# Patient Record
Sex: FEMALE | Race: BLACK OR AFRICAN AMERICAN | NOT HISPANIC OR LATINO | Employment: OTHER | URBAN - METROPOLITAN AREA
[De-identification: names, ages, dates, MRNs, and addresses within clinical notes are randomized per-mention and may not be internally consistent; named-entity substitution may affect disease eponyms.]

---

## 2017-01-09 DIAGNOSIS — F41.9 ANXIETY: ICD-10-CM

## 2017-01-09 NOTE — TELEPHONE ENCOUNTER
----- Message from Alma Griffiths sent at 1/9/2017  8:20 AM CST -----  Contact: pt  Pt requests refill for xanax. Pt can be reached at [phone]  ..  [facility]  [address] [place] [address]  Phone: [phone] Fax: [phone]

## 2017-01-11 DIAGNOSIS — F41.9 ANXIETY: ICD-10-CM

## 2017-01-11 RX ORDER — ALPRAZOLAM 2 MG/1
2 TABLET ORAL 2 TIMES DAILY
Qty: 60 TABLET | Refills: 2 | Status: CANCELLED | OUTPATIENT
Start: 2017-01-11

## 2017-01-11 RX ORDER — ALPRAZOLAM 2 MG/1
TABLET ORAL
Qty: 60 TABLET | Refills: 2 | Status: SHIPPED | OUTPATIENT
Start: 2017-01-11 | End: 2017-04-10 | Stop reason: SDUPTHER

## 2017-02-07 RX ORDER — LEVOTHYROXINE SODIUM 150 UG/1
TABLET ORAL
Qty: 30 TABLET | Refills: 6 | Status: SHIPPED | OUTPATIENT
Start: 2017-02-07 | End: 2017-04-18 | Stop reason: SDUPTHER

## 2017-02-09 ENCOUNTER — OFFICE VISIT (OUTPATIENT)
Dept: FAMILY MEDICINE | Facility: CLINIC | Age: 57
End: 2017-02-09
Payer: COMMERCIAL

## 2017-02-09 VITALS
BODY MASS INDEX: 21.19 KG/M2 | OXYGEN SATURATION: 96 % | RESPIRATION RATE: 18 BRPM | WEIGHT: 124.13 LBS | DIASTOLIC BLOOD PRESSURE: 84 MMHG | HEART RATE: 112 BPM | HEIGHT: 64 IN | SYSTOLIC BLOOD PRESSURE: 132 MMHG | TEMPERATURE: 99 F

## 2017-02-09 DIAGNOSIS — J30.9 ALLERGIC RHINITIS, CAUSE UNSPECIFIED: Primary | ICD-10-CM

## 2017-02-09 PROCEDURE — 99999 PR PBB SHADOW E&M-EST. PATIENT-LVL III: CPT | Mod: PBBFAC,,, | Performed by: REGISTERED NURSE

## 2017-02-09 PROCEDURE — 99213 OFFICE O/P EST LOW 20 MIN: CPT | Mod: S$GLB,,, | Performed by: REGISTERED NURSE

## 2017-02-09 RX ORDER — LEVOTHYROXINE SODIUM 150 UG/1
150 TABLET ORAL DAILY
Qty: 30 TABLET | Refills: 6 | Status: CANCELLED | OUTPATIENT
Start: 2017-02-09

## 2017-02-09 RX ORDER — PROMETHAZINE HYDROCHLORIDE AND CODEINE PHOSPHATE 6.25; 1 MG/5ML; MG/5ML
5 SOLUTION ORAL EVERY 6 HOURS PRN
Qty: 180 ML | Refills: 0 | Status: SHIPPED | OUTPATIENT
Start: 2017-02-09 | End: 2017-04-18 | Stop reason: SDUPTHER

## 2017-02-09 RX ORDER — CARBINOXAMINE MALEATE 4 MG/1
1-2 TABLET ORAL 3 TIMES DAILY PRN
Qty: 60 EACH | Refills: 0 | Status: SHIPPED | OUTPATIENT
Start: 2017-02-09 | End: 2017-04-18 | Stop reason: SDUPTHER

## 2017-02-09 RX ORDER — PREDNISONE 20 MG/1
40 TABLET ORAL DAILY
Qty: 10 TABLET | Refills: 0 | Status: SHIPPED | OUTPATIENT
Start: 2017-02-09 | End: 2017-02-14

## 2017-02-09 NOTE — MR AVS SNAPSHOT
[facility] Medicine  [address]  [place] [address]  Phone: [phone]                  Ana Bonilla   2017 10:30 AM   Office Visit    Description:  Female : 1960   Provider:  Kenrick Locke NP   Department:  [facility]           Reason for Visit     Generalized Body Aches     Cough     Fatigue           Diagnoses this Visit        Comments    Allergic rhinitis, cause unspecified    -  Primary            To Do List           Goals (5 Years of Data)     None       These Medications        Disp Refills Start End    predniSONE (DELTASONE) 20 MG tablet 10 tablet 0 2017    Take 2 tablets (40 mg total) by mouth once daily. - Oral    [facility] [place] [facility]. Ph #: [phone]       promethazine-codeine 6.25-10 mg/5 ml (PHENERGAN WITH CODEINE) 6.25-10 mg/5 mL syrup 180 mL 0 2017     Take 5 mLs by mouth every 6 (six) hours as needed. - Oral    [facility] [place] [facility]  [facility]. Ph #: [phone]       carbinoxamine maleate 4 mg Tab 60 each 0 2017     Take 1-2 tablets by mouth 3 (three) times daily as needed. - Oral    [facility] [place] [facility]  [facility]. Ph #: [phone]         [facility]     [facility] On Call Nurse Care Line -  Assistance  Registered nurses in the [facility] provide clinical advisement, health education, appointment booking, and other advisory services.  Call for this free service at [phone].             Medications           Message regarding Medications     Verify the changes and/or additions to your medication regime listed below are the same as discussed with your clinician today.  If any of these changes or additions are incorrect, please notify your healthcare provider.        START taking these NEW  medications        Refills    predniSONE (DELTASONE) 20 MG tablet 0    Sig: Take 2 tablets (40 mg total) by mouth once daily.    Class: Normal    Route: Oral    carbinoxamine maleate 4 mg Tab 0    Sig: Take 1-2 tablets by mouth 3 (three) times daily as needed.    Class: Normal    Route: Oral           Verify that the below list of medications is an accurate representation of the medications you are currently taking.  If none reported, the list may be blank. If incorrect, please contact your healthcare provider. Carry this list with you in case of emergency.           Current Medications     albuterol (PROVENTIL HFA) 90 mcg/actuation inhaler Inhale 2 puffs into the lungs every 6 (six) hours as needed.    albuterol-ipratropium  mcg (COMBIVENT)  mcg/actuation inhaler Inhale 2 puffs into the lungs every 6 (six) hours as needed for Wheezing.    alprazolam (XANAX) 2 MG Tab take 1 tablet by mouth twice a day if needed    alprazolam (XANAX) 2 MG Tab take 1 tablet by mouth twice a day if needed    augmented betamethasone dipropionate (DIPROLENE-AF) 0.05 % cream Apply topically 2 (two) times daily.    azelastine (OPTIVAR) 0.05 % ophthalmic solution Place 1 drop into both eyes 2 (two) times daily.    budesonide (PULMICORT) 180 mcg/actuation AePB Inhale 2 puffs into the lungs 2 (two) times daily.    carbinoxamine maleate 4 mg Tab Take 1-2 tablets by mouth 3 (three) times daily as needed.    flunisolide 25 mcg, 0.025%, (NASALIDE) 25 mcg (0.025 %) Spry 2 sprays by Nasal route 2 (two) times daily.    levocetirizine (XYZAL) 5 MG tablet Take 1 tablet (5 mg total) by mouth once daily.    levothyroxine (SYNTHROID) 150 MCG tablet take 1 tablet by mouth once daily    montelukast (SINGULAIR) 10 mg tablet Take 1 tablet (10 mg total) by mouth every evening.    omeprazole (PRILOSEC) 40 MG capsule Take 1 capsule (40 mg total) by mouth once daily.    ondansetron (ZOFRAN) 4 MG tablet Take 1 tablet (4 mg total) by mouth every 4 to 6  "hours as needed for Nausea.    oxycodone-acetaminophen (PERCOCET) 5-325 mg per tablet take 1 tablet by mouth every 8 to 12 hours    predniSONE (DELTASONE) 20 MG tablet Take 2 tablets (40 mg total) by mouth once daily.    promethazine-codeine 6.25-10 mg/5 ml (PHENERGAN WITH CODEINE) 6.25-10 mg/5 mL syrup Take 5 mLs by mouth every 6 (six) hours as needed.           Clinical Reference Information           Your Vitals Were     BP Pulse Temp Resp Height Weight    132/84 112 98.6 °F (37 °C) (Tympanic) 18 5' 4" (1.626 m) 56.3 kg (124 lb 1.9 oz)    SpO2 BMI             96% 21.3 kg/m2         Blood Pressure          Most Recent Value    BP  132/84      Allergies as of 2/9/2017     No Known Allergies      Immunizations Administered on Date of Encounter - 2/9/2017     None      Smoking Cessation     If you would like to quit smoking:   You may be eligible for free services if you are a [place] resident and started smoking cigarettes before September 1, 1988.  Call the Smoking Cessation Trust ([facility]) toll free at [phone] or ([phone].   Call 1-800-QUIT-NOW if you do not meet the above criteria.            Language Assistance Services     ATTENTION: Language assistance services are available, free of charge. Please call [phone].      ATENCIÓN: Si habla español, tiene a reyes disposición servicios gratuitos de asistencia lingüística. Llame al [phone].     [facility] Ý: N?u b?n nói Ti?ng Vi?t, có các d?ch v? h? tr? ngôn ng? mi?n phí dành cho b?n. G?i s? [phone].         [facility] complies with applicable Federal civil rights laws and does not discriminate on the basis of race, color, national origin, age, disability, or sex.        "

## 2017-02-09 NOTE — PROGRESS NOTES
"Subjective:       Patient ID: Ana Bonilla is a 56 y.o. female.    Chief Complaint: Generalized Body Aches; Cough; and Fatigue    HPI     Mrs. Bonilla is here today with c/o illness x 3 days.  Taking Benedryl, Phen-Codeine and nasal spray.  Reports fever, chills, aches, dry cough, NC, and ear pain.  Reports scratchy throat, sneezing w/ puffy watery eyes.    Review of Systems   Constitutional: Positive for chills, fatigue and fever.   HENT: Positive for congestion, ear pain, postnasal drip, sneezing and sore throat. Negative for rhinorrhea and sinus pressure.    Eyes: Positive for discharge, redness and itching. Negative for photophobia, pain and visual disturbance.   Respiratory: Positive for cough. Negative for shortness of breath and wheezing.    Cardiovascular: Negative.    Allergic/Immunologic: Positive for environmental allergies.   Neurological: Positive for headaches. Negative for weakness, light-headedness and numbness.   Hematological: Negative for adenopathy.       Objective:         Vitals:    02/09/17 1042   BP: 132/84   Pulse: (!) 112   Resp: 18   Temp: 98.6 °F (37 °C)   TempSrc: Tympanic   SpO2: 96%   Weight: 56.3 kg (124 lb 1.9 oz)   Height: 5' 4" (1.626 m)       Physical Exam   Constitutional: She is oriented to person, place, and time. She appears well-developed and well-nourished.   HENT:   Head: Normocephalic and atraumatic.   Right Ear: Tympanic membrane is bulging. Tympanic membrane is not injected, not perforated, not erythematous and not retracted.   Left Ear: Tympanic membrane is bulging. Tympanic membrane is not injected, not perforated, not erythematous and not retracted.   Nose: Mucosal edema and rhinorrhea (boggy, clear RN) present. Right sinus exhibits no maxillary sinus tenderness and no frontal sinus tenderness. Left sinus exhibits no maxillary sinus tenderness and no frontal sinus tenderness.   Mouth/Throat: No oropharyngeal exudate, posterior oropharyngeal edema or posterior " oropharyngeal erythema (clear PND noted).   Eyes: Pupils are equal, round, and reactive to light. Right eye exhibits discharge (clear B watery d/c, eye redness, puffy, shiners). Left eye exhibits discharge.   Cardiovascular: Regular rhythm and normal heart sounds.  Tachycardia present.  Exam reveals no gallop and no friction rub.    No murmur heard.  Pulmonary/Chest: Effort normal and breath sounds normal. No respiratory distress. She has no wheezes. She exhibits no tenderness.   Lymphadenopathy:     She has no cervical adenopathy.   Neurological: She is alert and oriented to person, place, and time.   Vitals reviewed.      Assessment:       1. Allergic rhinitis, cause unspecified        Plan:       Ana was seen today for generalized body aches, cough and fatigue.    Diagnoses and all orders for this visit:    Allergic rhinitis, cause unspecified  -     predniSONE (DELTASONE) 20 MG tablet; Take 2 tablets (40 mg total) by mouth once daily.  -     promethazine-codeine 6.25-10 mg/5 ml (PHENERGAN WITH CODEINE) 6.25-10 mg/5 mL syrup; Take 5 mLs by mouth every 6 (six) hours as needed.  -     carbinoxamine maleate 4 mg Tab; Take 1-2 tablets by mouth 3 (three) times daily as needed.      Symptomatic care, rest and fluids.  RTC prn.

## 2017-04-10 DIAGNOSIS — J32.9 SINUSITIS, UNSPECIFIED CHRONICITY, UNSPECIFIED LOCATION: ICD-10-CM

## 2017-04-10 DIAGNOSIS — F41.9 ANXIETY: ICD-10-CM

## 2017-04-10 RX ORDER — OMEPRAZOLE 40 MG/1
CAPSULE, DELAYED RELEASE ORAL
Qty: 30 CAPSULE | Refills: 6 | Status: SHIPPED | OUTPATIENT
Start: 2017-04-10 | End: 2017-04-18 | Stop reason: SDUPTHER

## 2017-04-10 RX ORDER — ALPRAZOLAM 2 MG/1
TABLET ORAL
Qty: 60 TABLET | Refills: 0 | Status: SHIPPED | OUTPATIENT
Start: 2017-04-10 | End: 2017-04-18 | Stop reason: SDUPTHER

## 2017-04-10 RX ORDER — FLUNISOLIDE 0.25 MG/ML
2 SOLUTION NASAL 2 TIMES DAILY
Qty: 25 ML | Refills: 2 | Status: SHIPPED | OUTPATIENT
Start: 2017-04-10 | End: 2017-08-29

## 2017-04-10 NOTE — TELEPHONE ENCOUNTER
----- Message from Monse Thomason sent at 4/10/2017  4:38 PM CDT -----  Requesting Rx refill for nasal spray, Xanax, and heart burn medication.    Pt use..  [facility] [facility] [address]  [address]  [place] [address]  Phone: [phone] Fax: [phone]    Please adv/call [phone].//thanks. cw

## 2017-04-11 NOTE — TELEPHONE ENCOUNTER
Pt notified of medication sent to pharmacy and appointment needed for physical. Pt voiced understanding

## 2017-04-11 NOTE — TELEPHONE ENCOUNTER
----- Message from Kamila Vann sent at 4/11/2017  2:50 PM CDT -----  Patient would like for you to call her at [phone].     This is all she would tell me.                                  kirk

## 2017-04-18 ENCOUNTER — OFFICE VISIT (OUTPATIENT)
Dept: FAMILY MEDICINE | Facility: CLINIC | Age: 57
End: 2017-04-18
Payer: COMMERCIAL

## 2017-04-18 ENCOUNTER — LAB VISIT (OUTPATIENT)
Dept: LAB | Facility: HOSPITAL | Age: 57
End: 2017-04-18
Attending: REGISTERED NURSE
Payer: COMMERCIAL

## 2017-04-18 VITALS
BODY MASS INDEX: 21.11 KG/M2 | HEIGHT: 64 IN | HEART RATE: 105 BPM | OXYGEN SATURATION: 99 % | RESPIRATION RATE: 18 BRPM | SYSTOLIC BLOOD PRESSURE: 136 MMHG | DIASTOLIC BLOOD PRESSURE: 86 MMHG | WEIGHT: 123.69 LBS | TEMPERATURE: 97 F

## 2017-04-18 DIAGNOSIS — E78.00 HYPERCHOLESTEROLEMIA: ICD-10-CM

## 2017-04-18 DIAGNOSIS — Z00.00 ANNUAL PHYSICAL EXAM: ICD-10-CM

## 2017-04-18 DIAGNOSIS — J44.89 COPD WITH ASTHMA: ICD-10-CM

## 2017-04-18 DIAGNOSIS — E03.9 PRIMARY HYPOTHYROIDISM: ICD-10-CM

## 2017-04-18 DIAGNOSIS — Z72.0 TOBACCO ABUSE DISORDER: ICD-10-CM

## 2017-04-18 DIAGNOSIS — Z00.00 ANNUAL PHYSICAL EXAM: Primary | ICD-10-CM

## 2017-04-18 DIAGNOSIS — J30.9 CHRONIC ALLERGIC RHINITIS: ICD-10-CM

## 2017-04-18 DIAGNOSIS — F41.9 ANXIETY: ICD-10-CM

## 2017-04-18 DIAGNOSIS — I73.9 PVD (PERIPHERAL VASCULAR DISEASE): ICD-10-CM

## 2017-04-18 DIAGNOSIS — R10.9 SIDE PAIN: ICD-10-CM

## 2017-04-18 DIAGNOSIS — Z12.39 BREAST CANCER SCREENING: ICD-10-CM

## 2017-04-18 DIAGNOSIS — K21.9 GASTROESOPHAGEAL REFLUX DISEASE, ESOPHAGITIS PRESENCE NOT SPECIFIED: ICD-10-CM

## 2017-04-18 LAB
ALBUMIN SERPL BCP-MCNC: 3.6 G/DL
ALP SERPL-CCNC: 63 U/L
ALT SERPL W/O P-5'-P-CCNC: 13 U/L
ANION GAP SERPL CALC-SCNC: 7 MMOL/L
AST SERPL-CCNC: 24 U/L
BACTERIA #/AREA URNS AUTO: ABNORMAL /HPF
BASOPHILS # BLD AUTO: 0.03 K/UL
BASOPHILS NFR BLD: 0.7 %
BILIRUB SERPL-MCNC: 0.6 MG/DL
BILIRUB UR QL STRIP: NEGATIVE
BUN SERPL-MCNC: 10 MG/DL
CALCIUM SERPL-MCNC: 8.8 MG/DL
CHLORIDE SERPL-SCNC: 107 MMOL/L
CHOLEST/HDLC SERPL: 2.3 {RATIO}
CLARITY UR REFRACT.AUTO: ABNORMAL
CO2 SERPL-SCNC: 27 MMOL/L
COLOR UR AUTO: YELLOW
CREAT SERPL-MCNC: 0.9 MG/DL
DIFFERENTIAL METHOD: ABNORMAL
EOSINOPHIL # BLD AUTO: 0.1 K/UL
EOSINOPHIL NFR BLD: 2.8 %
ERYTHROCYTE [DISTWIDTH] IN BLOOD BY AUTOMATED COUNT: 12.8 %
EST. GFR  (AFRICAN AMERICAN): >60 ML/MIN/1.73 M^2
EST. GFR  (NON AFRICAN AMERICAN): >60 ML/MIN/1.73 M^2
GLUCOSE SERPL-MCNC: 97 MG/DL
GLUCOSE UR QL STRIP: NEGATIVE
HCT VFR BLD AUTO: 39.7 %
HDL/CHOLESTEROL RATIO: 42.6 %
HDLC SERPL-MCNC: 204 MG/DL
HDLC SERPL-MCNC: 87 MG/DL
HGB BLD-MCNC: 13.4 G/DL
HGB UR QL STRIP: ABNORMAL
KETONES UR QL STRIP: NEGATIVE
LDLC SERPL CALC-MCNC: 106.8 MG/DL
LEUKOCYTE ESTERASE UR QL STRIP: NEGATIVE
LYMPHOCYTES # BLD AUTO: 1.5 K/UL
LYMPHOCYTES NFR BLD: 35.5 %
MCH RBC QN AUTO: 31.5 PG
MCHC RBC AUTO-ENTMCNC: 33.8 %
MCV RBC AUTO: 93 FL
MICROSCOPIC COMMENT: ABNORMAL
MONOCYTES # BLD AUTO: 0.4 K/UL
MONOCYTES NFR BLD: 9.6 %
NEUTROPHILS # BLD AUTO: 2.2 K/UL
NEUTROPHILS NFR BLD: 51.4 %
NITRITE UR QL STRIP: NEGATIVE
NONHDLC SERPL-MCNC: 117 MG/DL
PH UR STRIP: 5 [PH] (ref 5–8)
PLATELET # BLD AUTO: 288 K/UL
PMV BLD AUTO: 10.3 FL
POTASSIUM SERPL-SCNC: 3.7 MMOL/L
PROT SERPL-MCNC: 7.7 G/DL
PROT UR QL STRIP: NEGATIVE
RBC # BLD AUTO: 4.26 M/UL
RBC #/AREA URNS AUTO: 15 /HPF (ref 0–4)
SODIUM SERPL-SCNC: 141 MMOL/L
SP GR UR STRIP: 1.02 (ref 1–1.03)
SQUAMOUS #/AREA URNS AUTO: 8 /HPF
TRIGL SERPL-MCNC: 51 MG/DL
TSH SERPL DL<=0.005 MIU/L-ACNC: 1.1 UIU/ML
URN SPEC COLLECT METH UR: ABNORMAL
UROBILINOGEN UR STRIP-ACNC: NEGATIVE EU/DL
WBC # BLD AUTO: 4.28 K/UL
WBC #/AREA URNS AUTO: 3 /HPF (ref 0–5)

## 2017-04-18 PROCEDURE — 81001 URINALYSIS AUTO W/SCOPE: CPT

## 2017-04-18 PROCEDURE — 85025 COMPLETE CBC W/AUTO DIFF WBC: CPT

## 2017-04-18 PROCEDURE — 84443 ASSAY THYROID STIM HORMONE: CPT

## 2017-04-18 PROCEDURE — 80053 COMPREHEN METABOLIC PANEL: CPT

## 2017-04-18 PROCEDURE — 99396 PREV VISIT EST AGE 40-64: CPT | Mod: S$GLB,,, | Performed by: REGISTERED NURSE

## 2017-04-18 PROCEDURE — 36415 COLL VENOUS BLD VENIPUNCTURE: CPT | Mod: PO

## 2017-04-18 PROCEDURE — 99999 PR PBB SHADOW E&M-EST. PATIENT-LVL IV: CPT | Mod: PBBFAC,,, | Performed by: REGISTERED NURSE

## 2017-04-18 PROCEDURE — 80061 LIPID PANEL: CPT

## 2017-04-18 RX ORDER — ALPRAZOLAM 2 MG/1
2 TABLET ORAL 2 TIMES DAILY PRN
Qty: 60 TABLET | Refills: 2 | Status: SHIPPED | OUTPATIENT
Start: 2017-04-18 | End: 2017-08-09 | Stop reason: SDUPTHER

## 2017-04-18 RX ORDER — LEVOTHYROXINE SODIUM 150 UG/1
150 TABLET ORAL DAILY
Qty: 30 TABLET | Refills: 6 | Status: SHIPPED | OUTPATIENT
Start: 2017-04-18 | End: 2018-04-28 | Stop reason: SDUPTHER

## 2017-04-18 RX ORDER — ACETAMINOPHEN 500 MG/1
TABLET, FILM COATED ORAL
Refills: 0 | COMMUNITY
Start: 2017-03-11 | End: 2017-08-15

## 2017-04-18 RX ORDER — OMEPRAZOLE 40 MG/1
40 CAPSULE, DELAYED RELEASE ORAL DAILY
Qty: 30 CAPSULE | Refills: 6 | Status: SHIPPED | OUTPATIENT
Start: 2017-04-18 | End: 2018-05-23

## 2017-04-18 RX ORDER — MONTELUKAST SODIUM 10 MG/1
10 TABLET ORAL NIGHTLY
Qty: 30 TABLET | Refills: 6 | Status: SHIPPED | OUTPATIENT
Start: 2017-04-18 | End: 2018-05-23

## 2017-04-18 RX ORDER — CARBINOXAMINE MALEATE 4 MG/1
1-2 TABLET ORAL 3 TIMES DAILY PRN
Qty: 60 EACH | Refills: 0 | Status: SHIPPED | OUTPATIENT
Start: 2017-04-18 | End: 2017-08-29

## 2017-04-18 RX ORDER — PROMETHAZINE HYDROCHLORIDE AND CODEINE PHOSPHATE 6.25; 1 MG/5ML; MG/5ML
5 SOLUTION ORAL EVERY 6 HOURS PRN
Qty: 180 ML | Refills: 0 | Status: SHIPPED | OUTPATIENT
Start: 2017-04-18 | End: 2017-08-15

## 2017-04-18 NOTE — MR AVS SNAPSHOT
[facility] Medicine  [address] [address]  Phone: [phone]                  Ana Bonilla   2017 9:00 AM   Office Visit    Description:  Female : 1960   Provider:  Kenrick Locke NP   Department:  [facility]           Reason for Visit     Annual Exam           Diagnoses this Visit        Comments    Annual physical exam    -  Primary     Hypercholesterolemia         Primary hypothyroidism         PVD (peripheral vascular disease)         Gastroesophageal reflux disease, esophagitis presence not specified         Anxiety         Chronic allergic rhinitis         COPD with asthma         Side pain         Tobacco abuse disorder         Breast cancer screening                To Do List           Future Appointments        Provider Department Dept Phone    2017 11:00 AM LABORATORY, [address] [facility] [phone]      Goals (5 Years of Data)     None       These Medications        Disp Refills Start End    alprazolam (XANAX) 2 MG Tab 60 tablet 2 2017     Take 1 tablet (2 mg total) by mouth 2 (two) times daily as needed. - Oral    Pharmacy: [address]  [facility]. Ph #: [phone]       carbinoxamine maleate 4 mg Tab 60 each 0 2017     Take 1-2 tablets by mouth 3 (three) times daily as needed. - Oral    Pharmacy: [address]  [facility]. Ph #: [phone]       levothyroxine (SYNTHROID) 150 MCG tablet 30 tablet 6 2017     Take 1 tablet (150 mcg total) by mouth once daily. - Oral    Pharmacy: [address]  [facility]. Ph #: [phone]       montelukast (SINGULAIR) 10 mg tablet 30 tablet 6 2017     Take 1 tablet (10 mg total) by mouth every evening. - Oral    Pharmacy: [address]  - [address] [facility]. Ph #: [phone]       omeprazole (PRILOSEC) 40 MG capsule 30 capsule 6 4/18/2017     Take 1 capsule (40 mg total) by mouth once daily. - Oral    Pharmacy: [facility] [facility] [place] - [address] [facility]. Ph #: [phone]       promethazine-codeine 6.25-10 mg/5 ml (PHENERGAN WITH CODEINE) 6.25-10 mg/5 mL syrup 180 mL 0 4/18/2017     Take 5 mLs by mouth every 6 (six) hours as needed. - Oral    Pharmacy: [facility] [address] [facility]. Ph #: [phone]         [facility] On Call     [facility] On Call Nurse Care Line - 24/7 Assistance  Unless otherwise directed by your provider, please contact [facility], our nurse care line that is available for 24/7 assistance.     Registered nurses in the [facility] provide: appointment scheduling, clinical advisement, health education, and other advisory services.  Call: [phone] (toll free)               Medications           Message regarding Medications     Verify the changes and/or additions to your medication regime listed below are the same as discussed with your clinician today.  If any of these changes or additions are incorrect, please notify your healthcare provider.        CHANGE how you are taking these medications     Start Taking Instead of    alprazolam (XANAX) 2 MG Tab alprazolam (XANAX) 2 MG Tab    Dosage:  Take 1 tablet (2 mg total) by mouth 2 (two) times daily as needed. Dosage:  take 1 tablet by mouth twice a day if needed    Reason for Change:  Reorder     levothyroxine (SYNTHROID) 150 MCG tablet levothyroxine (SYNTHROID) 150 MCG tablet    Dosage:  Take 1 tablet (150 mcg total) by mouth once daily. Dosage:  take 1 tablet by mouth once daily    Reason for Change:  Reorder     omeprazole (PRILOSEC) 40 MG capsule omeprazole (PRILOSEC) 40 MG capsule    Dosage:  Take 1 capsule (40 mg total) by mouth once daily. Dosage:  take 1 capsule  by mouth once daily    Reason for Change:  Reorder       STOP taking these medications     levocetirizine (XYZAL) 5 MG tablet Take 1 tablet (5 mg total) by mouth once daily.           Verify that the below list of medications is an accurate representation of the medications you are currently taking.  If none reported, the list may be blank. If incorrect, please contact your healthcare provider. Carry this list with you in case of emergency.           Current Medications     albuterol (PROVENTIL HFA) 90 mcg/actuation inhaler Inhale 2 puffs into the lungs every 6 (six) hours as needed.    alprazolam (XANAX) 2 MG Tab Take 1 tablet (2 mg total) by mouth 2 (two) times daily as needed.    azelastine (OPTIVAR) 0.05 % ophthalmic solution Place 1 drop into both eyes 2 (two) times daily.    carbinoxamine maleate 4 mg Tab Take 1-2 tablets by mouth 3 (three) times daily as needed.    flunisolide 25 mcg, 0.025%, (NASALIDE) 25 mcg (0.025 %) Spry 2 sprays by Nasal route 2 (two) times daily.    levothyroxine (SYNTHROID) 150 MCG tablet Take 1 tablet (150 mcg total) by mouth once daily.    montelukast (SINGULAIR) 10 mg tablet Take 1 tablet (10 mg total) by mouth every evening.    omeprazole (PRILOSEC) 40 MG capsule Take 1 capsule (40 mg total) by mouth once daily.    ondansetron (ZOFRAN) 4 MG tablet Take 1 tablet (4 mg total) by mouth every 4 to 6 hours as needed for Nausea.    oxycodone-acetaminophen (PERCOCET) 5-325 mg per tablet take 1 tablet by mouth every 8 to 12 hours    TYLENOL EXTRA STRENGTH 500 mg tablet take 2 CAPLETS by mouth every 6 hours    albuterol-ipratropium  mcg (COMBIVENT)  mcg/actuation inhaler Inhale 2 puffs into the lungs every 6 (six) hours as needed for Wheezing.    augmented betamethasone dipropionate (DIPROLENE-AF) 0.05 % cream Apply topically 2 (two) times daily.    budesonide (PULMICORT) 180 mcg/actuation AePB Inhale 2 puffs into the lungs 2 (two) times daily.    promethazine-codeine 6.25-10  "mg/5 ml (PHENERGAN WITH CODEINE) 6.25-10 mg/5 mL syrup Take 5 mLs by mouth every 6 (six) hours as needed.           Clinical Reference Information           Your Vitals Were     BP Pulse Temp Resp    136/86 (BP Location: Left arm, Patient Position: Sitting, BP Method: Manual) 105 96.7 °F (35.9 °C) (Tympanic) 18    Height Weight SpO2 BMI    5' 4" (1.626 m) 56.1 kg (123 lb 10.9 oz) 99% 21.23 kg/m2      Blood Pressure          Most Recent Value    BP  136/86      Allergies as of 4/18/2017     No Known Allergies      Immunizations Administered on Date of Encounter - 4/18/2017     None      Orders Placed During Today's Visit     Future Labs/Procedures Expected by Expires    CBC auto differential  4/18/2017 6/17/2018    Comprehensive metabolic panel  4/18/2017 6/17/2018    Lipid panel  4/18/2017 6/17/2018    Mammo Digital Screening Bilat with CAD  4/18/2017 6/18/2018    TSH  4/18/2017 6/17/2018      Smoking Cessation     If you would like to quit smoking:   You may be eligible for free services if you are a [place] resident and started smoking cigarettes before September 1, 1988.  Call the Smoking Cessation Trust ([facility]) toll free at [phone] [phone] or [phone] [phone].   Call 1-800-QUIT-NOW if you do not meet the above criteria.   Contact us via email: [email or web address]   View our website for more information: [email or web address]        Language Assistance Services     ATTENTION: Language assistance services are available, free of charge. Please call [phone].      ATENCIÓN: Si habla español, tiene a reyes disposición servicios gratuitos de asistencia lingüística. Llame al [phone].     JADE Ý: N?u b?n nói Ti?ng Vi?t, có các d?ch v? h? tr? ngôn ng? mi?n phí dành cho b?n. G?i s? [phone].         [facility] complies with applicable Federal civil rights laws and does not discriminate on the basis of race, color, national origin, age, disability, or sex.        "

## 2017-04-26 NOTE — PROGRESS NOTES
Subjective:       Patient ID: Ana Bonilla is a 56 y.o. female.    Chief Complaint: Annual Exam      HPI    Mrs. Bonilla is here today for her annual wellness exam.  I have reviewed the patient's medical history in detail and updated the computerized patient record.    Reports LT side pain x few days.  Concerned might be kidney related, admits to lack of water intake daily.  However, she has been doing an increased amount of housework, cleaning, mopping, etc.  No pain meds taken.      Review of Systems   Constitutional: Negative.    HENT: Negative.    Eyes: Negative.    Respiratory: Negative.    Cardiovascular: Negative for chest pain, palpitations and leg swelling.   Gastrointestinal: Negative.    Endocrine: Negative for cold intolerance, heat intolerance, polydipsia, polyphagia and polyuria.   Genitourinary: Negative.    Musculoskeletal: Positive for back pain (LT side pain). Negative for gait problem, joint swelling and neck pain.   Skin: Negative.    Neurological: Negative.    Hematological: Negative for adenopathy. Does not bruise/bleed easily.   Psychiatric/Behavioral: Positive for sleep disturbance. Negative for dysphoric mood, hallucinations, self-injury and suicidal ideas. The patient is nervous/anxious (on meds). The patient is not hyperactive.          Patient Active Problem List   Diagnosis    COPD with asthma    GERD (gastroesophageal reflux disease)    Primary hypothyroidism    Tobacco abuse disorder    PVD (peripheral vascular disease)    Anxiety    Chronic allergic rhinitis    Hypercholesterolemia       Past Medical History:   Diagnosis Date    Amblyopia OS    Anxiety     Asthma     GERD (gastroesophageal reflux disease)     Thyroid disease      Past Surgical History:   Procedure Laterality Date    APPENDECTOMY      BUNIONECTOMY      HYSTERECTOMY      THYROIDECTOMY       Family History   Problem Relation Age of Onset    Hypertension Mother     Diabetes Mother     Mental  "illness Son     Diabetes Father     Pancreatic cancer Maternal Uncle     Stroke Maternal Grandmother     Prostate cancer Maternal Grandfather      Social History     Social History    Marital status:     Number of children: 2     Occupational History    [employer]      Social History Main Topics    Smoking status: Current Every Day Smoker     Packs/day: 1.00     Types: Cigarettes    Smokeless tobacco: Never Used    Alcohol use Yes, rarely    Drug use: No         Review of patient's allergies indicates:  No Known Allergies        Objective:     Vitals:    04/18/17 0903   BP: 136/86   BP Location: Left arm   Patient Position: Sitting   BP Method: Manual   Pulse: 105   Resp: 18   Temp: 96.7 °F (35.9 °C)   TempSrc: Tympanic   SpO2: 99%   Weight: 56.1 kg (123 lb 10.9 oz)   Height: 5' 4" (1.626 m)       Physical Exam   Constitutional: She is oriented to person, place, and time. She appears well-developed and well-nourished. No distress.   HENT:   Head: Normocephalic and atraumatic.   Right Ear: External ear normal.   Left Ear: External ear normal.   Nose: Nose normal.   Mouth/Throat: Oropharynx is clear and moist. No oropharyngeal exudate.   Eyes: Conjunctivae and EOM are normal. Pupils are equal, round, and reactive to light. Right eye exhibits no discharge. Left eye exhibits no discharge. No scleral icterus.   Neck: Normal range of motion. Neck supple. No JVD present. No tracheal deviation present. No thyromegaly present.   Cardiovascular: Normal rate, regular rhythm, normal heart sounds and intact distal pulses.  Exam reveals no gallop and no friction rub.    No murmur heard.  Pulmonary/Chest: Effort normal and breath sounds normal. No stridor. No tachypnea. No respiratory distress. She has no decreased breath sounds. She has no wheezes. She exhibits no tenderness. Right breast exhibits no mass. Left breast exhibits no mass. Breasts are symmetrical. There is no breast swelling.   Abdominal: Soft. Bowel " sounds are normal. She exhibits no distension and no mass. There is no tenderness.   Genitourinary: No breast tenderness or discharge.   Genitourinary Comments: Declined exam   Musculoskeletal: Normal range of motion. She exhibits no edema or deformity.        Lumbar back: She exhibits tenderness.        Back:    Lymphadenopathy:     She has no cervical adenopathy.   Neurological: She is alert and oriented to person, place, and time. She has normal reflexes. She displays normal reflexes. No cranial nerve deficit. She exhibits normal muscle tone. Coordination normal.   Skin: Skin is warm and dry. No rash noted. She is not diaphoretic. No erythema.   Psychiatric: She has a normal mood and affect. Her behavior is normal. Judgment and thought content normal.       Medication List with Changes/Refills   Current Medications    ALBUTEROL (PROVENTIL HFA) 90 MCG/ACTUATION INHALER    Inhale 2 puffs into the lungs every 6 (six) hours as needed.    ALBUTEROL-IPRATROPIUM  MCG (COMBIVENT)  MCG/ACTUATION INHALER    Inhale 2 puffs into the lungs every 6 (six) hours as needed for Wheezing.    AUGMENTED BETAMETHASONE DIPROPIONATE (DIPROLENE-AF) 0.05 % CREAM    Apply topically 2 (two) times daily.    AZELASTINE (OPTIVAR) 0.05 % OPHTHALMIC SOLUTION    Place 1 drop into both eyes 2 (two) times daily.    BUDESONIDE (PULMICORT) 180 MCG/ACTUATION AEPB    Inhale 2 puffs into the lungs 2 (two) times daily.    FLUNISOLIDE 25 MCG, 0.025%, (NASALIDE) 25 MCG (0.025 %) SPRY    2 sprays by Nasal route 2 (two) times daily.    ONDANSETRON (ZOFRAN) 4 MG TABLET    Take 1 tablet (4 mg total) by mouth every 4 to 6 hours as needed for Nausea.    OXYCODONE-ACETAMINOPHEN (PERCOCET) 5-325 MG PER TABLET    take 1 tablet by mouth every 8 to 12 hours    TYLENOL EXTRA STRENGTH 500 MG TABLET    take 2 CAPLETS by mouth every 6 hours   Changed and/or Refilled Medications    Modified Medication Previous Medication    ALPRAZOLAM (XANAX) 2 MG TAB  alprazolam (XANAX) 2 MG Tab       Take 1 tablet (2 mg total) by mouth 2 (two) times daily as needed.    take 1 tablet by mouth twice a day if needed    CARBINOXAMINE MALEATE 4 MG TAB carbinoxamine maleate 4 mg Tab       Take 1-2 tablets by mouth 3 (three) times daily as needed.    Take 1-2 tablets by mouth 3 (three) times daily as needed.    LEVOTHYROXINE (SYNTHROID) 150 MCG TABLET levothyroxine (SYNTHROID) 150 MCG tablet       Take 1 tablet (150 mcg total) by mouth once daily.    take 1 tablet by mouth once daily    MONTELUKAST (SINGULAIR) 10 MG TABLET montelukast (SINGULAIR) 10 mg tablet       Take 1 tablet (10 mg total) by mouth every evening.    Take 1 tablet (10 mg total) by mouth every evening.    OMEPRAZOLE (PRILOSEC) 40 MG CAPSULE omeprazole (PRILOSEC) 40 MG capsule       Take 1 capsule (40 mg total) by mouth once daily.    take 1 capsule by mouth once daily    PROMETHAZINE-CODEINE 6.25-10 MG/5 ML (PHENERGAN WITH CODEINE) 6.25-10 MG/5 ML SYRUP promethazine-codeine 6.25-10 mg/5 ml (PHENERGAN WITH CODEINE) 6.25-10 mg/5 mL syrup       Take 5 mLs by mouth every 6 (six) hours as needed.    Take 5 mLs by mouth every 6 (six) hours as needed.   Discontinued Medications    ALPRAZOLAM (XANAX) 2 MG TAB    take 1 tablet by mouth twice a day if needed    LEVOCETIRIZINE (XYZAL) 5 MG TABLET    Take 1 tablet (5 mg total) by mouth once daily.       Diagnosis       1. Annual physical exam    2. Hypercholesterolemia    3. Primary hypothyroidism    4. PVD (peripheral vascular disease)    5. Gastroesophageal reflux disease, esophagitis presence not specified    6. Anxiety    7. Chronic allergic rhinitis    8. COPD with asthma    9. Side pain    10. Tobacco abuse disorder    11. Breast cancer screening          Assessment/ Plan     Annual physical exam  -     Mammo Digital Screening Bilat with CAD; Future; Expected date: 4/18/17  -     CBC auto differential; Future; Expected date: 4/18/17  -     Comprehensive metabolic panel;  Future; Expected date: 4/18/17  -     TSH; Future; Expected date: 4/18/17  -     Lipid panel; Future; Expected date: 4/18/17    Hypercholesterolemia  -     CBC auto differential; Future; Expected date: 4/18/17  -     Comprehensive metabolic panel; Future; Expected date: 4/18/17  -     TSH; Future; Expected date: 4/18/17  -     Lipid panel; Future; Expected date: 4/18/17    Primary hypothyroidism  -     CBC auto differential; Future; Expected date: 4/18/17  -     Comprehensive metabolic panel; Future; Expected date: 4/18/17  -     TSH; Future; Expected date: 4/18/17  -     Lipid panel; Future; Expected date: 4/18/17  -     levothyroxine (SYNTHROID) 150 MCG tablet; Take 1 tablet (150 mcg total) by mouth once daily.  Dispense: 30 tablet; Refill: 6    PVD (peripheral vascular disease)    Gastroesophageal reflux disease, esophagitis presence not specified  -     omeprazole (PRILOSEC) 40 MG capsule; Take 1 capsule (40 mg total) by mouth once daily.  Dispense: 30 capsule; Refill: 6    Anxiety  -     alprazolam (XANAX) 2 MG Tab; Take 1 tablet (2 mg total) by mouth 2 (two) times daily as needed.  Dispense: 60 tablet; Refill: 2    Chronic allergic rhinitis  -     carbinoxamine maleate 4 mg Tab; Take 1-2 tablets by mouth 3 (three) times daily as needed.  Dispense: 60 each; Refill: 0  -     montelukast (SINGULAIR) 10 mg tablet; Take 1 tablet (10 mg total) by mouth every evening.  Dispense: 30 tablet; Refill: 6    COPD with asthma  -     montelukast (SINGULAIR) 10 mg tablet; Take 1 tablet (10 mg total) by mouth every evening.  Dispense: 30 tablet; Refill: 6  -     promethazine-codeine 6.25-10 mg/5 ml (PHENERGAN WITH CODEINE) 6.25-10 mg/5 mL syrup; Take 5 mLs by mouth every 6 (six) hours as needed.  Dispense: 180 mL; Refill: 0    Side pain  -     Urinalysis    Tobacco abuse disorder        -     Smoking cessation encouraged/advised.    Breast cancer screening  -     Mammo Digital Screening Bilat with CAD; Future; Expected date:  4/18/17      Medications refilled.  Mammogram.  Lab pending.  Follow-up in clinic as needed.      JONA Ramesh  [facility]

## 2017-08-09 DIAGNOSIS — F41.9 ANXIETY: ICD-10-CM

## 2017-08-09 RX ORDER — ALPRAZOLAM 2 MG/1
TABLET ORAL
Qty: 60 TABLET | Refills: 2 | Status: SHIPPED | OUTPATIENT
Start: 2017-08-09 | End: 2017-11-02 | Stop reason: SDUPTHER

## 2017-08-15 ENCOUNTER — OFFICE VISIT (OUTPATIENT)
Dept: FAMILY MEDICINE | Facility: CLINIC | Age: 57
End: 2017-08-15
Payer: OTHER MISCELLANEOUS

## 2017-08-15 ENCOUNTER — HOSPITAL ENCOUNTER (OUTPATIENT)
Dept: RADIOLOGY | Facility: HOSPITAL | Age: 57
Discharge: HOME | End: 2017-08-15
Attending: REGISTERED NURSE
Payer: COMMERCIAL

## 2017-08-15 VITALS
BODY MASS INDEX: 21.61 KG/M2 | TEMPERATURE: 98 F | DIASTOLIC BLOOD PRESSURE: 80 MMHG | OXYGEN SATURATION: 98 % | WEIGHT: 126.56 LBS | RESPIRATION RATE: 17 BRPM | HEART RATE: 106 BPM | HEIGHT: 64 IN | SYSTOLIC BLOOD PRESSURE: 150 MMHG

## 2017-08-15 DIAGNOSIS — M99.71 CONNECTIVE TISSUE AND DISC STENOSIS OF INTERVERTEBRAL FORAMINA OF CERVICAL REGION: ICD-10-CM

## 2017-08-15 DIAGNOSIS — Z01.818 PREOP EXAMINATION: Primary | ICD-10-CM

## 2017-08-15 DIAGNOSIS — R03.0 BLOOD PRESSURE ELEVATED WITHOUT HISTORY OF HTN: ICD-10-CM

## 2017-08-15 DIAGNOSIS — Z01.818 PREOP EXAMINATION: ICD-10-CM

## 2017-08-15 LAB
BILIRUB UR QL STRIP: NEGATIVE
CLARITY UR REFRACT.AUTO: CLEAR
COLOR UR AUTO: YELLOW
GLUCOSE UR QL STRIP: NEGATIVE
HGB UR QL STRIP: ABNORMAL
KETONES UR QL STRIP: NEGATIVE
LEUKOCYTE ESTERASE UR QL STRIP: ABNORMAL
MICROSCOPIC COMMENT: NORMAL
NITRITE UR QL STRIP: NEGATIVE
PH UR STRIP: 6 [PH] (ref 5–8)
PROT UR QL STRIP: NEGATIVE
RBC #/AREA URNS AUTO: 0 /HPF (ref 0–4)
SP GR UR STRIP: 1 (ref 1–1.03)
SQUAMOUS #/AREA URNS AUTO: 3 /HPF
URN SPEC COLLECT METH UR: ABNORMAL
UROBILINOGEN UR STRIP-ACNC: NEGATIVE EU/DL
WBC #/AREA URNS AUTO: 2 /HPF (ref 0–5)

## 2017-08-15 PROCEDURE — 71020 XR CHEST PA AND LATERAL: CPT | Mod: TC,PO

## 2017-08-15 PROCEDURE — 93010 ELECTROCARDIOGRAM REPORT: CPT | Mod: S$GLB,,, | Performed by: INTERNAL MEDICINE

## 2017-08-15 PROCEDURE — 93005 ELECTROCARDIOGRAM TRACING: CPT | Mod: S$GLB,,, | Performed by: REGISTERED NURSE

## 2017-08-15 PROCEDURE — 3008F BODY MASS INDEX DOCD: CPT | Mod: ,,, | Performed by: REGISTERED NURSE

## 2017-08-15 PROCEDURE — 71020 XR CHEST PA AND LATERAL: CPT | Mod: 26,,, | Performed by: RADIOLOGY

## 2017-08-15 PROCEDURE — 99999 PR PBB SHADOW E&M-EST. PATIENT-LVL V: CPT | Mod: PBBFAC,,, | Performed by: REGISTERED NURSE

## 2017-08-15 PROCEDURE — 99214 OFFICE O/P EST MOD 30 MIN: CPT | Mod: 25,S$PBB,, | Performed by: REGISTERED NURSE

## 2017-08-15 RX ORDER — PROMETHAZINE HYDROCHLORIDE AND CODEINE PHOSPHATE 6.25; 1 MG/5ML; MG/5ML
5 SOLUTION ORAL
Qty: 180 ML | Refills: 0 | Status: SHIPPED | OUTPATIENT
Start: 2017-08-15 | End: 2018-05-23

## 2017-08-15 NOTE — PROGRESS NOTES
Subjective:       Patient ID: Ana Bonilla is a 57 y.o. female.    Chief Complaint: Pre-op Exam      HPI    Mrs. Bonilla is here today for preop clearance for upcoming surgery.  Scheduled for C5-6 ACDF on 8/31/2017 with Dr. Richard at [facility].    Denies complications to surgery or anesthesia previously.    Blood pressure elevated today on 2 recordings, she reports likely due to stress.  Has recently increased from 1/2 ppd smoking to 1 ppd due to stress.  She has not previously been diagnosed with HTN and on no medications for such.  She is currently followed by Dr. Marcelo Maldonado (Cardiology) mainly for lipids but she has not seen him in the past 2 years.  She will need cardiac clearance per preop form.      Review of Systems   Constitutional: Negative.    HENT: Negative.    Eyes: Negative.    Respiratory: Negative.    Cardiovascular: Negative for chest pain, palpitations and leg swelling.   Endocrine: Negative for cold intolerance, heat intolerance, polydipsia, polyphagia and polyuria.   Genitourinary: Negative.    Musculoskeletal: Positive for myalgias and neck pain. Negative for back pain, gait problem and joint swelling.   Skin: Negative.    Neurological: Negative for dizziness, tremors, syncope, weakness, light-headedness and headaches.   Hematological: Negative for adenopathy. Does not bruise/bleed easily.   Psychiatric/Behavioral: Positive for agitation, decreased concentration, dysphoric mood and sleep disturbance. Negative for behavioral problems, confusion, hallucinations, self-injury and suicidal ideas. The patient is nervous/anxious (on meds). The patient is not hyperactive.          Patient Active Problem List   Diagnosis    COPD with asthma    GERD (gastroesophageal reflux disease)    Primary hypothyroidism    Tobacco abuse disorder    PVD (peripheral vascular disease)    Anxiety    Chronic allergic rhinitis    Hypercholesterolemia       Past Medical History:   Diagnosis Date     Amblyopia OS    Anxiety     Asthma     GERD (gastroesophageal reflux disease)     Thyroid disease        Past Surgical History:   Procedure Laterality Date    APPENDECTOMY      BUNIONECTOMY      HYSTERECTOMY      THYROIDECTOMY         Family History   Problem Relation Age of Onset    Hypertension Mother     Diabetes Mother     Mental illness Son     Diabetes Father     Mental illness Other     Pancreatic cancer Other     Pancreatic cancer Maternal Uncle     Stroke Maternal Grandmother     Prostate cancer Maternal Grandfather          Social History     Social History    Marital status:     Number of children: 2     Social History Main Topics    Smoking status: Current Every Day Smoker     Packs/day: 1.00     Types: Cigarettes    Smokeless tobacco: Never Used    Alcohol use Yes//rarely    Drug use: No         Review of patient's allergies indicates:  No Known Allergies      Current Outpatient Prescriptions on File Prior to Visit   Medication Sig Dispense Refill    albuterol (PROVENTIL HFA) 90 mcg/actuation inhaler Inhale 2 puffs into the lungs every 6 (six) hours as needed. 18 g 3    alprazolam (XANAX) 2 MG Tab take 1 tablet by mouth twice a day if needed 60 tablet 2    carbinoxamine maleate 4 mg Tab Take 1-2 tablets by mouth 3 (three) times daily as needed. 60 each 0    flunisolide 25 mcg, 0.025%, (NASALIDE) 25 mcg (0.025 %) Spry 2 sprays by Nasal route 2 (two) times daily. 25 mL 2    levothyroxine (SYNTHROID) 150 MCG tablet Take 1 tablet (150 mcg total) by mouth once daily. 30 tablet 6    montelukast (SINGULAIR) 10 mg tablet Take 1 tablet (10 mg total) by mouth every evening. 30 tablet 6    omeprazole (PRILOSEC) 40 MG capsule Take 1 capsule (40 mg total) by mouth once daily. 30 capsule 6    ondansetron (ZOFRAN) 4 MG tablet Take 1 tablet (4 mg total) by mouth every 4 to 6 hours as needed for Nausea. 60 tablet 2    oxycodone-acetaminophen (PERCOCET) 5-325 mg per tablet take  "1 tablet by mouth every 8 to 12 hours  0    budesonide (PULMICORT) 180 mcg/actuation AePB Inhale 2 puffs into the lungs 2 (two) times daily. 1 each 3           Objective:     Vitals:    08/15/17 1309 08/15/17 1333   BP: (!) 154/94 (!) 150/80   Pulse: 106    Resp: 17    Temp: 97.6 °F (36.4 °C)    TempSrc: Tympanic    SpO2: 98%    Weight: 57.4 kg (126 lb 8.7 oz)    Height: 5' 4" (1.626 m)    PainSc: 0-No pain        Physical Exam   Constitutional: She is oriented to person, place, and time. She appears well-developed and well-nourished. No distress.   HENT:   Head: Normocephalic and atraumatic.   Right Ear: External ear normal.   Left Ear: External ear normal.   Nose: Nose normal.   Mouth/Throat: Oropharynx is clear and moist. No oropharyngeal exudate.   Eyes: Conjunctivae and EOM are normal. Pupils are equal, round, and reactive to light. Right eye exhibits no discharge. Left eye exhibits no discharge. No scleral icterus.   Neck: Normal range of motion. Neck supple. No JVD present. No tracheal deviation present. No thyromegaly present.   Cardiovascular: Regular rhythm, normal heart sounds and intact distal pulses.  Tachycardia present.  Exam reveals no friction rub.    No murmur heard.  Pulmonary/Chest: Effort normal and breath sounds normal. No stridor. No respiratory distress. She has no wheezes. She has no rales. She exhibits no tenderness.   Abdominal: Soft. Bowel sounds are normal.   Musculoskeletal: Normal range of motion. She exhibits no edema, tenderness or deformity.   Lymphadenopathy:     She has no cervical adenopathy.   Neurological: She is alert and oriented to person, place, and time. She displays normal reflexes. No cranial nerve deficit. She exhibits normal muscle tone. Coordination normal.   Skin: Skin is warm and dry. Capillary refill takes less than 2 seconds. No rash noted. She is not diaphoretic.   Psychiatric: She has a normal mood and affect. Her behavior is normal. Judgment and thought " content normal.         12-lead EKG in office today shows a NSR with rate of 64 bpm.        Diagnosis       1. Preop examination    2. 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 Blood pressure elevated without history of HTN          Assessment/ Plan     Preop examination  -     X-Ray Chest PA And Lateral; Future; Expected date: 08/15/2017  -     IN OFFICE EKG 12-LEAD (to Muse)  -     CBC auto differential; Future; Expected date: 08/15/2017  -     Basic metabolic panel; Future; Expected date: 08/15/2017  -     Culture, MRSA  -     Protime-INR; Future; Expected date: 08/15/2017  -     APTT; Future; Expected date: 08/15/2017  -     Sedimentation rate, manual; Future; Expected date: 08/15/2017  -     URINALYSIS  -     Urine culture  -     Ambulatory referral to Cardiology    Connective 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 Richard.    Blood pressure elevated without history of HTN  -     Ambulatory referral to Cardiology        No oral NSAIDs or ASA ~ 7 to 10 days prior to surgery.  She is not able to see Dr. Maldonado for cardiac clearance as they do not accept Workers Comp insurance.  Surgical clearance pending lab and CXR results, along with Dr. Lovell's report.  RTC prn.        Future Appointments  Date Time Provider Department Center   8/15/2017 2:50 PM LABORATORY, [facility] LAB [facility]   8/17/2017 2:20 PM Joe Lovell MD [facility]

## 2017-08-17 ENCOUNTER — OFFICE VISIT (OUTPATIENT)
Dept: CARDIOLOGY | Facility: CLINIC | Age: 57
End: 2017-08-17
Payer: OTHER MISCELLANEOUS

## 2017-08-17 VITALS
HEART RATE: 96 BPM | BODY MASS INDEX: 21.91 KG/M2 | WEIGHT: 128.31 LBS | HEIGHT: 64 IN | SYSTOLIC BLOOD PRESSURE: 152 MMHG | DIASTOLIC BLOOD PRESSURE: 84 MMHG

## 2017-08-17 DIAGNOSIS — I10 ESSENTIAL HYPERTENSION: ICD-10-CM

## 2017-08-17 DIAGNOSIS — Z72.0 TOBACCO ABUSE DISORDER: ICD-10-CM

## 2017-08-17 DIAGNOSIS — I73.9 PVD (PERIPHERAL VASCULAR DISEASE): ICD-10-CM

## 2017-08-17 DIAGNOSIS — Z01.810 PREOP CARDIOVASCULAR EXAM: Primary | ICD-10-CM

## 2017-08-17 DIAGNOSIS — I73.9 CLAUDICATION: ICD-10-CM

## 2017-08-17 DIAGNOSIS — J44.89 COPD WITH ASTHMA: ICD-10-CM

## 2017-08-17 LAB
BACTERIA UR CULT: NORMAL
MRSA SPEC QL CULT: NORMAL

## 2017-08-17 PROCEDURE — 99244 OFF/OP CNSLTJ NEW/EST MOD 40: CPT | Mod: S$GLB,,, | Performed by: INTERNAL MEDICINE

## 2017-08-17 PROCEDURE — 99999 PR PBB SHADOW E&M-EST. PATIENT-LVL III: CPT | Mod: PBBFAC,,, | Performed by: INTERNAL MEDICINE

## 2017-08-17 RX ORDER — ATENOLOL 25 MG/1
25 TABLET ORAL DAILY
Qty: 30 TABLET | Refills: 11 | Status: SHIPPED | OUTPATIENT
Start: 2017-08-17 | End: 2017-09-05 | Stop reason: ALTCHOICE

## 2017-08-17 NOTE — LETTER
August 18, 2017      Kenrick Locke, NP  [address] [place] [address]           [facility]  [address] [place] [place] [address]  Phone: [phone]  Fax: [phone]          Patient: Ana Bonilla   MR Number: [id number]   YOB: 1960   Date of Visit: 8/17/2017       Dear Kenrick Locke:    Thank you for referring Ana Bonilla to me for evaluation. Attached you will find relevant portions of my assessment and plan of care.    If you have questions, please do not hesitate to call me. I look forward to following Ana Bonilla along with you.    Sincerely,    Joe Lovell MD    Enclosure  CC:  No Recipients    If you would like to receive this communication electronically, please contact [email or web address] or [phone] to request more information on [employer] Link access.    For providers and/or their staff who would like to refer a patient to [facility], please contact us through our one-stop-shop provider referral line, [facility] , at [phone].    If you feel you have received this communication in error or would no longer like to receive these types of communications, please e-mail [email or web address]

## 2017-08-17 NOTE — PROGRESS NOTES
Subjective:   Patient ID:  Ana Bonilla is a 57 y.o. female who presents for evaluation of Claudication and Pre-op Exam      56 yo female, came in for preop clearance of Scheduled for C5-6 ACDF with Dr. [facility] at [facility] on . Worked at [employer] and had neck injury.  PMH COPD, hiatal hernia, Smokes 1 ppd for 40 yrs. Occasional drinking.   Chronic BRAGG. No chest pain, dizziness, palpitation and syncope.  Left calf pain at rest, worse with walking. Had leg cramp at night two weeks ago.  Father  of DM at 30'. Mother HTN. Brother  of MVA.  EKG on  NSR          Past Medical History:   Diagnosis Date    Amblyopia OS    Anxiety     Asthma     GERD (gastroesophageal reflux disease)     Thyroid disease        Past Surgical History:   Procedure Laterality Date    APPENDECTOMY      BUNIONECTOMY      HYSTERECTOMY      THYROIDECTOMY         Social History   Substance Use Topics    Smoking status: Current Every Day Smoker     Packs/day: 1.00     Types: Cigarettes    Smokeless tobacco: Never Used    Alcohol use Yes      Comment: rarely        Family History   Problem Relation Age of Onset    Hypertension Mother     Diabetes Mother     Mental illness Son     Diabetes Father     Mental illness Other     Pancreatic cancer Other     Pancreatic cancer Maternal Uncle     Stroke Maternal Grandmother     Prostate cancer Maternal Grandfather        Review of Systems   Constitution: Negative for decreased appetite, diaphoresis, fever, weakness, malaise/fatigue and night sweats.   HENT: Negative for headaches and nosebleeds.    Eyes: Negative for blurred vision and double vision.   Cardiovascular: Negative for chest pain, claudication, dyspnea on exertion, irregular heartbeat, leg swelling, near-syncope, orthopnea, palpitations, paroxysmal nocturnal dyspnea and syncope.   Respiratory: Positive for shortness of breath. Negative for cough, sleep disturbances due to breathing, snoring, sputum  production and wheezing.    Endocrine: Negative for cold intolerance and polyuria.   Hematologic/Lymphatic: Does not bruise/bleed easily.   Skin: Negative for rash.   Musculoskeletal: Positive for joint pain and muscle cramps. Negative for back pain, falls, joint swelling and neck pain.   Gastrointestinal: Negative for abdominal pain, heartburn, nausea and vomiting.   Genitourinary: Negative for dysuria, frequency and hematuria.   Neurological: Negative for difficulty with concentration, dizziness, focal weakness, light-headedness, numbness and seizures.   Psychiatric/Behavioral: Negative for depression, memory loss and substance abuse. The patient does not have insomnia.    Allergic/Immunologic: Negative for HIV exposure and hives.       Objective:   Physical Exam   Constitutional: She is oriented to person, place, and time. She appears well-nourished.   HENT:   Head: Normocephalic.   Eyes: Pupils are equal, round, and reactive to light.   Neck: Normal carotid pulses and no JVD present. Carotid bruit is not present. No thyromegaly present.   Cardiovascular: Normal rate, regular rhythm and normal heart sounds.   No extrasystoles are present. PMI is not displaced.  Exam reveals no gallop and no S3.    No murmur heard.  Pulses:       Dorsalis pedis pulses are 2+ on the right side, and 1+ on the left side.        Posterior tibial pulses are 2+ on the right side, and 1+ on the left side.   Pulmonary/Chest: Breath sounds normal. No stridor. No respiratory distress.   Abdominal: Soft. Bowel sounds are normal. There is no tenderness. There is no rebound.   Musculoskeletal: Normal range of motion.   Neurological: She is alert and oriented to person, place, and time.   Skin: Skin is intact. No rash noted.   Psychiatric: Her behavior is normal.       Lab Results   Component Value Date    CHOL 204 (H) 04/18/2017    CHOL 228 (H) 04/12/2016    CHOL 209 (H) 06/04/2014     Lab Results   Component Value Date    HDL 87 (H)  04/18/2017    HDL 55 04/12/2016    HDL 75 06/04/2014     Lab Results   Component Value Date    LDLCALC 106.8 04/18/2017    LDLCALC 152.2 04/12/2016    LDLCALC 122.6 06/04/2014     Lab Results   Component Value Date    TRIG 51 04/18/2017    TRIG 104 04/12/2016    TRIG 57 06/04/2014     Lab Results   Component Value Date    CHOLHDL 42.6 04/18/2017    CHOLHDL 24.1 04/12/2016    CHOLHDL 35.9 06/04/2014       Chemistry        Component Value Date/Time     08/15/2017 1427    K 3.4 (L) 08/15/2017 1427     08/15/2017 1427    CO2 26 08/15/2017 1427    BUN 9 08/15/2017 1427    CREATININE 1.0 08/15/2017 1427    GLU 80 08/15/2017 1427        Component Value Date/Time    CALCIUM 8.9 08/15/2017 1427    ALKPHOS 63 04/18/2017 1006    AST 24 04/18/2017 1006    ALT 13 04/18/2017 1006    BILITOT 0.6 04/18/2017 1006    ESTGFRAFRICA >60.0 08/15/2017 1427    EGFRNONAA >60.0 08/15/2017 1427          Lab Results   Component Value Date    TSH 1.100 04/18/2017     Lab Results   Component Value Date    INR 1.0 08/15/2017     Lab Results   Component Value Date    WBC 5.99 08/15/2017    HGB 14.0 08/15/2017    HCT 41.1 08/15/2017    MCV 95 08/15/2017     08/15/2017     BNP  @LABRCNTIP(BNP,BNPTRIAGEBLO)@  Estimated Creatinine Clearance: 53.6 mL/min (based on Cr of 1).     Assessment:      1. Preop cardiovascular exam    2. Claudication    3. PVD (peripheral vascular disease)    4. Tobacco abuse disorder    5. COPD with asthma    6. Essential hypertension        Plan:   Echo and MIKE/LE arterial US ordered  Add Atenolol for HTN  Smoking cessation.       Addendum on 08/25  ECHo showed normal EF and moderate AI.  Low periop risk of CV events for moderate risk procedure.  Decent functional and exercise capacity.  No chest pain, active arrhythmia and CHF symptoms.  Ok to proceed the scheduled surgery without further cardiac study.  Continue Atenolol periop.  Avoid periop fluid overloaded.  RTC in 1 yr

## 2017-08-22 ENCOUNTER — TELEPHONE (OUTPATIENT)
Dept: FAMILY MEDICINE | Facility: CLINIC | Age: 57
End: 2017-08-22

## 2017-08-22 NOTE — TELEPHONE ENCOUNTER
----- Message from Kamila Vann sent at 8/22/2017  1:31 PM CDT -----  Contact: Miko Richard  She would like the pre-op clearance faxed to her at [phone] and phone [phone] ext [phone].                                           kirk

## 2017-08-22 NOTE — TELEPHONE ENCOUNTER
Nurse notified that the patient hasn't been cleared from cardiology yet and clearance will be faxed once this is done.

## 2017-08-24 ENCOUNTER — CLINICAL SUPPORT (OUTPATIENT)
Dept: CARDIOLOGY | Facility: CLINIC | Age: 57
End: 2017-08-24
Payer: OTHER MISCELLANEOUS

## 2017-08-24 DIAGNOSIS — I73.9 CLAUDICATION: ICD-10-CM

## 2017-08-24 DIAGNOSIS — Z01.810 PREOP CARDIOVASCULAR EXAM: ICD-10-CM

## 2017-08-24 LAB
AORTIC VALVE REGURGITATION: ABNORMAL
DIASTOLIC DYSFUNCTION: NO
RETIRED EF AND QEF - SEE NOTES: 60 (ref 55–65)

## 2017-08-24 PROCEDURE — 93306 TTE W/DOPPLER COMPLETE: CPT | Mod: PBBFAC,PO | Performed by: INTERNAL MEDICINE

## 2017-08-24 PROCEDURE — 93922 UPR/L XTREMITY ART 2 LEVELS: CPT | Mod: PBBFAC,PO | Performed by: INTERNAL MEDICINE

## 2017-08-24 PROCEDURE — 93925 LOWER EXTREMITY STUDY: CPT | Mod: PBBFAC,PO | Performed by: INTERNAL MEDICINE

## 2017-08-25 LAB — VASCULAR ANKLE BRACHIAL INDEX (ABI) RIGHT: 1.05 (ref 0.9–1.2)

## 2017-08-28 ENCOUNTER — TELEPHONE (OUTPATIENT)
Dept: FAMILY MEDICINE | Facility: CLINIC | Age: 57
End: 2017-08-28

## 2017-08-28 NOTE — TELEPHONE ENCOUNTER
Per Cardiology notes, procedure results normal, she is cleared for surgery.  However, I need her to come in for nurse visit BP check ASAP as her BP was elevated at her preop appt and she was started on medication for HTN.  Can she come in today so I can finish out her paperwork?????

## 2017-08-29 ENCOUNTER — OFFICE VISIT (OUTPATIENT)
Dept: FAMILY MEDICINE | Facility: CLINIC | Age: 57
End: 2017-08-29
Payer: OTHER MISCELLANEOUS

## 2017-08-29 VITALS
WEIGHT: 125.69 LBS | RESPIRATION RATE: 17 BRPM | OXYGEN SATURATION: 99 % | HEIGHT: 64 IN | HEART RATE: 86 BPM | BODY MASS INDEX: 21.46 KG/M2 | DIASTOLIC BLOOD PRESSURE: 74 MMHG | SYSTOLIC BLOOD PRESSURE: 132 MMHG | TEMPERATURE: 97 F

## 2017-08-29 DIAGNOSIS — I10 ESSENTIAL HYPERTENSION: Primary | ICD-10-CM

## 2017-08-29 DIAGNOSIS — J30.9 ALLERGIC RHINITIS, UNSPECIFIED: ICD-10-CM

## 2017-08-29 PROBLEM — Z01.810 PREOP CARDIOVASCULAR EXAM: Status: RESOLVED | Noted: 2017-08-17 | Resolved: 2017-08-29

## 2017-08-29 PROCEDURE — 99214 OFFICE O/P EST MOD 30 MIN: CPT | Mod: 25,S$GLB,, | Performed by: REGISTERED NURSE

## 2017-08-29 PROCEDURE — 96372 THER/PROPH/DIAG INJ SC/IM: CPT | Mod: S$GLB,,, | Performed by: REGISTERED NURSE

## 2017-08-29 PROCEDURE — 99999 PR PBB SHADOW E&M-EST. PATIENT-LVL IV: CPT | Mod: PBBFAC,,, | Performed by: REGISTERED NURSE

## 2017-08-29 PROCEDURE — 3008F BODY MASS INDEX DOCD: CPT | Mod: S$GLB,,, | Performed by: REGISTERED NURSE

## 2017-08-29 RX ORDER — DESLORATADINE 5 MG/1
5 TABLET ORAL DAILY
Qty: 30 TABLET | Refills: 6 | Status: SHIPPED | OUTPATIENT
Start: 2017-08-29 | End: 2018-05-23

## 2017-08-29 RX ORDER — BETAMETHASONE SODIUM PHOSPHATE AND BETAMETHASONE ACETATE 3; 3 MG/ML; MG/ML
6 INJECTION, SUSPENSION INTRA-ARTICULAR; INTRALESIONAL; INTRAMUSCULAR; SOFT TISSUE
Status: COMPLETED | OUTPATIENT
Start: 2017-08-29 | End: 2017-08-29

## 2017-08-29 RX ORDER — AZELASTINE 1 MG/ML
1 SPRAY, METERED NASAL 2 TIMES DAILY
Qty: 30 ML | Refills: 6 | Status: SHIPPED | OUTPATIENT
Start: 2017-08-29 | End: 2018-05-23

## 2017-08-29 RX ADMIN — BETAMETHASONE SODIUM PHOSPHATE AND BETAMETHASONE ACETATE 6 MG: 3; 3 INJECTION, SUSPENSION INTRA-ARTICULAR; INTRALESIONAL; INTRAMUSCULAR; SOFT TISSUE at 03:08

## 2017-08-29 NOTE — PROGRESS NOTES
Subjective:       Patient ID: Ana Bonilla is a 57 y.o. female.    Chief Complaint: Hypertension      HPI    Mrs. Bonilla is here today to f/u on HTN and for a blood pressure check to get cleared for surgery.  She was recently started on atenolol per Cardiology; has been taking daily as ordered.  Reports feeling better, HA improved.  Not checking BP as she does not have access to monitor at home.    She also reports phoning Dr. Richard's office regarding her sinus symptoms.  They advised her to see me today to see if she should have the surgery.  She feels she has a sinus infection with h/o allergies.  She has been taking Palgic and using nasal spray but not helping.  Reports PM-fever but not sure if true fever or hot flashes.  Reports dry cough, sore throat, RN, NC, PND, itchy swollen eyes.  Reports HA with pressure, nasal drainage clear.    Cardiology notes reviewed, Dr. Lovell has cleared her for surgery.      Review of Systems   Constitutional: Positive for diaphoresis. Negative for appetite change and chills.   HENT: Positive for congestion, postnasal drip, rhinorrhea, sinus pressure and sore throat. Negative for ear pain (reports pressure & popping), hearing loss, nosebleeds, sneezing, tinnitus, trouble swallowing and voice change.    Eyes: Positive for discharge and itching. Negative for photophobia, pain, redness and visual disturbance.   Respiratory: Positive for cough. Negative for choking, chest tightness, shortness of breath and wheezing.    Cardiovascular: Negative.    Gastrointestinal: Negative for abdominal pain, nausea and vomiting.   Allergic/Immunologic: Positive for environmental allergies.   Neurological: Positive for headaches. Negative for weakness, light-headedness and numbness.   Hematological: Negative for adenopathy.         Patient Active Problem List   Diagnosis    COPD with asthma    GERD (gastroesophageal reflux disease)    Primary hypothyroidism    Tobacco abuse disorder    PVD  "(peripheral vascular disease)    Anxiety    Hypercholesterolemia    Claudication    Essential hypertension         Objective:     Vitals:    08/29/17 1417   BP: 132/74   BP Location: Left arm   Patient Position: Sitting   BP Method: Medium (Manual)   Pulse: 86   Resp: 17   Temp: 97.4 °F (36.3 °C)   TempSrc: Tympanic   SpO2: 99%   Weight: 57 kg (125 lb 10.6 oz)   Height: 5' 4" (1.626 m)       Physical Exam   Constitutional: She is oriented to person, place, and time. She appears well-developed and well-nourished. No distress.   HENT:   Head: Normocephalic. Head is with contusion (small area of swelling, reports hitting her head on car door a few days ago --- no ecchymosis or skin breaks).       Right Ear: Tympanic membrane is retracted. Tympanic membrane is not injected, not perforated and not erythematous.   Left Ear: Tympanic membrane is retracted. Tympanic membrane is not injected, not perforated and not erythematous.   Nose: Mucosal edema and rhinorrhea (boggy, clear RN) present. No epistaxis. Right sinus exhibits no maxillary sinus tenderness and no frontal sinus tenderness. Left sinus exhibits no maxillary sinus tenderness and no frontal sinus tenderness.   Mouth/Throat: Mucous membranes are normal. No oropharyngeal exudate, posterior oropharyngeal edema or posterior oropharyngeal erythema (clear PND noted).   Eyes: EOM are normal. Pupils are equal, round, and reactive to light. Right eye exhibits discharge (clear B watery d/c, no eye redness, B shiners noted). Left eye exhibits discharge. No scleral icterus.   Cardiovascular: Normal rate, regular rhythm and normal heart sounds.  Exam reveals no gallop and no friction rub.    No murmur heard.  Pulmonary/Chest: Effort normal and breath sounds normal. No respiratory distress. She has no wheezes. She exhibits no tenderness.   Lymphadenopathy:     She has no cervical adenopathy.   Neurological: She is alert and oriented to person, place, and time.   Skin: Skin " is warm and dry. Capillary refill takes less than 2 seconds. No rash noted. She is not diaphoretic.         Medication List with Changes/Refills   New Medications    AZELASTINE (ASTELIN) 137 MCG (0.1 %) NASAL SPRAY    1 spray (137 mcg total) by Nasal route 2 (two) times daily.    DESLORATADINE (CLARINEX) 5 MG TABLET    Take 1 tablet (5 mg total) by mouth once daily.   Current Medications    ALBUTEROL (PROVENTIL HFA) 90 MCG/ACTUATION INHALER    Inhale 2 puffs into the lungs every 6 (six) hours as needed.    ALPRAZOLAM (XANAX) 2 MG TAB    take 1 tablet by mouth twice a day if needed    ATENOLOL (TENORMIN) 25 MG TABLET    Take 1 tablet (25 mg total) by mouth once daily.    LEVOTHYROXINE (SYNTHROID) 150 MCG TABLET    Take 1 tablet (150 mcg total) by mouth once daily.    MONTELUKAST (SINGULAIR) 10 MG TABLET    Take 1 tablet (10 mg total) by mouth every evening.    OMEPRAZOLE (PRILOSEC) 40 MG CAPSULE    Take 1 capsule (40 mg total) by mouth once daily.    ONDANSETRON (ZOFRAN) 4 MG TABLET    Take 1 tablet (4 mg total) by mouth every 4 to 6 hours as needed for Nausea.    OXYCODONE-ACETAMINOPHEN (PERCOCET) 5-325 MG PER TABLET    take 1 tablet by mouth every 8 to 12 hours    PROMETHAZINE-CODEINE 6.25-10 MG/5 ML (PHENERGAN WITH CODEINE) 6.25-10 MG/5 ML SYRUP    Take 5 mLs by mouth every 4 to 6 hours as needed for Cough.   Discontinued Medications    CARBINOXAMINE MALEATE 4 MG TAB    Take 1-2 tablets by mouth 3 (three) times daily as needed.    FLUNISOLIDE 25 MCG, 0.025%, (NASALIDE) 25 MCG (0.025 %) SPRY    2 sprays by Nasal route 2 (two) times daily.           Diagnosis       1. Essential hypertension    2. Allergic rhinitis, unspecified          Assessment/ Plan     Essential hypertension        -     Continue atenolol 25 mg daily as ordered.        -     Low-salt/caffeine intake.        -     Stop smoking.    Allergic rhinitis, unspecified        -     Stop Palgic and flunisolide spray, continue Singulair PM.  -      betamethasone acetate-betamethasone sodium phosphate injection 6 mg; Inject 1 mL (6 mg total) into the muscle one time.  -     azelastine (ASTELIN) 137 mcg (0.1 %) nasal spray; 1 spray (137 mcg total) by Nasal route 2 (two) times daily.  Dispense: 30 mL; Refill: 6  -     desloratadine (CLARINEX) 5 mg tablet; Take 1 tablet (5 mg total) by mouth once daily.  Dispense: 30 tablet; Refill: 6      Cleared for surgery.  Dr. Richard's office to be notified today with all information forwarded.  Follow-up in clinic as needed.        JONA Ramesh  [facility]

## 2017-08-30 ENCOUNTER — TELEPHONE (OUTPATIENT)
Dept: FAMILY MEDICINE | Facility: CLINIC | Age: 57
End: 2017-08-30

## 2017-08-30 NOTE — TELEPHONE ENCOUNTER
Workers comp notified that medication was related to WC claim because she had to be cleared for surgery.

## 2017-08-30 NOTE — TELEPHONE ENCOUNTER
----- Message from Miguel Ángel Pires sent at 8/30/2017  3:50 PM CDT -----  Contact: ANIL Veliz workers comp   She's calling in regards to verification of medication: Desloratadin 5 mg & Azelastine 0.1% (spray), she's asking if these medications related to the pt's workers Comp, please advise, ph# [phone] (office)

## 2017-09-05 ENCOUNTER — TELEPHONE (OUTPATIENT)
Dept: CARDIOLOGY | Facility: CLINIC | Age: 57
End: 2017-09-05

## 2017-09-05 DIAGNOSIS — E78.00 HYPERCHOLESTEROLEMIA: Primary | ICD-10-CM

## 2017-09-05 DIAGNOSIS — I10 ESSENTIAL HYPERTENSION: ICD-10-CM

## 2017-09-05 RX ORDER — METOPROLOL SUCCINATE 25 MG/1
25 TABLET, EXTENDED RELEASE ORAL DAILY
Qty: 30 TABLET | Refills: 11 | Status: SHIPPED | OUTPATIENT
Start: 2017-09-05 | End: 2018-05-23

## 2017-10-16 DIAGNOSIS — F41.9 ANXIETY: ICD-10-CM

## 2017-10-16 RX ORDER — PROMETHAZINE HYDROCHLORIDE AND CODEINE PHOSPHATE 6.25; 1 MG/5ML; MG/5ML
SOLUTION ORAL
Qty: 180 ML | Refills: 0 | OUTPATIENT
Start: 2017-10-16

## 2017-10-16 RX ORDER — ALPRAZOLAM 2 MG/1
TABLET ORAL
Qty: 60 TABLET | Refills: 2 | OUTPATIENT
Start: 2017-10-16

## 2017-10-16 NOTE — TELEPHONE ENCOUNTER
----- Message from Leslie Lloyd sent at 10/16/2017 12:19 PM CDT -----  Contact: Patient  Patient states she was told to call nurse regarding a refill on cough syrup, please call her back at [phone]. Thank you

## 2017-11-02 DIAGNOSIS — F41.9 ANXIETY: ICD-10-CM

## 2017-11-02 RX ORDER — ALPRAZOLAM 2 MG/1
TABLET ORAL
Qty: 60 TABLET | Refills: 2 | OUTPATIENT
Start: 2017-11-02

## 2017-11-02 NOTE — TELEPHONE ENCOUNTER
----- Message from Shakira Alatorre sent at 11/2/2017 11:26 AM CDT -----  Contact: Pt   Pt called in regards to needing a new prescription sent to [facility] pt is not trying refill the medication just need a new prescription please callback number is [phone]

## 2017-11-08 RX ORDER — ALPRAZOLAM 2 MG/1
2 TABLET ORAL 2 TIMES DAILY
Qty: 60 TABLET | Refills: 0 | Status: SHIPPED | OUTPATIENT
Start: 2017-11-08 | End: 2017-12-07 | Stop reason: SDUPTHER

## 2017-11-26 DIAGNOSIS — J30.9 CHRONIC ALLERGIC RHINITIS: ICD-10-CM

## 2017-11-26 RX ORDER — AZELASTINE HYDROCHLORIDE 0.5 MG/ML
SOLUTION/ DROPS OPHTHALMIC
Qty: 6 ML | Refills: 6 | Status: SHIPPED | OUTPATIENT
Start: 2017-11-26 | End: 2018-05-23

## 2017-12-07 DIAGNOSIS — F41.9 ANXIETY: ICD-10-CM

## 2017-12-07 RX ORDER — ALPRAZOLAM 2 MG/1
TABLET ORAL
Qty: 60 TABLET | Refills: 0 | Status: SHIPPED | OUTPATIENT
Start: 2017-12-07 | End: 2018-01-10 | Stop reason: SDUPTHER

## 2018-01-10 DIAGNOSIS — F41.9 ANXIETY: ICD-10-CM

## 2018-01-10 RX ORDER — ALPRAZOLAM 2 MG/1
TABLET ORAL
Qty: 60 TABLET | Refills: 0 | Status: SHIPPED | OUTPATIENT
Start: 2018-01-10 | End: 2018-02-12 | Stop reason: SDUPTHER

## 2018-02-12 DIAGNOSIS — F41.9 ANXIETY: ICD-10-CM

## 2018-02-13 RX ORDER — ALPRAZOLAM 2 MG/1
TABLET ORAL
Qty: 60 TABLET | Refills: 0 | Status: SHIPPED | OUTPATIENT
Start: 2018-02-13 | End: 2018-03-12 | Stop reason: SDUPTHER

## 2018-03-12 DIAGNOSIS — F41.9 ANXIETY: ICD-10-CM

## 2018-03-13 RX ORDER — ALPRAZOLAM 2 MG/1
2 TABLET ORAL 2 TIMES DAILY
Qty: 60 TABLET | Refills: 0 | OUTPATIENT
Start: 2018-03-13

## 2018-03-13 RX ORDER — ALPRAZOLAM 2 MG/1
TABLET ORAL
Qty: 60 TABLET | Refills: 0 | Status: SHIPPED | OUTPATIENT
Start: 2018-03-13 | End: 2018-04-13 | Stop reason: SDUPTHER

## 2018-03-13 NOTE — TELEPHONE ENCOUNTER
----- Message from Nidhi Marcos sent at 3/13/2018 12:09 PM CDT -----  Contact: patient  Calling concerning the status of her Rx. Please call patient @ [phone]. Thanks, ariela

## 2018-04-13 DIAGNOSIS — F41.9 ANXIETY: ICD-10-CM

## 2018-04-13 RX ORDER — ALPRAZOLAM 2 MG/1
TABLET ORAL
Qty: 60 TABLET | Refills: 0 | Status: SHIPPED | OUTPATIENT
Start: 2018-04-13 | End: 2018-05-15 | Stop reason: SDUPTHER

## 2018-04-28 DIAGNOSIS — E03.9 PRIMARY HYPOTHYROIDISM: ICD-10-CM

## 2018-04-29 RX ORDER — LEVOTHYROXINE SODIUM 150 UG/1
TABLET ORAL
Qty: 30 TABLET | Refills: 0 | Status: SHIPPED | OUTPATIENT
Start: 2018-04-29 | End: 2018-05-22 | Stop reason: SDUPTHER

## 2018-05-15 DIAGNOSIS — F41.9 ANXIETY: ICD-10-CM

## 2018-05-15 RX ORDER — ALPRAZOLAM 2 MG/1
TABLET ORAL
Qty: 60 TABLET | Refills: 0 | Status: SHIPPED | OUTPATIENT
Start: 2018-05-15 | End: 2018-06-21 | Stop reason: SDUPTHER

## 2018-05-22 DIAGNOSIS — E03.9 PRIMARY HYPOTHYROIDISM: ICD-10-CM

## 2018-05-22 RX ORDER — LEVOTHYROXINE SODIUM 150 UG/1
TABLET ORAL
Qty: 30 TABLET | Refills: 0 | Status: SHIPPED | OUTPATIENT
Start: 2018-05-22 | End: 2018-05-23

## 2018-05-23 ENCOUNTER — OFFICE VISIT (OUTPATIENT)
Dept: FAMILY MEDICINE | Facility: CLINIC | Age: 58
End: 2018-05-23
Payer: MEDICAID

## 2018-05-23 VITALS
DIASTOLIC BLOOD PRESSURE: 80 MMHG | HEART RATE: 78 BPM | RESPIRATION RATE: 18 BRPM | WEIGHT: 132.5 LBS | SYSTOLIC BLOOD PRESSURE: 158 MMHG | BODY MASS INDEX: 22.62 KG/M2 | TEMPERATURE: 98 F | HEIGHT: 64 IN

## 2018-05-23 DIAGNOSIS — M79.605 PAIN IN BOTH LOWER EXTREMITIES: ICD-10-CM

## 2018-05-23 DIAGNOSIS — R51.9 HEADACHE, UNSPECIFIED HEADACHE TYPE: ICD-10-CM

## 2018-05-23 DIAGNOSIS — M79.604 PAIN IN BOTH LOWER EXTREMITIES: ICD-10-CM

## 2018-05-23 DIAGNOSIS — J30.9 ALLERGIC RHINITIS, UNSPECIFIED SEASONALITY, UNSPECIFIED TRIGGER: ICD-10-CM

## 2018-05-23 DIAGNOSIS — Z72.0 TOBACCO ABUSE DISORDER: ICD-10-CM

## 2018-05-23 DIAGNOSIS — I10 ESSENTIAL HYPERTENSION: Primary | ICD-10-CM

## 2018-05-23 PROCEDURE — 99999 PR PBB SHADOW E&M-EST. PATIENT-LVL III: CPT | Mod: PBBFAC,,, | Performed by: REGISTERED NURSE

## 2018-05-23 PROCEDURE — 99214 OFFICE O/P EST MOD 30 MIN: CPT | Mod: S$PBB,,, | Performed by: REGISTERED NURSE

## 2018-05-23 PROCEDURE — 99213 OFFICE O/P EST LOW 20 MIN: CPT | Mod: PBBFAC,PO | Performed by: REGISTERED NURSE

## 2018-05-23 RX ORDER — LEVOCETIRIZINE DIHYDROCHLORIDE 5 MG/1
5 TABLET, FILM COATED ORAL EVERY MORNING
Qty: 30 TABLET | Refills: 6 | Status: SHIPPED | OUTPATIENT
Start: 2018-05-23 | End: 2020-01-27

## 2018-05-23 RX ORDER — MONTELUKAST SODIUM 10 MG/1
10 TABLET ORAL NIGHTLY
Qty: 30 TABLET | Refills: 6 | Status: SHIPPED | OUTPATIENT
Start: 2018-05-23 | End: 2019-06-30 | Stop reason: SDUPTHER

## 2018-05-23 RX ORDER — GABAPENTIN 300 MG/1
300 CAPSULE ORAL NIGHTLY
Qty: 30 CAPSULE | Refills: 6 | Status: SHIPPED | OUTPATIENT
Start: 2018-05-23 | End: 2020-12-01 | Stop reason: SDUPTHER

## 2018-05-23 RX ORDER — ATENOLOL 25 MG/1
25 TABLET ORAL DAILY
COMMUNITY
End: 2018-05-23 | Stop reason: SDUPTHER

## 2018-05-23 RX ORDER — ATENOLOL 50 MG/1
50 TABLET ORAL DAILY
Qty: 30 TABLET | Refills: 6 | Status: SHIPPED | OUTPATIENT
Start: 2018-05-23 | End: 2018-06-07

## 2018-05-23 RX ORDER — BENZOCAINE .13; .15; .5; 2 G/100G; G/100G; G/100G; G/100G
1 GEL ORAL DAILY
Qty: 8.6 G | Refills: 2 | Status: SHIPPED | OUTPATIENT
Start: 2018-05-23 | End: 2018-09-06

## 2018-05-23 NOTE — PROGRESS NOTES
Subjective:       Patient ID: Ana Bonilla is a 57 y.o. female.    Chief Complaint: Headache      HPI    Mrs. Bonilla is here today with c/o severe headaches along with other issues.  HA have been daily, with pain behind the eyes.  She has been checking her BP at local pharmacy when she gets a chance ~ 163/90.  Currently on BP medication as ordered --- see [employer].  Also with reports of chronic sinus/allergy issues, uses Flonase daily.  Does c/o dizziness with pressure in the ears at times.  Not resting well, reports frequent increased stress.  On Xanax as ordered, does not feel it has been effective as it once was when she first started taking.  We tried changing to other med in the past but she opts to stay on Xanax.  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  Denies GERD but may be due to chronic PND ????  Has been using Flonase daily intermittently during daytime and budesonide at bedtime per ENT instructions but not nightly as instructed.  Does continue to smoke 1 ppd but not ready to quit.  Reports leg burning and pain, has had previous studies on legs with negative results.  Good results with gabapentin, wishes to restart.  She is under care of Dr. Richard for chronic neck issues.  He has not addressed her leg issues to see if there is any correlation.  She does have an appt w/ him next week.      Review of Systems   Constitutional: Positive for fatigue. Negative for chills and fever.   HENT: Positive for sore throat.    Eyes: Positive for pain. Negative for photophobia and visual disturbance.   Respiratory: Negative.    Cardiovascular: Negative for chest pain, palpitations and leg swelling.   Gastrointestinal: Negative.    Endocrine: Negative for polydipsia, polyphagia and polyuria.   Genitourinary: Negative.    Musculoskeletal: Positive for arthralgias, myalgias and neck pain. Negative for back pain, gait problem and joint swelling.   Skin: Negative.    Neurological: Positive for  "light-headedness and headaches. Negative for weakness and numbness.   Hematological: Negative for adenopathy. Does not bruise/bleed easily.   Psychiatric/Behavioral: Positive for decreased concentration, dysphoric mood and sleep disturbance. Negative for hallucinations, self-injury and suicidal ideas. The patient is nervous/anxious. The patient is not hyperactive.          Patient Active Problem List   Diagnosis    COPD with asthma    GERD (gastroesophageal reflux disease)    Primary hypothyroidism    Tobacco abuse disorder    PVD (peripheral vascular disease)    Anxiety    Hypercholesterolemia    Claudication    Essential hypertension       Past medical, surgical, family and social histories have been reviewed today.        Objective:     Vitals:    05/23/18 1319 05/23/18 1345   BP: (!) 152/80 (!) 158/80   Pulse: 78    Resp: 18    Temp: 97.7 °F (36.5 °C)    TempSrc: Tympanic    Weight: 60.1 kg (132 lb 7.9 oz)    Height: 5' 4" (1.626 m)        Physical Exam   Constitutional: She is oriented to person, place, and time. She appears well-developed and well-nourished.   HENT:   Head: Normocephalic and atraumatic.   Right Ear: Tympanic membrane normal.   Left Ear: Tympanic membrane normal.   Nose: Mucosal edema and rhinorrhea (boggy, clear RN) present. Right sinus exhibits no maxillary sinus tenderness and no frontal sinus tenderness. Left sinus exhibits no maxillary sinus tenderness and no frontal sinus tenderness.   Mouth/Throat: Mucous membranes are normal. Posterior oropharyngeal erythema (increased clear PND noted) present. No oropharyngeal exudate or posterior oropharyngeal edema.   Eyes: EOM are normal. Pupils are equal, round, and reactive to light. Right eye exhibits discharge (clear B watery d/c, no eye redness, shiners noted). Left eye exhibits discharge.   Neck: Normal range of motion. Neck supple. No JVD present. No tracheal deviation present. No thyromegaly present.   Cardiovascular: Normal rate, " regular rhythm, normal heart sounds and intact distal pulses.  Exam reveals no gallop and no friction rub.    No murmur heard.  Pulmonary/Chest: Effort normal and breath sounds normal. No stridor. No respiratory distress. She has no wheezes. She exhibits no tenderness.   Musculoskeletal: Normal range of motion. She exhibits tenderness (mild TTP lower legs, no swelling, normal gait, Aruna neg). She exhibits no edema or deformity.   Lymphadenopathy:     She has no cervical adenopathy.   Neurological: She is alert and oriented to person, place, and time.   Skin: Skin is warm and dry. Capillary refill takes less than 2 seconds. No rash noted. No erythema.   Psychiatric: She has a normal mood and affect. Her behavior is normal. Judgment and thought content normal.   Vitals reviewed.        Medication List with Changes/Refills   New Medications    BUDESONIDE (RINOCORT AQUA) 32 MCG/ACTUATION NASAL SPRAY    1 spray (32 mcg total) by Nasal route once daily.    GABAPENTIN (NEURONTIN) 300 MG CAPSULE    Take 1 capsule (300 mg total) by mouth every evening.    LEVOCETIRIZINE (XYZAL) 5 MG TABLET    Take 1 tablet (5 mg total) by mouth every morning.    MONTELUKAST (SINGULAIR) 10 MG TABLET    Take 1 tablet (10 mg total) by mouth every evening.   Current Medications    ALPRAZOLAM (XANAX) 2 MG TAB    take 1 tablet by mouth twice a day    ONDANSETRON (ZOFRAN) 4 MG TABLET    Take 1 tablet (4 mg total) by mouth every 4 to 6 hours as needed for Nausea.    OXYCODONE-ACETAMINOPHEN (PERCOCET) 5-325 MG PER TABLET    take 1 tablet by mouth every 8 to 12 hours   Changed and/or Refilled Medications    Modified Medication Previous Medication    ATENOLOL (TENORMIN) 50 MG TABLET atenolol (TENORMIN) 25 MG tablet       Take 1 tablet (50 mg total) by mouth once daily.    Take 25 mg by mouth once daily.   Discontinued Medications    ALBUTEROL (PROVENTIL HFA) 90 MCG/ACTUATION INHALER    Inhale 2 puffs into the lungs every 6 (six) hours as needed.     AZELASTINE (ASTELIN) 137 MCG (0.1 %) NASAL SPRAY    1 spray (137 mcg total) by Nasal route 2 (two) times daily.    AZELASTINE (OPTIVAR) 0.05 % OPHTHALMIC SOLUTION    instill 1 drop into both eyes twice a day    DESLORATADINE (CLARINEX) 5 MG TABLET    Take 1 tablet (5 mg total) by mouth once daily.    LEVOTHYROXINE (SYNTHROID) 150 MCG TABLET    take 1 tablet by mouth once daily    METOPROLOL SUCCINATE (TOPROL-XL) 25 MG 24 HR TABLET    Take 1 tablet (25 mg total) by mouth once daily.    MONTELUKAST (SINGULAIR) 10 MG TABLET    Take 1 tablet (10 mg total) by mouth every evening.    OMEPRAZOLE (PRILOSEC) 40 MG CAPSULE    Take 1 capsule (40 mg total) by mouth once daily.    PROMETHAZINE-CODEINE 6.25-10 MG/5 ML (PHENERGAN WITH CODEINE) 6.25-10 MG/5 ML SYRUP    Take 5 mLs by mouth every 4 to 6 hours as needed for Cough.           Diagnosis       1. Essential hypertension    2. Headache, unspecified headache type    3. Allergic rhinitis, unspecified seasonality, unspecified trigger    4. Pain in both lower extremities    5. Tobacco abuse disorder          Assessment/ Plan     Essential hypertension        -     This problem is not currently controlled.        -     Increased atenolol from 25 to 50 mg daily.  -     atenolol (TENORMIN) 50 MG tablet; Take 1 tablet (50 mg total) by mouth once daily.  Dispense: 30 tablet; Refill: 6    Headache, unspecified headache type        -     This problem is not currently controlled.        -     Headache diary.        -     HTN vs stress vs allergies vs frequent Flonase use        -     Increased beta-blocker dose to help manage BP may help.  -     atenolol (TENORMIN) 50 MG tablet; Take 1 tablet (50 mg total) by mouth once daily.  Dispense: 30 tablet; Refill: 6    Allergic rhinitis, unspecified seasonality, unspecified trigger        -     This problem is not currently controlled.        -     Treatment plan and med discussed.        -     Stop Flonase, may be contributing to  headaches.  -     levocetirizine (XYZAL) 5 MG tablet; Take 1 tablet (5 mg total) by mouth every morning.  Dispense: 30 tablet; Refill: 6  -     montelukast (SINGULAIR) 10 mg tablet; Take 1 tablet (10 mg total) by mouth every evening.  Dispense: 30 tablet; Refill: 6  -     budesonide (RINOCORT AQUA) 32 mcg/actuation nasal spray; 1 spray (32 mcg total) by Nasal route once daily.  Dispense: 8.6 g; Refill: 2    Pain in both lower extremities        -     This problem is not currently controlled.        -     Take at bedtime for now, cautioned on sedating effects.  -     gabapentin (NEURONTIN) 300 MG capsule; Take 1 capsule (300 mg total) by mouth every evening.  Dispense: 30 capsule; Refill: 6  -     Discuss further with Dr. Richard, is there a connection btw leg pain/neuropathy and her current neck/spine issues ????  -     Stop smoking, can contribute to leg pain.    Tobacco abuse disorder        -     Smoking cessation has been strongly encouraged & advised, she is not ready to quit at this time.        -     Currently smoking 1 ppd.        She is scheduled for her annual wellness exam for 6/1/2018.  Follow-up in clinic as needed.          JONA Ramesh  [facility]

## 2018-05-24 PROBLEM — J30.9 ALLERGIC RHINITIS: Status: ACTIVE | Noted: 2018-05-24

## 2018-05-24 PROBLEM — M79.605 PAIN IN BOTH LOWER EXTREMITIES: Status: ACTIVE | Noted: 2018-05-24

## 2018-05-24 PROBLEM — M79.604 PAIN IN BOTH LOWER EXTREMITIES: Status: ACTIVE | Noted: 2018-05-24

## 2018-05-30 ENCOUNTER — OFFICE VISIT (OUTPATIENT)
Dept: FAMILY MEDICINE | Facility: CLINIC | Age: 58
End: 2018-05-30
Payer: MEDICAID

## 2018-05-30 ENCOUNTER — TELEPHONE (OUTPATIENT)
Dept: FAMILY MEDICINE | Facility: CLINIC | Age: 58
End: 2018-05-30

## 2018-05-30 VITALS
WEIGHT: 130.5 LBS | OXYGEN SATURATION: 97 % | DIASTOLIC BLOOD PRESSURE: 82 MMHG | SYSTOLIC BLOOD PRESSURE: 121 MMHG | TEMPERATURE: 97 F | BODY MASS INDEX: 22.28 KG/M2 | HEART RATE: 73 BPM | RESPIRATION RATE: 16 BRPM | HEIGHT: 64 IN

## 2018-05-30 DIAGNOSIS — J32.9 SINUSITIS, UNSPECIFIED CHRONICITY, UNSPECIFIED LOCATION: Primary | ICD-10-CM

## 2018-05-30 PROCEDURE — 99213 OFFICE O/P EST LOW 20 MIN: CPT | Mod: PBBFAC,PO | Performed by: REGISTERED NURSE

## 2018-05-30 PROCEDURE — 99213 OFFICE O/P EST LOW 20 MIN: CPT | Mod: S$PBB,,, | Performed by: REGISTERED NURSE

## 2018-05-30 PROCEDURE — 99999 PR PBB SHADOW E&M-EST. PATIENT-LVL III: CPT | Mod: PBBFAC,,, | Performed by: REGISTERED NURSE

## 2018-05-30 RX ORDER — PREDNISONE 20 MG/1
TABLET ORAL
Qty: 10 TABLET | Refills: 0 | Status: SHIPPED | OUTPATIENT
Start: 2018-05-30 | End: 2018-06-07

## 2018-05-30 RX ORDER — CLARITHROMYCIN 500 MG/1
500 TABLET, FILM COATED ORAL 2 TIMES DAILY
Qty: 20 TABLET | Refills: 0 | Status: SHIPPED | OUTPATIENT
Start: 2018-05-30 | End: 2018-06-09

## 2018-05-30 NOTE — TELEPHONE ENCOUNTER
States every since her shingles shot, she's been feeling bad. Thinks it's a bad sinus infections and wanted antibiotics called in. Advised pt that she must be seen. Appt scheduled.

## 2018-05-30 NOTE — TELEPHONE ENCOUNTER
----- Message from Miguel Ángel Pires sent at 5/30/2018  6:41 AM CDT -----  Contact: pt  She's calling in regards to getting a shingles injection, pls advise, [phone] (work)

## 2018-06-01 ENCOUNTER — TELEPHONE (OUTPATIENT)
Dept: FAMILY MEDICINE | Facility: CLINIC | Age: 58
End: 2018-06-01

## 2018-06-01 NOTE — TELEPHONE ENCOUNTER
----- Message from Ade Ruiz sent at 6/1/2018  8:33 AM CDT -----  Contact: self [phone]  States that she would like to reschedule her appt for a physical to Wednesday 06/06/18. Pt is an established pt. Please call back at [phone]//thank you acc

## 2018-06-07 ENCOUNTER — HOSPITAL ENCOUNTER (OUTPATIENT)
Dept: RADIOLOGY | Facility: HOSPITAL | Age: 58
Discharge: HOME | End: 2018-06-07
Attending: REGISTERED NURSE
Payer: MEDICAID

## 2018-06-07 ENCOUNTER — OFFICE VISIT (OUTPATIENT)
Dept: FAMILY MEDICINE | Facility: CLINIC | Age: 58
End: 2018-06-07
Payer: MEDICAID

## 2018-06-07 VITALS
SYSTOLIC BLOOD PRESSURE: 160 MMHG | HEART RATE: 69 BPM | TEMPERATURE: 98 F | DIASTOLIC BLOOD PRESSURE: 80 MMHG | BODY MASS INDEX: 22.32 KG/M2 | WEIGHT: 130.75 LBS | HEIGHT: 64 IN | RESPIRATION RATE: 18 BRPM

## 2018-06-07 DIAGNOSIS — E78.00 HYPERCHOLESTEROLEMIA: ICD-10-CM

## 2018-06-07 DIAGNOSIS — R05.9 COUGH: ICD-10-CM

## 2018-06-07 DIAGNOSIS — J34.89 SINUS PAIN: ICD-10-CM

## 2018-06-07 DIAGNOSIS — R51.9 HEADACHE, UNSPECIFIED HEADACHE TYPE: ICD-10-CM

## 2018-06-07 DIAGNOSIS — H57.13 PAIN OF BOTH EYES: ICD-10-CM

## 2018-06-07 DIAGNOSIS — K59.09 CONSTIPATION, CHRONIC: ICD-10-CM

## 2018-06-07 DIAGNOSIS — Z12.39 BREAST CANCER SCREENING: ICD-10-CM

## 2018-06-07 DIAGNOSIS — I10 ESSENTIAL HYPERTENSION: ICD-10-CM

## 2018-06-07 DIAGNOSIS — Z12.11 COLON CANCER SCREENING: ICD-10-CM

## 2018-06-07 DIAGNOSIS — Z00.00 ANNUAL PHYSICAL EXAM: Primary | ICD-10-CM

## 2018-06-07 DIAGNOSIS — E03.9 PRIMARY HYPOTHYROIDISM: ICD-10-CM

## 2018-06-07 LAB
CREAT UR-MCNC: 161 MG/DL
MICROALBUMIN UR DL<=1MG/L-MCNC: 50 UG/ML
MICROALBUMIN/CREATININE RATIO: 31.1 UG/MG

## 2018-06-07 PROCEDURE — 82043 UR ALBUMIN QUANTITATIVE: CPT

## 2018-06-07 PROCEDURE — 99215 OFFICE O/P EST HI 40 MIN: CPT | Mod: PBBFAC,25,PO | Performed by: REGISTERED NURSE

## 2018-06-07 PROCEDURE — 99999 PR PBB SHADOW E&M-EST. PATIENT-LVL V: CPT | Mod: PBBFAC,,, | Performed by: REGISTERED NURSE

## 2018-06-07 PROCEDURE — 99396 PREV VISIT EST AGE 40-64: CPT | Mod: S$PBB,,, | Performed by: REGISTERED NURSE

## 2018-06-07 PROCEDURE — 70220 X-RAY EXAM OF SINUSES: CPT | Mod: 26,,, | Performed by: RADIOLOGY

## 2018-06-07 PROCEDURE — 74019 RADEX ABDOMEN 2 VIEWS: CPT | Mod: 26,,, | Performed by: RADIOLOGY

## 2018-06-07 PROCEDURE — 74019 RADEX ABDOMEN 2 VIEWS: CPT | Mod: TC,FY,PO

## 2018-06-07 PROCEDURE — 70220 X-RAY EXAM OF SINUSES: CPT | Mod: TC,FY,PO

## 2018-06-07 RX ORDER — PROMETHAZINE HYDROCHLORIDE AND CODEINE PHOSPHATE 6.25; 1 MG/5ML; MG/5ML
5 SOLUTION ORAL EVERY 4 HOURS PRN
Qty: 180 ML | Refills: 0 | Status: SHIPPED | OUTPATIENT
Start: 2018-06-07 | End: 2019-04-26 | Stop reason: ALTCHOICE

## 2018-06-07 RX ORDER — AMLODIPINE AND BENAZEPRIL HYDROCHLORIDE 5; 10 MG/1; MG/1
1 CAPSULE ORAL DAILY
Qty: 30 CAPSULE | Refills: 6 | Status: SHIPPED | OUTPATIENT
Start: 2018-06-07 | End: 2019-02-05 | Stop reason: SDUPTHER

## 2018-06-07 NOTE — PROGRESS NOTES
"Subjective:       Patient ID: Ana Bonilla is a 57 y.o. female.    Chief Complaint: Sinus Problem and Headache      HPI    Mrs. Bonilla is here today with c/o sinus issues with frequent headaches since last week, worsening.  Reports HA pain, sinus pressure, pain behind eyes and cough.  She reports getting her Shingrix shot at pharmacy 2 days ago and thinks it may have made her feel worse.  OTC meds not helping.      Review of Systems   Constitutional: Positive for chills and fever.   HENT: Positive for congestion, postnasal drip, rhinorrhea, sinus pain and sore throat. Negative for ear pain and tinnitus.    Eyes: Positive for pain. Negative for photophobia, discharge, redness, itching and visual disturbance.   Respiratory: Positive for cough. Negative for shortness of breath and wheezing.    Cardiovascular: Negative.    Neurological: Positive for headaches. Negative for weakness, light-headedness and numbness.   Hematological: Negative for adenopathy.         Patient Active Problem List   Diagnosis    COPD with asthma    GERD (gastroesophageal reflux disease)    Primary hypothyroidism    Tobacco abuse disorder    PVD (peripheral vascular disease)    Anxiety    Hypercholesterolemia    Claudication    Essential hypertension    Allergic rhinitis    Pain in both lower extremities       Past medical, surgical, family and social histories have been reviewed today.        Objective:     Vitals:    05/30/18 0948   BP: 121/82   BP Location: Right arm   Patient Position: Sitting   BP Method: Small (Manual)   Pulse: 73   Resp: 16   Temp: 96.7 °F (35.9 °C)   TempSrc: Tympanic   SpO2: 97%   Weight: 59.2 kg (130 lb 8.2 oz)   Height: 5' 4" (1.626 m)       Physical Exam   Constitutional: She is oriented to person, place, and time. She appears well-developed and well-nourished.   HENT:   Head: Normocephalic and atraumatic.   Right Ear: Tympanic membrane is bulging. Tympanic membrane is not injected, not perforated, " not erythematous and not retracted.   Left Ear: Tympanic membrane is bulging. Tympanic membrane is not injected, not perforated, not erythematous and not retracted.   Nose: Mucosal edema present. No rhinorrhea. Right sinus exhibits frontal sinus tenderness. Right sinus exhibits no maxillary sinus tenderness. Left sinus exhibits frontal sinus tenderness. Left sinus exhibits no maxillary sinus tenderness.   Mouth/Throat: Oropharynx is clear and moist.   Eyes: Conjunctivae and EOM are normal. Pupils are equal, round, and reactive to light.   Cardiovascular: Normal rate and regular rhythm.    Pulmonary/Chest: Effort normal and breath sounds normal.   Lymphadenopathy:     She has no cervical adenopathy.   Neurological: She is alert and oriented to person, place, and time.   Vitals reviewed.        Medication List with Changes/Refills   New Medications    AMLODIPINE-BENAZEPRIL 5-10 MG (LOTREL) 5-10 MG PER CAPSULE    Take 1 capsule by mouth once daily.    CLARITHROMYCIN (BIAXIN) 500 MG TABLET    Take 1 tablet (500 mg total) by mouth 2 (two) times daily.    LINACLOTIDE (LINZESS) 145 MCG CAP CAPSULE    Take 1 capsule (145 mcg total) by mouth once daily. Take in AM on empty stomach 30 min before meal    PROMETHAZINE-CODEINE 6.25-10 MG/5 ML (PHENERGAN WITH CODEINE) 6.25-10 MG/5 ML SYRUP    Take 5 mLs by mouth every 4 (four) hours as needed.   Current Medications    ALPRAZOLAM (XANAX) 2 MG TAB    take 1 tablet by mouth twice a day    BUDESONIDE (RINOCORT AQUA) 32 MCG/ACTUATION NASAL SPRAY    1 spray (32 mcg total) by Nasal route once daily.    GABAPENTIN (NEURONTIN) 300 MG CAPSULE    Take 1 capsule (300 mg total) by mouth every evening.    LEVOCETIRIZINE (XYZAL) 5 MG TABLET    Take 1 tablet (5 mg total) by mouth every morning.    MONTELUKAST (SINGULAIR) 10 MG TABLET    Take 1 tablet (10 mg total) by mouth every evening.    ONDANSETRON (ZOFRAN) 4 MG TABLET    Take 1 tablet (4 mg total) by mouth every 4 to 6 hours as needed for  Nausea.    OXYCODONE-ACETAMINOPHEN (PERCOCET) 5-325 MG PER TABLET    take 1 tablet by mouth every 8 to 12 hours   Discontinued Medications    ATENOLOL (TENORMIN) 50 MG TABLET    Take 1 tablet (50 mg total) by mouth once daily.    PREDNISONE (DELTASONE) 20 MG TABLET    Take 2 tab daily x 3 days, 1 tab daily x 3 days, then 1/2 tab daily x 2 days.           Diagnosis       1. Sinusitis, unspecified chronicity, unspecified location          Assessment/ Plan     Sinusitis, unspecified chronicity, unspecified location  · This problem is not controlled; txmt plan and med discussed.  · Symptomatic care, rest, fluids, hydration, Vitamin-C.  · Smoking cessation discussed.  · OTC cough med prn.  · Flonase daily.  · Orders:  -     clarithromycin (BIAXIN) 500 MG tablet; Take 1 tablet (500 mg total) by mouth 2 (two) times daily.  Dispense: 20 tablet; Refill: 0  -     predniSONE (DELTASONE) 20 MG tablet; Take 2 tab daily x 3 days, 1 tab daily x 3 days, then 1/2 tab daily x 2 days.  Dispense: 10 tablet; Refill: 0      Follow-up in clinic as needed.          JONA Ramesh  [facility]

## 2018-06-07 NOTE — PROGRESS NOTES
"Subjective:       Patient ID: Ana Bonilla is a 57 y.o. female.    Chief Complaint: Annual Exam      HPI    Mrs. Bonilla is here today for her annual wellness exam.  I have reviewed the patient's medical history in detail and updated the computerized patient record.      Review of Systems   Constitutional: Negative.    HENT: Positive for sinus pain (chronic).    Eyes: Positive for pain (Reports chronic pain and dryness ~ 2015//not wearing glasses). Negative for photophobia, discharge, itching and visual disturbance.        Reports being legally blind in LT eye   Respiratory: Positive for cough. Negative for shortness of breath and wheezing.    Cardiovascular: Negative.         She has cut back to atenolol 25 mg daily since taking 50 mg "made me feel bad".  Does not check home BP.  Caffeine intake = 6 cokes/day.   Gastrointestinal: Positive for abdominal distention (bloating), abdominal pain (gas pains) and constipation (Reports decreased daily intake of water/fiber///admits to poor eating habits///stools 1 to 2 times a week, if that). Negative for anal bleeding, blood in stool, diarrhea, nausea, rectal pain and vomiting.   Endocrine: Negative for polydipsia, polyphagia and polyuria.   Genitourinary: Negative.    Musculoskeletal: Negative.    Neurological: Positive for dizziness, light-headedness and headaches.   Hematological: Negative for adenopathy. Does not bruise/bleed easily.   Psychiatric/Behavioral: Positive for sleep disturbance. Negative for decreased concentration and dysphoric mood. The patient is nervous/anxious.          Patient Active Problem List   Diagnosis    COPD with asthma    GERD (gastroesophageal reflux disease)    Primary hypothyroidism    Tobacco abuse disorder    PVD (peripheral vascular disease)    Anxiety    Hypercholesterolemia    Claudication    Essential hypertension    Allergic rhinitis    Pain in both lower extremities       Past Medical History:   Diagnosis Date    " "Allergy     Amblyopia OS    Anxiety     Asthma     COPD (chronic obstructive pulmonary disease)     GERD (gastroesophageal reflux disease)     Hyperlipidemia     Hypertension     Thyroid disease        Past Surgical History:   Procedure Laterality Date    APPENDECTOMY      BUNIONECTOMY      HYSTERECTOMY      PARTIAL//still with ovaries    THYROIDECTOMY         Family History   Problem Relation Age of Onset    Hypertension Mother     Diabetes Mother     Mental illness Son     Diabetes Father     Mental illness Other     Pancreatic cancer Other     Pancreatic cancer Maternal Uncle     Stroke Maternal Grandmother     Prostate cancer Maternal Grandfather        Social History     Social History    Marital status:     Number of children: 2     Social History Main Topics    Smoking status: Current Every Day Smoker     Packs/day: 1.00     Types: Cigarettes    Smokeless tobacco: Never Used    Alcohol use Rarely    Drug use: No         Review of patient's allergies indicates:  No Known Allergies        Objective:     Vitals:    06/07/18 0959 06/07/18 1020   BP: (!) 142/80 (!) 160/80   Pulse: 69    Resp: 18    Temp: 97.7 °F (36.5 °C)    TempSrc: Tympanic    Weight: 59.3 kg (130 lb 11.7 oz)    Height: 5' 4" (1.626 m)        Physical Exam   Constitutional: She is oriented to person, place, and time. She appears well-developed and well-nourished. No distress.   HENT:   Head: Normocephalic and atraumatic.   Right Ear: External ear normal.   Left Ear: External ear normal.   Nose: Right sinus exhibits frontal sinus tenderness. Right sinus exhibits no maxillary sinus tenderness. Left sinus exhibits frontal sinus tenderness. Left sinus exhibits no maxillary sinus tenderness.   Mouth/Throat: Oropharynx is clear and moist. No oropharyngeal exudate.   Eyes: Conjunctivae and EOM are normal. Pupils are equal, round, and reactive to light. Right eye exhibits no discharge. Left eye exhibits no discharge. No " scleral icterus.   Neck: Normal range of motion. Neck supple. No JVD present. No tracheal deviation present. No thyromegaly present.   Cardiovascular: Normal rate, regular rhythm, normal heart sounds and intact distal pulses.  Exam reveals no gallop and no friction rub.    No murmur heard.  Pulmonary/Chest: Effort normal and breath sounds normal. No stridor. No respiratory distress. She has no wheezes. She exhibits no tenderness. Right breast exhibits no mass and no nipple discharge. Left breast exhibits no mass and no nipple discharge. Breasts are symmetrical. There is no breast swelling.   Abdominal: Soft. Bowel sounds are normal. She exhibits no distension and no mass. There is no tenderness.   Genitourinary: No breast tenderness.   Genitourinary Comments: Declined exam.   Musculoskeletal: Normal range of motion. She exhibits no edema, tenderness or deformity.   Lymphadenopathy:     She has no cervical adenopathy.   Neurological: She is alert and oriented to person, place, and time. No sensory deficit. She exhibits normal muscle tone. Coordination normal.   Skin: Skin is warm and dry. Capillary refill takes less than 2 seconds. No rash noted. She is not diaphoretic. No erythema.   Psychiatric: She has a normal mood and affect. Her behavior is normal. Judgment and thought content normal.   Vitals reviewed.      Medication List with Changes/Refills   New Medications    AMLODIPINE-BENAZEPRIL 5-10 MG (LOTREL) 5-10 MG PER CAPSULE    Take 1 capsule by mouth once daily.    LINACLOTIDE (LINZESS) 145 MCG CAP CAPSULE    Take 1 capsule (145 mcg total) by mouth once daily. Take in AM on empty stomach 30 min before meal    PROMETHAZINE-CODEINE 6.25-10 MG/5 ML (PHENERGAN WITH CODEINE) 6.25-10 MG/5 ML SYRUP    Take 5 mLs by mouth every 4 (four) hours as needed.   Current Medications    ALPRAZOLAM (XANAX) 2 MG TAB    take 1 tablet by mouth twice a day    BUDESONIDE (RINOCORT AQUA) 32 MCG/ACTUATION NASAL SPRAY    1 spray (32 mcg  total) by Nasal route once daily.    CLARITHROMYCIN (BIAXIN) 500 MG TABLET    Take 1 tablet (500 mg total) by mouth 2 (two) times daily.    GABAPENTIN (NEURONTIN) 300 MG CAPSULE    Take 1 capsule (300 mg total) by mouth every evening.    LEVOCETIRIZINE (XYZAL) 5 MG TABLET    Take 1 tablet (5 mg total) by mouth every morning.    MONTELUKAST (SINGULAIR) 10 MG TABLET    Take 1 tablet (10 mg total) by mouth every evening.    ONDANSETRON (ZOFRAN) 4 MG TABLET    Take 1 tablet (4 mg total) by mouth every 4 to 6 hours as needed for Nausea.    OXYCODONE-ACETAMINOPHEN (PERCOCET) 5-325 MG PER TABLET    take 1 tablet by mouth every 8 to 12 hours   Discontinued Medications    ATENOLOL (TENORMIN) 50 MG TABLET    Take 1 tablet (50 mg total) by mouth once daily.    PREDNISONE (DELTASONE) 20 MG TABLET    Take 2 tab daily x 3 days, 1 tab daily x 3 days, then 1/2 tab daily x 2 days.       Diagnosis       1. Annual physical exam    2. Essential hypertension    3. Hypercholesterolemia    4. Primary hypothyroidism    5. Constipation, chronic    6. Sinus pain    7. Cough    8. Pain of both eyes    9. Headache, unspecified headache type    10. Breast cancer screening    11. Colon cancer screening          Assessment/ Plan     Annual physical exam  -     Mammo Digital Screening Bilat with CAD; Future; Expected date: 06/07/2018  -     Case request GI: COLONOSCOPY  -     CBC auto differential; Future; Expected date: 06/07/2018  -     Comprehensive metabolic panel; Future; Expected date: 06/07/2018  -     TSH; Future; Expected date: 06/07/2018  -     Lipid panel; Future; Expected date: 06/07/2018  -     Microalbumin/creatinine urine ratio    Essential hypertension  · This problem is not controlled.  · Stop atenolol.  · Start Lotrel 5-10 as directed.  · Low-caffeine and salt intake daily.  · Routine home BP checks daily with log.  · Orders:  -     CBC auto differential; Future; Expected date: 06/07/2018  -     Comprehensive metabolic panel;  Future; Expected date: 06/07/2018  -     TSH; Future; Expected date: 06/07/2018  -     Lipid panel; Future; Expected date: 06/07/2018  -     Microalbumin/creatinine urine ratio  -     amlodipine-benazepril 5-10 mg (LOTREL) 5-10 mg per capsule; Take 1 capsule by mouth once daily.  Dispense: 30 capsule; Refill: 6    Hypercholesterolemia  · PT with h/o elevated TC, to check lab today.  · Orders:  -     CBC auto differential; Future; Expected date: 06/07/2018  -     Comprehensive metabolic panel; Future; Expected date: 06/07/2018  -     TSH; Future; Expected date: 06/07/2018  -     Lipid panel; Future; Expected date: 06/07/2018    Primary hypothyroidism  · PT with h/o hypothyroidism, to check lab today.  -     CBC auto differential; Future; Expected date: 06/07/2018  -     Comprehensive metabolic panel; Future; Expected date: 06/07/2018  -     TSH; Future; Expected date: 06/07/2018  -     Lipid panel; Future; Expected date: 06/07/2018    Constipation, chronic  · This problem is not controlled.  · Increase daily intake of water and fiber.  · Orders:  -     X-Ray Abdomen Flat And Erect; Future; Expected date: 06/07/2018  -     linaclotide (LINZESS) 145 mcg Cap capsule; Take 1 capsule (145 mcg total) by mouth once daily. Take in AM on empty stomach 30 min before meal  Dispense: 30 capsule; Refill: 6    Sinus pain  · This problem is not controlled, she has completed ABX, to check xray today.  · Orders:  -     X-Ray Sinuses Min 3 Views; Future; Expected date: 06/07/2018    Cough  · Medication refill for PT c/o cough.  · Orders:  -     promethazine-codeine 6.25-10 mg/5 ml (PHENERGAN WITH CODEINE) 6.25-10 mg/5 mL syrup; Take 5 mLs by mouth every 4 (four) hours as needed.  Dispense: 180 mL; Refill: 0    Pain of both eyes  · This problem is not controlled.  · Orders:  -     Ambulatory referral to Optometry  -     X-Ray Sinuses Min 3 Views; Future; Expected date: 06/07/2018    Headache, unspecified headache type  · This problem  is not controlled.  · HA pain due to eye issues (not wearing glasses), sinuses, allergies, stress, HTN or other cause ???  · Orders:  -     Ambulatory referral to Optometry  -     X-Ray Sinuses Min 3 Views; Future; Expected date: 06/07/2018    Breast cancer screening  -     Mammo Digital Screening Bilat with CAD; Future; Expected date: 06/07/2018    Colon cancer screening  -     Case request GI: COLONOSCOPY        Lab pending.  Follow-up in clinic as needed.          JONA Ramesh  [facility]

## 2018-06-08 ENCOUNTER — LAB VISIT (OUTPATIENT)
Dept: LAB | Facility: HOSPITAL | Age: 58
End: 2018-06-08
Attending: REGISTERED NURSE
Payer: MEDICAID

## 2018-06-08 ENCOUNTER — TELEPHONE (OUTPATIENT)
Dept: FAMILY MEDICINE | Facility: CLINIC | Age: 58
End: 2018-06-08

## 2018-06-08 DIAGNOSIS — E05.90 HYPERTHYROIDISM: ICD-10-CM

## 2018-06-08 DIAGNOSIS — I10 ESSENTIAL HYPERTENSION: Primary | ICD-10-CM

## 2018-06-08 LAB — THYROPEROXIDASE IGG SERPL-ACNC: <6 IU/ML

## 2018-06-08 PROCEDURE — 36415 COLL VENOUS BLD VENIPUNCTURE: CPT | Mod: PO

## 2018-06-08 PROCEDURE — 86376 MICROSOMAL ANTIBODY EACH: CPT

## 2018-06-08 NOTE — TELEPHONE ENCOUNTER
Labs reviewed:    1 --- Urine shows very slight/minimal kidney function changes.  This should get taken care of due to me taking her off of her beta-blocker and starting her on the ACE for her HTN.  Will recheck in near future.    2 --- Lipids --- TC elevated at 231 --- need to focus on healthy diet and eating habits, exercise.  No meds for now.    3 --- Thyroid testing shows hyperthyroidism.  This may explain her frequent headaches.  However, before starting her on txmt, I want her to come in ASAP to check her thyroid antibodies for autoimmune disease.  Lab order in.      Want to recheck urine micro in 1 month --- orders in.      Xray:  1 --- Abdomen --- does show build-up of stool, no obstruction  2 --- Sinuses clear

## 2018-06-15 ENCOUNTER — TELEPHONE (OUTPATIENT)
Dept: FAMILY MEDICINE | Facility: CLINIC | Age: 58
End: 2018-06-15

## 2018-06-15 DIAGNOSIS — E05.90 HYPERTHYROIDISM: Primary | ICD-10-CM

## 2018-06-15 NOTE — TELEPHONE ENCOUNTER
----- Message from Miguel Ángel Pires sent at 6/15/2018 11:31 AM CDT -----  Contact: pt  She's calling in regards to her lab results, [phone] (work)

## 2018-06-17 PROBLEM — K59.09 CONSTIPATION, CHRONIC: Status: ACTIVE | Noted: 2018-06-17

## 2018-06-18 RX ORDER — METHIMAZOLE 5 MG/1
5 TABLET ORAL 3 TIMES DAILY
Qty: 90 TABLET | Refills: 6 | Status: SHIPPED | OUTPATIENT
Start: 2018-06-18 | End: 2018-07-13

## 2018-06-18 NOTE — TELEPHONE ENCOUNTER
Pt notified of lab results, interventions and medications sent to the pharmacy. Pt voiced understanding.

## 2018-06-18 NOTE — TELEPHONE ENCOUNTER
Thyroid antibodies negative.  Sending RX to pharmacy to tx her hyperthyroidism.  To start Tapazole 5 mg TID, recheck lab in 2 to 4 weeks.

## 2018-06-21 DIAGNOSIS — E03.9 PRIMARY HYPOTHYROIDISM: ICD-10-CM

## 2018-06-21 DIAGNOSIS — F41.9 ANXIETY: ICD-10-CM

## 2018-06-21 RX ORDER — LEVOTHYROXINE SODIUM 150 UG/1
TABLET ORAL
Qty: 30 TABLET | Refills: 0 | OUTPATIENT
Start: 2018-06-21

## 2018-06-21 RX ORDER — ALPRAZOLAM 2 MG/1
TABLET ORAL
Qty: 60 TABLET | Refills: 0 | Status: SHIPPED | OUTPATIENT
Start: 2018-06-21 | End: 2018-07-19 | Stop reason: SDUPTHER

## 2018-06-22 NOTE — TELEPHONE ENCOUNTER
----- Message from Radha Camara sent at 6/22/2018  7:53 AM CDT -----  Contact: pt   States she's calling rg having some questions about some medicine/ had some meds for her thyroid ordered last week and was never told to stop taking previous med and has been taken both of them and was told by her pharm that the Dr has stopped taking the meds and wants to discuss and can be reached at [phone]//thanks/dbw

## 2018-06-27 ENCOUNTER — DOCUMENTATION ONLY (OUTPATIENT)
Dept: ENDOSCOPY | Facility: HOSPITAL | Age: 58
End: 2018-06-27

## 2018-07-03 ENCOUNTER — OFFICE VISIT (OUTPATIENT)
Dept: OPHTHALMOLOGY | Facility: CLINIC | Age: 58
End: 2018-07-03
Payer: MEDICAID

## 2018-07-03 ENCOUNTER — HOSPITAL ENCOUNTER (OUTPATIENT)
Dept: RADIOLOGY | Facility: HOSPITAL | Age: 58
Discharge: HOME | End: 2018-07-03
Attending: REGISTERED NURSE
Payer: MEDICAID

## 2018-07-03 ENCOUNTER — OFFICE VISIT (OUTPATIENT)
Dept: URGENT CARE | Facility: CLINIC | Age: 58
End: 2018-07-03
Payer: MEDICAID

## 2018-07-03 VITALS
HEART RATE: 118 BPM | SYSTOLIC BLOOD PRESSURE: 132 MMHG | DIASTOLIC BLOOD PRESSURE: 76 MMHG | RESPIRATION RATE: 14 BRPM | TEMPERATURE: 97 F | HEIGHT: 64 IN | OXYGEN SATURATION: 97 % | WEIGHT: 134.69 LBS | BODY MASS INDEX: 22.99 KG/M2

## 2018-07-03 VITALS — HEIGHT: 64 IN | BODY MASS INDEX: 22.2 KG/M2 | WEIGHT: 130 LBS

## 2018-07-03 DIAGNOSIS — H20.9 IRITIS OF LEFT EYE: Primary | ICD-10-CM

## 2018-07-03 DIAGNOSIS — Z00.00 ANNUAL PHYSICAL EXAM: ICD-10-CM

## 2018-07-03 DIAGNOSIS — Z12.39 BREAST CANCER SCREENING: ICD-10-CM

## 2018-07-03 DIAGNOSIS — Z01.30 BP CHECK: Primary | ICD-10-CM

## 2018-07-03 PROCEDURE — 77063 BREAST TOMOSYNTHESIS BI: CPT | Mod: TC,PO

## 2018-07-03 PROCEDURE — 99213 OFFICE O/P EST LOW 20 MIN: CPT | Mod: S$PBB,,, | Performed by: FAMILY MEDICINE

## 2018-07-03 PROCEDURE — 77067 SCR MAMMO BI INCL CAD: CPT | Mod: 26,,, | Performed by: RADIOLOGY

## 2018-07-03 PROCEDURE — 92002 INTRM OPH EXAM NEW PATIENT: CPT | Mod: S$PBB,,, | Performed by: OPHTHALMOLOGY

## 2018-07-03 PROCEDURE — 77063 BREAST TOMOSYNTHESIS BI: CPT | Mod: 26,,, | Performed by: RADIOLOGY

## 2018-07-03 PROCEDURE — 99211 OFF/OP EST MAY X REQ PHY/QHP: CPT | Mod: PBBFAC,PO | Performed by: OPHTHALMOLOGY

## 2018-07-03 PROCEDURE — 99213 OFFICE O/P EST LOW 20 MIN: CPT | Mod: PBBFAC,PO | Performed by: FAMILY MEDICINE

## 2018-07-03 PROCEDURE — 99999 PR PBB SHADOW E&M-EST. PATIENT-LVL I: CPT | Mod: PBBFAC,,, | Performed by: OPHTHALMOLOGY

## 2018-07-03 PROCEDURE — 99999 PR PBB SHADOW E&M-EST. PATIENT-LVL III: CPT | Mod: PBBFAC,,, | Performed by: FAMILY MEDICINE

## 2018-07-03 RX ORDER — PREDNISOLONE ACETATE 10 MG/ML
1 SUSPENSION/ DROPS OPHTHALMIC 4 TIMES DAILY
Qty: 5 ML | Refills: 0 | Status: SHIPPED | OUTPATIENT
Start: 2018-07-03 | End: 2018-12-10 | Stop reason: ALTCHOICE

## 2018-07-03 NOTE — LETTER
July 3, 2018      Kenrick Locke, NP  [address] [place] [address]           [facility]  [address] [place] [address]  Phone: [phone]  Fax: [phone]          Patient: Ana Bonilla   MR Number: [id number]   YOB: 1960   Date of Visit: 7/3/2018       Dear Kenrick Locke:    Thank you for referring Ana Bonilla to me for evaluation. Attached you will find relevant portions of my assessment and plan of care.    If you have questions, please do not hesitate to call me. I look forward to following Ana Bonilla along with you.    Sincerely,    Stoney Trejo MD    Enclosure  CC:  No Recipients    If you would like to receive this communication electronically, please contact [email or web address] or [phone] to request more information on [employer] Link access.    For providers and/or their staff who would like to refer a patient to [facility], please contact us through our one-stop-shop provider referral line, [facility], at [phone].    If you feel you have received this communication in error or would no longer like to receive these types of communications, please e-mail [email or web address]

## 2018-07-03 NOTE — PROGRESS NOTES
HPI     [place] [facility]   Last seen by [facility] on 12/31/13  Requested a MD today only and cancelled appt with optometry   C/O pain in both eyes patient states pain has been present since   yesterday. Patient rates her pain level at a 6. Patient states eyes seem   very dry. OS hurts more than the OD  +Photobobia OS   No discharge  No drops, says she tried some in the past   Pt says she lost insurance and has just recently got insurance back   Pt states she also is being monitored for thyroid problems and High Blood   pressure     Last edited by Stoney Trejo MD on 7/3/2018  9:30 AM. (History)            Assessment /Plan     For exam results, see Encounter Report.      ICD-10-CM ICD-9-CM    1. Iritis of left eye H20.9 364.3 By Severe Symptoms-  only trace flare OS on exam- Will   Start Pred  QID OS        RETURN TO CLINIC 7-10 days , refract   Start Pred QID OS   Will sarah then and likely full exam   Pt will see PCP For Blood pressure and Thyroid Issues

## 2018-07-03 NOTE — PROGRESS NOTES
"Subjective:       Patient ID: Ana Bonilla is a 57 y.o. female.    Chief Complaint: Hypertension (patient states that she needed a BP check )    /76 (BP Location: Right arm, Patient Position: Sitting)   Pulse (!) 118   Temp 96.6 °F (35.9 °C) (Tympanic)   Resp 14   Ht 5' 4" (1.626 m)   Wt 61.1 kg (134 lb 11.2 oz)   SpO2 97%   BMI 23.12 kg/m²     HPI      Review of Systems    Objective:      Physical Exam    Assessment:       No diagnosis found.    Plan:     There are no diagnoses linked to this encounter.  Instructions    Discussed with pt/family all information and results pertaining to this visit. Discussed diagnosis and plan of treatment.  All questions and concerns were addressed at this time. Pt/family expresses understanding of information and instructions.  Care and follow up instruction provided.  "

## 2018-07-03 NOTE — PROGRESS NOTES
"Subjective:       Patient ID: Ana Bonilla is a 57 y.o. female.    Chief Complaint: Hypertension (patient states that she needed a BP check )    /76 (BP Location: Right arm, Patient Position: Sitting)   Pulse (!) 118   Temp 96.6 °F (35.9 °C) (Tympanic)   Resp 14   Ht 5' 4" (1.626 m)   Wt 61.1 kg (134 lb 11.2 oz)   SpO2 97%   BMI 23.12 kg/m²     HPI  Pt just saw her eye doctor for iritis- was not able to stand exam due to severe eye pain, and was sent here to check Bp since she is having a headache as well. She has her PCP appt next week  Review of Systems   Constitutional: Positive for fatigue.   Eyes: Positive for pain.   Neurological: Positive for headaches.       Objective:      Physical Exam   Constitutional: She is oriented to person, place, and time. She appears well-developed and well-nourished. No distress.   HENT:   Head: Normocephalic and atraumatic.   Eyes: EOM are normal. Pupils are equal, round, and reactive to light.   Neck: Normal range of motion. Neck supple.   Cardiovascular: Normal rate.    Pulmonary/Chest: Effort normal. No respiratory distress.   Musculoskeletal: Normal range of motion.   Neurological: She is alert and oriented to person, place, and time.   Skin: Skin is warm and dry. She is not diaphoretic.   Nursing note and vitals reviewed.      Assessment:       1. BP check        Plan:     Ana was seen today for hypertension.    Diagnoses and all orders for this visit:    BP check      Instructions  1. Continue current medicine  2. Keep 7/9 appt with PCP    Discussed with pt/family all information and results pertaining to this visit. Discussed diagnosis and plan of treatment.  All questions and concerns were addressed at this time. Pt/family expresses understanding of information and instructions.  Care and follow up instruction provided.  "

## 2018-07-03 NOTE — PATIENT INSTRUCTIONS
"  Discharge Instructions: Taking Blood Pressure Medications  You have been diagnosed with high blood pressure (hypertension). Diet and exercise help control high blood pressure. Many people also need the help of one or more medicines. Here are the main types of blood pressure medicines and how they work.  Diuretics  Diuretics are sometimes called "water pills" because they work in the kidney and flush excess water and sodium (salt) from the body. These are one of the most common and  important medicines for the management of blood pressure.  Beta-blockers  Beta-blockers reduce nerve impulses to the heart and blood vessels. This makes the heart beat slower and with less force. Your blood pressure drops, and your heart does not have to work as hard, which may improve pumping function.  ACE inhibitors  ACE inhibitors cause the vessels to relax. This helps the blood flow better and lowers blood pressure.  Angiotensin antagonists  Angiotensin antagonists shield blood vessels from a hormone that causes the blood vessels to get stiff and narrow. Your vessels become wider, and your blood pressure goes down.  Calcium channel blockers (CCBs)  CCBs keep calcium from entering the muscle cells of the heart and blood vessels. This causes blood vessels to relax, and your blood pressure goes down.  Alpha-blockers  Alpha-blockers reduce nerve impulses to blood vessels. This allows blood to pass easily, causing blood pressure to go down.  Alpha-beta blockers  Alpha-beta blockers work the same way as alpha-blockers but also slow your heartbeat. As a result, less blood is pumped through your blood vessels and your blood pressure goes down.  Vasodilators  Vasodilators directly open blood vessels by relaxing the muscle in the vessel walls, causing blood pressure to go down.  Date Last Reviewed: 4/27/2016  © 2477-4971 [employer]. [address] [place] [address], [place] [address]. All rights reserved. This information is not " intended as a substitute for professional medical care. Always follow your healthcare professional's instructions.

## 2018-07-11 ENCOUNTER — LAB VISIT (OUTPATIENT)
Dept: LAB | Facility: HOSPITAL | Age: 58
End: 2018-07-11
Attending: REGISTERED NURSE
Payer: MEDICAID

## 2018-07-11 ENCOUNTER — OFFICE VISIT (OUTPATIENT)
Dept: FAMILY MEDICINE | Facility: CLINIC | Age: 58
End: 2018-07-11
Payer: MEDICAID

## 2018-07-11 VITALS
HEIGHT: 64 IN | SYSTOLIC BLOOD PRESSURE: 110 MMHG | OXYGEN SATURATION: 98 % | TEMPERATURE: 96 F | WEIGHT: 132.06 LBS | DIASTOLIC BLOOD PRESSURE: 72 MMHG | BODY MASS INDEX: 22.55 KG/M2 | HEART RATE: 100 BPM

## 2018-07-11 DIAGNOSIS — I10 ESSENTIAL HYPERTENSION: ICD-10-CM

## 2018-07-11 DIAGNOSIS — E07.9 THYROID DISORDER: Primary | ICD-10-CM

## 2018-07-11 DIAGNOSIS — E07.9 THYROID DISORDER: ICD-10-CM

## 2018-07-11 LAB
T4 FREE SERPL-MCNC: <0.4 NG/DL
TSH SERPL DL<=0.005 MIU/L-ACNC: 4.26 UIU/ML

## 2018-07-11 PROCEDURE — 99999 PR PBB SHADOW E&M-EST. PATIENT-LVL IV: CPT | Mod: PBBFAC,,, | Performed by: REGISTERED NURSE

## 2018-07-11 PROCEDURE — 84443 ASSAY THYROID STIM HORMONE: CPT

## 2018-07-11 PROCEDURE — 99214 OFFICE O/P EST MOD 30 MIN: CPT | Mod: PBBFAC,PO | Performed by: REGISTERED NURSE

## 2018-07-11 PROCEDURE — 99214 OFFICE O/P EST MOD 30 MIN: CPT | Mod: S$PBB,,, | Performed by: REGISTERED NURSE

## 2018-07-11 PROCEDURE — 84439 ASSAY OF FREE THYROXINE: CPT

## 2018-07-11 PROCEDURE — 36415 COLL VENOUS BLD VENIPUNCTURE: CPT | Mod: PO

## 2018-07-11 RX ORDER — AMOXICILLIN AND CLAVULANATE POTASSIUM 875; 125 MG/1; MG/1
1 TABLET, FILM COATED ORAL 2 TIMES DAILY
Refills: 0 | COMMUNITY
Start: 2018-07-06 | End: 2018-12-10 | Stop reason: ALTCHOICE

## 2018-07-11 RX ORDER — OXAPROZIN 600 MG/1
TABLET, FILM COATED ORAL
Refills: 0 | COMMUNITY
Start: 2018-07-06 | End: 2018-12-10

## 2018-07-12 NOTE — PROGRESS NOTES
"Subjective:       Patient ID: Ana Bonilla is a 57 y.o. female.    Chief Complaint: Follow-up      HPI    Mrs. Bonilla is here today to f/u on abnormal thyroid labs.  She now reports that there was some confusion with her medication.  This is actually the first I am hearing of this.  She had been taking Synthroid but was nowhere listed on her Med-Card she was taking and had not let nurses know when checked in at appts.  So, when labs showed up abnormal, the assumption is that she is hyperthyroid on no medication.  She did stop the Synthroid before starting the Tapazole but there may have been a time when taking together.  She reports headaches have improved.  BP doing better, running ~ 130/70.  Currently on Linzess, stools much better so only able to take med every 3 days.      Review of Systems   Constitutional: Negative.    Respiratory: Negative.    Cardiovascular: Negative.    Gastrointestinal: Positive for constipation (improved on Linzess).   Neurological: Positive for headaches (improving).         Patient Active Problem List   Diagnosis    COPD with asthma    GERD (gastroesophageal reflux disease)    Primary hypothyroidism    Tobacco abuse disorder    PVD (peripheral vascular disease)    Anxiety    Hypercholesterolemia    Claudication    Essential hypertension    Allergic rhinitis    Pain in both lower extremities    Constipation, chronic       Past medical, surgical, family and social histories have been reviewed today.          Objective:     Vitals:    07/11/18 0943   BP: 110/72   BP Location: Left arm   Patient Position: Sitting   Pulse: 100   Temp: 96 °F (35.6 °C)   TempSrc: Tympanic   SpO2: 98%   Weight: 59.9 kg (132 lb 0.9 oz)   Height: 5' 4" (1.626 m)       Physical Exam   Constitutional: She is oriented to person, place, and time. She appears well-developed and well-nourished.   HENT:   Head: Normocephalic and atraumatic.   Eyes: Conjunctivae are normal. Pupils are equal, round, and " reactive to light.   Neck: Normal range of motion. Neck supple. No JVD present. No tracheal deviation present. No thyromegaly present.   Cardiovascular: Normal rate and regular rhythm.    Pulmonary/Chest: Effort normal and breath sounds normal. No stridor.   Neurological: She is alert and oriented to person, place, and time.         Medication List with Changes/Refills   Current Medications    ALPRAZOLAM (XANAX) 2 MG TAB    take 1 tablet by mouth twice a day    AMLODIPINE-BENAZEPRIL 5-10 MG (LOTREL) 5-10 MG PER CAPSULE    Take 1 capsule by mouth once daily.    AMOXICILLIN-CLAVULANATE 875-125MG (AUGMENTIN) 875-125 MG PER TABLET    Take 1 tablet by mouth 2 (two) times daily.    BUDESONIDE (RINOCORT AQUA) 32 MCG/ACTUATION NASAL SPRAY    1 spray (32 mcg total) by Nasal route once daily.    GABAPENTIN (NEURONTIN) 300 MG CAPSULE    Take 1 capsule (300 mg total) by mouth every evening.    LEVOCETIRIZINE (XYZAL) 5 MG TABLET    Take 1 tablet (5 mg total) by mouth every morning.    LINACLOTIDE (LINZESS) 145 MCG CAP CAPSULE    Take 1 capsule (145 mcg total) by mouth once daily. Take in AM on empty stomach 30 min before meal    MONTELUKAST (SINGULAIR) 10 MG TABLET    Take 1 tablet (10 mg total) by mouth every evening.    ONDANSETRON (ZOFRAN) 4 MG TABLET    Take 1 tablet (4 mg total) by mouth every 4 to 6 hours as needed for Nausea.    OXAPROZIN (DAYPRO) 600 MG TABLET    TK 1 T PO BID PRN P    OXYCODONE-ACETAMINOPHEN (PERCOCET) 5-325 MG PER TABLET    take 1 tablet by mouth every 8 to 12 hours    PREDNISOLONE ACETATE (PRED FORTE) 1 % DRPS    Place 1 drop into the left eye 4 (four) times daily.    PROMETHAZINE-CODEINE 6.25-10 MG/5 ML (PHENERGAN WITH CODEINE) 6.25-10 MG/5 ML SYRUP    Take 5 mLs by mouth every 4 (four) hours as needed.           Diagnosis       1. Thyroid disorder    2. Essential hypertension          Assessment/ Plan     Thyroid disorder  · PT with h/o hypothyroidism, to check labs today.  · Orders:  -     TSH;  Future; Expected date: 07/11/2018  -     T4, free; Future; Expected date: 07/11/2018    Essential hypertension  · Problem well-controlled, to continue medication as ordered.        Med adjustment pending lab result.  RTC prn.        [facility]

## 2018-07-13 ENCOUNTER — TELEPHONE (OUTPATIENT)
Dept: FAMILY MEDICINE | Facility: CLINIC | Age: 58
End: 2018-07-13

## 2018-07-13 DIAGNOSIS — E03.9 PRIMARY HYPOTHYROIDISM: Primary | ICD-10-CM

## 2018-07-13 RX ORDER — LEVOTHYROXINE SODIUM 100 UG/1
100 TABLET ORAL
Qty: 30 TABLET | Refills: 6 | Status: SHIPPED | OUTPATIENT
Start: 2018-07-13 | End: 2019-02-06 | Stop reason: SDUPTHER

## 2018-07-13 NOTE — TELEPHONE ENCOUNTER
Called patient to advise Labs reviewed ---- shows hypothyroidism, stop ALL thyroid meds she has now at home.  I am sending NEW med to pharmacy today for generic Synthroid --- take 100 mcg daily, recheck lab in 4 to 6 weeks. No answer. Left voicemail for a call back.

## 2018-07-13 NOTE — TELEPHONE ENCOUNTER
Labs reviewed ---- shows hypothyroidism, stop ALL thyroid meds she has now at home.  I am sending NEW med to pharmacy today for generic Synthroid --- take 100 mcg daily, recheck lab in 4 to 6 weeks.

## 2018-07-13 NOTE — TELEPHONE ENCOUNTER
----- Message from Delilah Holland sent at 7/13/2018  8:37 AM CDT -----  Contact: pt  She's calling in regards to missed call,pls call pt back at [phone] (home) [phone] (work)

## 2018-07-13 NOTE — TELEPHONE ENCOUNTER
S/w patient. Advised Labs reviewed ---- shows hypothyroidism, stop ALL thyroid meds she has now at home.  I am sending NEW med to pharmacy today for generic Synthroid --- take 100 mcg daily, recheck lab in 4 to 6 weeks patient verbalized understanding and states she will come in 4-6 weeks to recheck lab. Please put lab orders in.

## 2018-07-16 ENCOUNTER — TELEPHONE (OUTPATIENT)
Dept: FAMILY MEDICINE | Facility: CLINIC | Age: 58
End: 2018-07-16

## 2018-07-16 NOTE — TELEPHONE ENCOUNTER
LV letting the pt know her prescription is ready at her pharmacy [facility] and to call back with any questions.

## 2018-07-16 NOTE — TELEPHONE ENCOUNTER
----- Message from Shakira Alatorre sent at 7/16/2018 12:45 PM CDT -----  Contact: Pt   Pt called to check the status of medication being called in to the  Pharmacy..[phone] (home) [phone] (work)

## 2018-07-18 ENCOUNTER — OFFICE VISIT (OUTPATIENT)
Dept: OPHTHALMOLOGY | Facility: CLINIC | Age: 58
End: 2018-07-18
Payer: MEDICAID

## 2018-07-18 DIAGNOSIS — H20.9 IRITIS OF LEFT EYE: Primary | ICD-10-CM

## 2018-07-18 DIAGNOSIS — H53.022 ANISOMETROPIC AMBLYOPIA OF LEFT EYE: ICD-10-CM

## 2018-07-18 DIAGNOSIS — H02.402 PTOSIS OF LEFT EYELID: ICD-10-CM

## 2018-07-18 DIAGNOSIS — H57.12 PAIN AROUND LEFT EYE: ICD-10-CM

## 2018-07-18 DIAGNOSIS — H11.153 PINGUECULA OF BOTH EYES: ICD-10-CM

## 2018-07-18 PROCEDURE — 99999 PR PBB SHADOW E&M-EST. PATIENT-LVL I: CPT | Mod: PBBFAC,,, | Performed by: OPHTHALMOLOGY

## 2018-07-18 PROCEDURE — 99211 OFF/OP EST MAY X REQ PHY/QHP: CPT | Mod: PBBFAC,PO | Performed by: OPHTHALMOLOGY

## 2018-07-18 PROCEDURE — 92014 COMPRE OPH EXAM EST PT 1/>: CPT | Mod: S$PBB,,, | Performed by: OPHTHALMOLOGY

## 2018-07-18 RX ORDER — DICLOFENAC SODIUM 30 MG/G
GEL TOPICAL
Refills: 3 | COMMUNITY
Start: 2018-07-10 | End: 2019-08-26

## 2018-07-18 NOTE — PROGRESS NOTES
HPI     Iritis    Additional comments: Pred QID OS           Comments   Pt here for iritis f/u. Pt states that she doesn't know if the medication   has been working. She says that they are still bothering her and she   experienced some pain yesterday. VA stable. Pt was not 100% compliant with   gtts. The first couple of days, she was, but then she had her wisdom tooth   removed. She says after that, she used her drop around 2 times a day. She   slept most of the time after that.   Pt said for years her both eyes have hurt, ache sensation-   Dry Eyes OU      Neck Fusion 8/31/2017    Pred QID OS       Last edited by Stoney Trejo MD on 7/18/2018  8:19 AM. (History)            Assessment /Plan     For exam results, see Encounter Report.      ICD-10-CM ICD-9-CM    1. Iritis of left eye H20.9 364.3 By symptoms. Treated with Pred and patient did not have any response   2. Pinguecula of both eyes H11.153 372.51 Will follow   3. Amblyopia of left eye H53.032 368.01 Secondary to uncorrected astigmatism   4. Ptosis of left eyelid H02.402 374.30 Follow. Consider consult   5. Pain around left eye H57.12 379.91 Unidentified reason for pain OS but it is possible that she has some strain due to anisometropia. Possible neuralgia, or sinus. Pt is taking xyzal. Had ct on sinus previously        MR dispensed and explained that she will see well at distance in the right eye only. The new glasses will allow her to have better acceptance of the glasses. ( Progressive add on right lens only.) She will need to wear her old glasses in order to test for her 's license. Explained OS will not see well in the distance with new glasses, but will be tolerated.   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   Pt instructed to get glasses, see if headaches go away. Take drivers test with Old glasses to get best visual result OU for driving.   D/c pred     Return to clinic 1 year or PRN

## 2018-07-19 ENCOUNTER — TELEPHONE (OUTPATIENT)
Dept: FAMILY MEDICINE | Facility: CLINIC | Age: 58
End: 2018-07-19

## 2018-07-19 DIAGNOSIS — F41.9 ANXIETY: ICD-10-CM

## 2018-07-19 RX ORDER — ALPRAZOLAM 2 MG/1
TABLET ORAL
Qty: 60 TABLET | Refills: 0 | OUTPATIENT
Start: 2018-07-19

## 2018-07-19 NOTE — TELEPHONE ENCOUNTER
----- Message from Sofía Rooney sent at 7/19/2018 11:40 AM CDT -----  Contact: pt  She's calling stating that she tried to get a refill on her Xanax through her pharmacy and they advised her that they received a message stating the she was not longer a pt of [facility], but pt was just seen last week, please advise [phone] (work)

## 2018-07-20 RX ORDER — ALPRAZOLAM 2 MG/1
TABLET ORAL
Qty: 60 TABLET | Refills: 0 | Status: SHIPPED | OUTPATIENT
Start: 2018-07-20 | End: 2018-08-20 | Stop reason: SDUPTHER

## 2018-07-20 NOTE — TELEPHONE ENCOUNTER
----- Message from Ade Ruiz sent at 7/19/2018  3:26 PM CDT -----  Contact: self [phone]  States that she was told by pharm that Ms [facility] is no longer her provider. States that she wants to know why. States that she has called several times. Please call back at [phone]//thank you acc

## 2018-07-23 RX ORDER — ALPRAZOLAM 2 MG/1
TABLET ORAL
Qty: 60 TABLET | Refills: 0 | OUTPATIENT
Start: 2018-07-23

## 2018-08-20 DIAGNOSIS — F41.9 ANXIETY: ICD-10-CM

## 2018-08-20 RX ORDER — ALPRAZOLAM 2 MG/1
TABLET ORAL
Qty: 60 TABLET | Refills: 0 | OUTPATIENT
Start: 2018-08-20

## 2018-08-20 RX ORDER — ALPRAZOLAM 2 MG/1
2 TABLET ORAL 2 TIMES DAILY
Qty: 60 TABLET | Refills: 0 | Status: SHIPPED | OUTPATIENT
Start: 2018-08-20 | End: 2018-09-20 | Stop reason: SDUPTHER

## 2018-08-20 NOTE — TELEPHONE ENCOUNTER
----- Message from Radha Camara sent at 8/20/2018 11:16 AM CDT -----  Contact: pt   Pt states she's calling to get a refill on her medicine and can be reached at [phone]/pls call when it's called in //thanks/dbw     1. What is the name of the medication you are requesting? xantex  2. What is the dose? 2mg  3. How do you take the medication? Orally, topically, etc? Orally   4. How often do you take this medication? Twice daily   5. Do you need a 30 day or 90 day supply? 30 days  6. How many refills are you requesting?   7. What is your preferred pharmacy and location of the pharmacy?   8. Who can we contact with further questions? Pt     [id number] - [place] - [address] [address] [facility] [address] [facility] [address]  [facility] [place] [address]  Phone: [phone] Fax: [phone]

## 2018-09-04 ENCOUNTER — TELEPHONE (OUTPATIENT)
Dept: FAMILY MEDICINE | Facility: CLINIC | Age: 58
End: 2018-09-04

## 2018-09-04 NOTE — TELEPHONE ENCOUNTER
----- Message from Delia Singh sent at 9/4/2018  3:51 PM CDT -----  Patient needs call back  appt..[phone]

## 2018-09-06 ENCOUNTER — OFFICE VISIT (OUTPATIENT)
Dept: FAMILY MEDICINE | Facility: CLINIC | Age: 58
End: 2018-09-06
Payer: MEDICAID

## 2018-09-06 VITALS
SYSTOLIC BLOOD PRESSURE: 134 MMHG | BODY MASS INDEX: 22.4 KG/M2 | TEMPERATURE: 98 F | HEIGHT: 64 IN | HEART RATE: 105 BPM | DIASTOLIC BLOOD PRESSURE: 70 MMHG | WEIGHT: 131.19 LBS | OXYGEN SATURATION: 98 %

## 2018-09-06 DIAGNOSIS — I10 ESSENTIAL HYPERTENSION: ICD-10-CM

## 2018-09-06 DIAGNOSIS — E03.9 PRIMARY HYPOTHYROIDISM: ICD-10-CM

## 2018-09-06 DIAGNOSIS — J30.9 ALLERGIC RHINITIS, UNSPECIFIED SEASONALITY, UNSPECIFIED TRIGGER: ICD-10-CM

## 2018-09-06 DIAGNOSIS — F41.9 ANXIETY: Primary | ICD-10-CM

## 2018-09-06 DIAGNOSIS — R63.2 POLYPHAGIA: ICD-10-CM

## 2018-09-06 PROCEDURE — 99214 OFFICE O/P EST MOD 30 MIN: CPT | Mod: S$PBB,,, | Performed by: REGISTERED NURSE

## 2018-09-06 PROCEDURE — 99999 PR PBB SHADOW E&M-EST. PATIENT-LVL IV: CPT | Mod: PBBFAC,,, | Performed by: REGISTERED NURSE

## 2018-09-06 PROCEDURE — 99214 OFFICE O/P EST MOD 30 MIN: CPT | Mod: PBBFAC,PO | Performed by: REGISTERED NURSE

## 2018-09-06 RX ORDER — TRIAMCINOLONE ACETONIDE 55 UG/1
2 SPRAY, METERED NASAL DAILY
Qty: 16.5 ML | Refills: 3 | Status: SHIPPED | OUTPATIENT
Start: 2018-09-06 | End: 2019-03-14 | Stop reason: SDUPTHER

## 2018-09-06 NOTE — PROGRESS NOTES
Subjective:       Patient ID: Ana Bonilla is a 58 y.o. female.    Chief Complaint: Anxiety and Headache      HPI    Mrs. Bonilla is here today with reports of increased stress.  Mother ill in ICU at hospital, not doing well.  Son also causing her stress w/ his medical and legal issues.  She has been very upset and now with severe HA pain.  Does report increased nervousness at times, panic attacks and palpitations.  She is on anxiety medication as ordered, does not wish to try anything else at this time.  She is the only child, puts extra strain on her.  Reports not sleeping well.    Does have c/o increased appetite with night-time sugar cravings.  Denies increased urination or thirst.      Review of Systems   Constitutional: Positive for appetite change and fatigue. Negative for activity change and diaphoresis.   Eyes: Positive for pain (with headaches). Negative for photophobia and visual disturbance.   Respiratory: Negative.    Cardiovascular: Positive for palpitations. Negative for chest pain and leg swelling.   Musculoskeletal: Positive for myalgias.   Skin: Negative.    Neurological: Positive for headaches. Negative for dizziness, tremors, syncope, speech difficulty, light-headedness and numbness.   Psychiatric/Behavioral: Positive for agitation, decreased concentration and sleep disturbance. Negative for self-injury and suicidal ideas. The patient is nervous/anxious.          Patient Active Problem List   Diagnosis    COPD with asthma    GERD (gastroesophageal reflux disease)    Primary hypothyroidism    Tobacco abuse disorder    PVD (peripheral vascular disease)    Anxiety    Hypercholesterolemia    Claudication    Essential hypertension    Allergic rhinitis    Pain in both lower extremities    Constipation, chronic    Anisometropic amblyopia of left eye       Past medical, surgical, family and social histories have been reviewed today.        Objective:     Vitals:    09/06/18 0835   BP:  "134/70   Pulse: 105   Temp: 98.3 °F (36.8 °C)   SpO2: 98%   Weight: 59.5 kg (131 lb 2.8 oz)   Height: 5' 4" (1.626 m)   PainSc: 10-Worst pain ever       Physical Exam   Constitutional: She is oriented to person, place, and time. She appears well-developed and well-nourished. She appears distressed (tearful when speaking about ill mother).   HENT:   Head: Normocephalic and atraumatic.   Eyes: Conjunctivae and EOM are normal. Pupils are equal, round, and reactive to light.   Neck: Normal range of motion. Neck supple.   Cardiovascular: Regular rhythm and normal heart sounds. Tachycardia present.   Pulmonary/Chest: Effort normal and breath sounds normal.   Neurological: She is alert and oriented to person, place, and time.   Skin: She is not diaphoretic.   Psychiatric: Her speech is normal. Judgment normal. Her mood appears anxious. Her affect is not angry, not blunt and not inappropriate. She is agitated. She is not slowed, not withdrawn and not actively hallucinating. Thought content is not paranoid and not delusional. Cognition and memory are normal. She expresses no homicidal and no suicidal ideation. She is attentive.   Vitals reviewed.        Medication List with Changes/Refills   New Medications    TRIAMCINOLONE (NASACORT) 55 MCG NASAL INHALER    2 sprays by Nasal route once daily.   Current Medications    ALPRAZOLAM (XANAX) 2 MG TAB    Take 1 tablet (2 mg total) by mouth 2 (two) times daily.    AMLODIPINE-BENAZEPRIL 5-10 MG (LOTREL) 5-10 MG PER CAPSULE    Take 1 capsule by mouth once daily.    AMOXICILLIN-CLAVULANATE 875-125MG (AUGMENTIN) 875-125 MG PER TABLET    Take 1 tablet by mouth 2 (two) times daily.    DICLOFENAC SODIUM (SOLARAZE) 3 % GEL    APPLY ONE APPLICATION TOPICALLY FOUR TIMES DAILY AS NEEDED    GABAPENTIN (NEURONTIN) 300 MG CAPSULE    Take 1 capsule (300 mg total) by mouth every evening.    LEVOCETIRIZINE (XYZAL) 5 MG TABLET    Take 1 tablet (5 mg total) by mouth every morning.    LEVOTHYROXINE " (SYNTHROID) 100 MCG TABLET    Take 1 tablet (100 mcg total) by mouth before breakfast.    LINACLOTIDE (LINZESS) 145 MCG CAP CAPSULE    Take 1 capsule (145 mcg total) by mouth once daily. Take in AM on empty stomach 30 min before meal    MONTELUKAST (SINGULAIR) 10 MG TABLET    Take 1 tablet (10 mg total) by mouth every evening.    ONDANSETRON (ZOFRAN) 4 MG TABLET    Take 1 tablet (4 mg total) by mouth every 4 to 6 hours as needed for Nausea.    OXAPROZIN (DAYPRO) 600 MG TABLET    TK 1 T PO BID PRN P    OXYCODONE-ACETAMINOPHEN (PERCOCET) 5-325 MG PER TABLET    take 1 tablet by mouth every 8 to 12 hours    PREDNISOLONE ACETATE (PRED FORTE) 1 % DRPS    Place 1 drop into the left eye 4 (four) times daily.    PROMETHAZINE-CODEINE 6.25-10 MG/5 ML (PHENERGAN WITH CODEINE) 6.25-10 MG/5 ML SYRUP    Take 5 mLs by mouth every 4 (four) hours as needed.   Discontinued Medications    BUDESONIDE (RINOCORT AQUA) 32 MCG/ACTUATION NASAL SPRAY    1 spray (32 mcg total) by Nasal route once daily.           Diagnosis       1. Anxiety    2. Primary hypothyroidism    3. Essential hypertension    4. Polyphagia    5. Allergic rhinitis, unspecified seasonality, unspecified trigger          Assessment/ Plan     Anxiety  · This problem is not currently controlled, does not wish to make any changes to meds at this time.  · Continue Xanax as ordered, declines daily medication.  · Coping skills, relaxation, support system, meditation, etc.  · Recommended speaking with hospital  if not able to go for counseling.  · Proper sleep and diet.    Primary hypothyroidism  · PT with medication adjustment 9/2018, needs f/u labs to check level.  · Orders:  -     TSH; Future; Expected date: 09/06/2018  -     T4, free; Future; Expected date: 09/06/2018    Essential hypertension  · Stable and controlled, continue current medication, no changes.    Polyphagia  · PT with reports of increased appetite and PM sugar cravings, to check lab.  · Orders:  -      Hemoglobin A1c; Future; Expected date: 09/06/2018  -     Insulin, random; Future; Expected date: 09/06/2018    Allergic rhinitis, unspecified seasonality, unspecified trigger  · RX for nasal spray per PT request.  · Orders:  -     triamcinolone (NASACORT) 55 mcg nasal inhaler; 2 sprays by Nasal route once daily.  Dispense: 16.5 mL; Refill: 3        JONA Ramesh  [facility]

## 2018-09-07 ENCOUNTER — LAB VISIT (OUTPATIENT)
Dept: LAB | Facility: HOSPITAL | Age: 58
End: 2018-09-07
Attending: REGISTERED NURSE
Payer: MEDICAID

## 2018-09-07 DIAGNOSIS — R63.2 POLYPHAGIA: ICD-10-CM

## 2018-09-07 DIAGNOSIS — E03.9 PRIMARY HYPOTHYROIDISM: ICD-10-CM

## 2018-09-07 LAB
ESTIMATED AVG GLUCOSE: 103 MG/DL
HBA1C MFR BLD HPLC: 5.2 %
INSULIN COLLECTION INTERVAL: NORMAL
INSULIN SERPL-ACNC: 5.7 UU/ML
T4 FREE SERPL-MCNC: 1.21 NG/DL
TSH SERPL DL<=0.005 MIU/L-ACNC: 1.44 UIU/ML

## 2018-09-07 PROCEDURE — 84443 ASSAY THYROID STIM HORMONE: CPT

## 2018-09-07 PROCEDURE — 36415 COLL VENOUS BLD VENIPUNCTURE: CPT | Mod: PO

## 2018-09-07 PROCEDURE — 84439 ASSAY OF FREE THYROXINE: CPT

## 2018-09-07 PROCEDURE — 83036 HEMOGLOBIN GLYCOSYLATED A1C: CPT

## 2018-09-07 PROCEDURE — 83525 ASSAY OF INSULIN: CPT

## 2018-09-10 ENCOUNTER — TELEPHONE (OUTPATIENT)
Dept: FAMILY MEDICINE | Facility: CLINIC | Age: 58
End: 2018-09-10

## 2018-09-20 DIAGNOSIS — F41.9 ANXIETY: ICD-10-CM

## 2018-09-20 RX ORDER — ALPRAZOLAM 2 MG/1
TABLET ORAL
Qty: 60 TABLET | Refills: 0 | Status: SHIPPED | OUTPATIENT
Start: 2018-09-20 | End: 2018-10-17 | Stop reason: SDUPTHER

## 2018-10-17 DIAGNOSIS — F41.9 ANXIETY: ICD-10-CM

## 2018-10-18 RX ORDER — ALPRAZOLAM 2 MG/1
TABLET ORAL
Qty: 60 TABLET | Refills: 0 | Status: SHIPPED | OUTPATIENT
Start: 2018-10-18 | End: 2018-11-19 | Stop reason: SDUPTHER

## 2018-11-19 DIAGNOSIS — F41.9 ANXIETY: ICD-10-CM

## 2018-11-19 RX ORDER — ALPRAZOLAM 2 MG/1
TABLET ORAL
Qty: 60 TABLET | Refills: 0 | Status: SHIPPED | OUTPATIENT
Start: 2018-11-19 | End: 2018-12-17 | Stop reason: SDUPTHER

## 2018-11-26 ENCOUNTER — TELEPHONE (OUTPATIENT)
Dept: CARDIOLOGY | Facility: CLINIC | Age: 58
End: 2018-11-26

## 2018-11-26 NOTE — TELEPHONE ENCOUNTER
Called to patient--states needs a follow up appointment with Dr. Lovell--will be having an injection in back soon --appointment has been scheduled on 12/7/18 according to patient's request

## 2018-11-26 NOTE — TELEPHONE ENCOUNTER
----- Message from Cuca Mims sent at 11/26/2018  8:02 AM CST -----  Contact: self   Requesting to be worked into schedule.she was seen by pain management and BP was taken.she was told that her heartbeat is too fast.she also is need of pre op clearance.please call back at [phone].      Thanks,  Cuca Mims

## 2018-12-07 ENCOUNTER — OFFICE VISIT (OUTPATIENT)
Dept: CARDIOLOGY | Facility: CLINIC | Age: 58
End: 2018-12-07
Payer: MEDICAID

## 2018-12-07 ENCOUNTER — CLINICAL SUPPORT (OUTPATIENT)
Dept: CARDIOLOGY | Facility: CLINIC | Age: 58
End: 2018-12-07
Payer: MEDICAID

## 2018-12-07 VITALS
BODY MASS INDEX: 21.91 KG/M2 | SYSTOLIC BLOOD PRESSURE: 120 MMHG | HEART RATE: 72 BPM | WEIGHT: 128.31 LBS | HEIGHT: 64 IN | DIASTOLIC BLOOD PRESSURE: 70 MMHG

## 2018-12-07 DIAGNOSIS — I10 HTN (HYPERTENSION): ICD-10-CM

## 2018-12-07 DIAGNOSIS — I10 ESSENTIAL HYPERTENSION: ICD-10-CM

## 2018-12-07 DIAGNOSIS — I35.1 NONRHEUMATIC AORTIC VALVE INSUFFICIENCY: ICD-10-CM

## 2018-12-07 DIAGNOSIS — Z72.0 TOBACCO ABUSE DISORDER: ICD-10-CM

## 2018-12-07 DIAGNOSIS — I10 ESSENTIAL HYPERTENSION: Primary | ICD-10-CM

## 2018-12-07 DIAGNOSIS — E78.00 HYPERCHOLESTEROLEMIA: ICD-10-CM

## 2018-12-07 PROCEDURE — 93010 ELECTROCARDIOGRAM REPORT: CPT | Mod: S$PBB,,, | Performed by: NUCLEAR MEDICINE

## 2018-12-07 PROCEDURE — 99214 OFFICE O/P EST MOD 30 MIN: CPT | Mod: S$PBB,,, | Performed by: INTERNAL MEDICINE

## 2018-12-07 PROCEDURE — 93005 ELECTROCARDIOGRAM TRACING: CPT | Mod: PBBFAC | Performed by: NUCLEAR MEDICINE

## 2018-12-07 PROCEDURE — 99213 OFFICE O/P EST LOW 20 MIN: CPT | Mod: PBBFAC,25 | Performed by: INTERNAL MEDICINE

## 2018-12-07 PROCEDURE — 99999 PR PBB SHADOW E&M-EST. PATIENT-LVL III: CPT | Mod: PBBFAC,,, | Performed by: INTERNAL MEDICINE

## 2018-12-07 NOTE — PROGRESS NOTES
Subjective:   Patient ID:  Ana Bonilla is a 58 y.o. female who presents for follow up of Annual Exam      56 yo female, 1yr f/u.  PMH HTN, HLD< COPD, hiatal hernia, Smokes 1 ppd for 40 yrs. Occasional drinking.   Chronic BRAGG. No chest pain, dizziness, palpitation and syncope.  Left calf pain at rest, worse with walking. Had leg cramp at night two weeks ago.  Father  of DM at 30'. Mother HTN. Brother  of MVA.  EKG on  NSR  ECHO in  normal EF and moderate AI.          Past Medical History:   Diagnosis Date    Allergy     Amblyopia OS    Anxiety     Asthma     COPD (chronic obstructive pulmonary disease)     GERD (gastroesophageal reflux disease)     Hyperlipidemia     Hypertension     Thyroid disease        Past Surgical History:   Procedure Laterality Date    APPENDECTOMY      BUNIONECTOMY      HYSTERECTOMY      PARTIAL//still with ovaries    neck fusion  2017    THYROIDECTOMY         Social History     Tobacco Use    Smoking status: Current Every Day Smoker     Packs/day: 1.00     Types: Cigarettes    Smokeless tobacco: Never Used   Substance Use Topics    Alcohol use: Yes     Comment: rarely     Drug use: No       Family History   Problem Relation Age of Onset    Hypertension Mother     Diabetes Mother     Mental illness Son     Diabetes Father     Mental illness Other     Pancreatic cancer Other     Pancreatic cancer Maternal Uncle     Stroke Maternal Grandmother     Prostate cancer Maternal Grandfather     Ovarian cancer Maternal Cousin          Review of Systems   Constitution: Negative for decreased appetite, diaphoresis, fever, weakness, malaise/fatigue and night sweats.   HENT: Negative for nosebleeds.    Eyes: Negative for blurred vision and double vision.   Cardiovascular: Positive for dyspnea on exertion. Negative for chest pain, claudication, irregular heartbeat, leg swelling, near-syncope, orthopnea, palpitations, paroxysmal nocturnal dyspnea and  syncope.   Respiratory: Negative for cough, shortness of breath, sleep disturbances due to breathing, snoring, sputum production and wheezing.    Endocrine: Negative for cold intolerance and polyuria.   Hematologic/Lymphatic: Does not bruise/bleed easily.   Skin: Negative for rash.   Musculoskeletal: Negative for back pain, falls, joint pain, joint swelling and neck pain.   Gastrointestinal: Negative for abdominal pain, heartburn, nausea and vomiting.   Genitourinary: Negative for dysuria, frequency and hematuria.   Neurological: Negative for difficulty with concentration, dizziness, focal weakness, headaches, light-headedness, numbness and seizures.   Psychiatric/Behavioral: Negative for depression, memory loss and substance abuse. The patient does not have insomnia.    Allergic/Immunologic: Negative for HIV exposure and hives.       Objective:   Physical Exam   Constitutional: She is oriented to person, place, and time. She appears well-nourished.   HENT:   Head: Normocephalic.   Eyes: Pupils are equal, round, and reactive to light.   Neck: Normal carotid pulses and no JVD present. Carotid bruit is not present. No thyromegaly present.   Cardiovascular: Normal rate, regular rhythm and normal heart sounds.  No extrasystoles are present. PMI is not displaced. Exam reveals no gallop and no S3.   No murmur heard.  Pulses:       Dorsalis pedis pulses are 2+ on the right side, and 1+ on the left side.        Posterior tibial pulses are 2+ on the right side, and 1+ on the left side.   Pulmonary/Chest: Breath sounds normal. No stridor. No respiratory distress.   Abdominal: Soft. Bowel sounds are normal. There is no tenderness. There is no rebound.   Musculoskeletal: Normal range of motion.   Neurological: She is alert and oriented to person, place, and time.   Skin: Skin is intact. No rash noted.   Psychiatric: Her behavior is normal.       Lab Results   Component Value Date    CHOL 231 (H) 06/07/2018    CHOL 204 (H)  04/18/2017    CHOL 228 (H) 04/12/2016     Lab Results   Component Value Date    HDL 74 06/07/2018    HDL 87 (H) 04/18/2017    HDL 55 04/12/2016     Lab Results   Component Value Date    LDLCALC 141.2 06/07/2018    LDLCALC 106.8 04/18/2017    LDLCALC 152.2 04/12/2016     Lab Results   Component Value Date    TRIG 79 06/07/2018    TRIG 51 04/18/2017    TRIG 104 04/12/2016     Lab Results   Component Value Date    CHOLHDL 32.0 06/07/2018    CHOLHDL 42.6 04/18/2017    CHOLHDL 24.1 04/12/2016       Chemistry        Component Value Date/Time     06/07/2018 1119    K 4.1 06/07/2018 1119     06/07/2018 1119    CO2 24 06/07/2018 1119    BUN 13 06/07/2018 1119    CREATININE 0.9 06/07/2018 1119    GLU 92 06/07/2018 1119        Component Value Date/Time    CALCIUM 9.5 06/07/2018 1119    ALKPHOS 65 06/07/2018 1119    AST 25 06/07/2018 1119    ALT 19 06/07/2018 1119    BILITOT 0.7 06/07/2018 1119    ESTGFRAFRICA >60.0 06/07/2018 1119    EGFRNONAA >60.0 06/07/2018 1119          Lab Results   Component Value Date    HGBA1C 5.2 09/07/2018     Lab Results   Component Value Date    TSH 1.441 09/07/2018     Lab Results   Component Value Date    INR 1.0 08/15/2017     Lab Results   Component Value Date    WBC 5.25 06/07/2018    HGB 14.4 06/07/2018    HCT 45.8 06/07/2018    MCV 99 (H) 06/07/2018     06/07/2018     BMP  Sodium   Date Value Ref Range Status   06/07/2018 141 136 - 145 mmol/L Final     Potassium   Date Value Ref Range Status   06/07/2018 4.1 3.5 - 5.1 mmol/L Final     Chloride   Date Value Ref Range Status   06/07/2018 106 95 - 110 mmol/L Final     CO2   Date Value Ref Range Status   06/07/2018 24 23 - 29 mmol/L Final     BUN, Bld   Date Value Ref Range Status   06/07/2018 13 6 - 20 mg/dL Final     Creatinine   Date Value Ref Range Status   06/07/2018 0.9 0.5 - 1.4 mg/dL Final     Calcium   Date Value Ref Range Status   06/07/2018 9.5 8.7 - 10.5 mg/dL Final     Anion Gap   Date Value Ref Range Status    06/07/2018 11 8 - 16 mmol/L Final     eGFR if    Date Value Ref Range Status   06/07/2018 >60.0 >60 mL/min/1.73 m^2 Final     eGFR if non    Date Value Ref Range Status   06/07/2018 >60.0 >60 mL/min/1.73 m^2 Final     Comment:     Calculation used to obtain the estimated glomerular filtration  rate (eGFR) is the CKD-EPI equation.        BNP  @LABRCNTIP(BNP,BNPTRIAGEBLO)@  @LABRCNTIP(troponini)@  CrCl cannot be calculated (Patient's most recent lab result is older than the maximum 7 days allowed.).  No results found in the last 24 hours.  No results found in the last 24 hours.  No results found in the last 24 hours.    Assessment:      1. Essential hypertension    2. Hypercholesterolemia    3. Tobacco abuse disorder    4. HTN (hypertension)    5. Nonrheumatic aortic valve insufficiency      BP controlled    ASCVD 10-yr risk 5.8%  EKG NSr and LVH    Plan:   Refer to smoking cessation  Continue Lotrel  Recommend heart-healthy diet, weight control and regular exercise.  Yasmine. Risk modification.   RTC in 1 yr    I have reviewed all pertinent labs and cardiac studies. Plans and recommendations have been formulated under my direct supervision. All questions answered and patient voiced understanding. Patient to continue current medications.

## 2018-12-10 ENCOUNTER — OFFICE VISIT (OUTPATIENT)
Dept: GASTROENTEROLOGY | Facility: CLINIC | Age: 58
End: 2018-12-10
Payer: MEDICAID

## 2018-12-10 VITALS
BODY MASS INDEX: 21.91 KG/M2 | HEIGHT: 64 IN | DIASTOLIC BLOOD PRESSURE: 70 MMHG | WEIGHT: 128.31 LBS | HEART RATE: 100 BPM | SYSTOLIC BLOOD PRESSURE: 130 MMHG

## 2018-12-10 DIAGNOSIS — K21.9 GASTROESOPHAGEAL REFLUX DISEASE WITHOUT ESOPHAGITIS: Primary | ICD-10-CM

## 2018-12-10 DIAGNOSIS — K59.04 CHRONIC IDIOPATHIC CONSTIPATION: ICD-10-CM

## 2018-12-10 DIAGNOSIS — R10.13 DYSPEPSIA: ICD-10-CM

## 2018-12-10 PROCEDURE — 99203 OFFICE O/P NEW LOW 30 MIN: CPT | Mod: S$PBB,,, | Performed by: PHYSICIAN ASSISTANT

## 2018-12-10 PROCEDURE — 99214 OFFICE O/P EST MOD 30 MIN: CPT | Mod: PBBFAC | Performed by: PHYSICIAN ASSISTANT

## 2018-12-10 PROCEDURE — 99999 PR PBB SHADOW E&M-EST. PATIENT-LVL IV: CPT | Mod: PBBFAC,,, | Performed by: PHYSICIAN ASSISTANT

## 2018-12-10 RX ORDER — OMEPRAZOLE 20 MG/1
20 CAPSULE, DELAYED RELEASE ORAL DAILY
Qty: 30 CAPSULE | Refills: 11 | Status: SHIPPED | OUTPATIENT
Start: 2018-12-10 | End: 2019-11-01

## 2018-12-10 NOTE — PROGRESS NOTES
GI OUTPATIENT NOTE    PCP: Kenrick Locke No address on file    Chief Complaint   Patient presents with    Gastroesophageal Reflux       HISTORY OF PRESENT ILLNESS:  58 y.o. female presents to the GI clinic today for initial evaluation. Patient has a history of GERD and was diagnosed with hiatal hernia on EGD in 2011.The patient complains of epigastric pain described as a burning sensation onset many years ago. Symptoms are consistent with previous episodes of GERD, which was previously controlled with omeprazole. However, patient is no longer taking this medication and dyspepsia has returned. Patient's diet consists of very little fruits and vegetables and she frequently drinks multiple diet sodas a day. Associated symptoms include increased stress due to family issues and chronic constipation. Patient notes she was placed on Linzess for constipation by her PCP 6 months ago. She has not been taking this medication everyday and only takes it as needed when feeling excessively bloated or constipated. Patient typically has a bowel movement once a day for 4 days of the week. Patient feels she empties her bowels completely and does not strain excessively. She denies hematochezia, melena, weight loss, change in appetite, nausea or vomiting. Last colonoscopy was in January 2011. This resulted normal with recommendation to repeat in 7 years.    Past Medical History:   Diagnosis Date    Allergy     Amblyopia OS    Anxiety     Asthma     COPD (chronic obstructive pulmonary disease)     GERD (gastroesophageal reflux disease)     Hyperlipidemia     Hypertension     Thyroid disease        Past Surgical History:   Procedure Laterality Date    APPENDECTOMY      BUNIONECTOMY      HYSTERECTOMY      PARTIAL//still with ovaries    neck fusion  08/2017    THYROIDECTOMY         Social History     Socioeconomic History    Marital status:      Spouse name: Not on file    Number of children: 2    Years of  education: Not on file    Highest education level: Not on file   Social Needs    Financial resource strain: Not on file    Food insecurity - worry: Not on file    Food insecurity - inability: Not on file    Transportation needs - medical: Not on file    Transportation needs - non-medical: Not on file   Occupational History     Employer: [employer]   Tobacco Use    Smoking status: Current Every Day Smoker     Packs/day: 1.00     Types: Cigarettes    Smokeless tobacco: Never Used   Substance and Sexual Activity    Alcohol use: Yes     Comment: rarely     Drug use: No    Sexual activity: No   Other Topics Concern    Not on file   Social History Narrative    Not on file       Family History   Problem Relation Age of Onset    Hypertension Mother     Diabetes Mother     Mental illness Son     Diabetes Father     Mental illness Other     Pancreatic cancer Other     Pancreatic cancer Maternal Uncle     Stroke Maternal Grandmother     Prostate cancer Maternal Grandfather     Ovarian cancer Maternal Cousin        MEDS/ALLERGY:  The patient's medications and allergies were reviewed and updated in the EPIC chart.     Review of Systems   Constitutional: Negative for fever.   HENT: Negative for hearing loss.    Eyes: Negative for visual disturbance.   Respiratory: Negative for cough and shortness of breath.    Cardiovascular: Negative for chest pain.   Gastrointestinal:        As per HPI.   Genitourinary: Negative for dysuria, frequency and hematuria.   Musculoskeletal: Negative for arthralgias and back pain.   Skin: Negative for rash.   Neurological: Negative for seizures, syncope, numbness and headaches.   Hematological: Does not bruise/bleed easily.   Psychiatric/Behavioral: The patient is not nervous/anxious.        Physical Exam   Constitutional: She is oriented to person, place, and time. Vital signs are normal. She appears well-developed and well-nourished.   HENT:   Head: Normocephalic and atraumatic.    Eyes: EOM are normal.   Neck: Normal range of motion. Neck supple. Carotid bruit is not present. No thyromegaly present.   Cardiovascular: Normal rate and regular rhythm.   No murmur heard.  Pulmonary/Chest: Effort normal and breath sounds normal. No respiratory distress. She has no wheezes.   Abdominal: Soft. Normal appearance and bowel sounds are normal. She exhibits no distension and no mass. There is tenderness in the epigastric area.   Musculoskeletal: She exhibits no edema.   Neurological: She is alert and oriented to person, place, and time. No cranial nerve deficit. Gait normal.   Skin: Skin is warm and dry. No rash noted.   Psychiatric: Thought content normal.       LABS/IMAGING:   Pertinent results were reviewed.     ASSESSMENT:  1. Gastroesophageal reflux disease without esophagitis    2. Dyspepsia    3. Chronic idiopathic constipation        PLAN:  Prescribed a 4 week trial of Omeprazole 20 mg. Discussed the possible side effects of long term use. Recommended that patient limit foods which can exacerbate GERD, including caffeine, sodas, and tomato based sauces. Will evaluate efficacy of treatment at follow-up visit.   Start taking Linzess 3 days a week instead of PRN.   Will discuss scheduling a colonoscopy at next visit.    Follow-up in about 4 weeks (around 1/7/2019).     Thank you for the opportunity to participate in the care of this patient. This consult was designated to me by my supervising physician. He fully participated in the development of the assessment and recommendations.    Jeanmarie Brown PA-C.

## 2018-12-14 DIAGNOSIS — F41.9 ANXIETY: ICD-10-CM

## 2018-12-14 RX ORDER — ALPRAZOLAM 2 MG/1
TABLET ORAL
Qty: 60 TABLET | Refills: 0 | Status: CANCELLED | OUTPATIENT
Start: 2018-12-14

## 2018-12-17 ENCOUNTER — HOSPITAL ENCOUNTER (OUTPATIENT)
Dept: RADIOLOGY | Facility: HOSPITAL | Age: 58
Discharge: HOME | End: 2018-12-17
Attending: REGISTERED NURSE
Payer: MEDICAID

## 2018-12-17 ENCOUNTER — OFFICE VISIT (OUTPATIENT)
Dept: FAMILY MEDICINE | Facility: CLINIC | Age: 58
End: 2018-12-17
Payer: MEDICAID

## 2018-12-17 VITALS
DIASTOLIC BLOOD PRESSURE: 74 MMHG | WEIGHT: 130.06 LBS | BODY MASS INDEX: 22.2 KG/M2 | TEMPERATURE: 99 F | HEIGHT: 64 IN | OXYGEN SATURATION: 100 % | SYSTOLIC BLOOD PRESSURE: 112 MMHG | HEART RATE: 69 BPM

## 2018-12-17 DIAGNOSIS — R39.9 URINARY SYMPTOM OR SIGN: ICD-10-CM

## 2018-12-17 DIAGNOSIS — K59.09 CONSTIPATION, CHRONIC: ICD-10-CM

## 2018-12-17 DIAGNOSIS — M54.16 CHRONIC RADICULAR PAIN OF LOWER BACK: Primary | ICD-10-CM

## 2018-12-17 DIAGNOSIS — F41.9 ANXIETY: ICD-10-CM

## 2018-12-17 DIAGNOSIS — G89.29 CHRONIC RADICULAR PAIN OF LOWER BACK: Primary | ICD-10-CM

## 2018-12-17 LAB
BILIRUB SERPL-MCNC: NEGATIVE MG/DL
BLOOD URINE, POC: ABNORMAL
COLOR, POC UA: YELLOW
GLUCOSE UR QL STRIP: NORMAL
KETONES UR QL STRIP: NEGATIVE
LEUKOCYTE ESTERASE URINE, POC: ABNORMAL
NITRITE, POC UA: NEGATIVE
PH, POC UA: 8
PROTEIN, POC: ABNORMAL
SPECIFIC GRAVITY, POC UA: 1
UROBILINOGEN, POC UA: NORMAL

## 2018-12-17 PROCEDURE — 99999 PR PBB SHADOW E&M-EST. PATIENT-LVL IV: CPT | Mod: PBBFAC,,, | Performed by: REGISTERED NURSE

## 2018-12-17 PROCEDURE — 99214 OFFICE O/P EST MOD 30 MIN: CPT | Mod: PBBFAC,25,PO | Performed by: REGISTERED NURSE

## 2018-12-17 PROCEDURE — 87086 URINE CULTURE/COLONY COUNT: CPT

## 2018-12-17 PROCEDURE — 74019 RADEX ABDOMEN 2 VIEWS: CPT | Mod: TC,FY,PO

## 2018-12-17 PROCEDURE — 99214 OFFICE O/P EST MOD 30 MIN: CPT | Mod: 25,S$PBB,, | Performed by: REGISTERED NURSE

## 2018-12-17 PROCEDURE — 74019 RADEX ABDOMEN 2 VIEWS: CPT | Mod: 26,,, | Performed by: RADIOLOGY

## 2018-12-17 PROCEDURE — 81002 URINALYSIS NONAUTO W/O SCOPE: CPT | Mod: PBBFAC,PO | Performed by: REGISTERED NURSE

## 2018-12-17 RX ORDER — ALPRAZOLAM 2 MG/1
TABLET ORAL
Qty: 60 TABLET | Refills: 0 | Status: SHIPPED | OUTPATIENT
Start: 2018-12-17 | End: 2019-01-14 | Stop reason: SDUPTHER

## 2018-12-17 NOTE — PROGRESS NOTES
Subjective:       Patient ID: Ana Bonilla is a 58 y.o. female.    Chief Complaint: Back Pain      HPI    Ana Bonilla is here today with c/o lower back and pelvic pain since Friday 12/14/2018, acute onset.  Denies injury or trauma.  Pain located midline down LT leg.  Did have RANULFO about 2 weeks ago, reports doing well until pain started up this past Friday.  Has issues with chronic constipation, on Linzess but only taking 3 times a week.  Also with c/o bloating and heartburn.  LBM was on Friday, same day her pain to the back and pelvic area started.  She has tried Percocet and Pennsaid but did not relieve her pain.  Admits to minimal to no water intake daily, increased intake of soda.        Review of Systems   Constitutional: Negative for chills and fever.   Respiratory: Negative.    Cardiovascular: Negative.    Gastrointestinal: Positive for abdominal pain and constipation. Negative for diarrhea, nausea and vomiting.   Genitourinary: Positive for pelvic pain. Negative for dysuria and hematuria.   Musculoskeletal: Positive for back pain.   Skin: Negative.    Neurological: Negative.        Review of patient's allergies indicates:  No Known Allergies    Patient Active Problem List   Diagnosis    COPD with asthma    GERD (gastroesophageal reflux disease)    Primary hypothyroidism    Tobacco abuse disorder    PVD (peripheral vascular disease)    Anxiety    Hypercholesterolemia    Claudication    Essential hypertension    Allergic rhinitis    Pain in both lower extremities    Constipation, chronic    Anisometropic amblyopia of left eye    Nonrheumatic aortic valve insufficiency       Current Outpatient Medications on File Prior to Visit   Medication Sig Dispense Refill    ALPRAZolam (XANAX) 2 MG Tab TAKE 1 TABLET(2 MG) BY MOUTH TWICE DAILY 60 tablet 0    amlodipine-benazepril 5-10 mg (LOTREL) 5-10 mg per capsule Take 1 capsule by mouth once daily. 30 capsule 6    diclofenac sodium (SOLARAZE) 3 %  "gel APPLY ONE APPLICATION TOPICALLY FOUR TIMES DAILY AS NEEDED  3    gabapentin (NEURONTIN) 300 MG capsule Take 1 capsule (300 mg total) by mouth every evening. 30 capsule 6    levocetirizine (XYZAL) 5 MG tablet Take 1 tablet (5 mg total) by mouth every morning. 30 tablet 6    levothyroxine (SYNTHROID) 100 MCG tablet Take 1 tablet (100 mcg total) by mouth before breakfast. 30 tablet 6    linaclotide (LINZESS) 145 mcg Cap capsule Take 1 capsule (145 mcg total) by mouth once daily. Take in AM on empty stomach 30 min before meal 30 capsule 6    montelukast (SINGULAIR) 10 mg tablet Take 1 tablet (10 mg total) by mouth every evening. 30 tablet 6    omeprazole (PRILOSEC) 20 MG capsule Take 1 capsule (20 mg total) by mouth once daily. 30 capsule 11    ondansetron (ZOFRAN) 4 MG tablet Take 1 tablet (4 mg total) by mouth every 4 to 6 hours as needed for Nausea. 60 tablet 2    oxycodone-acetaminophen (PERCOCET) 5-325 mg per tablet take 1 tablet by mouth every 8 to 12 hours  0    promethazine-codeine 6.25-10 mg/5 ml (PHENERGAN WITH CODEINE) 6.25-10 mg/5 mL syrup Take 5 mLs by mouth every 4 (four) hours as needed. 180 mL 0    triamcinolone (NASACORT) 55 mcg nasal inhaler 2 sprays by Nasal route once daily. 16.5 mL 3     No current facility-administered medications on file prior to visit.        Past medical, surgical, family and social histories have been reviewed today.        Objective:     Vitals:    12/17/18 1038   BP: 112/74   Pulse: 69   Temp: 98.5 °F (36.9 °C)   TempSrc: Oral   SpO2: 100%   Weight: 59 kg (130 lb 1.1 oz)   Height: 5' 4" (1.626 m)   PainSc:   9   PainLoc: Back         Physical Exam   Constitutional: She is oriented to person, place, and time. She appears well-developed and well-nourished.   Cardiovascular: Normal rate and regular rhythm.   Pulmonary/Chest: Effort normal and breath sounds normal.   Abdominal: Bowel sounds are normal. She exhibits no distension and no mass. There is tenderness. " There is no rigidity and no guarding.       Musculoskeletal: Normal range of motion.        Lumbar back: She exhibits tenderness. She exhibits no swelling, no edema, no deformity, no spasm and normal pulse.        Back:    Neurological: She is alert and oriented to person, place, and time.   Vitals reviewed.              Component      Latest Ref Rng & Units 12/17/2018   Color, UA       YELLOW   Spec Grav UA       1.005   pH, UA       8   WBC, UA       +   Nitrite, UA       NEGATIVE   Protein       TRACE   Glucose, UA       NORMAL   Ketones, UA       NEGATIVE   Urobilinogen, UA       NORMAL   Bilirubin       NEGATIVE   RBC, UA       ABOUT 5-10       Diagnosis       1. Chronic radicular pain of lower back    2. Urinary symptom or sign    3. Constipation, chronic          Assessment/ Plan     Chronic radicular pain of lower back  · Acute flare of lower back pain ~ 3 days ago, chronic issue followed by Dr. Rodo Davidson with RANULFO 2 weeks ago.  · Continue treatment plan per Pain Mgmt.    Urinary symptom or sign  -     POCT urine dipstick without microscope  -     Urine culture    Constipation, chronic  -     X-Ray Abdomen Flat And Erect; Future; Expected date: 12/17/2018        Acute pain to the lower stomach and back, highly likely due to constipation.  Increase Linzess to daily if possible with increased intake of water and fiber in diet.  UA negative today for infection.  Xray pending.  Follow-up in clinic as needed.        JONA Ramesh  [facility]

## 2018-12-19 ENCOUNTER — TELEPHONE (OUTPATIENT)
Dept: FAMILY MEDICINE | Facility: CLINIC | Age: 58
End: 2018-12-19

## 2018-12-19 LAB — BACTERIA UR CULT: NO GROWTH

## 2018-12-19 NOTE — TELEPHONE ENCOUNTER
Urine culture negative, no infection.  I sent result to her portal this AM.  Needs to be drinking plenty of water, Azo or cranberry pills.  NO soft drinks, coffee or tea.    Also, her abdominal xrays do show a build-up of stool likely causing her symptoms of lower back and pelvic pressure.  Need to increase daily water and fiber intake.  Linzess as directed.

## 2018-12-19 NOTE — TELEPHONE ENCOUNTER
----- Message from Pura Johnson LPN sent at 12/19/2018  7:50 AM CST -----  Contact: Patient  Please advise.   ----- Message -----  From: Leslie Rm  Sent: 12/19/2018   7:20 AM  To: [facility] Staff    Patient is requesting an antibiotic, stating her bladder infection is not getting better, please call her back at [phone]. Thank you      [id number] - [place] [facility] [id number] [facility] [address] [facility] [address]  [address] [place] [address] [place] [id number]  Phone: [phone] Fax: [phone]

## 2019-01-14 DIAGNOSIS — F41.9 ANXIETY: ICD-10-CM

## 2019-01-15 NOTE — TELEPHONE ENCOUNTER
----- Message from Sofía Rooney sent at 1/15/2019  9:07 AM CST -----  Contact: pt  She's calling to get a refill...    1. What is the name of the medication you are requesting? Xanax  2. What is the dose? 2 mg  3. How do you take the medication? Orally, topically, etc? orally  4. How often do you take this medication? Twice daily  5. Do you need a 30 day or 90 day supply? 30  6. How many refills are you requesting? 1  7. What is your preferred pharmacy and location of the pharmacy? [facility] on [address] and [facility]  8. Who can we contact with further questions? [phone]

## 2019-01-16 RX ORDER — ALPRAZOLAM 2 MG/1
TABLET ORAL
Qty: 60 TABLET | Refills: 0 | Status: SHIPPED | OUTPATIENT
Start: 2019-01-16 | End: 2019-02-16 | Stop reason: SDUPTHER

## 2019-01-23 ENCOUNTER — TELEPHONE (OUTPATIENT)
Dept: FAMILY MEDICINE | Facility: CLINIC | Age: 59
End: 2019-01-23

## 2019-01-23 NOTE — TELEPHONE ENCOUNTER
----- Message from Cassidy Hayes sent at 1/23/2019 10:37 AM CST -----  Contact: [phone]  Would like to consult with the nurse, patients states she is hurting really bad and would like to be work in for an sooner appt, please call back at [phone], thanks sj

## 2019-01-24 ENCOUNTER — HOSPITAL ENCOUNTER (OUTPATIENT)
Dept: RADIOLOGY | Facility: HOSPITAL | Age: 59
Discharge: HOME | End: 2019-01-24
Attending: REGISTERED NURSE
Payer: MEDICAID

## 2019-01-24 ENCOUNTER — OFFICE VISIT (OUTPATIENT)
Dept: FAMILY MEDICINE | Facility: CLINIC | Age: 59
End: 2019-01-24
Payer: MEDICAID

## 2019-01-24 VITALS
TEMPERATURE: 98 F | DIASTOLIC BLOOD PRESSURE: 74 MMHG | OXYGEN SATURATION: 99 % | BODY MASS INDEX: 22.81 KG/M2 | HEART RATE: 102 BPM | SYSTOLIC BLOOD PRESSURE: 122 MMHG | HEIGHT: 64 IN | WEIGHT: 133.63 LBS

## 2019-01-24 DIAGNOSIS — K59.09 CONSTIPATION, CHRONIC: ICD-10-CM

## 2019-01-24 DIAGNOSIS — R10.2 PELVIC PAIN: ICD-10-CM

## 2019-01-24 DIAGNOSIS — R10.2 PELVIC PAIN: Primary | ICD-10-CM

## 2019-01-24 DIAGNOSIS — R10.9 ABDOMINAL PAIN, UNSPECIFIED ABDOMINAL LOCATION: ICD-10-CM

## 2019-01-24 LAB
BILIRUB SERPL-MCNC: NORMAL MG/DL
BLOOD URINE, POC: NORMAL
COLOR, POC UA: YELLOW
GLUCOSE UR QL STRIP: NORMAL
KETONES UR QL STRIP: NORMAL
LEUKOCYTE ESTERASE URINE, POC: NORMAL
NITRITE, POC UA: NORMAL
PH, POC UA: 5
PROTEIN, POC: NORMAL
SPECIFIC GRAVITY, POC UA: 1
UROBILINOGEN, POC UA: NORMAL

## 2019-01-24 PROCEDURE — 99999 PR PBB SHADOW E&M-EST. PATIENT-LVL IV: ICD-10-PCS | Mod: PBBFAC,,, | Performed by: REGISTERED NURSE

## 2019-01-24 PROCEDURE — 99999 PR PBB SHADOW E&M-EST. PATIENT-LVL IV: CPT | Mod: PBBFAC,,, | Performed by: REGISTERED NURSE

## 2019-01-24 PROCEDURE — 99214 OFFICE O/P EST MOD 30 MIN: CPT | Mod: PBBFAC,25,PO | Performed by: REGISTERED NURSE

## 2019-01-24 PROCEDURE — 81002 URINALYSIS NONAUTO W/O SCOPE: CPT | Mod: PBBFAC,PO | Performed by: REGISTERED NURSE

## 2019-01-24 PROCEDURE — 99214 OFFICE O/P EST MOD 30 MIN: CPT | Mod: 25,S$PBB,, | Performed by: REGISTERED NURSE

## 2019-01-24 PROCEDURE — 99214 PR OFFICE/OUTPT VISIT, EST, LEVL IV, 30-39 MIN: ICD-10-PCS | Mod: 25,S$PBB,, | Performed by: REGISTERED NURSE

## 2019-01-24 PROCEDURE — 74178 CT ABD&PLV WO CNTR FLWD CNTR: CPT | Mod: TC

## 2019-01-24 PROCEDURE — 25500020 PHARM REV CODE 255: Performed by: REGISTERED NURSE

## 2019-01-24 RX ORDER — HYOSCYAMINE SULFATE 0.125 MG
125 TABLET ORAL EVERY 4 HOURS PRN
Qty: 30 TABLET | Refills: 0 | Status: SHIPPED | OUTPATIENT
Start: 2019-01-24 | End: 2019-04-26

## 2019-01-24 RX ADMIN — IOHEXOL 30 ML: 350 INJECTION, SOLUTION INTRAVENOUS at 02:01

## 2019-01-24 RX ADMIN — IOHEXOL 75 ML: 350 INJECTION, SOLUTION INTRAVENOUS at 04:01

## 2019-01-25 ENCOUNTER — TELEPHONE (OUTPATIENT)
Dept: FAMILY MEDICINE | Facility: CLINIC | Age: 59
End: 2019-01-25

## 2019-01-25 NOTE — TELEPHONE ENCOUNTER
CT does not much other than constipation.  As discussed at her appt, I think this is the root of her problem and symptoms.  Needs to start taking the Linzess consistently, drink plenty of water and add fiber to the diet.  Discuss with GI (she has seen) for any further problems.

## 2019-02-01 ENCOUNTER — OFFICE VISIT (OUTPATIENT)
Dept: GASTROENTEROLOGY | Facility: CLINIC | Age: 59
End: 2019-02-01
Payer: MEDICAID

## 2019-02-01 VITALS
DIASTOLIC BLOOD PRESSURE: 70 MMHG | BODY MASS INDEX: 22.2 KG/M2 | WEIGHT: 130.06 LBS | HEIGHT: 64 IN | HEART RATE: 76 BPM | SYSTOLIC BLOOD PRESSURE: 132 MMHG

## 2019-02-01 DIAGNOSIS — Z12.11 COLON CANCER SCREENING: ICD-10-CM

## 2019-02-01 DIAGNOSIS — R14.0 BLOATING: Primary | ICD-10-CM

## 2019-02-01 DIAGNOSIS — K59.04 CHRONIC IDIOPATHIC CONSTIPATION: ICD-10-CM

## 2019-02-01 DIAGNOSIS — K21.9 GASTROESOPHAGEAL REFLUX DISEASE, ESOPHAGITIS PRESENCE NOT SPECIFIED: ICD-10-CM

## 2019-02-01 DIAGNOSIS — R10.13 DYSPEPSIA: ICD-10-CM

## 2019-02-01 PROCEDURE — 99214 OFFICE O/P EST MOD 30 MIN: CPT | Mod: PBBFAC | Performed by: PHYSICIAN ASSISTANT

## 2019-02-01 PROCEDURE — 99213 OFFICE O/P EST LOW 20 MIN: CPT | Mod: S$PBB,,, | Performed by: PHYSICIAN ASSISTANT

## 2019-02-01 PROCEDURE — 99213 PR OFFICE/OUTPT VISIT, EST, LEVL III, 20-29 MIN: ICD-10-PCS | Mod: S$PBB,,, | Performed by: PHYSICIAN ASSISTANT

## 2019-02-01 PROCEDURE — 99999 PR PBB SHADOW E&M-EST. PATIENT-LVL IV: CPT | Mod: PBBFAC,,, | Performed by: PHYSICIAN ASSISTANT

## 2019-02-01 PROCEDURE — 99999 PR PBB SHADOW E&M-EST. PATIENT-LVL IV: ICD-10-PCS | Mod: PBBFAC,,, | Performed by: PHYSICIAN ASSISTANT

## 2019-02-01 NOTE — PROGRESS NOTES
Subjective:      Patient ID: Ana Bonilal is a 58 y.o. female.    Chief Complaint: Follow-up and Abdominal Pain (Left side )    HPI  The patient has a history of GERD, dyspepsia and chronic constipation. She is here today for a follow up visit. Recommendations last visit included a trial of Omeprazole daily, education about reflux precautions and Linzess at least three time daily. Today she complains primarily of bloating. She last took Linzess on Tuesday, but not taking regularly. When she takes it, she has a BM within 40 minutes. She feels better after taking it and denies side effects. She has back on soda which helped a little with the bloating. She also reports having low back pain and trouble walking. She was given toradol which helped some, but she still has flank pain.     Review of Systems  As per HPI.     Objective:     Physical Exam   Constitutional: She is oriented to person, place, and time. She appears well-developed and well-nourished. No distress.   HENT:   Head: Normocephalic and atraumatic.   Eyes: EOM are normal.   Cardiovascular: Normal rate and regular rhythm.   Pulmonary/Chest: Effort normal and breath sounds normal. No respiratory distress. She has no wheezes.   Abdominal: Soft. Bowel sounds are normal. She exhibits no distension. There is no tenderness.   Neurological: She is alert and oriented to person, place, and time. No cranial nerve deficit.   Skin: She is not diaphoretic.   Psychiatric: Her behavior is normal.       Assessment:     1. Bloating    2. Chronic idiopathic constipation    3. Gastroesophageal reflux disease, esophagitis presence not specified    4. Dyspepsia    5. Colon cancer screening        Plan:     Discussed colonoscopy. Currently scheduled in May.   Reviewed the goal with Linzess. I would like to see her take it daily but doubt she will be compliant. Discussed importance of compliance with therapy plans.   Continue PPI daily.   Offered information on low FODMAPs  diet and discuss probiotics.     Follow-up if symptoms worsen or fail to improve.    Thank you for the opportunity to participate in the care of this patient.   Jeanmarie Brown PA-C.

## 2019-02-05 DIAGNOSIS — I10 ESSENTIAL HYPERTENSION: ICD-10-CM

## 2019-02-05 RX ORDER — AMLODIPINE AND BENAZEPRIL HYDROCHLORIDE 5; 10 MG/1; MG/1
1 CAPSULE ORAL DAILY
Qty: 30 CAPSULE | Refills: 6 | Status: SHIPPED | OUTPATIENT
Start: 2019-02-05 | End: 2019-07-30 | Stop reason: SDUPTHER

## 2019-02-06 DIAGNOSIS — E03.9 PRIMARY HYPOTHYROIDISM: ICD-10-CM

## 2019-02-07 ENCOUNTER — OFFICE VISIT (OUTPATIENT)
Dept: FAMILY MEDICINE | Facility: CLINIC | Age: 59
End: 2019-02-07
Payer: MEDICAID

## 2019-02-07 VITALS
TEMPERATURE: 99 F | OXYGEN SATURATION: 94 % | HEIGHT: 64 IN | DIASTOLIC BLOOD PRESSURE: 72 MMHG | SYSTOLIC BLOOD PRESSURE: 110 MMHG | BODY MASS INDEX: 22.32 KG/M2 | HEART RATE: 105 BPM | WEIGHT: 130.75 LBS

## 2019-02-07 DIAGNOSIS — J20.9 ACUTE BRONCHITIS, UNSPECIFIED ORGANISM: Primary | ICD-10-CM

## 2019-02-07 DIAGNOSIS — J30.9 ALLERGIC RHINITIS, UNSPECIFIED SEASONALITY, UNSPECIFIED TRIGGER: ICD-10-CM

## 2019-02-07 PROCEDURE — 99999 PR PBB SHADOW E&M-EST. PATIENT-LVL IV: CPT | Mod: PBBFAC,,, | Performed by: REGISTERED NURSE

## 2019-02-07 PROCEDURE — 96372 THER/PROPH/DIAG INJ SC/IM: CPT | Mod: PBBFAC,PO

## 2019-02-07 PROCEDURE — 99214 PR OFFICE/OUTPT VISIT, EST, LEVL IV, 30-39 MIN: ICD-10-PCS | Mod: S$PBB,,, | Performed by: REGISTERED NURSE

## 2019-02-07 PROCEDURE — 99214 OFFICE O/P EST MOD 30 MIN: CPT | Mod: S$PBB,,, | Performed by: REGISTERED NURSE

## 2019-02-07 PROCEDURE — 99999 PR PBB SHADOW E&M-EST. PATIENT-LVL IV: ICD-10-PCS | Mod: PBBFAC,,, | Performed by: REGISTERED NURSE

## 2019-02-07 PROCEDURE — 99214 OFFICE O/P EST MOD 30 MIN: CPT | Mod: PBBFAC,PO | Performed by: REGISTERED NURSE

## 2019-02-07 RX ORDER — PROMETHAZINE HYDROCHLORIDE AND DEXTROMETHORPHAN HYDROBROMIDE 6.25; 15 MG/5ML; MG/5ML
5 SYRUP ORAL
Qty: 118 ML | Refills: 0 | Status: SHIPPED | OUTPATIENT
Start: 2019-02-07 | End: 2019-04-26 | Stop reason: ALTCHOICE

## 2019-02-07 RX ORDER — METHYLPREDNISOLONE ACETATE 80 MG/ML
80 INJECTION, SUSPENSION INTRA-ARTICULAR; INTRALESIONAL; INTRAMUSCULAR; SOFT TISSUE ONCE
Status: COMPLETED | OUTPATIENT
Start: 2019-02-07 | End: 2019-02-07

## 2019-02-07 RX ORDER — LEVOTHYROXINE SODIUM 100 UG/1
100 TABLET ORAL
Qty: 30 TABLET | Refills: 6 | Status: SHIPPED | OUTPATIENT
Start: 2019-02-07 | End: 2019-02-07 | Stop reason: SDUPTHER

## 2019-02-07 RX ORDER — LEVOTHYROXINE SODIUM 100 UG/1
100 TABLET ORAL
Qty: 90 TABLET | Refills: 1 | Status: SHIPPED | OUTPATIENT
Start: 2019-02-07 | End: 2020-02-28

## 2019-02-07 RX ORDER — AZITHROMYCIN 250 MG/1
TABLET, FILM COATED ORAL
Qty: 6 TABLET | Refills: 0 | Status: SHIPPED | OUTPATIENT
Start: 2019-02-07 | End: 2019-04-26

## 2019-02-07 RX ADMIN — METHYLPREDNISOLONE ACETATE 80 MG: 80 INJECTION, SUSPENSION INTRALESIONAL; INTRAMUSCULAR; INTRASYNOVIAL; SOFT TISSUE at 11:02

## 2019-02-07 NOTE — TELEPHONE ENCOUNTER
----- Message from Gaye Garner sent at 2/7/2019  7:44 AM CST -----  Contact: Patient  Patient called to speak with the nurse; she would like to be worked in for Sinus issues today.    She can be contacted at [phone].    Thanks,  Gaye

## 2019-02-13 NOTE — PROGRESS NOTES
Subjective:       Patient ID: Ana Bonilla is a 58 y.o. female.    Chief Complaint   Patient presents with    Sinus Problem       HPI    Ana Bonilla is here today with c/o not feeling well since yesterday.  Reports clear RN, NC, PND, sneezing with itchy watery eyes.  Productive cough, ear pain and popping.  Using Nasacort with Clarinex and Tylenol-sinus.      Review of Systems   Constitutional: Negative for chills and fever.   HENT: Positive for congestion, ear pain, postnasal drip, rhinorrhea, sneezing and sore throat. Negative for sinus pain.    Eyes: Positive for discharge and itching. Negative for photophobia, pain, redness and visual disturbance.   Respiratory: Positive for cough. Negative for chest tightness and wheezing.    Allergic/Immunologic: Positive for environmental allergies.   Neurological: Negative.    Hematological: Negative for adenopathy.       Review of patient's allergies indicates:  No Known Allergies    Patient Active Problem List   Diagnosis    COPD with asthma    GERD (gastroesophageal reflux disease)    Primary hypothyroidism    Tobacco abuse disorder    PVD (peripheral vascular disease)    Anxiety    Hypercholesterolemia    Claudication    Essential hypertension    Allergic rhinitis    Pain in both lower extremities    Constipation, chronic    Anisometropic amblyopia of left eye    Nonrheumatic aortic valve insufficiency       Current Outpatient Medications on File Prior to Visit   Medication Sig Dispense Refill    ALPRAZolam (XANAX) 2 MG Tab TAKE 1 TABLET(2 MG) BY MOUTH TWICE DAILY 60 tablet 0    amlodipine-benazepril 5-10 mg (LOTREL) 5-10 mg per capsule Take 1 capsule by mouth once daily. 30 capsule 6    diclofenac sodium (SOLARAZE) 3 % gel APPLY ONE APPLICATION TOPICALLY FOUR TIMES DAILY AS NEEDED  3    gabapentin (NEURONTIN) 300 MG capsule Take 1 capsule (300 mg total) by mouth every evening. 30 capsule 6    hyoscyamine (ANASPAZ,LEVSIN) 0.125 mg Tab Take 1  "tablet (125 mcg total) by mouth every 4 (four) hours as needed. 30 tablet 0    levocetirizine (XYZAL) 5 MG tablet Take 1 tablet (5 mg total) by mouth every morning. 30 tablet 6    linaclotide (LINZESS) 145 mcg Cap capsule Take 1 capsule (145 mcg total) by mouth once daily. Take in AM on empty stomach 30 min before meal 30 capsule 6    montelukast (SINGULAIR) 10 mg tablet Take 1 tablet (10 mg total) by mouth every evening. 30 tablet 6    omeprazole (PRILOSEC) 20 MG capsule Take 1 capsule (20 mg total) by mouth once daily. 30 capsule 11    ondansetron (ZOFRAN) 4 MG tablet Take 1 tablet (4 mg total) by mouth every 4 to 6 hours as needed for Nausea. 60 tablet 2    oxycodone-acetaminophen (PERCOCET) 5-325 mg per tablet take 1 tablet by mouth every 8 to 12 hours  0    promethazine-codeine 6.25-10 mg/5 ml (PHENERGAN WITH CODEINE) 6.25-10 mg/5 mL syrup Take 5 mLs by mouth every 4 (four) hours as needed. 180 mL 0    triamcinolone (NASACORT) 55 mcg nasal inhaler 2 sprays by Nasal route once daily. 16.5 mL 3    levothyroxine (SYNTHROID) 100 MCG tablet Take 1 tablet (100 mcg total) by mouth before breakfast. 90 tablet 1     No current facility-administered medications on file prior to visit.        Past medical, surgical, family and social histories have been reviewed today.        Objective:     Vitals:    02/07/19 1101   BP: 110/72   Pulse: 105   Temp: 98.6 °F (37 °C)   TempSrc: Oral   SpO2: (!) 94%   Weight: 59.3 kg (130 lb 11.7 oz)   Height: 5' 4" (1.626 m)   PainSc: 0-No pain         Physical Exam   Constitutional: She is oriented to person, place, and time. She appears well-developed and well-nourished.   HENT:   Head: Normocephalic and atraumatic.   Right Ear: Tympanic membrane is bulging. Tympanic membrane is not erythematous. No middle ear effusion.   Left Ear: Tympanic membrane is bulging. Tympanic membrane is not erythematous.  No middle ear effusion.   Nose: Mucosal edema (clear RN//boggy) and rhinorrhea " present.   Mouth/Throat: No oropharyngeal exudate, posterior oropharyngeal edema or posterior oropharyngeal erythema (clear PND noted).   Eyes: Pupils are equal, round, and reactive to light. Right eye exhibits discharge (both eyes clear DC/red/shiners). Left eye exhibits discharge.   Cardiovascular: Regular rhythm and normal heart sounds. Tachycardia present.   Pulmonary/Chest: Effort normal. No respiratory distress. She has wheezes. She has no rales. She exhibits no tenderness.   Lymphadenopathy:     She has no cervical adenopathy.   Neurological: She is alert and oriented to person, place, and time.   Vitals reviewed.        Diagnosis       1. Acute bronchitis, unspecified organism    2. Allergic rhinitis, unspecified seasonality, unspecified trigger          Assessment/ Plan     Acute bronchitis, unspecified organism  -     methylPREDNISolone acetate injection 80 mg  -     azithromycin (Z-JESIKA) 250 MG tablet; Take 2 tabs by mouth today, then 1 tab daily x 4 days.  Dispense: 6 tablet; Refill: 0  -     promethazine-dextromethorphan (PROMETHAZINE-DM) 6.25-15 mg/5 mL Syrp; Take 5 mLs by mouth every 4 to 6 hours as needed.  Dispense: 118 mL; Refill: 0    Allergic rhinitis, unspecified seasonality, unspecified trigger  -     methylPREDNISolone acetate injection 80 mg  -     promethazine-dextromethorphan (PROMETHAZINE-DM) 6.25-15 mg/5 mL Syrp; Take 5 mLs by mouth every 4 to 6 hours as needed.  Dispense: 118 mL; Refill: 0          Medication discussed, as directed.  Injection today.  Continue Nasacort and Clarinex as previously ordered.  Rest and fluids.  Follow-up in clinic as needed.      JONA Ramesh  [facility]

## 2019-02-15 DIAGNOSIS — F41.9 ANXIETY: ICD-10-CM

## 2019-02-15 NOTE — TELEPHONE ENCOUNTER
----- Message from Ade Ruiz sent at 2/15/2019  3:32 PM CST -----  Contact: self [phone]  Type:  RX Refill Request    Who Called: Ana Bonilla  Refill or New Rx:refill  RX Name and Strength:xanax 2mg  How is the patient currently taking it? (ex. 1XDay):2x day  Is this a 30 day or 90 day RX:30 day  Preferred Pharmacy with phone number:    [id number] - [place] - [address] [address]  [address] [place] [address]  Phone: [phone] Fax: [phone]    Local or Mail Order:local  Ordering Provider:Cornelia  Would the patient rather a call back or a response via [facility]? Call back  Best Call Back Number:[phone]  Additional Information:

## 2019-02-16 DIAGNOSIS — F41.9 ANXIETY: ICD-10-CM

## 2019-02-18 RX ORDER — ALPRAZOLAM 2 MG/1
TABLET ORAL
Qty: 60 TABLET | Refills: 0 | Status: SHIPPED | OUTPATIENT
Start: 2019-02-18 | End: 2019-03-18 | Stop reason: SDUPTHER

## 2019-02-18 RX ORDER — ALPRAZOLAM 2 MG/1
TABLET ORAL
Qty: 60 TABLET | Refills: 0 | OUTPATIENT
Start: 2019-02-18

## 2019-03-14 DIAGNOSIS — F41.9 ANXIETY: ICD-10-CM

## 2019-03-14 DIAGNOSIS — J30.9 ALLERGIC RHINITIS, UNSPECIFIED SEASONALITY, UNSPECIFIED TRIGGER: ICD-10-CM

## 2019-03-14 RX ORDER — TRIAMCINOLONE ACETONIDE 55 UG/1
SPRAY, METERED NASAL
Qty: 16.9 G | Refills: 0 | Status: SHIPPED | OUTPATIENT
Start: 2019-03-14 | End: 2019-04-16 | Stop reason: SDUPTHER

## 2019-03-14 RX ORDER — ALPRAZOLAM 2 MG/1
TABLET ORAL
Qty: 60 TABLET | Refills: 0 | OUTPATIENT
Start: 2019-03-14

## 2019-03-18 DIAGNOSIS — F41.9 ANXIETY: ICD-10-CM

## 2019-03-18 RX ORDER — ALPRAZOLAM 2 MG/1
TABLET ORAL
Qty: 60 TABLET | Refills: 0 | Status: SHIPPED | OUTPATIENT
Start: 2019-03-18 | End: 2019-04-16 | Stop reason: SDUPTHER

## 2019-04-16 DIAGNOSIS — J30.9 ALLERGIC RHINITIS, UNSPECIFIED SEASONALITY, UNSPECIFIED TRIGGER: ICD-10-CM

## 2019-04-16 DIAGNOSIS — F41.9 ANXIETY: ICD-10-CM

## 2019-04-16 RX ORDER — TRIAMCINOLONE ACETONIDE 55 UG/1
SPRAY, METERED NASAL
Qty: 16.9 G | Refills: 0 | Status: SHIPPED | OUTPATIENT
Start: 2019-04-16 | End: 2019-05-03 | Stop reason: SDUPTHER

## 2019-04-16 RX ORDER — ALPRAZOLAM 2 MG/1
TABLET ORAL
Qty: 60 TABLET | Refills: 0 | Status: SHIPPED | OUTPATIENT
Start: 2019-04-16 | End: 2019-05-14 | Stop reason: SDUPTHER

## 2019-04-26 ENCOUNTER — OFFICE VISIT (OUTPATIENT)
Dept: FAMILY MEDICINE | Facility: CLINIC | Age: 59
End: 2019-04-26
Payer: MEDICAID

## 2019-04-26 VITALS
OXYGEN SATURATION: 99 % | WEIGHT: 129.19 LBS | TEMPERATURE: 99 F | BODY MASS INDEX: 22.06 KG/M2 | HEART RATE: 108 BPM | DIASTOLIC BLOOD PRESSURE: 80 MMHG | SYSTOLIC BLOOD PRESSURE: 136 MMHG | HEIGHT: 64 IN

## 2019-04-26 DIAGNOSIS — J30.9 ALLERGIC RHINITIS, UNSPECIFIED SEASONALITY, UNSPECIFIED TRIGGER: ICD-10-CM

## 2019-04-26 DIAGNOSIS — J44.89 COPD WITH ASTHMA: Primary | ICD-10-CM

## 2019-04-26 DIAGNOSIS — Z72.0 TOBACCO ABUSE DISORDER: ICD-10-CM

## 2019-04-26 PROCEDURE — 99214 PR OFFICE/OUTPT VISIT, EST, LEVL IV, 30-39 MIN: ICD-10-PCS | Mod: 25,S$PBB,, | Performed by: REGISTERED NURSE

## 2019-04-26 PROCEDURE — 99999 PR PBB SHADOW E&M-EST. PATIENT-LVL III: ICD-10-PCS | Mod: PBBFAC,,, | Performed by: REGISTERED NURSE

## 2019-04-26 PROCEDURE — 99999 PR PBB SHADOW E&M-EST. PATIENT-LVL III: CPT | Mod: PBBFAC,,, | Performed by: REGISTERED NURSE

## 2019-04-26 PROCEDURE — 96372 THER/PROPH/DIAG INJ SC/IM: CPT | Mod: PBBFAC,PO

## 2019-04-26 PROCEDURE — 99213 OFFICE O/P EST LOW 20 MIN: CPT | Mod: PBBFAC,PO,25 | Performed by: REGISTERED NURSE

## 2019-04-26 PROCEDURE — 99214 OFFICE O/P EST MOD 30 MIN: CPT | Mod: 25,S$PBB,, | Performed by: REGISTERED NURSE

## 2019-04-26 RX ORDER — BETAMETHASONE SODIUM PHOSPHATE AND BETAMETHASONE ACETATE 3; 3 MG/ML; MG/ML
9 INJECTION, SUSPENSION INTRA-ARTICULAR; INTRALESIONAL; INTRAMUSCULAR; SOFT TISSUE
Status: COMPLETED | OUTPATIENT
Start: 2019-04-26 | End: 2019-04-26

## 2019-04-26 RX ORDER — MOXIFLOXACIN HYDROCHLORIDE 400 MG/1
400 TABLET ORAL DAILY
Qty: 5 TABLET | Refills: 0 | Status: SHIPPED | OUTPATIENT
Start: 2019-04-26 | End: 2019-05-01

## 2019-04-26 RX ORDER — ALBUTEROL SULFATE 108 UG/1
2 AEROSOL, METERED RESPIRATORY (INHALATION) EVERY 6 HOURS PRN
Qty: 18 G | Refills: 0 | Status: SHIPPED | OUTPATIENT
Start: 2019-04-26 | End: 2021-11-17

## 2019-04-26 RX ORDER — PROMETHAZINE HYDROCHLORIDE AND CODEINE PHOSPHATE 6.25; 1 MG/5ML; MG/5ML
5 SOLUTION ORAL EVERY 4 HOURS PRN
Qty: 50 ML | Refills: 0 | Status: SHIPPED | OUTPATIENT
Start: 2019-04-26 | End: 2019-05-24 | Stop reason: SDUPTHER

## 2019-04-26 RX ADMIN — BETAMETHASONE ACETATE AND BETAMETHASONE SODIUM PHOSPHATE 9 MG: 3; 3 INJECTION, SUSPENSION INTRA-ARTICULAR; INTRALESIONAL; INTRAMUSCULAR; SOFT TISSUE at 09:04

## 2019-04-26 NOTE — PROGRESS NOTES
"Subjective:       Patient ID: Ana Bonilla is a 58 y.o. female.    Chief Complaint   Patient presents with    Sinusitis       Sinusitis   Associated symptoms include congestion, coughing, headaches, shortness of breath and sneezing. Pertinent negatives include no chills, ear pain or sore throat.   Here today with c/o sinus issues for the past ~ 3 weeks.  Has allergies, thinks ill due to pollen.  She has been taking Singulair nightly, takes Xyzal occasionally.  Phenergan-DM not helping.  Not sleeping well but that is more due to increased anxiety.  She has been seen by Gynecology recently, had an abnormal pap smear with f/u colposcopy.  Now due to the abnormalities on both tests, she is f/u for a procedure "to burn the cells", scheduled for 5/28/2019.  Smoking 1 ppd she thinks due to stress.  Now with c/o dry cough, wheezing, sinus pressure, itchy watery eyes.      Review of Systems   Constitutional: Positive for fatigue. Negative for chills and fever.   HENT: Positive for congestion, postnasal drip, rhinorrhea, sinus pain and sneezing. Negative for ear pain, facial swelling, nosebleeds, sore throat, tinnitus, trouble swallowing and voice change.    Eyes: Positive for discharge and itching. Negative for photophobia, pain, redness and visual disturbance.   Respiratory: Positive for cough, chest tightness, shortness of breath and wheezing.    Cardiovascular: Negative.    Gastrointestinal: Negative for abdominal pain, nausea and vomiting.   Musculoskeletal: Negative for myalgias.   Skin: Negative.    Allergic/Immunologic: Positive for environmental allergies.   Neurological: Positive for headaches. Negative for weakness, light-headedness and numbness.   Hematological: Negative for adenopathy.   Psychiatric/Behavioral: Positive for agitation, decreased concentration, dysphoric mood and sleep disturbance. Negative for behavioral problems, confusion, hallucinations, self-injury and suicidal ideas. The patient is " nervous/anxious. The patient is not hyperactive.        Review of patient's allergies indicates:  No Known Allergies    Patient Active Problem List   Diagnosis    COPD with asthma    GERD (gastroesophageal reflux disease)    Primary hypothyroidism    Tobacco abuse disorder    PVD (peripheral vascular disease)    Anxiety    Hypercholesterolemia    Claudication    Essential hypertension    Allergic rhinitis    Pain in both lower extremities    Constipation, chronic    Anisometropic amblyopia of left eye    Nonrheumatic aortic valve insufficiency       Current Outpatient Medications on File Prior to Visit   Medication Sig Dispense Refill    ALPRAZolam (XANAX) 2 MG Tab TAKE 1 TABLET(2 MG) BY MOUTH TWICE DAILY 60 tablet 0    amlodipine-benazepril 5-10 mg (LOTREL) 5-10 mg per capsule Take 1 capsule by mouth once daily. 30 capsule 6    diclofenac sodium (SOLARAZE) 3 % gel APPLY ONE APPLICATION TOPICALLY FOUR TIMES DAILY AS NEEDED  3    gabapentin (NEURONTIN) 300 MG capsule Take 1 capsule (300 mg total) by mouth every evening. 30 capsule 6    levothyroxine (SYNTHROID) 100 MCG tablet Take 1 tablet (100 mcg total) by mouth before breakfast. 90 tablet 1    linaclotide (LINZESS) 145 mcg Cap capsule Take 1 capsule (145 mcg total) by mouth once daily. Take in AM on empty stomach 30 min before meal 30 capsule 6    montelukast (SINGULAIR) 10 mg tablet Take 1 tablet (10 mg total) by mouth every evening. 30 tablet 6    NASACORT 55 mcg nasal inhaler INSTILL 2 SPRAYS IN EACH NOSTRIL ONCE DAILY 16.9 g 0    omeprazole (PRILOSEC) 20 MG capsule Take 1 capsule (20 mg total) by mouth once daily. 30 capsule 11    levocetirizine (XYZAL) 5 MG tablet Take 1 tablet (5 mg total) by mouth every morning. 30 tablet 6     No current facility-administered medications on file prior to visit.        Past medical, surgical, family and social histories have been reviewed today.        Objective:     Vitals:    04/26/19 0821   BP:  "136/80   Pulse: 108   Temp: 98.9 °F (37.2 °C)   TempSrc: Oral   SpO2: 99%   Weight: 58.6 kg (129 lb 3 oz)   Height: 5' 4" (1.626 m)         Physical Exam   Constitutional: She is oriented to person, place, and time. She appears well-developed and well-nourished. No distress.   HENT:   Head: Normocephalic and atraumatic.   Right Ear: Tympanic membrane normal.   Left Ear: Tympanic membrane normal.   Nose: Mucosal edema and rhinorrhea (boggy//clear RN) present.   Mouth/Throat: Mucous membranes are normal. No oropharyngeal exudate, posterior oropharyngeal edema or posterior oropharyngeal erythema (clear PND noted).   Eyes: Pupils are equal, round, and reactive to light. Right eye exhibits discharge (both eyes w/ clear watery d/c, no redness, pos for shiners). Left eye exhibits discharge.   Cardiovascular: Regular rhythm and normal heart sounds. Tachycardia present.   Pulmonary/Chest: Effort normal. No respiratory distress. She has wheezes. She has no rales. She exhibits no tenderness.   Lymphadenopathy:     She has no cervical adenopathy.   Neurological: She is alert and oriented to person, place, and time.   Skin: Skin is warm and dry. Capillary refill takes less than 2 seconds. No rash noted. She is not diaphoretic.   Vitals reviewed.        Diagnosis       1. COPD with asthma    2. Allergic rhinitis, unspecified seasonality, unspecified trigger    3. Tobacco abuse disorder          Assessment/ Plan     COPD with asthma  -     betamethasone acetate-betamethasone sodium phosphate injection 9 mg  -     moxifloxacin (AVELOX) 400 mg tablet; Take 1 tablet (400 mg total) by mouth once daily. for 5 days  Dispense: 5 tablet; Refill: 0  -     PROVENTIL HFA 90 mcg/actuation inhaler; Inhale 2 puffs into the lungs every 6 (six) hours as needed for Wheezing or Shortness of Breath. Rescue  Dispense: 18 g; Refill: 0  -     promethazine-codeine 6.25-10 mg/5 ml (PHENERGAN WITH CODEINE) 6.25-10 mg/5 mL syrup; Take 5 mLs by mouth every " 4 (four) hours as needed for Cough.  Dispense: 50 mL; Refill: 0    Allergic rhinitis, unspecified seasonality, unspecified trigger  -     betamethasone acetate-betamethasone sodium phosphate injection 9 mg  -     promethazine-codeine 6.25-10 mg/5 ml (PHENERGAN WITH CODEINE) 6.25-10 mg/5 mL syrup; Take 5 mLs by mouth every 4 (four) hours as needed for Cough.  Dispense: 50 mL; Refill: 0    Tobacco abuse disorder  · Smoking cessation discussed, advised.        Injection today.  Medication discussed, take as directed.  Xyzal and Singulair daily.  Rest and fluids.  Follow-up in clinic in 2 to 3 days if worse or no better.        JONA Ramesh  [facility]

## 2019-04-29 ENCOUNTER — TELEPHONE (OUTPATIENT)
Dept: FAMILY MEDICINE | Facility: CLINIC | Age: 59
End: 2019-04-29

## 2019-04-29 RX ORDER — DOXYCYCLINE 100 MG/1
100 CAPSULE ORAL 2 TIMES DAILY
Qty: 20 CAPSULE | Refills: 0 | Status: SHIPPED | OUTPATIENT
Start: 2019-04-29 | End: 2019-05-09

## 2019-04-29 NOTE — TELEPHONE ENCOUNTER
Rec'd notification from insurance that the antibiotic I ordered (Avelox) was not covered.  Before I change to something else, I need to confirm w/ her she did not get it filled.  Thanks.

## 2019-04-30 ENCOUNTER — TELEPHONE (OUTPATIENT)
Dept: FAMILY MEDICINE | Facility: CLINIC | Age: 59
End: 2019-04-30

## 2019-04-30 NOTE — TELEPHONE ENCOUNTER
----- Message from Maegan Winter sent at 4/30/2019  2:20 PM CDT -----  Contact: Tommy([phone]  Would like to consult with nurse regarding medication(Avelox 400 MG), please call back at [phone]. Thanks/ar

## 2019-04-30 NOTE — TELEPHONE ENCOUNTER
S/W Pt and pt is requesting a inhaler to be sent to the pharmacy. Pt stated that an antibiotic was sent but she needs an inhaler. Pt is requesting ventolin inhaler.//rf

## 2019-04-30 NOTE — TELEPHONE ENCOUNTER
S/W Pt and pt said that she was calling the pharmacy to see because she didn't receive it yesterday.//rf

## 2019-05-03 DIAGNOSIS — J30.9 ALLERGIC RHINITIS, UNSPECIFIED SEASONALITY, UNSPECIFIED TRIGGER: ICD-10-CM

## 2019-05-03 RX ORDER — TRIAMCINOLONE ACETONIDE 55 UG/1
SPRAY, METERED NASAL
Qty: 16.9 G | Refills: 2 | Status: SHIPPED | OUTPATIENT
Start: 2019-05-03 | End: 2020-07-27

## 2019-05-14 DIAGNOSIS — F41.9 ANXIETY: ICD-10-CM

## 2019-05-14 RX ORDER — ALPRAZOLAM 2 MG/1
TABLET ORAL
Qty: 60 TABLET | Refills: 1 | Status: SHIPPED | OUTPATIENT
Start: 2019-05-14 | End: 2019-07-08 | Stop reason: SDUPTHER

## 2019-05-24 DIAGNOSIS — J30.9 ALLERGIC RHINITIS, UNSPECIFIED SEASONALITY, UNSPECIFIED TRIGGER: ICD-10-CM

## 2019-05-24 DIAGNOSIS — J44.89 COPD WITH ASTHMA: ICD-10-CM

## 2019-05-24 RX ORDER — PROMETHAZINE HYDROCHLORIDE AND CODEINE PHOSPHATE 6.25; 1 MG/5ML; MG/5ML
5 SOLUTION ORAL EVERY 4 HOURS PRN
Qty: 90 ML | Refills: 0 | Status: SHIPPED | OUTPATIENT
Start: 2019-05-24 | End: 2019-07-08 | Stop reason: SDUPTHER

## 2019-05-30 NOTE — PROGRESS NOTES
Pt returned [facility] [address] [place] [address] [facility]  Electronically signed by Damaso Chaparro RN on 5/30/2019 at 1:59 PM Subjective:       Patient ID: Ana Bonilla is a 58 y.o. female.    Chief Complaint   Patient presents with    Urinary Tract Infection       HPI    Ana Bonilla is here today with c/o possible UTI.  Her only complaint that may be due to a UTI is that of lower pelvic pain.  She was seen by her Gynecologist recently, pelvic US done and she was told no cyst or fibroids.  Did have a lot of stool built up.  Admits to increased intake of cheese recently that may have caused her to be blocked.  Does suffer with chronic constipation but does not take her Linzess on regular basis.  Reports taking a dose of Linzess on Tuesday 1/22/2019 which relieved her side pain but now having pelvic pain and cramps.  She has increased her daily water intake, urine w/out odor and has not appeared darker in color to her.  Denies melena or bright red rectal bleeding.  Last seen 1 month ago for similar issue.      Review of Systems   Constitutional: Negative.    Respiratory: Negative.    Cardiovascular: Negative.    Gastrointestinal: Positive for abdominal pain and constipation. Negative for abdominal distention, blood in stool, diarrhea, nausea and vomiting.   Genitourinary: Positive for pelvic pain. Negative for dysuria, flank pain, hematuria and vaginal discharge.   Neurological: Negative.        Review of patient's allergies indicates:  No Known Allergies    Patient Active Problem List   Diagnosis    COPD with asthma    GERD (gastroesophageal reflux disease)    Primary hypothyroidism    Tobacco abuse disorder    PVD (peripheral vascular disease)    Anxiety    Hypercholesterolemia    Claudication    Essential hypertension    Allergic rhinitis    Pain in both lower extremities    Constipation, chronic    Anisometropic amblyopia of left eye    Nonrheumatic aortic valve insufficiency       Current Outpatient Medications on File Prior to Visit   Medication Sig Dispense Refill    ALPRAZolam (XANAX) 2 MG Tab TAKE 1 TABLET(2  "MG) BY MOUTH TWICE DAILY 60 tablet 0    amlodipine-benazepril 5-10 mg (LOTREL) 5-10 mg per capsule Take 1 capsule by mouth once daily. 30 capsule 6    diclofenac sodium (SOLARAZE) 3 % gel APPLY ONE APPLICATION TOPICALLY FOUR TIMES DAILY AS NEEDED  3    gabapentin (NEURONTIN) 300 MG capsule Take 1 capsule (300 mg total) by mouth every evening. 30 capsule 6    levocetirizine (XYZAL) 5 MG tablet Take 1 tablet (5 mg total) by mouth every morning. 30 tablet 6    levothyroxine (SYNTHROID) 100 MCG tablet Take 1 tablet (100 mcg total) by mouth before breakfast. 30 tablet 6    linaclotide (LINZESS) 145 mcg Cap capsule Take 1 capsule (145 mcg total) by mouth once daily. Take in AM on empty stomach 30 min before meal 30 capsule 6    montelukast (SINGULAIR) 10 mg tablet Take 1 tablet (10 mg total) by mouth every evening. 30 tablet 6    omeprazole (PRILOSEC) 20 MG capsule Take 1 capsule (20 mg total) by mouth once daily. 30 capsule 11    ondansetron (ZOFRAN) 4 MG tablet Take 1 tablet (4 mg total) by mouth every 4 to 6 hours as needed for Nausea. 60 tablet 2    oxycodone-acetaminophen (PERCOCET) 5-325 mg per tablet take 1 tablet by mouth every 8 to 12 hours  0    promethazine-codeine 6.25-10 mg/5 ml (PHENERGAN WITH CODEINE) 6.25-10 mg/5 mL syrup Take 5 mLs by mouth every 4 (four) hours as needed. 180 mL 0    triamcinolone (NASACORT) 55 mcg nasal inhaler 2 sprays by Nasal route once daily. 16.5 mL 3     No current facility-administered medications on file prior to visit.        Past medical, surgical, family and social histories have been reviewed today.        Objective:     Vitals:    01/24/19 0931   BP: 122/74   Pulse: 102   Temp: 98.1 °F (36.7 °C)   TempSrc: Oral   SpO2: 99%   Weight: 60.6 kg (133 lb 9.6 oz)   Height: 5' 4" (1.626 m)   PainSc:   6   PainLoc: Abdomen         Physical Exam   Constitutional: She is oriented to person, place, and time. She appears well-developed and well-nourished.   Cardiovascular: " Normal rate and regular rhythm.   Pulmonary/Chest: Effort normal and breath sounds normal.   Abdominal: Bowel sounds are normal. She exhibits distension (mild//suprapubic area). She exhibits no mass. There is no hepatosplenomegaly. There is tenderness. There is no rigidity, no rebound, no guarding, no CVA tenderness, no tenderness at McBurney's point and negative Ramachandran's sign.       Musculoskeletal: Normal range of motion. She exhibits no edema, tenderness or deformity.   Neurological: She is alert and oriented to person, place, and time.   Skin: Skin is warm. Capillary refill takes less than 2 seconds.   Psychiatric: Her speech is normal. Judgment normal. Her mood appears anxious. Her affect is not blunt, not labile and not inappropriate. She is agitated. She is not actively hallucinating. Cognition and memory are normal. She is attentive.   Vitals reviewed.      Component      Latest Ref Rng & Units 1/24/2019   Color, UA       YELLOW   Spec Grav UA       1.000   pH, UA       5   WBC, UA       NEG   Nitrite, UA       NEG   Protein       NEG   Glucose, UA       NORMAL   Ketones, UA       NEG   Urobilinogen, UA       NORMAL   Bilirubin       NEG   RBC, UA       NEG         Diagnosis       1. Pelvic pain    2. Abdominal pain, unspecified abdominal location    3. Constipation, chronic          Assessment/ Plan     Pelvic pain  · UA clear and noted to be free of infection today, current symptom likely due to constipation.  -     POCT urine dipstick without microscope  -     CT Abdomen Pelvis W Wo Contrast; Future; Expected date: 01/24/2019    Abdominal pain, unspecified abdominal location  · Pain x 1 to 2 months, not improving.  -     POCT urine dipstick without microscope  -     CT Abdomen Pelvis W Wo Contrast; Future; Expected date: 01/24/2019  -     hyoscyamine (ANASPAZ,LEVSIN) 0.125 mg Tab; Take 1 tablet (125 mcg total) by mouth every 4 (four) hours as needed.  Dispense: 30 tablet; Refill: 0    Constipation,  chronic  · Increase daily water and fiber intake.  · Take Linzess ordered.      Follow-up in clinic as needed.      JONA Ramesh  [facility]

## 2019-06-30 DIAGNOSIS — J30.9 ALLERGIC RHINITIS, UNSPECIFIED SEASONALITY, UNSPECIFIED TRIGGER: ICD-10-CM

## 2019-07-01 RX ORDER — MONTELUKAST SODIUM 10 MG/1
TABLET ORAL
Qty: 30 TABLET | Refills: 0 | Status: SHIPPED | OUTPATIENT
Start: 2019-07-01 | End: 2019-08-26 | Stop reason: SDUPTHER

## 2019-07-08 ENCOUNTER — OFFICE VISIT (OUTPATIENT)
Dept: FAMILY MEDICINE | Facility: CLINIC | Age: 59
End: 2019-07-08
Payer: MEDICAID

## 2019-07-08 VITALS
BODY MASS INDEX: 22.2 KG/M2 | WEIGHT: 130.06 LBS | HEIGHT: 64 IN | SYSTOLIC BLOOD PRESSURE: 115 MMHG | OXYGEN SATURATION: 98 % | HEART RATE: 86 BPM | TEMPERATURE: 99 F | DIASTOLIC BLOOD PRESSURE: 80 MMHG

## 2019-07-08 DIAGNOSIS — F41.9 ANXIETY: ICD-10-CM

## 2019-07-08 DIAGNOSIS — J32.9 SINOBRONCHITIS: Primary | ICD-10-CM

## 2019-07-08 DIAGNOSIS — J40 SINOBRONCHITIS: Primary | ICD-10-CM

## 2019-07-08 PROCEDURE — 99214 OFFICE O/P EST MOD 30 MIN: CPT | Mod: 25,S$PBB,, | Performed by: REGISTERED NURSE

## 2019-07-08 PROCEDURE — 99214 PR OFFICE/OUTPT VISIT, EST, LEVL IV, 30-39 MIN: ICD-10-PCS | Mod: 25,S$PBB,, | Performed by: REGISTERED NURSE

## 2019-07-08 PROCEDURE — 99999 PR PBB SHADOW E&M-EST. PATIENT-LVL IV: CPT | Mod: PBBFAC,,, | Performed by: REGISTERED NURSE

## 2019-07-08 PROCEDURE — 99214 OFFICE O/P EST MOD 30 MIN: CPT | Mod: PBBFAC,PO | Performed by: REGISTERED NURSE

## 2019-07-08 PROCEDURE — 99999 PR PBB SHADOW E&M-EST. PATIENT-LVL IV: ICD-10-PCS | Mod: PBBFAC,,, | Performed by: REGISTERED NURSE

## 2019-07-08 RX ORDER — ALPRAZOLAM 2 MG/1
TABLET ORAL
Qty: 60 TABLET | Refills: 0 | OUTPATIENT
Start: 2019-07-08

## 2019-07-08 RX ORDER — DOXYCYCLINE 100 MG/1
100 CAPSULE ORAL 2 TIMES DAILY
Qty: 20 CAPSULE | Refills: 0 | Status: SHIPPED | OUTPATIENT
Start: 2019-07-08 | End: 2019-07-18

## 2019-07-08 RX ORDER — PROMETHAZINE HYDROCHLORIDE AND CODEINE PHOSPHATE 6.25; 1 MG/5ML; MG/5ML
5 SOLUTION ORAL EVERY 4 HOURS PRN
Qty: 150 ML | Refills: 0 | Status: SHIPPED | OUTPATIENT
Start: 2019-07-08 | End: 2019-11-08

## 2019-07-08 RX ORDER — ALPRAZOLAM 2 MG/1
TABLET ORAL
Qty: 60 TABLET | Refills: 1 | Status: SHIPPED | OUTPATIENT
Start: 2019-07-08 | End: 2019-09-03 | Stop reason: SDUPTHER

## 2019-07-08 RX ORDER — BETAMETHASONE SODIUM PHOSPHATE AND BETAMETHASONE ACETATE 3; 3 MG/ML; MG/ML
9 INJECTION, SUSPENSION INTRA-ARTICULAR; INTRALESIONAL; INTRAMUSCULAR; SOFT TISSUE
Status: COMPLETED | OUTPATIENT
Start: 2019-07-08 | End: 2019-07-08

## 2019-07-08 RX ADMIN — BETAMETHASONE ACETATE AND BETAMETHASONE SODIUM PHOSPHATE 9 MG: 3; 3 INJECTION, SUSPENSION INTRA-ARTICULAR; INTRALESIONAL; INTRAMUSCULAR; SOFT TISSUE at 12:07

## 2019-07-08 NOTE — PROGRESS NOTES
Celestone 9mg IM injection administered left ventrogluteal. Tolerated without difficulty. Agreed to wait 15 mins for observation.

## 2019-07-08 NOTE — TELEPHONE ENCOUNTER
Spoke with pt informed of medication being sent to pharmacy for . Voiced understanding. Call concluded.

## 2019-07-08 NOTE — PROGRESS NOTES
Subjective:       Patient ID: Ana Boinlla is a 58 y.o. female.    Chief Complaint   Patient presents with    Cough       HPI    Ana Bonilla is here today with c/o cough and cold symptoms for the past few weeks.  Continues to smoke.  Reports fatigue, productive cough and chest congestion.  With c/o pain behind eyes and to face, NC, RN and throat irritation.  She has been using triamcinolone nasal spray.      Review of Systems   Constitutional: Positive for fatigue. Negative for chills, diaphoresis and fever.   HENT: Positive for congestion, postnasal drip, rhinorrhea, sinus pain and sore throat. Negative for ear pain, sneezing, trouble swallowing and voice change.    Eyes: Positive for pain. Negative for photophobia, redness and visual disturbance.   Respiratory: Positive for cough. Negative for shortness of breath and wheezing.    Cardiovascular: Negative for chest pain, palpitations and leg swelling.   Gastrointestinal: Negative for abdominal pain, nausea and vomiting.   Musculoskeletal: Negative.    Skin: Negative.    Neurological: Positive for headaches. Negative for weakness, light-headedness and numbness.   Hematological: Negative for adenopathy.       Review of patient's allergies indicates:  No Known Allergies    Patient Active Problem List   Diagnosis    COPD with asthma    GERD (gastroesophageal reflux disease)    Primary hypothyroidism    Tobacco abuse disorder    PVD (peripheral vascular disease)    Anxiety    Hypercholesterolemia    Claudication    Essential hypertension    Allergic rhinitis    Pain in both lower extremities    Constipation, chronic    Anisometropic amblyopia of left eye    Nonrheumatic aortic valve insufficiency       Current Outpatient Medications on File Prior to Visit   Medication Sig Dispense Refill    ALPRAZolam (XANAX) 2 MG Tab TAKE 1 TABLET(2 MG) BY MOUTH TWICE DAILY 60 tablet 1    amlodipine-benazepril 5-10 mg (LOTREL) 5-10 mg per capsule Take 1  "capsule by mouth once daily. 30 capsule 6    diclofenac sodium (SOLARAZE) 3 % gel APPLY ONE APPLICATION TOPICALLY FOUR TIMES DAILY AS NEEDED  3    levothyroxine (SYNTHROID) 100 MCG tablet Take 1 tablet (100 mcg total) by mouth before breakfast. 90 tablet 1    linaclotide (LINZESS) 145 mcg Cap capsule Take 1 capsule (145 mcg total) by mouth once daily. Take in AM on empty stomach 30 min before meal 30 capsule 6    montelukast (SINGULAIR) 10 mg tablet TAKE 1 TABLET BY MOUTH EVERY EVENING 30 tablet 0    NASACORT 55 mcg nasal inhaler INSTILL 2 SPRAYS IN EACH NOSTRIL ONCE DAILY 16.9 g 2    omeprazole (PRILOSEC) 20 MG capsule Take 1 capsule (20 mg total) by mouth once daily. 30 capsule 11    promethazine-codeine 6.25-10 mg/5 ml (PHENERGAN WITH CODEINE) 6.25-10 mg/5 mL syrup Take 5 mLs by mouth every 4 (four) hours as needed for Cough. 90 mL 0    PROVENTIL HFA 90 mcg/actuation inhaler Inhale 2 puffs into the lungs every 6 (six) hours as needed for Wheezing or Shortness of Breath. Rescue 18 g 0    gabapentin (NEURONTIN) 300 MG capsule Take 1 capsule (300 mg total) by mouth every evening. 30 capsule 6    levocetirizine (XYZAL) 5 MG tablet Take 1 tablet (5 mg total) by mouth every morning. 30 tablet 6     No current facility-administered medications on file prior to visit.          Past medical, surgical, family and social histories have been reviewed today.        Objective:     Vitals:    07/08/19 1122   BP: 115/80   Pulse: 86   Temp: 98.7 °F (37.1 °C)   SpO2: 98%   Weight: 59 kg (130 lb 1.1 oz)   Height: 5' 4" (1.626 m)   PainSc: 0-No pain         Physical Exam   Constitutional: She is oriented to person, place, and time. She appears well-developed and well-nourished.   HENT:   Head: Normocephalic and atraumatic.   Right Ear: Tympanic membrane normal.   Left Ear: Tympanic membrane normal.   Nose: Mucosal edema and rhinorrhea present. No epistaxis. Right sinus exhibits maxillary sinus tenderness and frontal sinus " tenderness. Left sinus exhibits maxillary sinus tenderness and frontal sinus tenderness.   Mouth/Throat: Oropharynx is clear and moist and mucous membranes are normal.   Eyes: Pupils are equal, round, and reactive to light. Conjunctivae are normal. Right eye exhibits no discharge. Left eye exhibits no discharge.   Cardiovascular: Normal rate and regular rhythm.   Pulmonary/Chest: Effort normal. No respiratory distress. She has wheezes. She has no rales. She exhibits no tenderness.   Musculoskeletal: Normal range of motion. She exhibits no edema.   Lymphadenopathy:     She has no cervical adenopathy.   Neurological: She is alert and oriented to person, place, and time.   Skin: Skin is warm and dry. Capillary refill takes less than 2 seconds. No rash noted.   Psychiatric: She has a normal mood and affect. Her behavior is normal. Judgment and thought content normal.   Vitals reviewed.        Diagnosis       1. Sinobronchitis          Assessment/ Plan     Sinobronchitis  -     betamethasone acetate-betamethasone sodium phosphate injection 9 mg  -     doxycycline (MONODOX) 100 MG capsule; Take 1 capsule (100 mg total) by mouth 2 (two) times daily. for 10 days  Dispense: 20 capsule; Refill: 0  -     promethazine-codeine 6.25-10 mg/5 ml (PHENERGAN WITH CODEINE) 6.25-10 mg/5 mL syrup; Take 5 mLs by mouth every 4 (four) hours as needed for Cough.  Dispense: 150 mL; Refill: 0        Injection today.  Smoking cessation strongly encouraged.  Symptomatic care, rest & fluids.  Follow-up in clinic in 2 to 3 days if worse or no better.  RTC prn.      JONA Ramesh  [facility]

## 2019-07-14 DIAGNOSIS — K59.09 CONSTIPATION, CHRONIC: ICD-10-CM

## 2019-07-15 RX ORDER — LINACLOTIDE 145 UG/1
CAPSULE, GELATIN COATED ORAL
Qty: 30 CAPSULE | Refills: 6 | Status: SHIPPED | OUTPATIENT
Start: 2019-07-15 | End: 2020-04-28

## 2019-07-30 DIAGNOSIS — I10 ESSENTIAL HYPERTENSION: ICD-10-CM

## 2019-07-30 RX ORDER — AMLODIPINE AND BENAZEPRIL HYDROCHLORIDE 5; 10 MG/1; MG/1
CAPSULE ORAL
Qty: 90 CAPSULE | Refills: 0 | Status: SHIPPED | OUTPATIENT
Start: 2019-07-30 | End: 2019-10-28 | Stop reason: SDUPTHER

## 2019-08-26 ENCOUNTER — TELEPHONE (OUTPATIENT)
Dept: FAMILY MEDICINE | Facility: CLINIC | Age: 59
End: 2019-08-26

## 2019-08-26 ENCOUNTER — OFFICE VISIT (OUTPATIENT)
Dept: FAMILY MEDICINE | Facility: CLINIC | Age: 59
End: 2019-08-26
Payer: MEDICAID

## 2019-08-26 VITALS
WEIGHT: 134.69 LBS | HEART RATE: 82 BPM | TEMPERATURE: 99 F | HEIGHT: 64 IN | DIASTOLIC BLOOD PRESSURE: 78 MMHG | SYSTOLIC BLOOD PRESSURE: 125 MMHG | OXYGEN SATURATION: 98 % | BODY MASS INDEX: 22.99 KG/M2

## 2019-08-26 DIAGNOSIS — Z12.39 BREAST CANCER SCREENING: ICD-10-CM

## 2019-08-26 DIAGNOSIS — J30.9 CHRONIC ALLERGIC RHINITIS: Primary | ICD-10-CM

## 2019-08-26 DIAGNOSIS — Z12.11 COLON CANCER SCREENING: ICD-10-CM

## 2019-08-26 DIAGNOSIS — Z72.0 TOBACCO ABUSE DISORDER: ICD-10-CM

## 2019-08-26 PROCEDURE — 99214 PR OFFICE/OUTPT VISIT, EST, LEVL IV, 30-39 MIN: ICD-10-PCS | Mod: S$PBB,,, | Performed by: REGISTERED NURSE

## 2019-08-26 PROCEDURE — 99214 OFFICE O/P EST MOD 30 MIN: CPT | Mod: PBBFAC,PO | Performed by: REGISTERED NURSE

## 2019-08-26 PROCEDURE — 99999 PR PBB SHADOW E&M-EST. PATIENT-LVL IV: CPT | Mod: PBBFAC,,, | Performed by: REGISTERED NURSE

## 2019-08-26 PROCEDURE — 99999 PR PBB SHADOW E&M-EST. PATIENT-LVL IV: ICD-10-PCS | Mod: PBBFAC,,, | Performed by: REGISTERED NURSE

## 2019-08-26 PROCEDURE — 99214 OFFICE O/P EST MOD 30 MIN: CPT | Mod: S$PBB,,, | Performed by: REGISTERED NURSE

## 2019-08-26 RX ORDER — IPRATROPIUM BROMIDE 21 UG/1
2 SPRAY, METERED NASAL 2 TIMES DAILY PRN
Qty: 30 ML | Refills: 2 | Status: SHIPPED | OUTPATIENT
Start: 2019-08-26 | End: 2019-12-03 | Stop reason: SDUPTHER

## 2019-08-26 RX ORDER — CHLORZOXAZONE 500 MG/1
TABLET ORAL
Refills: 0 | COMMUNITY
Start: 2019-08-23 | End: 2022-04-26

## 2019-08-26 RX ORDER — MONTELUKAST SODIUM 10 MG/1
10 TABLET ORAL NIGHTLY
Qty: 90 TABLET | Refills: 0 | Status: SHIPPED | OUTPATIENT
Start: 2019-08-26 | End: 2019-12-20

## 2019-08-26 NOTE — TELEPHONE ENCOUNTER
----- Message from Ruth Martinez sent at 8/26/2019 11:16 AM CDT -----  Contact: pt   Type:  Sooner Apoointment Request    Caller is requesting a sooner appointment.  Caller declined first available appointment listed below.  Caller will not accept being placed on the waitlist and is requesting a message be sent to doctor.  Name of Caller: pt   When is the first available appointment? 09/03  Symptoms: sinus infection  Would the patient rather a call back or a response via [facility]? Call back  Best Call Back Number:[phone]  Additional Information:

## 2019-08-26 NOTE — PROGRESS NOTES
Subjective:       Patient ID: Ana Bonilla is a 59 y.o. female.    Chief Complaint   Patient presents with    Sinusitis       Sinusitis   This is a recurrent problem. The current episode started in the past 7 days (strong smell of perfume triggered s/s). The problem has been waxing and waning since onset. There has been no fever. Her pain is at a severity of 0/10. Associated symptoms include congestion, coughing, diaphoresis, ear pain (popping), headaches, sinus pressure and sneezing. Pertinent negatives include no chills, hoarse voice, shortness of breath, sore throat or swollen glands. Past treatments include spray decongestants. The treatment provided mild relief.         Review of Systems   Constitutional: Positive for diaphoresis and fatigue. Negative for chills.   HENT: Positive for congestion, ear pain (popping), postnasal drip, rhinorrhea, sinus pressure and sneezing. Negative for hoarse voice, sore throat and trouble swallowing.    Eyes: Positive for pain. Negative for discharge, redness and itching.   Respiratory: Positive for cough. Negative for shortness of breath and wheezing.    Cardiovascular: Negative.    Allergic/Immunologic: Positive for environmental allergies.   Neurological: Positive for light-headedness and headaches. Negative for weakness and numbness.   Hematological: Negative for adenopathy.         Review of patient's allergies indicates:  No Known Allergies      Medication List with Changes/Refills   New Medications    IPRATROPIUM (ATROVENT) 0.03 % NASAL SPRAY    2 sprays by Nasal route 2 (two) times daily as needed for Rhinitis.   Current Medications    ALPRAZOLAM (XANAX) 2 MG TAB    TAKE 1 TABLET(2 MG) BY MOUTH TWICE DAILY    AMLODIPINE-BENAZEPRIL 5-10 MG (LOTREL) 5-10 MG PER CAPSULE    TAKE 1 CAPSULE BY MOUTH EVERY DAY    CHLORZOXAZONE (PARAFON FORTE) 500 MG TAB    TK 1 AND 1/2 TS PO Q 8 H PRF SPASMS    GABAPENTIN (NEURONTIN) 300 MG CAPSULE    Take 1 capsule (300 mg total) by mouth  "every evening.    LEVOCETIRIZINE (XYZAL) 5 MG TABLET    Take 1 tablet (5 mg total) by mouth every morning.    LEVOTHYROXINE (SYNTHROID) 100 MCG TABLET    Take 1 tablet (100 mcg total) by mouth before breakfast.    LINZESS 145 MCG CAP CAPSULE    TAKE 1 CAPSULE BY MOUTH ONCE DAILY IN THE MORNING ON AN EMPTY STOMACH 30 MINUTES BEFORE MEAL    NASACORT 55 MCG NASAL INHALER    INSTILL 2 SPRAYS IN EACH NOSTRIL ONCE DAILY    OMEPRAZOLE (PRILOSEC) 20 MG CAPSULE    Take 1 capsule (20 mg total) by mouth once daily.    PROMETHAZINE-CODEINE 6.25-10 MG/5 ML (PHENERGAN WITH CODEINE) 6.25-10 MG/5 ML SYRUP    Take 5 mLs by mouth every 4 (four) hours as needed for Cough.    PROVENTIL HFA 90 MCG/ACTUATION INHALER    Inhale 2 puffs into the lungs every 6 (six) hours as needed for Wheezing or Shortness of Breath. Rescue   Changed and/or Refilled Medications    Modified Medication Previous Medication    MONTELUKAST (SINGULAIR) 10 MG TABLET montelukast (SINGULAIR) 10 mg tablet       Take 1 tablet (10 mg total) by mouth every evening.    TAKE 1 TABLET BY MOUTH EVERY EVENING   Discontinued Medications    DICLOFENAC SODIUM (SOLARAZE) 3 % GEL    APPLY ONE APPLICATION TOPICALLY FOUR TIMES DAILY AS NEEDED       Patient Active Problem List   Diagnosis    COPD with asthma    GERD (gastroesophageal reflux disease)    Primary hypothyroidism    Tobacco abuse disorder    PVD (peripheral vascular disease)    Anxiety    Hypercholesterolemia    Claudication    Essential hypertension    Allergic rhinitis    Pain in both lower extremities    Constipation, chronic    Anisometropic amblyopia of left eye    Nonrheumatic aortic valve insufficiency         Past medical, surgical, family and social histories have been reviewed today.        Objective:     Vitals:    08/26/19 1421   BP: 125/78   Pulse: 82   Temp: 98.9 °F (37.2 °C)   SpO2: 98%   Weight: 61.1 kg (134 lb 11.2 oz)   Height: 5' 4" (1.626 m)   PainSc: 0-No pain         Physical Exam "   Constitutional: She is oriented to person, place, and time. She appears well-developed and well-nourished.   HENT:   Head: Normocephalic and atraumatic.   Right Ear: Tympanic membrane is bulging. Tympanic membrane is not injected and not erythematous.   Left Ear: Tympanic membrane is bulging. Tympanic membrane is not injected and not erythematous.   Nose: Mucosal edema and rhinorrhea (boggy//clear RN) present.   Mouth/Throat: Mucous membranes are normal. No oropharyngeal exudate, posterior oropharyngeal edema or posterior oropharyngeal erythema.   Eyes: Pupils are equal, round, and reactive to light. Right eye exhibits discharge (both eyes with clear watery DC, slight redness, shiners noted). Left eye exhibits discharge.   Cardiovascular: Normal rate and regular rhythm.   Pulmonary/Chest: Effort normal and breath sounds normal.   Lymphadenopathy:     She has no cervical adenopathy.   Neurological: She is alert and oriented to person, place, and time.   Vitals reviewed.        Diagnosis       1. Chronic allergic rhinitis    2. Tobacco abuse disorder    3. Breast cancer screening    4. Colon cancer screening          Assessment/ Plan     Chronic allergic rhinitis  · Chronic intermittent issue, to allergy for further evaluation, need to get underlying allergies under control.  · Claritin or Zyrtec once daily in AM, restart Singulair nightly.  -     Ambulatory referral to Allergy  -     ipratropium (ATROVENT) 0.03 % nasal spray; 2 sprays by Nasal route 2 (two) times daily as needed for Rhinitis.  Dispense: 30 mL; Refill: 2  -     montelukast (SINGULAIR) 10 mg tablet; Take 1 tablet (10 mg total) by mouth every evening.  Dispense: 90 tablet; Refill: 0    Tobacco abuse disorder  · Smoking cessation strongly advised and encouraged.    Breast cancer screening  -     Mammo Digital Screening Bilat; Future; Expected date: 08/26/2019    Colon cancer screening  -     Case request GI: COLONOSCOPY        Follow-up in clinic as  needed.      Patient Care Team:  Kenrick Locke NP as PCP - General (General Practice)  Saturnino Lemon LPN as Care Coordinator      JONA Ramesh  [facility]

## 2019-08-27 ENCOUNTER — OFFICE VISIT (OUTPATIENT)
Dept: ALLERGY | Facility: CLINIC | Age: 59
End: 2019-08-27
Payer: MEDICAID

## 2019-08-27 VITALS
SYSTOLIC BLOOD PRESSURE: 110 MMHG | HEIGHT: 64 IN | WEIGHT: 135.13 LBS | DIASTOLIC BLOOD PRESSURE: 78 MMHG | HEART RATE: 88 BPM | BODY MASS INDEX: 23.07 KG/M2 | TEMPERATURE: 98 F

## 2019-08-27 DIAGNOSIS — H04.129 DRY EYE: ICD-10-CM

## 2019-08-27 DIAGNOSIS — K21.9 GASTROESOPHAGEAL REFLUX DISEASE WITHOUT ESOPHAGITIS: ICD-10-CM

## 2019-08-27 DIAGNOSIS — J31.0 CHRONIC RHINITIS: Primary | ICD-10-CM

## 2019-08-27 PROCEDURE — 99999 PR PBB SHADOW E&M-EST. PATIENT-LVL IV: ICD-10-PCS | Mod: PBBFAC,,, | Performed by: ALLERGY & IMMUNOLOGY

## 2019-08-27 PROCEDURE — 99999 PR PBB SHADOW E&M-EST. PATIENT-LVL IV: CPT | Mod: PBBFAC,,, | Performed by: ALLERGY & IMMUNOLOGY

## 2019-08-27 PROCEDURE — 99203 PR OFFICE/OUTPT VISIT, NEW, LEVL III, 30-44 MIN: ICD-10-PCS | Mod: S$PBB,,, | Performed by: ALLERGY & IMMUNOLOGY

## 2019-08-27 PROCEDURE — 99203 OFFICE O/P NEW LOW 30 MIN: CPT | Mod: S$PBB,,, | Performed by: ALLERGY & IMMUNOLOGY

## 2019-08-27 PROCEDURE — 99214 OFFICE O/P EST MOD 30 MIN: CPT | Mod: PBBFAC | Performed by: ALLERGY & IMMUNOLOGY

## 2019-08-27 RX ORDER — POLYVINYL ALCOHOL 14 MG/ML
1 SOLUTION/ DROPS OPHTHALMIC
Qty: 30 ML | Refills: 5 | COMMUNITY
Start: 2019-08-27 | End: 2020-11-30

## 2019-08-27 RX ORDER — FLUTICASONE PROPIONATE 50 MCG
1 SPRAY, SUSPENSION (ML) NASAL DAILY
Qty: 1 BOTTLE | Refills: 5 | Status: SHIPPED | OUTPATIENT
Start: 2019-08-27 | End: 2020-02-24

## 2019-08-27 NOTE — PATIENT INSTRUCTIONS
Nasal symptoms likely not related to allergy.    Suspect symptoms are worsened by reflux- recommend avoiding  Acidic foods, spicy foods, alcohol, caffeine, chocolate, peppermints.  Discussed side effects of PPIs  Will start Flonase. [place] away from the center of the nose.  Continue Ipratropium nasal spray as needed.  Recommend smoking cessation.

## 2019-08-27 NOTE — PROGRESS NOTES
"Subjective:       Patient ID: Ana Bonilla is a 59 y.o. female.    Chief Complaint:  Allergies      HPI: 59 year old female- Rhinitis for several years. She admits to smoking for several years. She reports eye pain and "heaviness". She states that she as minimal eye pruritis. She reports that the fan and strong odors exacerbate nasal and eye symptoms. She was treated for a sinus infection three weeks ago with a steroid injection and Doxycycline. She has tried Xyzal in the past, which did not feel alleviated her symptoms. She has also used triamcinolone nasal spray, which she felt irritated her nasal passage.    Environmental History: Tobacco Smoke in Home: yes     Past Medical History:   Diagnosis Date    Allergy     Amblyopia OS    Anxiety     Asthma     COPD (chronic obstructive pulmonary disease)     GERD (gastroesophageal reflux disease)     Hyperlipidemia     Hypertension     Thyroid disease      Family History   Problem Relation Age of Onset    Hypertension Mother     Diabetes Mother     Mental illness Son     Diabetes Father     Mental illness Other     Pancreatic cancer Other     Pancreatic cancer Maternal Uncle     Stroke Maternal Grandmother     Prostate cancer Maternal Grandfather     Ovarian cancer Maternal Cousin      Social History     Tobacco Use    Smoking status: Current Every Day Smoker     Packs/day: 1.00     Types: Cigarettes    Smokeless tobacco: Never Used   Substance Use Topics    Alcohol use: Yes     Comment: rarely     Drug use: No     Review of patient's allergies indicates:  No Known Allergies    Review of Systems   Constitutional: Negative for activity change, appetite change, chills and fever.   HENT: Positive for postnasal drip, rhinorrhea, sinus pressure and sinus pain. Negative for ear pain.    Eyes: Positive for pain. Negative for discharge.        Dry eyes leading to eye pain per the patient   Respiratory: Negative for apnea, choking, chest tightness, " shortness of breath and wheezing.    Cardiovascular: Negative for chest pain and leg swelling.   Gastrointestinal: Negative for abdominal distention, abdominal pain and constipation.   Endocrine: Negative for polydipsia and polyuria.   Genitourinary: Negative for difficulty urinating and dysuria.   Musculoskeletal: Negative for neck pain and neck stiffness.   Allergic/Immunologic: Negative for environmental allergies and food allergies.   Neurological: Negative for dizziness.   Hematological: Negative for adenopathy. Does not bruise/bleed easily.   Psychiatric/Behavioral: Negative for agitation and confusion.        Objective:    Physical Exam   Constitutional: She is oriented to person, place, and time. She appears well-developed and well-nourished. No distress.   HENT:   Head: Normocephalic and atraumatic.   Right Ear: External ear normal.   Left Ear: External ear normal.   Nose: Nose normal.   Mouth/Throat: Oropharynx is clear and moist.   No pale turbinates, erythematous nasal passage   Eyes: Pupils are equal, round, and reactive to light. Conjunctivae are normal. Right eye exhibits no discharge. Left eye exhibits no discharge.   Neck: Normal range of motion. Neck supple. No tracheal deviation present. No thyromegaly present.   Cardiovascular: Normal rate, regular rhythm and normal heart sounds. Exam reveals no friction rub.   No murmur heard.  Pulmonary/Chest: Effort normal and breath sounds normal. No stridor. No respiratory distress. She has no wheezes. She has no rales. She exhibits no tenderness.   Abdominal: Soft. Bowel sounds are normal. She exhibits no distension and no mass. There is no tenderness. There is no rebound and no guarding.   Musculoskeletal: She exhibits no edema or tenderness.   Lymphadenopathy:     She has no cervical adenopathy.   Neurological: She is alert and oriented to person, place, and time.   Skin: Skin is warm. She is not diaphoretic. No pallor.   Psychiatric: She has a normal  mood and affect. Thought content normal.       Laboratory:   none performed   Assessment:       1. Chronic rhinitis    2. Gastroesophageal reflux disease without esophagitis    3. Dry eye         Plan:       Chronic rhinitis  -     fluticasone propionate (FLONASE) 50 mcg/actuation nasal spray; 1 spray (50 mcg total) by Each Nostril route once daily.  Dispense: 1 Bottle; Refill: 5    Gastroesophageal reflux disease without esophagitis    Dry eye  -     polyvinyl alcohol, artificial tears, (LIQUIFILM TEARS) 1.4 % ophthalmic solution; Place 1 drop into both eyes as needed.  Dispense: 30 mL; Refill: 5    Symptoms and physical exam are that of non- allergic rhinitis/chronic rhinitis. Discussed the aforementioned at length. Recommend she continue ipratropium nasal spray and start flonase. Recommend she stop triamcinolone nasal spray. Discussed appropriate technique to be used with nasal steroid spray administration. Recommend smoking cessation. Suspect GERD is contributing to her post nasal drip. Information regarding altering her diet were given. Discussed side effects of PPIs.   She reported dry eyes and lubricating drops were prescribed.  RTC -PRN 6 months or sooner, if needed

## 2019-08-27 NOTE — LETTER
August 27, 2019      Lore Maxwell MD  [address] [place] [address]           [facility]  [address]  [place] [address]  Phone: [phone]  Fax: [phone]          Patient: Ana Bonilla   MR Number: [id number]   YOB: 1960   Date of Visit: 8/27/2019       Dear Dr. Lore Maxwell:    Thank you for referring Ana Bonilla to me for evaluation. Attached you will find relevant portions of my assessment and plan of care.    If you have questions, please do not hesitate to call me. I look forward to following Ana Bonilla along with you.    Sincerely,    Namita Buenrsotro MD    Enclosure  CC:  No Recipients    If you would like to receive this communication electronically, please contact [email or web address] or [phone] to request more information on [employer] access.    For providers and/or their staff who would like to refer a patient to [facility], please contact us through our one-stop-shop provider referral line, [facility], at [phone].    If you feel you have received this communication in error or would no longer like to receive these types of communications, please e-mail [email or web address]

## 2019-09-03 DIAGNOSIS — F41.9 ANXIETY: ICD-10-CM

## 2019-09-04 ENCOUNTER — HOSPITAL ENCOUNTER (OUTPATIENT)
Dept: RADIOLOGY | Facility: HOSPITAL | Age: 59
Discharge: HOME | End: 2019-09-04
Attending: REGISTERED NURSE
Payer: MEDICAID

## 2019-09-04 VITALS — HEIGHT: 64 IN | WEIGHT: 135 LBS | BODY MASS INDEX: 23.05 KG/M2

## 2019-09-04 DIAGNOSIS — Z12.39 BREAST CANCER SCREENING: ICD-10-CM

## 2019-09-04 PROCEDURE — 77063 MAMMO DIGITAL SCREENING BILAT WITH TOMOSYNTHESIS_CAD: ICD-10-PCS | Mod: 26,,, | Performed by: RADIOLOGY

## 2019-09-04 PROCEDURE — 77063 BREAST TOMOSYNTHESIS BI: CPT | Mod: 26,,, | Performed by: RADIOLOGY

## 2019-09-04 PROCEDURE — 77067 MAMMO DIGITAL SCREENING BILAT WITH TOMOSYNTHESIS_CAD: ICD-10-PCS | Mod: 26,,, | Performed by: RADIOLOGY

## 2019-09-04 PROCEDURE — 77067 SCR MAMMO BI INCL CAD: CPT | Mod: 26,,, | Performed by: RADIOLOGY

## 2019-09-04 PROCEDURE — 77067 SCR MAMMO BI INCL CAD: CPT | Mod: TC

## 2019-09-04 RX ORDER — ALPRAZOLAM 2 MG/1
TABLET ORAL
Qty: 60 TABLET | Refills: 1 | Status: SHIPPED | OUTPATIENT
Start: 2019-09-04 | End: 2019-10-31 | Stop reason: SDUPTHER

## 2019-10-28 DIAGNOSIS — I10 ESSENTIAL HYPERTENSION: ICD-10-CM

## 2019-10-28 DIAGNOSIS — F41.9 ANXIETY: ICD-10-CM

## 2019-10-28 RX ORDER — AMLODIPINE AND BENAZEPRIL HYDROCHLORIDE 5; 10 MG/1; MG/1
CAPSULE ORAL
Qty: 90 CAPSULE | Refills: 0 | Status: SHIPPED | OUTPATIENT
Start: 2019-10-28 | End: 2020-01-24

## 2019-10-28 RX ORDER — ALPRAZOLAM 2 MG/1
TABLET ORAL
Qty: 60 TABLET | Refills: 0 | OUTPATIENT
Start: 2019-10-28

## 2019-10-29 DIAGNOSIS — J30.9 ALLERGIC RHINITIS, UNSPECIFIED SEASONALITY, UNSPECIFIED TRIGGER: ICD-10-CM

## 2019-10-29 RX ORDER — MONTELUKAST SODIUM 10 MG/1
TABLET ORAL
Qty: 30 TABLET | Refills: 0 | Status: SHIPPED | OUTPATIENT
Start: 2019-10-29 | End: 2019-11-08

## 2019-10-31 DIAGNOSIS — F41.9 ANXIETY: ICD-10-CM

## 2019-10-31 RX ORDER — ALPRAZOLAM 2 MG/1
TABLET ORAL
Qty: 60 TABLET | Refills: 2 | Status: SHIPPED | OUTPATIENT
Start: 2019-10-31 | End: 2020-01-27 | Stop reason: SDUPTHER

## 2019-11-01 ENCOUNTER — DOCUMENTATION ONLY (OUTPATIENT)
Dept: GASTROENTEROLOGY | Facility: CLINIC | Age: 59
End: 2019-11-01

## 2019-11-01 ENCOUNTER — LAB VISIT (OUTPATIENT)
Dept: LAB | Facility: HOSPITAL | Age: 59
End: 2019-11-01
Attending: PHYSICIAN ASSISTANT
Payer: MEDICAID

## 2019-11-01 ENCOUNTER — OFFICE VISIT (OUTPATIENT)
Dept: GASTROENTEROLOGY | Facility: CLINIC | Age: 59
End: 2019-11-01
Payer: MEDICAID

## 2019-11-01 VITALS
WEIGHT: 141.56 LBS | OXYGEN SATURATION: 99 % | DIASTOLIC BLOOD PRESSURE: 80 MMHG | SYSTOLIC BLOOD PRESSURE: 122 MMHG | BODY MASS INDEX: 24.17 KG/M2 | HEART RATE: 108 BPM | HEIGHT: 64 IN

## 2019-11-01 DIAGNOSIS — R10.13 EPIGASTRIC PAIN: ICD-10-CM

## 2019-11-01 DIAGNOSIS — E89.0 POSTOPERATIVE HYPOTHYROIDISM: Primary | ICD-10-CM

## 2019-11-01 DIAGNOSIS — E89.0 POSTOPERATIVE HYPOTHYROIDISM: ICD-10-CM

## 2019-11-01 LAB
ALBUMIN SERPL BCP-MCNC: 3.8 G/DL (ref 3.5–5.2)
ALP SERPL-CCNC: 69 U/L (ref 55–135)
ALT SERPL W/O P-5'-P-CCNC: 14 U/L (ref 10–44)
AST SERPL-CCNC: 21 U/L (ref 10–40)
BILIRUB DIRECT SERPL-MCNC: 0.2 MG/DL (ref 0.1–0.3)
BILIRUB SERPL-MCNC: 0.5 MG/DL (ref 0.1–1)
PROT SERPL-MCNC: 8 G/DL (ref 6–8.4)
TSH SERPL DL<=0.005 MIU/L-ACNC: 2.31 UIU/ML (ref 0.4–4)

## 2019-11-01 PROCEDURE — 99213 OFFICE O/P EST LOW 20 MIN: CPT | Mod: S$PBB,,, | Performed by: PHYSICIAN ASSISTANT

## 2019-11-01 PROCEDURE — 84443 ASSAY THYROID STIM HORMONE: CPT

## 2019-11-01 PROCEDURE — 99999 PR PBB SHADOW E&M-EST. PATIENT-LVL V: CPT | Mod: PBBFAC,,, | Performed by: PHYSICIAN ASSISTANT

## 2019-11-01 PROCEDURE — 36415 COLL VENOUS BLD VENIPUNCTURE: CPT

## 2019-11-01 PROCEDURE — 99213 PR OFFICE/OUTPT VISIT, EST, LEVL III, 20-29 MIN: ICD-10-PCS | Mod: S$PBB,,, | Performed by: PHYSICIAN ASSISTANT

## 2019-11-01 PROCEDURE — 99215 OFFICE O/P EST HI 40 MIN: CPT | Mod: PBBFAC | Performed by: PHYSICIAN ASSISTANT

## 2019-11-01 PROCEDURE — 99999 PR PBB SHADOW E&M-EST. PATIENT-LVL V: ICD-10-PCS | Mod: PBBFAC,,, | Performed by: PHYSICIAN ASSISTANT

## 2019-11-01 PROCEDURE — 80076 HEPATIC FUNCTION PANEL: CPT

## 2019-11-01 RX ORDER — OMEPRAZOLE 40 MG/1
40 CAPSULE, DELAYED RELEASE ORAL EVERY MORNING
Qty: 90 CAPSULE | Refills: 3 | Status: SHIPPED | OUTPATIENT
Start: 2019-11-01 | End: 2020-01-27 | Stop reason: ALTCHOICE

## 2019-11-01 RX ORDER — SODIUM, POTASSIUM,MAG SULFATES 17.5-3.13G
SOLUTION, RECONSTITUTED, ORAL ORAL
Qty: 354 ML | Refills: 0 | Status: ON HOLD | OUTPATIENT
Start: 2019-11-01 | End: 2019-12-04 | Stop reason: HOSPADM

## 2019-11-01 NOTE — PATIENT INSTRUCTIONS
Increased dose of Omeprazole daily.   Take Linzess daily at least one week. Ok to take in the evening.

## 2019-11-01 NOTE — PROGRESS NOTES
Mailed FODMOD diet and instructions for colonoscopy to pt, per her request. Suprep faxed to [facility] off [address].

## 2019-11-01 NOTE — PROGRESS NOTES
Subjective:      Patient ID: Ana Bonilla is a 59 y.o. female.    Chief Complaint: Bloated and Gastroesophageal Reflux (has hernia)    HPI  The patient has a history of GERD, dyspepsia and chronic constipation. She was last seen in February. She is here today for a follow up visit. She has traditionally done well with Linzess and daily Omeprazole. However, she reported non-compliance last visit. We also talked about low FODMAPs diet and probiotics. She is scheduled for a colonoscopy 11/11/19. Today she complains of epigastric discomfort, bloating and gas. Sometimes she gags herself to cause belching which helps. She denies abdominal pain, nausea or vomiting. She has been smoking more and not watching what she eats. She likes to drink alcohol sometimes. She attributes it to stress. She stopped her PPI and just started it back a few days ago. She said she stopped it because she thought is might cause cancer. She hasn't been taking Linzess because her pharmacist said it could cause dehydration. Her last BM was four days ago. She was prescribed hyocosymine but not taking it. She doesn't know if works.     Review of Systems  As per HPI. She also complains of weight gain and sweating more than usual.     Objective:     Physical Exam   Constitutional: She is oriented to person, place, and time. She appears well-developed and well-nourished. No distress.   HENT:   Head: Normocephalic and atraumatic.   Eyes: EOM are normal.   Cardiovascular: Normal rate and regular rhythm.   Pulmonary/Chest: Effort normal and breath sounds normal. No respiratory distress. She has no wheezes.   Abdominal: Soft. Bowel sounds are normal. She exhibits no distension. There is no tenderness.   Neurological: She is alert and oriented to person, place, and time. No cranial nerve deficit.   Skin: She is not diaphoretic.   Psychiatric: Her behavior is normal.       Assessment:     1. Postoperative hypothyroidism    2. Epigastric pain        Plan:       Discussed EGD, but we would have to change her colonoscopy date and she doesn't want to do that.  She is scheduled 11/11/19.   Discussed compliance with medications even if just a week or two, so we can see if therapy helps or if we need to move on to other options.     Orders Placed This Encounter   Procedures    TSH    Hepatic function panel     Medications Ordered This Encounter   Medications    omeprazole (PRILOSEC) 40 MG capsule     Sig: Take 1 capsule (40 mg total) by mouth every morning.     Dispense:  90 capsule     Refill:  3    SUPREP BOWEL PREP KIT 17.5-3.13-1.6 gram SolR     Sig: Use as directed     Dispense:  354 mL     Refill:  0       Follow up after colonoscopy.     Thank you for the opportunity to participate in the care of this patient.   Jeanmarie Brown PA-C.

## 2019-11-07 ENCOUNTER — TELEPHONE (OUTPATIENT)
Dept: FAMILY MEDICINE | Facility: CLINIC | Age: 59
End: 2019-11-07

## 2019-11-07 NOTE — PROGRESS NOTES
Subjective:       Patient ID: Ana Bonilla is a 59 y.o. female.    Chief Complaint   Patient presents with    Bleeding/Bruising       HPI    Ana Bonilla is here today with c/o skin rash w/ discoloration to her side for the past few days.  Rash has been itching causing her to scratch and rub more forcefully over area.  No skin breakdown.  Does think she bruises fairly easily, no h/o anemia.  Continues to have problems w/ LT sided sciatica.  She has used anti-itch cream for the rash and has helped some.  Does not take ASA or other blood thinners.      Review of Systems   Constitutional: Negative.    Respiratory: Negative.    Cardiovascular: Negative.    Musculoskeletal: Positive for back pain.   Skin: Positive for color change and rash.   Hematological: Positive for adenopathy.         Review of patient's allergies indicates:  No Known Allergies      Medication List with Changes/Refills   Current Medications    ALPRAZOLAM (XANAX) 2 MG TAB    TAKE 1 TABLET(2 MG) BY MOUTH TWICE DAILY PRN    AMLODIPINE-BENAZEPRIL 5-10 MG (LOTREL) 5-10 MG PER CAPSULE    TAKE 1 CAPSULE BY MOUTH EVERY DAY    CHLORZOXAZONE (PARAFON FORTE) 500 MG TAB    TK 1 AND 1/2 TS PO Q 8 H PRF SPASMS    FLUTICASONE PROPIONATE (FLONASE) 50 MCG/ACTUATION NASAL SPRAY    1 spray (50 mcg total) by Each Nostril route once daily.    GABAPENTIN (NEURONTIN) 300 MG CAPSULE    Take 1 capsule (300 mg total) by mouth every evening.    IPRATROPIUM (ATROVENT) 0.03 % NASAL SPRAY    2 sprays by Nasal route 2 (two) times daily as needed for Rhinitis.    LEVOCETIRIZINE (XYZAL) 5 MG TABLET    Take 1 tablet (5 mg total) by mouth every morning.    LEVOTHYROXINE (SYNTHROID) 100 MCG TABLET    Take 1 tablet (100 mcg total) by mouth before breakfast.    LINZESS 145 MCG CAP CAPSULE    TAKE 1 CAPSULE BY MOUTH ONCE DAILY IN THE MORNING ON AN EMPTY STOMACH 30 MINUTES BEFORE MEAL    MONTELUKAST (SINGULAIR) 10 MG TABLET    Take 1 tablet (10 mg total) by mouth every evening.  "   MONTELUKAST (SINGULAIR) 10 MG TABLET    TAKE 1 TABLET BY MOUTH EVERY EVENING    NASACORT 55 MCG NASAL INHALER    INSTILL 2 SPRAYS IN EACH NOSTRIL ONCE DAILY    OMEPRAZOLE (PRILOSEC) 40 MG CAPSULE    Take 1 capsule (40 mg total) by mouth every morning.    POLYVINYL ALCOHOL, ARTIFICIAL TEARS, (LIQUIFILM TEARS) 1.4 % OPHTHALMIC SOLUTION    Place 1 drop into both eyes as needed.    PROMETHAZINE-CODEINE 6.25-10 MG/5 ML (PHENERGAN WITH CODEINE) 6.25-10 MG/5 ML SYRUP    Take 5 mLs by mouth every 4 (four) hours as needed for Cough.    PROVENTIL HFA 90 MCG/ACTUATION INHALER    Inhale 2 puffs into the lungs every 6 (six) hours as needed for Wheezing or Shortness of Breath. Rescue    SUPREP BOWEL PREP KIT 17.5-3.13-1.6 GRAM SOLR    Use as directed       Patient Active Problem List   Diagnosis    COPD with asthma    GERD (gastroesophageal reflux disease)    Primary hypothyroidism    Tobacco abuse disorder    PVD (peripheral vascular disease)    Anxiety    Hypercholesterolemia    Claudication    Essential hypertension    Allergic rhinitis    Pain in both lower extremities    Constipation, chronic    Anisometropic amblyopia of left eye    Nonrheumatic aortic valve insufficiency         Past medical, surgical, family and social histories have been reviewed today.        Objective:     Vitals:    11/08/19 0915   BP: 106/72   Pulse: 104   Temp: 98.6 °F (37 °C)   TempSrc: Tympanic   SpO2: 98%   Weight: 62.1 kg (136 lb 14.5 oz)   Height: 5' 4" (1.626 m)   PainSc:   8         Physical Exam   Constitutional: She is oriented to person, place, and time. She appears well-developed and well-nourished.   Abdominal: Soft. Bowel sounds are normal. She exhibits no distension and no mass.       Skin rash to LT side of abdomen consistent with fungal infection.  Area near rash appears to be discolored similar to a bruise.   Neurological: She is alert and oriented to person, place, and time.   Skin: She is not diaphoretic.   Vitals " reviewed.        Diagnosis       1. Rash, skin    2. Easy bruisability          Assessment/ Plan     Rash, skin  -     clotrimazole-betamethasone 1-0.05% (LOTRISONE) cream; Apply topically 2 (two) times daily.  Dispense: 15 g; Refill: 1    Easy bruisability  -     CBC auto differential; Future; Expected date: 11/08/2019  -     Basic metabolic panel; Future; Expected date: 11/08/2019  -     Protime-INR; Future; Expected date: 11/08/2019        Will try treating her rash w/ topical and not oral at this time.  Lab pending.  Follow-up in clinic as needed.        Patient Care Team:  Kenrick Locke NP as PCP - General (General Practice)  Saturnino Lemon LPN as Care Coordinator      JONA Ramesh  [facility]

## 2019-11-07 NOTE — TELEPHONE ENCOUNTER
----- Message from Mimi Bettencourt sent at 11/7/2019  9:53 AM CST -----  Contact: Patient   Type:  Sooner Appointment Request    Caller is requesting a sooner appointment.  Caller declined first available appointment listed below.  Caller will not accept being placed on the wait list and is requesting a message be sent to doctor.  Name of Caller: Ana  When is the first available appointment? Thursday 11/21/19  Symptoms: Bruise on her left side  Would the patient rather a call back or a response via [facility]? Call back   Best Call Back Number: Please call her at [phone]  Additional Information: She would like to be seen before her colonoscopy

## 2019-11-08 ENCOUNTER — LAB VISIT (OUTPATIENT)
Dept: LAB | Facility: HOSPITAL | Age: 59
End: 2019-11-08
Attending: REGISTERED NURSE
Payer: MEDICAID

## 2019-11-08 ENCOUNTER — OFFICE VISIT (OUTPATIENT)
Dept: FAMILY MEDICINE | Facility: CLINIC | Age: 59
End: 2019-11-08
Payer: MEDICAID

## 2019-11-08 VITALS
TEMPERATURE: 99 F | HEIGHT: 64 IN | OXYGEN SATURATION: 98 % | HEART RATE: 104 BPM | WEIGHT: 136.88 LBS | DIASTOLIC BLOOD PRESSURE: 72 MMHG | BODY MASS INDEX: 23.37 KG/M2 | SYSTOLIC BLOOD PRESSURE: 106 MMHG

## 2019-11-08 DIAGNOSIS — R23.3 EASY BRUISABILITY: ICD-10-CM

## 2019-11-08 DIAGNOSIS — R21 RASH, SKIN: Primary | ICD-10-CM

## 2019-11-08 PROBLEM — M79.604 PAIN IN BOTH LOWER EXTREMITIES: Status: RESOLVED | Noted: 2018-05-24 | Resolved: 2019-11-08

## 2019-11-08 PROBLEM — M79.605 PAIN IN BOTH LOWER EXTREMITIES: Status: RESOLVED | Noted: 2018-05-24 | Resolved: 2019-11-08

## 2019-11-08 LAB
ANION GAP SERPL CALC-SCNC: 10 MMOL/L (ref 8–16)
BASOPHILS # BLD AUTO: 0.04 K/UL (ref 0–0.2)
BASOPHILS NFR BLD: 0.7 % (ref 0–1.9)
BUN SERPL-MCNC: 12 MG/DL (ref 6–20)
CALCIUM SERPL-MCNC: 9.1 MG/DL (ref 8.7–10.5)
CHLORIDE SERPL-SCNC: 106 MMOL/L (ref 95–110)
CO2 SERPL-SCNC: 24 MMOL/L (ref 23–29)
CREAT SERPL-MCNC: 0.9 MG/DL (ref 0.5–1.4)
DIFFERENTIAL METHOD: ABNORMAL
EOSINOPHIL # BLD AUTO: 0.1 K/UL (ref 0–0.5)
EOSINOPHIL NFR BLD: 2 % (ref 0–8)
ERYTHROCYTE [DISTWIDTH] IN BLOOD BY AUTOMATED COUNT: 12.8 % (ref 11.5–14.5)
EST. GFR  (AFRICAN AMERICAN): >60 ML/MIN/1.73 M^2
EST. GFR  (NON AFRICAN AMERICAN): >60 ML/MIN/1.73 M^2
GLUCOSE SERPL-MCNC: 90 MG/DL (ref 70–110)
HCT VFR BLD AUTO: 41.7 % (ref 37–48.5)
HGB BLD-MCNC: 13.1 G/DL (ref 12–16)
IMM GRANULOCYTES # BLD AUTO: 0.01 K/UL (ref 0–0.04)
IMM GRANULOCYTES NFR BLD AUTO: 0.2 % (ref 0–0.5)
INR PPP: 1 (ref 0.8–1.2)
LYMPHOCYTES # BLD AUTO: 1.9 K/UL (ref 1–4.8)
LYMPHOCYTES NFR BLD: 31.1 % (ref 18–48)
MCH RBC QN AUTO: 31.8 PG (ref 27–31)
MCHC RBC AUTO-ENTMCNC: 31.4 G/DL (ref 32–36)
MCV RBC AUTO: 101 FL (ref 82–98)
MONOCYTES # BLD AUTO: 0.6 K/UL (ref 0.3–1)
MONOCYTES NFR BLD: 9.4 % (ref 4–15)
NEUTROPHILS # BLD AUTO: 3.4 K/UL (ref 1.8–7.7)
NEUTROPHILS NFR BLD: 56.6 % (ref 38–73)
NRBC BLD-RTO: 0 /100 WBC
PLATELET # BLD AUTO: 311 K/UL (ref 150–350)
PMV BLD AUTO: 10 FL (ref 9.2–12.9)
POTASSIUM SERPL-SCNC: 4.2 MMOL/L (ref 3.5–5.1)
PROTHROMBIN TIME: 10.1 SEC (ref 9–12.5)
RBC # BLD AUTO: 4.12 M/UL (ref 4–5.4)
SODIUM SERPL-SCNC: 140 MMOL/L (ref 136–145)
WBC # BLD AUTO: 6.05 K/UL (ref 3.9–12.7)

## 2019-11-08 PROCEDURE — 85610 PROTHROMBIN TIME: CPT

## 2019-11-08 PROCEDURE — 99213 PR OFFICE/OUTPT VISIT, EST, LEVL III, 20-29 MIN: ICD-10-PCS | Mod: S$PBB,,, | Performed by: REGISTERED NURSE

## 2019-11-08 PROCEDURE — 99999 PR PBB SHADOW E&M-EST. PATIENT-LVL III: CPT | Mod: PBBFAC,,, | Performed by: REGISTERED NURSE

## 2019-11-08 PROCEDURE — 85025 COMPLETE CBC W/AUTO DIFF WBC: CPT

## 2019-11-08 PROCEDURE — 99999 PR PBB SHADOW E&M-EST. PATIENT-LVL III: ICD-10-PCS | Mod: PBBFAC,,, | Performed by: REGISTERED NURSE

## 2019-11-08 PROCEDURE — 36415 COLL VENOUS BLD VENIPUNCTURE: CPT | Mod: PO

## 2019-11-08 PROCEDURE — 99213 OFFICE O/P EST LOW 20 MIN: CPT | Mod: S$PBB,,, | Performed by: REGISTERED NURSE

## 2019-11-08 PROCEDURE — 80048 BASIC METABOLIC PNL TOTAL CA: CPT

## 2019-11-08 PROCEDURE — 99213 OFFICE O/P EST LOW 20 MIN: CPT | Mod: PBBFAC,PO | Performed by: REGISTERED NURSE

## 2019-11-08 RX ORDER — DICLOFENAC SODIUM 10 MG/G
GEL TOPICAL
Refills: 2 | COMMUNITY
Start: 2019-10-07 | End: 2020-07-27

## 2019-11-08 RX ORDER — CLOTRIMAZOLE AND BETAMETHASONE DIPROPIONATE 10; .64 MG/G; MG/G
CREAM TOPICAL 2 TIMES DAILY
Qty: 15 G | Refills: 1 | Status: SHIPPED | OUTPATIENT
Start: 2019-11-08 | End: 2020-07-27

## 2019-11-08 RX ORDER — CONJUGATED ESTROGENS 0.62 MG/G
CREAM VAGINAL
Refills: 3 | COMMUNITY
Start: 2019-10-30 | End: 2021-07-15

## 2019-11-08 RX ORDER — ESTRADIOL 0.5 MG/1
0.5 TABLET ORAL DAILY
COMMUNITY
End: 2019-11-08

## 2019-11-08 RX ORDER — LIDOCAINE 50 MG/G
1 PATCH TOPICAL DAILY
Refills: 1 | COMMUNITY
Start: 2019-10-07 | End: 2020-07-27

## 2019-11-08 RX ORDER — ESTRADIOL 1 MG/1
TABLET ORAL
Refills: 11 | COMMUNITY
Start: 2019-10-30 | End: 2020-07-27

## 2019-11-14 ENCOUNTER — TELEPHONE (OUTPATIENT)
Dept: FAMILY MEDICINE | Facility: CLINIC | Age: 59
End: 2019-11-14

## 2019-11-14 DIAGNOSIS — R79.89 ABNORMAL CBC MEASUREMENT: Primary | ICD-10-CM

## 2019-11-14 NOTE — TELEPHONE ENCOUNTER
Informed pt labs reviewed ---- shows some mild changes on her CBC, possibly due to a low B12 level or decreased folate.  These 2 issues can cause fatigue and possible bruising.  I would rec checking some lab to see how her levels are. Pt verbalized understanding. -KT-

## 2019-11-14 NOTE — TELEPHONE ENCOUNTER
----- Message from Kenrick Locke NP sent at 11/14/2019  7:55 AM CST -----  Labs reviewed ---- shows some mild changes on her CBC, possibly due to a low B12 level or decreased folate.  These 2 issues can cause fatigue and possible bruising.  I would rec checking some lab to see how her levels are.  Orders in.

## 2019-11-15 ENCOUNTER — LAB VISIT (OUTPATIENT)
Dept: LAB | Facility: HOSPITAL | Age: 59
End: 2019-11-15
Attending: REGISTERED NURSE
Payer: MEDICAID

## 2019-11-15 DIAGNOSIS — R79.89 ABNORMAL CBC MEASUREMENT: ICD-10-CM

## 2019-11-15 PROCEDURE — 36415 COLL VENOUS BLD VENIPUNCTURE: CPT | Mod: PO

## 2019-11-15 PROCEDURE — 82607 VITAMIN B-12: CPT

## 2019-11-15 PROCEDURE — 82746 ASSAY OF FOLIC ACID SERUM: CPT

## 2019-11-16 LAB
FOLATE SERPL-MCNC: 6.6 NG/ML (ref 4–24)
VIT B12 SERPL-MCNC: 749 PG/ML (ref 210–950)

## 2019-11-18 ENCOUNTER — TELEPHONE (OUTPATIENT)
Dept: FAMILY MEDICINE | Facility: CLINIC | Age: 59
End: 2019-11-18

## 2019-11-18 DIAGNOSIS — J44.89 COPD WITH ASTHMA: ICD-10-CM

## 2019-11-18 DIAGNOSIS — J30.9 ALLERGIC RHINITIS, UNSPECIFIED SEASONALITY, UNSPECIFIED TRIGGER: ICD-10-CM

## 2019-11-18 RX ORDER — PROMETHAZINE HYDROCHLORIDE AND CODEINE PHOSPHATE 6.25; 1 MG/5ML; MG/5ML
SOLUTION ORAL
Qty: 150 ML | Refills: 0 | Status: SHIPPED | OUTPATIENT
Start: 2019-11-18 | End: 2020-01-27 | Stop reason: SDUPTHER

## 2019-11-18 NOTE — TELEPHONE ENCOUNTER
----- Message from Leslie Rm sent at 11/18/2019  3:12 PM CST -----  Contact: Patient  Patient states that she keeps getting phone calls from the office, but does not know why, please call her back at [phone]. Thank you

## 2019-11-19 NOTE — TELEPHONE ENCOUNTER
----- Message from Louisa Edmondson sent at 11/19/2019  3:15 PM CST -----  Contact: [phone]/self  Patient requesting to speak with you regarding test results. Please advise.

## 2019-11-20 NOTE — TELEPHONE ENCOUNTER
Advised patient she didn't need to take anything at this time per physician. Patient stated she still getting a vitamin supplement to help her gain energy.

## 2019-12-03 DIAGNOSIS — J30.9 CHRONIC ALLERGIC RHINITIS: ICD-10-CM

## 2019-12-03 RX ORDER — IPRATROPIUM BROMIDE 21 UG/1
SPRAY, METERED NASAL
Qty: 30 ML | Refills: 0 | Status: SHIPPED | OUTPATIENT
Start: 2019-12-03 | End: 2020-12-09 | Stop reason: SDUPTHER

## 2019-12-04 ENCOUNTER — HOSPITAL ENCOUNTER (OUTPATIENT)
Facility: HOSPITAL | Age: 59
Discharge: HOME | End: 2019-12-04
Attending: INTERNAL MEDICINE | Admitting: INTERNAL MEDICINE
Payer: MEDICAID

## 2019-12-04 ENCOUNTER — ANESTHESIA EVENT (OUTPATIENT)
Dept: ENDOSCOPY | Facility: HOSPITAL | Age: 59
End: 2019-12-04
Payer: MEDICAID

## 2019-12-04 ENCOUNTER — ANESTHESIA (OUTPATIENT)
Dept: ENDOSCOPY | Facility: HOSPITAL | Age: 59
End: 2019-12-04
Payer: MEDICAID

## 2019-12-04 VITALS
TEMPERATURE: 98 F | OXYGEN SATURATION: 99 % | HEART RATE: 75 BPM | HEIGHT: 64 IN | DIASTOLIC BLOOD PRESSURE: 68 MMHG | WEIGHT: 135.38 LBS | SYSTOLIC BLOOD PRESSURE: 118 MMHG | BODY MASS INDEX: 23.11 KG/M2 | RESPIRATION RATE: 18 BRPM

## 2019-12-04 DIAGNOSIS — D12.5 ADENOMATOUS POLYP OF SIGMOID COLON: Primary | ICD-10-CM

## 2019-12-04 DIAGNOSIS — Z12.11 COLON CANCER SCREENING: ICD-10-CM

## 2019-12-04 PROBLEM — K59.09 CONSTIPATION, CHRONIC: Status: RESOLVED | Noted: 2018-06-17 | Resolved: 2019-12-04

## 2019-12-04 PROBLEM — K63.5 COLON POLYPS: Status: ACTIVE | Noted: 2019-12-04

## 2019-12-04 PROCEDURE — 88305 TISSUE EXAM BY PATHOLOGIST: ICD-10-PCS | Mod: 26,,, | Performed by: PATHOLOGY

## 2019-12-04 PROCEDURE — 45385 COLONOSCOPY W/LESION REMOVAL: CPT | Performed by: INTERNAL MEDICINE

## 2019-12-04 PROCEDURE — 45385 PR COLONOSCOPY,REMV LESN,SNARE: ICD-10-PCS | Mod: ,,, | Performed by: INTERNAL MEDICINE

## 2019-12-04 PROCEDURE — 63600175 PHARM REV CODE 636 W HCPCS: Performed by: NURSE ANESTHETIST, CERTIFIED REGISTERED

## 2019-12-04 PROCEDURE — 00811 ANES LWR INTST NDSC NOS: CPT | Performed by: INTERNAL MEDICINE

## 2019-12-04 PROCEDURE — 45385 COLONOSCOPY W/LESION REMOVAL: CPT | Mod: ,,, | Performed by: INTERNAL MEDICINE

## 2019-12-04 PROCEDURE — 88305 TISSUE EXAM BY PATHOLOGIST: CPT | Mod: 26,,, | Performed by: PATHOLOGY

## 2019-12-04 PROCEDURE — 37000009 HC ANESTHESIA EA ADD 15 MINS: Performed by: INTERNAL MEDICINE

## 2019-12-04 PROCEDURE — 27201089 HC SNARE, DISP (ANY): Performed by: INTERNAL MEDICINE

## 2019-12-04 PROCEDURE — 37000008 HC ANESTHESIA 1ST 15 MINUTES: Performed by: INTERNAL MEDICINE

## 2019-12-04 PROCEDURE — 88305 TISSUE EXAM BY PATHOLOGIST: CPT | Performed by: PATHOLOGY

## 2019-12-04 RX ORDER — SODIUM CHLORIDE, SODIUM LACTATE, POTASSIUM CHLORIDE, CALCIUM CHLORIDE 600; 310; 30; 20 MG/100ML; MG/100ML; MG/100ML; MG/100ML
INJECTION, SOLUTION INTRAVENOUS CONTINUOUS PRN
Status: DISCONTINUED | OUTPATIENT
Start: 2019-12-04 | End: 2019-12-04

## 2019-12-04 RX ORDER — SODIUM CHLORIDE, SODIUM LACTATE, POTASSIUM CHLORIDE, CALCIUM CHLORIDE 600; 310; 30; 20 MG/100ML; MG/100ML; MG/100ML; MG/100ML
INJECTION, SOLUTION INTRAVENOUS CONTINUOUS
Status: ACTIVE | OUTPATIENT
Start: 2019-12-04

## 2019-12-04 RX ORDER — SODIUM CHLORIDE 0.9 % (FLUSH) 0.9 %
10 SYRINGE (ML) INJECTION
Status: DISCONTINUED | OUTPATIENT
Start: 2019-12-04 | End: 2022-04-26

## 2019-12-04 RX ORDER — PROPOFOL 10 MG/ML
VIAL (ML) INTRAVENOUS
Status: DISCONTINUED | OUTPATIENT
Start: 2019-12-04 | End: 2019-12-04

## 2019-12-04 RX ADMIN — PROPOFOL 620 MG: 10 INJECTION, EMULSION INTRAVENOUS at 09:12

## 2019-12-04 RX ADMIN — SODIUM CHLORIDE, SODIUM LACTATE, POTASSIUM CHLORIDE, AND CALCIUM CHLORIDE: 600; 310; 30; 20 INJECTION, SOLUTION INTRAVENOUS at 08:12

## 2019-12-04 RX ADMIN — SODIUM CHLORIDE, SODIUM LACTATE, POTASSIUM CHLORIDE, AND CALCIUM CHLORIDE: 600; 310; 30; 20 INJECTION, SOLUTION INTRAVENOUS at 09:12

## 2019-12-04 NOTE — TRANSFER OF CARE
"Anesthesia Transfer of Care Note    Patient: Ana Bonilla    Procedure(s) Performed: Procedure(s) (LRB):  COLONOSCOPY (N/A)    Patient location: GI    Anesthesia Type: MAC    Transport from OR: Transported from OR on room air with adequate spontaneous ventilation    Post pain: adequate analgesia    Post assessment: no apparent anesthetic complications    Post vital signs: stable    Level of consciousness: responds to stimulation    Nausea/Vomiting: no nausea/vomiting    Complications: none    Transfer of care protocol was followed      Last vitals:   Visit Vitals  /70 (BP Location: Left arm, Patient Position: Lying)   Pulse 60   Temp 36.8 °C (98.2 °F) (Temporal)   Resp 18   Ht 5' 4" (1.626 m)   Wt 61.4 kg (135 lb 5.8 oz)   SpO2 98%   Breastfeeding? No   BMI 23.23 kg/m²     "

## 2019-12-04 NOTE — ANESTHESIA POSTPROCEDURE EVALUATION
Anesthesia Post Evaluation    Patient: Ana Bonilla    Procedure(s) Performed: Procedure(s) (LRB):  COLONOSCOPY (N/A)    Final Anesthesia Type: MAC    Patient location during evaluation: GI PACU  Patient participation: No - Unable to Participate, Sedation  Level of consciousness: responds to stimulation  Post-procedure vital signs: reviewed and stable  Pain management: adequate  Airway patency: patent    PONV status at discharge: No PONV  Anesthetic complications: no      Cardiovascular status: blood pressure returned to baseline  Respiratory status: unassisted and room air  Hydration status: euvolemic  Follow-up not needed.          Vitals Value Taken Time   /70 12/4/2019  7:40 AM   Temp 36.8 °C (98.2 °F) 12/4/2019  7:40 AM   Pulse 60 12/4/2019  7:40 AM   Resp 18 12/4/2019  7:40 AM   SpO2 98 % 12/4/2019  7:40 AM         No case tracking events are documented in the log.      Pain/Manuel Score: Manuel Score: 10 (12/4/2019  9:11 AM)

## 2019-12-04 NOTE — DISCHARGE INSTRUCTIONS

## 2019-12-04 NOTE — [employer] PATIENT INSTRUCTIONS
Discharge Summary/Instructions after an Endoscopic Procedure  Patient Name: Ana Bonilla  Patient MRN: [id number]  Patient YOB: 1960 Wednesday, December 04, 2019 Danny Matos III, MD  RESTRICTIONS:  During your procedure today, you received medications for sedation.  These   medications may affect your judgment, balance and coordination.  Therefore,   for 24 hours, you have the following restrictions:   - DO NOT drive a car, operate machinery, make legal/financial decisions,   sign important papers or drink alcohol.    ACTIVITY:  Today: no heavy lifting, straining or running due to procedural   sedation/anesthesia.  The following day: return to full activity including work.  DIET:  Eat and drink normally unless instructed otherwise.     TREATMENT FOR COMMON SIDE EFFECTS:  - Mild abdominal pain, nausea, belching, bloating or excessive gas:  rest,   eat lightly and use a heating pad.  - Sore Throat: treat with throat lozenges and/or gargle with warm salt   water.  - Because air was used during the procedure, expelling large amounts of air   from your rectum or belching is normal.  - If a bowel prep was taken, you may not have a bowel movement for 1-3 days.    This is normal.  SYMPTOMS TO WATCH FOR AND REPORT TO YOUR PHYSICIAN:  1. Abdominal pain or bloating, other than gas cramps.  2. Chest pain.  3. Back pain.  4. Signs of infection such as: chills or fever occurring within 24 hours   after the procedure.  5. Rectal bleeding, which would show as bright red, maroon, or black stools.   (A tablespoon of blood from the rectum is not serious, especially if   hemorrhoids are present.)  6. Vomiting.  7. Weakness or dizziness.  GO DIRECTLY TO THE NEAREST EMERGENCY ROOM IF YOU HAVE ANY OF THE FOLLOWING:      Difficulty breathing              Chills and/or fever over 101 F   Persistent vomiting and/or vomiting blood   Severe abdominal pain   Severe chest pain   Black, tarry stools   Bleeding- more than one  tablespoon   Any other symptom or condition that you feel may need urgent attention  Your doctor recommends these additional instructions:  If any biopsies were taken, your doctors clinic will contact you in 1 to 2   weeks with any results.  - Discharge patient to home (via wheelchair).   - High fiber diet.   - Continue present medications.   - Await pathology results.   - Repeat colonoscopy in 3 years for surveillance.   - Return to primary care physician as previously scheduled.   - Discharge patient to home (via wheelchair).   - High fiber diet.   - Continue present medications.   - Await pathology results.   - Repeat colonoscopy in 3 years for surveillance.   - Return to primary care physician as previously scheduled.  For questions, problems or results please call your physician Danny Matos III, MD at Work:  [phone]  If you have any questions about the above instructions, call the GI   department at [phone] or call the endoscopy unit at [phone]   from 7am until 3 pm.  [facility], EMERGENCY ROOM PHONE NUMBER:   [phone]  IF A COMPLICATION OR EMERGENCY SITUATION ARISES AND YOU ARE UNABLE TO REACH   YOUR PHYSICIAN - GO DIRECTLY TO THE EMERGENCY ROOM.  I have read or have had read to me these discharge instructions for my   procedure and have received a written copy.  I understand these   instructions and will follow-up with my physician if I have any questions.     __________________________________       _____________________________________  Nurse Signature                                          Patient/Designated   Responsible Party Signature  Danny Matos III, MD  12/4/2019 9:20:23 AM  This report has been verified and signed electronically.  [employer]

## 2019-12-04 NOTE — ANESTHESIA RELEASE NOTE
"Anesthesia Release from PACU Note    Patient: Ana Bonilla    Procedure(s) Performed: Procedure(s) (LRB):  COLONOSCOPY (N/A)    Anesthesia type: MAC    Post pain: Adequate analgesia    Post assessment: no apparent anesthetic complications    Last Vitals:   Visit Vitals  /70 (BP Location: Left arm, Patient Position: Lying)   Pulse 60   Temp 36.8 °C (98.2 °F) (Temporal)   Resp 18   Ht 5' 4" (1.626 m)   Wt 61.4 kg (135 lb 5.8 oz)   SpO2 98%   Breastfeeding? No   BMI 23.23 kg/m²       Post vital signs: stable    Level of consciousness: awake    Nausea/Vomiting: no nausea/no vomiting    Complications: none    Airway Patency: patent    Respiratory: unassisted, room air    Cardiovascular: stable and blood pressure at baseline    Hydration: euvolemic  "

## 2019-12-04 NOTE — DISCHARGE SUMMARY
[facility]  Brief Operative Note     SUMMARY     Surgery Date: 12/4/2019     Surgeon(s) and Role:     * Danny Matos III, MD - Primary    Assisting Surgeon: None    Pre-op Diagnosis:  Screening [Z13.9]    Post-op Diagnosis:  Post-Op Diagnosis Codes:     * Screening [Z13.9]      - Colon Polyps  Procedure(s) (LRB):  COLONOSCOPY (N/A)    Anesthesia: Choice    Description of the findings of the procedure: Procedures completed. See Procedure note for full details.    Findings/Key Components: Procedures completed. See Procedure note for full details.    Prosthetics/Devices: None    Estimated Blood Loss: * No values recorded between 12/4/2019 12:00 AM and 12/4/2019  9:22 AM *         Specimens:   Specimen (12h ago, onward)    None          Discharge Note    SUMMARY     Admit Date: 12/4/2019    Discharge Date and Time: 12/4/2019    Hospital Course (synopsis of major diagnoses, care, treatment, and services provided during the course of the hospital stay):  Procedures completed. See Procedure note for full details. Discharge patient when discharge criteria met.    Final Diagnosis: Post-Op Diagnosis Codes:     * Screening [Z13.9]     - Colon Polyps  Disposition: Discharge patient when discharge criteria met.    Follow Up/Patient Instructions:       Medications:  Reconciled Home Medications:   Current Discharge Medication List      CONTINUE these medications which have NOT CHANGED    Details   ALPRAZolam (XANAX) 2 MG Tab TAKE 1 TABLET(2 MG) BY MOUTH TWICE DAILY PRN  Qty: 60 tablet, Refills: 2    Associated Diagnoses: Anxiety      amlodipine-benazepril 5-10 mg (LOTREL) 5-10 mg per capsule TAKE 1 CAPSULE BY MOUTH EVERY DAY  Qty: 90 capsule, Refills: 0    Associated Diagnoses: Essential hypertension      chlorzoxazone (PARAFON FORTE) 500 mg Tab TK 1 AND 1/2 TS PO Q 8 H PRF SPASMS  Refills: 0      clotrimazole-betamethasone 1-0.05% (LOTRISONE) cream Apply topically 2 (two) times daily.  Qty: 15 g, Refills: 1     Associated Diagnoses: Rash, skin      fluticasone propionate (FLONASE) 50 mcg/actuation nasal spray 1 spray (50 mcg total) by Each Nostril route once daily.  Qty: 1 Bottle, Refills: 5    Associated Diagnoses: Chronic rhinitis      ipratropium (ATROVENT) 0.03 % nasal spray USE 2 SPRAYS IN EACH NOSTRIL TWICE DAILY AS NEEDED FOR RHINITIS  Qty: 30 mL, Refills: 0    Associated Diagnoses: Chronic allergic rhinitis      levothyroxine (SYNTHROID) 100 MCG tablet Take 1 tablet (100 mcg total) by mouth before breakfast.  Qty: 90 tablet, Refills: 1    Associated Diagnoses: Primary hypothyroidism      lidocaine (LIDODERM) 5 % 1 patch once daily.  Refills: 1      LINZESS 145 mcg Cap capsule TAKE 1 CAPSULE BY MOUTH ONCE DAILY IN THE MORNING ON AN EMPTY STOMACH 30 MINUTES BEFORE MEAL  Qty: 30 capsule, Refills: 6    Associated Diagnoses: Constipation, chronic      montelukast (SINGULAIR) 10 mg tablet Take 1 tablet (10 mg total) by mouth every evening.  Qty: 90 tablet, Refills: 0    Associated Diagnoses: Chronic allergic rhinitis      omeprazole (PRILOSEC) 40 MG capsule Take 1 capsule (40 mg total) by mouth every morning.  Qty: 90 capsule, Refills: 3      promethazine-codeine 6.25-10 mg/5 ml (PHENERGAN WITH CODEINE) 6.25-10 mg/5 mL syrup TAKE 5 ML BY MOUTH EVERY 4 HOURS AS NEEDED FOR COUGH  Qty: 150 mL, Refills: 0    Associated Diagnoses: COPD with asthma; Allergic rhinitis, unspecified seasonality, unspecified trigger      PROVENTIL HFA 90 mcg/actuation inhaler Inhale 2 puffs into the lungs every 6 (six) hours as needed for Wheezing or Shortness of Breath. Rescue  Qty: 18 g, Refills: 0    Associated Diagnoses: COPD with asthma      diclofenac sodium (VOLTAREN) 1 % Gel APPLY 2 GRAMS TOPICALLY FOUR TIMES DAILY AS NEEDED  Refills: 2      estradiol (ESTRACE) 1 MG tablet Refills: 11      gabapentin (NEURONTIN) 300 MG capsule Take 1 capsule (300 mg total) by mouth every evening.  Qty: 30 capsule, Refills: 6    Associated Diagnoses:  Pain in both lower extremities      levocetirizine (XYZAL) 5 MG tablet Take 1 tablet (5 mg total) by mouth every morning.  Qty: 30 tablet, Refills: 6    Associated Diagnoses: Allergic rhinitis, unspecified seasonality, unspecified trigger      NASACORT 55 mcg nasal inhaler INSTILL 2 SPRAYS IN EACH NOSTRIL ONCE DAILY  Qty: 16.9 g, Refills: 2    Associated Diagnoses: Allergic rhinitis, unspecified seasonality, unspecified trigger      polyvinyl alcohol, artificial tears, (LIQUIFILM TEARS) 1.4 % ophthalmic solution Place 1 drop into both eyes as needed.  Qty: 30 mL, Refills: 5    Associated Diagnoses: Dry eye      PREMARIN vaginal cream USE VAGINAL Q 3 DAYS AT BEDTIME UTD  Refills: 3         STOP taking these medications       SUPREP BOWEL PREP KIT 17.5-3.13-1.6 gram SolR Comments:   Reason for Stopping:              Discharge Procedure Orders   Diet general     Activity as tolerated

## 2019-12-04 NOTE — ANESTHESIA PREPROCEDURE EVALUATION
12/04/2019  Ana Bonilla is a 59 y.o., female.    Anesthesia Evaluation    I have reviewed the Patient Summary Reports.    I have reviewed the Nursing Notes.   I have reviewed the Medications.     Review of Systems  Anesthesia Hx:  No previous Anesthesia    Social:  Smoker    Hematology/Oncology:     Oncology Normal     Cardiovascular:   Hypertension Valvular problems/Murmurs, AI PVD hyperlipidemia Echo 2017:  CONCLUSIONS     1 - Concentric remodeling.     2 - No wall motion abnormalities.     3 - Normal left ventricular systolic function (EF 60-65%).     4 - Normal left ventricular diastolic function.     5 - Normal right ventricular systolic function .     6 - Moderate aortic regurgitation.    Pulmonary:   COPD Asthma    Renal/:  Renal/ Normal     Hepatic/GI:   Bowel Prep. GERD    Musculoskeletal:  Musculoskeletal Normal    Endocrine:   Hypothyroidism    Dermatological:  Skin Normal    Psych:  Psychiatric Normal           Physical Exam  General:  Well nourished    Airway/Jaw/Neck:  Airway Findings: Mouth Opening: Normal Tongue: Normal  General Airway Assessment: Adult  Mallampati: II  Improves to II with phonation.  TM Distance: Normal, at least 6 cm       Chest/Lungs:  Chest/Lungs Findings: Normal Respiratory Rate     Heart/Vascular:  Heart Findings: Rate: Normal  Rhythm: Regular Rhythm  Sounds: Normal        Mental Status:  Mental Status Findings:  Alert and Oriented         Anesthesia Plan  Type of Anesthesia, risks & benefits discussed:  Anesthesia Type:  MAC  Patient's Preference:   Intra-op Monitoring Plan: standard ASA monitors  Intra-op Monitoring Plan Comments:   Post Op Pain Control Plan: per primary service following discharge from PACU  Post Op Pain Control Plan Comments:   Induction:   IV  Beta Blocker:  Patient is not currently on a Beta-Blocker (No further documentation required).        Informed Consent: Patient understands risks and agrees with Anesthesia plan.  Questions answered. Anesthesia consent signed with patient.  ASA Score: 4     Day of Surgery Review of History & Physical:    H&P update referred to the surgeon.         Ready For Surgery From Anesthesia Perspective.

## 2019-12-04 NOTE — H&P
Short Stay Endoscopy History and Physical    PCP - Kenrick Locke NP    Procedure - Colonoscopy  ASA - 3  Mallampati - per anesthesia  History of Anesthesia problems - no  Family history Anesthesia problems -  no     HPI:  This is a 59 y.o.female here for evaluation of : Colon Cancer Screen    Reflux - yes  Dysphagia - no  Abdominal pain - no  Diarrhea - no  Anemia - no  GI bleeding - no  Nausea and vomiting-no  Early satiety-no  Aversion to sight or smell of food-no  Constipation: yes    ROS:  Constitutional: No fevers, chills, No weight loss  ENT: No allergies  CV: No chest pain  Pulm: No cough, No shortness of breath  Ophtho: No vision changes  GI: see HPI  Derm: positive rash; pruritus  Heme: No lymphadenopathy, No bruising  MSK: No arthritis  : No dysuria, No hematuria  Endo: No hot or cold intolerance  Neuro: No syncope, No seizure  Psych: No anxiety, No depression    Medical History:  Past Medical History:   Diagnosis Date    Allergy     Amblyopia OS    Anxiety     Asthma     COPD (chronic obstructive pulmonary disease)     GERD (gastroesophageal reflux disease)     Hyperlipidemia     Hypertension     Thyroid disease        Surgical History:  Past Surgical History:   Procedure Laterality Date    APPENDECTOMY      BUNIONECTOMY      HYSTERECTOMY      PARTIAL//still with ovaries    neck fusion  08/2017    THYROIDECTOMY         Family History:  Family History   Problem Relation Age of Onset    Hypertension Mother     Diabetes Mother     Mental illness Son     Diabetes Father     Mental illness Other     Pancreatic cancer Other     Pancreatic cancer Maternal Uncle     Stroke Maternal Grandmother     Prostate cancer Maternal Grandfather     Ovarian cancer Maternal Cousin        Social History:  Social History     Socioeconomic History    Marital status:      Spouse name: Not on file    Number of children: 2    Years of education: Not on file    Highest education level: Not  on file   Occupational History     Employer: [employer]   Social Needs    Financial resource strain: Not on file    Food insecurity:     Worry: Not on file     Inability: Not on file    Transportation needs:     Medical: Not on file     Non-medical: Not on file   Tobacco Use    Smoking status: Current Every Day Smoker     Packs/day: 1.00     Types: Cigarettes    Smokeless tobacco: Never Used   Substance and Sexual Activity    Alcohol use: Yes     Comment: rarely     Drug use: No    Sexual activity: Never   Lifestyle    Physical activity:     Days per week: Not on file     Minutes per session: Not on file    Stress: Not at all   Relationships    Social connections:     Talks on phone: Not on file     Gets together: Not on file     Attends Alevism service: Not on file     Active member of club or organization: Not on file     Attends meetings of clubs or organizations: Not on file     Relationship status: Not on file   Other Topics Concern    Not on file   Social History Narrative    Not on file       Allergies: Review of patient's allergies indicates:  No Known Allergies    Medications:   No current facility-administered medications on file prior to encounter.      Current Outpatient Medications on File Prior to Encounter   Medication Sig Dispense Refill    chlorzoxazone (PARAFON FORTE) 500 mg Tab TK 1 AND 1/2 TS PO Q 8 H PRF SPASMS  0    gabapentin (NEURONTIN) 300 MG capsule Take 1 capsule (300 mg total) by mouth every evening. 30 capsule 6    levocetirizine (XYZAL) 5 MG tablet Take 1 tablet (5 mg total) by mouth every morning. 30 tablet 6    levothyroxine (SYNTHROID) 100 MCG tablet Take 1 tablet (100 mcg total) by mouth before breakfast. 90 tablet 1    LINZESS 145 mcg Cap capsule TAKE 1 CAPSULE BY MOUTH ONCE DAILY IN THE MORNING ON AN EMPTY STOMACH 30 MINUTES BEFORE MEAL 30 capsule 6    montelukast (SINGULAIR) 10 mg tablet Take 1 tablet (10 mg total) by mouth every evening. 90 tablet 0    NASACORT  55 mcg nasal inhaler INSTILL 2 SPRAYS IN EACH NOSTRIL ONCE DAILY 16.9 g 2    PROVENTIL HFA 90 mcg/actuation inhaler Inhale 2 puffs into the lungs every 6 (six) hours as needed for Wheezing or Shortness of Breath. Rescue 18 g 0       Objective Findings:    Vital Signs:There were no vitals filed for this visit.        Physical Exam:  General Appearance: Well appearing in no acute distress  Eyes:    No scleral icterus  ENT: Neck supple, Lips, mucosa, and tongue normal; teeth and gums normal  Lungs: CTA bilaterally in anterior and posterior fields, no wheezes, no crackles.  Heart:  Regular rate, S1, S2 normal, no murmurs heard.  Abdomen: Soft, non tender, non distended with normal bowel sounds. No hepatosplenomegaly, ascites, or mass.  Extremities: No clubbing, cyanosis or edema  Skin: No rash    Labs:  Reviewed    Plan: Colonoscopy  I have explained the risks and benefits of endoscopy procedures to the patient including but not limited to bleeding, perforation, infection, and death. The patient wishes to proceed.

## 2019-12-06 DIAGNOSIS — I10 ESSENTIAL HYPERTENSION: Primary | ICD-10-CM

## 2019-12-06 LAB
FINAL PATHOLOGIC DIAGNOSIS: NORMAL
GROSS: NORMAL

## 2019-12-10 ENCOUNTER — OFFICE VISIT (OUTPATIENT)
Dept: CARDIOLOGY | Facility: CLINIC | Age: 59
End: 2019-12-10
Payer: MEDICAID

## 2019-12-10 ENCOUNTER — CLINICAL SUPPORT (OUTPATIENT)
Dept: CARDIOLOGY | Facility: CLINIC | Age: 59
End: 2019-12-10
Payer: MEDICAID

## 2019-12-10 VITALS
DIASTOLIC BLOOD PRESSURE: 80 MMHG | HEART RATE: 86 BPM | OXYGEN SATURATION: 97 % | WEIGHT: 137 LBS | SYSTOLIC BLOOD PRESSURE: 136 MMHG | BODY MASS INDEX: 23.52 KG/M2

## 2019-12-10 DIAGNOSIS — I73.9 PVD (PERIPHERAL VASCULAR DISEASE): ICD-10-CM

## 2019-12-10 DIAGNOSIS — I10 ESSENTIAL HYPERTENSION: ICD-10-CM

## 2019-12-10 DIAGNOSIS — Z72.0 TOBACCO ABUSE DISORDER: ICD-10-CM

## 2019-12-10 DIAGNOSIS — R07.9 CHEST PAIN, UNSPECIFIED TYPE: ICD-10-CM

## 2019-12-10 DIAGNOSIS — E78.00 HYPERCHOLESTEROLEMIA: ICD-10-CM

## 2019-12-10 DIAGNOSIS — R07.89 OTHER CHEST PAIN: Primary | ICD-10-CM

## 2019-12-10 DIAGNOSIS — J44.89 COPD WITH ASTHMA: ICD-10-CM

## 2019-12-10 PROCEDURE — 93005 ELECTROCARDIOGRAM TRACING: CPT | Mod: PBBFAC | Performed by: INTERNAL MEDICINE

## 2019-12-10 PROCEDURE — 99213 OFFICE O/P EST LOW 20 MIN: CPT | Mod: PBBFAC,25 | Performed by: INTERNAL MEDICINE

## 2019-12-10 PROCEDURE — 99215 OFFICE O/P EST HI 40 MIN: CPT | Mod: S$PBB,,, | Performed by: INTERNAL MEDICINE

## 2019-12-10 PROCEDURE — 93010 EKG 12-LEAD: ICD-10-PCS | Mod: S$PBB,,, | Performed by: INTERNAL MEDICINE

## 2019-12-10 PROCEDURE — 99999 PR PBB SHADOW E&M-EST. PATIENT-LVL III: ICD-10-PCS | Mod: PBBFAC,,, | Performed by: INTERNAL MEDICINE

## 2019-12-10 PROCEDURE — 93010 ELECTROCARDIOGRAM REPORT: CPT | Mod: S$PBB,,, | Performed by: INTERNAL MEDICINE

## 2019-12-10 PROCEDURE — 99999 PR PBB SHADOW E&M-EST. PATIENT-LVL III: CPT | Mod: PBBFAC,,, | Performed by: INTERNAL MEDICINE

## 2019-12-10 PROCEDURE — 99215 PR OFFICE/OUTPT VISIT, EST, LEVL V, 40-54 MIN: ICD-10-PCS | Mod: S$PBB,,, | Performed by: INTERNAL MEDICINE

## 2019-12-10 NOTE — PROGRESS NOTES
Subjective:   Patient ID:  Ana Bonilla is a 59 y.o. female who presents for follow up of No chief complaint on file.      58 yo female, 1yr f/u.  PMH HTN, HLD< COPD, hiatal hernia, Smokes 1 ppd for 40 yrs. Occasional drinking.   Some chest hurt, on left, once a month, lasted for minutes, triggered by stress. No radiation.  Chronic BRAGG. No dizziness, palpitation and syncope.  Left calf pain at rest, worse with walking. Had leg cramp at night two weeks ago.  Father  of DM at 30'. Mother HTN. Brother  of MVA.  EKG on  NSR  ECHO in  normal EF and moderate AI.  Now some vaping        Past Medical History:   Diagnosis Date    Allergy     Amblyopia OS    Anxiety     Asthma     COPD (chronic obstructive pulmonary disease)     GERD (gastroesophageal reflux disease)     Hyperlipidemia     Hypertension     Thyroid disease        Past Surgical History:   Procedure Laterality Date    APPENDECTOMY      BUNIONECTOMY      COLONOSCOPY N/A 2019    Procedure: COLONOSCOPY;  Surgeon: Danny Matos III, MD;  Location: [facility];  Service: Endoscopy;  Laterality: N/A;    HYSTERECTOMY      PARTIAL//still with ovaries    neck fusion  2017    THYROIDECTOMY         Social History     Tobacco Use    Smoking status: Current Every Day Smoker     Packs/day: 1.00     Types: Cigarettes    Smokeless tobacco: Never Used   Substance Use Topics    Alcohol use: Yes     Comment: rarely     Drug use: No       Family History   Problem Relation Age of Onset    Hypertension Mother     Diabetes Mother     Mental illness Son     Diabetes Father     Mental illness Other     Pancreatic cancer Other     Pancreatic cancer Maternal Uncle     Stroke Maternal Grandmother     Prostate cancer Maternal Grandfather     Ovarian cancer Maternal Cousin          Review of Systems   Constitution: Negative for decreased appetite, diaphoresis, fever, malaise/fatigue and night sweats.   HENT: Negative for  nosebleeds.    Eyes: Negative for blurred vision and double vision.   Cardiovascular: Positive for chest pain and dyspnea on exertion. Negative for claudication, irregular heartbeat, leg swelling, near-syncope, orthopnea, palpitations, paroxysmal nocturnal dyspnea and syncope.   Respiratory: Negative for cough, shortness of breath, sleep disturbances due to breathing, snoring, sputum production and wheezing.    Endocrine: Negative for cold intolerance and polyuria.   Hematologic/Lymphatic: Does not bruise/bleed easily.   Skin: Negative for rash.   Musculoskeletal: Positive for back pain. Negative for falls, joint pain, joint swelling and neck pain.   Gastrointestinal: Negative for abdominal pain, heartburn, nausea and vomiting.   Genitourinary: Negative for dysuria, frequency and hematuria.   Neurological: Negative for difficulty with concentration, dizziness, focal weakness, headaches, light-headedness, numbness, seizures and weakness.   Psychiatric/Behavioral: Negative for depression, memory loss and substance abuse. The patient does not have insomnia.    Allergic/Immunologic: Negative for HIV exposure and hives.       Objective:   Physical Exam   Constitutional: She is oriented to person, place, and time. She appears well-nourished.   HENT:   Head: Normocephalic.   Eyes: Pupils are equal, round, and reactive to light.   Neck: Normal carotid pulses and no JVD present. Carotid bruit is not present. No thyromegaly present.   Cardiovascular: Normal rate, regular rhythm and normal heart sounds.  No extrasystoles are present. PMI is not displaced. Exam reveals no gallop and no S3.   No murmur heard.  Pulses:       Dorsalis pedis pulses are 2+ on the right side, and 1+ on the left side.        Posterior tibial pulses are 2+ on the right side, and 1+ on the left side.   Pulmonary/Chest: Breath sounds normal. No stridor. No respiratory distress.   Abdominal: Soft. Bowel sounds are normal. There is no tenderness. There is  no rebound.   Musculoskeletal: Normal range of motion.   Neurological: She is alert and oriented to person, place, and time.   Skin: Skin is intact. No rash noted.   Psychiatric: Her behavior is normal.       Lab Results   Component Value Date    CHOL 231 (H) 06/07/2018    CHOL 204 (H) 04/18/2017    CHOL 228 (H) 04/12/2016     Lab Results   Component Value Date    HDL 74 06/07/2018    HDL 87 (H) 04/18/2017    HDL 55 04/12/2016     Lab Results   Component Value Date    LDLCALC 141.2 06/07/2018    LDLCALC 106.8 04/18/2017    LDLCALC 152.2 04/12/2016     Lab Results   Component Value Date    TRIG 79 06/07/2018    TRIG 51 04/18/2017    TRIG 104 04/12/2016     Lab Results   Component Value Date    CHOLHDL 32.0 06/07/2018    CHOLHDL 42.6 04/18/2017    CHOLHDL 24.1 04/12/2016       Chemistry        Component Value Date/Time     11/08/2019 1005    K 4.2 11/08/2019 1005     11/08/2019 1005    CO2 24 11/08/2019 1005    BUN 12 11/08/2019 1005    CREATININE 0.9 11/08/2019 1005    GLU 90 11/08/2019 1005        Component Value Date/Time    CALCIUM 9.1 11/08/2019 1005    ALKPHOS 69 11/01/2019 1022    AST 21 11/01/2019 1022    ALT 14 11/01/2019 1022    BILITOT 0.5 11/01/2019 1022    ESTGFRAFRICA >60.0 11/08/2019 1005    EGFRNONAA >60.0 11/08/2019 1005          Lab Results   Component Value Date    HGBA1C 5.2 09/07/2018     Lab Results   Component Value Date    TSH 2.305 11/01/2019     Lab Results   Component Value Date    INR 1.0 11/08/2019    INR 1.0 08/15/2017     Lab Results   Component Value Date    WBC 6.05 11/08/2019    HGB 13.1 11/08/2019    HCT 41.7 11/08/2019     (H) 11/08/2019     11/08/2019     BMP  Sodium   Date Value Ref Range Status   11/08/2019 140 136 - 145 mmol/L Final     Potassium   Date Value Ref Range Status   11/08/2019 4.2 3.5 - 5.1 mmol/L Final     Chloride   Date Value Ref Range Status   11/08/2019 106 95 - 110 mmol/L Final     CO2   Date Value Ref Range Status   11/08/2019 24 23 -  29 mmol/L Final     BUN, Bld   Date Value Ref Range Status   11/08/2019 12 6 - 20 mg/dL Final     Creatinine   Date Value Ref Range Status   11/08/2019 0.9 0.5 - 1.4 mg/dL Final     Calcium   Date Value Ref Range Status   11/08/2019 9.1 8.7 - 10.5 mg/dL Final     Anion Gap   Date Value Ref Range Status   11/08/2019 10 8 - 16 mmol/L Final     eGFR if    Date Value Ref Range Status   11/08/2019 >60.0 >60 mL/min/1.73 m^2 Final     eGFR if non    Date Value Ref Range Status   11/08/2019 >60.0 >60 mL/min/1.73 m^2 Final     Comment:     Calculation used to obtain the estimated glomerular filtration  rate (eGFR) is the CKD-EPI equation.        BNP  @LABRCNTIP(BNP,BNPTRIAGEBLO)@  @LABRCNTIP(troponini)@  CrCl cannot be calculated (Patient's most recent lab result is older than the maximum 7 days allowed.).  No results found in the last 24 hours.  No results found in the last 24 hours.  No results found in the last 24 hours.    Assessment:      1. Other chest pain    2. Hypercholesterolemia    3. Essential hypertension    4. PVD (peripheral vascular disease)    5. COPD with asthma    6. Tobacco abuse disorder      BP wnl  EKG NSR and PACs  Plan:   Phar. MPI can not walk due to lower back pain   Repeat Lipid profile  Continue Lotrel  Smoking cessation.  DASH  Avoid caffeines  Recommend heart-healthy diet, weight control and regular exercise.  Yasmine. Risk modification.   RTC in 1 yr    I have reviewed all pertinent labs and cardiac studies. Plans and recommendations have been formulated under my direct supervision. All questions answered and patient voiced understanding. Patient to continue current medications.

## 2019-12-20 DIAGNOSIS — J30.9 CHRONIC ALLERGIC RHINITIS: ICD-10-CM

## 2019-12-20 RX ORDER — MONTELUKAST SODIUM 10 MG/1
TABLET ORAL
Qty: 90 TABLET | Refills: 1 | Status: SHIPPED | OUTPATIENT
Start: 2019-12-20 | End: 2020-07-27

## 2020-01-14 ENCOUNTER — HOSPITAL ENCOUNTER (OUTPATIENT)
Dept: RADIOLOGY | Facility: HOSPITAL | Age: 60
Discharge: HOME | End: 2020-01-14
Attending: INTERNAL MEDICINE
Payer: MEDICAID

## 2020-01-14 ENCOUNTER — CLINICAL SUPPORT (OUTPATIENT)
Dept: CARDIOLOGY | Facility: CLINIC | Age: 60
End: 2020-01-14
Attending: INTERNAL MEDICINE
Payer: MEDICAID

## 2020-01-14 DIAGNOSIS — R07.9 CHEST PAIN, UNSPECIFIED TYPE: ICD-10-CM

## 2020-01-14 DIAGNOSIS — J44.89 COPD WITH ASTHMA: ICD-10-CM

## 2020-01-14 DIAGNOSIS — R07.89 OTHER CHEST PAIN: ICD-10-CM

## 2020-01-14 LAB — DIASTOLIC DYSFUNCTION: NO

## 2020-01-14 PROCEDURE — 93018 NM MULTI PHARM STRESS CARDIAC COMPONENT: ICD-10-PCS | Mod: S$PBB,,, | Performed by: INTERNAL MEDICINE

## 2020-01-14 PROCEDURE — 78452 NM MULTI PHARM STRESS CARDIAC COMPONENT: ICD-10-PCS | Mod: 26,,, | Performed by: INTERNAL MEDICINE

## 2020-01-14 PROCEDURE — 93016 CV STRESS TEST SUPVJ ONLY: CPT | Mod: S$PBB,,, | Performed by: INTERNAL MEDICINE

## 2020-01-14 PROCEDURE — 93018 CV STRESS TEST I&R ONLY: CPT | Mod: S$PBB,,, | Performed by: INTERNAL MEDICINE

## 2020-01-14 PROCEDURE — 93017 CV STRESS TEST TRACING ONLY: CPT | Mod: PBBFAC | Performed by: INTERNAL MEDICINE

## 2020-01-14 PROCEDURE — 93016 NM MULTI PHARM STRESS CARDIAC COMPONENT: ICD-10-PCS | Mod: S$PBB,,, | Performed by: INTERNAL MEDICINE

## 2020-01-14 PROCEDURE — 78452 HT MUSCLE IMAGE SPECT MULT: CPT | Mod: 26,,, | Performed by: INTERNAL MEDICINE

## 2020-01-14 PROCEDURE — A9502 TC99M TETROFOSMIN: HCPCS

## 2020-01-15 ENCOUNTER — TELEPHONE (OUTPATIENT)
Dept: CARDIOLOGY | Facility: CLINIC | Age: 60
End: 2020-01-15

## 2020-01-15 DIAGNOSIS — E78.00 HYPERCHOLESTEROLEMIA: Primary | ICD-10-CM

## 2020-01-15 RX ORDER — ATORVASTATIN CALCIUM 80 MG/1
80 TABLET, FILM COATED ORAL DAILY
Qty: 30 TABLET | Refills: 11 | Status: SHIPPED | OUTPATIENT
Start: 2020-01-15 | End: 2020-01-27

## 2020-01-16 ENCOUNTER — TELEPHONE (OUTPATIENT)
Dept: CARDIOLOGY | Facility: CLINIC | Age: 60
End: 2020-01-16

## 2020-01-16 NOTE — TELEPHONE ENCOUNTER
Contacted pt and let her know that he added lipitor and to do blood work in 6 weeks.    Add Lipitor 80 mg daily  CMP and Lipid profile in 6 weeks

## 2020-01-16 NOTE — TELEPHONE ENCOUNTER
----- Message from Chanel Sousa MA sent at 1/15/2020  8:31 AM CST -----  Please call in prescription for lipitor. Pt states that she is not taking any cholesterol medication, but I let her know that her nuke stress test is normal.

## 2020-01-22 DIAGNOSIS — J30.9 ALLERGIC RHINITIS, UNSPECIFIED SEASONALITY, UNSPECIFIED TRIGGER: ICD-10-CM

## 2020-01-22 DIAGNOSIS — I10 ESSENTIAL HYPERTENSION: ICD-10-CM

## 2020-01-22 DIAGNOSIS — J44.89 COPD WITH ASTHMA: ICD-10-CM

## 2020-01-22 NOTE — TELEPHONE ENCOUNTER
----- Message from Chandrika Gunderson sent at 1/22/2020  8:26 AM CST -----  Contact: Pt  Pt is requesting call back in regards to scheduling same day appt for sinus infection.          Pls call back at [phone]

## 2020-01-22 NOTE — TELEPHONE ENCOUNTER
Spoke with patient in reference to medication refill and advised her she need to come in for an annual and went over her last annual visit. Patient is schedule to come in on Monday for an annual visit.

## 2020-01-24 ENCOUNTER — TELEPHONE (OUTPATIENT)
Dept: CARDIOLOGY | Facility: CLINIC | Age: 60
End: 2020-01-24

## 2020-01-24 RX ORDER — AMLODIPINE AND BENAZEPRIL HYDROCHLORIDE 5; 10 MG/1; MG/1
CAPSULE ORAL
Qty: 30 CAPSULE | Refills: 0 | Status: SHIPPED | OUTPATIENT
Start: 2020-01-24 | End: 2020-02-24

## 2020-01-24 RX ORDER — PROMETHAZINE HYDROCHLORIDE AND CODEINE PHOSPHATE 6.25; 1 MG/5ML; MG/5ML
SOLUTION ORAL
Qty: 150 ML | OUTPATIENT
Start: 2020-01-24

## 2020-01-24 NOTE — TELEPHONE ENCOUNTER
Pt states that she started taking lipitor on Monday and states that she is itching and nausea and would like to know is it normal for these side effects. If not should she stop taking it.    Spoke with pt to let her know to stop taking the lipitor and to follow up with her PCP if the symptoms do not change.

## 2020-01-24 NOTE — TELEPHONE ENCOUNTER
----- Message from Mimi Bettencourt sent at 1/24/2020  3:35 PM CST -----  Contact: Patient   Patient is having a side effect from taken Lipitor 80 mg, Please call her at [phone].    Thanks  Td

## 2020-01-27 ENCOUNTER — OFFICE VISIT (OUTPATIENT)
Dept: FAMILY MEDICINE | Facility: CLINIC | Age: 60
End: 2020-01-27
Payer: MEDICAID

## 2020-01-27 VITALS
WEIGHT: 138 LBS | SYSTOLIC BLOOD PRESSURE: 140 MMHG | HEIGHT: 64 IN | TEMPERATURE: 98 F | DIASTOLIC BLOOD PRESSURE: 80 MMHG | HEART RATE: 97 BPM | OXYGEN SATURATION: 99 % | BODY MASS INDEX: 23.56 KG/M2

## 2020-01-27 DIAGNOSIS — J30.9 ALLERGIC RHINITIS, UNSPECIFIED SEASONALITY, UNSPECIFIED TRIGGER: ICD-10-CM

## 2020-01-27 DIAGNOSIS — K44.9 HIATAL HERNIA WITH GERD: ICD-10-CM

## 2020-01-27 DIAGNOSIS — F41.9 ANXIETY: ICD-10-CM

## 2020-01-27 DIAGNOSIS — I10 ESSENTIAL HYPERTENSION: ICD-10-CM

## 2020-01-27 DIAGNOSIS — R09.89 CHRONIC SINUS COMPLAINTS: ICD-10-CM

## 2020-01-27 DIAGNOSIS — Z00.00 ANNUAL PHYSICAL EXAM: Primary | ICD-10-CM

## 2020-01-27 DIAGNOSIS — K21.9 HIATAL HERNIA WITH GERD: ICD-10-CM

## 2020-01-27 DIAGNOSIS — K59.00 CONSTIPATION, UNSPECIFIED CONSTIPATION TYPE: ICD-10-CM

## 2020-01-27 DIAGNOSIS — J44.89 COPD WITH ASTHMA: ICD-10-CM

## 2020-01-27 DIAGNOSIS — E78.5 DYSLIPIDEMIA: ICD-10-CM

## 2020-01-27 DIAGNOSIS — K92.9 DIGESTIVE PROBLEMS: ICD-10-CM

## 2020-01-27 DIAGNOSIS — I73.9 PVD (PERIPHERAL VASCULAR DISEASE): ICD-10-CM

## 2020-01-27 DIAGNOSIS — Z72.0 TOBACCO ABUSE DISORDER: ICD-10-CM

## 2020-01-27 DIAGNOSIS — E03.9 PRIMARY HYPOTHYROIDISM: ICD-10-CM

## 2020-01-27 PROBLEM — R07.89 OTHER CHEST PAIN: Status: RESOLVED | Noted: 2019-12-10 | Resolved: 2020-01-27

## 2020-01-27 PROCEDURE — 99999 PR PBB SHADOW E&M-EST. PATIENT-LVL IV: CPT | Mod: PBBFAC,,, | Performed by: REGISTERED NURSE

## 2020-01-27 PROCEDURE — 99396 PREV VISIT EST AGE 40-64: CPT | Mod: S$PBB,,, | Performed by: REGISTERED NURSE

## 2020-01-27 PROCEDURE — 99214 OFFICE O/P EST MOD 30 MIN: CPT | Mod: PBBFAC,PO | Performed by: REGISTERED NURSE

## 2020-01-27 PROCEDURE — 99396 PR PREVENTIVE VISIT,EST,40-64: ICD-10-PCS | Mod: S$PBB,,, | Performed by: REGISTERED NURSE

## 2020-01-27 PROCEDURE — 99999 PR PBB SHADOW E&M-EST. PATIENT-LVL IV: ICD-10-PCS | Mod: PBBFAC,,, | Performed by: REGISTERED NURSE

## 2020-01-27 RX ORDER — PROMETHAZINE HYDROCHLORIDE AND CODEINE PHOSPHATE 6.25; 1 MG/5ML; MG/5ML
SOLUTION ORAL
Qty: 150 ML | Refills: 0 | Status: CANCELLED | OUTPATIENT
Start: 2020-01-27

## 2020-01-27 RX ORDER — ROSUVASTATIN CALCIUM 10 MG/1
10 TABLET, COATED ORAL DAILY
Qty: 90 TABLET | Refills: 1 | Status: SHIPPED | OUTPATIENT
Start: 2020-01-27 | End: 2020-08-10

## 2020-01-27 RX ORDER — PANTOPRAZOLE SODIUM 40 MG/1
40 TABLET, DELAYED RELEASE ORAL DAILY
Qty: 90 TABLET | Refills: 1 | Status: SHIPPED | OUTPATIENT
Start: 2020-01-27 | End: 2020-08-10

## 2020-01-27 RX ORDER — BETAMETHASONE SODIUM PHOSPHATE AND BETAMETHASONE ACETATE 3; 3 MG/ML; MG/ML
9 INJECTION, SUSPENSION INTRA-ARTICULAR; INTRALESIONAL; INTRAMUSCULAR; SOFT TISSUE
Status: COMPLETED | OUTPATIENT
Start: 2020-01-27 | End: 2020-01-27

## 2020-01-27 RX ORDER — HYOSCYAMINE SULFATE 0.125 MG
125 TABLET ORAL
Qty: 90 TABLET | Refills: 0 | Status: SHIPPED | OUTPATIENT
Start: 2020-01-27 | End: 2021-07-14

## 2020-01-27 RX ORDER — ALPRAZOLAM 2 MG/1
TABLET ORAL
Qty: 60 TABLET | Refills: 2 | Status: CANCELLED | OUTPATIENT
Start: 2020-01-27

## 2020-01-27 RX ORDER — PROMETHAZINE HYDROCHLORIDE AND CODEINE PHOSPHATE 6.25; 1 MG/5ML; MG/5ML
SOLUTION ORAL
Qty: 210 ML | Refills: 0 | Status: SHIPPED | OUTPATIENT
Start: 2020-01-27 | End: 2020-03-06

## 2020-01-27 RX ORDER — ALPRAZOLAM 2 MG/1
TABLET ORAL
Qty: 60 TABLET | Refills: 2 | Status: SHIPPED | OUTPATIENT
Start: 2020-01-27 | End: 2020-03-30 | Stop reason: SDUPTHER

## 2020-01-27 RX ADMIN — BETAMETHASONE ACETATE AND BETAMETHASONE SODIUM PHOSPHATE 9 MG: 3; 3 INJECTION, SUSPENSION INTRA-ARTICULAR; INTRALESIONAL; INTRAMUSCULAR; SOFT TISSUE at 03:01

## 2020-01-27 NOTE — PROGRESS NOTES
Administered celestone IM shot to right ventrogluteal.  See MAR.  Pt tolerated well.  Advised to wait at least 15 minutes to monitor for adverse reactions.  Pt verbalizes understanding.    ndc  [id number]  Lot   [id number]  Exp  2-

## 2020-01-27 NOTE — PROGRESS NOTES
Subjective:       Patient ID: Ana Bonilla is a 59 y.o. female.    Chief Complaint   Patient presents with    Annual Exam         HPI    Ana Bonilla is here today for an annual wellness exam.  I have reviewed the patient's medical history in detail and updated the computerized patient record.    Does have hyperlipidemia, followed by Dr. Andrews cardiology.  He recently ordered Lipitor 80 mg daily due to her abnormal lipids; however, she took the medication for a few days and stopped.  She reports medication caused skin itching and nausea.  However, she developed nausea after eating fried chicken.  Denies any muscle aches or pains.  She has not taken her cholesterol medication in about 2-3 days but GI symptoms persist.  She did take a dose of Levsin that she had at home and did help her nausea, bloating, including hands.  Zofran helped minimally.  Does admit to poor eating habits with an increased intake of fried and greasy food, lots of red meat.  No exercise.  Her nuclear stress test was normal      Review of Systems   Constitutional: Negative for activity change, appetite change, chills, diaphoresis, fatigue, fever and unexpected weight change.   HENT: Negative for nasal congestion, mouth sores and tinnitus.    Eyes: Negative for discharge and visual disturbance.   Respiratory: Negative for cough, shortness of breath and wheezing.    Cardiovascular: Negative for chest pain, palpitations and leg swelling.   Gastrointestinal: Positive for abdominal pain, bloating, constipation, nausea and reflux. Negative for blood in stool, diarrhea and vomiting.   Endocrine: Negative.    Genitourinary: Negative.    Musculoskeletal: Positive for back pain. Negative for arthralgias, joint swelling and leg pain.   Integumentary:  Negative for rash. Negative.   Neurological: Negative for vertigo, seizures, syncope, numbness and headaches.   Hematological: Negative.    Psychiatric/Behavioral: Positive for sleep disturbance.  Negative for depression. The patient is nervous/anxious.    Breast: negative.           Review of patient's allergies indicates:  No Known Allergies      Patient Active Problem List   Diagnosis    COPD with asthma    GERD (gastroesophageal reflux disease)    Primary hypothyroidism    Tobacco abuse disorder    PVD (peripheral vascular disease)    Anxiety    Hypercholesterolemia    Claudication    Essential hypertension    Allergic rhinitis    Anisometropic amblyopia of left eye    Nonrheumatic aortic valve insufficiency    Colon polyps    Other chest pain       Medication List with Changes/Refills   Current Medications    ALPRAZOLAM (XANAX) 2 MG TAB    TAKE 1 TABLET(2 MG) BY MOUTH TWICE DAILY PRN    AMLODIPINE-BENAZEPRIL 5-10 MG (LOTREL) 5-10 MG PER CAPSULE    TAKE 1 CAPSULE BY MOUTH EVERY DAY    ATORVASTATIN (LIPITOR) 80 MG TABLET    Take 1 tablet (80 mg total) by mouth once daily.    CHLORZOXAZONE (PARAFON FORTE) 500 MG TAB    TK 1 AND 1/2 TS PO Q 8 H PRF SPASMS    CLOTRIMAZOLE-BETAMETHASONE 1-0.05% (LOTRISONE) CREAM    Apply topically 2 (two) times daily.    DICLOFENAC SODIUM (VOLTAREN) 1 % GEL    APPLY 2 GRAMS TOPICALLY FOUR TIMES DAILY AS NEEDED    ESTRADIOL (ESTRACE) 1 MG TABLET        FLUTICASONE PROPIONATE (FLONASE) 50 MCG/ACTUATION NASAL SPRAY    1 spray (50 mcg total) by Each Nostril route once daily.    GABAPENTIN (NEURONTIN) 300 MG CAPSULE    Take 1 capsule (300 mg total) by mouth every evening.    IPRATROPIUM (ATROVENT) 0.03 % NASAL SPRAY    USE 2 SPRAYS IN EACH NOSTRIL TWICE DAILY AS NEEDED FOR RHINITIS    LEVOCETIRIZINE (XYZAL) 5 MG TABLET    Take 1 tablet (5 mg total) by mouth every morning.    LEVOTHYROXINE (SYNTHROID) 100 MCG TABLET    Take 1 tablet (100 mcg total) by mouth before breakfast.    LIDOCAINE (LIDODERM) 5 %    1 patch once daily.    LINZESS 145 MCG CAP CAPSULE    TAKE 1 CAPSULE BY MOUTH ONCE DAILY IN THE MORNING ON AN EMPTY STOMACH 30 MINUTES BEFORE MEAL    MONTELUKAST  (SINGULAIR) 10 MG TABLET    TAKE 1 TABLET BY MOUTH EVERY EVENING    NASACORT 55 MCG NASAL INHALER    INSTILL 2 SPRAYS IN EACH NOSTRIL ONCE DAILY    OMEPRAZOLE (PRILOSEC) 40 MG CAPSULE    Take 1 capsule (40 mg total) by mouth every morning.    POLYVINYL ALCOHOL, ARTIFICIAL TEARS, (LIQUIFILM TEARS) 1.4 % OPHTHALMIC SOLUTION    Place 1 drop into both eyes as needed.    PREMARIN VAGINAL CREAM    USE VAGINAL Q 3 DAYS AT BEDTIME UTD    PROMETHAZINE-CODEINE 6.25-10 MG/5 ML (PHENERGAN WITH CODEINE) 6.25-10 MG/5 ML SYRUP    TAKE 5 ML BY MOUTH EVERY 4 HOURS AS NEEDED FOR COUGH    PROVENTIL HFA 90 MCG/ACTUATION INHALER    Inhale 2 puffs into the lungs every 6 (six) hours as needed for Wheezing or Shortness of Breath. Rescue         Past Medical History:   Diagnosis Date    Allergy     Amblyopia OS    Anxiety     Asthma     COPD (chronic obstructive pulmonary disease)     GERD (gastroesophageal reflux disease)     Hyperlipidemia     Hypertension     Thyroid disease        Past Surgical History:   Procedure Laterality Date    APPENDECTOMY      BUNIONECTOMY      COLONOSCOPY N/A 12/4/2019    Procedure: COLONOSCOPY;  Surgeon: Danny Matos III, MD;  Location: [facility];  Service: Endoscopy;  Laterality: N/A;    HYSTERECTOMY      PARTIAL//still with ovaries    neck fusion  08/2017    THYROIDECTOMY         Family History   Problem Relation Age of Onset    Hypertension Mother     Diabetes Mother     Mental illness Son     Diabetes Father     Mental illness Other     Pancreatic cancer Other     Pancreatic cancer Maternal Uncle     Stroke Maternal Grandmother     Prostate cancer Maternal Grandfather     Ovarian cancer Maternal Cousin        Social History     Tobacco Use    Smoking status: Current Every Day Smoker     Packs/day: 1.00     Types: Cigarettes    Smokeless tobacco: Never Used   Substance Use Topics    Alcohol use: Yes     Comment: rarely     Drug use: No       Objective:     Vitals:     "01/27/20 1404   BP: 140/80   Pulse: 97   Temp: 97.9 °F (36.6 °C)   SpO2: 99%   Weight: 62.6 kg (138 lb 0.1 oz)   Height: 5' 4" (1.626 m)   PainSc: 0-No pain       Estimated body mass index is 23.52 kg/m² as calculated from the following:    Height as of 12/4/19: 5' 4" (1.626 m).    Weight as of 12/10/19: 62.1 kg (137 lb).      Physical Exam   Constitutional: She is oriented to person, place, and time. She appears well-developed and well-nourished. No distress.   HENT:   Head: Normocephalic and atraumatic.   Right Ear: External ear normal.   Left Ear: External ear normal.   Nose: Nose normal. No mucosal edema, rhinorrhea, sinus tenderness or nasal deformity. No epistaxis.   Mouth/Throat: Uvula is midline, oropharynx is clear and moist and mucous membranes are normal. Mucous membranes are not dry and not cyanotic. No oral lesions. Normal dentition. No uvula swelling. No oropharyngeal exudate or tonsillar abscesses.   Eyes: Pupils are equal, round, and reactive to light. Conjunctivae, EOM and lids are normal. Lids are everted and swept, no foreign bodies found. Right eye exhibits no discharge and no exudate. Left eye exhibits no discharge and no exudate. Right conjunctiva is not injected. Left conjunctiva is not injected. No scleral icterus.   Neck: Normal range of motion. Neck supple. No JVD present. Carotid bruit is not present. No tracheal deviation, no edema, no erythema and normal range of motion present. No thyroid mass and no thyromegaly present.   Cardiovascular: Normal rate, regular rhythm and intact distal pulses. Exam reveals no gallop and no friction rub.   No murmur heard.  Pulmonary/Chest: Effort normal and breath sounds normal. No stridor. No respiratory distress. She has no wheezes. She exhibits no tenderness.   Abdominal: Soft. Bowel sounds are normal. She exhibits no distension and no mass. There is no hepatosplenomegaly. There is no tenderness. There is no rebound, no guarding and no CVA tenderness. "   Genitourinary:   Genitourinary Comments: Deferred; h/o hysterectomy   Musculoskeletal: Normal range of motion. She exhibits no edema, tenderness or deformity.   Lymphadenopathy:     She has no cervical adenopathy.   Neurological: She is alert and oriented to person, place, and time. She has normal strength and normal reflexes. She displays no atrophy and no tremor. No cranial nerve deficit or sensory deficit. She exhibits normal muscle tone. Coordination and gait normal.   Skin: Skin is warm, dry and intact. Capillary refill takes less than 2 seconds. No bruising and no rash noted. She is not diaphoretic. No erythema. No pallor.   Psychiatric: She has a normal mood and affect. Her speech is normal and behavior is normal. Judgment and thought content normal. Cognition and memory are normal. Cognition and memory are not impaired.             Lab Results   Component Value Date    CHOL 235 (H) 01/14/2020    CHOL 231 (H) 06/07/2018    CHOL 204 (H) 04/18/2017     Lab Results   Component Value Date    HDL 76 (H) 01/14/2020    HDL 74 06/07/2018    HDL 87 (H) 04/18/2017     Lab Results   Component Value Date    LDLCALC 142.2 01/14/2020    LDLCALC 141.2 06/07/2018    LDLCALC 106.8 04/18/2017     Lab Results   Component Value Date    TRIG 84 01/14/2020    TRIG 79 06/07/2018    TRIG 51 04/18/2017     Lab Results   Component Value Date    CHOLHDL 32.3 01/14/2020    CHOLHDL 32.0 06/07/2018    CHOLHDL 42.6 04/18/2017         Diagnosis       1. Annual physical exam    2. Essential hypertension    3. Dyslipidemia    4. Primary hypothyroidism    5. PVD (peripheral vascular disease)    6. Allergic rhinitis, unspecified seasonality, unspecified trigger    7. Chronic sinus complaints    8. COPD with asthma    9. Anxiety    10. Hiatal hernia with GERD    11. Digestive problems    12. Constipation, unspecified constipation type    13. Tobacco abuse disorder          Assessment/ Plan     Annual physical exam  -     CBC auto differential;  Future; Expected date: 01/27/2020  -     Comprehensive metabolic panel; Future; Expected date: 01/27/2020  -     TSH; Future; Expected date: 01/27/2020  -     Cancel: Lipid panel; Future; Expected date: 01/27/2020  -     Microalbumin/creatinine urine ratio  -     HIV 1/2 Ag/Ab (4th Gen); Future; Expected date: 01/27/2020    Essential hypertension  Stable and controlled, on medication as ordered.  Healthy diet, regular exercise.  Discussed healthy lifestyle changes and modifications.  Stop smoking.  -     CBC auto differential; Future; Expected date: 01/27/2020  -     Comprehensive metabolic panel; Future; Expected date: 01/27/2020  -     TSH; Future; Expected date: 01/27/2020  -     Cancel: Lipid panel; Future; Expected date: 01/27/2020  -     Microalbumin/creatinine urine ratio    Dyslipidemia  Lipitor 80 mg ordered per Cardiology but patient reports not tolerating due to skin itching and GI issues; however, her GI issues started after eating fried food and have continued.  Overall, her lipids do not look all that bad although her total cholesterol is elevated but HDL is over 70.  LDL normal.  Healthy diet and cholesterol control discussed.  I have started her on a low-dose Crestor to take daily at night.  She has been advised to contact me back if any side effects occur her or medication is not tolerated.  Lipids not ordered today as they have been checked recently.  -     CBC auto differential; Future; Expected date: 01/27/2020  -     Comprehensive metabolic panel; Future; Expected date: 01/27/2020  -     TSH; Future; Expected date: 01/27/2020  -     rosuvastatin (CRESTOR) 10 MG tablet; Take 1 tablet (10 mg total) by mouth once daily.  Dispense: 90 tablet; Refill: 1    Primary hypothyroidism  On Synthroid as ordered for thyroid disorder, check lab today.  -     CBC auto differential; Future; Expected date: 01/27/2020  -     Comprehensive metabolic panel; Future; Expected date: 01/27/2020  -     TSH; Future;  Expected date: 01/27/2020    PVD (peripheral vascular disease)  Followed by cardiology.  Stop smoking.    Allergic rhinitis, unspecified seasonality, unspecified trigger  Does suffer from chronic allergies and sinus issues with cough.  Requests refill for the codeine cough medication that works well for her per her report.   has been reviewed today, no red flags.  She has been evaluated previously by ENT and Allergy Department, refuses updated evaluation at this time.  She is to take an over-the-counter oral antihistamine with or without a nasal spray.  -     promethazine-codeine 6.25-10 mg/5 ml (PHENERGAN WITH CODEINE) 6.25-10 mg/5 mL syrup; TAKE 5 ML BY MOUTH EVERY 4 HOURS AS NEEDED FOR COUGH  Dispense: 210 mL; Refill: 0    Chronic sinus complaints  Steroid injection provided today per patient request for chronic sinus and allergy issues.  See Allergy comments above.  -     betamethasone acetate-betamethasone sodium phosphate injection 9 mg    COPD with asthma  Stable at this time but does suffer from intermittent cough and wheeze due to COPD and asthma.  Uses inhalers as directed.  Cough medication refilled.  -     promethazine-codeine 6.25-10 mg/5 ml (PHENERGAN WITH CODEINE) 6.25-10 mg/5 mL syrup; TAKE 5 ML BY MOUTH EVERY 4 HOURS AS NEEDED FOR COUGH  Dispense: 210 mL; Refill: 0    Anxiety  On Xanax as ordered takes twice daily as needed  reviewed, no red flags.  -     ALPRAZolam (XANAX) 2 MG Tab; TAKE 1 TABLET(2 MG) BY MOUTH TWICE DAILY PRN  Dispense: 60 tablet; Refill: 2    Hiatal hernia with GERD  Recent flare of acid indigestion and GERD likely due to intake of fried food.  She has not treated with any medication and continues her poor eating habits.  Her complaints of nausea are likely due to this issue and not the cholesterol medication.  Dietary modifications and GERD triggers have been discussed.  She has taken omeprazole without relief of symptoms.  -     pantoprazole (PROTONIX) 40 MG tablet; Take  1 tablet (40 mg total) by mouth once daily.  Dispense: 90 tablet; Refill: 1    Digestive problems  Levsin refill per patient request as this has helped recently with her complaints of bloating and stomach cramping.  Does have Zofran at home to take as needed for nausea.  -     hyoscyamine (ANASPAZ,LEVSIN) 0.125 mg Tab; Take 1 tablet (125 mcg total) by mouth every 4 to 6 hours as needed.  Dispense: 90 tablet; Refill: 0    Constipation, unspecified constipation type  Followed by GI, Linzess ordered.  Maintain hydration and increase water intake daily, adequate fiber in diet.    Tobacco abuse disorder  Smoking cessation has strongly been encouraged and advised.  She reports not ready to quit at this time, admits using his smoking as her crutch to cope with other issues such as anxiety.      Lab pending.  Healthy diet, regular exercise.  Weight reduction.  Med(s) refilled.  Follow-up in clinic as needed.      Patient Care Team:  Kenrick Locke NP as PCP - General (General Practice)  Saturnino Lemon LPN as Care Coordinator      JONA Ramesh  [facility]

## 2020-01-29 ENCOUNTER — LAB VISIT (OUTPATIENT)
Dept: LAB | Facility: HOSPITAL | Age: 60
End: 2020-01-29
Attending: REGISTERED NURSE
Payer: MEDICAID

## 2020-01-29 DIAGNOSIS — Z00.00 ANNUAL PHYSICAL EXAM: ICD-10-CM

## 2020-01-29 DIAGNOSIS — E03.9 PRIMARY HYPOTHYROIDISM: ICD-10-CM

## 2020-01-29 DIAGNOSIS — I10 ESSENTIAL HYPERTENSION: ICD-10-CM

## 2020-01-29 DIAGNOSIS — E78.5 DYSLIPIDEMIA: ICD-10-CM

## 2020-01-29 LAB
ALBUMIN SERPL BCP-MCNC: 3.9 G/DL (ref 3.5–5.2)
ALP SERPL-CCNC: 74 U/L (ref 55–135)
ALT SERPL W/O P-5'-P-CCNC: 21 U/L (ref 10–44)
ANION GAP SERPL CALC-SCNC: 11 MMOL/L (ref 8–16)
AST SERPL-CCNC: 19 U/L (ref 10–40)
BASOPHILS # BLD AUTO: 0.03 K/UL (ref 0–0.2)
BASOPHILS NFR BLD: 0.4 % (ref 0–1.9)
BILIRUB SERPL-MCNC: 0.6 MG/DL (ref 0.1–1)
BUN SERPL-MCNC: 16 MG/DL (ref 6–20)
CALCIUM SERPL-MCNC: 9.2 MG/DL (ref 8.7–10.5)
CHLORIDE SERPL-SCNC: 107 MMOL/L (ref 95–110)
CO2 SERPL-SCNC: 25 MMOL/L (ref 23–29)
CREAT SERPL-MCNC: 0.9 MG/DL (ref 0.5–1.4)
DIFFERENTIAL METHOD: ABNORMAL
EOSINOPHIL # BLD AUTO: 0 K/UL (ref 0–0.5)
EOSINOPHIL NFR BLD: 0.4 % (ref 0–8)
ERYTHROCYTE [DISTWIDTH] IN BLOOD BY AUTOMATED COUNT: 12.4 % (ref 11.5–14.5)
EST. GFR  (AFRICAN AMERICAN): >60 ML/MIN/1.73 M^2
EST. GFR  (NON AFRICAN AMERICAN): >60 ML/MIN/1.73 M^2
GLUCOSE SERPL-MCNC: 76 MG/DL (ref 70–110)
HCT VFR BLD AUTO: 43.4 % (ref 37–48.5)
HGB BLD-MCNC: 13.5 G/DL (ref 12–16)
IMM GRANULOCYTES # BLD AUTO: 0.02 K/UL (ref 0–0.04)
IMM GRANULOCYTES NFR BLD AUTO: 0.2 % (ref 0–0.5)
LYMPHOCYTES # BLD AUTO: 2.7 K/UL (ref 1–4.8)
LYMPHOCYTES NFR BLD: 33 % (ref 18–48)
MCH RBC QN AUTO: 31.6 PG (ref 27–31)
MCHC RBC AUTO-ENTMCNC: 31.1 G/DL (ref 32–36)
MCV RBC AUTO: 102 FL (ref 82–98)
MONOCYTES # BLD AUTO: 0.8 K/UL (ref 0.3–1)
MONOCYTES NFR BLD: 10.3 % (ref 4–15)
NEUTROPHILS # BLD AUTO: 4.5 K/UL (ref 1.8–7.7)
NEUTROPHILS NFR BLD: 55.7 % (ref 38–73)
NRBC BLD-RTO: 0 /100 WBC
PLATELET # BLD AUTO: 300 K/UL (ref 150–350)
PMV BLD AUTO: 10.6 FL (ref 9.2–12.9)
POTASSIUM SERPL-SCNC: 3.5 MMOL/L (ref 3.5–5.1)
PROT SERPL-MCNC: 8.4 G/DL (ref 6–8.4)
RBC # BLD AUTO: 4.27 M/UL (ref 4–5.4)
SODIUM SERPL-SCNC: 143 MMOL/L (ref 136–145)
TSH SERPL DL<=0.005 MIU/L-ACNC: 0.98 UIU/ML (ref 0.4–4)
WBC # BLD AUTO: 8.05 K/UL (ref 3.9–12.7)

## 2020-01-29 PROCEDURE — 36415 COLL VENOUS BLD VENIPUNCTURE: CPT | Mod: PO

## 2020-01-29 PROCEDURE — 80053 COMPREHEN METABOLIC PANEL: CPT

## 2020-01-29 PROCEDURE — 85025 COMPLETE CBC W/AUTO DIFF WBC: CPT

## 2020-01-29 PROCEDURE — 84443 ASSAY THYROID STIM HORMONE: CPT

## 2020-01-29 PROCEDURE — 86703 HIV-1/HIV-2 1 RESULT ANTBDY: CPT

## 2020-01-30 LAB — HIV 1+2 AB+HIV1 P24 AG SERPL QL IA: NEGATIVE

## 2020-02-08 DIAGNOSIS — K92.9 DIGESTIVE PROBLEMS: ICD-10-CM

## 2020-02-10 RX ORDER — HYOSCYAMINE SULFATE 0.125 MG
TABLET ORAL
Qty: 90 TABLET | Refills: 0 | OUTPATIENT
Start: 2020-02-10

## 2020-02-23 DIAGNOSIS — J31.0 CHRONIC RHINITIS: ICD-10-CM

## 2020-02-23 DIAGNOSIS — I10 ESSENTIAL HYPERTENSION: ICD-10-CM

## 2020-02-24 RX ORDER — AMLODIPINE AND BENAZEPRIL HYDROCHLORIDE 5; 10 MG/1; MG/1
CAPSULE ORAL
Qty: 90 CAPSULE | Refills: 1 | Status: SHIPPED | OUTPATIENT
Start: 2020-02-24 | End: 2020-08-10

## 2020-02-24 RX ORDER — FLUTICASONE PROPIONATE 50 MCG
SPRAY, SUSPENSION (ML) NASAL
Qty: 16 G | Refills: 4 | Status: SHIPPED | OUTPATIENT
Start: 2020-02-24 | End: 2020-11-30

## 2020-02-27 ENCOUNTER — LAB VISIT (OUTPATIENT)
Dept: LAB | Facility: HOSPITAL | Age: 60
End: 2020-02-27
Attending: INTERNAL MEDICINE
Payer: MEDICAID

## 2020-02-27 DIAGNOSIS — E78.00 HYPERCHOLESTEROLEMIA: ICD-10-CM

## 2020-02-27 PROCEDURE — 80061 LIPID PANEL: CPT

## 2020-02-27 PROCEDURE — 36415 COLL VENOUS BLD VENIPUNCTURE: CPT

## 2020-02-27 PROCEDURE — 80053 COMPREHEN METABOLIC PANEL: CPT

## 2020-02-28 DIAGNOSIS — E03.9 PRIMARY HYPOTHYROIDISM: ICD-10-CM

## 2020-02-28 LAB
ALBUMIN SERPL BCP-MCNC: 3.8 G/DL (ref 3.5–5.2)
ALP SERPL-CCNC: 68 U/L (ref 55–135)
ALT SERPL W/O P-5'-P-CCNC: 19 U/L (ref 10–44)
ANION GAP SERPL CALC-SCNC: 9 MMOL/L (ref 8–16)
AST SERPL-CCNC: 22 U/L (ref 10–40)
BILIRUB SERPL-MCNC: 0.5 MG/DL (ref 0.1–1)
BUN SERPL-MCNC: 11 MG/DL (ref 6–20)
CALCIUM SERPL-MCNC: 8.9 MG/DL (ref 8.7–10.5)
CHLORIDE SERPL-SCNC: 106 MMOL/L (ref 95–110)
CHOLEST SERPL-MCNC: 159 MG/DL (ref 120–199)
CHOLEST/HDLC SERPL: 2.2 {RATIO} (ref 2–5)
CO2 SERPL-SCNC: 27 MMOL/L (ref 23–29)
CREAT SERPL-MCNC: 1 MG/DL (ref 0.5–1.4)
EST. GFR  (AFRICAN AMERICAN): >60 ML/MIN/1.73 M^2
EST. GFR  (NON AFRICAN AMERICAN): >60 ML/MIN/1.73 M^2
GLUCOSE SERPL-MCNC: 91 MG/DL (ref 70–110)
HDLC SERPL-MCNC: 73 MG/DL (ref 40–75)
HDLC SERPL: 45.9 % (ref 20–50)
LDLC SERPL CALC-MCNC: 75.6 MG/DL (ref 63–159)
NONHDLC SERPL-MCNC: 86 MG/DL
POTASSIUM SERPL-SCNC: 3.7 MMOL/L (ref 3.5–5.1)
PROT SERPL-MCNC: 7.8 G/DL (ref 6–8.4)
SODIUM SERPL-SCNC: 142 MMOL/L (ref 136–145)
TRIGL SERPL-MCNC: 52 MG/DL (ref 30–150)

## 2020-02-28 RX ORDER — LEVOTHYROXINE SODIUM 100 UG/1
TABLET ORAL
Qty: 90 TABLET | Refills: 3 | Status: SHIPPED | OUTPATIENT
Start: 2020-02-28 | End: 2021-02-15

## 2020-03-02 ENCOUNTER — TELEPHONE (OUTPATIENT)
Dept: FAMILY MEDICINE | Facility: CLINIC | Age: 60
End: 2020-03-02

## 2020-03-02 ENCOUNTER — TELEPHONE (OUTPATIENT)
Dept: CARDIOLOGY | Facility: CLINIC | Age: 60
End: 2020-03-02

## 2020-03-02 NOTE — TELEPHONE ENCOUNTER
Contacted pt about results, pt verbalized understanding.            ----- Message from Joe Lovell MD sent at 3/1/2020  5:27 PM CST -----  LDL controlled

## 2020-03-02 NOTE — TELEPHONE ENCOUNTER
----- Message from Mary Carlos sent at 3/2/2020  8:26 AM CST -----  Type:  Same Day Appointment Request    Caller is requesting a same day appointment.  Caller declined first available appointment listed below.    Name of Caller: Cristhian Perez  When is the first available appointment?  Next week  Symptoms:  Sinus/congestion/cough  Best Call Back Number:   [phone]  Additional Information:  Pt has Medicaid//and would like to know if possible to be seen today//please call/gustavo//josselin

## 2020-03-06 ENCOUNTER — TELEPHONE (OUTPATIENT)
Dept: FAMILY MEDICINE | Facility: CLINIC | Age: 60
End: 2020-03-06

## 2020-03-06 DIAGNOSIS — J30.9 ALLERGIC RHINITIS, UNSPECIFIED SEASONALITY, UNSPECIFIED TRIGGER: ICD-10-CM

## 2020-03-06 DIAGNOSIS — J44.89 COPD WITH ASTHMA: ICD-10-CM

## 2020-03-06 RX ORDER — PROMETHAZINE HYDROCHLORIDE AND CODEINE PHOSPHATE 6.25; 1 MG/5ML; MG/5ML
SOLUTION ORAL
Qty: 150 ML | Refills: 0 | Status: SHIPPED | OUTPATIENT
Start: 2020-03-06 | End: 2020-11-30

## 2020-03-06 NOTE — TELEPHONE ENCOUNTER
----- Message from Mimi Bettencourt sent at 3/6/2020 11:00 AM CST -----  Contact: Patient   Ana would like a call back at [phone], Regards to getting an appointment.    Thanks  Td

## 2020-03-06 NOTE — TELEPHONE ENCOUNTER
Patient would like a script sent to her pharmacy for yeast infection. She went to urgent care and they prescribe her an antibiotic for her sinus infection and she is now experience itching. Patient advised she always get those symptoms from taking antibiotic.

## 2020-03-09 RX ORDER — FLUCONAZOLE 150 MG/1
150 TABLET ORAL DAILY
Qty: 1 TABLET | Refills: 0 | Status: SHIPPED | OUTPATIENT
Start: 2020-03-09 | End: 2020-03-10

## 2020-03-19 DIAGNOSIS — J30.9 ALLERGIC RHINITIS, UNSPECIFIED SEASONALITY, UNSPECIFIED TRIGGER: ICD-10-CM

## 2020-03-19 DIAGNOSIS — J20.9 ACUTE BRONCHITIS, UNSPECIFIED ORGANISM: ICD-10-CM

## 2020-03-19 RX ORDER — PROMETHAZINE HYDROCHLORIDE AND DEXTROMETHORPHAN HYDROBROMIDE 6.25; 15 MG/5ML; MG/5ML
SYRUP ORAL
Qty: 118 ML | Refills: 0 | Status: SHIPPED | OUTPATIENT
Start: 2020-03-19 | End: 2020-11-30

## 2020-03-22 DIAGNOSIS — J44.89 COPD WITH ASTHMA: ICD-10-CM

## 2020-03-22 DIAGNOSIS — J30.9 ALLERGIC RHINITIS, UNSPECIFIED SEASONALITY, UNSPECIFIED TRIGGER: ICD-10-CM

## 2020-03-22 RX ORDER — PROMETHAZINE HYDROCHLORIDE AND CODEINE PHOSPHATE 6.25; 1 MG/5ML; MG/5ML
SOLUTION ORAL
Qty: 150 ML | OUTPATIENT
Start: 2020-03-22

## 2020-03-23 RX ORDER — PROMETHAZINE HYDROCHLORIDE AND CODEINE PHOSPHATE 6.25; 1 MG/5ML; MG/5ML
SOLUTION ORAL
Qty: 150 ML | Refills: 0 | Status: CANCELLED | OUTPATIENT
Start: 2020-03-23

## 2020-03-23 NOTE — TELEPHONE ENCOUNTER
She just had the medication (with codeine) filled on 3/6/3030 and again on 3/13/2020.  Any fever, SOB, ill contacts, etc ????  This is not something anyone should be taking long-term due to codeine.  If having a chronic cough with the need for codeine monthly, then she should see a Pulmonary doc for evaluation.

## 2020-03-23 NOTE — TELEPHONE ENCOUNTER
----- Message from Bran Cid sent at 3/23/2020  7:47 AM CDT -----  Contact: pt   Pt having some issue with getting cough syrup form pharmacy, script sent multiple times. Pt would like cb to explain.           [phone]           [id number] - [place] - [address] [place] [address] [facility] [address] [facility] [place] [address]  Phone: [phone] Fax: [phone]

## 2020-03-23 NOTE — TELEPHONE ENCOUNTER
Patient said the promethazine -DM does not work for her. she didn't  that medicine . Because she really want the promethazine with codeine. But she said if you really want her to take try  the promethazine syrp without codeine she'll try it.

## 2020-03-30 DIAGNOSIS — F41.9 ANXIETY: ICD-10-CM

## 2020-03-30 RX ORDER — ALPRAZOLAM 2 MG/1
TABLET ORAL
Qty: 60 TABLET | Refills: 2 | Status: SHIPPED | OUTPATIENT
Start: 2020-03-30 | End: 2020-06-24

## 2020-03-30 NOTE — TELEPHONE ENCOUNTER
----- Message from Roseann Sousa sent at 3/30/2020  9:47 AM CDT -----  ..Type:  Patient Returning Call    Who Called: pt   Who Left Message for Patient  Does the patient know what this is regarding?: urgent shut down of pharmacy   Would the patient rather a call back or a response via [facility]?  Call back   Best Call Back Number:[phone]  Additional Information: Pt is requesting a call from nurse to have her prescription transferred to another pharmacy. Pt states [facility] shut  Down due to urgent situation. Pt is out of medication.      [facility] [employer]

## 2020-03-30 NOTE — TELEPHONE ENCOUNTER
Patient would like her xanax medication sent to pharmacy I just put into system.  Although it was filled she could not  the medication because the pharmacy on [address] closed because they don't have a drive thru and their not letting anyone in at this time. She was told because it's consider an opioid drug  That she have to call her dr and have them put in a new script at another store because it can't be transfer from one pharmacy to another.

## 2020-04-28 DIAGNOSIS — K59.09 CONSTIPATION, CHRONIC: ICD-10-CM

## 2020-04-28 RX ORDER — LINACLOTIDE 145 UG/1
CAPSULE, GELATIN COATED ORAL
Qty: 30 CAPSULE | Refills: 6 | Status: SHIPPED | OUTPATIENT
Start: 2020-04-28 | End: 2021-10-01 | Stop reason: DRUGHIGH

## 2020-07-27 ENCOUNTER — OFFICE VISIT (OUTPATIENT)
Dept: FAMILY MEDICINE | Facility: CLINIC | Age: 60
End: 2020-07-27
Payer: MEDICAID

## 2020-07-27 VITALS
BODY MASS INDEX: 23.07 KG/M2 | HEIGHT: 64 IN | HEART RATE: 82 BPM | OXYGEN SATURATION: 99 % | SYSTOLIC BLOOD PRESSURE: 120 MMHG | DIASTOLIC BLOOD PRESSURE: 73 MMHG | TEMPERATURE: 98 F | WEIGHT: 135.13 LBS

## 2020-07-27 DIAGNOSIS — J30.9 CHRONIC ALLERGIC RHINITIS: Primary | ICD-10-CM

## 2020-07-27 PROCEDURE — 99213 PR OFFICE/OUTPT VISIT, EST, LEVL III, 20-29 MIN: ICD-10-PCS | Mod: 25,S$PBB,, | Performed by: REGISTERED NURSE

## 2020-07-27 PROCEDURE — 99215 OFFICE O/P EST HI 40 MIN: CPT | Mod: PBBFAC,PO | Performed by: REGISTERED NURSE

## 2020-07-27 PROCEDURE — 99999 PR PBB SHADOW E&M-EST. PATIENT-LVL V: ICD-10-PCS | Mod: PBBFAC,,, | Performed by: REGISTERED NURSE

## 2020-07-27 PROCEDURE — 99999 PR PBB SHADOW E&M-EST. PATIENT-LVL V: CPT | Mod: PBBFAC,,, | Performed by: REGISTERED NURSE

## 2020-07-27 PROCEDURE — 99213 OFFICE O/P EST LOW 20 MIN: CPT | Mod: 25,S$PBB,, | Performed by: REGISTERED NURSE

## 2020-07-27 RX ORDER — MONTELUKAST SODIUM 10 MG/1
10 TABLET ORAL NIGHTLY
Qty: 90 TABLET | Refills: 1 | Status: SHIPPED | OUTPATIENT
Start: 2020-07-27 | End: 2021-10-13

## 2020-07-27 RX ORDER — BETAMETHASONE SODIUM PHOSPHATE AND BETAMETHASONE ACETATE 3; 3 MG/ML; MG/ML
9 INJECTION, SUSPENSION INTRA-ARTICULAR; INTRALESIONAL; INTRAMUSCULAR; SOFT TISSUE
Status: COMPLETED | OUTPATIENT
Start: 2020-07-27 | End: 2020-07-27

## 2020-07-27 RX ORDER — AZELASTINE HYDROCHLORIDE 0.5 MG/ML
1 SOLUTION/ DROPS OPHTHALMIC 2 TIMES DAILY
Qty: 6 ML | Refills: 2 | Status: SHIPPED | OUTPATIENT
Start: 2020-07-27 | End: 2022-06-10

## 2020-07-27 RX ORDER — FLUNISOLIDE 0.25 MG/ML
2 SOLUTION NASAL 2 TIMES DAILY
Qty: 25 ML | Refills: 1 | Status: SHIPPED | OUTPATIENT
Start: 2020-07-27 | End: 2020-12-09 | Stop reason: SDUPTHER

## 2020-07-27 RX ADMIN — BETAMETHASONE ACETATE AND BETAMETHASONE SODIUM PHOSPHATE 9 MG: 3; 3 INJECTION, SUSPENSION INTRA-ARTICULAR; INTRALESIONAL; INTRAMUSCULAR; SOFT TISSUE at 02:07

## 2020-07-27 NOTE — PROGRESS NOTES
Subjective:       Patient ID: Ana Bonilla is a 59 y.o. female.    Chief Complaint   Patient presents with    Sinus Problem       HPI    Patient has been ill for the past 2 to 3 days.  Has not been taking Singulair x 3 weeks, although she reports getting tremendous relief from allergy issues.  Stopped Flonase as it gave her HA pain.  Reports mild cough, throat and eye irritation.      Review of Systems   Constitutional: Positive for chills. Negative for diaphoresis.   HENT: Positive for congestion, postnasal drip and sore throat. Negative for ear pain, facial swelling, nosebleeds and rhinorrhea.    Eyes: Positive for pain and itching. Negative for discharge.   Respiratory: Positive for cough. Negative for shortness of breath, wheezing and stridor.    Cardiovascular: Negative.    Musculoskeletal: Negative for myalgias.   Skin: Negative.    Neurological: Positive for headaches. Negative for weakness, light-headedness and numbness.   Hematological: Negative for adenopathy.         Review of patient's allergies indicates:  No Known Allergies      Patient Active Problem List   Diagnosis    COPD with asthma    GERD (gastroesophageal reflux disease)    Primary hypothyroidism    Tobacco abuse disorder    PVD (peripheral vascular disease)    Anxiety    Dyslipidemia    Claudication    Essential hypertension    Allergic rhinitis    Anisometropic amblyopia of left eye    Nonrheumatic aortic valve insufficiency    Colon polyps         Current Outpatient Medications:     ALPRAZolam (XANAX) 2 MG Tab, TAKE 1 TABLET(2 MG) BY MOUTH TWICE DAILY AS NEEDED, Disp: 60 tablet, Rfl: 1    amlodipine-benazepril 5-10 mg (LOTREL) 5-10 mg per capsule, TAKE 1 CAPSULE BY MOUTH EVERY DAY, Disp: 90 capsule, Rfl: 1    chlorzoxazone (PARAFON FORTE) 500 mg Tab, TK 1 AND 1/2 TS PO Q 8 H PRF SPASMS, Disp: , Rfl: 0    clotrimazole-betamethasone 1-0.05% (LOTRISONE) cream, Apply topically 2 (two) times daily., Disp: 15 g, Rfl: 1     diclofenac sodium (VOLTAREN) 1 % Gel, APPLY 2 GRAMS TOPICALLY FOUR TIMES DAILY AS NEEDED, Disp: , Rfl: 2    estradiol (ESTRACE) 1 MG tablet, , Disp: , Rfl: 11    fluticasone propionate (FLONASE) 50 mcg/actuation nasal spray, USE 1 SPRAY IN EACH NOSTRIL ONCE A DAY, Disp: 16 g, Rfl: 4    gabapentin (NEURONTIN) 300 MG capsule, Take 1 capsule (300 mg total) by mouth every evening., Disp: 30 capsule, Rfl: 6    hyoscyamine (ANASPAZ,LEVSIN) 0.125 mg Tab, Take 1 tablet (125 mcg total) by mouth every 4 to 6 hours as needed., Disp: 90 tablet, Rfl: 0    ipratropium (ATROVENT) 0.03 % nasal spray, USE 2 SPRAYS IN EACH NOSTRIL TWICE DAILY AS NEEDED FOR RHINITIS, Disp: 30 mL, Rfl: 0    levothyroxine (SYNTHROID) 100 MCG tablet, TAKE 1 TABLET(100 MCG) BY MOUTH BEFORE BREAKFAST, Disp: 90 tablet, Rfl: 3    lidocaine (LIDODERM) 5 %, 1 patch once daily., Disp: , Rfl: 1    LINZESS 145 mcg Cap capsule, TAKE 1 CAPSULE BY MOUTH ONCE DAILY IN THE MORNING ON AN EMPTY STOMACH 30 MINUTES BEFORE MEAL, Disp: 30 capsule, Rfl: 6    montelukast (SINGULAIR) 10 mg tablet, TAKE 1 TABLET BY MOUTH EVERY EVENING, Disp: 90 tablet, Rfl: 1    NASACORT 55 mcg nasal inhaler, INSTILL 2 SPRAYS IN EACH NOSTRIL ONCE DAILY, Disp: 16.9 g, Rfl: 2    pantoprazole (PROTONIX) 40 MG tablet, Take 1 tablet (40 mg total) by mouth once daily., Disp: 90 tablet, Rfl: 1    polyvinyl alcohol, artificial tears, (LIQUIFILM TEARS) 1.4 % ophthalmic solution, Place 1 drop into both eyes as needed., Disp: 30 mL, Rfl: 5    PREMARIN vaginal cream, USE VAGINAL Q 3 DAYS AT BEDTIME UTD, Disp: , Rfl: 3    promethazine-codeine 6.25-10 mg/5 ml (PHENERGAN WITH CODEINE) 6.25-10 mg/5 mL syrup, TAKE 5 ML BY MOUTH EVERY 4 HOURS AS NEEDED FOR COUGH, Disp: 150 mL, Rfl: 0    promethazine-dextromethorphan (PROMETHAZINE-DM) 6.25-15 mg/5 mL Syrp, TAKE 5 ML BY MOUTH EVERY 4 TO 6 HOURS AS NEEDED, Disp: 118 mL, Rfl: 0    PROVENTIL HFA 90 mcg/actuation inhaler, Inhale 2 puffs into the lungs  "every 6 (six) hours as needed for Wheezing or Shortness of Breath. Rescue, Disp: 18 g, Rfl: 0    rosuvastatin (CRESTOR) 10 MG tablet, Take 1 tablet (10 mg total) by mouth once daily., Disp: 90 tablet, Rfl: 1  No current facility-administered medications for this visit.     Facility-Administered Medications Ordered in Other Visits:     lactated ringers infusion, , Intravenous, Continuous, Danny Matos III, MD    sodium chloride 0.9% flush 10 mL, 10 mL, Intravenous, PRN, Danny Matos III, MD        Past medical, surgical, family and social histories have been reviewed today.      Objective:     Vitals:    07/27/20 1342   BP: 120/73   Pulse: 82   Temp: 97.7 °F (36.5 °C)   TempSrc: Oral   SpO2: 99%   Weight: 61.3 kg (135 lb 2.3 oz)   Height: 5' 4" (1.626 m)   PainSc:   2         Physical Exam  Vitals signs reviewed.   Constitutional:       Appearance: She is well-developed.   HENT:      Head: Normocephalic and atraumatic.      Right Ear: Tympanic membrane normal.      Left Ear: Tympanic membrane normal.      Nose: Mucosal edema (boggy//clear rn) and rhinorrhea present.      Mouth/Throat:      Pharynx: Oropharynx is clear. No pharyngeal swelling, oropharyngeal exudate or posterior oropharyngeal erythema.   Eyes:      General: Lids are normal. Allergic shiner present.         Right eye: No discharge.         Left eye: No discharge.      Conjunctiva/sclera: Conjunctivae normal.      Pupils: Pupils are equal, round, and reactive to light.   Cardiovascular:      Rate and Rhythm: Normal rate and regular rhythm.   Pulmonary:      Effort: Pulmonary effort is normal.      Breath sounds: Normal breath sounds.   Musculoskeletal: Normal range of motion.   Lymphadenopathy:      Cervical: No cervical adenopathy.   Skin:     General: Skin is warm and dry.      Capillary Refill: Capillary refill takes less than 2 seconds.      Findings: No rash.   Neurological:      Mental Status: She is alert and oriented to person, place, " and time.   Psychiatric:         Behavior: Behavior normal.         Thought Content: Thought content normal.         Judgment: Judgment normal.           Diagnosis       1. Chronic allergic rhinitis          Assessment/ Plan     Chronic allergic rhinitis  -     betamethasone acetate-betamethasone sodium phosphate injection 9 mg  -     montelukast (SINGULAIR) 10 mg tablet; Take 1 tablet (10 mg total) by mouth every evening.  Dispense: 90 tablet; Refill: 1  -     azelastine (OPTIVAR) 0.05 % ophthalmic solution; Place 1 drop into both eyes 2 (two) times daily.  Dispense: 6 mL; Refill: 2  -     flunisolide 25 mcg, 0.025%, (NASALIDE) 25 mcg (0.025 %) Spry; 2 sprays by Nasal route 2 (two) times daily.  Dispense: 25 mL; Refill: 1        Follow-up:  Return or contact office back in 2 to 3 days if worse or no better.  Otherwise, return prn.        JONA Ramesh  [facility]

## 2020-08-13 ENCOUNTER — CLINICAL SUPPORT (OUTPATIENT)
Dept: SMOKING CESSATION | Facility: CLINIC | Age: 60
End: 2020-08-13
Payer: COMMERCIAL

## 2020-08-13 DIAGNOSIS — F17.200 NICOTINE DEPENDENCE: Primary | ICD-10-CM

## 2020-08-13 PROCEDURE — 99999 PR PBB SHADOW E&M-EST. PATIENT-LVL I: CPT | Mod: PBBFAC,,,

## 2020-08-13 PROCEDURE — 99404 PR PREVENT COUNSEL,INDIV,60 MIN: ICD-10-PCS | Mod: S$GLB,,, | Performed by: GENERAL PRACTICE

## 2020-08-13 PROCEDURE — 99999 PR PBB SHADOW E&M-EST. PATIENT-LVL I: ICD-10-PCS | Mod: PBBFAC,,,

## 2020-08-13 PROCEDURE — 99404 PREV MED CNSL INDIV APPRX 60: CPT | Mod: S$GLB,,, | Performed by: GENERAL PRACTICE

## 2020-08-13 RX ORDER — DM/P-EPHED/ACETAMINOPH/DOXYLAM 30-7.5/3
2 LIQUID (ML) ORAL
Qty: 270 LOZENGE | Refills: 0 | Status: SHIPPED | OUTPATIENT
Start: 2020-08-13 | End: 2020-11-30

## 2020-08-13 RX ORDER — IBUPROFEN 200 MG
1 TABLET ORAL DAILY
Qty: 14 PATCH | Refills: 0 | Status: SHIPPED | OUTPATIENT
Start: 2020-08-13 | End: 2020-11-30

## 2020-08-20 ENCOUNTER — CLINICAL SUPPORT (OUTPATIENT)
Dept: SMOKING CESSATION | Facility: CLINIC | Age: 60
End: 2020-08-20
Payer: COMMERCIAL

## 2020-08-20 DIAGNOSIS — F17.200 NICOTINE DEPENDENCE: Primary | ICD-10-CM

## 2020-08-20 PROCEDURE — 99402 PREV MED CNSL INDIV APPRX 30: CPT | Mod: S$GLB,,, | Performed by: GENERAL PRACTICE

## 2020-08-20 PROCEDURE — 99402 PR PREVENT COUNSEL,INDIV,30 MIN: ICD-10-PCS | Mod: S$GLB,,, | Performed by: GENERAL PRACTICE

## 2020-08-20 NOTE — PROGRESS NOTES
Individual Follow-Up Form    8/20/2020    Clinical Status of Patient: Outpatient    Length of Service: 30 minutes    Continuing Medication: yes  Patches    Other Medications: nicotine lozenges as needed     Target Symptoms: Withdrawal and medication side effects. The following were  rated moderate (3) to severe (4) on TCRS:  · Moderate (3): tired, restless, anxious, desire tobacco  · Severe (4): none    Comments: Spoke with patient at length in regards to her smoking cessation progress. She states that she picked up her patches but the pharmacy had to order her nicotine lozenges. She states that her  had surgery and is being discharged today. She had to limit her smoking due to hospital guidelines. She has not started to use the nicotine patches but states that she will begin tomorrow. She states that she feels anxious and nervous about taking care of her  and will be forced to limit her smoking. Reviewed proper patch use and the use of the nicotine lozenges. Discussed coping strategies. She verbalized understanding. The patient denies any abnormal behavioral or mental changes at this time. Will continue to encourage and monitor progress.    Diagnosis: F17.200    Next Visit: 1 week

## 2020-08-24 DIAGNOSIS — F41.9 ANXIETY: ICD-10-CM

## 2020-08-24 RX ORDER — ALPRAZOLAM 2 MG/1
TABLET ORAL
Qty: 60 TABLET | Refills: 2 | Status: SHIPPED | OUTPATIENT
Start: 2020-08-24 | End: 2020-11-20 | Stop reason: SDUPTHER

## 2020-08-24 RX ORDER — ALPRAZOLAM 2 MG/1
TABLET ORAL
Qty: 50 TABLET | OUTPATIENT
Start: 2020-08-24

## 2020-08-25 NOTE — TELEPHONE ENCOUNTER
Want her to start her allergy medication, if not taking already.  Nasal spray for nose to help reduce congestion and swelling.  Cold compresses to eyes.

## 2020-08-25 NOTE — TELEPHONE ENCOUNTER
----- Message from Ade Ruiz sent at 8/25/2020  9:37 AM CDT -----  Regarding: same day appt  Contact: Pt  Type:  Same Day Appointment Request    Caller is requesting a same day appointment.  Caller declined first available appointment listed below.    Name of Caller:Ana Bonilla   When is the first available appointment?09/10/20  Symptoms:nose swollen   Best Call Back Number:[phone]  Additional Information:

## 2020-08-25 NOTE — TELEPHONE ENCOUNTER
Pt stated she woke up with the top of your nose is swollen and eyes are black.     Does pt need to come in or virtual     Please advise

## 2020-08-25 NOTE — TELEPHONE ENCOUNTER
Pt stated she is taking singular every night and will use nose spray she has and try cold compresses on eyes and will follow up if needed.

## 2020-08-28 ENCOUNTER — PATIENT OUTREACH (OUTPATIENT)
Dept: ADMINISTRATIVE | Facility: OTHER | Age: 60
End: 2020-08-28

## 2020-08-28 NOTE — PROGRESS NOTES
Health Maintenance Due   Topic Date Due    TETANUS VACCINE  08/04/1978    Pneumococcal Vaccine (Medium Risk) (1 of 1 - PPSV23) 08/04/1979    LDCT Lung Screen  08/04/2015    Shingles Vaccine (2 of 2) 07/22/2018    Mammogram  09/04/2020     Updates were requested from care everywhere.  Chart was reviewed for overdue Proactive [facility] Encounters ([facility]) topics (CRS, Breast Cancer Screening, Eye exam)  Health Maintenance has been updated.  LINKS immunization registry triggered.  Immunizations were not reconciled. Patient not found in LINKS.

## 2020-09-01 ENCOUNTER — OFFICE VISIT (OUTPATIENT)
Dept: OPHTHALMOLOGY | Facility: CLINIC | Age: 60
End: 2020-09-01
Payer: MEDICAID

## 2020-09-01 DIAGNOSIS — H04.129 DRYNESS, EYE: Primary | ICD-10-CM

## 2020-09-01 DIAGNOSIS — H18.529 MAP-DOT-FINGERPRINT CORNEAL DYSTROPHY: ICD-10-CM

## 2020-09-01 DIAGNOSIS — H53.022 ANISOMETROPIC AMBLYOPIA OF LEFT EYE: ICD-10-CM

## 2020-09-01 PROCEDURE — 92014 PR EYE EXAM, EST PATIENT,COMPREHESV: ICD-10-PCS | Mod: S$PBB,,, | Performed by: OPHTHALMOLOGY

## 2020-09-01 PROCEDURE — 99214 OFFICE O/P EST MOD 30 MIN: CPT | Mod: PBBFAC | Performed by: OPHTHALMOLOGY

## 2020-09-01 PROCEDURE — 92014 COMPRE OPH EXAM EST PT 1/>: CPT | Mod: S$PBB,,, | Performed by: OPHTHALMOLOGY

## 2020-09-01 PROCEDURE — 99999 PR PBB SHADOW E&M-EST. PATIENT-LVL IV: ICD-10-PCS | Mod: PBBFAC,,, | Performed by: OPHTHALMOLOGY

## 2020-09-01 PROCEDURE — 99999 PR PBB SHADOW E&M-EST. PATIENT-LVL IV: CPT | Mod: PBBFAC,,, | Performed by: OPHTHALMOLOGY

## 2020-09-01 RX ORDER — PREDNISOLONE ACETATE 10 MG/ML
1 SUSPENSION/ DROPS OPHTHALMIC 2 TIMES DAILY
Qty: 5 ML | Refills: 0 | Status: SHIPPED | OUTPATIENT
Start: 2020-09-01 | End: 2020-09-06

## 2020-09-01 NOTE — PROGRESS NOTES
===============================  Ana Bonilla,  9/1/2020 today   60 y.o. female   Last visit [facility]: :12/31/2013   Last visit eye dept. Visit date not found  [facility]:  Uncorrected distance visual acuity was 20/30 in the right eye and CF at 3' in the left eye.  Tonometry     Tonometry (Applanation, 2:35 PM)       Right Left    Pressure 12 15               Not recorded        Manifest Refraction     Manifest Refraction       Sphere Cylinder Axis Dist [facility]    Right +0.75 +0.25 040 20/20    Left    ni              CYCLOPLEGIC     Cycloplegic Refraction       Sphere Cylinder Axis Dist VA    Right +0.75 +0.25 040     Left -4.75 +0.50 040 20/30              Chief Complaint   Patient presents with    iritis of left eye     eye exam/ pt states that it is hard to see both distance and near    chalazion/ right eye     Ophthalmic Medications     Artificial Tears and Lubricant Single Agents Start End     polyvinyl alcohol, artificial tears, (LIQUIFILM TEARS) 1.4 % ophthalmic solution    8/27/2019     Sig: Place 1 drop into both eyes as needed.    Class: OTC    Route: Both Eyes    Ophthalmic - Anti-inflammatory, Glucocorticoids Start End     prednisoLONE acetate (PRED FORTE) 1 % DrpS    9/1/2020 9/6/2020    Sig: Place 1 drop into both eyes 2 (two) times a day. for 5 days    Route: Both Eyes        ________________  9/1/2020 today  HPI     iritis of left eye      Additional comments: eye exam/ pt states that it is hard to see both   distance and near              Comments     No dm  No sx's          Last edited by Jane Morales on 9/1/2020  2:26 PM. (History)      Problem List Items Addressed This Visit     Anisometropic amblyopia of left eye      Other Visit Diagnoses     Dryness, eye    -  Primary    Relevant Medications    prednisoLONE acetate (PRED FORTE) 1 % DrpS    Map-dot-fingerprint corneal dystrophy        Relevant Medications    prednisoLONE acetate (PRED FORTE) 1 % DrpS          .previously followed by Dr. Trejo for  iritis  Today no cell no flare  Minimal map dot dystrophy ou  Dry eye, recommend at's frequently  Anisometropia   She would like to have glasses  rx given today  Retina normal  rtc with Dr. Trejo in 1 year      ===========================

## 2020-09-01 NOTE — PATIENT INSTRUCTIONS
"Dry Eye Instruction Sheet  Dry Eye is a common eye condition in aging eyes or in patients with other medical conditions.  The symptoms of dry eye include burning, stinging, redness, aramis or ángel sensation, watering, or feeling as if something is in your eye.  These symptoms are often worse in situations that increase drying of eyes such as reading, lengthy use of eyes, dry weather, using a fan, wearing a CPAP, or often worse at the end of the day.  Dry eyes occur when the baseline tear production is reduced; therefore treatment is based on increasing moisture in the eyes.      --Artificial tear drops should be placed in both eyes 3-4 times daily and may be increased or decreased as needed. Do not use anything stating "to get the red out".  You can find artificial tear drops over the counter at any drug store. Examples are systane, genteal, theratears, refresh.         --Lubrication ointments and gels such as Systane gel, Genteal Gel, or Refresh PM should be applied to the inside of both lower lids at bedtime if you feel you need additional moisture.  You may also use throughout the daytime for severe dry eye.     "

## 2020-10-05 ENCOUNTER — TELEPHONE (OUTPATIENT)
Dept: FAMILY MEDICINE | Facility: CLINIC | Age: 60
End: 2020-10-05

## 2020-10-05 DIAGNOSIS — Z12.39 ENCOUNTER FOR SCREENING FOR MALIGNANT NEOPLASM OF BREAST, UNSPECIFIED SCREENING MODALITY: Primary | ICD-10-CM

## 2020-10-05 NOTE — TELEPHONE ENCOUNTER
----- Message from Beth Camara sent at 10/5/2020  2:12 PM CDT -----  Contact: JAMILA  Would like to consult with nurse about having a order place for a mammo please call back [phone]

## 2020-10-08 ENCOUNTER — HOSPITAL ENCOUNTER (OUTPATIENT)
Dept: RADIOLOGY | Facility: HOSPITAL | Age: 60
Discharge: HOME | End: 2020-10-08
Attending: REGISTERED NURSE
Payer: MEDICAID

## 2020-10-08 VITALS — HEIGHT: 64 IN | BODY MASS INDEX: 23.07 KG/M2 | WEIGHT: 135.13 LBS

## 2020-10-08 DIAGNOSIS — Z12.39 ENCOUNTER FOR SCREENING FOR MALIGNANT NEOPLASM OF BREAST, UNSPECIFIED SCREENING MODALITY: ICD-10-CM

## 2020-10-08 PROCEDURE — 77067 SCR MAMMO BI INCL CAD: CPT | Mod: 26,,, | Performed by: RADIOLOGY

## 2020-10-08 PROCEDURE — 77067 SCR MAMMO BI INCL CAD: CPT | Mod: TC

## 2020-10-08 PROCEDURE — 77063 MAMMO DIGITAL SCREENING BILAT WITH TOMO: ICD-10-PCS | Mod: 26,,, | Performed by: RADIOLOGY

## 2020-10-08 PROCEDURE — 77063 BREAST TOMOSYNTHESIS BI: CPT | Mod: 26,,, | Performed by: RADIOLOGY

## 2020-10-08 PROCEDURE — 77067 MAMMO DIGITAL SCREENING BILAT WITH TOMO: ICD-10-PCS | Mod: 26,,, | Performed by: RADIOLOGY

## 2020-11-20 DIAGNOSIS — F41.9 ANXIETY: ICD-10-CM

## 2020-11-20 RX ORDER — ALPRAZOLAM 2 MG/1
TABLET ORAL
Qty: 60 TABLET | Refills: 0 | Status: SHIPPED | OUTPATIENT
Start: 2020-11-20 | End: 2020-12-16 | Stop reason: SDUPTHER

## 2020-11-20 NOTE — TELEPHONE ENCOUNTER
----- Message from Mary Carlos sent at 11/20/2020 11:51 AM CST -----  Type:  RX Refill Request    Who Called:  Pt  Ana   Refill or New Rx:  refill   RX Name and Strength:  Xanax 2mg   How is the patient currently taking it? (ex. 1XDay):  Takes twice daily   Is this a 30 day or 90 day RX:   Preferred Pharmacy with phone number:  [facility]  Local or Mail Order:  Local   Ordering Provider:  Ms Locke   Would the patient rather a call back or a response via [facility]?   Call back   Best Call Back Number:  [phone]  Additional Information:  please call///thanks/[facility]

## 2020-11-30 ENCOUNTER — HOSPITAL ENCOUNTER (OUTPATIENT)
Dept: RADIOLOGY | Facility: HOSPITAL | Age: 60
Discharge: HOME | End: 2020-11-30
Attending: REGISTERED NURSE
Payer: MEDICAID

## 2020-11-30 ENCOUNTER — OFFICE VISIT (OUTPATIENT)
Dept: FAMILY MEDICINE | Facility: CLINIC | Age: 60
End: 2020-11-30
Payer: MEDICAID

## 2020-11-30 ENCOUNTER — TELEPHONE (OUTPATIENT)
Dept: FAMILY MEDICINE | Facility: CLINIC | Age: 60
End: 2020-11-30

## 2020-11-30 VITALS
BODY MASS INDEX: 24.13 KG/M2 | DIASTOLIC BLOOD PRESSURE: 70 MMHG | TEMPERATURE: 98 F | WEIGHT: 141.31 LBS | HEIGHT: 64 IN | HEART RATE: 103 BPM | SYSTOLIC BLOOD PRESSURE: 120 MMHG

## 2020-11-30 DIAGNOSIS — M79.672 BILATERAL FOOT PAIN: Primary | ICD-10-CM

## 2020-11-30 DIAGNOSIS — M79.672 BILATERAL FOOT PAIN: ICD-10-CM

## 2020-11-30 DIAGNOSIS — M79.671 BILATERAL FOOT PAIN: Primary | ICD-10-CM

## 2020-11-30 DIAGNOSIS — M79.671 BILATERAL FOOT PAIN: ICD-10-CM

## 2020-11-30 PROCEDURE — 99215 OFFICE O/P EST HI 40 MIN: CPT | Mod: PBBFAC,PO | Performed by: REGISTERED NURSE

## 2020-11-30 PROCEDURE — 99999 PR PBB SHADOW E&M-EST. PATIENT-LVL V: ICD-10-PCS | Mod: PBBFAC,,, | Performed by: REGISTERED NURSE

## 2020-11-30 PROCEDURE — 99214 PR OFFICE/OUTPT VISIT, EST, LEVL IV, 30-39 MIN: ICD-10-PCS | Mod: S$PBB,,, | Performed by: REGISTERED NURSE

## 2020-11-30 PROCEDURE — 73630 X-RAY EXAM OF FOOT: CPT | Mod: TC,50,FY,PO

## 2020-11-30 PROCEDURE — 99999 PR PBB SHADOW E&M-EST. PATIENT-LVL V: CPT | Mod: PBBFAC,,, | Performed by: REGISTERED NURSE

## 2020-11-30 PROCEDURE — 73630 XR FOOT COMPLETE 3 VIEW BILATERAL: ICD-10-PCS | Mod: 26,50,, | Performed by: RADIOLOGY

## 2020-11-30 PROCEDURE — 73630 X-RAY EXAM OF FOOT: CPT | Mod: 26,50,, | Performed by: RADIOLOGY

## 2020-11-30 PROCEDURE — 99214 OFFICE O/P EST MOD 30 MIN: CPT | Mod: S$PBB,,, | Performed by: REGISTERED NURSE

## 2020-11-30 RX ORDER — TRAMADOL HYDROCHLORIDE 50 MG/1
50 TABLET ORAL EVERY 6 HOURS
Qty: 20 TABLET | Refills: 0 | Status: SHIPPED | OUTPATIENT
Start: 2020-11-30 | End: 2021-07-14

## 2020-11-30 RX ORDER — DICLOFENAC SODIUM 10 MG/G
4 GEL TOPICAL 4 TIMES DAILY PRN
Qty: 100 G | Refills: 2 | Status: SHIPPED | OUTPATIENT
Start: 2020-11-30 | End: 2021-11-17

## 2020-11-30 RX ORDER — KETOROLAC TROMETHAMINE 30 MG/ML
60 INJECTION, SOLUTION INTRAMUSCULAR; INTRAVENOUS
Status: COMPLETED | OUTPATIENT
Start: 2020-11-30 | End: 2020-11-30

## 2020-11-30 RX ADMIN — KETOROLAC TROMETHAMINE 60 MG: 30 INJECTION, SOLUTION INTRAMUSCULAR; INTRAVENOUS at 02:11

## 2020-11-30 NOTE — PROGRESS NOTES
Subjective:       Patient ID: Ana Bonilla is a 60 y.o. female.      Chief Complaint   Patient presents with    Foot Pain       Mrs. Bonilla is here with c/o both feet hurting for about the past 2 weeks, waxing/waning.  Pain constant.  Denies injury or trauma.  Describes pain as deep burning, not sleeping well.  Concerned pain could be related to gout.  Recent intake of sausage and seafood.  History of right bunion surgery.  Tylenol not helping for pain.      Review of Systems   Constitutional: Negative.    Musculoskeletal: Positive for arthralgias and gait problem. Negative for joint swelling.   Skin: Negative.          Review of patient's allergies indicates:  No Known Allergies      Patient Active Problem List   Diagnosis    COPD with asthma    GERD (gastroesophageal reflux disease)    Primary hypothyroidism    Tobacco abuse disorder    PVD (peripheral vascular disease)    Anxiety    Dyslipidemia    Claudication    Essential hypertension    Allergic rhinitis    Anisometropic amblyopia of left eye    Nonrheumatic aortic valve insufficiency    Colon polyps           Current Outpatient Medications:     ALPRAZolam (XANAX) 2 MG Tab, TAKE 1 TABLET TWICE DAILY AS NEEDED, Disp: 60 tablet, Rfl: 0    amlodipine-benazepril 5-10 mg (LOTREL) 5-10 mg per capsule, TAKE 1 CAPSULE BY MOUTH EVERY DAY, Disp: 90 capsule, Rfl: 1    azelastine (OPTIVAR) 0.05 % ophthalmic solution, Place 1 drop into both eyes 2 (two) times daily., Disp: 6 mL, Rfl: 2    chlorzoxazone (PARAFON FORTE) 500 mg Tab, TK 1 AND 1/2 TS PO Q 8 H PRF SPASMS, Disp: , Rfl: 0    flunisolide 25 mcg, 0.025%, (NASALIDE) 25 mcg (0.025 %) Spry, 2 sprays by Nasal route 2 (two) times daily., Disp: 25 mL, Rfl: 1    hyoscyamine (ANASPAZ,LEVSIN) 0.125 mg Tab, Take 1 tablet (125 mcg total) by mouth every 4 to 6 hours as needed., Disp: 90 tablet, Rfl: 0    ipratropium (ATROVENT) 0.03 % nasal spray, USE 2 SPRAYS IN EACH NOSTRIL TWICE DAILY AS NEEDED FOR  "RHINITIS, Disp: 30 mL, Rfl: 0    levothyroxine (SYNTHROID) 100 MCG tablet, TAKE 1 TABLET(100 MCG) BY MOUTH BEFORE BREAKFAST, Disp: 90 tablet, Rfl: 3    LINZESS 145 mcg Cap capsule, TAKE 1 CAPSULE BY MOUTH ONCE DAILY IN THE MORNING ON AN EMPTY STOMACH 30 MINUTES BEFORE MEAL, Disp: 30 capsule, Rfl: 6    montelukast (SINGULAIR) 10 mg tablet, Take 1 tablet (10 mg total) by mouth every evening., Disp: 90 tablet, Rfl: 1    pantoprazole (PROTONIX) 40 MG tablet, TAKE 1 TABLET(40 MG) BY MOUTH EVERY DAY, Disp: 90 tablet, Rfl: 1    PREMARIN vaginal cream, USE VAGINAL Q 3 DAYS AT BEDTIME UTD, Disp: , Rfl: 3    promethazine-codeine 6.25-10 mg/5 ml (PHENERGAN WITH CODEINE) 6.25-10 mg/5 mL syrup, TAKE 5 ML BY MOUTH EVERY 4 HOURS AS NEEDED FOR COUGH, Disp: 150 mL, Rfl: 0    rosuvastatin (CRESTOR) 10 MG tablet, TAKE 1 TABLET(10 MG) BY MOUTH EVERY DAY, Disp: 90 tablet, Rfl: 1    gabapentin (NEURONTIN) 300 MG capsule, Take 1 capsule (300 mg total) by mouth every evening., Disp: 30 capsule, Rfl: 6    PROVENTIL HFA 90 mcg/actuation inhaler, Inhale 2 puffs into the lungs every 6 (six) hours as needed for Wheezing or Shortness of Breath. Rescue, Disp: 18 g, Rfl: 0  No current facility-administered medications for this visit.     Facility-Administered Medications Ordered in Other Visits:     lactated ringers infusion, , Intravenous, Continuous, Danny Matos III, MD    sodium chloride 0.9% flush 10 mL, 10 mL, Intravenous, PRN, Danny Matos III, MD        Past medical, surgical, family and social histories have been reviewed today.      Objective:     Vitals:    11/30/20 1339   BP: 120/70   Pulse: 103   Temp: 97.9 °F (36.6 °C)   TempSrc: Oral   Weight: 64.1 kg (141 lb 5 oz)   Height: 5' 4" (1.626 m)   PainSc: 10-Worst pain ever   PainLoc: Foot         Physical Exam  Vitals signs reviewed.   Constitutional:       General: She is in acute distress.   HENT:      Head: Normocephalic and atraumatic.   Cardiovascular:      " Pulses:           Dorsalis pedis pulses are 2+ on the right side and 2+ on the left side.        Posterior tibial pulses are 2+ on the right side and 2+ on the left side.   Musculoskeletal:      Right foot: Normal range of motion and normal capillary refill. Bony tenderness present. No swelling or deformity.      Left foot: Normal range of motion and normal capillary refill. Bony tenderness present. No swelling or deformity.        Feet:    Feet:      Right foot:      Skin integrity: Skin integrity normal.      Toenail Condition: Right toenails are normal.      Left foot:      Skin integrity: Skin integrity normal.      Toenail Condition: Left toenails are normal.   Neurological:      Mental Status: She is alert and oriented to person, place, and time.      Gait: Gait abnormal.   Psychiatric:         Mood and Affect: Mood is anxious.         Speech: Speech normal.         Behavior: Behavior is agitated.         Thought Content: Thought content normal.             Assessment         ICD-10-CM ICD-9-CM    1. Bilateral foot pain  M79.671 729.5 Uric Acid    M79.672  ketorolac injection 60 mg      X-Ray Foot Complete Bilateral      CK      diclofenac sodium (VOLTAREN) 1 % Gel      traMADoL (ULTRAM) 50 mg tablet    Lab and xray pending.  Elevate, cold packs.  Injection today for pain relief.           Follow-up     Return prn.      JONA Ramesh  [facility]

## 2020-11-30 NOTE — TELEPHONE ENCOUNTER
----- Message from Yaquelin Thomason sent at 11/30/2020  7:29 AM CST -----  Regarding: appt  Contact: pt  Type:  Same Day Appointment Request    Caller is requesting a same day appointment.  Caller declined first available appointment listed below.    Name of Caller: pt  When is the first available appointment? Call back  Symptoms: feet pain  Best Call Back Number: [phone]  Additional Information: n/a

## 2020-12-01 DIAGNOSIS — M79.604 PAIN IN BOTH LOWER EXTREMITIES: ICD-10-CM

## 2020-12-01 DIAGNOSIS — M79.605 PAIN IN BOTH LOWER EXTREMITIES: ICD-10-CM

## 2020-12-01 RX ORDER — GABAPENTIN 300 MG/1
300 CAPSULE ORAL NIGHTLY PRN
Qty: 90 CAPSULE | Refills: 0 | Status: SHIPPED | OUTPATIENT
Start: 2020-12-01 | End: 2020-12-09

## 2020-12-04 ENCOUNTER — OFFICE VISIT (OUTPATIENT)
Dept: PODIATRY | Facility: CLINIC | Age: 60
End: 2020-12-04
Payer: MEDICAID

## 2020-12-04 VITALS
SYSTOLIC BLOOD PRESSURE: 137 MMHG | DIASTOLIC BLOOD PRESSURE: 85 MMHG | HEART RATE: 105 BPM | WEIGHT: 141.31 LBS | HEIGHT: 64 IN | BODY MASS INDEX: 24.13 KG/M2

## 2020-12-04 DIAGNOSIS — M77.41 METATARSALGIA OF RIGHT FOOT: Primary | ICD-10-CM

## 2020-12-04 DIAGNOSIS — M79.671 ACUTE FOOT PAIN, RIGHT: ICD-10-CM

## 2020-12-04 PROCEDURE — 99214 OFFICE O/P EST MOD 30 MIN: CPT | Mod: PBBFAC | Performed by: PODIATRIST

## 2020-12-04 PROCEDURE — 99204 OFFICE O/P NEW MOD 45 MIN: CPT | Mod: S$PBB,,, | Performed by: PODIATRIST

## 2020-12-04 PROCEDURE — 99204 PR OFFICE/OUTPT VISIT, NEW, LEVL IV, 45-59 MIN: ICD-10-PCS | Mod: S$PBB,,, | Performed by: PODIATRIST

## 2020-12-04 PROCEDURE — 99999 PR PBB SHADOW E&M-EST. PATIENT-LVL IV: ICD-10-PCS | Mod: PBBFAC,,, | Performed by: PODIATRIST

## 2020-12-04 PROCEDURE — 99999 PR PBB SHADOW E&M-EST. PATIENT-LVL IV: CPT | Mod: PBBFAC,,, | Performed by: PODIATRIST

## 2020-12-04 RX ORDER — METHYLPREDNISOLONE 4 MG/1
TABLET ORAL
Qty: 1 PACKAGE | Refills: 0 | Status: SHIPPED | OUTPATIENT
Start: 2020-12-04 | End: 2021-07-14

## 2020-12-04 NOTE — PATIENT INSTRUCTIONS
Metatarsalgia     Metarsalgia is irritation/inflammation and/or damage to the supportive connective tissues (i.e. bone, joint capsule, tendon, and/or ligaments) located at the ball of the foot (the metatarsal heads, which may also include the metatarsoophalangeal joints where the toes connect to the foot). This is caused by accumulation of slightly increased but repetitive weightbearing pressure to the ball of the foot, or a one time activity of significant overuse to the foot.  Once this develops, it may take more than 6 weeks and up to 3 months to resolve with diligent treatment measures on a daily basis.  Unfortunately, this is usually not a quick fix type of problem, especially if it's been going on for a month or more.    The goal of treatment is to decrease the weight bearing pressures being transferred to the ball of the foot (the heads of the 2nd, 3rd and 4th metatarsals just behind the respective toes).  This can be done over a shorter period of time (6-8 weeks) by wearing an orthopedic boot daily.  These boots have a rocker bottom to them and transfer weightbearing pressures away from the foot significantly.  Over 1-2 months, the decreased weightbearing pressures allow the connective tissues to heal and regain some structural integrity.    Wearing a metatarsal pad will also help redistribute forces along the ball of your foot and decrease inflammation. These are pads that typically have a tear-drop type of shape and are placed just behind the ball of the foot in a shoe. Often they are made out of felt or silicon. Cushioning style over-the-counter inserts with a built-in metatarsal pad will also help. You may try to wear a motion-control shoes for a couple months or a rocker-bottom shoe ('s Shape Ups or similar). Similar to the orthopedic boot, the rocker-bottom shoe reduces motion of the toe joints and helps control the inflammation.    Examples of good motion-control shoes    [place]:  Momo  Addiction  New Balance: 587, 927, 1123, 1012  Asics Gel Evolution  [place]: ProGrid Stabil, ProGrid [place]  [place]: Wave Alchemy    Shoes in these categories are athletic-type shoes that are well-cushioned, structurally supportive, limit over-pronation, provide a good forefoot rocker, and stabilize the midfoot.  These properties work together to help a lot of foot conditions, including ball of foot pain, plantar fasciitis, midfoot arthritis, and symptomatic flatfeet. Patients are recommended to wear motion-control shoes as their regular footwear for 2-3 months.  It is advisable to have a couple pairs of these and wear them indoors as well.    Because the Achilles tendon is often tight in people with similar foot problems, a daily Achilles stretching regimen is recommended.  This can be done by looping a towel around the ball of your foot and gently pulling back for 30-40 seconds.  This should be done 5 times per day.  When you don't have a towel available, it is helpful to bend your foot back towards your shin to help stretch the Achilles when initiating weight bearing after a period of rest or sitting.    It is also important to rest from any excessive weightbearing activities: long walks, hikes, dancing, high-impact sports (basketball, tennis, racquetball, etc).  Instead, if you regularly exercise, consider low-impact exercises, such as swimming or a stationary bike. Avoid walking barefooted on hard surfaces.  Use of good slippers at home will help reduce the pressures to the ball of your feet.  Avoid use of ladders. Avoid excessive use of stairs    You may also take an anti-inflammatory medication such as Ibuprofen 600 mg three times a day with food for 3 weeks, but only if your stomach can tolerate it and if you are not taking a blood thinner or have a history of stomach / gastrointestinal or significant kidney problems.    This condition may take 8-12 weeks to resolve.  If no improvement is found after 4  weeks, then you may need to consider wearing a short leg walking cast, orthopedic boot, or discuss other treatment options with your doctor.

## 2020-12-04 NOTE — PROGRESS NOTES
PODIATRIC MEDICINE AND SURGERY  12/4/2020    PCP: Dr. Kenrick Locke, NP    CHIEF COMPLAINT   Chief Complaint   Patient presents with    Foot Pain     Pt c/o of right foot pain on ball of foot 9/10       HPI:    Ana Bonilla is a 60 y.o. female who has a past medical history of Allergy, Amblyopia (OS), Anxiety, Asthma, COPD (chronic obstructive pulmonary disease), GERD (gastroesophageal reflux disease), Hyperlipidemia, Hypertension, and Thyroid disease.   Ana presents to clinic today complaining of ball of foot pain RIGHT. Pt states she had bunion surgery 7 years ago. Symptoms are across ball of foot radiating primarily from great toe joint across ball of foot. Pt rates pain 9/10. She describes pain as throbbing/achy.     Patient denies other pedal complaints at this time.     PMH  Past Medical History:   Diagnosis Date    Allergy     Amblyopia OS    Anxiety     Asthma     COPD (chronic obstructive pulmonary disease)     GERD (gastroesophageal reflux disease)     Hyperlipidemia     Hypertension     Thyroid disease        PROBLEM LIST  Patient Active Problem List    Diagnosis Date Noted    Colon polyps 12/04/2019    Nonrheumatic aortic valve insufficiency 12/07/2018    Anisometropic amblyopia of left eye 07/18/2018    Allergic rhinitis 05/24/2018    Claudication 08/17/2017    Essential hypertension 08/17/2017    Dyslipidemia 10/07/2015    PVD (peripheral vascular disease) 11/03/2014    Anxiety 11/03/2014    COPD with asthma 10/18/2013    GERD (gastroesophageal reflux disease) 10/18/2013    Primary hypothyroidism 10/18/2013    Tobacco abuse disorder 10/18/2013       MEDS  Current Outpatient Medications on File Prior to Visit   Medication Sig Dispense Refill    ALPRAZolam (XANAX) 2 MG Tab TAKE 1 TABLET TWICE DAILY AS NEEDED 60 tablet 0    amlodipine-benazepril 5-10 mg (LOTREL) 5-10 mg per capsule TAKE 1 CAPSULE BY MOUTH EVERY DAY 90 capsule 1    azelastine (OPTIVAR) 0.05 %  ophthalmic solution Place 1 drop into both eyes 2 (two) times daily. 6 mL 2    chlorzoxazone (PARAFON FORTE) 500 mg Tab TK 1 AND 1/2 TS PO Q 8 H PRF SPASMS  0    diclofenac sodium (VOLTAREN) 1 % Gel Apply 4 g topically 4 (four) times daily as needed. 100 g 2    flunisolide 25 mcg, 0.025%, (NASALIDE) 25 mcg (0.025 %) Spry 2 sprays by Nasal route 2 (two) times daily. 25 mL 1    gabapentin (NEURONTIN) 300 MG capsule Take 1 capsule (300 mg total) by mouth nightly as needed. 90 capsule 0    hyoscyamine (ANASPAZ,LEVSIN) 0.125 mg Tab Take 1 tablet (125 mcg total) by mouth every 4 to 6 hours as needed. 90 tablet 0    ipratropium (ATROVENT) 0.03 % nasal spray USE 2 SPRAYS IN EACH NOSTRIL TWICE DAILY AS NEEDED FOR RHINITIS 30 mL 0    levothyroxine (SYNTHROID) 100 MCG tablet TAKE 1 TABLET(100 MCG) BY MOUTH BEFORE BREAKFAST 90 tablet 3    LINZESS 145 mcg Cap capsule TAKE 1 CAPSULE BY MOUTH ONCE DAILY IN THE MORNING ON AN EMPTY STOMACH 30 MINUTES BEFORE MEAL 30 capsule 6    montelukast (SINGULAIR) 10 mg tablet Take 1 tablet (10 mg total) by mouth every evening. 90 tablet 1    pantoprazole (PROTONIX) 40 MG tablet TAKE 1 TABLET(40 MG) BY MOUTH EVERY DAY 90 tablet 1    PREMARIN vaginal cream USE VAGINAL Q 3 DAYS AT BEDTIME UTD  3    rosuvastatin (CRESTOR) 10 MG tablet TAKE 1 TABLET(10 MG) BY MOUTH EVERY DAY 90 tablet 1    traMADoL (ULTRAM) 50 mg tablet Take 1 tablet (50 mg total) by mouth every 6 (six) hours. 20 tablet 0    PROVENTIL HFA 90 mcg/actuation inhaler Inhale 2 puffs into the lungs every 6 (six) hours as needed for Wheezing or Shortness of Breath. Rescue 18 g 0     Current Facility-Administered Medications on File Prior to Visit   Medication Dose Route Frequency Provider Last Rate Last Dose    lactated ringers infusion   Intravenous Continuous Danny Matos III, MD        sodium chloride 0.9% flush 10 mL  10 mL Intravenous PRN Danny Matos III, MD           Medication List with Changes/Refills   New  Medications    METHYLPREDNISOLONE (MEDROL DOSEPACK) 4 MG TABLET    use as directed   Current Medications    ALPRAZOLAM (XANAX) 2 MG TAB    TAKE 1 TABLET TWICE DAILY AS NEEDED    AMLODIPINE-BENAZEPRIL 5-10 MG (LOTREL) 5-10 MG PER CAPSULE    TAKE 1 CAPSULE BY MOUTH EVERY DAY    AZELASTINE (OPTIVAR) 0.05 % OPHTHALMIC SOLUTION    Place 1 drop into both eyes 2 (two) times daily.    CHLORZOXAZONE (PARAFON FORTE) 500 MG TAB    TK 1 AND 1/2 TS PO Q 8 H PRF SPASMS    DICLOFENAC SODIUM (VOLTAREN) 1 % GEL    Apply 4 g topically 4 (four) times daily as needed.    FLUNISOLIDE 25 MCG, 0.025%, (NASALIDE) 25 MCG (0.025 %) SPRY    2 sprays by Nasal route 2 (two) times daily.    GABAPENTIN (NEURONTIN) 300 MG CAPSULE    Take 1 capsule (300 mg total) by mouth nightly as needed.    HYOSCYAMINE (ANASPAZ,LEVSIN) 0.125 MG TAB    Take 1 tablet (125 mcg total) by mouth every 4 to 6 hours as needed.    IPRATROPIUM (ATROVENT) 0.03 % NASAL SPRAY    USE 2 SPRAYS IN EACH NOSTRIL TWICE DAILY AS NEEDED FOR RHINITIS    LEVOTHYROXINE (SYNTHROID) 100 MCG TABLET    TAKE 1 TABLET(100 MCG) BY MOUTH BEFORE BREAKFAST    LINZESS 145 MCG CAP CAPSULE    TAKE 1 CAPSULE BY MOUTH ONCE DAILY IN THE MORNING ON AN EMPTY STOMACH 30 MINUTES BEFORE MEAL    MONTELUKAST (SINGULAIR) 10 MG TABLET    Take 1 tablet (10 mg total) by mouth every evening.    PANTOPRAZOLE (PROTONIX) 40 MG TABLET    TAKE 1 TABLET(40 MG) BY MOUTH EVERY DAY    PREMARIN VAGINAL CREAM    USE VAGINAL Q 3 DAYS AT BEDTIME UTD    PROVENTIL HFA 90 MCG/ACTUATION INHALER    Inhale 2 puffs into the lungs every 6 (six) hours as needed for Wheezing or Shortness of Breath. Rescue    ROSUVASTATIN (CRESTOR) 10 MG TABLET    TAKE 1 TABLET(10 MG) BY MOUTH EVERY DAY    TRAMADOL (ULTRAM) 50 MG TABLET    Take 1 tablet (50 mg total) by mouth every 6 (six) hours.       PSH     Past Surgical History:   Procedure Laterality Date    APPENDECTOMY      BUNIONECTOMY      COLONOSCOPY N/A 12/4/2019    Procedure: COLONOSCOPY;  " Surgeon: Danny Matos III, MD;  Location: [facility];  Service: Endoscopy;  Laterality: N/A;    HYSTERECTOMY      PARTIAL//still with ovaries    neck fusion  08/2017    THYROIDECTOMY          ALL  Review of patient's allergies indicates:  No Known Allergies    SOC     Social History     Tobacco Use    Smoking status: Current Every Day Smoker     Packs/day: 1.00     Years: 45.00     Pack years: 45.00     Types: Cigarettes    Smokeless tobacco: Never Used   Substance Use Topics    Alcohol use: Yes     Comment: rarely     Drug use: No         FAMILY HX    Family History   Problem Relation Age of Onset    Hypertension Mother     Diabetes Mother     Mental illness Son     Diabetes Father     Mental illness Other     Pancreatic cancer Other     Pancreatic cancer Maternal Uncle     Stroke Maternal Grandmother     Prostate cancer Maternal Grandfather     Ovarian cancer Maternal Cousin             REVIEW OF SYSTEMS  General: This patient is well-developed, well-nourished and appears stated age, well-oriented to person, place and time, and cooperative and pleasant on today's visit   Constitutional: Negative for chills and fever.   Respiratory: Negative for shortness of breath.    Cardiovascular: Negative for chest pain, palpitations, orthopnea  Gastrointestinal: Negative for diarrhea, nausea and vomiting.   Musculoskeletal: Positive for above noted in HPI  Skin: Negative for rash, skin, or nail changes   Neurological: Negative for tingling and sensory changes  Peripheral Vascular: no claudication or cyanosis  Psychiatric/Behavioral: Negative for altered mental status     PHYSICAL EXAM:      Vitals:    12/04/20 0912   BP: 137/85   Pulse: 105   Weight: 64.1 kg (141 lb 5 oz)   Height: 5' 4" (1.626 m)         LOWER EXTREMITY PHYSICAL EXAM  VASCULAR  Dorsalis pedis and posterior tibial pulses palpable 2/4 bilaterally. Capillary refill time immediate to the toes. Feet are warm to the touch. Skin temperature " warm to warm from proximally to distally There are no varicosities, telangiectasias noted to bilateral foot and ankle regions. There are no ecchymoses noted to bilateral foot and ankle regions. There is no gross lower extremity edema.    DERMATOLOGIC  Skin moist with healthy texture and turgor.There are no open ulcerations, lacerations, or fissures to bilateral foot and ankle regions. There are no signs of infection as there is no erythema, no proximal-extending lymphangiitis, no fluctuance, or crepitus noted on palpation to bilateral foot and ankle regions. There is no interdigital maceration.   There are no hyperkeratotic lesions noted to feet. Nails are well-trimmed.    NEUROLOGIC  Epicritic sensation is intact as the patient is able to sense light touch to bilateral foot and ankle regions. Achilles and patellar deep tendon reflexes intact. Babinski reflex absent    ORTHOPEDIC/BIOMECHANICAL  TTP across metatarsal heads 1-5 RIGHT foot. Muscle strength AT/EHL/EDL/PT: 5/5; Achilles/Gastroc/Soleus: 5/5; PB/PL: 5/5 Muscle tone is normal. Ankle joint ROM non painful with DF/PF, non-crepitus; STJ ROM  Inv/ev non painful, non crepitus ; MTPJ b/l  DF/PF, non crepitus ;    IMAGING   Reviewed by me and I agree with radiologist findings, 3 views of foot/ankle, reveal:  Results for orders placed during the hospital encounter of 11/30/20   X-Ray Foot Complete Bilateral    Narrative EXAMINATION:  XR FOOT COMPLETE 3 VIEW BILATERAL    CLINICAL HISTORY:  Pain in right foot    TECHNIQUE:  AP, lateral, and oblique views of both feet were performed.    COMPARISON:  None    FINDINGS:  Right: There is no radiographic evidence of acute osseous, articular, or soft tissue abnormality.  There are postoperative changes related to prior hallux valgus correction with 2 screws in place within the distal 1st metatarsal.  No findings to suggest hardware complication.There are very mild degenerative findings present at the great toe MTP joint.   No erosive changes visualized.    Left: There is no radiographic evidence of acute osseous, articular, or soft tissue abnormality.  There is slight hallux valgus deformity.  Joint spaces are preserved.No erosive changes demonstrated.      Impression As above.  No acute findings.      Electronically signed by: Marino Pollack MD  Date:    11/30/2020  Time:    15:14           Results for orders placed during the hospital encounter of 11/30/20   X-Ray Foot Complete Bilateral    Narrative EXAMINATION:  XR FOOT COMPLETE 3 VIEW BILATERAL    CLINICAL HISTORY:  Pain in right foot    TECHNIQUE:  AP, lateral, and oblique views of both feet were performed.    COMPARISON:  None    FINDINGS:  Right: There is no radiographic evidence of acute osseous, articular, or soft tissue abnormality.  There are postoperative changes related to prior hallux valgus correction with 2 screws in place within the distal 1st metatarsal.  No findings to suggest hardware complication.There are very mild degenerative findings present at the great toe MTP joint.  No erosive changes visualized.    Left: There is no radiographic evidence of acute osseous, articular, or soft tissue abnormality.  There is slight hallux valgus deformity.  Joint spaces are preserved.No erosive changes demonstrated.      Impression As above.  No acute findings.      Electronically signed by: Marino Pollack MD  Date:    11/30/2020  Time:    15:14       No results found for this or any previous visit.     No results found for this or any previous visit.        ASSESSMENT     Encounter Diagnosis   Name Primary?    Metatarsalgia of right foot Yes         PLAN  Patient was educated about clinical and imaging findings, and verbalizes understanding of above.     Diagnoses and all orders for this visit:  Metatarsalgia of right foot    Other orders  -     methylPREDNISolone (MEDROL DOSEPACK) 4 mg tablet; use as directed  Dispense: 1 Package; Refill: 0       I discussed treatment  for Metarsalgia which is due to inflammation or irritation at ball of foot. I discussed the need to offload the foot to reduce the pressure on ball of foot with use of orthotic- pressure relief, cushioned tennis shoes, rocker bottom- examples provided to patient such as 's Shape Ups. An offloading metatarsal pad was also recommended to help redistribute forces along the ball of the foot and decrease inflammation.I also discussed treatment in CAM BOOT for period of 4-6 weeks if symptoms fail to resolve. This will allow transfer of weight to be reduced at the ball of the foot and allow the connective tissues time to heal. Pt would like to be fitted with boot today due to pain.    Verbal consent obtained by patient to proceed with fitting of CAM walking boot. Appropriate DME/signature obtained prior to fit. A CAM Walking boot was then fitted/dispensed and customized to the patient's RIGHT lower extremity by the LPN/MA. Patient denied any discomfort/pains to the site with boot.  Patient was provided instructions in regards to ambulation. Patient was informed that they should not drive with CAM walker if dispensed on right lower extremity.     Stretching exercises provided. RTC as scheduled below.    Disclaimer:  This note may have been prepared using voice recognition software, it may have not been extensively proofed, as such there could be errors within the text such as sound alike errors.         Future Appointments   Date Time Provider Department Center   1/12/2021 10:30 AM Aleja Lua DPM HG POD [place]       Report Electronically Signed By:     Aleja Lua DPM   Podiatry  [facility]-   12/4/2020

## 2020-12-09 ENCOUNTER — TELEPHONE (OUTPATIENT)
Dept: FAMILY MEDICINE | Facility: CLINIC | Age: 60
End: 2020-12-09

## 2020-12-09 DIAGNOSIS — J30.9 CHRONIC ALLERGIC RHINITIS: ICD-10-CM

## 2020-12-09 DIAGNOSIS — J01.10 ACUTE FRONTAL SINUSITIS, RECURRENCE NOT SPECIFIED: ICD-10-CM

## 2020-12-09 RX ORDER — IPRATROPIUM BROMIDE 21 UG/1
SPRAY, METERED NASAL
Qty: 30 ML | Refills: 2 | Status: SHIPPED | OUTPATIENT
Start: 2020-12-09 | End: 2021-03-17 | Stop reason: SDUPTHER

## 2020-12-09 RX ORDER — FLUNISOLIDE 0.25 MG/ML
2 SOLUTION NASAL 2 TIMES DAILY
Qty: 25 ML | Refills: 1 | Status: SHIPPED | OUTPATIENT
Start: 2020-12-09 | End: 2021-04-09 | Stop reason: SDUPTHER

## 2020-12-09 RX ORDER — PROMETHAZINE HYDROCHLORIDE AND CODEINE PHOSPHATE 6.25; 1 MG/5ML; MG/5ML
5 SOLUTION ORAL
Qty: 150 ML | Refills: 0 | Status: SHIPPED | OUTPATIENT
Start: 2020-12-09 | End: 2021-07-14

## 2020-12-09 RX ORDER — IPRATROPIUM BROMIDE 21 UG/1
SPRAY, METERED NASAL
Qty: 30 ML | Refills: 0 | Status: CANCELLED | OUTPATIENT
Start: 2020-12-09

## 2020-12-09 NOTE — TELEPHONE ENCOUNTER
Xanax last refilled on 11/20 for 1 mo supply ---- too soon now for refill.    As for the cough medication, she should not be taking if on gabapentin.

## 2020-12-09 NOTE — TELEPHONE ENCOUNTER
Pt notified of nasal spray refill sent; pt states she no longer takes gabapentin d/t seeing podiatry? Would like cough syrup called in

## 2020-12-09 NOTE — TELEPHONE ENCOUNTER
----- Message from Lucretia Hdz sent at 12/9/2020  8:38 AM CST -----  Type:  Needs Medical Advice    Who Called: Pt  Symptoms (please be specific):    How long has patient had these symptoms:    Pharmacy name and phone #:    Would the patient rather a call back or a response via [facility]? Call   Best Call Back Number: [phone] (home)   Additional Information:   Pt is requesting a call back asap in regards to previous refill request that was sent for cough syrup and nasal spray. Please call back

## 2020-12-09 NOTE — TELEPHONE ENCOUNTER
Called pt and she needs refills on her nasal spray and  Cough syrup.     xanax wants refill  X 2. Due to the fact a lot of people are getting xanax and when she needs it the pharm is out.      Please advise.

## 2020-12-15 DIAGNOSIS — F41.9 ANXIETY: ICD-10-CM

## 2020-12-15 RX ORDER — ALPRAZOLAM 2 MG/1
TABLET ORAL
Qty: 60 TABLET | Refills: 0 | OUTPATIENT
Start: 2020-12-15

## 2020-12-15 NOTE — TELEPHONE ENCOUNTER
----- Message from Tai Mckeon sent at 12/15/2020 11:52 AM CST -----  .Type:  RX Refill Request    Who Called: Ana Bonilla  Refill or New Rx:refil  RX Name and Strength:Xanax 2mg  How is the patient currently taking it? (ex. 1XDay):Daily  Is this a 30 day or 90 day RX:30    Preferred Pharmacy with phone number:.  [id number] - [place] - [address]  [address] [address]  Phone: [phone] Fax: [phone]        Local or Mail Order:local  Ordering Provider:Kenrick Locke  Would the patient rather a call back or a response via [facility]? Call back  Best Call Back Number:[phone]  Additional Information:

## 2020-12-16 DIAGNOSIS — F41.9 ANXIETY: ICD-10-CM

## 2020-12-17 RX ORDER — ALPRAZOLAM 2 MG/1
TABLET ORAL
Qty: 60 TABLET | Refills: 0 | Status: SHIPPED | OUTPATIENT
Start: 2020-12-17 | End: 2021-01-14 | Stop reason: SDUPTHER

## 2021-01-11 ENCOUNTER — PATIENT OUTREACH (OUTPATIENT)
Dept: ADMINISTRATIVE | Facility: OTHER | Age: 61
End: 2021-01-11

## 2021-01-26 ENCOUNTER — OFFICE VISIT (OUTPATIENT)
Dept: PODIATRY | Facility: CLINIC | Age: 61
End: 2021-01-26
Payer: MEDICAID

## 2021-01-26 VITALS
WEIGHT: 141.31 LBS | HEIGHT: 64 IN | BODY MASS INDEX: 24.13 KG/M2 | HEART RATE: 102 BPM | DIASTOLIC BLOOD PRESSURE: 88 MMHG | SYSTOLIC BLOOD PRESSURE: 156 MMHG

## 2021-01-26 DIAGNOSIS — M77.41 METATARSALGIA OF RIGHT FOOT: Primary | ICD-10-CM

## 2021-01-26 DIAGNOSIS — M79.671 ACUTE FOOT PAIN, RIGHT: ICD-10-CM

## 2021-01-26 PROCEDURE — 99214 OFFICE O/P EST MOD 30 MIN: CPT | Mod: PBBFAC | Performed by: PODIATRIST

## 2021-01-26 PROCEDURE — 99999 PR PBB SHADOW E&M-EST. PATIENT-LVL IV: ICD-10-PCS | Mod: PBBFAC,,, | Performed by: PODIATRIST

## 2021-01-26 PROCEDURE — 99214 PR OFFICE/OUTPT VISIT, EST, LEVL IV, 30-39 MIN: ICD-10-PCS | Mod: S$PBB,,, | Performed by: PODIATRIST

## 2021-01-26 PROCEDURE — 99214 OFFICE O/P EST MOD 30 MIN: CPT | Mod: S$PBB,,, | Performed by: PODIATRIST

## 2021-01-26 PROCEDURE — 99999 PR PBB SHADOW E&M-EST. PATIENT-LVL IV: CPT | Mod: PBBFAC,,, | Performed by: PODIATRIST

## 2021-02-10 ENCOUNTER — OFFICE VISIT (OUTPATIENT)
Dept: CARDIOLOGY | Facility: CLINIC | Age: 61
End: 2021-02-10
Payer: MEDICAID

## 2021-02-10 ENCOUNTER — CLINICAL SUPPORT (OUTPATIENT)
Dept: CARDIOLOGY | Facility: CLINIC | Age: 61
End: 2021-02-10
Payer: MEDICAID

## 2021-02-10 VITALS
BODY MASS INDEX: 24.2 KG/M2 | SYSTOLIC BLOOD PRESSURE: 138 MMHG | WEIGHT: 141 LBS | OXYGEN SATURATION: 98 % | HEART RATE: 117 BPM | DIASTOLIC BLOOD PRESSURE: 82 MMHG

## 2021-02-10 DIAGNOSIS — M79.605 PAIN IN BOTH LOWER EXTREMITIES: ICD-10-CM

## 2021-02-10 DIAGNOSIS — I10 ESSENTIAL HYPERTENSION: ICD-10-CM

## 2021-02-10 DIAGNOSIS — J44.89 COPD WITH ASTHMA: ICD-10-CM

## 2021-02-10 DIAGNOSIS — I35.1 NONRHEUMATIC AORTIC VALVE INSUFFICIENCY: ICD-10-CM

## 2021-02-10 DIAGNOSIS — I10 ESSENTIAL HYPERTENSION: Primary | ICD-10-CM

## 2021-02-10 DIAGNOSIS — E78.5 DYSLIPIDEMIA: ICD-10-CM

## 2021-02-10 DIAGNOSIS — Z72.0 TOBACCO ABUSE DISORDER: ICD-10-CM

## 2021-02-10 DIAGNOSIS — R00.0 TACHYCARDIA: ICD-10-CM

## 2021-02-10 DIAGNOSIS — M79.604 PAIN IN BOTH LOWER EXTREMITIES: ICD-10-CM

## 2021-02-10 PROCEDURE — 99214 PR OFFICE/OUTPT VISIT, EST, LEVL IV, 30-39 MIN: ICD-10-PCS | Mod: S$PBB,,, | Performed by: INTERNAL MEDICINE

## 2021-02-10 PROCEDURE — 99214 OFFICE O/P EST MOD 30 MIN: CPT | Mod: S$PBB,,, | Performed by: INTERNAL MEDICINE

## 2021-02-10 PROCEDURE — 93010 EKG 12-LEAD: ICD-10-PCS | Mod: S$PBB,,, | Performed by: INTERNAL MEDICINE

## 2021-02-10 PROCEDURE — 93010 ELECTROCARDIOGRAM REPORT: CPT | Mod: S$PBB,,, | Performed by: INTERNAL MEDICINE

## 2021-02-10 PROCEDURE — 99999 PR PBB SHADOW E&M-EST. PATIENT-LVL III: ICD-10-PCS | Mod: PBBFAC,,, | Performed by: INTERNAL MEDICINE

## 2021-02-10 PROCEDURE — 99213 OFFICE O/P EST LOW 20 MIN: CPT | Mod: PBBFAC,PO,25 | Performed by: INTERNAL MEDICINE

## 2021-02-10 PROCEDURE — 99999 PR PBB SHADOW E&M-EST. PATIENT-LVL III: CPT | Mod: PBBFAC,,, | Performed by: INTERNAL MEDICINE

## 2021-02-10 PROCEDURE — 93005 ELECTROCARDIOGRAM TRACING: CPT | Mod: PBBFAC,PO | Performed by: INTERNAL MEDICINE

## 2021-02-10 RX ORDER — AMLODIPINE AND BENAZEPRIL HYDROCHLORIDE 5; 10 MG/1; MG/1
1 CAPSULE ORAL DAILY
Qty: 90 CAPSULE | Refills: 3 | Status: SHIPPED | OUTPATIENT
Start: 2021-02-10 | End: 2022-02-02 | Stop reason: SDUPTHER

## 2021-02-10 RX ORDER — ROSUVASTATIN CALCIUM 10 MG/1
10 TABLET, COATED ORAL DAILY
Qty: 90 TABLET | Refills: 3 | Status: SHIPPED | OUTPATIENT
Start: 2021-02-10 | End: 2022-04-05 | Stop reason: SDUPTHER

## 2021-02-12 ENCOUNTER — OFFICE VISIT (OUTPATIENT)
Dept: INTERNAL MEDICINE | Facility: CLINIC | Age: 61
End: 2021-02-12
Payer: MEDICAID

## 2021-02-12 VITALS
SYSTOLIC BLOOD PRESSURE: 132 MMHG | BODY MASS INDEX: 24.24 KG/M2 | HEART RATE: 97 BPM | DIASTOLIC BLOOD PRESSURE: 66 MMHG | RESPIRATION RATE: 18 BRPM | TEMPERATURE: 98 F | OXYGEN SATURATION: 99 % | HEIGHT: 64 IN | WEIGHT: 142 LBS

## 2021-02-12 DIAGNOSIS — I10 ESSENTIAL HYPERTENSION: ICD-10-CM

## 2021-02-12 DIAGNOSIS — E89.0 HISTORY OF THYROIDECTOMY: Primary | ICD-10-CM

## 2021-02-12 DIAGNOSIS — F41.9 ANXIETY: ICD-10-CM

## 2021-02-12 DIAGNOSIS — Z76.89 ESTABLISHING CARE WITH NEW DOCTOR, ENCOUNTER FOR: ICD-10-CM

## 2021-02-12 DIAGNOSIS — Z72.0 TOBACCO ABUSE DISORDER: ICD-10-CM

## 2021-02-12 DIAGNOSIS — J39.2 EDEMA OF THROAT: ICD-10-CM

## 2021-02-12 PROCEDURE — 99999 PR PBB SHADOW E&M-EST. PATIENT-LVL IV: ICD-10-PCS | Mod: PBBFAC,,, | Performed by: FAMILY MEDICINE

## 2021-02-12 PROCEDURE — 99214 OFFICE O/P EST MOD 30 MIN: CPT | Mod: S$PBB,,, | Performed by: FAMILY MEDICINE

## 2021-02-12 PROCEDURE — 99999 PR PBB SHADOW E&M-EST. PATIENT-LVL IV: CPT | Mod: PBBFAC,,, | Performed by: FAMILY MEDICINE

## 2021-02-12 PROCEDURE — 99214 OFFICE O/P EST MOD 30 MIN: CPT | Mod: PBBFAC | Performed by: FAMILY MEDICINE

## 2021-02-12 PROCEDURE — 99214 PR OFFICE/OUTPT VISIT, EST, LEVL IV, 30-39 MIN: ICD-10-PCS | Mod: S$PBB,,, | Performed by: FAMILY MEDICINE

## 2021-02-12 RX ORDER — ALPRAZOLAM 2 MG/1
TABLET ORAL
Qty: 60 TABLET | Refills: 0 | Status: SHIPPED | OUTPATIENT
Start: 2021-02-12 | End: 2021-04-15 | Stop reason: SDUPTHER

## 2021-03-01 ENCOUNTER — PATIENT OUTREACH (OUTPATIENT)
Dept: ADMINISTRATIVE | Facility: OTHER | Age: 61
End: 2021-03-01

## 2021-03-02 ENCOUNTER — OFFICE VISIT (OUTPATIENT)
Dept: PODIATRY | Facility: CLINIC | Age: 61
End: 2021-03-02
Payer: MEDICAID

## 2021-03-02 VITALS
HEART RATE: 96 BPM | DIASTOLIC BLOOD PRESSURE: 78 MMHG | HEIGHT: 64 IN | WEIGHT: 142.44 LBS | SYSTOLIC BLOOD PRESSURE: 119 MMHG | BODY MASS INDEX: 24.32 KG/M2

## 2021-03-02 DIAGNOSIS — M62.461 GASTROCNEMIUS EQUINUS OF RIGHT LOWER EXTREMITY: ICD-10-CM

## 2021-03-02 DIAGNOSIS — M77.41 METATARSALGIA OF RIGHT FOOT: Primary | ICD-10-CM

## 2021-03-02 PROCEDURE — 99213 OFFICE O/P EST LOW 20 MIN: CPT | Mod: S$PBB,,, | Performed by: PODIATRIST

## 2021-03-02 PROCEDURE — 99213 PR OFFICE/OUTPT VISIT, EST, LEVL III, 20-29 MIN: ICD-10-PCS | Mod: S$PBB,,, | Performed by: PODIATRIST

## 2021-03-02 PROCEDURE — 99999 PR PBB SHADOW E&M-EST. PATIENT-LVL IV: CPT | Mod: PBBFAC,,, | Performed by: PODIATRIST

## 2021-03-02 PROCEDURE — 99214 OFFICE O/P EST MOD 30 MIN: CPT | Mod: PBBFAC | Performed by: PODIATRIST

## 2021-03-02 PROCEDURE — 99999 PR PBB SHADOW E&M-EST. PATIENT-LVL IV: ICD-10-PCS | Mod: PBBFAC,,, | Performed by: PODIATRIST

## 2021-03-09 ENCOUNTER — OFFICE VISIT (OUTPATIENT)
Dept: OTOLARYNGOLOGY | Facility: CLINIC | Age: 61
End: 2021-03-09
Payer: MEDICAID

## 2021-03-09 VITALS
BODY MASS INDEX: 24.6 KG/M2 | TEMPERATURE: 98 F | DIASTOLIC BLOOD PRESSURE: 92 MMHG | HEART RATE: 115 BPM | SYSTOLIC BLOOD PRESSURE: 177 MMHG | WEIGHT: 143.31 LBS

## 2021-03-09 DIAGNOSIS — F17.200 SMOKER: ICD-10-CM

## 2021-03-09 DIAGNOSIS — J34.89 NASAL DRAINAGE: ICD-10-CM

## 2021-03-09 DIAGNOSIS — J38.2 VOCAL CORD NODULE: Primary | ICD-10-CM

## 2021-03-09 DIAGNOSIS — E89.0 HISTORY OF THYROIDECTOMY: ICD-10-CM

## 2021-03-09 DIAGNOSIS — J39.2 EDEMA OF THROAT: ICD-10-CM

## 2021-03-09 PROCEDURE — 31575 DIAGNOSTIC LARYNGOSCOPY: CPT | Mod: S$PBB,,, | Performed by: ORTHOPAEDIC SURGERY

## 2021-03-09 PROCEDURE — 31575 DIAGNOSTIC LARYNGOSCOPY: CPT | Mod: PBBFAC | Performed by: ORTHOPAEDIC SURGERY

## 2021-03-09 PROCEDURE — 99204 OFFICE O/P NEW MOD 45 MIN: CPT | Mod: 25,S$PBB,, | Performed by: ORTHOPAEDIC SURGERY

## 2021-03-09 PROCEDURE — 31575 PR LARYNGOSCOPY, FLEXIBLE; DIAGNOSTIC: ICD-10-PCS | Mod: S$PBB,,, | Performed by: ORTHOPAEDIC SURGERY

## 2021-03-09 PROCEDURE — 99214 OFFICE O/P EST MOD 30 MIN: CPT | Mod: PBBFAC,25 | Performed by: ORTHOPAEDIC SURGERY

## 2021-03-09 PROCEDURE — 99999 PR PBB SHADOW E&M-EST. PATIENT-LVL IV: CPT | Mod: PBBFAC,,, | Performed by: ORTHOPAEDIC SURGERY

## 2021-03-09 PROCEDURE — 99999 PR PBB SHADOW E&M-EST. PATIENT-LVL IV: ICD-10-PCS | Mod: PBBFAC,,, | Performed by: ORTHOPAEDIC SURGERY

## 2021-03-09 PROCEDURE — 99204 PR OFFICE/OUTPT VISIT, NEW, LEVL IV, 45-59 MIN: ICD-10-PCS | Mod: 25,S$PBB,, | Performed by: ORTHOPAEDIC SURGERY

## 2021-03-17 DIAGNOSIS — J31.0 CHRONIC RHINITIS: ICD-10-CM

## 2021-03-17 DIAGNOSIS — J30.9 CHRONIC ALLERGIC RHINITIS: ICD-10-CM

## 2021-03-17 RX ORDER — FLUTICASONE PROPIONATE 50 MCG
1 SPRAY, SUSPENSION (ML) NASAL DAILY
Qty: 16 G | Refills: 4 | Status: CANCELLED | OUTPATIENT
Start: 2021-03-17

## 2021-03-17 RX ORDER — IPRATROPIUM BROMIDE 21 UG/1
SPRAY, METERED NASAL
Qty: 30 ML | Refills: 2 | Status: SHIPPED | OUTPATIENT
Start: 2021-03-17 | End: 2021-11-17

## 2021-03-19 ENCOUNTER — IMMUNIZATION (OUTPATIENT)
Dept: INTERNAL MEDICINE | Facility: CLINIC | Age: 61
End: 2021-03-19
Payer: MEDICAID

## 2021-03-19 DIAGNOSIS — Z23 NEED FOR VACCINATION: Primary | ICD-10-CM

## 2021-03-19 PROCEDURE — 91300 COVID-19, MRNA, LNP-S, PF, 30 MCG/0.3 ML DOSE VACCINE: CPT | Mod: PBBFAC | Performed by: FAMILY MEDICINE

## 2021-04-09 ENCOUNTER — IMMUNIZATION (OUTPATIENT)
Dept: INTERNAL MEDICINE | Facility: CLINIC | Age: 61
End: 2021-04-09

## 2021-04-09 DIAGNOSIS — Z23 NEED FOR VACCINATION: Primary | ICD-10-CM

## 2021-04-09 DIAGNOSIS — J30.9 CHRONIC ALLERGIC RHINITIS: ICD-10-CM

## 2021-04-09 PROCEDURE — 91300 COVID-19, MRNA, LNP-S, PF, 30 MCG/0.3 ML DOSE VACCINE: ICD-10-PCS | Mod: S$GLB,,, | Performed by: FAMILY MEDICINE

## 2021-04-09 PROCEDURE — 0002A COVID-19, MRNA, LNP-S, PF, 30 MCG/0.3 ML DOSE VACCINE: ICD-10-PCS | Mod: CV19,S$GLB,, | Performed by: FAMILY MEDICINE

## 2021-04-09 PROCEDURE — 0002A COVID-19, MRNA, LNP-S, PF, 30 MCG/0.3 ML DOSE VACCINE: CPT | Mod: CV19,S$GLB,, | Performed by: FAMILY MEDICINE

## 2021-04-09 PROCEDURE — 91300 COVID-19, MRNA, LNP-S, PF, 30 MCG/0.3 ML DOSE VACCINE: CPT | Mod: S$GLB,,, | Performed by: FAMILY MEDICINE

## 2021-04-12 RX ORDER — FLUNISOLIDE 0.25 MG/ML
2 SOLUTION NASAL 2 TIMES DAILY
Qty: 25 ML | Refills: 1 | Status: SHIPPED | OUTPATIENT
Start: 2021-04-12 | End: 2021-04-14 | Stop reason: SDUPTHER

## 2021-04-14 DIAGNOSIS — J30.9 CHRONIC ALLERGIC RHINITIS: ICD-10-CM

## 2021-04-14 RX ORDER — FLUNISOLIDE 0.25 MG/ML
2 SOLUTION NASAL 2 TIMES DAILY
Qty: 25 ML | Refills: 1 | Status: SHIPPED | OUTPATIENT
Start: 2021-04-14 | End: 2021-07-14

## 2021-04-15 DIAGNOSIS — F41.9 ANXIETY: ICD-10-CM

## 2021-04-15 RX ORDER — ALPRAZOLAM 2 MG/1
TABLET ORAL
Qty: 60 TABLET | Refills: 0 | Status: SHIPPED | OUTPATIENT
Start: 2021-04-15 | End: 2021-04-23 | Stop reason: SDUPTHER

## 2021-04-22 ENCOUNTER — CLINICAL SUPPORT (OUTPATIENT)
Dept: SMOKING CESSATION | Facility: CLINIC | Age: 61
End: 2021-04-22
Payer: COMMERCIAL

## 2021-04-22 DIAGNOSIS — F17.200 NICOTINE DEPENDENCE: Primary | ICD-10-CM

## 2021-04-22 PROCEDURE — 99407 BEHAV CHNG SMOKING > 10 MIN: CPT | Mod: S$GLB,,,

## 2021-04-22 PROCEDURE — 99407 PR TOBACCO USE CESSATION INTENSIVE >10 MINUTES: ICD-10-PCS | Mod: S$GLB,,,

## 2021-04-23 DIAGNOSIS — F41.9 ANXIETY: ICD-10-CM

## 2021-04-23 RX ORDER — ALPRAZOLAM 2 MG/1
TABLET ORAL
Qty: 60 TABLET | Refills: 0 | Status: SHIPPED | OUTPATIENT
Start: 2021-04-23 | End: 2021-07-14 | Stop reason: SDUPTHER

## 2021-05-11 ENCOUNTER — TELEPHONE (OUTPATIENT)
Dept: FAMILY MEDICINE | Facility: CLINIC | Age: 61
End: 2021-05-11

## 2021-07-01 ENCOUNTER — TELEPHONE (OUTPATIENT)
Dept: INTERNAL MEDICINE | Facility: CLINIC | Age: 61
End: 2021-07-01

## 2021-07-14 ENCOUNTER — OFFICE VISIT (OUTPATIENT)
Dept: INTERNAL MEDICINE | Facility: CLINIC | Age: 61
End: 2021-07-14
Payer: MEDICAID

## 2021-07-14 ENCOUNTER — LAB VISIT (OUTPATIENT)
Dept: LAB | Facility: HOSPITAL | Age: 61
End: 2021-07-14
Attending: FAMILY MEDICINE
Payer: MEDICAID

## 2021-07-14 VITALS
DIASTOLIC BLOOD PRESSURE: 82 MMHG | SYSTOLIC BLOOD PRESSURE: 134 MMHG | TEMPERATURE: 98 F | RESPIRATION RATE: 18 BRPM | HEIGHT: 64 IN | WEIGHT: 138.44 LBS | OXYGEN SATURATION: 97 % | HEART RATE: 104 BPM | BODY MASS INDEX: 23.64 KG/M2

## 2021-07-14 DIAGNOSIS — Z00.00 ROUTINE PHYSICAL EXAMINATION: Primary | ICD-10-CM

## 2021-07-14 DIAGNOSIS — Z12.11 COLON CANCER SCREENING: ICD-10-CM

## 2021-07-14 DIAGNOSIS — Z00.00 ROUTINE PHYSICAL EXAMINATION: ICD-10-CM

## 2021-07-14 DIAGNOSIS — F41.9 ANXIETY: ICD-10-CM

## 2021-07-14 DIAGNOSIS — R53.83 FATIGUE, UNSPECIFIED TYPE: ICD-10-CM

## 2021-07-14 DIAGNOSIS — Z12.31 SCREENING MAMMOGRAM, ENCOUNTER FOR: ICD-10-CM

## 2021-07-14 PROCEDURE — 36415 COLL VENOUS BLD VENIPUNCTURE: CPT | Performed by: FAMILY MEDICINE

## 2021-07-14 PROCEDURE — 82607 VITAMIN B-12: CPT | Performed by: FAMILY MEDICINE

## 2021-07-14 PROCEDURE — 80053 COMPREHEN METABOLIC PANEL: CPT | Performed by: FAMILY MEDICINE

## 2021-07-14 PROCEDURE — 99396 PREV VISIT EST AGE 40-64: CPT | Mod: S$PBB,,, | Performed by: FAMILY MEDICINE

## 2021-07-14 PROCEDURE — 99999 PR PBB SHADOW E&M-EST. PATIENT-LVL V: CPT | Mod: PBBFAC,,, | Performed by: FAMILY MEDICINE

## 2021-07-14 PROCEDURE — 99215 OFFICE O/P EST HI 40 MIN: CPT | Mod: PBBFAC,25 | Performed by: FAMILY MEDICINE

## 2021-07-14 PROCEDURE — 99396 PR PREVENTIVE VISIT,EST,40-64: ICD-10-PCS | Mod: S$PBB,,, | Performed by: FAMILY MEDICINE

## 2021-07-14 PROCEDURE — 96372 THER/PROPH/DIAG INJ SC/IM: CPT | Mod: PBBFAC

## 2021-07-14 PROCEDURE — 80061 LIPID PANEL: CPT | Performed by: FAMILY MEDICINE

## 2021-07-14 PROCEDURE — 99999 PR PBB SHADOW E&M-EST. PATIENT-LVL V: ICD-10-PCS | Mod: PBBFAC,,, | Performed by: FAMILY MEDICINE

## 2021-07-14 PROCEDURE — 84443 ASSAY THYROID STIM HORMONE: CPT | Performed by: FAMILY MEDICINE

## 2021-07-14 RX ORDER — ALPRAZOLAM 2 MG/1
TABLET ORAL
Qty: 60 TABLET | Refills: 0 | Status: SHIPPED | OUTPATIENT
Start: 2021-07-14 | End: 2021-07-14 | Stop reason: SDUPTHER

## 2021-07-14 RX ORDER — ESTRADIOL 10 UG/1
1 INSERT VAGINAL
COMMUNITY
Start: 2021-06-18 | End: 2023-03-27

## 2021-07-14 RX ORDER — FLUTICASONE PROPIONATE 50 MCG
1 SPRAY, SUSPENSION (ML) NASAL DAILY
Qty: 18.2 ML | Refills: 1 | Status: SHIPPED | OUTPATIENT
Start: 2021-07-14 | End: 2022-12-19 | Stop reason: SDUPTHER

## 2021-07-14 RX ORDER — ALPRAZOLAM 2 MG/1
TABLET ORAL
Qty: 60 TABLET | Refills: 0 | Status: SHIPPED | OUTPATIENT
Start: 2021-07-14 | End: 2021-09-15

## 2021-07-14 RX ORDER — CYANOCOBALAMIN 1000 UG/ML
1000 INJECTION, SOLUTION INTRAMUSCULAR; SUBCUTANEOUS ONCE
Status: COMPLETED | OUTPATIENT
Start: 2021-07-14 | End: 2021-07-14

## 2021-07-14 RX ORDER — DICLOFENAC SODIUM 20 MG/G
SOLUTION TOPICAL
COMMUNITY
Start: 2021-07-06 | End: 2022-08-24

## 2021-07-14 RX ADMIN — CYANOCOBALAMIN 1000 MCG: 1000 INJECTION, SOLUTION INTRAMUSCULAR at 10:07

## 2021-07-15 LAB
ALBUMIN SERPL BCP-MCNC: 3.8 G/DL (ref 3.5–5.2)
ALP SERPL-CCNC: 62 U/L (ref 55–135)
ALT SERPL W/O P-5'-P-CCNC: 32 U/L (ref 10–44)
ANION GAP SERPL CALC-SCNC: 11 MMOL/L (ref 8–16)
AST SERPL-CCNC: 33 U/L (ref 10–40)
BILIRUB SERPL-MCNC: 0.5 MG/DL (ref 0.1–1)
BUN SERPL-MCNC: 9 MG/DL (ref 6–20)
CALCIUM SERPL-MCNC: 9.2 MG/DL (ref 8.7–10.5)
CHLORIDE SERPL-SCNC: 105 MMOL/L (ref 95–110)
CHOLEST SERPL-MCNC: 168 MG/DL (ref 120–199)
CHOLEST/HDLC SERPL: 2.3 {RATIO} (ref 2–5)
CO2 SERPL-SCNC: 25 MMOL/L (ref 23–29)
CREAT SERPL-MCNC: 0.9 MG/DL (ref 0.5–1.4)
EST. GFR  (AFRICAN AMERICAN): >60 ML/MIN/1.73 M^2
EST. GFR  (NON AFRICAN AMERICAN): >60 ML/MIN/1.73 M^2
GLUCOSE SERPL-MCNC: 88 MG/DL (ref 70–110)
HDLC SERPL-MCNC: 74 MG/DL (ref 40–75)
HDLC SERPL: 44 % (ref 20–50)
LDLC SERPL CALC-MCNC: 80.4 MG/DL (ref 63–159)
NONHDLC SERPL-MCNC: 94 MG/DL
POTASSIUM SERPL-SCNC: 3.5 MMOL/L (ref 3.5–5.1)
PROT SERPL-MCNC: 8.4 G/DL (ref 6–8.4)
SODIUM SERPL-SCNC: 141 MMOL/L (ref 136–145)
TRIGL SERPL-MCNC: 68 MG/DL (ref 30–150)
TSH SERPL DL<=0.005 MIU/L-ACNC: 1.32 UIU/ML (ref 0.4–4)
VIT B12 SERPL-MCNC: 648 PG/ML (ref 210–950)

## 2021-07-20 ENCOUNTER — PATIENT MESSAGE (OUTPATIENT)
Dept: INTERNAL MEDICINE | Facility: CLINIC | Age: 61
End: 2021-07-20

## 2021-07-20 DIAGNOSIS — R05.9 COUGH: Primary | ICD-10-CM

## 2021-07-20 RX ORDER — PROMETHAZINE HYDROCHLORIDE AND CODEINE PHOSPHATE 6.25; 1 MG/5ML; MG/5ML
5 SOLUTION ORAL EVERY 4 HOURS PRN
Qty: 118 ML | Refills: 0 | Status: SHIPPED | OUTPATIENT
Start: 2021-07-20 | End: 2021-10-05

## 2021-08-17 ENCOUNTER — PATIENT OUTREACH (OUTPATIENT)
Dept: ADMINISTRATIVE | Facility: OTHER | Age: 61
End: 2021-08-17

## 2021-08-17 ENCOUNTER — OFFICE VISIT (OUTPATIENT)
Dept: PODIATRY | Facility: CLINIC | Age: 61
End: 2021-08-17
Payer: MEDICAID

## 2021-08-17 VITALS — HEIGHT: 64 IN | BODY MASS INDEX: 23.53 KG/M2 | WEIGHT: 137.81 LBS

## 2021-08-17 DIAGNOSIS — M77.41 METATARSALGIA OF RIGHT FOOT: Primary | ICD-10-CM

## 2021-08-17 DIAGNOSIS — M62.461 GASTROCNEMIUS EQUINUS OF RIGHT LOWER EXTREMITY: ICD-10-CM

## 2021-08-17 PROCEDURE — 99214 PR OFFICE/OUTPT VISIT, EST, LEVL IV, 30-39 MIN: ICD-10-PCS | Mod: S$PBB,,, | Performed by: PODIATRIST

## 2021-08-17 PROCEDURE — 99214 OFFICE O/P EST MOD 30 MIN: CPT | Mod: PBBFAC | Performed by: PODIATRIST

## 2021-08-17 PROCEDURE — 99999 PR PBB SHADOW E&M-EST. PATIENT-LVL IV: CPT | Mod: PBBFAC,,, | Performed by: PODIATRIST

## 2021-08-17 PROCEDURE — 99999 PR PBB SHADOW E&M-EST. PATIENT-LVL IV: ICD-10-PCS | Mod: PBBFAC,,, | Performed by: PODIATRIST

## 2021-08-17 PROCEDURE — 99214 OFFICE O/P EST MOD 30 MIN: CPT | Mod: S$PBB,,, | Performed by: PODIATRIST

## 2021-09-02 ENCOUNTER — OFFICE VISIT (OUTPATIENT)
Dept: CARDIOLOGY | Facility: CLINIC | Age: 61
End: 2021-09-02
Payer: MEDICAID

## 2021-09-02 VITALS
BODY MASS INDEX: 23.65 KG/M2 | OXYGEN SATURATION: 99 % | DIASTOLIC BLOOD PRESSURE: 72 MMHG | HEART RATE: 88 BPM | HEIGHT: 64 IN | SYSTOLIC BLOOD PRESSURE: 132 MMHG

## 2021-09-02 DIAGNOSIS — Z72.0 TOBACCO ABUSE DISORDER: Primary | ICD-10-CM

## 2021-09-02 DIAGNOSIS — M79.604 PAIN OF RIGHT LOWER EXTREMITY: ICD-10-CM

## 2021-09-02 DIAGNOSIS — R00.0 TACHYCARDIA: ICD-10-CM

## 2021-09-02 DIAGNOSIS — E78.5 DYSLIPIDEMIA: ICD-10-CM

## 2021-09-02 DIAGNOSIS — I73.9 PVD (PERIPHERAL VASCULAR DISEASE): ICD-10-CM

## 2021-09-02 DIAGNOSIS — I10 ESSENTIAL HYPERTENSION: ICD-10-CM

## 2021-09-02 DIAGNOSIS — I35.1 NONRHEUMATIC AORTIC VALVE INSUFFICIENCY: ICD-10-CM

## 2021-09-02 PROCEDURE — 99214 PR OFFICE/OUTPT VISIT, EST, LEVL IV, 30-39 MIN: ICD-10-PCS | Mod: S$PBB,,, | Performed by: INTERNAL MEDICINE

## 2021-09-02 PROCEDURE — 99214 OFFICE O/P EST MOD 30 MIN: CPT | Mod: S$PBB,,, | Performed by: INTERNAL MEDICINE

## 2021-09-02 PROCEDURE — 99999 PR PBB SHADOW E&M-EST. PATIENT-LVL III: CPT | Mod: PBBFAC,,, | Performed by: INTERNAL MEDICINE

## 2021-09-02 PROCEDURE — 99213 OFFICE O/P EST LOW 20 MIN: CPT | Mod: PBBFAC | Performed by: INTERNAL MEDICINE

## 2021-09-02 PROCEDURE — 99999 PR PBB SHADOW E&M-EST. PATIENT-LVL III: ICD-10-PCS | Mod: PBBFAC,,, | Performed by: INTERNAL MEDICINE

## 2021-09-07 ENCOUNTER — CLINICAL SUPPORT (OUTPATIENT)
Dept: REHABILITATION | Facility: HOSPITAL | Age: 61
End: 2021-09-07
Attending: PODIATRIST
Payer: MEDICAID

## 2021-09-07 DIAGNOSIS — M62.461 GASTROCNEMIUS EQUINUS OF RIGHT LOWER EXTREMITY: ICD-10-CM

## 2021-09-07 DIAGNOSIS — R26.2 DIFFICULTY WALKING: ICD-10-CM

## 2021-09-07 DIAGNOSIS — M77.41 METATARSALGIA OF RIGHT FOOT: ICD-10-CM

## 2021-09-07 PROCEDURE — 97162 PT EVAL MOD COMPLEX 30 MIN: CPT

## 2021-09-07 PROCEDURE — 97110 THERAPEUTIC EXERCISES: CPT

## 2021-09-15 ENCOUNTER — PATIENT MESSAGE (OUTPATIENT)
Dept: INTERNAL MEDICINE | Facility: CLINIC | Age: 61
End: 2021-09-15

## 2021-09-15 DIAGNOSIS — F41.9 ANXIETY: ICD-10-CM

## 2021-09-15 RX ORDER — ALPRAZOLAM 2 MG/1
TABLET ORAL
Qty: 60 TABLET | Refills: 0 | Status: SHIPPED | OUTPATIENT
Start: 2021-09-15 | End: 2021-10-15

## 2021-09-24 ENCOUNTER — HOSPITAL ENCOUNTER (OUTPATIENT)
Dept: CARDIOLOGY | Facility: HOSPITAL | Age: 61
Discharge: HOME | End: 2021-09-24
Attending: INTERNAL MEDICINE
Payer: MEDICAID

## 2021-09-24 ENCOUNTER — TELEPHONE (OUTPATIENT)
Dept: GASTROENTEROLOGY | Facility: CLINIC | Age: 61
End: 2021-09-24

## 2021-09-24 VITALS
WEIGHT: 137 LBS | WEIGHT: 137 LBS | SYSTOLIC BLOOD PRESSURE: 114 MMHG | BODY MASS INDEX: 23.39 KG/M2 | BODY MASS INDEX: 23.39 KG/M2 | DIASTOLIC BLOOD PRESSURE: 62 MMHG | BODY MASS INDEX: 23.39 KG/M2 | WEIGHT: 137 LBS | DIASTOLIC BLOOD PRESSURE: 62 MMHG | HEIGHT: 64 IN | HEIGHT: 64 IN | HEIGHT: 64 IN | DIASTOLIC BLOOD PRESSURE: 62 MMHG | SYSTOLIC BLOOD PRESSURE: 114 MMHG | SYSTOLIC BLOOD PRESSURE: 114 MMHG

## 2021-09-24 DIAGNOSIS — M79.604 PAIN OF RIGHT LOWER EXTREMITY: ICD-10-CM

## 2021-09-24 DIAGNOSIS — I73.9 PVD (PERIPHERAL VASCULAR DISEASE): ICD-10-CM

## 2021-09-24 DIAGNOSIS — I35.1 NONRHEUMATIC AORTIC VALVE INSUFFICIENCY: ICD-10-CM

## 2021-09-24 PROCEDURE — 93925 LOWER EXTREMITY STUDY: CPT | Mod: 26,,, | Performed by: INTERNAL MEDICINE

## 2021-09-24 PROCEDURE — 93925 LOWER EXTREMITY STUDY: CPT

## 2021-09-24 PROCEDURE — 93922 UPR/L XTREMITY ART 2 LEVELS: CPT

## 2021-09-24 PROCEDURE — 93922 UPR/L XTREMITY ART 2 LEVELS: CPT | Mod: 26,,, | Performed by: INTERNAL MEDICINE

## 2021-09-24 PROCEDURE — 93925 CV US DOPPLER ARTERIAL LEGS BILATERAL (CUPID ONLY): ICD-10-PCS | Mod: 26,,, | Performed by: INTERNAL MEDICINE

## 2021-09-24 PROCEDURE — 93971 CV US DOPPLER VENOUS LEG RIGHT (CUPID ONLY): ICD-10-PCS | Mod: 26,RT,, | Performed by: INTERNAL MEDICINE

## 2021-09-24 PROCEDURE — 93922 ANKLE BRACHIAL INDICES (ABI): ICD-10-PCS | Mod: 26,,, | Performed by: INTERNAL MEDICINE

## 2021-09-24 PROCEDURE — 93971 EXTREMITY STUDY: CPT | Mod: RT

## 2021-09-24 PROCEDURE — 93971 EXTREMITY STUDY: CPT | Mod: 26,RT,, | Performed by: INTERNAL MEDICINE

## 2021-09-27 LAB
LEFT ABI: 1.02
LEFT ANT TIBIAL SYS PSV: 51 CM/S
LEFT ARM BP: 114 MMHG
LEFT CFA PSV: 122 CM/S
LEFT DORSALIS PEDIS: 116 MMHG
LEFT PERONEAL SYS PSV: 46 CM/S
LEFT POPLITEAL PSV: 56 CM/S
LEFT POST TIBIAL SYS PSV: 59 CM/S
LEFT POSTERIOR TIBIAL: 91 MMHG
LEFT PROFUNDA SYS PSV: 66 CM/S
LEFT SUPER FEMORAL DIST SYS PSV: 70 CM/S
LEFT SUPER FEMORAL MID SYS PSV: 95 CM/S
LEFT SUPER FEMORAL OSTIAL SYS PSV: 117 CM/S
LEFT SUPER FEMORAL PROX SYS PSV: 115 CM/S
LEFT TBI: 0.77
LEFT TIB/PER TRUNK SYS PSV: 56 CM/S
LEFT TOE PRESSURE: 88 MMHG
OHS CV LEFT LOWER EXTREMITY ABI (NO CALC): 1.02
OHS CV RIGHT ABI LOWER EXTREMITY (NO CALC): 1.16
RIGHT ABI: 1.16
RIGHT ANT TIBIAL SYS PSV: 45 CM/S
RIGHT ARM BP: 108 MMHG
RIGHT CFA PSV: 111 CM/S
RIGHT DORSALIS PEDIS: 132 MMHG
RIGHT PERONEAL SYS PSV: 52 CM/S
RIGHT POPLITEAL PSV: 50 CM/S
RIGHT POST TIBIAL SYS PSV: 38 CM/S
RIGHT POSTERIOR TIBIAL: 108 MMHG
RIGHT PROFUNDA SYS PSV: 112 CM/S
RIGHT SUPER FEMORAL DIST SYS PSV: 66 CM/S
RIGHT SUPER FEMORAL MID SYS PSV: 105 CM/S
RIGHT SUPER FEMORAL OSTIAL SYS PSV: 82 CM/S
RIGHT SUPER FEMORAL PROX SYS PSV: 84 CM/S
RIGHT TBI: 0.75
RIGHT TIB/PER TRUNK SYS PSV: 38 CM/S
RIGHT TOE PRESSURE: 86 MMHG

## 2021-09-28 ENCOUNTER — TELEPHONE (OUTPATIENT)
Dept: CARDIOLOGY | Facility: CLINIC | Age: 61
End: 2021-09-28

## 2021-10-01 ENCOUNTER — OFFICE VISIT (OUTPATIENT)
Dept: GASTROENTEROLOGY | Facility: CLINIC | Age: 61
End: 2021-10-01
Payer: MEDICAID

## 2021-10-01 ENCOUNTER — PATIENT OUTREACH (OUTPATIENT)
Dept: ADMINISTRATIVE | Facility: OTHER | Age: 61
End: 2021-10-01

## 2021-10-01 VITALS
DIASTOLIC BLOOD PRESSURE: 68 MMHG | SYSTOLIC BLOOD PRESSURE: 104 MMHG | OXYGEN SATURATION: 98 % | HEART RATE: 104 BPM | BODY MASS INDEX: 23.93 KG/M2 | WEIGHT: 140.19 LBS | HEIGHT: 64 IN

## 2021-10-01 DIAGNOSIS — R10.13 EPIGASTRIC PAIN: Primary | ICD-10-CM

## 2021-10-01 DIAGNOSIS — K59.09 CHRONIC CONSTIPATION: ICD-10-CM

## 2021-10-01 DIAGNOSIS — K21.9 GASTROESOPHAGEAL REFLUX DISEASE, UNSPECIFIED WHETHER ESOPHAGITIS PRESENT: ICD-10-CM

## 2021-10-01 PROCEDURE — 99214 PR OFFICE/OUTPT VISIT, EST, LEVL IV, 30-39 MIN: ICD-10-PCS | Mod: S$PBB,,, | Performed by: INTERNAL MEDICINE

## 2021-10-01 PROCEDURE — 99213 OFFICE O/P EST LOW 20 MIN: CPT | Mod: PBBFAC | Performed by: INTERNAL MEDICINE

## 2021-10-01 PROCEDURE — 99214 OFFICE O/P EST MOD 30 MIN: CPT | Mod: S$PBB,,, | Performed by: INTERNAL MEDICINE

## 2021-10-01 PROCEDURE — 99999 PR PBB SHADOW E&M-EST. PATIENT-LVL III: CPT | Mod: PBBFAC,,, | Performed by: INTERNAL MEDICINE

## 2021-10-01 PROCEDURE — 99999 PR PBB SHADOW E&M-EST. PATIENT-LVL III: ICD-10-PCS | Mod: PBBFAC,,, | Performed by: INTERNAL MEDICINE

## 2021-10-04 ENCOUNTER — TELEPHONE (OUTPATIENT)
Dept: GASTROENTEROLOGY | Facility: CLINIC | Age: 61
End: 2021-10-04
Payer: MEDICAID

## 2021-10-04 DIAGNOSIS — R05.9 COUGH: ICD-10-CM

## 2021-10-05 ENCOUNTER — HOSPITAL ENCOUNTER (OUTPATIENT)
Dept: CARDIOLOGY | Facility: HOSPITAL | Age: 61
Discharge: HOME | End: 2021-10-05
Attending: INTERNAL MEDICINE
Payer: MEDICAID

## 2021-10-05 VITALS — WEIGHT: 140 LBS | BODY MASS INDEX: 23.9 KG/M2 | HEIGHT: 64 IN

## 2021-10-05 LAB
AORTIC ROOT ANNULUS: 2.44 CM
ASCENDING AORTA: 2.98 CM
AV INDEX (PROSTH): 0.83
AV MEAN GRADIENT: 4 MMHG
AV PEAK GRADIENT: 7 MMHG
AV VALVE AREA: 2.54 CM2
AV VELOCITY RATIO: 0.89
BSA FOR ECHO PROCEDURE: 1.69 M2
CV ECHO LV RWT: 0.4 CM
DOP CALC AO PEAK VEL: 1.33 M/S
DOP CALC AO VTI: 29.95 CM
DOP CALC LVOT AREA: 3 CM2
DOP CALC LVOT DIAMETER: 1.97 CM
DOP CALC LVOT PEAK VEL: 1.18 M/S
DOP CALC LVOT STROKE VOLUME: 76.16 CM3
DOP CALC RVOT PEAK VEL: 0.78 M/S
DOP CALC RVOT VTI: 15.97 CM
DOP CALCLVOT PEAK VEL VTI: 25 CM
E WAVE DECELERATION TIME: 157.02 MSEC
E/A RATIO: 0.98
E/E' RATIO: 7 M/S
ECHO LV POSTERIOR WALL: 0.86 CM (ref 0.6–1.1)
EJECTION FRACTION: 60 %
FRACTIONAL SHORTENING: 33 % (ref 28–44)
INTERVENTRICULAR SEPTUM: 0.77 CM (ref 0.6–1.1)
IVRT: 88.49 MSEC
LA MAJOR: 5.35 CM
LA MINOR: 5.02 CM
LA WIDTH: 2.84 CM
LEFT ATRIUM SIZE: 3.13 CM
LEFT ATRIUM VOLUME INDEX: 23.3 ML/M2
LEFT ATRIUM VOLUME: 39.14 CM3
LEFT INTERNAL DIMENSION IN SYSTOLE: 2.87 CM (ref 2.1–4)
LEFT VENTRICLE DIASTOLIC VOLUME INDEX: 48.89 ML/M2
LEFT VENTRICLE DIASTOLIC VOLUME: 82.13 ML
LEFT VENTRICLE MASS INDEX: 64 G/M2
LEFT VENTRICLE SYSTOLIC VOLUME INDEX: 18.6 ML/M2
LEFT VENTRICLE SYSTOLIC VOLUME: 31.32 ML
LEFT VENTRICULAR INTERNAL DIMENSION IN DIASTOLE: 4.28 CM (ref 3.5–6)
LEFT VENTRICULAR MASS: 107.11 G
LV LATERAL E/E' RATIO: 5.73 M/S
LV SEPTAL E/E' RATIO: 9 M/S
MV PEAK A VEL: 0.64 M/S
MV PEAK E VEL: 0.63 M/S
PULM VEIN S/D RATIO: 1.36
PV MEAN GRADIENT: 1 MMHG
PV PEAK D VEL: 0.44 M/S
PV PEAK S VEL: 0.6 M/S
PV PEAK VELOCITY: 0.9 CM/S
RA MAJOR: 4.17 CM
RA PRESSURE: 3 MMHG
RA WIDTH: 3.31 CM
RIGHT VENTRICULAR END-DIASTOLIC DIMENSION: 2.96 CM
SINUS: 3.09 CM
STJ: 3.14 CM
TDI LATERAL: 0.11 M/S
TDI SEPTAL: 0.07 M/S
TDI: 0.09 M/S
TRICUSPID ANNULAR PLANE SYSTOLIC EXCURSION: 1.64 CM

## 2021-10-05 PROCEDURE — 93306 ECHO (CUPID ONLY): ICD-10-PCS | Mod: 26,,, | Performed by: INTERNAL MEDICINE

## 2021-10-05 PROCEDURE — 93306 TTE W/DOPPLER COMPLETE: CPT | Mod: 26,,, | Performed by: INTERNAL MEDICINE

## 2021-10-05 PROCEDURE — 93306 TTE W/DOPPLER COMPLETE: CPT

## 2021-10-05 RX ORDER — PROMETHAZINE HYDROCHLORIDE AND CODEINE PHOSPHATE 6.25; 1 MG/5ML; MG/5ML
SOLUTION ORAL
Qty: 118 ML | Refills: 0 | Status: SHIPPED | OUTPATIENT
Start: 2021-10-05 | End: 2021-11-29 | Stop reason: SDUPTHER

## 2021-10-06 ENCOUNTER — TELEPHONE (OUTPATIENT)
Dept: CARDIOLOGY | Facility: CLINIC | Age: 61
End: 2021-10-06

## 2021-10-06 ENCOUNTER — CLINICAL SUPPORT (OUTPATIENT)
Dept: SMOKING CESSATION | Facility: CLINIC | Age: 61
End: 2021-10-06
Payer: COMMERCIAL

## 2021-10-06 DIAGNOSIS — F17.200 NICOTINE DEPENDENCE: Primary | ICD-10-CM

## 2021-10-06 PROCEDURE — 99407 BEHAV CHNG SMOKING > 10 MIN: CPT | Mod: S$GLB,,,

## 2021-10-06 PROCEDURE — 99407 PR TOBACCO USE CESSATION INTENSIVE >10 MINUTES: ICD-10-PCS | Mod: S$GLB,,,

## 2021-10-13 ENCOUNTER — TELEPHONE (OUTPATIENT)
Dept: INTERNAL MEDICINE | Facility: CLINIC | Age: 61
End: 2021-10-13

## 2021-10-13 ENCOUNTER — PATIENT MESSAGE (OUTPATIENT)
Dept: INTERNAL MEDICINE | Facility: CLINIC | Age: 61
End: 2021-10-13
Payer: MEDICAID

## 2021-10-14 DIAGNOSIS — F41.9 ANXIETY: ICD-10-CM

## 2021-10-15 RX ORDER — ALPRAZOLAM 2 MG/1
TABLET ORAL
Qty: 60 TABLET | Refills: 0 | Status: SHIPPED | OUTPATIENT
Start: 2021-10-15 | End: 2021-11-16

## 2021-10-18 ENCOUNTER — TELEPHONE (OUTPATIENT)
Dept: INTERNAL MEDICINE | Facility: CLINIC | Age: 61
End: 2021-10-18

## 2021-10-18 ENCOUNTER — PATIENT MESSAGE (OUTPATIENT)
Dept: INTERNAL MEDICINE | Facility: CLINIC | Age: 61
End: 2021-10-18
Payer: MEDICAID

## 2021-11-10 ENCOUNTER — PATIENT MESSAGE (OUTPATIENT)
Dept: INTERNAL MEDICINE | Facility: CLINIC | Age: 61
End: 2021-11-10
Payer: MEDICAID

## 2021-11-17 ENCOUNTER — OFFICE VISIT (OUTPATIENT)
Dept: INTERNAL MEDICINE | Facility: CLINIC | Age: 61
End: 2021-11-17
Payer: MEDICAID

## 2021-11-17 VITALS
TEMPERATURE: 98 F | HEIGHT: 64 IN | OXYGEN SATURATION: 97 % | RESPIRATION RATE: 18 BRPM | HEART RATE: 112 BPM | BODY MASS INDEX: 23.78 KG/M2 | SYSTOLIC BLOOD PRESSURE: 120 MMHG | WEIGHT: 139.31 LBS | DIASTOLIC BLOOD PRESSURE: 78 MMHG

## 2021-11-17 DIAGNOSIS — F41.9 ANXIETY: Primary | ICD-10-CM

## 2021-11-17 DIAGNOSIS — Z72.0 TOBACCO ABUSE DISORDER: ICD-10-CM

## 2021-11-17 PROCEDURE — 99999 PR PBB SHADOW E&M-EST. PATIENT-LVL IV: CPT | Mod: PBBFAC,,, | Performed by: FAMILY MEDICINE

## 2021-11-17 PROCEDURE — 99999 PR PBB SHADOW E&M-EST. PATIENT-LVL IV: ICD-10-PCS | Mod: PBBFAC,,, | Performed by: FAMILY MEDICINE

## 2021-11-17 PROCEDURE — 99213 PR OFFICE/OUTPT VISIT, EST, LEVL III, 20-29 MIN: ICD-10-PCS | Mod: S$PBB,,, | Performed by: FAMILY MEDICINE

## 2021-11-17 PROCEDURE — 99214 OFFICE O/P EST MOD 30 MIN: CPT | Mod: PBBFAC | Performed by: FAMILY MEDICINE

## 2021-11-17 PROCEDURE — 99213 OFFICE O/P EST LOW 20 MIN: CPT | Mod: S$PBB,,, | Performed by: FAMILY MEDICINE

## 2021-11-17 RX ORDER — LIDOCAINE 50 MG/G
PATCH TOPICAL
COMMUNITY
Start: 2021-11-12 | End: 2022-08-24

## 2021-11-17 RX ORDER — ONDANSETRON 4 MG/1
4 TABLET, FILM COATED ORAL
COMMUNITY
Start: 2021-10-18 | End: 2022-04-26

## 2021-11-17 RX ORDER — METAXALONE 800 MG/1
800 TABLET ORAL
COMMUNITY
Start: 2021-10-13 | End: 2022-04-26

## 2021-11-29 ENCOUNTER — PATIENT MESSAGE (OUTPATIENT)
Dept: INTERNAL MEDICINE | Facility: CLINIC | Age: 61
End: 2021-11-29
Payer: MEDICAID

## 2021-11-29 DIAGNOSIS — R05.9 COUGH: ICD-10-CM

## 2021-11-29 RX ORDER — PROMETHAZINE HYDROCHLORIDE AND CODEINE PHOSPHATE 6.25; 1 MG/5ML; MG/5ML
SOLUTION ORAL
Qty: 118 ML | OUTPATIENT
Start: 2021-11-29

## 2021-11-29 RX ORDER — PROMETHAZINE HYDROCHLORIDE AND CODEINE PHOSPHATE 6.25; 1 MG/5ML; MG/5ML
SOLUTION ORAL
Qty: 118 ML | Refills: 0 | Status: SHIPPED | OUTPATIENT
Start: 2021-11-29 | End: 2022-02-15

## 2021-12-14 ENCOUNTER — PATIENT MESSAGE (OUTPATIENT)
Dept: INTERNAL MEDICINE | Facility: CLINIC | Age: 61
End: 2021-12-14
Payer: MEDICAID

## 2022-01-07 ENCOUNTER — HOSPITAL ENCOUNTER (OUTPATIENT)
Dept: RADIOLOGY | Facility: HOSPITAL | Age: 62
Discharge: HOME | End: 2022-01-07
Attending: FAMILY MEDICINE
Payer: MEDICAID

## 2022-01-07 VITALS — WEIGHT: 139.31 LBS | HEIGHT: 64 IN | BODY MASS INDEX: 23.78 KG/M2

## 2022-01-07 DIAGNOSIS — Z12.31 SCREENING MAMMOGRAM, ENCOUNTER FOR: ICD-10-CM

## 2022-01-07 PROCEDURE — 77067 SCR MAMMO BI INCL CAD: CPT | Mod: 26,,, | Performed by: RADIOLOGY

## 2022-01-07 PROCEDURE — 77067 MAMMO DIGITAL SCREENING BILAT WITH TOMO: ICD-10-PCS | Mod: 26,,, | Performed by: RADIOLOGY

## 2022-01-07 PROCEDURE — 77063 BREAST TOMOSYNTHESIS BI: CPT | Mod: 26,,, | Performed by: RADIOLOGY

## 2022-01-07 PROCEDURE — 77063 MAMMO DIGITAL SCREENING BILAT WITH TOMO: ICD-10-PCS | Mod: 26,,, | Performed by: RADIOLOGY

## 2022-01-07 PROCEDURE — 77063 BREAST TOMOSYNTHESIS BI: CPT | Mod: TC

## 2022-01-12 ENCOUNTER — PATIENT MESSAGE (OUTPATIENT)
Dept: ENDOSCOPY | Facility: HOSPITAL | Age: 62
End: 2022-01-12
Payer: MEDICAID

## 2022-01-14 DIAGNOSIS — F41.9 ANXIETY: ICD-10-CM

## 2022-01-14 RX ORDER — ALPRAZOLAM 2 MG/1
2 TABLET ORAL 2 TIMES DAILY PRN
Qty: 60 TABLET | Refills: 0 | Status: SHIPPED | OUTPATIENT
Start: 2022-01-14 | End: 2022-02-15

## 2022-01-14 NOTE — TELEPHONE ENCOUNTER
Reviewed   Chronic benzo use  Reviewed last note w/ prescriber  Covering for Dr. Sibley  Will refill to prevent withdrawal

## 2022-01-14 NOTE — TELEPHONE ENCOUNTER
No new care gaps identified.  Powered by [employer]. Reference number: [id number].   1/14/2022 4:35:10 AM CST

## 2022-02-01 ENCOUNTER — PATIENT MESSAGE (OUTPATIENT)
Dept: CARDIOLOGY | Facility: CLINIC | Age: 62
End: 2022-02-01
Payer: MEDICAID

## 2022-02-01 DIAGNOSIS — I10 ESSENTIAL HYPERTENSION: ICD-10-CM

## 2022-02-02 RX ORDER — AMLODIPINE AND BENAZEPRIL HYDROCHLORIDE 5; 10 MG/1; MG/1
1 CAPSULE ORAL DAILY
Qty: 90 CAPSULE | Refills: 3 | Status: SHIPPED | OUTPATIENT
Start: 2022-02-02 | End: 2022-03-04

## 2022-02-14 DIAGNOSIS — F41.9 ANXIETY: ICD-10-CM

## 2022-02-14 NOTE — TELEPHONE ENCOUNTER
No new care gaps identified.  Powered by [employer]. Reference number: [id number].   2/14/2022 8:24:35 AM CST

## 2022-02-15 ENCOUNTER — PATIENT MESSAGE (OUTPATIENT)
Dept: INTERNAL MEDICINE | Facility: CLINIC | Age: 62
End: 2022-02-15

## 2022-02-15 ENCOUNTER — OFFICE VISIT (OUTPATIENT)
Dept: INTERNAL MEDICINE | Facility: CLINIC | Age: 62
End: 2022-02-15
Payer: MEDICAID

## 2022-02-15 DIAGNOSIS — R05.9 COUGH: ICD-10-CM

## 2022-02-15 DIAGNOSIS — F41.9 ANXIETY: Primary | ICD-10-CM

## 2022-02-15 DIAGNOSIS — U07.1 COVID-19: ICD-10-CM

## 2022-02-15 PROCEDURE — 1160F RVW MEDS BY RX/DR IN RCRD: CPT | Mod: CPTII,95,, | Performed by: PHYSICIAN ASSISTANT

## 2022-02-15 PROCEDURE — 99214 PR OFFICE/OUTPT VISIT, EST, LEVL IV, 30-39 MIN: ICD-10-PCS | Mod: 95,,, | Performed by: PHYSICIAN ASSISTANT

## 2022-02-15 PROCEDURE — 1160F PR REVIEW ALL MEDS BY PRESCRIBER/CLIN PHARMACIST DOCUMENTED: ICD-10-PCS | Mod: CPTII,95,, | Performed by: PHYSICIAN ASSISTANT

## 2022-02-15 PROCEDURE — 99214 OFFICE O/P EST MOD 30 MIN: CPT | Mod: 95,,, | Performed by: PHYSICIAN ASSISTANT

## 2022-02-15 PROCEDURE — 1159F PR MEDICATION LIST DOCUMENTED IN MEDICAL RECORD: ICD-10-PCS | Mod: CPTII,95,, | Performed by: PHYSICIAN ASSISTANT

## 2022-02-15 PROCEDURE — 1159F MED LIST DOCD IN RCRD: CPT | Mod: CPTII,95,, | Performed by: PHYSICIAN ASSISTANT

## 2022-02-15 PROCEDURE — 4010F ACE/ARB THERAPY RXD/TAKEN: CPT | Mod: CPTII,95,, | Performed by: PHYSICIAN ASSISTANT

## 2022-02-15 PROCEDURE — 4010F PR ACE/ARB THEARPY RXD/TAKEN: ICD-10-PCS | Mod: CPTII,95,, | Performed by: PHYSICIAN ASSISTANT

## 2022-02-15 RX ORDER — PROMETHAZINE HYDROCHLORIDE AND DEXTROMETHORPHAN HYDROBROMIDE 6.25; 15 MG/5ML; MG/5ML
5 SYRUP ORAL NIGHTLY PRN
Qty: 120 ML | Refills: 0 | Status: SHIPPED | OUTPATIENT
Start: 2022-02-15 | End: 2022-04-26

## 2022-02-15 RX ORDER — ALPRAZOLAM 2 MG/1
2 TABLET ORAL 2 TIMES DAILY PRN
Qty: 60 TABLET | Refills: 0 | OUTPATIENT
Start: 2022-02-15

## 2022-02-15 NOTE — PROGRESS NOTES
Subjective:      Patient ID: Ana Bonilla is a 61 y.o. female.    Chief Complaint: No chief complaint on file.      The patient location is: [place]   The chief complaint leading to consultation is: med refill    Visit type: audiovisual    Face to Face time with patient: 8:09-8:14  10 minutes of total time spent on the encounter, which includes face to face time and non-face to face time preparing to see the patient (eg, review of tests), Obtaining and/or reviewing separately obtained history, Documenting clinical information in the electronic or other health record, Independently interpreting results (not separately reported) and communicating results to the patient/family/caregiver, or Care coordination (not separately reported).     Each patient to whom he or she provides medical services by telemedicine is:  (1) informed of the relationship between the physician and patient and the respective role of any other health care provider with respect to management of the patient; and (2) notified that he or she may decline to receive medical services by telemedicine and may withdraw from such care at any time.    Notes: Patient is new to me, being seen today for medication refill.    Requesting refill of xanax, has been on medication for years, it works well for anxiety   Typically takes bid   Has a schizophrenic son that causes her a lot of stress/anxiety     COVID + Friday, symptoms started Wednesday, feeling better but still with cough    Last visit Nov 2021 with Dr. Sibley     Review of Systems   Constitutional: Positive for chills. Negative for activity change, diaphoresis, fever and unexpected weight change.   HENT: Positive for congestion, rhinorrhea, sinus pressure and sinus pain. Negative for ear pain, hearing loss, sore throat and trouble swallowing.    Eyes: Negative for discharge and visual disturbance.   Respiratory: Positive for cough and wheezing. Negative for chest tightness and shortness of breath.     Cardiovascular: Negative for chest pain and palpitations.   Gastrointestinal: Negative for blood in stool, constipation, diarrhea and vomiting.   Endocrine: Negative for polydipsia and polyuria.   Genitourinary: Negative for difficulty urinating, dysuria, hematuria and menstrual problem.   Musculoskeletal: Negative for arthralgias, joint swelling and neck pain.   Neurological: Negative for dizziness, weakness and headaches.   Psychiatric/Behavioral: Negative for confusion and dysphoric mood. The patient is nervous/anxious.        Objective:   There were no vitals taken for this visit.  Physical Exam  Constitutional:       General: She is not in acute distress.     Appearance: She is well-developed. She is not ill-appearing or diaphoretic.   HENT:      Head: Normocephalic and atraumatic.      Right Ear: External ear normal.      Left Ear: External ear normal.   Eyes:      General: Lids are normal.         Right eye: No discharge.         Left eye: No discharge.      Conjunctiva/sclera: Conjunctivae normal.      Right eye: Right conjunctiva is not injected.      Left eye: Left conjunctiva is not injected.   Pulmonary:      Effort: Pulmonary effort is normal. No respiratory distress.      Comments: Speaks in full sentences without increased respiratory effort   Skin:     General: Skin is warm and dry.      Findings: No rash.   Neurological:      Mental Status: She is alert and oriented to person, place, and time.   Psychiatric:         Speech: Speech normal.         Behavior: Behavior normal.         Thought Content: Thought content normal.         Judgment: Judgment normal.       Assessment:      1. Anxiety    2. COVID-19    3. Cough       Plan:   Anxiety    COVID-19  -     COVID-19 Home Symptom Monitoring  - Duration (days): 14    Cough  -     promethazine-dextromethorphan (PROMETHAZINE-DM) 6.25-15 mg/5 mL Syrp; Take 5 mLs by mouth nightly as needed (Cough).  Dispense: 120 mL; Refill: 0       Discussed worsening  signs/symptoms and when to return to clinic or go to ED.   Patient expresses understanding and agrees with treatment plan.

## 2022-02-21 ENCOUNTER — NURSE TRIAGE (OUTPATIENT)
Dept: ADMINISTRATIVE | Facility: CLINIC | Age: 62
End: 2022-02-21
Payer: MEDICAID

## 2022-02-21 NOTE — TELEPHONE ENCOUNTER
Pt contacted for HSM escalation - C/O sinus pressure and reports C19+ 10 days ago. -  Denies any fever or SOB, pt able to speak in full sentences without noted SOB or cough. Sinus congestion protocol used and pt advised on home care. Pt instructed to call [facility] for worsening of symptoms or health questions. Encounter routed to PCP.     Reason for Disposition   Sinus congestion as part of a cold, present < 10 days    Additional Information   Negative: Sounds like a life-threatening emergency to the triager   Negative: Difficulty breathing, and not from stuffy nose (e.g., not relieved by cleaning out the nose)   Negative: SEVERE headache and has fever   Negative: Patient sounds very sick or weak to the triager   Negative: SEVERE sinus pain   Negative: Severe headache   Negative: Redness or swelling on the cheek, forehead, or around the eye   Negative: Fever > 103 F (39.4 C)   Negative: Fever > 101 F (38.3 C) and over 60 years of age   Negative: Fever > 100.0 F (37.8 C) and has diabetes mellitus or a weak immune system (e.g., HIV positive, cancer chemotherapy, organ transplant, splenectomy, chronic steroids)   Negative: Fever > 100.0 F (37.8 C) and bedridden (e.g., nursing home patient, stroke, chronic illness, recovering from surgery)   Negative: Fever present > 3 days (72 hours)   Negative: Fever returns after gone for over 24 hours and symptoms worse or not improved   Negative: Sinus pain (not just congestion) and fever   Negative: Earache   Negative: Sinus congestion (pressure, fullness) present > 10 days   Negative: Nasal discharge present > 10 days   Negative: Using nasal washes and pain medicine > 24 hours and sinus pain (lower forehead, cheekbone, or eye) persists   Negative: Lots of coughing   Negative: Patient wants to be seen    Protocols used: SINUS PAIN OR CONGESTION-A-OH

## 2022-03-03 ENCOUNTER — PATIENT OUTREACH (OUTPATIENT)
Dept: ADMINISTRATIVE | Facility: OTHER | Age: 62
End: 2022-03-03
Payer: MEDICAID

## 2022-03-03 DIAGNOSIS — I10 ESSENTIAL HYPERTENSION: Primary | ICD-10-CM

## 2022-03-04 ENCOUNTER — OFFICE VISIT (OUTPATIENT)
Dept: CARDIOLOGY | Facility: CLINIC | Age: 62
End: 2022-03-04
Payer: MEDICAID

## 2022-03-04 ENCOUNTER — HOSPITAL ENCOUNTER (OUTPATIENT)
Dept: CARDIOLOGY | Facility: HOSPITAL | Age: 62
Discharge: HOME | End: 2022-03-04
Attending: INTERNAL MEDICINE
Payer: MEDICAID

## 2022-03-04 VITALS
HEART RATE: 98 BPM | DIASTOLIC BLOOD PRESSURE: 70 MMHG | BODY MASS INDEX: 23.34 KG/M2 | OXYGEN SATURATION: 98 % | SYSTOLIC BLOOD PRESSURE: 128 MMHG | WEIGHT: 136 LBS

## 2022-03-04 DIAGNOSIS — I73.9 PVD (PERIPHERAL VASCULAR DISEASE): ICD-10-CM

## 2022-03-04 DIAGNOSIS — J44.89 COPD WITH ASTHMA: ICD-10-CM

## 2022-03-04 DIAGNOSIS — I10 ESSENTIAL HYPERTENSION: ICD-10-CM

## 2022-03-04 DIAGNOSIS — E78.5 DYSLIPIDEMIA: ICD-10-CM

## 2022-03-04 DIAGNOSIS — R00.0 TACHYCARDIA: ICD-10-CM

## 2022-03-04 DIAGNOSIS — I10 ESSENTIAL HYPERTENSION: Primary | ICD-10-CM

## 2022-03-04 DIAGNOSIS — Z72.0 TOBACCO ABUSE DISORDER: ICD-10-CM

## 2022-03-04 DIAGNOSIS — I35.1 NONRHEUMATIC AORTIC VALVE INSUFFICIENCY: ICD-10-CM

## 2022-03-04 PROCEDURE — 3078F PR MOST RECENT DIASTOLIC BLOOD PRESSURE < 80 MM HG: ICD-10-PCS | Mod: CPTII,,, | Performed by: INTERNAL MEDICINE

## 2022-03-04 PROCEDURE — 1159F PR MEDICATION LIST DOCUMENTED IN MEDICAL RECORD: ICD-10-PCS | Mod: CPTII,,, | Performed by: INTERNAL MEDICINE

## 2022-03-04 PROCEDURE — 3008F PR BODY MASS INDEX (BMI) DOCUMENTED: ICD-10-PCS | Mod: CPTII,,, | Performed by: INTERNAL MEDICINE

## 2022-03-04 PROCEDURE — 99999 PR PBB SHADOW E&M-EST. PATIENT-LVL III: ICD-10-PCS | Mod: PBBFAC,,, | Performed by: INTERNAL MEDICINE

## 2022-03-04 PROCEDURE — 99214 OFFICE O/P EST MOD 30 MIN: CPT | Mod: S$PBB,,, | Performed by: INTERNAL MEDICINE

## 2022-03-04 PROCEDURE — 1160F PR REVIEW ALL MEDS BY PRESCRIBER/CLIN PHARMACIST DOCUMENTED: ICD-10-PCS | Mod: CPTII,,, | Performed by: INTERNAL MEDICINE

## 2022-03-04 PROCEDURE — 3074F SYST BP LT 130 MM HG: CPT | Mod: CPTII,,, | Performed by: INTERNAL MEDICINE

## 2022-03-04 PROCEDURE — 4010F ACE/ARB THERAPY RXD/TAKEN: CPT | Mod: CPTII,,, | Performed by: INTERNAL MEDICINE

## 2022-03-04 PROCEDURE — 99999 PR PBB SHADOW E&M-EST. PATIENT-LVL III: CPT | Mod: PBBFAC,,, | Performed by: INTERNAL MEDICINE

## 2022-03-04 PROCEDURE — 1160F RVW MEDS BY RX/DR IN RCRD: CPT | Mod: CPTII,,, | Performed by: INTERNAL MEDICINE

## 2022-03-04 PROCEDURE — 1159F MED LIST DOCD IN RCRD: CPT | Mod: CPTII,,, | Performed by: INTERNAL MEDICINE

## 2022-03-04 PROCEDURE — 99214 PR OFFICE/OUTPT VISIT, EST, LEVL IV, 30-39 MIN: ICD-10-PCS | Mod: S$PBB,,, | Performed by: INTERNAL MEDICINE

## 2022-03-04 PROCEDURE — 99213 OFFICE O/P EST LOW 20 MIN: CPT | Mod: PBBFAC | Performed by: INTERNAL MEDICINE

## 2022-03-04 PROCEDURE — 93010 EKG 12-LEAD: ICD-10-PCS | Mod: ,,, | Performed by: INTERNAL MEDICINE

## 2022-03-04 PROCEDURE — 93005 ELECTROCARDIOGRAM TRACING: CPT

## 2022-03-04 PROCEDURE — 93010 ELECTROCARDIOGRAM REPORT: CPT | Mod: ,,, | Performed by: INTERNAL MEDICINE

## 2022-03-04 PROCEDURE — 3008F BODY MASS INDEX DOCD: CPT | Mod: CPTII,,, | Performed by: INTERNAL MEDICINE

## 2022-03-04 PROCEDURE — 4010F PR ACE/ARB THEARPY RXD/TAKEN: ICD-10-PCS | Mod: CPTII,,, | Performed by: INTERNAL MEDICINE

## 2022-03-04 PROCEDURE — 3078F DIAST BP <80 MM HG: CPT | Mod: CPTII,,, | Performed by: INTERNAL MEDICINE

## 2022-03-04 PROCEDURE — 3074F PR MOST RECENT SYSTOLIC BLOOD PRESSURE < 130 MM HG: ICD-10-PCS | Mod: CPTII,,, | Performed by: INTERNAL MEDICINE

## 2022-03-04 RX ORDER — METOPROLOL SUCCINATE 50 MG/1
50 TABLET, EXTENDED RELEASE ORAL DAILY
Qty: 30 TABLET | Refills: 11 | Status: SHIPPED | OUTPATIENT
Start: 2022-03-04 | End: 2023-05-22

## 2022-03-04 RX ORDER — AMLODIPINE BESYLATE AND BENAZEPRIL HYDROCHLORIDE 2.5; 1 MG/1; MG/1
1 CAPSULE ORAL DAILY
Qty: 90 CAPSULE | Refills: 3 | Status: SHIPPED | OUTPATIENT
Start: 2022-03-04 | End: 2023-03-02

## 2022-03-04 NOTE — PROGRESS NOTES
Subjective:   Patient ID:  Ana Bonilla is a 61 y.o. female who presents for follow up of No chief complaint on file.      60 yo female 6 months f/u.  PMH HTN, HLD, mod AI, COPD, hiatal hernia, hypothyroidism, s/p neck spinal fusion. Smokes 1 ppd for 40 yrs. Occasional drinking.    visit. Some chest hurt, on left, once a month, lasted for minutes, triggered by stress. No radiation.  Chronic BRAGG. No dizziness, palpitation and syncope.  Left calf pain at rest, worse with walking. Had leg cramp at night two weeks ago.  Father  of DM at 30'. Mother HTN. Brother  of MVA.  EKG on  NSR  ECHO in  normal EF and moderate AI. LE arterial US no lesion  Now some vaping     MPI no ischemia and EF normal   states that some stress. Some chronic BRAGG. Occasional CP triggered by stress. May relate to GERD.  Occasional calf and ankle pain, occurred at rest. EKG NSR LAE    2021 visit  C/o RLE leg. Used to have the pain starting from the lower back. Now the pain on the thigh and calf. Ocurred at rest and with exertion.   F/u with podiatric for right foot pain and will have the therapy. Orthopedic gave her Toradol.   Still smoking.    Interval history  H/o COVID in    Lost her mother in  and her son is in behave hospital for drug issue. A lot of stress   echo normal EF and mod AI.   LE arterial US mild disease  BRAGG chronic. Still has bad cough after COVID 19 infection  Today EKG NSR and nonspecific T wave      Past Medical History:   Diagnosis Date    Allergy     Amblyopia OS    Anxiety     Asthma     COPD (chronic obstructive pulmonary disease)     GERD (gastroesophageal reflux disease)     Hyperlipidemia     Hypertension     Thyroid disease        Past Surgical History:   Procedure Laterality Date    APPENDECTOMY      BUNIONECTOMY      COLONOSCOPY N/A 2019    Procedure: COLONOSCOPY;  Surgeon: Danny Matos III, MD;  Location: [facility];  Service:  Endoscopy;  Laterality: N/A;    HYSTERECTOMY      PARTIAL//still with ovaries    neck fusion  08/2017    THYROIDECTOMY         Social History     Tobacco Use    Smoking status: Current Every Day Smoker     Packs/day: 1.00     Years: 45.00     Pack years: 45.00     Types: Cigarettes    Smokeless tobacco: Never Used   Substance Use Topics    Alcohol use: Not Currently     Comment: quit    Drug use: No       Family History   Problem Relation Age of Onset    Hypertension Mother     Diabetes Mother     Mental illness Son     Diabetes Father     Mental illness Other     Pancreatic cancer Other     Pancreatic cancer Maternal Uncle     Stroke Maternal Grandmother     Prostate cancer Maternal Grandfather     Ovarian cancer Maternal Cousin          Review of Systems   Constitutional: Negative for decreased appetite, diaphoresis, fever, malaise/fatigue and night sweats.   HENT: Negative for nosebleeds.    Eyes: Negative for blurred vision and double vision.   Cardiovascular: Positive for dyspnea on exertion. Negative for claudication, irregular heartbeat, leg swelling, near-syncope, orthopnea, palpitations, paroxysmal nocturnal dyspnea and syncope.        RLE pain   Respiratory: Negative for cough, shortness of breath, sleep disturbances due to breathing, snoring, sputum production and wheezing.    Endocrine: Negative for cold intolerance and polyuria.   Hematologic/Lymphatic: Does not bruise/bleed easily.   Skin: Negative for rash.   Musculoskeletal: Positive for back pain. Negative for falls, joint pain, joint swelling and neck pain.   Gastrointestinal: Negative for abdominal pain, heartburn, nausea and vomiting.   Genitourinary: Negative for dysuria, frequency and hematuria.   Neurological: Negative for difficulty with concentration, dizziness, focal weakness, headaches, light-headedness, numbness, seizures and weakness.   Psychiatric/Behavioral: Negative for depression, memory loss and substance abuse. The  patient is nervous/anxious. The patient does not have insomnia.    Allergic/Immunologic: Negative for HIV exposure and hives.       Objective:   Physical Exam  HENT:      Head: Normocephalic.   Eyes:      Pupils: Pupils are equal, round, and reactive to light.   Neck:      Thyroid: No thyromegaly.      Vascular: Normal carotid pulses. No carotid bruit or JVD.   Cardiovascular:      Rate and Rhythm: Normal rate and regular rhythm.  No extrasystoles are present.     Chest Wall: PMI is not displaced.      Pulses:           Dorsalis pedis pulses are 2+ on the right side and 1+ on the left side.        Posterior tibial pulses are 2+ on the right side and 1+ on the left side.      Heart sounds: Normal heart sounds. No murmur heard.    No gallop. No S3 sounds.   Pulmonary:      Effort: No respiratory distress.      Breath sounds: Normal breath sounds. No stridor.   Abdominal:      General: Bowel sounds are normal.      Palpations: Abdomen is soft.      Tenderness: There is no abdominal tenderness. There is no rebound.   Musculoskeletal:         General: Normal range of motion.   Skin:     Findings: No rash.   Neurological:      Mental Status: She is alert and oriented to person, place, and time.   Psychiatric:         Behavior: Behavior normal.         Lab Results   Component Value Date    CHOL 168 07/14/2021    CHOL 159 02/28/2020    CHOL 235 (H) 01/14/2020     Lab Results   Component Value Date    HDL 74 07/14/2021    HDL 73 02/28/2020    HDL 76 (H) 01/14/2020     Lab Results   Component Value Date    LDLCALC 80.4 07/14/2021    LDLCALC 75.6 02/28/2020    LDLCALC 142.2 01/14/2020     Lab Results   Component Value Date    TRIG 68 07/14/2021    TRIG 52 02/28/2020    TRIG 84 01/14/2020     Lab Results   Component Value Date    CHOLHDL 44.0 07/14/2021    CHOLHDL 45.9 02/28/2020    CHOLHDL 32.3 01/14/2020       Chemistry        Component Value Date/Time     07/14/2021 0927    K 3.5 07/14/2021 0927     07/14/2021  0927    CO2 25 07/14/2021 0927    BUN 9 07/14/2021 0927    CREATININE 0.9 07/14/2021 0927    GLU 88 07/14/2021 0927        Component Value Date/Time    CALCIUM 9.2 07/14/2021 0927    ALKPHOS 62 07/14/2021 0927    AST 33 07/14/2021 0927    ALT 32 07/14/2021 0927    BILITOT 0.5 07/14/2021 0927    ESTGFRAFRICA >60.0 07/14/2021 0927    EGFRNONAA >60.0 07/14/2021 0927          Lab Results   Component Value Date    HGBA1C 5.2 09/07/2018     Lab Results   Component Value Date    TSH 1.321 07/14/2021     Lab Results   Component Value Date    INR 1.0 11/08/2019    INR 1.0 08/15/2017     Lab Results   Component Value Date    WBC 8.05 01/29/2020    HGB 13.5 01/29/2020    HCT 43.4 01/29/2020     (H) 01/29/2020     01/29/2020     BMP  Sodium   Date Value Ref Range Status   07/14/2021 141 136 - 145 mmol/L Final     Potassium   Date Value Ref Range Status   07/14/2021 3.5 3.5 - 5.1 mmol/L Final     Chloride   Date Value Ref Range Status   07/14/2021 105 95 - 110 mmol/L Final     CO2   Date Value Ref Range Status   07/14/2021 25 23 - 29 mmol/L Final     BUN   Date Value Ref Range Status   07/14/2021 9 6 - 20 mg/dL Final     Creatinine   Date Value Ref Range Status   07/14/2021 0.9 0.5 - 1.4 mg/dL Final     Calcium   Date Value Ref Range Status   07/14/2021 9.2 8.7 - 10.5 mg/dL Final     Anion Gap   Date Value Ref Range Status   07/14/2021 11 8 - 16 mmol/L Final     eGFR if    Date Value Ref Range Status   07/14/2021 >60.0 >60 mL/min/1.73 m^2 Final     eGFR if non    Date Value Ref Range Status   07/14/2021 >60.0 >60 mL/min/1.73 m^2 Final     Comment:     Calculation used to obtain the estimated glomerular filtration  rate (eGFR) is the CKD-EPI equation.        BNP  @LABRCNTIP(BNP,BNPTRIAGEBLO)@  @LABRCNTIP(troponini)@  CrCl cannot be calculated (Patient's most recent lab result is older than the maximum 7 days allowed.).  No results found in the last 24 hours.  No results found in the  last 24 hours.  No results found in the last 24 hours.    Assessment:      1. Essential hypertension    2. Dyslipidemia    3. PVD (peripheral vascular disease)    4. Nonrheumatic aortic valve insufficiency    5. COPD with asthma    6. Tobacco abuse disorder    7. Tachycardia        Plan:   Decrease Lotrel from 5/10 mg to 2.5/10 mg daily  Add ToprolXL 50 mg daily for tachy and HTN  Continue statin  Not advise ASA   smoking cessation    Counseled DASH  Check Lipid profile in 6 months  Recommend heart-healthy diet, weight control and regular exercise.  Yasmine. Risk modification.   I have reviewed all pertinent labs and cardiac studies independently. Plans and recommendations have been formulated under my direct supervision. All questions answered and patient voiced understanding.   If symptoms persist go to the ED  RTC in 6 months

## 2022-03-15 ENCOUNTER — PATIENT MESSAGE (OUTPATIENT)
Dept: INTERNAL MEDICINE | Facility: CLINIC | Age: 62
End: 2022-03-15
Payer: MEDICAID

## 2022-03-30 ENCOUNTER — OFFICE VISIT (OUTPATIENT)
Dept: INTERNAL MEDICINE | Facility: CLINIC | Age: 62
End: 2022-03-30
Payer: MEDICAID

## 2022-03-30 VITALS
BODY MASS INDEX: 24.07 KG/M2 | WEIGHT: 140.19 LBS | TEMPERATURE: 97 F | SYSTOLIC BLOOD PRESSURE: 118 MMHG | RESPIRATION RATE: 18 BRPM | OXYGEN SATURATION: 98 % | DIASTOLIC BLOOD PRESSURE: 74 MMHG | HEART RATE: 73 BPM

## 2022-03-30 DIAGNOSIS — R10.9 LEFT SIDED ABDOMINAL PAIN: Primary | ICD-10-CM

## 2022-03-30 LAB
BILIRUB UR QL STRIP: NEGATIVE
CLARITY UR: CLEAR
COLOR UR: YELLOW
GLUCOSE UR QL STRIP: NEGATIVE
HGB UR QL STRIP: NEGATIVE
KETONES UR QL STRIP: NEGATIVE
LEUKOCYTE ESTERASE UR QL STRIP: NEGATIVE
NITRITE UR QL STRIP: NEGATIVE
PH UR STRIP: 7 [PH] (ref 5–8)
PROT UR QL STRIP: NEGATIVE
SP GR UR STRIP: 1.01 (ref 1–1.03)
URN SPEC COLLECT METH UR: NORMAL
UROBILINOGEN UR STRIP-ACNC: NEGATIVE EU/DL

## 2022-03-30 PROCEDURE — 3078F DIAST BP <80 MM HG: CPT | Mod: CPTII,,, | Performed by: FAMILY MEDICINE

## 2022-03-30 PROCEDURE — 96372 THER/PROPH/DIAG INJ SC/IM: CPT | Mod: PBBFAC

## 2022-03-30 PROCEDURE — 99213 PR OFFICE/OUTPT VISIT, EST, LEVL III, 20-29 MIN: ICD-10-PCS | Mod: S$PBB,,, | Performed by: FAMILY MEDICINE

## 2022-03-30 PROCEDURE — 3078F PR MOST RECENT DIASTOLIC BLOOD PRESSURE < 80 MM HG: ICD-10-PCS | Mod: CPTII,,, | Performed by: FAMILY MEDICINE

## 2022-03-30 PROCEDURE — 81002 URINALYSIS NONAUTO W/O SCOPE: CPT | Performed by: FAMILY MEDICINE

## 2022-03-30 PROCEDURE — 87086 URINE CULTURE/COLONY COUNT: CPT | Performed by: FAMILY MEDICINE

## 2022-03-30 PROCEDURE — 4010F PR ACE/ARB THEARPY RXD/TAKEN: ICD-10-PCS | Mod: CPTII,,, | Performed by: FAMILY MEDICINE

## 2022-03-30 PROCEDURE — 3074F SYST BP LT 130 MM HG: CPT | Mod: CPTII,,, | Performed by: FAMILY MEDICINE

## 2022-03-30 PROCEDURE — 1159F PR MEDICATION LIST DOCUMENTED IN MEDICAL RECORD: ICD-10-PCS | Mod: CPTII,,, | Performed by: FAMILY MEDICINE

## 2022-03-30 PROCEDURE — 3074F PR MOST RECENT SYSTOLIC BLOOD PRESSURE < 130 MM HG: ICD-10-PCS | Mod: CPTII,,, | Performed by: FAMILY MEDICINE

## 2022-03-30 PROCEDURE — 99999 PR PBB SHADOW E&M-EST. PATIENT-LVL IV: ICD-10-PCS | Mod: PBBFAC,,, | Performed by: FAMILY MEDICINE

## 2022-03-30 PROCEDURE — 4010F ACE/ARB THERAPY RXD/TAKEN: CPT | Mod: CPTII,,, | Performed by: FAMILY MEDICINE

## 2022-03-30 PROCEDURE — 99213 OFFICE O/P EST LOW 20 MIN: CPT | Mod: S$PBB,,, | Performed by: FAMILY MEDICINE

## 2022-03-30 PROCEDURE — 99214 OFFICE O/P EST MOD 30 MIN: CPT | Mod: 25,PBBFAC | Performed by: FAMILY MEDICINE

## 2022-03-30 PROCEDURE — 99999 PR PBB SHADOW E&M-EST. PATIENT-LVL IV: CPT | Mod: PBBFAC,,, | Performed by: FAMILY MEDICINE

## 2022-03-30 PROCEDURE — 1159F MED LIST DOCD IN RCRD: CPT | Mod: CPTII,,, | Performed by: FAMILY MEDICINE

## 2022-03-30 PROCEDURE — 3008F PR BODY MASS INDEX (BMI) DOCUMENTED: ICD-10-PCS | Mod: CPTII,,, | Performed by: FAMILY MEDICINE

## 2022-03-30 PROCEDURE — 3008F BODY MASS INDEX DOCD: CPT | Mod: CPTII,,, | Performed by: FAMILY MEDICINE

## 2022-03-30 RX ORDER — KETOROLAC TROMETHAMINE 30 MG/ML
60 INJECTION, SOLUTION INTRAMUSCULAR; INTRAVENOUS
Status: COMPLETED | OUTPATIENT
Start: 2022-03-30 | End: 2022-03-30

## 2022-03-30 RX ORDER — IBUPROFEN 800 MG/1
800 TABLET ORAL 3 TIMES DAILY
Qty: 30 TABLET | Refills: 1 | Status: SHIPPED | OUTPATIENT
Start: 2022-03-30 | End: 2022-04-26

## 2022-03-30 RX ADMIN — KETOROLAC TROMETHAMINE 60 MG: 60 INJECTION, SOLUTION INTRAMUSCULAR at 04:03

## 2022-03-30 NOTE — PROGRESS NOTES
Subjective:       Patient ID: Ana Bonilla is a 61 y.o. female.    Chief Complaint: No chief complaint on file.    HPI Ms. Bonilla presents today for left side pain    Went to    Urinalysis negative   Culture was positive and she has been treated with Augmentin  Still feeling pressure in the lower abdomen    She was given a medrol dose gurdeep and this did not help  She has had left side pain for a couple weeks.   History of back pain    Had a Toradol shot from pain management and this helped for a couple hours     The pain is nagging pain   7/10   Comes and goes     Review of Systems   Constitutional: Negative for activity change, appetite change, fatigue and fever.   HENT: Negative for congestion, ear pain and sinus pressure.    Eyes: Negative for pain and visual disturbance.   Respiratory: Negative for cough, chest tightness and wheezing.    Cardiovascular: Negative for chest pain, palpitations and leg swelling.   Gastrointestinal: Positive for abdominal pain. Negative for abdominal distention, constipation, diarrhea, nausea and vomiting.   Endocrine: Negative for polydipsia and polyuria.   Genitourinary: Negative for difficulty urinating, dyspareunia, dysuria, flank pain and hematuria.   Musculoskeletal: Negative for arthralgias and back pain.   Skin: Negative for rash.   Neurological: Negative for dizziness, tremors, syncope, weakness, numbness and headaches.   Psychiatric/Behavioral: Negative for agitation and confusion.           Past Medical History:   Diagnosis Date    Allergy     Amblyopia OS    Anxiety     Asthma     COPD (chronic obstructive pulmonary disease)     GERD (gastroesophageal reflux disease)     Hyperlipidemia     Hypertension     Thyroid disease      Past Surgical History:   Procedure Laterality Date    APPENDECTOMY      BUNIONECTOMY      COLONOSCOPY N/A 12/4/2019    Procedure: COLONOSCOPY;  Surgeon: Danny Matos III, MD;  Location: [facility];  Service: Endoscopy;   Laterality: N/A;    HYSTERECTOMY      PARTIAL//still with ovaries    neck fusion  08/2017    THYROIDECTOMY       Family History   Problem Relation Age of Onset    Hypertension Mother     Diabetes Mother     Mental illness Son     Diabetes Father     Mental illness Other     Pancreatic cancer Other     Pancreatic cancer Maternal Uncle     Stroke Maternal Grandmother     Prostate cancer Maternal Grandfather     Ovarian cancer Maternal Cousin      Social History     Socioeconomic History    Marital status:     Number of children: 2   Occupational History     Employer: [employer]   Tobacco Use    Smoking status: Current Every Day Smoker     Packs/day: 1.00     Years: 45.00     Pack years: 45.00     Types: Cigarettes    Smokeless tobacco: Never Used   Substance and Sexual Activity    Alcohol use: Not Currently     Comment: quit    Drug use: No    Sexual activity: Never       Objective:        Physical Exam  Vitals reviewed.   Constitutional:       Comments: Appears uncomfortable   HENT:      Head: Normocephalic and atraumatic.   Cardiovascular:      Rate and Rhythm: Normal rate.   Pulmonary:      Effort: Pulmonary effort is normal.   Skin:     General: Skin is warm.   Neurological:      Mental Status: She is alert.           Results for orders placed or performed in visit on 03/30/22   Urine culture    Specimen: Urine, Clean Catch   Result Value Ref Range    Urine Culture, Routine No growth    Urinalysis   Result Value Ref Range    Specimen UA Urine, Clean Catch     Color, UA Yellow Yellow, Straw, Kate    Appearance, UA Clear Clear    pH, UA 7.0 5.0 - 8.0    Specific Gravity, UA 1.015 1.005 - 1.030    Protein, UA Negative Negative    Glucose, UA Negative Negative    Ketones, UA Negative Negative    Bilirubin (UA) Negative Negative    Occult Blood UA Negative Negative    Nitrite, UA Negative Negative    Urobilinogen, UA Negative <2.0 EU/dL    Leukocytes, UA Negative Negative       Assessment/Plan:      Left sided abdominal pain  -     Urinalysis  -     Urine culture; Future; Expected date: 03/30/2022  -     ibuprofen (ADVIL,MOTRIN) 800 MG tablet; Take 1 tablet (800 mg total) by mouth 3 (three) times daily.  Dispense: 30 tablet; Refill: 1  -     ketorolac injection 60 mg          Follow up as needed    Kristin Sibley MD  [facility]

## 2022-03-31 LAB — BACTERIA UR CULT: NO GROWTH

## 2022-04-05 DIAGNOSIS — E78.5 DYSLIPIDEMIA: ICD-10-CM

## 2022-04-06 RX ORDER — ROSUVASTATIN CALCIUM 10 MG/1
10 TABLET, COATED ORAL DAILY
Qty: 90 TABLET | Refills: 3 | Status: SHIPPED | OUTPATIENT
Start: 2022-04-06 | End: 2023-04-28

## 2022-04-26 ENCOUNTER — PATIENT MESSAGE (OUTPATIENT)
Dept: INTERNAL MEDICINE | Facility: CLINIC | Age: 62
End: 2022-04-26

## 2022-04-26 ENCOUNTER — PATIENT MESSAGE (OUTPATIENT)
Dept: INTERNAL MEDICINE | Facility: CLINIC | Age: 62
End: 2022-04-26
Payer: MEDICAID

## 2022-04-26 ENCOUNTER — TELEPHONE (OUTPATIENT)
Dept: PRIMARY CARE CLINIC | Facility: CLINIC | Age: 62
End: 2022-04-26
Payer: MEDICAID

## 2022-04-26 ENCOUNTER — OFFICE VISIT (OUTPATIENT)
Dept: INTERNAL MEDICINE | Facility: CLINIC | Age: 62
End: 2022-04-26
Payer: MEDICAID

## 2022-04-26 VITALS
BODY MASS INDEX: 23.9 KG/M2 | DIASTOLIC BLOOD PRESSURE: 70 MMHG | SYSTOLIC BLOOD PRESSURE: 134 MMHG | HEIGHT: 64 IN | TEMPERATURE: 97 F | HEART RATE: 84 BPM | OXYGEN SATURATION: 99 % | WEIGHT: 140 LBS | RESPIRATION RATE: 18 BRPM

## 2022-04-26 DIAGNOSIS — Z72.0 TOBACCO ABUSE DISORDER: ICD-10-CM

## 2022-04-26 DIAGNOSIS — M94.0 COSTOCHONDRITIS: Primary | ICD-10-CM

## 2022-04-26 DIAGNOSIS — K44.9 HIATAL HERNIA WITH GERD: ICD-10-CM

## 2022-04-26 DIAGNOSIS — J44.89 COPD WITH ASTHMA: ICD-10-CM

## 2022-04-26 DIAGNOSIS — E03.9 PRIMARY HYPOTHYROIDISM: ICD-10-CM

## 2022-04-26 DIAGNOSIS — I10 ESSENTIAL HYPERTENSION: ICD-10-CM

## 2022-04-26 DIAGNOSIS — R05.9 COUGH: ICD-10-CM

## 2022-04-26 DIAGNOSIS — K21.9 HIATAL HERNIA WITH GERD: ICD-10-CM

## 2022-04-26 PROCEDURE — 1160F PR REVIEW ALL MEDS BY PRESCRIBER/CLIN PHARMACIST DOCUMENTED: ICD-10-PCS | Mod: CPTII,,, | Performed by: PHYSICIAN ASSISTANT

## 2022-04-26 PROCEDURE — 4010F PR ACE/ARB THEARPY RXD/TAKEN: ICD-10-PCS | Mod: CPTII,,, | Performed by: PHYSICIAN ASSISTANT

## 2022-04-26 PROCEDURE — 99999 PR PBB SHADOW E&M-EST. PATIENT-LVL IV: ICD-10-PCS | Mod: PBBFAC,,, | Performed by: PHYSICIAN ASSISTANT

## 2022-04-26 PROCEDURE — 99999 PR PBB SHADOW E&M-EST. PATIENT-LVL IV: CPT | Mod: PBBFAC,,, | Performed by: PHYSICIAN ASSISTANT

## 2022-04-26 PROCEDURE — 1160F RVW MEDS BY RX/DR IN RCRD: CPT | Mod: CPTII,,, | Performed by: PHYSICIAN ASSISTANT

## 2022-04-26 PROCEDURE — 3075F PR MOST RECENT SYSTOLIC BLOOD PRESS GE 130-139MM HG: ICD-10-PCS | Mod: CPTII,,, | Performed by: PHYSICIAN ASSISTANT

## 2022-04-26 PROCEDURE — 99214 OFFICE O/P EST MOD 30 MIN: CPT | Mod: S$PBB,,, | Performed by: PHYSICIAN ASSISTANT

## 2022-04-26 PROCEDURE — 1159F PR MEDICATION LIST DOCUMENTED IN MEDICAL RECORD: ICD-10-PCS | Mod: CPTII,,, | Performed by: PHYSICIAN ASSISTANT

## 2022-04-26 PROCEDURE — 1159F MED LIST DOCD IN RCRD: CPT | Mod: CPTII,,, | Performed by: PHYSICIAN ASSISTANT

## 2022-04-26 PROCEDURE — 4010F ACE/ARB THERAPY RXD/TAKEN: CPT | Mod: CPTII,,, | Performed by: PHYSICIAN ASSISTANT

## 2022-04-26 PROCEDURE — 3008F PR BODY MASS INDEX (BMI) DOCUMENTED: ICD-10-PCS | Mod: CPTII,,, | Performed by: PHYSICIAN ASSISTANT

## 2022-04-26 PROCEDURE — 3075F SYST BP GE 130 - 139MM HG: CPT | Mod: CPTII,,, | Performed by: PHYSICIAN ASSISTANT

## 2022-04-26 PROCEDURE — 3078F DIAST BP <80 MM HG: CPT | Mod: CPTII,,, | Performed by: PHYSICIAN ASSISTANT

## 2022-04-26 PROCEDURE — 99214 OFFICE O/P EST MOD 30 MIN: CPT | Mod: PBBFAC | Performed by: PHYSICIAN ASSISTANT

## 2022-04-26 PROCEDURE — 3078F PR MOST RECENT DIASTOLIC BLOOD PRESSURE < 80 MM HG: ICD-10-PCS | Mod: CPTII,,, | Performed by: PHYSICIAN ASSISTANT

## 2022-04-26 PROCEDURE — 99214 PR OFFICE/OUTPT VISIT, EST, LEVL IV, 30-39 MIN: ICD-10-PCS | Mod: S$PBB,,, | Performed by: PHYSICIAN ASSISTANT

## 2022-04-26 PROCEDURE — 3008F BODY MASS INDEX DOCD: CPT | Mod: CPTII,,, | Performed by: PHYSICIAN ASSISTANT

## 2022-04-26 RX ORDER — TIZANIDINE 4 MG/1
4 TABLET ORAL EVERY 8 HOURS PRN
Qty: 20 TABLET | Refills: 0 | Status: SHIPPED | OUTPATIENT
Start: 2022-04-26 | End: 2022-05-03

## 2022-04-26 RX ORDER — MELOXICAM 7.5 MG/1
7.5 TABLET ORAL DAILY
Qty: 15 TABLET | Refills: 0 | Status: SHIPPED | OUTPATIENT
Start: 2022-04-26 | End: 2022-05-11

## 2022-04-26 RX ORDER — LEVOTHYROXINE SODIUM 100 UG/1
100 TABLET ORAL
Qty: 90 TABLET | Refills: 0 | Status: SHIPPED | OUTPATIENT
Start: 2022-04-26 | End: 2022-07-12

## 2022-04-26 RX ORDER — PANTOPRAZOLE SODIUM 40 MG/1
40 TABLET, DELAYED RELEASE ORAL DAILY
Qty: 30 TABLET | Refills: 0 | Status: SHIPPED | OUTPATIENT
Start: 2022-04-26 | End: 2022-06-10 | Stop reason: SDUPTHER

## 2022-04-26 RX ORDER — PROMETHAZINE HYDROCHLORIDE AND DEXTROMETHORPHAN HYDROBROMIDE 6.25; 15 MG/5ML; MG/5ML
5 SYRUP ORAL NIGHTLY PRN
Qty: 120 ML | Refills: 0 | Status: SHIPPED | OUTPATIENT
Start: 2022-04-26 | End: 2022-06-10

## 2022-04-26 RX ORDER — BENZONATATE 200 MG/1
200 CAPSULE ORAL 3 TIMES DAILY PRN
Qty: 30 CAPSULE | Refills: 0 | Status: SHIPPED | OUTPATIENT
Start: 2022-04-26 | End: 2022-05-06

## 2022-04-26 NOTE — PROGRESS NOTES
"Subjective:      Patient ID: Ana Bonilla is a 61 y.o. female.    Chief Complaint: soreness    Patient is known to me, being seen today for chest soreness x1wk, R worse than L.  Feels like she pulled a muscle.  Coughing recently d/t allergies/pollen.  Soreness started after a coughing fit.  Took ibuprofen/tylenol with minimal relief.  Pain is worse with movement and deep inspiration.  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    Last visit March 2022    Review of Systems   Constitutional: Negative for chills, diaphoresis and fever.   HENT: Positive for rhinorrhea. Negative for congestion and sore throat.         Tales singulair   Respiratory: Positive for cough (improving, denies discolored sputum). Negative for shortness of breath and wheezing.         +smoker  +COPD   Cardiovascular: Positive for chest pain (soreness).   Gastrointestinal: Negative for abdominal pain, constipation, diarrhea, nausea and vomiting.   Skin: Negative for rash.   Neurological: Negative for dizziness, light-headedness and headaches.       Objective:   /70   Pulse 84   Temp 96.8 °F (36 °C) (Tympanic)   Resp 18   Ht 5' 4" (1.626 m)   Wt 63.5 kg (139 lb 15.9 oz)   SpO2 99%   BMI 24.03 kg/m²   Physical Exam  Constitutional:       General: She is not in acute distress.     Appearance: Normal appearance. She is well-developed. She is not ill-appearing.   HENT:      Head: Normocephalic and atraumatic.   Cardiovascular:      Rate and Rhythm: Normal rate and regular rhythm.      Heart sounds: Normal heart sounds. No murmur heard.  Pulmonary:      Effort: Pulmonary effort is normal. No respiratory distress.      Breath sounds: Normal breath sounds. No decreased breath sounds.   Chest:       Musculoskeletal:      Right lower leg: No edema.      Left lower leg: No edema.   Skin:     General: Skin is warm and dry.      Findings: No rash.   Psychiatric:         Speech: Speech normal.         " Behavior: Behavior normal.         Thought Content: Thought content normal.       Assessment:      1. Costochondritis    2. Cough    3. Hiatal hernia with GERD    4. Primary hypothyroidism    5. Essential hypertension    6. Tobacco abuse disorder    7. COPD with asthma       Plan:   Costochondritis  -     meloxicam (MOBIC) 7.5 MG tablet; Take 1 tablet (7.5 mg total) by mouth once daily.  Dispense: 15 tablet; Refill: 0  -     tiZANidine (ZANAFLEX) 4 MG tablet; Take 1 tablet (4 mg total) by mouth every 8 (eight) hours as needed (spasm).  Dispense: 20 tablet; Refill: 0    Cough  -     benzonatate (TESSALON) 200 MG capsule; Take 1 capsule (200 mg total) by mouth 3 (three) times daily as needed for Cough.  Dispense: 30 capsule; Refill: 0  -     promethazine-dextromethorphan (PROMETHAZINE-DM) 6.25-15 mg/5 mL Syrp; Take 5 mLs by mouth nightly as needed (Cough).  Dispense: 120 mL; Refill: 0    Hiatal hernia with GERD  -     pantoprazole (PROTONIX) 40 MG tablet; Take 1 tablet (40 mg total) by mouth once daily.  Dispense: 30 tablet; Refill: 0    Primary hypothyroidism  -     levothyroxine (SYNTHROID) 100 MCG tablet; Take 1 tablet (100 mcg total) by mouth before breakfast.  Dispense: 90 tablet; Refill: 0    Essential hypertension   Controlled     Tobacco abuse disorder   Encourage cessation    COPD with asthma  -     benzonatate (TESSALON) 200 MG capsule; Take 1 capsule (200 mg total) by mouth 3 (three) times daily as needed for Cough.  Dispense: 30 capsule; Refill: 0      Discussed RICE and short course NSAIDs, warm heat   Caution as muscle relaxer can cause drowsiness   Consider rib x-rays if no improvement     Discussed worsening signs/symptoms and when to return to clinic or go to ED.   Patient expresses understanding and agrees with treatment plan.

## 2022-04-26 NOTE — TELEPHONE ENCOUNTER
----- Message from Tasneem Joseph sent at 4/26/2022  8:50 AM CDT -----  Contact: Patient  Type:  Same Day Appointment Request    Caller is requesting a same day appointment.  Caller declined first available appointment listed below.    Name of Caller:Ana Bonilla   When is the first available appointment?  Symptoms:chest soreness  Best Call Back Number:[phone]  Additional Information:

## 2022-05-03 ENCOUNTER — PATIENT MESSAGE (OUTPATIENT)
Dept: CARDIOLOGY | Facility: CLINIC | Age: 62
End: 2022-05-03

## 2022-05-03 ENCOUNTER — OFFICE VISIT (OUTPATIENT)
Dept: CARDIOLOGY | Facility: CLINIC | Age: 62
End: 2022-05-03
Payer: MEDICAID

## 2022-05-03 ENCOUNTER — PATIENT MESSAGE (OUTPATIENT)
Dept: INTERNAL MEDICINE | Facility: CLINIC | Age: 62
End: 2022-05-03
Payer: MEDICAID

## 2022-05-03 VITALS
DIASTOLIC BLOOD PRESSURE: 72 MMHG | OXYGEN SATURATION: 100 % | BODY MASS INDEX: 24.17 KG/M2 | WEIGHT: 141.56 LBS | HEIGHT: 64 IN | SYSTOLIC BLOOD PRESSURE: 140 MMHG

## 2022-05-03 DIAGNOSIS — J44.89 COPD WITH ASTHMA: ICD-10-CM

## 2022-05-03 DIAGNOSIS — R07.2 PRECORDIAL PAIN: Primary | ICD-10-CM

## 2022-05-03 DIAGNOSIS — I10 ESSENTIAL HYPERTENSION: ICD-10-CM

## 2022-05-03 DIAGNOSIS — I35.1 NONRHEUMATIC AORTIC VALVE INSUFFICIENCY: ICD-10-CM

## 2022-05-03 DIAGNOSIS — E78.5 DYSLIPIDEMIA: ICD-10-CM

## 2022-05-03 DIAGNOSIS — I73.9 PVD (PERIPHERAL VASCULAR DISEASE): ICD-10-CM

## 2022-05-03 PROCEDURE — 3008F BODY MASS INDEX DOCD: CPT | Mod: CPTII,,, | Performed by: INTERNAL MEDICINE

## 2022-05-03 PROCEDURE — 4010F ACE/ARB THERAPY RXD/TAKEN: CPT | Mod: CPTII,,, | Performed by: INTERNAL MEDICINE

## 2022-05-03 PROCEDURE — 1160F RVW MEDS BY RX/DR IN RCRD: CPT | Mod: CPTII,,, | Performed by: INTERNAL MEDICINE

## 2022-05-03 PROCEDURE — 99999 PR PBB SHADOW E&M-EST. PATIENT-LVL IV: ICD-10-PCS | Mod: PBBFAC,,, | Performed by: INTERNAL MEDICINE

## 2022-05-03 PROCEDURE — 3078F DIAST BP <80 MM HG: CPT | Mod: CPTII,,, | Performed by: INTERNAL MEDICINE

## 2022-05-03 PROCEDURE — 4010F PR ACE/ARB THEARPY RXD/TAKEN: ICD-10-PCS | Mod: CPTII,,, | Performed by: INTERNAL MEDICINE

## 2022-05-03 PROCEDURE — 99999 PR PBB SHADOW E&M-EST. PATIENT-LVL IV: CPT | Mod: PBBFAC,,, | Performed by: INTERNAL MEDICINE

## 2022-05-03 PROCEDURE — 99214 OFFICE O/P EST MOD 30 MIN: CPT | Mod: PBBFAC | Performed by: INTERNAL MEDICINE

## 2022-05-03 PROCEDURE — 3077F SYST BP >= 140 MM HG: CPT | Mod: CPTII,,, | Performed by: INTERNAL MEDICINE

## 2022-05-03 PROCEDURE — 1159F MED LIST DOCD IN RCRD: CPT | Mod: CPTII,,, | Performed by: INTERNAL MEDICINE

## 2022-05-03 PROCEDURE — 3008F PR BODY MASS INDEX (BMI) DOCUMENTED: ICD-10-PCS | Mod: CPTII,,, | Performed by: INTERNAL MEDICINE

## 2022-05-03 PROCEDURE — 99214 OFFICE O/P EST MOD 30 MIN: CPT | Mod: S$PBB,,, | Performed by: INTERNAL MEDICINE

## 2022-05-03 PROCEDURE — 1159F PR MEDICATION LIST DOCUMENTED IN MEDICAL RECORD: ICD-10-PCS | Mod: CPTII,,, | Performed by: INTERNAL MEDICINE

## 2022-05-03 PROCEDURE — 3077F PR MOST RECENT SYSTOLIC BLOOD PRESSURE >= 140 MM HG: ICD-10-PCS | Mod: CPTII,,, | Performed by: INTERNAL MEDICINE

## 2022-05-03 PROCEDURE — 3078F PR MOST RECENT DIASTOLIC BLOOD PRESSURE < 80 MM HG: ICD-10-PCS | Mod: CPTII,,, | Performed by: INTERNAL MEDICINE

## 2022-05-03 PROCEDURE — 1160F PR REVIEW ALL MEDS BY PRESCRIBER/CLIN PHARMACIST DOCUMENTED: ICD-10-PCS | Mod: CPTII,,, | Performed by: INTERNAL MEDICINE

## 2022-05-03 PROCEDURE — 99214 PR OFFICE/OUTPT VISIT, EST, LEVL IV, 30-39 MIN: ICD-10-PCS | Mod: S$PBB,,, | Performed by: INTERNAL MEDICINE

## 2022-05-03 NOTE — PROGRESS NOTES
Subjective:   Patient ID:  Ana Bonilla is a 61 y.o. female who presents for follow up of Chest Pain      62 yo female came in for chest pain  PMH HTN, HLD, mod AI, COPD, hiatal hernia, hypothyroidism, s/p neck spinal fusion. Smokes 1 ppd for 40 yrs. Occasional drinking.    visit. Some chest hurt, on left, once a month, lasted for minutes, triggered by stress. No radiation.  Chronic BRAGG. No dizziness, palpitation and syncope.  Left calf pain at rest, worse with walking. Had leg cramp at night two weeks ago.  Father  of DM at 30'. Mother HTN. Brother  of MVA.  EKG on  NSR  ECHO in  normal EF and moderate AI. LE arterial US no lesion  Now some vaping     MPI no ischemia and EF normal   states that some stress. Some chronic BRAGG. Occasional CP triggered by stress. May relate to GERD.  Occasional calf and ankle pain, occurred at rest. EKG NSR LAE    2021 visit  C/o RLE leg. Used to have the pain starting from the lower back. Now the pain on the thigh and calf. Ocurred at rest and with exertion.   F/u with podiatric for right foot pain and will have the therapy. Orthopedic gave her Toradol.   Still smoking.     visit  H/o COVID in    Lost her mother in  and her son is in behave hospital for drug issue. A lot of stress   echo normal EF and mod AI.   LE arterial US mild disease  BRAGG chronic. Still has bad cough after COVID 19 infection  EKG NSR and nonspecific T wave    Interval history  Intermittent chest pain for 2 weeks, worse with cough and stretching the arms. Feels sore, and worse pain when laying down. Muscle relaxant caused the Crazy feeling and ibupofen      Past Medical History:   Diagnosis Date    Allergy     Amblyopia OS    Anxiety     Asthma     COPD (chronic obstructive pulmonary disease)     GERD (gastroesophageal reflux disease)     Hyperlipidemia     Hypertension     Thyroid disease        Past Surgical History:   Procedure  Laterality Date    APPENDECTOMY      BUNIONECTOMY      COLONOSCOPY N/A 12/4/2019    Procedure: COLONOSCOPY;  Surgeon: Danny Matos III, MD;  Location: [facility];  Service: Endoscopy;  Laterality: N/A;    HYSTERECTOMY      PARTIAL//still with ovaries    neck fusion  08/2017    THYROIDECTOMY         Social History     Tobacco Use    Smoking status: Current Every Day Smoker     Packs/day: 1.00     Years: 45.00     Pack years: 45.00     Types: Cigarettes    Smokeless tobacco: Never Used   Substance Use Topics    Alcohol use: Not Currently     Comment: quit    Drug use: No       Family History   Problem Relation Age of Onset    Hypertension Mother     Diabetes Mother     Diabetes Father     Stroke Maternal Grandmother     Prostate cancer Maternal Grandfather     Mental illness Son     Pancreatic cancer Maternal Uncle     Mental illness Other     Pancreatic cancer Other     Ovarian cancer Maternal Cousin          Review of Systems   Constitutional: Negative for decreased appetite, diaphoresis, fever, malaise/fatigue and night sweats.   HENT: Negative for nosebleeds.    Eyes: Negative for blurred vision and double vision.   Cardiovascular: Positive for chest pain and dyspnea on exertion. Negative for claudication, irregular heartbeat, leg swelling, near-syncope, orthopnea, palpitations, paroxysmal nocturnal dyspnea and syncope.        RLE pain   Respiratory: Negative for cough, shortness of breath, sleep disturbances due to breathing, snoring, sputum production and wheezing.    Endocrine: Negative for cold intolerance and polyuria.   Hematologic/Lymphatic: Does not bruise/bleed easily.   Skin: Negative for rash.   Musculoskeletal: Positive for back pain. Negative for falls, joint pain, joint swelling and neck pain.   Gastrointestinal: Negative for abdominal pain, heartburn, nausea and vomiting.   Genitourinary: Negative for dysuria, frequency and hematuria.   Neurological: Negative for difficulty  with concentration, dizziness, focal weakness, headaches, light-headedness, numbness, seizures and weakness.   Psychiatric/Behavioral: Negative for depression, memory loss and substance abuse. The patient is nervous/anxious. The patient does not have insomnia.    Allergic/Immunologic: Negative for HIV exposure and hives.       Objective:   Physical Exam  HENT:      Head: Normocephalic.   Eyes:      Pupils: Pupils are equal, round, and reactive to light.   Neck:      Thyroid: No thyromegaly.      Vascular: Normal carotid pulses. No carotid bruit or JVD.   Cardiovascular:      Rate and Rhythm: Normal rate and regular rhythm.  No extrasystoles are present.     Chest Wall: PMI is not displaced.      Pulses:           Dorsalis pedis pulses are 2+ on the right side and 1+ on the left side.        Posterior tibial pulses are 2+ on the right side and 1+ on the left side.      Heart sounds: Normal heart sounds. No murmur heard.    No gallop. No S3 sounds.   Pulmonary:      Effort: No respiratory distress.      Breath sounds: Normal breath sounds. No stridor.   Chest:      Comments: Diffuse + tenderness on both chests  Abdominal:      General: Bowel sounds are normal.      Palpations: Abdomen is soft.      Tenderness: There is no abdominal tenderness. There is no rebound.   Musculoskeletal:         General: Normal range of motion.   Skin:     Findings: No rash.   Neurological:      Mental Status: She is alert and oriented to person, place, and time.   Psychiatric:         Behavior: Behavior normal.         Lab Results   Component Value Date    CHOL 168 07/14/2021    CHOL 159 02/28/2020    CHOL 235 (H) 01/14/2020     Lab Results   Component Value Date    HDL 74 07/14/2021    HDL 73 02/28/2020    HDL 76 (H) 01/14/2020     Lab Results   Component Value Date    LDLCALC 80.4 07/14/2021    LDLCALC 75.6 02/28/2020    LDLCALC 142.2 01/14/2020     Lab Results   Component Value Date    TRIG 68 07/14/2021    TRIG 52 02/28/2020    TRIG  84 01/14/2020     Lab Results   Component Value Date    CHOLHDL 44.0 07/14/2021    CHOLHDL 45.9 02/28/2020    CHOLHDL 32.3 01/14/2020       Chemistry        Component Value Date/Time     07/14/2021 0927    K 3.5 07/14/2021 0927     07/14/2021 0927    CO2 25 07/14/2021 0927    BUN 9 07/14/2021 0927    CREATININE 0.9 07/14/2021 0927    GLU 88 07/14/2021 0927        Component Value Date/Time    CALCIUM 9.2 07/14/2021 0927    ALKPHOS 62 07/14/2021 0927    AST 33 07/14/2021 0927    ALT 32 07/14/2021 0927    BILITOT 0.5 07/14/2021 0927    ESTGFRAFRICA >60.0 07/14/2021 0927    EGFRNONAA >60.0 07/14/2021 0927          Lab Results   Component Value Date    HGBA1C 5.2 09/07/2018     Lab Results   Component Value Date    TSH 1.321 07/14/2021     Lab Results   Component Value Date    INR 1.0 11/08/2019    INR 1.0 08/15/2017     Lab Results   Component Value Date    WBC 8.05 01/29/2020    HGB 13.5 01/29/2020    HCT 43.4 01/29/2020     (H) 01/29/2020     01/29/2020     BMP  Sodium   Date Value Ref Range Status   07/14/2021 141 136 - 145 mmol/L Final     Potassium   Date Value Ref Range Status   07/14/2021 3.5 3.5 - 5.1 mmol/L Final     Chloride   Date Value Ref Range Status   07/14/2021 105 95 - 110 mmol/L Final     CO2   Date Value Ref Range Status   07/14/2021 25 23 - 29 mmol/L Final     BUN   Date Value Ref Range Status   07/14/2021 9 6 - 20 mg/dL Final     Creatinine   Date Value Ref Range Status   07/14/2021 0.9 0.5 - 1.4 mg/dL Final     Calcium   Date Value Ref Range Status   07/14/2021 9.2 8.7 - 10.5 mg/dL Final     Anion Gap   Date Value Ref Range Status   07/14/2021 11 8 - 16 mmol/L Final     eGFR if    Date Value Ref Range Status   07/14/2021 >60.0 >60 mL/min/1.73 m^2 Final     eGFR if non    Date Value Ref Range Status   07/14/2021 >60.0 >60 mL/min/1.73 m^2 Final     Comment:     Calculation used to obtain the estimated glomerular filtration  rate (eGFR) is the  CKD-EPI equation.        BNP  @LABRCNTIP(BNP,BNPTRIAGEBLO)@  @LABRCNTIP(troponini)@  CrCl cannot be calculated (Patient's most recent lab result is older than the maximum 7 days allowed.).  No results found in the last 24 hours.  No results found in the last 24 hours.  No results found in the last 24 hours.    Assessment:      1. Precordial pain    2. Essential hypertension    3. Dyslipidemia    4. PVD (peripheral vascular disease)    5. Nonrheumatic aortic valve insufficiency    6. COPD with asthma      Non cardiac chest pain  Plan:   Continue taking NSIADs for 2 weeks and f/u with PCP  Continue Lotrel from 5/10 mg to 2.5/10 mg daily  ContinueToprolXL 50 mg daily for tachy and HTN  Continue statin for HLD  Smoking cessation    Counseled DASH  Check Lipid profile in 6 months  Recommend heart-healthy diet, weight control and regular exercise.  Yasmine. Risk modification.   I have reviewed all pertinent labs and cardiac studies independently. Plans and recommendations have been formulated under my direct supervision. All questions answered and patient voiced understanding.   If symptoms persist go to the ED  RTC as scheduled

## 2022-05-11 ENCOUNTER — HOSPITAL ENCOUNTER (OUTPATIENT)
Dept: RADIOLOGY | Facility: HOSPITAL | Age: 62
Discharge: HOME | End: 2022-05-11
Attending: FAMILY MEDICINE
Payer: MEDICAID

## 2022-05-11 ENCOUNTER — OFFICE VISIT (OUTPATIENT)
Dept: INTERNAL MEDICINE | Facility: CLINIC | Age: 62
End: 2022-05-11
Payer: MEDICAID

## 2022-05-11 VITALS
SYSTOLIC BLOOD PRESSURE: 116 MMHG | TEMPERATURE: 97 F | WEIGHT: 137.56 LBS | HEIGHT: 64 IN | RESPIRATION RATE: 18 BRPM | OXYGEN SATURATION: 98 % | HEART RATE: 70 BPM | DIASTOLIC BLOOD PRESSURE: 76 MMHG | BODY MASS INDEX: 23.49 KG/M2

## 2022-05-11 DIAGNOSIS — R05.9 COUGH: ICD-10-CM

## 2022-05-11 DIAGNOSIS — F41.9 ANXIETY: ICD-10-CM

## 2022-05-11 DIAGNOSIS — M94.0 COSTOCHONDRITIS: Primary | ICD-10-CM

## 2022-05-11 PROCEDURE — 71046 X-RAY EXAM CHEST 2 VIEWS: CPT | Mod: 26,,, | Performed by: RADIOLOGY

## 2022-05-11 PROCEDURE — 99999 PR PBB SHADOW E&M-EST. PATIENT-LVL V: ICD-10-PCS | Mod: PBBFAC,,, | Performed by: FAMILY MEDICINE

## 2022-05-11 PROCEDURE — 1159F MED LIST DOCD IN RCRD: CPT | Mod: CPTII,,, | Performed by: FAMILY MEDICINE

## 2022-05-11 PROCEDURE — 3074F SYST BP LT 130 MM HG: CPT | Mod: CPTII,,, | Performed by: FAMILY MEDICINE

## 2022-05-11 PROCEDURE — 3078F PR MOST RECENT DIASTOLIC BLOOD PRESSURE < 80 MM HG: ICD-10-PCS | Mod: CPTII,,, | Performed by: FAMILY MEDICINE

## 2022-05-11 PROCEDURE — 99214 OFFICE O/P EST MOD 30 MIN: CPT | Mod: S$PBB,,, | Performed by: FAMILY MEDICINE

## 2022-05-11 PROCEDURE — 1159F PR MEDICATION LIST DOCUMENTED IN MEDICAL RECORD: ICD-10-PCS | Mod: CPTII,,, | Performed by: FAMILY MEDICINE

## 2022-05-11 PROCEDURE — 71046 XR CHEST PA AND LATERAL: ICD-10-PCS | Mod: 26,,, | Performed by: RADIOLOGY

## 2022-05-11 PROCEDURE — 96372 THER/PROPH/DIAG INJ SC/IM: CPT | Mod: PBBFAC

## 2022-05-11 PROCEDURE — 99999 PR PBB SHADOW E&M-EST. PATIENT-LVL V: CPT | Mod: PBBFAC,,, | Performed by: FAMILY MEDICINE

## 2022-05-11 PROCEDURE — 3008F BODY MASS INDEX DOCD: CPT | Mod: CPTII,,, | Performed by: FAMILY MEDICINE

## 2022-05-11 PROCEDURE — 4010F ACE/ARB THERAPY RXD/TAKEN: CPT | Mod: CPTII,,, | Performed by: FAMILY MEDICINE

## 2022-05-11 PROCEDURE — 3074F PR MOST RECENT SYSTOLIC BLOOD PRESSURE < 130 MM HG: ICD-10-PCS | Mod: CPTII,,, | Performed by: FAMILY MEDICINE

## 2022-05-11 PROCEDURE — 3008F PR BODY MASS INDEX (BMI) DOCUMENTED: ICD-10-PCS | Mod: CPTII,,, | Performed by: FAMILY MEDICINE

## 2022-05-11 PROCEDURE — 99214 PR OFFICE/OUTPT VISIT, EST, LEVL IV, 30-39 MIN: ICD-10-PCS | Mod: S$PBB,,, | Performed by: FAMILY MEDICINE

## 2022-05-11 PROCEDURE — 4010F PR ACE/ARB THEARPY RXD/TAKEN: ICD-10-PCS | Mod: CPTII,,, | Performed by: FAMILY MEDICINE

## 2022-05-11 PROCEDURE — 3078F DIAST BP <80 MM HG: CPT | Mod: CPTII,,, | Performed by: FAMILY MEDICINE

## 2022-05-11 PROCEDURE — 71046 X-RAY EXAM CHEST 2 VIEWS: CPT | Mod: TC

## 2022-05-11 PROCEDURE — 99215 OFFICE O/P EST HI 40 MIN: CPT | Mod: PBBFAC,25 | Performed by: FAMILY MEDICINE

## 2022-05-11 RX ORDER — NAPROXEN 500 MG/1
500 TABLET ORAL 2 TIMES DAILY
Qty: 20 TABLET | Refills: 0 | Status: SHIPPED | OUTPATIENT
Start: 2022-05-11 | End: 2022-08-25

## 2022-05-11 RX ORDER — PROMETHAZINE HYDROCHLORIDE AND CODEINE PHOSPHATE 6.25; 1 MG/5ML; MG/5ML
5 SOLUTION ORAL EVERY 4 HOURS PRN
Qty: 118 ML | Refills: 0 | Status: SHIPPED | OUTPATIENT
Start: 2022-05-11 | End: 2022-05-21

## 2022-05-11 RX ORDER — METHYLPREDNISOLONE ACETATE 80 MG/ML
80 INJECTION, SUSPENSION INTRA-ARTICULAR; INTRALESIONAL; INTRAMUSCULAR; SOFT TISSUE
Status: COMPLETED | OUTPATIENT
Start: 2022-05-11 | End: 2022-05-11

## 2022-05-11 RX ORDER — ALPRAZOLAM 2 MG/1
2 TABLET ORAL 2 TIMES DAILY PRN
Qty: 60 TABLET | Refills: 0 | Status: SHIPPED | OUTPATIENT
Start: 2022-05-11 | End: 2022-06-10 | Stop reason: SDUPTHER

## 2022-05-11 RX ADMIN — METHYLPREDNISOLONE ACETATE 80 MG: 80 INJECTION, SUSPENSION INTRA-ARTICULAR; INTRALESIONAL; INTRAMUSCULAR; INTRASYNOVIAL; SOFT TISSUE at 10:05

## 2022-05-11 NOTE — TELEPHONE ENCOUNTER
----- Message from Fidencio Sousa sent at 5/11/2022 11:24 AM CDT -----  Contact: [phone]  Calling regarding pt medication,promethazine-codeine 6.25-10 mg/5 ml (PHENERGAN WITH CODEINE) 6.25-10 mg/5 mL syrup. need approval to fill . Please call back at [phone] . Thanks/dj

## 2022-05-11 NOTE — PROGRESS NOTES
Subjective:       Patient ID: Ana Bonilla is a 61 y.o. female.    Chief Complaint: No chief complaint on file.    HPI Ms Bonilla presents today for chest pain follow up.   Cardiology told her her symptoms were not heart related.       Coughing at night with allergies  Seen by another provider and was given muscle relaxers, tessalon perles     Dx of costochondritis     Review of Systems   Constitutional: Negative for activity change, appetite change, fatigue and fever.   HENT: Negative for congestion, ear pain and sinus pressure.    Eyes: Negative for pain and visual disturbance.   Respiratory: Positive for cough. Negative for chest tightness and wheezing.    Cardiovascular: Positive for chest pain. Negative for palpitations and leg swelling.   Gastrointestinal: Negative for abdominal distention, abdominal pain, constipation, diarrhea, nausea and vomiting.   Endocrine: Negative for polydipsia and polyuria.   Genitourinary: Negative for difficulty urinating, dyspareunia, dysuria, flank pain and hematuria.   Musculoskeletal: Negative for arthralgias and back pain.   Skin: Negative for rash.   Neurological: Negative for dizziness, tremors, syncope, weakness, numbness and headaches.   Psychiatric/Behavioral: Negative for agitation and confusion.           Past Medical History:   Diagnosis Date    Allergy     Amblyopia OS    Anxiety     Asthma     COPD (chronic obstructive pulmonary disease)     GERD (gastroesophageal reflux disease)     Hyperlipidemia     Hypertension     Thyroid disease      Past Surgical History:   Procedure Laterality Date    APPENDECTOMY      BUNIONECTOMY      COLONOSCOPY N/A 12/4/2019    Procedure: COLONOSCOPY;  Surgeon: Danny Matos III, MD;  Location: [facility];  Service: Endoscopy;  Laterality: N/A;    HYSTERECTOMY      PARTIAL//still with ovaries    neck fusion  08/2017    THYROIDECTOMY       Family History   Problem Relation Age of Onset    Hypertension Mother      Diabetes Mother     Diabetes Father     Stroke Maternal Grandmother     Prostate cancer Maternal Grandfather     Mental illness Son     Pancreatic cancer Maternal Uncle     Mental illness Other     Pancreatic cancer Other     Ovarian cancer Maternal Cousin      Social History     Socioeconomic History    Marital status:     Number of children: 2   Occupational History     Employer: [employer]   Tobacco Use    Smoking status: Current Every Day Smoker     Packs/day: 1.00     Years: 45.00     Pack years: 45.00     Types: Cigarettes    Smokeless tobacco: Never Used   Substance and Sexual Activity    Alcohol use: Not Currently     Comment: quit    Drug use: No    Sexual activity: Never       Objective:        Physical Exam  Vitals reviewed.   Constitutional:       Appearance: Normal appearance. She is normal weight.   HENT:      Head: Normocephalic and atraumatic.   Chest:       Neurological:      Mental Status: She is alert.           Results for orders placed or performed in visit on 03/30/22   Urine culture    Specimen: Urine, Clean Catch   Result Value Ref Range    Urine Culture, Routine No growth    Urinalysis   Result Value Ref Range    Specimen UA Urine, Clean Catch     Color, UA Yellow Yellow, Straw, Kate    Appearance, UA Clear Clear    pH, UA 7.0 5.0 - 8.0    Specific Gravity, UA 1.015 1.005 - 1.030    Protein, UA Negative Negative    Glucose, UA Negative Negative    Ketones, UA Negative Negative    Bilirubin (UA) Negative Negative    Occult Blood UA Negative Negative    Nitrite, UA Negative Negative    Urobilinogen, UA Negative <2.0 EU/dL    Leukocytes, UA Negative Negative       Assessment/Plan:     Costochondritis  -     methylPREDNISolone acetate injection 80 mg  -     naproxen (NAPROSYN) 500 MG tablet; Take 1 tablet (500 mg total) by mouth 2 (two) times daily.  Dispense: 20 tablet; Refill: 0    Cough  -     X-Ray Chest PA And Lateral; Future; Expected date: 05/11/2022  -      promethazine-codeine 6.25-10 mg/5 ml (PHENERGAN WITH CODEINE) 6.25-10 mg/5 mL syrup; Take 5 mLs by mouth every 4 (four) hours as needed for Cough.  Dispense: 118 mL; Refill: 0    Anxiety  -     ALPRAZolam (XANAX) 2 MG Tab; Take 1 tablet (2 mg total) by mouth 2 (two) times daily as needed (anxiety).  Dispense: 60 tablet; Refill: 0        Follow up as needed     Kristin Sibley MD  [facility]   Family Medicine

## 2022-05-11 NOTE — TELEPHONE ENCOUNTER
Notified pharmacy per MD that she was ok with patient getting cough medication now as long as she stops the Promethazine DM she already has

## 2022-05-25 DIAGNOSIS — J30.9 CHRONIC ALLERGIC RHINITIS: ICD-10-CM

## 2022-05-25 RX ORDER — MONTELUKAST SODIUM 10 MG/1
TABLET ORAL
Qty: 90 TABLET | Refills: 1 | Status: SHIPPED | OUTPATIENT
Start: 2022-05-25 | End: 2022-10-03

## 2022-05-25 NOTE — TELEPHONE ENCOUNTER
No new care gaps identified.  [facility] Embedded Care Gaps. Reference number: [id number]. 5/25/2022   10:57:41 AM WANDAT

## 2022-05-25 NOTE — TELEPHONE ENCOUNTER
Refill Routing Note   Medication(s) are not appropriate for processing by [facility] for the following reason(s):      - Medication not previously prescribed by PCP    [facility] action(s):  Defer          Medication reconciliation completed: No     Appointments  past 12m or future 3m with PCP    Date Provider   Last Visit   5/11/2022 Kristin Sibley MD   Next Visit   8/24/2022 Kristin Sibley MD   ED visits in past 90 days: 0        Note composed:1:05 PM 05/25/2022

## 2022-05-31 ENCOUNTER — PATIENT MESSAGE (OUTPATIENT)
Dept: INTERNAL MEDICINE | Facility: CLINIC | Age: 62
End: 2022-05-31
Payer: MEDICAID

## 2022-05-31 ENCOUNTER — TELEPHONE (OUTPATIENT)
Dept: INTERNAL MEDICINE | Facility: CLINIC | Age: 62
End: 2022-05-31
Payer: MEDICAID

## 2022-05-31 NOTE — TELEPHONE ENCOUNTER
----- Message from Amaris Sampson sent at 5/31/2022 10:40 AM CDT -----  Contact: self  Ana Bonilla would like a call back at [phone], in regards to message she received on [facility]. pt states that she can't acces her account but she will accept the appt on next week.

## 2022-06-10 ENCOUNTER — LAB VISIT (OUTPATIENT)
Dept: LAB | Facility: HOSPITAL | Age: 62
End: 2022-06-10
Attending: FAMILY MEDICINE
Payer: MEDICAID

## 2022-06-10 ENCOUNTER — OFFICE VISIT (OUTPATIENT)
Dept: INTERNAL MEDICINE | Facility: CLINIC | Age: 62
End: 2022-06-10
Payer: MEDICAID

## 2022-06-10 VITALS
DIASTOLIC BLOOD PRESSURE: 76 MMHG | RESPIRATION RATE: 18 BRPM | WEIGHT: 134.5 LBS | HEIGHT: 64 IN | BODY MASS INDEX: 22.96 KG/M2 | HEART RATE: 86 BPM | SYSTOLIC BLOOD PRESSURE: 130 MMHG | OXYGEN SATURATION: 99 %

## 2022-06-10 DIAGNOSIS — R25.2 CRAMPS, EXTREMITY: ICD-10-CM

## 2022-06-10 DIAGNOSIS — F41.9 ANXIETY: ICD-10-CM

## 2022-06-10 DIAGNOSIS — E89.0 HISTORY OF THYROIDECTOMY: Primary | ICD-10-CM

## 2022-06-10 DIAGNOSIS — K21.9 HIATAL HERNIA WITH GERD: ICD-10-CM

## 2022-06-10 DIAGNOSIS — R20.2 NUMBNESS AND TINGLING: ICD-10-CM

## 2022-06-10 DIAGNOSIS — E89.0 HISTORY OF THYROIDECTOMY: ICD-10-CM

## 2022-06-10 DIAGNOSIS — R20.0 NUMBNESS AND TINGLING: ICD-10-CM

## 2022-06-10 DIAGNOSIS — K44.9 HIATAL HERNIA WITH GERD: ICD-10-CM

## 2022-06-10 LAB
MAGNESIUM SERPL-MCNC: 1.6 MG/DL (ref 1.6–2.6)
T3 SERPL-MCNC: 72 NG/DL (ref 60–180)
T4 FREE SERPL-MCNC: 1.33 NG/DL (ref 0.71–1.51)
TSH SERPL DL<=0.005 MIU/L-ACNC: 0.79 UIU/ML (ref 0.4–4)
VIT B12 SERPL-MCNC: 756 PG/ML (ref 210–950)

## 2022-06-10 PROCEDURE — 4010F ACE/ARB THERAPY RXD/TAKEN: CPT | Mod: CPTII,,, | Performed by: FAMILY MEDICINE

## 2022-06-10 PROCEDURE — 3008F BODY MASS INDEX DOCD: CPT | Mod: CPTII,,, | Performed by: FAMILY MEDICINE

## 2022-06-10 PROCEDURE — 84480 ASSAY TRIIODOTHYRONINE (T3): CPT | Performed by: FAMILY MEDICINE

## 2022-06-10 PROCEDURE — 3008F PR BODY MASS INDEX (BMI) DOCUMENTED: ICD-10-PCS | Mod: CPTII,,, | Performed by: FAMILY MEDICINE

## 2022-06-10 PROCEDURE — 99213 OFFICE O/P EST LOW 20 MIN: CPT | Mod: S$PBB,,, | Performed by: FAMILY MEDICINE

## 2022-06-10 PROCEDURE — 84443 ASSAY THYROID STIM HORMONE: CPT | Performed by: FAMILY MEDICINE

## 2022-06-10 PROCEDURE — 4010F PR ACE/ARB THEARPY RXD/TAKEN: ICD-10-PCS | Mod: CPTII,,, | Performed by: FAMILY MEDICINE

## 2022-06-10 PROCEDURE — 99213 PR OFFICE/OUTPT VISIT, EST, LEVL III, 20-29 MIN: ICD-10-PCS | Mod: S$PBB,,, | Performed by: FAMILY MEDICINE

## 2022-06-10 PROCEDURE — 1159F MED LIST DOCD IN RCRD: CPT | Mod: CPTII,,, | Performed by: FAMILY MEDICINE

## 2022-06-10 PROCEDURE — 1159F PR MEDICATION LIST DOCUMENTED IN MEDICAL RECORD: ICD-10-PCS | Mod: CPTII,,, | Performed by: FAMILY MEDICINE

## 2022-06-10 PROCEDURE — 83735 ASSAY OF MAGNESIUM: CPT | Performed by: FAMILY MEDICINE

## 2022-06-10 PROCEDURE — 84439 ASSAY OF FREE THYROXINE: CPT | Performed by: FAMILY MEDICINE

## 2022-06-10 PROCEDURE — 3078F PR MOST RECENT DIASTOLIC BLOOD PRESSURE < 80 MM HG: ICD-10-PCS | Mod: CPTII,,, | Performed by: FAMILY MEDICINE

## 2022-06-10 PROCEDURE — 3075F SYST BP GE 130 - 139MM HG: CPT | Mod: CPTII,,, | Performed by: FAMILY MEDICINE

## 2022-06-10 PROCEDURE — 3078F DIAST BP <80 MM HG: CPT | Mod: CPTII,,, | Performed by: FAMILY MEDICINE

## 2022-06-10 PROCEDURE — 3075F PR MOST RECENT SYSTOLIC BLOOD PRESS GE 130-139MM HG: ICD-10-PCS | Mod: CPTII,,, | Performed by: FAMILY MEDICINE

## 2022-06-10 PROCEDURE — 99999 PR PBB SHADOW E&M-EST. PATIENT-LVL IV: CPT | Mod: PBBFAC,,, | Performed by: FAMILY MEDICINE

## 2022-06-10 PROCEDURE — 99214 OFFICE O/P EST MOD 30 MIN: CPT | Mod: PBBFAC | Performed by: FAMILY MEDICINE

## 2022-06-10 PROCEDURE — 82607 VITAMIN B-12: CPT | Performed by: FAMILY MEDICINE

## 2022-06-10 PROCEDURE — 36415 COLL VENOUS BLD VENIPUNCTURE: CPT | Performed by: FAMILY MEDICINE

## 2022-06-10 PROCEDURE — 99999 PR PBB SHADOW E&M-EST. PATIENT-LVL IV: ICD-10-PCS | Mod: PBBFAC,,, | Performed by: FAMILY MEDICINE

## 2022-06-10 RX ORDER — ALPRAZOLAM 2 MG/1
2 TABLET ORAL 2 TIMES DAILY PRN
Qty: 60 TABLET | Refills: 0 | Status: SHIPPED | OUTPATIENT
Start: 2022-06-10 | End: 2022-07-12

## 2022-06-10 RX ORDER — METHYLPREDNISOLONE 4 MG/1
TABLET ORAL
Qty: 1 EACH | Refills: 0 | Status: SHIPPED | OUTPATIENT
Start: 2022-06-10 | End: 2022-07-01

## 2022-06-10 RX ORDER — PANTOPRAZOLE SODIUM 40 MG/1
40 TABLET, DELAYED RELEASE ORAL DAILY
Qty: 90 TABLET | Refills: 0 | Status: SHIPPED | OUTPATIENT
Start: 2022-06-10 | End: 2022-09-07

## 2022-06-10 NOTE — PROGRESS NOTES
Subjective:       Patient ID: Ana Bonilla is a 61 y.o. female.    Chief Complaint: Follow-up    HPI Ms. Bonilla presents today for her follow up.     Concerned about thyroid   She has been having hot flashes and cramps in the feet  Numbness and tingling     Saw oncologist on yesterday     Middle of chest is sore   Treated for costochondritis in April she did not take the medication as directed     Review of Systems   Constitutional: Negative for activity change, appetite change, fatigue and fever.   HENT: Negative for congestion, ear pain and sinus pressure.    Eyes: Negative for pain and visual disturbance.   Respiratory: Negative for cough, chest tightness and wheezing.    Cardiovascular: Positive for chest pain. Negative for palpitations and leg swelling.   Gastrointestinal: Negative for abdominal distention, abdominal pain, constipation, diarrhea, nausea and vomiting.   Endocrine: Negative for polydipsia and polyuria.   Genitourinary: Negative for difficulty urinating, dyspareunia, dysuria, flank pain and hematuria.   Musculoskeletal: Negative for arthralgias and back pain.   Skin: Negative for rash.   Neurological: Negative for dizziness, tremors, syncope, weakness, numbness and headaches.   Psychiatric/Behavioral: Negative for agitation and confusion.           Past Medical History:   Diagnosis Date    Allergy     Amblyopia OS    Anxiety     Asthma     COPD (chronic obstructive pulmonary disease)     GERD (gastroesophageal reflux disease)     Hyperlipidemia     Hypertension     Thyroid disease      Past Surgical History:   Procedure Laterality Date    APPENDECTOMY      BUNIONECTOMY      COLONOSCOPY N/A 12/4/2019    Procedure: COLONOSCOPY;  Surgeon: Danny Matos III, MD;  Location: [facility];  Service: Endoscopy;  Laterality: N/A;    HYSTERECTOMY      PARTIAL//still with ovaries    neck fusion  08/2017    THYROIDECTOMY       Family History   Problem Relation Age of Onset     Hypertension Mother     Diabetes Mother     Diabetes Father     Stroke Maternal Grandmother     Prostate cancer Maternal Grandfather     Mental illness Son     Pancreatic cancer Maternal Uncle     Mental illness Other     Pancreatic cancer Other     Ovarian cancer Maternal Cousin      Social History     Socioeconomic History    Marital status:     Number of children: 2   Occupational History     Employer: [employer]   Tobacco Use    Smoking status: Current Every Day Smoker     Packs/day: 1.00     Years: 45.00     Pack years: 45.00     Types: Cigarettes    Smokeless tobacco: Never Used   Substance and Sexual Activity    Alcohol use: Not Currently     Comment: quit    Drug use: No    Sexual activity: Never       Objective:        Physical Exam  Vitals reviewed.   Eyes:      Extraocular Movements: Extraocular movements intact.      Pupils: Pupils are equal, round, and reactive to light.   Pulmonary:      Effort: Pulmonary effort is normal.   Neurological:      General: No focal deficit present.      Mental Status: She is oriented to person, place, and time.           Results for orders placed or performed in visit on 03/30/22   Urine culture    Specimen: Urine, Clean Catch   Result Value Ref Range    Urine Culture, Routine No growth    Urinalysis   Result Value Ref Range    Specimen UA Urine, Clean Catch     Color, UA Yellow Yellow, Straw, Kate    Appearance, UA Clear Clear    pH, UA 7.0 5.0 - 8.0    Specific Gravity, UA 1.015 1.005 - 1.030    Protein, UA Negative Negative    Glucose, UA Negative Negative    Ketones, UA Negative Negative    Bilirubin (UA) Negative Negative    Occult Blood UA Negative Negative    Nitrite, UA Negative Negative    Urobilinogen, UA Negative <2.0 EU/dL    Leukocytes, UA Negative Negative       Assessment/Plan:     History of thyroidectomy  -     TSH; Future; Expected date: 06/10/2022  -     T4, Free; Future; Expected date: 06/10/2022  -     T3; Future; Expected date:  06/10/2022    Numbness and tingling  -     Vitamin B12; Future; Expected date: 06/10/2022    Cramps, extremity  -     Magnesium; Future; Expected date: 06/10/2022    Hiatal hernia with GERD  -     pantoprazole (PROTONIX) 40 MG tablet; Take 1 tablet (40 mg total) by mouth once daily.  Dispense: 90 tablet; Refill: 0    Anxiety  -     ALPRAZolam (XANAX) 2 MG Tab; Take 1 tablet (2 mg total) by mouth 2 (two) times daily as needed (anxiety).  Dispense: 60 tablet; Refill: 0    Other orders  -     methylPREDNISolone (MEDROL DOSEPACK) 4 mg tablet; use as directed  Dispense: 1 each; Refill: 0      Take medication Naproxen as directed twice a day for at least 7 days     Follow up as needed     Kristin Sibley MD  [facility]   Family Medicine

## 2022-07-20 DIAGNOSIS — I10 ESSENTIAL HYPERTENSION: ICD-10-CM

## 2022-07-30 NOTE — TELEPHONE ENCOUNTER
Called patient back and advised her that physician would like her to use the medication without codeine , patient agreed and understand.    30-Apr-2021

## 2022-08-18 ENCOUNTER — PATIENT MESSAGE (OUTPATIENT)
Dept: HEPATOLOGY | Facility: CLINIC | Age: 62
End: 2022-08-18
Payer: MEDICAID

## 2022-08-24 ENCOUNTER — OFFICE VISIT (OUTPATIENT)
Dept: INTERNAL MEDICINE | Facility: CLINIC | Age: 62
End: 2022-08-24
Payer: MEDICAID

## 2022-08-24 VITALS
SYSTOLIC BLOOD PRESSURE: 130 MMHG | BODY MASS INDEX: 23.37 KG/M2 | TEMPERATURE: 98 F | DIASTOLIC BLOOD PRESSURE: 72 MMHG | HEART RATE: 80 BPM | HEIGHT: 64 IN | WEIGHT: 136.88 LBS | OXYGEN SATURATION: 98 % | RESPIRATION RATE: 18 BRPM

## 2022-08-24 DIAGNOSIS — R52 PAIN: ICD-10-CM

## 2022-08-24 DIAGNOSIS — R05.9 COUGH: ICD-10-CM

## 2022-08-24 DIAGNOSIS — J44.89 COPD WITH ASTHMA: ICD-10-CM

## 2022-08-24 DIAGNOSIS — Z00.00 ROUTINE PHYSICAL EXAMINATION: Primary | ICD-10-CM

## 2022-08-24 DIAGNOSIS — Z72.0 TOBACCO ABUSE DISORDER: ICD-10-CM

## 2022-08-24 PROCEDURE — 99214 OFFICE O/P EST MOD 30 MIN: CPT | Mod: PBBFAC,25 | Performed by: FAMILY MEDICINE

## 2022-08-24 PROCEDURE — 99396 PR PREVENTIVE VISIT,EST,40-64: ICD-10-PCS | Mod: S$PBB,,, | Performed by: FAMILY MEDICINE

## 2022-08-24 PROCEDURE — 4010F PR ACE/ARB THEARPY RXD/TAKEN: ICD-10-PCS | Mod: CPTII,,, | Performed by: FAMILY MEDICINE

## 2022-08-24 PROCEDURE — 3075F SYST BP GE 130 - 139MM HG: CPT | Mod: CPTII,,, | Performed by: FAMILY MEDICINE

## 2022-08-24 PROCEDURE — 3008F PR BODY MASS INDEX (BMI) DOCUMENTED: ICD-10-PCS | Mod: CPTII,,, | Performed by: FAMILY MEDICINE

## 2022-08-24 PROCEDURE — 1159F MED LIST DOCD IN RCRD: CPT | Mod: CPTII,,, | Performed by: FAMILY MEDICINE

## 2022-08-24 PROCEDURE — 99396 PREV VISIT EST AGE 40-64: CPT | Mod: S$PBB,,, | Performed by: FAMILY MEDICINE

## 2022-08-24 PROCEDURE — 1159F PR MEDICATION LIST DOCUMENTED IN MEDICAL RECORD: ICD-10-PCS | Mod: CPTII,,, | Performed by: FAMILY MEDICINE

## 2022-08-24 PROCEDURE — 4010F ACE/ARB THERAPY RXD/TAKEN: CPT | Mod: CPTII,,, | Performed by: FAMILY MEDICINE

## 2022-08-24 PROCEDURE — 3078F DIAST BP <80 MM HG: CPT | Mod: CPTII,,, | Performed by: FAMILY MEDICINE

## 2022-08-24 PROCEDURE — 3075F PR MOST RECENT SYSTOLIC BLOOD PRESS GE 130-139MM HG: ICD-10-PCS | Mod: CPTII,,, | Performed by: FAMILY MEDICINE

## 2022-08-24 PROCEDURE — 99999 PR PBB SHADOW E&M-EST. PATIENT-LVL IV: CPT | Mod: PBBFAC,,, | Performed by: FAMILY MEDICINE

## 2022-08-24 PROCEDURE — 3078F PR MOST RECENT DIASTOLIC BLOOD PRESSURE < 80 MM HG: ICD-10-PCS | Mod: CPTII,,, | Performed by: FAMILY MEDICINE

## 2022-08-24 PROCEDURE — 99999 PR PBB SHADOW E&M-EST. PATIENT-LVL IV: ICD-10-PCS | Mod: PBBFAC,,, | Performed by: FAMILY MEDICINE

## 2022-08-24 PROCEDURE — 3008F BODY MASS INDEX DOCD: CPT | Mod: CPTII,,, | Performed by: FAMILY MEDICINE

## 2022-08-24 PROCEDURE — 96372 THER/PROPH/DIAG INJ SC/IM: CPT | Mod: PBBFAC

## 2022-08-24 RX ORDER — DEXAMETHASONE SODIUM PHOSPHATE 100 MG/10ML
10 INJECTION INTRAMUSCULAR; INTRAVENOUS ONCE
Status: COMPLETED | OUTPATIENT
Start: 2022-08-24 | End: 2022-08-24

## 2022-08-24 RX ORDER — FLUTICASONE PROPIONATE AND SALMETEROL 250; 50 UG/1; UG/1
1 POWDER RESPIRATORY (INHALATION) 2 TIMES DAILY
Qty: 180 EACH | Refills: 1 | Status: SHIPPED | OUTPATIENT
Start: 2022-08-24 | End: 2023-12-19 | Stop reason: SDUPTHER

## 2022-08-24 RX ORDER — PROMETHAZINE HYDROCHLORIDE AND CODEINE PHOSPHATE 6.25; 1 MG/5ML; MG/5ML
5 SOLUTION ORAL EVERY 4 HOURS PRN
Qty: 118 ML | Refills: 0 | Status: SHIPPED | OUTPATIENT
Start: 2022-08-24 | End: 2022-10-01

## 2022-08-24 RX ADMIN — DEXAMETHASONE SODIUM PHOSPHATE 10 MG: 10 INJECTION INTRAMUSCULAR; INTRAVENOUS at 03:08

## 2022-08-24 NOTE — PROGRESS NOTES
Ana Bonilla  08/24/2022  [id number]    Kristin Sibley MD  Patient Care Team:  Kristin Sibley MD as PCP - General (Internal Medicine)    Has the patient seen any provider outside of the network since the last visit ? (no). If yes, HIPPA forms completed and records requested.      Visit Type:a scheduled routine follow-up visit    Chief Complaint:  Chief Complaint   Patient presents with    Chest Pain    Annual Exam    Wheezing       History of Present Illness:  HPI    Ms. Bonilla presents today for her annual exam.     Chest pain   This has been going on for a while  Very uncomfortable going across her chest   Bra is uncomfortable     Acid reflux symptoms     Review of Systems   Constitutional: Negative for chills and fever.   HENT: Negative for congestion and tinnitus.    Eyes: Negative for blurred vision, pain and discharge.   Respiratory: Negative for cough and wheezing.    Cardiovascular: Negative for chest pain, palpitations, orthopnea and leg swelling.   Gastrointestinal: Negative for abdominal pain, blood in stool, constipation, diarrhea, heartburn, nausea and vomiting.   Genitourinary: Negative for dysuria, flank pain, frequency, hematuria and urgency.   Skin: Negative for itching and rash.   Neurological: Negative for dizziness, tingling and headaches.   Psychiatric/Behavioral: Negative for depression.         Screening Questionnaires:    In the last two weeks how often have you felt down, depressed, or hopeless ( frequent )    In the last two weeks how often have you had little interest or pleasure in doing  (intermittent )    In the last two weeks how often have you been bothered by the following problems:  1. Feeling nervous, anxious, or on edge ( constant )    2. Not being able to stop or control worrying ( intermittent)    3. Worrying too much about different things ( frequent)    4. Trouble relaxing ( no )    5. Being so restless that it is hard to sit still  (no )    6. Becoming easily annoyed  or irritable (constant)    7. Feeling afraid as if something awful might happen (no )    How often do you have a drink containing Alcohol? denied     Do you exercise  (no ) moderately active    Do you take a baby Aspirin daily ( no)    Do you have an advance directive ( no ) The patient was given information regarding Living Will/Durable Power-of- if requested.     The following were reviewed: Active problem list, medication list, allergies, family history, social history, and Health Maintenance.     History:  Past Medical History:   Diagnosis Date    Allergy     Amblyopia OS    Anxiety     Asthma     COPD (chronic obstructive pulmonary disease)     GERD (gastroesophageal reflux disease)     Hyperlipidemia     Hypertension     Thyroid disease      Past Surgical History:   Procedure Laterality Date    APPENDECTOMY      BUNIONECTOMY      COLONOSCOPY N/A 12/4/2019    Procedure: COLONOSCOPY;  Surgeon: Danny Matos III, MD;  Location: [facility];  Service: Endoscopy;  Laterality: N/A;    HYSTERECTOMY      PARTIAL//still with ovaries    neck fusion  08/2017    THYROIDECTOMY       Family History   Problem Relation Age of Onset    Hypertension Mother     Diabetes Mother     Diabetes Father     Stroke Maternal Grandmother     Prostate cancer Maternal Grandfather     Mental illness Son     Pancreatic cancer Maternal Uncle     Mental illness Other     Pancreatic cancer Other     Ovarian cancer Maternal Cousin      Social History     Socioeconomic History    Marital status:     Number of children: 2   Occupational History     Employer: [employer]   Tobacco Use    Smoking status: Current Every Day Smoker     Packs/day: 1.00     Years: 45.00     Pack years: 45.00     Types: Cigarettes    Smokeless tobacco: Never Used   Substance and Sexual Activity    Alcohol use: Not Currently     Comment: quit    Drug use: No    Sexual activity: Never     Patient Active Problem List   Diagnosis     COPD with asthma    GERD (gastroesophageal reflux disease)    Primary hypothyroidism    Tobacco abuse disorder    PVD (peripheral vascular disease)    Anxiety    Dyslipidemia    Claudication    Essential hypertension    Allergic rhinitis    Pain in both lower extremities    Anisometropic amblyopia of left eye    Nonrheumatic aortic valve insufficiency    Colon polyps    Precordial pain    Tachycardia    Pain of right lower extremity    Difficulty walking     Review of patient's allergies indicates:  No Known Allergies    Health Maintenance  Health Maintenance Topics with due status: Not Due       Topic Last Completion Date    Colorectal Cancer Screening 12/04/2019    Mammogram 01/07/2022     Health Maintenance Due   Topic Date Due    Pneumococcal Vaccines (Age 0-64) (1 - PCV) Never done    TETANUS VACCINE  Never done    LDCT Lung Screen  Never done    COVID-19 Vaccine (3 - Booster for Pfizer series) 09/09/2021    Lipid Panel  07/14/2022       Medications:  Current Outpatient Medications on File Prior to Visit   Medication Sig Dispense Refill    ALPRAZolam (XANAX) 2 MG Tab TAKE 1 TABLET(2 MG) BY MOUTH TWICE DAILY AS NEEDED FOR ANXIETY 60 tablet 0    amlodipine-benazepril 2.5-10 mg (LOTREL) 2.5-10 mg per capsule Take 1 capsule by mouth once daily. 90 capsule 3    estradioL (VAGIFEM) 10 mcg Tab Place 1 tablet vaginally twice a week.      fluticasone propionate (FLONASE) 50 mcg/actuation nasal spray 1 spray (50 mcg total) by Each Nostril route once daily. 18.2 mL 1    levothyroxine (SYNTHROID) 100 MCG tablet TAKE 1 TABLET(100 MCG) BY MOUTH BEFORE BREAKFAST 90 tablet 0    levothyroxine (SYNTHROID) 100 MCG tablet Take 1 tablet (100 mcg total) by mouth before breakfast. 90 tablet 0    linaCLOtide (LINZESS) 72 mcg Cap capsule Take 1 capsule (72 mcg total) by mouth before breakfast. 30 capsule 5    metoprolol succinate (TOPROL-XL) 50 MG 24 hr tablet Take 1 tablet (50 mg total) by mouth once  daily. 30 tablet 11    montelukast (SINGULAIR) 10 mg tablet TAKE 1 TABLET(10 MG) BY MOUTH EVERY EVENING 90 tablet 1    naproxen (NAPROSYN) 500 MG tablet Take 1 tablet (500 mg total) by mouth 2 (two) times daily. 20 tablet 0    pantoprazole (PROTONIX) 40 MG tablet Take 1 tablet (40 mg total) by mouth once daily. 90 tablet 0    rosuvastatin (CRESTOR) 10 MG tablet Take 1 tablet (10 mg total) by mouth once daily. 90 tablet 3    [DISCONTINUED] LIDOcaine (LIDODERM) 5 % SMARTSIG:Topical      [DISCONTINUED] PENNSAID 20 mg/gram /actuation(2 %) sopm SMARTSI Pump Topical Twice Daily       Current Facility-Administered Medications on File Prior to Visit   Medication Dose Route Frequency Provider Last Rate Last Admin    lactated ringers infusion   Intravenous Continuous Danny Matos III, MD           Medications have been reviewed and reconciled with patient at visit today.    Barriers to medications present (no )    Adverse reactions to current medications (no)    Over the counter medications reviewed (Yes) and if needed added to active Medication list.    Exam:  Vitals:    22 1357   BP: 130/72   Pulse: 80   Resp: 18   Temp: 98.3 °F (36.8 °C)     Weight: 62.1 kg (136 lb 14.5 oz)   Body mass index is 23.5 kg/m².      Physical Exam  Vitals reviewed.   Constitutional:       General: She is not in acute distress.     Appearance: Normal appearance.   HENT:      Head: Normocephalic and atraumatic.      Right Ear: External ear normal.      Left Ear: External ear normal.      Nose: Nose normal.   Eyes:      General:         Right eye: No discharge.         Left eye: No discharge.      Pupils: Pupils are equal, round, and reactive to light.   Neck:      Thyroid: No thyromegaly.   Cardiovascular:      Rate and Rhythm: Normal rate and regular rhythm.      Heart sounds: Normal heart sounds. No murmur heard.  Pulmonary:      Effort: Pulmonary effort is normal. No respiratory distress.      Breath sounds: Wheezing  present.   Abdominal:      General: Bowel sounds are normal. There is no distension.      Palpations: Abdomen is soft.      Tenderness: There is no abdominal tenderness.   Musculoskeletal:         General: Normal range of motion.      Cervical back: Normal range of motion and neck supple.   Skin:     General: Skin is warm and dry.      Findings: No rash.   Neurological:      Mental Status: She is alert and oriented to person, place, and time.      Coordination: Coordination normal.   Psychiatric:         Behavior: Behavior normal.         Laboratory Reviewed: (Yes)  Lab Results   Component Value Date    WBC 8.05 01/29/2020    HGB 13.5 01/29/2020    HCT 43.4 01/29/2020     01/29/2020    CHOL 168 07/14/2021    TRIG 68 07/14/2021    HDL 74 07/14/2021    ALT 32 07/14/2021    AST 33 07/14/2021     07/14/2021    K 3.5 07/14/2021     07/14/2021    CREATININE 0.9 07/14/2021    BUN 9 07/14/2021    CO2 25 07/14/2021    TSH 0.792 06/10/2022    INR 1.0 11/08/2019    HGBA1C 5.2 09/07/2018       Assessment:  The primary encounter diagnosis was Routine physical examination. Diagnoses of Tobacco abuse disorder, Pain, COPD with asthma, and Cough were also pertinent to this visit.    Plan:  Routine physical examination  -     Lipid Panel; Future; Expected date: 08/24/2022    Tobacco abuse disorder  -     CT Chest Lung Screening Low Dose; Future; Expected date: 08/24/2022    Pain  -     CBC auto differential; Future; Expected date: 08/24/2022  -     Comprehensive metabolic panel; Future; Expected date: 08/24/2022  -     Sedimentation rate; Future; Expected date: 08/24/2022  -     C-reactive protein; Future; Expected date: 08/24/2022  -     LIUDMILA; Future; Expected date: 08/24/2022  -     Rheumatoid factor; Future; Expected date: 08/24/2022  -     Cyclic citrul peptide antibody, IgG; Future; Expected date: 08/24/2022  -     Sjogrens syndrome-A extractable nuclear antibody; Future; Expected date: 08/24/2022  -      Urinalysis  -     Protein / creatinine ratio, urine    COPD with asthma  -     fluticasone-salmeterol diskus inhaler 250-50 mcg; Inhale 1 puff into the lungs 2 (two) times daily. Controller  Dispense: 180 each; Refill: 1  -     dexamethasone injection 10 mg    Cough  -     promethazine-codeine 6.25-10 mg/5 ml (PHENERGAN WITH CODEINE) 6.25-10 mg/5 mL syrup; Take 5 mLs by mouth every 4 (four) hours as needed for Cough.  Dispense: 118 mL; Refill: 0    discussed LDCT scan and patient agreed although scared    -Patient's lab results were reviewed and discussed with patient  -Treatment options and alternatives were discussed with the patient. Patient expressed understanding. Patient was given the opportunity to ask questions and be an active participant in their medical care. Patient had no further questions or concerns at this time.   -Documentation of patient's health and condition was obtained from family member who was present during visit.  -Patient is an overall moderate risk for health complications from their medical conditions.     Follow up: follow up 1 year       Care Plan/Goals: Reviewed N/A   Goals    None             After visit summary printed and given to patient upon discharge.  Patient goals and care plan are included in After visit summary.

## 2022-08-25 ENCOUNTER — TELEPHONE (OUTPATIENT)
Dept: INTERNAL MEDICINE | Facility: CLINIC | Age: 62
End: 2022-08-25
Payer: MEDICAID

## 2022-08-25 ENCOUNTER — OFFICE VISIT (OUTPATIENT)
Dept: GASTROENTEROLOGY | Facility: CLINIC | Age: 62
End: 2022-08-25
Payer: MEDICAID

## 2022-08-25 ENCOUNTER — LAB VISIT (OUTPATIENT)
Dept: LAB | Facility: HOSPITAL | Age: 62
End: 2022-08-25
Attending: FAMILY MEDICINE
Payer: MEDICAID

## 2022-08-25 VITALS
HEIGHT: 64 IN | HEART RATE: 72 BPM | RESPIRATION RATE: 98 BRPM | WEIGHT: 138 LBS | DIASTOLIC BLOOD PRESSURE: 80 MMHG | SYSTOLIC BLOOD PRESSURE: 130 MMHG | BODY MASS INDEX: 23.56 KG/M2

## 2022-08-25 DIAGNOSIS — Z79.1 NSAID LONG-TERM USE: ICD-10-CM

## 2022-08-25 DIAGNOSIS — Z00.00 ROUTINE PHYSICAL EXAMINATION: ICD-10-CM

## 2022-08-25 DIAGNOSIS — K21.9 GASTROESOPHAGEAL REFLUX DISEASE, UNSPECIFIED WHETHER ESOPHAGITIS PRESENT: Primary | ICD-10-CM

## 2022-08-25 DIAGNOSIS — Z86.010 HX OF ADENOMATOUS COLONIC POLYPS: ICD-10-CM

## 2022-08-25 DIAGNOSIS — R10.13 EPIGASTRIC PAIN: ICD-10-CM

## 2022-08-25 DIAGNOSIS — R52 PAIN: ICD-10-CM

## 2022-08-25 LAB
ALBUMIN SERPL BCP-MCNC: 3.7 G/DL (ref 3.5–5.2)
ALP SERPL-CCNC: 65 U/L (ref 55–135)
ALT SERPL W/O P-5'-P-CCNC: 18 U/L (ref 10–44)
ANION GAP SERPL CALC-SCNC: 12 MMOL/L (ref 8–16)
AST SERPL-CCNC: 19 U/L (ref 10–40)
BASOPHILS # BLD AUTO: 0.01 K/UL (ref 0–0.2)
BASOPHILS NFR BLD: 0.1 % (ref 0–1.9)
BILIRUB SERPL-MCNC: 0.5 MG/DL (ref 0.1–1)
BILIRUB UR QL STRIP: NEGATIVE
BUN SERPL-MCNC: 12 MG/DL (ref 8–23)
CALCIUM SERPL-MCNC: 9.5 MG/DL (ref 8.7–10.5)
CCP AB SER IA-ACNC: <0.5 U/ML
CHLORIDE SERPL-SCNC: 105 MMOL/L (ref 95–110)
CHOLEST SERPL-MCNC: 176 MG/DL (ref 120–199)
CHOLEST/HDLC SERPL: 2.4 {RATIO} (ref 2–5)
CLARITY UR REFRACT.AUTO: CLEAR
CO2 SERPL-SCNC: 21 MMOL/L (ref 23–29)
COLOR UR AUTO: COLORLESS
CREAT SERPL-MCNC: 0.8 MG/DL (ref 0.5–1.4)
CREAT UR-MCNC: 26 MG/DL (ref 15–325)
CRP SERPL-MCNC: 1.1 MG/L (ref 0–8.2)
DIFFERENTIAL METHOD: ABNORMAL
EOSINOPHIL # BLD AUTO: 0 K/UL (ref 0–0.5)
EOSINOPHIL NFR BLD: 0 % (ref 0–8)
ERYTHROCYTE [DISTWIDTH] IN BLOOD BY AUTOMATED COUNT: 12.3 % (ref 11.5–14.5)
ERYTHROCYTE [SEDIMENTATION RATE] IN BLOOD BY WESTERGREN METHOD: 34 MM/HR (ref 0–20)
EST. GFR  (NO RACE VARIABLE): >60 ML/MIN/1.73 M^2
GLUCOSE SERPL-MCNC: 119 MG/DL (ref 70–110)
GLUCOSE UR QL STRIP: NEGATIVE
HCT VFR BLD AUTO: 37.9 % (ref 37–48.5)
HDLC SERPL-MCNC: 74 MG/DL (ref 40–75)
HDLC SERPL: 42 % (ref 20–50)
HGB BLD-MCNC: 12.6 G/DL (ref 12–16)
HGB UR QL STRIP: NEGATIVE
IMM GRANULOCYTES # BLD AUTO: 0.08 K/UL (ref 0–0.04)
IMM GRANULOCYTES NFR BLD AUTO: 0.8 % (ref 0–0.5)
KETONES UR QL STRIP: NEGATIVE
LDLC SERPL CALC-MCNC: 92.6 MG/DL (ref 63–159)
LEUKOCYTE ESTERASE UR QL STRIP: NEGATIVE
LYMPHOCYTES # BLD AUTO: 1.5 K/UL (ref 1–4.8)
LYMPHOCYTES NFR BLD: 15.6 % (ref 18–48)
MCH RBC QN AUTO: 32.1 PG (ref 27–31)
MCHC RBC AUTO-ENTMCNC: 33.2 G/DL (ref 32–36)
MCV RBC AUTO: 96 FL (ref 82–98)
MONOCYTES # BLD AUTO: 0.5 K/UL (ref 0.3–1)
MONOCYTES NFR BLD: 5 % (ref 4–15)
NEUTROPHILS # BLD AUTO: 7.5 K/UL (ref 1.8–7.7)
NEUTROPHILS NFR BLD: 78.5 % (ref 38–73)
NITRITE UR QL STRIP: NEGATIVE
NONHDLC SERPL-MCNC: 102 MG/DL
NRBC BLD-RTO: 0 /100 WBC
PH UR STRIP: 6 [PH] (ref 5–8)
PLATELET # BLD AUTO: 288 K/UL (ref 150–450)
PMV BLD AUTO: 10.4 FL (ref 9.2–12.9)
POTASSIUM SERPL-SCNC: 3.8 MMOL/L (ref 3.5–5.1)
PROT SERPL-MCNC: 8.9 G/DL (ref 6–8.4)
PROT UR QL STRIP: NEGATIVE
PROT UR-MCNC: <7 MG/DL (ref 0–15)
PROT/CREAT UR: NORMAL MG/G{CREAT} (ref 0–0.2)
RBC # BLD AUTO: 3.93 M/UL (ref 4–5.4)
RHEUMATOID FACT SERPL-ACNC: <13 IU/ML (ref 0–15)
SODIUM SERPL-SCNC: 138 MMOL/L (ref 136–145)
SP GR UR STRIP: 1 (ref 1–1.03)
TRIGL SERPL-MCNC: 47 MG/DL (ref 30–150)
URN SPEC COLLECT METH UR: ABNORMAL
WBC # BLD AUTO: 9.51 K/UL (ref 3.9–12.7)

## 2022-08-25 PROCEDURE — 99999 PR PBB SHADOW E&M-EST. PATIENT-LVL V: CPT | Mod: PBBFAC,,, | Performed by: INTERNAL MEDICINE

## 2022-08-25 PROCEDURE — 86038 ANTINUCLEAR ANTIBODIES: CPT | Performed by: FAMILY MEDICINE

## 2022-08-25 PROCEDURE — 4010F ACE/ARB THERAPY RXD/TAKEN: CPT | Mod: CPTII,,, | Performed by: INTERNAL MEDICINE

## 2022-08-25 PROCEDURE — 86140 C-REACTIVE PROTEIN: CPT | Performed by: FAMILY MEDICINE

## 2022-08-25 PROCEDURE — 86235 NUCLEAR ANTIGEN ANTIBODY: CPT | Performed by: FAMILY MEDICINE

## 2022-08-25 PROCEDURE — 3008F PR BODY MASS INDEX (BMI) DOCUMENTED: ICD-10-PCS | Mod: CPTII,,, | Performed by: INTERNAL MEDICINE

## 2022-08-25 PROCEDURE — 99214 PR OFFICE/OUTPT VISIT, EST, LEVL IV, 30-39 MIN: ICD-10-PCS | Mod: S$PBB,,, | Performed by: INTERNAL MEDICINE

## 2022-08-25 PROCEDURE — 80053 COMPREHEN METABOLIC PANEL: CPT | Performed by: FAMILY MEDICINE

## 2022-08-25 PROCEDURE — 3079F DIAST BP 80-89 MM HG: CPT | Mod: CPTII,,, | Performed by: INTERNAL MEDICINE

## 2022-08-25 PROCEDURE — 1159F PR MEDICATION LIST DOCUMENTED IN MEDICAL RECORD: ICD-10-PCS | Mod: CPTII,,, | Performed by: INTERNAL MEDICINE

## 2022-08-25 PROCEDURE — 1160F RVW MEDS BY RX/DR IN RCRD: CPT | Mod: CPTII,,, | Performed by: INTERNAL MEDICINE

## 2022-08-25 PROCEDURE — 86200 CCP ANTIBODY: CPT | Performed by: FAMILY MEDICINE

## 2022-08-25 PROCEDURE — 86431 RHEUMATOID FACTOR QUANT: CPT | Performed by: FAMILY MEDICINE

## 2022-08-25 PROCEDURE — 84156 ASSAY OF PROTEIN URINE: CPT | Performed by: FAMILY MEDICINE

## 2022-08-25 PROCEDURE — 99999 PR PBB SHADOW E&M-EST. PATIENT-LVL V: ICD-10-PCS | Mod: PBBFAC,,, | Performed by: INTERNAL MEDICINE

## 2022-08-25 PROCEDURE — 1160F PR REVIEW ALL MEDS BY PRESCRIBER/CLIN PHARMACIST DOCUMENTED: ICD-10-PCS | Mod: CPTII,,, | Performed by: INTERNAL MEDICINE

## 2022-08-25 PROCEDURE — 1159F MED LIST DOCD IN RCRD: CPT | Mod: CPTII,,, | Performed by: INTERNAL MEDICINE

## 2022-08-25 PROCEDURE — 81003 URINALYSIS AUTO W/O SCOPE: CPT | Performed by: FAMILY MEDICINE

## 2022-08-25 PROCEDURE — 80061 LIPID PANEL: CPT | Performed by: FAMILY MEDICINE

## 2022-08-25 PROCEDURE — 3075F PR MOST RECENT SYSTOLIC BLOOD PRESS GE 130-139MM HG: ICD-10-PCS | Mod: CPTII,,, | Performed by: INTERNAL MEDICINE

## 2022-08-25 PROCEDURE — 3008F BODY MASS INDEX DOCD: CPT | Mod: CPTII,,, | Performed by: INTERNAL MEDICINE

## 2022-08-25 PROCEDURE — 3079F PR MOST RECENT DIASTOLIC BLOOD PRESSURE 80-89 MM HG: ICD-10-PCS | Mod: CPTII,,, | Performed by: INTERNAL MEDICINE

## 2022-08-25 PROCEDURE — 85651 RBC SED RATE NONAUTOMATED: CPT | Performed by: FAMILY MEDICINE

## 2022-08-25 PROCEDURE — 3075F SYST BP GE 130 - 139MM HG: CPT | Mod: CPTII,,, | Performed by: INTERNAL MEDICINE

## 2022-08-25 PROCEDURE — 99215 OFFICE O/P EST HI 40 MIN: CPT | Mod: PBBFAC | Performed by: INTERNAL MEDICINE

## 2022-08-25 PROCEDURE — 85025 COMPLETE CBC W/AUTO DIFF WBC: CPT | Performed by: FAMILY MEDICINE

## 2022-08-25 PROCEDURE — 36415 COLL VENOUS BLD VENIPUNCTURE: CPT | Performed by: FAMILY MEDICINE

## 2022-08-25 PROCEDURE — 99214 OFFICE O/P EST MOD 30 MIN: CPT | Mod: S$PBB,,, | Performed by: INTERNAL MEDICINE

## 2022-08-25 PROCEDURE — 4010F PR ACE/ARB THEARPY RXD/TAKEN: ICD-10-PCS | Mod: CPTII,,, | Performed by: INTERNAL MEDICINE

## 2022-08-25 RX ORDER — ONDANSETRON 4 MG/1
4 TABLET, ORALLY DISINTEGRATING ORAL EVERY 12 HOURS PRN
Qty: 10 TABLET | Refills: 0 | Status: SHIPPED | OUTPATIENT
Start: 2022-08-25 | End: 2023-03-03

## 2022-08-25 RX ORDER — SOD SULF/POT CHLORIDE/MAG SULF 1.479 G
12 TABLET ORAL DAILY
Qty: 24 TABLET | Refills: 0 | Status: SHIPPED | OUTPATIENT
Start: 2022-08-25 | End: 2023-03-27

## 2022-08-25 NOTE — PROGRESS NOTES
Subjective:       Patient ID: Ana Bonilla is a 62 y.o. female.    Chief Complaint: Gastroesophageal Reflux (Needs colonoscopy)    The patient is known to our service from previous encounters and was last seen in October last year with complaint of chest and epigastric pain.  There was also abdominal bloating.  She was set up for EGD but she states she had COVID and was unable to get this done.  Over the past 2 weeks she has had or of this, and has history of constipation with increased gas and bloating.  The patient also has history of colon polyps and is due for repeat colonoscopy.  Look to schedule EGD and colonoscopy in, and check KUB to see about stool burden.  The patient takes Linzess but admits to not taking it daily.  The patient also states she was told she could be having chest wall pain and has been on NSAIDs which she just recently discontinued.  She has also been told her PPI prescription has .    Review of Systems   Constitutional:  Negative for activity change, appetite change, chills, diaphoresis, fatigue, fever and unexpected weight change.   HENT:  Positive for postnasal drip. Negative for congestion, ear discharge, ear pain, hearing loss, nosebleeds and tinnitus.    Eyes:  Negative for photophobia and visual disturbance.   Respiratory:  Positive for cough. Negative for apnea, choking, chest tightness, shortness of breath and wheezing.    Cardiovascular:  Negative for chest pain, palpitations and leg swelling.   Gastrointestinal:  Positive for constipation and nausea. Negative for abdominal distention, abdominal pain, anal bleeding, blood in stool, diarrhea, rectal pain and vomiting.        Gas  Bloating  JOSE   Genitourinary:  Negative for difficulty urinating, dyspareunia, dysuria, flank pain, frequency, hematuria, menstrual problem, pelvic pain, urgency, vaginal bleeding and vaginal discharge.   Musculoskeletal:  Positive for back pain. Negative for arthralgias, gait problem, joint  swelling, myalgias and neck stiffness.   Skin:  Negative for pallor and rash.   Neurological:  Negative for dizziness, tremors, seizures, syncope, speech difficulty, weakness, numbness and headaches.   Hematological:  Negative for adenopathy.   Psychiatric/Behavioral:  Negative for agitation, confusion, hallucinations, sleep disturbance and suicidal ideas.      Objective:      Physical Exam  Vitals reviewed.   Constitutional:       Appearance: She is well-developed.   HENT:      Head: Normocephalic and atraumatic.   Eyes:      General: No scleral icterus.        Right eye: No discharge.         Left eye: No discharge.   Neck:      Thyroid: No thyromegaly.      Vascular: No JVD.   Cardiovascular:      Rate and Rhythm: Normal rate and regular rhythm.      Heart sounds: Normal heart sounds. No murmur heard.    No friction rub. No gallop.   Pulmonary:      Effort: Pulmonary effort is normal. No respiratory distress.      Breath sounds: Normal breath sounds. No wheezing or rales.   Chest:      Chest wall: No tenderness.   Abdominal:      General: Bowel sounds are normal. There is no distension.      Palpations: Abdomen is soft. There is no mass.      Tenderness: There is abdominal tenderness. There is no guarding or rebound.      Comments: Generalized but predominant epigastric tenderness  Full; mildly protuberant   Musculoskeletal:         General: Normal range of motion.      Cervical back: Normal range of motion and neck supple.   Lymphadenopathy:      Cervical: No cervical adenopathy.   Skin:     General: Skin is warm and dry.      Coloration: Skin is not pale.      Findings: No erythema or rash.   Neurological:      Mental Status: She is alert and oriented to person, place, and time.      Motor: No abnormal muscle tone.      Coordination: Coordination normal.      Deep Tendon Reflexes: Reflexes are normal and symmetric.   Psychiatric:         Behavior: Behavior normal.         Thought Content: Thought content  normal.         Judgment: Judgment normal.       Assessment:   Gastroesophageal reflux disease, unspecified whether esophagitis present  -     Ambulatory referral/consult to Endo Procedure ; Future; Expected date: 08/26/2022    Epigastric pain  -     Ambulatory referral/consult to Endo Procedure ; Future; Expected date: 08/26/2022    Hx of adenomatous colonic polyps  -     Ambulatory referral/consult to Endo Procedure ; Future; Expected date: 08/26/2022  -     sod sulf-pot chloride-mag sulf (SUTAB) 1.479-0.188- 0.225 gram tablet; Take 12 tablets by mouth once daily. Take according to package instructions with indicated amount of water.  Dispense: 24 tablet; Refill: 0    NSAID long-term use    Other orders  -     ondansetron (ZOFRAN-ODT) 4 MG TbDL; Take 1 tablet (4 mg total) by mouth every 12 (twelve) hours as needed (nausea).  Dispense: 10 tablet; Refill: 0        Plan:   As above

## 2022-08-25 NOTE — TELEPHONE ENCOUNTER
----- Message from Patti Wallis sent at 8/25/2022  8:43 AM CDT -----  Regarding: urine  Patient dropped off urine at lab appt but there is no current home collection orders to link to lab appt.

## 2022-08-26 ENCOUNTER — HOSPITAL ENCOUNTER (OUTPATIENT)
Dept: PREADMISSION TESTING | Facility: HOSPITAL | Age: 62
Discharge: HOME | End: 2022-08-26
Attending: INTERNAL MEDICINE
Payer: MEDICAID

## 2022-08-26 DIAGNOSIS — Z86.010 HX OF ADENOMATOUS COLONIC POLYPS: ICD-10-CM

## 2022-08-26 DIAGNOSIS — R10.13 EPIGASTRIC PAIN: ICD-10-CM

## 2022-08-26 DIAGNOSIS — K21.9 GASTROESOPHAGEAL REFLUX DISEASE, UNSPECIFIED WHETHER ESOPHAGITIS PRESENT: Primary | ICD-10-CM

## 2022-08-26 LAB — ANA SER QL IF: NORMAL

## 2022-08-29 LAB
ANTI-SSA ANTIBODY: 0.06 RATIO (ref 0–0.99)
ANTI-SSA INTERPRETATION: NEGATIVE

## 2022-08-30 ENCOUNTER — TELEPHONE (OUTPATIENT)
Dept: PREADMISSION TESTING | Facility: HOSPITAL | Age: 62
End: 2022-08-30
Payer: MEDICAID

## 2022-08-30 DIAGNOSIS — R10.13 EPIGASTRIC PAIN: ICD-10-CM

## 2022-08-30 DIAGNOSIS — K21.9 GASTROESOPHAGEAL REFLUX DISEASE, UNSPECIFIED WHETHER ESOPHAGITIS PRESENT: Primary | ICD-10-CM

## 2022-08-30 DIAGNOSIS — Z86.010 HISTORY OF ADENOMATOUS POLYP OF COLON: ICD-10-CM

## 2022-08-30 NOTE — TELEPHONE ENCOUNTER
----- Message from Luann Hdz sent at 8/29/2022  9:51 AM CDT -----  Pt request a call back at [phone]. States she has left several messages without luck of call back. Pt needs to schedule procedure, says someone called Fri at 10am but call ws disconnected. Thanks TYE

## 2022-09-06 ENCOUNTER — OFFICE VISIT (OUTPATIENT)
Dept: CARDIOLOGY | Facility: CLINIC | Age: 62
End: 2022-09-06
Payer: MEDICAID

## 2022-09-06 VITALS
SYSTOLIC BLOOD PRESSURE: 130 MMHG | BODY MASS INDEX: 23.34 KG/M2 | DIASTOLIC BLOOD PRESSURE: 80 MMHG | WEIGHT: 136.69 LBS | OXYGEN SATURATION: 99 % | HEIGHT: 64 IN | HEART RATE: 81 BPM

## 2022-09-06 DIAGNOSIS — I10 ESSENTIAL HYPERTENSION: Primary | ICD-10-CM

## 2022-09-06 DIAGNOSIS — Z72.0 TOBACCO ABUSE DISORDER: ICD-10-CM

## 2022-09-06 DIAGNOSIS — E78.5 DYSLIPIDEMIA: ICD-10-CM

## 2022-09-06 DIAGNOSIS — I35.1 NONRHEUMATIC AORTIC VALVE INSUFFICIENCY: ICD-10-CM

## 2022-09-06 DIAGNOSIS — I73.9 PVD (PERIPHERAL VASCULAR DISEASE): ICD-10-CM

## 2022-09-06 PROCEDURE — 3008F BODY MASS INDEX DOCD: CPT | Mod: CPTII,,, | Performed by: INTERNAL MEDICINE

## 2022-09-06 PROCEDURE — 1159F PR MEDICATION LIST DOCUMENTED IN MEDICAL RECORD: ICD-10-PCS | Mod: CPTII,,, | Performed by: INTERNAL MEDICINE

## 2022-09-06 PROCEDURE — 3008F PR BODY MASS INDEX (BMI) DOCUMENTED: ICD-10-PCS | Mod: CPTII,,, | Performed by: INTERNAL MEDICINE

## 2022-09-06 PROCEDURE — 3079F DIAST BP 80-89 MM HG: CPT | Mod: CPTII,,, | Performed by: INTERNAL MEDICINE

## 2022-09-06 PROCEDURE — 4010F PR ACE/ARB THEARPY RXD/TAKEN: ICD-10-PCS | Mod: CPTII,,, | Performed by: INTERNAL MEDICINE

## 2022-09-06 PROCEDURE — 3079F PR MOST RECENT DIASTOLIC BLOOD PRESSURE 80-89 MM HG: ICD-10-PCS | Mod: CPTII,,, | Performed by: INTERNAL MEDICINE

## 2022-09-06 PROCEDURE — 3075F SYST BP GE 130 - 139MM HG: CPT | Mod: CPTII,,, | Performed by: INTERNAL MEDICINE

## 2022-09-06 PROCEDURE — 1160F RVW MEDS BY RX/DR IN RCRD: CPT | Mod: CPTII,,, | Performed by: INTERNAL MEDICINE

## 2022-09-06 PROCEDURE — 4010F ACE/ARB THERAPY RXD/TAKEN: CPT | Mod: CPTII,,, | Performed by: INTERNAL MEDICINE

## 2022-09-06 PROCEDURE — 1159F MED LIST DOCD IN RCRD: CPT | Mod: CPTII,,, | Performed by: INTERNAL MEDICINE

## 2022-09-06 PROCEDURE — 1160F PR REVIEW ALL MEDS BY PRESCRIBER/CLIN PHARMACIST DOCUMENTED: ICD-10-PCS | Mod: CPTII,,, | Performed by: INTERNAL MEDICINE

## 2022-09-06 PROCEDURE — 3075F PR MOST RECENT SYSTOLIC BLOOD PRESS GE 130-139MM HG: ICD-10-PCS | Mod: CPTII,,, | Performed by: INTERNAL MEDICINE

## 2022-09-06 PROCEDURE — 99999 PR PBB SHADOW E&M-EST. PATIENT-LVL IV: ICD-10-PCS | Mod: PBBFAC,,, | Performed by: INTERNAL MEDICINE

## 2022-09-06 PROCEDURE — 99214 OFFICE O/P EST MOD 30 MIN: CPT | Mod: S$PBB,,, | Performed by: INTERNAL MEDICINE

## 2022-09-06 PROCEDURE — 99214 OFFICE O/P EST MOD 30 MIN: CPT | Mod: PBBFAC | Performed by: INTERNAL MEDICINE

## 2022-09-06 PROCEDURE — 99999 PR PBB SHADOW E&M-EST. PATIENT-LVL IV: CPT | Mod: PBBFAC,,, | Performed by: INTERNAL MEDICINE

## 2022-09-06 PROCEDURE — 99214 PR OFFICE/OUTPT VISIT, EST, LEVL IV, 30-39 MIN: ICD-10-PCS | Mod: S$PBB,,, | Performed by: INTERNAL MEDICINE

## 2022-09-06 NOTE — PROGRESS NOTES
Subjective:   Patient ID:  Ana Bonilla is a 62 y.o. female who presents for follow up of Follow-up      60 yo female came in for chest pain  PMH HTN, HLD, mod AI, COPD, hiatal hernia, hypothyroidism, s/p neck spinal fusion. Smokes 1 ppd for 40 yrs. Occasional drinking.    visit. Some chest hurt, on left, once a month, lasted for minutes, triggered by stress. No radiation.  Chronic BRAGG. No dizziness, palpitation and syncope.  Left calf pain at rest, worse with walking. Had leg cramp at night two weeks ago.  Father  of DM at 30'. Mother HTN. Brother  of MVA.  EKG on  NSR  ECHO in  normal EF and moderate AI. LE arterial US no lesion  Now some vaping     MPI no ischemia and EF normal   states that some stress. Some chronic BRAGG. Occasional CP triggered by stress. May relate to GERD.  Occasional calf and ankle pain, occurred at rest. EKG NSR LAE    2021 visit  C/o RLE leg. Used to have the pain starting from the lower back. Now the pain on the thigh and calf. Ocurred at rest and with exertion.   F/u with podiatric for right foot pain and will have the therapy. Orthopedic gave her Toradol.   Still smoking.     visit  H/o COVID in    Lost her mother in  and her son is in behave hospital for drug issue. A lot of stress   echo normal EF and mod AI.   LE arterial US mild disease  BRAGG chronic. Still has bad cough after COVID 19 infection  EKG NSR and nonspecific T wave     visit  Intermittent chest pain for 2 weeks, worse with cough and stretching the arms. Feels sore, and worse pain when laying down. Muscle relaxant caused the Crazy feeling and ibupofen    Interval history  Chronic lower sternal CP for days and weeks. Worse with coughing stretching. EGD in           Past Medical History:   Diagnosis Date    Allergy     Amblyopia OS    Anxiety     Asthma     COPD (chronic obstructive pulmonary disease)     GERD (gastroesophageal reflux  disease)     Hyperlipidemia     Hypertension     Thyroid disease        Past Surgical History:   Procedure Laterality Date    APPENDECTOMY      BUNIONECTOMY      COLONOSCOPY N/A 12/4/2019    Procedure: COLONOSCOPY;  Surgeon: Danny Matos III, MD;  Location: [facility];  Service: Endoscopy;  Laterality: N/A;    HYSTERECTOMY      PARTIAL//still with ovaries    neck fusion  08/2017    THYROIDECTOMY         Social History     Tobacco Use    Smoking status: Every Day     Packs/day: 1.00     Years: 45.00     Pack years: 45.00     Types: Cigarettes    Smokeless tobacco: Never   Substance Use Topics    Alcohol use: Not Currently     Comment: quit    Drug use: No       Family History   Problem Relation Age of Onset    Hypertension Mother     Diabetes Mother     Diabetes Father     Stroke Maternal Grandmother     Prostate cancer Maternal Grandfather     Mental illness Son     Pancreatic cancer Maternal Uncle     Mental illness Other     Pancreatic cancer Other     Ovarian cancer Maternal Cousin          Review of Systems   Constitutional: Negative for decreased appetite, diaphoresis, fever, malaise/fatigue and night sweats.   HENT:  Negative for nosebleeds.    Eyes:  Negative for blurred vision and double vision.   Cardiovascular:  Positive for chest pain and dyspnea on exertion. Negative for claudication, irregular heartbeat, leg swelling, near-syncope, orthopnea, palpitations, paroxysmal nocturnal dyspnea and syncope.        RLE pain   Respiratory:  Negative for cough, shortness of breath, sleep disturbances due to breathing, snoring, sputum production and wheezing.    Endocrine: Negative for cold intolerance and polyuria.   Hematologic/Lymphatic: Does not bruise/bleed easily.   Skin:  Negative for rash.   Musculoskeletal:  Positive for back pain. Negative for falls, joint pain, joint swelling and neck pain.   Gastrointestinal:  Negative for abdominal pain, heartburn, nausea and vomiting.   Genitourinary:  Negative for  dysuria, frequency and hematuria.   Neurological:  Negative for difficulty with concentration, dizziness, focal weakness, headaches, light-headedness, numbness, seizures and weakness.   Psychiatric/Behavioral:  Negative for depression, memory loss and substance abuse. The patient is nervous/anxious. The patient does not have insomnia.    Allergic/Immunologic: Negative for HIV exposure and hives.     Objective:   Physical Exam  HENT:      Head: Normocephalic.   Eyes:      Pupils: Pupils are equal, round, and reactive to light.   Neck:      Thyroid: No thyromegaly.      Vascular: Normal carotid pulses. No carotid bruit or JVD.   Cardiovascular:      Rate and Rhythm: Normal rate and regular rhythm. No extrasystoles are present.     Chest Wall: PMI is not displaced.      Pulses:           Dorsalis pedis pulses are 2+ on the right side and 1+ on the left side.        Posterior tibial pulses are 2+ on the right side and 1+ on the left side.      Heart sounds: Normal heart sounds. No murmur heard.    No gallop. No S3 sounds.   Pulmonary:      Effort: No respiratory distress.      Breath sounds: Normal breath sounds. No stridor.   Chest:      Comments: Diffuse + tenderness on both chests  Abdominal:      General: Bowel sounds are normal.      Palpations: Abdomen is soft.      Tenderness: There is no abdominal tenderness. There is no rebound.   Musculoskeletal:         General: Normal range of motion.   Skin:     Findings: No rash.   Neurological:      Mental Status: She is alert and oriented to person, place, and time.   Psychiatric:         Behavior: Behavior normal.       Lab Results   Component Value Date    CHOL 176 08/25/2022    CHOL 168 07/14/2021    CHOL 159 02/28/2020     Lab Results   Component Value Date    HDL 74 08/25/2022    HDL 74 07/14/2021    HDL 73 02/28/2020     Lab Results   Component Value Date    LDLCALC 92.6 08/25/2022    LDLCALC 80.4 07/14/2021    LDLCALC 75.6 02/28/2020     Lab Results   Component  Value Date    TRIG 47 08/25/2022    TRIG 68 07/14/2021    TRIG 52 02/28/2020     Lab Results   Component Value Date    CHOLHDL 42.0 08/25/2022    CHOLHDL 44.0 07/14/2021    CHOLHDL 45.9 02/28/2020       Chemistry        Component Value Date/Time     08/25/2022 0822    K 3.8 08/25/2022 0822     08/25/2022 0822    CO2 21 (L) 08/25/2022 0822    BUN 12 08/25/2022 0822    CREATININE 0.8 08/25/2022 0822     (H) 08/25/2022 0822        Component Value Date/Time    CALCIUM 9.5 08/25/2022 0822    ALKPHOS 65 08/25/2022 0822    AST 19 08/25/2022 0822    ALT 18 08/25/2022 0822    BILITOT 0.5 08/25/2022 0822    ESTGFRAFRICA >60.0 07/14/2021 0927    EGFRNONAA >60.0 07/14/2021 0927          Lab Results   Component Value Date    HGBA1C 5.2 09/07/2018     Lab Results   Component Value Date    TSH 0.792 06/10/2022     Lab Results   Component Value Date    INR 1.0 11/08/2019    INR 1.0 08/15/2017     Lab Results   Component Value Date    WBC 9.51 08/25/2022    HGB 12.6 08/25/2022    HCT 37.9 08/25/2022    MCV 96 08/25/2022     08/25/2022     BMP  Sodium   Date Value Ref Range Status   08/25/2022 138 136 - 145 mmol/L Final     Potassium   Date Value Ref Range Status   08/25/2022 3.8 3.5 - 5.1 mmol/L Final     Chloride   Date Value Ref Range Status   08/25/2022 105 95 - 110 mmol/L Final     CO2   Date Value Ref Range Status   08/25/2022 21 (L) 23 - 29 mmol/L Final     BUN   Date Value Ref Range Status   08/25/2022 12 8 - 23 mg/dL Final     Creatinine   Date Value Ref Range Status   08/25/2022 0.8 0.5 - 1.4 mg/dL Final     Calcium   Date Value Ref Range Status   08/25/2022 9.5 8.7 - 10.5 mg/dL Final     Anion Gap   Date Value Ref Range Status   08/25/2022 12 8 - 16 mmol/L Final     eGFR if    Date Value Ref Range Status   07/14/2021 >60.0 >60 mL/min/1.73 m^2 Final     eGFR if non    Date Value Ref Range Status   07/14/2021 >60.0 >60 mL/min/1.73 m^2 Final     Comment:      Calculation used to obtain the estimated glomerular filtration  rate (eGFR) is the CKD-EPI equation.        BNP  @LABRCNTIP(BNP,BNPTRIAGEBLO)@  @LABRCNTIP(troponini)@  CrCl cannot be calculated (Patient's most recent lab result is older than the maximum 7 days allowed.).  No results found in the last 24 hours.  No results found in the last 24 hours.  No results found in the last 24 hours.    Assessment:      1. Essential hypertension    2. PVD (peripheral vascular disease)    3. Dyslipidemia    4. Nonrheumatic aortic valve insufficiency    5. Tobacco abuse disorder        Plan:   Continue taking NSIADs for 2 weeks and f/u with PCP  Continue Lotrel 2.5/10 mg daily  ContinueToprolXL 50 mg daily for tachy and HTN  Continue statin for HLD  Smoking cessation  RTC 12 months

## 2022-10-06 ENCOUNTER — OFFICE VISIT (OUTPATIENT)
Dept: INTERNAL MEDICINE | Facility: CLINIC | Age: 62
End: 2022-10-06
Payer: MEDICAID

## 2022-10-06 VITALS
SYSTOLIC BLOOD PRESSURE: 130 MMHG | BODY MASS INDEX: 23.56 KG/M2 | WEIGHT: 138 LBS | HEIGHT: 64 IN | DIASTOLIC BLOOD PRESSURE: 80 MMHG

## 2022-10-06 DIAGNOSIS — J44.89 COPD WITH ASTHMA: ICD-10-CM

## 2022-10-06 DIAGNOSIS — M79.18 MUSCULOSKELETAL PAIN: ICD-10-CM

## 2022-10-06 DIAGNOSIS — R73.09 ELEVATED GLUCOSE: Primary | ICD-10-CM

## 2022-10-06 PROCEDURE — 1159F PR MEDICATION LIST DOCUMENTED IN MEDICAL RECORD: ICD-10-PCS | Mod: CPTII,95,, | Performed by: FAMILY MEDICINE

## 2022-10-06 PROCEDURE — 3075F PR MOST RECENT SYSTOLIC BLOOD PRESS GE 130-139MM HG: ICD-10-PCS | Mod: CPTII,95,, | Performed by: FAMILY MEDICINE

## 2022-10-06 PROCEDURE — 99213 OFFICE O/P EST LOW 20 MIN: CPT | Mod: 95,,, | Performed by: FAMILY MEDICINE

## 2022-10-06 PROCEDURE — 3008F PR BODY MASS INDEX (BMI) DOCUMENTED: ICD-10-PCS | Mod: CPTII,95,, | Performed by: FAMILY MEDICINE

## 2022-10-06 PROCEDURE — 4010F PR ACE/ARB THEARPY RXD/TAKEN: ICD-10-PCS | Mod: CPTII,95,, | Performed by: FAMILY MEDICINE

## 2022-10-06 PROCEDURE — 3079F PR MOST RECENT DIASTOLIC BLOOD PRESSURE 80-89 MM HG: ICD-10-PCS | Mod: CPTII,95,, | Performed by: FAMILY MEDICINE

## 2022-10-06 PROCEDURE — 1159F MED LIST DOCD IN RCRD: CPT | Mod: CPTII,95,, | Performed by: FAMILY MEDICINE

## 2022-10-06 PROCEDURE — 3075F SYST BP GE 130 - 139MM HG: CPT | Mod: CPTII,95,, | Performed by: FAMILY MEDICINE

## 2022-10-06 PROCEDURE — 4010F ACE/ARB THERAPY RXD/TAKEN: CPT | Mod: CPTII,95,, | Performed by: FAMILY MEDICINE

## 2022-10-06 PROCEDURE — 3079F DIAST BP 80-89 MM HG: CPT | Mod: CPTII,95,, | Performed by: FAMILY MEDICINE

## 2022-10-06 PROCEDURE — 3008F BODY MASS INDEX DOCD: CPT | Mod: CPTII,95,, | Performed by: FAMILY MEDICINE

## 2022-10-06 PROCEDURE — 99213 PR OFFICE/OUTPT VISIT, EST, LEVL III, 20-29 MIN: ICD-10-PCS | Mod: 95,,, | Performed by: FAMILY MEDICINE

## 2022-10-06 RX ORDER — METHYLPREDNISOLONE 4 MG/1
TABLET ORAL
Qty: 1 EACH | Refills: 0 | Status: SHIPPED | OUTPATIENT
Start: 2022-10-06 | End: 2022-10-27

## 2022-10-06 NOTE — PROGRESS NOTES
The patient location is: [place] (home)  The chief complaint leading to consultation is: follow up /cough/chest pain    Visit type: audiovisual    Face to Face time with patient: 12 minutes  12 minutes of total time spent on the encounter, which includes face to face time and non-face to face time preparing to see the patient (eg, review of tests), Obtaining and/or reviewing separately obtained history, Documenting clinical information in the electronic or other health record, Independently interpreting results (not separately reported) and communicating results to the patient/family/caregiver, or Care coordination (not separately reported).         Each patient to whom he or she provides medical services by telemedicine is:  (1) informed of the relationship between the physician and patient and the respective role of any other health care provider with respect to management of the patient; and (2) notified that he or she may decline to receive medical services by telemedicine and may withdraw from such care at any time.    Subjective:       Patient ID: Ana Bonilla is a 62 y.o. female.    Chief Complaint: Follow-up    HPI Ms. 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   HENT:  Negative for hearing loss, rhinorrhea and trouble swallowing.    Eyes:  Negative for discharge and visual disturbance.   Respiratory:  Positive for wheezing. Negative for chest tightness.    Cardiovascular:  Positive for chest pain. Negative for palpitations.   Gastrointestinal:  Negative for blood in stool, constipation, diarrhea and vomiting.   Endocrine: Negative for polydipsia and polyuria.    Genitourinary:  Negative for difficulty urinating, dysuria, hematuria and menstrual problem.   Musculoskeletal:  Positive for arthralgias. Negative for joint swelling and neck pain.   Neurological:  Negative for weakness and headaches.   Psychiatric/Behavioral:  Negative for confusion and dysphoric mood.          Past Medical History:   Diagnosis Date    Allergy     Amblyopia OS    Anxiety     Asthma     COPD (chronic obstructive pulmonary disease)     GERD (gastroesophageal reflux disease)     Hyperlipidemia     Hypertension     Thyroid disease      Past Surgical History:   Procedure Laterality Date    APPENDECTOMY      BUNIONECTOMY      COLONOSCOPY N/A 12/4/2019    Procedure: COLONOSCOPY;  Surgeon: Danny Matos III, MD;  Location: [facility];  Service: Endoscopy;  Laterality: N/A;    HYSTERECTOMY      PARTIAL//still with ovaries    neck fusion  08/2017    THYROIDECTOMY       Family History   Problem Relation Age of Onset    Hypertension Mother     Diabetes Mother     Diabetes Father     Stroke Maternal Grandmother     Prostate cancer Maternal Grandfather     Mental illness Son     Pancreatic cancer Maternal Uncle     Mental illness Other     Pancreatic cancer Other     Ovarian cancer Maternal Cousin          Objective:        Physical Exam  Vitals reviewed.   Constitutional:       Appearance: Normal appearance.   Pulmonary:      Effort: Pulmonary effort is normal.   Neurological:      Mental Status: She is alert.           Assessment/Plan:     Elevated glucose  -     HEMOGLOBIN A1C; Future; Expected date: 10/06/2022    Musculoskeletal pain  -     methylPREDNISolone (MEDROL DOSEPACK) 4 mg tablet; use as directed  Dispense: 1 each; Refill: 0    COPD with asthma    Will obtain a1c in a few months   Schedule with pulmonary    Follow up as needed    Kristin Sibley MD  [facility]   Family Medicine

## 2022-10-19 NOTE — PROGRESS NOTES
Procedure instructions reviewed. Place, time, begin low fiber diet, clear liquids only on day before procedure no solid food at all, nothing to eat or drink after 2 am dose of prep on am of procedure, no chewing gum or sucking on candy, take BP medication with sip of water at least 2 hours after am dose of prep on am of procedure on am of procedure, have someone to drive them home and bowel prep instructions reviewed with pt. Pt verbalized understanding.

## 2022-10-22 ENCOUNTER — NURSE TRIAGE (OUTPATIENT)
Dept: ADMINISTRATIVE | Facility: CLINIC | Age: 62
End: 2022-10-22
Payer: MEDICAID

## 2022-10-22 NOTE — TELEPHONE ENCOUNTER
Reason for Disposition   [1] Caller has URGENT medicine question about med that PCP or specialist prescribed AND [2] triager unable to answer question    Additional Information   Negative: [1] Intentional drug overdose AND [2] suicidal thoughts or ideas   Negative: Drug overdose and triager unable to answer question   Negative: Caller requesting a renewal or refill of a medicine patient is currently taking   Negative: Caller requesting information unrelated to medicine   Negative: Caller requesting information about COVID-19 Vaccine   Negative: Caller requesting information about Emergency Contraception   Negative: Caller requesting information about Combined Birth Control Pills   Negative: Caller requesting information about Progestin Birth Control Pills   Negative: Caller requesting information about Post-Op pain or medicines   Negative: Caller requesting a prescription antibiotic (such as Penicillin) for Strep throat and has a positive culture result   Negative: Caller requesting a prescription anti-viral med (such as Tamiflu) and has influenza (flu) symptoms   Negative: Immunization reaction suspected   Negative: Rash while taking a medicine or within 3 days of stopping it   Negative: [1] Asthma and [2] having symptoms of asthma (cough, wheezing, etc.)   Negative: [1] Symptom of illness (e.g., headache, abdominal pain, earache, vomiting) AND [2] more than mild   Negative: Breastfeeding questions about mother's medicines and diet   Negative: MORE THAN A DOUBLE DOSE of a prescription or over-the-counter (OTC) drug   Negative: [1] DOUBLE DOSE (an extra dose or lesser amount) of prescription drug AND [2] any symptoms (e.g., dizziness, nausea, pain, sleepiness)   Negative: [1] DOUBLE DOSE (an extra dose or lesser amount) of over-the-counter (OTC) drug AND [2] any symptoms (e.g., dizziness, nausea, pain, sleepiness)   Negative: Took another person's prescription drug   Negative: [1] Pharmacy calling with prescription  question AND [2] triager unable to answer question   Negative: [1] Prescription not at pharmacy AND [2] was prescribed by PCP recently (Exception: triager has access to EMR and prescription is recorded there. Go to Home Care and confirm for pharmacy.)   Negative: [1] DOUBLE DOSE (an extra dose or lesser amount) of prescription drug AND [2] NO symptoms (Exception: a double dose of antibiotics)   Negative: Diabetes drug error or overdose (e.g., took wrong type of insulin or took extra dose)    Protocols used: Medication Question Call-A-  pt called re having scope on Monday 10/24 mag citrate is recalled. Spoke with dr majo antunez to use 2 capfuls of miralax in Gatorade instead of mag citrate then use prep as ordered. Pt notified. Call back with questions

## 2022-10-23 RX ORDER — SODIUM CHLORIDE 0.9 % (FLUSH) 0.9 %
10 SYRINGE (ML) INJECTION
Status: CANCELLED | OUTPATIENT
Start: 2022-10-23

## 2022-10-23 RX ORDER — SODIUM CHLORIDE, SODIUM LACTATE, POTASSIUM CHLORIDE, CALCIUM CHLORIDE 600; 310; 30; 20 MG/100ML; MG/100ML; MG/100ML; MG/100ML
INJECTION, SOLUTION INTRAVENOUS CONTINUOUS
Status: CANCELLED | OUTPATIENT
Start: 2022-10-23

## 2022-10-24 ENCOUNTER — ANESTHESIA EVENT (OUTPATIENT)
Dept: ENDOSCOPY | Facility: HOSPITAL | Age: 62
End: 2022-10-24
Payer: MEDICAID

## 2022-10-24 ENCOUNTER — ANESTHESIA (OUTPATIENT)
Dept: ENDOSCOPY | Facility: HOSPITAL | Age: 62
End: 2022-10-24
Payer: MEDICAID

## 2022-10-24 ENCOUNTER — HOSPITAL ENCOUNTER (OUTPATIENT)
Facility: HOSPITAL | Age: 62
Discharge: HOME | End: 2022-10-24
Attending: INTERNAL MEDICINE | Admitting: INTERNAL MEDICINE
Payer: MEDICAID

## 2022-10-24 DIAGNOSIS — K21.9 GASTROESOPHAGEAL REFLUX DISEASE, UNSPECIFIED WHETHER ESOPHAGITIS PRESENT: Primary | ICD-10-CM

## 2022-10-24 DIAGNOSIS — Z86.010 HX OF ADENOMATOUS COLONIC POLYPS: ICD-10-CM

## 2022-10-24 PROCEDURE — 88341 IMHCHEM/IMCYTCHM EA ADD ANTB: CPT | Mod: 26,,, | Performed by: PATHOLOGY

## 2022-10-24 PROCEDURE — 37000008 HC ANESTHESIA 1ST 15 MINUTES: Performed by: INTERNAL MEDICINE

## 2022-10-24 PROCEDURE — 88305 TISSUE EXAM BY PATHOLOGIST: ICD-10-PCS | Mod: 26,,, | Performed by: PATHOLOGY

## 2022-10-24 PROCEDURE — 88342 IMHCHEM/IMCYTCHM 1ST ANTB: CPT | Mod: 26,,, | Performed by: PATHOLOGY

## 2022-10-24 PROCEDURE — 88342 IMHCHEM/IMCYTCHM 1ST ANTB: CPT | Performed by: PATHOLOGY

## 2022-10-24 PROCEDURE — 25000003 PHARM REV CODE 250: Performed by: INTERNAL MEDICINE

## 2022-10-24 PROCEDURE — 43239 PR EGD, FLEX, W/BIOPSY, SGL/MULTI: ICD-10-PCS | Mod: 51,,, | Performed by: INTERNAL MEDICINE

## 2022-10-24 PROCEDURE — 45378 PR COLONOSCOPY,DIAGNOSTIC: ICD-10-PCS | Mod: ,,, | Performed by: INTERNAL MEDICINE

## 2022-10-24 PROCEDURE — 88342 CHG IMMUNOCYTOCHEMISTRY: ICD-10-PCS | Mod: 26,,, | Performed by: PATHOLOGY

## 2022-10-24 PROCEDURE — G0105 COLORECTAL SCRN; HI RISK IND: HCPCS | Performed by: INTERNAL MEDICINE

## 2022-10-24 PROCEDURE — 37000009 HC ANESTHESIA EA ADD 15 MINS: Performed by: INTERNAL MEDICINE

## 2022-10-24 PROCEDURE — 88341 IMHCHEM/IMCYTCHM EA ADD ANTB: CPT | Mod: 59 | Performed by: PATHOLOGY

## 2022-10-24 PROCEDURE — 88305 TISSUE EXAM BY PATHOLOGIST: CPT | Mod: 26,,, | Performed by: PATHOLOGY

## 2022-10-24 PROCEDURE — 27201012 HC FORCEPS, HOT/COLD, DISP: Performed by: INTERNAL MEDICINE

## 2022-10-24 PROCEDURE — 45378 DIAGNOSTIC COLONOSCOPY: CPT | Mod: ,,, | Performed by: INTERNAL MEDICINE

## 2022-10-24 PROCEDURE — 43239 EGD BIOPSY SINGLE/MULTIPLE: CPT | Mod: 51,,, | Performed by: INTERNAL MEDICINE

## 2022-10-24 PROCEDURE — 63600175 PHARM REV CODE 636 W HCPCS: Performed by: FAMILY MEDICINE

## 2022-10-24 PROCEDURE — 45378 DIAGNOSTIC COLONOSCOPY: CPT | Performed by: INTERNAL MEDICINE

## 2022-10-24 PROCEDURE — 88341 PR IHC OR ICC EACH ADD'L SINGLE ANTIBODY  STAINPR: ICD-10-PCS | Mod: 26,,, | Performed by: PATHOLOGY

## 2022-10-24 PROCEDURE — 43239 EGD BIOPSY SINGLE/MULTIPLE: CPT | Performed by: INTERNAL MEDICINE

## 2022-10-24 PROCEDURE — 88305 TISSUE EXAM BY PATHOLOGIST: CPT | Performed by: PATHOLOGY

## 2022-10-24 PROCEDURE — 25000003 PHARM REV CODE 250: Performed by: FAMILY MEDICINE

## 2022-10-24 RX ORDER — DEXTROMETHORPHAN/PSEUDOEPHED 2.5-7.5/.8
DROPS ORAL
Status: DISCONTINUED | OUTPATIENT
Start: 2022-10-24 | End: 2022-10-24 | Stop reason: HOSPADM

## 2022-10-24 RX ORDER — LIDOCAINE HYDROCHLORIDE 20 MG/ML
INJECTION, SOLUTION EPIDURAL; INFILTRATION; INTRACAUDAL; PERINEURAL
Status: DISCONTINUED | OUTPATIENT
Start: 2022-10-24 | End: 2022-10-24

## 2022-10-24 RX ORDER — PROPOFOL 10 MG/ML
VIAL (ML) INTRAVENOUS
Status: DISCONTINUED | OUTPATIENT
Start: 2022-10-24 | End: 2022-10-24

## 2022-10-24 RX ADMIN — PROPOFOL 50 MG: 10 INJECTION, EMULSION INTRAVENOUS at 12:10

## 2022-10-24 RX ADMIN — LIDOCAINE HYDROCHLORIDE 20 MG: 20 INJECTION, SOLUTION EPIDURAL; INFILTRATION; INTRACAUDAL; PERINEURAL at 11:10

## 2022-10-24 RX ADMIN — PROPOFOL 100 MG: 10 INJECTION, EMULSION INTRAVENOUS at 11:10

## 2022-10-24 RX ADMIN — PROPOFOL 50 MG: 10 INJECTION, EMULSION INTRAVENOUS at 11:10

## 2022-10-24 RX ADMIN — SODIUM CHLORIDE, SODIUM LACTATE, POTASSIUM CHLORIDE, AND CALCIUM CHLORIDE: .6; .31; .03; .02 INJECTION, SOLUTION INTRAVENOUS at 11:10

## 2022-10-24 NOTE — [employer]ATION PATIENT INSTRUCTIONS
Discharge Summary/Instructions after an Endoscopic Procedure  Patient Name: Ana Bonilla  Patient MRN: [id number]  Patient YOB: 1960 Monday, October 24, 2022 Danny Matos III, MD  Dear patient,  As a result of recent federal legislation (The Federal Cures Act), you may   receive lab or pathology results from your procedure in your [facility]   account before your physician is able to contact you. Your physician or   their representative will relay the results to you with their   recommendations at their soonest availability.  Thank you,  RESTRICTIONS:  During your procedure today, you received medications for sedation.  These   medications may affect your judgment, balance and coordination.  Therefore,   for 24 hours, you have the following restrictions:   - DO NOT drive a car, operate machinery, make legal/financial decisions,   sign important papers or drink alcohol.    ACTIVITY:  Today: no heavy lifting, straining or running due to procedural   sedation/anesthesia.  The following day: return to full activity including work.  DIET:  Eat and drink normally unless instructed otherwise.     TREATMENT FOR COMMON SIDE EFFECTS:  - Mild abdominal pain, nausea, belching, bloating or excessive gas:  rest,   eat lightly and use a heating pad.  - Sore Throat: treat with throat lozenges and/or gargle with warm salt   water.  - Because air was used during the procedure, expelling large amounts of air   from your rectum or belching is normal.  - If a bowel prep was taken, you may not have a bowel movement for 1-3 days.    This is normal.  SYMPTOMS TO WATCH FOR AND REPORT TO YOUR PHYSICIAN:  1. Abdominal pain or bloating, other than gas cramps.  2. Chest pain.  3. Back pain.  4. Signs of infection such as: chills or fever occurring within 24 hours   after the procedure.  5. Rectal bleeding, which would show as bright red, maroon, or black stools.   (A tablespoon of blood from the rectum is not serious, especially  if   hemorrhoids are present.)  6. Vomiting.  7. Weakness or dizziness.  GO DIRECTLY TO THE NEAREST EMERGENCY ROOM IF YOU HAVE ANY OF THE FOLLOWING:      Difficulty breathing              Chills and/or fever over 101 F   Persistent vomiting and/or vomiting blood   Severe abdominal pain   Severe chest pain   Black, tarry stools   Bleeding- more than one tablespoon   Any other symptom or condition that you feel may need urgent attention  Your doctor recommends these additional instructions:  If any biopsies were taken, your doctors clinic will contact you in 1 to 2   weeks with any results.  - Discharge patient to home (via wheelchair).   - Resume previous diet.   - Continue present medications.   - Await pathology results.   - Return to GI clinic in 2 weeks.  For questions, problems or results please call your physician Danny Matos III, MD at Work:  [phone]  If you have any questions about the above instructions, call the GI   department at [phone] or call the endoscopy unit at [phone]   from 7am until 3 pm.  [facility], EMERGENCY ROOM PHONE NUMBER:   [phone]  IF A COMPLICATION OR EMERGENCY SITUATION ARISES AND YOU ARE UNABLE TO REACH   YOUR PHYSICIAN - GO DIRECTLY TO THE EMERGENCY ROOM.  I have read or have had read to me these discharge instructions for my   procedure and have received a written copy.  I understand these   instructions and will follow-up with my physician if I have any questions.     __________________________________       _____________________________________  Nurse Signature                                          Patient/Designated   Responsible Party Signature  Danny Matos III, MD  10/24/2022 12:51:34 PM  This report has been verified and signed electronically.  Dear patient,  As a result of recent federal legislation (The Federal Cures Act), you may   receive lab or pathology results from your procedure in your [facility]   account  before your physician is able to contact you. Your physician or   their representative will relay the results to you with their   recommendations at their soonest availability.  Thank you,  [employer]OVATION

## 2022-10-24 NOTE — ANESTHESIA POSTPROCEDURE EVALUATION
Anesthesia Post Evaluation    Patient: Ana Bonilla    Procedure(s) Performed: Procedure(s) (LRB):  EGD (ESOPHAGOGASTRODUODENOSCOPY) (N/A)  COLONOSCOPY (N/A)    Final Anesthesia Type: MAC      Patient location during evaluation: GI PACU  Patient participation: Yes- Able to Participate  Level of consciousness: awake and alert  Post-procedure vital signs: reviewed and stable  Pain management: adequate  Airway patency: patent    PONV status at discharge: No PONV  Anesthetic complications: no      Cardiovascular status: stable  Respiratory status: unassisted and spontaneous ventilation  Hydration status: euvolemic  Follow-up not needed.          Vitals Value Taken Time   /42 10/24/22 1230   Temp 36.2 °C (97.2 °F) 10/24/22 1230   Pulse 69 10/24/22 1230   Resp 16 10/24/22 1230   SpO2 100 % 10/24/22 1230         No case tracking events are documented in the log.      Pain/Manuel Score: No data recorded

## 2022-10-24 NOTE — TRANSFER OF CARE
"Anesthesia Transfer of Care Note    Patient: Ana Bonilla    Procedure(s) Performed: Procedure(s) (LRB):  EGD (ESOPHAGOGASTRODUODENOSCOPY) (N/A)  COLONOSCOPY (N/A)    Patient location: GI    Anesthesia Type: MAC    Transport from OR: Transported from OR on room air with adequate spontaneous ventilation    Post pain: adequate analgesia    Post assessment: no apparent anesthetic complications    Post vital signs: stable    Level of consciousness: awake and responds to stimulation    Nausea/Vomiting: no nausea/vomiting    Complications: none    Transfer of care protocol was followed      Last vitals:   Visit Vitals  BP (!) 134/42 (BP Location: Left arm, Patient Position: Lying)   Pulse 69   Temp 36.2 °C (97.2 °F) (Tympanic)   Resp 16   Ht 5' 4" (1.626 m)   Wt 61.2 kg (135 lb)   SpO2 100%   Breastfeeding No   BMI 23.17 kg/m²     "

## 2022-10-24 NOTE — PLAN OF CARE
Dr. Matos at bedside to discuss findings with pt and family.    normal rate, regular rhythm, and no murmur.

## 2022-10-24 NOTE — ANESTHESIA PREPROCEDURE EVALUATION
10/24/2022  Ana Bonilla is a 62 y.o., female.      Pre-op Assessment    I have reviewed the Patient Summary Reports.     I have reviewed the Nursing Notes. I have reviewed the NPO Status.   I have reviewed the Medications.     Review of Systems  Anesthesia Hx:  No previous Anesthesia    Social:  Smoker    Hematology/Oncology:     Oncology Normal     Cardiovascular:   Hypertension Valvular problems/Murmurs, AI PVD hyperlipidemia Echo 2017:  CONCLUSIONS     1 - Concentric remodeling.     2 - No wall motion abnormalities.     3 - Normal left ventricular systolic function (EF 60-65%).     4 - Normal left ventricular diastolic function.     5 - Normal right ventricular systolic function .     6 - Moderate aortic regurgitation.    Pulmonary:   COPD Asthma    Renal/:  Renal/ Normal     Hepatic/GI:   Bowel Prep. GERD    Musculoskeletal:  Musculoskeletal Normal    Endocrine:   Hypothyroidism    Dermatological:  Skin Normal    Psych:  Psychiatric Normal           Physical Exam  General: Well nourished, Cooperative, Alert and Oriented    Airway:  Mallampati: II   Mouth Opening: Normal  TM Distance: Normal  Tongue: Normal  Neck ROM: Normal ROM    Dental:  Intact    Chest/Lungs:  Clear to auscultation    Heart:  Rhythm: Regular Rhythm  Sounds: Normal    Abdomen:  Normal        Anesthesia Plan  Type of Anesthesia, risks & benefits discussed:    Anesthesia Type: MAC  Intra-op Monitoring Plan: Standard ASA Monitors  Induction:  IV  Informed Consent: Informed consent signed with the Patient and all parties understand the risks and agree with anesthesia plan.  All questions answered.   ASA Score: 3  Day of Surgery Review of History & Physical: I have interviewed and examined the patient. I have reviewed the patient's H&P dated:     Ready For Surgery From Anesthesia Perspective.     .

## 2022-10-24 NOTE — PROVATION PATIENT INSTRUCTIONS
Discharge Summary/Instructions after an Endoscopic Procedure  Patient Name: Ana Bonilla  Patient MRN: [id number]  Patient YOB: 1960 Monday, October 24, 2022 Danny Matos III, MD  Dear patient,  As a result of recent federal legislation (The Federal Cures Act), you may   receive lab or pathology results from your procedure in your [facility]   account before your physician is able to contact you. Your physician or   their representative will relay the results to you with their   recommendations at their soonest availability.  Thank you,  RESTRICTIONS:  During your procedure today, you received medications for sedation.  These   medications may affect your judgment, balance and coordination.  Therefore,   for 24 hours, you have the following restrictions:   - DO NOT drive a car, operate machinery, make legal/financial decisions,   sign important papers or drink alcohol.    ACTIVITY:  Today: no heavy lifting, straining or running due to procedural   sedation/anesthesia.  The following day: return to full activity including work.  DIET:  Eat and drink normally unless instructed otherwise.     TREATMENT FOR COMMON SIDE EFFECTS:  - Mild abdominal pain, nausea, belching, bloating or excessive gas:  rest,   eat lightly and use a heating pad.  - Sore Throat: treat with throat lozenges and/or gargle with warm salt   water.  - Because air was used during the procedure, expelling large amounts of air   from your rectum or belching is normal.  - If a bowel prep was taken, you may not have a bowel movement for 1-3 days.    This is normal.  SYMPTOMS TO WATCH FOR AND REPORT TO YOUR PHYSICIAN:  1. Abdominal pain or bloating, other than gas cramps.  2. Chest pain.  3. Back pain.  4. Signs of infection such as: chills or fever occurring within 24 hours   after the procedure.  5. Rectal bleeding, which would show as bright red, maroon, or black stools.   (A tablespoon of blood from the rectum is not serious, especially  if   hemorrhoids are present.)  6. Vomiting.  7. Weakness or dizziness.  GO DIRECTLY TO THE NEAREST EMERGENCY ROOM IF YOU HAVE ANY OF THE FOLLOWING:      Difficulty breathing              Chills and/or fever over 101 F   Persistent vomiting and/or vomiting blood   Severe abdominal pain   Severe chest pain   Black, tarry stools   Bleeding- more than one tablespoon   Any other symptom or condition that you feel may need urgent attention  Your doctor recommends these additional instructions:  If any biopsies were taken, your doctors clinic will contact you in 1 to 2   weeks with any results.  - Discharge patient to home (via wheelchair).   - High fiber diet.   - Continue present medications.   - Repeat colonoscopy in 5 years for surveillance.   - Return to primary care physician as previously scheduled.   - Discharge patient to home (via wheelchair).   - High fiber diet.   - Continue present medications.   - Repeat colonoscopy in 5 years for surveillance.   - Return to primary care physician as previously scheduled.  For questions, problems or results please call your physician Danny Matos III, MD at Work:  [phone]  If you have any questions about the above instructions, call the GI   department at [phone] or call the endoscopy unit at [phone]   from 7am until 3 pm.  [facility] [place], EMERGENCY ROOM PHONE NUMBER:   [phone]  IF A COMPLICATION OR EMERGENCY SITUATION ARISES AND YOU ARE UNABLE TO REACH   YOUR PHYSICIAN - GO DIRECTLY TO THE EMERGENCY ROOM.  I have read or have had read to me these discharge instructions for my   procedure and have received a written copy.  I understand these   instructions and will follow-up with my physician if I have any questions.     __________________________________       _____________________________________  Nurse Signature                                          Patient/Designated   Responsible Party Signature  Danny Matos III,  MD  10/24/2022 12:42:28 PM  This report has been verified and signed electronically.  Dear patient,  As a result of recent federal legislation (The Federal Cures Act), you may   receive lab or pathology results from your procedure in your [facility]   account before your physician is able to contact you. Your physician or   their representative will relay the results to you with their   recommendations at their soonest availability.  Thank you,  PROVATION

## 2022-10-24 NOTE — H&P
Short Stay Endoscopy History and Physical    PCP - Kristin Sibley MD    Procedure - Colonoscopy  ASA - 3  Mallampati - per anesthesia  History of Anesthesia problems - no  Family history Anesthesia problems -  no     HPI:  This is a 62 y.o.female here for evaluation of : Epigastric  pain, GERD; Hx of Colon Polyps    Reflux - yes  Dysphagia - no  Abdominal pain - yes  Diarrhea - no  Anemia - no  GI bleeding - no  Nausea and vomiting-no  Early satiety-no  aversion to sight or smell of food-no    ROS:  Constitutional: No fevers, chills, No weight loss  ENT: No allergies  CV: No chest pain  Pulm: No cough, No shortness of breath  Ophtho: No vision changes  GI: see HPI  Derm: No rash  Heme: No lymphadenopathy, No bruising  MSK: No arthritis  : No dysuria, No hematuria  Endo: No hot or cold intolerance  Neuro: No syncope, No seizure  Psych: No anxiety, No depression    Medical History:  Past Medical History:   Diagnosis Date    Allergy     Amblyopia OS    Anxiety     Asthma     COPD (chronic obstructive pulmonary disease)     GERD (gastroesophageal reflux disease)     Hyperlipidemia     Hypertension     Thyroid disease        Surgical History:  Past Surgical History:   Procedure Laterality Date    APPENDECTOMY      BUNIONECTOMY      COLONOSCOPY N/A 12/4/2019    Procedure: COLONOSCOPY;  Surgeon: Danny Matos III, MD;  Location: [facility];  Service: Endoscopy;  Laterality: N/A;    HYSTERECTOMY      PARTIAL//still with ovaries    neck fusion  08/2017    THYROIDECTOMY         Family History:  Family History   Problem Relation Age of Onset    Hypertension Mother     Diabetes Mother     Diabetes Father     Stroke Maternal Grandmother     Prostate cancer Maternal Grandfather     Mental illness Son     Pancreatic cancer Maternal Uncle     Mental illness Other     Pancreatic cancer Other     Ovarian cancer Maternal Cousin        Social History:  Social History     Socioeconomic History    Marital status:     Number  of children: 2   Occupational History     Employer: [employer]   Tobacco Use    Smoking status: Every Day     Packs/day: 1.00     Years: 45.00     Pack years: 45.00     Types: Cigarettes    Smokeless tobacco: Never   Substance and Sexual Activity    Alcohol use: Not Currently     Comment: quit    Drug use: No    Sexual activity: Never       Allergies: Review of patient's allergies indicates:  No Known Allergies    Medications:   Current Facility-Administered Medications on File Prior to Encounter   Medication Dose Route Frequency Provider Last Rate Last Admin    lactated ringers infusion   Intravenous Continuous Danny Matos III, MD         Current Outpatient Medications on File Prior to Encounter   Medication Sig Dispense Refill    amlodipine-benazepril 2.5-10 mg (LOTREL) 2.5-10 mg per capsule Take 1 capsule by mouth once daily. 90 capsule 3    estradioL (VAGIFEM) 10 mcg Tab Place 1 tablet vaginally twice a week.      fluticasone propionate (FLONASE) 50 mcg/actuation nasal spray 1 spray (50 mcg total) by Each Nostril route once daily. 18.2 mL 1    fluticasone-salmeterol diskus inhaler 250-50 mcg Inhale 1 puff into the lungs 2 (two) times daily. Controller 180 each 1    levothyroxine (SYNTHROID) 100 MCG tablet Take 1 tablet (100 mcg total) by mouth before breakfast. 90 tablet 0    linaCLOtide (LINZESS) 72 mcg Cap capsule Take 1 capsule (72 mcg total) by mouth before breakfast. 30 capsule 5    metoprolol succinate (TOPROL-XL) 50 MG 24 hr tablet Take 1 tablet (50 mg total) by mouth once daily. 30 tablet 11    ondansetron (ZOFRAN-ODT) 4 MG TbDL Take 1 tablet (4 mg total) by mouth every 12 (twelve) hours as needed (nausea). 10 tablet 0    rosuvastatin (CRESTOR) 10 MG tablet Take 1 tablet (10 mg total) by mouth once daily. 90 tablet 3    sod sulf-pot chloride-mag sulf (SUTAB) 1.479-0.188- 0.225 gram tablet Take 12 tablets by mouth once daily. Take according to package instructions with indicated amount of water. 24 tablet  0       Objective Findings:    Vital Signs:There were no vitals filed for this visit.        Physical Exam:  General Appearance: Well appearing in no acute distress  Eyes:    No scleral icterus  ENT: Neck supple, Lips, mucosa, and tongue normal; teeth and gums normal  Lungs: CTA bilaterally in anterior and posterior fields, no wheezes, no crackles.  Heart:  Regular rate, S1, S2 normal, no murmurs heard.  Abdomen: Soft, non tender, non distended with normal bowel sounds. No hepatosplenomegaly, ascites, or mass.  Extremities: No clubbing, cyanosis or edema  Skin: No rash    Labs:  Reviewed    Plan: EGD and Colonoscopy  I have explained the risks and benefits of endoscopy procedures to the patient including but not limited to bleeding, perforation, infection, and death. The patient wishes to proceed.

## 2022-10-25 ENCOUNTER — PATIENT MESSAGE (OUTPATIENT)
Dept: INTERNAL MEDICINE | Facility: CLINIC | Age: 62
End: 2022-10-25
Payer: MEDICAID

## 2022-10-25 VITALS
BODY MASS INDEX: 23.05 KG/M2 | TEMPERATURE: 97 F | DIASTOLIC BLOOD PRESSURE: 59 MMHG | OXYGEN SATURATION: 99 % | SYSTOLIC BLOOD PRESSURE: 103 MMHG | HEART RATE: 69 BPM | HEIGHT: 64 IN | WEIGHT: 135 LBS | RESPIRATION RATE: 16 BRPM

## 2022-10-28 DIAGNOSIS — K25.9 MULTIPLE GASTRIC ULCERS: Primary | ICD-10-CM

## 2022-10-28 DIAGNOSIS — K27.9 H PYLORI ULCER: ICD-10-CM

## 2022-10-28 DIAGNOSIS — B96.81 H PYLORI ULCER: ICD-10-CM

## 2022-10-28 LAB
COMMENT: NORMAL
FINAL PATHOLOGIC DIAGNOSIS: NORMAL
GROSS: NORMAL
Lab: NORMAL

## 2022-10-28 RX ORDER — BISMUTH SUBCITRATE POTASSIUM, METRONIDAZOLE AND TETRACYCLINE HYDROCHLORIDE 140; 125; 125 MG/1; MG/1; MG/1
3 CAPSULE ORAL
Qty: 120 CAPSULE | Refills: 0 | Status: SHIPPED | OUTPATIENT
Start: 2022-10-28 | End: 2022-11-07

## 2022-11-04 ENCOUNTER — PATIENT MESSAGE (OUTPATIENT)
Dept: INTERNAL MEDICINE | Facility: CLINIC | Age: 62
End: 2022-11-04
Payer: MEDICAID

## 2022-11-04 RX ORDER — FLUCONAZOLE 150 MG/1
150 TABLET ORAL DAILY
Qty: 2 TABLET | Refills: 0 | Status: SHIPPED | OUTPATIENT
Start: 2022-11-04 | End: 2022-11-06

## 2022-11-04 NOTE — TELEPHONE ENCOUNTER
Please advise Web[employer] message, Dr. Matos had prescribed Pylera and it is giving her a yeast infection.

## 2022-11-07 ENCOUNTER — PATIENT MESSAGE (OUTPATIENT)
Dept: INTERNAL MEDICINE | Facility: CLINIC | Age: 62
End: 2022-11-07
Payer: MEDICAID

## 2022-11-07 DIAGNOSIS — F41.9 ANXIETY: ICD-10-CM

## 2022-11-07 RX ORDER — ALPRAZOLAM 2 MG/1
TABLET ORAL
Qty: 60 TABLET | Refills: 0 | Status: SHIPPED | OUTPATIENT
Start: 2022-11-07 | End: 2022-12-05 | Stop reason: SDUPTHER

## 2022-11-07 NOTE — TELEPHONE ENCOUNTER
No new care gaps identified.  [facility] Embedded Care Gaps. Reference number: [id number]. 11/07/2022   11:40:55 AM CST

## 2022-11-30 ENCOUNTER — PATIENT MESSAGE (OUTPATIENT)
Dept: GASTROENTEROLOGY | Facility: CLINIC | Age: 62
End: 2022-11-30
Payer: MEDICAID

## 2022-12-07 ENCOUNTER — TELEPHONE (OUTPATIENT)
Dept: INTERNAL MEDICINE | Facility: CLINIC | Age: 62
End: 2022-12-07
Payer: MEDICAID

## 2022-12-07 ENCOUNTER — OFFICE VISIT (OUTPATIENT)
Dept: INTERNAL MEDICINE | Facility: CLINIC | Age: 62
End: 2022-12-07
Payer: MEDICAID

## 2022-12-07 VITALS
RESPIRATION RATE: 18 BRPM | BODY MASS INDEX: 22.85 KG/M2 | HEIGHT: 64 IN | WEIGHT: 133.81 LBS | OXYGEN SATURATION: 95 % | TEMPERATURE: 98 F | SYSTOLIC BLOOD PRESSURE: 116 MMHG | HEART RATE: 69 BPM | DIASTOLIC BLOOD PRESSURE: 70 MMHG

## 2022-12-07 DIAGNOSIS — R09.81 SINUS CONGESTION: ICD-10-CM

## 2022-12-07 DIAGNOSIS — R05.9 COUGH, UNSPECIFIED TYPE: Primary | ICD-10-CM

## 2022-12-07 LAB
CTP QC/QA: YES
CTP QC/QA: YES
POC MOLECULAR INFLUENZA A AGN: NEGATIVE
POC MOLECULAR INFLUENZA B AGN: NEGATIVE
SARS-COV-2 RDRP RESP QL NAA+PROBE: NEGATIVE

## 2022-12-07 PROCEDURE — 3078F DIAST BP <80 MM HG: CPT | Mod: CPTII,,, | Performed by: NURSE PRACTITIONER

## 2022-12-07 PROCEDURE — 3008F BODY MASS INDEX DOCD: CPT | Mod: CPTII,,, | Performed by: NURSE PRACTITIONER

## 2022-12-07 PROCEDURE — 87502 INFLUENZA DNA AMP PROBE: CPT | Mod: PBBFAC | Performed by: NURSE PRACTITIONER

## 2022-12-07 PROCEDURE — 1159F MED LIST DOCD IN RCRD: CPT | Mod: CPTII,,, | Performed by: NURSE PRACTITIONER

## 2022-12-07 PROCEDURE — 1160F PR REVIEW ALL MEDS BY PRESCRIBER/CLIN PHARMACIST DOCUMENTED: ICD-10-PCS | Mod: CPTII,,, | Performed by: NURSE PRACTITIONER

## 2022-12-07 PROCEDURE — 3078F PR MOST RECENT DIASTOLIC BLOOD PRESSURE < 80 MM HG: ICD-10-PCS | Mod: CPTII,,, | Performed by: NURSE PRACTITIONER

## 2022-12-07 PROCEDURE — 3074F PR MOST RECENT SYSTOLIC BLOOD PRESSURE < 130 MM HG: ICD-10-PCS | Mod: CPTII,,, | Performed by: NURSE PRACTITIONER

## 2022-12-07 PROCEDURE — 99999 PR PBB SHADOW E&M-EST. PATIENT-LVL III: ICD-10-PCS | Mod: PBBFAC,,, | Performed by: NURSE PRACTITIONER

## 2022-12-07 PROCEDURE — 99213 PR OFFICE/OUTPT VISIT, EST, LEVL III, 20-29 MIN: ICD-10-PCS | Mod: S$PBB,,, | Performed by: NURSE PRACTITIONER

## 2022-12-07 PROCEDURE — 4010F PR ACE/ARB THEARPY RXD/TAKEN: ICD-10-PCS | Mod: CPTII,,, | Performed by: NURSE PRACTITIONER

## 2022-12-07 PROCEDURE — 99213 OFFICE O/P EST LOW 20 MIN: CPT | Mod: PBBFAC | Performed by: NURSE PRACTITIONER

## 2022-12-07 PROCEDURE — 99213 OFFICE O/P EST LOW 20 MIN: CPT | Mod: S$PBB,,, | Performed by: NURSE PRACTITIONER

## 2022-12-07 PROCEDURE — 99999 PR PBB SHADOW E&M-EST. PATIENT-LVL III: CPT | Mod: PBBFAC,,, | Performed by: NURSE PRACTITIONER

## 2022-12-07 PROCEDURE — 4010F ACE/ARB THERAPY RXD/TAKEN: CPT | Mod: CPTII,,, | Performed by: NURSE PRACTITIONER

## 2022-12-07 PROCEDURE — 1159F PR MEDICATION LIST DOCUMENTED IN MEDICAL RECORD: ICD-10-PCS | Mod: CPTII,,, | Performed by: NURSE PRACTITIONER

## 2022-12-07 PROCEDURE — 3008F PR BODY MASS INDEX (BMI) DOCUMENTED: ICD-10-PCS | Mod: CPTII,,, | Performed by: NURSE PRACTITIONER

## 2022-12-07 PROCEDURE — 96372 THER/PROPH/DIAG INJ SC/IM: CPT | Mod: PBBFAC

## 2022-12-07 PROCEDURE — 1160F RVW MEDS BY RX/DR IN RCRD: CPT | Mod: CPTII,,, | Performed by: NURSE PRACTITIONER

## 2022-12-07 PROCEDURE — 87635 SARS-COV-2 COVID-19 AMP PRB: CPT | Mod: PBBFAC | Performed by: NURSE PRACTITIONER

## 2022-12-07 PROCEDURE — 3074F SYST BP LT 130 MM HG: CPT | Mod: CPTII,,, | Performed by: NURSE PRACTITIONER

## 2022-12-07 RX ORDER — METHYLPREDNISOLONE 4 MG/1
TABLET ORAL
Qty: 1 EACH | Refills: 0 | Status: SHIPPED | OUTPATIENT
Start: 2022-12-07 | End: 2023-02-15

## 2022-12-07 RX ORDER — PROMETHAZINE HYDROCHLORIDE AND DEXTROMETHORPHAN HYDROBROMIDE 6.25; 15 MG/5ML; MG/5ML
10 SYRUP ORAL EVERY 4 HOURS PRN
Qty: 240 ML | Refills: 0 | Status: SHIPPED | OUTPATIENT
Start: 2022-12-07 | End: 2022-12-17

## 2022-12-07 RX ORDER — DEXAMETHASONE SODIUM PHOSPHATE 100 MG/10ML
10 INJECTION INTRAMUSCULAR; INTRAVENOUS
Status: COMPLETED | OUTPATIENT
Start: 2022-12-07 | End: 2022-12-07

## 2022-12-07 RX ORDER — DEXBROMPHENIRAMINE MALEATE AND PHENYLEPHRINE HYDROCHLORIDE 2; 10 MG/1; MG/1
1 TABLET ORAL EVERY 6 HOURS PRN
Qty: 28 TABLET | Refills: 0 | Status: SHIPPED | OUTPATIENT
Start: 2022-12-07 | End: 2022-12-21

## 2022-12-07 RX ORDER — AZITHROMYCIN 250 MG/1
TABLET, FILM COATED ORAL
Qty: 6 TABLET | Refills: 0 | Status: SHIPPED | OUTPATIENT
Start: 2022-12-07 | End: 2022-12-12

## 2022-12-07 RX ADMIN — DEXAMETHASONE SODIUM PHOSPHATE 10 MG: 10 INJECTION INTRAMUSCULAR; INTRAVENOUS at 03:12

## 2022-12-07 NOTE — PROGRESS NOTES
Ana GONZALEZ Hickman  12/07/2022  [id number]    Kristin Sibley MD  Patient Care Team:  Kristin Sibley MD as PCP - General (Internal Medicine)          Visit Type:an urgent visit for a new problem    Chief Complaint:  Chief Complaint   Patient presents with    Cough    Chest Pain    Nasal Congestion    Sore Throat       History of Present Illness:    61 yo female presents with co cough, sinus congestion, throat pain  and  cough for one week. Pt denies fever, chills, sweats, CP, SOB, NVD, abdominal pain, fatigue, headache, body aches, loss of taste or smell.      History:  Past Medical History:   Diagnosis Date    Allergy     Amblyopia OS    Anxiety     Asthma     COPD (chronic obstructive pulmonary disease)     GERD (gastroesophageal reflux disease)     Hyperlipidemia     Hypertension     Thyroid disease      Past Surgical History:   Procedure Laterality Date    APPENDECTOMY      BUNIONECTOMY      COLONOSCOPY N/A 12/4/2019    Procedure: COLONOSCOPY;  Surgeon: Danny Matos III, MD;  Location: [facility];  Service: Endoscopy;  Laterality: N/A;    COLONOSCOPY N/A 10/24/2022    Procedure: COLONOSCOPY;  Surgeon: Danny Matos III, MD;  Location: [facility];  Service: Endoscopy;  Laterality: N/A;    ESOPHAGOGASTRODUODENOSCOPY N/A 10/24/2022    Procedure: EGD (ESOPHAGOGASTRODUODENOSCOPY);  Surgeon: Danny Matos III, MD;  Location: [facility];  Service: Endoscopy;  Laterality: N/A;    HYSTERECTOMY      PARTIAL//still with ovaries    neck fusion  08/2017    THYROIDECTOMY       Family History   Problem Relation Age of Onset    Hypertension Mother     Diabetes Mother     Diabetes Father     Stroke Maternal Grandmother     Prostate cancer Maternal Grandfather     Mental illness Son     Pancreatic cancer Maternal Uncle     Mental illness Other     Pancreatic cancer Other     Ovarian cancer Maternal Cousin      Social History     Socioeconomic History    Marital status:     Number of children: 2   Occupational History      Employer: [employer]   Tobacco Use    Smoking status: Every Day     Packs/day: 0.50     Years: 45.00     Pack years: 22.50     Types: Cigarettes    Smokeless tobacco: Never   Substance and Sexual Activity    Alcohol use: Not Currently     Comment: quit    Drug use: No    Sexual activity: Never     Patient Active Problem List   Diagnosis    COPD with asthma    GERD (gastroesophageal reflux disease)    Primary hypothyroidism    Tobacco abuse disorder    PVD (peripheral vascular disease)    Anxiety    Dyslipidemia    Claudication    Essential hypertension    Allergic rhinitis    Pain in both lower extremities    Anisometropic amblyopia of left eye    Nonrheumatic aortic valve insufficiency    Colon polyps    Precordial pain    Tachycardia    Pain of right lower extremity    Difficulty walking     Review of patient's allergies indicates:  No Known Allergies    The following were reviewed at this visit: active problem list, medication list, allergies, family history, social history, and health maintenance.    Medications:  Current Outpatient Medications on File Prior to Visit   Medication Sig Dispense Refill    ALPRAZolam (XANAX) 2 MG Tab TAKE 1 TABLET BY MOUTH 2 TIMES A DAY AS NEEDED ANXIETY 60 tablet 0    amlodipine-benazepril 2.5-10 mg (LOTREL) 2.5-10 mg per capsule Take 1 capsule by mouth once daily. 90 capsule 3    fluticasone propionate (FLONASE) 50 mcg/actuation nasal spray 1 spray (50 mcg total) by Each Nostril route once daily. 18.2 mL 1    fluticasone-salmeterol diskus inhaler 250-50 mcg Inhale 1 puff into the lungs 2 (two) times daily. Controller 180 each 1    levothyroxine (SYNTHROID) 100 MCG tablet Take 1 tablet (100 mcg total) by mouth before breakfast. 90 tablet 0    linaCLOtide (LINZESS) 72 mcg Cap capsule Take 1 capsule (72 mcg total) by mouth before breakfast. 30 capsule 5    metoprolol succinate (TOPROL-XL) 50 MG 24 hr tablet Take 1 tablet (50 mg total) by mouth once daily. 30 tablet 11    montelukast  (SINGULAIR) 10 mg tablet TAKE 1 TABLET(10 MG) BY MOUTH EVERY EVENING 90 tablet 0    ondansetron (ZOFRAN-ODT) 4 MG TbDL Take 1 tablet (4 mg total) by mouth every 12 (twelve) hours as needed (nausea). 10 tablet 0    pantoprazole (PROTONIX) 40 MG tablet TAKE 1 TABLET(40 MG) BY MOUTH EVERY DAY 90 tablet 3    promethazine-codeine 6.25-10 mg/5 ml (PHENERGAN WITH CODEINE) 6.25-10 mg/5 mL syrup Take 5 mLs by mouth every 4 (four) hours as needed for Cough. 118 mL 0    rosuvastatin (CRESTOR) 10 MG tablet Take 1 tablet (10 mg total) by mouth once daily. 90 tablet 3    sod sulf-pot chloride-mag sulf (SUTAB) 1.479-0.188- 0.225 gram tablet Take 12 tablets by mouth once daily. Take according to package instructions with indicated amount of water. 24 tablet 0    estradioL (VAGIFEM) 10 mcg Tab Place 1 tablet vaginally twice a week.       Current Facility-Administered Medications on File Prior to Visit   Medication Dose Route Frequency Provider Last Rate Last Admin    lactated ringers infusion   Intravenous Continuous Danny Matos III, MD           Medications have been reviewed and reconciled with patient at this visit.  Barriers to medications reviewed with patient.    Adverse reactions to current medications reviewed with patient..    Over the counter medications reviewed and reconciled with patient.    Exam:  Wt Readings from Last 3 Encounters:   12/07/22 60.7 kg (133 lb 13.1 oz)   10/24/22 61.2 kg (135 lb)   10/06/22 62.6 kg (138 lb)     Temp Readings from Last 3 Encounters:   12/07/22 97.9 °F (36.6 °C) (Tympanic)   10/24/22 97.2 °F (36.2 °C) (Tympanic)   08/24/22 98.3 °F (36.8 °C) (Oral)     BP Readings from Last 3 Encounters:   12/07/22 116/70   10/24/22 (!) 103/59   10/06/22 130/80     Pulse Readings from Last 3 Encounters:   12/07/22 69   10/24/22 69   09/06/22 81     Body mass index is 22.97 kg/m².      Review of Systems   Constitutional: Negative.    HENT:  Positive for congestion, sinus pain and sore throat.    Eyes:  Negative.    Respiratory:  Positive for cough.    Cardiovascular: Negative.    Gastrointestinal: Negative.    Genitourinary: Negative.    Musculoskeletal: Negative.    Skin: Negative.    Neurological: Negative.    Endo/Heme/Allergies: Negative.    Psychiatric/Behavioral: Negative.     Physical Exam  Vitals and nursing note reviewed.   Constitutional:       Appearance: Normal appearance. She is normal weight.   HENT:      Head: Normocephalic and atraumatic.      Right Ear: Tympanic membrane, ear canal and external ear normal.      Left Ear: Tympanic membrane, ear canal and external ear normal.      Nose: Congestion and rhinorrhea present.      Mouth/Throat:      Mouth: Mucous membranes are moist.      Pharynx: Oropharynx is clear.   Eyes:      Conjunctiva/sclera: Conjunctivae normal.      Pupils: Pupils are equal, round, and reactive to light.   Cardiovascular:      Rate and Rhythm: Normal rate and regular rhythm.      Pulses: Normal pulses.   Pulmonary:      Effort: Pulmonary effort is normal.      Breath sounds: Normal breath sounds.   Abdominal:      General: Abdomen is flat. Bowel sounds are normal.      Palpations: Abdomen is soft.   Musculoskeletal:         General: Normal range of motion.      Cervical back: Normal range of motion and neck supple.   Skin:     General: Skin is warm and dry.      Capillary Refill: Capillary refill takes less than 2 seconds.   Neurological:      General: No focal deficit present.      Mental Status: She is alert and oriented to person, place, and time.   Psychiatric:         Mood and Affect: Mood normal.         Behavior: Behavior normal.         Thought Content: Thought content normal.         Judgment: Judgment normal.       Laboratory Reviewed ({Yes)  Lab Results   Component Value Date    WBC 9.51 08/25/2022    HGB 12.6 08/25/2022    HCT 37.9 08/25/2022     08/25/2022    CHOL 176 08/25/2022    TRIG 47 08/25/2022    HDL 74 08/25/2022    ALT 18 08/25/2022    AST 19  08/25/2022     08/25/2022    K 3.8 08/25/2022     08/25/2022    CREATININE 0.8 08/25/2022    BUN 12 08/25/2022    CO2 21 (L) 08/25/2022    TSH 0.792 06/10/2022    INR 1.0 11/08/2019    HGBA1C 5.2 09/07/2018       Ana was seen today for cough, chest pain, nasal congestion and sore throat.    Diagnoses and all orders for this visit:    Cough, unspecified type  -     POCT COVID-19 Rapid Screening  -     POCT Influenza A/B Molecular    Sinus congestion  -     POCT COVID-19 Rapid Screening  -     POCT Influenza A/B Molecular                Care Plan/Goals: Reviewed    Goals    None         Follow up: No follow-ups on file.    After visit summary was printed and given to patient upon discharge today.  Patient goals and care plan are included in After Visit Summary.

## 2022-12-07 NOTE — TELEPHONE ENCOUNTER
----- Message from Ruth Ochoa sent at 12/7/2022 10:04 AM CST -----  Contact: Ana  Type:  Patient Returning Call    Who Called: Ana  Who Left Message for Patient: Peace   Does the patient know what this is regarding?: no  Would the patient rather a call back or a response via [facility]? call  Best Call Back Number: [phone]  Additional Information: Ana returned the missed call today and would like to be contacted again today please

## 2022-12-07 NOTE — TELEPHONE ENCOUNTER
----- Message from Lupe Bolaños sent at 12/7/2022  9:43 AM CST -----  Contact: pt- [phone]  No blue slot available to schedule an appointment for the patient.    Patient is established with which PCP: [facility]    Reason for the visit: sinus and cough    Would the patient like a call back, or a response through their [facility] portal?:  call

## 2022-12-07 NOTE — LETTER
December 7, 2022      [facility]  [id number] [place] [id number]  Phone: [phone]  Fax: [phone]       Patient: Ana Bonilla   YOB: 1960  Date of Visit: 12/07/2022    To Whom It May Concern:    Traci Bonilla  was at [facility] on 12/07/2022. The patient may return to work/school on 12/8/22 with no restrictions. If you have any questions or concerns, or if I can be of further assistance, please do not hesitate to contact me.    Sincerely,    Monse Castillo, NP

## 2022-12-22 ENCOUNTER — OFFICE VISIT (OUTPATIENT)
Dept: INTERNAL MEDICINE | Facility: CLINIC | Age: 62
End: 2022-12-22
Payer: MEDICAID

## 2022-12-22 VITALS
BODY MASS INDEX: 22.77 KG/M2 | HEART RATE: 82 BPM | OXYGEN SATURATION: 98 % | DIASTOLIC BLOOD PRESSURE: 76 MMHG | SYSTOLIC BLOOD PRESSURE: 118 MMHG | WEIGHT: 133.38 LBS | TEMPERATURE: 97 F | HEIGHT: 64 IN

## 2022-12-22 DIAGNOSIS — R05.9 COUGH, UNSPECIFIED TYPE: Primary | ICD-10-CM

## 2022-12-22 DIAGNOSIS — J40 BRONCHITIS: ICD-10-CM

## 2022-12-22 PROCEDURE — 99999 PR PBB SHADOW E&M-EST. PATIENT-LVL IV: ICD-10-PCS | Mod: PBBFAC,,, | Performed by: NURSE PRACTITIONER

## 2022-12-22 PROCEDURE — 99214 OFFICE O/P EST MOD 30 MIN: CPT | Mod: PBBFAC | Performed by: NURSE PRACTITIONER

## 2022-12-22 PROCEDURE — 3078F DIAST BP <80 MM HG: CPT | Mod: CPTII,,, | Performed by: NURSE PRACTITIONER

## 2022-12-22 PROCEDURE — 3074F SYST BP LT 130 MM HG: CPT | Mod: CPTII,,, | Performed by: NURSE PRACTITIONER

## 2022-12-22 PROCEDURE — 4010F PR ACE/ARB THEARPY RXD/TAKEN: ICD-10-PCS | Mod: CPTII,,, | Performed by: NURSE PRACTITIONER

## 2022-12-22 PROCEDURE — 3078F PR MOST RECENT DIASTOLIC BLOOD PRESSURE < 80 MM HG: ICD-10-PCS | Mod: CPTII,,, | Performed by: NURSE PRACTITIONER

## 2022-12-22 PROCEDURE — 99999 PR PBB SHADOW E&M-EST. PATIENT-LVL IV: CPT | Mod: PBBFAC,,, | Performed by: NURSE PRACTITIONER

## 2022-12-22 PROCEDURE — 99213 OFFICE O/P EST LOW 20 MIN: CPT | Mod: S$PBB,,, | Performed by: NURSE PRACTITIONER

## 2022-12-22 PROCEDURE — 3008F PR BODY MASS INDEX (BMI) DOCUMENTED: ICD-10-PCS | Mod: CPTII,,, | Performed by: NURSE PRACTITIONER

## 2022-12-22 PROCEDURE — 3074F PR MOST RECENT SYSTOLIC BLOOD PRESSURE < 130 MM HG: ICD-10-PCS | Mod: CPTII,,, | Performed by: NURSE PRACTITIONER

## 2022-12-22 PROCEDURE — 1159F MED LIST DOCD IN RCRD: CPT | Mod: CPTII,,, | Performed by: NURSE PRACTITIONER

## 2022-12-22 PROCEDURE — 99213 PR OFFICE/OUTPT VISIT, EST, LEVL III, 20-29 MIN: ICD-10-PCS | Mod: S$PBB,,, | Performed by: NURSE PRACTITIONER

## 2022-12-22 PROCEDURE — 3008F BODY MASS INDEX DOCD: CPT | Mod: CPTII,,, | Performed by: NURSE PRACTITIONER

## 2022-12-22 PROCEDURE — 1159F PR MEDICATION LIST DOCUMENTED IN MEDICAL RECORD: ICD-10-PCS | Mod: CPTII,,, | Performed by: NURSE PRACTITIONER

## 2022-12-22 PROCEDURE — 4010F ACE/ARB THERAPY RXD/TAKEN: CPT | Mod: CPTII,,, | Performed by: NURSE PRACTITIONER

## 2022-12-22 RX ORDER — PROMETHAZINE HYDROCHLORIDE AND DEXTROMETHORPHAN HYDROBROMIDE 6.25; 15 MG/5ML; MG/5ML
10 SYRUP ORAL EVERY 4 HOURS PRN
Qty: 240 ML | Refills: 0 | Status: SHIPPED | OUTPATIENT
Start: 2022-12-22 | End: 2023-01-01

## 2022-12-22 RX ORDER — FLUTICASONE PROPIONATE 50 MCG
1 SPRAY, SUSPENSION (ML) NASAL DAILY
Qty: 1 ML | Refills: 1 | Status: SHIPPED | OUTPATIENT
Start: 2022-12-22 | End: 2024-01-24 | Stop reason: SDUPTHER

## 2022-12-22 RX ORDER — ALBUTEROL SULFATE 90 UG/1
2 AEROSOL, METERED RESPIRATORY (INHALATION) EVERY 6 HOURS PRN
Qty: 18 G | Refills: 0 | Status: SHIPPED | OUTPATIENT
Start: 2022-12-22 | End: 2023-09-06 | Stop reason: SDUPTHER

## 2022-12-22 RX ORDER — BENZONATATE 200 MG/1
200 CAPSULE ORAL 3 TIMES DAILY PRN
Qty: 30 CAPSULE | Refills: 1 | Status: SHIPPED | OUTPATIENT
Start: 2022-12-22 | End: 2023-01-01

## 2022-12-22 NOTE — PROGRESS NOTES
Ana GONZALEZ [place]  12/22/2022  [id number]    Kristin Sibley MD  Patient Care Team:  Kristin Sibley MD as PCP - General (Internal Medicine)          Visit Type:an urgent visit for a new problem    Chief Complaint:  Chief Complaint   Patient presents with    Cough    Shortness of Breath       History of Present Illness:    61 yo female presents with continued cough, sinus congestion. Pt denies fever, chills, sweats, CP, SOB, NVD, abdominal pain, fatigue, headache, body aches, loss of taste or smell. Reports she completed all prescribed medications from last visit and felt better but began coughing again after being exposed to dust at work.    History:  Past Medical History:   Diagnosis Date    Allergy     Amblyopia OS    Anxiety     Asthma     COPD (chronic obstructive pulmonary disease)     GERD (gastroesophageal reflux disease)     Hyperlipidemia     Hypertension     Thyroid disease      Past Surgical History:   Procedure Laterality Date    APPENDECTOMY      BUNIONECTOMY      COLONOSCOPY N/A 12/4/2019    Procedure: COLONOSCOPY;  Surgeon: Danny Matos III, MD;  Location: [facility];  Service: Endoscopy;  Laterality: N/A;    COLONOSCOPY N/A 10/24/2022    Procedure: COLONOSCOPY;  Surgeon: Danny Matos III, MD;  Location: [facility];  Service: Endoscopy;  Laterality: N/A;    ESOPHAGOGASTRODUODENOSCOPY N/A 10/24/2022    Procedure: EGD (ESOPHAGOGASTRODUODENOSCOPY);  Surgeon: Danny Matos III, MD;  Location: [facility];  Service: Endoscopy;  Laterality: N/A;    HYSTERECTOMY      PARTIAL//still with ovaries    neck fusion  08/2017    THYROIDECTOMY       Family History   Problem Relation Age of Onset    Hypertension Mother     Diabetes Mother     Diabetes Father     Stroke Maternal Grandmother     Prostate cancer Maternal Grandfather     Mental illness Son     Pancreatic cancer Maternal Uncle     Mental illness Other     Pancreatic cancer Other     Ovarian cancer Maternal Cousin      Social History     Socioeconomic  History    Marital status:     Number of children: 2   Occupational History     Employer: [employer]   Tobacco Use    Smoking status: Every Day     Packs/day: 0.50     Years: 45.00     Pack years: 22.50     Types: Cigarettes    Smokeless tobacco: Never   Substance and Sexual Activity    Alcohol use: Not Currently     Comment: quit    Drug use: No    Sexual activity: Never     Patient Active Problem List   Diagnosis    COPD with asthma    GERD (gastroesophageal reflux disease)    Primary hypothyroidism    Tobacco abuse disorder    PVD (peripheral vascular disease)    Anxiety    Dyslipidemia    Claudication    Essential hypertension    Allergic rhinitis    Pain in both lower extremities    Anisometropic amblyopia of left eye    Nonrheumatic aortic valve insufficiency    Colon polyps    Precordial pain    Tachycardia    Pain of right lower extremity    Difficulty walking     Review of patient's allergies indicates:  No Known Allergies    The following were reviewed at this visit: active problem list, medication list, allergies, family history, social history, and health maintenance.    Medications:  Current Outpatient Medications on File Prior to Visit   Medication Sig Dispense Refill    ALPRAZolam (XANAX) 2 MG Tab TAKE 1 TABLET BY MOUTH 2 TIMES A DAY AS NEEDED ANXIETY 60 tablet 0    amlodipine-benazepril 2.5-10 mg (LOTREL) 2.5-10 mg per capsule Take 1 capsule by mouth once daily. 90 capsule 3    estradioL (VAGIFEM) 10 mcg Tab Place 1 tablet vaginally twice a week.      fluticasone propionate (FLONASE) 50 mcg/actuation nasal spray 1 spray (50 mcg total) by Each Nostril route once daily. 32 g 3    fluticasone-salmeterol diskus inhaler 250-50 mcg Inhale 1 puff into the lungs 2 (two) times daily. Controller 180 each 1    levothyroxine (SYNTHROID) 100 MCG tablet Take 1 tablet (100 mcg total) by mouth before breakfast. 90 tablet 0    linaCLOtide (LINZESS) 72 mcg Cap capsule Take 1 capsule (72 mcg total) by mouth before  breakfast. 30 capsule 5    methylPREDNISolone (MEDROL DOSEPACK) 4 mg tablet TAKE AS DIRECTED 1 each 0    metoprolol succinate (TOPROL-XL) 50 MG 24 hr tablet Take 1 tablet (50 mg total) by mouth once daily. 30 tablet 11    montelukast (SINGULAIR) 10 mg tablet TAKE 1 TABLET(10 MG) BY MOUTH EVERY EVENING 90 tablet 0    ondansetron (ZOFRAN-ODT) 4 MG TbDL Take 1 tablet (4 mg total) by mouth every 12 (twelve) hours as needed (nausea). 10 tablet 0    pantoprazole (PROTONIX) 40 MG tablet TAKE 1 TABLET(40 MG) BY MOUTH EVERY DAY 90 tablet 3    rosuvastatin (CRESTOR) 10 MG tablet Take 1 tablet (10 mg total) by mouth once daily. 90 tablet 3    sod sulf-pot chloride-mag sulf (SUTAB) 1.479-0.188- 0.225 gram tablet Take 12 tablets by mouth once daily. Take according to package instructions with indicated amount of water. 24 tablet 0    [] dexbrompheniramine-phenyleph (ALA-HIST PE) 2-10 mg Tab Take 1 tablet by mouth every 6 (six) hours as needed (sinus congestion). 28 tablet 0    promethazine-codeine 6.25-10 mg/5 ml (PHENERGAN WITH CODEINE) 6.25-10 mg/5 mL syrup Take 5 mLs by mouth every 4 (four) hours as needed for Cough. (Patient not taking: Reported on 2022) 118 mL 0     Current Facility-Administered Medications on File Prior to Visit   Medication Dose Route Frequency Provider Last Rate Last Admin    lactated ringers infusion   Intravenous Continuous Danny Matos III, MD           Medications have been reviewed and reconciled with patient at this visit.  Barriers to medications reviewed with patient.    Adverse reactions to current medications reviewed with patient..    Over the counter medications reviewed and reconciled with patient.    Exam:  Wt Readings from Last 3 Encounters:   22 60.5 kg (133 lb 6.1 oz)   22 60.7 kg (133 lb 13.1 oz)   10/24/22 61.2 kg (135 lb)     Temp Readings from Last 3 Encounters:   22 97.4 °F (36.3 °C)   22 97.9 °F (36.6 °C) (Tympanic)   10/24/22 97.2 °F (36.2  °C) (Tympanic)     BP Readings from Last 3 Encounters:   12/22/22 118/76   12/07/22 116/70   10/24/22 (!) 103/59     Pulse Readings from Last 3 Encounters:   12/22/22 82   12/07/22 69   10/24/22 69     Body mass index is 22.89 kg/m².      Review of Systems   Constitutional: Negative.    HENT:  Positive for congestion.    Eyes: Negative.    Respiratory:  Positive for cough, sputum production and wheezing.    Cardiovascular: Negative.    Gastrointestinal: Negative.    Genitourinary: Negative.    Musculoskeletal: Negative.    Skin: Negative.    Neurological: Negative.    Endo/Heme/Allergies: Negative.    Psychiatric/Behavioral: Negative.     Physical Exam  Vitals and nursing note reviewed.   Constitutional:       Appearance: Normal appearance. She is normal weight.   HENT:      Head: Normocephalic and atraumatic.      Right Ear: Tympanic membrane, ear canal and external ear normal.      Left Ear: Tympanic membrane, ear canal and external ear normal.      Nose: Congestion and rhinorrhea present.      Mouth/Throat:      Mouth: Mucous membranes are moist.      Pharynx: Oropharynx is clear.   Eyes:      Extraocular Movements: Extraocular movements intact.      Conjunctiva/sclera: Conjunctivae normal.      Pupils: Pupils are equal, round, and reactive to light.   Cardiovascular:      Rate and Rhythm: Normal rate and regular rhythm.      Pulses: Normal pulses.      Heart sounds: Normal heart sounds.   Pulmonary:      Effort: Pulmonary effort is normal.      Breath sounds: Examination of the right-lower field reveals wheezing. Examination of the left-lower field reveals wheezing. Wheezing present.   Abdominal:      General: Bowel sounds are normal.      Palpations: Abdomen is soft.   Musculoskeletal:         General: Normal range of motion.      Cervical back: Normal range of motion and neck supple.   Skin:     General: Skin is warm and dry.      Capillary Refill: Capillary refill takes less than 2 seconds.   Neurological:       General: No focal deficit present.      Mental Status: She is alert.   Psychiatric:         Mood and Affect: Mood normal.         Behavior: Behavior normal.         Thought Content: Thought content normal.         Judgment: Judgment normal.       Laboratory Reviewed ({Yes)  Lab Results   Component Value Date    WBC 9.51 08/25/2022    HGB 12.6 08/25/2022    HCT 37.9 08/25/2022     08/25/2022    CHOL 176 08/25/2022    TRIG 47 08/25/2022    HDL 74 08/25/2022    ALT 18 08/25/2022    AST 19 08/25/2022     08/25/2022    K 3.8 08/25/2022     08/25/2022    CREATININE 0.8 08/25/2022    BUN 12 08/25/2022    CO2 21 (L) 08/25/2022    TSH 0.792 06/10/2022    INR 1.0 11/08/2019    HGBA1C 5.2 09/07/2018       Ana was seen today for cough and shortness of breath.    Diagnoses and all orders for this visit:    Cough, unspecified type                Care Plan/Goals: Reviewed    Goals    None         Follow up: No follow-ups on file.    After visit summary was printed and given to patient upon discharge today.  Patient goals and care plan are included in After Visit Summary.

## 2023-01-10 ENCOUNTER — OFFICE VISIT (OUTPATIENT)
Dept: INTERNAL MEDICINE | Facility: CLINIC | Age: 63
End: 2023-01-10
Payer: MEDICAID

## 2023-01-10 ENCOUNTER — LAB VISIT (OUTPATIENT)
Dept: LAB | Facility: HOSPITAL | Age: 63
End: 2023-01-10
Attending: NURSE PRACTITIONER
Payer: MEDICAID

## 2023-01-10 VITALS
BODY MASS INDEX: 21.91 KG/M2 | OXYGEN SATURATION: 95 % | TEMPERATURE: 98 F | WEIGHT: 128.31 LBS | HEIGHT: 64 IN | DIASTOLIC BLOOD PRESSURE: 60 MMHG | HEART RATE: 96 BPM | SYSTOLIC BLOOD PRESSURE: 112 MMHG | RESPIRATION RATE: 18 BRPM

## 2023-01-10 DIAGNOSIS — Z00.00 PHYSICAL EXAM, ANNUAL: ICD-10-CM

## 2023-01-10 DIAGNOSIS — R06.2 WHEEZING: ICD-10-CM

## 2023-01-10 DIAGNOSIS — R09.81 SINUS CONGESTION: ICD-10-CM

## 2023-01-10 DIAGNOSIS — R05.9 COUGH, UNSPECIFIED TYPE: Primary | ICD-10-CM

## 2023-01-10 LAB
ESTIMATED AVG GLUCOSE: 117 MG/DL (ref 68–131)
HBA1C MFR BLD: 5.7 % (ref 4–5.6)

## 2023-01-10 PROCEDURE — 1159F MED LIST DOCD IN RCRD: CPT | Mod: CPTII,,, | Performed by: NURSE PRACTITIONER

## 2023-01-10 PROCEDURE — 83036 HEMOGLOBIN GLYCOSYLATED A1C: CPT | Performed by: NURSE PRACTITIONER

## 2023-01-10 PROCEDURE — 3044F HG A1C LEVEL LT 7.0%: CPT | Mod: CPTII,,, | Performed by: NURSE PRACTITIONER

## 2023-01-10 PROCEDURE — 94640 AIRWAY INHALATION TREATMENT: CPT | Mod: ,,, | Performed by: NURSE PRACTITIONER

## 2023-01-10 PROCEDURE — 1159F PR MEDICATION LIST DOCUMENTED IN MEDICAL RECORD: ICD-10-PCS | Mod: CPTII,,, | Performed by: NURSE PRACTITIONER

## 2023-01-10 PROCEDURE — 99999 PR PBB SHADOW E&M-EST. PATIENT-LVL IV: CPT | Mod: PBBFAC,,, | Performed by: NURSE PRACTITIONER

## 2023-01-10 PROCEDURE — 94640 PR INHAL RX, AIRWAY OBST/DX SPUTUM INDUCT: ICD-10-PCS | Mod: ,,, | Performed by: NURSE PRACTITIONER

## 2023-01-10 PROCEDURE — 1160F PR REVIEW ALL MEDS BY PRESCRIBER/CLIN PHARMACIST DOCUMENTED: ICD-10-PCS | Mod: CPTII,,, | Performed by: NURSE PRACTITIONER

## 2023-01-10 PROCEDURE — 1160F RVW MEDS BY RX/DR IN RCRD: CPT | Mod: CPTII,,, | Performed by: NURSE PRACTITIONER

## 2023-01-10 PROCEDURE — 96372 THER/PROPH/DIAG INJ SC/IM: CPT | Mod: PBBFAC

## 2023-01-10 PROCEDURE — 99999 PR PBB SHADOW E&M-EST. PATIENT-LVL IV: ICD-10-PCS | Mod: PBBFAC,,, | Performed by: NURSE PRACTITIONER

## 2023-01-10 PROCEDURE — 99213 OFFICE O/P EST LOW 20 MIN: CPT | Mod: S$PBB,25,, | Performed by: NURSE PRACTITIONER

## 2023-01-10 PROCEDURE — 99214 OFFICE O/P EST MOD 30 MIN: CPT | Mod: PBBFAC | Performed by: NURSE PRACTITIONER

## 2023-01-10 PROCEDURE — 3074F PR MOST RECENT SYSTOLIC BLOOD PRESSURE < 130 MM HG: ICD-10-PCS | Mod: CPTII,,, | Performed by: NURSE PRACTITIONER

## 2023-01-10 PROCEDURE — 3044F PR MOST RECENT HEMOGLOBIN A1C LEVEL <7.0%: ICD-10-PCS | Mod: CPTII,,, | Performed by: NURSE PRACTITIONER

## 2023-01-10 PROCEDURE — 3008F PR BODY MASS INDEX (BMI) DOCUMENTED: ICD-10-PCS | Mod: CPTII,,, | Performed by: NURSE PRACTITIONER

## 2023-01-10 PROCEDURE — 3008F BODY MASS INDEX DOCD: CPT | Mod: CPTII,,, | Performed by: NURSE PRACTITIONER

## 2023-01-10 PROCEDURE — 3074F SYST BP LT 130 MM HG: CPT | Mod: CPTII,,, | Performed by: NURSE PRACTITIONER

## 2023-01-10 PROCEDURE — 3078F PR MOST RECENT DIASTOLIC BLOOD PRESSURE < 80 MM HG: ICD-10-PCS | Mod: CPTII,,, | Performed by: NURSE PRACTITIONER

## 2023-01-10 PROCEDURE — 36415 COLL VENOUS BLD VENIPUNCTURE: CPT | Performed by: NURSE PRACTITIONER

## 2023-01-10 PROCEDURE — 99213 PR OFFICE/OUTPT VISIT, EST, LEVL III, 20-29 MIN: ICD-10-PCS | Mod: S$PBB,25,, | Performed by: NURSE PRACTITIONER

## 2023-01-10 PROCEDURE — 3078F DIAST BP <80 MM HG: CPT | Mod: CPTII,,, | Performed by: NURSE PRACTITIONER

## 2023-01-10 RX ORDER — METHYLPREDNISOLONE 4 MG/1
TABLET ORAL
Qty: 1 EACH | Refills: 0 | Status: SHIPPED | OUTPATIENT
Start: 2023-01-10 | End: 2023-02-15

## 2023-01-10 RX ORDER — IPRATROPIUM BROMIDE AND ALBUTEROL SULFATE 2.5; .5 MG/3ML; MG/3ML
3 SOLUTION RESPIRATORY (INHALATION)
Status: COMPLETED | OUTPATIENT
Start: 2023-01-10 | End: 2023-01-10

## 2023-01-10 RX ORDER — DEXAMETHASONE SODIUM PHOSPHATE 100 MG/10ML
10 INJECTION INTRAMUSCULAR; INTRAVENOUS
Status: COMPLETED | OUTPATIENT
Start: 2023-01-10 | End: 2023-01-10

## 2023-01-10 RX ORDER — DEXBROMPHENIRAMINE MALEATE AND PHENYLEPHRINE HYDROCHLORIDE 2; 10 MG/1; MG/1
1 TABLET ORAL EVERY 6 HOURS PRN
Qty: 28 TABLET | Refills: 0 | Status: SHIPPED | OUTPATIENT
Start: 2023-01-10 | End: 2023-01-24

## 2023-01-10 RX ADMIN — DEXAMETHASONE SODIUM PHOSPHATE 10 MG: 10 INJECTION INTRAMUSCULAR; INTRAVENOUS at 10:01

## 2023-01-10 RX ADMIN — IPRATROPIUM BROMIDE AND ALBUTEROL SULFATE 3 ML: .5; 3 SOLUTION RESPIRATORY (INHALATION) at 10:01

## 2023-01-10 NOTE — LETTER
January 10, 2023      [facility]  [id number] [place] [id number]  Phone: [phone]  Fax: [phone]       Patient: Ana Bonilla   YOB: 1960  Date of Visit: 01/10/2023    To Whom It May Concern:    Traci Bonilla  was at [facility] on 01/10/2023. The patient may return to work 01/12/2023 with no restrictions. If you have any questions or concerns, or if I can be of further assistance, please do not hesitate to contact me.    Sincerely,    Christy Gomez MA

## 2023-01-11 DIAGNOSIS — Z12.31 OTHER SCREENING MAMMOGRAM: ICD-10-CM

## 2023-01-12 DIAGNOSIS — J44.89 COPD WITH ASTHMA: Primary | ICD-10-CM

## 2023-01-17 NOTE — PROGRESS NOTES
Ana GONZALEZ [place]  01/16/2023  [id number]    Kristin Sibley MD  Patient Care Team:  Kristin Sibley MD as PCP - General (Internal Medicine)          Visit Type:an urgent visit for an existing problem     Chief Complaint:  No chief complaint on file.      History of Present Illness:  61 yo female presents with co of continued cough, congestion and wheezing for months. Pt reports she stills smokes and thinks her allergies are flared up from all the dust at work. Denies SOB,CP or difficulty breathing.       History:  Past Medical History:   Diagnosis Date    Allergy     Amblyopia OS    Anxiety     Asthma     COPD (chronic obstructive pulmonary disease)     GERD (gastroesophageal reflux disease)     Hyperlipidemia     Hypertension     Thyroid disease      Past Surgical History:   Procedure Laterality Date    APPENDECTOMY      BUNIONECTOMY      COLONOSCOPY N/A 12/4/2019    Procedure: COLONOSCOPY;  Surgeon: Danny Matos III, MD;  Location: [facility];  Service: Endoscopy;  Laterality: N/A;    COLONOSCOPY N/A 10/24/2022    Procedure: COLONOSCOPY;  Surgeon: Danny Matos III, MD;  Location: [facility];  Service: Endoscopy;  Laterality: N/A;    ESOPHAGOGASTRODUODENOSCOPY N/A 10/24/2022    Procedure: EGD (ESOPHAGOGASTRODUODENOSCOPY);  Surgeon: Danny Matos III, MD;  Location: [facility];  Service: Endoscopy;  Laterality: N/A;    HYSTERECTOMY      PARTIAL//still with ovaries    neck fusion  08/2017    THYROIDECTOMY       Family History   Problem Relation Age of Onset    Hypertension Mother     Diabetes Mother     Diabetes Father     Stroke Maternal Grandmother     Prostate cancer Maternal Grandfather     Mental illness Son     Pancreatic cancer Maternal Uncle     Mental illness Other     Pancreatic cancer Other     Ovarian cancer Maternal Cousin      Social History     Socioeconomic History    Marital status:     Number of children: 2   Occupational History     Employer: [employer]   Tobacco Use    Smoking status: Every  Day     Packs/day: 0.50     Years: 45.00     Pack years: 22.50     Types: Cigarettes    Smokeless tobacco: Never   Substance and Sexual Activity    Alcohol use: Not Currently     Comment: quit    Drug use: No    Sexual activity: Never     Patient Active Problem List   Diagnosis    COPD with asthma    GERD (gastroesophageal reflux disease)    Primary hypothyroidism    Tobacco abuse disorder    PVD (peripheral vascular disease)    Anxiety    Dyslipidemia    Claudication    Essential hypertension    Allergic rhinitis    Pain in both lower extremities    Anisometropic amblyopia of left eye    Nonrheumatic aortic valve insufficiency    Colon polyps    Precordial pain    Tachycardia    Pain of right lower extremity    Difficulty walking     Review of patient's allergies indicates:  No Known Allergies    The following were reviewed at this visit: active problem list, medication list, allergies, family history, social history, and health maintenance.    Medications:  Current Outpatient Medications on File Prior to Visit   Medication Sig Dispense Refill    albuterol (PROVENTIL HFA) 90 mcg/actuation inhaler Inhale 2 puffs into the lungs every 6 (six) hours as needed for Wheezing. Rescue 18 g 0    amlodipine-benazepril 2.5-10 mg (LOTREL) 2.5-10 mg per capsule Take 1 capsule by mouth once daily. 90 capsule 3    estradioL (VAGIFEM) 10 mcg Tab Place 1 tablet vaginally twice a week.      fluticasone propionate (FLONASE) 50 mcg/actuation nasal spray 1 spray (50 mcg total) by Each Nostril route once daily. 32 g 3    fluticasone propionate (FLONASE) 50 mcg/actuation nasal spray 1 spray (50 mcg total) by Each Nostril route once daily. 1 mL 1    fluticasone-salmeterol diskus inhaler 250-50 mcg Inhale 1 puff into the lungs 2 (two) times daily. Controller 180 each 1    levothyroxine (SYNTHROID) 100 MCG tablet Take 1 tablet (100 mcg total) by mouth before breakfast. 90 tablet 0    linaCLOtide (LINZESS) 72 mcg Cap capsule Take 1 capsule  (72 mcg total) by mouth before breakfast. 30 capsule 5    methylPREDNISolone (MEDROL DOSEPACK) 4 mg tablet TAKE AS DIRECTED 1 each 0    metoprolol succinate (TOPROL-XL) 50 MG 24 hr tablet Take 1 tablet (50 mg total) by mouth once daily. 30 tablet 11    montelukast (SINGULAIR) 10 mg tablet TAKE 1 TABLET(10 MG) BY MOUTH EVERY EVENING 90 tablet 0    ondansetron (ZOFRAN-ODT) 4 MG TbDL Take 1 tablet (4 mg total) by mouth every 12 (twelve) hours as needed (nausea). 10 tablet 0    pantoprazole (PROTONIX) 40 MG tablet TAKE 1 TABLET(40 MG) BY MOUTH EVERY DAY 90 tablet 3    rosuvastatin (CRESTOR) 10 MG tablet Take 1 tablet (10 mg total) by mouth once daily. 90 tablet 3    sod sulf-pot chloride-mag sulf (SUTAB) 1.479-0.188- 0.225 gram tablet Take 12 tablets by mouth once daily. Take according to package instructions with indicated amount of water. 24 tablet 0    promethazine-codeine 6.25-10 mg/5 ml (PHENERGAN WITH CODEINE) 6.25-10 mg/5 mL syrup Take 5 mLs by mouth every 4 (four) hours as needed for Cough. (Patient not taking: Reported on 1/10/2023) 118 mL 0     Current Facility-Administered Medications on File Prior to Visit   Medication Dose Route Frequency Provider Last Rate Last Admin    lactated ringers infusion   Intravenous Continuous Danny Matos III, MD           Medications have been reviewed and reconciled with patient at this visit.  Barriers to medications reviewed with patient.    Adverse reactions to current medications reviewed with patient..    Over the counter medications reviewed and reconciled with patient.    Exam:  Wt Readings from Last 3 Encounters:   01/10/23 58.2 kg (128 lb 4.9 oz)   12/22/22 60.5 kg (133 lb 6.1 oz)   12/07/22 60.7 kg (133 lb 13.1 oz)     Temp Readings from Last 3 Encounters:   01/10/23 97.7 °F (36.5 °C) (Temporal)   12/22/22 97.4 °F (36.3 °C)   12/07/22 97.9 °F (36.6 °C) (Tympanic)     BP Readings from Last 3 Encounters:   01/10/23 112/60   12/22/22 118/76   12/07/22 116/70      Pulse Readings from Last 3 Encounters:   01/10/23 96   12/22/22 82   12/07/22 69     Body mass index is 22.02 kg/m².      Review of Systems   HENT:  Positive for congestion.    Respiratory:  Positive for cough and wheezing.    All other systems reviewed and are negative.  Physical Exam  Constitutional:       Appearance: Normal appearance. She is normal weight.   HENT:      Head: Normocephalic and atraumatic.      Right Ear: Tympanic membrane, ear canal and external ear normal.      Left Ear: Tympanic membrane, ear canal and external ear normal.      Nose: Congestion present.      Mouth/Throat:      Mouth: Mucous membranes are moist.      Pharynx: Oropharynx is clear.   Eyes:      Extraocular Movements: Extraocular movements intact.      Conjunctiva/sclera: Conjunctivae normal.      Pupils: Pupils are equal, round, and reactive to light.   Cardiovascular:      Rate and Rhythm: Normal rate and regular rhythm.      Pulses: Normal pulses.      Heart sounds: Normal heart sounds.   Pulmonary:      Effort: Pulmonary effort is normal.      Breath sounds: Normal breath sounds.   Abdominal:      General: Abdomen is flat. Bowel sounds are normal.      Palpations: Abdomen is soft.   Musculoskeletal:         General: Normal range of motion.      Cervical back: Normal range of motion and neck supple.   Skin:     General: Skin is warm and dry.      Capillary Refill: Capillary refill takes less than 2 seconds.      Coloration: Skin is not pale.   Neurological:      General: No focal deficit present.      Mental Status: She is alert and oriented to person, place, and time.   Psychiatric:         Mood and Affect: Mood normal.         Behavior: Behavior normal.         Thought Content: Thought content normal.       Laboratory Reviewed ({Yes)  Lab Results   Component Value Date    WBC 9.51 08/25/2022    HGB 12.6 08/25/2022    HCT 37.9 08/25/2022     08/25/2022    CHOL 176 08/25/2022    TRIG 47 08/25/2022    HDL 74 08/25/2022     ALT 18 08/25/2022    AST 19 08/25/2022     08/25/2022    K 3.8 08/25/2022     08/25/2022    CREATININE 0.8 08/25/2022    BUN 12 08/25/2022    CO2 21 (L) 08/25/2022    TSH 0.792 06/10/2022    INR 1.0 11/08/2019    HGBA1C 5.7 (H) 01/10/2023       Diagnoses and all orders for this visit:    Cough, unspecified type  -     albuterol-ipratropium 2.5 mg-0.5 mg/3 mL nebulizer solution 3 mL  -     dexAMETHasone injection 10 mg  -     methylPREDNISolone (MEDROL DOSEPACK) 4 mg tablet; TAKE AS DIRECTED    Sinus congestion  -     dexAMETHasone injection 10 mg  -     methylPREDNISolone (MEDROL DOSEPACK) 4 mg tablet; TAKE AS DIRECTED  -     dexbrompheniramine-phenyleph (ALA-HIST PE) 2-10 mg Tab; Take 1 tablet by mouth every 6 (six) hours as needed (sinus congestion).    Physical exam, annual  -     HEMOGLOBIN A1C; Future    Wheezing        Nebulizer treatment     Pt patient gave verbal consent to nebulizer treatment  Treatment was given for wheezing   Patient's oxygen saturation prior to treatment 96%  Patient tolerated treatment well  Patient reports less coughing and can breathe more easily  No wheezing auscultated after completion of treatment  Patient left in no apparent distress     Care Plan/Goals: Reviewed    Goals    None     Diagnoses and all orders for this visit:    Cough, unspecified type  -     albuterol-ipratropium 2.5 mg-0.5 mg/3 mL nebulizer solution 3 mL  -     dexAMETHasone injection 10 mg  -     methylPREDNISolone (MEDROL DOSEPACK) 4 mg tablet; TAKE AS DIRECTED    Sinus congestion  -     dexAMETHasone injection 10 mg  -     methylPREDNISolone (MEDROL DOSEPACK) 4 mg tablet; TAKE AS DIRECTED  -     dexbrompheniramine-phenyleph (ALA-HIST PE) 2-10 mg Tab; Take 1 tablet by mouth every 6 (six) hours as needed (sinus congestion).    Physical exam, annual  -     HEMOGLOBIN A1C; Future    Wheezing         Follow up: Follow up if symptoms worsen or fail to improve.    After visit summary was printed and  given to patient upon discharge today.  Patient goals and care plan are included in After Visit Summary.

## 2023-01-19 ENCOUNTER — TELEPHONE (OUTPATIENT)
Dept: INTERNAL MEDICINE | Facility: CLINIC | Age: 63
End: 2023-01-19
Payer: MEDICAID

## 2023-01-19 DIAGNOSIS — Z87.891 HISTORY OF TOBACCO USE: ICD-10-CM

## 2023-01-19 DIAGNOSIS — R05.9 COUGH, UNSPECIFIED TYPE: Primary | ICD-10-CM

## 2023-01-20 ENCOUNTER — PATIENT MESSAGE (OUTPATIENT)
Dept: INTERNAL MEDICINE | Facility: CLINIC | Age: 63
End: 2023-01-20
Payer: MEDICAID

## 2023-01-20 ENCOUNTER — TELEPHONE (OUTPATIENT)
Dept: INTERNAL MEDICINE | Facility: CLINIC | Age: 63
End: 2023-01-20
Payer: MEDICAID

## 2023-01-20 NOTE — TELEPHONE ENCOUNTER
----- Message from Jyotsna Mahajan sent at 1/20/2023 10:44 AM CST -----  Pt stated she missed a call and is requesting a call back at.[phone]  Thx jm

## 2023-01-25 ENCOUNTER — PATIENT MESSAGE (OUTPATIENT)
Dept: ADMINISTRATIVE | Facility: HOSPITAL | Age: 63
End: 2023-01-25
Payer: MEDICAID

## 2023-02-02 ENCOUNTER — PATIENT MESSAGE (OUTPATIENT)
Dept: INTERNAL MEDICINE | Facility: CLINIC | Age: 63
End: 2023-02-02
Payer: COMMERCIAL

## 2023-02-02 ENCOUNTER — HOSPITAL ENCOUNTER (OUTPATIENT)
Dept: RADIOLOGY | Facility: HOSPITAL | Age: 63
Discharge: HOME | End: 2023-02-02
Attending: FAMILY MEDICINE
Payer: COMMERCIAL

## 2023-02-02 ENCOUNTER — TELEPHONE (OUTPATIENT)
Dept: INTERNAL MEDICINE | Facility: CLINIC | Age: 63
End: 2023-02-02
Payer: COMMERCIAL

## 2023-02-02 DIAGNOSIS — R05.9 COUGH, UNSPECIFIED TYPE: ICD-10-CM

## 2023-02-02 DIAGNOSIS — Z87.891 HISTORY OF TOBACCO USE: ICD-10-CM

## 2023-02-02 DIAGNOSIS — R91.8 ABNORMAL CT LUNG SCREENING: Primary | ICD-10-CM

## 2023-02-02 PROCEDURE — 71250 CT CHEST WITHOUT CONTRAST: ICD-10-PCS | Mod: 26,,, | Performed by: RADIOLOGY

## 2023-02-02 PROCEDURE — 71250 CT THORAX DX C-: CPT | Mod: TC

## 2023-02-02 PROCEDURE — 71250 CT THORAX DX C-: CPT | Mod: 26,,, | Performed by: RADIOLOGY

## 2023-02-03 ENCOUNTER — PATIENT MESSAGE (OUTPATIENT)
Dept: INTERNAL MEDICINE | Facility: CLINIC | Age: 63
End: 2023-02-03

## 2023-02-03 ENCOUNTER — OFFICE VISIT (OUTPATIENT)
Dept: INTERNAL MEDICINE | Facility: CLINIC | Age: 63
End: 2023-02-03
Payer: COMMERCIAL

## 2023-02-03 ENCOUNTER — OFFICE VISIT (OUTPATIENT)
Dept: HEMATOLOGY/ONCOLOGY | Facility: CLINIC | Age: 63
End: 2023-02-03
Payer: COMMERCIAL

## 2023-02-03 VITALS
TEMPERATURE: 98 F | SYSTOLIC BLOOD PRESSURE: 147 MMHG | BODY MASS INDEX: 22.63 KG/M2 | HEART RATE: 88 BPM | WEIGHT: 131.81 LBS | DIASTOLIC BLOOD PRESSURE: 97 MMHG | OXYGEN SATURATION: 98 %

## 2023-02-03 DIAGNOSIS — G89.29 CHRONIC MIDLINE LOW BACK PAIN WITHOUT SCIATICA: ICD-10-CM

## 2023-02-03 DIAGNOSIS — R91.8 ABNORMAL CT LUNG SCREENING: Primary | ICD-10-CM

## 2023-02-03 DIAGNOSIS — K59.00 CONSTIPATION, UNSPECIFIED CONSTIPATION TYPE: Primary | ICD-10-CM

## 2023-02-03 DIAGNOSIS — K59.09 CHRONIC CONSTIPATION: ICD-10-CM

## 2023-02-03 DIAGNOSIS — M54.50 CHRONIC MIDLINE LOW BACK PAIN WITHOUT SCIATICA: ICD-10-CM

## 2023-02-03 DIAGNOSIS — R30.0 DYSURIA: ICD-10-CM

## 2023-02-03 DIAGNOSIS — G47.00 INSOMNIA, UNSPECIFIED TYPE: ICD-10-CM

## 2023-02-03 PROCEDURE — 1159F MED LIST DOCD IN RCRD: CPT | Mod: CPTII,95,, | Performed by: NURSE PRACTITIONER

## 2023-02-03 PROCEDURE — 3044F PR MOST RECENT HEMOGLOBIN A1C LEVEL <7.0%: ICD-10-PCS | Mod: CPTII,95,, | Performed by: NURSE PRACTITIONER

## 2023-02-03 PROCEDURE — 99999 PR PBB SHADOW E&M-EST. PATIENT-LVL V: CPT | Mod: PBBFAC,,, | Performed by: INTERNAL MEDICINE

## 2023-02-03 PROCEDURE — 1160F RVW MEDS BY RX/DR IN RCRD: CPT | Mod: CPTII,95,, | Performed by: NURSE PRACTITIONER

## 2023-02-03 PROCEDURE — 1160F PR REVIEW ALL MEDS BY PRESCRIBER/CLIN PHARMACIST DOCUMENTED: ICD-10-PCS | Mod: CPTII,95,, | Performed by: NURSE PRACTITIONER

## 2023-02-03 PROCEDURE — 3044F PR MOST RECENT HEMOGLOBIN A1C LEVEL <7.0%: ICD-10-PCS | Mod: CPTII,S$GLB,, | Performed by: INTERNAL MEDICINE

## 2023-02-03 PROCEDURE — 3080F PR MOST RECENT DIASTOLIC BLOOD PRESSURE >= 90 MM HG: ICD-10-PCS | Mod: CPTII,S$GLB,, | Performed by: INTERNAL MEDICINE

## 2023-02-03 PROCEDURE — 3044F HG A1C LEVEL LT 7.0%: CPT | Mod: CPTII,95,, | Performed by: NURSE PRACTITIONER

## 2023-02-03 PROCEDURE — 99212 PR OFFICE/OUTPT VISIT, EST, LEVL II, 10-19 MIN: ICD-10-PCS | Mod: 95,,, | Performed by: NURSE PRACTITIONER

## 2023-02-03 PROCEDURE — 99212 OFFICE O/P EST SF 10 MIN: CPT | Mod: 95,,, | Performed by: NURSE PRACTITIONER

## 2023-02-03 PROCEDURE — 99204 OFFICE O/P NEW MOD 45 MIN: CPT | Mod: S$GLB,,, | Performed by: INTERNAL MEDICINE

## 2023-02-03 PROCEDURE — 3008F PR BODY MASS INDEX (BMI) DOCUMENTED: ICD-10-PCS | Mod: CPTII,S$GLB,, | Performed by: INTERNAL MEDICINE

## 2023-02-03 PROCEDURE — 3077F PR MOST RECENT SYSTOLIC BLOOD PRESSURE >= 140 MM HG: ICD-10-PCS | Mod: CPTII,S$GLB,, | Performed by: INTERNAL MEDICINE

## 2023-02-03 PROCEDURE — 99204 PR OFFICE/OUTPT VISIT, NEW, LEVL IV, 45-59 MIN: ICD-10-PCS | Mod: S$GLB,,, | Performed by: INTERNAL MEDICINE

## 2023-02-03 PROCEDURE — 3080F DIAST BP >= 90 MM HG: CPT | Mod: CPTII,S$GLB,, | Performed by: INTERNAL MEDICINE

## 2023-02-03 PROCEDURE — 1159F PR MEDICATION LIST DOCUMENTED IN MEDICAL RECORD: ICD-10-PCS | Mod: CPTII,95,, | Performed by: NURSE PRACTITIONER

## 2023-02-03 PROCEDURE — 3044F HG A1C LEVEL LT 7.0%: CPT | Mod: CPTII,S$GLB,, | Performed by: INTERNAL MEDICINE

## 2023-02-03 PROCEDURE — 3077F SYST BP >= 140 MM HG: CPT | Mod: CPTII,S$GLB,, | Performed by: INTERNAL MEDICINE

## 2023-02-03 PROCEDURE — 99999 PR PBB SHADOW E&M-EST. PATIENT-LVL V: ICD-10-PCS | Mod: PBBFAC,,, | Performed by: INTERNAL MEDICINE

## 2023-02-03 PROCEDURE — 3008F BODY MASS INDEX DOCD: CPT | Mod: CPTII,S$GLB,, | Performed by: INTERNAL MEDICINE

## 2023-02-03 RX ORDER — KETOROLAC TROMETHAMINE 10 MG/1
10 TABLET, FILM COATED ORAL EVERY 6 HOURS
Qty: 20 TABLET | Refills: 0 | Status: SHIPPED | OUTPATIENT
Start: 2023-02-03 | End: 2023-02-08

## 2023-02-03 RX ORDER — NITROFURANTOIN 25; 75 MG/1; MG/1
100 CAPSULE ORAL 2 TIMES DAILY
Qty: 14 CAPSULE | Refills: 0 | Status: SHIPPED | OUTPATIENT
Start: 2023-02-03 | End: 2023-02-10

## 2023-02-03 RX ORDER — TRAZODONE HYDROCHLORIDE 50 MG/1
50 TABLET ORAL NIGHTLY
Qty: 30 TABLET | Refills: 11 | Status: SHIPPED | OUTPATIENT
Start: 2023-02-03 | End: 2024-02-03

## 2023-02-03 NOTE — TELEPHONE ENCOUNTER
Pt changed her insurance in the chart, [employer]. I was able to get her scheduled with hematology for today.

## 2023-02-03 NOTE — PROGRESS NOTES
Rey GONZALEZ Verbank  02/03/2023  [id number]      The patient location is: home   The chief complaint leading to consultation is: lower back pain, dysuria, constipation   Visit type: Virtual visit with synchronous audio and video  Total time spent with patient: 10 min  Each patient to whom he or she provides medical services by telemedicine is:  (1) informed of the relationship between the physician and patient and the respective role of any other health care provider with respect to management of the patient; and (2) notified that he or she may decline to receive medical services by telemedicine and may withdraw from such care at any time.        Chief Complaint: lower back pain, constipation, dysuria      History of Present Illness:    62 year old female presents with complaint of insomnia, chronic lower back pain without radiation, chronic  constipation, and dysuria for 2 days. Denies fever, known injury or trauma       Review of Systems   Constitutional:  Negative for fever and weight loss.   Cardiovascular:  Negative for chest pain.   Gastrointestinal:  Negative for abdominal pain.   Genitourinary:  Positive for dysuria. Negative for hematuria.   Musculoskeletal:  Positive for back pain.   Neurological:  Negative for tingling, weakness and headaches.       History:  Past Medical History:   Diagnosis Date    Allergy     Amblyopia OS    Anxiety     Asthma     COPD (chronic obstructive pulmonary disease)     GERD (gastroesophageal reflux disease)     Hyperlipidemia     Hypertension     Thyroid disease      Past Surgical History:   Procedure Laterality Date    APPENDECTOMY      BUNIONECTOMY      COLONOSCOPY N/A 12/4/2019    Procedure: COLONOSCOPY;  Surgeon: Danny Matos III, MD;  Location: [facility];  Service: Endoscopy;  Laterality: N/A;    COLONOSCOPY N/A 10/24/2022    Procedure: COLONOSCOPY;  Surgeon: Danny Matos III, MD;  Location: [facility];  Service: Endoscopy;  Laterality: N/A;     ESOPHAGOGASTRODUODENOSCOPY N/A 10/24/2022    Procedure: EGD (ESOPHAGOGASTRODUODENOSCOPY);  Surgeon: Danny Matos III, MD;  Location: [facility];  Service: Endoscopy;  Laterality: N/A;    HYSTERECTOMY      PARTIAL//still with ovaries    neck fusion  08/2017    THYROIDECTOMY         Family History   Problem Relation Age of Onset    Hypertension Mother     Diabetes Mother     Diabetes Father     Stroke Maternal Grandmother     Prostate cancer Maternal Grandfather     Mental illness Son     Pancreatic cancer Maternal Uncle     Mental illness Other     Pancreatic cancer Other     Ovarian cancer Maternal Cousin      Social History     Socioeconomic History    Marital status:     Number of children: 2   Occupational History     Employer: [employer]   Tobacco Use    Smoking status: Every Day     Packs/day: 0.50     Years: 45.00     Pack years: 22.50     Types: Cigarettes    Smokeless tobacco: Never   Substance and Sexual Activity    Alcohol use: Not Currently     Comment: quit    Drug use: No    Sexual activity: Never     Patient Active Problem List   Diagnosis    COPD with asthma    GERD (gastroesophageal reflux disease)    Primary hypothyroidism    Tobacco abuse disorder    PVD (peripheral vascular disease)    Anxiety    Dyslipidemia    Claudication    Essential hypertension    Allergic rhinitis    Pain in both lower extremities    Anisometropic amblyopia of left eye    Nonrheumatic aortic valve insufficiency    Colon polyps    Precordial pain    Tachycardia    Pain of right lower extremity    Difficulty walking    Low back pain, unspecified     Review of patient's allergies indicates:  No Known Allergies    The following were reviewed at this visit: active problem list, medication list, allergies, family history, social history, and health maintenance.    Medications:  Current Outpatient Medications on File Prior to Visit   Medication Sig Dispense Refill    albuterol (PROVENTIL HFA) 90 mcg/actuation inhaler Inhale 2  puffs into the lungs every 6 (six) hours as needed for Wheezing. Rescue 18 g 0    ALPRAZolam (XANAX) 2 MG Tab TAKE 1 TABLET BY MOUTH TWICE DAILY AS NEEDED FOR ANXIETY 60 tablet 0    amlodipine-benazepril 2.5-10 mg (LOTREL) 2.5-10 mg per capsule Take 1 capsule by mouth once daily. 90 capsule 3    estradioL (VAGIFEM) 10 mcg Tab Place 1 tablet vaginally twice a week.      fluticasone propionate (FLONASE) 50 mcg/actuation nasal spray 1 spray (50 mcg total) by Each Nostril route once daily. 32 g 3    fluticasone propionate (FLONASE) 50 mcg/actuation nasal spray 1 spray (50 mcg total) by Each Nostril route once daily. 1 mL 1    fluticasone-salmeterol diskus inhaler 250-50 mcg Inhale 1 puff into the lungs 2 (two) times daily. Controller 180 each 1    levothyroxine (SYNTHROID) 100 MCG tablet TAKE 1 TABLET(100 MCG) BY MOUTH BEFORE BREAKFAST 90 tablet 1    linaCLOtide (LINZESS) 72 mcg Cap capsule Take 1 capsule (72 mcg total) by mouth before breakfast. 30 capsule 5    methylPREDNISolone (MEDROL DOSEPACK) 4 mg tablet TAKE AS DIRECTED 1 each 0    methylPREDNISolone (MEDROL DOSEPACK) 4 mg tablet TAKE AS DIRECTED 1 each 0    metoprolol succinate (TOPROL-XL) 50 MG 24 hr tablet Take 1 tablet (50 mg total) by mouth once daily. 30 tablet 11    montelukast (SINGULAIR) 10 mg tablet TAKE 1 TABLET(10 MG) BY MOUTH EVERY EVENING 90 tablet 0    ondansetron (ZOFRAN-ODT) 4 MG TbDL Take 1 tablet (4 mg total) by mouth every 12 (twelve) hours as needed (nausea). 10 tablet 0    pantoprazole (PROTONIX) 40 MG tablet TAKE 1 TABLET(40 MG) BY MOUTH EVERY DAY 90 tablet 3    promethazine-codeine 6.25-10 mg/5 ml (PHENERGAN WITH CODEINE) 6.25-10 mg/5 mL syrup Take 5 mLs by mouth every 4 (four) hours as needed for Cough. 118 mL 0    rosuvastatin (CRESTOR) 10 MG tablet Take 1 tablet (10 mg total) by mouth once daily. 90 tablet 3    sod sulf-pot chloride-mag sulf (SUTAB) 1.479-0.188- 0.225 gram tablet Take 12 tablets by mouth once daily. Take according to  package instructions with indicated amount of water. 24 tablet 0     Current Facility-Administered Medications on File Prior to Visit   Medication Dose Route Frequency Provider Last Rate Last Admin    lactated ringers infusion   Intravenous Continuous Danny Matos III, MD           Exam:  Wt Readings from Last 3 Encounters:   02/03/23 59.8 kg (131 lb 13.4 oz)   01/10/23 58.2 kg (128 lb 4.9 oz)   12/22/22 60.5 kg (133 lb 6.1 oz)     Temp Readings from Last 3 Encounters:   02/03/23 98.1 °F (36.7 °C) (Temporal)   01/10/23 97.7 °F (36.5 °C) (Temporal)   12/22/22 97.4 °F (36.3 °C)     BP Readings from Last 3 Encounters:   02/03/23 (!) 147/97   01/10/23 112/60   12/22/22 118/76     Pulse Readings from Last 3 Encounters:   02/03/23 88   01/10/23 96   12/22/22 82     There is no height or weight on file to calculate BMI.      @ROS@    Physical Exam  Vitals and nursing note reviewed.   Constitutional:       Appearance: Normal appearance. She is normal weight.   Eyes:      Extraocular Movements: Extraocular movements intact.      Conjunctiva/sclera: Conjunctivae normal.   Neurological:      General: No focal deficit present.      Mental Status: She is alert and oriented to person, place, and time.   Psychiatric:         Mood and Affect: Mood normal.         Behavior: Behavior normal.         Thought Content: Thought content normal.         Judgment: Judgment normal.       Laboratory Reviewed ({Yes)  Lab Results   Component Value Date    WBC 9.51 08/25/2022    HGB 12.6 08/25/2022    HCT 37.9 08/25/2022     08/25/2022    CHOL 176 08/25/2022    TRIG 47 08/25/2022    HDL 74 08/25/2022    ALT 18 08/25/2022    AST 19 08/25/2022     08/25/2022    K 3.8 08/25/2022     08/25/2022    CREATININE 0.8 08/25/2022    BUN 12 08/25/2022    CO2 21 (L) 08/25/2022    TSH 0.792 06/10/2022    INR 1.0 11/08/2019    HGBA1C 5.7 (H) 01/10/2023       Diagnoses and all orders for this visit:    Constipation, unspecified  constipation type    Chronic midline low back pain without sciatica    Dysuria    Answers submitted by the patient for this visit:  Back Pain Questionnaire (Submitted on 2/3/2023)  Chief Complaint: Back pain  Chronicity: new  Onset: in the past 7 days  Frequency: intermittently  Progression since onset: gradually worsening  Pain location: sacro-iliac  Pain quality: aching  Pain - numeric: 10/10  Pain is: the same all the time  Aggravated by: urinating  bladder incontinence: Yes  leg pain: No  numbness: No  paresis: No  pelvic pain: No  perianal numbness: No  genital pain: No  Pain severity: severe  Improvement on treatment: no relief

## 2023-02-03 NOTE — PROGRESS NOTES
Subjective:      Patient ID: Ana Bonilla is a 62 y.o. female.    Chief Complaint: No chief complaint on file.      HPI: This is a 62 year old woman with history of Gastritis. She had CT chest done on 2/2/2023 that showed lytic and expansile destructive lesion in the sternum and lytic lesion at L1 vertebral body. She was referred for evaluation of lytic lesion by Dr. Kristin Sibley.    Review of Systems   Constitutional:  Negative for chills and fever.   HENT:  Negative for mouth sores and sore throat.    Respiratory:  Negative for shortness of breath and wheezing.    Cardiovascular:  Negative for palpitations.   Gastrointestinal:  Negative for abdominal pain and blood in stool.   Genitourinary:  Negative for hematuria.   Neurological:  Negative for dizziness and headaches.   Psychiatric/Behavioral:  Negative for agitation and confusion.      Objective:     Physical Exam  Constitutional:       Appearance: Normal appearance.   HENT:      Head: Normocephalic and atraumatic.      Mouth/Throat:      Pharynx: Oropharynx is clear. No oropharyngeal exudate or posterior oropharyngeal erythema.   Cardiovascular:      Heart sounds:     No friction rub. No gallop.   Pulmonary:      Effort: No respiratory distress.      Breath sounds: Normal breath sounds. No wheezing or rales.   Abdominal:      Tenderness: There is no guarding or rebound.   Musculoskeletal:      Cervical back: Neck supple.      Right lower leg: No edema.      Left lower leg: No edema.   Skin:     Findings: No erythema or rash.   Neurological:      General: No focal deficit present.      Mental Status: She is alert and oriented to person, place, and time.   Psychiatric:         Mood and Affect: Mood normal.         Behavior: Behavior normal.         Thought Content: Thought content normal.         Judgment: Judgment normal.       Assessment:     Problem List Items Addressed This Visit    None  Visit Diagnoses       Abnormal CT lung screening    -  Primary     Relevant Orders    Ambulatory referral/consult to Interventional Radiology    CT/PET SCAN ROUTINE    CBC W/ AUTO DIFFERENTIAL    COMPREHENSIVE METABOLIC PANEL             Lytic lesion at Sternum and L1 vertebral robinson.     Plan:     For CT - guided biopsy of lytic lesion -sternum/L1 - for histological diagnosis.  Further recommendations to follow after biopsy and histological diagnosis.  PET-CT/Mammogram.  RTC: 2 weeks after biopsy.

## 2023-02-09 ENCOUNTER — PATIENT MESSAGE (OUTPATIENT)
Dept: HEMATOLOGY/ONCOLOGY | Facility: CLINIC | Age: 63
End: 2023-02-09
Payer: COMMERCIAL

## 2023-02-14 ENCOUNTER — PATIENT MESSAGE (OUTPATIENT)
Dept: INTERNAL MEDICINE | Facility: CLINIC | Age: 63
End: 2023-02-14
Payer: COMMERCIAL

## 2023-02-15 ENCOUNTER — OFFICE VISIT (OUTPATIENT)
Dept: PAIN MEDICINE | Facility: CLINIC | Age: 63
End: 2023-02-15
Payer: COMMERCIAL

## 2023-02-15 ENCOUNTER — PATIENT MESSAGE (OUTPATIENT)
Dept: PAIN MEDICINE | Facility: CLINIC | Age: 63
End: 2023-02-15

## 2023-02-15 VITALS
SYSTOLIC BLOOD PRESSURE: 127 MMHG | HEART RATE: 80 BPM | RESPIRATION RATE: 17 BRPM | BODY MASS INDEX: 22.2 KG/M2 | HEIGHT: 64 IN | DIASTOLIC BLOOD PRESSURE: 74 MMHG | WEIGHT: 130.06 LBS

## 2023-02-15 DIAGNOSIS — M46.1 SACROILIITIS: ICD-10-CM

## 2023-02-15 DIAGNOSIS — M79.18 MYOFASCIAL PAIN SYNDROME OF LUMBAR SPINE: ICD-10-CM

## 2023-02-15 DIAGNOSIS — M51.36 DDD (DEGENERATIVE DISC DISEASE), LUMBAR: ICD-10-CM

## 2023-02-15 DIAGNOSIS — M47.816 LUMBAR SPONDYLOSIS: Primary | ICD-10-CM

## 2023-02-15 PROCEDURE — 99204 PR OFFICE/OUTPT VISIT, NEW, LEVL IV, 45-59 MIN: ICD-10-PCS | Mod: 25,S$GLB,, | Performed by: ANESTHESIOLOGY

## 2023-02-15 PROCEDURE — 3078F DIAST BP <80 MM HG: CPT | Mod: CPTII,S$GLB,, | Performed by: ANESTHESIOLOGY

## 2023-02-15 PROCEDURE — 1159F MED LIST DOCD IN RCRD: CPT | Mod: CPTII,S$GLB,, | Performed by: ANESTHESIOLOGY

## 2023-02-15 PROCEDURE — 3044F PR MOST RECENT HEMOGLOBIN A1C LEVEL <7.0%: ICD-10-PCS | Mod: CPTII,S$GLB,, | Performed by: ANESTHESIOLOGY

## 2023-02-15 PROCEDURE — 3008F PR BODY MASS INDEX (BMI) DOCUMENTED: ICD-10-PCS | Mod: CPTII,S$GLB,, | Performed by: ANESTHESIOLOGY

## 2023-02-15 PROCEDURE — 20552 PR INJECT TRIGGER POINT, 1 OR 2: ICD-10-PCS | Mod: S$GLB,,, | Performed by: ANESTHESIOLOGY

## 2023-02-15 PROCEDURE — 99204 OFFICE O/P NEW MOD 45 MIN: CPT | Mod: 25,S$GLB,, | Performed by: ANESTHESIOLOGY

## 2023-02-15 PROCEDURE — 1159F PR MEDICATION LIST DOCUMENTED IN MEDICAL RECORD: ICD-10-PCS | Mod: CPTII,S$GLB,, | Performed by: ANESTHESIOLOGY

## 2023-02-15 PROCEDURE — 3074F SYST BP LT 130 MM HG: CPT | Mod: CPTII,S$GLB,, | Performed by: ANESTHESIOLOGY

## 2023-02-15 PROCEDURE — 20552 NJX 1/MLT TRIGGER POINT 1/2: CPT | Mod: S$GLB,,, | Performed by: ANESTHESIOLOGY

## 2023-02-15 PROCEDURE — 3078F PR MOST RECENT DIASTOLIC BLOOD PRESSURE < 80 MM HG: ICD-10-PCS | Mod: CPTII,S$GLB,, | Performed by: ANESTHESIOLOGY

## 2023-02-15 PROCEDURE — 3008F BODY MASS INDEX DOCD: CPT | Mod: CPTII,S$GLB,, | Performed by: ANESTHESIOLOGY

## 2023-02-15 PROCEDURE — 3074F PR MOST RECENT SYSTOLIC BLOOD PRESSURE < 130 MM HG: ICD-10-PCS | Mod: CPTII,S$GLB,, | Performed by: ANESTHESIOLOGY

## 2023-02-15 PROCEDURE — 1160F RVW MEDS BY RX/DR IN RCRD: CPT | Mod: CPTII,S$GLB,, | Performed by: ANESTHESIOLOGY

## 2023-02-15 PROCEDURE — 99999 PR PBB SHADOW E&M-EST. PATIENT-LVL III: ICD-10-PCS | Mod: PBBFAC,,, | Performed by: ANESTHESIOLOGY

## 2023-02-15 PROCEDURE — 1160F PR REVIEW ALL MEDS BY PRESCRIBER/CLIN PHARMACIST DOCUMENTED: ICD-10-PCS | Mod: CPTII,S$GLB,, | Performed by: ANESTHESIOLOGY

## 2023-02-15 PROCEDURE — 3044F HG A1C LEVEL LT 7.0%: CPT | Mod: CPTII,S$GLB,, | Performed by: ANESTHESIOLOGY

## 2023-02-15 PROCEDURE — 99999 PR PBB SHADOW E&M-EST. PATIENT-LVL III: CPT | Mod: PBBFAC,,, | Performed by: ANESTHESIOLOGY

## 2023-02-15 RX ORDER — METHOCARBAMOL 500 MG/1
500 TABLET, FILM COATED ORAL 2 TIMES DAILY PRN
Qty: 60 TABLET | Refills: 1 | Status: SHIPPED | OUTPATIENT
Start: 2023-02-15 | End: 2023-05-22

## 2023-02-15 RX ORDER — IBUPROFEN 800 MG/1
TABLET ORAL
COMMUNITY
Start: 2023-02-02 | End: 2023-03-27

## 2023-02-16 ENCOUNTER — PATIENT MESSAGE (OUTPATIENT)
Dept: INTERNAL MEDICINE | Facility: CLINIC | Age: 63
End: 2023-02-16
Payer: COMMERCIAL

## 2023-02-16 NOTE — PROGRESS NOTES
New Patient Interventional Pain Note (Initial Visit)    Referring Physician: Sohail Bailey    PCP: Kristin Sibley MD    Chief Complaint:   Chief Complaint   Patient presents with    Low-back Pain        SUBJECTIVE:    Ana Bonilla is a 62 y.o. female who presents to the clinic for the evaluation of back pain.   Patient reports 20 year history of lower back pain that intermittently flares up.  Most recent flare-up has been lasting for 2 weeks.  Pain is described as an aching throbbing pain in a band across her lower back.  She denies any significant radiation to her bilateral lower extremities.  Pain is worse with prolonged standing or walking, better with sitting down.  Pain is rated an 8 out 10. Denies any fevers, chills, changes in gait, weakness, or bowel and bladder incontinence      Non-Pharmacologic Treatments:  Physical Therapy/Home Exercise: yes  Ice/Heat:yes  TENS: no  Acupuncture: no  Massage: yes  Chiropractic: yes        Previous Pain Medications:  NSAIDs, Tylenol, muscle relaxers, opioids, topicals     report:  Reviewed and consistent with medication use as prescribed.    Pain Procedures:   Remote history of lumbar RANULFO as lumbar medial branch blocks with Dr. Schulz      Imaging:     MRI lumbar spine without contrast, 04/21/2021, [facility] center of [place]  Technique: Sagittal T2, T1, axial T2, and T1 spin echo and sagittal STIR images through the lumbar spine were obtained without gadolinium. This examination was performed on the 0.6 talita [facility] MRI unit.    Findings: The lumbar elements are normal in height, alignment, and marrow signal.    L1-L2: Normal.    L2-L3: Disc bulge extends 1 to 2 mm beyond the margin of the vertebral column. No spinal canal narrowing. No foraminal narrowing. Stable.    L3-L4: Circumferential disc bulge extends 2 mm beyond the margin of the vertebral column. Early facet arthropathy. No spinal canal narrowing. No foraminal narrowing. Stable.    L4-L5:  Circumferential disc bulge extends 2 mm beyond the margin of the vertebral column. Early facet arthropathy. No spinal canal narrowing. Minimal narrowing of both foramina.    L5-S1: Disc bulge extends 1 to 2 mm beyond the margin of the vertebral column. No spinal canal narrowing. No foraminal narrowing.    The conus medullaris terminates at L1-L2 and appears normal.    Impression: There has been no change in the appearance of the lumbar spine since an examination dated November 15, 2018. There are small disc bulges along the lumbar spine causing no spinal canal narrowing and no significant foraminal narrowing.    Location Code: 1      Electronically Signed by STEVEN ROJAS at 29-Apr-2021 03:25:27 PM            Past Medical History:   Diagnosis Date    Allergy     Amblyopia OS    Anxiety     Asthma     COPD (chronic obstructive pulmonary disease)     GERD (gastroesophageal reflux disease)     Hyperlipidemia     Hypertension     Thyroid disease      Past Surgical History:   Procedure Laterality Date    APPENDECTOMY      BUNIONECTOMY      COLONOSCOPY N/A 12/4/2019    Procedure: COLONOSCOPY;  Surgeon: Danny Matos III, MD;  Location: [facility];  Service: Endoscopy;  Laterality: N/A;    COLONOSCOPY N/A 10/24/2022    Procedure: COLONOSCOPY;  Surgeon: Danny Matos III, MD;  Location: [facility];  Service: Endoscopy;  Laterality: N/A;    ESOPHAGOGASTRODUODENOSCOPY N/A 10/24/2022    Procedure: EGD (ESOPHAGOGASTRODUODENOSCOPY);  Surgeon: Danny Matos III, MD;  Location: [facility];  Service: Endoscopy;  Laterality: N/A;    HYSTERECTOMY      PARTIAL//still with ovaries    neck fusion  08/2017    THYROIDECTOMY       Social History     Socioeconomic History    Marital status:     Number of children: 2   Occupational History     Employer: [employer]   Tobacco Use    Smoking status: Every Day     Packs/day: 0.50     Years: 45.00     Pack years: 22.50     Types: Cigarettes    Smokeless tobacco: Never   Substance and  Sexual Activity    Alcohol use: Not Currently     Comment: quit    Drug use: No    Sexual activity: Never     Family History   Problem Relation Age of Onset    Hypertension Mother     Diabetes Mother     Diabetes Father     Stroke Maternal Grandmother     Prostate cancer Maternal Grandfather     Mental illness Son     Pancreatic cancer Maternal Uncle     Mental illness Other     Pancreatic cancer Other     Ovarian cancer Maternal Cousin        Review of patient's allergies indicates:  No Known Allergies    Current Outpatient Medications   Medication Sig    albuterol (PROVENTIL HFA) 90 mcg/actuation inhaler Inhale 2 puffs into the lungs every 6 (six) hours as needed for Wheezing. Rescue    ALPRAZolam (XANAX) 2 MG Tab TAKE 1 TABLET BY MOUTH TWICE DAILY AS NEEDED FOR ANXIETY    amlodipine-benazepril 2.5-10 mg (LOTREL) 2.5-10 mg per capsule Take 1 capsule by mouth once daily.    estradioL (VAGIFEM) 10 mcg Tab Place 1 tablet vaginally twice a week.    fluticasone propionate (FLONASE) 50 mcg/actuation nasal spray 1 spray (50 mcg total) by Each Nostril route once daily.    fluticasone propionate (FLONASE) 50 mcg/actuation nasal spray 1 spray (50 mcg total) by Each Nostril route once daily.    fluticasone-salmeterol diskus inhaler 250-50 mcg Inhale 1 puff into the lungs 2 (two) times daily. Controller    ibuprofen (ADVIL,MOTRIN) 800 MG tablet ibuprofen Take 1 tablet (oral) every 8 hours PRN - Pain [id number] tablet every 8 hours oral No set duration recorded No set duration amount recorded active 800 mg    levothyroxine (SYNTHROID) 100 MCG tablet TAKE 1 TABLET(100 MCG) BY MOUTH BEFORE BREAKFAST    linaCLOtide (LINZESS) 72 mcg Cap capsule Take 2 capsules (144 mcg total) by mouth before breakfast.    metoprolol succinate (TOPROL-XL) 50 MG 24 hr tablet Take 1 tablet (50 mg total) by mouth once daily.    montelukast (SINGULAIR) 10 mg tablet TAKE 1 TABLET(10 MG) BY MOUTH EVERY EVENING    ondansetron (ZOFRAN-ODT) 4 MG TbDL Take 1  tablet (4 mg total) by mouth every 12 (twelve) hours as needed (nausea).    pantoprazole (PROTONIX) 40 MG tablet TAKE 1 TABLET(40 MG) BY MOUTH EVERY DAY    promethazine-codeine 6.25-10 mg/5 ml (PHENERGAN WITH CODEINE) 6.25-10 mg/5 mL syrup Take 5 mLs by mouth every 4 (four) hours as needed for Cough.    rosuvastatin (CRESTOR) 10 MG tablet Take 1 tablet (10 mg total) by mouth once daily.    sod sulf-pot chloride-mag sulf (SUTAB) 1.479-0.188- 0.225 gram tablet Take 12 tablets by mouth once daily. Take according to package instructions with indicated amount of water.    traZODone (DESYREL) 50 MG tablet Take 1 tablet (50 mg total) by mouth every evening.    methocarbamoL (ROBAXIN) 500 MG Tab Take 1 tablet (500 mg total) by mouth 2 (two) times daily as needed.     No current facility-administered medications for this visit.     Facility-Administered Medications Ordered in Other Visits   Medication    lactated ringers infusion         ROS  Review of Systems   Constitutional:  Negative for chills, diaphoresis, fatigue and fever.   HENT:  Negative for ear discharge, ear pain, rhinorrhea, trouble swallowing and voice change.    Eyes:  Negative for pain and redness.   Respiratory:  Negative for chest tightness, shortness of breath, wheezing and stridor.    Cardiovascular:  Negative for chest pain and leg swelling.   Gastrointestinal:  Negative for blood in stool, diarrhea, nausea and vomiting.   Endocrine: Negative for cold intolerance and heat intolerance.   Genitourinary:  Negative for dysuria, hematuria and urgency.   Musculoskeletal:  Positive for arthralgias, back pain and myalgias. Negative for gait problem, joint swelling, neck pain and neck stiffness.   Skin:  Negative for rash.   Neurological:  Negative for tremors, seizures, speech difficulty, weakness, light-headedness, numbness and headaches.   Hematological:  Does not bruise/bleed easily.   Psychiatric/Behavioral:  Negative for agitation, confusion and suicidal  "ideas.           OBJECTIVE:  /74   Pulse 80   Resp 17   Ht 5' 4" (1.626 m)   Wt 59 kg (130 lb 1.1 oz)   BMI 22.33 kg/m²         Physical Exam  Constitutional:       General: She is not in acute distress.     Appearance: Normal appearance. She is not ill-appearing.   HENT:      Head: Normocephalic and atraumatic.      Nose: No congestion or rhinorrhea.   Eyes:      Extraocular Movements: Extraocular movements intact.      Pupils: Pupils are equal, round, and reactive to light.   Cardiovascular:      Pulses: Normal pulses.   Pulmonary:      Effort: Pulmonary effort is normal.   Abdominal:      General: Abdomen is flat.      Palpations: Abdomen is soft.   Skin:     General: Skin is warm and dry.      Capillary Refill: Capillary refill takes less than 2 seconds.   Neurological:      General: No focal deficit present.      Mental Status: She is alert and oriented to person, place, and time.      Cranial Nerves: No cranial nerve deficit.      Sensory: No sensory deficit.      Motor: No weakness or abnormal muscle tone.      Gait: Gait abnormal.      Deep Tendon Reflexes:      Reflex Scores:       Patellar reflexes are 2+ on the right side and 2+ on the left side.       Achilles reflexes are 2+ on the right side and 2+ on the left side.  Psychiatric:         Mood and Affect: Mood normal.         Behavior: Behavior normal.         Thought Content: Thought content normal.         Musculoskeletal:    Lumbar Exam  Incision: no  Pain with Flexion: yes  Pain with Extension: yes  ROM:  Decreased  Paraspinous TTP:  Bilateral lumbar paraspinous  Facet TTP:  L4-5  Facet Loading:  Positive bilaterally  SLR:  Negative bilaterally  SIJ TTP:  Negative bilaterally  ALEJANDRO:  Negative bilaterally      LABS:  Lab Results   Component Value Date    WBC 9.51 08/25/2022    HGB 12.6 08/25/2022    HCT 37.9 08/25/2022    MCV 96 08/25/2022     08/25/2022       CMP  Sodium   Date Value Ref Range Status   08/25/2022 138 136 - 145 " mmol/L Final     Potassium   Date Value Ref Range Status   08/25/2022 3.8 3.5 - 5.1 mmol/L Final     Chloride   Date Value Ref Range Status   08/25/2022 105 95 - 110 mmol/L Final     CO2   Date Value Ref Range Status   08/25/2022 21 (L) 23 - 29 mmol/L Final     Glucose   Date Value Ref Range Status   08/25/2022 119 (H) 70 - 110 mg/dL Final     BUN   Date Value Ref Range Status   08/25/2022 12 8 - 23 mg/dL Final     Creatinine   Date Value Ref Range Status   08/25/2022 0.8 0.5 - 1.4 mg/dL Final     Calcium   Date Value Ref Range Status   08/25/2022 9.5 8.7 - 10.5 mg/dL Final     Total Protein   Date Value Ref Range Status   08/25/2022 8.9 (H) 6.0 - 8.4 g/dL Final     Albumin   Date Value Ref Range Status   08/25/2022 3.7 3.5 - 5.2 g/dL Final     Total Bilirubin   Date Value Ref Range Status   08/25/2022 0.5 0.1 - 1.0 mg/dL Final     Comment:     For infants and newborns, interpretation of results should be based  on gestational age, weight and in agreement with clinical  observations.    Premature Infant recommended reference ranges:  Up to 24 hours.............<8.0 mg/dL  Up to 48 hours............<12.0 mg/dL  3-5 days..................<15.0 mg/dL  6-29 days.................<15.0 mg/dL       Alkaline Phosphatase   Date Value Ref Range Status   08/25/2022 65 55 - 135 U/L Final     AST   Date Value Ref Range Status   08/25/2022 19 10 - 40 U/L Final     ALT   Date Value Ref Range Status   08/25/2022 18 10 - 44 U/L Final     Anion Gap   Date Value Ref Range Status   08/25/2022 12 8 - 16 mmol/L Final     eGFR if    Date Value Ref Range Status   07/14/2021 >60.0 >60 mL/min/1.73 m^2 Final     eGFR if non    Date Value Ref Range Status   07/14/2021 >60.0 >60 mL/min/1.73 m^2 Final     Comment:     Calculation used to obtain the estimated glomerular filtration  rate (eGFR) is the CKD-EPI equation.          Lab Results   Component Value Date    HGBA1C 5.7 (H) 01/10/2023             ASSESSMENT:        62 y.o. year old female with lower back pain, consistent with     1. Lumbar spondylosis  methocarbamoL (ROBAXIN) 500 MG Tab      2. DDD (degenerative disc disease), lumbar        3. Myofascial pain syndrome of lumbar spine        4. Sacroiliitis          Lumbar spondylosis  -     methocarbamoL (ROBAXIN) 500 MG Tab; Take 1 tablet (500 mg total) by mouth 2 (two) times daily as needed.  Dispense: 60 tablet; Refill: 1    DDD (degenerative disc disease), lumbar    Myofascial pain syndrome of lumbar spine    Sacroiliitis             PLAN:   - Interventions:   Trigger point injection today in clinic.  Note below  If patient does not have resolution of lower back pain with trigger point injection, we will proceed with lumbar medial branch blocks  - Anticoagulation use:   no no anticoagulation    - Medications:   Start Robaxin 500 mg twice a day as needed    - Therapy:    Patient has completed formal physical therapy as well as chiropractic sessions with mild relief.  Continue home exercises    - Imaging/Diagnostic:   MRI lumbar spine reviewed and findings discussed with patient.  Significant for degenerative disc disease throughout the lumbar spine as well as facet arthropathy most advanced at L4-5    - Consults:   None at this time        - Patient Questions: Answered all of the patient's questions regarding diagnosis, therapy, and treatment     This condition does not require this patient to take time off of work, and the primary goal of our Pain Management services is to improve the patient's functional capacity.     - Follow up visit: return to clinic in 4 weeks if needed    Procedure Note: Trigger point injection of left lumbar paraspinous x1  Pre-Procedure Diagnosis:  Myofascial pain  Post-Procedure Diagnosis: Same  : Alirio Santos MD  Medication Mixture: 2ml 1% lidocaine and 1ml Toradol 30mg/ml    Description: After maximal tender points were identified at the noted areas above, the overlying skin  was cleaned with etoh swabs.  Using a 25 G 1 inch hypodermic needle, the above medication mixture was distributed equally among all the injection sites  after negative aspiration. A stellate pattern was then used to needle the surrounding area. Needle was removed and areas cleaned and a bandage was applied    Blood loss minimal.  Complications: None  Disposition: Pt left in good condition with no complaints.      The above plan and management options were discussed at length with patient. Patient is in agreement with the above and verbalized understanding.    I discussed the goals of interventional chronic pain management with the patient on today's visit.  I explained the utility of injections for diagnostic and therapeutic purposes.  We discussed a multimodal approach to pain including treating the patient's given worst pain at any given time.  We will use a systematic approach to addressing pain.  We will also adopt a multimodal approach that includes injections, adjuvant medications, physical therapy, at times psychiatry.  There may be a limited role for opioid use intermittently in the treatment of pain, more particularly for acute pain although no one approach can be used as a sole treatment modality.    I emphasized the importance of regular exercise, core strengthening and stretching, diet and weight loss as a cornerstone of long-term pain management.      Alirio Santos MD  Interventional Pain Management  [facility]    Disclaimer:  This note was prepared using voice recognition system and is likely to have sound alike errors that may have been overlooked even after proof reading.  Please call me with any questions

## 2023-02-16 NOTE — H&P (VIEW-ONLY)
New Patient Interventional Pain Note (Initial Visit)    Referring Physician: Sohail Bailey    PCP: Kristin Sibley MD    Chief Complaint:   Chief Complaint   Patient presents with    Low-back Pain        SUBJECTIVE:    Ana Bonilla is a 62 y.o. female who presents to the clinic for the evaluation of back pain.   Patient reports 20 year history of lower back pain that intermittently flares up.  Most recent flare-up has been lasting for 2 weeks.  Pain is described as an aching throbbing pain in a band across her lower back.  She denies any significant radiation to her bilateral lower extremities.  Pain is worse with prolonged standing or walking, better with sitting down.  Pain is rated an 8 out 10. Denies any fevers, chills, changes in gait, weakness, or bowel and bladder incontinence      Non-Pharmacologic Treatments:  Physical Therapy/Home Exercise: yes  Ice/Heat:yes  TENS: no  Acupuncture: no  Massage: yes  Chiropractic: yes        Previous Pain Medications:  NSAIDs, Tylenol, muscle relaxers, opioids, topicals     report:  Reviewed and consistent with medication use as prescribed.    Pain Procedures:   Remote history of lumbar RANULFO as lumbar medial branch blocks with Dr. Schulz      Imaging:     MRI lumbar spine without contrast, 04/21/2021, imaging center of [place]  Technique: Sagittal T2, T1, axial T2, and T1 spin echo and sagittal STIR images through the lumbar spine were obtained without gadolinium. This examination was performed on the 0.6 talita [facility] MRI unit.    Findings: The lumbar elements are normal in height, alignment, and marrow signal.    L1-L2: Normal.    L2-L3: Disc bulge extends 1 to 2 mm beyond the margin of the vertebral column. No spinal canal narrowing. No foraminal narrowing. Stable.    L3-L4: Circumferential disc bulge extends 2 mm beyond the margin of the vertebral column. Early facet arthropathy. No spinal canal narrowing. No foraminal narrowing. Stable.    L4-L5:  Circumferential disc bulge extends 2 mm beyond the margin of the vertebral column. Early facet arthropathy. No spinal canal narrowing. Minimal narrowing of both foramina.    L5-S1: Disc bulge extends 1 to 2 mm beyond the margin of the vertebral column. No spinal canal narrowing. No foraminal narrowing.    The conus medullaris terminates at L1-L2 and appears normal.    Impression: There has been no change in the appearance of the lumbar spine since an examination dated November 15, 2018. There are small disc bulges along the lumbar spine causing no spinal canal narrowing and no significant foraminal narrowing.    Location Code: 1      Electronically Signed by STEVEN ROJAS at 29-Apr-2021 03:25:27 PM            Past Medical History:   Diagnosis Date    Allergy     Amblyopia OS    Anxiety     Asthma     COPD (chronic obstructive pulmonary disease)     GERD (gastroesophageal reflux disease)     Hyperlipidemia     Hypertension     Thyroid disease      Past Surgical History:   Procedure Laterality Date    APPENDECTOMY      BUNIONECTOMY      COLONOSCOPY N/A 12/4/2019    Procedure: COLONOSCOPY;  Surgeon: Danny Matos III, MD;  Location: [facility];  Service: Endoscopy;  Laterality: N/A;    COLONOSCOPY N/A 10/24/2022    Procedure: COLONOSCOPY;  Surgeon: Danny Matos III, MD;  Location: [facility];  Service: Endoscopy;  Laterality: N/A;    ESOPHAGOGASTRODUODENOSCOPY N/A 10/24/2022    Procedure: EGD (ESOPHAGOGASTRODUODENOSCOPY);  Surgeon: Danny Matos III, MD;  Location: [facility];  Service: Endoscopy;  Laterality: N/A;    HYSTERECTOMY      PARTIAL//still with ovaries    neck fusion  08/2017    THYROIDECTOMY       Social History     Socioeconomic History    Marital status:     Number of children: 2   Occupational History     Employer: [employer]   Tobacco Use    Smoking status: Every Day     Packs/day: 0.50     Years: 45.00     Pack years: 22.50     Types: Cigarettes    Smokeless tobacco: Never   Substance and  Sexual Activity    Alcohol use: Not Currently     Comment: quit    Drug use: No    Sexual activity: Never     Family History   Problem Relation Age of Onset    Hypertension Mother     Diabetes Mother     Diabetes Father     Stroke Maternal Grandmother     Prostate cancer Maternal Grandfather     Mental illness Son     Pancreatic cancer Maternal Uncle     Mental illness Other     Pancreatic cancer Other     Ovarian cancer Maternal Cousin        Review of patient's allergies indicates:  No Known Allergies    Current Outpatient Medications   Medication Sig    albuterol (PROVENTIL HFA) 90 mcg/actuation inhaler Inhale 2 puffs into the lungs every 6 (six) hours as needed for Wheezing. Rescue    ALPRAZolam (XANAX) 2 MG Tab TAKE 1 TABLET BY MOUTH TWICE DAILY AS NEEDED FOR ANXIETY    amlodipine-benazepril 2.5-10 mg (LOTREL) 2.5-10 mg per capsule Take 1 capsule by mouth once daily.    estradioL (VAGIFEM) 10 mcg Tab Place 1 tablet vaginally twice a week.    fluticasone propionate (FLONASE) 50 mcg/actuation nasal spray 1 spray (50 mcg total) by Each Nostril route once daily.    fluticasone propionate (FLONASE) 50 mcg/actuation nasal spray 1 spray (50 mcg total) by Each Nostril route once daily.    fluticasone-salmeterol diskus inhaler 250-50 mcg Inhale 1 puff into the lungs 2 (two) times daily. Controller    ibuprofen (ADVIL,MOTRIN) 800 MG tablet ibuprofen Take 1 tablet (oral) every 8 hours PRN - Pain [id number] tablet every 8 hours oral No set duration recorded No set duration amount recorded active 800 mg    levothyroxine (SYNTHROID) 100 MCG tablet TAKE 1 TABLET(100 MCG) BY MOUTH BEFORE BREAKFAST    linaCLOtide (LINZESS) 72 mcg Cap capsule Take 2 capsules (144 mcg total) by mouth before breakfast.    metoprolol succinate (TOPROL-XL) 50 MG 24 hr tablet Take 1 tablet (50 mg total) by mouth once daily.    montelukast (SINGULAIR) 10 mg tablet TAKE 1 TABLET(10 MG) BY MOUTH EVERY EVENING    ondansetron (ZOFRAN-ODT) 4 MG TbDL Take 1  tablet (4 mg total) by mouth every 12 (twelve) hours as needed (nausea).    pantoprazole (PROTONIX) 40 MG tablet TAKE 1 TABLET(40 MG) BY MOUTH EVERY DAY    promethazine-codeine 6.25-10 mg/5 ml (PHENERGAN WITH CODEINE) 6.25-10 mg/5 mL syrup Take 5 mLs by mouth every 4 (four) hours as needed for Cough.    rosuvastatin (CRESTOR) 10 MG tablet Take 1 tablet (10 mg total) by mouth once daily.    sod sulf-pot chloride-mag sulf (SUTAB) 1.479-0.188- 0.225 gram tablet Take 12 tablets by mouth once daily. Take according to package instructions with indicated amount of water.    traZODone (DESYREL) 50 MG tablet Take 1 tablet (50 mg total) by mouth every evening.    methocarbamoL (ROBAXIN) 500 MG Tab Take 1 tablet (500 mg total) by mouth 2 (two) times daily as needed.     No current facility-administered medications for this visit.     Facility-Administered Medications Ordered in Other Visits   Medication    lactated ringers infusion         ROS  Review of Systems   Constitutional:  Negative for chills, diaphoresis, fatigue and fever.   HENT:  Negative for ear discharge, ear pain, rhinorrhea, trouble swallowing and voice change.    Eyes:  Negative for pain and redness.   Respiratory:  Negative for chest tightness, shortness of breath, wheezing and stridor.    Cardiovascular:  Negative for chest pain and leg swelling.   Gastrointestinal:  Negative for blood in stool, diarrhea, nausea and vomiting.   Endocrine: Negative for cold intolerance and heat intolerance.   Genitourinary:  Negative for dysuria, hematuria and urgency.   Musculoskeletal:  Positive for arthralgias, back pain and myalgias. Negative for gait problem, joint swelling, neck pain and neck stiffness.   Skin:  Negative for rash.   Neurological:  Negative for tremors, seizures, speech difficulty, weakness, light-headedness, numbness and headaches.   Hematological:  Does not bruise/bleed easily.   Psychiatric/Behavioral:  Negative for agitation, confusion and suicidal  "ideas.           OBJECTIVE:  /74   Pulse 80   Resp 17   Ht 5' 4" (1.626 m)   Wt 59 kg (130 lb 1.1 oz)   BMI 22.33 kg/m²         Physical Exam  Constitutional:       General: She is not in acute distress.     Appearance: Normal appearance. She is not ill-appearing.   HENT:      Head: Normocephalic and atraumatic.      Nose: No congestion or rhinorrhea.   Eyes:      Extraocular Movements: Extraocular movements intact.      Pupils: Pupils are equal, round, and reactive to light.   Cardiovascular:      Pulses: Normal pulses.   Pulmonary:      Effort: Pulmonary effort is normal.   Abdominal:      General: Abdomen is flat.      Palpations: Abdomen is soft.   Skin:     General: Skin is warm and dry.      Capillary Refill: Capillary refill takes less than 2 seconds.   Neurological:      General: No focal deficit present.      Mental Status: She is alert and oriented to person, place, and time.      Cranial Nerves: No cranial nerve deficit.      Sensory: No sensory deficit.      Motor: No weakness or abnormal muscle tone.      Gait: Gait abnormal.      Deep Tendon Reflexes:      Reflex Scores:       Patellar reflexes are 2+ on the right side and 2+ on the left side.       Achilles reflexes are 2+ on the right side and 2+ on the left side.  Psychiatric:         Mood and Affect: Mood normal.         Behavior: Behavior normal.         Thought Content: Thought content normal.         Musculoskeletal:    Lumbar Exam  Incision: no  Pain with Flexion: yes  Pain with Extension: yes  ROM:  Decreased  Paraspinous TTP:  Bilateral lumbar paraspinous  Facet TTP:  L4-5  Facet Loading:  Positive bilaterally  SLR:  Negative bilaterally  SIJ TTP:  Negative bilaterally  ALEJANDRO:  Negative bilaterally      LABS:  Lab Results   Component Value Date    WBC 9.51 08/25/2022    HGB 12.6 08/25/2022    HCT 37.9 08/25/2022    MCV 96 08/25/2022     08/25/2022       CMP  Sodium   Date Value Ref Range Status   08/25/2022 138 136 - 145 " mmol/L Final     Potassium   Date Value Ref Range Status   08/25/2022 3.8 3.5 - 5.1 mmol/L Final     Chloride   Date Value Ref Range Status   08/25/2022 105 95 - 110 mmol/L Final     CO2   Date Value Ref Range Status   08/25/2022 21 (L) 23 - 29 mmol/L Final     Glucose   Date Value Ref Range Status   08/25/2022 119 (H) 70 - 110 mg/dL Final     BUN   Date Value Ref Range Status   08/25/2022 12 8 - 23 mg/dL Final     Creatinine   Date Value Ref Range Status   08/25/2022 0.8 0.5 - 1.4 mg/dL Final     Calcium   Date Value Ref Range Status   08/25/2022 9.5 8.7 - 10.5 mg/dL Final     Total Protein   Date Value Ref Range Status   08/25/2022 8.9 (H) 6.0 - 8.4 g/dL Final     Albumin   Date Value Ref Range Status   08/25/2022 3.7 3.5 - 5.2 g/dL Final     Total Bilirubin   Date Value Ref Range Status   08/25/2022 0.5 0.1 - 1.0 mg/dL Final     Comment:     For infants and newborns, interpretation of results should be based  on gestational age, weight and in agreement with clinical  observations.    Premature Infant recommended reference ranges:  Up to 24 hours.............<8.0 mg/dL  Up to 48 hours............<12.0 mg/dL  3-5 days..................<15.0 mg/dL  6-29 days.................<15.0 mg/dL       Alkaline Phosphatase   Date Value Ref Range Status   08/25/2022 65 55 - 135 U/L Final     AST   Date Value Ref Range Status   08/25/2022 19 10 - 40 U/L Final     ALT   Date Value Ref Range Status   08/25/2022 18 10 - 44 U/L Final     Anion Gap   Date Value Ref Range Status   08/25/2022 12 8 - 16 mmol/L Final     eGFR if    Date Value Ref Range Status   07/14/2021 >60.0 >60 mL/min/1.73 m^2 Final     eGFR if non    Date Value Ref Range Status   07/14/2021 >60.0 >60 mL/min/1.73 m^2 Final     Comment:     Calculation used to obtain the estimated glomerular filtration  rate (eGFR) is the CKD-EPI equation.          Lab Results   Component Value Date    HGBA1C 5.7 (H) 01/10/2023             ASSESSMENT:        62 y.o. year old female with lower back pain, consistent with     1. Lumbar spondylosis  methocarbamoL (ROBAXIN) 500 MG Tab      2. DDD (degenerative disc disease), lumbar        3. Myofascial pain syndrome of lumbar spine        4. Sacroiliitis          Lumbar spondylosis  -     methocarbamoL (ROBAXIN) 500 MG Tab; Take 1 tablet (500 mg total) by mouth 2 (two) times daily as needed.  Dispense: 60 tablet; Refill: 1    DDD (degenerative disc disease), lumbar    Myofascial pain syndrome of lumbar spine    Sacroiliitis             PLAN:   - Interventions:   Trigger point injection today in clinic.  Note below  If patient does not have resolution of lower back pain with trigger point injection, we will proceed with lumbar medial branch blocks  - Anticoagulation use:   no no anticoagulation    - Medications:   Start Robaxin 500 mg twice a day as needed    - Therapy:    Patient has completed formal physical therapy as well as chiropractic sessions with mild relief.  Continue home exercises    - Imaging/Diagnostic:   MRI lumbar spine reviewed and findings discussed with patient.  Significant for degenerative disc disease throughout the lumbar spine as well as facet arthropathy most advanced at L4-5    - Consults:   None at this time        - Patient Questions: Answered all of the patient's questions regarding diagnosis, therapy, and treatment     This condition does not require this patient to take time off of work, and the primary goal of our Pain Management services is to improve the patient's functional capacity.     - Follow up visit: return to clinic in 4 weeks if needed    Procedure Note: Trigger point injection of left lumbar paraspinous x1  Pre-Procedure Diagnosis:  Myofascial pain  Post-Procedure Diagnosis: Same  : Alirio Santos MD  Medication Mixture: 2ml 1% lidocaine and 1ml Toradol 30mg/ml    Description: After maximal tender points were identified at the noted areas above, the overlying skin  was cleaned with etoh swabs.  Using a 25 G 1 inch hypodermic needle, the above medication mixture was distributed equally among all the injection sites  after negative aspiration. A stellate pattern was then used to needle the surrounding area. Needle was removed and areas cleaned and a bandage was applied    Blood loss minimal.  Complications: None  Disposition: Pt left in good condition with no complaints.      The above plan and management options were discussed at length with patient. Patient is in agreement with the above and verbalized understanding.    I discussed the goals of interventional chronic pain management with the patient on today's visit.  I explained the utility of injections for diagnostic and therapeutic purposes.  We discussed a multimodal approach to pain including treating the patient's given worst pain at any given time.  We will use a systematic approach to addressing pain.  We will also adopt a multimodal approach that includes injections, adjuvant medications, physical therapy, at times psychiatry.  There may be a limited role for opioid use intermittently in the treatment of pain, more particularly for acute pain although no one approach can be used as a sole treatment modality.    I emphasized the importance of regular exercise, core strengthening and stretching, diet and weight loss as a cornerstone of long-term pain management.      Alirio Santos MD  Interventional Pain Management  [facility]    Disclaimer:  This note was prepared using voice recognition system and is likely to have sound alike errors that may have been overlooked even after proof reading.  Please call me with any questions

## 2023-02-17 DIAGNOSIS — M54.50 LOW BACK PAIN, UNSPECIFIED BACK PAIN LATERALITY, UNSPECIFIED CHRONICITY, UNSPECIFIED WHETHER SCIATICA PRESENT: Primary | ICD-10-CM

## 2023-02-17 DIAGNOSIS — K63.5 POLYP OF COLON, UNSPECIFIED PART OF COLON, UNSPECIFIED TYPE: ICD-10-CM

## 2023-03-02 ENCOUNTER — PATIENT MESSAGE (OUTPATIENT)
Dept: HEMATOLOGY/ONCOLOGY | Facility: CLINIC | Age: 63
End: 2023-03-02
Payer: COMMERCIAL

## 2023-03-02 DIAGNOSIS — R91.8 ABNORMAL CT LUNG SCREENING: Primary | ICD-10-CM

## 2023-03-03 ENCOUNTER — PATIENT MESSAGE (OUTPATIENT)
Dept: INTERNAL MEDICINE | Facility: CLINIC | Age: 63
End: 2023-03-03
Payer: COMMERCIAL

## 2023-03-03 DIAGNOSIS — R91.8 ABNORMAL CT LUNG SCREENING: Primary | ICD-10-CM

## 2023-03-03 DIAGNOSIS — G89.29 CHRONIC MIDLINE LOW BACK PAIN WITHOUT SCIATICA: Primary | ICD-10-CM

## 2023-03-03 DIAGNOSIS — M54.50 CHRONIC MIDLINE LOW BACK PAIN WITHOUT SCIATICA: Primary | ICD-10-CM

## 2023-03-03 RX ORDER — ONDANSETRON HYDROCHLORIDE 8 MG/1
8 TABLET, FILM COATED ORAL EVERY 12 HOURS PRN
Qty: 30 TABLET | Refills: 2 | Status: SHIPPED | OUTPATIENT
Start: 2023-03-03 | End: 2024-03-02

## 2023-03-03 RX ORDER — HYDROCODONE BITARTRATE AND ACETAMINOPHEN 5; 325 MG/1; MG/1
1 TABLET ORAL EVERY 6 HOURS PRN
Qty: 30 TABLET | Refills: 0 | Status: SHIPPED | OUTPATIENT
Start: 2023-03-03 | End: 2023-03-03

## 2023-03-03 RX ORDER — HYDROCODONE BITARTRATE AND ACETAMINOPHEN 5; 325 MG/1; MG/1
1 TABLET ORAL EVERY 6 HOURS PRN
Qty: 60 TABLET | Refills: 0 | Status: CANCELLED | OUTPATIENT
Start: 2023-03-03

## 2023-03-03 RX ORDER — OXYCODONE AND ACETAMINOPHEN 10; 325 MG/1; MG/1
1 TABLET ORAL EVERY 4 HOURS PRN
Qty: 28 TABLET | Refills: 0 | Status: SHIPPED | OUTPATIENT
Start: 2023-03-03 | End: 2023-03-27 | Stop reason: ALTCHOICE

## 2023-03-04 ENCOUNTER — PATIENT MESSAGE (OUTPATIENT)
Dept: INTERNAL MEDICINE | Facility: CLINIC | Age: 63
End: 2023-03-04
Payer: COMMERCIAL

## 2023-03-09 ENCOUNTER — TELEPHONE (OUTPATIENT)
Dept: RADIOLOGY | Facility: HOSPITAL | Age: 63
End: 2023-03-09
Payer: COMMERCIAL

## 2023-03-09 DIAGNOSIS — I10 ESSENTIAL HYPERTENSION: ICD-10-CM

## 2023-03-09 DIAGNOSIS — E78.5 DYSLIPIDEMIA: ICD-10-CM

## 2023-03-09 DIAGNOSIS — D68.9 COAGULATION DEFECT, UNSPECIFIED: Primary | ICD-10-CM

## 2023-03-09 DIAGNOSIS — E03.9 PRIMARY HYPOTHYROIDISM: ICD-10-CM

## 2023-03-10 ENCOUNTER — HOSPITAL ENCOUNTER (OUTPATIENT)
Dept: RADIOLOGY | Facility: HOSPITAL | Age: 63
Discharge: HOME | End: 2023-03-10
Attending: PHYSICIAN ASSISTANT
Payer: COMMERCIAL

## 2023-03-10 ENCOUNTER — PATIENT MESSAGE (OUTPATIENT)
Dept: INTERNAL MEDICINE | Facility: CLINIC | Age: 63
End: 2023-03-10
Payer: COMMERCIAL

## 2023-03-10 VITALS
BODY MASS INDEX: 21.85 KG/M2 | WEIGHT: 128 LBS | SYSTOLIC BLOOD PRESSURE: 113 MMHG | HEART RATE: 65 BPM | DIASTOLIC BLOOD PRESSURE: 65 MMHG | HEIGHT: 64 IN | OXYGEN SATURATION: 99 % | RESPIRATION RATE: 16 BRPM

## 2023-03-10 DIAGNOSIS — M54.50 LOW BACK PAIN, UNSPECIFIED BACK PAIN LATERALITY, UNSPECIFIED CHRONICITY, UNSPECIFIED WHETHER SCIATICA PRESENT: ICD-10-CM

## 2023-03-10 DIAGNOSIS — F41.9 ANXIETY: ICD-10-CM

## 2023-03-10 DIAGNOSIS — K63.5 POLYP OF COLON, UNSPECIFIED PART OF COLON, UNSPECIFIED TYPE: ICD-10-CM

## 2023-03-10 PROCEDURE — 63600175 PHARM REV CODE 636 W HCPCS: Performed by: RADIOLOGY

## 2023-03-10 PROCEDURE — 77012 CT SCAN FOR NEEDLE BIOPSY: CPT | Mod: TC

## 2023-03-10 PROCEDURE — 88342 CHG IMMUNOCYTOCHEMISTRY: ICD-10-PCS | Mod: 26,59,, | Performed by: PATHOLOGY

## 2023-03-10 PROCEDURE — 88185 FLOWCYTOMETRY/TC ADD-ON: CPT | Mod: 59 | Performed by: PATHOLOGY

## 2023-03-10 PROCEDURE — 88341 PR IHC OR ICC EACH ADD'L SINGLE ANTIBODY  STAINPR: ICD-10-PCS | Mod: 26,,, | Performed by: PATHOLOGY

## 2023-03-10 PROCEDURE — 88307 PR  SURG PATH,LEVEL V: ICD-10-PCS | Mod: 26,,, | Performed by: PATHOLOGY

## 2023-03-10 PROCEDURE — 88341 IMHCHEM/IMCYTCHM EA ADD ANTB: CPT | Mod: 26,,, | Performed by: PATHOLOGY

## 2023-03-10 PROCEDURE — 77012 CT GUIDED NEEDLE PLACEMENT: ICD-10-PCS | Mod: 26,,, | Performed by: RADIOLOGY

## 2023-03-10 PROCEDURE — 20220 BONE BIOPSY TROCAR/NDL SUPFC: CPT

## 2023-03-10 PROCEDURE — 88341 IMHCHEM/IMCYTCHM EA ADD ANTB: CPT | Performed by: PATHOLOGY

## 2023-03-10 PROCEDURE — 88184 FLOWCYTOMETRY/ TC 1 MARKER: CPT | Performed by: PATHOLOGY

## 2023-03-10 PROCEDURE — 88189 FLOWCYTOMETRY/READ 16 & >: CPT | Mod: ,,, | Performed by: PATHOLOGY

## 2023-03-10 PROCEDURE — 88307 TISSUE EXAM BY PATHOLOGIST: CPT | Performed by: PATHOLOGY

## 2023-03-10 PROCEDURE — 63600175 PHARM REV CODE 636 W HCPCS: Performed by: PHYSICIAN ASSISTANT

## 2023-03-10 PROCEDURE — 88189 PR  FLOWCYTOMETRY/READ, 16 & > MARKERS: ICD-10-PCS | Mod: ,,, | Performed by: PATHOLOGY

## 2023-03-10 PROCEDURE — 77012 CT SCAN FOR NEEDLE BIOPSY: CPT | Mod: 26,,, | Performed by: RADIOLOGY

## 2023-03-10 PROCEDURE — 88342 IMHCHEM/IMCYTCHM 1ST ANTB: CPT | Performed by: PATHOLOGY

## 2023-03-10 PROCEDURE — 88307 TISSUE EXAM BY PATHOLOGIST: CPT | Mod: 26,,, | Performed by: PATHOLOGY

## 2023-03-10 PROCEDURE — 88342 IMHCHEM/IMCYTCHM 1ST ANTB: CPT | Mod: 26,59,, | Performed by: PATHOLOGY

## 2023-03-10 RX ORDER — OXYCODONE AND ACETAMINOPHEN 5; 325 MG/1; MG/1
1 TABLET ORAL EVERY 6 HOURS PRN
COMMUNITY
End: 2023-03-27 | Stop reason: ALTCHOICE

## 2023-03-10 RX ORDER — ONDANSETRON 2 MG/ML
8 INJECTION INTRAMUSCULAR; INTRAVENOUS ONCE
Status: COMPLETED | OUTPATIENT
Start: 2023-03-10 | End: 2023-03-10

## 2023-03-10 RX ORDER — ALPRAZOLAM 2 MG/1
TABLET ORAL
Qty: 60 TABLET | Refills: 0 | Status: SHIPPED | OUTPATIENT
Start: 2023-03-10 | End: 2023-04-10 | Stop reason: SDUPTHER

## 2023-03-10 RX ORDER — MIDAZOLAM HYDROCHLORIDE 1 MG/ML
INJECTION INTRAMUSCULAR; INTRAVENOUS CODE/TRAUMA/SEDATION MEDICATION
Status: COMPLETED | OUTPATIENT
Start: 2023-03-10 | End: 2023-03-10

## 2023-03-10 RX ORDER — ONDANSETRON HYDROCHLORIDE 4 MG/5ML
8 SOLUTION ORAL ONCE
Status: DISCONTINUED | OUTPATIENT
Start: 2023-03-10 | End: 2023-03-10

## 2023-03-10 RX ORDER — ONDANSETRON 2 MG/ML
4 INJECTION INTRAMUSCULAR; INTRAVENOUS ONCE
Status: DISCONTINUED | OUTPATIENT
Start: 2023-03-10 | End: 2023-03-10

## 2023-03-10 RX ORDER — ONDANSETRON 2 MG/ML
4 INJECTION INTRAMUSCULAR; INTRAVENOUS ONCE
Status: COMPLETED | OUTPATIENT
Start: 2023-03-10 | End: 2023-03-10

## 2023-03-10 RX ORDER — FENTANYL CITRATE 50 UG/ML
INJECTION, SOLUTION INTRAMUSCULAR; INTRAVENOUS CODE/TRAUMA/SEDATION MEDICATION
Status: COMPLETED | OUTPATIENT
Start: 2023-03-10 | End: 2023-03-10

## 2023-03-10 RX ADMIN — ONDANSETRON 4 MG: 2 INJECTION INTRAMUSCULAR; INTRAVENOUS at 09:03

## 2023-03-10 RX ADMIN — MIDAZOLAM HYDROCHLORIDE 1 MG: 1 INJECTION INTRAMUSCULAR; INTRAVENOUS at 10:03

## 2023-03-10 RX ADMIN — ONDANSETRON 8 MG: 2 INJECTION INTRAMUSCULAR; INTRAVENOUS at 11:03

## 2023-03-10 RX ADMIN — FENTANYL CITRATE 50 MCG: 50 INJECTION, SOLUTION INTRAMUSCULAR; INTRAVENOUS at 10:03

## 2023-03-10 NOTE — PLAN OF CARE
Band aid to left mid posterior back D/I with no redness/swelling noted and small spot of blood. VSS, NADN, and pt meets criteria for discharge. Discharge instructions given to and reviewed with pt, and pt verbalized understanding of all. Pt discharged to home, taken out via wheelchair and driven home by Lars ().

## 2023-03-10 NOTE — DISCHARGE INSTRUCTIONS
Please return to ER if any of these symptoms occur:  Fever over 101 degrees,  Any purulent drainage from site (pus, yellow or has foul odor), or any redness or swelling to site  Bleeding from the puncture site not controlled, If bleeding occurs at site hold pressure for 5 mins.  If bleeding continues go to ER  Pain not controlled with Aleve or Tylenol,    No driving for 24 hours after procedure due to sedation given during procedure.     Do not submerge in standing water for 2 days after biopsy but you may shower.    May change bandage if it becomes soiled and bandage may be removed in 2 days.    Resume home medications and diet    Biopsy results will be with Dr. Velasquez in 5-7 days, please follow up with him for results and any other questions or concerns that you may have.

## 2023-03-10 NOTE — DISCHARGE SUMMARY
[facility] ([facility])  Discharge Note  Short Stay    CT Guided Needle Placement      OUTCOME: Patient tolerated treatment/procedure well without complication and is now ready for discharge.    DISPOSITION: Home or Self Care    FINAL DIAGNOSIS:  <principal problem not specified>    FOLLOWUP: In clinic    DISCHARGE INSTRUCTIONS:  No discharge procedures on file.      Clinical Reference Documents Added to Patient Instructions         Document    BONE BIOPSY (ENGLISH)    PROCEDURAL SEDATION, ADULT ED (ENGLISH)            TIME SPENT ON DISCHARGE: 15 minutes.    Pre Op Diagnosis: L1 vertebral body bone lesion.     Post Op Diagnosis: same     Procedure:  CT guided L1 bone biopsy.     Procedure performed by: Hebert PAREDES, Ortega OWUSU, Neto OWUSU     Written Informed Consent Obtained: Yes     Specimen Removed:  yes     Estimated Blood Loss:  minimal     Findings: Local anesthesia and moderate sedation were used.     The patient tolerated the procedure well and there were no complications.      Disposition:  F/U in clinic or with ordering physician    Discharge instructions:  Light activity for 24 hours.  Remove band aid in 24 hours.  No baths (showers are appropriate).      Sterile technique was performed in the posterior thorax, lidocaine was used as a local anesthetic.  Multiple samples taken percutaneously from the L1 bone lesion.  Pt tolerated the procedure well without immediate complications.  Please see radiologist report for details. F/u with PCP and/or ordering physician.

## 2023-03-10 NOTE — PLAN OF CARE
PT RECEIVED TO RECOVERY.  PT C/O NAUSEA.  DENIES PAIN AND HA.  IN SUPINE POSITION ON STRETCHER.  DRESSING TO MID BACK CDI.

## 2023-03-10 NOTE — PLAN OF CARE
PT AMBULATED TO ROOM.  PLACED IN HOSPITAL GOWN AND IN SUPINE POSITION ON STRETCHER.  MARCOS.  HISTORY PER PT.

## 2023-03-10 NOTE — SEDATION DOCUMENTATION
Procedure completed.  Needle removed.  Transferred to stretcher in supine position.  Report to Antonia BERG  Transferred to RR

## 2023-03-10 NOTE — TELEPHONE ENCOUNTER
No new care gaps identified.  [facility] Embedded Care Gaps. Reference number: [id number]. 3/10/2023   9:05:47 AM CST

## 2023-03-17 ENCOUNTER — HOSPITAL ENCOUNTER (OUTPATIENT)
Dept: RADIOLOGY | Facility: HOSPITAL | Age: 63
Discharge: HOME | End: 2023-03-17
Attending: FAMILY MEDICINE
Payer: COMMERCIAL

## 2023-03-17 ENCOUNTER — TELEPHONE (OUTPATIENT)
Dept: PAIN MEDICINE | Facility: CLINIC | Age: 63
End: 2023-03-17
Payer: COMMERCIAL

## 2023-03-17 DIAGNOSIS — Z12.31 OTHER SCREENING MAMMOGRAM: ICD-10-CM

## 2023-03-17 LAB
COMMENT: NORMAL
FINAL PATHOLOGIC DIAGNOSIS: NORMAL
FLOW CYTOMETRY ANTIBODIES ANALYZED - LYMPH NODE: NORMAL
FLOW CYTOMETRY COMMENT - LYMPH NODE: NORMAL
FLOW CYTOMETRY INTERPRETATION - LYMPH NODE: NORMAL
GROSS: NORMAL
Lab: NORMAL
MICROSCOPIC EXAM: NORMAL

## 2023-03-17 PROCEDURE — 77063 BREAST TOMOSYNTHESIS BI: CPT | Mod: 26,,, | Performed by: RADIOLOGY

## 2023-03-17 PROCEDURE — 77067 SCR MAMMO BI INCL CAD: CPT | Mod: 26,,, | Performed by: RADIOLOGY

## 2023-03-17 PROCEDURE — 77067 SCR MAMMO BI INCL CAD: CPT | Mod: TC

## 2023-03-17 PROCEDURE — 77067 MAMMO DIGITAL SCREENING BILAT WITH TOMO: ICD-10-PCS | Mod: 26,,, | Performed by: RADIOLOGY

## 2023-03-17 PROCEDURE — 77063 MAMMO DIGITAL SCREENING BILAT WITH TOMO: ICD-10-PCS | Mod: 26,,, | Performed by: RADIOLOGY

## 2023-03-22 ENCOUNTER — PATIENT MESSAGE (OUTPATIENT)
Dept: HEMATOLOGY/ONCOLOGY | Facility: CLINIC | Age: 63
End: 2023-03-22
Payer: COMMERCIAL

## 2023-03-24 ENCOUNTER — PATIENT MESSAGE (OUTPATIENT)
Dept: HEMATOLOGY/ONCOLOGY | Facility: CLINIC | Age: 63
End: 2023-03-24
Payer: COMMERCIAL

## 2023-03-24 ENCOUNTER — TELEPHONE (OUTPATIENT)
Dept: HEMATOLOGY/ONCOLOGY | Facility: CLINIC | Age: 63
End: 2023-03-24
Payer: COMMERCIAL

## 2023-03-24 NOTE — TELEPHONE ENCOUNTER
----- Message from Nayeli Bryan sent at 3/24/2023  4:16 PM CDT -----  Type:  Patient Returning Call    Who Called:pt  Who Left Message for Patient:abelardo  Does the patient know what this is regarding?:no  Would the patient rather a call back or a response via [facility]? call  Best Call Back Number:[phone]  Additional Information:

## 2023-03-27 ENCOUNTER — OFFICE VISIT (OUTPATIENT)
Dept: HEMATOLOGY/ONCOLOGY | Facility: CLINIC | Age: 63
End: 2023-03-27
Payer: COMMERCIAL

## 2023-03-27 ENCOUNTER — PATIENT MESSAGE (OUTPATIENT)
Dept: HEMATOLOGY/ONCOLOGY | Facility: CLINIC | Age: 63
End: 2023-03-27

## 2023-03-27 ENCOUNTER — PATIENT MESSAGE (OUTPATIENT)
Dept: INTERNAL MEDICINE | Facility: CLINIC | Age: 63
End: 2023-03-27
Payer: COMMERCIAL

## 2023-03-27 ENCOUNTER — LAB VISIT (OUTPATIENT)
Dept: LAB | Facility: HOSPITAL | Age: 63
End: 2023-03-27
Attending: INTERNAL MEDICINE
Payer: COMMERCIAL

## 2023-03-27 VITALS
SYSTOLIC BLOOD PRESSURE: 147 MMHG | HEART RATE: 88 BPM | TEMPERATURE: 97 F | BODY MASS INDEX: 21.65 KG/M2 | OXYGEN SATURATION: 97 % | WEIGHT: 126.13 LBS | DIASTOLIC BLOOD PRESSURE: 87 MMHG

## 2023-03-27 DIAGNOSIS — E88.09 PLASMA CELL DYSCRASIA: Primary | ICD-10-CM

## 2023-03-27 DIAGNOSIS — E88.09 PLASMA CELL DYSCRASIA: ICD-10-CM

## 2023-03-27 DIAGNOSIS — J30.9 CHRONIC ALLERGIC RHINITIS: ICD-10-CM

## 2023-03-27 LAB
B2 MICROGLOB SERPL-MCNC: 1.9 UG/ML (ref 0–2.5)
IGA SERPL-MCNC: 49 MG/DL (ref 40–350)
IGG SERPL-MCNC: 4521 MG/DL (ref 650–1600)
IGM SERPL-MCNC: 21 MG/DL (ref 50–300)
LDH SERPL L TO P-CCNC: 162 U/L (ref 110–260)

## 2023-03-27 PROCEDURE — 99999 PR PBB SHADOW E&M-EST. PATIENT-LVL IV: ICD-10-PCS | Mod: PBBFAC,,, | Performed by: INTERNAL MEDICINE

## 2023-03-27 PROCEDURE — 1160F PR REVIEW ALL MEDS BY PRESCRIBER/CLIN PHARMACIST DOCUMENTED: ICD-10-PCS | Mod: CPTII,S$GLB,, | Performed by: INTERNAL MEDICINE

## 2023-03-27 PROCEDURE — 1159F MED LIST DOCD IN RCRD: CPT | Mod: CPTII,S$GLB,, | Performed by: INTERNAL MEDICINE

## 2023-03-27 PROCEDURE — 82232 ASSAY OF BETA-2 PROTEIN: CPT | Performed by: INTERNAL MEDICINE

## 2023-03-27 PROCEDURE — 3077F PR MOST RECENT SYSTOLIC BLOOD PRESSURE >= 140 MM HG: ICD-10-PCS | Mod: CPTII,S$GLB,, | Performed by: INTERNAL MEDICINE

## 2023-03-27 PROCEDURE — 3079F PR MOST RECENT DIASTOLIC BLOOD PRESSURE 80-89 MM HG: ICD-10-PCS | Mod: CPTII,S$GLB,, | Performed by: INTERNAL MEDICINE

## 2023-03-27 PROCEDURE — 99213 OFFICE O/P EST LOW 20 MIN: CPT | Mod: S$GLB,,, | Performed by: INTERNAL MEDICINE

## 2023-03-27 PROCEDURE — 86334 PATHOLOGIST INTERPRETATION IFE: ICD-10-PCS | Mod: 26,,, | Performed by: PATHOLOGY

## 2023-03-27 PROCEDURE — 83615 LACTATE (LD) (LDH) ENZYME: CPT | Performed by: INTERNAL MEDICINE

## 2023-03-27 PROCEDURE — 3079F DIAST BP 80-89 MM HG: CPT | Mod: CPTII,S$GLB,, | Performed by: INTERNAL MEDICINE

## 2023-03-27 PROCEDURE — 99213 PR OFFICE/OUTPT VISIT, EST, LEVL III, 20-29 MIN: ICD-10-PCS | Mod: S$GLB,,, | Performed by: INTERNAL MEDICINE

## 2023-03-27 PROCEDURE — 84165 PATHOLOGIST INTERPRETATION SPE: ICD-10-PCS | Mod: 26,,, | Performed by: PATHOLOGY

## 2023-03-27 PROCEDURE — 36415 COLL VENOUS BLD VENIPUNCTURE: CPT | Performed by: INTERNAL MEDICINE

## 2023-03-27 PROCEDURE — 3077F SYST BP >= 140 MM HG: CPT | Mod: CPTII,S$GLB,, | Performed by: INTERNAL MEDICINE

## 2023-03-27 PROCEDURE — 3008F PR BODY MASS INDEX (BMI) DOCUMENTED: ICD-10-PCS | Mod: CPTII,S$GLB,, | Performed by: INTERNAL MEDICINE

## 2023-03-27 PROCEDURE — 86334 IMMUNOFIX E-PHORESIS SERUM: CPT | Performed by: INTERNAL MEDICINE

## 2023-03-27 PROCEDURE — 86334 IMMUNOFIX E-PHORESIS SERUM: CPT | Mod: 26,,, | Performed by: PATHOLOGY

## 2023-03-27 PROCEDURE — 84165 PROTEIN E-PHORESIS SERUM: CPT | Mod: 26,,, | Performed by: PATHOLOGY

## 2023-03-27 PROCEDURE — 99999 PR PBB SHADOW E&M-EST. PATIENT-LVL IV: CPT | Mod: PBBFAC,,, | Performed by: INTERNAL MEDICINE

## 2023-03-27 PROCEDURE — 1159F PR MEDICATION LIST DOCUMENTED IN MEDICAL RECORD: ICD-10-PCS | Mod: CPTII,S$GLB,, | Performed by: INTERNAL MEDICINE

## 2023-03-27 PROCEDURE — 3044F PR MOST RECENT HEMOGLOBIN A1C LEVEL <7.0%: ICD-10-PCS | Mod: CPTII,S$GLB,, | Performed by: INTERNAL MEDICINE

## 2023-03-27 PROCEDURE — 3044F HG A1C LEVEL LT 7.0%: CPT | Mod: CPTII,S$GLB,, | Performed by: INTERNAL MEDICINE

## 2023-03-27 PROCEDURE — 84165 PROTEIN E-PHORESIS SERUM: CPT | Performed by: INTERNAL MEDICINE

## 2023-03-27 PROCEDURE — 4010F PR ACE/ARB THEARPY RXD/TAKEN: ICD-10-PCS | Mod: CPTII,S$GLB,, | Performed by: INTERNAL MEDICINE

## 2023-03-27 PROCEDURE — 3008F BODY MASS INDEX DOCD: CPT | Mod: CPTII,S$GLB,, | Performed by: INTERNAL MEDICINE

## 2023-03-27 PROCEDURE — 4010F ACE/ARB THERAPY RXD/TAKEN: CPT | Mod: CPTII,S$GLB,, | Performed by: INTERNAL MEDICINE

## 2023-03-27 PROCEDURE — 82784 ASSAY IGA/IGD/IGG/IGM EACH: CPT | Performed by: INTERNAL MEDICINE

## 2023-03-27 PROCEDURE — 1160F RVW MEDS BY RX/DR IN RCRD: CPT | Mod: CPTII,S$GLB,, | Performed by: INTERNAL MEDICINE

## 2023-03-27 RX ORDER — MONTELUKAST SODIUM 10 MG/1
10 TABLET ORAL NIGHTLY
Qty: 90 TABLET | Refills: 0 | Status: SHIPPED | OUTPATIENT
Start: 2023-03-27 | End: 2023-11-01 | Stop reason: SDUPTHER

## 2023-03-27 RX ORDER — BACLOFEN 10 MG/1
10 TABLET ORAL 3 TIMES DAILY
COMMUNITY
Start: 2023-02-02 | End: 2023-10-18

## 2023-03-27 RX ORDER — HYDROCODONE BITARTRATE AND ACETAMINOPHEN 5; 325 MG/1; MG/1
1 TABLET ORAL EVERY 6 HOURS PRN
COMMUNITY
Start: 2023-03-03 | End: 2023-03-27 | Stop reason: SDUPTHER

## 2023-03-27 RX ORDER — HYDROCODONE BITARTRATE AND ACETAMINOPHEN 5; 325 MG/1; MG/1
1 TABLET ORAL EVERY 6 HOURS PRN
Qty: 90 TABLET | Refills: 0 | Status: SHIPPED | OUTPATIENT
Start: 2023-03-27 | End: 2023-07-28

## 2023-03-27 NOTE — H&P (VIEW-ONLY)
Subjective:      Patient ID: Ana Bonilla is a 62 y.o. female.    Chief Complaint: No chief complaint on file.      HPI: This is a 62 year old woman who had CT chest that showed lytic lesion on sternum and L1 vertebral body. Biopsy of L1 lesion on 3/10/2023 showed mature B cell neoplasm, most consistent with Plasma cell neoplasm.     Review of Systems   Constitutional:  Negative for chills and fever.   HENT:  Negative for mouth sores and sore throat.    Respiratory:  Negative for cough and shortness of breath.    Cardiovascular:  Negative for chest pain and palpitations.   Gastrointestinal:  Negative for abdominal pain and blood in stool.   Neurological:  Negative for dizziness and headaches.   Psychiatric/Behavioral:  Negative for agitation and confusion.      Objective:     Physical Exam  Constitutional:       Appearance: Normal appearance.   HENT:      Head: Normocephalic and atraumatic.      Mouth/Throat:      Pharynx: Oropharynx is clear. No oropharyngeal exudate or posterior oropharyngeal erythema.   Cardiovascular:      Heart sounds:     No friction rub. No gallop.   Pulmonary:      Breath sounds: Normal breath sounds. No wheezing or rales.   Abdominal:      Palpations: Abdomen is soft.      Tenderness: There is no guarding or rebound.   Musculoskeletal:      Cervical back: Neck supple.      Right lower leg: No edema.      Left lower leg: No edema.   Skin:     Findings: No erythema or rash.   Neurological:      Mental Status: She is alert and oriented to person, place, and time.   Psychiatric:         Mood and Affect: Mood normal.         Behavior: Behavior normal.         Thought Content: Thought content normal.         Judgment: Judgment normal.       Assessment:     Problem List Items Addressed This Visit    None  Visit Diagnoses       Plasma cell dyscrasia    -  Primary    Relevant Orders    PROTEIN ELECTROPHORESIS, SERUM    MECHE    Protein electrophoresis, timed urine    Immunofixation, 24 hour  Urine    IMMUNOGLOBULINS (IGG, IGA, IGM) QUANTITATIVE    BETA 2 MICROGLOBULIN    LDH (Completed)    NM PET CT Whole Body    CT Biopsy Bone Marrow (xpd)             Lytic lesion - L1/sternum  - L1 biopsy - most compatible with plasma cell neoplasm.     Plan:     L1 biopsy was most compatible with plasma cell neoplasm.  For SPEP/MECHE/FLC/ quantitative immunoglobulins.  UPEP/MECHE  LDH/beta 2 microglobulin  Bone marrow biopsy.  PET-CT.  RTC: 1 week after bone marrow biopsy.

## 2023-03-28 ENCOUNTER — PATIENT MESSAGE (OUTPATIENT)
Dept: INTERNAL MEDICINE | Facility: CLINIC | Age: 63
End: 2023-03-28
Payer: COMMERCIAL

## 2023-03-28 LAB
ALBUMIN SERPL ELPH-MCNC: 4.46 G/DL (ref 3.35–5.55)
ALPHA1 GLOB SERPL ELPH-MCNC: 0.31 G/DL (ref 0.17–0.41)
ALPHA2 GLOB SERPL ELPH-MCNC: 0.83 G/DL (ref 0.43–0.99)
B-GLOBULIN SERPL ELPH-MCNC: 0.64 G/DL (ref 0.5–1.1)
GAMMA GLOB SERPL ELPH-MCNC: 3.45 G/DL (ref 0.67–1.58)
INTERPRETATION SERPL IFE-IMP: NORMAL
PATHOLOGIST INTERPRETATION IFE: NORMAL
PROT SERPL-MCNC: 9.7 G/DL (ref 6–8.4)

## 2023-03-29 LAB — PATHOLOGIST INTERPRETATION SPE: NORMAL

## 2023-04-04 ENCOUNTER — PATIENT MESSAGE (OUTPATIENT)
Dept: HEMATOLOGY/ONCOLOGY | Facility: CLINIC | Age: 63
End: 2023-04-04
Payer: COMMERCIAL

## 2023-04-05 ENCOUNTER — TELEPHONE (OUTPATIENT)
Dept: RADIOLOGY | Facility: HOSPITAL | Age: 63
End: 2023-04-05
Payer: COMMERCIAL

## 2023-04-05 ENCOUNTER — PATIENT MESSAGE (OUTPATIENT)
Dept: HEMATOLOGY/ONCOLOGY | Facility: CLINIC | Age: 63
End: 2023-04-05
Payer: COMMERCIAL

## 2023-04-05 NOTE — TELEPHONE ENCOUNTER
Interventional Radiology:    Pre-procedure phone call made at this time. Informed pt to be NPO after midnight, show up to [facility] [address] at 9:30am, either have a ride with them or have a phone number for the person driving them home so that we can get in contact with them to keep them updated. Answered all questions that the pt had and pt verbalized understanding of all discussed.

## 2023-04-06 ENCOUNTER — HOSPITAL ENCOUNTER (OUTPATIENT)
Dept: RADIOLOGY | Facility: HOSPITAL | Age: 63
Discharge: HOME | End: 2023-04-06
Attending: INTERNAL MEDICINE
Payer: COMMERCIAL

## 2023-04-06 VITALS
DIASTOLIC BLOOD PRESSURE: 58 MMHG | HEART RATE: 62 BPM | SYSTOLIC BLOOD PRESSURE: 93 MMHG | OXYGEN SATURATION: 96 % | RESPIRATION RATE: 14 BRPM

## 2023-04-06 DIAGNOSIS — E88.09 PLASMA CELL DYSCRASIA: ICD-10-CM

## 2023-04-06 PROCEDURE — 85097 BONE MARROW INTERPRETATION: CPT | Mod: ,,, | Performed by: PATHOLOGY

## 2023-04-06 PROCEDURE — 38221 CT BIOPSY BONE MARROW (XPD): ICD-10-PCS | Mod: RT,,, | Performed by: RADIOLOGY

## 2023-04-06 PROCEDURE — 88342 IMHCHEM/IMCYTCHM 1ST ANTB: CPT | Mod: 59 | Performed by: PATHOLOGY

## 2023-04-06 PROCEDURE — 88237 TISSUE CULTURE BONE MARROW: CPT | Performed by: INTERNAL MEDICINE

## 2023-04-06 PROCEDURE — 88189 FLOWCYTOMETRY/READ 16 & >: CPT | Mod: ,,, | Performed by: PATHOLOGY

## 2023-04-06 PROCEDURE — 88189 PR  FLOWCYTOMETRY/READ, 16 & > MARKERS: ICD-10-PCS | Mod: ,,, | Performed by: PATHOLOGY

## 2023-04-06 PROCEDURE — 88341 PR IHC OR ICC EACH ADD'L SINGLE ANTIBODY  STAINPR: ICD-10-PCS | Mod: 26,,, | Performed by: PATHOLOGY

## 2023-04-06 PROCEDURE — 88313 SPECIAL STAINS GROUP 2: CPT | Mod: 59 | Performed by: PATHOLOGY

## 2023-04-06 PROCEDURE — 77012 CT SCAN FOR NEEDLE BIOPSY: CPT | Mod: 26,RT,, | Performed by: RADIOLOGY

## 2023-04-06 PROCEDURE — 88341 IMHCHEM/IMCYTCHM EA ADD ANTB: CPT | Mod: 26,,, | Performed by: PATHOLOGY

## 2023-04-06 PROCEDURE — 88305 TISSUE EXAM BY PATHOLOGIST: ICD-10-PCS | Mod: 26,,, | Performed by: PATHOLOGY

## 2023-04-06 PROCEDURE — 85097 PR  BONE MARROW,SMEAR INTERPRETATION: ICD-10-PCS | Mod: ,,, | Performed by: PATHOLOGY

## 2023-04-06 PROCEDURE — 88271 CYTOGENETICS DNA PROBE: CPT | Performed by: INTERNAL MEDICINE

## 2023-04-06 PROCEDURE — 88311 PR  DECALCIFY TISSUE: ICD-10-PCS | Mod: 26,,, | Performed by: PATHOLOGY

## 2023-04-06 PROCEDURE — 38221 DX BONE MARROW BIOPSIES: CPT | Mod: RT,,, | Performed by: RADIOLOGY

## 2023-04-06 PROCEDURE — 77012 CT SCAN FOR NEEDLE BIOPSY: CPT | Mod: TC

## 2023-04-06 PROCEDURE — 88342 CHG IMMUNOCYTOCHEMISTRY: ICD-10-PCS | Mod: 26,59,, | Performed by: PATHOLOGY

## 2023-04-06 PROCEDURE — 88341 IMHCHEM/IMCYTCHM EA ADD ANTB: CPT | Mod: 59 | Performed by: PATHOLOGY

## 2023-04-06 PROCEDURE — 88311 DECALCIFY TISSUE: CPT | Mod: 26,,, | Performed by: PATHOLOGY

## 2023-04-06 PROCEDURE — 88313 PR  SPECIAL STAINS,GROUP II: ICD-10-PCS | Mod: 26,,, | Performed by: PATHOLOGY

## 2023-04-06 PROCEDURE — 88184 FLOWCYTOMETRY/ TC 1 MARKER: CPT | Performed by: PATHOLOGY

## 2023-04-06 PROCEDURE — 63600175 PHARM REV CODE 636 W HCPCS: Performed by: RADIOLOGY

## 2023-04-06 PROCEDURE — 88264 CHROMOSOME ANALYSIS 20-25: CPT | Performed by: INTERNAL MEDICINE

## 2023-04-06 PROCEDURE — 88311 DECALCIFY TISSUE: CPT | Performed by: PATHOLOGY

## 2023-04-06 PROCEDURE — 88342 IMHCHEM/IMCYTCHM 1ST ANTB: CPT | Mod: 26,59,, | Performed by: PATHOLOGY

## 2023-04-06 PROCEDURE — 88305 TISSUE EXAM BY PATHOLOGIST: CPT | Mod: 59 | Performed by: PATHOLOGY

## 2023-04-06 PROCEDURE — 88185 FLOWCYTOMETRY/TC ADD-ON: CPT | Mod: 59 | Performed by: PATHOLOGY

## 2023-04-06 PROCEDURE — 88305 TISSUE EXAM BY PATHOLOGIST: CPT | Mod: 26,,, | Performed by: PATHOLOGY

## 2023-04-06 PROCEDURE — 88313 SPECIAL STAINS GROUP 2: CPT | Mod: 26,,, | Performed by: PATHOLOGY

## 2023-04-06 PROCEDURE — 77012 CT BIOPSY BONE MARROW (XPD): ICD-10-PCS | Mod: 26,RT,, | Performed by: RADIOLOGY

## 2023-04-06 RX ORDER — FENTANYL CITRATE 50 UG/ML
INJECTION, SOLUTION INTRAMUSCULAR; INTRAVENOUS CODE/TRAUMA/SEDATION MEDICATION
Status: COMPLETED | OUTPATIENT
Start: 2023-04-06 | End: 2023-04-06

## 2023-04-06 RX ORDER — MIDAZOLAM HYDROCHLORIDE 1 MG/ML
INJECTION INTRAMUSCULAR; INTRAVENOUS CODE/TRAUMA/SEDATION MEDICATION
Status: COMPLETED | OUTPATIENT
Start: 2023-04-06 | End: 2023-04-06

## 2023-04-06 RX ADMIN — FENTANYL CITRATE 50 MCG: 50 INJECTION, SOLUTION INTRAMUSCULAR; INTRAVENOUS at 10:04

## 2023-04-06 RX ADMIN — MIDAZOLAM HYDROCHLORIDE 1 MG: 1 INJECTION INTRAMUSCULAR; INTRAVENOUS at 10:04

## 2023-04-06 NOTE — DISCHARGE SUMMARY
[facility])  Discharge Note  Short Stay    CT Biopsy Bone Marrow (xpd)      OUTCOME: Patient tolerated treatment/procedure well without complication and is now ready for discharge.    DISPOSITION: Home or Self Care    FINAL DIAGNOSIS:  <principal problem not specified>    FOLLOWUP: In clinic    DISCHARGE INSTRUCTIONS:  No discharge procedures on file.     TIME SPENT ON DISCHARGE: 15 minutes    Pre Op Diagnosis: Plasma cell dyscrasia     Post Op Diagnosis: same     Procedure:  Bone marrow biopsy     Procedure performed by: Donn PAREDES, Neto OWUSU     Written Informed Consent Obtained: Yes     Specimen Removed:  yes     Estimated Blood Loss:  minimal     Findings: Local anesthesia and moderate sedation were used.     The patient tolerated the procedure well and there were no complications.      Disposition:  F/U in clinic    Discharge instructions:  Light activity for 24 hours.  Remove band aid in 24 hours.  No baths (showers are appropriate).    F/U with ordering physician    Sterile technique was performed in the right iliac, lidocaine was used as a local anesthetic.  Multiple samples taken percutaneously from the right iliac bone.  Pt tolerated the procedure well without immediate complications.  Please see radiologist report for details. F/u with PCP and/or ordering physician.

## 2023-04-06 NOTE — NURSING
1055  Rec'd to RR NAD VSS  C/o mild nausea but doesn't want anything.  (Rec'd prior to procedure Zofran)  Lights dimmed.  Quiet environment facilitated

## 2023-04-08 ENCOUNTER — PATIENT MESSAGE (OUTPATIENT)
Dept: INTERNAL MEDICINE | Facility: CLINIC | Age: 63
End: 2023-04-08
Payer: COMMERCIAL

## 2023-04-08 DIAGNOSIS — F41.9 ANXIETY: ICD-10-CM

## 2023-04-10 ENCOUNTER — HOSPITAL ENCOUNTER (OUTPATIENT)
Dept: RADIOLOGY | Facility: HOSPITAL | Age: 63
Discharge: HOME | End: 2023-04-10
Attending: INTERNAL MEDICINE
Payer: COMMERCIAL

## 2023-04-10 DIAGNOSIS — E88.09 PLASMA CELL DYSCRASIA: ICD-10-CM

## 2023-04-10 PROCEDURE — 78816 PET IMAGE W/CT FULL BODY: CPT | Mod: 26,PS,, | Performed by: RADIOLOGY

## 2023-04-10 PROCEDURE — 78816 PET IMAGE W/CT FULL BODY: CPT | Mod: TC

## 2023-04-10 PROCEDURE — A9552 F18 FDG: HCPCS

## 2023-04-10 PROCEDURE — 78816 NM PET CT WHOLE BODY: ICD-10-PCS | Mod: 26,PS,, | Performed by: RADIOLOGY

## 2023-04-10 RX ORDER — ALPRAZOLAM 2 MG/1
TABLET ORAL
Qty: 60 TABLET | Refills: 0 | Status: SHIPPED | OUTPATIENT
Start: 2023-04-10 | End: 2023-05-08 | Stop reason: SDUPTHER

## 2023-04-10 NOTE — TELEPHONE ENCOUNTER
No new care gaps identified.  [facility] Embedded Care Gaps. Reference number: [id number]. 4/10/2023   9:09:16 AM CDT

## 2023-04-11 ENCOUNTER — PATIENT MESSAGE (OUTPATIENT)
Dept: HEMATOLOGY/ONCOLOGY | Facility: CLINIC | Age: 63
End: 2023-04-11
Payer: COMMERCIAL

## 2023-04-12 LAB
BODY SITE - BONE MARROW: NORMAL
CLINICAL DIAGNOSIS - BONE MARROW: NORMAL
FLOW CYTOMETRY ANTIBODIES ANALYZED - BONE MARROW: NORMAL
FLOW CYTOMETRY COMMENT - BONE MARROW: NORMAL
FLOW CYTOMETRY INTERPRETATION - BONE MARROW: NORMAL

## 2023-04-19 ENCOUNTER — LAB VISIT (OUTPATIENT)
Dept: LAB | Facility: HOSPITAL | Age: 63
End: 2023-04-19
Attending: INTERNAL MEDICINE
Payer: COMMERCIAL

## 2023-04-19 ENCOUNTER — OFFICE VISIT (OUTPATIENT)
Dept: HEMATOLOGY/ONCOLOGY | Facility: CLINIC | Age: 63
End: 2023-04-19
Payer: COMMERCIAL

## 2023-04-19 VITALS
SYSTOLIC BLOOD PRESSURE: 148 MMHG | BODY MASS INDEX: 21.49 KG/M2 | HEART RATE: 105 BPM | WEIGHT: 125.88 LBS | HEIGHT: 64 IN | OXYGEN SATURATION: 98 % | DIASTOLIC BLOOD PRESSURE: 88 MMHG | TEMPERATURE: 98 F

## 2023-04-19 DIAGNOSIS — C90.00 MULTIPLE MYELOMA, REMISSION STATUS UNSPECIFIED: Primary | ICD-10-CM

## 2023-04-19 DIAGNOSIS — R91.8 ABNORMAL CT LUNG SCREENING: ICD-10-CM

## 2023-04-19 DIAGNOSIS — C90.00 MULTIPLE MYELOMA, REMISSION STATUS UNSPECIFIED: ICD-10-CM

## 2023-04-19 DIAGNOSIS — E88.09 PLASMA CELL DYSCRASIA: Primary | ICD-10-CM

## 2023-04-19 LAB
ALBUMIN SERPL BCP-MCNC: 3.6 G/DL (ref 3.5–5.2)
ALP SERPL-CCNC: 59 U/L (ref 55–135)
ALT SERPL W/O P-5'-P-CCNC: 17 U/L (ref 10–44)
ANION GAP SERPL CALC-SCNC: 11 MMOL/L (ref 8–16)
AST SERPL-CCNC: 18 U/L (ref 10–40)
BASOPHILS # BLD AUTO: 0.05 K/UL (ref 0–0.2)
BASOPHILS NFR BLD: 0.8 % (ref 0–1.9)
BILIRUB SERPL-MCNC: 0.5 MG/DL (ref 0.1–1)
BUN SERPL-MCNC: 11 MG/DL (ref 8–23)
CALCIUM SERPL-MCNC: 9 MG/DL (ref 8.7–10.5)
CHLORIDE SERPL-SCNC: 108 MMOL/L (ref 95–110)
CO2 SERPL-SCNC: 23 MMOL/L (ref 23–29)
CREAT SERPL-MCNC: 0.9 MG/DL (ref 0.5–1.4)
DIFFERENTIAL METHOD: ABNORMAL
EOSINOPHIL # BLD AUTO: 0.3 K/UL (ref 0–0.5)
EOSINOPHIL NFR BLD: 4.1 % (ref 0–8)
ERYTHROCYTE [DISTWIDTH] IN BLOOD BY AUTOMATED COUNT: 12.9 % (ref 11.5–14.5)
EST. GFR  (NO RACE VARIABLE): >60 ML/MIN/1.73 M^2
GLUCOSE SERPL-MCNC: 120 MG/DL (ref 70–110)
HCT VFR BLD AUTO: 35.5 % (ref 37–48.5)
HGB BLD-MCNC: 11.5 G/DL (ref 12–16)
IMM GRANULOCYTES # BLD AUTO: 0.01 K/UL (ref 0–0.04)
IMM GRANULOCYTES NFR BLD AUTO: 0.2 % (ref 0–0.5)
LYMPHOCYTES # BLD AUTO: 2.4 K/UL (ref 1–4.8)
LYMPHOCYTES NFR BLD: 38.7 % (ref 18–48)
MCH RBC QN AUTO: 31.1 PG (ref 27–31)
MCHC RBC AUTO-ENTMCNC: 32.4 G/DL (ref 32–36)
MCV RBC AUTO: 96 FL (ref 82–98)
MONOCYTES # BLD AUTO: 0.6 K/UL (ref 0.3–1)
MONOCYTES NFR BLD: 8.7 % (ref 4–15)
NEUTROPHILS # BLD AUTO: 3 K/UL (ref 1.8–7.7)
NEUTROPHILS NFR BLD: 47.5 % (ref 38–73)
NRBC BLD-RTO: 0 /100 WBC
PLATELET # BLD AUTO: 252 K/UL (ref 150–450)
PMV BLD AUTO: 9.3 FL (ref 9.2–12.9)
POTASSIUM SERPL-SCNC: 3.5 MMOL/L (ref 3.5–5.1)
PROT SERPL-MCNC: 9.5 G/DL (ref 6–8.4)
RBC # BLD AUTO: 3.7 M/UL (ref 4–5.4)
SODIUM SERPL-SCNC: 142 MMOL/L (ref 136–145)
WBC # BLD AUTO: 6.3 K/UL (ref 3.9–12.7)

## 2023-04-19 PROCEDURE — 1159F PR MEDICATION LIST DOCUMENTED IN MEDICAL RECORD: ICD-10-PCS | Mod: CPTII,S$GLB,, | Performed by: INTERNAL MEDICINE

## 2023-04-19 PROCEDURE — 3008F BODY MASS INDEX DOCD: CPT | Mod: CPTII,S$GLB,, | Performed by: INTERNAL MEDICINE

## 2023-04-19 PROCEDURE — 3079F DIAST BP 80-89 MM HG: CPT | Mod: CPTII,S$GLB,, | Performed by: INTERNAL MEDICINE

## 2023-04-19 PROCEDURE — 99213 OFFICE O/P EST LOW 20 MIN: CPT | Mod: S$GLB,,, | Performed by: INTERNAL MEDICINE

## 2023-04-19 PROCEDURE — 1159F MED LIST DOCD IN RCRD: CPT | Mod: CPTII,S$GLB,, | Performed by: INTERNAL MEDICINE

## 2023-04-19 PROCEDURE — 3008F PR BODY MASS INDEX (BMI) DOCUMENTED: ICD-10-PCS | Mod: CPTII,S$GLB,, | Performed by: INTERNAL MEDICINE

## 2023-04-19 PROCEDURE — 4010F ACE/ARB THERAPY RXD/TAKEN: CPT | Mod: CPTII,S$GLB,, | Performed by: INTERNAL MEDICINE

## 2023-04-19 PROCEDURE — 4010F PR ACE/ARB THEARPY RXD/TAKEN: ICD-10-PCS | Mod: CPTII,S$GLB,, | Performed by: INTERNAL MEDICINE

## 2023-04-19 PROCEDURE — 3044F HG A1C LEVEL LT 7.0%: CPT | Mod: CPTII,S$GLB,, | Performed by: INTERNAL MEDICINE

## 2023-04-19 PROCEDURE — 99213 PR OFFICE/OUTPT VISIT, EST, LEVL III, 20-29 MIN: ICD-10-PCS | Mod: S$GLB,,, | Performed by: INTERNAL MEDICINE

## 2023-04-19 PROCEDURE — 1160F RVW MEDS BY RX/DR IN RCRD: CPT | Mod: CPTII,S$GLB,, | Performed by: INTERNAL MEDICINE

## 2023-04-19 PROCEDURE — 3044F PR MOST RECENT HEMOGLOBIN A1C LEVEL <7.0%: ICD-10-PCS | Mod: CPTII,S$GLB,, | Performed by: INTERNAL MEDICINE

## 2023-04-19 PROCEDURE — 85025 COMPLETE CBC W/AUTO DIFF WBC: CPT | Performed by: INTERNAL MEDICINE

## 2023-04-19 PROCEDURE — 99999 PR PBB SHADOW E&M-EST. PATIENT-LVL V: ICD-10-PCS | Mod: PBBFAC,,, | Performed by: INTERNAL MEDICINE

## 2023-04-19 PROCEDURE — 1160F PR REVIEW ALL MEDS BY PRESCRIBER/CLIN PHARMACIST DOCUMENTED: ICD-10-PCS | Mod: CPTII,S$GLB,, | Performed by: INTERNAL MEDICINE

## 2023-04-19 PROCEDURE — 36415 COLL VENOUS BLD VENIPUNCTURE: CPT | Performed by: INTERNAL MEDICINE

## 2023-04-19 PROCEDURE — 3077F SYST BP >= 140 MM HG: CPT | Mod: CPTII,S$GLB,, | Performed by: INTERNAL MEDICINE

## 2023-04-19 PROCEDURE — 3077F PR MOST RECENT SYSTOLIC BLOOD PRESSURE >= 140 MM HG: ICD-10-PCS | Mod: CPTII,S$GLB,, | Performed by: INTERNAL MEDICINE

## 2023-04-19 PROCEDURE — 80053 COMPREHEN METABOLIC PANEL: CPT | Performed by: INTERNAL MEDICINE

## 2023-04-19 PROCEDURE — 99999 PR PBB SHADOW E&M-EST. PATIENT-LVL V: CPT | Mod: PBBFAC,,, | Performed by: INTERNAL MEDICINE

## 2023-04-19 PROCEDURE — 3079F PR MOST RECENT DIASTOLIC BLOOD PRESSURE 80-89 MM HG: ICD-10-PCS | Mod: CPTII,S$GLB,, | Performed by: INTERNAL MEDICINE

## 2023-04-19 NOTE — PROGRESS NOTES
Subjective:      Patient ID: Ana Bonilla is a 62 y.o. female.    Chief Complaint: No chief complaint on file.      HPI: This is a 62 year old woman with Multiple myeloma. Bone marrow biopsy done on 4/6/2023 showed 60 % plasma cells. PET-CT on 4/10/2023 showed extensive lytic bony lesions throughout the spine, sternum, bilateral ribs, pelvis and mild compression deformity in L1 vertebral body. SPEP/MECHE on 3/27/2023 showed IgG lambda monoclonal protein - 3.19 g/dl. Quantitative immunoglobulins on 3/27/2023 showed IgG 4,521 mg/dl.  UPEP/MECHE and FLC were pending. Labs on 3/27/2023 showed Beta 2 microglobulin 1.9, .  Labs on 4/19/2023 showed Hb, 11.5, WBC 6.3, platelets, BUN 11, Cr 0.9, calcium 9.  Review of Systems   Constitutional:  Negative for chills and fever.   HENT:  Negative for mouth sores and sore throat.    Respiratory:  Negative for cough and shortness of breath.    Cardiovascular:  Negative for chest pain and palpitations.   Gastrointestinal:  Negative for blood in stool.   Neurological:  Negative for dizziness and headaches.   Psychiatric/Behavioral:  Negative for agitation and confusion.      Objective:     Physical Exam  Constitutional:       Appearance: Normal appearance.   HENT:      Head: Normocephalic and atraumatic.      Mouth/Throat:      Pharynx: Oropharynx is clear. No oropharyngeal exudate or posterior oropharyngeal erythema.   Cardiovascular:      Heart sounds:     No friction rub. No gallop.   Pulmonary:      Breath sounds: Normal breath sounds. No wheezing or rales.   Abdominal:      Palpations: Abdomen is soft.      Tenderness: There is no guarding.   Musculoskeletal:      Cervical back: Neck supple.      Right lower leg: No edema.      Left lower leg: No edema.   Skin:     Findings: No erythema or rash.   Neurological:      Mental Status: She is alert and oriented to person, place, and time.   Psychiatric:         Mood and Affect: Mood normal.         Behavior: Behavior  normal.         Thought Content: Thought content normal.         Judgment: Judgment normal.       Assessment:     Problem List Items Addressed This Visit    None       IgG lambda Multiple myeloma -  -  BMBx : 60 % plasma cells - 4/6/2023.     Plan:     Patient has IgG lambda multiple myeloma - BMBx : 60 % plasma cells on 4/6/2023.  SPEP/MECHE showed IgG lambda - monoclonal protein 3.19 g/dl - (3/27/2023).  PET-CT ( 4/10/2023) - showed extensive lytic lesions in the axial skeleton and mild L1 compression deformity.  Await FLC/UPEP/MECHE and myeloma FISH panel.  Await FISH panel for staging.  Plan to initiate Induction chemotherapy with VRD regimen (Velcde/Revlimid/Decadron)  -  Q 21 days cycle for 3 -6 cycles and evaluate for autotransplant.   - May consider 4 drug regimen by adding Daratumumab - if high risk after FISH panel is available.  6. Aspirin daily p.o - prophylaxis for thrombosis.  7. Acyclovir 400 mg p.o BID - prophylaxis for herpes Zoster.  8.  If mild L1 compression deformity becomes symptomatic, then consider Radiation to L1 and Neurosurgery evaluation.  9.  Plan for Zometa  ( and calcium and vit. D ) - after dental evaluation.  10. RTC: 2 weeks.

## 2023-04-20 ENCOUNTER — DOCUMENTATION ONLY (OUTPATIENT)
Dept: HEMATOLOGY/ONCOLOGY | Facility: CLINIC | Age: 63
End: 2023-04-20
Payer: COMMERCIAL

## 2023-04-20 ENCOUNTER — TELEPHONE (OUTPATIENT)
Dept: HEMATOLOGY/ONCOLOGY | Facility: CLINIC | Age: 63
End: 2023-04-20
Payer: COMMERCIAL

## 2023-04-20 DIAGNOSIS — C90.00 MULTIPLE MYELOMA, REMISSION STATUS UNSPECIFIED: ICD-10-CM

## 2023-04-20 NOTE — PROGRESS NOTES
Called and spoke with pt to discuss starting tx. Explained my role as NN, gave pt my phone # and asked about MD visit yesterday. Pt aware of need to start oral and IV/ injection tx. Asked about tx time, days, how often. Voiced concerns about being vs not being able to work, her job working with her availability d/t tx. Told her once we have tx plan/schedule will ask SW to reach out to her for social service needs. Will notify MD of plan needed to get insurance approval, told pt typically 10-14 days for approval is needed.   Oncology Navigation   Intake  Date of Diagnosis: 23  Cancer Type: Myeloma  Internal / External Referral: Internal  Date of Referral: 23  Initial Nurse Navigator Contact: 23  Referral to Initial Contact Timeline (days): 1  Date Worked: 23     Treatment  Current Status: Staging work-up       Medical Oncologist: Dr. Velasquez  Chemotherapy: Planned  Chemotherapy Regimen: Velcade (Durvalumab possible pending FISH)  Oral Therapy: Planned                       Acuity  Systemic Treatment - predicted or initiated: Chemotherapy Regimen with Multiple drugs (+1)  ECO  Comorbidities in Medical History: 2  Support: 0  Verbalizes Financial Concerns: 1  Transportation: 0  Verbalizes the need for more education: 1  Navigation Acuity: 7     Follow Up  No follow-ups on file.     All questions answered, she voiced understanding and has no further questions at this time.

## 2023-04-20 NOTE — TELEPHONE ENCOUNTER
SW intern called pt to discuss distress score of 10. Pt was not available and SW intern left a message for pt to call back. SW intern will remain available.

## 2023-04-20 NOTE — TELEPHONE ENCOUNTER
Spoke with Mrs Bonilla advised that Dr Velasquez would like her to have labs drawn tomorrow 4/21/22 she also needs to  supplies for 24hr urine. Mrs Bonilla will stop by the cc tomorrow to have labs drawn. She will also need to see the  to  ensure.

## 2023-04-21 ENCOUNTER — SPECIALTY PHARMACY (OUTPATIENT)
Dept: PHARMACY | Facility: CLINIC | Age: 63
End: 2023-04-21
Payer: COMMERCIAL

## 2023-04-21 ENCOUNTER — PATIENT MESSAGE (OUTPATIENT)
Dept: INTERNAL MEDICINE | Facility: CLINIC | Age: 63
End: 2023-04-21
Payer: COMMERCIAL

## 2023-04-21 ENCOUNTER — LAB VISIT (OUTPATIENT)
Dept: LAB | Facility: HOSPITAL | Age: 63
End: 2023-04-21
Attending: INTERNAL MEDICINE
Payer: COMMERCIAL

## 2023-04-21 ENCOUNTER — PATIENT MESSAGE (OUTPATIENT)
Dept: HEMATOLOGY/ONCOLOGY | Facility: CLINIC | Age: 63
End: 2023-04-21
Payer: COMMERCIAL

## 2023-04-21 ENCOUNTER — TELEPHONE (OUTPATIENT)
Dept: INTERNAL MEDICINE | Facility: CLINIC | Age: 63
End: 2023-04-21
Payer: COMMERCIAL

## 2023-04-21 DIAGNOSIS — M25.559 HIP PAIN, UNSPECIFIED LATERALITY: Primary | ICD-10-CM

## 2023-04-21 DIAGNOSIS — C90.00 MULTIPLE MYELOMA, REMISSION STATUS UNSPECIFIED: Primary | ICD-10-CM

## 2023-04-21 DIAGNOSIS — C90.00 MULTIPLE MYELOMA, REMISSION STATUS UNSPECIFIED: ICD-10-CM

## 2023-04-21 LAB
ALBUMIN SERPL BCP-MCNC: 3.5 G/DL (ref 3.5–5.2)
ALP SERPL-CCNC: 59 U/L (ref 55–135)
ALT SERPL W/O P-5'-P-CCNC: 14 U/L (ref 10–44)
ANION GAP SERPL CALC-SCNC: 10 MMOL/L (ref 8–16)
AST SERPL-CCNC: 19 U/L (ref 10–40)
BASOPHILS # BLD AUTO: 0.06 K/UL (ref 0–0.2)
BASOPHILS NFR BLD: 1.2 % (ref 0–1.9)
BILIRUB SERPL-MCNC: 0.7 MG/DL (ref 0.1–1)
BUN SERPL-MCNC: 10 MG/DL (ref 8–23)
CALCIUM SERPL-MCNC: 9 MG/DL (ref 8.7–10.5)
CHLORIDE SERPL-SCNC: 108 MMOL/L (ref 95–110)
CO2 SERPL-SCNC: 23 MMOL/L (ref 23–29)
CREAT SERPL-MCNC: 0.8 MG/DL (ref 0.5–1.4)
DIFFERENTIAL METHOD: ABNORMAL
EOSINOPHIL # BLD AUTO: 0.3 K/UL (ref 0–0.5)
EOSINOPHIL NFR BLD: 5.5 % (ref 0–8)
ERYTHROCYTE [DISTWIDTH] IN BLOOD BY AUTOMATED COUNT: 13 % (ref 11.5–14.5)
EST. GFR  (NO RACE VARIABLE): >60 ML/MIN/1.73 M^2
GLUCOSE SERPL-MCNC: 78 MG/DL (ref 70–110)
HCT VFR BLD AUTO: 36.4 % (ref 37–48.5)
HGB BLD-MCNC: 11.7 G/DL (ref 12–16)
IMM GRANULOCYTES # BLD AUTO: 0.01 K/UL (ref 0–0.04)
IMM GRANULOCYTES NFR BLD AUTO: 0.2 % (ref 0–0.5)
LYMPHOCYTES # BLD AUTO: 2.1 K/UL (ref 1–4.8)
LYMPHOCYTES NFR BLD: 42.9 % (ref 18–48)
MCH RBC QN AUTO: 30.6 PG (ref 27–31)
MCHC RBC AUTO-ENTMCNC: 32.1 G/DL (ref 32–36)
MCV RBC AUTO: 95 FL (ref 82–98)
MONOCYTES # BLD AUTO: 0.5 K/UL (ref 0.3–1)
MONOCYTES NFR BLD: 10.3 % (ref 4–15)
NEUTROPHILS # BLD AUTO: 1.9 K/UL (ref 1.8–7.7)
NEUTROPHILS NFR BLD: 39.9 % (ref 38–73)
NRBC BLD-RTO: 0 /100 WBC
PLATELET # BLD AUTO: 233 K/UL (ref 150–450)
PMV BLD AUTO: 9.3 FL (ref 9.2–12.9)
POTASSIUM SERPL-SCNC: 3.9 MMOL/L (ref 3.5–5.1)
PROT SERPL-MCNC: 9.5 G/DL (ref 6–8.4)
RBC # BLD AUTO: 3.82 M/UL (ref 4–5.4)
SODIUM SERPL-SCNC: 141 MMOL/L (ref 136–145)
WBC # BLD AUTO: 4.87 K/UL (ref 3.9–12.7)

## 2023-04-21 PROCEDURE — 36415 COLL VENOUS BLD VENIPUNCTURE: CPT | Performed by: INTERNAL MEDICINE

## 2023-04-21 PROCEDURE — 83521 IG LIGHT CHAINS FREE EACH: CPT | Mod: 59 | Performed by: INTERNAL MEDICINE

## 2023-04-21 PROCEDURE — 80053 COMPREHEN METABOLIC PANEL: CPT | Performed by: INTERNAL MEDICINE

## 2023-04-21 PROCEDURE — 85025 COMPLETE CBC W/AUTO DIFF WBC: CPT | Performed by: INTERNAL MEDICINE

## 2023-04-21 RX ORDER — ACYCLOVIR 400 MG/1
400 TABLET ORAL 2 TIMES DAILY
Qty: 60 TABLET | Refills: 11 | Status: SHIPPED | OUTPATIENT
Start: 2023-04-21 | End: 2024-04-20

## 2023-04-21 RX ORDER — DEXAMETHASONE 4 MG/1
40 TABLET ORAL
Qty: 30 TABLET | Refills: 4 | Status: SHIPPED | OUTPATIENT
Start: 2023-04-21 | End: 2023-06-21 | Stop reason: SDUPTHER

## 2023-04-21 RX ORDER — LYSINE HCL 500 MG
1 TABLET ORAL ONCE
Qty: 1 TABLET | Refills: 0 | Status: SHIPPED | OUTPATIENT
Start: 2023-04-21 | End: 2023-10-18

## 2023-04-21 RX ORDER — LENALIDOMIDE 25 MG/1
25 CAPSULE ORAL DAILY
Qty: 14 CAPSULE | Refills: 4 | Status: SHIPPED | OUTPATIENT
Start: 2023-04-21 | End: 2023-04-25 | Stop reason: SDUPTHER

## 2023-04-21 RX ORDER — KETOROLAC TROMETHAMINE 30 MG/ML
60 INJECTION, SOLUTION INTRAMUSCULAR; INTRAVENOUS
Status: DISCONTINUED | OUTPATIENT
Start: 2023-04-21 | End: 2023-10-18

## 2023-04-21 RX ORDER — HEPARIN 100 UNIT/ML
500 SYRINGE INTRAVENOUS
Status: CANCELLED | OUTPATIENT
Start: 2023-05-31

## 2023-04-21 RX ORDER — SODIUM CHLORIDE 0.9 % (FLUSH) 0.9 %
10 SYRINGE (ML) INJECTION
Status: CANCELLED | OUTPATIENT
Start: 2023-05-31

## 2023-04-21 RX ORDER — ASPIRIN 81 MG/1
81 TABLET ORAL DAILY
Qty: 30 TABLET | Refills: 4 | Status: SHIPPED | OUTPATIENT
Start: 2023-04-21 | End: 2023-12-13

## 2023-04-21 NOTE — TELEPHONE ENCOUNTER
Vito, this is Karishma Morales with [facility].  We are working on your prescription that your doctor has sent us. We will be working with your insurance to get this approved for you. We will be calling you along the way with updates on your medication.  If you have any questions, you can reach us at [phone].    Welcome call outcome: Patient/caregiver reached    Notified pt that medication is approved under her insurance. No [place] required but [place] required. [place] approved until 4/20/2024. Pt will need to fill with [facility]. Notified pt with [facility] information. Pt confirmed understanding.

## 2023-04-21 NOTE — TELEPHONE ENCOUNTER
----- Message from Yary Brennan sent at 4/21/2023  4:28 PM CDT -----  Contact: Ana  Type:  Sooner Apoointment Request    Caller is requesting a sooner appointment. Caller will not accept being placed on the waitlist and is requesting a message be sent to doctor.  Name of Caller:Ana  When is the first available appointment?unknown  Symptoms:injection  Would the patient rather a call back or a response via [facility]? call  Best Call Back Number:[phone]  Additional Information: Patient request to schedule an injection sooner than next available  Thank you,  GH

## 2023-04-24 ENCOUNTER — CLINICAL SUPPORT (OUTPATIENT)
Dept: HEMATOLOGY/ONCOLOGY | Facility: CLINIC | Age: 63
End: 2023-04-24
Payer: COMMERCIAL

## 2023-04-24 ENCOUNTER — CLINICAL SUPPORT (OUTPATIENT)
Dept: INTERNAL MEDICINE | Facility: CLINIC | Age: 63
End: 2023-04-24
Payer: COMMERCIAL

## 2023-04-24 DIAGNOSIS — M25.559 HIP PAIN, UNSPECIFIED LATERALITY: Primary | ICD-10-CM

## 2023-04-24 LAB
COMMENT: NORMAL
FINAL PATHOLOGIC DIAGNOSIS: NORMAL
GROSS: NORMAL
KAPPA LC SER QL IA: 0.99 MG/DL (ref 0.33–1.94)
KAPPA LC/LAMBDA SER IA: 0.02 (ref 0.26–1.65)
LAMBDA LC SER QL IA: 51.78 MG/DL (ref 0.57–2.63)
Lab: NORMAL
MICROSCOPIC EXAM: NORMAL
SUPPLEMENTAL DIAGNOSIS: NORMAL

## 2023-04-24 PROCEDURE — 99999 PR PBB SHADOW E&M-EST. PATIENT-LVL III: ICD-10-PCS | Mod: PBBFAC,,,

## 2023-04-24 PROCEDURE — 96372 PR INJECTION,THERAP/PROPH/DIAG2ST, IM OR SUBCUT: ICD-10-PCS | Mod: S$GLB,,, | Performed by: NURSE PRACTITIONER

## 2023-04-24 PROCEDURE — 96372 THER/PROPH/DIAG INJ SC/IM: CPT | Mod: S$GLB,,, | Performed by: NURSE PRACTITIONER

## 2023-04-24 PROCEDURE — 99999 PR PBB SHADOW E&M-EST. PATIENT-LVL III: CPT | Mod: PBBFAC,,,

## 2023-04-24 RX ORDER — KETOROLAC TROMETHAMINE 30 MG/ML
60 INJECTION, SOLUTION INTRAMUSCULAR; INTRAVENOUS
Status: COMPLETED | OUTPATIENT
Start: 2023-04-24 | End: 2023-04-24

## 2023-04-24 RX ADMIN — KETOROLAC TROMETHAMINE 60 MG: 30 INJECTION, SOLUTION INTRAMUSCULAR; INTRAVENOUS at 12:04

## 2023-04-24 NOTE — PROGRESS NOTES
Pt here for toradol injection   Injection given as ordered  Tolerated well   Advised pt to wait 15 min after injection   Pt verbalized understanding

## 2023-04-24 NOTE — PROGRESS NOTES
Met with patient  in person____) to discuss upcoming systemic therapy initiation. Discussed patient diagnosis including staging information. Also discussed that therapy regimen prescribed involves the following drugs: __Dexamethasone, Revlimid, Velcade, and Zometa_, and timeline of therapy administration. Went over what to expect on first day of treatment, including what to bring with you, visitor policy, and infusion suite guidelines. Also discussed supportive and shared services available to patient, including social work, financial counseling, oncology nutrition, and oncology psychology.      Covered with patient potential side effects and symptom management. Reviewed supportive medications that will be given before, during, and after treatment and provided written instructions for all.  Also stressed that other side effects are possible outside of those listed. Spent additional time on signs of infection, infection prevention, and proper nutrition/hydration.    Education provided to patient via [facility] specific drug information and chemotherapy education binder___). Also provided contact information for clinic and information related to [facility] communication. Discussed communication process for after-hours needs and some common scenarios in which patient should call provider for guidance vs. immediately report to the emergency room.     Finally, patient was given my contact information. Encouraged patient to call with any questions, concerns, or needs. Instructed pt to call when her chemo appointment is confirmed and she has a start date and we will review how and when to take the medications at home to coincide with her chemo injection.  Reminded her that the example calendar I made for her shows cycle days, not set week days, as she may not start on a Monday.  It shows days 1-21 and how to take oral meds as well as when the Velcade falls within that 21 day cycle.  Patient verbalized understanding of all  above information and said she is going to review it with her .  Once she receives the Revlimid, she will call her navigator, Abbie Amor RN to let her know.

## 2023-04-25 ENCOUNTER — DOCUMENTATION ONLY (OUTPATIENT)
Dept: HEMATOLOGY/ONCOLOGY | Facility: CLINIC | Age: 63
End: 2023-04-25
Payer: COMMERCIAL

## 2023-04-25 DIAGNOSIS — C90.00 MULTIPLE MYELOMA, REMISSION STATUS UNSPECIFIED: ICD-10-CM

## 2023-04-25 DIAGNOSIS — Z72.0 TOBACCO ABUSE DISORDER: Primary | ICD-10-CM

## 2023-04-25 RX ORDER — LENALIDOMIDE 25 MG/1
25 CAPSULE ORAL DAILY
Qty: 14 CAPSULE | Refills: 4 | Status: SHIPPED | OUTPATIENT
Start: 2023-04-25 | End: 2023-04-25 | Stop reason: SDUPTHER

## 2023-04-25 RX ORDER — LENALIDOMIDE 25 MG/1
25 CAPSULE ORAL DAILY
Qty: 14 CAPSULE | Refills: 4 | Status: SHIPPED | OUTPATIENT
Start: 2023-04-25 | End: 2023-05-17

## 2023-04-25 NOTE — PROGRESS NOTES
Talked with pt about starting revlimid/dex/velcade, since she has completed chemo education. Told me she was able to complete [employer] survey, obtained auth #[id number].

## 2023-04-27 ENCOUNTER — SOCIAL WORK (OUTPATIENT)
Dept: HEMATOLOGY/ONCOLOGY | Facility: CLINIC | Age: 63
End: 2023-04-27
Payer: COMMERCIAL

## 2023-04-27 ENCOUNTER — CLINICAL SUPPORT (OUTPATIENT)
Dept: SMOKING CESSATION | Facility: CLINIC | Age: 63
End: 2023-04-27
Payer: COMMERCIAL

## 2023-04-27 ENCOUNTER — TELEPHONE (OUTPATIENT)
Dept: HEMATOLOGY/ONCOLOGY | Facility: CLINIC | Age: 63
End: 2023-04-27
Payer: COMMERCIAL

## 2023-04-27 ENCOUNTER — TELEPHONE (OUTPATIENT)
Dept: SMOKING CESSATION | Facility: CLINIC | Age: 63
End: 2023-04-27
Payer: COMMERCIAL

## 2023-04-27 DIAGNOSIS — F17.200 NICOTINE DEPENDENCE: Primary | ICD-10-CM

## 2023-04-27 DIAGNOSIS — E78.5 DYSLIPIDEMIA: ICD-10-CM

## 2023-04-27 LAB
CHROM BANDING METHOD: NORMAL
CHROMOSOME ANALYSIS BM ADDITIONAL INFORMATION: NORMAL
CHROMOSOME ANALYSIS BM RELEASED BY: NORMAL
CHROMOSOME ANALYSIS BM RESULT SUMMARY: NORMAL
CLINICAL CYTOGENETICIST REVIEW: NORMAL
KARYOTYP MAR: NORMAL
MYELOMA FISH REASON FOR REFERRAL (BM): NORMAL
MYELOMA FISH RESULT (BM): NORMAL
MYELOMA, FISH DISCLAIMER: NORMAL
MYELOMA, FISH INTERPRETATION: NORMAL
MYELOMA, FISH RELEASED BY: NORMAL
MYELOMA, FISH RESULT SUMMARY: NORMAL
MYELOMA, FISH RESULT TABLE: NORMAL
MYELOMA, FISH SPECIMEN: NORMAL
REASON FOR REFERRAL (NARRATIVE): NORMAL
REF LAB TEST METHOD: NORMAL
REF LAB TEST METHOD: NORMAL
SERVICE CMNT-IMP: NORMAL
SPECIMEN SOURCE: NORMAL
SPECIMEN SOURCE: NORMAL
SPECIMEN: NORMAL

## 2023-04-27 PROCEDURE — 99404 PREV MED CNSL INDIV APPRX 60: CPT | Mod: 95,,, | Performed by: GENERAL PRACTICE

## 2023-04-27 PROCEDURE — 99404 PR PREVENT COUNSEL,INDIV,60 MIN: ICD-10-PCS | Mod: 95,,, | Performed by: GENERAL PRACTICE

## 2023-04-27 RX ORDER — IBUPROFEN 200 MG
1 TABLET ORAL DAILY
Qty: 28 PATCH | Refills: 0 | Status: SHIPPED | OUTPATIENT
Start: 2023-04-27 | End: 2023-08-31 | Stop reason: DRUGHIGH

## 2023-04-27 NOTE — TELEPHONE ENCOUNTER
Spoke to patient over the phone to confirm virtual visit scheduled for today at 9 AM. Reviewed instructions for joining virtual visit and provided patient with return contact information if needed.

## 2023-04-27 NOTE — TELEPHONE ENCOUNTER
SW intern called pt to discuss distress score of 10. Pt stated she is doing well at this time. Pt stated she is coming to [place] today to get her medicine and that she needs more vanilla complete ensure. SW intern will give pt vanilla boost to try. SW intern will remain available.

## 2023-04-27 NOTE — Clinical Note
Patient intake for Quit 2 Episode.  Patient will be participating in bi-weekly tobacco cessation meetings and will begin the prescribed tobacco cessation medication regimen of 21 mg nicotine patch QD. FTND score of 6 indicates a moderate dependence on nicotine. LOWELL-D score of 9 is perceived as no mental distress / depression at this time.

## 2023-04-27 NOTE — PROGRESS NOTES
SW intern met with pt downstairs and provided pt with a box of vanilla boost. SW intern will remain available.

## 2023-04-27 NOTE — PROGRESS NOTES
JONG intern met with pt downstairs and provided pt with a box of vanilla boost. Ensure samples were not available. Pt stated that she was not diabetic. No other needs expressed. JONG intern will remain available.

## 2023-04-28 RX ORDER — ROSUVASTATIN CALCIUM 10 MG/1
TABLET, COATED ORAL
Qty: 90 TABLET | Refills: 3 | Status: SHIPPED | OUTPATIENT
Start: 2023-04-28 | End: 2023-09-11 | Stop reason: SDUPTHER

## 2023-05-01 ENCOUNTER — DOCUMENTATION ONLY (OUTPATIENT)
Dept: HEMATOLOGY/ONCOLOGY | Facility: CLINIC | Age: 63
End: 2023-05-01
Payer: COMMERCIAL

## 2023-05-03 NOTE — PROGRESS NOTES
Pt called stating she received a call from [facility] and they told the the meds won't be delivered to her home, she'll have to get from local pharmacy. Told pt [facility] is the  that will offer copay assist, they have nothing to do with med delivery. Told her I will call the specialty pharmacy filling her meds which is [facility]. Called [facility] and spoke with Radha for status of new fill, she transferred me to [facility] with physician services line, [phone]. [facility] was able to confirm receipt of script, plan to fill and schedule delivery with pt. Will f/u in 5-7 days to check status. Pt states she doesn't want to schedule chemo until she has the med as she is still trying to work and her schedule is made week to week. She plans to notify me when she hears form [facility].  Oncology Navigation   Intake  Date of Diagnosis: 23  Cancer Type: Myeloma  Internal / External Referral: Internal  Date of Referral: 23  Initial Nurse Navigator Contact: 23  Referral to Initial Contact Timeline (days): 1  Date Worked: 23     Treatment  Current Status: Staging work-up       Medical Oncologist: Dr. Velasquez  Chemotherapy: Planned  Chemotherapy Regimen: Velcade (Durvalumab possible pending FISH)  Oral Therapy: Planned                       Acuity  Systemic Treatment - predicted or initiated: Chemotherapy Regimen with Multiple drugs (+1)  ECO  Comorbidities in Medical History: 2  Support: 0  Verbalizes Financial Concerns: 1  Transportation: 0  Verbalizes the need for more education: 1  Navigation Acuity: 7     Follow Up  No follow-ups on file.

## 2023-05-04 ENCOUNTER — TELEPHONE (OUTPATIENT)
Dept: HEMATOLOGY/ONCOLOGY | Facility: CLINIC | Age: 63
End: 2023-05-04
Payer: COMMERCIAL

## 2023-05-04 ENCOUNTER — PATIENT MESSAGE (OUTPATIENT)
Dept: HEMATOLOGY/ONCOLOGY | Facility: CLINIC | Age: 63
End: 2023-05-04
Payer: COMMERCIAL

## 2023-05-04 ENCOUNTER — CLINICAL SUPPORT (OUTPATIENT)
Dept: SMOKING CESSATION | Facility: CLINIC | Age: 63
End: 2023-05-04
Payer: COMMERCIAL

## 2023-05-04 DIAGNOSIS — F17.200 NICOTINE DEPENDENCE: Primary | ICD-10-CM

## 2023-05-04 PROCEDURE — 99999 PR PBB SHADOW E&M-EST. PATIENT-LVL I: ICD-10-PCS | Mod: PBBFAC,,,

## 2023-05-04 PROCEDURE — 99402 PR PREVENT COUNSEL,INDIV,30 MIN: ICD-10-PCS | Mod: S$GLB,,, | Performed by: GENERAL PRACTICE

## 2023-05-04 PROCEDURE — 99402 PREV MED CNSL INDIV APPRX 30: CPT | Mod: S$GLB,,, | Performed by: GENERAL PRACTICE

## 2023-05-04 PROCEDURE — 99999 PR PBB SHADOW E&M-EST. PATIENT-LVL I: CPT | Mod: PBBFAC,,,

## 2023-05-04 NOTE — NURSING
1555pm: Outgoing call to pt regarding LVM for me. Abbie is pt assigned NN but is out. Pt received Revlimid in mail. Pt scheduled for labs on 5/10 at 8 am at , for Dr. Dela Cruz on 5/10 at 9 am at , and for chemo on 5/10 at 930 am at . Reviewed with pt that Revlimid should be taken at same time everyday. Pt instructed to start Revlimid and Decadron on Wed 5/10 when start Velcade. Pt informed that I'll have her NN, Abbie, call pt on  when she gets back to make sure pt understands what's expected of her. Pt verbalized understanding. Pt plan to report to appt as scheduled.   Oncology Navigation   Intake  Date of Diagnosis: 23  Cancer Type: Myeloma  Internal / External Referral: Internal  Date of Referral: 23  Initial Nurse Navigator Contact: 23  Referral to Initial Contact Timeline (days): 1  Date Worked: 23     Treatment  Current Status: Staging work-up       Medical Oncologist: Dr. Velasquez  Chemotherapy: Planned  Chemotherapy Regimen: Velcade (Durvalumab possible pending FISH)  Oral Therapy: Planned                       Acuity  Systemic Treatment - predicted or initiated: Chemotherapy Regimen with Multiple drugs (+1)  ECO  Comorbidities in Medical History: 2  Support: 0  Verbalizes Financial Concerns: 1  Transportation: 0  Verbalizes the need for more education: 1  Navigation Acuity: 7     Follow Up  No follow-ups on file.
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
## 2023-05-05 DIAGNOSIS — C90.00 MULTIPLE MYELOMA, REMISSION STATUS UNSPECIFIED: Primary | ICD-10-CM

## 2023-05-08 ENCOUNTER — PATIENT MESSAGE (OUTPATIENT)
Dept: INTERNAL MEDICINE | Facility: CLINIC | Age: 63
End: 2023-05-08
Payer: COMMERCIAL

## 2023-05-08 DIAGNOSIS — F41.9 ANXIETY: ICD-10-CM

## 2023-05-08 RX ORDER — ALPRAZOLAM 2 MG/1
TABLET ORAL
Qty: 60 TABLET | Refills: 0 | Status: SHIPPED | OUTPATIENT
Start: 2023-05-08 | End: 2023-06-02 | Stop reason: SDUPTHER

## 2023-05-08 NOTE — TELEPHONE ENCOUNTER
No care due was identified.  [facility] Embedded Care Due Messages. Reference number: [id number].   5/08/2023 10:53:35 AM CDT

## 2023-05-09 ENCOUNTER — PATIENT MESSAGE (OUTPATIENT)
Dept: HEMATOLOGY/ONCOLOGY | Facility: CLINIC | Age: 63
End: 2023-05-09
Payer: COMMERCIAL

## 2023-05-09 ENCOUNTER — PATIENT MESSAGE (OUTPATIENT)
Dept: INTERNAL MEDICINE | Facility: CLINIC | Age: 63
End: 2023-05-09
Payer: COMMERCIAL

## 2023-05-09 NOTE — PROGRESS NOTES
HEMATOLOGY / ONCOLOGY   CLINIC NOTE     ONCOLOGICAL HISTORY:     Diagnosis:  - multiple myeloma IgG lambda    Treatment History:  -     Current Treatment:   - VRD  - Zometa pending dental clearance     Subjective:       Chief Complaint: Chemotherapy and Multiple Myeloma      HPI    Ana Bonilla  62 y.o.  female with past medical history significant for hypertension, COPD, asthma, GERD, hypothyroidism here for follow-up and management of multiple myeloma      Bone marrow biopsy on 4/6/2023 showed 60 % plasma cells. PET-CT on 4/10/2023 showed extensive lytic bony lesions throughout the spine, sternum, bilateral ribs, pelvis and mild compression deformity in L1 vertebral body. SPEP/MECHE on 3/27/2023 showed IgG lambda monoclonal protein - 3.19 g/dl. Quantitative immunoglobulins on 3/27/2023 showed IgG 4,521 mg/dl.  UPEP/MECHE and FLC were pending. Labs on 3/27/2023 showed Beta 2 microglobulin 1.9, .  Labs on 4/19/2023 showed Hb, 11.5, WBC 6.3, platelets, BUN 11, Cr 0.9, calcium 9.    Interval History:     Patient is having chronic lower abdominal pain and sternal pain.  Denies any numbness, tingling in the lower extremities.  Denies any urine or bowel incontinence or retention.  Denies any other concerns      Past Medical History:   Diagnosis Date    Allergy     Amblyopia OS    Anxiety     Asthma     COPD (chronic obstructive pulmonary disease)     GERD (gastroesophageal reflux disease)     Hyperlipidemia     Hypertension     Thyroid disease       Past Surgical History:   Procedure Laterality Date    APPENDECTOMY      BUNIONECTOMY      COLONOSCOPY N/A 12/4/2019    Procedure: COLONOSCOPY;  Surgeon: Danny Matos III, MD;  Location: [facility];  Service: Endoscopy;  Laterality: N/A;    COLONOSCOPY N/A 10/24/2022    Procedure: COLONOSCOPY;  Surgeon: Danny Matos III, MD;  Location: [facility];  Service: Endoscopy;  Laterality: N/A;    ESOPHAGOGASTRODUODENOSCOPY N/A 10/24/2022    Procedure: EGD  (ESOPHAGOGASTRODUODENOSCOPY);  Surgeon: Danny Matos III, MD;  Location: [facility];  Service: Endoscopy;  Laterality: N/A;    HYSTERECTOMY      PARTIAL//still with ovaries    neck fusion  08/2017    THYROIDECTOMY       Social History     Socioeconomic History    Marital status:     Number of children: 2   Occupational History     Employer: [employer]   Tobacco Use    Smoking status: Every Day     Packs/day: 0.50     Years: 50.00     Pack years: 25.00     Types: Cigarettes    Smokeless tobacco: Never   Substance and Sexual Activity    Alcohol use: Not Currently     Comment: quit    Drug use: No    Sexual activity: Never      Family History   Problem Relation Age of Onset    Hypertension Mother     Diabetes Mother     Diabetes Father     Stroke Maternal Grandmother     Prostate cancer Maternal Grandfather     Mental illness Son     Pancreatic cancer Maternal Uncle     Mental illness Other     Pancreatic cancer Other     Ovarian cancer Maternal Cousin       Review of patient's allergies indicates:   Allergen Reactions    Hydrocodone-acetaminophen Other (See Comments)     Causes pt to feel extremely sick       Review of Systems   Constitutional:  Positive for fatigue. Negative for activity change, chills and fever.   HENT: Negative.     Eyes: Negative.    Respiratory:  Negative for cough and shortness of breath.    Cardiovascular:  Positive for chest pain. Negative for leg swelling.   Gastrointestinal:  Negative for constipation, diarrhea, nausea and vomiting.   Endocrine: Negative.    Genitourinary: Negative.    Musculoskeletal:  Positive for back pain. Negative for arthralgias and myalgias.   Integumentary:  Negative.   Allergic/Immunologic: Negative.    Neurological:  Negative for light-headedness, numbness and headaches.   Hematological: Negative.    Psychiatric/Behavioral: Negative.         Objective:        Vitals:    05/10/23 0844   BP: 125/80   Pulse: 78   Temp: 98.5 °F (36.9 °C)        Physical  Exam  Constitutional:       General: She is not in acute distress.     Appearance: She is well-developed. She is not ill-appearing.   HENT:      Head: Normocephalic and atraumatic.      Mouth/Throat:      Mouth: Mucous membranes are moist.   Eyes:      Extraocular Movements: Extraocular movements intact.      Conjunctiva/sclera: Conjunctivae normal.   Cardiovascular:      Rate and Rhythm: Normal rate and regular rhythm.      Heart sounds: Normal heart sounds.   Pulmonary:      Effort: Pulmonary effort is normal. No respiratory distress.      Breath sounds: Normal breath sounds. No wheezing.   Abdominal:      General: Bowel sounds are normal. There is no distension.      Palpations: Abdomen is soft.      Tenderness: There is no abdominal tenderness.   Musculoskeletal:         General: Normal range of motion.      Cervical back: Normal range of motion and neck supple.   Skin:     General: Skin is warm.   Neurological:      General: No focal deficit present.      Mental Status: She is alert and oriented to person, place, and time. Mental status is at baseline.      Cranial Nerves: No cranial nerve deficit.   Psychiatric:         Mood and Affect: Mood normal.         LABS / IMAGING      - 06/06/2023 RIGHT ILIAC CREST BONE MARROW ASPIRATE, BONE MARROW CLOT, AND BONE MARROW CORE BIOPSY WITH:     CELLULARITY=40-60%, TRILINEAGE HEMATOPOIETIC ACTIVITY (M/E=2.9:1).   CONSISTENT WITH PLASMA CELL NEOPLASM(60%).  SEE COMMENT.   FOCAL GRADE 1 RETICULAR FIBROSIS.   CONGO RED NEGATIVE.   INCREASED STORAGE IRON.   ADEQUATE NUMBER OF MEGAKARYOCYTES.    Bone marrow karyotype results: 46, XX[20], female karyotype.     Myeloma fixed cell, high-risk, FISH:  Normal.  The result is within normal limits for 1q duplication, TP53 deletion and IGH rearrangement.       - 04/10/2023 PET: Numerous FDG avid predominately lytic osseous lesions as above.  Primary differential considerations would include multiple myeloma versus metastatic  disease.  2. No FDG avid soft tissue masses or adenopathy demonstrated.  3. Mild pathologic compression deformity of the L1 vertebral body.  This report was flagged in Epic as abnormal.        Electronically signed by: Marino Pollack MD  Date:                                                Assessment:     ECOG SCORE           IgG lambda Multiple myeloma, R-ISS stage I  -  BMBx : 60 % plasma cells - 4/6/2023  - SPEP/MECHE showed IgG lambda - monoclonal protein 3.19 g/dl - (3/27/2023).  - R-ISS stage I (beta 2 microglobulin 1.9, albumin 3.5, , normal karyotype and no high-risk mutations on fish panel  - PET-CT ( 4/10/2023) - showed extensive lytic lesions in the axial skeleton and mild L1 compression deformity.    Cancer-related pain / palliative care by specialist  -  checked 05/10/2023   - switch [place] to Percocet as patient is unable to tolerate Norco      Plan:     - Start with Induction chemotherapy with VRD regimen (Velcde/Revlimid/Decadron) - 05/10/2023   - Refer to evaluate for autotransplant.   - Continue with Aspirin daily p.o for thrombosis prophylaxis; Acyclovir 400 mg p.o BID for herpes Zoster prophylaxis .  - Referred to Neurosurgery & Radiation oncology for evaluation of L1 compression deformity   - Plan for Zometa  ( and calcium and vit. D ) - after dental evaluation.  Referred for dental evaluation  - patient has been on Norco for pain relief but is complaining of nausea with it, thus will switch to Percocet         - MD / LABS / TREATMENT VISIT - 1 WEEK for cycle 1 day 8 treatment and also to follow up on toxicity after being started on chemo         Med Onc Chart Routing      Follow up with physician 1 week.   Follow up with WERNER    Infusion scheduling note chemo today and every week   Injection scheduling note    Labs CBC and CMP   Scheduling:  Preferred lab:  Lab interval:  labs before next visit   Imaging    Pharmacy appointment    Other referrals             The patient was seen,  interviewed and examined. Pertinent lab and radiology studies were reviewed. Pt instructed to call should develop concerning signs/symptoms or have further questions.       Portions of the record may have been created with voice recognition software. Occasional wrong-word or sound-a-like substitutions may have occurred due to the inherent limitations of voice recognition software. Read the chart carefully and recognize, using context, where substitutions have occurred.    Jose Dela Cruz MD  Hematology / Oncology

## 2023-05-10 ENCOUNTER — OFFICE VISIT (OUTPATIENT)
Dept: HEMATOLOGY/ONCOLOGY | Facility: CLINIC | Age: 63
End: 2023-05-10
Payer: COMMERCIAL

## 2023-05-10 ENCOUNTER — TELEPHONE (OUTPATIENT)
Dept: HEMATOLOGY/ONCOLOGY | Facility: CLINIC | Age: 63
End: 2023-05-10

## 2023-05-10 ENCOUNTER — LAB VISIT (OUTPATIENT)
Dept: LAB | Facility: HOSPITAL | Age: 63
End: 2023-05-10
Attending: INTERNAL MEDICINE
Payer: COMMERCIAL

## 2023-05-10 ENCOUNTER — INFUSION (OUTPATIENT)
Dept: INFUSION THERAPY | Facility: HOSPITAL | Age: 63
End: 2023-05-10
Attending: INTERNAL MEDICINE
Payer: COMMERCIAL

## 2023-05-10 ENCOUNTER — TELEPHONE (OUTPATIENT)
Dept: NEUROSURGERY | Facility: CLINIC | Age: 63
End: 2023-05-10
Payer: COMMERCIAL

## 2023-05-10 VITALS
DIASTOLIC BLOOD PRESSURE: 80 MMHG | HEART RATE: 78 BPM | BODY MASS INDEX: 21.34 KG/M2 | WEIGHT: 124.31 LBS | SYSTOLIC BLOOD PRESSURE: 125 MMHG | TEMPERATURE: 99 F

## 2023-05-10 VITALS
SYSTOLIC BLOOD PRESSURE: 101 MMHG | OXYGEN SATURATION: 100 % | DIASTOLIC BLOOD PRESSURE: 68 MMHG | TEMPERATURE: 97 F | RESPIRATION RATE: 18 BRPM | HEART RATE: 66 BPM

## 2023-05-10 DIAGNOSIS — G89.3 CANCER RELATED PAIN: ICD-10-CM

## 2023-05-10 DIAGNOSIS — Z51.11 ENCOUNTER FOR ANTINEOPLASTIC CHEMOTHERAPY: ICD-10-CM

## 2023-05-10 DIAGNOSIS — C90.00 MULTIPLE MYELOMA, REMISSION STATUS UNSPECIFIED: ICD-10-CM

## 2023-05-10 DIAGNOSIS — C90.00 MULTIPLE MYELOMA, REMISSION STATUS UNSPECIFIED: Primary | ICD-10-CM

## 2023-05-10 LAB
ALBUMIN SERPL BCP-MCNC: 3.4 G/DL (ref 3.5–5.2)
ALP SERPL-CCNC: 62 U/L (ref 55–135)
ALT SERPL W/O P-5'-P-CCNC: 20 U/L (ref 10–44)
ANION GAP SERPL CALC-SCNC: 7 MMOL/L (ref 8–16)
AST SERPL-CCNC: 24 U/L (ref 10–40)
BASOPHILS # BLD AUTO: 0.04 K/UL (ref 0–0.2)
BASOPHILS NFR BLD: 0.7 % (ref 0–1.9)
BILIRUB SERPL-MCNC: 0.5 MG/DL (ref 0.1–1)
BUN SERPL-MCNC: 11 MG/DL (ref 8–23)
CALCIUM SERPL-MCNC: 9.5 MG/DL (ref 8.7–10.5)
CHLORIDE SERPL-SCNC: 105 MMOL/L (ref 95–110)
CO2 SERPL-SCNC: 26 MMOL/L (ref 23–29)
CREAT SERPL-MCNC: 1 MG/DL (ref 0.5–1.4)
DIFFERENTIAL METHOD: NORMAL
EOSINOPHIL # BLD AUTO: 0.3 K/UL (ref 0–0.5)
EOSINOPHIL NFR BLD: 4.6 % (ref 0–8)
ERYTHROCYTE [DISTWIDTH] IN BLOOD BY AUTOMATED COUNT: 12.6 % (ref 11.5–14.5)
EST. GFR  (NO RACE VARIABLE): >60 ML/MIN/1.73 M^2
GLUCOSE SERPL-MCNC: 129 MG/DL (ref 70–110)
HCT VFR BLD AUTO: 38.1 % (ref 37–48.5)
HGB BLD-MCNC: 12.4 G/DL (ref 12–16)
IMM GRANULOCYTES # BLD AUTO: 0.01 K/UL (ref 0–0.04)
IMM GRANULOCYTES NFR BLD AUTO: 0.2 % (ref 0–0.5)
LYMPHOCYTES # BLD AUTO: 2.4 K/UL (ref 1–4.8)
LYMPHOCYTES NFR BLD: 43.3 % (ref 18–48)
MAGNESIUM SERPL-MCNC: 1.6 MG/DL (ref 1.6–2.6)
MCH RBC QN AUTO: 31 PG (ref 27–31)
MCHC RBC AUTO-ENTMCNC: 32.5 G/DL (ref 32–36)
MCV RBC AUTO: 95 FL (ref 82–98)
MONOCYTES # BLD AUTO: 0.6 K/UL (ref 0.3–1)
MONOCYTES NFR BLD: 10.5 % (ref 4–15)
NEUTROPHILS # BLD AUTO: 2.2 K/UL (ref 1.8–7.7)
NEUTROPHILS NFR BLD: 40.7 % (ref 38–73)
NRBC BLD-RTO: 0 /100 WBC
PLATELET # BLD AUTO: 251 K/UL (ref 150–450)
PMV BLD AUTO: 9.3 FL (ref 9.2–12.9)
POTASSIUM SERPL-SCNC: 4.5 MMOL/L (ref 3.5–5.1)
PROT SERPL-MCNC: 9.7 G/DL (ref 6–8.4)
RBC # BLD AUTO: 4 M/UL (ref 4–5.4)
SODIUM SERPL-SCNC: 138 MMOL/L (ref 136–145)
WBC # BLD AUTO: 5.45 K/UL (ref 3.9–12.7)

## 2023-05-10 PROCEDURE — 85025 COMPLETE CBC W/AUTO DIFF WBC: CPT | Performed by: INTERNAL MEDICINE

## 2023-05-10 PROCEDURE — 3074F SYST BP LT 130 MM HG: CPT | Mod: CPTII,S$GLB,, | Performed by: INTERNAL MEDICINE

## 2023-05-10 PROCEDURE — 4010F PR ACE/ARB THEARPY RXD/TAKEN: ICD-10-PCS | Mod: CPTII,S$GLB,, | Performed by: INTERNAL MEDICINE

## 2023-05-10 PROCEDURE — 99215 PR OFFICE/OUTPT VISIT, EST, LEVL V, 40-54 MIN: ICD-10-PCS | Mod: S$GLB,,, | Performed by: INTERNAL MEDICINE

## 2023-05-10 PROCEDURE — 3044F HG A1C LEVEL LT 7.0%: CPT | Mod: CPTII,S$GLB,, | Performed by: INTERNAL MEDICINE

## 2023-05-10 PROCEDURE — 99999 PR PBB SHADOW E&M-EST. PATIENT-LVL IV: ICD-10-PCS | Mod: PBBFAC,,, | Performed by: INTERNAL MEDICINE

## 2023-05-10 PROCEDURE — 3008F PR BODY MASS INDEX (BMI) DOCUMENTED: ICD-10-PCS | Mod: CPTII,S$GLB,, | Performed by: INTERNAL MEDICINE

## 2023-05-10 PROCEDURE — 3044F PR MOST RECENT HEMOGLOBIN A1C LEVEL <7.0%: ICD-10-PCS | Mod: CPTII,S$GLB,, | Performed by: INTERNAL MEDICINE

## 2023-05-10 PROCEDURE — 83735 ASSAY OF MAGNESIUM: CPT | Performed by: INTERNAL MEDICINE

## 2023-05-10 PROCEDURE — 80074 ACUTE HEPATITIS PANEL: CPT | Performed by: INTERNAL MEDICINE

## 2023-05-10 PROCEDURE — 3079F DIAST BP 80-89 MM HG: CPT | Mod: CPTII,S$GLB,, | Performed by: INTERNAL MEDICINE

## 2023-05-10 PROCEDURE — 3079F PR MOST RECENT DIASTOLIC BLOOD PRESSURE 80-89 MM HG: ICD-10-PCS | Mod: CPTII,S$GLB,, | Performed by: INTERNAL MEDICINE

## 2023-05-10 PROCEDURE — 3074F PR MOST RECENT SYSTOLIC BLOOD PRESSURE < 130 MM HG: ICD-10-PCS | Mod: CPTII,S$GLB,, | Performed by: INTERNAL MEDICINE

## 2023-05-10 PROCEDURE — 36415 COLL VENOUS BLD VENIPUNCTURE: CPT | Performed by: INTERNAL MEDICINE

## 2023-05-10 PROCEDURE — 87389 HIV-1 AG W/HIV-1&-2 AB AG IA: CPT | Performed by: INTERNAL MEDICINE

## 2023-05-10 PROCEDURE — 99215 OFFICE O/P EST HI 40 MIN: CPT | Mod: S$GLB,,, | Performed by: INTERNAL MEDICINE

## 2023-05-10 PROCEDURE — 63600175 PHARM REV CODE 636 W HCPCS: Mod: JW,JG | Performed by: INTERNAL MEDICINE

## 2023-05-10 PROCEDURE — 3008F BODY MASS INDEX DOCD: CPT | Mod: CPTII,S$GLB,, | Performed by: INTERNAL MEDICINE

## 2023-05-10 PROCEDURE — 86704 HEP B CORE ANTIBODY TOTAL: CPT | Performed by: INTERNAL MEDICINE

## 2023-05-10 PROCEDURE — 80053 COMPREHEN METABOLIC PANEL: CPT | Performed by: INTERNAL MEDICINE

## 2023-05-10 PROCEDURE — 4010F ACE/ARB THERAPY RXD/TAKEN: CPT | Mod: CPTII,S$GLB,, | Performed by: INTERNAL MEDICINE

## 2023-05-10 PROCEDURE — 96401 CHEMO ANTI-NEOPL SQ/IM: CPT

## 2023-05-10 PROCEDURE — 99999 PR PBB SHADOW E&M-EST. PATIENT-LVL IV: CPT | Mod: PBBFAC,,, | Performed by: INTERNAL MEDICINE

## 2023-05-10 RX ORDER — SODIUM CHLORIDE 0.9 % (FLUSH) 0.9 %
10 SYRINGE (ML) INJECTION
Status: CANCELLED | OUTPATIENT
Start: 2023-05-11

## 2023-05-10 RX ORDER — OXYCODONE AND ACETAMINOPHEN 5; 325 MG/1; MG/1
1 TABLET ORAL EVERY 4 HOURS PRN
Qty: 90 TABLET | Refills: 0 | Status: SHIPPED | OUTPATIENT
Start: 2023-05-10 | End: 2023-06-12

## 2023-05-10 RX ORDER — BORTEZOMIB 3.5 MG/1
1.3 INJECTION, POWDER, LYOPHILIZED, FOR SOLUTION INTRAVENOUS; SUBCUTANEOUS
Status: CANCELLED | OUTPATIENT
Start: 2023-05-11

## 2023-05-10 RX ORDER — LENALIDOMIDE 25 MG/1
25 CAPSULE ORAL DAILY
Qty: 14 CAPSULE | Refills: 4 | Status: SHIPPED | OUTPATIENT
Start: 2023-05-10 | End: 2023-05-17

## 2023-05-10 RX ORDER — HEPARIN 100 UNIT/ML
500 SYRINGE INTRAVENOUS
Status: CANCELLED | OUTPATIENT
Start: 2023-05-11

## 2023-05-10 RX ORDER — BORTEZOMIB 3.5 MG/1
1.3 INJECTION, POWDER, LYOPHILIZED, FOR SOLUTION INTRAVENOUS; SUBCUTANEOUS
Status: COMPLETED | OUTPATIENT
Start: 2023-05-10 | End: 2023-05-10

## 2023-05-10 RX ADMIN — BORTEZOMIB 2 MG: 3.5 INJECTION, POWDER, LYOPHILIZED, FOR SOLUTION INTRAVENOUS; SUBCUTANEOUS at 10:05

## 2023-05-10 NOTE — PLAN OF CARE
Problem: Adult Inpatient Plan of Care  Goal: Plan of Care Review  5/10/2023 1104 by Antonia Raman RN  Outcome: Ongoing, Progressing  5/10/2023 0952 by Antonia Raman RN  Outcome: Ongoing, Progressing  Goal: Patient-Specific Goal (Individualized)  5/10/2023 1104 by Antonia Raman RN  Outcome: Ongoing, Progressing  5/10/2023 0952 by Antonia Raman RN  Outcome: Ongoing, Progressing  Goal: Optimal Comfort and Wellbeing  5/10/2023 1104 by Antonia Raman RN  Outcome: Ongoing, Progressing  5/10/2023 0952 by Antonia Raman RN  Outcome: Ongoing, Progressing  Intervention: Provide Person-Centered Care  Flowsheets (Taken 5/10/2023 1104)  Trust Relationship/Rapport:   care explained   reassurance provided   choices provided   thoughts/feelings acknowledged   emotional support provided   empathic listening provided   questions answered   questions encouraged

## 2023-05-10 NOTE — PROGRESS NOTES
Introduced myself to new oncology patient and/or family member(s) in infusion. Gave my business card with dietitian contact information along with guidance on when to contact me about an appointment, for concerns like significant loss of appetite and weight loss.   Signature: Rosemary Bynum, MPH, RD, LDN

## 2023-05-10 NOTE — TELEPHONE ENCOUNTER
"Swer was consulted to assist with pt. dental needs.   2:05 pm - Pavanr called pt. Pt. reports she has dentures on the top part of her mouth and reports she is unsure when she last saw a dentist.  Pt. reports she has a dentist "I know I can go to." Pt. reports to call this dentist for an appt. Pt. cut our conversation short today. Pt. is agreeable to telephone call tm as pt. reports she is tired today. Swer will return pt.'s call tm morning per pt request. Swer will remain available.   "

## 2023-05-10 NOTE — TELEPHONE ENCOUNTER
[facility] Urgent request from Hem/onc for a compression deformity. Patient stated she could make appt tomorrow, and if she could not she would cancel. PET/ CT done 4/10/23.  RD

## 2023-05-11 ENCOUNTER — TELEPHONE (OUTPATIENT)
Dept: RADIATION ONCOLOGY | Facility: CLINIC | Age: 63
End: 2023-05-11
Payer: COMMERCIAL

## 2023-05-11 ENCOUNTER — TELEPHONE (OUTPATIENT)
Dept: HEMATOLOGY/ONCOLOGY | Facility: CLINIC | Age: 63
End: 2023-05-11
Payer: COMMERCIAL

## 2023-05-11 ENCOUNTER — PATIENT MESSAGE (OUTPATIENT)
Dept: HEMATOLOGY/ONCOLOGY | Facility: CLINIC | Age: 63
End: 2023-05-11
Payer: COMMERCIAL

## 2023-05-11 ENCOUNTER — OFFICE VISIT (OUTPATIENT)
Dept: NEUROSURGERY | Facility: CLINIC | Age: 63
End: 2023-05-11
Payer: COMMERCIAL

## 2023-05-11 VITALS
RESPIRATION RATE: 18 BRPM | HEART RATE: 10 BPM | HEIGHT: 64 IN | BODY MASS INDEX: 21.07 KG/M2 | WEIGHT: 123.44 LBS | SYSTOLIC BLOOD PRESSURE: 127 MMHG | DIASTOLIC BLOOD PRESSURE: 75 MMHG

## 2023-05-11 DIAGNOSIS — S32.010A COMPRESSION FRACTURE OF L1 VERTEBRA, INITIAL ENCOUNTER: ICD-10-CM

## 2023-05-11 DIAGNOSIS — C90.00 MULTIPLE MYELOMA, REMISSION STATUS UNSPECIFIED: ICD-10-CM

## 2023-05-11 DIAGNOSIS — M54.6 PAIN IN THORACIC SPINE: ICD-10-CM

## 2023-05-11 DIAGNOSIS — G89.3 CANCER RELATED PAIN: ICD-10-CM

## 2023-05-11 DIAGNOSIS — M48.07 SPINAL STENOSIS, LUMBOSACRAL REGION: Primary | ICD-10-CM

## 2023-05-11 DIAGNOSIS — C90.00 MULTIPLE MYELOMA, REMISSION STATUS UNSPECIFIED: Primary | ICD-10-CM

## 2023-05-11 LAB
HAV IGM SERPL QL IA: NORMAL
HBV CORE AB SERPL QL IA: NORMAL
HBV CORE IGM SERPL QL IA: NORMAL
HBV SURFACE AG SERPL QL IA: NORMAL
HCV AB SERPL QL IA: NORMAL
HIV 1+2 AB+HIV1 P24 AG SERPL QL IA: NORMAL

## 2023-05-11 PROCEDURE — 3008F PR BODY MASS INDEX (BMI) DOCUMENTED: ICD-10-PCS | Mod: CPTII,S$GLB,, | Performed by: NEUROLOGICAL SURGERY

## 2023-05-11 PROCEDURE — 99999 PR PBB SHADOW E&M-EST. PATIENT-LVL V: CPT | Mod: PBBFAC,,, | Performed by: NEUROLOGICAL SURGERY

## 2023-05-11 PROCEDURE — 3078F DIAST BP <80 MM HG: CPT | Mod: CPTII,S$GLB,, | Performed by: NEUROLOGICAL SURGERY

## 2023-05-11 PROCEDURE — 99999 PR PBB SHADOW E&M-EST. PATIENT-LVL V: ICD-10-PCS | Mod: PBBFAC,,, | Performed by: NEUROLOGICAL SURGERY

## 2023-05-11 PROCEDURE — 3044F PR MOST RECENT HEMOGLOBIN A1C LEVEL <7.0%: ICD-10-PCS | Mod: CPTII,S$GLB,, | Performed by: NEUROLOGICAL SURGERY

## 2023-05-11 PROCEDURE — 3074F SYST BP LT 130 MM HG: CPT | Mod: CPTII,S$GLB,, | Performed by: NEUROLOGICAL SURGERY

## 2023-05-11 PROCEDURE — 4010F PR ACE/ARB THEARPY RXD/TAKEN: ICD-10-PCS | Mod: CPTII,S$GLB,, | Performed by: NEUROLOGICAL SURGERY

## 2023-05-11 PROCEDURE — 3074F PR MOST RECENT SYSTOLIC BLOOD PRESSURE < 130 MM HG: ICD-10-PCS | Mod: CPTII,S$GLB,, | Performed by: NEUROLOGICAL SURGERY

## 2023-05-11 PROCEDURE — 3078F PR MOST RECENT DIASTOLIC BLOOD PRESSURE < 80 MM HG: ICD-10-PCS | Mod: CPTII,S$GLB,, | Performed by: NEUROLOGICAL SURGERY

## 2023-05-11 PROCEDURE — 3044F HG A1C LEVEL LT 7.0%: CPT | Mod: CPTII,S$GLB,, | Performed by: NEUROLOGICAL SURGERY

## 2023-05-11 PROCEDURE — 3008F BODY MASS INDEX DOCD: CPT | Mod: CPTII,S$GLB,, | Performed by: NEUROLOGICAL SURGERY

## 2023-05-11 PROCEDURE — 4010F ACE/ARB THERAPY RXD/TAKEN: CPT | Mod: CPTII,S$GLB,, | Performed by: NEUROLOGICAL SURGERY

## 2023-05-11 PROCEDURE — 99204 PR OFFICE/OUTPT VISIT, NEW, LEVL IV, 45-59 MIN: ICD-10-PCS | Mod: S$GLB,,, | Performed by: NEUROLOGICAL SURGERY

## 2023-05-11 PROCEDURE — 99204 OFFICE O/P NEW MOD 45 MIN: CPT | Mod: S$GLB,,, | Performed by: NEUROLOGICAL SURGERY

## 2023-05-11 RX ORDER — PROMETHAZINE HYDROCHLORIDE 25 MG/1
25 TABLET ORAL EVERY 4 HOURS
Qty: 45 TABLET | Refills: 2 | Status: SHIPPED | OUTPATIENT
Start: 2023-05-11

## 2023-05-11 RX ORDER — DIAZEPAM 5 MG/1
TABLET ORAL
Qty: 1 TABLET | Refills: 0 | Status: SHIPPED | OUTPATIENT
Start: 2023-05-11 | End: 2023-10-30

## 2023-05-11 NOTE — TELEPHONE ENCOUNTER
Made a call to schedule the patient a radiation oncology consult to come in today @ 12:00 to see Dr Saunders but the patient said she was feeling nauseous and she doesn't feel like coming in today. She is scheduled to see Dr Smith @ 1:30 and she has some questions for him regarding her back, she said to call her back tomorrow after she has had a chance to talk with Dr Smith today but she isn't even sure if she wants radiation. She expressed her frustration with [facility] & that all of the doctors are leaving & now they are laying off over 700 employees & she said she isn't sure that she wants to stay with [facility]. I told her that I would call her back tomorrow after she has a chance to talk with Dr Smith & we will discuss with her if she wants to come in for a consult, she verbalized understanding.

## 2023-05-11 NOTE — PROGRESS NOTES
Subjective:      Patient ID: Ana Bonilla is a 62 y.o. female.    HPI  The patient is here today for evaluation of a compression fracture.  She is referred to our clinic by Dr. Jose Dela Cruz.  Pt with hx of multiple myeloma presents to clinic with MRI for evaluation lower back pain with a CT/PET for review   Pt w hx of multiple myeloma currently receiving chemotherapy   Lower back pain rated 8/10   No radiation into the lower extremities   She denies any NT or BB symptoms     PET:CT      1. Numerous FDG avid predominately lytic osseous lesions as above.  Primary differential considerations would include multiple myeloma versus metastatic disease.  2. No FDG avid soft tissue masses or adenopathy demonstrated.  3. Mild pathologic compression deformity of the L1 vertebral body.  This report was flagged in Epic as abnormal.      Objective:     Body mass index is 21.19 kg/m².  Vitals:    05/11/23 1323   BP: 127/75   Pulse: (!) 10   Resp: 18        Back:  None  Paraspinal muscle spasms   None  Pain with flexion and extention   WNL  Range of motion    Neg  Straight leg raise     Motor   Right Right Left Left  Level Group   5  5  L2 Hip flexor (Psoas)   5  5  L3 Leg extension (Quads)   5  5  L4 Dorsiflexion & foot inversion (Tibialis Anterior)   5  5  L5 Great toe extension ( EHL)   5  5  S1 Foot eversion (Gastroc, PL & PB)     Sensation  NL Decreased (R/L/BL) Level Sensation    X  L2 Anterio-medial thigh   X  L3 Medial thigh around knee   X  L4 Medial foot   X  L5 Dorsum foot   X  S1 Lateral foot     Reflex  2+  Patellar tendon (L4)   2+  Achilles tendon (S1)       NM Pet Whole Body- 4/10/23  FINDINGS:  Quality of the study: Adequate.   Head neck: No suspicious foci of elevated FDG uptake demonstrated.   Chest: No suspicious foci of elevated FDG uptake demonstrated.   Abdomen and pelvis: No suspicious foci of elevated FDG uptake demonstrated.   Bones and extremities: Numerous suspicious FDG avid predominately lytic  lesions are seen throughout the axial skeleton with involvement of the cervical spine, thoracic spine, lumbar spine, sternum, bilateral ribs, and pelvis.  Index lesions demonstrated SUV max of 13.0 at the sternum, 15.0 at the L1 vertebral body, 10.0 at the T5 vertebral body, 5.7 at the C7 vertebral body, 11.0 along the posterior margins of the right acetabulum, and 10.0 at the right pubic bone.  There does appear to be a mild compression deformity involving the L1 vertebral body.  There also appears to be a small FDG avid lesion in the left femoral head with SUV max of 7.7.   Physiologic uptake of the tracer is present within the brain, salivary glands, myocardium, GI and  tracts.   Incidental CT findings: There are multiple bilateral renal cysts present.     Impression:   1. Numerous FDG avid predominately lytic osseous lesions as above.  Primary differential considerations would include multiple myeloma versus metastatic disease.  2. No FDG avid soft tissue masses or adenopathy demonstrated.  3. Mild pathologic compression deformity of the L1 vertebral body.       Component 1 mo ago   Final Pathologic Diagnosis     RIGHT ILIAC CREST BONE MARROW ASPIRATE, BONE MARROW CLOT, AND BONE MARROW CORE BIOPSY WITH:     CELLULARITY=40-60%, TRILINEAGE HEMATOPOIETIC ACTIVITY (M/E=2.9:1).   CONSISTENT WITH PLASMA CELL NEOPLASM(60%).  SEE COMMENT.   FOCAL GRADE 1 RETICULAR FIBROSIS.   CONGO RED NEGATIVE.   INCREASED STORAGE IRON.   ADEQUATE NUMBER OF MEGAKARYOCYTES.          Lab Results   Component Value Date    WBC 17.16 (H) 06/02/2023    HCT 37.3 06/02/2023           INDEPENDENT INTERPRETATION OF TEST:  Relevant imaging results reviewed and interpreted by me, discussed with the patient and / or family today.  Assessment:     1. Spinal stenosis, lumbosacral region    2. Multiple myeloma, remission status unspecified    3. Cancer related pain    4. Pain in thoracic spine    5. Compression fracture of L1 vertebra, initial  encounter      Plan:     Spinal stenosis, lumbosacral region  -     MRI Lumbar Spine W WO Contrast; Future; Expected date: 05/11/2023  -     MRI Thoracic Spine W WO Contrast; Future; Expected date: 05/11/2023    Multiple myeloma, remission status unspecified  -     Ambulatory referral/consult to Neurosurgery    Cancer related pain  -     Ambulatory referral/consult to Neurosurgery    Pain in thoracic spine  -     MRI Thoracic Spine W WO Contrast; Future; Expected date: 05/11/2023    Compression fracture of L1 vertebra, initial encounter    Other orders  -     diazePAM (VALIUM) 5 MG tablet; Take 1 tablet by mouth 30 minutes prior to MRI to help decrease anxiety.  Dispense: 1 tablet; Refill: 0    Patient w hx of MM and new L1 compression deformity   Neurologically intact   MRI with and without contrast ordered   Valium for sedation during scan   Follow up after imaging for tx options     Fausto Smith MD  [facility]

## 2023-05-11 NOTE — TELEPHONE ENCOUNTER
Swer attempted to return pt.'s call to address dental evaluation. Swer received no telephone pickup ([phone]). Pavanr was able to leave a  for a call back. Swer will remain available.

## 2023-05-11 NOTE — TELEPHONE ENCOUNTER
Pavanr received return call from pt. Pt. reports having called [facility] for an appt. Pt. reports dental appt is 5/16/23 at 8 am. Pavanr provided update to nurse navigation. Pavanr attempted to call [facility] to request dental clearance letter following pt. appt, however swer was left on hold for a long period of time. Pavanr will attempt to call business again today.

## 2023-05-12 ENCOUNTER — TELEPHONE (OUTPATIENT)
Dept: HEMATOLOGY/ONCOLOGY | Facility: CLINIC | Age: 63
End: 2023-05-12
Payer: COMMERCIAL

## 2023-05-12 NOTE — TELEPHONE ENCOUNTER
8:20 am - Guy attempted to reach [facility] ([phone]) for the third time with no pickup. Guy left a vm for a call back regarding clearance letter. Swer will remain available.

## 2023-05-15 ENCOUNTER — TELEPHONE (OUTPATIENT)
Dept: HEMATOLOGY/ONCOLOGY | Facility: CLINIC | Age: 63
End: 2023-05-15
Payer: COMMERCIAL

## 2023-05-15 NOTE — TELEPHONE ENCOUNTER
----- Message from Roni Mitchell sent at 5/10/2023  1:43 PM CDT -----  Regarding: RE: SCT Benefits Check  Abe Hilliard,    I have verified insurance coverage for medical and transplant benefit for patient at [facility] only.    Stephen  ----- Message -----  From: Arminda De Leon RN  Sent: 5/10/2023  10:57 AM CDT  To: Tangela Gomez, Roni Mitchell  Subject: SCT Benefits Check                               Hello,    Please complete benefit check for patient. Medical benefit check and transplant benefit check for [facility] only.    [employer]    Thank you,  Arminda

## 2023-05-16 ENCOUNTER — TELEPHONE (OUTPATIENT)
Dept: HEMATOLOGY/ONCOLOGY | Facility: CLINIC | Age: 63
End: 2023-05-16
Payer: COMMERCIAL

## 2023-05-16 NOTE — TELEPHONE ENCOUNTER
Swer received return call from [facility] this am. Staff requests pt. current medication list be faxed to (fax#[phone]). Swer faxed requested med list. Staff reports to fax pt. clearance letter to sw fax ([phone]). Staff has  contact information. Swer will remain available.

## 2023-05-16 NOTE — TELEPHONE ENCOUNTER
OTIS Angelo spoke with patient who agreed to see Dr Garcia virtually in June. Pt is unsure about moving forward with SCT consult but will speak with Dr Dela Cruz.

## 2023-05-16 NOTE — PROGRESS NOTES
HEMATOLOGY / ONCOLOGY   CLINIC NOTE     ONCOLOGICAL HISTORY:     Diagnosis:  - Multiple Myeloma IgG lambda    Treatment History:  -     Current Treatment:   - VRD  - Zometa pending dental clearance     Subjective:       Chief Complaint: Multiple Myeloma      HPI    Ana Bonilla  62 y.o.  female with past medical history significant for hypertension, COPD, asthma, GERD, hypothyroidism here for follow-up and management of multiple myeloma    Bone marrow biopsy on 4/6/2023 showed 60 % plasma cells. PET-CT on 4/10/2023 showed extensive lytic bony lesions throughout the spine, sternum, bilateral ribs, pelvis and mild compression deformity in L1 vertebral body. SPEP/MECHE on 3/27/2023 showed IgG lambda monoclonal protein - 3.19 g/dl. Quantitative immunoglobulins on 3/27/2023 showed IgG 4,521 mg/dl.  UPEP/MECHE and FLC were pending. Labs on 3/27/2023 showed Beta 2 microglobulin 1.9, .  Labs on 4/19/2023 showed Hb, 11.5, WBC 6.3, platelets, BUN 11, Cr 0.9, calcium 9.    Interval History:     Patient has been complaining intermittent nausea, cyst being started chemotherapy.  Patient is complaining of back pain has been following with Neurosurgery, who is planning to have an MRI of her spine done next week.  Denies abdominal pain , diarrhea.  Denies any numbness, tingling in the lower extremities.  Denies any urine or bowel incontinence or retention.  Denies any other concerns      Past Medical History:   Diagnosis Date    Allergy     Amblyopia OS    Anxiety     Asthma     COPD (chronic obstructive pulmonary disease)     GERD (gastroesophageal reflux disease)     Hyperlipidemia     Hypertension     Thyroid disease       Past Surgical History:   Procedure Laterality Date    APPENDECTOMY      BUNIONECTOMY      COLONOSCOPY N/A 12/4/2019    Procedure: COLONOSCOPY;  Surgeon: Danny Matos III, MD;  Location: [facility];  Service: Endoscopy;  Laterality: N/A;    COLONOSCOPY N/A 10/24/2022    Procedure:  COLONOSCOPY;  Surgeon: Danny Matos III, MD;  Location: [facility] ENDO;  Service: Endoscopy;  Laterality: N/A;    ESOPHAGOGASTRODUODENOSCOPY N/A 10/24/2022    Procedure: EGD (ESOPHAGOGASTRODUODENOSCOPY);  Surgeon: Danny Matos III, MD;  Location: [facility] ENDO;  Service: Endoscopy;  Laterality: N/A;    HYSTERECTOMY      PARTIAL//still with ovaries    neck fusion  08/2017    THYROIDECTOMY       Social History     Socioeconomic History    Marital status:     Number of children: 2   Occupational History     Employer: [employer]   Tobacco Use    Smoking status: Every Day     Packs/day: 0.50     Years: 50.00     Pack years: 25.00     Types: Cigarettes    Smokeless tobacco: Never   Substance and Sexual Activity    Alcohol use: Not Currently     Comment: quit    Drug use: No    Sexual activity: Never      Family History   Problem Relation Age of Onset    Hypertension Mother     Diabetes Mother     Diabetes Father     Stroke Maternal Grandmother     Prostate cancer Maternal Grandfather     Mental illness Son     Pancreatic cancer Maternal Uncle     Mental illness Other     Pancreatic cancer Other     Ovarian cancer Maternal Cousin       Review of patient's allergies indicates:   Allergen Reactions    Hydrocodone-acetaminophen Other (See Comments)     Causes pt to feel extremely sick       Review of Systems   Constitutional:  Positive for fatigue. Negative for activity change, chills and fever.   HENT: Negative.     Eyes: Negative.    Respiratory:  Negative for cough and shortness of breath.    Cardiovascular:  Positive for chest pain. Negative for leg swelling.   Gastrointestinal:  Positive for nausea. Negative for constipation, diarrhea and vomiting.   Endocrine: Negative.    Genitourinary: Negative.    Musculoskeletal:  Positive for back pain. Negative for arthralgias and myalgias.   Integumentary:  Negative.   Allergic/Immunologic: Negative.    Neurological:  Negative for light-headedness, numbness and headaches.    Hematological: Negative.    Psychiatric/Behavioral: Negative.         Objective:        Vitals:    05/17/23 1014   BP: 113/70   Pulse: 82   Temp: 97.7 °F (36.5 °C)          Physical Exam  Constitutional:       General: She is not in acute distress.     Appearance: She is well-developed. She is not ill-appearing.   HENT:      Head: Normocephalic and atraumatic.      Mouth/Throat:      Mouth: Mucous membranes are moist.   Eyes:      Extraocular Movements: Extraocular movements intact.      Conjunctiva/sclera: Conjunctivae normal.   Cardiovascular:      Rate and Rhythm: Normal rate and regular rhythm.      Heart sounds: Normal heart sounds.   Pulmonary:      Effort: Pulmonary effort is normal. No respiratory distress.      Breath sounds: Normal breath sounds. No wheezing.   Abdominal:      General: Bowel sounds are normal. There is no distension.      Palpations: Abdomen is soft.      Tenderness: There is no abdominal tenderness.   Musculoskeletal:         General: Normal range of motion.      Cervical back: Normal range of motion and neck supple.   Skin:     General: Skin is warm.   Neurological:      General: No focal deficit present.      Mental Status: She is alert and oriented to person, place, and time. Mental status is at baseline.      Cranial Nerves: No cranial nerve deficit.   Psychiatric:         Mood and Affect: Mood normal.         LABS / IMAGING      - 06/06/2023 RIGHT ILIAC CREST BONE MARROW ASPIRATE, BONE MARROW CLOT, AND BONE MARROW CORE BIOPSY WITH:     CELLULARITY=40-60%, TRILINEAGE HEMATOPOIETIC ACTIVITY (M/E=2.9:1).   CONSISTENT WITH PLASMA CELL NEOPLASM(60%).  SEE COMMENT.   FOCAL GRADE 1 RETICULAR FIBROSIS.   CONGO RED NEGATIVE.   INCREASED STORAGE IRON.   ADEQUATE NUMBER OF MEGAKARYOCYTES.    Bone marrow karyotype results: 46, XX[20], female karyotype.     Myeloma fixed cell, high-risk, FISH:  Normal.  The result is within normal limits for 1q duplication, TP53 deletion and IGH rearrangement.        - 04/10/2023 PET: Numerous FDG avid predominately lytic osseous lesions as above.  Primary differential considerations would include multiple myeloma versus metastatic disease.  2. No FDG avid soft tissue masses or adenopathy demonstrated.  3. Mild pathologic compression deformity of the L1 vertebral body.  This report was flagged in Epic as abnormal.        Electronically signed by: Marino Pollack MD  Date:                                                Assessment:     ECOG SCORE           IgG lambda Multiple myeloma, R-ISS stage I  - BMBx : 60 % plasma cells - 4/6/2023  - SPEP/MECHE showed IgG lambda - monoclonal protein 3.19 g/dl - (3/27/2023).  - R-ISS stage I (beta 2 microglobulin 1.9, albumin 3.5, , normal karyotype and no high-risk mutations on fish panel  - PET-CT ( 4/10/2023) - showed extensive lytic lesions in the axial skeleton and mild L1 compression deformity.  - Started with Induction chemotherapy with VRD regimen (Velcde/Revlimid/Decadron) - 05/10/2023   - Started Aspirin daily p.o for thrombosis prophylaxis; Acyclovir 400 mg p.o BID for herpes Zoster prophylaxis .    Cancer-related pain / palliative care by specialist  -  checked 05/10/2023   - switch Moro to Percocet as patient is unable to tolerate Norco      Plan:     - Continue C1D8.  Noted to have creatinine clearance of 49, thus will change the treatment of lenalidomide from 25 mg daily to 10 mg daily.  New prescription sent and patient informed to bring the medication with her to the clinic during next visit for confirmation and to start on the new dose.   - Continue Aspirin ; Acyclovir 400 mg p.o BID for herpes Zoster prophylaxis .  - Pending MRI spine and Neurosurgery evaluation (05/23/23) & Radiation oncology for evaluation of L1 compression deformity   - Pending evaluation for BM transplant (06/05/2023).   - Plan for Zometa  ( and calcium and vit. D ) - after dental evaluation.  Referred for dental evaluation, pending  clearance documentation.  Plan to start the patient on Zometa after being seen by Neurosurgery and decided not to have any intervention.   - patient has been on Norco for pain relief but is complaining of nausea with it, thus will switch to Percocet     - MD / LABS / TREATMENT VISIT - 1 WEEK for cycle 1 day 15 treatment and also to follow up on toxicity after being started on chemo         Med Onc Chart Routing      Follow up with physician 2 weeks.   Follow up with WERNER 1 week.   Infusion scheduling note today and next week   Injection scheduling note    Labs CBC, CMP and TSH   Scheduling:  Preferred lab:  Lab interval:  next visit   Imaging    Pharmacy appointment    Other referrals             The patient was seen, interviewed and examined. Pertinent lab and radiology studies were reviewed. Pt instructed to call should develop concerning signs/symptoms or have further questions.       Portions of the record may have been created with voice recognition software. Occasional wrong-word or sound-a-like substitutions may have occurred due to the inherent limitations of voice recognition software. Read the chart carefully and recognize, using context, where substitutions have occurred.    Jose Dela Cruz MD  Hematology / Oncology

## 2023-05-17 ENCOUNTER — LAB VISIT (OUTPATIENT)
Dept: LAB | Facility: HOSPITAL | Age: 63
End: 2023-05-17
Attending: INTERNAL MEDICINE
Payer: COMMERCIAL

## 2023-05-17 ENCOUNTER — INFUSION (OUTPATIENT)
Dept: INFUSION THERAPY | Facility: HOSPITAL | Age: 63
End: 2023-05-17
Attending: INTERNAL MEDICINE
Payer: COMMERCIAL

## 2023-05-17 ENCOUNTER — OFFICE VISIT (OUTPATIENT)
Dept: HEMATOLOGY/ONCOLOGY | Facility: CLINIC | Age: 63
End: 2023-05-17
Payer: COMMERCIAL

## 2023-05-17 ENCOUNTER — DOCUMENTATION ONLY (OUTPATIENT)
Dept: HEMATOLOGY/ONCOLOGY | Facility: CLINIC | Age: 63
End: 2023-05-17
Payer: COMMERCIAL

## 2023-05-17 ENCOUNTER — TELEPHONE (OUTPATIENT)
Dept: PHARMACY | Facility: CLINIC | Age: 63
End: 2023-05-17
Payer: COMMERCIAL

## 2023-05-17 VITALS
WEIGHT: 117.75 LBS | HEIGHT: 64 IN | TEMPERATURE: 98 F | SYSTOLIC BLOOD PRESSURE: 113 MMHG | OXYGEN SATURATION: 99 % | HEART RATE: 82 BPM | DIASTOLIC BLOOD PRESSURE: 70 MMHG | BODY MASS INDEX: 20.1 KG/M2

## 2023-05-17 VITALS
HEART RATE: 81 BPM | SYSTOLIC BLOOD PRESSURE: 111 MMHG | OXYGEN SATURATION: 99 % | TEMPERATURE: 98 F | RESPIRATION RATE: 18 BRPM | DIASTOLIC BLOOD PRESSURE: 60 MMHG

## 2023-05-17 DIAGNOSIS — C90.00 MULTIPLE MYELOMA, REMISSION STATUS UNSPECIFIED: Primary | ICD-10-CM

## 2023-05-17 DIAGNOSIS — Z51.11 ENCOUNTER FOR ANTINEOPLASTIC CHEMOTHERAPY: ICD-10-CM

## 2023-05-17 DIAGNOSIS — C90.00 MULTIPLE MYELOMA, REMISSION STATUS UNSPECIFIED: ICD-10-CM

## 2023-05-17 LAB
ALBUMIN SERPL BCP-MCNC: 3.8 G/DL (ref 3.5–5.2)
ALP SERPL-CCNC: 64 U/L (ref 55–135)
ALT SERPL W/O P-5'-P-CCNC: 34 U/L (ref 10–44)
ANION GAP SERPL CALC-SCNC: 9 MMOL/L (ref 8–16)
AST SERPL-CCNC: 27 U/L (ref 10–40)
BASOPHILS # BLD AUTO: 0.03 K/UL (ref 0–0.2)
BASOPHILS NFR BLD: 0.4 % (ref 0–1.9)
BILIRUB SERPL-MCNC: 0.5 MG/DL (ref 0.1–1)
BUN SERPL-MCNC: 16 MG/DL (ref 8–23)
CALCIUM SERPL-MCNC: 9.1 MG/DL (ref 8.7–10.5)
CHLORIDE SERPL-SCNC: 104 MMOL/L (ref 95–110)
CO2 SERPL-SCNC: 27 MMOL/L (ref 23–29)
CREAT SERPL-MCNC: 1 MG/DL (ref 0.5–1.4)
DIFFERENTIAL METHOD: ABNORMAL
EOSINOPHIL # BLD AUTO: 0.5 K/UL (ref 0–0.5)
EOSINOPHIL NFR BLD: 7.3 % (ref 0–8)
ERYTHROCYTE [DISTWIDTH] IN BLOOD BY AUTOMATED COUNT: 13 % (ref 11.5–14.5)
EST. GFR  (NO RACE VARIABLE): >60 ML/MIN/1.73 M^2
GLUCOSE SERPL-MCNC: 105 MG/DL (ref 70–110)
HCT VFR BLD AUTO: 37.2 % (ref 37–48.5)
HGB BLD-MCNC: 12.2 G/DL (ref 12–16)
IMM GRANULOCYTES # BLD AUTO: 0.01 K/UL (ref 0–0.04)
IMM GRANULOCYTES NFR BLD AUTO: 0.1 % (ref 0–0.5)
LYMPHOCYTES # BLD AUTO: 2.7 K/UL (ref 1–4.8)
LYMPHOCYTES NFR BLD: 38.7 % (ref 18–48)
MAGNESIUM SERPL-MCNC: 1.7 MG/DL (ref 1.6–2.6)
MCH RBC QN AUTO: 31.5 PG (ref 27–31)
MCHC RBC AUTO-ENTMCNC: 32.8 G/DL (ref 32–36)
MCV RBC AUTO: 96 FL (ref 82–98)
MONOCYTES # BLD AUTO: 0.6 K/UL (ref 0.3–1)
MONOCYTES NFR BLD: 8.5 % (ref 4–15)
NEUTROPHILS # BLD AUTO: 3.2 K/UL (ref 1.8–7.7)
NEUTROPHILS NFR BLD: 45 % (ref 38–73)
NRBC BLD-RTO: 0 /100 WBC
PLATELET # BLD AUTO: 249 K/UL (ref 150–450)
PMV BLD AUTO: 9.4 FL (ref 9.2–12.9)
POTASSIUM SERPL-SCNC: 4.4 MMOL/L (ref 3.5–5.1)
PROT SERPL-MCNC: 9.8 G/DL (ref 6–8.4)
RBC # BLD AUTO: 3.87 M/UL (ref 4–5.4)
SODIUM SERPL-SCNC: 140 MMOL/L (ref 136–145)
WBC # BLD AUTO: 7.08 K/UL (ref 3.9–12.7)

## 2023-05-17 PROCEDURE — 99999 PR PBB SHADOW E&M-EST. PATIENT-LVL III: CPT | Mod: PBBFAC,,, | Performed by: INTERNAL MEDICINE

## 2023-05-17 PROCEDURE — 3074F SYST BP LT 130 MM HG: CPT | Mod: CPTII,S$GLB,, | Performed by: INTERNAL MEDICINE

## 2023-05-17 PROCEDURE — 96401 CHEMO ANTI-NEOPL SQ/IM: CPT

## 2023-05-17 PROCEDURE — 99214 PR OFFICE/OUTPT VISIT, EST, LEVL IV, 30-39 MIN: ICD-10-PCS | Mod: S$GLB,,, | Performed by: INTERNAL MEDICINE

## 2023-05-17 PROCEDURE — 3078F PR MOST RECENT DIASTOLIC BLOOD PRESSURE < 80 MM HG: ICD-10-PCS | Mod: CPTII,S$GLB,, | Performed by: INTERNAL MEDICINE

## 2023-05-17 PROCEDURE — 3044F HG A1C LEVEL LT 7.0%: CPT | Mod: CPTII,S$GLB,, | Performed by: INTERNAL MEDICINE

## 2023-05-17 PROCEDURE — 3078F DIAST BP <80 MM HG: CPT | Mod: CPTII,S$GLB,, | Performed by: INTERNAL MEDICINE

## 2023-05-17 PROCEDURE — 4010F ACE/ARB THERAPY RXD/TAKEN: CPT | Mod: CPTII,S$GLB,, | Performed by: INTERNAL MEDICINE

## 2023-05-17 PROCEDURE — 99214 OFFICE O/P EST MOD 30 MIN: CPT | Mod: S$GLB,,, | Performed by: INTERNAL MEDICINE

## 2023-05-17 PROCEDURE — 99999 PR PBB SHADOW E&M-EST. PATIENT-LVL III: ICD-10-PCS | Mod: PBBFAC,,, | Performed by: INTERNAL MEDICINE

## 2023-05-17 PROCEDURE — 83735 ASSAY OF MAGNESIUM: CPT | Performed by: INTERNAL MEDICINE

## 2023-05-17 PROCEDURE — 85025 COMPLETE CBC W/AUTO DIFF WBC: CPT | Performed by: INTERNAL MEDICINE

## 2023-05-17 PROCEDURE — 3008F BODY MASS INDEX DOCD: CPT | Mod: CPTII,S$GLB,, | Performed by: INTERNAL MEDICINE

## 2023-05-17 PROCEDURE — 3044F PR MOST RECENT HEMOGLOBIN A1C LEVEL <7.0%: ICD-10-PCS | Mod: CPTII,S$GLB,, | Performed by: INTERNAL MEDICINE

## 2023-05-17 PROCEDURE — 4010F PR ACE/ARB THEARPY RXD/TAKEN: ICD-10-PCS | Mod: CPTII,S$GLB,, | Performed by: INTERNAL MEDICINE

## 2023-05-17 PROCEDURE — 3074F PR MOST RECENT SYSTOLIC BLOOD PRESSURE < 130 MM HG: ICD-10-PCS | Mod: CPTII,S$GLB,, | Performed by: INTERNAL MEDICINE

## 2023-05-17 PROCEDURE — 3008F PR BODY MASS INDEX (BMI) DOCUMENTED: ICD-10-PCS | Mod: CPTII,S$GLB,, | Performed by: INTERNAL MEDICINE

## 2023-05-17 PROCEDURE — 63600175 PHARM REV CODE 636 W HCPCS: Mod: JZ,JG | Performed by: INTERNAL MEDICINE

## 2023-05-17 PROCEDURE — 36415 COLL VENOUS BLD VENIPUNCTURE: CPT | Performed by: INTERNAL MEDICINE

## 2023-05-17 PROCEDURE — 80053 COMPREHEN METABOLIC PANEL: CPT | Performed by: INTERNAL MEDICINE

## 2023-05-17 RX ORDER — SODIUM CHLORIDE 0.9 % (FLUSH) 0.9 %
10 SYRINGE (ML) INJECTION
Status: CANCELLED | OUTPATIENT
Start: 2023-05-17

## 2023-05-17 RX ORDER — HEPARIN 100 UNIT/ML
500 SYRINGE INTRAVENOUS
Status: DISCONTINUED | OUTPATIENT
Start: 2023-05-17 | End: 2023-05-17 | Stop reason: HOSPADM

## 2023-05-17 RX ORDER — HEPARIN 100 UNIT/ML
500 SYRINGE INTRAVENOUS
Status: CANCELLED | OUTPATIENT
Start: 2023-05-17

## 2023-05-17 RX ORDER — LENALIDOMIDE 10 MG/1
1 CAPSULE ORAL DAILY
Qty: 14 EACH | Refills: 5 | Status: ACTIVE | OUTPATIENT
Start: 2023-05-17 | End: 2023-05-18 | Stop reason: SDUPTHER

## 2023-05-17 RX ORDER — SODIUM CHLORIDE 0.9 % (FLUSH) 0.9 %
10 SYRINGE (ML) INJECTION
Status: DISCONTINUED | OUTPATIENT
Start: 2023-05-17 | End: 2023-05-17 | Stop reason: HOSPADM

## 2023-05-17 RX ORDER — BORTEZOMIB 3.5 MG/1
1.3 INJECTION, POWDER, LYOPHILIZED, FOR SOLUTION INTRAVENOUS; SUBCUTANEOUS
Status: CANCELLED | OUTPATIENT
Start: 2023-05-17

## 2023-05-17 RX ORDER — BORTEZOMIB 3.5 MG/1
1.3 INJECTION, POWDER, LYOPHILIZED, FOR SOLUTION INTRAVENOUS; SUBCUTANEOUS
Status: COMPLETED | OUTPATIENT
Start: 2023-05-17 | End: 2023-05-17

## 2023-05-17 RX ADMIN — BORTEZOMIB 2 MG: 3.5 INJECTION, POWDER, LYOPHILIZED, FOR SOLUTION INTRAVENOUS; SUBCUTANEOUS at 11:05

## 2023-05-17 NOTE — PLAN OF CARE
Discussed plan of care with pt. Addressed any and ongoing concerns. Pt denies    Problem: Adult Inpatient Plan of Care  Goal: Plan of Care Review  5/17/2023 1106 by Sandy Moreno RN  Outcome: Ongoing, Progressing  Flowsheets (Taken 5/17/2023 1106)  Plan of Care Reviewed With: patient  5/17/2023 1105 by Sandy Moreno RN  Outcome: Ongoing, Progressing  Flowsheets (Taken 5/17/2023 1105)  Plan of Care Reviewed With: patient  Goal: Patient-Specific Goal (Individualized)  5/17/2023 1106 by Sandy Moreno RN  Outcome: Ongoing, Progressing  5/17/2023 1105 by Sandy Moreno RN  Outcome: Ongoing, Progressing  Goal: Absence of Hospital-Acquired Illness or Injury  5/17/2023 1106 by Sandy Moreno RN  Outcome: Ongoing, Progressing  5/17/2023 1105 by Sandy Moreno RN  Outcome: Ongoing, Progressing  Intervention: Identify and Manage Fall Risk  Flowsheets (Taken 5/17/2023 1105)  Safety Promotion/Fall Prevention:   room near unit station   nonskid shoes/socks when out of bed   in recliner, wheels locked  Intervention: Prevent Infection  Flowsheets (Taken 5/17/2023 1105)  Infection Prevention:   hand hygiene promoted   equipment surfaces disinfected  Goal: Optimal Comfort and Wellbeing  5/17/2023 1106 by Sandy Moreno RN  Outcome: Ongoing, Progressing  5/17/2023 1105 by Sandy Moreno RN  Outcome: Ongoing, Progressing  Intervention: Monitor Pain and Promote Comfort  Flowsheets (Taken 5/17/2023 1105)  Pain Management Interventions: quiet environment facilitated  Intervention: Provide Person-Centered Care  Flowsheets (Taken 5/17/2023 1105)  Trust Relationship/Rapport:   questions encouraged   care explained   choices provided   reassurance provided   thoughts/feelings acknowledged   emotional support provided   empathic listening provided   questions answered

## 2023-05-17 NOTE — PROGRESS NOTES
Guy received fax from [facility] with pt.'s dental assessment. MD recommended pt. obtain dental evaluation. Guy notified pt. nurse navigator and provided fax. Pavanr will remain available.

## 2023-05-17 NOTE — PLAN OF CARE
Discussed plan of care with pt. Addressed any and ongoing concerns. Pt denies    Problem: Adult Inpatient Plan of Care  Goal: Plan of Care Review  Outcome: Ongoing, Progressing  Flowsheets (Taken 5/17/2023 1105)  Plan of Care Reviewed With: patient  Goal: Patient-Specific Goal (Individualized)  Outcome: Ongoing, Progressing  Goal: Absence of Hospital-Acquired Illness or Injury  Outcome: Ongoing, Progressing  Intervention: Identify and Manage Fall Risk  Flowsheets (Taken 5/17/2023 1105)  Safety Promotion/Fall Prevention:   room near unit station   nonskid shoes/socks when out of bed   in recliner, wheels locked  Intervention: Prevent Infection  Flowsheets (Taken 5/17/2023 1105)  Infection Prevention:   hand hygiene promoted   equipment surfaces disinfected  Goal: Optimal Comfort and Wellbeing  Outcome: Ongoing, Progressing  Intervention: Monitor Pain and Promote Comfort  Flowsheets (Taken 5/17/2023 1105)  Pain Management Interventions: quiet environment facilitated  Intervention: Provide Person-Centered Care  Flowsheets (Taken 5/17/2023 1105)  Trust Relationship/Rapport:   questions encouraged   care explained   choices provided   reassurance provided   thoughts/feelings acknowledged   emotional support provided   empathic listening provided   questions answered

## 2023-05-17 NOTE — TELEPHONE ENCOUNTER
Hello, this is Obey Denton, clinical pharmacist with [facility] that is part of your care team.  We have begun working on your prescription that your doctor has sent us. Our next steps include:     Working with your insurance company to obtain approval for your medication  Working with you to ensure your medication is affordable     We will be calling you along the way with updates on your medication but if you have any concerns or receive information that you would like to discuss please reach us at [phone].    Welcome call outcome: Patient/caregiver reached

## 2023-05-18 ENCOUNTER — TELEPHONE (OUTPATIENT)
Dept: HEMATOLOGY/ONCOLOGY | Facility: CLINIC | Age: 63
End: 2023-05-18
Payer: COMMERCIAL

## 2023-05-18 ENCOUNTER — DOCUMENTATION ONLY (OUTPATIENT)
Dept: HEMATOLOGY/ONCOLOGY | Facility: CLINIC | Age: 63
End: 2023-05-18
Payer: COMMERCIAL

## 2023-05-18 RX ORDER — LENALIDOMIDE 10 MG/1
1 CAPSULE ORAL DAILY
Qty: 14 EACH | Refills: 5 | Status: SHIPPED | OUTPATIENT
Start: 2023-05-18 | End: 2023-05-24 | Stop reason: SDUPTHER

## 2023-05-18 NOTE — PROGRESS NOTES
"Called pt to f/u after C1D1. States it went well, but had some questions about oral chemo. Pt states MD changed her dose and [facility] called her about filling the med. Explained to her again that med has to be filled by specialty pharmacy, not local retail. Pt sure that [facility] filled it. Told her it went to [facility] but I will check with both. R/S next weeks lab appt to  so that she doesn't have to wait for lab results on day of provider visit. Wants to follow Dr. Dela Cruz after next weeks visit if possible, told her MD likes [facility] to follow also in the event MD not available, she states that's fine. Reminded her to bring meds with her next week for the MD to review. All questions answered, she voiced understanding and knows to call if she has questions. Called [facility], staff states no active scripts of Dr. Dela Cruz on file. Called [facility], "Yuleno" states they only have the 25mg script for lenalidomide, told him I see that MD sent it to [facility], will ask Dr. Dela Cruz to send to [facility]. Dr. Dela Cruz sent 10mg lenalidomide script to [facility], will follow.   Oncology Navigation   Intake  Date of Diagnosis: 23  Cancer Type: Transplant; Myeloma  Internal / External Referral: Internal  Date of Referral: 05/10/23  Initial Nurse Navigator Contact: 05/15/23  Referral to Initial Contact Timeline (days): 5  Date Worked: 23  Reason if booked > 7 days after scheduling: Additional tests/procedures; Patient request     Treatment  Current Status: Active       Medical Oncologist: Dr. FRITZ Dela Cruz  Chemotherapy: Initiated  Chemotherapy Regimen: Velcade (Durvalumab possible pending FISH)  Oral Therapy: Initiated                       Acuity  Systemic Treatment - predicted or initiated: Chemotherapy Regimen with Multiple drugs (+1)  ECO  Comorbidities in Medical History: 2   Needed: 0  Support: 0  Verbalizes Financial Concerns: 1  Transportation: 0  Psychological Factors (+1 each): Emotional during " conversation  Verbalizes the need for more education: 1  Navigation Acuity: 3     Follow Up  Follow up in 6 days (on 5/24/2023) for 1 wk sarah w/labs.

## 2023-05-19 ENCOUNTER — OFFICE VISIT (OUTPATIENT)
Dept: INTERNAL MEDICINE | Facility: CLINIC | Age: 63
End: 2023-05-19
Payer: COMMERCIAL

## 2023-05-19 VITALS
WEIGHT: 124.31 LBS | DIASTOLIC BLOOD PRESSURE: 68 MMHG | HEIGHT: 64 IN | BODY MASS INDEX: 21.22 KG/M2 | OXYGEN SATURATION: 98 % | SYSTOLIC BLOOD PRESSURE: 124 MMHG | RESPIRATION RATE: 15 BRPM

## 2023-05-19 DIAGNOSIS — R10.9 LEFT FLANK PAIN: ICD-10-CM

## 2023-05-19 DIAGNOSIS — R30.0 DYSURIA: Primary | ICD-10-CM

## 2023-05-19 LAB
BACTERIA #/AREA URNS HPF: ABNORMAL /HPF
BILIRUB UR QL STRIP: NEGATIVE
CLARITY UR: ABNORMAL
COLOR UR: YELLOW
GLUCOSE UR QL STRIP: NEGATIVE
HGB UR QL STRIP: NEGATIVE
KETONES UR QL STRIP: NEGATIVE
LEUKOCYTE ESTERASE UR QL STRIP: ABNORMAL
MICROSCOPIC COMMENT: ABNORMAL
NITRITE UR QL STRIP: NEGATIVE
PH UR STRIP: 6 [PH] (ref 5–8)
PROT UR QL STRIP: ABNORMAL
RBC #/AREA URNS HPF: 3 /HPF (ref 0–4)
SP GR UR STRIP: 1.02 (ref 1–1.03)
SQUAMOUS #/AREA URNS HPF: 4 /HPF
UNIDENT CRYS URNS QL MICRO: ABNORMAL
URN SPEC COLLECT METH UR: ABNORMAL
UROBILINOGEN UR STRIP-ACNC: NEGATIVE EU/DL
WBC #/AREA URNS HPF: 14 /HPF (ref 0–5)
WBC CLUMPS URNS QL MICRO: ABNORMAL

## 2023-05-19 PROCEDURE — 99214 PR OFFICE/OUTPT VISIT, EST, LEVL IV, 30-39 MIN: ICD-10-PCS | Mod: S$GLB,,, | Performed by: NURSE PRACTITIONER

## 2023-05-19 PROCEDURE — 4010F ACE/ARB THERAPY RXD/TAKEN: CPT | Mod: CPTII,S$GLB,, | Performed by: NURSE PRACTITIONER

## 2023-05-19 PROCEDURE — 87086 URINE CULTURE/COLONY COUNT: CPT | Performed by: NURSE PRACTITIONER

## 2023-05-19 PROCEDURE — 3078F DIAST BP <80 MM HG: CPT | Mod: CPTII,S$GLB,, | Performed by: NURSE PRACTITIONER

## 2023-05-19 PROCEDURE — 3008F PR BODY MASS INDEX (BMI) DOCUMENTED: ICD-10-PCS | Mod: CPTII,S$GLB,, | Performed by: NURSE PRACTITIONER

## 2023-05-19 PROCEDURE — 3078F PR MOST RECENT DIASTOLIC BLOOD PRESSURE < 80 MM HG: ICD-10-PCS | Mod: CPTII,S$GLB,, | Performed by: NURSE PRACTITIONER

## 2023-05-19 PROCEDURE — 1159F PR MEDICATION LIST DOCUMENTED IN MEDICAL RECORD: ICD-10-PCS | Mod: CPTII,S$GLB,, | Performed by: NURSE PRACTITIONER

## 2023-05-19 PROCEDURE — 3044F HG A1C LEVEL LT 7.0%: CPT | Mod: CPTII,S$GLB,, | Performed by: NURSE PRACTITIONER

## 2023-05-19 PROCEDURE — 3008F BODY MASS INDEX DOCD: CPT | Mod: CPTII,S$GLB,, | Performed by: NURSE PRACTITIONER

## 2023-05-19 PROCEDURE — 99214 OFFICE O/P EST MOD 30 MIN: CPT | Mod: S$GLB,,, | Performed by: NURSE PRACTITIONER

## 2023-05-19 PROCEDURE — 3044F PR MOST RECENT HEMOGLOBIN A1C LEVEL <7.0%: ICD-10-PCS | Mod: CPTII,S$GLB,, | Performed by: NURSE PRACTITIONER

## 2023-05-19 PROCEDURE — 99999 PR PBB SHADOW E&M-EST. PATIENT-LVL III: ICD-10-PCS | Mod: PBBFAC,,, | Performed by: NURSE PRACTITIONER

## 2023-05-19 PROCEDURE — 1159F MED LIST DOCD IN RCRD: CPT | Mod: CPTII,S$GLB,, | Performed by: NURSE PRACTITIONER

## 2023-05-19 PROCEDURE — 99999 PR PBB SHADOW E&M-EST. PATIENT-LVL III: CPT | Mod: PBBFAC,,, | Performed by: NURSE PRACTITIONER

## 2023-05-19 PROCEDURE — 4010F PR ACE/ARB THEARPY RXD/TAKEN: ICD-10-PCS | Mod: CPTII,S$GLB,, | Performed by: NURSE PRACTITIONER

## 2023-05-19 PROCEDURE — 3074F PR MOST RECENT SYSTOLIC BLOOD PRESSURE < 130 MM HG: ICD-10-PCS | Mod: CPTII,S$GLB,, | Performed by: NURSE PRACTITIONER

## 2023-05-19 PROCEDURE — 81000 URINALYSIS NONAUTO W/SCOPE: CPT | Performed by: NURSE PRACTITIONER

## 2023-05-19 PROCEDURE — 1160F RVW MEDS BY RX/DR IN RCRD: CPT | Mod: CPTII,S$GLB,, | Performed by: NURSE PRACTITIONER

## 2023-05-19 PROCEDURE — 3074F SYST BP LT 130 MM HG: CPT | Mod: CPTII,S$GLB,, | Performed by: NURSE PRACTITIONER

## 2023-05-19 PROCEDURE — 1160F PR REVIEW ALL MEDS BY PRESCRIBER/CLIN PHARMACIST DOCUMENTED: ICD-10-PCS | Mod: CPTII,S$GLB,, | Performed by: NURSE PRACTITIONER

## 2023-05-19 RX ORDER — NITROFURANTOIN 25; 75 MG/1; MG/1
100 CAPSULE ORAL 2 TIMES DAILY
Qty: 14 CAPSULE | Refills: 0 | Status: SHIPPED | OUTPATIENT
Start: 2023-05-19 | End: 2023-05-31

## 2023-05-19 NOTE — PROGRESS NOTES
Ana GONZALEZ Levan  05/19/2023  [id number]    Kristin Sibley MD  Patient Care Team:  Kristin Sibley MD as PCP - General (Internal Medicine)  Abbie Amor, RN as Oncology Navigator  Arminda De Leon, RN as Oncology Navigator (Hematology and Oncology)  Negrita Wetzel, PharmD as Pharmacist (Pharmacist)          Visit Type:an urgent visit for a new problem    Chief Complaint:  No chief complaint on file.      History of Present Illness:    61 yo female presents with co left flank pain and dysuria for one week.  Denies fever, cva tenderness.  Takes one injection once a week and chemo pill everyday. MD was concerned about kidney function.       History:  Past Medical History:   Diagnosis Date    Allergy     Amblyopia OS    Anxiety     Asthma     COPD (chronic obstructive pulmonary disease)     GERD (gastroesophageal reflux disease)     Hyperlipidemia     Hypertension     Thyroid disease      Past Surgical History:   Procedure Laterality Date    APPENDECTOMY      BUNIONECTOMY      COLONOSCOPY N/A 12/4/2019    Procedure: COLONOSCOPY;  Surgeon: Danny Matos III, MD;  Location: [facility];  Service: Endoscopy;  Laterality: N/A;    COLONOSCOPY N/A 10/24/2022    Procedure: COLONOSCOPY;  Surgeon: Danny Matos III, MD;  Location: [facility];  Service: Endoscopy;  Laterality: N/A;    ESOPHAGOGASTRODUODENOSCOPY N/A 10/24/2022    Procedure: EGD (ESOPHAGOGASTRODUODENOSCOPY);  Surgeon: Danny Matos III, MD;  Location: [facility];  Service: Endoscopy;  Laterality: N/A;    HYSTERECTOMY      PARTIAL//still with ovaries    neck fusion  08/2017    THYROIDECTOMY       Family History   Problem Relation Age of Onset    Hypertension Mother     Diabetes Mother     Diabetes Father     Stroke Maternal Grandmother     Prostate cancer Maternal Grandfather     Mental illness Son     Pancreatic cancer Maternal Uncle     Mental illness Other     Pancreatic cancer Other     Ovarian cancer Maternal Cousin      Social History      Socioeconomic History    Marital status:     Number of children: 2   Occupational History     Employer: [employer]   Tobacco Use    Smoking status: Every Day     Packs/day: 0.50     Years: 50.00     Pack years: 25.00     Types: Cigarettes    Smokeless tobacco: Never   Substance and Sexual Activity    Alcohol use: Not Currently     Comment: quit    Drug use: No    Sexual activity: Never     Patient Active Problem List   Diagnosis    COPD with asthma    GERD (gastroesophageal reflux disease)    Primary hypothyroidism    Tobacco abuse disorder    PVD (peripheral vascular disease)    Anxiety    Dyslipidemia    Claudication    Essential hypertension    Allergic rhinitis    Pain in both lower extremities    Anisometropic amblyopia of left eye    Nonrheumatic aortic valve insufficiency    Colon polyps    Precordial pain    Tachycardia    Pain of right lower extremity    Difficulty walking    Low back pain, unspecified    Multiple myeloma     Review of patient's allergies indicates:   Allergen Reactions    Hydrocodone-acetaminophen Other (See Comments)     Causes pt to feel extremely sick        The following were reviewed at this visit: active problem list, medication list, allergies, family history, social history, and health maintenance.    Medications:  Current Outpatient Medications on File Prior to Visit   Medication Sig Dispense Refill    acyclovir (ZOVIRAX) 400 MG tablet Take 1 tablet (400 mg total) by mouth 2 (two) times daily. 60 tablet 11    albuterol (PROVENTIL HFA) 90 mcg/actuation inhaler Inhale 2 puffs into the lungs every 6 (six) hours as needed for Wheezing. Rescue 18 g 0    ALPRAZolam (XANAX) 2 MG Tab TAKE 1 TABLET BY MOUTH TWICE DAILY AS NEEDED FOR ANXIETY 60 tablet 0    amlodipine-benazepril 2.5-10 mg (LOTREL) 2.5-10 mg per capsule TAKE 1 CAPSULE BY MOUTH EVERY DAY 90 capsule 3    aspirin (ECOTRIN) 81 MG EC tablet Take 1 tablet (81 mg total) by mouth once daily. 30 tablet 4    baclofen (LIORESAL) 10  MG tablet baclofen Take 1 Tablet (oral) 3 times per day for 5 days 20230202 tablet 3 times per day oral 5 days active 10 mg      calcium carbonate-vit D3-min 600 mg calcium- 400 unit Tab Take 1 tablet by mouth once. for 1 dose (Patient not taking: Reported on 4/27/2023) 1 tablet 0    dexAMETHasone (DECADRON) 4 MG Tab Take 10 tablets (40 mg total) by mouth every 7 days. ( once weekly by mouth on days 1, 8 and 15 every 21 day cycle) 30 tablet 4    diazePAM (VALIUM) 5 MG tablet Take 1 tablet by mouth 30 minutes prior to MRI to help decrease anxiety. 1 tablet 0    fluticasone propionate (FLONASE) 50 mcg/actuation nasal spray 1 spray (50 mcg total) by Each Nostril route once daily. 1 mL 1    fluticasone-salmeterol diskus inhaler 250-50 mcg Inhale 1 puff into the lungs 2 (two) times daily. Controller 180 each 1    HYDROcodone-acetaminophen (NORCO) 5-325 mg per tablet Take 1 tablet by mouth every 6 (six) hours as needed for Pain. 90 tablet 0    lenalidomide 10 mg Cap Take 1 capsule by mouth once daily on days 1-14 of each 21 day cycle.. 14 each 5    levothyroxine (SYNTHROID) 100 MCG tablet TAKE 1 TABLET(100 MCG) BY MOUTH BEFORE BREAKFAST 90 tablet 1    linaCLOtide (LINZESS) 72 mcg Cap capsule Take 2 capsules (144 mcg total) by mouth before breakfast. 30 capsule 5    methocarbamoL (ROBAXIN) 500 MG Tab Take 1 tablet (500 mg total) by mouth 2 (two) times daily as needed. 60 tablet 1    metoprolol succinate (TOPROL-XL) 50 MG 24 hr tablet Take 1 tablet (50 mg total) by mouth once daily. 30 tablet 11    montelukast (SINGULAIR) 10 mg tablet Take 1 tablet (10 mg total) by mouth every evening. 90 tablet 0    nicotine (NICODERM CQ) 21 mg/24 hr Place 1 patch onto the skin once daily. 28 patch 0    ondansetron (ZOFRAN) 8 MG tablet Take 1 tablet (8 mg total) by mouth every 12 (twelve) hours as needed for Nausea. 30 tablet 2    oxyCODONE-acetaminophen (PERCOCET) 5-325 mg per tablet Take 1 tablet by mouth every 4 (four) hours as needed  for Pain. 90 tablet 0    pantoprazole (PROTONIX) 40 MG tablet TAKE 1 TABLET(40 MG) BY MOUTH EVERY DAY 90 tablet 3    promethazine (PHENERGAN) 25 MG tablet Take 1 tablet (25 mg total) by mouth every 4 (four) hours. 45 tablet 2    promethazine-codeine 6.25-10 mg/5 ml (PHENERGAN WITH CODEINE) 6.25-10 mg/5 mL syrup Take 5 mLs by mouth every 4 (four) hours as needed for Cough. 118 mL 0    rosuvastatin (CRESTOR) 10 MG tablet TAKE 1 TABLET(10 MG) BY MOUTH EVERY DAY 90 tablet 3    traZODone (DESYREL) 50 MG tablet Take 1 tablet (50 mg total) by mouth every evening. 30 tablet 11     Current Facility-Administered Medications on File Prior to Visit   Medication Dose Route Frequency Provider Last Rate Last Admin    ketorolac injection 60 mg  60 mg Intramuscular 1 time in Clinic/HOD Kristin Sibley MD        lactated ringers infusion   Intravenous Continuous Danny Matos III, MD           Medications have been reviewed and reconciled with patient at this visit.  Barriers to medications reviewed with patient.    Adverse reactions to current medications reviewed with patient..    Over the counter medications reviewed and reconciled with patient.    Exam:  Wt Readings from Last 3 Encounters:   05/17/23 53.4 kg (117 lb 11.6 oz)   05/11/23 56 kg (123 lb 7.3 oz)   05/10/23 56.4 kg (124 lb 5.4 oz)     Temp Readings from Last 3 Encounters:   05/17/23 98.1 °F (36.7 °C)   05/17/23 97.7 °F (36.5 °C) (Temporal)   05/10/23 97.4 °F (36.3 °C)     BP Readings from Last 3 Encounters:   05/17/23 111/60   05/17/23 113/70   05/11/23 127/75     Pulse Readings from Last 3 Encounters:   05/17/23 81   05/17/23 82   05/11/23 (!) 10     There is no height or weight on file to calculate BMI.      Review of Systems   Constitutional:  Positive for weight loss.   Gastrointestinal:  Positive for constipation and nausea.   Genitourinary:  Positive for dysuria, flank pain, frequency and urgency.   Skin:  Negative for rash.   Physical Exam  Vitals and  nursing note reviewed.   Constitutional:       Appearance: Normal appearance. She is normal weight.   HENT:      Head: Normocephalic and atraumatic.      Right Ear: Tympanic membrane, ear canal and external ear normal.      Left Ear: Tympanic membrane, ear canal and external ear normal.      Nose: Nose normal.      Mouth/Throat:      Mouth: Mucous membranes are moist.      Pharynx: Oropharynx is clear.   Eyes:      Extraocular Movements: Extraocular movements intact.      Conjunctiva/sclera: Conjunctivae normal.      Pupils: Pupils are equal, round, and reactive to light.   Cardiovascular:      Rate and Rhythm: Normal rate and regular rhythm.      Pulses: Normal pulses.      Heart sounds: Normal heart sounds.   Pulmonary:      Effort: Pulmonary effort is normal.      Breath sounds: Normal breath sounds.   Abdominal:      General: Bowel sounds are normal.      Palpations: Abdomen is soft.   Musculoskeletal:         General: Normal range of motion.      Cervical back: Normal range of motion and neck supple.   Skin:     General: Skin is warm and dry.      Capillary Refill: Capillary refill takes less than 2 seconds.   Neurological:      General: No focal deficit present.      Mental Status: She is alert and oriented to person, place, and time.   Psychiatric:         Mood and Affect: Mood normal.         Behavior: Behavior normal.         Thought Content: Thought content normal.         Judgment: Judgment normal.       Laboratory Reviewed ({Yes)  Lab Results   Component Value Date    WBC 7.08 05/17/2023    HGB 12.2 05/17/2023    HCT 37.2 05/17/2023     05/17/2023    CHOL 176 08/25/2022    TRIG 47 08/25/2022    HDL 74 08/25/2022    ALT 34 05/17/2023    AST 27 05/17/2023     05/17/2023    K 4.4 05/17/2023     05/17/2023    CREATININE 1.0 05/17/2023    BUN 16 05/17/2023    CO2 27 05/17/2023    TSH 0.792 06/10/2022    INR 1.0 03/10/2023    HGBA1C 5.7 (H) 01/10/2023       Diagnoses and all orders for this  visit:    Dysuria  -     Urinalysis, Reflex to Urine Culture Urine, Clean Catch                Care Plan/Goals: Reviewed    Goals    None         Follow up: No follow-ups on file.    After visit summary was printed and given to patient upon discharge today.  Patient goals and care plan are included in After Visit Summary.  Answers submitted by the patient for this visit:  Painful Urination Questionnaire (Submitted on 5/19/2023)  Chief Complaint: Dysuria  Chronicity: new  Frequency: intermittently  Progression since onset: gradually worsening  Pain quality: aching  Pain - numeric: 4/10  Fever: no fever  Sexually active?: No  History of pyelonephritis?: No  possible pregnancy: No  sweats: Yes  withholding: No  behavior changes: No  Treatments tried: acetaminophen, increased fluids  Improvement on treatment: no relief  Pain severity: moderate  catheterization: No  diabetes insipidus: No  diabetes mellitus: No  genitourinary reflux: No  hypertension: Yes  recurrent UTIs: No  single kidney: No  STD: No  urinary stasis: No  urological procedure: No  kidney stones: No

## 2023-05-21 LAB — BACTERIA UR CULT: NORMAL

## 2023-05-22 ENCOUNTER — HOSPITAL ENCOUNTER (OUTPATIENT)
Dept: RADIOLOGY | Facility: HOSPITAL | Age: 63
Discharge: HOME | End: 2023-05-22
Attending: NEUROLOGICAL SURGERY
Payer: COMMERCIAL

## 2023-05-22 ENCOUNTER — TELEPHONE (OUTPATIENT)
Dept: RADIATION ONCOLOGY | Facility: CLINIC | Age: 63
End: 2023-05-22
Payer: COMMERCIAL

## 2023-05-22 DIAGNOSIS — M47.816 LUMBAR SPONDYLOSIS: ICD-10-CM

## 2023-05-22 DIAGNOSIS — M48.07 SPINAL STENOSIS, LUMBOSACRAL REGION: ICD-10-CM

## 2023-05-22 DIAGNOSIS — M54.6 PAIN IN THORACIC SPINE: ICD-10-CM

## 2023-05-22 PROCEDURE — 72157 MRI CHEST SPINE W/O & W/DYE: CPT | Mod: 26,,, | Performed by: RADIOLOGY

## 2023-05-22 PROCEDURE — 72157 MRI THORACIC SPINE W WO CONTRAST: ICD-10-PCS | Mod: 26,,, | Performed by: RADIOLOGY

## 2023-05-22 PROCEDURE — 72158 MRI LUMBAR SPINE W/O & W/DYE: CPT | Mod: 26,,, | Performed by: RADIOLOGY

## 2023-05-22 PROCEDURE — A9585 GADOBUTROL INJECTION: HCPCS | Mod: PN | Performed by: NEUROLOGICAL SURGERY

## 2023-05-22 PROCEDURE — 72158 MRI LUMBAR SPINE W/O & W/DYE: CPT | Mod: TC,PN

## 2023-05-22 PROCEDURE — 72158 MRI LUMBAR SPINE W WO CONTRAST: ICD-10-PCS | Mod: 26,,, | Performed by: RADIOLOGY

## 2023-05-22 PROCEDURE — 72157 MRI CHEST SPINE W/O & W/DYE: CPT | Mod: TC,PN

## 2023-05-22 PROCEDURE — 25500020 PHARM REV CODE 255: Mod: PN | Performed by: NEUROLOGICAL SURGERY

## 2023-05-22 RX ORDER — METHOCARBAMOL 500 MG/1
TABLET, FILM COATED ORAL
Qty: 60 TABLET | Refills: 1 | Status: SHIPPED | OUTPATIENT
Start: 2023-05-22 | End: 2023-06-23 | Stop reason: SDUPTHER

## 2023-05-22 RX ORDER — GADOBUTROL 604.72 MG/ML
6 INJECTION INTRAVENOUS
Status: COMPLETED | OUTPATIENT
Start: 2023-05-22 | End: 2023-05-22

## 2023-05-22 RX ORDER — METOPROLOL SUCCINATE 50 MG/1
TABLET, EXTENDED RELEASE ORAL
Qty: 90 TABLET | Refills: 3 | Status: SHIPPED | OUTPATIENT
Start: 2023-05-22 | End: 2023-09-11 | Stop reason: SDUPTHER

## 2023-05-22 RX ADMIN — GADOBUTROL 6 ML: 604.72 INJECTION INTRAVENOUS at 11:05

## 2023-05-22 NOTE — TELEPHONE ENCOUNTER
Called patient to see if she wanted to schedule Rad Onc consult & she said she is having an mri on 3/23/23 & that Dr Smith is wanting to try to fix her back with concrete but she won't know anything more until after she has the mri. She asked that I call her back after the mri.

## 2023-05-23 ENCOUNTER — OFFICE VISIT (OUTPATIENT)
Dept: NEUROSURGERY | Facility: CLINIC | Age: 63
End: 2023-05-23
Payer: COMMERCIAL

## 2023-05-23 ENCOUNTER — LAB VISIT (OUTPATIENT)
Dept: LAB | Facility: HOSPITAL | Age: 63
End: 2023-05-23
Attending: INTERNAL MEDICINE
Payer: COMMERCIAL

## 2023-05-23 VITALS
HEART RATE: 94 BPM | DIASTOLIC BLOOD PRESSURE: 74 MMHG | WEIGHT: 124 LBS | SYSTOLIC BLOOD PRESSURE: 116 MMHG | RESPIRATION RATE: 18 BRPM | HEIGHT: 64 IN | BODY MASS INDEX: 21.17 KG/M2

## 2023-05-23 DIAGNOSIS — S32.010A CLOSED COMPRESSION FRACTURE OF BODY OF L1 VERTEBRA: ICD-10-CM

## 2023-05-23 DIAGNOSIS — M48.07 SPINAL STENOSIS, LUMBOSACRAL REGION: Primary | ICD-10-CM

## 2023-05-23 DIAGNOSIS — C90.00 MULTIPLE MYELOMA, REMISSION STATUS UNSPECIFIED: ICD-10-CM

## 2023-05-23 LAB
ALBUMIN SERPL BCP-MCNC: 3.3 G/DL (ref 3.5–5.2)
ALP SERPL-CCNC: 74 U/L (ref 55–135)
ALT SERPL W/O P-5'-P-CCNC: 40 U/L (ref 10–44)
ANION GAP SERPL CALC-SCNC: 12 MMOL/L (ref 8–16)
AST SERPL-CCNC: 28 U/L (ref 10–40)
BASOPHILS # BLD AUTO: 0.05 K/UL (ref 0–0.2)
BASOPHILS NFR BLD: 0.6 % (ref 0–1.9)
BILIRUB SERPL-MCNC: 0.5 MG/DL (ref 0.1–1)
BUN SERPL-MCNC: 20 MG/DL (ref 8–23)
CALCIUM SERPL-MCNC: 9.1 MG/DL (ref 8.7–10.5)
CHLORIDE SERPL-SCNC: 104 MMOL/L (ref 95–110)
CO2 SERPL-SCNC: 21 MMOL/L (ref 23–29)
CREAT SERPL-MCNC: 1 MG/DL (ref 0.5–1.4)
DIFFERENTIAL METHOD: ABNORMAL
EOSINOPHIL # BLD AUTO: 0.7 K/UL (ref 0–0.5)
EOSINOPHIL NFR BLD: 8.8 % (ref 0–8)
ERYTHROCYTE [DISTWIDTH] IN BLOOD BY AUTOMATED COUNT: 12.8 % (ref 11.5–14.5)
EST. GFR  (NO RACE VARIABLE): >60 ML/MIN/1.73 M^2
GLUCOSE SERPL-MCNC: 134 MG/DL (ref 70–110)
HCT VFR BLD AUTO: 35.3 % (ref 37–48.5)
HGB BLD-MCNC: 11.5 G/DL (ref 12–16)
IMM GRANULOCYTES # BLD AUTO: 0.02 K/UL (ref 0–0.04)
IMM GRANULOCYTES NFR BLD AUTO: 0.3 % (ref 0–0.5)
LYMPHOCYTES # BLD AUTO: 2.8 K/UL (ref 1–4.8)
LYMPHOCYTES NFR BLD: 34.4 % (ref 18–48)
MCH RBC QN AUTO: 31.3 PG (ref 27–31)
MCHC RBC AUTO-ENTMCNC: 32.6 G/DL (ref 32–36)
MCV RBC AUTO: 96 FL (ref 82–98)
MONOCYTES # BLD AUTO: 0.8 K/UL (ref 0.3–1)
MONOCYTES NFR BLD: 9.6 % (ref 4–15)
NEUTROPHILS # BLD AUTO: 3.7 K/UL (ref 1.8–7.7)
NEUTROPHILS NFR BLD: 46.3 % (ref 38–73)
NRBC BLD-RTO: 0 /100 WBC
PLATELET # BLD AUTO: 254 K/UL (ref 150–450)
PMV BLD AUTO: 10 FL (ref 9.2–12.9)
POTASSIUM SERPL-SCNC: 4.1 MMOL/L (ref 3.5–5.1)
PROT SERPL-MCNC: 9.5 G/DL (ref 6–8.4)
RBC # BLD AUTO: 3.68 M/UL (ref 4–5.4)
SODIUM SERPL-SCNC: 137 MMOL/L (ref 136–145)
WBC # BLD AUTO: 7.99 K/UL (ref 3.9–12.7)

## 2023-05-23 PROCEDURE — 3044F PR MOST RECENT HEMOGLOBIN A1C LEVEL <7.0%: ICD-10-PCS | Mod: CPTII,S$GLB,, | Performed by: NEUROLOGICAL SURGERY

## 2023-05-23 PROCEDURE — 3078F PR MOST RECENT DIASTOLIC BLOOD PRESSURE < 80 MM HG: ICD-10-PCS | Mod: CPTII,S$GLB,, | Performed by: NEUROLOGICAL SURGERY

## 2023-05-23 PROCEDURE — 99999 PR PBB SHADOW E&M-EST. PATIENT-LVL III: ICD-10-PCS | Mod: PBBFAC,,, | Performed by: NEUROLOGICAL SURGERY

## 2023-05-23 PROCEDURE — 3078F DIAST BP <80 MM HG: CPT | Mod: CPTII,S$GLB,, | Performed by: NEUROLOGICAL SURGERY

## 2023-05-23 PROCEDURE — 85025 COMPLETE CBC W/AUTO DIFF WBC: CPT | Performed by: INTERNAL MEDICINE

## 2023-05-23 PROCEDURE — 99999 PR PBB SHADOW E&M-EST. PATIENT-LVL III: CPT | Mod: PBBFAC,,, | Performed by: NEUROLOGICAL SURGERY

## 2023-05-23 PROCEDURE — 4010F ACE/ARB THERAPY RXD/TAKEN: CPT | Mod: CPTII,S$GLB,, | Performed by: NEUROLOGICAL SURGERY

## 2023-05-23 PROCEDURE — 3008F BODY MASS INDEX DOCD: CPT | Mod: CPTII,S$GLB,, | Performed by: NEUROLOGICAL SURGERY

## 2023-05-23 PROCEDURE — 1159F PR MEDICATION LIST DOCUMENTED IN MEDICAL RECORD: ICD-10-PCS | Mod: CPTII,S$GLB,, | Performed by: NEUROLOGICAL SURGERY

## 2023-05-23 PROCEDURE — 99214 PR OFFICE/OUTPT VISIT, EST, LEVL IV, 30-39 MIN: ICD-10-PCS | Mod: S$GLB,,, | Performed by: NEUROLOGICAL SURGERY

## 2023-05-23 PROCEDURE — 3074F PR MOST RECENT SYSTOLIC BLOOD PRESSURE < 130 MM HG: ICD-10-PCS | Mod: CPTII,S$GLB,, | Performed by: NEUROLOGICAL SURGERY

## 2023-05-23 PROCEDURE — 36415 COLL VENOUS BLD VENIPUNCTURE: CPT | Performed by: INTERNAL MEDICINE

## 2023-05-23 PROCEDURE — 80053 COMPREHEN METABOLIC PANEL: CPT | Performed by: INTERNAL MEDICINE

## 2023-05-23 PROCEDURE — 1159F MED LIST DOCD IN RCRD: CPT | Mod: CPTII,S$GLB,, | Performed by: NEUROLOGICAL SURGERY

## 2023-05-23 PROCEDURE — 99214 OFFICE O/P EST MOD 30 MIN: CPT | Mod: S$GLB,,, | Performed by: NEUROLOGICAL SURGERY

## 2023-05-23 PROCEDURE — 4010F PR ACE/ARB THEARPY RXD/TAKEN: ICD-10-PCS | Mod: CPTII,S$GLB,, | Performed by: NEUROLOGICAL SURGERY

## 2023-05-23 PROCEDURE — 84443 ASSAY THYROID STIM HORMONE: CPT | Performed by: INTERNAL MEDICINE

## 2023-05-23 PROCEDURE — 3008F PR BODY MASS INDEX (BMI) DOCUMENTED: ICD-10-PCS | Mod: CPTII,S$GLB,, | Performed by: NEUROLOGICAL SURGERY

## 2023-05-23 PROCEDURE — 3074F SYST BP LT 130 MM HG: CPT | Mod: CPTII,S$GLB,, | Performed by: NEUROLOGICAL SURGERY

## 2023-05-23 PROCEDURE — 3044F HG A1C LEVEL LT 7.0%: CPT | Mod: CPTII,S$GLB,, | Performed by: NEUROLOGICAL SURGERY

## 2023-05-23 NOTE — PROGRESS NOTES
Subjective:      Patient ID: nAa Bonilla is a 62 y.o. female.    HPI  Pt here today for follow-up L1 compression fracture with a history of multiple myeloma  She has a CT PET scan as well as an MRI with and without contrast to review  Since last visit rates pain as 7/10 lower back across the hips  Ambulates unassisted  Denies any weakness radiation into the legs  Denies any numbness or tingling  Denies bowel bladder symptoms    No previous spinal surgery  Currently getting chemotherapy for multiple myeloma    Objective:     Body mass index is 21.28 kg/m².  Vitals:    05/23/23 1503   BP: 116/74   Pulse: 94   Resp: 18        Back:   Tenderness bilaterally Paraspinal muscle spasms     Pain with flexion and extention    Limited secondary to pain Range of motion      Straight leg raise     Motor   Right Right Left Left  Level Group   5  5  L2 Hip flexor (Psoas)   5  5  L3 Leg extension (Quads)   5  5  L4 Dorsiflexion & foot inversion (Tibialis Anterior)   5  5  L5 Great toe extension ( EHL)   5  5  S1 Foot eversion (Gastroc, PL & PB)     Sensation  NL Decreased (R/L/BL) Level Sensation    X  L2 Anterio-medial thigh   X  L3 Medial thigh around knee   X  L4 Medial foot   X  L5 Dorsum foot   X  S1 Lateral foot     Reflex  2+  Patellar tendon (L4)   2+  Achilles tendon (S1)   MR thoracic spine  There are findings of extensive metastatic/neoplastic disease within the thoracic bone marrow.  There are numerous stippled STIR hyperintense and enhancing lesions throughout.  There is a somewhat large lesion within the body of C7, T5, and T8.  No signs of extramedullary extension of malignancy.  Vertebral body height and alignment are normal within the thoracic region.  No abnormal intradural enhancement.  No abnormal cord signal or cord deformity. No significant disc pathology. No spinal canal or neural foraminal stenosis.     Impression:     Widely metastatic disease to the thoracic spinal marrow without extramedullary  extension of malignancy.  No spinal canal or neural foraminal stenosis.    MR lumbar      1. Widespread metastatic/neoplastic disease to bone marrow.  There is extra medullary extension of malignant disease involving the left pedicle of L1 that causes severe left foraminal stenosis at T12-L1 and L1-L2.  It also causes mild spinal stenosis at the mid L1 where the dural canal measures 10 mm.  2. Pathologic compression fracture of L1 with roughly 50% height loss       NM Pet Whole Body- 4/10/23  FINDINGS:  Quality of the study: Adequate.   Head neck: No suspicious foci of elevated FDG uptake demonstrated.   Chest: No suspicious foci of elevated FDG uptake demonstrated.   Abdomen and pelvis: No suspicious foci of elevated FDG uptake demonstrated.   Bones and extremities: Numerous suspicious FDG avid predominately lytic lesions are seen throughout the axial skeleton with involvement of the cervical spine, thoracic spine, lumbar spine, sternum, bilateral ribs, and pelvis.  Index lesions demonstrated SUV max of 13.0 at the sternum, 15.0 at the L1 vertebral body, 10.0 at the T5 vertebral body, 5.7 at the C7 vertebral body, 11.0 along the posterior margins of the right acetabulum, and 10.0 at the right pubic bone.  There does appear to be a mild compression deformity involving the L1 vertebral body.  There also appears to be a small FDG avid lesion in the left femoral head with SUV max of 7.7.   Physiologic uptake of the tracer is present within the brain, salivary glands, myocardium, GI and  tracts.   Incidental CT findings: There are multiple bilateral renal cysts present.     Impression:   1. Numerous FDG avid predominately lytic osseous lesions as above.  Primary differential considerations would include multiple myeloma versus metastatic disease.  2. No FDG avid soft tissue masses or adenopathy demonstrated.  3. Mild pathologic compression deformity of the L1 vertebral body.       Component 1 mo ago   Final Pathologic  Diagnosis     RIGHT ILIAC CREST BONE MARROW ASPIRATE, BONE MARROW CLOT, AND BONE MARROW CORE BIOPSY WITH:     CELLULARITY=40-60%, TRILINEAGE HEMATOPOIETIC ACTIVITY (M/E=2.9:1).   CONSISTENT WITH PLASMA CELL NEOPLASM(60%).  SEE COMMENT.   FOCAL GRADE 1 RETICULAR FIBROSIS.   CONGO RED NEGATIVE.   INCREASED STORAGE IRON.   ADEQUATE NUMBER OF MEGAKARYOCYTES.          Lab Results   Component Value Date    WBC 7.08 05/17/2023    HCT 37.2 05/17/2023           INDEPENDENT INTERPRETATION OF TEST:  Relevant imaging results reviewed and interpreted by me, discussed with the patient and / or family today.  Assessment:     1. Spinal stenosis, lumbosacral region    2. Multiple myeloma, remission status unspecified    3. Closed compression fracture of body of L1 vertebra      Plan:     Spinal stenosis, lumbosacral region  -     Back/Cervical Brace For Home Use    Multiple myeloma, remission status unspecified    Closed compression fracture of body of L1 vertebra    Pathologic compression fracture of L1  History of multiple myeloma currently undergoing chemotherapy  50% loss of height of the lumbar vertebrae  Persistent back pain without radiation into the lower extremities    We discussed watchful waiting versus kyphoplasty and radiofrequency ablation of the tumor for pain control    Patient wishes to proceed with kyphoplasty    Consents signed today    The patient was informed of all benefits and potential risk of the operation including but not limited to:  Pain, infection, bleeding, coma, paralysis, death.  Cerebrospinal fluid leak, failure of any instrumentation, the need for additional procedures in the future. No guarantee was given that this procedure would alleviate all of the symptoms.    Plan for kyphoplasty with RFA of the vertebral body will call with surgical date in the near future    Fausto Smith MD  [facility]

## 2023-05-24 ENCOUNTER — OFFICE VISIT (OUTPATIENT)
Dept: HEMATOLOGY/ONCOLOGY | Facility: CLINIC | Age: 63
End: 2023-05-24
Payer: COMMERCIAL

## 2023-05-24 ENCOUNTER — DOCUMENTATION ONLY (OUTPATIENT)
Dept: HEMATOLOGY/ONCOLOGY | Facility: CLINIC | Age: 63
End: 2023-05-24
Payer: COMMERCIAL

## 2023-05-24 VITALS
TEMPERATURE: 97 F | WEIGHT: 126.13 LBS | OXYGEN SATURATION: 98 % | BODY MASS INDEX: 21.53 KG/M2 | HEIGHT: 64 IN | DIASTOLIC BLOOD PRESSURE: 75 MMHG | SYSTOLIC BLOOD PRESSURE: 113 MMHG | HEART RATE: 87 BPM

## 2023-05-24 DIAGNOSIS — C90.00 MULTIPLE MYELOMA, REMISSION STATUS UNSPECIFIED: ICD-10-CM

## 2023-05-24 DIAGNOSIS — Z51.11 ENCOUNTER FOR ANTINEOPLASTIC CHEMOTHERAPY: ICD-10-CM

## 2023-05-24 LAB — TSH SERPL DL<=0.005 MIU/L-ACNC: 0.85 UIU/ML (ref 0.4–4)

## 2023-05-24 PROCEDURE — 1159F MED LIST DOCD IN RCRD: CPT | Mod: CPTII,S$GLB,,

## 2023-05-24 PROCEDURE — 99999 PR PBB SHADOW E&M-EST. PATIENT-LVL V: ICD-10-PCS | Mod: PBBFAC,,,

## 2023-05-24 PROCEDURE — 3044F PR MOST RECENT HEMOGLOBIN A1C LEVEL <7.0%: ICD-10-PCS | Mod: CPTII,S$GLB,,

## 2023-05-24 PROCEDURE — 4010F ACE/ARB THERAPY RXD/TAKEN: CPT | Mod: CPTII,S$GLB,,

## 2023-05-24 PROCEDURE — 3008F BODY MASS INDEX DOCD: CPT | Mod: CPTII,S$GLB,,

## 2023-05-24 PROCEDURE — 1159F PR MEDICATION LIST DOCUMENTED IN MEDICAL RECORD: ICD-10-PCS | Mod: CPTII,S$GLB,,

## 2023-05-24 PROCEDURE — 3074F PR MOST RECENT SYSTOLIC BLOOD PRESSURE < 130 MM HG: ICD-10-PCS | Mod: CPTII,S$GLB,,

## 2023-05-24 PROCEDURE — 99215 PR OFFICE/OUTPT VISIT, EST, LEVL V, 40-54 MIN: ICD-10-PCS | Mod: S$GLB,,,

## 2023-05-24 PROCEDURE — 3044F HG A1C LEVEL LT 7.0%: CPT | Mod: CPTII,S$GLB,,

## 2023-05-24 PROCEDURE — 99215 OFFICE O/P EST HI 40 MIN: CPT | Mod: S$GLB,,,

## 2023-05-24 PROCEDURE — 3078F DIAST BP <80 MM HG: CPT | Mod: CPTII,S$GLB,,

## 2023-05-24 PROCEDURE — 4010F PR ACE/ARB THEARPY RXD/TAKEN: ICD-10-PCS | Mod: CPTII,S$GLB,,

## 2023-05-24 PROCEDURE — 99999 PR PBB SHADOW E&M-EST. PATIENT-LVL V: CPT | Mod: PBBFAC,,,

## 2023-05-24 PROCEDURE — 3074F SYST BP LT 130 MM HG: CPT | Mod: CPTII,S$GLB,,

## 2023-05-24 PROCEDURE — 3078F PR MOST RECENT DIASTOLIC BLOOD PRESSURE < 80 MM HG: ICD-10-PCS | Mod: CPTII,S$GLB,,

## 2023-05-24 PROCEDURE — 3008F PR BODY MASS INDEX (BMI) DOCUMENTED: ICD-10-PCS | Mod: CPTII,S$GLB,,

## 2023-05-24 RX ORDER — LENALIDOMIDE 10 MG/1
1 CAPSULE ORAL DAILY
Qty: 14 EACH | Refills: 5 | Status: SHIPPED | OUTPATIENT
Start: 2023-05-24 | End: 2023-06-28 | Stop reason: SDUPTHER

## 2023-05-24 NOTE — ASSESSMENT & PLAN NOTE
BMBx : 60 % plasma cells - 4/6/2023  - SPEP/MECHE showed IgG lambda - monoclonal protein 3.19 g/dl - (3/27/2023).  - R-ISS stage I (beta 2 microglobulin 1.9, albumin 3.5, , normal karyotype and no high-risk mutations on fish panel  - PET-CT ( 4/10/2023) - showed extensive lytic lesions in the axial skeleton and mild L1 compression deformity.  - Started with Induction chemotherapy with VRD regimen (Velcde/Revlimid/Decadron) - 05/10/2023   - Started Aspirin daily p.o for thrombosis prophylaxis; Acyclovir 400 mg p.o BID for herpes Zoster prophylaxis .  __________________________________________________    HOLD C1D15  --pt on abx therapy for tx of UTI  Lenalomide  10mg   --pt awaiting delivery from pharmacy--medication reordered and [employer] risk management prescription authorization completed today  --Advised pt to bring in to next appt to confirm receipt and start  --Continue Aspirin; Acyclovir 400 mg p.o BID for herpes Zoster prophylaxis .  -MRI spine and neurosurgery evaluation complete 05/23/23 plan for kyphoplasty pending insurance approval   - Pending evaluation for BM transplant (06/05/2023).   - Plan for Zometa pt continues on Ca+D supplement which she notes causes nausea--recommend trial of Viactiv Ca+D chews. Dental evaluation complete--Zometa on hold for now as pt has upcoming kyphoplasty scheduled.   - MD / LABS / TREATMENT VISIT - 1 WEEK for cycle 1 day 15 treatment and also to follow up on toxicity after being started on chemo

## 2023-05-24 NOTE — PROGRESS NOTES
Subjective:     Patient ID:?Ana Bonilla is a 62 y.o. female.?? MR#: [id number]   ?   PRIMARY ONCOLOGIST:   ?   CHIEF COMPLAINT: lab review/assessment for chemo ?????   ?   ONCOLOGIC DIAGNOSIS: Multiple Myeloma  ?   CURRENT TREATMENT: OP VRD - WEEKLY BORTEZOMIB LENALIDOMIDE DEXAMETHASONE Q3W     HPI  Ms. Bonilla is a 62-year-old  female with past medical history significant for hypertension, COPD, asthma, GERD, hypothyroidism here for follow-up and management of multiple myeloma. BMBx on 4/6/2023 showed 60 % plasma cells. PET-CT on 4/10/2023 showed extensive lytic bony lesions throughout the spine, sternum, bilateral ribs, pelvis and mild compression deformity in L1 vertebral body. SPEP/MECHE on 3/27/2023 showed IgG lambda monoclonal protein - 3.19 g/dl. Quantitative immunoglobulins on 3/27/2023 showed IgG 4,521 mg/dl. UPEP/MECHE and FLC were pending at that time. Labs on 3/27/2023 showed Beta 2 microglobulin 1.9, .  Labs on 4/19/2023 showed Hb, 11.5, WBC 6.3, platelets, BUN 11, Cr 0.9, calcium 9. Pt initiated on VRD 05/10/23.       Interval History: Pt presents today with her  for lab review and assessment prior to Velcade. Lenalidomide dose previously reduced from 25mg to 10mg do to decline in creatinine clearance. She has not received new rx--NN reaching out to [facility] to determine status of shipment. Pt notes she was seen by PCP 05/19 and found to have UTI currently on abx therapy with macrobid for 7 days. She notes all dental work has been completed.       Oncology History   Multiple myeloma   4/20/2023 Initial Diagnosis    Multiple myeloma       5/10/2023 -  Chemotherapy    Treatment Summary   Plan Name: OP VRD - WEEKLY BORTEZOMIB LENALIDOMIDE DEXAMETHASONE Q3W  Treatment Goal: Palliative  Status: Active  Start Date: 5/10/2023  End Date: 9/6/2023 (Planned)  Provider: Abram Velasquez MD  Chemotherapy: bortezomib (VELCADE) injection 2 mg, 1.3 mg/m2 = 2 mg, Subcutaneous, Clinic/HOD 1 time, 1  of 6 cycles  Administration: 2 mg (5/10/2023), 2 mg (5/17/2023)  lenalidomide 25 mg Cap, 25 mg, Oral, Daily, 1 of 1 cycle, Start date: 5/10/2023, End date: 5/17/2023          Social History     Socioeconomic History    Marital status:     Number of children: 2   Occupational History     Employer: [employer]   Tobacco Use    Smoking status: Every Day     Packs/day: 0.50     Years: 50.00     Pack years: 25.00     Types: Cigarettes    Smokeless tobacco: Never   Substance and Sexual Activity    Alcohol use: Not Currently     Comment: quit    Drug use: No    Sexual activity: Never      Family History   Problem Relation Age of Onset    Hypertension Mother     Diabetes Mother     Diabetes Father     Stroke Maternal Grandmother     Prostate cancer Maternal Grandfather     Mental illness Son     Pancreatic cancer Maternal Uncle     Mental illness Other     Pancreatic cancer Other     Ovarian cancer Maternal Cousin       Past Surgical History:   Procedure Laterality Date    APPENDECTOMY      BUNIONECTOMY      COLONOSCOPY N/A 12/4/2019    Procedure: COLONOSCOPY;  Surgeon: Danny Matos III, MD;  Location: [facility];  Service: Endoscopy;  Laterality: N/A;    COLONOSCOPY N/A 10/24/2022    Procedure: COLONOSCOPY;  Surgeon: Danny Matos III, MD;  Location: [facility];  Service: Endoscopy;  Laterality: N/A;    ESOPHAGOGASTRODUODENOSCOPY N/A 10/24/2022    Procedure: EGD (ESOPHAGOGASTRODUODENOSCOPY);  Surgeon: Danny Matos III, MD;  Location: [facility];  Service: Endoscopy;  Laterality: N/A;    HYSTERECTOMY      PARTIAL//still with ovaries    neck fusion  08/2017    THYROIDECTOMY          Review of Systems   Constitutional:  Positive for activity change and fatigue. Negative for appetite change, chills, fever and unexpected weight change.   HENT:  Negative for congestion, dental problem, mouth sores and nosebleeds.    Eyes:  Negative for visual disturbance.   Respiratory:  Negative for cough, choking and chest tightness.     Cardiovascular:  Negative for chest pain, palpitations and leg swelling.   Gastrointestinal:  Negative for abdominal distention, abdominal pain, anal bleeding, blood in stool, constipation, diarrhea, nausea and vomiting.   Endocrine: Negative.    Genitourinary:  Negative for dysuria, frequency, hematuria and urgency.   Musculoskeletal:  Positive for arthralgias and myalgias. Negative for back pain, gait problem and joint swelling.   Skin:  Negative for wound.   Allergic/Immunologic: Negative for immunocompromised state.   Neurological:  Negative for dizziness, light-headedness, numbness and headaches.   Hematological:  Negative for adenopathy. Does not bruise/bleed easily.   Psychiatric/Behavioral:  The patient is nervous/anxious.      ?   A comprehensive 14-point review of systems was reviewed with patient and was negative other than as specified above.   ?     Objective:      Physical Exam  Vitals reviewed.   Constitutional:       Appearance: Normal appearance. She is not ill-appearing.   HENT:      Head: Normocephalic and atraumatic.      Right Ear: External ear normal.      Left Ear: External ear normal.      Mouth/Throat:      Mouth: Mucous membranes are moist.      Pharynx: Oropharynx is clear.   Cardiovascular:      Rate and Rhythm: Normal rate.   Pulmonary:      Effort: Pulmonary effort is normal.   Abdominal:      General: Abdomen is flat.   Genitourinary:     Comments: deferred  Musculoskeletal:         General: Normal range of motion.      Cervical back: Normal range of motion.      Right lower leg: No edema.      Left lower leg: No edema.   Skin:     General: Skin is warm and dry.      Capillary Refill: Capillary refill takes less than 2 seconds.   Neurological:      Mental Status: She is alert and oriented to person, place, and time.      Motor: No weakness.         ?   Vitals:    05/24/23 1030   BP: 113/75   Pulse: 87   Temp: 97 °F (36.1 °C)      ?       ?   Laboratory:  ?   No visits with results  within 1 Day(s) from this visit.   Latest known visit with results is:   Lab Visit on 05/23/2023   Component Date Value Ref Range Status    Sodium 05/23/2023 137  136 - 145 mmol/L Final    Potassium 05/23/2023 4.1  3.5 - 5.1 mmol/L Final    Chloride 05/23/2023 104  95 - 110 mmol/L Final    CO2 05/23/2023 21 (L)  23 - 29 mmol/L Final    Glucose 05/23/2023 134 (H)  70 - 110 mg/dL Final    BUN 05/23/2023 20  8 - 23 mg/dL Final    Creatinine 05/23/2023 1.0  0.5 - 1.4 mg/dL Final    Calcium 05/23/2023 9.1  8.7 - 10.5 mg/dL Final    Total Protein 05/23/2023 9.5 (H)  6.0 - 8.4 g/dL Final    Albumin 05/23/2023 3.3 (L)  3.5 - 5.2 g/dL Final    Total Bilirubin 05/23/2023 0.5  0.1 - 1.0 mg/dL Final    Alkaline Phosphatase 05/23/2023 74  55 - 135 U/L Final    AST 05/23/2023 28  10 - 40 U/L Final    ALT 05/23/2023 40  10 - 44 U/L Final    Anion Gap 05/23/2023 12  8 - 16 mmol/L Final    eGFR 05/23/2023 >60.0  >60 mL/min/1.73 m^2 Final    WBC 05/23/2023 7.99  3.90 - 12.70 K/uL Final    RBC 05/23/2023 3.68 (L)  4.00 - 5.40 M/uL Final    Hemoglobin 05/23/2023 11.5 (L)  12.0 - 16.0 g/dL Final    Hematocrit 05/23/2023 35.3 (L)  37.0 - 48.5 % Final    MCV 05/23/2023 96  82 - 98 fL Final    MCH 05/23/2023 31.3 (H)  27.0 - 31.0 pg Final    MCHC 05/23/2023 32.6  32.0 - 36.0 g/dL Final    RDW 05/23/2023 12.8  11.5 - 14.5 % Final    Platelets 05/23/2023 254  150 - 450 K/uL Final    MPV 05/23/2023 10.0  9.2 - 12.9 fL Final    Immature Granulocytes 05/23/2023 0.3  0.0 - 0.5 % Final    Gran # (ANC) 05/23/2023 3.7  1.8 - 7.7 K/uL Final    Immature Grans (Abs) 05/23/2023 0.02  0.00 - 0.04 K/uL Final    Lymph # 05/23/2023 2.8  1.0 - 4.8 K/uL Final    Mono # 05/23/2023 0.8  0.3 - 1.0 K/uL Final    Eos # 05/23/2023 0.7 (H)  0.0 - 0.5 K/uL Final    Baso # 05/23/2023 0.05  0.00 - 0.20 K/uL Final    nRBC 05/23/2023 0  0 /100 WBC Final    Gran % 05/23/2023 46.3  38.0 - 73.0 % Final    Lymph % 05/23/2023 34.4  18.0 - 48.0 % Final    Mono % 05/23/2023  9.6  4.0 - 15.0 % Final    Eosinophil % 05/23/2023 8.8 (H)  0.0 - 8.0 % Final    Basophil % 05/23/2023 0.6  0.0 - 1.9 % Final    Differential Method 05/23/2023 Automated   Final    TSH 05/23/2023 0.846  0.400 - 4.000 uIU/mL Final      ?   Imaging:    Results for orders placed or performed during the hospital encounter of 04/10/23 (from the past 2160 hour(s))   NM PET CT Whole Body    Impression    1. Numerous FDG avid predominately lytic osseous lesions as above.  Primary differential considerations would include multiple myeloma versus metastatic disease.  2. No FDG avid soft tissue masses or adenopathy demonstrated.  3. Mild pathologic compression deformity of the L1 vertebral body.  This report was flagged in Epic as abnormal.      Electronically signed by: Marino Pollack MD  Date:    04/10/2023  Time:    11:58        Results for orders placed or performed during the hospital encounter of 04/06/23 (from the past 2160 hour(s))   CT Biopsy Bone Marrow (xpd)    Narrative    EXAMINATION:  CT BIOPSY BONE MARROW (XPD)    CLINICAL HISTORY:  Other disorders of plasma-protein metabolism, not elsewhere classified    TECHNIQUE:  Medications:    Moderate sedation using versed and fentanyl was provided with and trained observer monitoring the patient's vital signs and level of consciousness. The total time of sedation was  30 minutes.    1% lidocaine locally    : Donn PAREDES;  [facility] GLORIA assisted in this procedure.    Complications: None    COMPARISON:  None    FINDINGS:  Procedure: The risks and benefits of this procedure were discussed with the patient, written informed consent was obtained.  The patient was placed prone on the CT gantry.  Preprocedural imaging revealed clear route to the right iliac bone.  A suitable skin site for biopsy was selected.  IV fentanyl 50 mcg and Versed 1 mg was given for conscious sedation.  The skin site was prepped and draped in sterile fashion.  The skin was anesthetized with  1% lidocaine.    Using CT guidance an 11-gauge introducer needle was guided into the right iliac bone.  Prior to biopsy appropriate needle positioning was confirmed with CT.  Bone drill was used to obtain secure purchase into the iliac bone marrow space.  A total of 2 10 cc syringes were filled with marrow aspirate.  The coaxial needle was then used to core a bone sample.    Postprocedural imaging was acquired. The site was bandaged sterilely. The patient left the room in stable condition.      Impression    CT guided coaxial core needle right iliac wing bone marrow aspirate and biopsy.    All CT scans at this facility use dose modulation, iterative reconstruction, and/or weight based dosing when appropriate to reduce radiation dose to as low as reasonable achievable.      Electronically signed by: Michael Pop  Date:    04/06/2023  Time:    15:50   Results for orders placed or performed during the hospital encounter of 03/10/23 (from the past 2160 hour(s))   CT Guided Needle Placement    Narrative    EXAMINATION:  CT GUIDED NEEDLE PLACEMENT    CLINICAL HISTORY:  L1 bone lesion;  Low back pain, unspecified    TECHNIQUE:  The CT exam was performed using one or more of the following dose reduction techniques- Automated exposure control, adjustment of the mA and/or kV according to patient size, and/or use of iterative reconstructed technique.    All CT scans at this facility use dose modulation, iterative reconstruction, and/or weight based dosing when appropriate to reduce radiation dose to as low as reasonable achievable.    Medications:    Moderate sedation using versed and fentanyl was administered by a trained observer monitoring the patient's vital signs and level of consciousness. The total time of sedation was 20 minutes.    1% lidocaine locally    : KIET Hill    Complications: None.    COMPARISON:  None    FINDINGS:  Procedure: The risks and benefits of this procedure were discussed, all questions  were answered and informed consent was obtained.  The patient was placed supine on the CT gantry.  Preprocedural imaging was performed.  A suitable skin site for biopsy was selected.  IV fentanyl and Versed was administered for conscious sedation by a trained observer.  The skin site was prepped and draped in sterile fashion.  The skin was anesthetized with 1% lidocaine.    Using CT guidance a 19 gauge introducer needle was guided into the lytic L1 lesion.  Prior to biopsy appropriate needle positioning was confirmed with CT. Five 20 gauge samples were acquired, for were placed in formalin and 1 in RPMI.  The stylet was replaced and the needle was removed.    Postprocedural imaging was acquired. The site was bandaged sterilely. The patient left the room in stable condition.    Findings:    The needle is within the lytic L1 lesion.      Impression    Technically successful CT guided of the lytic L1 lesion.      Electronically signed by: Claus Ambrosio  Date:    03/10/2023  Time:    16:58        ?   Assessment/Plan:     Problem List Items Addressed This Visit          Oncology    Multiple myeloma     BMBx : 60 % plasma cells - 4/6/2023  - SPEP/MECHE showed IgG lambda - monoclonal protein 3.19 g/dl - (3/27/2023).  - R-ISS stage I (beta 2 microglobulin 1.9, albumin 3.5, , normal karyotype and no high-risk mutations on fish panel  - PET-CT ( 4/10/2023) - showed extensive lytic lesions in the axial skeleton and mild L1 compression deformity.  - Started with Induction chemotherapy with VRD regimen (Velcde/Revlimid/Decadron) - 05/10/2023   - Started Aspirin daily p.o for thrombosis prophylaxis; Acyclovir 400 mg p.o BID for herpes Zoster prophylaxis .  __________________________________________________    HOLD C1D15  --pt on abx therapy for tx of UTI  Lenalomide  10mg   --pt awaiting delivery from pharmacy--medication reordered and [employer] risk management prescription authorization completed today  --Advised pt to bring  in to next appt to confirm receipt and start  --Continue Aspirin; Acyclovir 400 mg p.o BID for herpes Zoster prophylaxis .  -MRI spine and neurosurgery evaluation complete 05/23/23 plan for kyphoplasty pending insurance approval   - Pending evaluation for BM transplant (06/05/2023).   - Plan for Zometa pt continues on Ca+D supplement which she notes causes nausea--recommend trial of Viactiv Ca+D chews. Dental evaluation complete--Zometa on hold for now as pt has upcoming kyphoplasty scheduled.   - MD / LABS / TREATMENT VISIT - 1 WEEK for cycle 1 day 15 treatment and also to follow up on toxicity after being started on chemo            Relevant Medications    lenalidomide 10 mg Cap     Other Visit Diagnoses       Encounter for antineoplastic chemotherapy        Relevant Medications    lenalidomide 10 mg Cap           Follow-up in one week with labs prior for reconsideration of C1D15 VRD      Med Onc Chart Routing      Follow up with physician . SCHEDULED   Follow up with [facility]    Infusion scheduling note    Injection scheduling note    Labs    Imaging    Pharmacy appointment    Other referrals              TONYA Pool  Hematology/Oncology

## 2023-05-25 ENCOUNTER — DOCUMENTATION ONLY (OUTPATIENT)
Dept: HEMATOLOGY/ONCOLOGY | Facility: CLINIC | Age: 63
End: 2023-05-25
Payer: COMMERCIAL

## 2023-05-29 ENCOUNTER — TELEPHONE (OUTPATIENT)
Dept: RADIATION ONCOLOGY | Facility: CLINIC | Age: 63
End: 2023-05-29
Payer: COMMERCIAL

## 2023-05-29 NOTE — TELEPHONE ENCOUNTER
Spoke with the patient regarding scheduling the Rad Onc consult & she told me that Dr Smith is suppose to fix her back, she is just waiting on her insurance so she is not interested in coming for a consult in radiation Oncology.

## 2023-05-30 NOTE — PROGRESS NOTES
HEMATOLOGY / ONCOLOGY   CLINIC NOTE     ONCOLOGICAL HISTORY:     Diagnosis:  - Multiple Myeloma IgG lambda    Treatment History:  -     Current Treatment:   - VRD  - Zometa pending dental clearance     Subjective:       Chief Complaint: No chief complaint on file.      MARIBETH Bonilla  62 y.o.  female with past medical history significant for hypertension, COPD, asthma, GERD, hypothyroidism here for follow-up and management of multiple myeloma    Bone marrow biopsy on 4/6/2023 showed 60 % plasma cells. PET-CT on 4/10/2023 showed extensive lytic bony lesions throughout the spine, sternum, bilateral ribs, pelvis and mild compression deformity in L1 vertebral body. SPEP/MECHE on 3/27/2023 showed IgG lambda monoclonal protein - 3.19 g/dl. Quantitative immunoglobulins on 3/27/2023 showed IgG 4,521 mg/dl.  UPEP/MECHE and FLC were pending. Labs on 3/27/2023 showed Beta 2 microglobulin 1.9, .  Labs on 4/19/2023 showed Hb, 11.5, WBC 6.3, platelets, BUN 11, Cr 0.9, calcium 9.    Interval History:     Patient has been complaining intermittent nausea and back pain , following with Neurosurgery, who is planning to have kyphoplasty done next week.  Denies abdominal pain , diarrhea.  Denies any numbness, tingling in the lower extremities.  Denies any urine or bowel incontinence or retention.  Denies any other concerns      Past Medical History:   Diagnosis Date    Allergy     Amblyopia OS    Anxiety     Asthma     COPD (chronic obstructive pulmonary disease)     GERD (gastroesophageal reflux disease)     Hyperlipidemia     Hypertension     Thyroid disease       Past Surgical History:   Procedure Laterality Date    APPENDECTOMY      BUNIONECTOMY      COLONOSCOPY N/A 12/4/2019    Procedure: COLONOSCOPY;  Surgeon: Danny Matos III, MD;  Location: [facility];  Service: Endoscopy;  Laterality: N/A;    COLONOSCOPY N/A 10/24/2022    Procedure: COLONOSCOPY;  Surgeon: Danny Matos III, MD;  Location: [facility];   Service: Endoscopy;  Laterality: N/A;    ESOPHAGOGASTRODUODENOSCOPY N/A 10/24/2022    Procedure: EGD (ESOPHAGOGASTRODUODENOSCOPY);  Surgeon: Danny Matos III, MD;  Location: [facility];  Service: Endoscopy;  Laterality: N/A;    HYSTERECTOMY      PARTIAL//still with ovaries    neck fusion  08/2017    THYROIDECTOMY       Social History     Socioeconomic History    Marital status:     Number of children: 2   Occupational History     Employer: [employer]   Tobacco Use    Smoking status: Every Day     Packs/day: 0.50     Years: 50.00     Pack years: 25.00     Types: Cigarettes    Smokeless tobacco: Never   Substance and Sexual Activity    Alcohol use: Not Currently     Comment: quit    Drug use: No    Sexual activity: Never      Family History   Problem Relation Age of Onset    Hypertension Mother     Diabetes Mother     Diabetes Father     Stroke Maternal Grandmother     Prostate cancer Maternal Grandfather     Mental illness Son     Pancreatic cancer Maternal Uncle     Mental illness Other     Pancreatic cancer Other     Ovarian cancer Maternal Cousin       Review of patient's allergies indicates:   Allergen Reactions    Hydrocodone-acetaminophen Other (See Comments)     Causes pt to feel extremely sick       Review of Systems   Constitutional:  Positive for fatigue. Negative for activity change, chills and fever.   HENT: Negative.     Eyes: Negative.    Respiratory:  Negative for cough and shortness of breath.    Cardiovascular:  Negative for chest pain and leg swelling.   Gastrointestinal:  Positive for nausea. Negative for constipation, diarrhea and vomiting.   Endocrine: Negative.    Genitourinary: Negative.    Musculoskeletal:  Positive for back pain. Negative for arthralgias and myalgias.   Integumentary:  Negative.   Allergic/Immunologic: Negative.    Neurological:  Negative for light-headedness, numbness and headaches.   Hematological: Negative.    Psychiatric/Behavioral: Negative.         Objective:         Vitals:    05/31/23 1102   BP: 128/72   Pulse: 94   Resp: 18   Temp: 98.4 °F (36.9 °C)            Physical Exam  Constitutional:       General: She is not in acute distress.     Appearance: She is well-developed. She is not ill-appearing.   HENT:      Head: Normocephalic and atraumatic.      Mouth/Throat:      Mouth: Mucous membranes are moist.   Cardiovascular:      Rate and Rhythm: Normal rate and regular rhythm.      Heart sounds: Normal heart sounds.   Pulmonary:      Effort: Pulmonary effort is normal. No respiratory distress.      Breath sounds: Normal breath sounds. No wheezing.   Abdominal:      General: Bowel sounds are normal. There is no distension.      Palpations: Abdomen is soft.      Tenderness: There is no abdominal tenderness.   Musculoskeletal:         General: Normal range of motion.      Cervical back: Normal range of motion and neck supple.   Skin:     General: Skin is warm.   Neurological:      General: No focal deficit present.      Mental Status: She is alert and oriented to person, place, and time. Mental status is at baseline.      Cranial Nerves: No cranial nerve deficit.   Psychiatric:         Mood and Affect: Mood normal.         LABS / IMAGING      - 06/06/2023 RIGHT ILIAC CREST BONE MARROW ASPIRATE, BONE MARROW CLOT, AND BONE MARROW CORE BIOPSY WITH:     CELLULARITY=40-60%, TRILINEAGE HEMATOPOIETIC ACTIVITY (M/E=2.9:1).   CONSISTENT WITH PLASMA CELL NEOPLASM(60%).  SEE COMMENT.   FOCAL GRADE 1 RETICULAR FIBROSIS.   CONGO RED NEGATIVE.   INCREASED STORAGE IRON.   ADEQUATE NUMBER OF MEGAKARYOCYTES.    Bone marrow karyotype results: 46, XX[20], female karyotype.     Myeloma fixed cell, high-risk, FISH:  Normal.  The result is within normal limits for 1q duplication, TP53 deletion and IGH rearrangement.       - 04/10/2023 PET: Numerous FDG avid predominately lytic osseous lesions as above.  Primary differential considerations would include multiple myeloma versus metastatic  disease.  2. No FDG avid soft tissue masses or adenopathy demonstrated.  3. Mild pathologic compression deformity of the L1 vertebral body.  This report was flagged in Epic as abnormal.        Electronically signed by: Marino Pollack MD  Date:                                                Assessment:     ECOG SCORE    1 - Restricted in strenuous activity-ambulatory and able to carry out work of a light nature       IgG lambda Multiple myeloma, R-ISS stage I  - BMBx : 60 % plasma cells - 4/6/2023  - SPEP/MECHE showed IgG lambda - monoclonal protein 3.19 g/dl - (3/27/2023).  - R-ISS stage I (beta 2 microglobulin 1.9, albumin 3.5, , normal karyotype and no high-risk mutations on fish panel)  - PET-CT ( 4/10/2023) - showed extensive lytic lesions in the axial skeleton and mild L1 compression deformity.  - Started with Induction chemotherapy with VRD regimen (Velcde/Revlimid/Decadron) - 05/10/2023   - Started Aspirin daily p.o for thrombosis prophylaxis; Acyclovir 400 mg p.o BID for herpes Zoster prophylaxis .    Cancer-related pain / palliative care by specialist  -  checked 05/10/2023   - switch [place] to Percocet as patient is unable to tolerate Norco      Plan:     - Continue C1D15.  Noted to have creatinine clearance of 49, thus changed lenalidomide from 25 mg daily to 10 mg daily.  New prescription sent and patient informed to bring the medication with her to the clinic during next visit for confirmation and to start on the new dose.   - Continue Aspirin; Acyclovir 400 mg p.o BID for herpes Zoster prophylaxis .  - Pending kyphoplasty - next week   - Started Zometa  (and calcium and vit. D ) as dental evaluation complete and discussed with Neurosurgery, can start while pending kyphoplasty  - Pending evaluation for BM transplant (06/05/2023).   - patient has been on Norco for pain relief but is complaining of nausea with it, thus will switch to Percocet  - MD / LABS / TREATMENT VISIT - 1 WEEK for cycle 2  day 1 treatment         Med Onc Chart Routing      Follow up with physician    Follow up with [facility] 1 week.   Infusion scheduling note Treat with bortezomib and Zometa today   Injection scheduling note Weekly bortezomib   Labs CBC, CMP and magnesium   Schedulin day prior to infusion  Preferred lab:  Lab interval:     Imaging    Pharmacy appointment    Other referrals             The patient was seen, interviewed and examined. Pertinent lab and radiology studies were reviewed. Pt instructed to call should develop concerning signs/symptoms or have further questions.       Portions of the record may have been created with voice recognition software. Occasional wrong-word or sound-a-like substitutions may have occurred due to the inherent limitations of voice recognition software. Read the chart carefully and recognize, using context, where substitutions have occurred.    Jose Dela Cruz MD  Hematology / Oncology

## 2023-05-31 ENCOUNTER — LAB VISIT (OUTPATIENT)
Dept: LAB | Facility: HOSPITAL | Age: 63
End: 2023-05-31
Attending: INTERNAL MEDICINE
Payer: COMMERCIAL

## 2023-05-31 ENCOUNTER — DOCUMENTATION ONLY (OUTPATIENT)
Dept: HEMATOLOGY/ONCOLOGY | Facility: CLINIC | Age: 63
End: 2023-05-31
Payer: COMMERCIAL

## 2023-05-31 ENCOUNTER — TELEPHONE (OUTPATIENT)
Dept: NEUROSURGERY | Facility: CLINIC | Age: 63
End: 2023-05-31
Payer: COMMERCIAL

## 2023-05-31 ENCOUNTER — OFFICE VISIT (OUTPATIENT)
Dept: HEMATOLOGY/ONCOLOGY | Facility: CLINIC | Age: 63
End: 2023-05-31
Payer: COMMERCIAL

## 2023-05-31 ENCOUNTER — INFUSION (OUTPATIENT)
Dept: INFUSION THERAPY | Facility: HOSPITAL | Age: 63
End: 2023-05-31
Attending: INTERNAL MEDICINE
Payer: COMMERCIAL

## 2023-05-31 ENCOUNTER — DOCUMENTATION ONLY (OUTPATIENT)
Dept: HEMATOLOGY/ONCOLOGY | Facility: CLINIC | Age: 63
End: 2023-05-31

## 2023-05-31 VITALS
HEIGHT: 64 IN | WEIGHT: 126.13 LBS | OXYGEN SATURATION: 98 % | SYSTOLIC BLOOD PRESSURE: 128 MMHG | TEMPERATURE: 98 F | RESPIRATION RATE: 18 BRPM | BODY MASS INDEX: 21.53 KG/M2 | DIASTOLIC BLOOD PRESSURE: 72 MMHG | HEART RATE: 94 BPM

## 2023-05-31 VITALS
BODY MASS INDEX: 21.53 KG/M2 | TEMPERATURE: 98 F | DIASTOLIC BLOOD PRESSURE: 64 MMHG | RESPIRATION RATE: 18 BRPM | HEART RATE: 76 BPM | HEIGHT: 64 IN | WEIGHT: 126.13 LBS | OXYGEN SATURATION: 98 % | SYSTOLIC BLOOD PRESSURE: 98 MMHG

## 2023-05-31 DIAGNOSIS — Z51.11 ENCOUNTER FOR ANTINEOPLASTIC CHEMOTHERAPY: Primary | ICD-10-CM

## 2023-05-31 DIAGNOSIS — T45.1X5A IMMUNODEFICIENCY DUE TO CHEMOTHERAPY: ICD-10-CM

## 2023-05-31 DIAGNOSIS — C90.00 MULTIPLE MYELOMA, REMISSION STATUS UNSPECIFIED: ICD-10-CM

## 2023-05-31 DIAGNOSIS — Z51.5 PALLIATIVE CARE BY SPECIALIST: ICD-10-CM

## 2023-05-31 DIAGNOSIS — Z79.899 IMMUNODEFICIENCY DUE TO CHEMOTHERAPY: ICD-10-CM

## 2023-05-31 DIAGNOSIS — D84.821 IMMUNODEFICIENCY DUE TO CHEMOTHERAPY: ICD-10-CM

## 2023-05-31 DIAGNOSIS — C90.00 MULTIPLE MYELOMA NOT HAVING ACHIEVED REMISSION: Primary | ICD-10-CM

## 2023-05-31 DIAGNOSIS — E83.42 HYPOMAGNESEMIA: ICD-10-CM

## 2023-05-31 DIAGNOSIS — Z51.11 ENCOUNTER FOR ANTINEOPLASTIC CHEMOTHERAPY: ICD-10-CM

## 2023-05-31 DIAGNOSIS — Z01.818 PRE-OP TESTING: Primary | ICD-10-CM

## 2023-05-31 LAB
ALBUMIN SERPL BCP-MCNC: 3.3 G/DL (ref 3.5–5.2)
ALP SERPL-CCNC: 76 U/L (ref 55–135)
ALT SERPL W/O P-5'-P-CCNC: 34 U/L (ref 10–44)
ANION GAP SERPL CALC-SCNC: 9 MMOL/L (ref 8–16)
AST SERPL-CCNC: 29 U/L (ref 10–40)
BASOPHILS # BLD AUTO: 0.07 K/UL (ref 0–0.2)
BASOPHILS NFR BLD: 1 % (ref 0–1.9)
BILIRUB SERPL-MCNC: 0.4 MG/DL (ref 0.1–1)
BUN SERPL-MCNC: 14 MG/DL (ref 8–23)
CALCIUM SERPL-MCNC: 8.7 MG/DL (ref 8.7–10.5)
CHLORIDE SERPL-SCNC: 107 MMOL/L (ref 95–110)
CO2 SERPL-SCNC: 24 MMOL/L (ref 23–29)
CREAT SERPL-MCNC: 1 MG/DL (ref 0.5–1.4)
DIFFERENTIAL METHOD: ABNORMAL
EOSINOPHIL # BLD AUTO: 0.9 K/UL (ref 0–0.5)
EOSINOPHIL NFR BLD: 12.3 % (ref 0–8)
ERYTHROCYTE [DISTWIDTH] IN BLOOD BY AUTOMATED COUNT: 13.2 % (ref 11.5–14.5)
EST. GFR  (NO RACE VARIABLE): >60 ML/MIN/1.73 M^2
GLUCOSE SERPL-MCNC: 88 MG/DL (ref 70–110)
HCT VFR BLD AUTO: 34.8 % (ref 37–48.5)
HGB BLD-MCNC: 11.5 G/DL (ref 12–16)
IMM GRANULOCYTES # BLD AUTO: 0.01 K/UL (ref 0–0.04)
IMM GRANULOCYTES NFR BLD AUTO: 0.1 % (ref 0–0.5)
LYMPHOCYTES # BLD AUTO: 2 K/UL (ref 1–4.8)
LYMPHOCYTES NFR BLD: 29.3 % (ref 18–48)
MAGNESIUM SERPL-MCNC: 1.5 MG/DL (ref 1.6–2.6)
MCH RBC QN AUTO: 31.7 PG (ref 27–31)
MCHC RBC AUTO-ENTMCNC: 33 G/DL (ref 32–36)
MCV RBC AUTO: 96 FL (ref 82–98)
MONOCYTES # BLD AUTO: 0.6 K/UL (ref 0.3–1)
MONOCYTES NFR BLD: 8.2 % (ref 4–15)
NEUTROPHILS # BLD AUTO: 3.4 K/UL (ref 1.8–7.7)
NEUTROPHILS NFR BLD: 49.1 % (ref 38–73)
NRBC BLD-RTO: 0 /100 WBC
PLATELET # BLD AUTO: 289 K/UL (ref 150–450)
PMV BLD AUTO: 9.1 FL (ref 9.2–12.9)
POTASSIUM SERPL-SCNC: 4 MMOL/L (ref 3.5–5.1)
PROT SERPL-MCNC: 9.5 G/DL (ref 6–8.4)
RBC # BLD AUTO: 3.63 M/UL (ref 4–5.4)
SODIUM SERPL-SCNC: 140 MMOL/L (ref 136–145)
WBC # BLD AUTO: 6.92 K/UL (ref 3.9–12.7)

## 2023-05-31 PROCEDURE — 99999 PR PBB SHADOW E&M-EST. PATIENT-LVL III: CPT | Mod: PBBFAC,,, | Performed by: INTERNAL MEDICINE

## 2023-05-31 PROCEDURE — 3078F DIAST BP <80 MM HG: CPT | Mod: CPTII,S$GLB,, | Performed by: INTERNAL MEDICINE

## 2023-05-31 PROCEDURE — 83735 ASSAY OF MAGNESIUM: CPT | Performed by: INTERNAL MEDICINE

## 2023-05-31 PROCEDURE — 63600175 PHARM REV CODE 636 W HCPCS: Mod: JW,JG | Performed by: INTERNAL MEDICINE

## 2023-05-31 PROCEDURE — 85025 COMPLETE CBC W/AUTO DIFF WBC: CPT | Performed by: INTERNAL MEDICINE

## 2023-05-31 PROCEDURE — 3044F HG A1C LEVEL LT 7.0%: CPT | Mod: CPTII,S$GLB,, | Performed by: INTERNAL MEDICINE

## 2023-05-31 PROCEDURE — 25000003 PHARM REV CODE 250: Performed by: INTERNAL MEDICINE

## 2023-05-31 PROCEDURE — 3074F SYST BP LT 130 MM HG: CPT | Mod: CPTII,S$GLB,, | Performed by: INTERNAL MEDICINE

## 2023-05-31 PROCEDURE — 3008F PR BODY MASS INDEX (BMI) DOCUMENTED: ICD-10-PCS | Mod: CPTII,S$GLB,, | Performed by: INTERNAL MEDICINE

## 2023-05-31 PROCEDURE — 4010F ACE/ARB THERAPY RXD/TAKEN: CPT | Mod: CPTII,S$GLB,, | Performed by: INTERNAL MEDICINE

## 2023-05-31 PROCEDURE — 3078F PR MOST RECENT DIASTOLIC BLOOD PRESSURE < 80 MM HG: ICD-10-PCS | Mod: CPTII,S$GLB,, | Performed by: INTERNAL MEDICINE

## 2023-05-31 PROCEDURE — 36415 COLL VENOUS BLD VENIPUNCTURE: CPT | Performed by: INTERNAL MEDICINE

## 2023-05-31 PROCEDURE — 96367 TX/PROPH/DG ADDL SEQ IV INF: CPT

## 2023-05-31 PROCEDURE — 3074F PR MOST RECENT SYSTOLIC BLOOD PRESSURE < 130 MM HG: ICD-10-PCS | Mod: CPTII,S$GLB,, | Performed by: INTERNAL MEDICINE

## 2023-05-31 PROCEDURE — 3008F BODY MASS INDEX DOCD: CPT | Mod: CPTII,S$GLB,, | Performed by: INTERNAL MEDICINE

## 2023-05-31 PROCEDURE — 3044F PR MOST RECENT HEMOGLOBIN A1C LEVEL <7.0%: ICD-10-PCS | Mod: CPTII,S$GLB,, | Performed by: INTERNAL MEDICINE

## 2023-05-31 PROCEDURE — 99999 PR PBB SHADOW E&M-EST. PATIENT-LVL III: ICD-10-PCS | Mod: PBBFAC,,, | Performed by: INTERNAL MEDICINE

## 2023-05-31 PROCEDURE — 96365 THER/PROPH/DIAG IV INF INIT: CPT

## 2023-05-31 PROCEDURE — 80053 COMPREHEN METABOLIC PANEL: CPT | Performed by: INTERNAL MEDICINE

## 2023-05-31 PROCEDURE — 99215 PR OFFICE/OUTPT VISIT, EST, LEVL V, 40-54 MIN: ICD-10-PCS | Mod: S$GLB,,, | Performed by: INTERNAL MEDICINE

## 2023-05-31 PROCEDURE — 96401 CHEMO ANTI-NEOPL SQ/IM: CPT

## 2023-05-31 PROCEDURE — 99215 OFFICE O/P EST HI 40 MIN: CPT | Mod: S$GLB,,, | Performed by: INTERNAL MEDICINE

## 2023-05-31 PROCEDURE — 4010F PR ACE/ARB THEARPY RXD/TAKEN: ICD-10-PCS | Mod: CPTII,S$GLB,, | Performed by: INTERNAL MEDICINE

## 2023-05-31 RX ORDER — BORTEZOMIB 3.5 MG/1
1.3 INJECTION, POWDER, LYOPHILIZED, FOR SOLUTION INTRAVENOUS; SUBCUTANEOUS
Status: COMPLETED | OUTPATIENT
Start: 2023-05-31 | End: 2023-05-31

## 2023-05-31 RX ORDER — BORTEZOMIB 3.5 MG/1
1.3 INJECTION, POWDER, LYOPHILIZED, FOR SOLUTION INTRAVENOUS; SUBCUTANEOUS
Status: CANCELLED | OUTPATIENT
Start: 2023-05-31

## 2023-05-31 RX ORDER — HEPARIN 100 UNIT/ML
500 SYRINGE INTRAVENOUS
Status: CANCELLED | OUTPATIENT
Start: 2023-05-31

## 2023-05-31 RX ORDER — SODIUM CHLORIDE 0.9 % (FLUSH) 0.9 %
10 SYRINGE (ML) INJECTION
Status: CANCELLED | OUTPATIENT
Start: 2023-05-31

## 2023-05-31 RX ORDER — ZOLEDRONIC ACID 0.04 MG/ML
4 INJECTION, SOLUTION INTRAVENOUS
Status: DISCONTINUED | OUTPATIENT
Start: 2023-05-31 | End: 2023-05-31

## 2023-05-31 RX ORDER — MAGNESIUM SULFATE 1 G/100ML
1 INJECTION INTRAVENOUS
Status: DISCONTINUED | OUTPATIENT
Start: 2023-05-31 | End: 2023-05-31

## 2023-05-31 RX ADMIN — BORTEZOMIB 2 MG: 3.5 INJECTION, POWDER, LYOPHILIZED, FOR SOLUTION INTRAVENOUS; SUBCUTANEOUS at 02:05

## 2023-05-31 RX ADMIN — ZOLEDRONIC ACID 3.5 MG: 4 INJECTION, SOLUTION, CONCENTRATE INTRAVENOUS at 01:05

## 2023-05-31 RX ADMIN — MAGNESIUM SULFATE HEPTAHYDRATE 100 ML/HR: 500 INJECTION, SOLUTION INTRAMUSCULAR; INTRAVENOUS at 12:05

## 2023-05-31 NOTE — DISCHARGE INSTRUCTIONS
FALL PREVENTION   Falls often occur due to slipping, tripping or losing your balance. Here are ways to reduce your risk of falling again.   Was there anything that caused your fall that can be fixed, removed or replaced?   Make your home safe by keeping walkways clear of objects you may trip over.   Use non-slip pads under rugs.   Do not walk in poorly lit areas.   Do not stand on chairs or wobbly ladders.   Use caution when reaching overhead or looking upward. This position can cause a loss of balance.   Be sure your shoes fit properly, have non-slip bottoms and are in good condition.   Be cautious when going up and down stairs, curbs, and when walking on uneven sidewalks.   If your balance is poor, consider using a cane or walker.   If your fall was related to alcohol use, stop or limit alcohol intake.   If your fall was related to use of sleeping medicines, talk to your doctor about this. You may need to reduce your dosage at bedtime if you awaken during the night to go to the bathroom.   To reduce the need for nighttime bathroom trips:   Avoid drinking fluids for several hours before going to bed   Empty your bladder before going to bed   Men can keep a urinal at the bedside   © 8024-3068 [facility], [address] [place] [address], [place] [id number]. All rights reserved. This information is not intended as a substitute for professional medical care. Always follow your healthcare professional's instructions.  WAYS TO HELP PREVENT INFECTION        WASH YOUR HANDS OFTEN DURING THE DAY, ESPECIALLY BEFORE YOU EAT, AFTER USING THE BATHROOM, AND AFTER TOUCHING ANIMALS    STAY AWAY FROM PEOPLE WHO HAVE ILLNESSES YOU CAN CATCH; SUCH AS COLDS, FLU, CHICKEN POX    TRY TO AVOID CROWDS    STAY AWAY FROM CHILDREN WHO RECENTLY HAVE RECEIVED LIVE VIRUS VACCINES    MAINTAIN GOOD MOUTH CARE    DO NOT SQUEEZE OR SCRATCH PIMPLES    CLEAN CUTS & SCRAPES RIGHT AWAY AND DAILY UNTIL HEALED WITH WARM WATER, SOAP & AN ANTISEPTIC    AVOID  CONTACT WITH LITTER BOXES, BIRD CAGES, & FISH TANKS    AVOID STANDING WATER, IE., BIRD BATHS, FLOWER POTS/VASES, OR HUMIDIFIERS    WEAR GLOVES WHEN GARDENING OR CLEANING UP AFTER OTHERS, ESPECIALLY BABIES & SMALL CHILDREN    DO NOT EAT RAW FISH, SEAFOOD, MEAT, OR EGGS    Thank you for letting me take care of YOU!!    OTIS Delgado          [facility]  [address] [facility]  [id number] [place] [address] Drive  [phone] phone     [phone] fax  Hours of Operation: Monday- Friday 8:00am- 5:00pm  After hours phone  [phone]  Hematology / Oncology Physicians on call:    Dr. Willi Cleaning        Nurse Practitioners:    BELLE Tipton, JUANI Duncan NP Khelsea Conley, BELLE Page, BELLE      Please don't hesitate to call if you have any concerns.

## 2023-05-31 NOTE — NURSING
Reviewed plan of care and discussed treatment with patient.  Receiving zometa IVPB; Mg+ IVPB; velcade subq to the right lower abdominal quadrant.  Patient verbalizes understanding.  Addressed any ongoing concerns; denies. Reclined in chair with feet elevated and a warm blanket.  Tolerated treatment. No adverse reaction.  To return to clinic on 6/7/2023.

## 2023-05-31 NOTE — PROGRESS NOTES
Met with pt in exam room prior to WERNER visit, pt states she still hasn't received lenalidomide 10mg nor has she heard from [facility]. Called [facility] while in room with pt phone on speaker for pt to hear. Representative states they need clarification on dose change from 25mg to 10mg, told her pt creatinine clearance is 49 as documented  in MD most recent note. Rep states she has noted that in pt file for pharmacist and she will go ahead and release for immediate fill. States auth# is good, pt should hear from someone by next day. Thanked her for her assistance, call ended well. Pt confirmed that she heard the conversation and will expect to hear from [employer] soon. She has my call back number should she need to reach me. No add'l NN needs at this time.   Oncology Navigation   Intake  Date of Diagnosis: 23  Cancer Type: Transplant; Myeloma  Internal / External Referral: Internal  Date of Referral: 05/10/23  Initial Nurse Navigator Contact: 05/15/23  Referral to Initial Contact Timeline (days): 5  Date Worked: 23  Reason if booked > 7 days after scheduling: Additional tests/procedures; Patient request     Treatment  Current Status: Active       Medical Oncologist: Dr. FRITZ Dela Cruz  Chemotherapy: Initiated  Chemotherapy Regimen: Velcade (Durvalumab possible pending FISH)  Oral Therapy: Initiated                       Acuity  Systemic Treatment - predicted or initiated: Chemotherapy Regimen with Multiple drugs (+1)  ECO  Comorbidities in Medical History: 2   Needed: 0  Support: 0  Verbalizes Financial Concerns: 1  Transportation: 0  Psychological Factors (+1 each): Emotional during conversation  Verbalizes the need for more education: 1  Navigation Acuity: 3     Follow Up  No follow-ups on file.

## 2023-05-31 NOTE — PROGRESS NOTES
Attempted to check status of med using [employer] online portal but WERNER credentials didn't work. After several attempts by WERNER to log on, called [facility] rep for assist, Zander Chan who had Lyly call me to walk us through new log in credentials. Spent 45 min on phone with Lyly, finally successful log in by WERNER and myself. Was told by Lyly, new script for 10mg was sent already by Dr. Márquez and the new auth # is [id number]. States all [facility] is waiting on is new auth # and med should be set up for delivery, thanked her for all her help, confirmed with clinic nurse the new auth, which she said she did and will fax to [facility]. Will follow as needed.   Oncology Navigation   Intake  Date of Diagnosis: 23  Cancer Type: Transplant; Myeloma  Internal / External Referral: Internal  Date of Referral: 05/10/23  Initial Nurse Navigator Contact: 05/15/23  Referral to Initial Contact Timeline (days): 5  Date Worked: 23  Reason if booked > 7 days after scheduling: Additional tests/procedures; Patient request     Treatment  Current Status: Active       Medical Oncologist: Dr. FRITZ Dela Cruz  Chemotherapy: Initiated  Chemotherapy Regimen: Velcade (Durvalumab possible pending FISH)  Oral Therapy: Initiated                       Acuity  Systemic Treatment - predicted or initiated: Chemotherapy Regimen with Multiple drugs (+1)  ECO  Comorbidities in Medical History: 2   Needed: 0  Support: 0  Verbalizes Financial Concerns: 1  Transportation: 0  Psychological Factors (+1 each): Emotional during conversation  Verbalizes the need for more education: 1  Navigation Acuity: 3     Follow Up  No follow-ups on file.

## 2023-05-31 NOTE — PLAN OF CARE
Problem: Adult Inpatient Plan of Care  Goal: Plan of Care Review  Outcome: Ongoing, Progressing  Flowsheets (Taken 5/31/2023 1533)  Plan of Care Reviewed With: patient  Goal: Patient-Specific Goal (Individualized)  Outcome: Ongoing, Progressing  Flowsheets (Taken 5/31/2023 1533)  Anxieties, Fears or Concerns: denies  Individualized Care Needs:   Reclined in chair with feet elevated   warm blanket   provided snack/water  Goal: Optimal Comfort and Wellbeing  Outcome: Ongoing, Progressing  Intervention: Monitor Pain and Promote Comfort  Flowsheets (Taken 5/31/2023 1533)  Pain Management Interventions:   quiet environment facilitated   warm blanket provided   relaxation techniques promoted  Intervention: Provide Person-Centered Care  Flowsheets (Taken 5/31/2023 1533)  Trust Relationship/Rapport:   questions encouraged   choices provided   reassurance provided   care explained   emotional support provided   thoughts/feelings acknowledged   empathic listening provided   questions answered     Problem: Fall Injury Risk  Goal: Absence of Fall and Fall-Related Injury  Outcome: Ongoing, Progressing  Intervention: Identify and Manage Contributors  Flowsheets (Taken 5/31/2023 1533)  Self-Care Promotion: independence encouraged  Medication Review/Management: medications reviewed  Intervention: Promote Injury-Free Environment  Flowsheets (Taken 5/31/2023 1533)  Safety Promotion/Fall Prevention:   in recliner, wheels locked   nonskid shoes/socks when out of bed   medications reviewed     Problem: Infection  Goal: Absence of Infection Signs and Symptoms  Outcome: Ongoing, Progressing  Intervention: Prevent or Manage Infection  Flowsheets (Taken 5/31/2023 1533)  Infection Management: aseptic technique maintained  Isolation Precautions: contact

## 2023-05-31 NOTE — PROGRESS NOTES
Called to exam room by Dr. Dela Cruz, wants update on pt med and neuro sx plan to determine it pt will be treated with chemo today. Pt states she stopped at Dr. Smith's office and was told her she couldn't get chemo until after procedure, also informed us that she still doesn't have the new oral chemo pills nor has she heard from [facility]. Told MD will find out about neuro procedure first, then will f/u on oral med. Secure chat msg sent to Dr. Smith and Bertrand, his PA. Pt expressed frustration with being here all day and this being the second time she's come for tx and not get it. I explained to her that we want both providers to be on one accord with her care and tx and until we hear back from Dr. Smith we won't give chemo. Explained it's important that we don't want to cause a delay in getting her spine taken care of, and assured her that she is well within her rights if she doesn't want to wait or get treated today. PA replied ok to treat today and they are planning for procedure on Monday but will reach out to pt for a definitive date, informed pt of this and she does want to get tx today. Told her once I hear from [facility] I will let her know, she voiced understanding, proceeding to infusion room for tx. SW met with pt while getting treatment. Pt states Dr. Smith's office called her to schedule procedure in August but she declined that late of a date, is waiting to hear from someone. I checked pt chart for phone note, no details in that note, so I msg'd KASHIF Smith to let her know the secure chat msg I received from Bertrand earlier. She states the providers are in surgery, she sent msgs, waiting to hear back from them and will notify pt and me of plan. Called [facility] and spoke with Nicki Clark, Faustina for update on pt script. States they just need clarification of why the change in dose, told her creatinine clearance of 49 is MD reason. States it's noted and changed in the system, claim went through,  confirmed that she has the correct auth #, script expires 23. States it should update in system shortly, they will be calling pt to set up delivery soon. Thanked her for her time. Called pt to let her know she should hear from [facility] within a day and that Dr. Smith's office should be reaching out to her also. She voiced understanding information, no add'l questions for NN at this time.  Oncology Navigation   Intake  Date of Diagnosis: 23  Cancer Type: Transplant; Myeloma  Internal / External Referral: Internal  Date of Referral: 05/10/23  Initial Nurse Navigator Contact: 05/15/23  Referral to Initial Contact Timeline (days): 5  Date Worked: 23  Reason if booked > 7 days after scheduling: Additional tests/procedures; Patient request     Treatment  Current Status: Active       Medical Oncologist: Dr. FRITZ Dela Cruz  Chemotherapy: Initiated  Chemotherapy Regimen: Velcade (Durvalumab possible pending FISH)  Oral Therapy: Initiated                       Acuity  Systemic Treatment - predicted or initiated: Chemotherapy Regimen with Multiple drugs (+1)  ECO  Comorbidities in Medical History: 2   Needed: 0  Support: 0  Verbalizes Financial Concerns: 1  Transportation: 0  Psychological Factors (+1 each): Emotional during conversation  Verbalizes the need for more education: 1  Navigation Acuity: 3     Follow Up  Follow up in about 1 week (around 2023) for sarah w/ labs.

## 2023-06-01 ENCOUNTER — TELEPHONE (OUTPATIENT)
Dept: NEUROSURGERY | Facility: CLINIC | Age: 63
End: 2023-06-01
Payer: COMMERCIAL

## 2023-06-01 DIAGNOSIS — S32.010A CLOSED COMPRESSION FRACTURE OF BODY OF L1 VERTEBRA: Primary | ICD-10-CM

## 2023-06-01 DIAGNOSIS — G89.3 CANCER RELATED PAIN: ICD-10-CM

## 2023-06-01 DIAGNOSIS — C90.00 MULTIPLE MYELOMA, REMISSION STATUS UNSPECIFIED: ICD-10-CM

## 2023-06-01 NOTE — TELEPHONE ENCOUNTER
Spoke with pt informed pt of sx date 6/5/23, pt was advised to complete labs on tomorrow at [facility]  and she ws advised that pre admit dept will give her a call with an arrival time on tomorrow.

## 2023-06-01 NOTE — PROGRESS NOTES
SWER met with patient on today in infusion. Pt was alone at time of visit. SWER introduced herself and explained her role. Pt was alone at time of visit. Pt accepted SWER's business card but gave the new patient folder back. Pt reports looking at the contents make her anxious and depressed. Pt reports she is doing well for now but will call SWER if she have any needs. Pt reports she works at [employer] in the [employer] department. Pt reports she drives herself to and from treatment. SWER provided patient sample Ensure and coupons. SWER encouraged patient to call if she have any further needs.

## 2023-06-02 ENCOUNTER — TELEPHONE (OUTPATIENT)
Dept: HEMATOLOGY/ONCOLOGY | Facility: CLINIC | Age: 63
End: 2023-06-02
Payer: COMMERCIAL

## 2023-06-02 ENCOUNTER — PATIENT MESSAGE (OUTPATIENT)
Dept: HEMATOLOGY/ONCOLOGY | Facility: CLINIC | Age: 63
End: 2023-06-02
Payer: COMMERCIAL

## 2023-06-02 ENCOUNTER — HOSPITAL ENCOUNTER (OUTPATIENT)
Dept: CARDIOLOGY | Facility: HOSPITAL | Age: 63
Discharge: HOME | End: 2023-06-02
Attending: NEUROLOGICAL SURGERY
Payer: COMMERCIAL

## 2023-06-02 ENCOUNTER — PATIENT MESSAGE (OUTPATIENT)
Dept: INTERNAL MEDICINE | Facility: CLINIC | Age: 63
End: 2023-06-02
Payer: COMMERCIAL

## 2023-06-02 ENCOUNTER — DOCUMENTATION ONLY (OUTPATIENT)
Dept: HEMATOLOGY/ONCOLOGY | Facility: CLINIC | Age: 63
End: 2023-06-02
Payer: COMMERCIAL

## 2023-06-02 ENCOUNTER — HOSPITAL ENCOUNTER (OUTPATIENT)
Dept: RADIOLOGY | Facility: HOSPITAL | Age: 63
Discharge: HOME | End: 2023-06-02
Attending: NEUROLOGICAL SURGERY
Payer: COMMERCIAL

## 2023-06-02 DIAGNOSIS — Z01.818 PRE-OP TESTING: ICD-10-CM

## 2023-06-02 DIAGNOSIS — F41.9 ANXIETY: ICD-10-CM

## 2023-06-02 PROCEDURE — 71046 X-RAY EXAM CHEST 2 VIEWS: CPT | Mod: TC

## 2023-06-02 PROCEDURE — 93010 ELECTROCARDIOGRAM REPORT: CPT | Mod: ,,, | Performed by: INTERNAL MEDICINE

## 2023-06-02 PROCEDURE — 93005 ELECTROCARDIOGRAM TRACING: CPT

## 2023-06-02 PROCEDURE — 71046 X-RAY EXAM CHEST 2 VIEWS: CPT | Mod: 26,,, | Performed by: RADIOLOGY

## 2023-06-02 PROCEDURE — 71046 XR CHEST PA AND LATERAL PRE-OP: ICD-10-PCS | Mod: 26,,, | Performed by: RADIOLOGY

## 2023-06-02 PROCEDURE — 93010 EKG 12-LEAD: ICD-10-PCS | Mod: ,,, | Performed by: INTERNAL MEDICINE

## 2023-06-02 RX ORDER — ALPRAZOLAM 2 MG/1
TABLET ORAL
Qty: 60 TABLET | Refills: 0 | Status: SHIPPED | OUTPATIENT
Start: 2023-06-02 | End: 2023-07-10 | Stop reason: SDUPTHER

## 2023-06-02 NOTE — PROGRESS NOTES
"Per Dr. Smith's ofc, pt having procedure 6/5 and pt not sure if she'll be able to have chemo 6/7 as scheduled. Told her I will check with Dr. Smalls for recs. Pt also states she hasn't heard from [facility] for med delivery. Told her I will call [employer] also and then call her back. Spoke with Zulma PRADO @ [employer] who states they have tried to make contact with pt a couple times at two different numbers. Asked her to confirm numbers on file, one number was incorrect asked her to update, states she couldn't. Asked if I could speak to someone who could update, as pt not having med has been minimum of 3 weeks, would like to prevent any further delays. Before she placed call on hold she said "gotta make my job hard today". When she returned to the phone asked for the first initial of her last name, she said "J"  and I asked if their calls are recorded? "yes they are". I told her what she said word for word, her response was "I'm sorry, I didn't know you heard me". Told her I did and would like to speak with someone. Shira, resolution  was placed on the call. I explained what happened, she apologized profusely, said that was unacceptable. Shira states Zulma's manager would be made aware and will listen to the conversation and the situation would be corrected. Shira asked if pt needs have been taken care of, told her needs will be taken care of when someone makes contact with pt to schedule delivery of med. States that will take place today. Thanked her for her time, call ended. Called pt back to let her know that someone should reach out to her to schedule delivery, possibly expect the call to come from [place]. Also told her the Dr. Dela Cruz ok if pt would like to move tx to week of 6/12 b/c of procedure on 6/5. We r/s lab to 6/6 @ [facility], provider and infusion to 6/7 @ [facility]. States she will call if she doesn't hear from [facility] over the weekend, has no other questions at this time.     "

## 2023-06-02 NOTE — TELEPHONE ENCOUNTER
----- Message from Abbie Amor RN sent at 6/2/2023  9:04 AM CDT -----  Good morning,     Pt has virtual consult with Dr. Calabrese 6/5 @ 330pm. She has been scheduled for her kyphoplasty 6/5 and needs the consult r/s please. She can be reached @ [phone].    Thanks

## 2023-06-02 NOTE — TELEPHONE ENCOUNTER
No care due was identified.  [facility] Embedded Care Due Messages. Reference number: [id number].   6/02/2023 9:14:53 AM CDT

## 2023-06-05 DIAGNOSIS — S32.010A CLOSED COMPRESSION FRACTURE OF BODY OF L1 VERTEBRA: ICD-10-CM

## 2023-06-05 DIAGNOSIS — S32.010A COMPRESSION FRACTURE OF L1 VERTEBRA, INITIAL ENCOUNTER: ICD-10-CM

## 2023-06-05 DIAGNOSIS — C90.00 MULTIPLE MYELOMA, REMISSION STATUS UNSPECIFIED: Primary | ICD-10-CM

## 2023-06-07 ENCOUNTER — ANESTHESIA EVENT (OUTPATIENT)
Dept: SURGERY | Facility: HOSPITAL | Age: 63
End: 2023-06-07
Payer: COMMERCIAL

## 2023-06-07 ENCOUNTER — TELEPHONE (OUTPATIENT)
Dept: PREADMISSION TESTING | Facility: HOSPITAL | Age: 63
End: 2023-06-07
Payer: COMMERCIAL

## 2023-06-07 NOTE — TELEPHONE ENCOUNTER
Called and spoke with pt about the following:     Please arrive to [facility] ([facility]) at 0630 am on 6/8/23 for your scheduled procedure.  Address: [address] [place] [address] [place] [address] ([address] on left, 1st Floor Lobby)  >>>NO eating or drinking after midnight unless instructed otherwise by your Surgeon<<<    Thank you,  -[facility] Pre Admit Testing Dept.  Mon-Fri 8 am - 4 pm [phone]

## 2023-06-07 NOTE — ANESTHESIA PREPROCEDURE EVALUATION
06/07/2023  Ana Bonilla is a 62 y.o., female.    Patient Active Problem List   Diagnosis    COPD with asthma    GERD (gastroesophageal reflux disease)    Primary hypothyroidism    Tobacco abuse disorder    PVD (peripheral vascular disease)    Anxiety    Dyslipidemia    Claudication    Essential hypertension    Allergic rhinitis    Pain in both lower extremities    Anisometropic amblyopia of left eye    Nonrheumatic aortic valve insufficiency    Colon polyps    Precordial pain    Tachycardia    Pain of right lower extremity    Difficulty walking    Low back pain, unspecified    Multiple myeloma     Pre-op Assessment    I have reviewed the Patient Summary Reports.     I have reviewed the Nursing Notes. I have reviewed the NPO Status.   I have reviewed the Medications.     Review of Systems  Anesthesia Hx:  Denies Family Hx of Anesthesia complications.  Personal Hx of Anesthesia complications, Post-Operative Nausea/Vomiting   Social:  Smoker    EENT/Dental:EENT/Dental Normal   Cardiovascular:   Hypertension Valvular problems/Murmurs, AI PVD ECG has been reviewed.    Pulmonary:   COPD Asthma    Renal/:  Renal/ Normal     Hepatic/GI:   GERD    Musculoskeletal:   Myeloma with pathologic L1 compression fracture  Spine Disorders: lumbar    Neurological:  Neurology Normal    Endocrine:   Hypothyroidism    Dermatological:  Skin Normal    Psych:  Psychiatric Normal           Physical Exam  General: Alert and Oriented    Airway:  Mallampati: II   Mouth Opening: Normal  TM Distance: Normal  Tongue: Normal  Neck ROM: Normal ROM    TTE   The left ventricle is normal in size with normal systolic function.   The estimated ejection fraction is 60%.   Normal left ventricular diastolic function.   Normal right ventricular size with normal right ventricular systolic function.   Moderate aortic  regurgitation.   Normal central venous pressure (3 mmHg).         Anesthesia Plan  Type of Anesthesia, risks & benefits discussed:    Anesthesia Type: Gen ETT  Intra-op Monitoring Plan: Standard ASA Monitors  Induction:  IV  Informed Consent: Informed consent signed with the Patient and all parties understand the risks and agree with anesthesia plan.  All questions answered.   ASA Score: 3  Day of Surgery Review of History & Physical: I have interviewed and examined the patient. I have reviewed the patient's H&P dated:     Ready For Surgery From Anesthesia Perspective.     .

## 2023-06-08 ENCOUNTER — ANESTHESIA (OUTPATIENT)
Dept: SURGERY | Facility: HOSPITAL | Age: 63
End: 2023-06-08
Payer: COMMERCIAL

## 2023-06-08 ENCOUNTER — HOSPITAL ENCOUNTER (OUTPATIENT)
Facility: HOSPITAL | Age: 63
Discharge: HOME | End: 2023-06-08
Attending: NEUROLOGICAL SURGERY | Admitting: NEUROLOGICAL SURGERY
Payer: COMMERCIAL

## 2023-06-08 VITALS
RESPIRATION RATE: 16 BRPM | WEIGHT: 125.44 LBS | HEIGHT: 64 IN | SYSTOLIC BLOOD PRESSURE: 95 MMHG | HEART RATE: 71 BPM | BODY MASS INDEX: 21.42 KG/M2 | DIASTOLIC BLOOD PRESSURE: 62 MMHG | OXYGEN SATURATION: 93 % | TEMPERATURE: 98 F

## 2023-06-08 DIAGNOSIS — E78.5 DYSLIPIDEMIA: ICD-10-CM

## 2023-06-08 DIAGNOSIS — C90.00 MULTIPLE MYELOMA NOT HAVING ACHIEVED REMISSION: Primary | ICD-10-CM

## 2023-06-08 DIAGNOSIS — M54.50 ACUTE BILATERAL LOW BACK PAIN WITHOUT SCIATICA: ICD-10-CM

## 2023-06-08 DIAGNOSIS — M48.50XA COMPRESSED SPINE FRACTURE: ICD-10-CM

## 2023-06-08 PROCEDURE — 88307 TISSUE EXAM BY PATHOLOGIST: CPT | Performed by: PATHOLOGY

## 2023-06-08 PROCEDURE — 88342 IMHCHEM/IMCYTCHM 1ST ANTB: CPT | Mod: 26,,, | Performed by: PATHOLOGY

## 2023-06-08 PROCEDURE — 94640 AIRWAY INHALATION TREATMENT: CPT

## 2023-06-08 PROCEDURE — 88307 TISSUE EXAM BY PATHOLOGIST: CPT | Mod: 26,,, | Performed by: PATHOLOGY

## 2023-06-08 PROCEDURE — 88311 PR  DECALCIFY TISSUE: ICD-10-PCS | Mod: 26,,, | Performed by: PATHOLOGY

## 2023-06-08 PROCEDURE — 63600175 PHARM REV CODE 636 W HCPCS: Performed by: PHYSICIAN ASSISTANT

## 2023-06-08 PROCEDURE — 88307 PR  SURG PATH,LEVEL V: ICD-10-PCS | Mod: 26,,, | Performed by: PATHOLOGY

## 2023-06-08 PROCEDURE — 36000707: Performed by: NEUROLOGICAL SURGERY

## 2023-06-08 PROCEDURE — 20982 PR ABLATE, BONE TUMOR(S) PERQ: ICD-10-PCS | Mod: 51,50,, | Performed by: NEUROLOGICAL SURGERY

## 2023-06-08 PROCEDURE — 25000003 PHARM REV CODE 250: Performed by: PHYSICIAN ASSISTANT

## 2023-06-08 PROCEDURE — C1886 CATHETER, ABLATION: HCPCS | Performed by: NEUROLOGICAL SURGERY

## 2023-06-08 PROCEDURE — 25000003 PHARM REV CODE 250: Performed by: ANESTHESIOLOGY

## 2023-06-08 PROCEDURE — 27201423 OPTIME MED/SURG SUP & DEVICES STERILE SUPPLY: Performed by: NEUROLOGICAL SURGERY

## 2023-06-08 PROCEDURE — 20982 ABLATE BONE TUMOR(S) PERQ: CPT | Mod: 51,50,, | Performed by: NEUROLOGICAL SURGERY

## 2023-06-08 PROCEDURE — 22514 PERQ VERTEBRAL AUGMENTATION: CPT | Mod: ,,, | Performed by: NEUROLOGICAL SURGERY

## 2023-06-08 PROCEDURE — 22514 PR PERQ VERTEBRAL AUGMENTATION UNI/BILAT LUMBAR: ICD-10-PCS | Mod: ,,, | Performed by: NEUROLOGICAL SURGERY

## 2023-06-08 PROCEDURE — 63600175 PHARM REV CODE 636 W HCPCS: Performed by: NEUROLOGICAL SURGERY

## 2023-06-08 PROCEDURE — 25000242 PHARM REV CODE 250 ALT 637 W/ HCPCS: Performed by: ANESTHESIOLOGY

## 2023-06-08 PROCEDURE — 37000008 HC ANESTHESIA 1ST 15 MINUTES: Performed by: NEUROLOGICAL SURGERY

## 2023-06-08 PROCEDURE — 25000003 PHARM REV CODE 250

## 2023-06-08 PROCEDURE — 37000009 HC ANESTHESIA EA ADD 15 MINS: Performed by: NEUROLOGICAL SURGERY

## 2023-06-08 PROCEDURE — 99900035 HC TECH TIME PER 15 MIN (STAT)

## 2023-06-08 PROCEDURE — 88341 IMHCHEM/IMCYTCHM EA ADD ANTB: CPT | Mod: 26,,, | Performed by: PATHOLOGY

## 2023-06-08 PROCEDURE — 71000015 HC POSTOP RECOV 1ST HR: Performed by: NEUROLOGICAL SURGERY

## 2023-06-08 PROCEDURE — 88364 INSITU HYBRIDIZATION (FISH): CPT | Performed by: PATHOLOGY

## 2023-06-08 PROCEDURE — 36000706: Performed by: NEUROLOGICAL SURGERY

## 2023-06-08 PROCEDURE — 88365 PR  TISSUE HYBRIDIZATION: ICD-10-PCS | Mod: 26,,, | Performed by: PATHOLOGY

## 2023-06-08 PROCEDURE — 63600175 PHARM REV CODE 636 W HCPCS

## 2023-06-08 PROCEDURE — 63600175 PHARM REV CODE 636 W HCPCS: Performed by: ANESTHESIOLOGY

## 2023-06-08 PROCEDURE — 88365 INSITU HYBRIDIZATION (FISH): CPT | Mod: 26,,, | Performed by: PATHOLOGY

## 2023-06-08 PROCEDURE — 88342 IMHCHEM/IMCYTCHM 1ST ANTB: CPT | Performed by: PATHOLOGY

## 2023-06-08 PROCEDURE — 88364 CHG INSITU HYBRIDIZATION (FISH: ICD-10-PCS | Mod: 26,,, | Performed by: PATHOLOGY

## 2023-06-08 PROCEDURE — C1726 CATH, BAL DIL, NON-VASCULAR: HCPCS | Performed by: NEUROLOGICAL SURGERY

## 2023-06-08 PROCEDURE — 88311 DECALCIFY TISSUE: CPT | Mod: 26,,, | Performed by: PATHOLOGY

## 2023-06-08 PROCEDURE — 88341 IMHCHEM/IMCYTCHM EA ADD ANTB: CPT | Performed by: PATHOLOGY

## 2023-06-08 PROCEDURE — C1713 ANCHOR/SCREW BN/BN,TIS/BN: HCPCS | Performed by: NEUROLOGICAL SURGERY

## 2023-06-08 PROCEDURE — 88364 INSITU HYBRIDIZATION (FISH): CPT | Mod: 26,,, | Performed by: PATHOLOGY

## 2023-06-08 PROCEDURE — 71000033 HC RECOVERY, INTIAL HOUR: Performed by: NEUROLOGICAL SURGERY

## 2023-06-08 PROCEDURE — 94761 N-INVAS EAR/PLS OXIMETRY MLT: CPT

## 2023-06-08 PROCEDURE — 25500020 PHARM REV CODE 255: Performed by: NEUROLOGICAL SURGERY

## 2023-06-08 PROCEDURE — 88341 PR IHC OR ICC EACH ADD'L SINGLE ANTIBODY  STAINPR: ICD-10-PCS | Mod: 26,,, | Performed by: PATHOLOGY

## 2023-06-08 PROCEDURE — 88365 INSITU HYBRIDIZATION (FISH): CPT | Performed by: PATHOLOGY

## 2023-06-08 PROCEDURE — 88342 CHG IMMUNOCYTOCHEMISTRY: ICD-10-PCS | Mod: 26,,, | Performed by: PATHOLOGY

## 2023-06-08 PROCEDURE — 71000039 HC RECOVERY, EACH ADD'L HOUR: Performed by: NEUROLOGICAL SURGERY

## 2023-06-08 PROCEDURE — 88311 DECALCIFY TISSUE: CPT | Performed by: PATHOLOGY

## 2023-06-08 DEVICE — CEMENT KYPHOPLASTY HI VISC: Type: IMPLANTABLE DEVICE | Site: SPINE LUMBAR | Status: FUNCTIONAL

## 2023-06-08 RX ORDER — ONDANSETRON HYDROCHLORIDE 2 MG/ML
INJECTION, SOLUTION INTRAMUSCULAR; INTRAVENOUS
Status: DISCONTINUED | OUTPATIENT
Start: 2023-06-08 | End: 2023-06-08

## 2023-06-08 RX ORDER — DEXTROSE MONOHYDRATE AND SODIUM CHLORIDE 5; .9 G/100ML; G/100ML
INJECTION, SOLUTION INTRAVENOUS CONTINUOUS
Status: DISCONTINUED | OUTPATIENT
Start: 2023-06-08 | End: 2023-06-08 | Stop reason: HOSPADM

## 2023-06-08 RX ORDER — ALBUTEROL SULFATE 0.83 MG/ML
2.5 SOLUTION RESPIRATORY (INHALATION) EVERY 4 HOURS PRN
Status: DISCONTINUED | OUTPATIENT
Start: 2023-06-08 | End: 2023-06-08 | Stop reason: HOSPADM

## 2023-06-08 RX ORDER — SCOLOPAMINE TRANSDERMAL SYSTEM 1 MG/1
1 PATCH, EXTENDED RELEASE TRANSDERMAL
Status: DISCONTINUED | OUTPATIENT
Start: 2023-06-08 | End: 2023-06-08 | Stop reason: HOSPADM

## 2023-06-08 RX ORDER — DEXAMETHASONE SODIUM PHOSPHATE 4 MG/ML
INJECTION, SOLUTION INTRA-ARTICULAR; INTRALESIONAL; INTRAMUSCULAR; INTRAVENOUS; SOFT TISSUE
Status: DISCONTINUED | OUTPATIENT
Start: 2023-06-08 | End: 2023-06-08

## 2023-06-08 RX ORDER — OXYCODONE HYDROCHLORIDE 5 MG/1
5 TABLET ORAL
Status: DISCONTINUED | OUTPATIENT
Start: 2023-06-08 | End: 2023-06-08 | Stop reason: HOSPADM

## 2023-06-08 RX ORDER — LIDOCAINE HYDROCHLORIDE 20 MG/ML
INJECTION, SOLUTION EPIDURAL; INFILTRATION; INTRACAUDAL; PERINEURAL
Status: DISCONTINUED | OUTPATIENT
Start: 2023-06-08 | End: 2023-06-08

## 2023-06-08 RX ORDER — PROPOFOL 10 MG/ML
VIAL (ML) INTRAVENOUS
Status: DISCONTINUED | OUTPATIENT
Start: 2023-06-08 | End: 2023-06-08

## 2023-06-08 RX ORDER — MIDAZOLAM HYDROCHLORIDE 1 MG/ML
INJECTION INTRAMUSCULAR; INTRAVENOUS
Status: DISCONTINUED | OUTPATIENT
Start: 2023-06-08 | End: 2023-06-08

## 2023-06-08 RX ORDER — SODIUM CHLORIDE 0.9 % (FLUSH) 0.9 %
3 SYRINGE (ML) INJECTION
Status: DISCONTINUED | OUTPATIENT
Start: 2023-06-08 | End: 2023-06-08 | Stop reason: HOSPADM

## 2023-06-08 RX ORDER — HYDROMORPHONE HYDROCHLORIDE 2 MG/ML
0.2 INJECTION, SOLUTION INTRAMUSCULAR; INTRAVENOUS; SUBCUTANEOUS EVERY 5 MIN PRN
Status: DISCONTINUED | OUTPATIENT
Start: 2023-06-08 | End: 2023-06-08 | Stop reason: HOSPADM

## 2023-06-08 RX ORDER — IPRATROPIUM BROMIDE AND ALBUTEROL SULFATE 2.5; .5 MG/3ML; MG/3ML
3 SOLUTION RESPIRATORY (INHALATION)
Status: COMPLETED | OUTPATIENT
Start: 2023-06-08 | End: 2023-06-08

## 2023-06-08 RX ORDER — FENTANYL CITRATE 50 UG/ML
INJECTION, SOLUTION INTRAMUSCULAR; INTRAVENOUS
Status: DISCONTINUED | OUTPATIENT
Start: 2023-06-08 | End: 2023-06-08

## 2023-06-08 RX ORDER — PHENYLEPHRINE HYDROCHLORIDE 10 MG/ML
INJECTION INTRAVENOUS
Status: DISCONTINUED | OUTPATIENT
Start: 2023-06-08 | End: 2023-06-08

## 2023-06-08 RX ORDER — ONDANSETRON 2 MG/ML
4 INJECTION INTRAMUSCULAR; INTRAVENOUS DAILY PRN
Status: DISCONTINUED | OUTPATIENT
Start: 2023-06-08 | End: 2023-06-08 | Stop reason: HOSPADM

## 2023-06-08 RX ORDER — MEPERIDINE HYDROCHLORIDE 25 MG/ML
12.5 INJECTION INTRAMUSCULAR; INTRAVENOUS; SUBCUTANEOUS ONCE
Status: DISCONTINUED | OUTPATIENT
Start: 2023-06-08 | End: 2023-06-08 | Stop reason: HOSPADM

## 2023-06-08 RX ORDER — VANCOMYCIN HYDROCHLORIDE 1 G/20ML
INJECTION, POWDER, LYOPHILIZED, FOR SOLUTION INTRAVENOUS
Status: DISCONTINUED | OUTPATIENT
Start: 2023-06-08 | End: 2023-06-08 | Stop reason: HOSPADM

## 2023-06-08 RX ORDER — DIPHENHYDRAMINE HYDROCHLORIDE 50 MG/ML
25 INJECTION INTRAMUSCULAR; INTRAVENOUS EVERY 6 HOURS PRN
Status: DISCONTINUED | OUTPATIENT
Start: 2023-06-08 | End: 2023-06-08 | Stop reason: HOSPADM

## 2023-06-08 RX ORDER — ROCURONIUM BROMIDE 10 MG/ML
INJECTION, SOLUTION INTRAVENOUS
Status: DISCONTINUED | OUTPATIENT
Start: 2023-06-08 | End: 2023-06-08

## 2023-06-08 RX ORDER — PROCHLORPERAZINE EDISYLATE 5 MG/ML
5 INJECTION INTRAMUSCULAR; INTRAVENOUS EVERY 30 MIN PRN
Status: DISCONTINUED | OUTPATIENT
Start: 2023-06-08 | End: 2023-06-08 | Stop reason: HOSPADM

## 2023-06-08 RX ADMIN — PROPOFOL 120 MG: 10 INJECTION, EMULSION INTRAVENOUS at 07:06

## 2023-06-08 RX ADMIN — ROCURONIUM BROMIDE 20 MG: 10 SOLUTION INTRAVENOUS at 08:06

## 2023-06-08 RX ADMIN — HYDROMORPHONE HYDROCHLORIDE 0.2 MG: 2 INJECTION INTRAMUSCULAR; INTRAVENOUS; SUBCUTANEOUS at 10:06

## 2023-06-08 RX ADMIN — FENTANYL CITRATE 50 MCG: 0.05 INJECTION, SOLUTION INTRAMUSCULAR; INTRAVENOUS at 09:06

## 2023-06-08 RX ADMIN — PHENYLEPHRINE HYDROCHLORIDE 100 MCG: 10 INJECTION INTRAVENOUS at 08:06

## 2023-06-08 RX ADMIN — IPRATROPIUM BROMIDE AND ALBUTEROL SULFATE 3 ML: .5; 3 SOLUTION RESPIRATORY (INHALATION) at 06:06

## 2023-06-08 RX ADMIN — MIDAZOLAM HYDROCHLORIDE 2 MG: 1 INJECTION, SOLUTION INTRAMUSCULAR; INTRAVENOUS at 07:06

## 2023-06-08 RX ADMIN — LIDOCAINE HYDROCHLORIDE 50 MG: 20 INJECTION, SOLUTION EPIDURAL; INFILTRATION; INTRACAUDAL; PERINEURAL at 07:06

## 2023-06-08 RX ADMIN — DEXAMETHASONE SODIUM PHOSPHATE 8 MG: 4 INJECTION, SOLUTION INTRA-ARTICULAR; INTRALESIONAL; INTRAMUSCULAR; INTRAVENOUS; SOFT TISSUE at 08:06

## 2023-06-08 RX ADMIN — PHENYLEPHRINE HYDROCHLORIDE 200 MCG: 10 INJECTION INTRAVENOUS at 08:06

## 2023-06-08 RX ADMIN — ONDANSETRON 4 MG: 2 INJECTION INTRAMUSCULAR; INTRAVENOUS at 09:06

## 2023-06-08 RX ADMIN — ROCURONIUM BROMIDE 40 MG: 10 SOLUTION INTRAVENOUS at 07:06

## 2023-06-08 RX ADMIN — SODIUM CHLORIDE, SODIUM LACTATE, POTASSIUM CHLORIDE, AND CALCIUM CHLORIDE: .6; .31; .03; .02 INJECTION, SOLUTION INTRAVENOUS at 07:06

## 2023-06-08 RX ADMIN — HYDROMORPHONE HYDROCHLORIDE 0.2 MG: 2 INJECTION INTRAMUSCULAR; INTRAVENOUS; SUBCUTANEOUS at 09:06

## 2023-06-08 RX ADMIN — FENTANYL CITRATE 50 MCG: 0.05 INJECTION, SOLUTION INTRAMUSCULAR; INTRAVENOUS at 07:06

## 2023-06-08 RX ADMIN — SCOPOLAMINE 1 PATCH: 1.5 PATCH, EXTENDED RELEASE TRANSDERMAL at 07:06

## 2023-06-08 RX ADMIN — VANCOMYCIN HYDROCHLORIDE 1000 MG: 1 INJECTION, POWDER, LYOPHILIZED, FOR SOLUTION INTRAVENOUS at 08:06

## 2023-06-08 NOTE — ANESTHESIA PROCEDURE NOTES
Intubation    Date/Time: 6/8/2023 8:00 AM  Performed by: Theresa Zamora  Authorized by: Delfino Hope MD     Intubation:     Induction:  Intravenous    Intubated:  Postinduction    Mask Ventilation:  Easy mask    Attempts:  1    Attempted By:  Student    Method of Intubation:  Video laryngoscopy    Blade:  Hartley 3    Laryngeal View Grade: Grade I - full view of cords      Difficult Airway Encountered?: No      Complications:  None    Airway Device:  Oral endotracheal tube    Airway Device Size:  7.0    Style/Cuff Inflation:  Cuffed (inflated to minimal occlusive pressure)    Tube secured:  21    Secured at:  The lips    Placement Verified By:  Capnometry    Complicating Factors:  None    Findings Post-Intubation:  BS equal bilateral

## 2023-06-08 NOTE — H&P
History:   Pt here for L1 kyphoplasty/osteocool   Hx of multiple myeloma  with compression fracture  8/10 back pain   No lower extremity symptoms     Medications:  Continuous Infusions:   dextrose 5 % and 0.9 % NaCl       Scheduled Meds:   scopolamine  1 patch Transdermal Once Pre-Op     PRN Meds:iohexoL, sodium chloride 0.9%, vancomycin (VANCOCIN) IVPB     Review of Systems   Constitutional:  Negative for activity change, appetite change and chills.   HENT:  Negative for hearing loss, sore throat and tinnitus.    Eyes:  Negative for pain, discharge and itching.   Cardiovascular:  Negative for chest pain.   Gastrointestinal:  Negative for abdominal pain.   Endocrine: Negative for cold intolerance and heat intolerance.   Genitourinary:  Negative for difficulty urinating and dysuria.   Musculoskeletal:  Positive for back pain and gait problem.   Allergic/Immunologic: Negative for environmental allergies.   Neurological:  Negative for dizziness, tremors, weakness, light-headedness and headaches.   Hematological:  Negative for adenopathy.   Psychiatric/Behavioral:  Negative for agitation, behavioral problems and confusion.    Objective:     Weight: 56.9 kg (125 lb 7.1 oz)  Body mass index is 21.53 kg/m².  Vital Signs (Most Recent):  Temp: 98.2 °F (36.8 °C) (06/08/23 0720)  Pulse: 78 (06/08/23 0720)  Resp: 20 (06/08/23 0720)  BP: 124/71 (06/08/23 0720)  SpO2: 99 % (06/08/23 0720) Vital Signs (24h Range):  Temp:  [98.2 °F (36.8 °C)] 98.2 °F (36.8 °C)  Pulse:  [68-78] 78  Resp:  [16-20] 20  SpO2:  [99 %-100 %] 99 %  BP: (124)/(71) 124/71                                 Physical Exam  Constitutional:       General: She is not in acute distress.     Appearance: She is well-developed and well-nourished.   Eyes:      Extraocular Movements: EOM normal.      Pupils: Pupils are equal, round, and reactive to light.   Cardiovascular:      Rate and Rhythm: Normal rate.   Abdominal:      Palpations: Abdomen is soft.   Neurological:       Mental Status: She is alert and oriented to person, place, and time.   Psychiatric:         Mood and Affect: Mood and affect normal.            Physical Exam:    Constitutional: She appears well-developed and well-nourished. No distress.     Eyes: Pupils are equal, round, and reactive to light. EOM are normal.     Cardiovascular: Normal rate.     Abdominal: Soft.     Skin: Skin displays no rash on trunk.     Psych/Behavior: She is alert. She is oriented to person, place, and time. She has a normal mood and affect.     Musculoskeletal:        Neck: Range of motion is full. Muscle strength is 5/5. Tone is normal.        Back: Range of motion is limited. There is tenderness. Muscle strength is 5/5. Tone is normal.        Right Upper Extremities: There is no tenderness. Tone is normal.        Left Upper Extremities: There is no tenderness. Tone is normal.       Right Lower Extremities: There is no tenderness.        Left Lower Extremities: There is no tenderness.     Neurological:        Sensory: There is no sensory deficit in the trunk. There is no sensory deficit in the extremities.        DTRs: DTRs are normal.        Cranial nerves: Cranial nerve(s) II, III, IV, V, VI, VII, VIII, IX, X, XI and XII are intact.     Significant Labs:  No results for input(s): GLU, NA, K, CL, CO2, BUN, CREATININE, CALCIUM, MG in the last 48 hours.  No results for input(s): WBC, HGB, HCT, PLT in the last 48 hours.  No results for input(s): LABPT, INR, APTT in the last 48 hours.  Microbiology Results (last 7 days)       ** No results found for the last 168 hours. **          All pertinent labs from the last 24 hours have been reviewed.    Significant Diagnostics:  I have reviewed all pertinent imaging results/findings within the past 24 hours.      Fausto Smith MD  Neurosurgery     Disclaimer: This note was prepared using a voice recognition system and is likely to have sound alike errors within the text.

## 2023-06-08 NOTE — OP NOTE
[facility] ([facility])  Neurosurgery  Operative Note    SUMMARY      Date of Procedure: 6/8/2023     Procedure: Procedure(s) (LRB):  Kyphoplasty (Bilateral)  BIOPSY (N/A)   Radiofrequency ablation     Surgeon(s) and Role:     * Fausto Smith MD - Primary    Assisting Surgeon: None    Pre-Operative Diagnosis: Multiple myeloma, remission status unspecified [C90.00]  Compression fracture of L1 vertebra, initial encounter [S32.010A]    Post-Operative Diagnosis: Post-Op Diagnosis Codes:     * Multiple myeloma, remission status unspecified [C90.00]     * Compression fracture of L1 vertebra, initial encounter [S32.010A]    Anesthesia: General    Operative Findings (including complications, if any):   L1 compression fracture secondary to tumor     Description of Technical Procedures:   Kyphoplasty L1   Vertebral body biopsy L1   Radiofrequency ablation bilateral L1         Estimated Blood Loss (EBL): * No values recorded between 6/8/2023  8:35 AM and 6/8/2023  9:15 AM *           Specimens:   Specimen (24h ago, onward)       Start     Ordered    06/08/23 0856  Specimen to Pathology, Surgery Neurosurgery  Once        Comments: Pre-op Diagnosis: Multiple myeloma, remission status unspecified [C90.00]Compression fracture of L1 vertebra, initial encounter [S32.010A]Procedure(s):KyphoplastyBIOPSY Number of specimens: 1Name of specimens: 1. L1 Vertebral body biopsy -perm     References:    Click here for ordering Quick Tip   Question Answer Comment   Procedure Type: Neurosurgery    Specimen Class: Known or suspected malignancy    Which provider would you like to cc? FAUSTO SMITH    Release to patient Immediate        06/08/23 0906                     Implants:   Implant Name Type Inv. Item Serial No.  Lot No. LRB No. Used Action   CEMENT KYPHOPLASTY HI VISC - [id number]  CEMENT KYPHOPLASTY HI VISC  KYPHON [id number] Bilateral 1 Implanted              Condition: Good    Disposition: PACU - hemodynamically  stable.    Attestation: I was present and scrubbed for the entire procedure.    Patient is a 62-year-old female who presented to me for evaluation history of multiple myeloma with an L1 compression fracture.  Contrast enhancement consistent with metastatic involvement of the vertebral body.  She after failing conservative therapy did agree and consent to having a kyphoplasty bilateral L1.  Transpedicular biopsy vertebral body  bilateral.  Radiofrequency ablation of the vertebral body      After explaining the surgical risks the patient as well as the family members were well apprised of all the objectives, benefits, risks and potential complications of the procedure, including but not limited to:  Worsening of current status, the possible need for further procedures, the risk of infection, headaches, CSF leak, possible spinal nerve injury resulting in paralysis, infection, injury to major vessels causing hemorrhage, stroke, loss of language function, coma and even death.  No assurance was given whether the symptoms would improve following the procedure.  Informed consent was obtained and secured in the chart after the patient and family voiced understanding of these risks and decided to proceed with the operation.     Description of procedure:  The patient was transferred to the operating room and was given preoperative prophylactic IV antibiotics.    Patient was then intubated with anesthesia and rolled prone onto the Les table.  Tano Hugger was placed over the upper body to maintain core control of the body temperature.  A Arreaga catheter was inserted prior.     Operative technique:  There area was prepped and draped in the standard sterile fashion.  A spinal needle was placed to zakia the L1 with biplanar fluoroscopy.     The cannulated transpedicular trocar from [employer] was then placed bilaterally under fluoroscopic guidance.  AP and lateral fluoroscopy then confirmed the trocars were in the appropriate  position.  Next the transpedicular drill was introduced and the bone was drilled into the vertebral body.  Specimen was sent to pathology for analysis and evaluation    Next the radiofrequency ablation probes were placed to the vertebral body transpedicular and subsequent treatment was performed according to the normal protocol     The balloon tamps were inserted in the deflated position into each pedicle.  Using radiopaque material and fluoroscopic guidance the balloon tamps were inflated to nominal pressure approximately 200 psi. The balloon tamps were then deflated and removed.       At the L1 level approximately 6 cc of cement was injected until a good fill through the vertebral body was obtained .  The right side pedicle and vertebral body filled easily     The cannula was then rotated and removed. Hemostasis obtained by holding manual pressure over the puncture site.  The patient remained prone on the table until the cement in the mixing bowl had hardened completely.  The percutaneous pin was then removed. Derma steen was used  close the incisions at each of the entry sites.  Patient tolerated the procedure well and was turned supine onto the operative table following the procedure.  The patient was then extubated with anesthesia and taken the recovery room without any deficits noted.    Fausto Smith MD  Neurosurgery     Disclaimer: This note was prepared using a voice recognition system and is likely to have sound alike errors within the text.

## 2023-06-08 NOTE — DISCHARGE SUMMARY
[facility] (Hospital)  Neurosurgery  Discharge Summary      Patient Name: Ana Bonilla  MRN: [id number]  Admission Date: 6/8/2023  Hospital Length of Stay: 0 days  Discharge Date and Time: No discharge date for patient encounter.  Attending Physician: Fausto Smith MD   Discharging Provider: Fausto Smith MD  Primary Care Provider: Kristin Sibley MD    HPI:   No notes on file    Procedure(s) (LRB):  Kyphoplasty (Bilateral)  BIOPSY (N/A)     Hospital Course: No notes on file    Goals of Care Treatment Preferences:  Code Status: Full Code      Consults:     Significant Diagnostic Studies: N/A    Pending Diagnostic Studies:       Procedure Component Value Units Date/Time    SURG FL Surgery Fluoro Usage [[id number]] Resulted: 06/08/23 0906    Order Status: Sent Lab Status: In process Updated: 06/08/23 0906    Specimen to Pathology, Surgery Neurosurgery [[id number]] Collected: 06/08/23 0906    Order Status: Sent Lab Status: In process Updated: 06/08/23 0907    Specimen: Tissue     X-Ray Lumbar Spine Ap And Lateral [[id number]] Resulted: 06/08/23 0906    Order Status: Sent Lab Status: In process Updated: 06/08/23 0906          There are no hospital problems to display for this patient.     Discharged Condition: good     Disposition: Home or Self Care    Follow Up:   Follow-up Information       Bertrand De La Rosa PA-C Follow up in 2 week(s).    Specialty: Neurosurgery  Contact information:  [address] [place] [address] [address] [place]Z [address] [phone]                           Patient Instructions:      Diet Adult Regular     Notify your health care provider if you experience any of the following:  temperature >100.4     Notify your health care provider if you experience any of the following:  persistent nausea and vomiting or diarrhea     Notify your health care provider if you experience any of the following:  severe uncontrolled pain     Notify your health care provider if you experience any of the following:   redness, tenderness, or signs of infection (pain, swelling, redness, odor or green/yellow discharge around incision site)     Notify your health care provider if you experience any of the following:  difficulty breathing or increased cough     Notify your health care provider if you experience any of the following:  severe persistent headache     Notify your health care provider if you experience any of the following:  persistent dizziness, light-headedness, or visual disturbances     Notify your health care provider if you experience any of the following:  worsening rash     Change dressing (specify)   Order Comments: Dressing change:wash daily with soap and water   Cover with a sterile dressing following     Activity as tolerated     Medications:  Reconciled Home Medications:      Medication List        CHANGE how you take these medications      metoprolol succinate 50 MG 24 hr tablet  Commonly known as: TOPROL-XL  TAKE 1 TABLET(50 MG) BY MOUTH EVERY DAY  What changed: when to take this     pantoprazole 40 MG tablet  Commonly known as: PROTONIX  TAKE 1 TABLET(40 MG) BY MOUTH EVERY DAY  What changed:   when to take this  reasons to take this     rosuvastatin 10 MG tablet  Commonly known as: CRESTOR  TAKE 1 TABLET(10 MG) BY MOUTH EVERY DAY  What changed: when to take this            CONTINUE taking these medications      acyclovir 400 MG tablet  Commonly known as: ZOVIRAX  Take 1 tablet (400 mg total) by mouth 2 (two) times daily.     albuterol 90 mcg/actuation inhaler  Commonly known as: PROVENTIL HFA  Inhale 2 puffs into the lungs every 6 (six) hours as needed for Wheezing. Rescue     ALPRAZolam 2 MG Tab  Commonly known as: XANAX  TAKE 1 TABLET BY MOUTH TWICE DAILY AS NEEDED FOR ANXIETY     amlodipine-benazepril 2.5-10 mg 2.5-10 mg per capsule  Commonly known as: LOTREL  TAKE 1 CAPSULE BY MOUTH EVERY DAY     aspirin 81 MG EC tablet  Commonly known as: ECOTRIN  Take 1 tablet (81 mg total) by mouth once daily.      baclofen 10 MG tablet  Commonly known as: LIORESAL  Take 10 mg by mouth 3 (three) times daily.     calcium carbonate-vit D3-min 600 mg calcium- 400 unit Tab  Take 1 tablet by mouth once. for 1 dose     dexAMETHasone 4 MG Tab  Commonly known as: DECADRON  Take 10 tablets (40 mg total) by mouth every 7 days. ( once weekly by mouth on days 1, 8 and 15 every 21 day cycle)     diazePAM 5 MG tablet  Commonly known as: VALIUM  Take 1 tablet by mouth 30 minutes prior to MRI to help decrease anxiety.     fluticasone propionate 50 mcg/actuation nasal spray  Commonly known as: FLONASE  1 spray (50 mcg total) by Each Nostril route once daily.     fluticasone-salmeterol 250-50 mcg/dose 250-50 mcg/dose diskus inhaler  Commonly known as: ADVAIR  Inhale 1 puff into the lungs 2 (two) times daily. Controller     HYDROcodone-acetaminophen 5-325 mg per tablet  Commonly known as: NORCO  Take 1 tablet by mouth every 6 (six) hours as needed for Pain.     levothyroxine 100 MCG tablet  Commonly known as: SYNTHROID  TAKE 1 TABLET(100 MCG) BY MOUTH BEFORE BREAKFAST     linaCLOtide 72 mcg Cap capsule  Commonly known as: LINZESS  Take 2 capsules (144 mcg total) by mouth before breakfast.     methocarbamoL 500 MG Tab  Commonly known as: ROBAXIN  TAKE 1 TABLET(500 MG) BY MOUTH TWICE DAILY AS NEEDED     montelukast 10 mg tablet  Commonly known as: SINGULAIR  Take 1 tablet (10 mg total) by mouth every evening.     nicotine 21 mg/24 hr  Commonly known as: NICODERM CQ  Place 1 patch onto the skin once daily.     ondansetron 8 MG tablet  Commonly known as: ZOFRAN  Take 1 tablet (8 mg total) by mouth every 12 (twelve) hours as needed for Nausea.     oxyCODONE-acetaminophen 5-325 mg per tablet  Commonly known as: PERCOCET  Take 1 tablet by mouth every 4 (four) hours as needed for Pain.     promethazine 25 MG tablet  Commonly known as: PHENERGAN  Take 1 tablet (25 mg total) by mouth every 4 (four) hours.     promethazine-codeine 6.25-10 mg/5 ml 6.25-10  mg/5 mL syrup  Commonly known as: PHENERGAN with CODEINE  Take 5 mLs by mouth every 4 (four) hours as needed for Cough.     traZODone 50 MG tablet  Commonly known as: DESYREL  Take 1 tablet (50 mg total) by mouth every evening.            ASK your doctor about these medications      lenalidomide 10 mg Cap  Take 1 capsule by mouth once daily on days 1-14 of each 21 day cycle..              Fausto Smith MD  Neurosurgery  [facility] ([facility])

## 2023-06-08 NOTE — TRANSFER OF CARE
"Anesthesia Transfer of Care Note    Patient: Ana Bonilla    Procedure(s) Performed: Procedure(s) (LRB):  Kyphoplasty (Bilateral)  BIOPSY (N/A)    Patient location: PACU    Anesthesia Type: general    Transport from OR: Transported from OR on room air with adequate spontaneous ventilation    Post pain: adequate analgesia    Post assessment: no apparent anesthetic complications and tolerated procedure well    Post vital signs: stable    Level of consciousness: awake, alert and oriented    Nausea/Vomiting: no nausea/vomiting    Complications: none    Transfer of care protocol was followed      Last vitals:   Visit Vitals  BP (!) 140/69 (BP Location: Right arm, Patient Position: Lying)   Pulse 75   Temp 36.9 °C (98.5 °F) (Temporal)   Resp 15   Ht 5' 4" (1.626 m)   Wt 56.9 kg (125 lb 7.1 oz)   SpO2 97%   Breastfeeding No   BMI 21.53 kg/m²     "

## 2023-06-09 ENCOUNTER — PATIENT MESSAGE (OUTPATIENT)
Dept: NEUROSURGERY | Facility: CLINIC | Age: 63
End: 2023-06-09
Payer: COMMERCIAL

## 2023-06-09 NOTE — ANESTHESIA POSTPROCEDURE EVALUATION
Anesthesia Post Evaluation    Patient: Ana Bonilla    Procedure(s) Performed: Procedure(s) (LRB):  KYPHOPLASTY (Bilateral)  BIOPSY (N/A)    Final Anesthesia Type: general      Patient location during evaluation: PACU  Patient participation: Yes- Able to Participate  Level of consciousness: awake  Post-procedure vital signs: reviewed and stable  Pain management: adequate  Airway patency: patent    PONV status at discharge: No PONV  Anesthetic complications: no      Cardiovascular status: hemodynamically stable  Respiratory status: unassisted  Hydration status: euvolemic  Follow-up not needed.          Vitals Value Taken Time   BP 95/62 06/08/23 1031   Temp 36.7 °C (98 °F) 06/08/23 1030   Pulse 74 06/08/23 1038   Resp 64 06/08/23 1037   SpO2 95 % 06/08/23 1038   Vitals shown include unvalidated device data.      Event Time   Out of Recovery 10:39:10         Pain/Manuel Score: Pain Rating Prior to Med Admin: 6 (6/8/2023 10:00 AM)  Manuel Score: 9 (6/8/2023 10:45 AM)

## 2023-06-12 ENCOUNTER — PATIENT MESSAGE (OUTPATIENT)
Dept: HEMATOLOGY/ONCOLOGY | Facility: CLINIC | Age: 63
End: 2023-06-12
Payer: COMMERCIAL

## 2023-06-12 DIAGNOSIS — C90.00 MULTIPLE MYELOMA, REMISSION STATUS UNSPECIFIED: ICD-10-CM

## 2023-06-12 DIAGNOSIS — G89.3 CANCER RELATED PAIN: ICD-10-CM

## 2023-06-12 RX ORDER — OXYCODONE AND ACETAMINOPHEN 10; 325 MG/1; MG/1
1 TABLET ORAL EVERY 8 HOURS PRN
Qty: 90 TABLET | Refills: 0 | Status: SHIPPED | OUTPATIENT
Start: 2023-07-12 | End: 2023-06-13

## 2023-06-12 RX ORDER — OXYCODONE AND ACETAMINOPHEN 10; 325 MG/1; MG/1
1 TABLET ORAL EVERY 8 HOURS PRN
Qty: 90 TABLET | Refills: 0 | Status: SHIPPED | OUTPATIENT
Start: 2023-07-13 | End: 2023-06-12

## 2023-06-12 NOTE — PROGRESS NOTES
Subjective:       Patient ID: Ana Bonilla is a 62 y.o. female.    Chief Complaint:   1. Multiple myeloma, remission status unspecified  IgG lambda Multiple myeloma, R-ISS stage I        Current Treatment:  OP VRD - WEEKLY BORTEZOMIB LENALIDOMIDE DEXAMETHASONE Q3W  OP ZOLEDRONIC ACID (ZOMETA) Q4W started 5/2023    Treatment History:  S/p kyphoplasty with radiofrequency ablation per Dr. Smith on 6/8/2023    HPI: This is a 62 year old woman with medical history significant for amblyopia, asthma, HTN, thyroid disease, anxiety, COPD, hyperlipidemia, allergies, and GERD who is seen in Hem/Onc for multiple myeloma. She was referred in 2/2023 by her PCP Dr. Kassidy Sibley after workup for gastritis revealed lytic lesions. CT chest showed lytic and expansile destructive lesion in the sternum and lytic lesions at L1. Biopsy of L1 lesion in 3/2023 revealed mature B cell neoplasm most consistent with plasma cell neoplasm.     Bone marrow biopsy was performed in 4/2023 that demonstrated 60% plasma cells. PET/CT showed extensive bony lesions throughout the spine, sternum, bilateral ribs, pelvis, and a mild compression deformity in L1. SPEP/MECHE showed IgG lambda monoclonal protein measuring 3.19 g/dL. Quantitative immunoglobulins on 3/27/2023 showed IgG 4,521 mg/dL. UPEP/MECHE and FLC were pending. Labs on 3/27/2023 showed Beta 2 microglobulin 1.9, .  Labs on 4/19/2023 showed Hgb, 11.5, WBC 6.3, platelets, BUN 11, Cr 0.9, calcium 9.    She was started on VRd (Velcade/Revlimid/dexamethasone) every 21 days for 3-6 cycles with the plan to evaluate her for autotransplant with consideration of adding daratumumab if she was found to be high risk after FISH panel was resulted. She was started on ASA for thrombosis prophylaxis and acyclovir for herpes zoster prophylaxis. Plan to start Zometa after dental evaluation.     In 5/2023, Revlimid was decreased from 25mg po 10mg due to decreased creatinine clearance of 49. She underwent  "kyphoplasty with radiofrequency ablation on 6/8/2023 by Neurosurgery.     Her primary Hematologist/Oncologist is Dr. Dela Cruz .    Interval History: Patient presents for follow up on VRd; She is scheduled to receive C2D1 today. She presents alone and reports "excruciating" pain for the last 6 days since her back surgery. She states she was informed that she would be in some pain after surgery. She states she did not receive any pain medication from her surgeon and previously prescribed Percocet 5mg are not effective. She was prescribed Percocet 10mg by Dr. Dela Cruz yesterday but her pharmacy does not have it; the medication is on backorder. Will have it transferred to [facility] pharmacy per her request as they have the medication in stock. She reports being constipated but admits to not drinking much water. She states her Linzess is not even effective. She reports a decreased appetite as well. Discussed that pain can cause decreased appetite and pain medications as well as inadequate water intake can cause constipation. Ca is low and total protein elevated. She admits to drinking Ensure with medications or whenever she does not feel like eating. Advised her to supplement her meals with Ensure as opposed to relying on Ensure for all of her nutrition. Recommend she start her new pain medication today and increase daily water intake; also recommend she snack throughout the day on the foods she likes or craves. She is amenable to this.     Reviewed labs with patient:   CBC:   Recent Labs   Lab 06/14/23  0754   WBC 6.99   RBC 3.37 L   Hemoglobin 10.6 L   Hematocrit 32.6 L   Platelets 283   MCV 97   MCH 31.5 H   MCHC 32.5     CMP:  Recent Labs   Lab 06/14/23  0754   Glucose 148 H   Calcium 8.4 L   Albumin 2.9 L   Total Protein 9.2 H   Sodium 137   Potassium 3.6   CO2 25   Chloride 103   BUN 13   Creatinine 1.1   Alkaline Phosphatase 63   ALT 18   AST 17   Total Bilirubin 0.5       Social History     Socioeconomic " History    Marital status:     Number of children: 2   Occupational History     Employer: [employer]   Tobacco Use    Smoking status: Every Day     Packs/day: 0.50     Years: 50.00     Pack years: 25.00     Types: Cigarettes    Smokeless tobacco: Never    Tobacco comments:     HOLD MIDNIGHT PRIOR TO SX   Substance and Sexual Activity    Alcohol use: Never     Comment: quit    Drug use: No    Sexual activity: Never     Past Medical History:   Diagnosis Date    Allergy     Amblyopia OS    Anxiety     Asthma     COPD (chronic obstructive pulmonary disease)     NO HOME o2    GERD (gastroesophageal reflux disease)     Hyperlipidemia     Hypertension     PONV (postoperative nausea and vomiting)     Thyroid disease      Family History   Problem Relation Age of Onset    Hypertension Mother     Diabetes Mother     Diabetes Father     Stroke Maternal Grandmother     Prostate cancer Maternal Grandfather     Mental illness Son     Pancreatic cancer Maternal Uncle     Mental illness Other     Pancreatic cancer Other     Ovarian cancer Maternal Cousin      Past Surgical History:   Procedure Laterality Date    APPENDECTOMY      BIOPSY N/A 6/8/2023    Procedure: BIOPSY;  Surgeon: Fausto Smith MD;  Location: [facility] OR;  Service: Neurosurgery;  Laterality: N/A;  L1    BUNIONECTOMY      COLONOSCOPY N/A 12/4/2019    Procedure: COLONOSCOPY;  Surgeon: Danny Matos III, MD;  Location: [facility];  Service: Endoscopy;  Laterality: N/A;    COLONOSCOPY N/A 10/24/2022    Procedure: COLONOSCOPY;  Surgeon: Danny Matos III, MD;  Location: [facility] ENDO;  Service: Endoscopy;  Laterality: N/A;    ESOPHAGOGASTRODUODENOSCOPY N/A 10/24/2022    Procedure: EGD (ESOPHAGOGASTRODUODENOSCOPY);  Surgeon: Danny Matos III, MD;  Location: [facility] ENDO;  Service: Endoscopy;  Laterality: N/A;    FIXATION KYPHOPLASTY Bilateral 6/8/2023    Procedure: KYPHOPLASTY;  Surgeon: Fausto Smith MD;  Location: [facility] OR;  Service: Neurosurgery;  Laterality:  Bilateral;  kyphoplasty and radiofrequency ablation - L1    HYSTERECTOMY      PARTIAL//still with ovaries    neck fusion  08/2017    THYROIDECTOMY       Review of Systems   Constitutional:  Positive for appetite change. Negative for fatigue.   HENT:  Negative for mouth sores, rhinorrhea and sore throat.    Eyes: Negative.  Photophobia: decreased since surgery.   Respiratory: Negative.     Cardiovascular: Negative.    Gastrointestinal:  Positive for constipation (since surgery). Negative for diarrhea, nausea and vomiting.   Endocrine: Negative.    Genitourinary: Negative.    Musculoskeletal:  Positive for back pain (10/10 back and right hip pain constant).   Integumentary:  Negative.   Allergic/Immunologic: Negative.    Neurological:  Negative for weakness and numbness.   Hematological: Negative.    Psychiatric/Behavioral: Negative.         Medication List with Changes/Refills   Current Medications    ACYCLOVIR (ZOVIRAX) 400 MG TABLET    Take 1 tablet (400 mg total) by mouth 2 (two) times daily.    ALBUTEROL (PROVENTIL HFA) 90 MCG/ACTUATION INHALER    Inhale 2 puffs into the lungs every 6 (six) hours as needed for Wheezing. Rescue    ALPRAZOLAM (XANAX) 2 MG TAB    TAKE 1 TABLET BY MOUTH TWICE DAILY AS NEEDED FOR ANXIETY    AMLODIPINE-BENAZEPRIL 2.5-10 MG (LOTREL) 2.5-10 MG PER CAPSULE    TAKE 1 CAPSULE BY MOUTH EVERY DAY    ASPIRIN (ECOTRIN) 81 MG EC TABLET    Take 1 tablet (81 mg total) by mouth once daily.    BACLOFEN (LIORESAL) 10 MG TABLET    Take 10 mg by mouth 3 (three) times daily.    CALCIUM CARBONATE-VIT D3- MG CALCIUM- 400 UNIT TAB    Take 1 tablet by mouth once. for 1 dose    DEXAMETHASONE (DECADRON) 4 MG TAB    Take 10 tablets (40 mg total) by mouth every 7 days. ( once weekly by mouth on days 1, 8 and 15 every 21 day cycle)    DIAZEPAM (VALIUM) 5 MG TABLET    Take 1 tablet by mouth 30 minutes prior to MRI to help decrease anxiety.    FLUTICASONE PROPIONATE (FLONASE) 50 MCG/ACTUATION NASAL SPRAY     1 spray (50 mcg total) by Each Nostril route once daily.    FLUTICASONE-SALMETEROL DISKUS INHALER 250-50 MCG    Inhale 1 puff into the lungs 2 (two) times daily. Controller    HYDROCODONE-ACETAMINOPHEN (NORCO) 5-325 MG PER TABLET    Take 1 tablet by mouth every 6 (six) hours as needed for Pain.    LENALIDOMIDE 10 MG CAP    Take 1 capsule by mouth once daily on days 1-14 of each 21 day cycle..    LEVOTHYROXINE (SYNTHROID) 100 MCG TABLET    TAKE 1 TABLET(100 MCG) BY MOUTH BEFORE BREAKFAST    LINACLOTIDE (LINZESS) 72 MCG CAP CAPSULE    Take 2 capsules (144 mcg total) by mouth before breakfast.    METHOCARBAMOL (ROBAXIN) 500 MG TAB    TAKE 1 TABLET(500 MG) BY MOUTH TWICE DAILY AS NEEDED    METOPROLOL SUCCINATE (TOPROL-XL) 50 MG 24 HR TABLET    TAKE 1 TABLET(50 MG) BY MOUTH EVERY DAY    MONTELUKAST (SINGULAIR) 10 MG TABLET    Take 1 tablet (10 mg total) by mouth every evening.    NICOTINE (NICODERM CQ) 21 MG/24 HR    Place 1 patch onto the skin once daily.    ONDANSETRON (ZOFRAN) 8 MG TABLET    Take 1 tablet (8 mg total) by mouth every 12 (twelve) hours as needed for Nausea.    OXYCODONE-ACETAMINOPHEN (PERCOCET)  MG PER TABLET    Take 1 tablet by mouth every 8 (eight) hours as needed for Pain.    PANTOPRAZOLE (PROTONIX) 40 MG TABLET    TAKE 1 TABLET(40 MG) BY MOUTH EVERY DAY    PROMETHAZINE (PHENERGAN) 25 MG TABLET    Take 1 tablet (25 mg total) by mouth every 4 (four) hours.    PROMETHAZINE-CODEINE 6.25-10 MG/5 ML (PHENERGAN WITH CODEINE) 6.25-10 MG/5 ML SYRUP    Take 5 mLs by mouth every 4 (four) hours as needed for Cough.    ROSUVASTATIN (CRESTOR) 10 MG TABLET    TAKE 1 TABLET(10 MG) BY MOUTH EVERY DAY    TRAZODONE (DESYREL) 50 MG TABLET    Take 1 tablet (50 mg total) by mouth every evening.     Objective:     Vitals:    06/14/23 0835   BP: 93/64   Pulse: 86   Temp: 98.1 °F (36.7 °C)     Physical Exam  Vitals reviewed.   Constitutional:       Appearance: Normal appearance.   HENT:      Head: Normocephalic.       Mouth/Throat:      Comments: Wearing mask      Eyes:      Extraocular Movements: Extraocular movements intact.      Pupils: Pupils are equal, round, and reactive to light.   Cardiovascular:      Rate and Rhythm: Normal rate and regular rhythm.      Heart sounds: Normal heart sounds.   Pulmonary:      Effort: Pulmonary effort is normal.      Breath sounds: Normal breath sounds.   Abdominal:      General: Bowel sounds are normal.      Palpations: Abdomen is soft.      Comments: rounded     Genitourinary:     Comments: deferred    Musculoskeletal:         General: Normal range of motion.      Cervical back: Normal range of motion and neck supple.   Skin:     General: Skin is warm and dry.   Neurological:      Mental Status: She is alert and oriented to person, place, and time.   Psychiatric:         Behavior: Behavior normal.         Thought Content: Thought content normal.      Comments: Restless in the chair and while standing due to discomfort from pain and constipation        (2) Ambulatory and capable of self care, unable to carry out work activity, up and about > 50% or waking hours  Assessment:     Problem List Items Addressed This Visit          Oncology    Multiple myeloma - Primary (Chronic)     Diagnosed in 3/2023 after work up for gastritis revealed lytic and expansile destructive lesion in the sternum and lytic lesions at L1. Bone marrow biopsy showed 60% plasma cells. Started VRd and Zometa. Underwent kyphoplasty and radiofrequency ablation in 6/2023. Plan to evaluate for autotransplant after 3-6 cycles.           Plan:     Multiple myeloma, remission status unspecified    Labs reviewed.   Ok to proceed with C2D1 of VRd today.  Patient to increase daily water and food intake.  Will have Percocet 10mg prescription sent to [facility] pharmacy per patient's request; patient to start today.  Follow up in 1 week with CBC and Comprehensive Metabolic Panel prior to C2D8.    Route Chart for Scheduling    Med  Onc Chart Routing      Follow up with physician    Follow up with [facility] 1 week.   Infusion scheduling note    Injection scheduling note C2D8 Velcade/Rev/dex   Labs CBC and CMP   Scheduling:  Preferred lab:  Lab interval:  in 1 week at [place]   Imaging None      Pharmacy appointment No pharmacy appointment needed      Other referrals     No additional referrals needed         I will review assessment/plan with collaborating physician.      BRITTANI Enamorado

## 2023-06-12 NOTE — TELEPHONE ENCOUNTER
Called by the patient complaining of severe pain in her back after surgical procedure and she was not provided any pain medication by the doctor who did the procedure.     checked 06/12/2023     Pain medication dose increased from Percocet 5 mg q8h prn to 10 mg q8h prn     Referred to palliative care for pain management in the future. Informed the patient to follow up with them in the future for pain medication refill as patient is also taking Xanax.       Jose Dela Cruz MD  Hematology / Oncology

## 2023-06-12 NOTE — ASSESSMENT & PLAN NOTE
Diagnosed in 3/2023 after work up for gastritis revealed lytic and expansile destructive lesion in the sternum and lytic lesions at L1. Bone marrow biopsy showed 60% plasma cells. Started VRd and Zometa. Underwent kyphoplasty and radiofrequency ablation in 6/2023. Plan to evaluate for autotransplant after 3-6 cycles.

## 2023-06-13 RX ORDER — OXYCODONE AND ACETAMINOPHEN 10; 325 MG/1; MG/1
1 TABLET ORAL EVERY 8 HOURS PRN
Qty: 90 TABLET | Refills: 0 | Status: SHIPPED | OUTPATIENT
Start: 2023-06-13 | End: 2023-06-14

## 2023-06-14 ENCOUNTER — LAB VISIT (OUTPATIENT)
Dept: LAB | Facility: HOSPITAL | Age: 63
End: 2023-06-14
Attending: INTERNAL MEDICINE
Payer: COMMERCIAL

## 2023-06-14 ENCOUNTER — OFFICE VISIT (OUTPATIENT)
Dept: HEMATOLOGY/ONCOLOGY | Facility: CLINIC | Age: 63
End: 2023-06-14
Payer: COMMERCIAL

## 2023-06-14 ENCOUNTER — INFUSION (OUTPATIENT)
Dept: INFUSION THERAPY | Facility: HOSPITAL | Age: 63
End: 2023-06-14
Attending: INTERNAL MEDICINE
Payer: COMMERCIAL

## 2023-06-14 VITALS
SYSTOLIC BLOOD PRESSURE: 86 MMHG | HEART RATE: 78 BPM | TEMPERATURE: 98 F | OXYGEN SATURATION: 99 % | RESPIRATION RATE: 18 BRPM | DIASTOLIC BLOOD PRESSURE: 61 MMHG

## 2023-06-14 VITALS
SYSTOLIC BLOOD PRESSURE: 93 MMHG | HEART RATE: 86 BPM | HEIGHT: 63 IN | OXYGEN SATURATION: 100 % | BODY MASS INDEX: 22.54 KG/M2 | DIASTOLIC BLOOD PRESSURE: 64 MMHG | TEMPERATURE: 98 F | WEIGHT: 127.19 LBS

## 2023-06-14 DIAGNOSIS — C90.00 MULTIPLE MYELOMA, REMISSION STATUS UNSPECIFIED: Primary | ICD-10-CM

## 2023-06-14 DIAGNOSIS — C90.00 MULTIPLE MYELOMA, REMISSION STATUS UNSPECIFIED: ICD-10-CM

## 2023-06-14 DIAGNOSIS — Z51.11 ENCOUNTER FOR ANTINEOPLASTIC CHEMOTHERAPY: ICD-10-CM

## 2023-06-14 LAB
ALBUMIN SERPL BCP-MCNC: 2.9 G/DL (ref 3.5–5.2)
ALP SERPL-CCNC: 63 U/L (ref 55–135)
ALT SERPL W/O P-5'-P-CCNC: 18 U/L (ref 10–44)
ANION GAP SERPL CALC-SCNC: 9 MMOL/L (ref 8–16)
AST SERPL-CCNC: 17 U/L (ref 10–40)
BASOPHILS # BLD AUTO: 0.03 K/UL (ref 0–0.2)
BASOPHILS NFR BLD: 0.4 % (ref 0–1.9)
BILIRUB SERPL-MCNC: 0.5 MG/DL (ref 0.1–1)
BUN SERPL-MCNC: 13 MG/DL (ref 8–23)
CALCIUM SERPL-MCNC: 8.4 MG/DL (ref 8.7–10.5)
CHLORIDE SERPL-SCNC: 103 MMOL/L (ref 95–110)
CO2 SERPL-SCNC: 25 MMOL/L (ref 23–29)
CREAT SERPL-MCNC: 1.1 MG/DL (ref 0.5–1.4)
DIFFERENTIAL METHOD: ABNORMAL
EOSINOPHIL # BLD AUTO: 0.3 K/UL (ref 0–0.5)
EOSINOPHIL NFR BLD: 4.9 % (ref 0–8)
ERYTHROCYTE [DISTWIDTH] IN BLOOD BY AUTOMATED COUNT: 13.2 % (ref 11.5–14.5)
EST. GFR  (NO RACE VARIABLE): 57 ML/MIN/1.73 M^2
GLUCOSE SERPL-MCNC: 148 MG/DL (ref 70–110)
HCT VFR BLD AUTO: 32.6 % (ref 37–48.5)
HGB BLD-MCNC: 10.6 G/DL (ref 12–16)
IMM GRANULOCYTES # BLD AUTO: 0.01 K/UL (ref 0–0.04)
IMM GRANULOCYTES NFR BLD AUTO: 0.1 % (ref 0–0.5)
LYMPHOCYTES # BLD AUTO: 2.1 K/UL (ref 1–4.8)
LYMPHOCYTES NFR BLD: 29.8 % (ref 18–48)
MAGNESIUM SERPL-MCNC: 2 MG/DL (ref 1.6–2.6)
MCH RBC QN AUTO: 31.5 PG (ref 27–31)
MCHC RBC AUTO-ENTMCNC: 32.5 G/DL (ref 32–36)
MCV RBC AUTO: 97 FL (ref 82–98)
MONOCYTES # BLD AUTO: 0.8 K/UL (ref 0.3–1)
MONOCYTES NFR BLD: 11.7 % (ref 4–15)
NEUTROPHILS # BLD AUTO: 3.7 K/UL (ref 1.8–7.7)
NEUTROPHILS NFR BLD: 53.1 % (ref 38–73)
NRBC BLD-RTO: 0 /100 WBC
PLATELET # BLD AUTO: 283 K/UL (ref 150–450)
PMV BLD AUTO: 8.7 FL (ref 9.2–12.9)
POTASSIUM SERPL-SCNC: 3.6 MMOL/L (ref 3.5–5.1)
PROT SERPL-MCNC: 9.2 G/DL (ref 6–8.4)
RBC # BLD AUTO: 3.37 M/UL (ref 4–5.4)
SODIUM SERPL-SCNC: 137 MMOL/L (ref 136–145)
WBC # BLD AUTO: 6.99 K/UL (ref 3.9–12.7)

## 2023-06-14 PROCEDURE — 99999 PR PBB SHADOW E&M-EST. PATIENT-LVL V: CPT | Mod: PBBFAC,,, | Performed by: NURSE PRACTITIONER

## 2023-06-14 PROCEDURE — 99215 OFFICE O/P EST HI 40 MIN: CPT | Mod: 25,S$GLB,, | Performed by: NURSE PRACTITIONER

## 2023-06-14 PROCEDURE — 96401 CHEMO ANTI-NEOPL SQ/IM: CPT

## 2023-06-14 PROCEDURE — 3078F PR MOST RECENT DIASTOLIC BLOOD PRESSURE < 80 MM HG: ICD-10-PCS | Mod: CPTII,S$GLB,, | Performed by: NURSE PRACTITIONER

## 2023-06-14 PROCEDURE — 80053 COMPREHEN METABOLIC PANEL: CPT | Performed by: INTERNAL MEDICINE

## 2023-06-14 PROCEDURE — 3078F DIAST BP <80 MM HG: CPT | Mod: CPTII,S$GLB,, | Performed by: NURSE PRACTITIONER

## 2023-06-14 PROCEDURE — 4010F PR ACE/ARB THEARPY RXD/TAKEN: ICD-10-PCS | Mod: CPTII,S$GLB,, | Performed by: NURSE PRACTITIONER

## 2023-06-14 PROCEDURE — 63600175 PHARM REV CODE 636 W HCPCS: Mod: JW,JG | Performed by: NURSE PRACTITIONER

## 2023-06-14 PROCEDURE — 3074F SYST BP LT 130 MM HG: CPT | Mod: CPTII,S$GLB,, | Performed by: NURSE PRACTITIONER

## 2023-06-14 PROCEDURE — 83735 ASSAY OF MAGNESIUM: CPT | Performed by: INTERNAL MEDICINE

## 2023-06-14 PROCEDURE — 99215 PR OFFICE/OUTPT VISIT, EST, LEVL V, 40-54 MIN: ICD-10-PCS | Mod: 25,S$GLB,, | Performed by: NURSE PRACTITIONER

## 2023-06-14 PROCEDURE — 3044F HG A1C LEVEL LT 7.0%: CPT | Mod: CPTII,S$GLB,, | Performed by: NURSE PRACTITIONER

## 2023-06-14 PROCEDURE — 36415 COLL VENOUS BLD VENIPUNCTURE: CPT | Performed by: INTERNAL MEDICINE

## 2023-06-14 PROCEDURE — 3074F PR MOST RECENT SYSTOLIC BLOOD PRESSURE < 130 MM HG: ICD-10-PCS | Mod: CPTII,S$GLB,, | Performed by: NURSE PRACTITIONER

## 2023-06-14 PROCEDURE — 99999 PR PBB SHADOW E&M-EST. PATIENT-LVL V: ICD-10-PCS | Mod: PBBFAC,,, | Performed by: NURSE PRACTITIONER

## 2023-06-14 PROCEDURE — 85025 COMPLETE CBC W/AUTO DIFF WBC: CPT | Performed by: INTERNAL MEDICINE

## 2023-06-14 PROCEDURE — 3008F BODY MASS INDEX DOCD: CPT | Mod: CPTII,S$GLB,, | Performed by: NURSE PRACTITIONER

## 2023-06-14 PROCEDURE — 4010F ACE/ARB THERAPY RXD/TAKEN: CPT | Mod: CPTII,S$GLB,, | Performed by: NURSE PRACTITIONER

## 2023-06-14 PROCEDURE — 3044F PR MOST RECENT HEMOGLOBIN A1C LEVEL <7.0%: ICD-10-PCS | Mod: CPTII,S$GLB,, | Performed by: NURSE PRACTITIONER

## 2023-06-14 PROCEDURE — 3008F PR BODY MASS INDEX (BMI) DOCUMENTED: ICD-10-PCS | Mod: CPTII,S$GLB,, | Performed by: NURSE PRACTITIONER

## 2023-06-14 RX ORDER — HEPARIN 100 UNIT/ML
500 SYRINGE INTRAVENOUS
Status: CANCELLED | OUTPATIENT
Start: 2023-06-28

## 2023-06-14 RX ORDER — SODIUM CHLORIDE 0.9 % (FLUSH) 0.9 %
10 SYRINGE (ML) INJECTION
Status: CANCELLED | OUTPATIENT
Start: 2023-06-14

## 2023-06-14 RX ORDER — SODIUM CHLORIDE 0.9 % (FLUSH) 0.9 %
10 SYRINGE (ML) INJECTION
Status: CANCELLED | OUTPATIENT
Start: 2023-06-15

## 2023-06-14 RX ORDER — BORTEZOMIB 3.5 MG/1
1.3 INJECTION, POWDER, LYOPHILIZED, FOR SOLUTION INTRAVENOUS; SUBCUTANEOUS
Status: CANCELLED | OUTPATIENT
Start: 2023-06-15

## 2023-06-14 RX ORDER — BORTEZOMIB 3.5 MG/1
1.3 INJECTION, POWDER, LYOPHILIZED, FOR SOLUTION INTRAVENOUS; SUBCUTANEOUS
Status: CANCELLED | OUTPATIENT
Start: 2023-06-14

## 2023-06-14 RX ORDER — BORTEZOMIB 3.5 MG/1
1.3 INJECTION, POWDER, LYOPHILIZED, FOR SOLUTION INTRAVENOUS; SUBCUTANEOUS
Status: COMPLETED | OUTPATIENT
Start: 2023-06-14 | End: 2023-06-14

## 2023-06-14 RX ORDER — SODIUM CHLORIDE 0.9 % (FLUSH) 0.9 %
10 SYRINGE (ML) INJECTION
Status: CANCELLED | OUTPATIENT
Start: 2023-06-28

## 2023-06-14 RX ORDER — HEPARIN 100 UNIT/ML
500 SYRINGE INTRAVENOUS
Status: CANCELLED | OUTPATIENT
Start: 2023-06-15

## 2023-06-14 RX ORDER — BORTEZOMIB 3.5 MG/1
1.3 INJECTION, POWDER, LYOPHILIZED, FOR SOLUTION INTRAVENOUS; SUBCUTANEOUS
Status: CANCELLED | OUTPATIENT
Start: 2023-06-28

## 2023-06-14 RX ORDER — HEPARIN 100 UNIT/ML
500 SYRINGE INTRAVENOUS
Status: CANCELLED | OUTPATIENT
Start: 2023-06-14

## 2023-06-14 RX ORDER — OXYCODONE AND ACETAMINOPHEN 10; 325 MG/1; MG/1
1 TABLET ORAL EVERY 8 HOURS PRN
Qty: 90 TABLET | Refills: 0 | Status: SHIPPED | OUTPATIENT
Start: 2023-06-14 | End: 2023-07-28

## 2023-06-14 RX ADMIN — BORTEZOMIB 2 MG: 3.5 INJECTION, POWDER, LYOPHILIZED, FOR SOLUTION INTRAVENOUS; SUBCUTANEOUS at 10:06

## 2023-06-15 LAB
COMMENT: NORMAL
FINAL PATHOLOGIC DIAGNOSIS: NORMAL
GROSS: NORMAL
Lab: NORMAL
MICROSCOPIC EXAM: NORMAL

## 2023-06-16 ENCOUNTER — INFUSION (OUTPATIENT)
Dept: INFUSION THERAPY | Facility: HOSPITAL | Age: 63
End: 2023-06-16
Attending: INTERNAL MEDICINE
Payer: COMMERCIAL

## 2023-06-16 ENCOUNTER — OFFICE VISIT (OUTPATIENT)
Dept: HEMATOLOGY/ONCOLOGY | Facility: CLINIC | Age: 63
End: 2023-06-16
Payer: COMMERCIAL

## 2023-06-16 VITALS
HEART RATE: 64 BPM | BODY MASS INDEX: 22.35 KG/M2 | RESPIRATION RATE: 16 BRPM | SYSTOLIC BLOOD PRESSURE: 100 MMHG | DIASTOLIC BLOOD PRESSURE: 54 MMHG | HEIGHT: 63 IN | OXYGEN SATURATION: 100 % | TEMPERATURE: 99 F | WEIGHT: 126.13 LBS

## 2023-06-16 VITALS
SYSTOLIC BLOOD PRESSURE: 117 MMHG | HEIGHT: 63 IN | BODY MASS INDEX: 22.35 KG/M2 | DIASTOLIC BLOOD PRESSURE: 67 MMHG | WEIGHT: 126.13 LBS | OXYGEN SATURATION: 99 % | HEART RATE: 79 BPM | TEMPERATURE: 98 F

## 2023-06-16 DIAGNOSIS — C90.00 MULTIPLE MYELOMA NOT HAVING ACHIEVED REMISSION: ICD-10-CM

## 2023-06-16 DIAGNOSIS — E86.0 DEHYDRATION: ICD-10-CM

## 2023-06-16 DIAGNOSIS — E86.0 DEHYDRATION: Primary | ICD-10-CM

## 2023-06-16 PROCEDURE — 99215 OFFICE O/P EST HI 40 MIN: CPT | Mod: S$GLB,,,

## 2023-06-16 PROCEDURE — 63600175 PHARM REV CODE 636 W HCPCS

## 2023-06-16 PROCEDURE — 3008F BODY MASS INDEX DOCD: CPT | Mod: CPTII,S$GLB,,

## 2023-06-16 PROCEDURE — 99999 PR PBB SHADOW E&M-EST. PATIENT-LVL III: ICD-10-PCS | Mod: PBBFAC,,,

## 2023-06-16 PROCEDURE — 96374 THER/PROPH/DIAG INJ IV PUSH: CPT

## 2023-06-16 PROCEDURE — 3044F PR MOST RECENT HEMOGLOBIN A1C LEVEL <7.0%: ICD-10-PCS | Mod: CPTII,S$GLB,,

## 2023-06-16 PROCEDURE — 3074F PR MOST RECENT SYSTOLIC BLOOD PRESSURE < 130 MM HG: ICD-10-PCS | Mod: CPTII,S$GLB,,

## 2023-06-16 PROCEDURE — 4010F ACE/ARB THERAPY RXD/TAKEN: CPT | Mod: CPTII,S$GLB,,

## 2023-06-16 PROCEDURE — 3074F SYST BP LT 130 MM HG: CPT | Mod: CPTII,S$GLB,,

## 2023-06-16 PROCEDURE — 3008F PR BODY MASS INDEX (BMI) DOCUMENTED: ICD-10-PCS | Mod: CPTII,S$GLB,,

## 2023-06-16 PROCEDURE — 4010F PR ACE/ARB THEARPY RXD/TAKEN: ICD-10-PCS | Mod: CPTII,S$GLB,,

## 2023-06-16 PROCEDURE — 3078F PR MOST RECENT DIASTOLIC BLOOD PRESSURE < 80 MM HG: ICD-10-PCS | Mod: CPTII,S$GLB,,

## 2023-06-16 PROCEDURE — 96361 HYDRATE IV INFUSION ADD-ON: CPT

## 2023-06-16 PROCEDURE — 96360 HYDRATION IV INFUSION INIT: CPT

## 2023-06-16 PROCEDURE — 25000003 PHARM REV CODE 250

## 2023-06-16 PROCEDURE — 99999 PR PBB SHADOW E&M-EST. PATIENT-LVL III: CPT | Mod: PBBFAC,,,

## 2023-06-16 PROCEDURE — 3044F HG A1C LEVEL LT 7.0%: CPT | Mod: CPTII,S$GLB,,

## 2023-06-16 PROCEDURE — 99215 PR OFFICE/OUTPT VISIT, EST, LEVL V, 40-54 MIN: ICD-10-PCS | Mod: S$GLB,,,

## 2023-06-16 PROCEDURE — 3078F DIAST BP <80 MM HG: CPT | Mod: CPTII,S$GLB,,

## 2023-06-16 PROCEDURE — 96375 TX/PRO/DX INJ NEW DRUG ADDON: CPT

## 2023-06-16 RX ORDER — ONDANSETRON 2 MG/ML
8 INJECTION INTRAMUSCULAR; INTRAVENOUS ONCE
Status: COMPLETED | OUTPATIENT
Start: 2023-06-16 | End: 2023-06-16

## 2023-06-16 RX ORDER — HEPARIN 100 UNIT/ML
500 SYRINGE INTRAVENOUS
Status: CANCELLED | OUTPATIENT
Start: 2023-06-16

## 2023-06-16 RX ORDER — ACETAMINOPHEN 325 MG/1
650 TABLET ORAL
Status: CANCELLED
Start: 2023-06-16

## 2023-06-16 RX ORDER — ONDANSETRON 2 MG/ML
8 INJECTION INTRAMUSCULAR; INTRAVENOUS ONCE
Status: CANCELLED
Start: 2023-06-16

## 2023-06-16 RX ORDER — SODIUM CHLORIDE 0.9 % (FLUSH) 0.9 %
10 SYRINGE (ML) INJECTION
Status: CANCELLED | OUTPATIENT
Start: 2023-06-16

## 2023-06-16 RX ORDER — ACETAMINOPHEN 325 MG/1
650 TABLET ORAL
Status: COMPLETED | OUTPATIENT
Start: 2023-06-16 | End: 2023-06-16

## 2023-06-16 RX ORDER — ONDANSETRON 2 MG/ML
8 INJECTION INTRAMUSCULAR; INTRAVENOUS
Status: CANCELLED
Start: 2023-06-16

## 2023-06-16 RX ADMIN — ACETAMINOPHEN 650 MG: 325 TABLET ORAL at 11:06

## 2023-06-16 RX ADMIN — SODIUM CHLORIDE 1000 ML: 9 INJECTION, SOLUTION INTRAVENOUS at 11:06

## 2023-06-16 RX ADMIN — ONDANSETRON 8 MG: 2 INJECTION, SOLUTION INTRAMUSCULAR; INTRAVENOUS at 11:06

## 2023-06-16 NOTE — ASSESSMENT & PLAN NOTE
Plan for 1L IVFs over 2 hours today + Zofran and acetaminophen   Ok to take Doculax OTC for constipation--pt notes she has not taking Linzess  Reiterated importance of taking antiemetic at first sign of nausea  Pt reassured/educated on importance of notifying clinic with worsening symptoms

## 2023-06-16 NOTE — PROGRESS NOTES
"Subjective:     Patient ID:?Ana Bonilla is a 62 y.o. female.?? MR#: [id number]   ?   PRIMARY ONCOLOGIST:   ?   ONCOLOGIC DIAGNOSIS: Multiple Myeloma  ?   CURRENT TREATMENT: OP VRD - WEEKLY BORTEZOMIB LENALIDOMIDE DEXAMETHASONE Q3W     Urgent Care Oncology Visit    Date and Time: 06/16/2023 10:26 AM   Patient MRN: [id number]   Chief Complaint:   Chief Complaint   Patient presents with    Follow-up     "I haven't been feeling good since I started chemo pills"     Reason for Urgent Care Visit: constipation, decreased appetite, and fatigue  Intervention: education provided and sent to infusion / IV fluids  Dispo: return to clinic as previous  UrgOnc appointment addressed via:  Self scheduled   This UrgOnc visit was in person  Time spent handling UC issue:  30    Was this virtual or in person UrgOnc visit appropriate? Yes       HPI  Ms. Bonilla is a 62-year-old  female with past medical history significant for hypertension, COPD, asthma, GERD, hypothyroidism here for follow-up and management of multiple myeloma. BMBx on 4/6/2023 showed 60 % plasma cells. PET-CT on 4/10/2023 showed extensive lytic bony lesions throughout the spine, sternum, bilateral ribs, pelvis and mild compression deformity in L1 vertebral body. SPEP/MECHE on 3/27/2023 showed IgG lambda monoclonal protein - 3.19 g/dl. Quantitative immunoglobulins on 3/27/2023 showed IgG 4,521 mg/dl. UPEP/MECHE and FLC were pending at that time. Labs on 3/27/2023 showed Beta 2 microglobulin 1.9, .  Labs on 4/19/2023 showed Hb, 11.5, WBC 6.3, platelets, BUN 11, Cr 0.9, calcium 9. Pt initiated on VRD 05/10/23.       Interval History: Pt presents today with her  for self scheduled urgent care visit with c/o of nausea, general malaise, poor appetite, constipation, and back pain. Of note, pt seen in clinic 06/14/23. She is s/p kyphoplasty 06/08/23. Pt reassured--will provide with supportive care today. Reiterated importance of maintaining hydration, rest, " adequate nutrition and supplementing Boost. Also reiterated on importance of taking all medications as prescribed.      Oncology History   Multiple myeloma   4/20/2023 Initial Diagnosis    Multiple myeloma     5/10/2023 -  Chemotherapy    Treatment Summary   Plan Name: OP VRD - WEEKLY BORTEZOMIB LENALIDOMIDE DEXAMETHASONE Q3W  Treatment Goal: Palliative  Status: Active  Start Date: 5/10/2023  End Date: 9/14/2023 (Planned)  Provider: Abram Velasquez MD  Chemotherapy: bortezomib (VELCADE) injection 2 mg, 1.3 mg/m2 = 2 mg, Subcutaneous, Clinic/HOD 1 time, 2 of 6 cycles  Administration: 2 mg (5/10/2023), 2 mg (5/17/2023), 2 mg (6/14/2023), 2 mg (5/31/2023)  lenalidomide 25 mg Cap, 25 mg, Oral, Daily, 1 of 1 cycle, Start date: 5/10/2023, End date: 5/17/2023        Social History     Socioeconomic History    Marital status:     Number of children: 2   Occupational History     Employer: [employer]   Tobacco Use    Smoking status: Every Day     Packs/day: 0.50     Years: 50.00     Pack years: 25.00     Types: Cigarettes    Smokeless tobacco: Never    Tobacco comments:     HOLD MIDNIGHT PRIOR TO SX   Substance and Sexual Activity    Alcohol use: Never     Comment: quit    Drug use: No    Sexual activity: Never      Family History   Problem Relation Age of Onset    Hypertension Mother     Diabetes Mother     Diabetes Father     Stroke Maternal Grandmother     Prostate cancer Maternal Grandfather     Mental illness Son     Pancreatic cancer Maternal Uncle     Mental illness Other     Pancreatic cancer Other     Ovarian cancer Maternal Cousin       Past Surgical History:   Procedure Laterality Date    APPENDECTOMY      BIOPSY N/A 6/8/2023    Procedure: BIOPSY;  Surgeon: Fausto Smith MD;  Location: [facility] OR;  Service: Neurosurgery;  Laterality: N/A;  L1    BUNIONECTOMY      COLONOSCOPY N/A 12/4/2019    Procedure: COLONOSCOPY;  Surgeon: Danny Matos III, MD;  Location: [facility] ENDO;  Service: Endoscopy;  Laterality: N/A;     COLONOSCOPY N/A 10/24/2022    Procedure: COLONOSCOPY;  Surgeon: Danny Matos III, MD;  Location: [facility] ENDO;  Service: Endoscopy;  Laterality: N/A;    ESOPHAGOGASTRODUODENOSCOPY N/A 10/24/2022    Procedure: EGD (ESOPHAGOGASTRODUODENOSCOPY);  Surgeon: Danny Matos III, MD;  Location: [facility] ENDO;  Service: Endoscopy;  Laterality: N/A;    FIXATION KYPHOPLASTY Bilateral 6/8/2023    Procedure: KYPHOPLASTY;  Surgeon: aFusto Smith MD;  Location: [facility] OR;  Service: Neurosurgery;  Laterality: Bilateral;  kyphoplasty and radiofrequency ablation - L1    HYSTERECTOMY      PARTIAL//still with ovaries    neck fusion  08/2017    THYROIDECTOMY          Review of Systems   Constitutional:  Positive for activity change and fatigue. Negative for appetite change, chills, fever and unexpected weight change.   HENT:  Negative for congestion, dental problem, mouth sores and nosebleeds.    Eyes:  Negative for visual disturbance.   Respiratory:  Negative for cough, choking and chest tightness.    Cardiovascular:  Negative for chest pain, palpitations and leg swelling.   Gastrointestinal:  Negative for abdominal distention, abdominal pain, anal bleeding, blood in stool, constipation, diarrhea, nausea and vomiting.   Endocrine: Negative.    Genitourinary:  Negative for dysuria, frequency, hematuria and urgency.   Musculoskeletal:  Positive for arthralgias and myalgias. Negative for back pain, gait problem and joint swelling.   Skin:  Negative for wound.   Allergic/Immunologic: Negative for immunocompromised state.   Neurological:  Negative for dizziness, light-headedness, numbness and headaches.   Hematological:  Negative for adenopathy. Does not bruise/bleed easily.   Psychiatric/Behavioral:  The patient is nervous/anxious.      ?   A comprehensive 14-point review of systems was reviewed with patient and was negative other than as specified above.   ?     Objective:      Physical Exam  Vitals reviewed.   Constitutional:        Appearance: Normal appearance. She is not ill-appearing.   HENT:      Head: Normocephalic and atraumatic.      Right Ear: External ear normal.      Left Ear: External ear normal.      Mouth/Throat:      Mouth: Mucous membranes are moist.      Pharynx: Oropharynx is clear.   Cardiovascular:      Rate and Rhythm: Normal rate.   Pulmonary:      Effort: Pulmonary effort is normal.   Abdominal:      General: Abdomen is flat.   Genitourinary:     Comments: deferred  Musculoskeletal:         General: Normal range of motion.      Cervical back: Normal range of motion.      Right lower leg: No edema.      Left lower leg: No edema.   Skin:     General: Skin is warm and dry.      Capillary Refill: Capillary refill takes less than 2 seconds.   Neurological:      Mental Status: She is alert and oriented to person, place, and time.      Motor: No weakness.         ?   Vitals:    06/16/23 0945   BP: 117/67   Pulse: 79   Temp: 98.4 °F (36.9 °C)      ?       ?   Laboratory:  ?   No visits with results within 1 Day(s) from this visit.   Latest known visit with results is:   Lab Visit on 06/14/2023   Component Date Value Ref Range Status    Magnesium 06/14/2023 2.0  1.6 - 2.6 mg/dL Final    Sodium 06/14/2023 137  136 - 145 mmol/L Final    Potassium 06/14/2023 3.6  3.5 - 5.1 mmol/L Final    Chloride 06/14/2023 103  95 - 110 mmol/L Final    CO2 06/14/2023 25  23 - 29 mmol/L Final    Glucose 06/14/2023 148 (H)  70 - 110 mg/dL Final    BUN 06/14/2023 13  8 - 23 mg/dL Final    Creatinine 06/14/2023 1.1  0.5 - 1.4 mg/dL Final    Calcium 06/14/2023 8.4 (L)  8.7 - 10.5 mg/dL Final    Total Protein 06/14/2023 9.2 (H)  6.0 - 8.4 g/dL Final    Albumin 06/14/2023 2.9 (L)  3.5 - 5.2 g/dL Final    Total Bilirubin 06/14/2023 0.5  0.1 - 1.0 mg/dL Final    Alkaline Phosphatase 06/14/2023 63  55 - 135 U/L Final    AST 06/14/2023 17  10 - 40 U/L Final    ALT 06/14/2023 18  10 - 44 U/L Final    Anion Gap 06/14/2023 9  8 - 16 mmol/L Final    eGFR  06/14/2023 57 (A)  >60 mL/min/1.73 m^2 Final    WBC 06/14/2023 6.99  3.90 - 12.70 K/uL Final    RBC 06/14/2023 3.37 (L)  4.00 - 5.40 M/uL Final    Hemoglobin 06/14/2023 10.6 (L)  12.0 - 16.0 g/dL Final    Hematocrit 06/14/2023 32.6 (L)  37.0 - 48.5 % Final    MCV 06/14/2023 97  82 - 98 fL Final    MCH 06/14/2023 31.5 (H)  27.0 - 31.0 pg Final    MCHC 06/14/2023 32.5  32.0 - 36.0 g/dL Final    RDW 06/14/2023 13.2  11.5 - 14.5 % Final    Platelets 06/14/2023 283  150 - 450 K/uL Final    MPV 06/14/2023 8.7 (L)  9.2 - 12.9 fL Final    Immature Granulocytes 06/14/2023 0.1  0.0 - 0.5 % Final    Gran # (ANC) 06/14/2023 3.7  1.8 - 7.7 K/uL Final    Immature Grans (Abs) 06/14/2023 0.01  0.00 - 0.04 K/uL Final    Lymph # 06/14/2023 2.1  1.0 - 4.8 K/uL Final    Mono # 06/14/2023 0.8  0.3 - 1.0 K/uL Final    Eos # 06/14/2023 0.3  0.0 - 0.5 K/uL Final    Baso # 06/14/2023 0.03  0.00 - 0.20 K/uL Final    nRBC 06/14/2023 0  0 /100 WBC Final    Gran % 06/14/2023 53.1  38.0 - 73.0 % Final    Lymph % 06/14/2023 29.8  18.0 - 48.0 % Final    Mono % 06/14/2023 11.7  4.0 - 15.0 % Final    Eosinophil % 06/14/2023 4.9  0.0 - 8.0 % Final    Basophil % 06/14/2023 0.4  0.0 - 1.9 % Final    Differential Method 06/14/2023 Automated   Final      ?   Imaging:    Results for orders placed or performed during the hospital encounter of 04/10/23 (from the past 2160 hour(s))   NM PET CT Whole Body    Impression    1. Numerous FDG avid predominately lytic osseous lesions as above.  Primary differential considerations would include multiple myeloma versus metastatic disease.  2. No FDG avid soft tissue masses or adenopathy demonstrated.  3. Mild pathologic compression deformity of the L1 vertebral body.  This report was flagged in Epic as abnormal.      Electronically signed by: Marino Pollack MD  Date:    04/10/2023  Time:    11:58     *Note: Due to a large number of results and/or encounters for the requested time period, some results have not been  displayed. A complete set of results can be found in Results Review.        Results for orders placed or performed during the hospital encounter of 04/06/23 (from the past 2160 hour(s))   CT Biopsy Bone Marrow (xpd)    Narrative    EXAMINATION:  CT BIOPSY BONE MARROW (XPD)    CLINICAL HISTORY:  Other disorders of plasma-protein metabolism, not elsewhere classified    TECHNIQUE:  Medications:    Moderate sedation using versed and fentanyl was provided with and trained observer monitoring the patient's vital signs and level of consciousness. The total time of sedation was  30 minutes.    1% lidocaine locally    : Donn PAREDES;  [facility] assisted in this procedure.    Complications: None    COMPARISON:  None    FINDINGS:  Procedure: The risks and benefits of this procedure were discussed with the patient, written informed consent was obtained.  The patient was placed prone on the CT gantry.  Preprocedural imaging revealed clear route to the right iliac bone.  A suitable skin site for biopsy was selected.  IV fentanyl 50 mcg and Versed 1 mg was given for conscious sedation.  The skin site was prepped and draped in sterile fashion.  The skin was anesthetized with 1% lidocaine.    Using CT guidance an 11-gauge introducer needle was guided into the right iliac bone.  Prior to biopsy appropriate needle positioning was confirmed with CT.  Bone drill was used to obtain secure purchase into the iliac bone marrow space.  A total of 2 10 cc syringes were filled with marrow aspirate.  The coaxial needle was then used to core a bone sample.    Postprocedural imaging was acquired. The site was bandaged sterilely. The patient left the room in stable condition.      Impression    CT guided coaxial core needle right iliac wing bone marrow aspirate and biopsy.    All CT scans at this facility use dose modulation, iterative reconstruction, and/or weight based dosing when appropriate to reduce radiation dose to as low as  reasonable achievable.      Electronically signed by: Michael Pop  Date:    04/06/2023  Time:    15:50     *Note: Due to a large number of results and/or encounters for the requested time period, some results have not been displayed. A complete set of results can be found in Results Review.        ?   Assessment/Plan:     Problem List Items Addressed This Visit          Renal/    Dehydration     Plan for 1L IVFs over 2 hours today + Zofran and acetaminophen   Ok to take Doculax OTC for constipation--pt notes she has not taking Linzess  Reiterated importance of taking antiemetic at first sign of nausea  Pt reassured/educated on importance of notifying clinic with worsening symptoms                     Med Onc Chart Routing      Follow up with physician    Follow up with W[facility] . AS SCHEDULED   Infusion scheduling note    Injection scheduling note    Labs    Imaging    Pharmacy appointment    Other referrals               TONYA Pool  Hematology/Oncology

## 2023-06-16 NOTE — PLAN OF CARE
Discussed plan of care with pt. Addressed any and ongoing concerns. Pt denies   Problem: Adult Inpatient Plan of Care  Goal: Plan of Care Review  Outcome: Ongoing, Progressing  Goal: Patient-Specific Goal (Individualized)  Outcome: Ongoing, Progressing  Flowsheets (Taken 6/16/2023 1332)  Anxieties, Fears or Concerns: Back is really hurting hasn't been able to eat because of pain and nausea  Individualized Care Needs: Positioned in private bay, warm blanket and pillow for comfort  Goal: Absence of Hospital-Acquired Illness or Injury  Outcome: Ongoing, Progressing  Intervention: Prevent Infection  Flowsheets (Taken 6/16/2023 1332)  Infection Prevention:   equipment surfaces disinfected   hand hygiene promoted   personal protective equipment utilized  Goal: Optimal Comfort and Wellbeing  Outcome: Ongoing, Progressing  Intervention: Monitor Pain and Promote Comfort  Flowsheets (Taken 6/16/2023 1332)  Pain Management Interventions:   position adjusted   pillow support provided   warm blanket provided

## 2023-06-19 ENCOUNTER — DOCUMENTATION ONLY (OUTPATIENT)
Dept: PALLIATIVE MEDICINE | Facility: CLINIC | Age: 63
End: 2023-06-19
Payer: COMMERCIAL

## 2023-06-19 ENCOUNTER — PATIENT MESSAGE (OUTPATIENT)
Dept: NEUROSURGERY | Facility: CLINIC | Age: 63
End: 2023-06-19
Payer: COMMERCIAL

## 2023-06-19 NOTE — PROGRESS NOTES
Palliative Referral Nurse Note    Nurse reached out to pt per Dr Montgomery, pt was informed what is palliative medicin, pt stated she is not big on taking pain meds and her back is hurting, pt stated she is a 2 wk s/p  Kyphoplasty surgery. Pt stated her back hurt but would like her surgeon to follow up on her after 2 weeks she thinks she should not be having this amount of  pain, she is wanted the surgeon  to follow up with her pain, she stated its ok to scheduled the appointment for later than sooner. Pt denied a sooner apt because she really would like her surgeon to address her back pain.

## 2023-06-20 ENCOUNTER — LAB VISIT (OUTPATIENT)
Dept: LAB | Facility: HOSPITAL | Age: 63
End: 2023-06-20
Attending: INTERNAL MEDICINE
Payer: COMMERCIAL

## 2023-06-20 DIAGNOSIS — C90.00 MULTIPLE MYELOMA, REMISSION STATUS UNSPECIFIED: ICD-10-CM

## 2023-06-20 LAB
ALBUMIN SERPL BCP-MCNC: 3.3 G/DL (ref 3.5–5.2)
ALP SERPL-CCNC: 63 U/L (ref 55–135)
ALT SERPL W/O P-5'-P-CCNC: 19 U/L (ref 10–44)
ANION GAP SERPL CALC-SCNC: 11 MMOL/L (ref 8–16)
AST SERPL-CCNC: 21 U/L (ref 10–40)
BASOPHILS # BLD AUTO: 0.02 K/UL (ref 0–0.2)
BASOPHILS NFR BLD: 0.3 % (ref 0–1.9)
BILIRUB SERPL-MCNC: 0.4 MG/DL (ref 0.1–1)
BUN SERPL-MCNC: 9 MG/DL (ref 8–23)
CALCIUM SERPL-MCNC: 9.5 MG/DL (ref 8.7–10.5)
CHLORIDE SERPL-SCNC: 103 MMOL/L (ref 95–110)
CO2 SERPL-SCNC: 25 MMOL/L (ref 23–29)
CREAT SERPL-MCNC: 1 MG/DL (ref 0.5–1.4)
DIFFERENTIAL METHOD: ABNORMAL
EOSINOPHIL # BLD AUTO: 0.7 K/UL (ref 0–0.5)
EOSINOPHIL NFR BLD: 10.1 % (ref 0–8)
ERYTHROCYTE [DISTWIDTH] IN BLOOD BY AUTOMATED COUNT: 13 % (ref 11.5–14.5)
EST. GFR  (NO RACE VARIABLE): >60 ML/MIN/1.73 M^2
GLUCOSE SERPL-MCNC: 107 MG/DL (ref 70–110)
HCT VFR BLD AUTO: 34.2 % (ref 37–48.5)
HGB BLD-MCNC: 11 G/DL (ref 12–16)
IMM GRANULOCYTES # BLD AUTO: 0.02 K/UL (ref 0–0.04)
IMM GRANULOCYTES NFR BLD AUTO: 0.3 % (ref 0–0.5)
LYMPHOCYTES # BLD AUTO: 2.2 K/UL (ref 1–4.8)
LYMPHOCYTES NFR BLD: 32.5 % (ref 18–48)
MCH RBC QN AUTO: 30.7 PG (ref 27–31)
MCHC RBC AUTO-ENTMCNC: 32.2 G/DL (ref 32–36)
MCV RBC AUTO: 96 FL (ref 82–98)
MONOCYTES # BLD AUTO: 0.7 K/UL (ref 0.3–1)
MONOCYTES NFR BLD: 9.8 % (ref 4–15)
NEUTROPHILS # BLD AUTO: 3.1 K/UL (ref 1.8–7.7)
NEUTROPHILS NFR BLD: 47 % (ref 38–73)
NRBC BLD-RTO: 0 /100 WBC
PLATELET # BLD AUTO: 370 K/UL (ref 150–450)
PMV BLD AUTO: 9.4 FL (ref 9.2–12.9)
POTASSIUM SERPL-SCNC: 3.8 MMOL/L (ref 3.5–5.1)
PROT SERPL-MCNC: 9.4 G/DL (ref 6–8.4)
RBC # BLD AUTO: 3.58 M/UL (ref 4–5.4)
SODIUM SERPL-SCNC: 139 MMOL/L (ref 136–145)
WBC # BLD AUTO: 6.61 K/UL (ref 3.9–12.7)

## 2023-06-20 PROCEDURE — 85025 COMPLETE CBC W/AUTO DIFF WBC: CPT | Performed by: NURSE PRACTITIONER

## 2023-06-20 PROCEDURE — 80053 COMPREHEN METABOLIC PANEL: CPT | Performed by: NURSE PRACTITIONER

## 2023-06-20 PROCEDURE — 36415 COLL VENOUS BLD VENIPUNCTURE: CPT | Performed by: NURSE PRACTITIONER

## 2023-06-21 ENCOUNTER — OFFICE VISIT (OUTPATIENT)
Dept: HEMATOLOGY/ONCOLOGY | Facility: CLINIC | Age: 63
End: 2023-06-21
Payer: COMMERCIAL

## 2023-06-21 ENCOUNTER — INFUSION (OUTPATIENT)
Dept: INFUSION THERAPY | Facility: HOSPITAL | Age: 63
End: 2023-06-21
Attending: INTERNAL MEDICINE
Payer: COMMERCIAL

## 2023-06-21 ENCOUNTER — TELEPHONE (OUTPATIENT)
Dept: NEUROSURGERY | Facility: CLINIC | Age: 63
End: 2023-06-21
Payer: COMMERCIAL

## 2023-06-21 VITALS
SYSTOLIC BLOOD PRESSURE: 124 MMHG | RESPIRATION RATE: 16 BRPM | OXYGEN SATURATION: 99 % | BODY MASS INDEX: 21.42 KG/M2 | HEART RATE: 103 BPM | HEIGHT: 64 IN | TEMPERATURE: 98 F | WEIGHT: 125.44 LBS | DIASTOLIC BLOOD PRESSURE: 79 MMHG

## 2023-06-21 VITALS
SYSTOLIC BLOOD PRESSURE: 107 MMHG | HEART RATE: 80 BPM | WEIGHT: 125.44 LBS | RESPIRATION RATE: 18 BRPM | HEIGHT: 64 IN | DIASTOLIC BLOOD PRESSURE: 70 MMHG | OXYGEN SATURATION: 99 % | BODY MASS INDEX: 21.42 KG/M2 | TEMPERATURE: 97 F

## 2023-06-21 DIAGNOSIS — C90.00 MULTIPLE MYELOMA, REMISSION STATUS UNSPECIFIED: Primary | ICD-10-CM

## 2023-06-21 DIAGNOSIS — C90.00 MULTIPLE MYELOMA, REMISSION STATUS UNSPECIFIED: ICD-10-CM

## 2023-06-21 DIAGNOSIS — Z51.11 ENCOUNTER FOR ANTINEOPLASTIC CHEMOTHERAPY: Primary | ICD-10-CM

## 2023-06-21 DIAGNOSIS — D84.821 IMMUNODEFICIENCY DUE TO CHEMOTHERAPY: ICD-10-CM

## 2023-06-21 DIAGNOSIS — T45.1X5A IMMUNODEFICIENCY DUE TO CHEMOTHERAPY: ICD-10-CM

## 2023-06-21 DIAGNOSIS — Z79.899 IMMUNODEFICIENCY DUE TO CHEMOTHERAPY: ICD-10-CM

## 2023-06-21 PROCEDURE — 3078F PR MOST RECENT DIASTOLIC BLOOD PRESSURE < 80 MM HG: ICD-10-PCS | Mod: CPTII,S$GLB,,

## 2023-06-21 PROCEDURE — 4010F ACE/ARB THERAPY RXD/TAKEN: CPT | Mod: CPTII,S$GLB,,

## 2023-06-21 PROCEDURE — 4010F PR ACE/ARB THEARPY RXD/TAKEN: ICD-10-PCS | Mod: CPTII,S$GLB,,

## 2023-06-21 PROCEDURE — 99215 OFFICE O/P EST HI 40 MIN: CPT | Mod: S$GLB,,,

## 2023-06-21 PROCEDURE — 3008F BODY MASS INDEX DOCD: CPT | Mod: CPTII,S$GLB,,

## 2023-06-21 PROCEDURE — 3044F PR MOST RECENT HEMOGLOBIN A1C LEVEL <7.0%: ICD-10-PCS | Mod: CPTII,S$GLB,,

## 2023-06-21 PROCEDURE — 3074F PR MOST RECENT SYSTOLIC BLOOD PRESSURE < 130 MM HG: ICD-10-PCS | Mod: CPTII,S$GLB,,

## 2023-06-21 PROCEDURE — 63600175 PHARM REV CODE 636 W HCPCS

## 2023-06-21 PROCEDURE — 99999 PR PBB SHADOW E&M-EST. PATIENT-LVL III: CPT | Mod: PBBFAC,,,

## 2023-06-21 PROCEDURE — 63600175 PHARM REV CODE 636 W HCPCS: Mod: JW,JG | Performed by: NURSE PRACTITIONER

## 2023-06-21 PROCEDURE — 3078F DIAST BP <80 MM HG: CPT | Mod: CPTII,S$GLB,,

## 2023-06-21 PROCEDURE — 3008F PR BODY MASS INDEX (BMI) DOCUMENTED: ICD-10-PCS | Mod: CPTII,S$GLB,,

## 2023-06-21 PROCEDURE — 96401 CHEMO ANTI-NEOPL SQ/IM: CPT

## 2023-06-21 PROCEDURE — 99999 PR PBB SHADOW E&M-EST. PATIENT-LVL III: ICD-10-PCS | Mod: PBBFAC,,,

## 2023-06-21 PROCEDURE — 3074F SYST BP LT 130 MM HG: CPT | Mod: CPTII,S$GLB,,

## 2023-06-21 PROCEDURE — 3044F HG A1C LEVEL LT 7.0%: CPT | Mod: CPTII,S$GLB,,

## 2023-06-21 PROCEDURE — 99215 PR OFFICE/OUTPT VISIT, EST, LEVL V, 40-54 MIN: ICD-10-PCS | Mod: S$GLB,,,

## 2023-06-21 RX ORDER — BORTEZOMIB 3.5 MG/1
1.3 INJECTION, POWDER, LYOPHILIZED, FOR SOLUTION INTRAVENOUS; SUBCUTANEOUS
Status: COMPLETED | OUTPATIENT
Start: 2023-06-21 | End: 2023-06-21

## 2023-06-21 RX ORDER — DEXAMETHASONE 4 MG/1
40 TABLET ORAL
Status: CANCELLED
Start: 2023-06-21

## 2023-06-21 RX ORDER — DEXAMETHASONE 4 MG/1
40 TABLET ORAL
Qty: 30 TABLET | Refills: 4 | Status: SHIPPED | OUTPATIENT
Start: 2023-06-21 | End: 2023-10-30 | Stop reason: SDUPTHER

## 2023-06-21 RX ORDER — DEXAMETHASONE 4 MG/1
40 TABLET ORAL
Status: COMPLETED | OUTPATIENT
Start: 2023-06-21 | End: 2023-06-21

## 2023-06-21 RX ADMIN — DEXAMETHASONE 40 MG: 4 TABLET ORAL at 09:06

## 2023-06-21 RX ADMIN — BORTEZOMIB 2 MG: 3.5 INJECTION, POWDER, LYOPHILIZED, FOR SOLUTION INTRAVENOUS; SUBCUTANEOUS at 09:06

## 2023-06-21 NOTE — NURSING
Pt tolerated Velcade injection well. No adverse reaction noted. Pt education reinforced on possible side effects, what to expect, and when to call her provider. Pt verbalized understanding. I reviewed pt calendar w/ pt and understanding verbalized. I

## 2023-06-21 NOTE — PROGRESS NOTES
"Subjective:     Patient ID:?Ana Bonilla is a 62 y.o. female.?? MR#: [id number]   ?   PRIMARY ONCOLOGIST:   ?   CHIEF COMPLAINT: lab review/assessment for chemo ?????   ?   ONCOLOGIC DIAGNOSIS: Multiple Myeloma  ?   CURRENT TREATMENT: OP VRD - WEEKLY BORTEZOMIB LENALIDOMIDE DEXAMETHASONE Q3W     HPI  Ms. Bonilla is a 62-year-old  female with past medical history significant for hypertension, COPD, asthma, GERD, hypothyroidism here for follow-up and management of multiple myeloma. BMBx on 4/6/2023 showed 60 % plasma cells. PET-CT on 4/10/2023 showed extensive lytic bony lesions throughout the spine, sternum, bilateral ribs, pelvis and mild compression deformity in L1 vertebral body. SPEP/MECHE on 3/27/2023 showed IgG lambda monoclonal protein - 3.19 g/dl. Quantitative immunoglobulins on 3/27/2023 showed IgG 4,521 mg/dl. UPEP/MECHE and FLC were pending at that time. Labs on 3/27/2023 showed Beta 2 microglobulin 1.9, .  Labs on 4/19/2023 showed Hb, 11.5, WBC 6.3, platelets, BUN 11, Cr 0.9, calcium 9. Pt initiated on VRD 05/10/23.       Interval History: Pt presents today for lab review and assessment prior to Velcade. Pt seen for Urgent care visit last week and provided with IV hydration and notes she is feeling much better today. She continues on lenalidomide 10mg, however, states she has not taken dexamethasone and has been out of it "for a long time." She also c/o of rash to ble which became irritated after an episode of diarrhea which covered this area. Denies itching, swelling, tenderness, changes to lotions, soaps, detergents. She notes constipation has resolved, appetite has improved, she is doing her best to maintain hydration--advised IV hydration available when experiencing difficulty with this.    Oncology History   Multiple myeloma   4/20/2023 Initial Diagnosis    Multiple myeloma     5/10/2023 -  Chemotherapy    Treatment Summary   Plan Name: OP VRD - WEEKLY BORTEZOMIB LENALIDOMIDE " DEXAMETHASONE Q3W  Treatment Goal: Palliative  Status: Active  Start Date: 5/10/2023  End Date: 9/14/2023 (Planned)  Provider: Abram Velasquez MD  Chemotherapy: bortezomib (VELCADE) injection 2 mg, 1.3 mg/m2 = 2 mg, Subcutaneous, Clinic/HOD 1 time, 2 of 6 cycles  Administration: 2 mg (5/10/2023), 2 mg (5/17/2023), 2 mg (6/14/2023), 2 mg (5/31/2023), 2 mg (6/21/2023)  lenalidomide 25 mg Cap, 25 mg, Oral, Daily, 1 of 1 cycle, Start date: 5/10/2023, End date: 5/17/2023        Social History     Socioeconomic History    Marital status:     Number of children: 2   Occupational History     Employer: [employer]   Tobacco Use    Smoking status: Every Day     Packs/day: 0.50     Years: 50.00     Pack years: 25.00     Types: Cigarettes    Smokeless tobacco: Never    Tobacco comments:     HOLD MIDNIGHT PRIOR TO SX   Substance and Sexual Activity    Alcohol use: Never     Comment: quit    Drug use: No    Sexual activity: Never      Family History   Problem Relation Age of Onset    Hypertension Mother     Diabetes Mother     Diabetes Father     Stroke Maternal Grandmother     Prostate cancer Maternal Grandfather     Mental illness Son     Pancreatic cancer Maternal Uncle     Mental illness Other     Pancreatic cancer Other     Ovarian cancer Maternal Cousin       Past Surgical History:   Procedure Laterality Date    APPENDECTOMY      BIOPSY N/A 6/8/2023    Procedure: BIOPSY;  Surgeon: Fausto Smith MD;  Location: [facility] OR;  Service: Neurosurgery;  Laterality: N/A;  L1    BUNIONECTOMY      COLONOSCOPY N/A 12/4/2019    Procedure: COLONOSCOPY;  Surgeon: Danny Matos III, MD;  Location: [facility] ENDO;  Service: Endoscopy;  Laterality: N/A;    COLONOSCOPY N/A 10/24/2022    Procedure: COLONOSCOPY;  Surgeon: Danny Matos III, MD;  Location: [facility] ENDO;  Service: Endoscopy;  Laterality: N/A;    ESOPHAGOGASTRODUODENOSCOPY N/A 10/24/2022    Procedure: EGD (ESOPHAGOGASTRODUODENOSCOPY);  Surgeon: Danny Matos III, MD;  Location:  [facility] ENDO;  Service: Endoscopy;  Laterality: N/A;    FIXATION KYPHOPLASTY Bilateral 6/8/2023    Procedure: KYPHOPLASTY;  Surgeon: Fausto Smith MD;  Location: [facility] OR;  Service: Neurosurgery;  Laterality: Bilateral;  kyphoplasty and radiofrequency ablation - L1    HYSTERECTOMY      PARTIAL//still with ovaries    neck fusion  08/2017    THYROIDECTOMY          Review of Systems   Constitutional:  Positive for fatigue. Negative for activity change, appetite change, chills, fever and unexpected weight change.   HENT:  Negative for congestion, dental problem, mouth sores and nosebleeds.    Eyes:  Negative for visual disturbance.   Respiratory:  Negative for cough, choking and chest tightness.    Cardiovascular:  Negative for chest pain, palpitations and leg swelling.   Gastrointestinal:  Negative for abdominal distention, abdominal pain, anal bleeding, blood in stool, constipation, diarrhea, nausea and vomiting.   Endocrine: Negative.    Genitourinary:  Negative for dysuria, frequency, hematuria and urgency.   Musculoskeletal:  Positive for arthralgias and myalgias. Negative for back pain, gait problem and joint swelling.   Skin:  Positive for rash. Negative for wound.   Allergic/Immunologic: Negative for immunocompromised state.   Neurological:  Negative for dizziness, light-headedness, numbness and headaches.   Hematological:  Negative for adenopathy. Does not bruise/bleed easily.   Psychiatric/Behavioral:  The patient is nervous/anxious.      ?   A comprehensive 14-point review of systems was reviewed with patient and was negative other than as specified above.   ?     Objective:      Physical Exam  Vitals reviewed.   Constitutional:       Appearance: Normal appearance. She is not ill-appearing.   HENT:      Head: Normocephalic and atraumatic.      Right Ear: External ear normal.      Left Ear: External ear normal.      Mouth/Throat:      Mouth: Mucous membranes are moist.      Pharynx: Oropharynx is clear.    Cardiovascular:      Rate and Rhythm: Normal rate.   Pulmonary:      Effort: Pulmonary effort is normal.   Abdominal:      General: Abdomen is flat.   Genitourinary:     Comments: deferred  Musculoskeletal:         General: Normal range of motion.      Cervical back: Normal range of motion.      Right lower leg: No edema.      Left lower leg: No edema.   Skin:     General: Skin is warm and dry.      Capillary Refill: Capillary refill takes less than 2 seconds.      Coloration: Skin is mottled.      Comments: Mild mottling noted to bilateral thighs   Neurological:      Mental Status: She is alert and oriented to person, place, and time.      Motor: No weakness.         ?   Vitals:    06/21/23 0842   BP: 124/79   Pulse: 103   Resp: 16   Temp: 97.9 °F (36.6 °C)      ?       ?   Laboratory:  ?   No visits with results within 1 Day(s) from this visit.   Latest known visit with results is:   Lab Visit on 06/20/2023   Component Date Value Ref Range Status    WBC 06/20/2023 6.61  3.90 - 12.70 K/uL Final    RBC 06/20/2023 3.58 (L)  4.00 - 5.40 M/uL Final    Hemoglobin 06/20/2023 11.0 (L)  12.0 - 16.0 g/dL Final    Hematocrit 06/20/2023 34.2 (L)  37.0 - 48.5 % Final    MCV 06/20/2023 96  82 - 98 fL Final    MCH 06/20/2023 30.7  27.0 - 31.0 pg Final    MCHC 06/20/2023 32.2  32.0 - 36.0 g/dL Final    RDW 06/20/2023 13.0  11.5 - 14.5 % Final    Platelets 06/20/2023 370  150 - 450 K/uL Final    MPV 06/20/2023 9.4  9.2 - 12.9 fL Final    Immature Granulocytes 06/20/2023 0.3  0.0 - 0.5 % Final    Gran # (ANC) 06/20/2023 3.1  1.8 - 7.7 K/uL Final    Immature Grans (Abs) 06/20/2023 0.02  0.00 - 0.04 K/uL Final    Lymph # 06/20/2023 2.2  1.0 - 4.8 K/uL Final    Mono # 06/20/2023 0.7  0.3 - 1.0 K/uL Final    Eos # 06/20/2023 0.7 (H)  0.0 - 0.5 K/uL Final    Baso # 06/20/2023 0.02  0.00 - 0.20 K/uL Final    nRBC 06/20/2023 0  0 /100 WBC Final    Gran % 06/20/2023 47.0  38.0 - 73.0 % Final    Lymph % 06/20/2023 32.5  18.0 - 48.0 %  Final    Mono % 06/20/2023 9.8  4.0 - 15.0 % Final    Eosinophil % 06/20/2023 10.1 (H)  0.0 - 8.0 % Final    Basophil % 06/20/2023 0.3  0.0 - 1.9 % Final    Differential Method 06/20/2023 Automated   Final    Sodium 06/20/2023 139  136 - 145 mmol/L Final    Potassium 06/20/2023 3.8  3.5 - 5.1 mmol/L Final    Chloride 06/20/2023 103  95 - 110 mmol/L Final    CO2 06/20/2023 25  23 - 29 mmol/L Final    Glucose 06/20/2023 107  70 - 110 mg/dL Final    BUN 06/20/2023 9  8 - 23 mg/dL Final    Creatinine 06/20/2023 1.0  0.5 - 1.4 mg/dL Final    Calcium 06/20/2023 9.5  8.7 - 10.5 mg/dL Final    Total Protein 06/20/2023 9.4 (H)  6.0 - 8.4 g/dL Final    Albumin 06/20/2023 3.3 (L)  3.5 - 5.2 g/dL Final    Total Bilirubin 06/20/2023 0.4  0.1 - 1.0 mg/dL Final    Alkaline Phosphatase 06/20/2023 63  55 - 135 U/L Final    AST 06/20/2023 21  10 - 40 U/L Final    ALT 06/20/2023 19  10 - 44 U/L Final    eGFR 06/20/2023 >60  >60 mL/min/1.73 m^2 Final    Anion Gap 06/20/2023 11  8 - 16 mmol/L Final      ?   Imaging:    Results for orders placed or performed during the hospital encounter of 04/10/23 (from the past 2160 hour(s))   NM PET CT Whole Body    Impression    1. Numerous FDG avid predominately lytic osseous lesions as above.  Primary differential considerations would include multiple myeloma versus metastatic disease.  2. No FDG avid soft tissue masses or adenopathy demonstrated.  3. Mild pathologic compression deformity of the L1 vertebral body.  This report was flagged in Epic as abnormal.      Electronically signed by: Marino Pollack MD  Date:    04/10/2023  Time:    11:58     *Note: Due to a large number of results and/or encounters for the requested time period, some results have not been displayed. A complete set of results can be found in Results Review.        Results for orders placed or performed during the hospital encounter of 04/06/23 (from the past 2160 hour(s))   CT Biopsy Bone Marrow (xpd)    Narrative     EXAMINATION:  CT BIOPSY BONE MARROW (XPD)    CLINICAL HISTORY:  Other disorders of plasma-protein metabolism, not elsewhere classified    TECHNIQUE:  Medications:    Moderate sedation using versed and fentanyl was provided with and trained observer monitoring the patient's vital signs and level of consciousness. The total time of sedation was  30 minutes.    1% lidocaine locally    : Donn PAREDES;  [facility] GLORIA assisted in this procedure.    Complications: None    COMPARISON:  None    FINDINGS:  Procedure: The risks and benefits of this procedure were discussed with the patient, written informed consent was obtained.  The patient was placed prone on the CT gantry.  Preprocedural imaging revealed clear route to the right iliac bone.  A suitable skin site for biopsy was selected.  IV fentanyl 50 mcg and Versed 1 mg was given for conscious sedation.  The skin site was prepped and draped in sterile fashion.  The skin was anesthetized with 1% lidocaine.    Using CT guidance an 11-gauge introducer needle was guided into the right iliac bone.  Prior to biopsy appropriate needle positioning was confirmed with CT.  Bone drill was used to obtain secure purchase into the iliac bone marrow space.  A total of 2 10 cc syringes were filled with marrow aspirate.  The coaxial needle was then used to core a bone sample.    Postprocedural imaging was acquired. The site was bandaged sterilely. The patient left the room in stable condition.      Impression    CT guided coaxial core needle right iliac wing bone marrow aspirate and biopsy.    All CT scans at this facility use dose modulation, iterative reconstruction, and/or weight based dosing when appropriate to reduce radiation dose to as low as reasonable achievable.      Electronically signed by: Michael Pop  Date:    04/06/2023  Time:    15:50     *Note: Due to a large number of results and/or encounters for the requested time period, some results have not been displayed. A  complete set of results can be found in Results Review.        ?   Assessment/Plan:     Problem List Items Addressed This Visit          Oncology    Multiple myeloma (Chronic)     BMBx : 60 % plasma cells - 4/6/2023  - SPEP/MECHE showed IgG lambda - monoclonal protein 3.19 g/dl - (3/27/2023).  - R-ISS stage I (beta 2 microglobulin 1.9, albumin 3.5, , normal karyotype and no high-risk mutations on fish panel  - PET-CT ( 4/10/2023) - showed extensive lytic lesions in the axial skeleton and mild L1 compression deformity.  - Started with Induction chemotherapy with VRD regimen (Velcde/Revlimid/Decadron) - 05/10/2023   - Started Aspirin daily p.o for thrombosis prophylaxis; Acyclovir 400 mg p.o BID for herpes Zoster prophylaxis .  __________________________________________________    Labs reviewed, no concerning cytopenias    --Ok to proceed with C2D8 VRD  --Pt has not taken Dex will provide incenter--reordered   --Continues on Lenalomide 10mg utd 1-21 of 28 day cycle, of note, CrCl 50.4mL/min. Previously dose reduced for CrCl will defer further dose reductions of lenalidomide to primary oncologist.  --Continue Aspirin; Acyclovir 400 mg p.o BID for herpes Zoster prophylaxis .  --S/p Kyphoplasty 06/08/23  --Pending evaluation for BM transplant 06/26/23  --Initiated on Zometa 05/31/23 pt continues on Ca+D     Follow-up in one week with repeat labs and MD for C2D15         Relevant Medications    dexAMETHasone (DECADRON) 4 MG Tab    Other Relevant Orders    CBC Auto Differential    Comprehensive Metabolic Panel     Other Visit Diagnoses       Encounter for antineoplastic chemotherapy    -  Primary    Relevant Orders    CBC Auto Differential    Comprehensive Metabolic Panel    Immunodeficiency due to chemotherapy        Relevant Orders    CBC Auto Differential    Comprehensive Metabolic Panel                 Med Onc Chart Routing      Follow up with physician 1 week. with  with labs prior for C2D22 VRD    Follow up with WERNER    Infusion scheduling note    Injection scheduling note    Labs CBC and CMP   Scheduling:  Preferred lab:  Lab interval:     Imaging    Pharmacy appointment    Other referrals               NEW PoolP-JUANITO  Hematology/Oncology

## 2023-06-21 NOTE — TELEPHONE ENCOUNTER
Spoke with patient regarding appointment that had to be cancelled for tomorrow due to provider being in surgery. The patient was rescheduled for 06/23 @ 11 AM. Patient verbalized understanding.

## 2023-06-21 NOTE — ASSESSMENT & PLAN NOTE
BMBx : 60 % plasma cells - 4/6/2023  - SPEP/MECHE showed IgG lambda - monoclonal protein 3.19 g/dl - (3/27/2023).  - R-ISS stage I (beta 2 microglobulin 1.9, albumin 3.5, , normal karyotype and no high-risk mutations on fish panel  - PET-CT ( 4/10/2023) - showed extensive lytic lesions in the axial skeleton and mild L1 compression deformity.  - Started with Induction chemotherapy with VRD regimen (Velcde/Revlimid/Decadron) - 05/10/2023   - Started Aspirin daily p.o for thrombosis prophylaxis; Acyclovir 400 mg p.o BID for herpes Zoster prophylaxis .  __________________________________________________    Labs reviewed, no concerning cytopenias    --Ok to proceed with C2D8 VRD  --Pt has not taken Dex will provide incenter--reordered   --Continues on Lenalomide 10mg utd 1-21 of 28 day cycle, of note, CrCl 50.4mL/min. Previously dose reduced for CrCl will defer further dose reductions of lenalidomide to primary oncologist.  --Continue Aspirin; Acyclovir 400 mg p.o BID for herpes Zoster prophylaxis .  --S/p Kyphoplasty 06/08/23  --Pending evaluation for BM transplant 06/26/23  --Initiated on Zometa 05/31/23 pt continues on Ca+D     Follow-up in one week with repeat labs and MD for C2D15

## 2023-06-21 NOTE — PLAN OF CARE
Pt states she feels well today besides pain in her right hip from back surgery.      Problem: Adult Inpatient Plan of Care  Goal: Plan of Care Review  Outcome: Ongoing, Progressing  Flowsheets (Taken 6/21/2023 0943)  Plan of Care Reviewed With: patient  Goal: Patient-Specific Goal (Individualized)  Outcome: Ongoing, Progressing  Flowsheets (Taken 6/21/2023 0943)  Anxieties, Fears or Concerns: none today  Individualized Care Needs: ice water and snack  Goal: Absence of Hospital-Acquired Illness or Injury  Outcome: Ongoing, Progressing  Intervention: Prevent Infection  Flowsheets (Taken 6/21/2023 0943)  Infection Prevention:   equipment surfaces disinfected   hand hygiene promoted   personal protective equipment utilized  Goal: Optimal Comfort and Wellbeing  Outcome: Ongoing, Progressing  Intervention: Provide Person-Centered Care  Flowsheets (Taken 6/21/2023 0943)  Trust Relationship/Rapport:   care explained   thoughts/feelings acknowledged   choices provided   emotional support provided   empathic listening provided   questions answered   questions encouraged   reassurance provided     Problem: Neutropenia (Chemotherapy Effects)  Goal: Absence of Infection  Outcome: Ongoing, Progressing  Intervention: Prevent Infection and Maximize Resistance  Flowsheets (Taken 6/21/2023 0943)  Infection Prevention:   equipment surfaces disinfected   hand hygiene promoted   personal protective equipment utilized     Problem: Fall Injury Risk  Goal: Absence of Fall and Fall-Related Injury  Outcome: Ongoing, Progressing  Intervention: Promote Injury-Free Environment  Flowsheets (Taken 6/21/2023 0943)  Safety Promotion/Fall Prevention:   assistive device/personal item within reach   high risk medications identified   instructed to call staff for mobility   in recliner, wheels locked   nonskid shoes/socks when out of bed   medications reviewed

## 2023-06-23 ENCOUNTER — TELEPHONE (OUTPATIENT)
Dept: HEMATOLOGY/ONCOLOGY | Facility: CLINIC | Age: 63
End: 2023-06-23
Payer: COMMERCIAL

## 2023-06-23 ENCOUNTER — HOSPITAL ENCOUNTER (OUTPATIENT)
Dept: RADIOLOGY | Facility: HOSPITAL | Age: 63
Discharge: HOME | End: 2023-06-23
Attending: PHYSICIAN ASSISTANT
Payer: COMMERCIAL

## 2023-06-23 ENCOUNTER — OFFICE VISIT (OUTPATIENT)
Dept: NEUROSURGERY | Facility: CLINIC | Age: 63
End: 2023-06-23
Payer: COMMERCIAL

## 2023-06-23 VITALS
HEART RATE: 93 BPM | SYSTOLIC BLOOD PRESSURE: 110 MMHG | WEIGHT: 126.19 LBS | BODY MASS INDEX: 21.66 KG/M2 | DIASTOLIC BLOOD PRESSURE: 71 MMHG

## 2023-06-23 DIAGNOSIS — M54.9 DORSALGIA, UNSPECIFIED: ICD-10-CM
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
## 2023-06-23 DIAGNOSIS — Z98.890 STATUS POST KYPHOPLASTY: Primary | ICD-10-CM

## 2023-06-23 DIAGNOSIS — Z48.89 ENCOUNTER FOR POSTOPERATIVE WOUND CHECK: ICD-10-CM

## 2023-06-23 DIAGNOSIS — M47.816 LUMBAR SPONDYLOSIS: ICD-10-CM

## 2023-06-23 DIAGNOSIS — C90.00 MULTIPLE MYELOMA, REMISSION STATUS UNSPECIFIED: ICD-10-CM

## 2023-06-23 PROCEDURE — 3074F PR MOST RECENT SYSTOLIC BLOOD PRESSURE < 130 MM HG: ICD-10-PCS | Mod: CPTII,S$GLB,, | Performed by: PHYSICIAN ASSISTANT

## 2023-06-23 PROCEDURE — 99024 POSTOP FOLLOW-UP VISIT: CPT | Mod: S$GLB,,, | Performed by: PHYSICIAN ASSISTANT

## 2023-06-23 PROCEDURE — 3008F BODY MASS INDEX DOCD: CPT | Mod: CPTII,S$GLB,, | Performed by: PHYSICIAN ASSISTANT

## 2023-06-23 PROCEDURE — 72100 X-RAY EXAM L-S SPINE 2/3 VWS: CPT | Mod: TC

## 2023-06-23 PROCEDURE — 4010F PR ACE/ARB THEARPY RXD/TAKEN: ICD-10-PCS | Mod: CPTII,S$GLB,, | Performed by: PHYSICIAN ASSISTANT

## 2023-06-23 PROCEDURE — 99024 PR POST-OP FOLLOW-UP VISIT: ICD-10-PCS | Mod: S$GLB,,, | Performed by: PHYSICIAN ASSISTANT

## 2023-06-23 PROCEDURE — 3044F PR MOST RECENT HEMOGLOBIN A1C LEVEL <7.0%: ICD-10-PCS | Mod: CPTII,S$GLB,, | Performed by: PHYSICIAN ASSISTANT

## 2023-06-23 PROCEDURE — 3008F PR BODY MASS INDEX (BMI) DOCUMENTED: ICD-10-PCS | Mod: CPTII,S$GLB,, | Performed by: PHYSICIAN ASSISTANT

## 2023-06-23 PROCEDURE — 72100 XR LUMBAR SPINE AP AND LATERAL: ICD-10-PCS | Mod: 26,,, | Performed by: RADIOLOGY

## 2023-06-23 PROCEDURE — 72100 X-RAY EXAM L-S SPINE 2/3 VWS: CPT | Mod: 26,,, | Performed by: RADIOLOGY

## 2023-06-23 PROCEDURE — 99999 PR PBB SHADOW E&M-EST. PATIENT-LVL V: CPT | Mod: PBBFAC,,, | Performed by: PHYSICIAN ASSISTANT

## 2023-06-23 PROCEDURE — 99999 PR PBB SHADOW E&M-EST. PATIENT-LVL V: ICD-10-PCS | Mod: PBBFAC,,, | Performed by: PHYSICIAN ASSISTANT

## 2023-06-23 PROCEDURE — 3078F DIAST BP <80 MM HG: CPT | Mod: CPTII,S$GLB,, | Performed by: PHYSICIAN ASSISTANT

## 2023-06-23 PROCEDURE — 3074F SYST BP LT 130 MM HG: CPT | Mod: CPTII,S$GLB,, | Performed by: PHYSICIAN ASSISTANT

## 2023-06-23 PROCEDURE — 3078F PR MOST RECENT DIASTOLIC BLOOD PRESSURE < 80 MM HG: ICD-10-PCS | Mod: CPTII,S$GLB,, | Performed by: PHYSICIAN ASSISTANT

## 2023-06-23 PROCEDURE — 4010F ACE/ARB THERAPY RXD/TAKEN: CPT | Mod: CPTII,S$GLB,, | Performed by: PHYSICIAN ASSISTANT

## 2023-06-23 PROCEDURE — 3044F HG A1C LEVEL LT 7.0%: CPT | Mod: CPTII,S$GLB,, | Performed by: PHYSICIAN ASSISTANT

## 2023-06-23 RX ORDER — METHOCARBAMOL 500 MG/1
500 TABLET, FILM COATED ORAL EVERY 8 HOURS PRN
Qty: 60 TABLET | Refills: 1 | Status: SHIPPED | OUTPATIENT
Start: 2023-06-23 | End: 2023-10-18

## 2023-06-23 NOTE — TELEPHONE ENCOUNTER
Spoke with pt, informed pt that her upcoming appt with NP rodriguez has been cancelled and rescheduled. She will be seeing Dr Márquez for possible dose adjustment. Informed pt that  will be out of office next week and she needs to see MD that is why she is seeing  instead of NP rodriguez. Pt notified of date time and location of upcoming appt and verbalized her understanding

## 2023-06-23 NOTE — PROGRESS NOTES
"Subjective:      Patient ID: Ana Bonilla is a 62 y.o. female.    Chief Complaint: Follow-up (Patient is here today after surgery kyphoplasty. Rates Pain 5/10. Pain is worse with sitting and certain movement.  She is taking Oxycodone and Decadron currently. )    HPI  The patient is here today for postop evaluation.  She reports she had moderate to severe pain after procedure but after the two week zakia she feel much better.  Back pain has improved.   She does c/o pain in her R hip.     She did have a fall two nights after surgery.  states she was "knocked out."  Pt thinks she tripped on something and loss her balance.   reports she told her other doctors.  I offered to scan her head (Head CT) but she refused.  She does have L facial swelling that is improving since the fall.    Date of Procedure: 6/8/2023    Procedure: Procedure(s) (LRB):  Kyphoplasty (Bilateral)  BIOPSY (N/A)   Radiofrequency ablation     Operative Findings (including complications, if any):   L1 compression fracture secondary to tumor    Description of Technical Procedures:   Kyphoplasty L1   Vertebral body biopsy L1   Radiofrequency ablation bilateral L1              Objective:            General    Constitutional: She is oriented to person, place, and time. She appears well-developed and well-nourished.   Cardiovascular:  Normal rate and regular rhythm.            Pulmonary/Chest: Effort normal.   Abdominal: Soft.   Neurological: She is alert and oriented to person, place, and time.   Psychiatric: She has a normal mood and affect. Her behavior is normal.             Neurosurgery exam:  Vitals reviewed  Moves all 4 ext  Incisions CDI, Healing well          Component 2 wk ago   Final Pathologic Diagnosis 1. L1 vertebral body,  biopsy:   - Consistent with plasma cell myeloma.  See comment.    Comment: Interp By Olga Lidia Brand MD, Signed on 06/15/2023 at 09:15   Comment Flow cytometry analysis of tissue was not submitted. "     Immunohistochemical studies were performed on the paraffin embedded tissue block with adequate positive and negative controls.  The plasma cells are  positive and AE1/AE3 negative.  In Situ hybridization for kappa and lambda shows lambda light chain    restricted plasma cells.     Findings are consistent with plasma cell myeloma.  Correlate clinically.        Assessment:       Encounter Diagnoses   Name Primary?    Encounter for postoperative wound check     Multiple myeloma, remission status unspecified     Status post kyphoplasty Yes    Dorsalgia, unspecified     Fall, initial encounter     Lumbar spondylosis           Plan:       Ana was seen today for follow-up.    Diagnoses and all orders for this visit:    Status post kyphoplasty    Encounter for postoperative wound check    Multiple myeloma, remission status unspecified    Dorsalgia, unspecified  -     X-Ray Lumbar Spine Ap And Lateral; Future    Fall, initial encounter    Lumbar spondylosis  -     methocarbamoL (ROBAXIN) 500 MG Tab; Take 1 tablet (500 mg total) by mouth every 8 (eight) hours as needed.      Discussed path results with patient today. (Findings are consistent with plasma cell myeloma. ) She is followed by heme/oncology.  She will complete x-rays today on her way out.  Rx given for Robaxin.  She will follow up with our clinic in 5 weeks.  DME messaged regarding pt's LSO.  Please reach out with any changes.            Bertrand De La Rosa PA-C

## 2023-06-26 ENCOUNTER — OFFICE VISIT (OUTPATIENT)
Dept: HEMATOLOGY/ONCOLOGY | Facility: CLINIC | Age: 63
End: 2023-06-26
Payer: COMMERCIAL

## 2023-06-26 DIAGNOSIS — F17.200 SMOKER: ICD-10-CM

## 2023-06-26 DIAGNOSIS — Z76.82 STEM CELL TRANSPLANT CANDIDATE: ICD-10-CM

## 2023-06-26 DIAGNOSIS — C90.00 MULTIPLE MYELOMA, REMISSION STATUS UNSPECIFIED: Primary | ICD-10-CM

## 2023-06-26 PROCEDURE — 4010F ACE/ARB THERAPY RXD/TAKEN: CPT | Mod: CPTII,95,, | Performed by: INTERNAL MEDICINE

## 2023-06-26 PROCEDURE — 99215 PR OFFICE/OUTPT VISIT, EST, LEVL V, 40-54 MIN: ICD-10-PCS | Mod: 95,,, | Performed by: INTERNAL MEDICINE

## 2023-06-26 PROCEDURE — 99417 PR PROLONGED SVC, OUTPT, W/WO DIRECT PT CONTACT,  EA ADDTL 15 MIN: ICD-10-PCS | Mod: 95,,, | Performed by: INTERNAL MEDICINE

## 2023-06-26 PROCEDURE — 99215 OFFICE O/P EST HI 40 MIN: CPT | Mod: 95,,, | Performed by: INTERNAL MEDICINE

## 2023-06-26 PROCEDURE — 99417 PROLNG OP E/M EACH 15 MIN: CPT | Mod: 95,,, | Performed by: INTERNAL MEDICINE

## 2023-06-26 PROCEDURE — 3044F PR MOST RECENT HEMOGLOBIN A1C LEVEL <7.0%: ICD-10-PCS | Mod: CPTII,95,, | Performed by: INTERNAL MEDICINE

## 2023-06-26 PROCEDURE — 3044F HG A1C LEVEL LT 7.0%: CPT | Mod: CPTII,95,, | Performed by: INTERNAL MEDICINE

## 2023-06-26 PROCEDURE — 4010F PR ACE/ARB THEARPY RXD/TAKEN: ICD-10-PCS | Mod: CPTII,95,, | Performed by: INTERNAL MEDICINE

## 2023-06-26 NOTE — PROGRESS NOTES
"The patient location is: home  The chief complaint leading to consultation is: MM/SCT eval     Visit type: audiovisual    Face to Face time with patient: 20  75 minutes of total time spent on the encounter, which includes face to face time and non-face to face time preparing to see the patient (eg, review of tests), Obtaining and/or reviewing separately obtained history, Documenting clinical information in the electronic or other health record, Independently interpreting results (not separately reported) and communicating results to the patient/family/caregiver, or Care coordination (not separately reported).         Each patient to whom he or she provides medical services by telemedicine is:  (1) informed of the relationship between the physician and patient and the respective role of any other health care provider with respect to management of the patient; and (2) notified that he or she may decline to receive medical services by telemedicine and may withdraw from such care at any time.    Notes:   SECTION OF HEMATOLOGY AND BONE MARROW TRANSPLANT  New Patient Visit   06/26/2023  Referred by:  Dr. Jose Dela Cruz  Referred for: MM/SCT evaluation     CHIEF COMPLAINT: No chief complaint on file.      HISTORY OF PRESENT ILLNESS:   62 y.o.female; pmh of  hypertension, COPD, asthma, GERD, hypothyroidism; her onclogic history is notable for history of Multiple Myeloma; referred for SCT by local oncologist Dr. Jose Dela Cruz.    With regard to her myeloma history; progressive bone pain.      From outside referring oncologist's note -"BMBx on 4/6/2023 showed 60 % plasma cells. PET-CT on 4/10/2023 showed extensive lytic bony lesions throughout the spine, sternum, bilateral ribs, pelvis and mild compression deformity in L1 vertebral body. SPEP/MECHE on 3/27/2023 showed IgG lambda monoclonal protein - 3.19 g/dl. Quantitative immunoglobulins on 3/27/2023 showed IgG 4,521 mg/dl. UPEP/MECHE and FLC were pending at that time. Labs on " "3/27/2023 showed Beta 2 microglobulin 1.9, .  Labs on 4/19/2023 showed Hb, 11.5, WBC 6.3, platelets, BUN 11, Cr 0.9, calcium 9. Pt initiated on VRD 05/10/23."    Currently mid cycle 2, 21 day cycle VRd.    I do not see any fu biochemical studies done since initiating treatment.     Of note underwent kyphoplasty/biopsy of vertebrae on 6/8/23.  Some relief in back pain but other hip pain worsening.  Lidocaine patch, percocet make her groggy/nauseated.    Required dose reduction of rev to 10mg because "was affecting her kidney" but states no other side effects; on epic review her Cr has been stable.    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    and lives with .      Schizophrenic son she is caretaker for but he Lives alone. Long time active  Smoker.      PAST MEDICAL HISTORY:   Past Medical History:   Diagnosis Date    Allergy     Amblyopia OS    Anxiety     Asthma     COPD (chronic obstructive pulmonary disease)     NO HOME o2    GERD (gastroesophageal reflux disease)     Hyperlipidemia     Hypertension     PONV (postoperative nausea and vomiting)     Thyroid disease        PAST SURGICAL HISTORY:   Past Surgical History:   Procedure Laterality Date    APPENDECTOMY      BIOPSY N/A 6/8/2023    Procedure: BIOPSY;  Surgeon: Fausto Smith MD;  Location: [facility];  Service: Neurosurgery;  Laterality: N/A;  L1    BUNIONECTOMY      COLONOSCOPY N/A 12/4/2019    Procedure: COLONOSCOPY;  Surgeon: Danny Matos III, MD;  Location: [facility];  Service: Endoscopy;  Laterality: N/A;    COLONOSCOPY N/A 10/24/2022    Procedure: COLONOSCOPY;  Surgeon: Danny Matos III, MD;  Location: [facility];  Service: Endoscopy;  Laterality: N/A;    ESOPHAGOGASTRODUODENOSCOPY N/A 10/24/2022    Procedure: EGD (ESOPHAGOGASTRODUODENOSCOPY);  Surgeon: Danny Matos III, MD;  Location: [facility];  Service: Endoscopy;  Laterality: N/A;    FIXATION KYPHOPLASTY Bilateral 6/8/2023    Procedure: KYPHOPLASTY;  Surgeon: Fausto" XIN Smith MD;  Location: [facility];  Service: Neurosurgery;  Laterality: Bilateral;  kyphoplasty and radiofrequency ablation - L1    HYSTERECTOMY      PARTIAL//still with ovaries    neck fusion  08/2017    THYROIDECTOMY         PAST SOCIAL HISTORY:   reports that she has been smoking cigarettes. She has a 25.00 pack-year smoking history. She has never used smokeless tobacco. She reports that she does not drink alcohol and does not use drugs.    FAMILY HISTORY:  Family History   Problem Relation Age of Onset    Hypertension Mother     Diabetes Mother     Diabetes Father     Stroke Maternal Grandmother     Prostate cancer Maternal Grandfather     Mental illness Son     Pancreatic cancer Maternal Uncle     Mental illness Other     Pancreatic cancer Other     Ovarian cancer Maternal Cousin        CURRENT MEDICATIONS:   Current Outpatient Medications   Medication Sig    acyclovir (ZOVIRAX) 400 MG tablet Take 1 tablet (400 mg total) by mouth 2 (two) times daily.    albuterol (PROVENTIL HFA) 90 mcg/actuation inhaler Inhale 2 puffs into the lungs every 6 (six) hours as needed for Wheezing. Rescue    ALPRAZolam (XANAX) 2 MG Tab TAKE 1 TABLET BY MOUTH TWICE DAILY AS NEEDED FOR ANXIETY    amlodipine-benazepril 2.5-10 mg (LOTREL) 2.5-10 mg per capsule TAKE 1 CAPSULE BY MOUTH EVERY DAY    aspirin (ECOTRIN) 81 MG EC tablet Take 1 tablet (81 mg total) by mouth once daily.    baclofen (LIORESAL) 10 MG tablet Take 10 mg by mouth 3 (three) times daily.    calcium carbonate-vit D3-min 600 mg calcium- 400 unit Tab Take 1 tablet by mouth once. for 1 dose    dexAMETHasone (DECADRON) 4 MG Tab Take 10 tablets (40 mg total) by mouth every 7 days. ( once weekly by mouth on days 1, 8 and 15 every 21 day cycle)    diazePAM (VALIUM) 5 MG tablet Take 1 tablet by mouth 30 minutes prior to MRI to help decrease anxiety.    fluticasone propionate (FLONASE) 50 mcg/actuation nasal spray 1 spray (50 mcg total) by Each Nostril route once daily.     fluticasone-salmeterol diskus inhaler 250-50 mcg Inhale 1 puff into the lungs 2 (two) times daily. Controller    HYDROcodone-acetaminophen (NORCO) 5-325 mg per tablet Take 1 tablet by mouth every 6 (six) hours as needed for Pain.    lenalidomide 10 mg Cap Take 1 capsule by mouth once daily on days 1-14 of each 21 day cycle..    levothyroxine (SYNTHROID) 100 MCG tablet TAKE 1 TABLET(100 MCG) BY MOUTH BEFORE BREAKFAST    linaCLOtide (LINZESS) 72 mcg Cap capsule Take 2 capsules (144 mcg total) by mouth before breakfast.    methocarbamoL (ROBAXIN) 500 MG Tab Take 1 tablet (500 mg total) by mouth every 8 (eight) hours as needed.    metoprolol succinate (TOPROL-XL) 50 MG 24 hr tablet TAKE 1 TABLET(50 MG) BY MOUTH EVERY DAY (Patient taking differently: Take 50 mg by mouth every evening.)    montelukast (SINGULAIR) 10 mg tablet Take 1 tablet (10 mg total) by mouth every evening.    nicotine (NICODERM CQ) 21 mg/24 hr Place 1 patch onto the skin once daily.    ondansetron (ZOFRAN) 8 MG tablet Take 1 tablet (8 mg total) by mouth every 12 (twelve) hours as needed for Nausea.    oxyCODONE-acetaminophen (PERCOCET)  mg per tablet Take 1 tablet by mouth every 8 (eight) hours as needed for Pain.    pantoprazole (PROTONIX) 40 MG tablet TAKE 1 TABLET(40 MG) BY MOUTH EVERY DAY (Patient taking differently: Take 40 mg by mouth daily as needed.)    promethazine (PHENERGAN) 25 MG tablet Take 1 tablet (25 mg total) by mouth every 4 (four) hours.    promethazine-codeine 6.25-10 mg/5 ml (PHENERGAN WITH CODEINE) 6.25-10 mg/5 mL syrup Take 5 mLs by mouth every 4 (four) hours as needed for Cough.    rosuvastatin (CRESTOR) 10 MG tablet TAKE 1 TABLET(10 MG) BY MOUTH EVERY DAY (Patient taking differently: Take 10 mg by mouth every evening.)    traZODone (DESYREL) 50 MG tablet Take 1 tablet (50 mg total) by mouth every evening.     Current Facility-Administered Medications   Medication    ketorolac injection 60 mg     Facility-Administered  Medications Ordered in Other Visits   Medication    lactated ringers infusion     ALLERGIES:   Review of patient's allergies indicates:   Allergen Reactions    Hydrocodone-acetaminophen Other (See Comments)     Causes pt to feel extremely sick              REVIEW OF SYSTEMS:   See HPI    PHYSICAL EXAM:   physical exam deferred due to telemed     ECOG Performance Status: (foot note - ECOG PS provided by [employer]cology [employer]) 1 - Symptomatic but completely ambulatory    Karnofsky Performance Score:  80%- Normal Activity with Effort: Some Symptoms of Disease  DATA:   Lab Results   Component Value Date    WBC 6.61 06/20/2023    HGB 11.0 (L) 06/20/2023    HCT 34.2 (L) 06/20/2023    MCV 96 06/20/2023     06/20/2023       Gran # (ANC)   Date Value Ref Range Status   06/20/2023 3.1 1.8 - 7.7 K/uL Final     Gran %   Date Value Ref Range Status   06/20/2023 47.0 38.0 - 73.0 % Final     CMP  Sodium   Date Value Ref Range Status   06/20/2023 139 136 - 145 mmol/L Final     Potassium   Date Value Ref Range Status   06/20/2023 3.8 3.5 - 5.1 mmol/L Final     Chloride   Date Value Ref Range Status   06/20/2023 103 95 - 110 mmol/L Final     CO2   Date Value Ref Range Status   06/20/2023 25 23 - 29 mmol/L Final     Glucose   Date Value Ref Range Status   06/20/2023 107 70 - 110 mg/dL Final     BUN   Date Value Ref Range Status   06/20/2023 9 8 - 23 mg/dL Final     Creatinine   Date Value Ref Range Status   06/20/2023 1.0 0.5 - 1.4 mg/dL Final     Calcium   Date Value Ref Range Status   06/20/2023 9.5 8.7 - 10.5 mg/dL Final     Total Protein   Date Value Ref Range Status   06/20/2023 9.4 (H) 6.0 - 8.4 g/dL Final     Albumin   Date Value Ref Range Status   06/20/2023 3.3 (L) 3.5 - 5.2 g/dL Final     Total Bilirubin   Date Value Ref Range Status   06/20/2023 0.4 0.1 - 1.0 mg/dL Final     Comment:     For infants and newborns, interpretation of results should be based  on gestational age, weight and in agreement with  clinical  observations.    Premature Infant recommended reference ranges:  Up to 24 hours.............<8.0 mg/dL  Up to 48 hours............<12.0 mg/dL  3-5 days..................<15.0 mg/dL  6-29 days.................<15.0 mg/dL       Alkaline Phosphatase   Date Value Ref Range Status   06/20/2023 63 55 - 135 U/L Final     AST   Date Value Ref Range Status   06/20/2023 21 10 - 40 U/L Final     ALT   Date Value Ref Range Status   06/20/2023 19 10 - 44 U/L Final     Anion Gap   Date Value Ref Range Status   06/20/2023 11 8 - 16 mmol/L Final     eGFR   Date Value Ref Range Status   06/20/2023 >60 >60 mL/min/1.73 m^2 Final     IgG   Date Value Ref Range Status   03/27/2023 4521 (H) 650 - 1600 mg/dL Final     Comment:     IgG Cord Blood Reference Range: 650-1600 mg/dL.     IgA   Date Value Ref Range Status   03/27/2023 49 40 - 350 mg/dL Final     Comment:     IgA Cord Blood Reference Range: <5 mg/dL.     IgM   Date Value Ref Range Status   03/27/2023 21 (L) 50 - 300 mg/dL Final     Comment:     IgM Cord Blood Reference Range: <25 mg/dL.     Kappa Free Light Chains   Date Value Ref Range Status   04/21/2023 0.99 0.33 - 1.94 mg/dL Final     Lambda Free Light Chains   Date Value Ref Range Status   04/21/2023 51.78 (H) 0.57 - 2.63 mg/dL Final     Kappa/Lambda FLC Ratio   Date Value Ref Range Status   04/21/2023 0.02 (L) 0.26 - 1.65 Final     Comment:     Undetected antigen excess is a rare event but cannot   be excluded. If these free light chain results do not   agree with other clinical or laboratory findings or   if the sample is from a patient that has previously   demonstrated antigen excess, discuss with the testing   laboratory.   Results should always be interpreted in conjunction   with other laboratory tests and clinical evidence.       Pathologist Interpretation SPE   Date Value Ref Range Status   03/27/2023 REVIEWED  Final     Comment:       Electronically reviewed and signed by:  Monse Saldana MD  Signed  on 03/29/23 at 09:11  Increased total protein. Normal gamma globulins are decreased.   Paraprotein peak in gamma = 3.19 g/dL.        Pathologist Interpretation MECHE   Date Value Ref Range Status   03/27/2023 REVIEWED  Final     Comment:       Electronically reviewed and signed by:  Monse Saldana MD  Signed on 03/28/23 at 13:06  IgG lambda specific monoclonal band present.            Bone marrow biopsy - 4/6/23  Final Pathologic Diagnosis     RIGHT ILIAC CREST BONE MARROW ASPIRATE, BONE MARROW CLOT, AND BONE MARROW CORE BIOPSY WITH:     CELLULARITY=40-60%, TRILINEAGE HEMATOPOIETIC ACTIVITY (M/E=2.9:1).   CONSISTENT WITH PLASMA CELL NEOPLASM(60%).  SEE COMMENT.   FOCAL GRADE 1 RETICULAR FIBROSIS.   CONGO RED NEGATIVE.   INCREASED STORAGE IRON.   ADEQUATE NUMBER OF MEGAKARYOCYTES.  VC      Comment: Interp By Olga Lidia Brand MD, Signed on 04/24/2023 at 16:08   Supplemental Diagnosis     See [facility] report:   ([address] , [place] [address])     Bone marrow karyotype results: 46, XX[20], female karyotype.     Myeloma fixed cell, high-risk, FISH:  Normal.  The result is within normal limits for 1q duplication, TP53 deletion and IGH rearrangement           Verterbral biopsy - 6/8/23  1. L1 vertebral body,  biopsy:   - Consistent with plasma cell myeloma.    Results for orders placed or performed during the hospital encounter of 04/10/23 (from the past 2160 hour(s))   NM PET CT Whole Body     Impression     1. Numerous FDG avid predominately lytic osseous lesions as above.  Primary differential considerations would include multiple myeloma versus metastatic disease.  2. No FDG avid soft tissue masses or adenopathy demonstrated.  3. Mild pathologic compression deformity of the L1 vertebral body.  This report was flagged in Epic as abnormal.        Electronically signed by:       Marino Pollack MD  Date:                                                04/10/2023  Time:                                                 11:58     ASSESSMENT AND PLAN:   Encounter Diagnoses   Name Primary?    Multiple myeloma, remission status unspecified Yes    Stem cell transplant candidate      -R-ISS stage I IgG Lambda multiple myeloma; disease complicated by diffuse lytic disease disease diagnosed after presenting with symptomatic bone disease April 2023  -initiated on VRd therapy on 5/10/23; generally tolerating  well; her revlimid has been dose reduced to 10mg from 25 mg for unclear reasons (was told was affecting her kidneys but her Cr has been stable)  -underwent subsequent kyphoplasty on 6/8/23 with significant improvement in back pain   -she has not had repeat serologic studies to assess response so recommend these be done asap  -pt has has multiple heme onc providers outside of [facility] but is planning on establishing permanent care with Dr. Márquez in [place] who she will see later this week   -has co-morbidities  but appears to be reasonable SCT candidate pending adequate restaging and evaluation; encouraged smoking cessation   -recommended we add daratumumab to her VRd ; recommend 28  day cycles with weekly velcade/odell and 21 days on 7 days off revlimid, weekly dex  -Long discussion with pt   regarding nature of disease  ;   discussed prognosis of >10 years likely   in modern therapy era with standard risk disease; discussed that majority of with myeloma relapse but that operational cures possible in small group of patients  -Discussed recommendations for weekly 28 day cycle odell-VRd x 4 total cycles >crissy autoSCT> maintenance   - recommended Supportive medications: Levaquin, acyc, vit/d ca, asa;  zometa q month once she obtains dental clearance   -discussed process, rationale and commitments necessary for transplant including 30 days in [place] and need for caretaker post discharge through day +30   -also dicussed concept of delayed transplant   -pt states she is agreeable to 4 drug induction but that she would like  to take some time to think about transplant which is reasonable; will have coordinators reach out to pt to provide educational material on transplant; will tentatively schedule her for virtual appt in approxmiately one month though she did state she maybe coming into [place] in the coming weeks with her  who has a medical appt at the [facility] and I told her I would be happy to see her this day to again address transplant with her huband present; she states she will message us date of this appt over the portal       -continue fu with pcp for [facility]   Follow Up:   BMT Chart Routing      Follow up with physician 1 month. virtual MD appt in approximately 1 month   Follow up with [facility]    Provider visit type    Infusion scheduling note    Injection scheduling note    Labs    Imaging    Pharmacy appointment    Other referrals              Advance Care Planning     Date: 06/26/2023    [facility]  I engaged the patient in a voluntary conversation   we specifically addressed what the goals of care would be moving forward.  We did specifically address the patient's likely prognosis, which is good .  We explored the patient's values and preferences for future care.  The patient endorses that what is most important right now is to focus on quality of life, even if it means sacrificing a little time and curative/life-prolongation (regardless of treatment burdens)    Accordingly, we have decided that the best plan to meet the patient's goals includes continuing with treatment    I did not explain the role for hospice care at this stage of the patient's illness, including its ability to help the patient live with the best quality of life possible.  We will not be making a hospice referral.    I spent a total of 20 minutes engaging the patient in this advance care planning discussion.         Chano Garcia MD  Hematology/Oncology/Bone Marrow Transplant

## 2023-06-27 ENCOUNTER — TELEPHONE (OUTPATIENT)
Dept: HEMATOLOGY/ONCOLOGY | Facility: CLINIC | Age: 63
End: 2023-06-27
Payer: COMMERCIAL

## 2023-06-27 ENCOUNTER — LAB VISIT (OUTPATIENT)
Dept: LAB | Facility: HOSPITAL | Age: 63
End: 2023-06-27
Payer: COMMERCIAL

## 2023-06-27 ENCOUNTER — TELEPHONE (OUTPATIENT)
Dept: NEUROSURGERY | Facility: CLINIC | Age: 63
End: 2023-06-27
Payer: COMMERCIAL

## 2023-06-27 DIAGNOSIS — Z79.899 IMMUNODEFICIENCY DUE TO CHEMOTHERAPY: ICD-10-CM

## 2023-06-27 DIAGNOSIS — C90.00 MULTIPLE MYELOMA, REMISSION STATUS UNSPECIFIED: ICD-10-CM

## 2023-06-27 DIAGNOSIS — T45.1X5A IMMUNODEFICIENCY DUE TO CHEMOTHERAPY: ICD-10-CM

## 2023-06-27 DIAGNOSIS — Z51.11 ENCOUNTER FOR ANTINEOPLASTIC CHEMOTHERAPY: ICD-10-CM

## 2023-06-27 DIAGNOSIS — D84.821 IMMUNODEFICIENCY DUE TO CHEMOTHERAPY: ICD-10-CM

## 2023-06-27 LAB
ALBUMIN SERPL BCP-MCNC: 3.4 G/DL (ref 3.5–5.2)
ALP SERPL-CCNC: 70 U/L (ref 55–135)
ALT SERPL W/O P-5'-P-CCNC: 21 U/L (ref 10–44)
ANION GAP SERPL CALC-SCNC: 11 MMOL/L (ref 8–16)
ANISOCYTOSIS BLD QL SMEAR: SLIGHT
AST SERPL-CCNC: 19 U/L (ref 10–40)
BASOPHILS # BLD AUTO: 0.03 K/UL (ref 0–0.2)
BASOPHILS NFR BLD: 0.4 % (ref 0–1.9)
BILIRUB SERPL-MCNC: 0.4 MG/DL (ref 0.1–1)
BUN SERPL-MCNC: 12 MG/DL (ref 8–23)
CALCIUM SERPL-MCNC: 8.8 MG/DL (ref 8.7–10.5)
CHLORIDE SERPL-SCNC: 107 MMOL/L (ref 95–110)
CO2 SERPL-SCNC: 21 MMOL/L (ref 23–29)
CREAT SERPL-MCNC: 0.8 MG/DL (ref 0.5–1.4)
DIFFERENTIAL METHOD: ABNORMAL
EOSINOPHIL # BLD AUTO: 0.7 K/UL (ref 0–0.5)
EOSINOPHIL NFR BLD: 9.4 % (ref 0–8)
ERYTHROCYTE [DISTWIDTH] IN BLOOD BY AUTOMATED COUNT: 13.6 % (ref 11.5–14.5)
EST. GFR  (NO RACE VARIABLE): >60 ML/MIN/1.73 M^2
GLUCOSE SERPL-MCNC: 48 MG/DL (ref 70–110)
HCT VFR BLD AUTO: 34.9 % (ref 37–48.5)
HGB BLD-MCNC: 10.9 G/DL (ref 12–16)
IMM GRANULOCYTES # BLD AUTO: 0.03 K/UL (ref 0–0.04)
IMM GRANULOCYTES NFR BLD AUTO: 0.4 % (ref 0–0.5)
LYMPHOCYTES # BLD AUTO: 2.2 K/UL (ref 1–4.8)
LYMPHOCYTES NFR BLD: 29.3 % (ref 18–48)
MCH RBC QN AUTO: 30.3 PG (ref 27–31)
MCHC RBC AUTO-ENTMCNC: 31.2 G/DL (ref 32–36)
MCV RBC AUTO: 97 FL (ref 82–98)
MONOCYTES # BLD AUTO: 1.3 K/UL (ref 0.3–1)
MONOCYTES NFR BLD: 16.5 % (ref 4–15)
NEUTROPHILS # BLD AUTO: 3.4 K/UL (ref 1.8–7.7)
NEUTROPHILS NFR BLD: 44 % (ref 38–73)
NRBC BLD-RTO: 0 /100 WBC
PLATELET # BLD AUTO: 429 K/UL (ref 150–450)
PLATELET BLD QL SMEAR: ABNORMAL
PMV BLD AUTO: 9.9 FL (ref 9.2–12.9)
POTASSIUM SERPL-SCNC: 4 MMOL/L (ref 3.5–5.1)
PROT SERPL-MCNC: 8.9 G/DL (ref 6–8.4)
RBC # BLD AUTO: 3.6 M/UL (ref 4–5.4)
SODIUM SERPL-SCNC: 139 MMOL/L (ref 136–145)
WBC # BLD AUTO: 7.64 K/UL (ref 3.9–12.7)

## 2023-06-27 PROCEDURE — 85025 COMPLETE CBC W/AUTO DIFF WBC: CPT

## 2023-06-27 PROCEDURE — 36415 COLL VENOUS BLD VENIPUNCTURE: CPT

## 2023-06-27 PROCEDURE — 80053 COMPREHEN METABOLIC PANEL: CPT

## 2023-06-27 NOTE — TELEPHONE ENCOUNTER
Contacted this patient to inform her that I was made of aware of her forms that she left at the [facility] location. I have informed her that I will be at the [facility] Location tomorrow and will complete this and have Bertrand review. Patient verbalized understanding.

## 2023-06-28 ENCOUNTER — INFUSION (OUTPATIENT)
Dept: INFUSION THERAPY | Facility: HOSPITAL | Age: 63
End: 2023-06-28
Attending: INTERNAL MEDICINE
Payer: COMMERCIAL

## 2023-06-28 ENCOUNTER — OFFICE VISIT (OUTPATIENT)
Dept: HEMATOLOGY/ONCOLOGY | Facility: CLINIC | Age: 63
End: 2023-06-28
Payer: COMMERCIAL

## 2023-06-28 ENCOUNTER — LAB VISIT (OUTPATIENT)
Dept: LAB | Facility: HOSPITAL | Age: 63
End: 2023-06-28
Attending: INTERNAL MEDICINE
Payer: COMMERCIAL

## 2023-06-28 VITALS
BODY MASS INDEX: 21.42 KG/M2 | RESPIRATION RATE: 18 BRPM | TEMPERATURE: 97 F | OXYGEN SATURATION: 99 % | DIASTOLIC BLOOD PRESSURE: 64 MMHG | WEIGHT: 125.44 LBS | SYSTOLIC BLOOD PRESSURE: 103 MMHG | HEART RATE: 77 BPM | HEIGHT: 64 IN

## 2023-06-28 DIAGNOSIS — C90.00 MULTIPLE MYELOMA, REMISSION STATUS UNSPECIFIED: Primary | ICD-10-CM

## 2023-06-28 DIAGNOSIS — E78.5 DYSLIPIDEMIA: ICD-10-CM

## 2023-06-28 DIAGNOSIS — C79.51 SECONDARY MALIGNANT NEOPLASM OF BONE: ICD-10-CM

## 2023-06-28 DIAGNOSIS — Z51.11 ENCOUNTER FOR ANTINEOPLASTIC CHEMOTHERAPY: ICD-10-CM

## 2023-06-28 DIAGNOSIS — D84.821 IMMUNODEFICIENCY DUE TO CHEMOTHERAPY: ICD-10-CM

## 2023-06-28 DIAGNOSIS — Z76.82 STEM CELL TRANSPLANT CANDIDATE: ICD-10-CM

## 2023-06-28 DIAGNOSIS — C90.00 MULTIPLE MYELOMA, REMISSION STATUS UNSPECIFIED: ICD-10-CM

## 2023-06-28 DIAGNOSIS — I10 ESSENTIAL HYPERTENSION: ICD-10-CM

## 2023-06-28 DIAGNOSIS — T45.1X5A IMMUNODEFICIENCY DUE TO CHEMOTHERAPY: ICD-10-CM

## 2023-06-28 DIAGNOSIS — Z79.899 IMMUNODEFICIENCY DUE TO CHEMOTHERAPY: ICD-10-CM

## 2023-06-28 PROBLEM — Z79.69 IMMUNODEFICIENCY DUE TO CHEMOTHERAPY: Status: ACTIVE | Noted: 2023-06-28

## 2023-06-28 LAB
ABO + RH BLD: NORMAL
ALBUMIN SERPL BCP-MCNC: 3.6 G/DL (ref 3.5–5.2)
ALBUMIN SERPL BCP-MCNC: 3.6 G/DL (ref 3.5–5.2)
ALP SERPL-CCNC: 71 U/L (ref 55–135)
ALP SERPL-CCNC: 71 U/L (ref 55–135)
ALT SERPL W/O P-5'-P-CCNC: 21 U/L (ref 10–44)
ALT SERPL W/O P-5'-P-CCNC: 21 U/L (ref 10–44)
ANION GAP SERPL CALC-SCNC: 10 MMOL/L (ref 8–16)
ANION GAP SERPL CALC-SCNC: 10 MMOL/L (ref 8–16)
AST SERPL-CCNC: 18 U/L (ref 10–40)
AST SERPL-CCNC: 18 U/L (ref 10–40)
B2 MICROGLOB SERPL-MCNC: 2.1 UG/ML (ref 0–2.5)
BASOPHILS # BLD AUTO: 0.03 K/UL (ref 0–0.2)
BASOPHILS # BLD AUTO: 0.03 K/UL (ref 0–0.2)
BASOPHILS NFR BLD: 0.4 % (ref 0–1.9)
BASOPHILS NFR BLD: 0.4 % (ref 0–1.9)
BILIRUB SERPL-MCNC: 0.4 MG/DL (ref 0.1–1)
BILIRUB SERPL-MCNC: 0.4 MG/DL (ref 0.1–1)
BLD GP AB SCN CELLS X3 SERPL QL: NORMAL
BUN SERPL-MCNC: 11 MG/DL (ref 8–23)
BUN SERPL-MCNC: 11 MG/DL (ref 8–23)
CALCIUM SERPL-MCNC: 8.9 MG/DL (ref 8.7–10.5)
CALCIUM SERPL-MCNC: 8.9 MG/DL (ref 8.7–10.5)
CHLORIDE SERPL-SCNC: 105 MMOL/L (ref 95–110)
CHLORIDE SERPL-SCNC: 105 MMOL/L (ref 95–110)
CO2 SERPL-SCNC: 24 MMOL/L (ref 23–29)
CO2 SERPL-SCNC: 24 MMOL/L (ref 23–29)
CREAT SERPL-MCNC: 1.1 MG/DL (ref 0.5–1.4)
CREAT SERPL-MCNC: 1.1 MG/DL (ref 0.5–1.4)
DIFFERENTIAL METHOD: ABNORMAL
DIFFERENTIAL METHOD: ABNORMAL
EOSINOPHIL # BLD AUTO: 0.6 K/UL (ref 0–0.5)
EOSINOPHIL # BLD AUTO: 0.6 K/UL (ref 0–0.5)
EOSINOPHIL NFR BLD: 7.9 % (ref 0–8)
EOSINOPHIL NFR BLD: 7.9 % (ref 0–8)
ERYTHROCYTE [DISTWIDTH] IN BLOOD BY AUTOMATED COUNT: 13.9 % (ref 11.5–14.5)
ERYTHROCYTE [DISTWIDTH] IN BLOOD BY AUTOMATED COUNT: 13.9 % (ref 11.5–14.5)
EST. GFR  (NO RACE VARIABLE): 57 ML/MIN/1.73 M^2
EST. GFR  (NO RACE VARIABLE): 57 ML/MIN/1.73 M^2
GLUCOSE SERPL-MCNC: 86 MG/DL (ref 70–110)
GLUCOSE SERPL-MCNC: 86 MG/DL (ref 70–110)
HCT VFR BLD AUTO: 34.5 % (ref 37–48.5)
HCT VFR BLD AUTO: 34.5 % (ref 37–48.5)
HGB BLD-MCNC: 11.1 G/DL (ref 12–16)
HGB BLD-MCNC: 11.1 G/DL (ref 12–16)
IMM GRANULOCYTES # BLD AUTO: 0.03 K/UL (ref 0–0.04)
IMM GRANULOCYTES # BLD AUTO: 0.03 K/UL (ref 0–0.04)
IMM GRANULOCYTES NFR BLD AUTO: 0.4 % (ref 0–0.5)
IMM GRANULOCYTES NFR BLD AUTO: 0.4 % (ref 0–0.5)
LYMPHOCYTES # BLD AUTO: 2.4 K/UL (ref 1–4.8)
LYMPHOCYTES # BLD AUTO: 2.4 K/UL (ref 1–4.8)
LYMPHOCYTES NFR BLD: 32.2 % (ref 18–48)
LYMPHOCYTES NFR BLD: 32.2 % (ref 18–48)
MCH RBC QN AUTO: 31.2 PG (ref 27–31)
MCH RBC QN AUTO: 31.2 PG (ref 27–31)
MCHC RBC AUTO-ENTMCNC: 32.2 G/DL (ref 32–36)
MCHC RBC AUTO-ENTMCNC: 32.2 G/DL (ref 32–36)
MCV RBC AUTO: 97 FL (ref 82–98)
MCV RBC AUTO: 97 FL (ref 82–98)
MONOCYTES # BLD AUTO: 1.3 K/UL (ref 0.3–1)
MONOCYTES # BLD AUTO: 1.3 K/UL (ref 0.3–1)
MONOCYTES NFR BLD: 17.8 % (ref 4–15)
MONOCYTES NFR BLD: 17.8 % (ref 4–15)
NEUTROPHILS # BLD AUTO: 3 K/UL (ref 1.8–7.7)
NEUTROPHILS # BLD AUTO: 3 K/UL (ref 1.8–7.7)
NEUTROPHILS NFR BLD: 41.3 % (ref 38–73)
NEUTROPHILS NFR BLD: 41.3 % (ref 38–73)
NRBC BLD-RTO: 0 /100 WBC
NRBC BLD-RTO: 0 /100 WBC
PLATELET # BLD AUTO: 464 K/UL (ref 150–450)
PLATELET # BLD AUTO: 464 K/UL (ref 150–450)
PMV BLD AUTO: 9 FL (ref 9.2–12.9)
PMV BLD AUTO: 9 FL (ref 9.2–12.9)
POTASSIUM SERPL-SCNC: 3.4 MMOL/L (ref 3.5–5.1)
POTASSIUM SERPL-SCNC: 3.4 MMOL/L (ref 3.5–5.1)
PROT SERPL-MCNC: 9.2 G/DL (ref 6–8.4)
PROT SERPL-MCNC: 9.2 G/DL (ref 6–8.4)
RBC # BLD AUTO: 3.56 M/UL (ref 4–5.4)
RBC # BLD AUTO: 3.56 M/UL (ref 4–5.4)
SODIUM SERPL-SCNC: 139 MMOL/L (ref 136–145)
SODIUM SERPL-SCNC: 139 MMOL/L (ref 136–145)
SPECIMEN OUTDATE: NORMAL
WBC # BLD AUTO: 7.32 K/UL (ref 3.9–12.7)
WBC # BLD AUTO: 7.32 K/UL (ref 3.9–12.7)

## 2023-06-28 PROCEDURE — 96401 CHEMO ANTI-NEOPL SQ/IM: CPT

## 2023-06-28 PROCEDURE — 1160F PR REVIEW ALL MEDS BY PRESCRIBER/CLIN PHARMACIST DOCUMENTED: ICD-10-PCS | Mod: CPTII,95,, | Performed by: INTERNAL MEDICINE

## 2023-06-28 PROCEDURE — 85025 COMPLETE CBC W/AUTO DIFF WBC: CPT | Performed by: INTERNAL MEDICINE

## 2023-06-28 PROCEDURE — 63600175 PHARM REV CODE 636 W HCPCS: Mod: JG | Performed by: NURSE PRACTITIONER

## 2023-06-28 PROCEDURE — 3044F HG A1C LEVEL LT 7.0%: CPT | Mod: CPTII,95,, | Performed by: INTERNAL MEDICINE

## 2023-06-28 PROCEDURE — 1160F RVW MEDS BY RX/DR IN RCRD: CPT | Mod: CPTII,95,, | Performed by: INTERNAL MEDICINE

## 2023-06-28 PROCEDURE — 1159F PR MEDICATION LIST DOCUMENTED IN MEDICAL RECORD: ICD-10-PCS | Mod: CPTII,95,, | Performed by: INTERNAL MEDICINE

## 2023-06-28 PROCEDURE — 84165 PROTEIN E-PHORESIS SERUM: CPT | Mod: 26,,, | Performed by: PATHOLOGY

## 2023-06-28 PROCEDURE — 36415 COLL VENOUS BLD VENIPUNCTURE: CPT | Performed by: INTERNAL MEDICINE

## 2023-06-28 PROCEDURE — 1159F MED LIST DOCD IN RCRD: CPT | Mod: CPTII,95,, | Performed by: INTERNAL MEDICINE

## 2023-06-28 PROCEDURE — 4010F ACE/ARB THERAPY RXD/TAKEN: CPT | Mod: CPTII,95,, | Performed by: INTERNAL MEDICINE

## 2023-06-28 PROCEDURE — 4010F PR ACE/ARB THEARPY RXD/TAKEN: ICD-10-PCS | Mod: CPTII,95,, | Performed by: INTERNAL MEDICINE

## 2023-06-28 PROCEDURE — 86900 BLOOD TYPING SEROLOGIC ABO: CPT | Performed by: INTERNAL MEDICINE

## 2023-06-28 PROCEDURE — 84165 PATHOLOGIST INTERPRETATION SPE: ICD-10-PCS | Mod: 26,,, | Performed by: PATHOLOGY

## 2023-06-28 PROCEDURE — 83521 IG LIGHT CHAINS FREE EACH: CPT | Mod: 59 | Performed by: INTERNAL MEDICINE

## 2023-06-28 PROCEDURE — 99214 PR OFFICE/OUTPT VISIT, EST, LEVL IV, 30-39 MIN: ICD-10-PCS | Mod: 95,,, | Performed by: INTERNAL MEDICINE

## 2023-06-28 PROCEDURE — 3044F PR MOST RECENT HEMOGLOBIN A1C LEVEL <7.0%: ICD-10-PCS | Mod: CPTII,95,, | Performed by: INTERNAL MEDICINE

## 2023-06-28 PROCEDURE — 84165 PROTEIN E-PHORESIS SERUM: CPT | Performed by: INTERNAL MEDICINE

## 2023-06-28 PROCEDURE — 99214 OFFICE O/P EST MOD 30 MIN: CPT | Mod: 95,,, | Performed by: INTERNAL MEDICINE

## 2023-06-28 PROCEDURE — 80053 COMPREHEN METABOLIC PANEL: CPT | Performed by: INTERNAL MEDICINE

## 2023-06-28 PROCEDURE — 82232 ASSAY OF BETA-2 PROTEIN: CPT | Performed by: INTERNAL MEDICINE

## 2023-06-28 RX ORDER — BORTEZOMIB 3.5 MG/1
1.3 INJECTION, POWDER, LYOPHILIZED, FOR SOLUTION INTRAVENOUS; SUBCUTANEOUS
Status: COMPLETED | OUTPATIENT
Start: 2023-06-28 | End: 2023-06-28

## 2023-06-28 RX ORDER — LENALIDOMIDE 10 MG/1
1 CAPSULE ORAL DAILY
Qty: 14 EACH | Refills: 5 | Status: SHIPPED | OUTPATIENT
Start: 2023-06-28 | End: 2023-06-29 | Stop reason: SDUPTHER

## 2023-06-28 RX ADMIN — BORTEZOMIB 2 MG: 3.5 INJECTION, POWDER, LYOPHILIZED, FOR SOLUTION INTRAVENOUS; SUBCUTANEOUS at 09:06

## 2023-06-28 NOTE — NURSING
"0910: Pt a chairside with s/o. Pt stated, "I left my medication in the car, it's my steroid, I take it tomorrow which is day 8 correct?" This nurse stated, "I will look for you." Pt stated, "I also have one or two of my chemo pills left, can you call to make sure that one special pharmacy will send them or still has the prescription? Where is Dr. Dela Cruz, the one who scheduled my appointment said he would not be here." This nurse stated, I will check on your home chemo pill Revlimid." Pt stated, "Yeah, that's the name. Do you know who my Navigator is." This nurse stated, "Radha, Abbie.." Pt stated, "Abbie." This nurse stated, "Do you need me to get Abbie?" Pt stated, "Check on my medications and then you can grab her." This nurse stated, "Your dexamethasone 4mg you take on Day 1, Day 8 and Day 15." Pt stated, "I took Day 1 already, Day 8 tomorrow and Day 15 in one week." This nurse stated, "Day 15 is tomorrow, your speciality pharmacy is [facility]." Pt stated, "Yeah, that's it."   0930:This nurse excused herself from chairside from pt and pt's s/o. This nurse went to contact [facility] at [phone]  0935:Abbie LAUREN RN/Petey at pt's chairside, this nurse on hold with [facility]. Abbie LAUREN RN/Petey educated this nurse pt's paperwork for prescription was not sent last week per Natividad Mckeon FNPOLLY due to elevated liver enzymes, awaiting Dr. Márquez to resume current dosage or a reduction. This nurse verbalized understanding and hung the phone up with [facility].   0940:Abbie LAUREN RN/Petey back at chairside communicating with pt.  "

## 2023-06-28 NOTE — PLAN OF CARE
Pt is stable. Pt administered Velcade today. Pt will follow up in one week.      Problem: Fall Injury Risk  Goal: Absence of Fall and Fall-Related Injury  Outcome: Ongoing, Progressing     Problem: Anemia (Chemotherapy Effects)  Goal: Anemia Symptom Improvement  Outcome: Ongoing, Progressing     Problem: Urinary Bleeding Risk or Actual (Chemotherapy Effects)  Goal: Absence of Hematuria  Outcome: Ongoing, Progressing     Problem: Nausea and Vomiting (Chemotherapy Effects)  Goal: Fluid and Electrolyte Balance  Outcome: Ongoing, Progressing     Problem: Neurotoxicity (Chemotherapy Effects)  Goal: Neurotoxicity Symptom Control  Outcome: Ongoing, Progressing     Problem: Neutropenia (Chemotherapy Effects)  Goal: Absence of Infection  Outcome: Ongoing, Progressing     Problem: Oral Mucositis (Chemotherapy Effects)  Goal: Improved Oral Mucous Membrane Integrity  Outcome: Ongoing, Progressing     Problem: Thrombocytopenia Bleeding Risk (Chemotherapy Effects)  Goal: Absence of Bleeding  Outcome: Ongoing, Progressing     Problem: Adult Inpatient Plan of Care  Goal: Plan of Care Review  Outcome: Ongoing, Progressing  Goal: Patient-Specific Goal (Individualized)  Outcome: Ongoing, Progressing  Flowsheets (Taken 6/28/2023 1024)  Anxieties, Fears or Concerns: Pt deneis.  Individualized Care Needs: Pillow provided.

## 2023-06-28 NOTE — PROGRESS NOTES
Subjective:       Patient ID: Ana Bonilla is a 62 y.o. female.    Chief Complaint: Results, Chemotherapy, and Multiple Myeloma    HPI:  62-year-old female with stage II multiple myeloma.  Patient is receiving Velcade dexamethasone.  At this time patient has been seen by leukemia lymphoma specialist Dr. Jeff vera  With recommendations to switch patient lenalidomide daratumumab Velcade and dexamethasone.  ECOG status 1 seen in video visit    Past Medical History:   Diagnosis Date    Allergy     Amblyopia OS    Anxiety     Asthma     COPD (chronic obstructive pulmonary disease)     NO HOME o2    GERD (gastroesophageal reflux disease)     Hyperlipidemia     Hypertension     PONV (postoperative nausea and vomiting)     Thyroid disease      Family History   Problem Relation Age of Onset    Hypertension Mother     Diabetes Mother     Diabetes Father     Stroke Maternal Grandmother     Prostate cancer Maternal Grandfather     Mental illness Son     Pancreatic cancer Maternal Uncle     Mental illness Other     Pancreatic cancer Other     Ovarian cancer Maternal Cousin      Social History     Socioeconomic History    Marital status:     Number of children: 2   Occupational History     Employer: [employer]   Tobacco Use    Smoking status: Every Day     Packs/day: 0.50     Years: 50.00     Pack years: 25.00     Types: Cigarettes    Smokeless tobacco: Never    Tobacco comments:     HOLD MIDNIGHT PRIOR TO SX   Substance and Sexual Activity    Alcohol use: Never     Comment: quit    Drug use: No    Sexual activity: Never     Past Surgical History:   Procedure Laterality Date    APPENDECTOMY      BIOPSY N/A 6/8/2023    Procedure: BIOPSY;  Surgeon: Fausto Smith MD;  Location: [facility] OR;  Service: Neurosurgery;  Laterality: N/A;  L1    BUNIONECTOMY      COLONOSCOPY N/A 12/4/2019    Procedure: COLONOSCOPY;  Surgeon: Danny Matos III, MD;  Location: [facility] ENDO;  Service: Endoscopy;  Laterality: N/A;     COLONOSCOPY N/A 10/24/2022    Procedure: COLONOSCOPY;  Surgeon: Danny Matos III, MD;  Location: [facility] ENDO;  Service: Endoscopy;  Laterality: N/A;    ESOPHAGOGASTRODUODENOSCOPY N/A 10/24/2022    Procedure: EGD (ESOPHAGOGASTRODUODENOSCOPY);  Surgeon: Danny Matos III, MD;  Location: [facility] ENDO;  Service: Endoscopy;  Laterality: N/A;    FIXATION KYPHOPLASTY Bilateral 6/8/2023    Procedure: KYPHOPLASTY;  Surgeon: Fausto Smith MD;  Location: [facility] OR;  Service: Neurosurgery;  Laterality: Bilateral;  kyphoplasty and radiofrequency ablation - L1    HYSTERECTOMY      PARTIAL//still with ovaries    neck fusion  08/2017    THYROIDECTOMY         Labs:  Lab Results   Component Value Date    WBC 7.64 06/27/2023    HGB 10.9 (L) 06/27/2023    HCT 34.9 (L) 06/27/2023    MCV 97 06/27/2023     06/27/2023     BMP  Lab Results   Component Value Date     06/27/2023    K 4.0 06/27/2023     06/27/2023    CO2 21 (L) 06/27/2023    BUN 12 06/27/2023    CREATININE 0.8 06/27/2023    CALCIUM 8.8 06/27/2023    ANIONGAP 11 06/27/2023    ESTGFRAFRICA >60.0 07/14/2021    EGFRNONAA >60.0 07/14/2021     Lab Results   Component Value Date    ALT 21 06/27/2023    AST 19 06/27/2023    ALKPHOS 70 06/27/2023    BILITOT 0.4 06/27/2023       No results found for: IRON, TIBC, FERRITIN, SATURATEDIRO  Lab Results   Component Value Date    [id number] 756 06/10/2022     Lab Results   Component Value Date    FOLATE 6.6 11/15/2019     Lab Results   Component Value Date    TSH 0.846 05/23/2023         Review of Systems   Constitutional:  Positive for fatigue. Negative for activity change, appetite change, chills, diaphoresis, fever and unexpected weight change.   HENT:  Negative for congestion, dental problem, drooling, ear discharge, ear pain, facial swelling, hearing loss, mouth sores, nosebleeds, postnasal drip, rhinorrhea, sinus pressure, sneezing, sore throat, tinnitus, trouble swallowing and voice change.    Eyes:  Negative for  photophobia, pain, discharge, redness, itching and visual disturbance.   Respiratory:  Negative for cough, choking, chest tightness, shortness of breath, wheezing and stridor.    Cardiovascular:  Negative for chest pain, palpitations and leg swelling.   Gastrointestinal:  Negative for abdominal distention, abdominal pain, anal bleeding, blood in stool, constipation, diarrhea, nausea, rectal pain and vomiting.   Endocrine: Negative for cold intolerance, heat intolerance, polydipsia, polyphagia and polyuria.   Genitourinary:  Negative for decreased urine volume, difficulty urinating, dyspareunia, dysuria, enuresis, flank pain, frequency, genital sores, hematuria, menstrual problem, pelvic pain, urgency, vaginal bleeding, vaginal discharge and vaginal pain.   Musculoskeletal:  Positive for back pain and myalgias. Negative for arthralgias, gait problem, joint swelling, neck pain and neck stiffness.   Skin:  Negative for color change, pallor and rash.   Allergic/Immunologic: Negative for environmental allergies, food allergies and immunocompromised state.   Neurological:  Positive for weakness. Negative for dizziness, tremors, seizures, syncope, facial asymmetry, speech difficulty, light-headedness, numbness and headaches.   Hematological:  Negative for adenopathy. Does not bruise/bleed easily.   Psychiatric/Behavioral:  Positive for dysphoric mood. Negative for agitation, behavioral problems, confusion, decreased concentration, hallucinations, self-injury, sleep disturbance and suicidal ideas. The patient is nervous/anxious. The patient is not hyperactive.      Objective:      Physical Exam  Constitutional:       Appearance: Normal appearance.           Assessment:      1. Multiple myeloma, remission status unspecified    2. Stem cell transplant candidate    3. Essential hypertension    4. Dyslipidemia    5. Immunodeficiency due to chemotherapy    6. Secondary malignant neoplasm of bone           Med Onc Chart  Routing      Follow up with physician . Nurse navigation to coordinate will need to see next week physical in-person visit   Follow up with WERNER    Infusion scheduling note    Injection scheduling note    Labs    Imaging    Pharmacy appointment    Other referrals             Plan:     The patient location is:  Office opposite side of town  The chief complaint leading to consultation is:  Multiple myeloma    Visit type: audiovisual    Face to Face time with patient: 25 minutes of total time spent on the encounter, which includes face to face time and non-face to face time preparing to see the patient (eg, review of tests), Obtaining and/or reviewing separately obtained history, Documenting clinical information in the electronic or other health record, Independently interpreting results (not separately reported) and communicating results to the patient/family/caregiver, or Care coordination (not separately reported).         Each patient to whom he or she provides medical services by telemedicine is:  (1) informed of the relationship between the physician and patient and the respective role of any other health care provider with respect to management of the patient; and (2) notified that he or she may decline to receive medical services by telemedicine and may withdraw from such care at any time.    Notes:     Reviewed information in reviewed note by leukemia lymphoma specialist.  At this time treat with Velcade today.  Dexamethasone I will see with physical in-person visit.  Been will need new therapeutic plans including daratumumab.  Discussed implications and answered questions with her follow-up as detailed    Oscar Márquez Jr, MD FACP

## 2023-06-29 ENCOUNTER — DOCUMENTATION ONLY (OUTPATIENT)
Dept: HEMATOLOGY/ONCOLOGY | Facility: CLINIC | Age: 63
End: 2023-06-29
Payer: COMMERCIAL

## 2023-06-29 DIAGNOSIS — C90.00 MULTIPLE MYELOMA, REMISSION STATUS UNSPECIFIED: Primary | ICD-10-CM

## 2023-06-29 DIAGNOSIS — C90.00 MULTIPLE MYELOMA, REMISSION STATUS UNSPECIFIED: ICD-10-CM

## 2023-06-29 DIAGNOSIS — Z51.11 ENCOUNTER FOR ANTINEOPLASTIC CHEMOTHERAPY: ICD-10-CM

## 2023-06-29 LAB
ALBUMIN SERPL ELPH-MCNC: 3.81 G/DL (ref 3.35–5.55)
ALPHA1 GLOB SERPL ELPH-MCNC: 0.39 G/DL (ref 0.17–0.41)
ALPHA2 GLOB SERPL ELPH-MCNC: 1.01 G/DL (ref 0.43–0.99)
B-GLOBULIN SERPL ELPH-MCNC: 0.67 G/DL (ref 0.5–1.1)
GAMMA GLOB SERPL ELPH-MCNC: 2.42 G/DL (ref 0.67–1.58)
KAPPA LC SER QL IA: 1.62 MG/DL (ref 0.33–1.94)
KAPPA LC/LAMBDA SER IA: 0.16 (ref 0.26–1.65)
LAMBDA LC SER QL IA: 10.34 MG/DL (ref 0.57–2.63)
PATHOLOGIST INTERPRETATION SPE: NORMAL
PROT SERPL-MCNC: 8.3 G/DL (ref 6–8.4)

## 2023-06-29 RX ORDER — LENALIDOMIDE 10 MG/1
1 CAPSULE ORAL DAILY
Qty: 14 EACH | Refills: 5 | Status: ACTIVE | OUTPATIENT
Start: 2023-06-29 | End: 2023-07-28

## 2023-06-29 NOTE — PROGRESS NOTES
On  met with pt while here for infusion. States she is supposed to start new med next time and needs to see Dr. Márquez in person. States she will need revlimid for next week, told her I will get new script sent to [facility] for her. Explained that new tx will include her getting another injection, for a total of two. She voiced understanding and states she will call me if she has questions or doesn't get med to start next week. Confirmed with clinic nurse that new script and auth has been obtained. Notified SW, FC, pharm and pre service of new drug added to current tx plan.   Oncology Navigation   Intake  Date of Diagnosis: 23  Cancer Type: Transplant; Myeloma  Internal / External Referral: Internal  Date of Referral: 05/10/23  Initial Nurse Navigator Contact: 05/15/23  Referral to Initial Contact Timeline (days): 5  Date Worked: 23  Reason if booked > 7 days after scheduling: Additional tests/procedures; Patient request     Treatment  Current Status: Active       Medical Oncologist: Dr. FRITZ Dela Cruz  Chemotherapy: Initiated  Chemotherapy Regimen: Velcade (Durvalumab possible pending FISH)  Oral Therapy: Initiated                       Acuity  Systemic Treatment - predicted or initiated: Chemotherapy Regimen with Multiple drugs (+1)  ECO  Comorbidities in Medical History: 2   Needed: 0  Support: 0  Verbalizes Financial Concerns: 1  Transportation: 0  Psychological Factors (+1 each): Emotional during conversation  Verbalizes the need for more education: 1  Navigation Acuity: 3     Follow Up  Follow up in 6 days (on 2023) for New drug Daratumumab and continue VRD.

## 2023-06-30 ENCOUNTER — SPECIALTY PHARMACY (OUTPATIENT)
Dept: PHARMACY | Facility: CLINIC | Age: 63
End: 2023-06-30
Payer: COMMERCIAL

## 2023-06-30 ENCOUNTER — PATIENT MESSAGE (OUTPATIENT)
Dept: NEUROSURGERY | Facility: CLINIC | Age: 63
End: 2023-06-30
Payer: COMMERCIAL

## 2023-06-30 ENCOUNTER — PATIENT MESSAGE (OUTPATIENT)
Dept: HEMATOLOGY/ONCOLOGY | Facility: CLINIC | Age: 63
End: 2023-06-30
Payer: COMMERCIAL

## 2023-06-30 NOTE — TELEPHONE ENCOUNTER
PA approved until 05/17/2024. Patient has been getting medication filled with [facility] (phone # [phone]).     Will route rx to [facility] to be filled and close out [facility] encounter

## 2023-07-05 ENCOUNTER — LAB VISIT (OUTPATIENT)
Dept: LAB | Facility: HOSPITAL | Age: 63
End: 2023-07-05
Attending: INTERNAL MEDICINE
Payer: COMMERCIAL

## 2023-07-05 DIAGNOSIS — Z76.82 STEM CELL TRANSPLANT CANDIDATE: ICD-10-CM

## 2023-07-05 DIAGNOSIS — C90.00 MULTIPLE MYELOMA, REMISSION STATUS UNSPECIFIED: ICD-10-CM

## 2023-07-05 LAB
ALBUMIN SERPL BCP-MCNC: 3.5 G/DL (ref 3.5–5.2)
ALP SERPL-CCNC: 74 U/L (ref 55–135)
ALT SERPL W/O P-5'-P-CCNC: 17 U/L (ref 10–44)
ANION GAP SERPL CALC-SCNC: 7 MMOL/L (ref 8–16)
AST SERPL-CCNC: 18 U/L (ref 10–40)
BASOPHILS # BLD AUTO: 0.03 K/UL (ref 0–0.2)
BASOPHILS NFR BLD: 0.5 % (ref 0–1.9)
BILIRUB SERPL-MCNC: 0.5 MG/DL (ref 0.1–1)
BUN SERPL-MCNC: 13 MG/DL (ref 8–23)
CALCIUM SERPL-MCNC: 8.8 MG/DL (ref 8.7–10.5)
CHLORIDE SERPL-SCNC: 109 MMOL/L (ref 95–110)
CO2 SERPL-SCNC: 25 MMOL/L (ref 23–29)
CREAT SERPL-MCNC: 0.9 MG/DL (ref 0.5–1.4)
DIFFERENTIAL METHOD: ABNORMAL
EOSINOPHIL # BLD AUTO: 0.3 K/UL (ref 0–0.5)
EOSINOPHIL NFR BLD: 4.6 % (ref 0–8)
ERYTHROCYTE [DISTWIDTH] IN BLOOD BY AUTOMATED COUNT: 14.5 % (ref 11.5–14.5)
EST. GFR  (NO RACE VARIABLE): >60 ML/MIN/1.73 M^2
GLUCOSE SERPL-MCNC: 104 MG/DL (ref 70–110)
HBV CORE AB SERPL QL IA: NORMAL
HBV SURFACE AG SERPL QL IA: NORMAL
HCT VFR BLD AUTO: 32.2 % (ref 37–48.5)
HGB BLD-MCNC: 10.3 G/DL (ref 12–16)
IMM GRANULOCYTES # BLD AUTO: 0.01 K/UL (ref 0–0.04)
IMM GRANULOCYTES NFR BLD AUTO: 0.2 % (ref 0–0.5)
LYMPHOCYTES # BLD AUTO: 2 K/UL (ref 1–4.8)
LYMPHOCYTES NFR BLD: 33.5 % (ref 18–48)
MCH RBC QN AUTO: 31.2 PG (ref 27–31)
MCHC RBC AUTO-ENTMCNC: 32 G/DL (ref 32–36)
MCV RBC AUTO: 98 FL (ref 82–98)
MONOCYTES # BLD AUTO: 0.6 K/UL (ref 0.3–1)
MONOCYTES NFR BLD: 10.9 % (ref 4–15)
NEUTROPHILS # BLD AUTO: 3 K/UL (ref 1.8–7.7)
NEUTROPHILS NFR BLD: 50.3 % (ref 38–73)
NRBC BLD-RTO: 0 /100 WBC
PLATELET # BLD AUTO: 344 K/UL (ref 150–450)
PMV BLD AUTO: 9.1 FL (ref 9.2–12.9)
POTASSIUM SERPL-SCNC: 4.2 MMOL/L (ref 3.5–5.1)
PROT SERPL-MCNC: 8.2 G/DL (ref 6–8.4)
RBC # BLD AUTO: 3.3 M/UL (ref 4–5.4)
SODIUM SERPL-SCNC: 141 MMOL/L (ref 136–145)
WBC # BLD AUTO: 5.88 K/UL (ref 3.9–12.7)

## 2023-07-05 PROCEDURE — 36415 COLL VENOUS BLD VENIPUNCTURE: CPT | Performed by: INTERNAL MEDICINE

## 2023-07-05 PROCEDURE — 86704 HEP B CORE ANTIBODY TOTAL: CPT | Performed by: INTERNAL MEDICINE

## 2023-07-05 PROCEDURE — 80053 COMPREHEN METABOLIC PANEL: CPT | Performed by: INTERNAL MEDICINE

## 2023-07-05 PROCEDURE — 85025 COMPLETE CBC W/AUTO DIFF WBC: CPT | Performed by: INTERNAL MEDICINE

## 2023-07-05 PROCEDURE — 87340 HEPATITIS B SURFACE AG IA: CPT | Performed by: INTERNAL MEDICINE

## 2023-07-05 PROCEDURE — 86706 HEP B SURFACE ANTIBODY: CPT | Performed by: INTERNAL MEDICINE

## 2023-07-06 ENCOUNTER — PATIENT MESSAGE (OUTPATIENT)
Dept: ADMINISTRATIVE | Facility: OTHER | Age: 63
End: 2023-07-06
Payer: COMMERCIAL

## 2023-07-06 ENCOUNTER — OFFICE VISIT (OUTPATIENT)
Dept: HEMATOLOGY/ONCOLOGY | Facility: CLINIC | Age: 63
End: 2023-07-06
Payer: COMMERCIAL

## 2023-07-06 ENCOUNTER — INFUSION (OUTPATIENT)
Dept: INFUSION THERAPY | Facility: HOSPITAL | Age: 63
End: 2023-07-06
Attending: INTERNAL MEDICINE
Payer: COMMERCIAL

## 2023-07-06 ENCOUNTER — CLINICAL SUPPORT (OUTPATIENT)
Dept: SMOKING CESSATION | Facility: CLINIC | Age: 63
End: 2023-07-06
Payer: COMMERCIAL

## 2023-07-06 VITALS
HEART RATE: 85 BPM | BODY MASS INDEX: 21.64 KG/M2 | WEIGHT: 126.75 LBS | HEIGHT: 64 IN | TEMPERATURE: 98 F | SYSTOLIC BLOOD PRESSURE: 126 MMHG | DIASTOLIC BLOOD PRESSURE: 74 MMHG | RESPIRATION RATE: 98 BRPM

## 2023-07-06 VITALS
DIASTOLIC BLOOD PRESSURE: 70 MMHG | TEMPERATURE: 97 F | OXYGEN SATURATION: 98 % | RESPIRATION RATE: 18 BRPM | SYSTOLIC BLOOD PRESSURE: 105 MMHG | HEART RATE: 74 BPM | BODY MASS INDEX: 21.64 KG/M2 | WEIGHT: 126.75 LBS | HEIGHT: 64 IN

## 2023-07-06 DIAGNOSIS — F17.200 NICOTINE DEPENDENCE: Primary | ICD-10-CM

## 2023-07-06 DIAGNOSIS — C90.00 MULTIPLE MYELOMA, REMISSION STATUS UNSPECIFIED: Primary | ICD-10-CM

## 2023-07-06 DIAGNOSIS — C79.51 SECONDARY MALIGNANT NEOPLASM OF BONE: ICD-10-CM

## 2023-07-06 DIAGNOSIS — D84.821 IMMUNODEFICIENCY DUE TO CHEMOTHERAPY: ICD-10-CM

## 2023-07-06 DIAGNOSIS — J44.89 COPD WITH ASTHMA: ICD-10-CM

## 2023-07-06 DIAGNOSIS — Z79.899 IMMUNODEFICIENCY DUE TO CHEMOTHERAPY: ICD-10-CM

## 2023-07-06 DIAGNOSIS — T45.1X5A IMMUNODEFICIENCY DUE TO CHEMOTHERAPY: ICD-10-CM

## 2023-07-06 DIAGNOSIS — I73.9 PVD (PERIPHERAL VASCULAR DISEASE): ICD-10-CM

## 2023-07-06 DIAGNOSIS — I10 ESSENTIAL HYPERTENSION: ICD-10-CM

## 2023-07-06 PROCEDURE — 99215 OFFICE O/P EST HI 40 MIN: CPT | Mod: 25,S$GLB,, | Performed by: INTERNAL MEDICINE

## 2023-07-06 PROCEDURE — 3074F PR MOST RECENT SYSTOLIC BLOOD PRESSURE < 130 MM HG: ICD-10-PCS | Mod: CPTII,S$GLB,, | Performed by: INTERNAL MEDICINE

## 2023-07-06 PROCEDURE — 99215 PR OFFICE/OUTPT VISIT, EST, LEVL V, 40-54 MIN: ICD-10-PCS | Mod: 25,S$GLB,, | Performed by: INTERNAL MEDICINE

## 2023-07-06 PROCEDURE — 3044F PR MOST RECENT HEMOGLOBIN A1C LEVEL <7.0%: ICD-10-PCS | Mod: CPTII,S$GLB,, | Performed by: INTERNAL MEDICINE

## 2023-07-06 PROCEDURE — 3078F PR MOST RECENT DIASTOLIC BLOOD PRESSURE < 80 MM HG: ICD-10-PCS | Mod: CPTII,S$GLB,, | Performed by: INTERNAL MEDICINE

## 2023-07-06 PROCEDURE — 99999 PR PBB SHADOW E&M-EST. PATIENT-LVL V: CPT | Mod: PBBFAC,,, | Performed by: INTERNAL MEDICINE

## 2023-07-06 PROCEDURE — 1159F MED LIST DOCD IN RCRD: CPT | Mod: CPTII,S$GLB,, | Performed by: INTERNAL MEDICINE

## 2023-07-06 PROCEDURE — 99999 PR PBB SHADOW E&M-EST. PATIENT-LVL V: ICD-10-PCS | Mod: PBBFAC,,, | Performed by: INTERNAL MEDICINE

## 2023-07-06 PROCEDURE — 3008F BODY MASS INDEX DOCD: CPT | Mod: CPTII,S$GLB,, | Performed by: INTERNAL MEDICINE

## 2023-07-06 PROCEDURE — 25000003 PHARM REV CODE 250: Performed by: INTERNAL MEDICINE

## 2023-07-06 PROCEDURE — 63600175 PHARM REV CODE 636 W HCPCS

## 2023-07-06 PROCEDURE — 3074F SYST BP LT 130 MM HG: CPT | Mod: CPTII,S$GLB,, | Performed by: INTERNAL MEDICINE

## 2023-07-06 PROCEDURE — 3008F PR BODY MASS INDEX (BMI) DOCUMENTED: ICD-10-PCS | Mod: CPTII,S$GLB,, | Performed by: INTERNAL MEDICINE

## 2023-07-06 PROCEDURE — 25000003 PHARM REV CODE 250

## 2023-07-06 PROCEDURE — 1160F PR REVIEW ALL MEDS BY PRESCRIBER/CLIN PHARMACIST DOCUMENTED: ICD-10-PCS | Mod: CPTII,S$GLB,, | Performed by: INTERNAL MEDICINE

## 2023-07-06 PROCEDURE — 1159F PR MEDICATION LIST DOCUMENTED IN MEDICAL RECORD: ICD-10-PCS | Mod: CPTII,S$GLB,, | Performed by: INTERNAL MEDICINE

## 2023-07-06 PROCEDURE — 99999 PR PBB SHADOW E&M-EST. PATIENT-LVL I: ICD-10-PCS | Mod: PBBFAC,,,

## 2023-07-06 PROCEDURE — 3078F DIAST BP <80 MM HG: CPT | Mod: CPTII,S$GLB,, | Performed by: INTERNAL MEDICINE

## 2023-07-06 PROCEDURE — 4010F ACE/ARB THERAPY RXD/TAKEN: CPT | Mod: CPTII,S$GLB,, | Performed by: INTERNAL MEDICINE

## 2023-07-06 PROCEDURE — 4010F PR ACE/ARB THEARPY RXD/TAKEN: ICD-10-PCS | Mod: CPTII,S$GLB,, | Performed by: INTERNAL MEDICINE

## 2023-07-06 PROCEDURE — 96401 CHEMO ANTI-NEOPL SQ/IM: CPT

## 2023-07-06 PROCEDURE — 99407 BEHAV CHNG SMOKING > 10 MIN: CPT | Mod: S$GLB,,, | Performed by: DIETITIAN, REGISTERED

## 2023-07-06 PROCEDURE — 3044F HG A1C LEVEL LT 7.0%: CPT | Mod: CPTII,S$GLB,, | Performed by: INTERNAL MEDICINE

## 2023-07-06 PROCEDURE — 63600175 PHARM REV CODE 636 W HCPCS: Mod: JZ,TB | Performed by: INTERNAL MEDICINE

## 2023-07-06 PROCEDURE — 1160F RVW MEDS BY RX/DR IN RCRD: CPT | Mod: CPTII,S$GLB,, | Performed by: INTERNAL MEDICINE

## 2023-07-06 PROCEDURE — 99407 PR TOBACCO USE CESSATION INTENSIVE >10 MINUTES: ICD-10-PCS | Mod: S$GLB,,, | Performed by: DIETITIAN, REGISTERED

## 2023-07-06 PROCEDURE — 99999 PR PBB SHADOW E&M-EST. PATIENT-LVL I: CPT | Mod: PBBFAC,,,

## 2023-07-06 RX ORDER — SODIUM CHLORIDE 0.9 % (FLUSH) 0.9 %
10 SYRINGE (ML) INJECTION
Status: CANCELLED | OUTPATIENT
Start: 2023-07-07

## 2023-07-06 RX ORDER — HEPARIN 100 UNIT/ML
500 SYRINGE INTRAVENOUS
Status: CANCELLED | OUTPATIENT
Start: 2023-07-07

## 2023-07-06 RX ORDER — ONDANSETRON 4 MG/1
4 TABLET, FILM COATED ORAL
Status: COMPLETED | OUTPATIENT
Start: 2023-07-06 | End: 2023-07-06

## 2023-07-06 RX ORDER — SODIUM CHLORIDE 0.9 % (FLUSH) 0.9 %
10 SYRINGE (ML) INJECTION
Status: CANCELLED | OUTPATIENT
Start: 2023-07-21

## 2023-07-06 RX ORDER — EPINEPHRINE 0.3 MG/.3ML
0.3 INJECTION SUBCUTANEOUS ONCE AS NEEDED
Status: CANCELLED | OUTPATIENT
Start: 2023-07-28

## 2023-07-06 RX ORDER — PROCHLORPERAZINE EDISYLATE 5 MG/ML
5 INJECTION INTRAMUSCULAR; INTRAVENOUS ONCE AS NEEDED
Status: CANCELLED
Start: 2023-07-07

## 2023-07-06 RX ORDER — PROCHLORPERAZINE MALEATE 5 MG
5 TABLET ORAL EVERY 6 HOURS PRN
Qty: 60 TABLET | Refills: 5 | Status: SHIPPED | OUTPATIENT
Start: 2023-07-06

## 2023-07-06 RX ORDER — BORTEZOMIB 3.5 MG/1
1.3 INJECTION, POWDER, LYOPHILIZED, FOR SOLUTION INTRAVENOUS; SUBCUTANEOUS
Status: CANCELLED | OUTPATIENT
Start: 2023-07-28

## 2023-07-06 RX ORDER — BORTEZOMIB 3.5 MG/1
1.3 INJECTION, POWDER, LYOPHILIZED, FOR SOLUTION INTRAVENOUS; SUBCUTANEOUS
Status: CANCELLED | OUTPATIENT
Start: 2023-07-14

## 2023-07-06 RX ORDER — PROCHLORPERAZINE EDISYLATE 5 MG/ML
5 INJECTION INTRAMUSCULAR; INTRAVENOUS ONCE AS NEEDED
Status: CANCELLED
Start: 2023-07-14

## 2023-07-06 RX ORDER — ACETAMINOPHEN 325 MG/1
650 TABLET ORAL
Status: COMPLETED | OUTPATIENT
Start: 2023-07-06 | End: 2023-07-06

## 2023-07-06 RX ORDER — DEXAMETHASONE 4 MG/1
40 TABLET ORAL
Status: COMPLETED | OUTPATIENT
Start: 2023-07-06 | End: 2023-07-06

## 2023-07-06 RX ORDER — DIPHENHYDRAMINE HYDROCHLORIDE 50 MG/ML
50 INJECTION INTRAMUSCULAR; INTRAVENOUS ONCE AS NEEDED
Status: CANCELLED | OUTPATIENT
Start: 2023-07-21

## 2023-07-06 RX ORDER — DIPHENHYDRAMINE HCL 25 MG
50 CAPSULE ORAL
Status: CANCELLED
Start: 2023-07-07

## 2023-07-06 RX ORDER — DIPHENHYDRAMINE HCL 25 MG
50 CAPSULE ORAL
Status: CANCELLED
Start: 2023-07-14

## 2023-07-06 RX ORDER — BORTEZOMIB 3.5 MG/1
1.3 INJECTION, POWDER, LYOPHILIZED, FOR SOLUTION INTRAVENOUS; SUBCUTANEOUS
Status: COMPLETED | OUTPATIENT
Start: 2023-07-06 | End: 2023-07-06

## 2023-07-06 RX ORDER — SODIUM CHLORIDE 0.9 % (FLUSH) 0.9 %
10 SYRINGE (ML) INJECTION
Status: CANCELLED | OUTPATIENT
Start: 2023-07-28

## 2023-07-06 RX ORDER — PROCHLORPERAZINE EDISYLATE 5 MG/ML
5 INJECTION INTRAMUSCULAR; INTRAVENOUS ONCE AS NEEDED
Status: CANCELLED
Start: 2023-07-21

## 2023-07-06 RX ORDER — DIPHENHYDRAMINE HYDROCHLORIDE 50 MG/ML
50 INJECTION INTRAMUSCULAR; INTRAVENOUS ONCE AS NEEDED
Status: CANCELLED | OUTPATIENT
Start: 2023-07-28

## 2023-07-06 RX ORDER — DIPHENHYDRAMINE HCL 25 MG
50 CAPSULE ORAL
Status: COMPLETED | OUTPATIENT
Start: 2023-07-06 | End: 2023-07-06

## 2023-07-06 RX ORDER — DEXAMETHASONE 4 MG/1
40 TABLET ORAL
Status: CANCELLED
Start: 2023-07-07

## 2023-07-06 RX ORDER — ACETAMINOPHEN 325 MG/1
650 TABLET ORAL
Status: CANCELLED | OUTPATIENT
Start: 2023-07-21

## 2023-07-06 RX ORDER — EPINEPHRINE 0.3 MG/.3ML
0.3 INJECTION SUBCUTANEOUS ONCE AS NEEDED
Status: CANCELLED | OUTPATIENT
Start: 2023-07-21

## 2023-07-06 RX ORDER — BORTEZOMIB 3.5 MG/1
1.3 INJECTION, POWDER, LYOPHILIZED, FOR SOLUTION INTRAVENOUS; SUBCUTANEOUS
Status: CANCELLED | OUTPATIENT
Start: 2023-07-21

## 2023-07-06 RX ORDER — BORTEZOMIB 3.5 MG/1
1.3 INJECTION, POWDER, LYOPHILIZED, FOR SOLUTION INTRAVENOUS; SUBCUTANEOUS
Status: CANCELLED | OUTPATIENT
Start: 2023-07-07

## 2023-07-06 RX ORDER — DEXAMETHASONE 4 MG/1
40 TABLET ORAL SEE ADMIN INSTRUCTIONS
Qty: 40 TABLET | Refills: 5 | Status: SHIPPED | OUTPATIENT
Start: 2023-07-06

## 2023-07-06 RX ORDER — HEPARIN 100 UNIT/ML
500 SYRINGE INTRAVENOUS
Status: CANCELLED | OUTPATIENT
Start: 2023-07-14

## 2023-07-06 RX ORDER — DIPHENHYDRAMINE HCL 25 MG
50 CAPSULE ORAL
Status: CANCELLED
Start: 2023-07-21

## 2023-07-06 RX ORDER — ACETAMINOPHEN 325 MG/1
650 TABLET ORAL
Status: CANCELLED | OUTPATIENT
Start: 2023-07-07

## 2023-07-06 RX ORDER — ACETAMINOPHEN 325 MG/1
650 TABLET ORAL
Status: CANCELLED | OUTPATIENT
Start: 2023-07-14

## 2023-07-06 RX ORDER — HEPARIN 100 UNIT/ML
500 SYRINGE INTRAVENOUS
Status: CANCELLED | OUTPATIENT
Start: 2023-07-21

## 2023-07-06 RX ORDER — HEPARIN 100 UNIT/ML
500 SYRINGE INTRAVENOUS
Status: CANCELLED | OUTPATIENT
Start: 2023-07-28

## 2023-07-06 RX ORDER — PROCHLORPERAZINE EDISYLATE 5 MG/ML
5 INJECTION INTRAMUSCULAR; INTRAVENOUS ONCE AS NEEDED
Status: CANCELLED
Start: 2023-07-28

## 2023-07-06 RX ORDER — LENALIDOMIDE 10 MG/1
1 CAPSULE ORAL DAILY
Qty: 21 EACH | Refills: 5 | Status: SHIPPED | OUTPATIENT
Start: 2023-07-06 | End: 2023-07-26 | Stop reason: SDUPTHER

## 2023-07-06 RX ORDER — SODIUM CHLORIDE 0.9 % (FLUSH) 0.9 %
10 SYRINGE (ML) INJECTION
Status: CANCELLED | OUTPATIENT
Start: 2023-07-14

## 2023-07-06 RX ADMIN — DIPHENHYDRAMINE HYDROCHLORIDE 50 MG: 25 CAPSULE ORAL at 10:07

## 2023-07-06 RX ADMIN — DEXAMETHASONE 40 MG: 4 TABLET ORAL at 10:07

## 2023-07-06 RX ADMIN — ONDANSETRON HYDROCHLORIDE 4 MG: 4 TABLET, FILM COATED ORAL at 01:07

## 2023-07-06 RX ADMIN — ACETAMINOPHEN 650 MG: 325 TABLET ORAL at 10:07

## 2023-07-06 RX ADMIN — DARATUMUMAB AND HYALURONIDASE-FIHJ (HUMAN RECOMBINANT) 1800 MG: 1800; 30000 INJECTION SUBCUTANEOUS at 11:07

## 2023-07-06 RX ADMIN — BORTEZOMIB 2 MG: 3.5 INJECTION, POWDER, LYOPHILIZED, FOR SOLUTION INTRAVENOUS; SUBCUTANEOUS at 11:07

## 2023-07-06 NOTE — PLAN OF CARE
"Pt is stable. Pt administered Darzaelex and Velcade today. Pt voiced she was doing "okay" today. Pt will follow up in one week.      Problem: Fall Injury Risk  Goal: Absence of Fall and Fall-Related Injury  Outcome: Ongoing, Progressing     Problem: Anemia (Chemotherapy Effects)  Goal: Anemia Symptom Improvement  Outcome: Ongoing, Progressing     Problem: Urinary Bleeding Risk or Actual (Chemotherapy Effects)  Goal: Absence of Hematuria  Outcome: Ongoing, Progressing     Problem: Nausea and Vomiting (Chemotherapy Effects)  Goal: Fluid and Electrolyte Balance  Outcome: Ongoing, Progressing     Problem: Neurotoxicity (Chemotherapy Effects)  Goal: Neurotoxicity Symptom Control  Outcome: Ongoing, Progressing     Problem: Neutropenia (Chemotherapy Effects)  Goal: Absence of Infection  Outcome: Ongoing, Progressing     Problem: Oral Mucositis (Chemotherapy Effects)  Goal: Improved Oral Mucous Membrane Integrity  Outcome: Ongoing, Progressing     Problem: Thrombocytopenia Bleeding Risk (Chemotherapy Effects)  Goal: Absence of Bleeding  Outcome: Ongoing, Progressing     Problem: Adult Inpatient Plan of Care  Goal: Plan of Care Review  Outcome: Ongoing, Progressing  Goal: Patient-Specific Goal (Individualized)  Outcome: Ongoing, Progressing  Flowsheets (Taken 7/6/2023 1009)  Anxieties, Fears or Concerns: Pt having trouble on whent to take medicatinos for tretament.  Individualized Care Needs: Pt proivded pillow and ice water with medications.     "

## 2023-07-06 NOTE — NURSING
1005: Pt voiced she is not taking Acyclovir, pt educated on the importance of Acyclovir, pt voiced medication was a pharmacy ready to be picked up.

## 2023-07-06 NOTE — PROGRESS NOTES
Subjective:       Patient ID: Ana Bonilla is a 62 y.o. female.    Chief Complaint: Results, Chemotherapy, and Multiple Myeloma    HPI:  62-year-old female stage II multiple myeloma patient is to initiate treatment with daratumumab needs consent signed today.  As continuation.  We evaluated and we submitted her chemotherapeutic regimen.  She is a candidate for potential stem cell transplant but is very anxious in terms of this regard.  ECOG status 1    Past Medical History:   Diagnosis Date    Allergy     Amblyopia OS    Anxiety     Asthma     COPD (chronic obstructive pulmonary disease)     NO HOME o2    GERD (gastroesophageal reflux disease)     Hyperlipidemia     Hypertension     PONV (postoperative nausea and vomiting)     Thyroid disease      Family History   Problem Relation Age of Onset    Hypertension Mother     Diabetes Mother     Diabetes Father     Stroke Maternal Grandmother     Prostate cancer Maternal Grandfather     Mental illness Son     Pancreatic cancer Maternal Uncle     Mental illness Other     Pancreatic cancer Other     Ovarian cancer Maternal Cousin      Social History     Socioeconomic History    Marital status:     Number of children: 2   Occupational History     Employer: [employer]   Tobacco Use    Smoking status: Every Day     Packs/day: 0.50     Years: 50.00     Pack years: 25.00     Types: Cigarettes    Smokeless tobacco: Never    Tobacco comments:     HOLD MIDNIGHT PRIOR TO SX   Substance and Sexual Activity    Alcohol use: Never     Comment: quit    Drug use: No    Sexual activity: Never     Past Surgical History:   Procedure Laterality Date    APPENDECTOMY      BIOPSY N/A 6/8/2023    Procedure: BIOPSY;  Surgeon: Fausto Smith MD;  Location: [facility] OR;  Service: Neurosurgery;  Laterality: N/A;  L1    BUNIONECTOMY      COLONOSCOPY N/A 12/4/2019    Procedure: COLONOSCOPY;  Surgeon: Danny Matos III, MD;  Location: [facility] ENDO;  Service: Endoscopy;  Laterality: N/A;     COLONOSCOPY N/A 10/24/2022    Procedure: COLONOSCOPY;  Surgeon: Danny Matos III, MD;  Location: [facility] ENDO;  Service: Endoscopy;  Laterality: N/A;    ESOPHAGOGASTRODUODENOSCOPY N/A 10/24/2022    Procedure: EGD (ESOPHAGOGASTRODUODENOSCOPY);  Surgeon: Danny Matos III, MD;  Location: [facility] ENDO;  Service: Endoscopy;  Laterality: N/A;    FIXATION KYPHOPLASTY Bilateral 6/8/2023    Procedure: KYPHOPLASTY;  Surgeon: Fausto Smith MD;  Location: [facility] OR;  Service: Neurosurgery;  Laterality: Bilateral;  kyphoplasty and radiofrequency ablation - L1    HYSTERECTOMY      PARTIAL//still with ovaries    neck fusion  08/2017    THYROIDECTOMY         Labs:  Lab Results   Component Value Date    WBC 5.88 07/05/2023    HGB 10.3 (L) 07/05/2023    HCT 32.2 (L) 07/05/2023    MCV 98 07/05/2023     07/05/2023     BMP  Lab Results   Component Value Date     07/05/2023    K 4.2 07/05/2023     07/05/2023    CO2 25 07/05/2023    BUN 13 07/05/2023    CREATININE 0.9 07/05/2023    CALCIUM 8.8 07/05/2023    ANIONGAP 7 (L) 07/05/2023    ESTGFRAFRICA >60.0 07/14/2021    EGFRNONAA >60.0 07/14/2021     Lab Results   Component Value Date    ALT 17 07/05/2023    AST 18 07/05/2023    ALKPHOS 74 07/05/2023    BILITOT 0.5 07/05/2023       No results found for: IRON, TIBC, FERRITIN, SATURATEDIRO  Lab Results   Component Value Date    [id number] 756 06/10/2022     Lab Results   Component Value Date    FOLATE 6.6 11/15/2019     Lab Results   Component Value Date    TSH 0.846 05/23/2023         Review of Systems   Constitutional:  Negative for activity change, appetite change, chills, diaphoresis, fatigue, fever and unexpected weight change.   HENT:  Negative for congestion, dental problem, drooling, ear discharge, ear pain, facial swelling, hearing loss, mouth sores, nosebleeds, postnasal drip, rhinorrhea, sinus pressure, sneezing, sore throat, tinnitus, trouble swallowing and voice change.    Eyes:  Negative for photophobia,  pain, discharge, redness, itching and visual disturbance.   Respiratory:  Negative for cough, choking, chest tightness, shortness of breath, wheezing and stridor.    Cardiovascular:  Negative for chest pain, palpitations and leg swelling.   Gastrointestinal:  Negative for abdominal distention, abdominal pain, anal bleeding, blood in stool, constipation, diarrhea, nausea, rectal pain and vomiting.   Endocrine: Negative for cold intolerance, heat intolerance, polydipsia, polyphagia and polyuria.   Genitourinary:  Negative for decreased urine volume, difficulty urinating, dyspareunia, dysuria, enuresis, flank pain, frequency, genital sores, hematuria, menstrual problem, pelvic pain, urgency, vaginal bleeding, vaginal discharge and vaginal pain.   Musculoskeletal:  Negative for arthralgias, back pain, gait problem, joint swelling, myalgias, neck pain and neck stiffness.   Skin:  Negative for color change, pallor and rash.   Allergic/Immunologic: Negative for environmental allergies, food allergies and immunocompromised state.   Neurological:  Negative for dizziness, tremors, seizures, syncope, facial asymmetry, speech difficulty, weakness, light-headedness, numbness and headaches.   Hematological:  Negative for adenopathy. Does not bruise/bleed easily.   Psychiatric/Behavioral:  Positive for dysphoric mood. Negative for agitation, behavioral problems, confusion, decreased concentration, hallucinations, self-injury, sleep disturbance and suicidal ideas. The patient is nervous/anxious. The patient is not hyperactive.      Objective:      Physical Exam  Vitals reviewed.   Constitutional:       General: She is not in acute distress.     Appearance: She is well-developed. She is not diaphoretic.   HENT:      Head: Normocephalic and atraumatic.      Right Ear: External ear normal.      Left Ear: External ear normal.      Nose: Nose normal.      Right Sinus: No maxillary sinus tenderness or frontal sinus tenderness.      Left  Sinus: No maxillary sinus tenderness or frontal sinus tenderness.      Mouth/Throat:      Pharynx: No oropharyngeal exudate.   Eyes:      General: Lids are normal. No scleral icterus.        Right eye: No discharge.         Left eye: No discharge.      Conjunctiva/sclera: Conjunctivae normal.      Right eye: Right conjunctiva is not injected. No hemorrhage.     Left eye: Left conjunctiva is not injected. No hemorrhage.     Pupils: Pupils are equal, round, and reactive to light.   Neck:      Thyroid: No thyromegaly.      Vascular: No JVD.      Trachea: No tracheal deviation.   Cardiovascular:      Rate and Rhythm: Normal rate.   Pulmonary:      Effort: Pulmonary effort is normal. No respiratory distress.      Breath sounds: No stridor.   Chest:      Chest wall: No tenderness.   Abdominal:      General: Bowel sounds are normal. There is no distension.      Palpations: Abdomen is soft. There is no hepatomegaly, splenomegaly or mass.      Tenderness: There is no abdominal tenderness. There is no rebound.   Musculoskeletal:         General: No tenderness. Normal range of motion.      Cervical back: Normal range of motion and neck supple.   Lymphadenopathy:      Cervical: No cervical adenopathy.      Upper Body:      Right upper body: No supraclavicular adenopathy.      Left upper body: No supraclavicular adenopathy.   Skin:     General: Skin is dry.      Findings: No erythema or rash.   Neurological:      Mental Status: She is alert and oriented to person, place, and time.      Cranial Nerves: No cranial nerve deficit.      Motor: Weakness present.      Coordination: Coordination normal.      Gait: Gait abnormal.   Psychiatric:         Behavior: Behavior normal.         Thought Content: Thought content normal.         Judgment: Judgment normal.           Assessment:      1. Multiple myeloma, remission status unspecified    2. Immunodeficiency due to chemotherapy    3. PVD (peripheral vascular disease)    4. Essential  hypertension    5. COPD with asthma    6. Secondary malignant neoplasm of bone           Med Onc Chart Routing      Follow up with physician . Return to clinic to be seen by Dr. Dela Cruz in 1 month CBC CMP SPEP and free light chain prior   Follow up with WERNER 1 week. Weekly follow-up with [facility] with CBC CMP   Infusion scheduling note    Injection scheduling note    Labs    Imaging    Pharmacy appointment    Other referrals             Plan:     Extensive conversation with patient and her .  At this time I have reviewed with him she is had responsive disease we are trying to deepen her response with introduction of daratumumab.  Consent has been signedConsented the patient to the treatment plan and the patient was educated on the planned duration of the treatment and schedule of the treatment administration.  We have reviewed need for advanced care planningAdvance Care Planning    Patient can be seen by [facility] on weekly basis for day 815 in 22 can be seen back by MD with standing labs of CBC CMP SPEP and free light chain prior.  With weekly CBC CMP with [facility].  Patient is aware that she has responsive disease which is quite good and does make her a candidate for potential stem cell transplant but she is very anxious I have tried to reassure on throughout the conversation today.        Oscar Márquez Jr, MD FACP

## 2023-07-06 NOTE — NURSING
1200: Reviewed pt's future appointments with printed AVS and Calendar. Pt was given hand written note to take Decadron 40mg the steroid on Day1,8,15 and 22, along with taking Acyclovir 400mg by mouth twice daily. Pt verbalized understanding. Pt c/o nausea, see Mar for zofran administration.

## 2023-07-06 NOTE — NURSING
"0916: Pt at chairside and voiced she did not take home Decadron. This nurse educated pt, steroid needed to be administered today. Pt educated provider would be able to place on plan today. Pt educated she would have to wait approximately one hour prior to having Darzalex administered post Decadron/steroid administration and be monitored two hours post Darzalex administration. Pt was upset and voiced she was not educated on having to take steroid today. Pt reassured this was the only visit she would have to wait approximately two hours post Darzalex. Pt stated, "I am supposed to take my steroid on Day 1, Day 8 and Day 15." This nurse stated, "Today is Day 1." Pt verbalized understanding. Pt re-educated on medication administration of home medications for treatment. Pt verbalized understanding.  "

## 2023-07-06 NOTE — NURSING
1105: Darzalex administered to the rlq of abd and Velcade administered to the llq of the abd, Injection given without difficulties.Bandaid applied. Patient instructed to stay in the clinic for two hours post Darzalex administration. Patient verbalized understanding and will notify nurse with any complaints.

## 2023-07-07 ENCOUNTER — PATIENT MESSAGE (OUTPATIENT)
Dept: HEMATOLOGY/ONCOLOGY | Facility: CLINIC | Age: 63
End: 2023-07-07
Payer: COMMERCIAL

## 2023-07-07 ENCOUNTER — TELEPHONE (OUTPATIENT)
Dept: HEMATOLOGY/ONCOLOGY | Facility: CLINIC | Age: 63
End: 2023-07-07
Payer: COMMERCIAL

## 2023-07-08 LAB
HBV SURFACE AB SER QL IA: NEGATIVE
HBV SURFACE AB SERPL IA-ACNC: <3 MIU/ML

## 2023-07-10 ENCOUNTER — PATIENT MESSAGE (OUTPATIENT)
Dept: HEMATOLOGY/ONCOLOGY | Facility: CLINIC | Age: 63
End: 2023-07-10
Payer: COMMERCIAL

## 2023-07-10 ENCOUNTER — CLINICAL SUPPORT (OUTPATIENT)
Dept: SMOKING CESSATION | Facility: CLINIC | Age: 63
End: 2023-07-10
Payer: COMMERCIAL

## 2023-07-10 DIAGNOSIS — F17.200 NICOTINE DEPENDENCE: Primary | ICD-10-CM

## 2023-07-10 DIAGNOSIS — F41.9 ANXIETY: ICD-10-CM

## 2023-07-10 PROCEDURE — 99999 PR PBB SHADOW E&M-EST. PATIENT-LVL I: CPT | Mod: PBBFAC,,,

## 2023-07-10 PROCEDURE — 99407 BEHAV CHNG SMOKING > 10 MIN: CPT | Mod: S$GLB,,, | Performed by: GENERAL PRACTICE

## 2023-07-10 PROCEDURE — 99999 PR PBB SHADOW E&M-EST. PATIENT-LVL I: ICD-10-PCS | Mod: PBBFAC,,,

## 2023-07-10 PROCEDURE — 99407 PR TOBACCO USE CESSATION INTENSIVE >10 MINUTES: ICD-10-PCS | Mod: S$GLB,,, | Performed by: GENERAL PRACTICE

## 2023-07-10 RX ORDER — ALPRAZOLAM 2 MG/1
TABLET ORAL
Qty: 60 TABLET | Refills: 0 | Status: SHIPPED | OUTPATIENT
Start: 2023-07-10 | End: 2023-08-09 | Stop reason: SDUPTHER

## 2023-07-10 NOTE — PROGRESS NOTES
Spoke to patient over the phone in regard to her smoking cessation progress. She is not tobacco free at this time. Patient states that life stressors have been a barrier to her quitting smoking and she has not started using nicotine patches at this time. Reviewed instructions for use of nicotine patch and encouraged patient to try to use them to assist her with quitting. Discussed the importance of quitting for her overall health. Scheduled a follow up virtual visit for next week. Will continue to encourage and monitor her progress.

## 2023-07-11 ENCOUNTER — LAB VISIT (OUTPATIENT)
Dept: LAB | Facility: HOSPITAL | Age: 63
End: 2023-07-11
Attending: INTERNAL MEDICINE
Payer: COMMERCIAL

## 2023-07-11 DIAGNOSIS — C90.00 MULTIPLE MYELOMA, REMISSION STATUS UNSPECIFIED: ICD-10-CM

## 2023-07-11 LAB
ALBUMIN SERPL BCP-MCNC: 3.7 G/DL (ref 3.5–5.2)
ALP SERPL-CCNC: 81 U/L (ref 55–135)
ALT SERPL W/O P-5'-P-CCNC: 18 U/L (ref 10–44)
ANION GAP SERPL CALC-SCNC: 13 MMOL/L (ref 8–16)
AST SERPL-CCNC: 17 U/L (ref 10–40)
BASOPHILS # BLD AUTO: 0.01 K/UL (ref 0–0.2)
BASOPHILS NFR BLD: 0.2 % (ref 0–1.9)
BILIRUB SERPL-MCNC: 0.5 MG/DL (ref 0.1–1)
BUN SERPL-MCNC: 11 MG/DL (ref 8–23)
CALCIUM SERPL-MCNC: 9.7 MG/DL (ref 8.7–10.5)
CHLORIDE SERPL-SCNC: 106 MMOL/L (ref 95–110)
CO2 SERPL-SCNC: 24 MMOL/L (ref 23–29)
CREAT SERPL-MCNC: 0.9 MG/DL (ref 0.5–1.4)
DIFFERENTIAL METHOD: ABNORMAL
EOSINOPHIL # BLD AUTO: 0.6 K/UL (ref 0–0.5)
EOSINOPHIL NFR BLD: 13.4 % (ref 0–8)
ERYTHROCYTE [DISTWIDTH] IN BLOOD BY AUTOMATED COUNT: 15.2 % (ref 11.5–14.5)
EST. GFR  (NO RACE VARIABLE): >60 ML/MIN/1.73 M^2
GLUCOSE SERPL-MCNC: 104 MG/DL (ref 70–110)
HCT VFR BLD AUTO: 35.3 % (ref 37–48.5)
HGB BLD-MCNC: 11.3 G/DL (ref 12–16)
IMM GRANULOCYTES # BLD AUTO: 0.01 K/UL (ref 0–0.04)
IMM GRANULOCYTES NFR BLD AUTO: 0.2 % (ref 0–0.5)
LYMPHOCYTES # BLD AUTO: 1.7 K/UL (ref 1–4.8)
LYMPHOCYTES NFR BLD: 35 % (ref 18–48)
MCH RBC QN AUTO: 31 PG (ref 27–31)
MCHC RBC AUTO-ENTMCNC: 32 G/DL (ref 32–36)
MCV RBC AUTO: 97 FL (ref 82–98)
MONOCYTES # BLD AUTO: 0.6 K/UL (ref 0.3–1)
MONOCYTES NFR BLD: 11.9 % (ref 4–15)
NEUTROPHILS # BLD AUTO: 1.9 K/UL (ref 1.8–7.7)
NEUTROPHILS NFR BLD: 39.3 % (ref 38–73)
NRBC BLD-RTO: 0 /100 WBC
PLATELET # BLD AUTO: 324 K/UL (ref 150–450)
PMV BLD AUTO: 9.5 FL (ref 9.2–12.9)
POTASSIUM SERPL-SCNC: 4.2 MMOL/L (ref 3.5–5.1)
PROT SERPL-MCNC: 8.5 G/DL (ref 6–8.4)
RBC # BLD AUTO: 3.64 M/UL (ref 4–5.4)
SODIUM SERPL-SCNC: 143 MMOL/L (ref 136–145)
WBC # BLD AUTO: 4.77 K/UL (ref 3.9–12.7)

## 2023-07-11 PROCEDURE — 80053 COMPREHEN METABOLIC PANEL: CPT | Performed by: INTERNAL MEDICINE

## 2023-07-11 PROCEDURE — 36415 COLL VENOUS BLD VENIPUNCTURE: CPT | Performed by: INTERNAL MEDICINE

## 2023-07-11 PROCEDURE — 85025 COMPLETE CBC W/AUTO DIFF WBC: CPT | Performed by: INTERNAL MEDICINE

## 2023-07-12 ENCOUNTER — INFUSION (OUTPATIENT)
Dept: INFUSION THERAPY | Facility: HOSPITAL | Age: 63
End: 2023-07-12
Attending: INTERNAL MEDICINE
Payer: COMMERCIAL

## 2023-07-12 ENCOUNTER — OFFICE VISIT (OUTPATIENT)
Dept: HEMATOLOGY/ONCOLOGY | Facility: CLINIC | Age: 63
End: 2023-07-12
Payer: COMMERCIAL

## 2023-07-12 VITALS
RESPIRATION RATE: 20 BRPM | WEIGHT: 128.31 LBS | TEMPERATURE: 98 F | BODY MASS INDEX: 21.91 KG/M2 | OXYGEN SATURATION: 99 % | HEIGHT: 64 IN | DIASTOLIC BLOOD PRESSURE: 79 MMHG | SYSTOLIC BLOOD PRESSURE: 124 MMHG | HEART RATE: 88 BPM

## 2023-07-12 VITALS
RESPIRATION RATE: 18 BRPM | OXYGEN SATURATION: 99 % | HEART RATE: 80 BPM | SYSTOLIC BLOOD PRESSURE: 98 MMHG | DIASTOLIC BLOOD PRESSURE: 66 MMHG | BODY MASS INDEX: 21.91 KG/M2 | TEMPERATURE: 97 F | HEIGHT: 64 IN | WEIGHT: 128.31 LBS

## 2023-07-12 DIAGNOSIS — C90.00 MULTIPLE MYELOMA, REMISSION STATUS UNSPECIFIED: Chronic | ICD-10-CM

## 2023-07-12 DIAGNOSIS — C90.00 MULTIPLE MYELOMA, REMISSION STATUS UNSPECIFIED: Primary | ICD-10-CM

## 2023-07-12 PROCEDURE — 1160F RVW MEDS BY RX/DR IN RCRD: CPT | Mod: CPTII,S$GLB,,

## 2023-07-12 PROCEDURE — 63600175 PHARM REV CODE 636 W HCPCS: Mod: JG | Performed by: INTERNAL MEDICINE

## 2023-07-12 PROCEDURE — 3074F SYST BP LT 130 MM HG: CPT | Mod: CPTII,S$GLB,,

## 2023-07-12 PROCEDURE — 1159F MED LIST DOCD IN RCRD: CPT | Mod: CPTII,S$GLB,,

## 2023-07-12 PROCEDURE — 4010F PR ACE/ARB THEARPY RXD/TAKEN: ICD-10-PCS | Mod: CPTII,S$GLB,,

## 2023-07-12 PROCEDURE — 99215 PR OFFICE/OUTPT VISIT, EST, LEVL V, 40-54 MIN: ICD-10-PCS | Mod: S$GLB,,,

## 2023-07-12 PROCEDURE — 25000003 PHARM REV CODE 250: Performed by: INTERNAL MEDICINE

## 2023-07-12 PROCEDURE — 3008F BODY MASS INDEX DOCD: CPT | Mod: CPTII,S$GLB,,

## 2023-07-12 PROCEDURE — 3044F PR MOST RECENT HEMOGLOBIN A1C LEVEL <7.0%: ICD-10-PCS | Mod: CPTII,S$GLB,,

## 2023-07-12 PROCEDURE — 3044F HG A1C LEVEL LT 7.0%: CPT | Mod: CPTII,S$GLB,,

## 2023-07-12 PROCEDURE — 3074F PR MOST RECENT SYSTOLIC BLOOD PRESSURE < 130 MM HG: ICD-10-PCS | Mod: CPTII,S$GLB,,

## 2023-07-12 PROCEDURE — 99999 PR PBB SHADOW E&M-EST. PATIENT-LVL V: CPT | Mod: PBBFAC,,,

## 2023-07-12 PROCEDURE — 96401 CHEMO ANTI-NEOPL SQ/IM: CPT

## 2023-07-12 PROCEDURE — 3008F PR BODY MASS INDEX (BMI) DOCUMENTED: ICD-10-PCS | Mod: CPTII,S$GLB,,

## 2023-07-12 PROCEDURE — 99999 PR PBB SHADOW E&M-EST. PATIENT-LVL V: ICD-10-PCS | Mod: PBBFAC,,,

## 2023-07-12 PROCEDURE — 3078F PR MOST RECENT DIASTOLIC BLOOD PRESSURE < 80 MM HG: ICD-10-PCS | Mod: CPTII,S$GLB,,

## 2023-07-12 PROCEDURE — 99215 OFFICE O/P EST HI 40 MIN: CPT | Mod: S$GLB,,,

## 2023-07-12 PROCEDURE — 3078F DIAST BP <80 MM HG: CPT | Mod: CPTII,S$GLB,,

## 2023-07-12 PROCEDURE — 1160F PR REVIEW ALL MEDS BY PRESCRIBER/CLIN PHARMACIST DOCUMENTED: ICD-10-PCS | Mod: CPTII,S$GLB,,

## 2023-07-12 PROCEDURE — 1159F PR MEDICATION LIST DOCUMENTED IN MEDICAL RECORD: ICD-10-PCS | Mod: CPTII,S$GLB,,

## 2023-07-12 PROCEDURE — 4010F ACE/ARB THERAPY RXD/TAKEN: CPT | Mod: CPTII,S$GLB,,

## 2023-07-12 RX ORDER — DIPHENHYDRAMINE HCL 25 MG
50 CAPSULE ORAL
Status: COMPLETED | OUTPATIENT
Start: 2023-07-12 | End: 2023-07-12

## 2023-07-12 RX ORDER — ACETAMINOPHEN 325 MG/1
650 TABLET ORAL
Status: COMPLETED | OUTPATIENT
Start: 2023-07-12 | End: 2023-07-12

## 2023-07-12 RX ORDER — BORTEZOMIB 3.5 MG/1
1.3 INJECTION, POWDER, LYOPHILIZED, FOR SOLUTION INTRAVENOUS; SUBCUTANEOUS
Status: COMPLETED | OUTPATIENT
Start: 2023-07-12 | End: 2023-07-12

## 2023-07-12 RX ADMIN — DIPHENHYDRAMINE HYDROCHLORIDE 50 MG: 25 CAPSULE ORAL at 09:07

## 2023-07-12 RX ADMIN — BORTEZOMIB 2 MG: 3.5 INJECTION, POWDER, LYOPHILIZED, FOR SOLUTION INTRAVENOUS; SUBCUTANEOUS at 10:07

## 2023-07-12 RX ADMIN — DARATUMUMAB AND HYALURONIDASE-FIHJ (HUMAN RECOMBINANT) 1800 MG: 1800; 30000 INJECTION SUBCUTANEOUS at 10:07

## 2023-07-12 RX ADMIN — ACETAMINOPHEN 650 MG: 325 TABLET ORAL at 09:07

## 2023-07-12 NOTE — NURSING
1005: Darzalex and Velcade Injection given without difficulties.Bandaid applied. Patient instructed to stay in the clinic for 15 minutes. Patient verbalized understanding and will notify nurse with any complaints.

## 2023-07-12 NOTE — ASSESSMENT & PLAN NOTE
BMBx : 60 % plasma cells - 4/6/2023  - SPEP/MECHE showed IgG lambda - monoclonal protein 3.19 g/dl - (3/27/2023).  - R-ISS stage I (beta 2 microglobulin 1.9, albumin 3.5, , normal karyotype and no high-risk mutations on fish panel  - PET-CT ( 4/10/2023) - showed extensive lytic lesions in the axial skeleton and mild L1 compression deformity.  - Started with Induction chemotherapy with VRD regimen (Velcde/Revlimid/Decadron) - 05/10/2023   - Started Aspirin daily p.o for thrombosis prophylaxis; Acyclovir 400 mg p.o BID for herpes Zoster prophylaxis .  __________________________________________________    Seen by Dr. Guanakito severino with following recommendations:  -weekly 28 day cycle odell-VRd x 4 total cycles >crissy autoSCT> maintenance   -recommended Supportive medications: Levaquin, acyc, vit/d ca, asa;  zometa q month once she obtains dental clearance     Labs reviewed, no concerning cytopenias    --Ok to proceed with C1D8 D-VRD  --Continues on Lenalomide 10mg utd 1-21 of 28 day cycle; Dex q 7 days  --Continue Aspirin; Acyclovir 400 mg p.o BID for herpes Zoster prophylaxis .  --S/p Kyphoplasty 06/08/23  --Initiated on Zometa 05/31/23 pt continues on Ca+D currently on q 3 month dosing      Follow-up in one week with repeat labs for C1D15

## 2023-07-12 NOTE — PROGRESS NOTES
Subjective:     Patient ID:?Ana Bonilla is a 62 y.o. female.?? MR#: [id number]   ?   PRIMARY ONCOLOGIST:   ?   CHIEF COMPLAINT: lab review/assessment for chemo ?????   ?   ONCOLOGIC DIAGNOSIS: Multiple Myeloma  ?   CURRENT TREATMENT: OP D-VRD DARATUMUMAB + BORTEZOMIB LENALIDOMIDE DEXAMETHASONE     HPI  Ms. Bonilla is a 62-year-old  female with past medical history significant for hypertension, COPD, asthma, GERD, hypothyroidism here for follow-up and management of multiple myeloma. BMBx on 4/6/2023 showed 60 % plasma cells. PET-CT on 4/10/2023 showed extensive lytic bony lesions throughout the spine, sternum, bilateral ribs, pelvis and mild compression deformity in L1 vertebral body. SPEP/MECHE on 3/27/2023 showed IgG lambda monoclonal protein - 3.19 g/dl. Quantitative immunoglobulins on 3/27/2023 showed IgG 4,521 mg/dl. UPEP/MECHE and FLC were pending at that time. Labs on 3/27/2023 showed Beta 2 microglobulin 1.9, .  Labs on 4/19/2023 showed Hb, 11.5, WBC 6.3, platelets, BUN 11, Cr 0.9, calcium 9. Pt initiated on VRD 05/10/23. Treatment planned changed to D-VRD 07/06/23.       Interval History: Pt presents today for lab review and assessment prior to Velcade/Darzalex Faspro. She continues on lenalidomide 10mg which she started on Saturday, she is taking acyclovir as prescribed and has taken dexamethasone this morning. Pt states overall she is feeling ok. Denies n/v/d/c, fever, chills, sob, cp, abnormal bleeding. She notes appetite waxes and wanes; denies difficulty with hydration.    Oncology History   Multiple myeloma   4/20/2023 Initial Diagnosis    Multiple myeloma     5/10/2023 - 6/28/2023 Chemotherapy    Treatment Summary   Plan Name: OP VRD - WEEKLY BORTEZOMIB LENALIDOMIDE DEXAMETHASONE Q3W  Treatment Goal: Palliative  Status: Inactive  Start Date: 5/10/2023  End Date: 6/28/2023  Provider: Abram Velasquez MD  Chemotherapy: bortezomib (VELCADE) injection 2 mg, 1.3 mg/m2 = 2 mg, Subcutaneous,  Clinic/HOD 1 time, 2 of 6 cycles  Administration: 2 mg (5/10/2023), 2 mg (5/17/2023), 2 mg (6/14/2023), 2 mg (5/31/2023), 2 mg (6/21/2023), 2 mg (6/28/2023)  lenalidomide 25 mg Cap, 25 mg, Oral, Daily, 1 of 1 cycle, Start date: 5/10/2023, End date: 5/17/2023 6/28/2023 Cancer Staged    Staging form: Plasma Cell Myeloma and Plasma Cell Disorders, AJCC 8th Edition  - Clinical stage from 6/28/2023: RISS Stage II (Beta-2-microglobulin (mg/L): 1.9, Albumin (g/dL): 3, ISS: Stage II, High-risk cytogenetics: Absent, LDH: Normal)     7/6/2023 -  Chemotherapy    Treatment Summary   Plan Name: OP D-VRD DARATUMUMAB + BORTEZOMIB LENALIDOMIDE DEXAMETHASONE  Treatment Goal: Palliative  Status: Active  Start Date: 7/6/2023  End Date: 12/15/2023 (Planned)  Provider: Oscar Márquez MD  Chemotherapy: bortezomib (VELCADE) injection 2 mg, 1.3 mg/m2 = 2 mg, Subcutaneous, Clinic/HOD 1 time, 1 of 6 cycles  Administration: 2 mg (7/6/2023), 2 mg (7/12/2023)  lenalidomide 10 mg Cap, 1 capsule (100 % of original dose 1 capsule), Oral, Daily, 1 of 1 cycle, Start date: 7/6/2023, End date: --  Dose modification: 1 capsule (original dose 1 capsule, Cycle 0)  daratumumab-hyaluronidase-fij subcutaneous injection 1,800 mg, 1,800 mg (100 % of original dose 1,800 mg), Subcutaneous, Clinic/HOD 1 time, 1 of 6 cycles  Dose modification: 1,800 mg (original dose 1,800 mg, Cycle 1), 1,800 mg (original dose 1,800 mg, Cycle 1)  Administration: 1,800 mg (7/6/2023), 1,800 mg (7/12/2023)        Social History     Socioeconomic History    Marital status:     Number of children: 2   Occupational History     Employer: [employer]   Tobacco Use    Smoking status: Every Day     Packs/day: 0.50     Years: 50.00     Pack years: 25.00     Types: Cigarettes    Smokeless tobacco: Never    Tobacco comments:     HOLD MIDNIGHT PRIOR TO SX   Substance and Sexual Activity    Alcohol use: Never     Comment: quit    Drug use: No    Sexual activity: Never      Family  History   Problem Relation Age of Onset    Hypertension Mother     Diabetes Mother     Diabetes Father     Stroke Maternal Grandmother     Prostate cancer Maternal Grandfather     Mental illness Son     Pancreatic cancer Maternal Uncle     Mental illness Other     Pancreatic cancer Other     Ovarian cancer Maternal Cousin       Past Surgical History:   Procedure Laterality Date    APPENDECTOMY      BIOPSY N/A 6/8/2023    Procedure: BIOPSY;  Surgeon: Fausto Smith MD;  Location: [facility] OR;  Service: Neurosurgery;  Laterality: N/A;  L1    BUNIONECTOMY      COLONOSCOPY N/A 12/4/2019    Procedure: COLONOSCOPY;  Surgeon: Danny Matos III, MD;  Location: [facility] ENDO;  Service: Endoscopy;  Laterality: N/A;    COLONOSCOPY N/A 10/24/2022    Procedure: COLONOSCOPY;  Surgeon: Danny Matos III, MD;  Location: [facility] ENDO;  Service: Endoscopy;  Laterality: N/A;    ESOPHAGOGASTRODUODENOSCOPY N/A 10/24/2022    Procedure: EGD (ESOPHAGOGASTRODUODENOSCOPY);  Surgeon: Danny Matos III, MD;  Location: [facility] ENDO;  Service: Endoscopy;  Laterality: N/A;    FIXATION KYPHOPLASTY Bilateral 6/8/2023    Procedure: KYPHOPLASTY;  Surgeon: Fausto Smith MD;  Location: [facility] OR;  Service: Neurosurgery;  Laterality: Bilateral;  kyphoplasty and radiofrequency ablation - L1    HYSTERECTOMY      PARTIAL//still with ovaries    neck fusion  08/2017    THYROIDECTOMY          Review of Systems   Constitutional:  Positive for fatigue. Negative for activity change, appetite change, chills, fever and unexpected weight change.   HENT:  Negative for congestion, dental problem, mouth sores and nosebleeds.    Eyes:  Negative for visual disturbance.   Respiratory:  Negative for cough, choking and chest tightness.    Cardiovascular:  Negative for chest pain, palpitations and leg swelling.   Gastrointestinal:  Negative for abdominal distention, abdominal pain, anal bleeding, blood in stool, constipation, diarrhea, nausea and  vomiting.   Endocrine: Negative.    Genitourinary:  Negative for dysuria, frequency, hematuria and urgency.   Musculoskeletal:  Positive for arthralgias and myalgias. Negative for back pain, gait problem and joint swelling.   Skin:  Negative for rash and wound.   Allergic/Immunologic: Negative for immunocompromised state.   Neurological:  Negative for dizziness, light-headedness, numbness and headaches.   Hematological:  Negative for adenopathy. Does not bruise/bleed easily.   Psychiatric/Behavioral:  The patient is nervous/anxious.      ?   A comprehensive 14-point review of systems was reviewed with patient and was negative other than as specified above.   ?     Objective:      Physical Exam  Vitals reviewed.   Constitutional:       Appearance: Normal appearance. She is not ill-appearing.   HENT:      Head: Normocephalic and atraumatic.      Right Ear: External ear normal.      Left Ear: External ear normal.   Eyes:      Conjunctiva/sclera: Conjunctivae normal.   Cardiovascular:      Rate and Rhythm: Normal rate.   Pulmonary:      Effort: Pulmonary effort is normal.   Abdominal:      General: Abdomen is flat.   Genitourinary:     Comments: deferred  Musculoskeletal:         General: Normal range of motion.      Cervical back: Normal range of motion.      Right lower leg: No edema.      Left lower leg: No edema.   Skin:     General: Skin is warm and dry.      Capillary Refill: Capillary refill takes less than 2 seconds.      Coloration: Skin is not mottled.   Neurological:      Mental Status: She is alert and oriented to person, place, and time.      Motor: No weakness.         ?   Vitals:    07/12/23 0837   BP: 124/79   Pulse: 88   Resp: 20   Temp: 98.2 °F (36.8 °C)      ?       ?   Laboratory:  ?   No visits with results within 1 Day(s) from this visit.   Latest known visit with results is:   Lab Visit on 07/11/2023   Component Date Value Ref Range Status    Sodium 07/11/2023 143  136 - 145 mmol/L Final     Potassium 07/11/2023 4.2  3.5 - 5.1 mmol/L Final    Chloride 07/11/2023 106  95 - 110 mmol/L Final    CO2 07/11/2023 24  23 - 29 mmol/L Final    Glucose 07/11/2023 104  70 - 110 mg/dL Final    BUN 07/11/2023 11  8 - 23 mg/dL Final    Creatinine 07/11/2023 0.9  0.5 - 1.4 mg/dL Final    Calcium 07/11/2023 9.7  8.7 - 10.5 mg/dL Final    Total Protein 07/11/2023 8.5 (H)  6.0 - 8.4 g/dL Final    Albumin 07/11/2023 3.7  3.5 - 5.2 g/dL Final    Total Bilirubin 07/11/2023 0.5  0.1 - 1.0 mg/dL Final    Alkaline Phosphatase 07/11/2023 81  55 - 135 U/L Final    AST 07/11/2023 17  10 - 40 U/L Final    ALT 07/11/2023 18  10 - 44 U/L Final    eGFR 07/11/2023 >60  >60 mL/min/1.73 m^2 Final    Anion Gap 07/11/2023 13  8 - 16 mmol/L Final    WBC 07/11/2023 4.77  3.90 - 12.70 K/uL Final    RBC 07/11/2023 3.64 (L)  4.00 - 5.40 M/uL Final    Hemoglobin 07/11/2023 11.3 (L)  12.0 - 16.0 g/dL Final    Hematocrit 07/11/2023 35.3 (L)  37.0 - 48.5 % Final    MCV 07/11/2023 97  82 - 98 fL Final    MCH 07/11/2023 31.0  27.0 - 31.0 pg Final    MCHC 07/11/2023 32.0  32.0 - 36.0 g/dL Final    RDW 07/11/2023 15.2 (H)  11.5 - 14.5 % Final    Platelets 07/11/2023 324  150 - 450 K/uL Final    MPV 07/11/2023 9.5  9.2 - 12.9 fL Final    Immature Granulocytes 07/11/2023 0.2  0.0 - 0.5 % Final    Gran # (ANC) 07/11/2023 1.9  1.8 - 7.7 K/uL Final    Immature Grans (Abs) 07/11/2023 0.01  0.00 - 0.04 K/uL Final    Lymph # 07/11/2023 1.7  1.0 - 4.8 K/uL Final    Mono # 07/11/2023 0.6  0.3 - 1.0 K/uL Final    Eos # 07/11/2023 0.6 (H)  0.0 - 0.5 K/uL Final    Baso # 07/11/2023 0.01  0.00 - 0.20 K/uL Final    nRBC 07/11/2023 0  0 /100 WBC Final    Gran % 07/11/2023 39.3  38.0 - 73.0 % Final    Lymph % 07/11/2023 35.0  18.0 - 48.0 % Final    Mono % 07/11/2023 11.9  4.0 - 15.0 % Final    Eosinophil % 07/11/2023 13.4 (H)  0.0 - 8.0 % Final    Basophil % 07/11/2023 0.2  0.0 - 1.9 % Final    Differential Method 07/11/2023  Automated   Final      ?   Imaging:    No results found. However, due to the size of the patient record, not all encounters were searched. Please check Results Review for a complete set of results.       No results found. However, due to the size of the patient record, not all encounters were searched. Please check Results Review for a complete set of results.       ?   Assessment/Plan:     Problem List Items Addressed This Visit          Oncology    Multiple myeloma (Chronic)     BMBx : 60 % plasma cells - 4/6/2023  - SPEP/MECHE showed IgG lambda - monoclonal protein 3.19 g/dl - (3/27/2023).  - R-ISS stage I (beta 2 microglobulin 1.9, albumin 3.5, , normal karyotype and no high-risk mutations on fish panel  - PET-CT ( 4/10/2023) - showed extensive lytic lesions in the axial skeleton and mild L1 compression deformity.  - Started with Induction chemotherapy with VRD regimen (Velcde/Revlimid/Decadron) - 05/10/2023   - Started Aspirin daily p.o for thrombosis prophylaxis; Acyclovir 400 mg p.o BID for herpes Zoster prophylaxis .  __________________________________________________    Seen by Dr. Guanakito severino with following recommendations:  -weekly 28 day cycle odell-VRd x 4 total cycles >crissy autoSCT> maintenance   -recommended Supportive medications: Levaquin, acyc, vit/d ca, asa;  zometa q month once she obtains dental clearance     Labs reviewed, no concerning cytopenias    --Ok to proceed with C1D8 D-VRD  --Continues on Lenalomide 10mg utd 1-21 of 28 day cycle; Dex q 7 days  --Continue Aspirin; Acyclovir 400 mg p.o BID for herpes Zoster prophylaxis .  --S/p Kyphoplasty 06/08/23  --Initiated on Zometa 05/31/23 pt continues on Ca+D currently on q 3 month dosing      Follow-up in one week with repeat labs for C1D15                 Med Onc Chart Routing      Follow up with physician    Follow up with WERNER . Scheduled   Infusion scheduling note    Injection scheduling note    Labs    Imaging    Pharmacy  appointment    Other referrals               KALEN Mckeon, FNP-C  Hematology/Oncology

## 2023-07-12 NOTE — PLAN OF CARE
Pt is stable, pt administered Darzalex and Velcade today. Pt voiced she was doing well. Pt will follow up in one week.      Problem: Fall Injury Risk  Goal: Absence of Fall and Fall-Related Injury  Outcome: Ongoing, Progressing     Problem: Anemia (Chemotherapy Effects)  Goal: Anemia Symptom Improvement  Outcome: Ongoing, Progressing     Problem: Urinary Bleeding Risk or Actual (Chemotherapy Effects)  Goal: Absence of Hematuria  Outcome: Ongoing, Progressing     Problem: Nausea and Vomiting (Chemotherapy Effects)  Goal: Fluid and Electrolyte Balance  Outcome: Ongoing, Progressing     Problem: Neurotoxicity (Chemotherapy Effects)  Goal: Neurotoxicity Symptom Control  Outcome: Ongoing, Progressing     Problem: Neutropenia (Chemotherapy Effects)  Goal: Absence of Infection  Outcome: Ongoing, Progressing     Problem: Oral Mucositis (Chemotherapy Effects)  Goal: Improved Oral Mucous Membrane Integrity  Outcome: Ongoing, Progressing     Problem: Thrombocytopenia Bleeding Risk (Chemotherapy Effects)  Goal: Absence of Bleeding  Outcome: Ongoing, Progressing     Problem: Adult Inpatient Plan of Care  Goal: Plan of Care Review  Outcome: Ongoing, Progressing  Goal: Patient-Specific Goal (Individualized)  Outcome: Ongoing, Progressing  Flowsheets (Taken 7/12/2023 [id number])  Anxieties, Fears or Concerns: Pt denies any.  Individualized Care Needs: Pt provided pillow.

## 2023-07-17 ENCOUNTER — CLINICAL SUPPORT (OUTPATIENT)
Dept: SMOKING CESSATION | Facility: CLINIC | Age: 63
End: 2023-07-17
Payer: COMMERCIAL

## 2023-07-17 DIAGNOSIS — F17.200 NICOTINE DEPENDENCE: Primary | ICD-10-CM

## 2023-07-17 PROCEDURE — 99404 PR PREVENT COUNSEL,INDIV,60 MIN: ICD-10-PCS | Mod: 95,,, | Performed by: GENERAL PRACTICE

## 2023-07-17 PROCEDURE — 99404 PREV MED CNSL INDIV APPRX 60: CPT | Mod: 95,,, | Performed by: GENERAL PRACTICE

## 2023-07-17 NOTE — PROGRESS NOTES
Individual Follow-Up Form    7/17/2023    Quit Date: TBD    Clinical Status of Patient: Outpatient    Continuing Medication: no     Target Symptoms: Withdrawal and medication side effects. The following were  rated moderate (3) to severe (4) on TCRS:  Moderate (3): none  Severe (4): none    Comments: Patient was seen today by virtual visit with synchronous audio and video for a smoking cessation progress update. Patient reports smoking 10 cpd and is not using nicotine patches at this time. She plans to start this week. Reviewed instructions for use of nicotine patch. Discussed health effects of tobacco, health benefits of quitting, coping skills, strategies to eliminate tobacco, and personal reasons for quitting. Discussed an aggressive quit plan, starting by limiting smoking to 5 cpd, assigning times to cigarettes, and eliminating smoking inside of her home. Encouraged patient to not buy more cigarettes. The patient denies any abnormal behavioral or mental changes at this time.  Will continue to encourage and monitor her progress.     Diagnosis: F17.200    Next Visit: 2 weeks

## 2023-07-17 NOTE — Clinical Note
Patient was seen today by virtual visit with synchronous audio and video for a smoking cessation progress update. Patient reports smoking 10 cpd and is not using nicotine patches at this time. She plans to start this week. Reviewed instructions for use of nicotine patch. Discussed health effects of tobacco, health benefits of quitting, coping skills, strategies to eliminate tobacco, and personal reasons for quitting. Discussed an aggressive quit plan, starting by limiting smoking to 5 cpd, assigning times to cigarettes, and eliminating smoking inside of her home. Encouraged patient to not buy more cigarettes. The patient denies any abnormal behavioral or mental changes at this time.  Will continue to encourage and monitor her progress.

## 2023-07-18 ENCOUNTER — TELEPHONE (OUTPATIENT)
Dept: NUTRITION | Facility: CLINIC | Age: 63
End: 2023-07-18
Payer: COMMERCIAL

## 2023-07-18 ENCOUNTER — LAB VISIT (OUTPATIENT)
Dept: LAB | Facility: HOSPITAL | Age: 63
End: 2023-07-18
Attending: INTERNAL MEDICINE
Payer: COMMERCIAL

## 2023-07-18 DIAGNOSIS — C90.00 MULTIPLE MYELOMA, REMISSION STATUS UNSPECIFIED: ICD-10-CM

## 2023-07-18 LAB
ALBUMIN SERPL BCP-MCNC: 3.6 G/DL (ref 3.5–5.2)
ALP SERPL-CCNC: 86 U/L (ref 55–135)
ALT SERPL W/O P-5'-P-CCNC: 21 U/L (ref 10–44)
ANION GAP SERPL CALC-SCNC: 11 MMOL/L (ref 8–16)
AST SERPL-CCNC: 18 U/L (ref 10–40)
BASOPHILS # BLD AUTO: 0.01 K/UL (ref 0–0.2)
BASOPHILS NFR BLD: 0.2 % (ref 0–1.9)
BILIRUB SERPL-MCNC: 0.5 MG/DL (ref 0.1–1)
BUN SERPL-MCNC: 8 MG/DL (ref 8–23)
CALCIUM SERPL-MCNC: 8.5 MG/DL (ref 8.7–10.5)
CHLORIDE SERPL-SCNC: 108 MMOL/L (ref 95–110)
CO2 SERPL-SCNC: 22 MMOL/L (ref 23–29)
CREAT SERPL-MCNC: 1 MG/DL (ref 0.5–1.4)
DIFFERENTIAL METHOD: ABNORMAL
EOSINOPHIL # BLD AUTO: 0.4 K/UL (ref 0–0.5)
EOSINOPHIL NFR BLD: 6.3 % (ref 0–8)
ERYTHROCYTE [DISTWIDTH] IN BLOOD BY AUTOMATED COUNT: 14.2 % (ref 11.5–14.5)
EST. GFR  (NO RACE VARIABLE): >60 ML/MIN/1.73 M^2
GLUCOSE SERPL-MCNC: 77 MG/DL (ref 70–110)
HCT VFR BLD AUTO: 35.3 % (ref 37–48.5)
HGB BLD-MCNC: 11.1 G/DL (ref 12–16)
IMM GRANULOCYTES # BLD AUTO: 0.01 K/UL (ref 0–0.04)
IMM GRANULOCYTES NFR BLD AUTO: 0.2 % (ref 0–0.5)
LYMPHOCYTES # BLD AUTO: 1.6 K/UL (ref 1–4.8)
LYMPHOCYTES NFR BLD: 27.2 % (ref 18–48)
MCH RBC QN AUTO: 30.8 PG (ref 27–31)
MCHC RBC AUTO-ENTMCNC: 31.4 G/DL (ref 32–36)
MCV RBC AUTO: 98 FL (ref 82–98)
MONOCYTES # BLD AUTO: 0.7 K/UL (ref 0.3–1)
MONOCYTES NFR BLD: 12.8 % (ref 4–15)
NEUTROPHILS # BLD AUTO: 3 K/UL (ref 1.8–7.7)
NEUTROPHILS NFR BLD: 53.3 % (ref 38–73)
NRBC BLD-RTO: 0 /100 WBC
PLATELET # BLD AUTO: 291 K/UL (ref 150–450)
PMV BLD AUTO: 9.6 FL (ref 9.2–12.9)
POTASSIUM SERPL-SCNC: 3.6 MMOL/L (ref 3.5–5.1)
PROT SERPL-MCNC: 7.7 G/DL (ref 6–8.4)
RBC # BLD AUTO: 3.6 M/UL (ref 4–5.4)
SODIUM SERPL-SCNC: 141 MMOL/L (ref 136–145)
WBC # BLD AUTO: 5.69 K/UL (ref 3.9–12.7)

## 2023-07-18 PROCEDURE — 85025 COMPLETE CBC W/AUTO DIFF WBC: CPT | Performed by: INTERNAL MEDICINE

## 2023-07-18 PROCEDURE — 36415 COLL VENOUS BLD VENIPUNCTURE: CPT | Performed by: INTERNAL MEDICINE

## 2023-07-18 PROCEDURE — 80053 COMPREHEN METABOLIC PANEL: CPT | Performed by: INTERNAL MEDICINE

## 2023-07-18 NOTE — PROGRESS NOTES
Subjective:       Patient ID: Ana Bonilla is a 62 y.o. female.    Chief Complaint: Multiple Myeloma and Chemotherapy    Primary Oncologist/Hematologist: Dr. Dela Cruz    HPI: Ms. Bonilla is a 62 year old female who is following up for her multiple myeloma. She is here for cycle 1 day 15 of bortezomib (velcade) + daratumumab (darzalex) + lenalidomide + dexamethasone. She takes lenalidomide on days 1-14 on a 21 day cycle.  Cancer Hx:  BMBx on 4/6/2023 showed 60 % plasma cells. PET-CT on 4/10/2023 showed extensive lytic bony lesions throughout the spine, sternum, bilateral ribs, pelvis and mild compression deformity in L1 vertebral body.    Today: Patient states she has been well. She continues with her dexamethasone and lenalidomide. She has constipation but takes linzess. She recently started on smoking patch. She states it is decreasing her cravings. She denies any fevers, illnesses, bleeding, n/v/d. She does not taking calcium and vitamin D. She will start. She states her appetite is good and she is staying hydrated.      Social History     Socioeconomic History    Marital status:     Number of children: 2   Occupational History     Employer: [employer]   Tobacco Use    Smoking status: Every Day     Packs/day: 0.50     Years: 50.00     Pack years: 25.00     Types: Cigarettes    Smokeless tobacco: Never   Substance and Sexual Activity    Alcohol use: Never     Comment: quit    Drug use: No    Sexual activity: Never       Past Medical History:   Diagnosis Date    Allergy     Amblyopia OS    Anxiety     Asthma     COPD (chronic obstructive pulmonary disease)     NO HOME o2    GERD (gastroesophageal reflux disease)     Hyperlipidemia     Hypertension     PONV (postoperative nausea and vomiting)     Thyroid disease        Family History   Problem Relation Age of Onset    Hypertension Mother     Diabetes Mother     Diabetes Father     Stroke Maternal Grandmother     Prostate cancer Maternal Grandfather     Mental  illness Son     Pancreatic cancer Maternal Uncle     Mental illness Other     Pancreatic cancer Other     Ovarian cancer Maternal Cousin        Past Surgical History:   Procedure Laterality Date    APPENDECTOMY      BIOPSY N/A 6/8/2023    Procedure: BIOPSY;  Surgeon: Fausto Smith MD;  Location: [facility] OR;  Service: Neurosurgery;  Laterality: N/A;  L1    BUNIONECTOMY      COLONOSCOPY N/A 12/4/2019    Procedure: COLONOSCOPY;  Surgeon: Danny Matos III, MD;  Location: [facility] ENDO;  Service: Endoscopy;  Laterality: N/A;    COLONOSCOPY N/A 10/24/2022    Procedure: COLONOSCOPY;  Surgeon: Danny Matos III, MD;  Location: [facility] ENDO;  Service: Endoscopy;  Laterality: N/A;    ESOPHAGOGASTRODUODENOSCOPY N/A 10/24/2022    Procedure: EGD (ESOPHAGOGASTRODUODENOSCOPY);  Surgeon: Danny Matos III, MD;  Location: [facility] ENDO;  Service: Endoscopy;  Laterality: N/A;    FIXATION KYPHOPLASTY Bilateral 6/8/2023    Procedure: KYPHOPLASTY;  Surgeon: Fausto Smith MD;  Location: [facility] OR;  Service: Neurosurgery;  Laterality: Bilateral;  kyphoplasty and radiofrequency ablation - L1    HYSTERECTOMY      PARTIAL//still with ovaries    neck fusion  08/2017    THYROIDECTOMY         Review of Systems   Constitutional:  Negative for activity change, appetite change, chills, diaphoresis, fatigue, fever and unexpected weight change.   HENT:  Negative for congestion, nosebleeds and rhinorrhea.    Respiratory:  Negative for cough and shortness of breath.    Cardiovascular:  Negative for chest pain and leg swelling.   Gastrointestinal:  Positive for constipation. Negative for abdominal pain, anal bleeding, blood in stool, diarrhea, nausea and vomiting.   Genitourinary:  Negative for hematuria.   Musculoskeletal:  Positive for arthralgias and back pain. Negative for myalgias.   Skin:  Negative for color change and pallor.   Allergic/Immunologic: Positive for immunocompromised state.   Neurological:  Negative for dizziness, weakness,  light-headedness, numbness and headaches.       Medication List with Changes/Refills   Current Medications    ACYCLOVIR (ZOVIRAX) 400 MG TABLET    Take 1 tablet (400 mg total) by mouth 2 (two) times daily.    ALBUTEROL (PROVENTIL HFA) 90 MCG/ACTUATION INHALER    Inhale 2 puffs into the lungs every 6 (six) hours as needed for Wheezing. Rescue    ALPRAZOLAM (XANAX) 2 MG TAB    TAKE 1 TABLET BY MOUTH TWICE DAILY AS NEEDED FOR ANXIETY    AMLODIPINE-BENAZEPRIL 2.5-10 MG (LOTREL) 2.5-10 MG PER CAPSULE    TAKE 1 CAPSULE BY MOUTH EVERY DAY    ASPIRIN (ECOTRIN) 81 MG EC TABLET    Take 1 tablet (81 mg total) by mouth once daily.    BACLOFEN (LIORESAL) 10 MG TABLET    Take 10 mg by mouth 3 (three) times daily.    CALCIUM CARBONATE-VIT D3- MG CALCIUM- 400 UNIT TAB    Take 1 tablet by mouth once. for 1 dose    DEXAMETHASONE (DECADRON) 4 MG TAB    Take 10 tablets (40 mg total) by mouth every 7 days. ( once weekly by mouth on days 1, 8 and 15 every 21 day cycle)    DEXAMETHASONE (DECADRON) 4 MG TAB    Take 10 tablets (40 mg total) by mouth As instructed. Daily on days 1, 8, 15, and 22 of each chemotherapy cycle. Take with food.    DIAZEPAM (VALIUM) 5 MG TABLET    Take 1 tablet by mouth 30 minutes prior to MRI to help decrease anxiety.    FLUTICASONE PROPIONATE (FLONASE) 50 MCG/ACTUATION NASAL SPRAY    1 spray (50 mcg total) by Each Nostril route once daily.    FLUTICASONE-SALMETEROL DISKUS INHALER 250-50 MCG    Inhale 1 puff into the lungs 2 (two) times daily. Controller    HYDROCODONE-ACETAMINOPHEN (NORCO) 5-325 MG PER TABLET    Take 1 tablet by mouth every 6 (six) hours as needed for Pain.    LENALIDOMIDE 10 MG CAP    Take 1 capsule by mouth once daily on days 1-14 of each 21 day cycle..    LENALIDOMIDE 10 MG CAP    Take 1 capsule by mouth once daily Only on days 1-21 of each 28 day cycle. [employer] authorization.    LEVOTHYROXINE (SYNTHROID) 100 MCG TABLET    TAKE 1 TABLET(100 MCG) BY MOUTH BEFORE BREAKFAST    LINACLOTIDE  (LINZESS) 72 MCG CAP CAPSULE    Take 2 capsules (144 mcg total) by mouth before breakfast.    METHOCARBAMOL (ROBAXIN) 500 MG TAB    Take 1 tablet (500 mg total) by mouth every 8 (eight) hours as needed.    METOPROLOL SUCCINATE (TOPROL-XL) 50 MG 24 HR TABLET    TAKE 1 TABLET(50 MG) BY MOUTH EVERY DAY    MONTELUKAST (SINGULAIR) 10 MG TABLET    Take 1 tablet (10 mg total) by mouth every evening.    NICOTINE (NICODERM CQ) 21 MG/24 HR    Place 1 patch onto the skin once daily.    ONDANSETRON (ZOFRAN) 8 MG TABLET    Take 1 tablet (8 mg total) by mouth every 12 (twelve) hours as needed for Nausea.    OXYCODONE-ACETAMINOPHEN (PERCOCET)  MG PER TABLET    Take 1 tablet by mouth every 8 (eight) hours as needed for Pain.    PANTOPRAZOLE (PROTONIX) 40 MG TABLET    TAKE 1 TABLET(40 MG) BY MOUTH EVERY DAY    PROCHLORPERAZINE (COMPAZINE) 5 MG TABLET    Take 1 tablet (5 mg total) by mouth every 6 (six) hours as needed for Nausea.    PROMETHAZINE (PHENERGAN) 25 MG TABLET    Take 1 tablet (25 mg total) by mouth every 4 (four) hours.    PROMETHAZINE-CODEINE 6.25-10 MG/5 ML (PHENERGAN WITH CODEINE) 6.25-10 MG/5 ML SYRUP    Take 5 mLs by mouth every 4 (four) hours as needed for Cough.    ROSUVASTATIN (CRESTOR) 10 MG TABLET    TAKE 1 TABLET(10 MG) BY MOUTH EVERY DAY    TRAZODONE (DESYREL) 50 MG TABLET    Take 1 tablet (50 mg total) by mouth every evening.     Objective:     Vitals:    07/19/23 0843   BP: 116/73   Pulse: 89   Resp: 20   Temp: 97.9 °F (36.6 °C)     Physical Exam  Vitals reviewed.   Constitutional:       General: She is not in acute distress.     Appearance: She is not ill-appearing, toxic-appearing or diaphoretic.   HENT:      Head: Normocephalic and atraumatic.   Eyes:      Conjunctiva/sclera: Conjunctivae normal.   Cardiovascular:      Rate and Rhythm: Normal rate.   Musculoskeletal:      Right lower leg: No edema.      Left lower leg: No edema.   Skin:     General: Skin is warm.      Coloration: Skin is not  jaundiced or pale.      Findings: No bruising, erythema, lesion or rash.   Neurological:      Mental Status: She is alert.      Motor: No weakness.      Gait: Gait normal.   Psychiatric:         Mood and Affect: Mood normal.         Behavior: Behavior normal.         Thought Content: Thought content normal.          Labs/Results:  Lab Results   Component Value Date    WBC 5.69 07/18/2023    RBC 3.60 (L) 07/18/2023    HGB 11.1 (L) 07/18/2023    HCT 35.3 (L) 07/18/2023    MCV 98 07/18/2023    MCH 30.8 07/18/2023    MCHC 31.4 (L) 07/18/2023    RDW 14.2 07/18/2023     07/18/2023    MPV 9.6 07/18/2023    GRAN 3.0 07/18/2023    GRAN 53.3 07/18/2023    LYMPH 1.6 07/18/2023    LYMPH 27.2 07/18/2023    MONO 0.7 07/18/2023    MONO 12.8 07/18/2023    EOS 0.4 07/18/2023    BASO 0.01 07/18/2023    EOSINOPHIL 6.3 07/18/2023    BASOPHIL 0.2 07/18/2023     CMP  Sodium   Date Value Ref Range Status   07/18/2023 141 136 - 145 mmol/L Final     Potassium   Date Value Ref Range Status   07/18/2023 3.6 3.5 - 5.1 mmol/L Final     Chloride   Date Value Ref Range Status   07/18/2023 108 95 - 110 mmol/L Final     CO2   Date Value Ref Range Status   07/18/2023 22 (L) 23 - 29 mmol/L Final     Glucose   Date Value Ref Range Status   07/18/2023 77 70 - 110 mg/dL Final     BUN   Date Value Ref Range Status   07/18/2023 8 8 - 23 mg/dL Final     Creatinine   Date Value Ref Range Status   07/18/2023 1.0 0.5 - 1.4 mg/dL Final     Calcium   Date Value Ref Range Status   07/18/2023 8.5 (L) 8.7 - 10.5 mg/dL Final     Total Protein   Date Value Ref Range Status   07/18/2023 7.7 6.0 - 8.4 g/dL Final     Albumin   Date Value Ref Range Status   07/18/2023 3.6 3.5 - 5.2 g/dL Final     Total Bilirubin   Date Value Ref Range Status   07/18/2023 0.5 0.1 - 1.0 mg/dL Final     Comment:     For infants and newborns, interpretation of results should be based  on gestational age, weight and in agreement with clinical  observations.    Premature Infant  recommended reference ranges:  Up to 24 hours.............<8.0 mg/dL  Up to 48 hours............<12.0 mg/dL  3-5 days..................<15.0 mg/dL  6-29 days.................<15.0 mg/dL       Alkaline Phosphatase   Date Value Ref Range Status   07/18/2023 86 55 - 135 U/L Final     AST   Date Value Ref Range Status   07/18/2023 18 10 - 40 U/L Final     ALT   Date Value Ref Range Status   07/18/2023 21 10 - 44 U/L Final     Anion Gap   Date Value Ref Range Status   07/18/2023 11 8 - 16 mmol/L Final     eGFR   Date Value Ref Range Status   07/18/2023 >60 >60 mL/min/1.73 m^2 Final      Latest Reference Range & Units 06/28/23 08:53   Protein, Serum 6.0 - 8.4 g/dL 8.3   Albumin grams/dl 3.35 - 5.55 g/dL 3.81   Alpha-1 grams/dl 0.17 - 0.41 g/dL 0.39   Alpha-2 0.43 - 0.99 g/dL 1.01 (H)   Beta 0.50 - 1.10 g/dL 0.67   Gamma 0.67 - 1.58 g/dL 2.42 (H)      Latest Reference Range & Units 06/28/23 08:53   [place] Free Light Chains 0.33 - 1.94 mg/dL 1.62   Lambda Free Light Chains 0.57 - 2.63 mg/dL 10.34 (H)   Kappa/Lambda FLC Ratio 0.26 - 1.65  0.16 (L)   Normal total protein. Normal gamma globulins are decreased.   Paraprotein peak in gamma =  2.15 g/dL, previously 3.19 g/dL.     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 Last dose was 5/31/23.  --continue with calcium and vitamin D supplement  --ANC:3.0, plts:291, crcl:50.4 ml/min, tibli:0.5, ast:18, alt:21  --PET-CT ( 4/10/2023) - showed extensive lytic lesions in the axial skeleton and mild L1 compression  deformity. S/p kyphoplasty on 6/8/23.  --continue with Aspirin daily p.o for thrombosis prophylaxis; Acyclovir 400 mg p.o BID for herpes Zoster prophylaxis   --Seen by Dr. Calabrese [facility] mere with following recommendations: weekly 28 day cycle odell-VRd x 4 total cycles >crissy autoSCT> maintenance . Recommended Supportive medications: Levaquin, acyc, vit/d ca, asa;  zometa q month once she obtains dental clearance     Follow-Up: 1 week with cbc cmp prior for cycle 1 day 22-standing orders in    Halie Cortez PA-C  Hematology Oncology    Route Chart for Scheduling    Med Onc Chart Routing      Follow up with physician    Follow up with WERNER 1 week. with cbc cmp prior   Infusion scheduling note   1 weekw ith cbc cmp prior for cycle 1 day 22   Injection scheduling note    Labs CBC and CMP   Scheduling:  Preferred lab:  Lab interval:  standing orders in. Labs on tuesday. visit and chemo on wednesday-oneal location   Imaging    Pharmacy appointment    Other referrals            Treatment Plan Information   OP D-VRD DARATUMUMAB + BORTEZOMIB LENALIDOMIDE DEXAMETHASONE   Oscar Márquez MD   Upcoming Treatment Dates - OP D-VRD DARATUMUMAB + BORTEZOMIB LENALIDOMIDE DEXAMETHASONE    7/21/2023       Pre-Medications       acetaminophen tablet 650 mg       diphenhydrAMINE capsule 50 mg       Chemotherapy       bortezomib (VELCADE) injection 2 mg       daratumumab-hyaluronidase-fihj subcutaneous injection 1,800 mg       Supportive Care       prochlorperazine injection Soln 5 mg  7/28/2023       Chemotherapy       bortezomib (VELCADE) injection 2 mg       daratumumab-hyaluronidase-fihj subcutaneous injection 1,800 mg       Supportive Care       prochlorperazine injection Soln 5 mg  8/4/2023       Chemotherapy       bortezomib (VELCADE) injection       daratumumab-hyaluronidase-fihj (DARZALEX FASPRO) subcutaneous injection 1,800 mg 15 mL       Supportive Care       prochlorperazine injection Soln 5 mg  8/11/2023       Chemotherapy        bortezomib (VELCADE) injection       daratumumab-hyaluronidase-fihj (DARZALEX FASPRO) subcutaneous injection 1,800 mg 15 mL       Supportive Care       prochlorperazine injection Soln 5 mg    Supportive Plan Information  OP ZOLEDRONIC ACID (ZOMETA) Q4W   Abram Velasquez MD   Upcoming Treatment Dates - OP ZOLEDRONIC ACID (ZOMETA) Q4W    8/28/2023       Medications       zoledronic acid (ZOMETA) 4 mg in sodium chloride 0.9% 100 mL IVPB  9/25/2023       Medications       zoledronic acid (ZOMETA) 4 mg in sodium chloride 0.9% 100 mL IVPB  12/24/2023       Medications       zoledronic acid (ZOMETA) 4 mg in sodium chloride 0.9% 100 mL IVPB  3/23/2024       Medications       zoledronic acid (ZOMETA) 4 mg in sodium chloride 0.9% 100 mL IVPB    Therapy Plan Information  IV Fluids  sodium chloride 0.9% bolus 1,000 mL 1,000 mL  1,000 mL, Intravenous, Every visit  Flushes  sodium chloride 0.9% flush 10 mL  10 mL, Intravenous, PRN  heparin, porcine (PF) 100 unit/mL injection flush 500 Units  500 Units, Intravenous, PRN

## 2023-07-18 NOTE — TELEPHONE ENCOUNTER
Ms. Bonilla asked to speak with me over the phone rather than the virtual computerized appointment. She shared that since her referral to nutrition, her diet quality has improved. Her weight of 128 lb is her normal weight. Ms. Bonilla is eating fewer desserts than a few months ago, when steroid from the chemo and quitting smoking led her to crave sweets like cupcakes from [employer].    Previously, Ms. Bonilla was drinking Ensure instead of some meals, but now she eat grits, eggs, and madrid at breakfast and air fried turkey or chicken with rice and gravy and a green vegetable and lunch and dinner plus a few snacks in small portions like ice cream bar, chocolate covered peanuts, and chips as snacks. She drinks water, Coke Zero, and Body Armor. She will get in touch with me through the portal if nutrition concerns arise in the future.     Signature: Rosemary Bynum, MPH, RD, LDN

## 2023-07-19 ENCOUNTER — INFUSION (OUTPATIENT)
Dept: INFUSION THERAPY | Facility: HOSPITAL | Age: 63
End: 2023-07-19
Attending: INTERNAL MEDICINE
Payer: COMMERCIAL

## 2023-07-19 ENCOUNTER — OFFICE VISIT (OUTPATIENT)
Dept: HEMATOLOGY/ONCOLOGY | Facility: CLINIC | Age: 63
End: 2023-07-19
Payer: COMMERCIAL

## 2023-07-19 VITALS
SYSTOLIC BLOOD PRESSURE: 113 MMHG | DIASTOLIC BLOOD PRESSURE: 66 MMHG | HEIGHT: 64 IN | BODY MASS INDEX: 22.06 KG/M2 | RESPIRATION RATE: 18 BRPM | WEIGHT: 129.19 LBS | OXYGEN SATURATION: 99 % | HEART RATE: 81 BPM | TEMPERATURE: 98 F

## 2023-07-19 VITALS
HEART RATE: 89 BPM | SYSTOLIC BLOOD PRESSURE: 116 MMHG | HEIGHT: 64 IN | OXYGEN SATURATION: 99 % | DIASTOLIC BLOOD PRESSURE: 73 MMHG | BODY MASS INDEX: 22.06 KG/M2 | RESPIRATION RATE: 20 BRPM | WEIGHT: 129.19 LBS | TEMPERATURE: 98 F

## 2023-07-19 DIAGNOSIS — Z79.899 IMMUNODEFICIENCY DUE TO CHEMOTHERAPY: Primary | ICD-10-CM

## 2023-07-19 DIAGNOSIS — D84.821 IMMUNODEFICIENCY DUE TO CHEMOTHERAPY: Primary | ICD-10-CM

## 2023-07-19 DIAGNOSIS — C90.00 MULTIPLE MYELOMA, REMISSION STATUS UNSPECIFIED: Primary | ICD-10-CM

## 2023-07-19 DIAGNOSIS — C79.51 SECONDARY MALIGNANT NEOPLASM OF BONE: ICD-10-CM

## 2023-07-19 DIAGNOSIS — T45.1X5A IMMUNODEFICIENCY DUE TO CHEMOTHERAPY: Primary | ICD-10-CM

## 2023-07-19 DIAGNOSIS — Z51.11 ENCOUNTER FOR ANTINEOPLASTIC CHEMOTHERAPY: ICD-10-CM

## 2023-07-19 DIAGNOSIS — E03.9 PRIMARY HYPOTHYROIDISM: ICD-10-CM

## 2023-07-19 DIAGNOSIS — C90.00 MULTIPLE MYELOMA, REMISSION STATUS UNSPECIFIED: Chronic | ICD-10-CM

## 2023-07-19 PROCEDURE — 3044F PR MOST RECENT HEMOGLOBIN A1C LEVEL <7.0%: ICD-10-PCS | Mod: CPTII,S$GLB,,

## 2023-07-19 PROCEDURE — 99215 PR OFFICE/OUTPT VISIT, EST, LEVL V, 40-54 MIN: ICD-10-PCS | Mod: S$GLB,,,

## 2023-07-19 PROCEDURE — 96401 CHEMO ANTI-NEOPL SQ/IM: CPT

## 2023-07-19 PROCEDURE — 3044F HG A1C LEVEL LT 7.0%: CPT | Mod: CPTII,S$GLB,,

## 2023-07-19 PROCEDURE — 3074F SYST BP LT 130 MM HG: CPT | Mod: CPTII,S$GLB,,

## 2023-07-19 PROCEDURE — 3008F PR BODY MASS INDEX (BMI) DOCUMENTED: ICD-10-PCS | Mod: CPTII,S$GLB,,

## 2023-07-19 PROCEDURE — 99999 PR PBB SHADOW E&M-EST. PATIENT-LVL III: CPT | Mod: PBBFAC,,,

## 2023-07-19 PROCEDURE — 3074F PR MOST RECENT SYSTOLIC BLOOD PRESSURE < 130 MM HG: ICD-10-PCS | Mod: CPTII,S$GLB,,

## 2023-07-19 PROCEDURE — 3078F DIAST BP <80 MM HG: CPT | Mod: CPTII,S$GLB,,

## 2023-07-19 PROCEDURE — 3078F PR MOST RECENT DIASTOLIC BLOOD PRESSURE < 80 MM HG: ICD-10-PCS | Mod: CPTII,S$GLB,,

## 2023-07-19 PROCEDURE — 4010F ACE/ARB THERAPY RXD/TAKEN: CPT | Mod: CPTII,S$GLB,,

## 2023-07-19 PROCEDURE — 25000003 PHARM REV CODE 250: Performed by: INTERNAL MEDICINE

## 2023-07-19 PROCEDURE — 63600175 PHARM REV CODE 636 W HCPCS: Mod: JZ,TB | Performed by: INTERNAL MEDICINE

## 2023-07-19 PROCEDURE — 3008F BODY MASS INDEX DOCD: CPT | Mod: CPTII,S$GLB,,

## 2023-07-19 PROCEDURE — 4010F PR ACE/ARB THEARPY RXD/TAKEN: ICD-10-PCS | Mod: CPTII,S$GLB,,

## 2023-07-19 PROCEDURE — 99999 PR PBB SHADOW E&M-EST. PATIENT-LVL III: ICD-10-PCS | Mod: PBBFAC,,,

## 2023-07-19 PROCEDURE — 99215 OFFICE O/P EST HI 40 MIN: CPT | Mod: S$GLB,,,

## 2023-07-19 RX ORDER — LEVOTHYROXINE SODIUM 100 UG/1
100 TABLET ORAL
Qty: 90 TABLET | Refills: 1 | Status: SHIPPED | OUTPATIENT
Start: 2023-07-19 | End: 2024-01-16

## 2023-07-19 RX ORDER — BORTEZOMIB 3.5 MG/1
1.3 INJECTION, POWDER, LYOPHILIZED, FOR SOLUTION INTRAVENOUS; SUBCUTANEOUS
Status: COMPLETED | OUTPATIENT
Start: 2023-07-19 | End: 2023-07-19

## 2023-07-19 RX ORDER — DIPHENHYDRAMINE HCL 25 MG
50 CAPSULE ORAL
Status: COMPLETED | OUTPATIENT
Start: 2023-07-19 | End: 2023-07-19

## 2023-07-19 RX ORDER — ACETAMINOPHEN 325 MG/1
650 TABLET ORAL
Status: COMPLETED | OUTPATIENT
Start: 2023-07-19 | End: 2023-07-19

## 2023-07-19 RX ADMIN — BORTEZOMIB 2 MG: 3.5 INJECTION, POWDER, LYOPHILIZED, FOR SOLUTION INTRAVENOUS; SUBCUTANEOUS at 09:07

## 2023-07-19 RX ADMIN — ACETAMINOPHEN 650 MG: 325 TABLET ORAL at 09:07

## 2023-07-19 RX ADMIN — DARATUMUMAB AND HYALURONIDASE-FIHJ (HUMAN RECOMBINANT) 1800 MG: 1800; 30000 INJECTION SUBCUTANEOUS at 09:07

## 2023-07-19 RX ADMIN — DIPHENHYDRAMINE HYDROCHLORIDE 50 MG: 25 CAPSULE ORAL at 09:07

## 2023-07-19 NOTE — DISCHARGE INSTRUCTIONS
Thank you for allowing me to care for you today,  MIS LeachN, RN    [facility]  [address] [address]  [phone] phone     [phone] fax  Hours of Operation: Monday- Friday 8:00am- 5:00pm  After hours phone  [phone]  Hematology / Oncology Physicians on call      Dr. Willi Cortez, JUANI Chapman, BELLE Hyman NP Phaon Dunbar, BELLE Deluna, BELLE    Please call with any concerns regarding your appointment today.

## 2023-07-19 NOTE — NURSING
0924: Velcade Injection given without difficulties.Bandaid applied. Patient instructed to stay in the clinic for 15 minutes. Patient verbalized understanding and will notify nurse with any complaints.   0948: Darzalex Injection given without difficulties.Bandaid applied. Patient instructed to stay in the clinic for 15 minutes. Patient verbalized understanding and will notify nurse with any complaints.

## 2023-07-20 ENCOUNTER — PATIENT MESSAGE (OUTPATIENT)
Dept: HEMATOLOGY/ONCOLOGY | Facility: CLINIC | Age: 63
End: 2023-07-20
Payer: COMMERCIAL

## 2023-07-20 RX ORDER — LENALIDOMIDE 10 MG/1
1 CAPSULE ORAL DAILY
Qty: 14 EACH | Refills: 5 | Status: CANCELLED | OUTPATIENT
Start: 2023-07-20

## 2023-07-21 ENCOUNTER — TELEPHONE (OUTPATIENT)
Dept: HEMATOLOGY/ONCOLOGY | Facility: CLINIC | Age: 63
End: 2023-07-21
Payer: COMMERCIAL

## 2023-07-24 ENCOUNTER — OFFICE VISIT (OUTPATIENT)
Dept: HEMATOLOGY/ONCOLOGY | Facility: CLINIC | Age: 63
End: 2023-07-24
Payer: COMMERCIAL

## 2023-07-24 DIAGNOSIS — C90.00 MULTIPLE MYELOMA, REMISSION STATUS UNSPECIFIED: Primary | ICD-10-CM

## 2023-07-24 DIAGNOSIS — F17.200 SMOKER: ICD-10-CM

## 2023-07-24 DIAGNOSIS — M54.9 BACK PAIN, UNSPECIFIED BACK LOCATION, UNSPECIFIED BACK PAIN LATERALITY, UNSPECIFIED CHRONICITY: ICD-10-CM

## 2023-07-24 DIAGNOSIS — Z79.899 IMMUNODEFICIENCY DUE TO DRUGS: ICD-10-CM

## 2023-07-24 DIAGNOSIS — Z76.82 STEM CELL TRANSPLANT CANDIDATE: ICD-10-CM

## 2023-07-24 DIAGNOSIS — D84.821 IMMUNODEFICIENCY DUE TO DRUGS: ICD-10-CM

## 2023-07-24 PROCEDURE — 3044F HG A1C LEVEL LT 7.0%: CPT | Mod: CPTII,95,, | Performed by: INTERNAL MEDICINE

## 2023-07-24 PROCEDURE — 4010F PR ACE/ARB THEARPY RXD/TAKEN: ICD-10-PCS | Mod: CPTII,95,, | Performed by: INTERNAL MEDICINE

## 2023-07-24 PROCEDURE — 4010F ACE/ARB THERAPY RXD/TAKEN: CPT | Mod: CPTII,95,, | Performed by: INTERNAL MEDICINE

## 2023-07-24 PROCEDURE — 3044F PR MOST RECENT HEMOGLOBIN A1C LEVEL <7.0%: ICD-10-PCS | Mod: CPTII,95,, | Performed by: INTERNAL MEDICINE

## 2023-07-24 PROCEDURE — 99215 OFFICE O/P EST HI 40 MIN: CPT | Mod: 95,,, | Performed by: INTERNAL MEDICINE

## 2023-07-24 PROCEDURE — 99215 PR OFFICE/OUTPT VISIT, EST, LEVL V, 40-54 MIN: ICD-10-PCS | Mod: 95,,, | Performed by: INTERNAL MEDICINE

## 2023-07-24 NOTE — PROGRESS NOTES
"Tpatient location is: home  The chief complaint leading to consultation is: MM/SCT eval     Visit type: audiovisual    Face to Face time with patient: 20  75 minutes of total time spent on the encounter, which includes face to face time and non-face to face time preparing to see the patient (eg, review of tests), Obtaining and/or reviewing separately obtained history, Documenting clinical information in the electronic or other health record, Independently interpreting results (not separately reported) and communicating results to the patient/family/caregiver, or Care coordination (not separately reported).         Each patient to whom he or she provides medical services by telemedicine is:  (1) informed of the relationship between the physician and patient and the respective role of any other health care provider with respect to management of the patient; and (2) notified that he or she may decline to receive medical services by telemedicine and may withdraw from such care at any time.    Notes:   SECTION OF HEMATOLOGY AND BONE MARROW TRANSPLANT  Return  Patient Visit   07/24/2023  Referred by:  No ref. provider found  Referred for: MM/SCT evaluation     CHIEF COMPLAINT: No chief complaint on file.      HISTORY OF PRESENT ILLNESS:   Initial consult note June 2023:   62 y.o.female; pmh of  hypertension, COPD, asthma, GERD, hypothyroidism; her onclogic history is notable for history of Multiple Myeloma; referred for SCT by local oncologist Dr. Jose Dela Cruz.    With regard to her myeloma history; progressive bone pain.      From outside referring oncologist's note -"BMBx on 4/6/2023 showed 60 % plasma cells. PET-CT on 4/10/2023 showed extensive lytic bony lesions throughout the spine, sternum, bilateral ribs, pelvis and mild compression deformity in L1 vertebral body. SPEP/MECHE on 3/27/2023 showed IgG lambda monoclonal protein - 3.19 g/dl. Quantitative immunoglobulins on 3/27/2023 showed IgG 4,521 mg/dl. UPEP/MECHE and " "FLC were pending at that time. Labs on 3/27/2023 showed Beta 2 microglobulin 1.9, .  Labs on 4/19/2023 showed Hb, 11.5, WBC 6.3, platelets, BUN 11, Cr 0.9, calcium 9. Pt initiated on VRD 05/10/23."    Currently mid cycle 2, 21 day cycle VRd.    I do not see any fu biochemical studies done since initiating treatment.     Of note underwent kyphoplasty/biopsy of vertebrae on 6/8/23.  Some relief in back pain but other hip pain worsening.  Lidocaine patch, percocet make her groggy/nauseated.    Required dose reduction of rev to 10mg because "was affecting her kidney" but states no other side effects; on epic review her Cr has been stable.    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    and lives with .      Schizophrenic son she is caretaker for but he Lives alone. Long time active  Smoker.      Interval history - 7/24/23  At last appt recommended addition of odell to VRd which has happened and she is tolerating well.  Serial biochemical studies show excellent response.   Her chronic hip and back pain from myeloma lesions continues; following with ortho spine for possible intervention.  Presents to discuss SCT; remains hesitant but after online research understands the benefit Its likely to confer.  No major  change in clinical status since our last appt.    She is C3D8 currently.       PAST MEDICAL HISTORY:   Past Medical History:   Diagnosis Date    Allergy     Amblyopia OS    Anxiety     Asthma     COPD (chronic obstructive pulmonary disease)     NO HOME o2    GERD (gastroesophageal reflux disease)     Hyperlipidemia     Hypertension     PONV (postoperative nausea and vomiting)     Thyroid disease        PAST SURGICAL HISTORY:   Past Surgical History:   Procedure Laterality Date    APPENDECTOMY      BIOPSY N/A 6/8/2023    Procedure: BIOPSY;  Surgeon: Fausto Smith MD;  Location: [facility] OR;  Service: Neurosurgery;  Laterality: N/A;  L1    BUNIONECTOMY      COLONOSCOPY N/A 12/4/2019    Procedure: " COLONOSCOPY;  Surgeon: Danny Matos III, MD;  Location: [facility] [place] ENDO;  Service: Endoscopy;  Laterality: N/A;    COLONOSCOPY N/A 10/24/2022    Procedure: COLONOSCOPY;  Surgeon: Danny Matos III, MD;  Location: [facility] [place] ENDO;  Service: Endoscopy;  Laterality: N/A;    ESOPHAGOGASTRODUODENOSCOPY N/A 10/24/2022    Procedure: EGD (ESOPHAGOGASTRODUODENOSCOPY);  Surgeon: Danny Matos III, MD;  Location: [facility] [place] ENDO;  Service: Endoscopy;  Laterality: N/A;    FIXATION KYPHOPLASTY Bilateral 6/8/2023    Procedure: KYPHOPLASTY;  Surgeon: Fausto Smith MD;  Location: [facility] [place] OR;  Service: Neurosurgery;  Laterality: Bilateral;  kyphoplasty and radiofrequency ablation - L1    HYSTERECTOMY      PARTIAL//still with ovaries    neck fusion  08/2017    THYROIDECTOMY         PAST SOCIAL HISTORY:   reports that she has been smoking cigarettes. She has a 25.00 pack-year smoking history. She has never used smokeless tobacco. She reports that she does not drink alcohol and does not use drugs.    FAMILY HISTORY:  Family History   Problem Relation Age of Onset    Hypertension Mother     Diabetes Mother     Diabetes Father     Stroke Maternal Grandmother     Prostate cancer Maternal Grandfather     Mental illness Son     Pancreatic cancer Maternal Uncle     Mental illness Other     Pancreatic cancer Other     Ovarian cancer Maternal Cousin        CURRENT MEDICATIONS:   Current Outpatient Medications   Medication Sig    acyclovir (ZOVIRAX) 400 MG tablet Take 1 tablet (400 mg total) by mouth 2 (two) times daily.    albuterol (PROVENTIL HFA) 90 mcg/actuation inhaler Inhale 2 puffs into the lungs every 6 (six) hours as needed for Wheezing. Rescue    ALPRAZolam (XANAX) 2 MG Tab TAKE 1 TABLET BY MOUTH TWICE DAILY AS NEEDED FOR ANXIETY    amlodipine-benazepril 2.5-10 mg (LOTREL) 2.5-10 mg per capsule TAKE 1 CAPSULE BY MOUTH EVERY DAY    aspirin (ECOTRIN) 81 MG EC tablet Take 1 tablet (81 mg total) by mouth once daily.    baclofen (LIORESAL) 10 MG  tablet Take 10 mg by mouth 3 (three) times daily.    calcium carbonate-vit D3-min 600 mg calcium- 400 unit Tab Take 1 tablet by mouth once. for 1 dose    dexAMETHasone (DECADRON) 4 MG Tab Take 10 tablets (40 mg total) by mouth every 7 days. ( once weekly by mouth on days 1, 8 and 15 every 21 day cycle)    dexAMETHasone (DECADRON) 4 MG Tab Take 10 tablets (40 mg total) by mouth As instructed. Daily on days 1, 8, 15, and 22 of each chemotherapy cycle. Take with food.    diazePAM (VALIUM) 5 MG tablet Take 1 tablet by mouth 30 minutes prior to MRI to help decrease anxiety.    fluticasone propionate (FLONASE) 50 mcg/actuation nasal spray 1 spray (50 mcg total) by Each Nostril route once daily.    fluticasone-salmeterol diskus inhaler 250-50 mcg Inhale 1 puff into the lungs 2 (two) times daily. Controller    HYDROcodone-acetaminophen (NORCO) 5-325 mg per tablet Take 1 tablet by mouth every 6 (six) hours as needed for Pain.    lenalidomide 10 mg Cap Take 1 capsule by mouth once daily on days 1-14 of each 21 day cycle..    lenalidomide 10 mg Cap Take 1 capsule by mouth once daily Only on days 1-21 of each 28 day cycle. [place] authorization.    levothyroxine (SYNTHROID) 100 MCG tablet Take 1 tablet (100 mcg total) by mouth before breakfast.    linaCLOtide (LINZESS) 72 mcg Cap capsule Take 2 capsules (144 mcg total) by mouth before breakfast.    methocarbamoL (ROBAXIN) 500 MG Tab Take 1 tablet (500 mg total) by mouth every 8 (eight) hours as needed.    metoprolol succinate (TOPROL-XL) 50 MG 24 hr tablet TAKE 1 TABLET(50 MG) BY MOUTH EVERY DAY (Patient taking differently: Take 50 mg by mouth every evening.)    montelukast (SINGULAIR) 10 mg tablet Take 1 tablet (10 mg total) by mouth every evening.    nicotine (NICODERM CQ) 21 mg/24 hr Place 1 patch onto the skin once daily.    ondansetron (ZOFRAN) 8 MG tablet Take 1 tablet (8 mg total) by mouth every 12 (twelve) hours as needed for Nausea.    oxyCODONE-acetaminophen  (PERCOCET)  mg per tablet Take 1 tablet by mouth every 8 (eight) hours as needed for Pain.    pantoprazole (PROTONIX) 40 MG tablet TAKE 1 TABLET(40 MG) BY MOUTH EVERY DAY (Patient taking differently: Take 40 mg by mouth daily as needed.)    prochlorperazine (COMPAZINE) 5 MG tablet Take 1 tablet (5 mg total) by mouth every 6 (six) hours as needed for Nausea.    promethazine (PHENERGAN) 25 MG tablet Take 1 tablet (25 mg total) by mouth every 4 (four) hours.    promethazine-codeine 6.25-10 mg/5 ml (PHENERGAN WITH CODEINE) 6.25-10 mg/5 mL syrup Take 5 mLs by mouth every 4 (four) hours as needed for Cough.    rosuvastatin (CRESTOR) 10 MG tablet TAKE 1 TABLET(10 MG) BY MOUTH EVERY DAY (Patient taking differently: Take 10 mg by mouth every evening.)    traZODone (DESYREL) 50 MG tablet Take 1 tablet (50 mg total) by mouth every evening.     Current Facility-Administered Medications   Medication    ketorolac injection 60 mg     Facility-Administered Medications Ordered in Other Visits   Medication    lactated ringers infusion     ALLERGIES:   Review of patient's allergies indicates:   Allergen Reactions    Hydrocodone-acetaminophen Other (See Comments)     Causes pt to feel extremely sick              REVIEW OF SYSTEMS:   See HPI    PHYSICAL EXAM:   physical exam deferred due to telemed     ECOG Performance Status: (foot note - ECOG PS provided by [employer]cology [employer]) 1 - Symptomatic but completely ambulatory    Karnofsky Performance Score:  80%- Normal Activity with Effort: Some Symptoms of Disease  DATA:   Lab Results   Component Value Date    WBC 5.69 07/18/2023    HGB 11.1 (L) 07/18/2023    HCT 35.3 (L) 07/18/2023    MCV 98 07/18/2023     07/18/2023       Gran # (ANC)   Date Value Ref Range Status   07/18/2023 3.0 1.8 - 7.7 K/uL Final     Gran %   Date Value Ref Range Status   07/18/2023 53.3 38.0 - 73.0 % Final     CMP  Sodium   Date Value Ref Range Status   07/18/2023 141 136 - 145 mmol/L  Final     Potassium   Date Value Ref Range Status   07/18/2023 3.6 3.5 - 5.1 mmol/L Final     Chloride   Date Value Ref Range Status   07/18/2023 108 95 - 110 mmol/L Final     CO2   Date Value Ref Range Status   07/18/2023 22 (L) 23 - 29 mmol/L Final     Glucose   Date Value Ref Range Status   07/18/2023 77 70 - 110 mg/dL Final     BUN   Date Value Ref Range Status   07/18/2023 8 8 - 23 mg/dL Final     Creatinine   Date Value Ref Range Status   07/18/2023 1.0 0.5 - 1.4 mg/dL Final     Calcium   Date Value Ref Range Status   07/18/2023 8.5 (L) 8.7 - 10.5 mg/dL Final     Total Protein   Date Value Ref Range Status   07/18/2023 7.7 6.0 - 8.4 g/dL Final     Albumin   Date Value Ref Range Status   07/18/2023 3.6 3.5 - 5.2 g/dL Final     Total Bilirubin   Date Value Ref Range Status   07/18/2023 0.5 0.1 - 1.0 mg/dL Final     Comment:     For infants and newborns, interpretation of results should be based  on gestational age, weight and in agreement with clinical  observations.    Premature Infant recommended reference ranges:  Up to 24 hours.............<8.0 mg/dL  Up to 48 hours............<12.0 mg/dL  3-5 days..................<15.0 mg/dL  6-29 days.................<15.0 mg/dL       Alkaline Phosphatase   Date Value Ref Range Status   07/18/2023 86 55 - 135 U/L Final     AST   Date Value Ref Range Status   07/18/2023 18 10 - 40 U/L Final     ALT   Date Value Ref Range Status   07/18/2023 21 10 - 44 U/L Final     Anion Gap   Date Value Ref Range Status   07/18/2023 11 8 - 16 mmol/L Final     eGFR   Date Value Ref Range Status   07/18/2023 >60 >60 mL/min/1.73 m^2 Final     IgG   Date Value Ref Range Status   03/27/2023 4521 (H) 650 - 1600 mg/dL Final     Comment:     IgG Cord Blood Reference Range: 650-1600 mg/dL.     IgA   Date Value Ref Range Status   03/27/2023 49 40 - 350 mg/dL Final     Comment:     IgA Cord Blood Reference Range: <5 mg/dL.     IgM   Date Value Ref Range Status   03/27/2023 21 (L) 50 - 300 mg/dL  Final     Comment:     IgM Cord Blood Reference Range: <25 mg/dL.     Kappa Free Light Chains   Date Value Ref Range Status   06/28/2023 1.62 0.33 - 1.94 mg/dL Final   04/21/2023 0.99 0.33 - 1.94 mg/dL Final     Lambda Free Light Chains   Date Value Ref Range Status   06/28/2023 10.34 (H) 0.57 - 2.63 mg/dL Final   04/21/2023 51.78 (H) 0.57 - 2.63 mg/dL Final     Kappa/Lambda FLC Ratio   Date Value Ref Range Status   06/28/2023 0.16 (L) 0.26 - 1.65 Final     Comment:     Undetected antigen excess is a rare event but cannot   be excluded. If these free light chain results do not   agree with other clinical or laboratory findings or   if the sample is from a patient that has previously   demonstrated antigen excess, discuss with the testing   laboratory.   Results should always be interpreted in conjunction   with other laboratory tests and clinical evidence.     04/21/2023 0.02 (L) 0.26 - 1.65 Final     Comment:     Undetected antigen excess is a rare event but cannot   be excluded. If these free light chain results do not   agree with other clinical or laboratory findings or   if the sample is from a patient that has previously   demonstrated antigen excess, discuss with the testing   laboratory.   Results should always be interpreted in conjunction   with other laboratory tests and clinical evidence.       Pathologist Interpretation SPE   Date Value Ref Range Status   06/28/2023 REVIEWED  Final     Comment:       Electronically reviewed and signed by:  Monse Saldana MD  Signed on 06/29/23 at 14:47  Normal total protein. Normal gamma globulins are decreased.   Paraprotein peak in gamma =  2.15 g/dL, previously 3.19 g/dL.      03/27/2023 REVIEWED  Final     Comment:       Electronically reviewed and signed by:  Monse Saldana MD  Signed on 03/29/23 at 09:11  Increased total protein. Normal gamma globulins are decreased.   Paraprotein peak in gamma = 3.19 g/dL.        Pathologist Interpretation MECHE   Date  Value Ref Range Status   03/27/2023 REVIEWED  Final     Comment:       Electronically reviewed and signed by:  Monse Saldana MD  Signed on 03/28/23 at 13:06  IgG lambda specific monoclonal band present.            Bone marrow biopsy - 4/6/23  Final Pathologic Diagnosis     RIGHT ILIAC CREST BONE MARROW ASPIRATE, BONE MARROW CLOT, AND BONE MARROW CORE BIOPSY WITH:     CELLULARITY=40-60%, TRILINEAGE HEMATOPOIETIC ACTIVITY (M/E=2.9:1).   CONSISTENT WITH PLASMA CELL NEOPLASM(60%).  SEE COMMENT.   FOCAL GRADE 1 RETICULAR FIBROSIS.   CONGO RED NEGATIVE.   INCREASED STORAGE IRON.   ADEQUATE NUMBER OF MEGAKARYOCYTES.  VC      Comment: Interp By Olga Lidia Brand MD, Signed on 04/24/2023 at 16:08   Supplemental Diagnosis     See [facility] report:   ([address] , [place] [address])     Bone marrow karyotype results: 46, XX[20], female karyotype.     Myeloma fixed cell, high-risk, FISH:  Normal.  The result is within normal limits for 1q duplication, TP53 deletion and IGH rearrangement           Verterbral biopsy - 6/8/23  1. L1 vertebral body,  biopsy:   - Consistent with plasma cell myeloma.    Results for orders placed or performed during the hospital encounter of 04/10/23 (from the past 2160 hour(s))   NM PET CT Whole Body     Impression     1. Numerous FDG avid predominately lytic osseous lesions as above.  Primary differential considerations would include multiple myeloma versus metastatic disease.  2. No FDG avid soft tissue masses or adenopathy demonstrated.  3. Mild pathologic compression deformity of the L1 vertebral body.  This report was flagged in Epic as abnormal.        Electronically signed by:       Marino Pollack MD  Date:                                                04/10/2023  Time:                                                11:58     ASSESSMENT AND PLAN:   Encounter Diagnoses   Name Primary?    Multiple myeloma, remission status unspecified Yes    Stem cell transplant candidate      Smoker     Immunodeficiency due to drugs     Back pain, unspecified back location, unspecified back pain laterality, unspecified chronicity        -R-ISS stage I IgG Lambda multiple myeloma; disease complicated by diffuse lytic disease disease diagnosed after presenting with symptomatic bone disease April 2023  -initiated on VRd therapy on 5/10/23; generally tolerating  well; her revlimid has been dose reduced to 10mg from 25 mg for unclear reasons (was told was affecting her kidneys but her Cr has been stable)  -underwent subsequent kyphoplasty on 6/8/23 with significant improvement in back pain   -rosa was added with cycle 2 for Rosa-VRd and pt is currently C3D8 tolerating and responding well biochemically under care of jeanette dunn [facility] onc team  -recommend continue therapy  - again discussed role and rationale and process of SCT and pt remains hesitant particularly regarding side effects and time needed away from home (30 days) but is leaning towards SCT  -will have bmt coordinator reach out to pt to discuss with pt and provide additional SCT resources  -she has stopped smoking; encouraged pt to continue this   -monthly biochemical studies and local provider appt while on therapy for AE checks  -we will have another virtual visit to check in in one month to again readdress transplant; she may end needing 4-6 cycles of therapy in total prior to SCT if she elects to proceed we are likely talking about late 2023 transplant date   -supportive meds: acyc, asa, bisphosphonate (assuming DDS clearance )       -continue fu with pcp for health maintenance     Follow Up:  virtual MD appt in 1 month to readdress SCT    Date: 06/26/2023

## 2023-07-24 NOTE — PROGRESS NOTES
Subjective:       Patient ID: Ana Bonilla is a 62 y.o. female.    Chief Complaint:   1. Multiple myeloma, remission status unspecified  IgG lambda Multiple myeloma, R-ISS stage I        Current Treatment:  OP D-VRD DARATUMUMAB + BORTEZOMIB LENALIDOMIDE DEXAMETHASONE        OP ZOLEDRONIC ACID (ZOMETA) every 3 months started 5/2023    Treatment History:  S/p kyphoplasty with radiofrequency ablation per Dr. Smith on 6/8/2023        VRd; Rosa added with cycle #2; Revlimid dose reduced to 10mg due to kidney function    HPI: This is a 62 year old woman with amblyopia, asthma, HTN, thyroid disease, anxiety, COPD, hyperlipidemia, allergies, and GERD who is seen in Hem/Onc for multiple myeloma. She was referred in 2/2023 by her PCP Dr. Kassidy Sibley after workup for gastritis revealed lytic lesions. CT chest showed lytic and expansile destructive lesion in the sternum and lytic lesions at L1. Biopsy of L1 lesion in 3/2023 revealed mature B cell neoplasm most consistent with plasma cell neoplasm.     Bone marrow biopsy was performed in 4/2023 that demonstrated 60% plasma cells. PET/CT showed extensive bony lesions throughout the spine, sternum, bilateral ribs, pelvis, and a mild compression deformity in L1. SPEP/MECHE showed IgG lambda monoclonal protein measuring 3.19 g/dL. Quantitative immunoglobulins on 3/27/2023 showed IgG 4,521 mg/dL. UPEP/MECHE and FLC were pending. Labs on 3/27/2023 showed Beta 2 microglobulin 1.9, .  Labs on 4/19/2023 showed Hgb, 11.5, WBC 6.3, platelets, BUN 11, Cr 0.9, calcium 9.    She was started on VRd (Velcade/Revlimid/dexamethasone) every 21 days for 3-6 cycles with the plan to evaluate her for autotransplant with consideration of adding daratumumab if she was found to be high risk after FISH panel was resulted. She was started on ASA for thrombosis prophylaxis and acyclovir for herpes zoster prophylaxis. Plan to start Zometa after dental evaluation.     In 5/2023, Revlimid was  decreased from 25mg po 10mg due to decreased creatinine clearance of 49. She underwent kyphoplasty with radiofrequency ablation on 6/8/2023 by Neurosurgery.     Her primary Hematologist/Oncologist is Dr. Dela Cruz .    Interval History: Patient presents for follow up on Rosa+VRd; She is scheduled to receive C1D22 today. She presents alone and states she is irritable today. She has a schizophrenic son who lost his SNAP benefits; she still has pain from her back; she is in smoking cessation; and is not sure if she wants to proceed with stem cell transplant. She sees Dr. Garcia in 3 weeks. She is not able to drive due to sciatic pain in right leg following kyphoplasty. She is tired of staying at home; there is not much to do and she would like to return to work. She is scheduled to see her back doctor Friday. She states she still has not received Revlimid from [facility]. Will enlist Nurse Navigators  to assist.     Reviewed labs with patient:   CBC:   Recent Labs   Lab 07/25/23  0940   WBC 3.98   RBC 3.63 L   Hemoglobin 11.3 L   Hematocrit 35.0 L   Platelets 241   MCV 96   MCH 31.1 H   MCHC 32.3     CMP:  Recent Labs   Lab 07/25/23  0940   Glucose 102   Calcium 8.6 L   Albumin 3.5   Total Protein 7.4   Sodium 140   Potassium 3.9   CO2 23   Chloride 108   BUN 13   Creatinine 0.8   Alkaline Phosphatase 77   ALT 23   AST 18   Total Bilirubin 0.6       Social History     Socioeconomic History    Marital status:     Number of children: 2   Occupational History     Employer: [employer]   Tobacco Use    Smoking status: Every Day     Packs/day: 0.50     Years: 50.00     Pack years: 25.00     Types: Cigarettes    Smokeless tobacco: Never   Substance and Sexual Activity    Alcohol use: Never     Comment: quit    Drug use: No    Sexual activity: Never     Past Medical History:   Diagnosis Date    Allergy     Amblyopia OS    Anxiety     Asthma     COPD (chronic obstructive pulmonary disease)     NO HOME o2    GERD  (gastroesophageal reflux disease)     Hyperlipidemia     Hypertension     PONV (postoperative nausea and vomiting)     Thyroid disease      Family History   Problem Relation Age of Onset    Hypertension Mother     Diabetes Mother     Diabetes Father     Stroke Maternal Grandmother     Prostate cancer Maternal Grandfather     Mental illness Son     Pancreatic cancer Maternal Uncle     Mental illness Other     Pancreatic cancer Other     Ovarian cancer Maternal Cousin      Past Surgical History:   Procedure Laterality Date    APPENDECTOMY      BIOPSY N/A 6/8/2023    Procedure: BIOPSY;  Surgeon: Fausto Smith MD;  Location: [facility] OR;  Service: Neurosurgery;  Laterality: N/A;  L1    BUNIONECTOMY      COLONOSCOPY N/A 12/4/2019    Procedure: COLONOSCOPY;  Surgeon: Danny Matos III, MD;  Location: [facility] ENDO;  Service: Endoscopy;  Laterality: N/A;    COLONOSCOPY N/A 10/24/2022    Procedure: COLONOSCOPY;  Surgeon: Danny Matos III, MD;  Location: [facility] ENDO;  Service: Endoscopy;  Laterality: N/A;    ESOPHAGOGASTRODUODENOSCOPY N/A 10/24/2022    Procedure: EGD (ESOPHAGOGASTRODUODENOSCOPY);  Surgeon: Danny Matos III, MD;  Location: [facility] ENDO;  Service: Endoscopy;  Laterality: N/A;    FIXATION KYPHOPLASTY Bilateral 6/8/2023    Procedure: KYPHOPLASTY;  Surgeon: Fausto Smith MD;  Location: [facility] OR;  Service: Neurosurgery;  Laterality: Bilateral;  kyphoplasty and radiofrequency ablation - L1    HYSTERECTOMY      PARTIAL//still with ovaries    neck fusion  08/2017    THYROIDECTOMY       Review of Systems   Constitutional:  Positive for appetite change. Negative for fatigue.   HENT:  Negative for mouth sores, rhinorrhea and sore throat.    Eyes: Negative.  Photophobia: decreased since surgery.   Respiratory: Negative.     Cardiovascular: Negative.    Gastrointestinal:  Positive for constipation (since surgery). Negative for diarrhea, nausea and vomiting.   Endocrine: Negative.    Genitourinary: Negative.     Musculoskeletal:  Positive for back pain (4/10 back and right hip pain constant; improved with hot shower).   Integumentary:  Negative.   Allergic/Immunologic: Negative.    Neurological:  Negative for weakness and numbness.   Hematological: Negative.    Psychiatric/Behavioral:  The patient is nervous/anxious.        Medication List with Changes/Refills   Current Medications    ACYCLOVIR (ZOVIRAX) 400 MG TABLET    Take 1 tablet (400 mg total) by mouth 2 (two) times daily.    ALBUTEROL (PROVENTIL HFA) 90 MCG/ACTUATION INHALER    Inhale 2 puffs into the lungs every 6 (six) hours as needed for Wheezing. Rescue    ALPRAZOLAM (XANAX) 2 MG TAB    TAKE 1 TABLET BY MOUTH TWICE DAILY AS NEEDED FOR ANXIETY    AMLODIPINE-BENAZEPRIL 2.5-10 MG (LOTREL) 2.5-10 MG PER CAPSULE    TAKE 1 CAPSULE BY MOUTH EVERY DAY    ASPIRIN (ECOTRIN) 81 MG EC TABLET    Take 1 tablet (81 mg total) by mouth once daily.    BACLOFEN (LIORESAL) 10 MG TABLET    Take 10 mg by mouth 3 (three) times daily.    CALCIUM CARBONATE-VIT D3- MG CALCIUM- 400 UNIT TAB    Take 1 tablet by mouth once. for 1 dose    DEXAMETHASONE (DECADRON) 4 MG TAB    Take 10 tablets (40 mg total) by mouth every 7 days. ( once weekly by mouth on days 1, 8 and 15 every 21 day cycle)    DEXAMETHASONE (DECADRON) 4 MG TAB    Take 10 tablets (40 mg total) by mouth As instructed. Daily on days 1, 8, 15, and 22 of each chemotherapy cycle. Take with food.    DIAZEPAM (VALIUM) 5 MG TABLET    Take 1 tablet by mouth 30 minutes prior to MRI to help decrease anxiety.    FLUTICASONE PROPIONATE (FLONASE) 50 MCG/ACTUATION NASAL SPRAY    1 spray (50 mcg total) by Each Nostril route once daily.    FLUTICASONE-SALMETEROL DISKUS INHALER 250-50 MCG    Inhale 1 puff into the lungs 2 (two) times daily. Controller    HYDROCODONE-ACETAMINOPHEN (NORCO) 5-325 MG PER TABLET    Take 1 tablet by mouth every 6 (six) hours as needed for Pain.    LENALIDOMIDE 10 MG CAP    Take 1 capsule by mouth once daily on  days 1-14 of each 21 day cycle..    LENALIDOMIDE 10 MG CAP    Take 1 capsule by mouth once daily Only on days 1-21 of each 28 day cycle. [employer] authorization.    LEVOTHYROXINE (SYNTHROID) 100 MCG TABLET    Take 1 tablet (100 mcg total) by mouth before breakfast.    LINACLOTIDE (LINZESS) 72 MCG CAP CAPSULE    Take 2 capsules (144 mcg total) by mouth before breakfast.    METHOCARBAMOL (ROBAXIN) 500 MG TAB    Take 1 tablet (500 mg total) by mouth every 8 (eight) hours as needed.    METOPROLOL SUCCINATE (TOPROL-XL) 50 MG 24 HR TABLET    TAKE 1 TABLET(50 MG) BY MOUTH EVERY DAY    MONTELUKAST (SINGULAIR) 10 MG TABLET    Take 1 tablet (10 mg total) by mouth every evening.    NICOTINE (NICODERM CQ) 21 MG/24 HR    Place 1 patch onto the skin once daily.    ONDANSETRON (ZOFRAN) 8 MG TABLET    Take 1 tablet (8 mg total) by mouth every 12 (twelve) hours as needed for Nausea.    OXYCODONE-ACETAMINOPHEN (PERCOCET)  MG PER TABLET    Take 1 tablet by mouth every 8 (eight) hours as needed for Pain.    PANTOPRAZOLE (PROTONIX) 40 MG TABLET    TAKE 1 TABLET(40 MG) BY MOUTH EVERY DAY    PROCHLORPERAZINE (COMPAZINE) 5 MG TABLET    Take 1 tablet (5 mg total) by mouth every 6 (six) hours as needed for Nausea.    PROMETHAZINE (PHENERGAN) 25 MG TABLET    Take 1 tablet (25 mg total) by mouth every 4 (four) hours.    PROMETHAZINE-CODEINE 6.25-10 MG/5 ML (PHENERGAN WITH CODEINE) 6.25-10 MG/5 ML SYRUP    Take 5 mLs by mouth every 4 (four) hours as needed for Cough.    ROSUVASTATIN (CRESTOR) 10 MG TABLET    TAKE 1 TABLET(10 MG) BY MOUTH EVERY DAY    TRAZODONE (DESYREL) 50 MG TABLET    Take 1 tablet (50 mg total) by mouth every evening.     Objective:     Vitals:    07/26/23 0846   BP: 110/68   Pulse: 73   Resp: 20   Temp: 97.6 °F (36.4 °C)     Physical Exam  Vitals reviewed.   Constitutional:       Appearance: Normal appearance.   HENT:      Head: Normocephalic.      Mouth/Throat:      Comments: Wearing mask      Eyes:      Extraocular  Movements: Extraocular movements intact.      Pupils: Pupils are equal, round, and reactive to light.   Cardiovascular:      Rate and Rhythm: Normal rate and regular rhythm.      Heart sounds: Normal heart sounds.   Pulmonary:      Effort: Pulmonary effort is normal.      Breath sounds: Normal breath sounds.   Abdominal:      General: Bowel sounds are normal.      Palpations: Abdomen is soft.      Comments: rounded     Genitourinary:     Comments: deferred    Musculoskeletal:         General: Normal range of motion.      Cervical back: Normal range of motion and neck supple.   Skin:     General: Skin is warm and dry.   Neurological:      Mental Status: She is alert and oriented to person, place, and time.   Psychiatric:         Behavior: Behavior normal.         Thought Content: Thought content normal.      Comments: Restless in the chair and while standing due to discomfort from pain and constipation        (2) Ambulatory and capable of self care, unable to carry out work activity, up and about > 50% or waking hours  Assessment:     Problem List Items Addressed This Visit          Oncology    Multiple myeloma - Primary (Chronic)     Diagnosed in 3/2023 after work up for gastritis revealed lytic and expansile destructive lesion in the sternum and lytic lesions at L1. Bone marrow biopsy showed 60% plasma cells. Started VRd and Zometa. Underwent kyphoplasty and radiofrequency ablation in 6/2023. Plan to evaluate for autotransplant after 3-6 cycles.             Other    Tobacco use     Continue smoking cessation.           Plan:     Multiple myeloma, remission status unspecified    Tobacco use    Labs reviewed; anemia stable.   Ok to proceed with C1D22 of Rosa+VRd today.  Continue smoking cessation.  Continue monthly Zometa every 3 months; due 8/28/2023.   Follow up in 1 week with  SPEP, MECHE, FLC, CBC, and Comprehensive Metabolic Panel prior to C2D1.  Keep upcoming appointment with orthopedic surgeon on Friday.      Route Chart for Scheduling    Med Onc Chart Routing      Follow up with physician 1 week. Dr. Dela Cruz   Follow up with WERNER    Infusion scheduling note    Injection scheduling note in 1 week for C2D1 Rosa+VRd   Labs CBC, CMP, SPEP, free light chains and other   Scheduling:  Preferred lab:  Lab interval:  and MECHE in 1 week   Imaging None      Pharmacy appointment No pharmacy appointment needed      Other referrals     No additional referrals needed         I will review assessment/plan with collaborating physician.      BRITTANI Enamorado

## 2023-07-25 ENCOUNTER — LAB VISIT (OUTPATIENT)
Dept: LAB | Facility: HOSPITAL | Age: 63
End: 2023-07-25
Attending: INTERNAL MEDICINE
Payer: COMMERCIAL

## 2023-07-25 DIAGNOSIS — C90.00 MULTIPLE MYELOMA, REMISSION STATUS UNSPECIFIED: ICD-10-CM

## 2023-07-25 LAB
ALBUMIN SERPL BCP-MCNC: 3.5 G/DL (ref 3.5–5.2)
ALP SERPL-CCNC: 77 U/L (ref 55–135)
ALT SERPL W/O P-5'-P-CCNC: 23 U/L (ref 10–44)
ANION GAP SERPL CALC-SCNC: 9 MMOL/L (ref 8–16)
AST SERPL-CCNC: 18 U/L (ref 10–40)
BASOPHILS # BLD AUTO: 0.03 K/UL (ref 0–0.2)
BASOPHILS NFR BLD: 0.8 % (ref 0–1.9)
BILIRUB SERPL-MCNC: 0.6 MG/DL (ref 0.1–1)
BUN SERPL-MCNC: 13 MG/DL (ref 8–23)
CALCIUM SERPL-MCNC: 8.6 MG/DL (ref 8.7–10.5)
CHLORIDE SERPL-SCNC: 108 MMOL/L (ref 95–110)
CO2 SERPL-SCNC: 23 MMOL/L (ref 23–29)
CREAT SERPL-MCNC: 0.8 MG/DL (ref 0.5–1.4)
DIFFERENTIAL METHOD: ABNORMAL
EOSINOPHIL # BLD AUTO: 0.2 K/UL (ref 0–0.5)
EOSINOPHIL NFR BLD: 5.3 % (ref 0–8)
ERYTHROCYTE [DISTWIDTH] IN BLOOD BY AUTOMATED COUNT: 13.9 % (ref 11.5–14.5)
EST. GFR  (NO RACE VARIABLE): >60 ML/MIN/1.73 M^2
GLUCOSE SERPL-MCNC: 102 MG/DL (ref 70–110)
HCT VFR BLD AUTO: 35 % (ref 37–48.5)
HGB BLD-MCNC: 11.3 G/DL (ref 12–16)
IMM GRANULOCYTES # BLD AUTO: 0 K/UL (ref 0–0.04)
IMM GRANULOCYTES NFR BLD AUTO: 0 % (ref 0–0.5)
LYMPHOCYTES # BLD AUTO: 2.1 K/UL (ref 1–4.8)
LYMPHOCYTES NFR BLD: 52.5 % (ref 18–48)
MCH RBC QN AUTO: 31.1 PG (ref 27–31)
MCHC RBC AUTO-ENTMCNC: 32.3 G/DL (ref 32–36)
MCV RBC AUTO: 96 FL (ref 82–98)
MONOCYTES # BLD AUTO: 0.6 K/UL (ref 0.3–1)
MONOCYTES NFR BLD: 14.6 % (ref 4–15)
NEUTROPHILS # BLD AUTO: 1.1 K/UL (ref 1.8–7.7)
NEUTROPHILS NFR BLD: 26.8 % (ref 38–73)
NRBC BLD-RTO: 0 /100 WBC
PLATELET # BLD AUTO: 241 K/UL (ref 150–450)
PMV BLD AUTO: 9.8 FL (ref 9.2–12.9)
POTASSIUM SERPL-SCNC: 3.9 MMOL/L (ref 3.5–5.1)
PROT SERPL-MCNC: 7.4 G/DL (ref 6–8.4)
RBC # BLD AUTO: 3.63 M/UL (ref 4–5.4)
SODIUM SERPL-SCNC: 140 MMOL/L (ref 136–145)
WBC # BLD AUTO: 3.98 K/UL (ref 3.9–12.7)

## 2023-07-25 PROCEDURE — 36415 COLL VENOUS BLD VENIPUNCTURE: CPT | Performed by: INTERNAL MEDICINE

## 2023-07-25 PROCEDURE — 85025 COMPLETE CBC W/AUTO DIFF WBC: CPT | Performed by: INTERNAL MEDICINE

## 2023-07-25 PROCEDURE — 80053 COMPREHEN METABOLIC PANEL: CPT | Performed by: INTERNAL MEDICINE

## 2023-07-26 ENCOUNTER — INFUSION (OUTPATIENT)
Dept: INFUSION THERAPY | Facility: HOSPITAL | Age: 63
End: 2023-07-26
Attending: INTERNAL MEDICINE
Payer: COMMERCIAL

## 2023-07-26 ENCOUNTER — OFFICE VISIT (OUTPATIENT)
Dept: HEMATOLOGY/ONCOLOGY | Facility: CLINIC | Age: 63
End: 2023-07-26
Payer: COMMERCIAL

## 2023-07-26 ENCOUNTER — TELEPHONE (OUTPATIENT)
Dept: HEMATOLOGY/ONCOLOGY | Facility: CLINIC | Age: 63
End: 2023-07-26
Payer: COMMERCIAL

## 2023-07-26 VITALS
OXYGEN SATURATION: 98 % | BODY MASS INDEX: 22.17 KG/M2 | TEMPERATURE: 97 F | RESPIRATION RATE: 18 BRPM | HEIGHT: 64 IN | SYSTOLIC BLOOD PRESSURE: 107 MMHG | WEIGHT: 129.88 LBS | HEART RATE: 71 BPM | DIASTOLIC BLOOD PRESSURE: 65 MMHG

## 2023-07-26 VITALS
SYSTOLIC BLOOD PRESSURE: 110 MMHG | RESPIRATION RATE: 20 BRPM | WEIGHT: 129.88 LBS | TEMPERATURE: 98 F | BODY MASS INDEX: 22.17 KG/M2 | HEART RATE: 73 BPM | DIASTOLIC BLOOD PRESSURE: 68 MMHG | OXYGEN SATURATION: 98 % | HEIGHT: 64 IN

## 2023-07-26 DIAGNOSIS — C90.00 MULTIPLE MYELOMA, REMISSION STATUS UNSPECIFIED: Primary | ICD-10-CM

## 2023-07-26 DIAGNOSIS — Z72.0 TOBACCO USE: ICD-10-CM

## 2023-07-26 DIAGNOSIS — Z51.11 ENCOUNTER FOR ANTINEOPLASTIC CHEMOTHERAPY: ICD-10-CM

## 2023-07-26 PROCEDURE — 1159F MED LIST DOCD IN RCRD: CPT | Mod: CPTII,S$GLB,, | Performed by: NURSE PRACTITIONER

## 2023-07-26 PROCEDURE — 3074F PR MOST RECENT SYSTOLIC BLOOD PRESSURE < 130 MM HG: ICD-10-PCS | Mod: CPTII,S$GLB,, | Performed by: NURSE PRACTITIONER

## 2023-07-26 PROCEDURE — 99999 PR PBB SHADOW E&M-EST. PATIENT-LVL V: CPT | Mod: PBBFAC,,, | Performed by: NURSE PRACTITIONER

## 2023-07-26 PROCEDURE — 63600175 PHARM REV CODE 636 W HCPCS: Mod: JZ,TB | Performed by: INTERNAL MEDICINE

## 2023-07-26 PROCEDURE — 99215 PR OFFICE/OUTPT VISIT, EST, LEVL V, 40-54 MIN: ICD-10-PCS | Mod: S$GLB,,, | Performed by: NURSE PRACTITIONER

## 2023-07-26 PROCEDURE — 1160F PR REVIEW ALL MEDS BY PRESCRIBER/CLIN PHARMACIST DOCUMENTED: ICD-10-PCS | Mod: CPTII,S$GLB,, | Performed by: NURSE PRACTITIONER

## 2023-07-26 PROCEDURE — 1160F RVW MEDS BY RX/DR IN RCRD: CPT | Mod: CPTII,S$GLB,, | Performed by: NURSE PRACTITIONER

## 2023-07-26 PROCEDURE — 3008F PR BODY MASS INDEX (BMI) DOCUMENTED: ICD-10-PCS | Mod: CPTII,S$GLB,, | Performed by: NURSE PRACTITIONER

## 2023-07-26 PROCEDURE — 96401 CHEMO ANTI-NEOPL SQ/IM: CPT

## 2023-07-26 PROCEDURE — 1159F PR MEDICATION LIST DOCUMENTED IN MEDICAL RECORD: ICD-10-PCS | Mod: CPTII,S$GLB,, | Performed by: NURSE PRACTITIONER

## 2023-07-26 PROCEDURE — 99999 PR PBB SHADOW E&M-EST. PATIENT-LVL V: ICD-10-PCS | Mod: PBBFAC,,, | Performed by: NURSE PRACTITIONER

## 2023-07-26 PROCEDURE — 3074F SYST BP LT 130 MM HG: CPT | Mod: CPTII,S$GLB,, | Performed by: NURSE PRACTITIONER

## 2023-07-26 PROCEDURE — 3044F HG A1C LEVEL LT 7.0%: CPT | Mod: CPTII,S$GLB,, | Performed by: NURSE PRACTITIONER

## 2023-07-26 PROCEDURE — 3044F PR MOST RECENT HEMOGLOBIN A1C LEVEL <7.0%: ICD-10-PCS | Mod: CPTII,S$GLB,, | Performed by: NURSE PRACTITIONER

## 2023-07-26 PROCEDURE — 4010F PR ACE/ARB THEARPY RXD/TAKEN: ICD-10-PCS | Mod: CPTII,S$GLB,, | Performed by: NURSE PRACTITIONER

## 2023-07-26 PROCEDURE — 3078F DIAST BP <80 MM HG: CPT | Mod: CPTII,S$GLB,, | Performed by: NURSE PRACTITIONER

## 2023-07-26 PROCEDURE — 4010F ACE/ARB THERAPY RXD/TAKEN: CPT | Mod: CPTII,S$GLB,, | Performed by: NURSE PRACTITIONER

## 2023-07-26 PROCEDURE — 99215 OFFICE O/P EST HI 40 MIN: CPT | Mod: S$GLB,,, | Performed by: NURSE PRACTITIONER

## 2023-07-26 PROCEDURE — 25000003 PHARM REV CODE 250: Performed by: NURSE PRACTITIONER

## 2023-07-26 PROCEDURE — 3078F PR MOST RECENT DIASTOLIC BLOOD PRESSURE < 80 MM HG: ICD-10-PCS | Mod: CPTII,S$GLB,, | Performed by: NURSE PRACTITIONER

## 2023-07-26 PROCEDURE — 3008F BODY MASS INDEX DOCD: CPT | Mod: CPTII,S$GLB,, | Performed by: NURSE PRACTITIONER

## 2023-07-26 RX ORDER — DIPHENHYDRAMINE HCL 25 MG
50 CAPSULE ORAL ONCE
Status: COMPLETED | OUTPATIENT
Start: 2023-07-26 | End: 2023-07-26

## 2023-07-26 RX ORDER — ACETAMINOPHEN 325 MG/1
650 TABLET ORAL
Status: COMPLETED | OUTPATIENT
Start: 2023-07-26 | End: 2023-07-26

## 2023-07-26 RX ORDER — LENALIDOMIDE 10 MG/1
1 CAPSULE ORAL DAILY
Qty: 21 EACH | Refills: 5 | Status: SHIPPED | OUTPATIENT
Start: 2023-07-26 | End: 2023-08-02

## 2023-07-26 RX ORDER — DIPHENHYDRAMINE HCL 25 MG
50 CAPSULE ORAL ONCE
Status: CANCELLED
Start: 2023-07-26

## 2023-07-26 RX ORDER — ACETAMINOPHEN 325 MG/1
650 TABLET ORAL
Status: CANCELLED
Start: 2023-07-26

## 2023-07-26 RX ORDER — BORTEZOMIB 3.5 MG/1
1.3 INJECTION, POWDER, LYOPHILIZED, FOR SOLUTION INTRAVENOUS; SUBCUTANEOUS
Status: COMPLETED | OUTPATIENT
Start: 2023-07-26 | End: 2023-07-26

## 2023-07-26 RX ADMIN — DARATUMUMAB AND HYALURONIDASE-FIHJ (HUMAN RECOMBINANT) 1800 MG: 1800; 30000 INJECTION SUBCUTANEOUS at 10:07

## 2023-07-26 RX ADMIN — DIPHENHYDRAMINE HYDROCHLORIDE 50 MG: 25 CAPSULE ORAL at 10:07

## 2023-07-26 RX ADMIN — ACETAMINOPHEN 650 MG: 325 TABLET ORAL at 10:07

## 2023-07-26 RX ADMIN — BORTEZOMIB 2 MG: 3.5 INJECTION, POWDER, LYOPHILIZED, FOR SOLUTION INTRAVENOUS; SUBCUTANEOUS at 10:07

## 2023-07-26 NOTE — PLAN OF CARE
"Pt is stable. Pt administered Darzalex and Velcade today. Pt voiced she was doing "great." Pt will follow up in one week.      Problem: Fall Injury Risk  Goal: Absence of Fall and Fall-Related Injury  Outcome: Ongoing, Progressing     Problem: Anemia (Chemotherapy Effects)  Goal: Anemia Symptom Improvement  Outcome: Ongoing, Progressing     Problem: Urinary Bleeding Risk or Actual (Chemotherapy Effects)  Goal: Absence of Hematuria  Outcome: Ongoing, Progressing     Problem: Nausea and Vomiting (Chemotherapy Effects)  Goal: Fluid and Electrolyte Balance  Outcome: Ongoing, Progressing     Problem: Neurotoxicity (Chemotherapy Effects)  Goal: Neurotoxicity Symptom Control  Outcome: Ongoing, Progressing     Problem: Neutropenia (Chemotherapy Effects)  Goal: Absence of Infection  Outcome: Ongoing, Progressing     Problem: Oral Mucositis (Chemotherapy Effects)  Goal: Improved Oral Mucous Membrane Integrity  Outcome: Ongoing, Progressing     Problem: Thrombocytopenia Bleeding Risk (Chemotherapy Effects)  Goal: Absence of Bleeding  Outcome: Ongoing, Progressing     Problem: Adult Inpatient Plan of Care  Goal: Plan of Care Review  Outcome: Ongoing, Progressing  Goal: Patient-Specific Goal (Individualized)  Outcome: Ongoing, Progressing  Flowsheets (Taken 7/26/2023 1201)  Anxieties, Fears or Concerns: Pt denies any  Individualized Care Needs: Pt provided pillow, ice water and snack.     "

## 2023-07-26 NOTE — NURSING
1047: Darzalex and Velcade Injection given without difficulties.Bandaid applied. Patient instructed to stay in the clinic for 15 minutes. Patient verbalized understanding and will notify nurse with any complaints.

## 2023-07-26 NOTE — DISCHARGE INSTRUCTIONS
Thank you for allowing me to care for you today,  MIS LeachN, RN    [facility]  [address] [address]  [phone] phone     [phone] fax  Hours of Operation: Monday- Friday 8:00am- 5:00pm  After hours phone  [phone]  Hematology / Oncology Physicians on call      Dr. Willi Cortez, PA    Joanna Chapman, BELLE Page, BELLE Cardenas, BELLE Mckeon, BELLE Deluna, BELLE    Please call with any concerns regarding your appointment today.    HOME CARE AFTER CHEMOTHERAPY   Meals   Many patients feel sick and lose their appetites during treatment. Eat small meals several times a day. Choose bland foods with little taste or smell if you have problems with nausea. Be sure to cook all food thoroughly. This kills bacteria and helps you avoid intestinal infection. Soft foods are easier to swallow and digest.   Activity   Exercise keeps you strong and keeps your heart and lungs active. Talk to your doctor about an appropriate exercise program for you.   Skin Care   To prevent a skin infection, bathe or shower once a day. Use a moisturizing soap and wash with warm water. Avoid very hot or cold water. Chemotherapy can make your skin dry . Apply moisturizing lotion to help relieve dry skin. Some drugs used in high doses can cause slight burns to appear (like sunburn). Ask for a special cream to help relieve the burn and protect your skin.   Prevent Mouth Sores   During chemotherapy, many people get mouth sores. Do the following to help prevent mouth sores or to ease discomfort.   Brush your teeth with a soft-bristle toothbrush after every meal.  Don't use dental floss if your platelet count is below 50,000. Your doctor or nurse will tell you if this is the case.  Use an oral swab or special soft toothbrush if your gums bleed during regular brushing.  Use mouthwash as directed. If you can't tolerate commercial mouthwash,  use salt and baking soda to clean your mouth. Mix 1 teaspoon of salt and 1 teaspoon of baking soda into a glass of water. Swish and spit.  Call your doctor or return to this facility if you develop any of the following:   Sore throat   White patches in the mouth or throat   Fever of 100.4ºF (38ºC) or higher, or as directed by your healthcare provider  © 2000-2011 [employer] [facility], [address] [place] [address], [place] [address]. All rights reserved. This information is not intended as a substitute for professional medical care. Always follow your healthcare professional's  Nutrition During Chemotherapy   During chemotherapy, the energy provided by a healthy diet can help you rebuild normal cells. It can also help you keep up your strength and fight infection. As a result, you may feel better and be more able to cope with side effects. Ask your doctor about your nutrition needs.   Drink Plenty of Fluids   Fluids help the body produce urine and decrease constipation. They help prevent kidney and bladder problems. They also help replace fluids lost from vomiting and diarrhea.   Try water, unsweetened juices, and other flavored drinks without caffeine. They flush toxins from the body. Avoid drinks with added sugar or those with artificial sweetener.  Get Enough Calories   Calories are fuel. The body uses this fuel to perform all of its functions, including healing.   Its okay to be lean, but be sure you are not underweight. If you are, try eating more calories.   Eat calorie-dense foods such as avocados, peanut butter, eggs, and ice cream.   If you need extra calories, add butter, gravy, and sauces to foods (if tolerated).   Limit the amount of processed foods you eat. Also try to limit foods that are fried, greasy, or high in fat or added sugar.  Eat Protein, Fruits, and Vegetables   Protein builds muscle, bone, skin, and blood. It helps your body heal and fight infection. It also helps boost your energy level.   Good  choices include yogurt, eggs, chicken, lean meats, and peanut butter.   Fruits and vegetables are full of vitamins and nutrition. Beans are high in protein.   Try to eat a variety of vegetables, fruits, whole grains, and beans.   Ask your doctor about instant protein powder or other supplements.  Eating Right During Treatment   Side effects may make it a little harder to eat well on some days. The following tips will help you to continue to get the nutrition you need.   Be open to new foods and recipes.   Eat small portions often and slowly.   Have a healthy snack instead of a meal if you are not very hungry.   Try eating in a new setting.   Physical activity, such as walking, can help increase your appetite. Try to be active for at least 30 minutes each day.   Round off your diet with vitamins from fruit, vegetables, and grains.   If you live alone and are not up to cooking, ask your healthcare provider about Meals on Wheels or other outreach programs.  For more information, go to www.cancer.org or call 8-101-MSM-2388.    © 8473-1905 [employer], [address] [place] [address], [place] [address]. All rights reserved. This information is not intended as a substitute for professional medical care. Always follow your healthcare professional's

## 2023-07-26 NOTE — TELEPHONE ENCOUNTER
Called patient to discuss transplant and address questions; per patient tired after treatment and would like a callback next week.

## 2023-07-27 ENCOUNTER — DOCUMENTATION ONLY (OUTPATIENT)
Dept: HEMATOLOGY/ONCOLOGY | Facility: CLINIC | Age: 63
End: 2023-07-27
Payer: COMMERCIAL

## 2023-07-27 NOTE — PROGRESS NOTES
Incoming call from pt with questions about revlimid from [employer], starting new cycle in a couple days. States when I was out she was told by covering NN how to take remaining dose of revlimid. We reviewed what she was told and I confirmed that was correct. States she is supposed to start next cycle tomorrow but hasn't received shipment yet. Told her I will call [employer] for update. Called [employer] and spoke with Gale who told me new auth is needed for next fill as current auth expires tomorrow, . This is confirmed when viewing the [employer] portal. Asked Gale if I can request auth today, states no it has to be tomorrow. Explained to her pt has 2 pills remaining from previous cycle b/c she got off track, will need new fill by Sat, asked if she'd be able to receive in time to avoid dose interruption. States if I update them with new auth # tomorrow they will overnight med to her to have in time to resume med as scheduled and w/o dose interruption. Gave me the direct pharmacy team # to call [phone]. States she is noting in pt record our conversation and what needs to happen when they receive new auth. Thanked her for her assist, remind me set so that I can obtain new auth # in the am.   Oncology Navigation   Intake  Date of Diagnosis: 23  Cancer Type: Transplant; Myeloma  Internal / External Referral: Internal  Date of Referral: 05/10/23  Initial Nurse Navigator Contact: 05/15/23  Referral to Initial Contact Timeline (days): 5  Date Worked: 23  Reason if booked > 7 days after scheduling: Additional tests/procedures; Patient request     Treatment  Current Status: Active       Medical Oncologist: Dr. FRITZ Dela Cruz  Chemotherapy: Initiated  Chemotherapy Regimen: Velcade (Durvalumab possible pending FISH)  Oral Therapy: Initiated                       Acuity  Systemic Treatment - predicted or initiated: Chemotherapy Regimen with Multiple drugs (+1)  ECO  Comorbidities in Medical History: 2    Needed: 0  Support: 0  Verbalizes Financial Concerns: 1  Transportation: 0  Psychological Factors (+1 each): Emotional during conversation  Verbalizes the need for more education: 1  Navigation Acuity: 3     Follow Up  No follow-ups on file.

## 2023-07-28 ENCOUNTER — OFFICE VISIT (OUTPATIENT)
Dept: NEUROSURGERY | Facility: CLINIC | Age: 63
End: 2023-07-28
Payer: COMMERCIAL

## 2023-07-28 ENCOUNTER — DOCUMENTATION ONLY (OUTPATIENT)
Dept: HEMATOLOGY/ONCOLOGY | Facility: CLINIC | Age: 63
End: 2023-07-28
Payer: COMMERCIAL

## 2023-07-28 VITALS
BODY MASS INDEX: 22.06 KG/M2 | DIASTOLIC BLOOD PRESSURE: 63 MMHG | HEIGHT: 64 IN | RESPIRATION RATE: 16 BRPM | HEART RATE: 78 BPM | SYSTOLIC BLOOD PRESSURE: 92 MMHG | WEIGHT: 129.19 LBS

## 2023-07-28 DIAGNOSIS — Z98.890 STATUS POST KYPHOPLASTY: Primary | ICD-10-CM

## 2023-07-28 DIAGNOSIS — C90.00 MULTIPLE MYELOMA, REMISSION STATUS UNSPECIFIED: ICD-10-CM

## 2023-07-28 DIAGNOSIS — M51.36 DEGENERATIVE DISC DISEASE, LUMBAR: ICD-10-CM

## 2023-07-28 PROCEDURE — 3074F PR MOST RECENT SYSTOLIC BLOOD PRESSURE < 130 MM HG: ICD-10-PCS | Mod: CPTII,S$GLB,, | Performed by: NEUROLOGICAL SURGERY

## 2023-07-28 PROCEDURE — 3044F HG A1C LEVEL LT 7.0%: CPT | Mod: CPTII,S$GLB,, | Performed by: NEUROLOGICAL SURGERY

## 2023-07-28 PROCEDURE — 1159F PR MEDICATION LIST DOCUMENTED IN MEDICAL RECORD: ICD-10-PCS | Mod: CPTII,S$GLB,, | Performed by: NEUROLOGICAL SURGERY

## 2023-07-28 PROCEDURE — 3044F PR MOST RECENT HEMOGLOBIN A1C LEVEL <7.0%: ICD-10-PCS | Mod: CPTII,S$GLB,, | Performed by: NEUROLOGICAL SURGERY

## 2023-07-28 PROCEDURE — 3074F SYST BP LT 130 MM HG: CPT | Mod: CPTII,S$GLB,, | Performed by: NEUROLOGICAL SURGERY

## 2023-07-28 PROCEDURE — 99999 PR PBB SHADOW E&M-EST. PATIENT-LVL III: CPT | Mod: PBBFAC,,, | Performed by: NEUROLOGICAL SURGERY

## 2023-07-28 PROCEDURE — 3008F BODY MASS INDEX DOCD: CPT | Mod: CPTII,S$GLB,, | Performed by: NEUROLOGICAL SURGERY

## 2023-07-28 PROCEDURE — 3008F PR BODY MASS INDEX (BMI) DOCUMENTED: ICD-10-PCS | Mod: CPTII,S$GLB,, | Performed by: NEUROLOGICAL SURGERY

## 2023-07-28 PROCEDURE — 3078F DIAST BP <80 MM HG: CPT | Mod: CPTII,S$GLB,, | Performed by: NEUROLOGICAL SURGERY

## 2023-07-28 PROCEDURE — 99024 POSTOP FOLLOW-UP VISIT: CPT | Mod: S$GLB,,, | Performed by: NEUROLOGICAL SURGERY

## 2023-07-28 PROCEDURE — 99024 PR POST-OP FOLLOW-UP VISIT: ICD-10-PCS | Mod: S$GLB,,, | Performed by: NEUROLOGICAL SURGERY

## 2023-07-28 PROCEDURE — 1159F MED LIST DOCD IN RCRD: CPT | Mod: CPTII,S$GLB,, | Performed by: NEUROLOGICAL SURGERY

## 2023-07-28 PROCEDURE — 3078F PR MOST RECENT DIASTOLIC BLOOD PRESSURE < 80 MM HG: ICD-10-PCS | Mod: CPTII,S$GLB,, | Performed by: NEUROLOGICAL SURGERY

## 2023-07-28 PROCEDURE — 4010F ACE/ARB THERAPY RXD/TAKEN: CPT | Mod: CPTII,S$GLB,, | Performed by: NEUROLOGICAL SURGERY

## 2023-07-28 PROCEDURE — 4010F PR ACE/ARB THEARPY RXD/TAKEN: ICD-10-PCS | Mod: CPTII,S$GLB,, | Performed by: NEUROLOGICAL SURGERY

## 2023-07-28 PROCEDURE — 99999 PR PBB SHADOW E&M-EST. PATIENT-LVL III: ICD-10-PCS | Mod: PBBFAC,,, | Performed by: NEUROLOGICAL SURGERY

## 2023-07-28 RX ORDER — LENALIDOMIDE 10 MG/1
1 CAPSULE ORAL DAILY
Qty: 14 EACH | Refills: 5 | Status: SHIPPED | OUTPATIENT
Start: 2023-07-28 | End: 2023-08-02

## 2023-07-28 RX ORDER — ACETAMINOPHEN AND CODEINE PHOSPHATE 300; 30 MG/1; MG/1
1 TABLET ORAL EVERY 8 HOURS PRN
Qty: 30 TABLET | Refills: 0 | Status: SHIPPED | OUTPATIENT
Start: 2023-07-28 | End: 2023-08-07

## 2023-07-28 NOTE — PROGRESS NOTES
Called and spoke with Shira @ [employer] to give her new auth # [id number]. Explained that pt is due to start next cycle today, has 2 pills from a previous cycle that she can take but to avoid dose interruption will need med delivered by  (). Shira states she will request an expedited ship, once ready they will reach out to pt to schedule delivery. Told her I will notify pt so she can make sure she answers phone. Thanked her for her help. Called and spoke with pt letting her know new auth received, Chau is processing order and will reach out to her to schedule delivery by  () to avoid dose interruption. Asked again when she should complete cycle, counted and told her day 14 will be 8/10. No other questions from pt, she does have my number should that change.   Oncology Navigation   Intake  Date of Diagnosis: 23  Cancer Type: Transplant; Myeloma  Internal / External Referral: Internal  Date of Referral: 05/10/23  Initial Nurse Navigator Contact: 05/15/23  Referral to Initial Contact Timeline (days): 5  Date Worked: 23  Reason if booked > 7 days after scheduling: Additional tests/procedures; Patient request     Treatment  Current Status: Active       Medical Oncologist: Dr. FRITZ Dela Cruz  Chemotherapy: Initiated  Chemotherapy Regimen: Velcade (Durvalumab possible pending FISH)  Oral Therapy: Initiated                       Acuity  Systemic Treatment - predicted or initiated: Chemotherapy Regimen with Multiple drugs (+1)  ECO  Comorbidities in Medical History: 2   Needed: 0  Support: 0  Verbalizes Financial Concerns: 1  Transportation: 0  Psychological Factors (+1 each): Emotional during conversation  Verbalizes the need for more education: 1  Navigation Acuity: 3     Follow Up  No follow-ups on file.

## 2023-07-28 NOTE — PROGRESS NOTES
Subjective:      Patient ID: Ana Bonilla is a 62 y.o. female.    Chief Complaint: Post-op Evaluation (Patient states that she is in pain. Rates pain 3/10 on right side of back. Sitting makes pain worse. )    HPI  Pt here s/p kypho L1 for pathologic compression fx from mutiple myeloma   Back pain resolved   Still with some r hip pain   Pain 3/10   Ambulates unassisted   Not wearing brace   Wants to try to go back to work at [employer]   No other new complaints at this time       Date of Procedure: 6/8/2023    Procedure: Procedure(s) (LRB):  Kyphoplasty (Bilateral)  BIOPSY (N/A)   Radiofrequency ablation     Operative Findings (including complications, if any):   L1 compression fracture secondary to tumor    Description of Technical Procedures:   Kyphoplasty L1   Vertebral body biopsy L1   Radiofrequency ablation bilateral L1              Objective:            General    Constitutional: She is oriented to person, place, and time. She appears well-developed and well-nourished.   Cardiovascular:  Normal rate and regular rhythm.            Pulmonary/Chest: Effort normal.   Abdominal: Soft.   Neurological: She is alert and oriented to person, place, and time.   Psychiatric: She has a normal mood and affect. Her behavior is normal.             Neurosurgery exam:  Vitals reviewed  Moves all 4 ext  Incisions are healed             Component 2 wk ago   Final Pathologic Diagnosis 1. L1 vertebral body,  biopsy:   - Consistent with plasma cell myeloma.  See comment.    Comment: Interp By Olga Lidia Brand MD, Signed on 06/15/2023 at 09:15   Comment Flow cytometry analysis of tissue was not submitted.     Immunohistochemical studies were performed on the paraffin embedded tissue block with adequate positive and negative controls.  The plasma cells are  positive and AE1/AE3 negative.  In Situ hybridization for kappa and lambda shows lambda light chain    restricted plasma cells.     Findings are consistent with plasma cell  myeloma.  Correlate clinically.        Assessment:       Encounter Diagnoses   Name Primary?    Status post kyphoplasty Yes    Multiple myeloma, remission status unspecified     Degenerative disc disease, lumbar           Plan:       Ana was seen today for post-op evaluation.    Diagnoses and all orders for this visit:    Status post kyphoplasty    Multiple myeloma, remission status unspecified  -     acetaminophen-codeine 300-30mg (TYLENOL #3) 300-30 mg Tab; Take 1 tablet by mouth every 8 (eight) hours as needed.    Degenerative disc disease, lumbar  -     acetaminophen-codeine 300-30mg (TYLENOL #3) 300-30 mg Tab; Take 1 tablet by mouth every 8 (eight) hours as needed.    Doing well   Back pain better   Still with the R hip pain   Currently seeing heme onc for treatments   No further nsr recs at this time   Will continue to monitor her in clinic   Call with any questions or new neuro symptoms     Thank you for the referral   Please call with any questions    Fausto Smith MD  Neurosurgery     Disclaimer: This note was prepared using a voice recognition system and is likely to have sound alike errors within the text.

## 2023-07-31 ENCOUNTER — CLINICAL SUPPORT (OUTPATIENT)
Dept: SMOKING CESSATION | Facility: CLINIC | Age: 63
End: 2023-07-31
Payer: COMMERCIAL

## 2023-07-31 DIAGNOSIS — F17.200 NICOTINE DEPENDENCE: Primary | ICD-10-CM

## 2023-07-31 PROCEDURE — 99403 PREV MED CNSL INDIV APPRX 45: CPT | Mod: 95,,, | Performed by: GENERAL PRACTICE

## 2023-07-31 PROCEDURE — 99403 PR PREVENT COUNSEL,INDIV,45 MIN: ICD-10-PCS | Mod: 95,,, | Performed by: GENERAL PRACTICE

## 2023-07-31 NOTE — PROGRESS NOTES
Individual Follow-Up Form    7/31/2023    Quit Date: TBD    Clinical Status of Patient: Outpatient    Length of Service: 45 minutes    Continuing Medication: yes  Patches    Other Medications: none     Target Symptoms: Withdrawal and medication side effects. The following were  rated moderate (3) to severe (4) on TCRS:  Moderate (3): irritable, desire tobacco, frustrated, difficulty sleeping  Severe (4): none    Comments: Spoke with patient via virtual appointment in regards to her smoking cessation progress. Several minutes into the appointment we lost sound but had video. Telephone call was made to her cell phone to complete appointment. She stated that she has not worn a nicotine patch in almost 5 days. She was putting a patch on around 2:00 pm every day and stated that last week she went for her chemo treatment on Wednesday and forgot to remove her old patch and put on a new patch. She stated that when she realized that she had not changed her patch she decided to not put one on and see how she felt. She stated that her doctor prescribed medications to help her relax and sleep  but when she would have a patch on she would be awake most of the night. She stated that she is under a lot of stress with her son and the xanax helps her relax. She stated that she wants to go back to work for a few days each week for the social interaction and stated that she cannot smoke at work which will help with this quit attempt. Discussed restarting the nicotine patch and placing it on first thing in the morning but removing the patch when she gets in bed at night. Discussed using the coping strategies and relaxation strategies to refrain from smoking. She will start using cough drops in place of smoking. She verbalized understanding. Will continue to encourage and monitor progress.    Diagnosis: F17.200    Next Visit: 2 weeks

## 2023-08-01 ENCOUNTER — LAB VISIT (OUTPATIENT)
Dept: LAB | Facility: HOSPITAL | Age: 63
End: 2023-08-01
Attending: INTERNAL MEDICINE
Payer: COMMERCIAL

## 2023-08-01 DIAGNOSIS — C90.00 MULTIPLE MYELOMA, REMISSION STATUS UNSPECIFIED: ICD-10-CM

## 2023-08-01 LAB
ALBUMIN SERPL BCP-MCNC: 3.8 G/DL (ref 3.5–5.2)
ALP SERPL-CCNC: 76 U/L (ref 55–135)
ALT SERPL W/O P-5'-P-CCNC: 18 U/L (ref 10–44)
ANION GAP SERPL CALC-SCNC: 11 MMOL/L (ref 8–16)
AST SERPL-CCNC: 17 U/L (ref 10–40)
BASOPHILS # BLD AUTO: 0.02 K/UL (ref 0–0.2)
BASOPHILS NFR BLD: 0.5 % (ref 0–1.9)
BILIRUB SERPL-MCNC: 0.5 MG/DL (ref 0.1–1)
BUN SERPL-MCNC: 11 MG/DL (ref 8–23)
CALCIUM SERPL-MCNC: 9 MG/DL (ref 8.7–10.5)
CHLORIDE SERPL-SCNC: 106 MMOL/L (ref 95–110)
CO2 SERPL-SCNC: 25 MMOL/L (ref 23–29)
CREAT SERPL-MCNC: 0.8 MG/DL (ref 0.5–1.4)
DIFFERENTIAL METHOD: ABNORMAL
EOSINOPHIL # BLD AUTO: 0.1 K/UL (ref 0–0.5)
EOSINOPHIL NFR BLD: 3 % (ref 0–8)
ERYTHROCYTE [DISTWIDTH] IN BLOOD BY AUTOMATED COUNT: 14.4 % (ref 11.5–14.5)
EST. GFR  (NO RACE VARIABLE): >60 ML/MIN/1.73 M^2
GLUCOSE SERPL-MCNC: 143 MG/DL (ref 70–110)
HCT VFR BLD AUTO: 34.3 % (ref 37–48.5)
HGB BLD-MCNC: 11.3 G/DL (ref 12–16)
IMM GRANULOCYTES # BLD AUTO: 0 K/UL (ref 0–0.04)
IMM GRANULOCYTES NFR BLD AUTO: 0 % (ref 0–0.5)
LYMPHOCYTES # BLD AUTO: 1.7 K/UL (ref 1–4.8)
LYMPHOCYTES NFR BLD: 40.7 % (ref 18–48)
MCH RBC QN AUTO: 31.5 PG (ref 27–31)
MCHC RBC AUTO-ENTMCNC: 32.9 G/DL (ref 32–36)
MCV RBC AUTO: 96 FL (ref 82–98)
MONOCYTES # BLD AUTO: 0.5 K/UL (ref 0.3–1)
MONOCYTES NFR BLD: 12.6 % (ref 4–15)
NEUTROPHILS # BLD AUTO: 1.8 K/UL (ref 1.8–7.7)
NEUTROPHILS NFR BLD: 43.2 % (ref 38–73)
NRBC BLD-RTO: 0 /100 WBC
PLATELET # BLD AUTO: 268 K/UL (ref 150–450)
PMV BLD AUTO: 9.5 FL (ref 9.2–12.9)
POTASSIUM SERPL-SCNC: 3.5 MMOL/L (ref 3.5–5.1)
PROT SERPL-MCNC: 7.3 G/DL (ref 6–8.4)
RBC # BLD AUTO: 3.59 M/UL (ref 4–5.4)
SODIUM SERPL-SCNC: 142 MMOL/L (ref 136–145)
WBC # BLD AUTO: 4.05 K/UL (ref 3.9–12.7)

## 2023-08-01 PROCEDURE — 85025 COMPLETE CBC W/AUTO DIFF WBC: CPT | Performed by: INTERNAL MEDICINE

## 2023-08-01 PROCEDURE — 36415 COLL VENOUS BLD VENIPUNCTURE: CPT | Performed by: INTERNAL MEDICINE

## 2023-08-01 PROCEDURE — 84165 PROTEIN E-PHORESIS SERUM: CPT | Performed by: INTERNAL MEDICINE

## 2023-08-01 PROCEDURE — 84165 PROTEIN E-PHORESIS SERUM: CPT | Mod: 26,,, | Performed by: PATHOLOGY

## 2023-08-01 PROCEDURE — 80053 COMPREHEN METABOLIC PANEL: CPT | Performed by: INTERNAL MEDICINE

## 2023-08-01 PROCEDURE — 83521 IG LIGHT CHAINS FREE EACH: CPT | Mod: 59 | Performed by: INTERNAL MEDICINE

## 2023-08-01 PROCEDURE — 84165 PATHOLOGIST INTERPRETATION SPE: ICD-10-PCS | Mod: 26,,, | Performed by: PATHOLOGY

## 2023-08-01 NOTE — PROGRESS NOTES
HEMATOLOGY / ONCOLOGY   CLINIC NOTE       Established Patient - Telehealth Visit     The patient location is: home  The chief complaint leading to consultation is: follow up   Visit type: Virtual visit with synchronous audio and video  Total time spent with patient: 15       The reason for the audio only service rather than synchronous audio and video virtual visit was related to technical difficulties or patient preference/necessity.     Each patient to whom I provide medical services by telemedicine is:  (1) informed of the relationship between the physician and patient and the respective role of any other health care provider with respect to management of the patient; and (2) notified that they may decline to receive medical services by telemedicine and may withdraw from such care at any time. Patient verbally consented to receive this service via voice-only telephone call.                       This service was not originating from a related E/M service provided within the previous 7 days nor will  to an E/M service or procedure within the next 24 hours or my soonest available appointment.  Prevailing standard of care was able to be met in this audio-only visit.    ONCOLOGICAL HISTORY:     Diagnosis:  - Multiple Myeloma IgG lambda    Treatment History:  - VRD (5/10/2023 - 6/28/2023)    Current Treatment:   - D-VRD (7/6/2023 -   - Zometa pending dental clearance     Subjective:       Chief Complaint: Multiple Myeloma and Chemotherapy      HPI    Ana Bonilla  62 y.o.  female with past medical history significant for hypertension, COPD, asthma, GERD, hypothyroidism here for follow-up and management of multiple myeloma    She was referred in 2/2023 by her PCP after workup for gastritis revealed lytic lesions. CT chest showed lytic and expansile destructive lesion in the sternum and lytic lesions at L1. Biopsy of L1 lesion in 3/2023 revealed mature B cell neoplasm most consistent with plasma  cell neoplasm.      Bone marrow biopsy was performed in 4/2023 that demonstrated 60% plasma cells. PET/CT showed extensive bony lesions throughout the spine, sternum, bilateral ribs, pelvis, and a mild compression deformity in L1. SPEP/MECHE showed IgG lambda monoclonal protein measuring 3.19 g/dL. Quantitative immunoglobulins on 3/27/2023 showed IgG 4,521 mg/dL. UPEP/MECHE and FLC were pending. Labs on 3/27/2023 showed Beta 2 microglobulin 1.9, .  Labs on 4/19/2023 showed Hgb, 11.5, WBC 6.3, platelets, BUN 11, Cr 0.9, calcium 9.     She was started on VRd (Velcade/Revlimid/dexamethasone) every 21 days and then daratumumab was added by the transplant team. She was started on ASA for thrombosis prophylaxis and acyclovir for herpes zoster prophylaxis. Plan to start Zometa after dental evaluation.      In 5/2023, Revlimid was decreased from 25mg po 10mg due to decreased creatinine clearance of 49. She underwent kyphoplasty with radiofrequency ablation on 6/8/2023 by Neurosurgery.     Patient was evaluated by the bone Marrow transplant team who recommended to change the treatment to D-VRD with Revlimid being given for 21 days on 7 days dago cycle and was started on the treatment on 07/06/2023         Interval History:     Patient here for follow-up and next cycle of treatment.  She is tolerating it without any issues but has not got her Revlimid for the next cycle yet.     Patient presents for follow up on Rosa+VRd; She is scheduled to receive C2D1 today but is still waiting for her Revlimid refill.          Past Medical History:   Diagnosis Date    Allergy     Amblyopia OS    Anxiety     Asthma     COPD (chronic obstructive pulmonary disease)     NO HOME o2    GERD (gastroesophageal reflux disease)     Hyperlipidemia     Hypertension     PONV (postoperative nausea and vomiting)     Thyroid disease       Past Surgical History:   Procedure Laterality Date    APPENDECTOMY      BIOPSY N/A 6/8/2023    Procedure: BIOPSY;   Surgeon: Fausto Smith MD;  Location: [facility] OR;  Service: Neurosurgery;  Laterality: N/A;  L1    BUNIONECTOMY      COLONOSCOPY N/A 12/4/2019    Procedure: COLONOSCOPY;  Surgeon: Danny Matos III, MD;  Location: [facility] ENDO;  Service: Endoscopy;  Laterality: N/A;    COLONOSCOPY N/A 10/24/2022    Procedure: COLONOSCOPY;  Surgeon: Danny Matos III, MD;  Location: [facility] ENDO;  Service: Endoscopy;  Laterality: N/A;    ESOPHAGOGASTRODUODENOSCOPY N/A 10/24/2022    Procedure: EGD (ESOPHAGOGASTRODUODENOSCOPY);  Surgeon: Danny Matos III, MD;  Location: [facility] ENDO;  Service: Endoscopy;  Laterality: N/A;    FIXATION KYPHOPLASTY Bilateral 6/8/2023    Procedure: KYPHOPLASTY;  Surgeon: Fausto Simth MD;  Location: [facility] OR;  Service: Neurosurgery;  Laterality: Bilateral;  kyphoplasty and radiofrequency ablation - L1    HYSTERECTOMY      PARTIAL//still with ovaries    neck fusion  08/2017    THYROIDECTOMY       Social History     Socioeconomic History    Marital status:     Number of children: 2   Occupational History     Employer: [employer]   Tobacco Use    Smoking status: Every Day     Current packs/day: 0.50     Average packs/day: 0.5 packs/day for 50.0 years (25.0 ttl pk-yrs)     Types: Cigarettes    Smokeless tobacco: Never   Substance and Sexual Activity    Alcohol use: Never     Comment: quit    Drug use: No    Sexual activity: Never     Social Determinants of Health     Stress: No Stress Concern Present (7/8/2019)    [employer] [place] [employer] - Occupational Stress Questionnaire     Feeling of Stress : Not at all      Family History   Problem Relation Age of Onset    Hypertension Mother     Diabetes Mother     Diabetes Father     Stroke Maternal Grandmother     Prostate cancer Maternal Grandfather     Mental illness Son     Pancreatic cancer Maternal Uncle     Mental illness Other     Pancreatic cancer Other     Ovarian cancer Maternal Cousin       Review of patient's allergies indicates:    Allergen Reactions    Hydrocodone-acetaminophen Other (See Comments)     Causes pt to feel extremely sick       Review of Systems   Constitutional:  Positive for fatigue. Negative for activity change, chills and fever.   HENT: Negative.     Eyes: Negative.    Respiratory:  Negative for cough and shortness of breath.    Cardiovascular:  Negative for chest pain and leg swelling.   Gastrointestinal:  Positive for nausea. Negative for constipation, diarrhea and vomiting.   Endocrine: Negative.    Genitourinary: Negative.    Musculoskeletal:  Positive for back pain. Negative for arthralgias and myalgias.   Integumentary:  Negative.   Allergic/Immunologic: Negative.    Neurological:  Negative for light-headedness, numbness and headaches.   Hematological: Negative.    Psychiatric/Behavioral: Negative.           Objective:        There were no vitals filed for this visit.           Physical Exam  Constitutional:       General: She is not in acute distress.     Appearance: Normal appearance. She is not ill-appearing.   HENT:      Head: Normocephalic.      Mouth/Throat:      Mouth: Mucous membranes are moist.   Eyes:      Extraocular Movements: Extraocular movements intact.   Pulmonary:      Effort: Pulmonary effort is normal.   Neurological:      Mental Status: She is alert and oriented to person, place, and time. Mental status is at baseline.   Psychiatric:         Mood and Affect: Mood normal.         Behavior: Behavior normal.         LABS / IMAGING      - 06/06/2023 RIGHT ILIAC CREST BONE MARROW ASPIRATE, BONE MARROW CLOT, AND BONE MARROW CORE BIOPSY WITH:     CELLULARITY=40-60%, TRILINEAGE HEMATOPOIETIC ACTIVITY (M/E=2.9:1).   CONSISTENT WITH PLASMA CELL NEOPLASM(60%).  SEE COMMENT.   FOCAL GRADE 1 RETICULAR FIBROSIS.   CONGO RED NEGATIVE.   INCREASED STORAGE IRON.   ADEQUATE NUMBER OF MEGAKARYOCYTES.    Bone marrow karyotype results: 46, XX[20], female karyotype.     Myeloma fixed cell, high-risk, FISH:  Normal.  The  result is within normal limits for 1q duplication, TP53 deletion and IGH rearrangement.       - 04/10/2023 PET: Numerous FDG avid predominately lytic osseous lesions as above.  Primary differential considerations would include multiple myeloma versus metastatic disease.  2. No FDG avid soft tissue masses or adenopathy demonstrated.  3. Mild pathologic compression deformity of the L1 vertebral body.  This report was flagged in Epic as abnormal.        Electronically signed by: Marino Pollack MD  Date:                                                Assessment:         IgG lambda Multiple myeloma, R-ISS stage I  - BMBx : 60 % plasma cells - 4/6/2023  - SPEP/MECHE showed IgG lambda - monoclonal protein 3.19 g/dl - (3/27/2023).  - R-ISS stage I (beta 2 microglobulin 1.9, albumin 3.5, , normal karyotype and no high-risk mutations on fish panel)  - PET-CT ( 4/10/2023) - showed extensive lytic lesions in the axial skeleton and mild L1 compression deformity.  - Started with Induction chemotherapy with VRD regimen (Velcde/Revlimid/Decadron) - (5/10/2023 - 6/28/2023)  - Aspirin for thrombosis prophylaxis; Acyclovir 400 mg p.o BID for herpes Zoster prophylaxis .  - patient was evaluated by transplant team and then switched to D-VRD (7/6/2023 -    - repeat creatinine clearance is more than 60, thus will adjust the dose of Revlimid to 25 mg daily and also treat for 21 days on 7 days off as recommended by the transplant team    Cancer-related pain / palliative care by specialist  -  checked 05/10/2023   - switch [place] to Percocet as patient is unable to tolerate Norco      Plan:       - repeat creatinine clearance is more than 60, thus will adjust the dose of Revlimid to 25 mg daily and also treat for 21 days on 7 days off as recommended by the transplant team  - Labs reviewed; continue with C2D1 of Rosa+VRd     - requested to get the dental clearance of the patient from the dentist. According to patient, she was cleared  but do not have the document in the chart.   - SPEP, FLC levels pending   - Continue Aspirin; Acyclovir 400 mg p.o BID for herpes Zoster prophylaxis .  - follow up with [facility] transplant team (06/05/2023).   - MD / LABS / TREATMENT VISIT - 1 WEEK for cycle 2 day 8 treatment         Med Onc Chart Routing      Follow up with physician 1 week.   Follow up with WERNER    Infusion scheduling note   Chemo today and in 1 week   Injection scheduling note    Labs CBC, CMP and magnesium   Scheduling:  Preferred lab:  Lab interval:  Labs before next visit   Imaging    Pharmacy appointment    Other referrals       Please can we help the patient to get the Revlimid as soon as possible and also to get the medical records from the dentist           The patient was seen, interviewed and examined. Pertinent lab and radiology studies were reviewed. Pt instructed to call should develop concerning signs/symptoms or have further questions.       Portions of the record may have been created with voice recognition software. Occasional wrong-word or sound-a-like substitutions may have occurred due to the inherent limitations of voice recognition software. Read the chart carefully and recognize, using context, where substitutions have occurred.    Jose Dela Cruz MD  Hematology / Oncology

## 2023-08-02 ENCOUNTER — OFFICE VISIT (OUTPATIENT)
Dept: HEMATOLOGY/ONCOLOGY | Facility: CLINIC | Age: 63
End: 2023-08-02
Payer: COMMERCIAL

## 2023-08-02 ENCOUNTER — TELEPHONE (OUTPATIENT)
Dept: NEUROSURGERY | Facility: CLINIC | Age: 63
End: 2023-08-02
Payer: COMMERCIAL

## 2023-08-02 ENCOUNTER — INFUSION (OUTPATIENT)
Dept: INFUSION THERAPY | Facility: HOSPITAL | Age: 63
End: 2023-08-02
Attending: INTERNAL MEDICINE
Payer: COMMERCIAL

## 2023-08-02 ENCOUNTER — DOCUMENTATION ONLY (OUTPATIENT)
Dept: INFUSION THERAPY | Facility: HOSPITAL | Age: 63
End: 2023-08-02

## 2023-08-02 VITALS
HEIGHT: 64 IN | HEART RATE: 80 BPM | TEMPERATURE: 99 F | RESPIRATION RATE: 18 BRPM | SYSTOLIC BLOOD PRESSURE: 116 MMHG | DIASTOLIC BLOOD PRESSURE: 73 MMHG | BODY MASS INDEX: 22.17 KG/M2 | OXYGEN SATURATION: 98 % | WEIGHT: 129.88 LBS

## 2023-08-02 DIAGNOSIS — C90.00 MULTIPLE MYELOMA, REMISSION STATUS UNSPECIFIED: Primary | ICD-10-CM

## 2023-08-02 DIAGNOSIS — Z51.11 ENCOUNTER FOR ANTINEOPLASTIC CHEMOTHERAPY: ICD-10-CM

## 2023-08-02 DIAGNOSIS — D84.821 IMMUNODEFICIENCY DUE TO CHEMOTHERAPY: ICD-10-CM

## 2023-08-02 DIAGNOSIS — T45.1X5A IMMUNODEFICIENCY DUE TO CHEMOTHERAPY: ICD-10-CM

## 2023-08-02 DIAGNOSIS — Z79.899 IMMUNODEFICIENCY DUE TO CHEMOTHERAPY: ICD-10-CM

## 2023-08-02 LAB
ALBUMIN SERPL ELPH-MCNC: 3.84 G/DL (ref 3.35–5.55)
ALPHA1 GLOB SERPL ELPH-MCNC: 0.29 G/DL (ref 0.17–0.41)
ALPHA2 GLOB SERPL ELPH-MCNC: 0.82 G/DL (ref 0.43–0.99)
B-GLOBULIN SERPL ELPH-MCNC: 0.63 G/DL (ref 0.5–1.1)
GAMMA GLOB SERPL ELPH-MCNC: 1.22 G/DL (ref 0.67–1.58)
KAPPA LC SER QL IA: 1.04 MG/DL (ref 0.33–1.94)
KAPPA LC/LAMBDA SER IA: 0.78 (ref 0.26–1.65)
LAMBDA LC SER QL IA: 1.34 MG/DL (ref 0.57–2.63)
PROT SERPL-MCNC: 6.8 G/DL (ref 6–8.4)

## 2023-08-02 PROCEDURE — 3044F HG A1C LEVEL LT 7.0%: CPT | Mod: CPTII,95,, | Performed by: INTERNAL MEDICINE

## 2023-08-02 PROCEDURE — 4010F ACE/ARB THERAPY RXD/TAKEN: CPT | Mod: CPTII,95,, | Performed by: INTERNAL MEDICINE

## 2023-08-02 PROCEDURE — 4010F PR ACE/ARB THEARPY RXD/TAKEN: ICD-10-PCS | Mod: CPTII,95,, | Performed by: INTERNAL MEDICINE

## 2023-08-02 PROCEDURE — 3044F PR MOST RECENT HEMOGLOBIN A1C LEVEL <7.0%: ICD-10-PCS | Mod: CPTII,95,, | Performed by: INTERNAL MEDICINE

## 2023-08-02 PROCEDURE — 96401 CHEMO ANTI-NEOPL SQ/IM: CPT

## 2023-08-02 PROCEDURE — 99215 OFFICE O/P EST HI 40 MIN: CPT | Mod: 95,,, | Performed by: INTERNAL MEDICINE

## 2023-08-02 PROCEDURE — 99215 PR OFFICE/OUTPT VISIT, EST, LEVL V, 40-54 MIN: ICD-10-PCS | Mod: 95,,, | Performed by: INTERNAL MEDICINE

## 2023-08-02 PROCEDURE — 63600175 PHARM REV CODE 636 W HCPCS: Mod: JZ,TB | Performed by: INTERNAL MEDICINE

## 2023-08-02 RX ORDER — BORTEZOMIB 3.5 MG/1
1.3 INJECTION, POWDER, LYOPHILIZED, FOR SOLUTION INTRAVENOUS; SUBCUTANEOUS
Status: CANCELLED | OUTPATIENT
Start: 2023-08-02

## 2023-08-02 RX ORDER — SODIUM CHLORIDE 0.9 % (FLUSH) 0.9 %
10 SYRINGE (ML) INJECTION
Status: CANCELLED | OUTPATIENT
Start: 2023-08-09

## 2023-08-02 RX ORDER — PROCHLORPERAZINE EDISYLATE 5 MG/ML
5 INJECTION INTRAMUSCULAR; INTRAVENOUS ONCE AS NEEDED
Status: CANCELLED
Start: 2023-08-02

## 2023-08-02 RX ORDER — HEPARIN 100 UNIT/ML
500 SYRINGE INTRAVENOUS
Status: CANCELLED | OUTPATIENT
Start: 2023-08-09

## 2023-08-02 RX ORDER — BORTEZOMIB 3.5 MG/1
1.3 INJECTION, POWDER, LYOPHILIZED, FOR SOLUTION INTRAVENOUS; SUBCUTANEOUS
Status: CANCELLED | OUTPATIENT
Start: 2023-08-16

## 2023-08-02 RX ORDER — HEPARIN 100 UNIT/ML
500 SYRINGE INTRAVENOUS
Status: CANCELLED | OUTPATIENT
Start: 2023-08-02

## 2023-08-02 RX ORDER — SODIUM CHLORIDE 0.9 % (FLUSH) 0.9 %
10 SYRINGE (ML) INJECTION
Status: CANCELLED | OUTPATIENT
Start: 2023-08-16

## 2023-08-02 RX ORDER — DIPHENHYDRAMINE HYDROCHLORIDE 50 MG/ML
50 INJECTION INTRAMUSCULAR; INTRAVENOUS ONCE AS NEEDED
Status: CANCELLED | OUTPATIENT
Start: 2023-08-23

## 2023-08-02 RX ORDER — BORTEZOMIB 3.5 MG/1
1.3 INJECTION, POWDER, LYOPHILIZED, FOR SOLUTION INTRAVENOUS; SUBCUTANEOUS
Status: COMPLETED | OUTPATIENT
Start: 2023-08-02 | End: 2023-08-02

## 2023-08-02 RX ORDER — HEPARIN 100 UNIT/ML
500 SYRINGE INTRAVENOUS
Status: CANCELLED | OUTPATIENT
Start: 2023-08-23

## 2023-08-02 RX ORDER — PROCHLORPERAZINE EDISYLATE 5 MG/ML
5 INJECTION INTRAMUSCULAR; INTRAVENOUS ONCE AS NEEDED
Status: CANCELLED
Start: 2023-08-09

## 2023-08-02 RX ORDER — PROCHLORPERAZINE EDISYLATE 5 MG/ML
5 INJECTION INTRAMUSCULAR; INTRAVENOUS ONCE AS NEEDED
Status: CANCELLED
Start: 2023-08-16

## 2023-08-02 RX ORDER — EPINEPHRINE 0.3 MG/.3ML
0.3 INJECTION SUBCUTANEOUS ONCE AS NEEDED
Status: CANCELLED | OUTPATIENT
Start: 2023-08-23

## 2023-08-02 RX ORDER — DIPHENHYDRAMINE HYDROCHLORIDE 50 MG/ML
50 INJECTION INTRAMUSCULAR; INTRAVENOUS ONCE AS NEEDED
Status: CANCELLED | OUTPATIENT
Start: 2023-08-16

## 2023-08-02 RX ORDER — SODIUM CHLORIDE 0.9 % (FLUSH) 0.9 %
10 SYRINGE (ML) INJECTION
Status: CANCELLED | OUTPATIENT
Start: 2023-08-23

## 2023-08-02 RX ORDER — LENALIDOMIDE 25 MG/1
25 CAPSULE ORAL DAILY
Qty: 21 CAPSULE | Refills: 2 | Status: SHIPPED | OUTPATIENT
Start: 2023-08-02 | End: 2023-08-30

## 2023-08-02 RX ORDER — BORTEZOMIB 3.5 MG/1
1.3 INJECTION, POWDER, LYOPHILIZED, FOR SOLUTION INTRAVENOUS; SUBCUTANEOUS
Status: CANCELLED | OUTPATIENT
Start: 2023-08-23

## 2023-08-02 RX ORDER — EPINEPHRINE 0.3 MG/.3ML
0.3 INJECTION SUBCUTANEOUS ONCE AS NEEDED
Status: CANCELLED | OUTPATIENT
Start: 2023-08-16

## 2023-08-02 RX ORDER — SODIUM CHLORIDE 0.9 % (FLUSH) 0.9 %
10 SYRINGE (ML) INJECTION
Status: CANCELLED | OUTPATIENT
Start: 2023-08-02

## 2023-08-02 RX ORDER — BORTEZOMIB 3.5 MG/1
1.3 INJECTION, POWDER, LYOPHILIZED, FOR SOLUTION INTRAVENOUS; SUBCUTANEOUS
Status: CANCELLED | OUTPATIENT
Start: 2023-08-09

## 2023-08-02 RX ORDER — PROCHLORPERAZINE EDISYLATE 5 MG/ML
5 INJECTION INTRAMUSCULAR; INTRAVENOUS ONCE AS NEEDED
Status: CANCELLED
Start: 2023-08-23

## 2023-08-02 RX ORDER — HEPARIN 100 UNIT/ML
500 SYRINGE INTRAVENOUS
Status: CANCELLED | OUTPATIENT
Start: 2023-08-16

## 2023-08-02 RX ADMIN — DARATUMUMAB AND HYALURONIDASE-FIHJ (HUMAN RECOMBINANT) 1800 MG: 1800; 30000 INJECTION SUBCUTANEOUS at 10:08

## 2023-08-02 RX ADMIN — BORTEZOMIB 2 MG: 3.5 INJECTION, POWDER, LYOPHILIZED, FOR SOLUTION INTRAVENOUS; SUBCUTANEOUS at 10:08

## 2023-08-02 NOTE — TELEPHONE ENCOUNTER
Patient informed that message has been sent to the providers to return to work without restrictions.

## 2023-08-02 NOTE — PROGRESS NOTES
Met and talked with pt while in infusion. States PAREDES is asking for dental clearance from dentist so that she can resume zometa tx. States form was sent to us in May so she's not sure why we don't have it. Reviewed form scanned in to system, told her will get MD to sign where appropriate and then fax back to her dentist. Explained the importance of needing dental clearance prior to zometa, she states she understands. She remembers having Revlimid 25mg as initial fill and has been keeping them just in case of a dose change. Told her I will ask MD if she will start as day 1 or day 6 if she begins taking today, as her planned start day 1 should've been . Once I know will notify pt. Has birthday coming up this Friday, , wanted to go to [employer] to eat crabs but was told by RD she could only eat 4 b/c of the salt. She has decided she and her  will go to [employer] instead. Told her to enjoy her birthday. All questions answered, she voiced understanding of waiting to hear from me before starting revlimid 25mg. In basket msg sent to MD and pharmD. MD signed dental clearance form and I faxed it to [phone]. Will follow.   Oncology Navigation   Intake  Date of Diagnosis: 23  Cancer Type: Transplant; Myeloma  Internal / External Referral: Internal  Date of Referral: 05/10/23  Initial Nurse Navigator Contact: 05/15/23  Referral to Initial Contact Timeline (days): 5  Date Worked: 23  Reason if booked > 7 days after scheduling: Additional tests/procedures; Patient request     Treatment  Current Status: Active       Medical Oncologist: Dr. FRITZ Dela Cruz  Chemotherapy: Initiated  Chemotherapy Regimen: Velcade (Durvalumab possible pending FISH)  Oral Therapy: Initiated                       Acuity  Systemic Treatment - predicted or initiated: Chemotherapy Regimen with Multiple drugs (+1)  ECO  Comorbidities in Medical History: 2   Needed: 0  Support: 0  Verbalizes Financial Concerns:  1  Transportation: 0  Psychological Factors (+1 each): Emotional during conversation  Verbalizes the need for more education: 1  Navigation Acuity: 3     Follow Up  Follow up in 1 week (on 8/9/2023).

## 2023-08-02 NOTE — TELEPHONE ENCOUNTER
----- Message from Amaris Sampson sent at 8/2/2023  3:40 PM CDT -----  Contact: patient  Ana Bonillajazmine would like a call back at [phone], in regards to getting a return to [employer] excuse.

## 2023-08-02 NOTE — NURSING
1017: Darzalex and Velcade Injection given without difficulties.Bandaid applied. Patient instructed to stay in the clinic for 15 minutes. Patient verbalized understanding and will notify nurse with any complaints.

## 2023-08-02 NOTE — DISCHARGE INSTRUCTIONS
Thank you for allowing me to care for you today,  MIS LeachN, RN    [place]  [address] [address]  [phone] phone     [phone] fax  Hours of Operation: Monday- Friday 8:00am- 5:00pm  After hours phone  [phone]  Hematology / Oncology Physicians on call      JUANI Garc[address] Dr., BELLE Page, BELLE Valdez, BELLE Deluna, BELLE    Please call with any concerns regarding your appointment today.

## 2023-08-03 ENCOUNTER — PATIENT MESSAGE (OUTPATIENT)
Dept: NEUROSURGERY | Facility: CLINIC | Age: 63
End: 2023-08-03
Payer: COMMERCIAL

## 2023-08-04 ENCOUNTER — PATIENT MESSAGE (OUTPATIENT)
Dept: NEUROSURGERY | Facility: CLINIC | Age: 63
End: 2023-08-04
Payer: COMMERCIAL

## 2023-08-04 ENCOUNTER — TELEPHONE (OUTPATIENT)
Dept: NEUROSURGERY | Facility: CLINIC | Age: 63
End: 2023-08-04
Payer: COMMERCIAL

## 2023-08-04 ENCOUNTER — TELEPHONE (OUTPATIENT)
Dept: HEMATOLOGY/ONCOLOGY | Facility: CLINIC | Age: 63
End: 2023-08-04
Payer: COMMERCIAL

## 2023-08-04 NOTE — LETTER
August 4, 2023    Ana Bonilla  [address]  [place] [address]         [facility] Neurosurgery  [address] [place] [address] [address] [place] [address]  Phone: [phone]  Fax: [phone] August 4, 2023     Patient: Ana Bonilla   YOB: 1960   Date of Visit: 8/4/2023       To Whom It May Concern:    It is my medical opinion that Ana Bonilla may return to work on 8/12/23 without restrictions .    If you have any questions or concerns, please don't hesitate to call.    Sincerely,        Bertrand De La Rosa PA-C

## 2023-08-04 NOTE — TELEPHONE ENCOUNTER
----- Message from Laura Partida MA sent at 8/2/2023  4:35 PM CDT -----  Regarding: Clearance to Return To Work  Contact: patient  Patient needs a letter stating that she can return back to work without restrictions. Patient will return on the 12th.   ----- Message -----  From: Amaris Sampson  Sent: 8/2/2023   3:41 PM CDT  To: Sarah Lambert Staff    Ana Sunshine would like a call back at [phone], in regards to getting a return to [employer] excuse.

## 2023-08-06 LAB — PATHOLOGIST INTERPRETATION SPE: NORMAL

## 2023-08-07 NOTE — PROGRESS NOTES
Subjective:       Patient ID: Ana Bonilla is a 63 y.o. female.    Chief Complaint: Multiple Myeloma and Chemotherapy    Primary Oncologist/Hematologist: Dr. Dela Cruz    HPI: Ms. Bonilla is a 63 year old female who is following up for her multiple myeloma. She is here for cycle 2 day 8 of bortezomib (velcade) + daratumumab (darzalex) + lenalidomide + dexamethasone. She takes lenalidomide 25mg on days 1-21 on a 28 day cycle.  She also gets zometa q 4 weeks, last dose 5/31/23. Dental clearance confirmed.  Cancer Hx:  BMBx on 4/6/2023 showed 60 % plasma cells. PET-CT on 4/10/2023 showed extensive lytic bony lesions throughout the spine, sternum, bilateral ribs, pelvis and mild compression deformity in L1 vertebral body.    Today: She continues with her dexamethasone and lenalidomide. She has 8 or so pills left of her 25mg revlimid. She is on day 5 of this cycle. She has constipation but takes linzess. She is nauseated at times, and takes her medication but wants to try to stop taking her anti-emetics because it makes her fatigued. She states her appetite is good and she is staying hydrated. She uses a nicotine patch but she states it is getting hard because it keeps her up at night.  She sees pain management, Dr. Smith. He has cleared her to go back to work, she worked on the floor at [employer]. She states she is excited to go back to work but she is fatigued. She doesn't take calcium or vitamin D because it makes her nauseated. Spoke with dentist and she doesn't need anything done.     Social History     Socioeconomic History    Marital status:     Number of children: 2   Occupational History     Employer: [employer]   Tobacco Use    Smoking status: Every Day     Current packs/day: 0.50     Average packs/day: 0.5 packs/day for 50.0 years (25.0 ttl pk-yrs)     Types: Cigarettes    Smokeless tobacco: Never   Substance and Sexual Activity    Alcohol use: Never     Comment: quit    Drug use: No    Sexual activity:  Never     Social Determinants of Health     Stress: No Stress Concern Present (7/8/2019)    [employer] - Occupational Stress Questionnaire     Feeling of Stress : Not at all       Past Medical History:   Diagnosis Date    Allergy     Amblyopia OS    Anxiety     Asthma     COPD (chronic obstructive pulmonary disease)     NO HOME o2    GERD (gastroesophageal reflux disease)     Hyperlipidemia     Hypertension     PONV (postoperative nausea and vomiting)     Thyroid disease        Family History   Problem Relation Age of Onset    Hypertension Mother     Diabetes Mother     Diabetes Father     Stroke Maternal Grandmother     Prostate cancer Maternal Grandfather     Mental illness Son     Pancreatic cancer Maternal Uncle     Mental illness Other     Pancreatic cancer Other     Ovarian cancer Maternal Cousin        Past Surgical History:   Procedure Laterality Date    APPENDECTOMY      BIOPSY N/A 6/8/2023    Procedure: BIOPSY;  Surgeon: Fausto Smith MD;  Location: [facility] OR;  Service: Neurosurgery;  Laterality: N/A;  L1    BUNIONECTOMY      COLONOSCOPY N/A 12/4/2019    Procedure: COLONOSCOPY;  Surgeon: Danny Matos III, MD;  Location: [facility] ENDO;  Service: Endoscopy;  Laterality: N/A;    COLONOSCOPY N/A 10/24/2022    Procedure: COLONOSCOPY;  Surgeon: Danny Matos III, MD;  Location: [facility] ENDO;  Service: Endoscopy;  Laterality: N/A;    ESOPHAGOGASTRODUODENOSCOPY N/A 10/24/2022    Procedure: EGD (ESOPHAGOGASTRODUODENOSCOPY);  Surgeon: Danny Matos III, MD;  Location: [facility] ENDO;  Service: Endoscopy;  Laterality: N/A;    FIXATION KYPHOPLASTY Bilateral 6/8/2023    Procedure: KYPHOPLASTY;  Surgeon: Fausto Smith MD;  Location: [facility] OR;  Service: Neurosurgery;  Laterality: Bilateral;  kyphoplasty and radiofrequency ablation - L1    HYSTERECTOMY      PARTIAL//still with ovaries    neck fusion  08/2017    THYROIDECTOMY         Review of Systems   Constitutional:  Positive for  fatigue. Negative for activity change, appetite change, chills, diaphoresis, fever and unexpected weight change.   HENT:  Negative for congestion, nosebleeds and rhinorrhea.    Respiratory:  Negative for cough and shortness of breath.    Cardiovascular:  Negative for chest pain and leg swelling.   Gastrointestinal:  Positive for nausea. Negative for abdominal pain, anal bleeding, blood in stool, constipation, diarrhea and vomiting.   Genitourinary:  Negative for hematuria.   Musculoskeletal:  Positive for arthralgias and back pain. Negative for myalgias.   Skin:  Negative for color change and pallor.   Allergic/Immunologic: Positive for immunocompromised state.   Neurological:  Negative for dizziness, weakness, light-headedness, numbness and headaches.         Medication List with Changes/Refills   Current Medications    ACYCLOVIR (ZOVIRAX) 400 MG TABLET    Take 1 tablet (400 mg total) by mouth 2 (two) times daily.    ALBUTEROL (PROVENTIL HFA) 90 MCG/ACTUATION INHALER    Inhale 2 puffs into the lungs every 6 (six) hours as needed for Wheezing. Rescue    AMLODIPINE-BENAZEPRIL 2.5-10 MG (LOTREL) 2.5-10 MG PER CAPSULE    TAKE 1 CAPSULE BY MOUTH EVERY DAY    ASPIRIN (ECOTRIN) 81 MG EC TABLET    Take 1 tablet (81 mg total) by mouth once daily.    BACLOFEN (LIORESAL) 10 MG TABLET    Take 10 mg by mouth 3 (three) times daily.    CALCIUM CARBONATE-VIT D3- MG CALCIUM- 400 UNIT TAB    Take 1 tablet by mouth once. for 1 dose    DEXAMETHASONE (DECADRON) 4 MG TAB    Take 10 tablets (40 mg total) by mouth every 7 days. ( once weekly by mouth on days 1, 8 and 15 every 21 day cycle)    DEXAMETHASONE (DECADRON) 4 MG TAB    Take 10 tablets (40 mg total) by mouth As instructed. Daily on days 1, 8, 15, and 22 of each chemotherapy cycle. Take with food.    DIAZEPAM (VALIUM) 5 MG TABLET    Take 1 tablet by mouth 30 minutes prior to MRI to help decrease anxiety.    FLUTICASONE PROPIONATE (FLONASE) 50 MCG/ACTUATION NASAL SPRAY    1  spray (50 mcg total) by Each Nostril route once daily.    FLUTICASONE-SALMETEROL DISKUS INHALER 250-50 MCG    Inhale 1 puff into the lungs 2 (two) times daily. Controller    IBUPROFEN 100 MG CHEW    ibuprofen Take 1 tablet (oral) every 8 hours PRN - Pain [id number] tablet every 8 hours oral No set duration recorded No set duration amount recorded active 800 mg    LENALIDOMIDE 25 MG CAP    Take 1 capsule (25 mg total) by mouth once daily for 21 days and then 7 days off..    LEVOTHYROXINE (SYNTHROID) 100 MCG TABLET    Take 1 tablet (100 mcg total) by mouth before breakfast.    LINACLOTIDE (LINZESS) 72 MCG CAP CAPSULE    Take 2 capsules (144 mcg total) by mouth before breakfast.    METHOCARBAMOL (ROBAXIN) 500 MG TAB    Take 1 tablet (500 mg total) by mouth every 8 (eight) hours as needed.    METOPROLOL SUCCINATE (TOPROL-XL) 50 MG 24 HR TABLET    TAKE 1 TABLET(50 MG) BY MOUTH EVERY DAY    MONTELUKAST (SINGULAIR) 10 MG TABLET    Take 1 tablet (10 mg total) by mouth every evening.    NICOTINE (NICODERM CQ) 21 MG/24 HR    Place 1 patch onto the skin once daily.    ONDANSETRON (ZOFRAN) 8 MG TABLET    Take 1 tablet (8 mg total) by mouth every 12 (twelve) hours as needed for Nausea.    PANTOPRAZOLE (PROTONIX) 40 MG TABLET    TAKE 1 TABLET(40 MG) BY MOUTH EVERY DAY    PROCHLORPERAZINE (COMPAZINE) 5 MG TABLET    Take 1 tablet (5 mg total) by mouth every 6 (six) hours as needed for Nausea.    PROMETHAZINE (PHENERGAN) 25 MG TABLET    Take 1 tablet (25 mg total) by mouth every 4 (four) hours.    PROMETHAZINE-CODEINE 6.25-10 MG/5 ML (PHENERGAN WITH CODEINE) 6.25-10 MG/5 ML SYRUP    Take 5 mLs by mouth every 4 (four) hours as needed for Cough.    ROSUVASTATIN (CRESTOR) 10 MG TABLET    TAKE 1 TABLET(10 MG) BY MOUTH EVERY DAY    TRAZODONE (DESYREL) 50 MG TABLET    Take 1 tablet (50 mg total) by mouth every evening.   Changed and/or Refilled Medications    Modified Medication Previous Medication    ALPRAZOLAM (XANAX) 2 MG TAB ALPRAZolam  (XANAX) 2 MG Tab       TAKE 1 TABLET BY MOUTH TWICE DAILY AS NEEDED FOR ANXIETY    TAKE 1 TABLET BY MOUTH TWICE DAILY AS NEEDED FOR ANXIETY     Objective:     Vitals:    08/09/23 0840   BP: 109/69   Pulse: 89   Resp: 16   Temp: 98.1 °F (36.7 °C)       Physical Exam  Vitals reviewed.   Constitutional:       General: She is not in acute distress.     Appearance: She is not ill-appearing, toxic-appearing or diaphoretic.   HENT:      Head: Normocephalic and atraumatic.   Eyes:      Conjunctiva/sclera: Conjunctivae normal.   Cardiovascular:      Rate and Rhythm: Normal rate.   Musculoskeletal:      Right lower leg: No edema.      Left lower leg: No edema.   Skin:     General: Skin is warm.      Coloration: Skin is not jaundiced or pale.      Findings: No bruising, erythema, lesion or rash.   Neurological:      Mental Status: She is alert.      Motor: No weakness.      Gait: Gait normal.   Psychiatric:         Mood and Affect: Mood normal.         Behavior: Behavior normal.         Thought Content: Thought content normal.            Labs/Results:  Lab Results   Component Value Date    WBC 4.48 08/08/2023    RBC 3.89 (L) 08/08/2023    HGB 12.1 08/08/2023    HCT 37.6 08/08/2023    MCV 97 08/08/2023    MCH 31.1 (H) 08/08/2023    MCHC 32.2 08/08/2023    RDW 14.6 (H) 08/08/2023     08/08/2023    MPV 9.7 08/08/2023    GRAN 1.6 (L) 08/08/2023    GRAN 34.5 (L) 08/08/2023    LYMPH 2.2 08/08/2023    LYMPH 49.8 (H) 08/08/2023    MONO 0.5 08/08/2023    MONO 11.2 08/08/2023    EOS 0.2 08/08/2023    BASO 0.03 08/08/2023    EOSINOPHIL 3.6 08/08/2023    BASOPHIL 0.7 08/08/2023     CMP  Sodium   Date Value Ref Range Status   08/08/2023 142 136 - 145 mmol/L Final     Potassium   Date Value Ref Range Status   08/08/2023 3.9 3.5 - 5.1 mmol/L Final     Chloride   Date Value Ref Range Status   08/08/2023 106 95 - 110 mmol/L Final     CO2   Date Value Ref Range Status   08/08/2023 27 23 - 29 mmol/L Final     Glucose   Date Value Ref  Range Status   08/08/2023 102 70 - 110 mg/dL Final     BUN   Date Value Ref Range Status   08/08/2023 10 8 - 23 mg/dL Final     Creatinine   Date Value Ref Range Status   08/08/2023 0.9 0.5 - 1.4 mg/dL Final     Calcium   Date Value Ref Range Status   08/08/2023 9.0 8.7 - 10.5 mg/dL Final     Total Protein   Date Value Ref Range Status   08/08/2023 7.3 6.0 - 8.4 g/dL Final     Albumin   Date Value Ref Range Status   08/08/2023 3.8 3.5 - 5.2 g/dL Final     Total Bilirubin   Date Value Ref Range Status   08/08/2023 0.6 0.1 - 1.0 mg/dL Final     Comment:     For infants and newborns, interpretation of results should be based  on gestational age, weight and in agreement with clinical  observations.    Premature Infant recommended reference ranges:  Up to 24 hours.............<8.0 mg/dL  Up to 48 hours............<12.0 mg/dL  3-5 days..................<15.0 mg/dL  6-29 days.................<15.0 mg/dL       Alkaline Phosphatase   Date Value Ref Range Status   08/08/2023 74 55 - 135 U/L Final     AST   Date Value Ref Range Status   08/08/2023 18 10 - 40 U/L Final     ALT   Date Value Ref Range Status   08/08/2023 16 10 - 44 U/L Final     Anion Gap   Date Value Ref Range Status   08/08/2023 9 8 - 16 mmol/L Final     eGFR   Date Value Ref Range Status   08/08/2023 >60 >60 mL/min/1.73 m^2 Final        Latest Reference Range & Units 08/01/23 10:45   Protein, Serum 6.0 - 8.4 g/dL 6.8   Albumin grams/dl 3.35 - 5.55 g/dL 3.84   Alpha-1 grams/dl 0.17 - 0.41 g/dL 0.29   Alpha-2 0.43 - 0.99 g/dL 0.82   Beta 0.50 - 1.10 g/dL 0.63   Gamma 0.67 - 1.58 g/dL 1.22      Latest Reference Range & Units 08/01/23 10:45   [place] Free Light Chains 0.33 - 1.94 mg/dL 1.04   [place] Free Light Chains 0.57 - 2.63 mg/dL 1.34   Kappa/Lambda FLC Ratio 0.26 - 1.65  0.78   Normal total protein. Normal gamma globulins are decreased.   Paraprotein peak in near-gamma = 1.06 g/dL (previously, 2.15 g/dL).   Assessment:     Problem List Items Addressed This  Visit          Immunology/Multi System    Immunodeficiency due to chemotherapy       Oncology    Multiple myeloma - Primary (Chronic)    Secondary malignant neoplasm of bone     Plan:     Multiple myeloma, remission status unspecified, Secondary malignant neoplasm of bone  --continue with cycle 2 day 8  --continue with lenalidomide 25mg on days 1-21 on 28 day cycle  --continue with dexamethasone+lenalidomide+ daratumumab +bortezomib  --continue with zometa q 4 weeks. Last dose was 5/31/23. Dental clearance has been confirmed.  --continue with calcium and vitamin D supplement  --ANC:1.6, plts:336, crcl:55.2 ml/min, tibli:0.6, ast:18, alt:16  --PET-CT ( 4/10/2023) - showed extensive lytic lesions in the axial skeleton and mild L1 compression deformity. S/p kyphoplasty on 6/8/23. Continue to follow with pain management  --continue with Aspirin daily p.o for thrombosis prophylaxis; Acyclovir 400 mg p.o BID for herpes Zoster prophylaxis   --Seen by Dr. Guanakito severino with following recommendations: weekly 28 day cycle odell-VRd x 4 total cycles >crissy autoSCT> maintenance . Recommended Supportive medications: Levaquin, acyc, vit/d ca, asa;  zometa q month once she obtains dental clearance     Follow-Up: 1 week with cbc cmp prior for cycle 2 day 15 + zometa -standing orders in    Halie Cortez PA-C  Hematology Oncology    Route Chart for Scheduling    Med Onc Chart Routing      Follow up with physician    Follow up with WERNER 1 week. with cbc cmp prior   Infusion scheduling note   1 week for cycle 2 day 15 and zometa   Injection scheduling note    Labs CBC and CMP   Scheduling:  Preferred lab:  Lab interval:  standing orders in   Imaging    Pharmacy appointment    Other referrals              Treatment Plan Information   OP D-VRD DARATUMUMAB + BORTEZOMIB LENALIDOMIDE DEXAMETHASONE   Oscar Márquez MD   Upcoming Treatment Dates - OP D-VRD DARATUMUMAB + BORTEZOMIB LENALIDOMIDE DEXAMETHASONE    8/16/2023        Chemotherapy       bortezomib (VELCADE) injection       daratumumab-hyaluronidase-fihj (DARZALEX FASPRO) subcutaneous injection 1,800 mg 15 mL       Supportive Care       prochlorperazine injection Soln 5 mg  8/23/2023       Chemotherapy       daratumumab-hyaluronidase-fihj (DARZALEX FASPRO) subcutaneous injection 1,800 mg 15 mL       bortezomib (VELCADE) injection       Supportive Care       prochlorperazine injection Soln 5 mg  8/30/2023       Chemotherapy       bortezomib (VELCADE) injection       daratumumab-hyaluronidase-fihj (DARZALEX FASPRO) subcutaneous injection 1,800 mg 15 mL       Supportive Care       prochlorperazine injection Soln 5 mg  9/6/2023       Chemotherapy       bortezomib (VELCADE) injection       Supportive Care       prochlorperazine injection Soln 5 mg    Supportive Plan Information  OP ZOLEDRONIC ACID (ZOMETA) Q4W   Abram Velasquez MD   Upcoming Treatment Dates - OP ZOLEDRONIC ACID (ZOMETA) Q4W    8/28/2023       Medications       zoledronic acid (ZOMETA) 4 mg in sodium chloride 0.9% 100 mL IVPB  9/25/2023       Medications       zoledronic acid (ZOMETA) 4 mg in sodium chloride 0.9% 100 mL IVPB  12/24/2023       Medications       zoledronic acid (ZOMETA) 4 mg in sodium chloride 0.9% 100 mL IVPB  3/23/2024       Medications       zoledronic acid (ZOMETA) 4 mg in sodium chloride 0.9% 100 mL IVPB    Therapy Plan Information  IV Fluids  sodium chloride 0.9% bolus 1,000 mL 1,000 mL  1,000 mL, Intravenous, Every visit  Flushes  sodium chloride 0.9% flush 10 mL  10 mL, Intravenous, PRN  heparin, porcine (PF) 100 unit/mL injection flush 500 Units  500 Units, Intravenous, PRN

## 2023-08-08 ENCOUNTER — LAB VISIT (OUTPATIENT)
Dept: LAB | Facility: HOSPITAL | Age: 63
End: 2023-08-08
Attending: INTERNAL MEDICINE
Payer: COMMERCIAL

## 2023-08-08 DIAGNOSIS — C90.00 MULTIPLE MYELOMA, REMISSION STATUS UNSPECIFIED: ICD-10-CM

## 2023-08-08 LAB
ALBUMIN SERPL BCP-MCNC: 3.8 G/DL (ref 3.5–5.2)
ALP SERPL-CCNC: 74 U/L (ref 55–135)
ALT SERPL W/O P-5'-P-CCNC: 16 U/L (ref 10–44)
ANION GAP SERPL CALC-SCNC: 9 MMOL/L (ref 8–16)
AST SERPL-CCNC: 18 U/L (ref 10–40)
BASOPHILS # BLD AUTO: 0.03 K/UL (ref 0–0.2)
BASOPHILS NFR BLD: 0.7 % (ref 0–1.9)
BILIRUB SERPL-MCNC: 0.6 MG/DL (ref 0.1–1)
BUN SERPL-MCNC: 10 MG/DL (ref 8–23)
CALCIUM SERPL-MCNC: 9 MG/DL (ref 8.7–10.5)
CHLORIDE SERPL-SCNC: 106 MMOL/L (ref 95–110)
CO2 SERPL-SCNC: 27 MMOL/L (ref 23–29)
CREAT SERPL-MCNC: 0.9 MG/DL (ref 0.5–1.4)
DIFFERENTIAL METHOD: ABNORMAL
EOSINOPHIL # BLD AUTO: 0.2 K/UL (ref 0–0.5)
EOSINOPHIL NFR BLD: 3.6 % (ref 0–8)
ERYTHROCYTE [DISTWIDTH] IN BLOOD BY AUTOMATED COUNT: 14.6 % (ref 11.5–14.5)
EST. GFR  (NO RACE VARIABLE): >60 ML/MIN/1.73 M^2
GLUCOSE SERPL-MCNC: 102 MG/DL (ref 70–110)
HCT VFR BLD AUTO: 37.6 % (ref 37–48.5)
HGB BLD-MCNC: 12.1 G/DL (ref 12–16)
IMM GRANULOCYTES # BLD AUTO: 0.01 K/UL (ref 0–0.04)
IMM GRANULOCYTES NFR BLD AUTO: 0.2 % (ref 0–0.5)
LYMPHOCYTES # BLD AUTO: 2.2 K/UL (ref 1–4.8)
LYMPHOCYTES NFR BLD: 49.8 % (ref 18–48)
MAGNESIUM SERPL-MCNC: 1.7 MG/DL (ref 1.6–2.6)
MCH RBC QN AUTO: 31.1 PG (ref 27–31)
MCHC RBC AUTO-ENTMCNC: 32.2 G/DL (ref 32–36)
MCV RBC AUTO: 97 FL (ref 82–98)
MONOCYTES # BLD AUTO: 0.5 K/UL (ref 0.3–1)
MONOCYTES NFR BLD: 11.2 % (ref 4–15)
NEUTROPHILS # BLD AUTO: 1.6 K/UL (ref 1.8–7.7)
NEUTROPHILS NFR BLD: 34.5 % (ref 38–73)
NRBC BLD-RTO: 0 /100 WBC
PLATELET # BLD AUTO: 336 K/UL (ref 150–450)
PMV BLD AUTO: 9.7 FL (ref 9.2–12.9)
POTASSIUM SERPL-SCNC: 3.9 MMOL/L (ref 3.5–5.1)
PROT SERPL-MCNC: 7.3 G/DL (ref 6–8.4)
RBC # BLD AUTO: 3.89 M/UL (ref 4–5.4)
SODIUM SERPL-SCNC: 142 MMOL/L (ref 136–145)
WBC # BLD AUTO: 4.48 K/UL (ref 3.9–12.7)

## 2023-08-08 PROCEDURE — 80053 COMPREHEN METABOLIC PANEL: CPT | Performed by: INTERNAL MEDICINE

## 2023-08-08 PROCEDURE — 85025 COMPLETE CBC W/AUTO DIFF WBC: CPT | Performed by: INTERNAL MEDICINE

## 2023-08-08 PROCEDURE — 83735 ASSAY OF MAGNESIUM: CPT | Performed by: INTERNAL MEDICINE

## 2023-08-08 PROCEDURE — 36415 COLL VENOUS BLD VENIPUNCTURE: CPT | Performed by: INTERNAL MEDICINE

## 2023-08-09 ENCOUNTER — DOCUMENTATION ONLY (OUTPATIENT)
Dept: HEMATOLOGY/ONCOLOGY | Facility: CLINIC | Age: 63
End: 2023-08-09
Payer: COMMERCIAL

## 2023-08-09 ENCOUNTER — OFFICE VISIT (OUTPATIENT)
Dept: HEMATOLOGY/ONCOLOGY | Facility: CLINIC | Age: 63
End: 2023-08-09
Payer: COMMERCIAL

## 2023-08-09 ENCOUNTER — PATIENT MESSAGE (OUTPATIENT)
Dept: NEUROSURGERY | Facility: CLINIC | Age: 63
End: 2023-08-09
Payer: COMMERCIAL

## 2023-08-09 ENCOUNTER — TELEPHONE (OUTPATIENT)
Dept: NEUROSURGERY | Facility: CLINIC | Age: 63
End: 2023-08-09
Payer: COMMERCIAL

## 2023-08-09 ENCOUNTER — INFUSION (OUTPATIENT)
Dept: INFUSION THERAPY | Facility: HOSPITAL | Age: 63
End: 2023-08-09
Attending: INTERNAL MEDICINE
Payer: COMMERCIAL

## 2023-08-09 VITALS
HEART RATE: 83 BPM | HEIGHT: 64 IN | RESPIRATION RATE: 18 BRPM | TEMPERATURE: 98 F | WEIGHT: 129 LBS | SYSTOLIC BLOOD PRESSURE: 106 MMHG | BODY MASS INDEX: 22.02 KG/M2 | OXYGEN SATURATION: 99 % | DIASTOLIC BLOOD PRESSURE: 65 MMHG

## 2023-08-09 VITALS
WEIGHT: 129 LBS | HEIGHT: 64 IN | TEMPERATURE: 98 F | BODY MASS INDEX: 22.02 KG/M2 | HEART RATE: 89 BPM | RESPIRATION RATE: 16 BRPM | OXYGEN SATURATION: 99 % | SYSTOLIC BLOOD PRESSURE: 109 MMHG | DIASTOLIC BLOOD PRESSURE: 69 MMHG

## 2023-08-09 DIAGNOSIS — Z79.899 IMMUNODEFICIENCY DUE TO CHEMOTHERAPY: ICD-10-CM

## 2023-08-09 DIAGNOSIS — T45.1X5A IMMUNODEFICIENCY DUE TO CHEMOTHERAPY: ICD-10-CM

## 2023-08-09 DIAGNOSIS — D84.821 IMMUNODEFICIENCY DUE TO CHEMOTHERAPY: ICD-10-CM

## 2023-08-09 DIAGNOSIS — C90.00 MULTIPLE MYELOMA, REMISSION STATUS UNSPECIFIED: Primary | ICD-10-CM

## 2023-08-09 DIAGNOSIS — C90.00 MULTIPLE MYELOMA, REMISSION STATUS UNSPECIFIED: Primary | Chronic | ICD-10-CM

## 2023-08-09 DIAGNOSIS — F41.9 ANXIETY: ICD-10-CM

## 2023-08-09 DIAGNOSIS — C79.51 SECONDARY MALIGNANT NEOPLASM OF BONE: ICD-10-CM

## 2023-08-09 PROCEDURE — 63600175 PHARM REV CODE 636 W HCPCS: Mod: JZ,TB | Performed by: INTERNAL MEDICINE

## 2023-08-09 PROCEDURE — 3078F PR MOST RECENT DIASTOLIC BLOOD PRESSURE < 80 MM HG: ICD-10-PCS | Mod: CPTII,S$GLB,,

## 2023-08-09 PROCEDURE — 4010F ACE/ARB THERAPY RXD/TAKEN: CPT | Mod: CPTII,S$GLB,,

## 2023-08-09 PROCEDURE — 99215 OFFICE O/P EST HI 40 MIN: CPT | Mod: S$GLB,,,

## 2023-08-09 PROCEDURE — 99999 PR PBB SHADOW E&M-EST. PATIENT-LVL III: ICD-10-PCS | Mod: PBBFAC,,,

## 2023-08-09 PROCEDURE — 96401 CHEMO ANTI-NEOPL SQ/IM: CPT

## 2023-08-09 PROCEDURE — 3008F PR BODY MASS INDEX (BMI) DOCUMENTED: ICD-10-PCS | Mod: CPTII,S$GLB,,

## 2023-08-09 PROCEDURE — 3044F PR MOST RECENT HEMOGLOBIN A1C LEVEL <7.0%: ICD-10-PCS | Mod: CPTII,S$GLB,,

## 2023-08-09 PROCEDURE — 99215 PR OFFICE/OUTPT VISIT, EST, LEVL V, 40-54 MIN: ICD-10-PCS | Mod: S$GLB,,,

## 2023-08-09 PROCEDURE — 3044F HG A1C LEVEL LT 7.0%: CPT | Mod: CPTII,S$GLB,,

## 2023-08-09 PROCEDURE — 3074F SYST BP LT 130 MM HG: CPT | Mod: CPTII,S$GLB,,

## 2023-08-09 PROCEDURE — 3078F DIAST BP <80 MM HG: CPT | Mod: CPTII,S$GLB,,

## 2023-08-09 PROCEDURE — 3008F BODY MASS INDEX DOCD: CPT | Mod: CPTII,S$GLB,,

## 2023-08-09 PROCEDURE — 3074F PR MOST RECENT SYSTOLIC BLOOD PRESSURE < 130 MM HG: ICD-10-PCS | Mod: CPTII,S$GLB,,

## 2023-08-09 PROCEDURE — 99999 PR PBB SHADOW E&M-EST. PATIENT-LVL III: CPT | Mod: PBBFAC,,,

## 2023-08-09 PROCEDURE — 4010F PR ACE/ARB THEARPY RXD/TAKEN: ICD-10-PCS | Mod: CPTII,S$GLB,,

## 2023-08-09 RX ORDER — BORTEZOMIB 3.5 MG/1
1.3 INJECTION, POWDER, LYOPHILIZED, FOR SOLUTION INTRAVENOUS; SUBCUTANEOUS
Status: COMPLETED | OUTPATIENT
Start: 2023-08-09 | End: 2023-08-09

## 2023-08-09 RX ORDER — ALPRAZOLAM 2 MG/1
TABLET ORAL
Qty: 60 TABLET | Refills: 0 | Status: SHIPPED | OUTPATIENT
Start: 2023-08-09 | End: 2023-09-08 | Stop reason: SDUPTHER

## 2023-08-09 RX ORDER — IBUPROFEN 100 MG/1
TABLET, CHEWABLE ORAL
COMMUNITY
Start: 2023-02-02 | End: 2023-10-18

## 2023-08-09 RX ADMIN — DARATUMUMAB AND HYALURONIDASE-FIHJ (HUMAN RECOMBINANT) 1800 MG: 1800; 30000 INJECTION SUBCUTANEOUS at 09:08

## 2023-08-09 RX ADMIN — BORTEZOMIB 2 MG: 3.5 INJECTION, POWDER, LYOPHILIZED, FOR SOLUTION INTRAVENOUS; SUBCUTANEOUS at 09:08

## 2023-08-09 NOTE — PLAN OF CARE
"Pt is stable. Pt voiced she was doing "great," today. Pt administered Darzalex and Velcade. Pt will follow up in one week.      Problem: Fall Injury Risk  Goal: Absence of Fall and Fall-Related Injury  Outcome: Ongoing, Progressing     Problem: Anemia (Chemotherapy Effects)  Goal: Anemia Symptom Improvement  Outcome: Ongoing, Progressing     Problem: Urinary Bleeding Risk or Actual (Chemotherapy Effects)  Goal: Absence of Hematuria  Outcome: Ongoing, Progressing     Problem: Nausea and Vomiting (Chemotherapy Effects)  Goal: Fluid and Electrolyte Balance  Outcome: Ongoing, Progressing     Problem: Neurotoxicity (Chemotherapy Effects)  Goal: Neurotoxicity Symptom Control  Outcome: Ongoing, Progressing     Problem: Neutropenia (Chemotherapy Effects)  Goal: Absence of Infection  Outcome: Ongoing, Progressing     Problem: Oral Mucositis (Chemotherapy Effects)  Goal: Improved Oral Mucous Membrane Integrity  Outcome: Ongoing, Progressing     Problem: Thrombocytopenia Bleeding Risk (Chemotherapy Effects)  Goal: Absence of Bleeding  Outcome: Ongoing, Progressing     Problem: Adult Inpatient Plan of Care  Goal: Plan of Care Review  Outcome: Ongoing, Progressing  Goal: Patient-Specific Goal (Individualized)  Outcome: Ongoing, Progressing  Flowsheets (Taken 8/9/2023 6202)  Anxieties, Fears or Concerns: Pt denies any  Individualized Care Needs: Pt provided pillow and chose to sit upright.     "

## 2023-08-09 NOTE — PROGRESS NOTES
Spoke with Lynn @ [facility] about dental clearance form. Lynn states dentist is well aware that pt isn't interested in deep cleaning/scaling at this time and no clearance for chemo infusion is needed from them, she only sent the form b/c pt requested it. Lynn states they don't need any clearance from us either, she told the pt whenever she is ready to have procedures done to call them. States she told pt her cancer tx are more important than a cleaning at this time, although she did recommend to pt that she have it done once tx complete to preserve her teeth. Told her I just wanted to clarify that pt is scheduled to receive zometa monthly and cannot have invasive dental procedures for 3 months before or 3 months after receiving zometa. Lynn states pt is not and will not be scheduled for any dental tx until pt calls letting them know she has completed cancer tx. She states pt voiced understanding. Told Lynn I will reiterate to the pt and will let her oncology providers know that no dental tx have been or will be scheduled while pt on active chemo tx.   In basket msg sent to Dr. Smith, re: pt inquiring about not receiving 'donut' yet but has been charged $45 for it.   Called [facility] and spoke with Freedom Landis, anaD. Asked about most recent lenalidomide script from 8/2, pt still doesn't have. She reviewed pt records and included her lead to assist. States lead is updating team to update note for new fill. She took my phone & fax # if any add'l info needed. States they hope to have this resolved today as there has been dose interruption. Plan to f/u.  Oncology Navigation   Intake  Date of Diagnosis: 04/06/23  Cancer Type: Transplant; Myeloma  Internal / External Referral: Internal  Date of Referral: 05/10/23  Initial Nurse Navigator Contact: 05/15/23  Referral to Initial Contact Timeline (days): 5  Date Worked: 08/02/23  Reason if booked > 7 days after scheduling: Additional  tests/procedures; Patient request     Treatment  Current Status: Active       Medical Oncologist: Dr. FRITZ Dela Cruz  Chemotherapy: Initiated  Chemotherapy Regimen: Velcade (Durvalumab possible pending FISH)  Oral Therapy: Initiated                       Acuity  Systemic Treatment - predicted or initiated: Chemotherapy Regimen with Multiple drugs (+1)  ECO  Comorbidities in Medical History: 2   Needed: 0  Support: 0  Verbalizes Financial Concerns: 1  Transportation: 0  Psychological Factors (+1 each): Emotional during conversation  Verbalizes the need for more education: 1  Navigation Acuity: 3     Follow Up  No follow-ups on file.

## 2023-08-09 NOTE — NURSING
0952: Darzalex and Velcade Injection given without difficulties.Bandaid applied. Patient instructed to stay in the clinic for 15 minutes. Patient verbalized understanding and will notify nurse with any complaints. See MAR

## 2023-08-09 NOTE — DISCHARGE INSTRUCTIONS
Thank you for allowing me to care for you today,  MIS LeachN, RN    [facility]  [address] [address]  [phone] phone     [phone] fax  Hours of Operation: Monday- Friday 8:00am- 5:00pm  After hours phone  [phone]  Hematology / Oncology Physicians on call      JUANI García Dr[address] Dr., BELLE Page, BELLE Valdez, BELLE Deluna, BELLE    Please call with any concerns regarding your appointment today.       · Follows closely with outpatient psychiatrist  · Continue PTA Lamictal 200 mg twice daily  · Continue PTA lorazepam 1 mg in the a m  and afternoon, 4 mg at at bedtime  · PDMP reviewed, no red flags

## 2023-08-10 ENCOUNTER — TELEPHONE (OUTPATIENT)
Dept: SMOKING CESSATION | Facility: CLINIC | Age: 63
End: 2023-08-10
Payer: COMMERCIAL

## 2023-08-14 ENCOUNTER — OFFICE VISIT (OUTPATIENT)
Dept: HEMATOLOGY/ONCOLOGY | Facility: CLINIC | Age: 63
End: 2023-08-14
Payer: COMMERCIAL

## 2023-08-14 ENCOUNTER — PATIENT MESSAGE (OUTPATIENT)
Dept: NEUROSURGERY | Facility: CLINIC | Age: 63
End: 2023-08-14
Payer: COMMERCIAL

## 2023-08-14 DIAGNOSIS — M54.9 BACK PAIN, UNSPECIFIED BACK LOCATION, UNSPECIFIED BACK PAIN LATERALITY, UNSPECIFIED CHRONICITY: ICD-10-CM

## 2023-08-14 DIAGNOSIS — Z72.0 TOBACCO USE: ICD-10-CM

## 2023-08-14 DIAGNOSIS — C90.00 MULTIPLE MYELOMA, REMISSION STATUS UNSPECIFIED: Primary | ICD-10-CM

## 2023-08-14 DIAGNOSIS — Z79.899 IMMUNODEFICIENCY DUE TO CHEMOTHERAPY: ICD-10-CM

## 2023-08-14 DIAGNOSIS — D84.821 IMMUNODEFICIENCY DUE TO DRUGS: ICD-10-CM

## 2023-08-14 DIAGNOSIS — D84.821 IMMUNODEFICIENCY DUE TO CHEMOTHERAPY: ICD-10-CM

## 2023-08-14 DIAGNOSIS — F41.9 ANXIETY: ICD-10-CM

## 2023-08-14 DIAGNOSIS — Z76.82 STEM CELL TRANSPLANT CANDIDATE: ICD-10-CM

## 2023-08-14 DIAGNOSIS — T45.1X5A IMMUNODEFICIENCY DUE TO CHEMOTHERAPY: ICD-10-CM

## 2023-08-14 DIAGNOSIS — Z79.899 IMMUNODEFICIENCY DUE TO DRUGS: ICD-10-CM

## 2023-08-14 PROCEDURE — 1160F RVW MEDS BY RX/DR IN RCRD: CPT | Mod: CPTII,95,, | Performed by: NURSE PRACTITIONER

## 2023-08-14 PROCEDURE — 1160F PR REVIEW ALL MEDS BY PRESCRIBER/CLIN PHARMACIST DOCUMENTED: ICD-10-PCS | Mod: CPTII,95,, | Performed by: NURSE PRACTITIONER

## 2023-08-14 PROCEDURE — 99214 OFFICE O/P EST MOD 30 MIN: CPT | Mod: 95,,, | Performed by: NURSE PRACTITIONER

## 2023-08-14 PROCEDURE — 3044F PR MOST RECENT HEMOGLOBIN A1C LEVEL <7.0%: ICD-10-PCS | Mod: CPTII,95,, | Performed by: NURSE PRACTITIONER

## 2023-08-14 PROCEDURE — 4010F ACE/ARB THERAPY RXD/TAKEN: CPT | Mod: CPTII,95,, | Performed by: NURSE PRACTITIONER

## 2023-08-14 PROCEDURE — 1159F PR MEDICATION LIST DOCUMENTED IN MEDICAL RECORD: ICD-10-PCS | Mod: CPTII,95,, | Performed by: NURSE PRACTITIONER

## 2023-08-14 PROCEDURE — 3044F HG A1C LEVEL LT 7.0%: CPT | Mod: CPTII,95,, | Performed by: NURSE PRACTITIONER

## 2023-08-14 PROCEDURE — 1159F MED LIST DOCD IN RCRD: CPT | Mod: CPTII,95,, | Performed by: NURSE PRACTITIONER

## 2023-08-14 PROCEDURE — 4010F PR ACE/ARB THEARPY RXD/TAKEN: ICD-10-PCS | Mod: CPTII,95,, | Performed by: NURSE PRACTITIONER

## 2023-08-14 PROCEDURE — 99214 PR OFFICE/OUTPT VISIT, EST, LEVL IV, 30-39 MIN: ICD-10-PCS | Mod: 95,,, | Performed by: NURSE PRACTITIONER

## 2023-08-14 NOTE — PROGRESS NOTES
"Tpatient location is: home  The chief complaint leading to consultation is: MM/SCT eval     Visit type: audiovisual    Face to Face time with patient: 20  75 minutes of total time spent on the encounter, which includes face to face time and non-face to face time preparing to see the patient (eg, review of tests), Obtaining and/or reviewing separately obtained history, Documenting clinical information in the electronic or other health record, Independently interpreting results (not separately reported) and communicating results to the patient/family/caregiver, or Care coordination (not separately reported).         Each patient to whom he or she provides medical services by telemedicine is:  (1) informed of the relationship between the physician and patient and the respective role of any other health care provider with respect to management of the patient; and (2) notified that he or she may decline to receive medical services by telemedicine and may withdraw from such care at any time.    Notes:   SECTION OF HEMATOLOGY AND BONE MARROW TRANSPLANT  Return  Patient Visit   08/14/2023  Referred by:  No ref. provider found  Referred for: MM/SCT evaluation     CHIEF COMPLAINT:   Chief Complaint   Patient presents with    Multiple Myeloma     D-VRD       HISTORY OF PRESENT ILLNESS:   Initial consult note June 2023:   63 y.o.female; pmh of  hypertension, COPD, asthma, GERD, hypothyroidism; her onclogic history is notable for history of Multiple Myeloma; referred for SCT by local oncologist Dr. Jose Dela Cruz.    With regard to her myeloma history; progressive bone pain.      From outside referring oncologist's note -"BMBx on 4/6/2023 showed 60 % plasma cells. PET-CT on 4/10/2023 showed extensive lytic bony lesions throughout the spine, sternum, bilateral ribs, pelvis and mild compression deformity in L1 vertebral body. SPEP/MECHE on 3/27/2023 showed IgG lambda monoclonal protein - 3.19 g/dl. Quantitative immunoglobulins on " "3/27/2023 showed IgG 4,521 mg/dl. UPEP/MECHE and FLC were pending at that time. Labs on 3/27/2023 showed Beta 2 microglobulin 1.9, .  Labs on 4/19/2023 showed Hb, 11.5, WBC 6.3, platelets, BUN 11, Cr 0.9, calcium 9. Pt initiated on VRD 05/10/23."    Currently mid cycle 2, 21 day cycle VRd.    Of note underwent kyphoplasty/biopsy of vertebrae on 6/8/23.  Some relief in back pain but other hip pain worsening.  Lidocaine patch, percocet make her groggy/nauseated.    Required dose reduction of rev to 10mg because "was affecting her kidney" but states no other side effects; on epic review her Cr has been stable.    Works in [employer] department   at [employer].    and lives with .      Schizophrenic son she is caretaker for but he Lives alone. Long time active  Smoker.      Interval history - 08/14/23  At last appt recommended addition of odell to VRd which has happened and she is tolerating well.  Serial biochemical studies show excellent response.   Her chronic hip and back pain from myeloma lesions continues; following with ortho spine for possible intervention.  Presents to discuss SCT; remains hesitant; still doing online research she will update with us regarding her decision  No major  change in clinical status since our last appt.    She is C2D15 currently.       PAST MEDICAL HISTORY:   Past Medical History:   Diagnosis Date    Allergy     Amblyopia OS    Anxiety     Asthma     COPD (chronic obstructive pulmonary disease)     NO HOME o2    GERD (gastroesophageal reflux disease)     Hyperlipidemia     Hypertension     PONV (postoperative nausea and vomiting)     Thyroid disease        PAST SURGICAL HISTORY:   Past Surgical History:   Procedure Laterality Date    APPENDECTOMY      BIOPSY N/A 6/8/2023    Procedure: BIOPSY;  Surgeon: Fausto Smith MD;  Location: [facility];  Service: Neurosurgery;  Laterality: N/A;  L1    BUNIONECTOMY      COLONOSCOPY N/A 12/4/2019    Procedure: COLONOSCOPY;  Surgeon: " Danny Matos III, MD;  Location: [facility] ENDO;  Service: Endoscopy;  Laterality: N/A;    COLONOSCOPY N/A 10/24/2022    Procedure: COLONOSCOPY;  Surgeon: Danny Matos III, MD;  Location: [facility] ENDO;  Service: Endoscopy;  Laterality: N/A;    ESOPHAGOGASTRODUODENOSCOPY N/A 10/24/2022    Procedure: EGD (ESOPHAGOGASTRODUODENOSCOPY);  Surgeon: Danny Matos III, MD;  Location: [facility] ENDO;  Service: Endoscopy;  Laterality: N/A;    FIXATION KYPHOPLASTY Bilateral 6/8/2023    Procedure: KYPHOPLASTY;  Surgeon: Fausto Smith MD;  Location: [facility] OR;  Service: Neurosurgery;  Laterality: Bilateral;  kyphoplasty and radiofrequency ablation - L1    HYSTERECTOMY      PARTIAL//still with ovaries    neck fusion  08/2017    THYROIDECTOMY         PAST SOCIAL HISTORY:   reports that she has been smoking cigarettes. She has a 25.0 pack-year smoking history. She has never used smokeless tobacco. She reports that she does not drink alcohol and does not use drugs.    FAMILY HISTORY:  Family History   Problem Relation Age of Onset    Hypertension Mother     Diabetes Mother     Diabetes Father     Stroke Maternal Grandmother     Prostate cancer Maternal Grandfather     Mental illness Son     Pancreatic cancer Maternal Uncle     Mental illness Other     Pancreatic cancer Other     Ovarian cancer Maternal Cousin        CURRENT MEDICATIONS:   Current Outpatient Medications   Medication Sig    acyclovir (ZOVIRAX) 400 MG tablet Take 1 tablet (400 mg total) by mouth 2 (two) times daily.    albuterol (PROVENTIL HFA) 90 mcg/actuation inhaler Inhale 2 puffs into the lungs every 6 (six) hours as needed for Wheezing. Rescue    ALPRAZolam (XANAX) 2 MG Tab TAKE 1 TABLET BY MOUTH TWICE DAILY AS NEEDED FOR ANXIETY    amlodipine-benazepril 2.5-10 mg (LOTREL) 2.5-10 mg per capsule TAKE 1 CAPSULE BY MOUTH EVERY DAY    aspirin (ECOTRIN) 81 MG EC tablet Take 1 tablet (81 mg total) by mouth once daily.    baclofen (LIORESAL) 10 MG tablet Take 10 mg by  mouth 3 (three) times daily.    calcium carbonate-vit D3-min 600 mg calcium- 400 unit Tab Take 1 tablet by mouth once. for 1 dose    dexAMETHasone (DECADRON) 4 MG Tab Take 10 tablets (40 mg total) by mouth every 7 days. ( once weekly by mouth on days 1, 8 and 15 every 21 day cycle)    dexAMETHasone (DECADRON) 4 MG Tab Take 10 tablets (40 mg total) by mouth As instructed. Daily on days 1, 8, 15, and 22 of each chemotherapy cycle. Take with food.    diazePAM (VALIUM) 5 MG tablet Take 1 tablet by mouth 30 minutes prior to MRI to help decrease anxiety.    fluticasone propionate (FLONASE) 50 mcg/actuation nasal spray 1 spray (50 mcg total) by Each Nostril route once daily.    fluticasone-salmeterol diskus inhaler 250-50 mcg Inhale 1 puff into the lungs 2 (two) times daily. Controller    ibuprofen 100 mg Chew ibuprofen Take 1 tablet (oral) every 8 hours PRN - Pain [id number] tablet every 8 hours oral No set duration recorded No set duration amount recorded active 800 mg    lenalidomide 25 mg Cap Take 1 capsule (25 mg total) by mouth once daily for 21 days and then 7 days off..    levothyroxine (SYNTHROID) 100 MCG tablet Take 1 tablet (100 mcg total) by mouth before breakfast.    linaCLOtide (LINZESS) 72 mcg Cap capsule Take 2 capsules (144 mcg total) by mouth before breakfast.    methocarbamoL (ROBAXIN) 500 MG Tab Take 1 tablet (500 mg total) by mouth every 8 (eight) hours as needed.    metoprolol succinate (TOPROL-XL) 50 MG 24 hr tablet TAKE 1 TABLET(50 MG) BY MOUTH EVERY DAY (Patient taking differently: Take 50 mg by mouth every evening.)    montelukast (SINGULAIR) 10 mg tablet Take 1 tablet (10 mg total) by mouth every evening.    nicotine (NICODERM CQ) 21 mg/24 hr Place 1 patch onto the skin once daily.    ondansetron (ZOFRAN) 8 MG tablet Take 1 tablet (8 mg total) by mouth every 12 (twelve) hours as needed for Nausea.    pantoprazole (PROTONIX) 40 MG tablet TAKE 1 TABLET(40 MG) BY MOUTH EVERY DAY (Patient taking  differently: Take 40 mg by mouth daily as needed.)    prochlorperazine (COMPAZINE) 5 MG tablet Take 1 tablet (5 mg total) by mouth every 6 (six) hours as needed for Nausea.    promethazine (PHENERGAN) 25 MG tablet Take 1 tablet (25 mg total) by mouth every 4 (four) hours.    promethazine-codeine 6.25-10 mg/5 ml (PHENERGAN WITH CODEINE) 6.25-10 mg/5 mL syrup Take 5 mLs by mouth every 4 (four) hours as needed for Cough.    rosuvastatin (CRESTOR) 10 MG tablet TAKE 1 TABLET(10 MG) BY MOUTH EVERY DAY (Patient taking differently: Take 10 mg by mouth every evening.)    traZODone (DESYREL) 50 MG tablet Take 1 tablet (50 mg total) by mouth every evening.     Current Facility-Administered Medications   Medication    ketorolac injection 60 mg     Facility-Administered Medications Ordered in Other Visits   Medication    lactated ringers infusion     ALLERGIES:   Review of patient's allergies indicates:   Allergen Reactions    Hydrocodone-acetaminophen Other (See Comments)     Causes pt to feel extremely sick              REVIEW OF SYSTEMS:   See HPI    PHYSICAL EXAM:   physical exam deferred due to telemed     ECOG Performance Status: (foot note - ECOG PS provided by Eastern Cooperative Oncology Group) 1 - Symptomatic but completely ambulatory    Karnofsky Performance Score:  80%- Normal Activity with Effort: Some Symptoms of Disease  DATA:   Lab Results   Component Value Date    WBC 4.48 08/08/2023    HGB 12.1 08/08/2023    HCT 37.6 08/08/2023    MCV 97 08/08/2023     08/08/2023       Gran # (ANC)   Date Value Ref Range Status   08/08/2023 1.6 (L) 1.8 - 7.7 K/uL Final     Gran %   Date Value Ref Range Status   08/08/2023 34.5 (L) 38.0 - 73.0 % Final     CMP  Sodium   Date Value Ref Range Status   08/08/2023 142 136 - 145 mmol/L Final     Potassium   Date Value Ref Range Status   08/08/2023 3.9 3.5 - 5.1 mmol/L Final     Chloride   Date Value Ref Range Status   08/08/2023 106 95 - 110 mmol/L Final     CO2   Date Value  Ref Range Status   08/08/2023 27 23 - 29 mmol/L Final     Glucose   Date Value Ref Range Status   08/08/2023 102 70 - 110 mg/dL Final     BUN   Date Value Ref Range Status   08/08/2023 10 8 - 23 mg/dL Final     Creatinine   Date Value Ref Range Status   08/08/2023 0.9 0.5 - 1.4 mg/dL Final     Calcium   Date Value Ref Range Status   08/08/2023 9.0 8.7 - 10.5 mg/dL Final     Total Protein   Date Value Ref Range Status   08/08/2023 7.3 6.0 - 8.4 g/dL Final     Albumin   Date Value Ref Range Status   08/08/2023 3.8 3.5 - 5.2 g/dL Final     Total Bilirubin   Date Value Ref Range Status   08/08/2023 0.6 0.1 - 1.0 mg/dL Final     Comment:     For infants and newborns, interpretation of results should be based  on gestational age, weight and in agreement with clinical  observations.    Premature Infant recommended reference ranges:  Up to 24 hours.............<8.0 mg/dL  Up to 48 hours............<12.0 mg/dL  3-5 days..................<15.0 mg/dL  6-29 days.................<15.0 mg/dL       Alkaline Phosphatase   Date Value Ref Range Status   08/08/2023 74 55 - 135 U/L Final     AST   Date Value Ref Range Status   08/08/2023 18 10 - 40 U/L Final     ALT   Date Value Ref Range Status   08/08/2023 16 10 - 44 U/L Final     Anion Gap   Date Value Ref Range Status   08/08/2023 9 8 - 16 mmol/L Final     eGFR   Date Value Ref Range Status   08/08/2023 >60 >60 mL/min/1.73 m^2 Final     IgG   Date Value Ref Range Status   03/27/2023 4521 (H) 650 - 1600 mg/dL Final     Comment:     IgG Cord Blood Reference Range: 650-1600 mg/dL.     IgA   Date Value Ref Range Status   03/27/2023 49 40 - 350 mg/dL Final     Comment:     IgA Cord Blood Reference Range: <5 mg/dL.     IgM   Date Value Ref Range Status   03/27/2023 21 (L) 50 - 300 mg/dL Final     Comment:     IgM Cord Blood Reference Range: <25 mg/dL.     Kappa Free Light Chains   Date Value Ref Range Status   08/01/2023 1.04 0.33 - 1.94 mg/dL Final   06/28/2023 1.62 0.33 - 1.94 mg/dL  Final   04/21/2023 0.99 0.33 - 1.94 mg/dL Final     Lambda Free Light Chains   Date Value Ref Range Status   08/01/2023 1.34 0.57 - 2.63 mg/dL Final   06/28/2023 10.34 (H) 0.57 - 2.63 mg/dL Final   04/21/2023 51.78 (H) 0.57 - 2.63 mg/dL Final     Kappa/Lambda FLC Ratio   Date Value Ref Range Status   08/01/2023 0.78 0.26 - 1.65 Final     Comment:     Undetected antigen excess is a rare event but cannot   be excluded. If these free light chain results do not   agree with other clinical or laboratory findings or   if the sample is from a patient that has previously   demonstrated antigen excess, discuss with the testing   laboratory.   Results should always be interpreted in conjunction   with other laboratory tests and clinical evidence.     06/28/2023 0.16 (L) 0.26 - 1.65 Final     Comment:     Undetected antigen excess is a rare event but cannot   be excluded. If these free light chain results do not   agree with other clinical or laboratory findings or   if the sample is from a patient that has previously   demonstrated antigen excess, discuss with the testing   laboratory.   Results should always be interpreted in conjunction   with other laboratory tests and clinical evidence.     04/21/2023 0.02 (L) 0.26 - 1.65 Final     Comment:     Undetected antigen excess is a rare event but cannot   be excluded. If these free light chain results do not   agree with other clinical or laboratory findings or   if the sample is from a patient that has previously   demonstrated antigen excess, discuss with the testing   laboratory.   Results should always be interpreted in conjunction   with other laboratory tests and clinical evidence.       Pathologist Interpretation SPE   Date Value Ref Range Status   08/01/2023 REVIEWED  Final     Comment:       Electronically reviewed and signed by:  Jessica Wells D.O.  Signed on 08/06/23 at 17:25  Normal total protein. Normal gamma globulins are decreased.  Paraprotein peak in near-gamma =  1.06 g/dL (previously, 2.15 g/dL).     06/28/2023 REVIEWED  Final     Comment:       Electronically reviewed and signed by:  Monse Saldana MD  Signed on 06/29/23 at 14:47  Normal total protein. Normal gamma globulins are decreased.   Paraprotein peak in gamma =  2.15 g/dL, previously 3.19 g/dL.      03/27/2023 REVIEWED  Final     Comment:       Electronically reviewed and signed by:  Monse Saldana MD  Signed on 03/29/23 at 09:11  Increased total protein. Normal gamma globulins are decreased.   Paraprotein peak in gamma = 3.19 g/dL.        Pathologist Interpretation MECHE   Date Value Ref Range Status   03/27/2023 REVIEWED  Final     Comment:       Electronically reviewed and signed by:  Monse Saldana MD  Signed on 03/28/23 at 13:06  IgG lambda specific monoclonal band present.            Bone marrow biopsy - 4/6/23  Final Pathologic Diagnosis     RIGHT ILIAC CREST BONE MARROW ASPIRATE, BONE MARROW CLOT, AND BONE MARROW CORE BIOPSY WITH:     CELLULARITY=40-60%, TRILINEAGE HEMATOPOIETIC ACTIVITY (M/E=2.9:1).   CONSISTENT WITH PLASMA CELL NEOPLASM(60%).  SEE COMMENT.   FOCAL GRADE 1 RETICULAR FIBROSIS.   CONGO RED NEGATIVE.   INCREASED STORAGE IRON.   ADEQUATE NUMBER OF MEGAKARYOCYTES.  VC      Comment: Interp By Olga Lidia Brand MD, Signed on 04/24/2023 at 16:08   Supplemental Diagnosis     See [facility] report:   ([address] , [place] [address])     Bone marrow karyotype results: 46, XX[20], female karyotype.     Myeloma fixed cell, high-risk, FISH:  Normal.  The result is within normal limits for 1q duplication, TP53 deletion and IGH rearrangement           Verterbral biopsy - 6/8/23  1. L1 vertebral body,  biopsy:   - Consistent with plasma cell myeloma.    Results for orders placed or performed during the hospital encounter of 04/10/23 (from the past 2160 hour(s))   NM PET CT Whole Body     Impression     1. Numerous FDG avid predominately lytic osseous lesions as above.  Primary  differential considerations would include multiple myeloma versus metastatic disease.  2. No FDG avid soft tissue masses or adenopathy demonstrated.  3. Mild pathologic compression deformity of the L1 vertebral body.  This report was flagged in Epic as abnormal.        Electronically signed by:       Marino Pollack MD  Date:                                                04/10/2023  Time:                                                11:58     ASSESSMENT AND PLAN:   Encounter Diagnoses   Name Primary?    Multiple myeloma, remission status unspecified Yes    Immunodeficiency due to chemotherapy     Stem cell transplant candidate     Tobacco use     Immunodeficiency due to drugs     Anxiety     Back pain, unspecified back location, unspecified back pain laterality, unspecified chronicity          -R-ISS stage I IgG Lambda multiple myeloma; disease complicated by diffuse lytic disease disease diagnosed after presenting with symptomatic bone disease April 2023  -initiated on VRd therapy on 5/10/23; generally tolerating  well; her revlimid has been dose reduced to 10mg from 25 mg for unclear reasons (was told was affecting her kidneys but her Cr has been stable)  -underwent subsequent kyphoplasty on 6/8/23 with significant improvement in back pain   -rosa was added with cycle 2 for Rosa-VRd and pt is currently C3D8 tolerating and responding well biochemically under care of [facility] onc team  -recommend continue therapy  - again discussed role and rationale and process of SCT and pt remains hesitant particularly regarding side effects and time needed away from home (30 days) but is leaning towards SCT. She will update with us regarding SCT decision in 3-4 weeks, still doing some online research  -will have bmt coordinator reach out to pt to discuss with pt and provide additional SCT resources  -she has stopped smoking; encouraged pt to continue this   -monthly biochemical studies and local provider appt while  on therapy for AE checks  -we will have another virtual visit to check in in one month to again readdress transplant; she may end needing 4-6 cycles of therapy in total prior to SCT if she elects to proceed we are likely talking about late 2023 transplant date   -supportive meds: acyc, asa, bisphosphonate (assuming DDS clearance )   - Close f/u with  locally    -continue fu with pcp for health maintenance     Follow Up:  virtual MD appt in 1 month to readdress SCT      BMT Chart Routing      Follow up with physician . Virtual visit with  in 1 month   Follow up with WERNER    Provider visit type    Infusion scheduling note    Injection scheduling note    Labs    Imaging    Pharmacy appointment    Other referrals            Advance Care Planning     Date: 08/14/2023      Patient did not wish to name a surrogate decision maker or provide an Advance Care Plan.        Adri Soria NP  Hematology/Oncology/BMT

## 2023-08-15 ENCOUNTER — LAB VISIT (OUTPATIENT)
Dept: LAB | Facility: HOSPITAL | Age: 63
End: 2023-08-15
Attending: INTERNAL MEDICINE
Payer: COMMERCIAL

## 2023-08-15 DIAGNOSIS — C90.00 MULTIPLE MYELOMA, REMISSION STATUS UNSPECIFIED: ICD-10-CM

## 2023-08-15 LAB
ALBUMIN SERPL BCP-MCNC: 3.6 G/DL (ref 3.5–5.2)
ALP SERPL-CCNC: 71 U/L (ref 55–135)
ALT SERPL W/O P-5'-P-CCNC: 20 U/L (ref 10–44)
ANION GAP SERPL CALC-SCNC: 8 MMOL/L (ref 8–16)
AST SERPL-CCNC: 20 U/L (ref 10–40)
BASOPHILS # BLD AUTO: 0.03 K/UL (ref 0–0.2)
BASOPHILS NFR BLD: 0.5 % (ref 0–1.9)
BILIRUB SERPL-MCNC: 0.5 MG/DL (ref 0.1–1)
BUN SERPL-MCNC: 10 MG/DL (ref 8–23)
CALCIUM SERPL-MCNC: 8.3 MG/DL (ref 8.7–10.5)
CHLORIDE SERPL-SCNC: 109 MMOL/L (ref 95–110)
CO2 SERPL-SCNC: 23 MMOL/L (ref 23–29)
CREAT SERPL-MCNC: 0.8 MG/DL (ref 0.5–1.4)
DIFFERENTIAL METHOD: ABNORMAL
EOSINOPHIL # BLD AUTO: 0.2 K/UL (ref 0–0.5)
EOSINOPHIL NFR BLD: 4 % (ref 0–8)
ERYTHROCYTE [DISTWIDTH] IN BLOOD BY AUTOMATED COUNT: 13.9 % (ref 11.5–14.5)
EST. GFR  (NO RACE VARIABLE): >60 ML/MIN/1.73 M^2
GLUCOSE SERPL-MCNC: 80 MG/DL (ref 70–110)
HCT VFR BLD AUTO: 37.1 % (ref 37–48.5)
HGB BLD-MCNC: 12.1 G/DL (ref 12–16)
IMM GRANULOCYTES # BLD AUTO: 0.01 K/UL (ref 0–0.04)
IMM GRANULOCYTES NFR BLD AUTO: 0.2 % (ref 0–0.5)
LYMPHOCYTES # BLD AUTO: 2.1 K/UL (ref 1–4.8)
LYMPHOCYTES NFR BLD: 36.9 % (ref 18–48)
MCH RBC QN AUTO: 31.4 PG (ref 27–31)
MCHC RBC AUTO-ENTMCNC: 32.6 G/DL (ref 32–36)
MCV RBC AUTO: 96 FL (ref 82–98)
MONOCYTES # BLD AUTO: 1 K/UL (ref 0.3–1)
MONOCYTES NFR BLD: 17.8 % (ref 4–15)
NEUTROPHILS # BLD AUTO: 2.4 K/UL (ref 1.8–7.7)
NEUTROPHILS NFR BLD: 40.6 % (ref 38–73)
NRBC BLD-RTO: 0 /100 WBC
PLATELET # BLD AUTO: 268 K/UL (ref 150–450)
PMV BLD AUTO: 10.2 FL (ref 9.2–12.9)
POTASSIUM SERPL-SCNC: 3.6 MMOL/L (ref 3.5–5.1)
PROT SERPL-MCNC: 7.1 G/DL (ref 6–8.4)
RBC # BLD AUTO: 3.85 M/UL (ref 4–5.4)
SODIUM SERPL-SCNC: 140 MMOL/L (ref 136–145)
WBC # BLD AUTO: 5.78 K/UL (ref 3.9–12.7)

## 2023-08-15 PROCEDURE — 36415 COLL VENOUS BLD VENIPUNCTURE: CPT | Performed by: INTERNAL MEDICINE

## 2023-08-15 PROCEDURE — 85025 COMPLETE CBC W/AUTO DIFF WBC: CPT | Performed by: INTERNAL MEDICINE

## 2023-08-15 PROCEDURE — 80053 COMPREHEN METABOLIC PANEL: CPT | Performed by: INTERNAL MEDICINE

## 2023-08-16 ENCOUNTER — DOCUMENTATION ONLY (OUTPATIENT)
Dept: HEMATOLOGY/ONCOLOGY | Facility: CLINIC | Age: 63
End: 2023-08-16
Payer: COMMERCIAL

## 2023-08-16 ENCOUNTER — INFUSION (OUTPATIENT)
Dept: INFUSION THERAPY | Facility: HOSPITAL | Age: 63
End: 2023-08-16
Attending: INTERNAL MEDICINE
Payer: COMMERCIAL

## 2023-08-16 ENCOUNTER — OFFICE VISIT (OUTPATIENT)
Dept: HEMATOLOGY/ONCOLOGY | Facility: CLINIC | Age: 63
End: 2023-08-16
Payer: COMMERCIAL

## 2023-08-16 VITALS
RESPIRATION RATE: 18 BRPM | WEIGHT: 129 LBS | TEMPERATURE: 98 F | HEIGHT: 64 IN | OXYGEN SATURATION: 99 % | HEART RATE: 74 BPM | SYSTOLIC BLOOD PRESSURE: 107 MMHG | BODY MASS INDEX: 22.02 KG/M2 | DIASTOLIC BLOOD PRESSURE: 71 MMHG

## 2023-08-16 VITALS
SYSTOLIC BLOOD PRESSURE: 100 MMHG | HEIGHT: 64 IN | WEIGHT: 129 LBS | TEMPERATURE: 98 F | DIASTOLIC BLOOD PRESSURE: 63 MMHG | BODY MASS INDEX: 22.02 KG/M2 | HEART RATE: 85 BPM

## 2023-08-16 DIAGNOSIS — F41.9 ANXIETY: ICD-10-CM

## 2023-08-16 DIAGNOSIS — T45.1X5A IMMUNODEFICIENCY DUE TO CHEMOTHERAPY: ICD-10-CM

## 2023-08-16 DIAGNOSIS — Z79.899 IMMUNODEFICIENCY DUE TO CHEMOTHERAPY: ICD-10-CM

## 2023-08-16 DIAGNOSIS — Z01.00 ENCOUNTER FOR EYE EXAM: ICD-10-CM

## 2023-08-16 DIAGNOSIS — Z51.11 ENCOUNTER FOR ANTINEOPLASTIC CHEMOTHERAPY: ICD-10-CM

## 2023-08-16 DIAGNOSIS — C90.00 MULTIPLE MYELOMA, REMISSION STATUS UNSPECIFIED: Primary | ICD-10-CM

## 2023-08-16 DIAGNOSIS — M54.9 BACK PAIN, UNSPECIFIED BACK LOCATION, UNSPECIFIED BACK PAIN LATERALITY, UNSPECIFIED CHRONICITY: ICD-10-CM

## 2023-08-16 DIAGNOSIS — C79.51 SECONDARY MALIGNANT NEOPLASM OF BONE: ICD-10-CM

## 2023-08-16 DIAGNOSIS — D84.821 IMMUNODEFICIENCY DUE TO CHEMOTHERAPY: ICD-10-CM

## 2023-08-16 PROCEDURE — 3074F SYST BP LT 130 MM HG: CPT | Mod: CPTII,S$GLB,,

## 2023-08-16 PROCEDURE — 99215 OFFICE O/P EST HI 40 MIN: CPT | Mod: S$GLB,,,

## 2023-08-16 PROCEDURE — 3008F PR BODY MASS INDEX (BMI) DOCUMENTED: ICD-10-PCS | Mod: CPTII,S$GLB,,

## 2023-08-16 PROCEDURE — 99999 PR PBB SHADOW E&M-EST. PATIENT-LVL III: CPT | Mod: PBBFAC,,,

## 2023-08-16 PROCEDURE — 3078F DIAST BP <80 MM HG: CPT | Mod: CPTII,S$GLB,,

## 2023-08-16 PROCEDURE — 99999 PR PBB SHADOW E&M-EST. PATIENT-LVL III: ICD-10-PCS | Mod: PBBFAC,,,

## 2023-08-16 PROCEDURE — 4010F PR ACE/ARB THEARPY RXD/TAKEN: ICD-10-PCS | Mod: CPTII,S$GLB,,

## 2023-08-16 PROCEDURE — 96401 CHEMO ANTI-NEOPL SQ/IM: CPT

## 2023-08-16 PROCEDURE — 99215 PR OFFICE/OUTPT VISIT, EST, LEVL V, 40-54 MIN: ICD-10-PCS | Mod: S$GLB,,,

## 2023-08-16 PROCEDURE — 3044F HG A1C LEVEL LT 7.0%: CPT | Mod: CPTII,S$GLB,,

## 2023-08-16 PROCEDURE — 4010F ACE/ARB THERAPY RXD/TAKEN: CPT | Mod: CPTII,S$GLB,,

## 2023-08-16 PROCEDURE — 63600175 PHARM REV CODE 636 W HCPCS: Mod: JZ,TB | Performed by: INTERNAL MEDICINE

## 2023-08-16 PROCEDURE — 3044F PR MOST RECENT HEMOGLOBIN A1C LEVEL <7.0%: ICD-10-PCS | Mod: CPTII,S$GLB,,

## 2023-08-16 PROCEDURE — 3078F PR MOST RECENT DIASTOLIC BLOOD PRESSURE < 80 MM HG: ICD-10-PCS | Mod: CPTII,S$GLB,,

## 2023-08-16 PROCEDURE — 3008F BODY MASS INDEX DOCD: CPT | Mod: CPTII,S$GLB,,

## 2023-08-16 PROCEDURE — 3074F PR MOST RECENT SYSTOLIC BLOOD PRESSURE < 130 MM HG: ICD-10-PCS | Mod: CPTII,S$GLB,,

## 2023-08-16 RX ORDER — BORTEZOMIB 3.5 MG/1
1.3 INJECTION, POWDER, LYOPHILIZED, FOR SOLUTION INTRAVENOUS; SUBCUTANEOUS
Status: COMPLETED | OUTPATIENT
Start: 2023-08-16 | End: 2023-08-16

## 2023-08-16 RX ADMIN — BORTEZOMIB 2 MG: 3.5 INJECTION, POWDER, LYOPHILIZED, FOR SOLUTION INTRAVENOUS; SUBCUTANEOUS at 09:08

## 2023-08-16 RX ADMIN — DARATUMUMAB AND HYALURONIDASE-FIHJ (HUMAN RECOMBINANT) 1800 MG: 1800; 30000 INJECTION SUBCUTANEOUS at 09:08

## 2023-08-16 NOTE — NURSING
0938: Darazalex and Velcade Injection given without difficulties.Bandaid applied. Patient instructed to stay in the clinic for 15 minutes. Patient verbalized understanding and will notify nurse with any complaints.

## 2023-08-16 NOTE — PLAN OF CARE
"Pt is stable. Pt administered Darzalex and Velcade. Pt voiced she was "tired," today. Pt will follow up in one week.      Problem: Fall Injury Risk  Goal: Absence of Fall and Fall-Related Injury  Outcome: Ongoing, Progressing     Problem: Anemia (Chemotherapy Effects)  Goal: Anemia Symptom Improvement  Outcome: Ongoing, Progressing     Problem: Urinary Bleeding Risk or Actual (Chemotherapy Effects)  Goal: Absence of Hematuria  Outcome: Ongoing, Progressing     Problem: Nausea and Vomiting (Chemotherapy Effects)  Goal: Fluid and Electrolyte Balance  Outcome: Ongoing, Progressing     Problem: Neurotoxicity (Chemotherapy Effects)  Goal: Neurotoxicity Symptom Control  Outcome: Ongoing, Progressing     Problem: Neutropenia (Chemotherapy Effects)  Goal: Absence of Infection  Outcome: Ongoing, Progressing     Problem: Oral Mucositis (Chemotherapy Effects)  Goal: Improved Oral Mucous Membrane Integrity  Outcome: Ongoing, Progressing     Problem: Thrombocytopenia Bleeding Risk (Chemotherapy Effects)  Goal: Absence of Bleeding  Outcome: Ongoing, Progressing     Problem: Adult Inpatient Plan of Care  Goal: Plan of Care Review  Outcome: Ongoing, Progressing  Goal: Patient-Specific Goal (Individualized)  8/16/2023 0915 by Karen Bess RN  Outcome: Ongoing, Progressing  8/16/2023 0914 by Karen Bess, RN  Flowsheets (Taken 8/16/2023 0914)  Anxieties, Fears or Concerns: Pt denies any.  Individualized Care Needs: Pt provided pillow, and chose to sit upright.     "

## 2023-08-16 NOTE — DISCHARGE INSTRUCTIONS
Thank you for allowing me to care for you today,  MIS LeachN, RN    [place]  [address] [address]  [phone] phone     [phone] fax  Hours of Operation: Monday- Friday 8:00am- 5:00pm  After hours phone  [phone]  Hematology / Oncology Physicians on call      JUANI García [address] Dr., BELLE Page, BELLE Valdez, BELLE Deluna, BELLE    Please call with any concerns regarding your appointment today.

## 2023-08-16 NOTE — ASSESSMENT & PLAN NOTE
BMBx : 60 % plasma cells - 4/6/2023  - SPEP/MECHE showed IgG lambda - monoclonal protein 3.19 g/dl - (3/27/2023).  - R-ISS stage I (beta 2 microglobulin 1.9, albumin 3.5, , normal karyotype and no high-risk mutations on fish panel  - PET-CT ( 4/10/2023) - showed extensive lytic lesions in the axial skeleton and mild L1 compression deformity.  - Started with Induction chemotherapy with VRD regimen (Velcde/Revlimid/Decadron) - 05/10/2023   - Started Aspirin daily p.o for thrombosis prophylaxis; Acyclovir 400 mg p.o BID for herpes Zoster prophylaxis .  __________________________________________________    Seen by Dr. Guanakito severino with following recommendations:  -weekly 28 day cycle odell-VRd x 4 total cycles >cirssy autoSCT> maintenance     Labs reviewed, no concerning cytopenias  --Ok to proceed with C2D15 D-VRD  --On Lenalomide 25mg utd 1-21 of 28 day cycle; Dex q 7 days  --Continue Aspirin; Acyclovir 400 mg p.o BID for herpes Zoster prophylaxis  --S/p Kyphoplasty 06/08/23  --Initiated on Zometa 05/31/23 pt continues on Ca+D currently on q 3 month dosing next due on or about 08/31/23      Follow-up in one week with repeat labs for C2D22

## 2023-08-16 NOTE — PROGRESS NOTES
Subjective:     Patient ID:?Ana Bonilla is a 63 y.o. female.?? MR#: [id number]   ?   PRIMARY ONCOLOGIST:   ?   CHIEF COMPLAINT: lab review/assessment for chemo/MM ?????   ?   ONCOLOGIC DIAGNOSIS: Multiple Myeloma  ?   CURRENT TREATMENT: OP D-VRD DARATUMUMAB + BORTEZOMIB LENALIDOMIDE DEXAMETHASONE     HPI  Ms. Bonilla is a 62-year-old  female with past medical history significant for hypertension, COPD, asthma, GERD, hypothyroidism here for follow-up and management of multiple myeloma. BMBx on 4/6/2023 showed 60 % plasma cells. PET-CT on 4/10/2023 showed extensive lytic bony lesions throughout the spine, sternum, bilateral ribs, pelvis and mild compression deformity in L1 vertebral body. SPEP/MECHE on 3/27/2023 showed IgG lambda monoclonal protein - 3.19 g/dl. Quantitative immunoglobulins on 3/27/2023 showed IgG 4,521 mg/dl. UPEP/MECHE and FLC were pending at that time. Labs on 3/27/2023 showed Beta 2 microglobulin 1.9, .  Labs on 4/19/2023 showed Hb, 11.5, WBC 6.3, platelets, BUN 11, Cr 0.9, calcium 9. Pt initiated on VRD 05/10/23. Treatment planned changed to D-VRD 07/06/23.       Interval History: Pt presents today for lab review and assessment prior to Velcade/Darzalex Faspro. She continues lenalidomide dose previously reduced to 10mg 05/17/23 per primary oncologist after drop in CrCl 49. Restarted on 25mg 08/04/23, she is taking acyclovir as prescribed and has taken dexamethasone this morning. Pt states overall she is feeling ok and notes increasing fatigue. Denies n/v/d/c, fever, chills, sob, cp, abnormal bleeding. She notes appetite waxes and wanes; denies difficulty with hydration.  Pt would like referral to opthalmology for chalazion of left eye.  Oncology History   Multiple myeloma   4/20/2023 Initial Diagnosis    Multiple myeloma     5/10/2023 - 6/28/2023 Chemotherapy    Treatment Summary   Plan Name: OP VRD - WEEKLY BORTEZOMIB LENALIDOMIDE DEXAMETHASONE Q3W  Treatment Goal:  Palliative  Status: Inactive  Start Date: 5/10/2023  End Date: 6/28/2023  Provider: Abram Velasquez MD  Chemotherapy: bortezomib (VELCADE) injection 2 mg, 1.3 mg/m2 = 2 mg, Subcutaneous, Clinic/HOD 1 time, 2 of 6 cycles  Administration: 2 mg (5/10/2023), 2 mg (5/17/2023), 2 mg (6/14/2023), 2 mg (5/31/2023), 2 mg (6/21/2023), 2 mg (6/28/2023)  lenalidomide 25 mg Cap, 25 mg, Oral, Daily, 1 of 1 cycle, Start date: 5/10/2023, End date: 5/17/2023 6/28/2023 Cancer Staged    Staging form: Plasma Cell Myeloma and Plasma Cell Disorders, AJCC 8th Edition  - Clinical stage from 6/28/2023: RISS Stage II (Beta-2-microglobulin (mg/L): 1.9, Albumin (g/dL): 3, ISS: Stage II, High-risk cytogenetics: Absent, LDH: Normal)     7/6/2023 -  Chemotherapy    Treatment Summary   Plan Name: OP D-VRD DARATUMUMAB + BORTEZOMIB LENALIDOMIDE DEXAMETHASONE  Treatment Goal: Palliative  Status: Active  Start Date: 7/6/2023  End Date: 12/13/2023 (Planned)  Provider: Oscar Márquez MD  Chemotherapy: bortezomib (VELCADE) injection 2 mg, 1.3 mg/m2 = 2 mg, Subcutaneous, Clinic/HOD 1 time, 2 of 6 cycles  Administration: 2 mg (7/6/2023), 2 mg (7/12/2023), 2 mg (8/2/2023), 2 mg (8/9/2023), 2 mg (7/19/2023), 2 mg (7/26/2023), 2 mg (8/16/2023)  lenalidomide 10 mg Cap, 1 capsule (100 % of original dose 1 capsule), Oral, Daily, 1 of 1 cycle, Start date: 7/26/2023, End date: 8/2/2023  Dose modification: 1 capsule (original dose 1 capsule, Cycle 0)  daratumumab-hyaluronidase-fij subcutaneous injection 1,800 mg, 1,800 mg (100 % of original dose 1,800 mg), Subcutaneous, Clinic/hospitals 1 time, 2 of 6 cycles  Dose modification: 1,800 mg (original dose 1,800 mg, Cycle 1), 1,800 mg (original dose 1,800 mg, Cycle 1)  Administration: 1,800 mg (7/6/2023), 1,800 mg (7/12/2023), 1,800 mg (7/19/2023), 1,800 mg (7/26/2023), 1,800 mg (8/2/2023), 1,800 mg (8/9/2023), 1,800 mg (8/16/2023)        Social History     Socioeconomic History    Marital status:     Number of  children: 2   Occupational History     Employer: [employer]   Tobacco Use    Smoking status: Every Day     Current packs/day: 0.50     Average packs/day: 0.5 packs/day for 50.0 years (25.0 ttl pk-yrs)     Types: Cigarettes    Smokeless tobacco: Never   Substance and Sexual Activity    Alcohol use: Never     Comment: quit    Drug use: No    Sexual activity: Never     Social Determinants of Health     Stress: No Stress Concern Present (7/8/2019)    [employer] - Occupational Stress Questionnaire     Feeling of Stress : Not at all      Family History   Problem Relation Age of Onset    Hypertension Mother     Diabetes Mother     Diabetes Father     Stroke Maternal Grandmother     Prostate cancer Maternal Grandfather     Mental illness Son     Pancreatic cancer Maternal Uncle     Mental illness Other     Pancreatic cancer Other     Ovarian cancer Maternal Cousin       Past Surgical History:   Procedure Laterality Date    APPENDECTOMY      BIOPSY N/A 6/8/2023    Procedure: BIOPSY;  Surgeon: Fausto Smith MD;  Location: [facility] OR;  Service: Neurosurgery;  Laterality: N/A;  L1    BUNIONECTOMY      COLONOSCOPY N/A 12/4/2019    Procedure: COLONOSCOPY;  Surgeon: Danny Matos III, MD;  Location: [facility] ENDO;  Service: Endoscopy;  Laterality: N/A;    COLONOSCOPY N/A 10/24/2022    Procedure: COLONOSCOPY;  Surgeon: Danny Matos III, MD;  Location: [facility] ENDO;  Service: Endoscopy;  Laterality: N/A;    ESOPHAGOGASTRODUODENOSCOPY N/A 10/24/2022    Procedure: EGD (ESOPHAGOGASTRODUODENOSCOPY);  Surgeon: Danny Matos III, MD;  Location: [facility] ENDO;  Service: Endoscopy;  Laterality: N/A;    FIXATION KYPHOPLASTY Bilateral 6/8/2023    Procedure: KYPHOPLASTY;  Surgeon: Fausto Smith MD;  Location: [facility] OR;  Service: Neurosurgery;  Laterality: Bilateral;  kyphoplasty and radiofrequency ablation - L1    HYSTERECTOMY      PARTIAL//still with ovaries    neck fusion  08/2017    THYROIDECTOMY          Review  of Systems   Constitutional:  Positive for fatigue. Negative for activity change, appetite change, chills, fever and unexpected weight change.   HENT:  Negative for congestion, dental problem, mouth sores and nosebleeds.    Eyes:  Negative for visual disturbance.   Respiratory:  Negative for cough, choking and chest tightness.    Cardiovascular:  Negative for chest pain, palpitations and leg swelling.   Gastrointestinal:  Negative for abdominal distention, abdominal pain, anal bleeding, blood in stool, constipation, diarrhea, nausea and vomiting.   Endocrine: Negative.    Genitourinary:  Negative for dysuria, frequency, hematuria and urgency.   Musculoskeletal:  Positive for arthralgias and myalgias. Negative for back pain, gait problem and joint swelling.   Skin:  Negative for rash and wound.   Allergic/Immunologic: Negative for immunocompromised state.   Neurological:  Negative for dizziness, light-headedness, numbness and headaches.   Hematological:  Negative for adenopathy. Does not bruise/bleed easily.   Psychiatric/Behavioral:  The patient is nervous/anxious.        ?   A comprehensive 14-point review of systems was reviewed with patient and was negative other than as specified above.   ?     Objective:      Physical Exam  Vitals reviewed.   Constitutional:       Appearance: Normal appearance. She is not ill-appearing.   HENT:      Head: Normocephalic and atraumatic.      Right Ear: External ear normal.      Left Ear: External ear normal.   Eyes:      Conjunctiva/sclera: Conjunctivae normal.   Cardiovascular:      Rate and Rhythm: Normal rate.   Pulmonary:      Effort: Pulmonary effort is normal.   Abdominal:      General: Abdomen is flat.   Genitourinary:     Comments: deferred  Musculoskeletal:         General: Normal range of motion.      Cervical back: Normal range of motion.      Right lower leg: No edema.      Left lower leg: No edema.   Skin:     General: Skin is warm and dry.      Capillary Refill:  Capillary refill takes less than 2 seconds.      Coloration: Skin is not mottled.   Neurological:      Mental Status: She is alert and oriented to person, place, and time.      Motor: No weakness.           ?   Vitals:    08/16/23 0811   BP: 100/63   Pulse: 85   Temp: 97.6 °F (36.4 °C)      ?       ?   Laboratory:  ?   No visits with results within 1 Day(s) from this visit.   Latest known visit with results is:   Lab Visit on 08/15/2023   Component Date Value Ref Range Status    Sodium 08/15/2023 140  136 - 145 mmol/L Final    Potassium 08/15/2023 3.6  3.5 - 5.1 mmol/L Final    Chloride 08/15/2023 109  95 - 110 mmol/L Final    CO2 08/15/2023 23  23 - 29 mmol/L Final    Glucose 08/15/2023 80  70 - 110 mg/dL Final    BUN 08/15/2023 10  8 - 23 mg/dL Final    Creatinine 08/15/2023 0.8  0.5 - 1.4 mg/dL Final    Calcium 08/15/2023 8.3 (L)  8.7 - 10.5 mg/dL Final    Total Protein 08/15/2023 7.1  6.0 - 8.4 g/dL Final    Albumin 08/15/2023 3.6  3.5 - 5.2 g/dL Final    Total Bilirubin 08/15/2023 0.5  0.1 - 1.0 mg/dL Final    Alkaline Phosphatase 08/15/2023 71  55 - 135 U/L Final    AST 08/15/2023 20  10 - 40 U/L Final    ALT 08/15/2023 20  10 - 44 U/L Final    eGFR 08/15/2023 >60  >60 mL/min/1.73 m^2 Final    Anion Gap 08/15/2023 8  8 - 16 mmol/L Final    WBC 08/15/2023 5.78  3.90 - 12.70 K/uL Final    RBC 08/15/2023 3.85 (L)  4.00 - 5.40 M/uL Final    Hemoglobin 08/15/2023 12.1  12.0 - 16.0 g/dL Final    Hematocrit 08/15/2023 37.1  37.0 - 48.5 % Final    MCV 08/15/2023 96  82 - 98 fL Final    MCH 08/15/2023 31.4 (H)  27.0 - 31.0 pg Final    MCHC 08/15/2023 32.6  32.0 - 36.0 g/dL Final    RDW 08/15/2023 13.9  11.5 - 14.5 % Final    Platelets 08/15/2023 268  150 - 450 K/uL Final    MPV 08/15/2023 10.2  9.2 - 12.9 fL Final    Immature Granulocytes 08/15/2023 0.2  0.0 - 0.5 % Final    Gran # (ANC) 08/15/2023 2.4  1.8 - 7.7 K/uL Final    Immature Grans (Abs) 08/15/2023 0.01  0.00 - 0.04 K/uL Final    Lymph # 08/15/2023 2.1  1.0 -  4.8 K/uL Final    Mono # 08/15/2023 1.0  0.3 - 1.0 K/uL Final    Eos # 08/15/2023 0.2  0.0 - 0.5 K/uL Final    Baso # 08/15/2023 0.03  0.00 - 0.20 K/uL Final    nRBC 08/15/2023 0  0 /100 WBC Final    Gran % 08/15/2023 40.6  38.0 - 73.0 % Final    Lymph % 08/15/2023 36.9  18.0 - 48.0 % Final    Mono % 08/15/2023 17.8 (H)  4.0 - 15.0 % Final    Eosinophil % 08/15/2023 4.0  0.0 - 8.0 % Final    Basophil % 08/15/2023 0.5  0.0 - 1.9 % Final    Differential Method 08/15/2023 Automated   Final      ?   Imaging:    No results found. However, due to the size of the patient record, not all encounters were searched. Please check Results Review for a complete set of results.       No results found. However, due to the size of the patient record, not all encounters were searched. Please check Results Review for a complete set of results.       ?   Assessment/Plan:     Problem List Items Addressed This Visit          Psychiatric    Anxiety    Relevant Orders    CBC Auto Differential    Comprehensive Metabolic Panel       Immunology/Multi System    Immunodeficiency due to chemotherapy    Relevant Orders    CBC Auto Differential    Comprehensive Metabolic Panel       Oncology    Multiple myeloma - Primary (Chronic)     BMBx : 60 % plasma cells - 4/6/2023  - SPEP/MECHE showed IgG lambda - monoclonal protein 3.19 g/dl - (3/27/2023).  - R-ISS stage I (beta 2 microglobulin 1.9, albumin 3.5, , normal karyotype and no high-risk mutations on fish panel  - PET-CT ( 4/10/2023) - showed extensive lytic lesions in the axial skeleton and mild L1 compression deformity.  - Started with Induction chemotherapy with VRD regimen (Velcde/Revlimid/Decadron) - 05/10/2023   - Started Aspirin daily p.o for thrombosis prophylaxis; Acyclovir 400 mg p.o BID for herpes Zoster prophylaxis .  __________________________________________________    Seen by Dr. Guanakito severino with following recommendations:  -weekly 28 day cycle odell-VRd x 4 total cycles  >crissy autoSCT> maintenance     Labs reviewed, no concerning cytopenias  --Ok to proceed with C2D15 D-VRD  --On Lenalomide 25mg utd 1-21 of 28 day cycle; Dex q 7 days  --Continue Aspirin; Acyclovir 400 mg p.o BID for herpes Zoster prophylaxis  --S/p Kyphoplasty 06/08/23  --Initiated on Zometa 05/31/23 pt continues on Ca+D currently on q 3 month dosing next due on or about 08/31/23      Follow-up in one week with repeat labs for C2D22         Relevant Orders    CBC Auto Differential    Comprehensive Metabolic Panel    Secondary malignant neoplasm of bone    Relevant Orders    CBC Auto Differential    Comprehensive Metabolic Panel     Other Visit Diagnoses       Back pain, unspecified back location, unspecified back pain laterality, unspecified chronicity        Relevant Orders    CBC Auto Differential    Comprehensive Metabolic Panel    Encounter for antineoplastic chemotherapy        Relevant Orders    CBC Auto Differential    Comprehensive Metabolic Panel    Encounter for eye exam        Relevant Orders    Ambulatory referral/consult to Ophthalmology                 Med Onc Chart Routing      Follow up with physician 1 week. with labs prior for C2D22 D-VRD   Follow up with WERNER    Infusion scheduling note    Injection scheduling note    Labs CBC and CMP   Scheduling:  Preferred lab:  Lab interval:     Imaging    Pharmacy appointment    Other referrals                 TONYA Pool  Hematology/Oncology

## 2023-08-18 NOTE — PROGRESS NOTES
Met with pt during chemo infusion visit. States she is tired, thought she was ready to go back to work but b/c she still can't drive she'd rather wait. States she got her oral chemo delivered and is taking it as directed. Doesn't have any NN needs at this time but knows to reach out if she does. States she is still waiting to receive her donut from neuro surgery, plans to stop on first floor on her way out.   Oncology Navigation   Intake  Date of Diagnosis: 23  Cancer Type: Transplant; Myeloma  Internal / External Referral: Internal  Date of Referral: 05/10/23  Initial Nurse Navigator Contact: 05/15/23  Referral to Initial Contact Timeline (days): 5  Date Worked: 23  Reason if booked > 7 days after scheduling: Additional tests/procedures; Patient request     Treatment  Current Status: Active       Medical Oncologist: Dr. FRITZ Dela Cruz  Chemotherapy: Initiated  Chemotherapy Regimen: Velcade (Durvalumab possible pending FISH)  Oral Therapy: Initiated                       Acuity  Systemic Treatment - predicted or initiated: Chemotherapy Regimen with Multiple drugs (+1)  ECO  Comorbidities in Medical History: 2   Needed: 0  Support: 0  Verbalizes Financial Concerns: 1  Transportation: 0  Psychological Factors (+1 each): Emotional during conversation  Verbalizes the need for more education: 1  Navigation Acuity: 3     Follow Up  No follow-ups on file.

## 2023-08-21 ENCOUNTER — OFFICE VISIT (OUTPATIENT)
Dept: OPHTHALMOLOGY | Facility: CLINIC | Age: 63
End: 2023-08-21
Payer: COMMERCIAL

## 2023-08-21 DIAGNOSIS — H00.011 HORDEOLUM EXTERNUM OF RIGHT UPPER EYELID: Primary | ICD-10-CM

## 2023-08-21 DIAGNOSIS — H00.015 HORDEOLUM EXTERNUM OF LEFT LOWER EYELID: ICD-10-CM

## 2023-08-21 PROCEDURE — 99203 OFFICE O/P NEW LOW 30 MIN: CPT | Mod: S$GLB,,, | Performed by: OPTOMETRIST

## 2023-08-21 PROCEDURE — 4010F PR ACE/ARB THEARPY RXD/TAKEN: ICD-10-PCS | Mod: CPTII,S$GLB,, | Performed by: OPTOMETRIST

## 2023-08-21 PROCEDURE — 1159F PR MEDICATION LIST DOCUMENTED IN MEDICAL RECORD: ICD-10-PCS | Mod: CPTII,S$GLB,, | Performed by: OPTOMETRIST

## 2023-08-21 PROCEDURE — 3044F HG A1C LEVEL LT 7.0%: CPT | Mod: CPTII,S$GLB,, | Performed by: OPTOMETRIST

## 2023-08-21 PROCEDURE — 1160F RVW MEDS BY RX/DR IN RCRD: CPT | Mod: CPTII,S$GLB,, | Performed by: OPTOMETRIST

## 2023-08-21 PROCEDURE — 99999 PR PBB SHADOW E&M-EST. PATIENT-LVL II: CPT | Mod: PBBFAC,,, | Performed by: OPTOMETRIST

## 2023-08-21 PROCEDURE — 1159F MED LIST DOCD IN RCRD: CPT | Mod: CPTII,S$GLB,, | Performed by: OPTOMETRIST

## 2023-08-21 PROCEDURE — 99999 PR PBB SHADOW E&M-EST. PATIENT-LVL II: ICD-10-PCS | Mod: PBBFAC,,, | Performed by: OPTOMETRIST

## 2023-08-21 PROCEDURE — 99203 PR OFFICE/OUTPT VISIT, NEW, LEVL III, 30-44 MIN: ICD-10-PCS | Mod: S$GLB,,, | Performed by: OPTOMETRIST

## 2023-08-21 PROCEDURE — 3044F PR MOST RECENT HEMOGLOBIN A1C LEVEL <7.0%: ICD-10-PCS | Mod: CPTII,S$GLB,, | Performed by: OPTOMETRIST

## 2023-08-21 PROCEDURE — 1160F PR REVIEW ALL MEDS BY PRESCRIBER/CLIN PHARMACIST DOCUMENTED: ICD-10-PCS | Mod: CPTII,S$GLB,, | Performed by: OPTOMETRIST

## 2023-08-21 PROCEDURE — 4010F ACE/ARB THERAPY RXD/TAKEN: CPT | Mod: CPTII,S$GLB,, | Performed by: OPTOMETRIST

## 2023-08-21 RX ORDER — NEOMYCIN SULFATE, POLYMYXIN B SULFATE, AND DEXAMETHASONE 3.5; 10000; 1 MG/G; [USP'U]/G; MG/G
OINTMENT OPHTHALMIC 3 TIMES DAILY
Qty: 3.5 G | Refills: 0 | Status: SHIPPED | OUTPATIENT
Start: 2023-08-21 | End: 2023-08-28

## 2023-08-21 NOTE — PROGRESS NOTES
HPI     Eye Problem            Comments: Painful lids           Comments    States that her eyes have been red swollen watery painful and have AM   matting. States that she has multiple styes and that they started on her   OS 1st. States that her Chemo doctor to her that it could be from her   chemo meds. States that she has tried warm compress and that nothing has   helped.           Last edited by Charlette Clifton on 8/21/2023 10:49 AM.            Assessment /Plan     For exam results, see Encounter Report.    Hordeolum externum of right upper eyelid  -     neomycin-polymyxin-dexamethasone (DEXACINE) 3.5 mg/g-10,000 unit/g-0.1 % Oint; Place into both eyes 3 (three) times daily. for 7 days  Dispense: 3.5 g; Refill: 0    Hordeolum externum of left lower eyelid  -     neomycin-polymyxin-dexamethasone (DEXACINE) 3.5 mg/g-10,000 unit/g-0.1 % Oint; Place into both eyes 3 (three) times daily. for 7 days  Dispense: 3.5 g; Refill: 0      Begin warm compresses tid-qid OS for 10-15 minutes  Start Dexacine tid OU for 7 days  Discussed surgical removal consult if pt desires      RTC next available for dilated eye exam or PRN if any problems.   Discussed above and answered questions.

## 2023-08-22 ENCOUNTER — LAB VISIT (OUTPATIENT)
Dept: LAB | Facility: HOSPITAL | Age: 63
End: 2023-08-22
Attending: INTERNAL MEDICINE
Payer: COMMERCIAL

## 2023-08-22 DIAGNOSIS — Z79.899 IMMUNODEFICIENCY DUE TO CHEMOTHERAPY: ICD-10-CM

## 2023-08-22 DIAGNOSIS — C79.51 SECONDARY MALIGNANT NEOPLASM OF BONE: ICD-10-CM

## 2023-08-22 DIAGNOSIS — Z51.11 ENCOUNTER FOR ANTINEOPLASTIC CHEMOTHERAPY: ICD-10-CM

## 2023-08-22 DIAGNOSIS — T45.1X5A IMMUNODEFICIENCY DUE TO CHEMOTHERAPY: ICD-10-CM

## 2023-08-22 DIAGNOSIS — D84.821 IMMUNODEFICIENCY DUE TO CHEMOTHERAPY: ICD-10-CM

## 2023-08-22 DIAGNOSIS — M54.9 BACK PAIN, UNSPECIFIED BACK LOCATION, UNSPECIFIED BACK PAIN LATERALITY, UNSPECIFIED CHRONICITY: ICD-10-CM

## 2023-08-22 DIAGNOSIS — F41.9 ANXIETY: ICD-10-CM

## 2023-08-22 DIAGNOSIS — C90.00 MULTIPLE MYELOMA, REMISSION STATUS UNSPECIFIED: ICD-10-CM

## 2023-08-22 LAB
ALBUMIN SERPL BCP-MCNC: 3.7 G/DL (ref 3.5–5.2)
ALP SERPL-CCNC: 67 U/L (ref 55–135)
ALT SERPL W/O P-5'-P-CCNC: 16 U/L (ref 10–44)
ANION GAP SERPL CALC-SCNC: 11 MMOL/L (ref 8–16)
AST SERPL-CCNC: 18 U/L (ref 10–40)
BASOPHILS # BLD AUTO: 0.02 K/UL (ref 0–0.2)
BASOPHILS NFR BLD: 0.4 % (ref 0–1.9)
BILIRUB SERPL-MCNC: 0.6 MG/DL (ref 0.1–1)
BUN SERPL-MCNC: 11 MG/DL (ref 8–23)
CALCIUM SERPL-MCNC: 8.6 MG/DL (ref 8.7–10.5)
CHLORIDE SERPL-SCNC: 106 MMOL/L (ref 95–110)
CO2 SERPL-SCNC: 23 MMOL/L (ref 23–29)
CREAT SERPL-MCNC: 0.9 MG/DL (ref 0.5–1.4)
DIFFERENTIAL METHOD: ABNORMAL
EOSINOPHIL # BLD AUTO: 0.5 K/UL (ref 0–0.5)
EOSINOPHIL NFR BLD: 11 % (ref 0–8)
ERYTHROCYTE [DISTWIDTH] IN BLOOD BY AUTOMATED COUNT: 13.7 % (ref 11.5–14.5)
EST. GFR  (NO RACE VARIABLE): >60 ML/MIN/1.73 M^2
GLUCOSE SERPL-MCNC: 106 MG/DL (ref 70–110)
HCT VFR BLD AUTO: 37.2 % (ref 37–48.5)
HGB BLD-MCNC: 12.2 G/DL (ref 12–16)
IMM GRANULOCYTES # BLD AUTO: 0.01 K/UL (ref 0–0.04)
IMM GRANULOCYTES NFR BLD AUTO: 0.2 % (ref 0–0.5)
LYMPHOCYTES # BLD AUTO: 1.5 K/UL (ref 1–4.8)
LYMPHOCYTES NFR BLD: 33.2 % (ref 18–48)
MCH RBC QN AUTO: 31.4 PG (ref 27–31)
MCHC RBC AUTO-ENTMCNC: 32.8 G/DL (ref 32–36)
MCV RBC AUTO: 96 FL (ref 82–98)
MONOCYTES # BLD AUTO: 0.8 K/UL (ref 0.3–1)
MONOCYTES NFR BLD: 16.8 % (ref 4–15)
NEUTROPHILS # BLD AUTO: 1.7 K/UL (ref 1.8–7.7)
NEUTROPHILS NFR BLD: 38.4 % (ref 38–73)
NRBC BLD-RTO: 0 /100 WBC
PLATELET # BLD AUTO: 196 K/UL (ref 150–450)
PMV BLD AUTO: 10.6 FL (ref 9.2–12.9)
POTASSIUM SERPL-SCNC: 3.4 MMOL/L (ref 3.5–5.1)
PROT SERPL-MCNC: 7.2 G/DL (ref 6–8.4)
RBC # BLD AUTO: 3.88 M/UL (ref 4–5.4)
SODIUM SERPL-SCNC: 140 MMOL/L (ref 136–145)
WBC # BLD AUTO: 4.46 K/UL (ref 3.9–12.7)

## 2023-08-22 PROCEDURE — 85025 COMPLETE CBC W/AUTO DIFF WBC: CPT

## 2023-08-22 PROCEDURE — 36415 COLL VENOUS BLD VENIPUNCTURE: CPT

## 2023-08-22 PROCEDURE — 80053 COMPREHEN METABOLIC PANEL: CPT

## 2023-08-24 DIAGNOSIS — Z98.890 STATUS POST KYPHOPLASTY: Primary | ICD-10-CM

## 2023-08-24 DIAGNOSIS — C90.00 MULTIPLE MYELOMA, REMISSION STATUS UNSPECIFIED: ICD-10-CM

## 2023-08-28 ENCOUNTER — PATIENT MESSAGE (OUTPATIENT)
Dept: NEUROSURGERY | Facility: CLINIC | Age: 63
End: 2023-08-28
Payer: COMMERCIAL

## 2023-08-28 ENCOUNTER — OFFICE VISIT (OUTPATIENT)
Dept: OPHTHALMOLOGY | Facility: CLINIC | Age: 63
End: 2023-08-28
Payer: COMMERCIAL

## 2023-08-28 DIAGNOSIS — H53.022 REFRACTIVE AMBLYOPIA, LEFT EYE: ICD-10-CM

## 2023-08-28 DIAGNOSIS — H10.9 BACTERIAL CONJUNCTIVITIS OF BOTH EYES: ICD-10-CM

## 2023-08-28 DIAGNOSIS — H00.011 HORDEOLUM EXTERNUM OF RIGHT UPPER EYELID: Primary | ICD-10-CM

## 2023-08-28 DIAGNOSIS — H00.015 HORDEOLUM EXTERNUM OF LEFT LOWER EYELID: ICD-10-CM

## 2023-08-28 DIAGNOSIS — B96.89 BACTERIAL CONJUNCTIVITIS OF BOTH EYES: ICD-10-CM

## 2023-08-28 PROCEDURE — 1159F MED LIST DOCD IN RCRD: CPT | Mod: CPTII,S$GLB,, | Performed by: OPTOMETRIST

## 2023-08-28 PROCEDURE — 99213 OFFICE O/P EST LOW 20 MIN: CPT | Mod: S$GLB,,, | Performed by: OPTOMETRIST

## 2023-08-28 PROCEDURE — 3044F PR MOST RECENT HEMOGLOBIN A1C LEVEL <7.0%: ICD-10-PCS | Mod: CPTII,S$GLB,, | Performed by: OPTOMETRIST

## 2023-08-28 PROCEDURE — 4010F ACE/ARB THERAPY RXD/TAKEN: CPT | Mod: CPTII,S$GLB,, | Performed by: OPTOMETRIST

## 2023-08-28 PROCEDURE — 99999 PR PBB SHADOW E&M-EST. PATIENT-LVL II: CPT | Mod: PBBFAC,,, | Performed by: OPTOMETRIST

## 2023-08-28 PROCEDURE — 99213 PR OFFICE/OUTPT VISIT, EST, LEVL III, 20-29 MIN: ICD-10-PCS | Mod: S$GLB,,, | Performed by: OPTOMETRIST

## 2023-08-28 PROCEDURE — 3044F HG A1C LEVEL LT 7.0%: CPT | Mod: CPTII,S$GLB,, | Performed by: OPTOMETRIST

## 2023-08-28 PROCEDURE — 99999 PR PBB SHADOW E&M-EST. PATIENT-LVL II: ICD-10-PCS | Mod: PBBFAC,,, | Performed by: OPTOMETRIST

## 2023-08-28 PROCEDURE — 4010F PR ACE/ARB THEARPY RXD/TAKEN: ICD-10-PCS | Mod: CPTII,S$GLB,, | Performed by: OPTOMETRIST

## 2023-08-28 PROCEDURE — 1159F PR MEDICATION LIST DOCUMENTED IN MEDICAL RECORD: ICD-10-PCS | Mod: CPTII,S$GLB,, | Performed by: OPTOMETRIST

## 2023-08-28 RX ORDER — MOXIFLOXACIN 5 MG/ML
SOLUTION/ DROPS OPHTHALMIC
Qty: 3 ML | Refills: 0 | Status: SHIPPED | OUTPATIENT
Start: 2023-08-28 | End: 2023-10-09 | Stop reason: ALTCHOICE

## 2023-08-28 NOTE — PROGRESS NOTES
MARIBETH Torres            Comments: NP to DKT  PAtient here today for bump OD          Comments    Pain Scale:  8 today  Onset:   2 weeks  OD, OS, OU:   OD  Discharge:   Yes  A.M. Matting:  Yes  Itch:   Yes  Redness:   Yes  Photophobia:   No  Foreign body sensation:   No  Deep pain:   No  Previous occurrence:   Yes   Drops:   Harley/Poly/Dex TID            Last edited by Elva Loomis, PCT on 8/28/2023  3:43 PM.            Assessment /Plan     For exam results, see Encounter Report.    Hordeolum externum of right upper eyelid  -     moxifloxacin (VIGAMOX) 0.5 % ophthalmic solution; Instill one drop into both eyes 4 times daily for 7 days.  Dispense: 3 mL; Refill: 0  Continue dexacine radha bid, add maxitrol 1gtt qid x 7 days. Strict return precautions given. Observe at this time.     Hordeolum externum of left lower eyelid  -     moxifloxacin (VIGAMOX) 0.5 % ophthalmic solution; Instill one drop into both eyes 4 times daily for 7 days.  Dispense: 3 mL; Refill: 0  See above    Bacterial conjunctivitis of both eyes  See above.     Refractive amblyopia, left eye  Longstanding. Observe.     RTC as scheduled for annual eye exam, sooner if any changes to vision worsening symptoms.

## 2023-08-29 ENCOUNTER — LAB VISIT (OUTPATIENT)
Dept: LAB | Facility: HOSPITAL | Age: 63
End: 2023-08-29
Attending: INTERNAL MEDICINE
Payer: COMMERCIAL

## 2023-08-29 DIAGNOSIS — Z72.0 TOBACCO USE: ICD-10-CM

## 2023-08-29 DIAGNOSIS — C90.00 MULTIPLE MYELOMA, REMISSION STATUS UNSPECIFIED: ICD-10-CM

## 2023-08-29 LAB
ALBUMIN SERPL BCP-MCNC: 3.5 G/DL (ref 3.5–5.2)
ALP SERPL-CCNC: 59 U/L (ref 55–135)
ALT SERPL W/O P-5'-P-CCNC: 19 U/L (ref 10–44)
ANION GAP SERPL CALC-SCNC: 9 MMOL/L (ref 8–16)
AST SERPL-CCNC: 22 U/L (ref 10–40)
BILIRUB SERPL-MCNC: 0.6 MG/DL (ref 0.1–1)
BUN SERPL-MCNC: 10 MG/DL (ref 8–23)
CALCIUM SERPL-MCNC: 8.1 MG/DL (ref 8.7–10.5)
CHLORIDE SERPL-SCNC: 112 MMOL/L (ref 95–110)
CO2 SERPL-SCNC: 20 MMOL/L (ref 23–29)
CREAT SERPL-MCNC: 0.8 MG/DL (ref 0.5–1.4)
ERYTHROCYTE [DISTWIDTH] IN BLOOD BY AUTOMATED COUNT: 14.4 % (ref 11.5–14.5)
EST. GFR  (NO RACE VARIABLE): >60 ML/MIN/1.73 M^2
GLUCOSE SERPL-MCNC: 95 MG/DL (ref 70–110)
HCT VFR BLD AUTO: 32.7 % (ref 37–48.5)
HGB BLD-MCNC: 10.7 G/DL (ref 12–16)
IMM GRANULOCYTES # BLD AUTO: 0.01 K/UL (ref 0–0.04)
MCH RBC QN AUTO: 32 PG (ref 27–31)
MCHC RBC AUTO-ENTMCNC: 32.7 G/DL (ref 32–36)
MCV RBC AUTO: 98 FL (ref 82–98)
NEUTROPHILS # BLD AUTO: 1.9 K/UL (ref 1.8–7.7)
PLATELET # BLD AUTO: 343 K/UL (ref 150–450)
PMV BLD AUTO: 8.6 FL (ref 9.2–12.9)
POTASSIUM SERPL-SCNC: 3.7 MMOL/L (ref 3.5–5.1)
PROT SERPL-MCNC: 6.7 G/DL (ref 6–8.4)
RBC # BLD AUTO: 3.34 M/UL (ref 4–5.4)
SODIUM SERPL-SCNC: 141 MMOL/L (ref 136–145)
WBC # BLD AUTO: 4.54 K/UL (ref 3.9–12.7)

## 2023-08-29 PROCEDURE — 85027 COMPLETE CBC AUTOMATED: CPT | Performed by: NURSE PRACTITIONER

## 2023-08-29 PROCEDURE — 80053 COMPREHEN METABOLIC PANEL: CPT | Performed by: NURSE PRACTITIONER

## 2023-08-29 PROCEDURE — 36415 COLL VENOUS BLD VENIPUNCTURE: CPT | Performed by: NURSE PRACTITIONER

## 2023-08-29 NOTE — PROGRESS NOTES
HEMATOLOGY / ONCOLOGY   CLINIC NOTE           Established Patient - Telehealth Visit     The patient location is: Home  The chief complaint leading to consultation is: follow up for chemotherapy   Visit type: Virtual visit with synchronous audio and video    Face to Face time with patient: 30 minutes of total time spent on the encounter, which includes face to face time and non-face to face time preparing to see the patient (eg, review of tests), Obtaining and/or reviewing separately obtained history, Documenting clinical information in the electronic or other health record, Independently interpreting results (not separately reported) and communicating results to the patient/family/caregiver, or Care coordination (not separately reported).        The reason for the audio only service rather than synchronous audio and video virtual visit was related to technical difficulties or patient preference/necessity.     Each patient to whom I provide medical services by telemedicine is:  (1) informed of the relationship between the physician and patient and the respective role of any other health care provider with respect to management of the patient; and (2) notified that they may decline to receive medical services by telemedicine and may withdraw from such care at any time. Patient verbally consented to receive this service via voice-only telephone call.             This service was not originating from a related E/M service provided within the previous 7 days nor will  to an E/M service or procedure within the next 24 hours or my soonest available appointment.  Prevailing standard of care was able to be met in this audio-only visit.       ONCOLOGICAL HISTORY:     Diagnosis:  - Multiple Myeloma IgG lambda    Treatment History:  - VRD (5/10/2023 - 6/28/2023)    Current Treatment:   - D-VRD (7/6/2023 -   - Zometa pending dental clearance     Subjective:       Chief Complaint: Not feeling  jose maria      HPI    Ana Bonilla  63 y.o.  female with past medical history significant for hypertension, COPD, asthma, GERD, hypothyroidism here for follow-up and management of multiple myeloma    She was referred in 2/2023 by her PCP after workup for gastritis revealed lytic lesions. CT chest showed lytic and expansile destructive lesion in the sternum and lytic lesions at L1. Biopsy of L1 lesion in 3/2023 revealed mature B cell neoplasm most consistent with plasma cell neoplasm.      Bone marrow biopsy was performed in 4/2023 that demonstrated 60% plasma cells. PET/CT showed extensive bony lesions throughout the spine, sternum, bilateral ribs, pelvis, and a mild compression deformity in L1. SPEP/MECHE showed IgG lambda monoclonal protein measuring 3.19 g/dL. Quantitative immunoglobulins on 3/27/2023 showed IgG 4,521 mg/dL. UPEP/MECHE and FLC were pending. Labs on 3/27/2023 showed Beta 2 microglobulin 1.9, .  Labs on 4/19/2023 showed Hgb, 11.5, WBC 6.3, platelets, BUN 11, Cr 0.9, calcium 9.     She was started on VRd (Velcade/Revlimid/dexamethasone) every 21 days and then daratumumab was added by the transplant team. She was started on ASA for thrombosis prophylaxis and acyclovir for herpes zoster prophylaxis. Plan to start Zometa after dental evaluation.      In 5/2023, Revlimid was decreased from 25mg po to 10mg due to decreased creatinine clearance of 49. She underwent kyphoplasty with radiofrequency ablation on 6/8/2023 by Neurosurgery.     Patient was evaluated by the bone Marrow transplant team who recommended to change the treatment to D-VRD with Revlimid being given for 21 days on 7 days off cycle and was started on the treatment on 07/06/2023         Interval History:     Patient here for follow-up and next cycle of treatment.  She is complaining of not feeling well for the last few weeks after the dose of Revlimid had been increased and also has developed a new eye infection initially in the left eye  which has got better but now has developed a new infection in the right eye.  She was seen by ophthalmologist and has been started on new medication earlier this week but still feel like swelling in her right eye.            Past Medical History:   Diagnosis Date    Allergy     Amblyopia OS    Anxiety     Asthma     COPD (chronic obstructive pulmonary disease)     NO HOME o2    GERD (gastroesophageal reflux disease)     Hyperlipidemia     Hypertension     PONV (postoperative nausea and vomiting)     Thyroid disease       Past Surgical History:   Procedure Laterality Date    APPENDECTOMY      BIOPSY N/A 6/8/2023    Procedure: BIOPSY;  Surgeon: Fausto Smith MD;  Location: [facility] OR;  Service: Neurosurgery;  Laterality: N/A;  L1    BUNIONECTOMY      COLONOSCOPY N/A 12/4/2019    Procedure: COLONOSCOPY;  Surgeon: Danny Matos III, MD;  Location: [facility] ENDO;  Service: Endoscopy;  Laterality: N/A;    COLONOSCOPY N/A 10/24/2022    Procedure: COLONOSCOPY;  Surgeon: Danny Matos III, MD;  Location: [facility] ENDO;  Service: Endoscopy;  Laterality: N/A;    ESOPHAGOGASTRODUODENOSCOPY N/A 10/24/2022    Procedure: EGD (ESOPHAGOGASTRODUODENOSCOPY);  Surgeon: Danny Matos III, MD;  Location: [facility] ENDO;  Service: Endoscopy;  Laterality: N/A;    FIXATION KYPHOPLASTY Bilateral 6/8/2023    Procedure: KYPHOPLASTY;  Surgeon: Fausto Smith MD;  Location: [facility];  Service: Neurosurgery;  Laterality: Bilateral;  kyphoplasty and radiofrequency ablation - L1    HYSTERECTOMY      PARTIAL//still with ovaries    neck fusion  08/2017    THYROIDECTOMY       Social History     Socioeconomic History    Marital status:     Number of children: 2   Occupational History     Employer: [employer]   Tobacco Use    Smoking status: Every Day     Current packs/day: 0.50     Average packs/day: 0.5 packs/day for 50.0 years (25.0 ttl pk-yrs)     Types: Cigarettes    Smokeless tobacco: Never   Substance and Sexual Activity    Alcohol use: Never      Comment: quit    Drug use: No    Sexual activity: Never     Social Determinants of Health     Stress: No Stress Concern Present (7/8/2019)    [employer] - Occupational Stress Questionnaire     Feeling of Stress : Not at all      Family History   Problem Relation Age of Onset    Hypertension Mother     Diabetes Mother     Diabetes Father     Stroke Maternal Grandmother     Prostate cancer Maternal Grandfather     Mental illness Son     Pancreatic cancer Maternal Uncle     Mental illness Other     Pancreatic cancer Other     Ovarian cancer Maternal Cousin       Review of patient's allergies indicates:   Allergen Reactions    Hydrocodone-acetaminophen Other (See Comments)     Causes pt to feel extremely sick       Review of Systems   Constitutional:  Positive for fatigue. Negative for activity change, chills and fever.   HENT: Negative.     Eyes:  Positive for pain, discharge and redness.   Respiratory:  Negative for cough and shortness of breath.    Cardiovascular:  Negative for chest pain and leg swelling.   Gastrointestinal:  Positive for nausea. Negative for constipation, diarrhea and vomiting.   Endocrine: Negative.    Genitourinary: Negative.    Musculoskeletal:  Positive for back pain. Negative for arthralgias and myalgias.   Integumentary:  Negative.   Allergic/Immunologic: Negative.    Neurological:  Negative for light-headedness, numbness and headaches.   Hematological: Negative.    Psychiatric/Behavioral: Negative.           Objective:        There were no vitals filed for this visit.           Physical Exam  Constitutional:       General: She is not in acute distress.     Appearance: Normal appearance. She is not ill-appearing.   HENT:      Head: Normocephalic.      Mouth/Throat:      Mouth: Mucous membranes are moist.   Eyes:      Extraocular Movements: Extraocular movements intact.   Pulmonary:      Effort: Pulmonary effort is normal.   Neurological:      Mental Status: She  is alert and oriented to person, place, and time. Mental status is at baseline.   Psychiatric:         Mood and Affect: Mood normal.         Behavior: Behavior normal.           LABS / IMAGING      - 06/06/2023 RIGHT ILIAC CREST BONE MARROW ASPIRATE, BONE MARROW CLOT, AND BONE MARROW CORE BIOPSY WITH:     CELLULARITY=40-60%, TRILINEAGE HEMATOPOIETIC ACTIVITY (M/E=2.9:1).   CONSISTENT WITH PLASMA CELL NEOPLASM(60%).  SEE COMMENT.   FOCAL GRADE 1 RETICULAR FIBROSIS.   CONGO RED NEGATIVE.   INCREASED STORAGE IRON.   ADEQUATE NUMBER OF MEGAKARYOCYTES.    Bone marrow karyotype results: 46, XX[20], female karyotype.     Myeloma fixed cell, high-risk, FISH:  Normal.  The result is within normal limits for 1q duplication, TP53 deletion and IGH rearrangement.       - 04/10/2023 PET: Numerous FDG avid predominately lytic osseous lesions as above.  Primary differential considerations would include multiple myeloma versus metastatic disease.  2. No FDG avid soft tissue masses or adenopathy demonstrated.  3. Mild pathologic compression deformity of the L1 vertebral body.  This report was flagged in Epic as abnormal.        Electronically signed by: Marino Pollack MD  Date:                                                Assessment:         IgG lambda Multiple myeloma, R-ISS stage I  - BMBx : 60 % plasma cells - 4/6/2023  - SPEP/MECHE showed IgG lambda - monoclonal protein 3.19 g/dl - (3/27/2023).  - R-ISS stage I (beta 2 microglobulin 1.9, albumin 3.5, , normal karyotype and no high-risk mutations on fish panel)  - PET-CT ( 4/10/2023) - showed extensive lytic lesions in the axial skeleton and mild L1 compression deformity.  - Started with Induction chemotherapy with VRD regimen (Velcde/Revlimid/Decadron) - (5/10/2023 - 6/28/2023)  - Aspirin for thrombosis prophylaxis; Acyclovir 400 mg p.o BID for herpes Zoster prophylaxis .  - patient was evaluated by transplant team and then switched to D-VRD (7/6/2023 -    - repeat  creatinine clearance is more than 60, adjust the dose of Revlimid to 25 mg daily for 21 days on 7 days off as recommended by the transplant team but patient unable to tolerate it and thus decreased it back to 10 mg daily    Cancer-related pain / palliative care by specialist  -  checked 05/10/2023   - switch Henderson to Percocet as patient is unable to tolerate Norco      Plan:       - Patient treatment is held today for fatigue and eye infection. Also the dose of Revlimid decreased back to 10 mg daily as unable to tolerate it. Will hold treatment today and restart cycle 3 Day 1 next week.  New prescription has been sent and a request has been placed to get it refilled as soon as possible  - requested to get the dental clearance of the patient from the dentist. According to patient, she was cleared but do not have the document in the chart.   - Continue Aspirin; Acyclovir 400 mg p.o BID for herpes Zoster prophylaxis .  - follow up with [facility] transplant team   - MD / LABS / TREATMENT VISIT - 1 WEEK for cycle 3 day 1 treatment         Med Onc Chart Routing      Follow up with physician 1 week.   Follow up with W[facility]ER    Infusion scheduling note   hold treatment today, will treat in one week   Injection scheduling note    Labs CBC, CMP and magnesium   Scheduling:  Preferred lab:  Lab interval:     Imaging    Pharmacy appointment    Other referrals                The patient was seen, interviewed and examined. Pertinent lab and radiology studies were reviewed. Pt instructed to call should develop concerning signs/symptoms or have further questions.       Portions of the record may have been created with voice recognition software. Occasional wrong-word or sound-a-like substitutions may have occurred due to the inherent limitations of voice recognition software. Read the chart carefully and recognize, using context, where substitutions have occurred.    Jose Dela Cruz MD  Hematology / Oncology

## 2023-08-30 ENCOUNTER — PATIENT MESSAGE (OUTPATIENT)
Dept: SMOKING CESSATION | Facility: CLINIC | Age: 63
End: 2023-08-30
Payer: COMMERCIAL

## 2023-08-30 ENCOUNTER — OFFICE VISIT (OUTPATIENT)
Dept: HEMATOLOGY/ONCOLOGY | Facility: CLINIC | Age: 63
End: 2023-08-30
Payer: COMMERCIAL

## 2023-08-30 DIAGNOSIS — C90.00 MULTIPLE MYELOMA, REMISSION STATUS UNSPECIFIED: Primary | ICD-10-CM

## 2023-08-30 PROCEDURE — 99214 PR OFFICE/OUTPT VISIT, EST, LEVL IV, 30-39 MIN: ICD-10-PCS | Mod: 95,,, | Performed by: INTERNAL MEDICINE

## 2023-08-30 PROCEDURE — 3044F HG A1C LEVEL LT 7.0%: CPT | Mod: CPTII,95,, | Performed by: INTERNAL MEDICINE

## 2023-08-30 PROCEDURE — 3044F PR MOST RECENT HEMOGLOBIN A1C LEVEL <7.0%: ICD-10-PCS | Mod: CPTII,95,, | Performed by: INTERNAL MEDICINE

## 2023-08-30 PROCEDURE — 4010F PR ACE/ARB THEARPY RXD/TAKEN: ICD-10-PCS | Mod: CPTII,95,, | Performed by: INTERNAL MEDICINE

## 2023-08-30 PROCEDURE — 99214 OFFICE O/P EST MOD 30 MIN: CPT | Mod: 95,,, | Performed by: INTERNAL MEDICINE

## 2023-08-30 PROCEDURE — 4010F ACE/ARB THERAPY RXD/TAKEN: CPT | Mod: CPTII,95,, | Performed by: INTERNAL MEDICINE

## 2023-08-30 RX ORDER — LENALIDOMIDE 10 MG/1
10 CAPSULE ORAL DAILY
Qty: 21 EACH | Refills: 1 | Status: ACTIVE | OUTPATIENT
Start: 2023-08-30 | End: 2023-09-27

## 2023-08-31 ENCOUNTER — CLINICAL SUPPORT (OUTPATIENT)
Dept: SMOKING CESSATION | Facility: CLINIC | Age: 63
End: 2023-08-31
Payer: COMMERCIAL

## 2023-08-31 ENCOUNTER — TELEPHONE (OUTPATIENT)
Dept: SMOKING CESSATION | Facility: CLINIC | Age: 63
End: 2023-08-31
Payer: COMMERCIAL

## 2023-08-31 DIAGNOSIS — F17.200 NICOTINE DEPENDENCE: Primary | ICD-10-CM

## 2023-08-31 PROCEDURE — 99999 PR PBB SHADOW E&M-EST. PATIENT-LVL II: ICD-10-PCS | Mod: PBBFAC,,,

## 2023-08-31 PROCEDURE — 99999 PR PBB SHADOW E&M-EST. PATIENT-LVL II: CPT | Mod: PBBFAC,,,

## 2023-08-31 PROCEDURE — 99402 PREV MED CNSL INDIV APPRX 30: CPT | Mod: S$GLB,,, | Performed by: GENERAL PRACTICE

## 2023-08-31 PROCEDURE — 99402 PR PREVENT COUNSEL,INDIV,30 MIN: ICD-10-PCS | Mod: S$GLB,,, | Performed by: GENERAL PRACTICE

## 2023-08-31 RX ORDER — IBUPROFEN 200 MG
1 TABLET ORAL DAILY
Qty: 14 PATCH | Refills: 0 | Status: SHIPPED | OUTPATIENT
Start: 2023-08-31

## 2023-08-31 NOTE — TELEPHONE ENCOUNTER
Attempt to contact patient to schedule a follow up appointment with our smoking cessation program. Recorded message left with return contact information.

## 2023-08-31 NOTE — PROGRESS NOTES
Individual Follow-Up Form    8/31/2023    Quit Date: TBD    Clinical Status of Patient: Outpatient    Length of Service: 30 minutes    Continuing Medication: yes  Patches    Other Medications: none     Target Symptoms: Withdrawal and medication side effects. The following were  rated moderate (3) to severe (4) on TCRS:  Moderate (3): headache, desire tobacco, restless  Severe (4): none    Comments: Spoke with patient in regards to her smoking cessation progress. She is not tobacco free at this time. She states that she went back to work 2 days per week. She has attempted to use the 21 mg nicotine patches as previously prescribed but states that she keeps getting really bad headaches after a few hours so she will take it off. She is smoking 7-8 cigarettes daily when not wearing a patch and 5-7 while wearing a patch. Discussed getting too much nicotine in her system while wearing a patch and smoking. Discussed refraining from smoking or reducing her patch dose with no smoking. Discussed nicotine toxicity. She verbalized understanding. Discussed coping strategies and setting challenges with boundaries for her smoking. She feels that she needs to pray more and try these distraction strategies with boundaries for her smoking. She denies any negative thoughts or behavior at this time. Will continue to encourage and monitor progress.    Diagnosis: F17.200    Next Visit: 2 weeks

## 2023-09-01 ENCOUNTER — TELEPHONE (OUTPATIENT)
Dept: HEMATOLOGY/ONCOLOGY | Facility: CLINIC | Age: 63
End: 2023-09-01
Payer: COMMERCIAL

## 2023-09-01 NOTE — TELEPHONE ENCOUNTER
Spoke to Pharmacist at [facility] verified pt's Revlimid dose was decreased to 10mg. Zong auth# [id number] provided.

## 2023-09-01 NOTE — TELEPHONE ENCOUNTER
----- Message from Carmelo Sousa sent at 9/1/2023  1:25 PM CDT -----  Contact: Carlotta/Chau Laguerre is needing a call back in regards to clarifying the patients Lenalidomide 10mg. Please give her a call back at [phone], ref # [id number]

## 2023-09-05 ENCOUNTER — LAB VISIT (OUTPATIENT)
Dept: LAB | Facility: HOSPITAL | Age: 63
End: 2023-09-05
Attending: INTERNAL MEDICINE
Payer: COMMERCIAL

## 2023-09-05 ENCOUNTER — TELEPHONE (OUTPATIENT)
Dept: INTERNAL MEDICINE | Facility: CLINIC | Age: 63
End: 2023-09-05
Payer: COMMERCIAL

## 2023-09-05 DIAGNOSIS — C90.00 MULTIPLE MYELOMA, REMISSION STATUS UNSPECIFIED: ICD-10-CM

## 2023-09-05 LAB
ALBUMIN SERPL BCP-MCNC: 4.1 G/DL (ref 3.5–5.2)
ALP SERPL-CCNC: 74 U/L (ref 55–135)
ALT SERPL W/O P-5'-P-CCNC: 21 U/L (ref 10–44)
ANION GAP SERPL CALC-SCNC: 11 MMOL/L (ref 8–16)
AST SERPL-CCNC: 24 U/L (ref 10–40)
BASOPHILS # BLD AUTO: 0.11 K/UL (ref 0–0.2)
BASOPHILS NFR BLD: 1.4 % (ref 0–1.9)
BILIRUB SERPL-MCNC: 0.5 MG/DL (ref 0.1–1)
BUN SERPL-MCNC: 10 MG/DL (ref 8–23)
CALCIUM SERPL-MCNC: 9.2 MG/DL (ref 8.7–10.5)
CHLORIDE SERPL-SCNC: 114 MMOL/L (ref 95–110)
CO2 SERPL-SCNC: 19 MMOL/L (ref 23–29)
CREAT SERPL-MCNC: 0.9 MG/DL (ref 0.5–1.4)
DIFFERENTIAL METHOD: ABNORMAL
EOSINOPHIL # BLD AUTO: 0.1 K/UL (ref 0–0.5)
EOSINOPHIL NFR BLD: 1.5 % (ref 0–8)
ERYTHROCYTE [DISTWIDTH] IN BLOOD BY AUTOMATED COUNT: 14.6 % (ref 11.5–14.5)
EST. GFR  (NO RACE VARIABLE): >60 ML/MIN/1.73 M^2
GLUCOSE SERPL-MCNC: 110 MG/DL (ref 70–110)
HCT VFR BLD AUTO: 39.8 % (ref 37–48.5)
HGB BLD-MCNC: 12.6 G/DL (ref 12–16)
IMM GRANULOCYTES # BLD AUTO: 0.02 K/UL (ref 0–0.04)
IMM GRANULOCYTES NFR BLD AUTO: 0.3 % (ref 0–0.5)
LYMPHOCYTES # BLD AUTO: 1.7 K/UL (ref 1–4.8)
LYMPHOCYTES NFR BLD: 21 % (ref 18–48)
MAGNESIUM SERPL-MCNC: 1.7 MG/DL (ref 1.6–2.6)
MCH RBC QN AUTO: 31.3 PG (ref 27–31)
MCHC RBC AUTO-ENTMCNC: 31.7 G/DL (ref 32–36)
MCV RBC AUTO: 99 FL (ref 82–98)
MONOCYTES # BLD AUTO: 0.9 K/UL (ref 0.3–1)
MONOCYTES NFR BLD: 11.4 % (ref 4–15)
NEUTROPHILS # BLD AUTO: 5.1 K/UL (ref 1.8–7.7)
NEUTROPHILS NFR BLD: 64.4 % (ref 38–73)
NRBC BLD-RTO: 0 /100 WBC
PLATELET # BLD AUTO: 448 K/UL (ref 150–450)
PMV BLD AUTO: 8.7 FL (ref 9.2–12.9)
POTASSIUM SERPL-SCNC: 3.6 MMOL/L (ref 3.5–5.1)
PROT SERPL-MCNC: 7.5 G/DL (ref 6–8.4)
RBC # BLD AUTO: 4.02 M/UL (ref 4–5.4)
SODIUM SERPL-SCNC: 144 MMOL/L (ref 136–145)
WBC # BLD AUTO: 7.96 K/UL (ref 3.9–12.7)

## 2023-09-05 PROCEDURE — 85025 COMPLETE CBC W/AUTO DIFF WBC: CPT | Performed by: INTERNAL MEDICINE

## 2023-09-05 PROCEDURE — 83735 ASSAY OF MAGNESIUM: CPT | Performed by: INTERNAL MEDICINE

## 2023-09-05 PROCEDURE — 80053 COMPREHEN METABOLIC PANEL: CPT | Performed by: INTERNAL MEDICINE

## 2023-09-05 PROCEDURE — 36415 COLL VENOUS BLD VENIPUNCTURE: CPT | Performed by: INTERNAL MEDICINE

## 2023-09-05 NOTE — PROGRESS NOTES
HEMATOLOGY / ONCOLOGY   CLINIC NOTE              ONCOLOGICAL HISTORY:     Diagnosis:  - Multiple Myeloma IgG lambda    Treatment History:  - VRD (5/10/2023 - 6/28/2023)    Current Treatment:   - D-VRD (7/6/2023 -   - Zometa pending dental clearance     Subjective:       Chief Complaint: Multiple Myeloma and Cough      HPI    Ana Bonilla  63 y.o.  female with past medical history significant for hypertension, COPD, asthma, GERD, hypothyroidism here for follow-up and management of multiple myeloma    She was referred in 2/2023 by her PCP after workup for gastritis revealed lytic lesions. CT chest showed lytic and expansile destructive lesion in the sternum and lytic lesions at L1. Biopsy of L1 lesion in 3/2023 revealed mature B cell neoplasm most consistent with plasma cell neoplasm.      Bone marrow biopsy was performed in 4/2023 that demonstrated 60% plasma cells. PET/CT showed extensive bony lesions throughout the spine, sternum, bilateral ribs, pelvis, and a mild compression deformity in L1. SPEP/MECHE showed IgG lambda monoclonal protein measuring 3.19 g/dL. Quantitative immunoglobulins on 3/27/2023 showed IgG 4,521 mg/dL. UPEP/MECHE and FLC were pending. Labs on 3/27/2023 showed Beta 2 microglobulin 1.9, .  Labs on 4/19/2023 showed Hgb, 11.5, WBC 6.3, platelets, BUN 11, Cr 0.9, calcium 9.     She was started on VRd (Velcade/Revlimid/dexamethasone) every 21 days and then daratumumab was added by the transplant team. She was started on ASA for thrombosis prophylaxis and acyclovir for herpes zoster prophylaxis. Plan to start Zometa after dental evaluation.      In 5/2023, Revlimid was decreased from 25mg po to 10mg due to decreased creatinine clearance of 49. She underwent kyphoplasty with radiofrequency ablation on 6/8/2023 by Neurosurgery.     Patient was evaluated by the bone Marrow transplant team who recommended to change the treatment to D-VRD with Revlimid being given for 21 days on 7 days  off cycle and was started on the treatment on 07/06/2023         Interval History:     Patient here for follow-up and next cycle of treatment.  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  She was seen yesterday urgent care and was started on antibiotics.  According to the patient, she was tested for COVID flu and were negative.  Her eye infection has improved.            Past Medical History:   Diagnosis Date    Allergy     Amblyopia OS    Anxiety     Asthma     COPD (chronic obstructive pulmonary disease)     NO HOME o2    GERD (gastroesophageal reflux disease)     Hyperlipidemia     Hypertension     PONV (postoperative nausea and vomiting)     Thyroid disease       Past Surgical History:   Procedure Laterality Date    APPENDECTOMY      BIOPSY N/A 6/8/2023    Procedure: BIOPSY;  Surgeon: Fausto Smith MD;  Location: [facility] OR;  Service: Neurosurgery;  Laterality: N/A;  L1    BUNIONECTOMY      COLONOSCOPY N/A 12/4/2019    Procedure: COLONOSCOPY;  Surgeon: Danny Matos III, MD;  Location: [facility] ENDO;  Service: Endoscopy;  Laterality: N/A;    COLONOSCOPY N/A 10/24/2022    Procedure: COLONOSCOPY;  Surgeon: Danny Matos III, MD;  Location: [facility] ENDO;  Service: Endoscopy;  Laterality: N/A;    ESOPHAGOGASTRODUODENOSCOPY N/A 10/24/2022    Procedure: EGD (ESOPHAGOGASTRODUODENOSCOPY);  Surgeon: Danny Matos III, MD;  Location: [facility];  Service: Endoscopy;  Laterality: N/A;    FIXATION KYPHOPLASTY Bilateral 6/8/2023    Procedure: KYPHOPLASTY;  Surgeon: Fausto Smith MD;  Location: [facility] OR;  Service: Neurosurgery;  Laterality: Bilateral;  kyphoplasty and radiofrequency ablation - L1    HYSTERECTOMY      PARTIAL//still with ovaries    neck fusion  08/2017    THYROIDECTOMY       Social History     Socioeconomic History    Marital status:     Number of children: 2    Occupational History     Employer: [employer]   Tobacco Use    Smoking status: Every Day     Current packs/day: 0.50     Average packs/day: 0.5 packs/day for 50.0 years (25.0 ttl pk-yrs)     Types: Cigarettes    Smokeless tobacco: Never   Substance and Sexual Activity    Alcohol use: Never     Comment: quit    Drug use: No    Sexual activity: Never     Social Determinants of Health     Stress: No Stress Concern Present (7/8/2019)    [employer] [place] [employer] - Occupational Stress Questionnaire     Feeling of Stress : Not at all      Family History   Problem Relation Age of Onset    Hypertension Mother     Diabetes Mother     Diabetes Father     Stroke Maternal Grandmother     Prostate cancer Maternal Grandfather     Mental illness Son     Pancreatic cancer Maternal Uncle     Mental illness Other     Pancreatic cancer Other     Ovarian cancer Maternal Cousin       Review of patient's allergies indicates:   Allergen Reactions    Hydrocodone-acetaminophen Other (See Comments)     Causes pt to feel extremely sick       Review of Systems   Constitutional:  Positive for fatigue. Negative for activity change, chills and fever.   HENT: Negative.     Eyes:  Negative for pain, discharge and redness.   Respiratory:  Positive for cough, shortness of breath and wheezing.    Cardiovascular:  Negative for chest pain and leg swelling.   Gastrointestinal:  Positive for nausea. Negative for constipation, diarrhea and vomiting.   Endocrine: Negative.    Genitourinary: Negative.    Musculoskeletal:  Positive for back pain. Negative for arthralgias and myalgias.   Integumentary:  Negative.   Allergic/Immunologic: Negative.    Neurological:  Negative for light-headedness, numbness and headaches.   Hematological: Negative.    Psychiatric/Behavioral: Negative.           Objective:        Vitals:    09/06/23 0819   BP: (!) 154/92   Pulse: (!) 118   Resp: 20   Temp: 97.8 °F (36.6 °C)              Physical Exam  Constitutional:        General: She is not in acute distress.     Appearance: Normal appearance. She is not ill-appearing.   HENT:      Head: Normocephalic.      Mouth/Throat:      Mouth: Mucous membranes are moist.   Eyes:      Extraocular Movements: Extraocular movements intact.   Cardiovascular:      Rate and Rhythm: Normal rate.   Pulmonary:      Effort: Respiratory distress present.      Breath sounds: Wheezing present.   Abdominal:      Palpations: Abdomen is soft.   Musculoskeletal:         General: No swelling.      Cervical back: Normal range of motion.   Skin:     General: Skin is warm.   Neurological:      Mental Status: She is alert and oriented to person, place, and time. Mental status is at baseline.   Psychiatric:         Mood and Affect: Mood normal.         Behavior: Behavior normal.           LABS / IMAGING      - 06/06/2023 RIGHT ILIAC CREST BONE MARROW ASPIRATE, BONE MARROW CLOT, AND BONE MARROW CORE BIOPSY WITH:     CELLULARITY=40-60%, TRILINEAGE HEMATOPOIETIC ACTIVITY (M/E=2.9:1).   CONSISTENT WITH PLASMA CELL NEOPLASM(60%).  SEE COMMENT.   FOCAL GRADE 1 RETICULAR FIBROSIS.   CONGO RED NEGATIVE.   INCREASED STORAGE IRON.   ADEQUATE NUMBER OF MEGAKARYOCYTES.    Bone marrow karyotype results: 46, XX[20], female karyotype.     Myeloma fixed cell, high-risk, FISH:  Normal.  The result is within normal limits for 1q duplication, TP53 deletion and IGH rearrangement.       - 04/10/2023 PET: Numerous FDG avid predominately lytic osseous lesions as above.  Primary differential considerations would include multiple myeloma versus metastatic disease.  2. No FDG avid soft tissue masses or adenopathy demonstrated.  3. Mild pathologic compression deformity of the L1 vertebral body.                                              Assessment:         IgG lambda Multiple myeloma, R-ISS stage I  - BMBx : 60 % plasma cells - 4/6/2023  - SPEP/MECHE showed IgG lambda - monoclonal protein 3.19 g/dl - (3/27/2023).  - R-ISS stage I (beta 2  microglobulin 1.9, albumin 3.5, , normal karyotype and no high-risk mutations on fish panel)  - PET-CT ( 4/10/2023) - showed extensive lytic lesions in the axial skeleton and mild L1 compression deformity.  - Started with Induction chemotherapy with VRD regimen (Velcde/Revlimid/Decadron) - (5/10/2023 - 6/28/2023)  - Aspirin for thrombosis prophylaxis; Acyclovir 400 mg p.o BID for herpes Zoster prophylaxis .  - patient was evaluated by transplant team and then switched to D-VRD (7/6/2023 -    - repeat creatinine clearance is more than 60, adjust the dose of Revlimid to 25 mg daily for 21 days on 7 days off as recommended by the transplant team but patient unable to tolerate it and thus decreased it back to 10 mg daily    Cancer-related pain / palliative care by specialist  -  checked 05/10/2023   - switch [place] to Percocet as patient is unable to tolerate Norco      Plan:       - Patient treatment is held today for COPD exacerbation.  Patient is still waiting for refill after the dose of Revlimid was decreased back to 10 mg daily as unable to tolerate higher dose. Will hold treatment today and restart cycle 3 Day 1 next week.  New prescription has been sent last week and a request has been placed to get it refilled as soon as possible  - requested to get the dental clearance of the patient from the dentist. According to patient, she was cleared but do not have the document in the chart.   - prescribed cough syrup, steroids and inhaler for COPD exacerbation.  Patient refused to be transferred to the ER at the moment.   - Continue Aspirin; Acyclovir 400 mg p.o BID for herpes Zoster prophylaxis .  - follow up with [facility] transplant team   - MD / LABS / TREATMENT VISIT - 1 WEEK for cycle 3 day 1 treatment         Med Onc Chart Routing      Follow up with physician 1 week.   Follow up with WERNER    Infusion scheduling note    Injection scheduling note    Labs CBC, CMP and magnesium   Scheduling:  Preferred lab:  Lab  interval:     Imaging    Pharmacy appointment    Other referrals                The patient was seen, interviewed and examined. Pertinent lab and radiology studies were reviewed. Pt instructed to call should develop concerning signs/symptoms or have further questions.       Portions of the record may have been created with voice recognition software. Occasional wrong-word or sound-a-like substitutions may have occurred due to the inherent limitations of voice recognition software. Read the chart carefully and recognize, using context, where substitutions have occurred.    Jose Dela Cruz MD  Hematology / Oncology

## 2023-09-05 NOTE — TELEPHONE ENCOUNTER
Tried to assist pt's  with scheduling an office visit. He stated he would have to called office back. Pt is currently at urgent care.

## 2023-09-05 NOTE — TELEPHONE ENCOUNTER
----- Message from Teresa Slater sent at 9/5/2023 11:36 AM CDT -----  Contact: Pt spouse lars pradhan  Type:  Sooner Apoointment Request    Caller is requesting a sooner appointment.  Caller declined first available appointment listed below.  Caller will not accept being placed on the waitlist and is requesting a message be sent to doctor.  Name of Caller:JAMILA PRADHAN [[id number]] Pt spouse     When is the first available appointment?no available appointment     Symptoms: Bad Cough and shortness of breath     Would the patient rather a call back or a response via [facility] [employer]?  Call back     Best Call Back Number:[phone] Lars     Additional Information:

## 2023-09-06 ENCOUNTER — OFFICE VISIT (OUTPATIENT)
Dept: HEMATOLOGY/ONCOLOGY | Facility: CLINIC | Age: 63
End: 2023-09-06
Payer: COMMERCIAL

## 2023-09-06 VITALS
TEMPERATURE: 98 F | HEIGHT: 66 IN | DIASTOLIC BLOOD PRESSURE: 92 MMHG | BODY MASS INDEX: 20.76 KG/M2 | OXYGEN SATURATION: 95 % | WEIGHT: 129.19 LBS | RESPIRATION RATE: 20 BRPM | HEART RATE: 118 BPM | SYSTOLIC BLOOD PRESSURE: 154 MMHG

## 2023-09-06 DIAGNOSIS — J44.1 COPD WITH ACUTE EXACERBATION: Primary | ICD-10-CM

## 2023-09-06 DIAGNOSIS — J40 BRONCHITIS: ICD-10-CM

## 2023-09-06 PROCEDURE — 99214 PR OFFICE/OUTPT VISIT, EST, LEVL IV, 30-39 MIN: ICD-10-PCS | Mod: S$GLB,,, | Performed by: INTERNAL MEDICINE

## 2023-09-06 PROCEDURE — 4010F PR ACE/ARB THEARPY RXD/TAKEN: ICD-10-PCS | Mod: CPTII,S$GLB,, | Performed by: INTERNAL MEDICINE

## 2023-09-06 PROCEDURE — 3080F DIAST BP >= 90 MM HG: CPT | Mod: CPTII,S$GLB,, | Performed by: INTERNAL MEDICINE

## 2023-09-06 PROCEDURE — 3044F PR MOST RECENT HEMOGLOBIN A1C LEVEL <7.0%: ICD-10-PCS | Mod: CPTII,S$GLB,, | Performed by: INTERNAL MEDICINE

## 2023-09-06 PROCEDURE — 3077F PR MOST RECENT SYSTOLIC BLOOD PRESSURE >= 140 MM HG: ICD-10-PCS | Mod: CPTII,S$GLB,, | Performed by: INTERNAL MEDICINE

## 2023-09-06 PROCEDURE — 99214 OFFICE O/P EST MOD 30 MIN: CPT | Mod: S$GLB,,, | Performed by: INTERNAL MEDICINE

## 2023-09-06 PROCEDURE — 99999 PR PBB SHADOW E&M-EST. PATIENT-LVL III: ICD-10-PCS | Mod: PBBFAC,,, | Performed by: INTERNAL MEDICINE

## 2023-09-06 PROCEDURE — 3077F SYST BP >= 140 MM HG: CPT | Mod: CPTII,S$GLB,, | Performed by: INTERNAL MEDICINE

## 2023-09-06 PROCEDURE — 99999 PR PBB SHADOW E&M-EST. PATIENT-LVL III: CPT | Mod: PBBFAC,,, | Performed by: INTERNAL MEDICINE

## 2023-09-06 PROCEDURE — 3008F BODY MASS INDEX DOCD: CPT | Mod: CPTII,S$GLB,, | Performed by: INTERNAL MEDICINE

## 2023-09-06 PROCEDURE — 3008F PR BODY MASS INDEX (BMI) DOCUMENTED: ICD-10-PCS | Mod: CPTII,S$GLB,, | Performed by: INTERNAL MEDICINE

## 2023-09-06 PROCEDURE — 3044F HG A1C LEVEL LT 7.0%: CPT | Mod: CPTII,S$GLB,, | Performed by: INTERNAL MEDICINE

## 2023-09-06 PROCEDURE — 4010F ACE/ARB THERAPY RXD/TAKEN: CPT | Mod: CPTII,S$GLB,, | Performed by: INTERNAL MEDICINE

## 2023-09-06 PROCEDURE — 3080F PR MOST RECENT DIASTOLIC BLOOD PRESSURE >= 90 MM HG: ICD-10-PCS | Mod: CPTII,S$GLB,, | Performed by: INTERNAL MEDICINE

## 2023-09-06 RX ORDER — BENZONATATE 200 MG/1
200 CAPSULE ORAL
COMMUNITY
Start: 2023-09-05 | End: 2024-02-21 | Stop reason: SDUPTHER

## 2023-09-06 RX ORDER — PREDNISONE 50 MG/1
50 TABLET ORAL DAILY
Qty: 5 TABLET | Refills: 0 | Status: SHIPPED | OUTPATIENT
Start: 2023-09-06 | End: 2023-09-11

## 2023-09-06 RX ORDER — DOXYCYCLINE 100 MG/1
100 CAPSULE ORAL 2 TIMES DAILY
COMMUNITY
Start: 2023-09-05 | End: 2023-10-09 | Stop reason: SDUPTHER

## 2023-09-06 RX ORDER — BENZONATATE 200 MG/1
200 CAPSULE ORAL 3 TIMES DAILY PRN
COMMUNITY
Start: 2023-09-05 | End: 2023-09-12

## 2023-09-06 RX ORDER — ALBUTEROL SULFATE 90 UG/1
2 AEROSOL, METERED RESPIRATORY (INHALATION) EVERY 6 HOURS PRN
Qty: 18 G | Refills: 0 | Status: SHIPPED | OUTPATIENT
Start: 2023-09-06 | End: 2024-09-05

## 2023-09-06 RX ORDER — PROMETHAZINE HYDROCHLORIDE AND DEXTROMETHORPHAN HYDROBROMIDE 6.25; 15 MG/5ML; MG/5ML
5 SYRUP ORAL EVERY 4 HOURS PRN
Qty: 180 ML | Refills: 0 | Status: SHIPPED | OUTPATIENT
Start: 2023-09-06 | End: 2023-09-16

## 2023-09-07 ENCOUNTER — TELEPHONE (OUTPATIENT)
Dept: HEMATOLOGY/ONCOLOGY | Facility: CLINIC | Age: 63
End: 2023-09-07
Payer: COMMERCIAL

## 2023-09-07 ENCOUNTER — DOCUMENTATION ONLY (OUTPATIENT)
Dept: HEMATOLOGY/ONCOLOGY | Facility: CLINIC | Age: 63
End: 2023-09-07
Payer: COMMERCIAL

## 2023-09-07 NOTE — PROGRESS NOTES
On  faxed request to [facility] for ok to receive zometa in writing per Dr. Dela Cruz's request. Received zometa clearance , gave to MD to review then scanned in to media. Called pt to let her know Dr. Dela Cruz received zometa clearance from her dentist. States she got revlimid 10mg yesterday, the fastest she's received her oral med from [facility]. All questions answered, she expressed understanding information discussed. Has my call back number, states she'sll see us next week.   Oncology Navigation   Intake  Date of Diagnosis: 23  Cancer Type: Transplant; Myeloma  Internal / External Referral: Internal  Date of Referral: 05/10/23  Initial Nurse Navigator Contact: 05/15/23  Referral to Initial Contact Timeline (days): 5  Date Worked: 23  Reason if booked > 7 days after scheduling: Additional tests/procedures; Patient request     Treatment  Current Status: Active       Medical Oncologist: Dr. FRITZ Dela Cruz  Chemotherapy: Initiated  Chemotherapy Regimen: Velcade (Durvalumab possible pending FISH)  Oral Therapy: Initiated                       Acuity  Systemic Treatment - predicted or initiated: Chemotherapy Regimen with Multiple drugs (+1)  ECO  Comorbidities in Medical History: 2   Needed: 0  Support: 0  Verbalizes Financial Concerns: 1  Transportation: 0  Psychological Factors (+1 each): Emotional during conversation  Verbalizes the need for more education: 1  Navigation Acuity: 3     Follow Up  No follow-ups on file.

## 2023-09-07 NOTE — TELEPHONE ENCOUNTER
----- Message from Barry Bhatti sent at 9/7/2023 10:02 AM CDT -----  Contact: [phone]  Patient is calling in regards to her medication. Please call pt back at [phone]. Thanks KB     Spoke with pt, informed pt that her revlimid that was received today should not be taken today she will take rx when starting her next cycle of treatment. Also informed pt that ivory did receive her clearance form from the dentist dr oviedo was able to review it this morning we will get it scanned into her chart. Pt requesting appt for Thursday be moved to Wednesday if possible bc she works on Thursdays. Informed pt that I will reach out to our schedulers to get appts rescheduled as requested.

## 2023-09-08 DIAGNOSIS — F41.9 ANXIETY: ICD-10-CM

## 2023-09-08 RX ORDER — ALPRAZOLAM 2 MG/1
TABLET ORAL
Qty: 60 TABLET | Refills: 0 | Status: SHIPPED | OUTPATIENT
Start: 2023-09-08 | End: 2023-10-04 | Stop reason: SDUPTHER

## 2023-09-11 ENCOUNTER — OFFICE VISIT (OUTPATIENT)
Dept: CARDIOLOGY | Facility: CLINIC | Age: 63
End: 2023-09-11
Payer: COMMERCIAL

## 2023-09-11 ENCOUNTER — LAB VISIT (OUTPATIENT)
Dept: LAB | Facility: HOSPITAL | Age: 63
End: 2023-09-11
Attending: INTERNAL MEDICINE
Payer: COMMERCIAL

## 2023-09-11 VITALS
WEIGHT: 134.94 LBS | BODY MASS INDEX: 21.69 KG/M2 | DIASTOLIC BLOOD PRESSURE: 82 MMHG | HEART RATE: 77 BPM | OXYGEN SATURATION: 99 % | SYSTOLIC BLOOD PRESSURE: 134 MMHG | HEIGHT: 66 IN

## 2023-09-11 DIAGNOSIS — I10 ESSENTIAL HYPERTENSION: Primary | ICD-10-CM

## 2023-09-11 DIAGNOSIS — E78.5 DYSLIPIDEMIA: ICD-10-CM

## 2023-09-11 DIAGNOSIS — J44.1 COPD WITH ACUTE EXACERBATION: ICD-10-CM

## 2023-09-11 DIAGNOSIS — C90.00 MULTIPLE MYELOMA, REMISSION STATUS UNSPECIFIED: Chronic | ICD-10-CM

## 2023-09-11 DIAGNOSIS — I73.9 PVD (PERIPHERAL VASCULAR DISEASE): ICD-10-CM

## 2023-09-11 DIAGNOSIS — Z72.0 TOBACCO USE: ICD-10-CM

## 2023-09-11 DIAGNOSIS — I35.1 NONRHEUMATIC AORTIC VALVE INSUFFICIENCY: ICD-10-CM

## 2023-09-11 LAB
ALBUMIN SERPL BCP-MCNC: 3.5 G/DL (ref 3.5–5.2)
ALP SERPL-CCNC: 59 U/L (ref 55–135)
ALT SERPL W/O P-5'-P-CCNC: 29 U/L (ref 10–44)
ANION GAP SERPL CALC-SCNC: 12 MMOL/L (ref 8–16)
AST SERPL-CCNC: 18 U/L (ref 10–40)
BASOPHILS # BLD AUTO: 0.01 K/UL (ref 0–0.2)
BASOPHILS NFR BLD: 0.1 % (ref 0–1.9)
BILIRUB SERPL-MCNC: 0.5 MG/DL (ref 0.1–1)
BUN SERPL-MCNC: 17 MG/DL (ref 8–23)
CALCIUM SERPL-MCNC: 8.5 MG/DL (ref 8.7–10.5)
CHLORIDE SERPL-SCNC: 108 MMOL/L (ref 95–110)
CO2 SERPL-SCNC: 25 MMOL/L (ref 23–29)
CREAT SERPL-MCNC: 0.8 MG/DL (ref 0.5–1.4)
DIFFERENTIAL METHOD: ABNORMAL
EOSINOPHIL # BLD AUTO: 0 K/UL (ref 0–0.5)
EOSINOPHIL NFR BLD: 0.3 % (ref 0–8)
ERYTHROCYTE [DISTWIDTH] IN BLOOD BY AUTOMATED COUNT: 14.3 % (ref 11.5–14.5)
EST. GFR  (NO RACE VARIABLE): >60 ML/MIN/1.73 M^2
GLUCOSE SERPL-MCNC: 102 MG/DL (ref 70–110)
HCT VFR BLD AUTO: 36.6 % (ref 37–48.5)
HGB BLD-MCNC: 11.9 G/DL (ref 12–16)
IMM GRANULOCYTES # BLD AUTO: 0.02 K/UL (ref 0–0.04)
IMM GRANULOCYTES NFR BLD AUTO: 0.3 % (ref 0–0.5)
LYMPHOCYTES # BLD AUTO: 2.4 K/UL (ref 1–4.8)
LYMPHOCYTES NFR BLD: 30.6 % (ref 18–48)
MAGNESIUM SERPL-MCNC: 1.6 MG/DL (ref 1.6–2.6)
MCH RBC QN AUTO: 31.4 PG (ref 27–31)
MCHC RBC AUTO-ENTMCNC: 32.5 G/DL (ref 32–36)
MCV RBC AUTO: 97 FL (ref 82–98)
MONOCYTES # BLD AUTO: 0.9 K/UL (ref 0.3–1)
MONOCYTES NFR BLD: 11.1 % (ref 4–15)
NEUTROPHILS # BLD AUTO: 4.6 K/UL (ref 1.8–7.7)
NEUTROPHILS NFR BLD: 57.6 % (ref 38–73)
NRBC BLD-RTO: 0 /100 WBC
PLATELET # BLD AUTO: 343 K/UL (ref 150–450)
PMV BLD AUTO: 9.1 FL (ref 9.2–12.9)
POTASSIUM SERPL-SCNC: 3.2 MMOL/L (ref 3.5–5.1)
PROT SERPL-MCNC: 6.3 G/DL (ref 6–8.4)
RBC # BLD AUTO: 3.79 M/UL (ref 4–5.4)
SODIUM SERPL-SCNC: 145 MMOL/L (ref 136–145)
WBC # BLD AUTO: 7.93 K/UL (ref 3.9–12.7)

## 2023-09-11 PROCEDURE — 99999 PR PBB SHADOW E&M-EST. PATIENT-LVL V: CPT | Mod: PBBFAC,,, | Performed by: INTERNAL MEDICINE

## 2023-09-11 PROCEDURE — 1159F PR MEDICATION LIST DOCUMENTED IN MEDICAL RECORD: ICD-10-PCS | Mod: CPTII,S$GLB,, | Performed by: INTERNAL MEDICINE

## 2023-09-11 PROCEDURE — 3079F PR MOST RECENT DIASTOLIC BLOOD PRESSURE 80-89 MM HG: ICD-10-PCS | Mod: CPTII,S$GLB,, | Performed by: INTERNAL MEDICINE

## 2023-09-11 PROCEDURE — 1160F RVW MEDS BY RX/DR IN RCRD: CPT | Mod: CPTII,S$GLB,, | Performed by: INTERNAL MEDICINE

## 2023-09-11 PROCEDURE — 1160F PR REVIEW ALL MEDS BY PRESCRIBER/CLIN PHARMACIST DOCUMENTED: ICD-10-PCS | Mod: CPTII,S$GLB,, | Performed by: INTERNAL MEDICINE

## 2023-09-11 PROCEDURE — 99999 PR PBB SHADOW E&M-EST. PATIENT-LVL V: ICD-10-PCS | Mod: PBBFAC,,, | Performed by: INTERNAL MEDICINE

## 2023-09-11 PROCEDURE — 1159F MED LIST DOCD IN RCRD: CPT | Mod: CPTII,S$GLB,, | Performed by: INTERNAL MEDICINE

## 2023-09-11 PROCEDURE — 3044F HG A1C LEVEL LT 7.0%: CPT | Mod: CPTII,S$GLB,, | Performed by: INTERNAL MEDICINE

## 2023-09-11 PROCEDURE — 99214 PR OFFICE/OUTPT VISIT, EST, LEVL IV, 30-39 MIN: ICD-10-PCS | Mod: S$GLB,,, | Performed by: INTERNAL MEDICINE

## 2023-09-11 PROCEDURE — 4010F ACE/ARB THERAPY RXD/TAKEN: CPT | Mod: CPTII,S$GLB,, | Performed by: INTERNAL MEDICINE

## 2023-09-11 PROCEDURE — 36415 COLL VENOUS BLD VENIPUNCTURE: CPT | Performed by: INTERNAL MEDICINE

## 2023-09-11 PROCEDURE — 3075F SYST BP GE 130 - 139MM HG: CPT | Mod: CPTII,S$GLB,, | Performed by: INTERNAL MEDICINE

## 2023-09-11 PROCEDURE — 83735 ASSAY OF MAGNESIUM: CPT | Performed by: INTERNAL MEDICINE

## 2023-09-11 PROCEDURE — 3079F DIAST BP 80-89 MM HG: CPT | Mod: CPTII,S$GLB,, | Performed by: INTERNAL MEDICINE

## 2023-09-11 PROCEDURE — 3008F BODY MASS INDEX DOCD: CPT | Mod: CPTII,S$GLB,, | Performed by: INTERNAL MEDICINE

## 2023-09-11 PROCEDURE — 4010F PR ACE/ARB THEARPY RXD/TAKEN: ICD-10-PCS | Mod: CPTII,S$GLB,, | Performed by: INTERNAL MEDICINE

## 2023-09-11 PROCEDURE — 3075F PR MOST RECENT SYSTOLIC BLOOD PRESS GE 130-139MM HG: ICD-10-PCS | Mod: CPTII,S$GLB,, | Performed by: INTERNAL MEDICINE

## 2023-09-11 PROCEDURE — 99214 OFFICE O/P EST MOD 30 MIN: CPT | Mod: S$GLB,,, | Performed by: INTERNAL MEDICINE

## 2023-09-11 PROCEDURE — 80053 COMPREHEN METABOLIC PANEL: CPT | Performed by: INTERNAL MEDICINE

## 2023-09-11 PROCEDURE — 3008F PR BODY MASS INDEX (BMI) DOCUMENTED: ICD-10-PCS | Mod: CPTII,S$GLB,, | Performed by: INTERNAL MEDICINE

## 2023-09-11 PROCEDURE — 3044F PR MOST RECENT HEMOGLOBIN A1C LEVEL <7.0%: ICD-10-PCS | Mod: CPTII,S$GLB,, | Performed by: INTERNAL MEDICINE

## 2023-09-11 PROCEDURE — 85025 COMPLETE CBC W/AUTO DIFF WBC: CPT | Performed by: INTERNAL MEDICINE

## 2023-09-11 RX ORDER — ROSUVASTATIN CALCIUM 10 MG/1
10 TABLET, COATED ORAL NIGHTLY
Start: 2023-09-11 | End: 2024-01-02 | Stop reason: SDUPTHER

## 2023-09-11 RX ORDER — METOPROLOL SUCCINATE 50 MG/1
50 TABLET, EXTENDED RELEASE ORAL NIGHTLY
Start: 2023-09-11 | End: 2023-10-16 | Stop reason: SDUPTHER

## 2023-09-11 NOTE — PROGRESS NOTES
Subjective:   Patient ID:  Ana Bonilla is a 63 y.o. female who presents for follow up of Shortness of Breath      60 yo female came in for 1 yr  PMH HTN, HLD, mod AI, PAD, multiple myeloma COPD, hiatal hernia, hypothyroidism, s/p neck spinal fusion. Smokes 1 ppd for 40 yrs. Occasional drinking.    visit. Some chest hurt, on left, once a month, lasted for minutes, triggered by stress. No radiation.  Chronic BRAGG. No dizziness, palpitation and syncope.  Left calf pain at rest, worse with walking. Had leg cramp at night two weeks ago.  Father  of DM at 30'. Mother HTN. Brother  of MVA.  EKG on  NSR  ECHO in  normal EF and moderate AI. LE arterial US no lesion  Now some vaping     MPI no ischemia and EF normal   states that some stress. Some chronic BRAGG. Occasional CP triggered by stress. May relate to GERD.  Occasional calf and ankle pain, occurred at rest. EKG NSR LAE    2021 visit  C/o RLE leg. Used to have the pain starting from the lower back. Now the pain on the thigh and calf. Ocurred at rest and with exertion.   F/u with podiatric for right foot pain and will have the therapy. Orthopedic gave her Toradol.   Still smoking.     visit  H/o COVID in    Lost her mother in  and her son is in behave hospital for drug issue. A lot of stress   echo normal EF and mod AI.   LE arterial US mild disease  BRAGG chronic. Still has bad cough after COVID 19 infection  EKG NSR and nonspecific T wave     visit  Intermittent chest pain for 2 weeks, worse with cough and stretching the arms. Feels sore, and worse pain when laying down. Muscle relaxant caused the Crazy feeling and ibupofen     visit  Chronic lower sternal CP for days and weeks. Worse with coughing stretching. EGD in     Interval history  Dx of MM in  and on chemo Rx.  VRd (Velcade/Revlimid/dexamethasone. Had elevated Cr. And the dosage adjusted. Off 3 weeks due to eye  infection  On smoking cessation.   No leg swelling orthopnea. Question PND due to COPD.             Past Medical History:   Diagnosis Date    Allergy     Amblyopia OS    Anxiety     Asthma     COPD (chronic obstructive pulmonary disease)     NO HOME o2    GERD (gastroesophageal reflux disease)     Hyperlipidemia     Hypertension     PONV (postoperative nausea and vomiting)     Thyroid disease        Past Surgical History:   Procedure Laterality Date    APPENDECTOMY      BIOPSY N/A 6/8/2023    Procedure: BIOPSY;  Surgeon: Fausto Smith MD;  Location: [facility] OR;  Service: Neurosurgery;  Laterality: N/A;  L1    BUNIONECTOMY      COLONOSCOPY N/A 12/4/2019    Procedure: COLONOSCOPY;  Surgeon: Danny Matos III, MD;  Location: [facility] ENDO;  Service: Endoscopy;  Laterality: N/A;    COLONOSCOPY N/A 10/24/2022    Procedure: COLONOSCOPY;  Surgeon: Danny Matos III, MD;  Location: [facility] ENDO;  Service: Endoscopy;  Laterality: N/A;    ESOPHAGOGASTRODUODENOSCOPY N/A 10/24/2022    Procedure: EGD (ESOPHAGOGASTRODUODENOSCOPY);  Surgeon: Danny Matos III, MD;  Location: [facility] ENDO;  Service: Endoscopy;  Laterality: N/A;    FIXATION KYPHOPLASTY Bilateral 6/8/2023    Procedure: KYPHOPLASTY;  Surgeon: Fausto Smith MD;  Location: [facility] OR;  Service: Neurosurgery;  Laterality: Bilateral;  kyphoplasty and radiofrequency ablation - L1    HYSTERECTOMY      PARTIAL//still with ovaries    neck fusion  08/2017    THYROIDECTOMY         Social History     Tobacco Use    Smoking status: Every Day     Current packs/day: 0.50     Average packs/day: 0.5 packs/day for 50.0 years (25.0 ttl pk-yrs)     Types: Cigarettes    Smokeless tobacco: Never   Substance Use Topics    Alcohol use: Never     Comment: quit    Drug use: No       Family History   Problem Relation Age of Onset    Hypertension Mother     Diabetes Mother     Diabetes Father     Stroke Maternal Grandmother     Prostate cancer Maternal Grandfather     Mental illness Son      Pancreatic cancer Maternal Uncle     Mental illness Other     Pancreatic cancer Other     Ovarian cancer Maternal Cousin          ROS    Objective:   Physical Exam  HENT:      Head: Normocephalic.   Eyes:      Pupils: Pupils are equal, round, and reactive to light.   Neck:      Thyroid: No thyromegaly.      Vascular: Normal carotid pulses. No carotid bruit or JVD.   Cardiovascular:      Rate and Rhythm: Normal rate and regular rhythm. No extrasystoles are present.     Chest Wall: PMI is not displaced.      Pulses:           Dorsalis pedis pulses are 2+ on the right side and 1+ on the left side.        Posterior tibial pulses are 2+ on the right side and 1+ on the left side.      Heart sounds: Normal heart sounds. No murmur heard.     No gallop. No S3 sounds.   Pulmonary:      Effort: No respiratory distress.      Breath sounds: No stridor. Decreased breath sounds and rhonchi present.   Chest:      Comments: Diffuse + tenderness on both chests  Abdominal:      General: Bowel sounds are normal.      Palpations: Abdomen is soft.      Tenderness: There is no abdominal tenderness. There is no rebound.   Musculoskeletal:         General: Normal range of motion.   Skin:     Findings: No rash.   Neurological:      Mental Status: She is alert and oriented to person, place, and time.   Psychiatric:         Behavior: Behavior normal.         Lab Results   Component Value Date    CHOL 176 08/25/2022    CHOL 168 07/14/2021    CHOL 159 02/28/2020     Lab Results   Component Value Date    HDL 74 08/25/2022    HDL 74 07/14/2021    HDL 73 02/28/2020     Lab Results   Component Value Date    LDLCALC 92.6 08/25/2022    LDLCALC 80.4 07/14/2021    LDLCALC 75.6 02/28/2020     Lab Results   Component Value Date    TRIG 47 08/25/2022    TRIG 68 07/14/2021    TRIG 52 02/28/2020     Lab Results   Component Value Date    CHOLHDL 42.0 08/25/2022    CHOLHDL 44.0 07/14/2021    CHOLHDL 45.9 02/28/2020       Chemistry        Component Value  Date/Time     09/05/2023 1529    K 3.6 09/05/2023 1529     (H) 09/05/2023 1529    CO2 19 (L) 09/05/2023 1529    BUN 10 09/05/2023 1529    CREATININE 0.9 09/05/2023 1529     09/05/2023 1529        Component Value Date/Time    CALCIUM 9.2 09/05/2023 1529    ALKPHOS 74 09/05/2023 1529    AST 24 09/05/2023 1529    ALT 21 09/05/2023 1529    BILITOT 0.5 09/05/2023 1529    ESTGFRAFRICA >60.0 07/14/2021 0927    EGFRNONAA >60.0 07/14/2021 0927          Lab Results   Component Value Date    HGBA1C 5.7 (H) 01/10/2023     Lab Results   Component Value Date    TSH 0.846 05/23/2023     Lab Results   Component Value Date    INR 1.0 03/10/2023    INR 1.0 11/08/2019    INR 1.0 08/15/2017     Lab Results   Component Value Date    WBC 7.93 09/11/2023    HGB 11.9 (L) 09/11/2023    HCT 36.6 (L) 09/11/2023    MCV 97 09/11/2023     09/11/2023     BMP  Sodium   Date Value Ref Range Status   09/05/2023 144 136 - 145 mmol/L Final     Potassium   Date Value Ref Range Status   09/05/2023 3.6 3.5 - 5.1 mmol/L Final     Chloride   Date Value Ref Range Status   09/05/2023 114 (H) 95 - 110 mmol/L Final     CO2   Date Value Ref Range Status   09/05/2023 19 (L) 23 - 29 mmol/L Final     BUN   Date Value Ref Range Status   09/05/2023 10 8 - 23 mg/dL Final     Creatinine   Date Value Ref Range Status   09/05/2023 0.9 0.5 - 1.4 mg/dL Final     Calcium   Date Value Ref Range Status   09/05/2023 9.2 8.7 - 10.5 mg/dL Final     Anion Gap   Date Value Ref Range Status   09/05/2023 11 8 - 16 mmol/L Final     eGFR if    Date Value Ref Range Status   07/14/2021 >60.0 >60 mL/min/1.73 m^2 Final     eGFR if non    Date Value Ref Range Status   07/14/2021 >60.0 >60 mL/min/1.73 m^2 Final     Comment:     Calculation used to obtain the estimated glomerular filtration  rate (eGFR) is the CKD-EPI equation.        BNP  @LABRCNTIP(BNP,BNPTRIAGEBLO)@  @LABRCNTIP(troponini)@  Estimated Creatinine Clearance: 59.9  mL/min (based on SCr of 0.9 mg/dL).  No results found in the last 24 hours.  No results found in the last 24 hours.  No results found in the last 24 hours.    Assessment:      1. Essential hypertension    2. PVD (peripheral vascular disease)    3. Dyslipidemia    4. Nonrheumatic aortic valve insufficiency    5. Multiple myeloma, remission status unspecified    6. Tobacco use      BP and LDL    Plan:   [place] ASA Crestor Metoprolol   Echo due to recent chemo for MM  Refer to pulm  Counseled DASH  Check Lipid profile with PCP in 6 months  Recommend heart-healthy diet, weight control and regular exercise.  Yasmine. Risk modification.   I have reviewed all pertinent labs and cardiac studies independently. Plans and recommendations have been formulated under my direct supervision. All questions answered and patient voiced understanding.   If symptoms persist go to the ED  RTC in 6 months

## 2023-09-12 NOTE — PROGRESS NOTES
HEMATOLOGY / ONCOLOGY   CLINIC NOTE             Established Patient - Telehealth Visit     The patient location is: home  The chief complaint leading to consultation is: follow up for chemotherapy   Visit type: Virtual visit with synchronous audio and video    Face to Face time with patient: 30  minutes of total time spent on the encounter, which includes face to face time and non-face to face time preparing to see the patient (eg, review of tests), Obtaining and/or reviewing separately obtained history, Documenting clinical information in the electronic or other health record, Independently interpreting results (not separately reported) and communicating results to the patient/family/caregiver, or Care coordination (not separately reported).        The reason for the audio only service rather than synchronous audio and video virtual visit was related to technical difficulties or patient preference/necessity.     Each patient to whom I provide medical services by telemedicine is:  (1) informed of the relationship between the physician and patient and the respective role of any other health care provider with respect to management of the patient; and (2) notified that they may decline to receive medical services by telemedicine and may withdraw from such care at any time. Patient verbally consented to receive this service via voice-only telephone call.             This service was not originating from a related E/M service provided within the previous 7 days nor will  to an E/M service or procedure within the next 24 hours or my soonest available appointment.  Prevailing standard of care was able to be met in this audio-only visit.       ONCOLOGICAL HISTORY:     Diagnosis:  - Multiple Myeloma IgG lambda    Treatment History:  - VRD (5/10/2023 - 6/28/2023)    Current Treatment:   - D-VRD (7/6/2023 -   - Zometa pending dental clearance     Subjective:       Chief Complaint: Multiple Myeloma and  Chemotherapy      HPI    Ana Bonilla  63 y.o.  female with past medical history significant for hypertension, COPD, asthma, GERD, hypothyroidism here for follow-up and management of multiple myeloma    She was referred in 2/2023 by her PCP after workup for gastritis revealed lytic lesions. CT chest showed lytic and expansile destructive lesion in the sternum and lytic lesions at L1. Biopsy of L1 lesion in 3/2023 revealed mature B cell neoplasm most consistent with plasma cell neoplasm.      Bone marrow biopsy was performed in 4/2023 that demonstrated 60% plasma cells. PET/CT showed extensive bony lesions throughout the spine, sternum, bilateral ribs, pelvis, and a mild compression deformity in L1. SPEP/MECHE showed IgG lambda monoclonal protein measuring 3.19 g/dL. Quantitative immunoglobulins on 3/27/2023 showed IgG 4,521 mg/dL. UPEP/MECHE and FLC were pending. Labs on 3/27/2023 showed Beta 2 microglobulin 1.9, .  Labs on 4/19/2023 showed Hgb, 11.5, WBC 6.3, platelets, BUN 11, Cr 0.9, calcium 9.     She was started on VRd (Velcade/Revlimid/dexamethasone) every 21 days and then daratumumab was added by the transplant team. She was started on ASA for thrombosis prophylaxis and acyclovir for herpes zoster prophylaxis. Plan to start Zometa after dental evaluation.      In 5/2023, Revlimid was decreased from 25mg po to 10mg due to decreased creatinine clearance of 49. She underwent kyphoplasty with radiofrequency ablation on 6/8/2023 by Neurosurgery.     Patient was evaluated by the bone Marrow transplant team who recommended to change the treatment to D-VRD with Revlimid being given for 21 days on 7 days off cycle and was started on the treatment on 07/06/2023    Interval History:     Patient here for follow-up and next cycle of treatment.  She was not feeling well last week and was treating with steroids and antibiotics for steroid exacerbation but now feeling better and ready to be started on treatment. Her  eye infection has improved.         Past Medical History:   Diagnosis Date    Allergy     Amblyopia OS    Anxiety     Asthma     COPD (chronic obstructive pulmonary disease)     NO HOME o2    GERD (gastroesophageal reflux disease)     Hyperlipidemia     Hypertension     PONV (postoperative nausea and vomiting)     Thyroid disease       Past Surgical History:   Procedure Laterality Date    APPENDECTOMY      BIOPSY N/A 6/8/2023    Procedure: BIOPSY;  Surgeon: Fausto Smith MD;  Location: [facility] OR;  Service: Neurosurgery;  Laterality: N/A;  L1    BUNIONECTOMY      COLONOSCOPY N/A 12/4/2019    Procedure: COLONOSCOPY;  Surgeon: Danny Matos III, MD;  Location: [facility] ENDO;  Service: Endoscopy;  Laterality: N/A;    COLONOSCOPY N/A 10/24/2022    Procedure: COLONOSCOPY;  Surgeon: Danny Matos III, MD;  Location: [facility] ENDO;  Service: Endoscopy;  Laterality: N/A;    ESOPHAGOGASTRODUODENOSCOPY N/A 10/24/2022    Procedure: EGD (ESOPHAGOGASTRODUODENOSCOPY);  Surgeon: Danny Matos III, MD;  Location: [facility] ENDO;  Service: Endoscopy;  Laterality: N/A;    FIXATION KYPHOPLASTY Bilateral 6/8/2023    Procedure: KYPHOPLASTY;  Surgeon: Fausto Smith MD;  Location: [facility] OR;  Service: Neurosurgery;  Laterality: Bilateral;  kyphoplasty and radiofrequency ablation - L1    HYSTERECTOMY      PARTIAL//still with ovaries    neck fusion  08/2017    THYROIDECTOMY       Social History     Socioeconomic History    Marital status:     Number of children: 2   Occupational History     Employer: [employer]   Tobacco Use    Smoking status: Every Day     Current packs/day: 0.50     Average packs/day: 0.5 packs/day for 50.0 years (25.0 ttl pk-yrs)     Types: Cigarettes    Smokeless tobacco: Never   Substance and Sexual Activity    Alcohol use: Never     Comment: quit    Drug use: No    Sexual activity: Never     Social Determinants of Health     Stress: No Stress Concern Present (7/8/2019)    [employer] [place] [employer] -  Occupational Stress Questionnaire     Feeling of Stress : Not at all      Family History   Problem Relation Age of Onset    Hypertension Mother     Diabetes Mother     Diabetes Father     Stroke Maternal Grandmother     Prostate cancer Maternal Grandfather     Mental illness Son     Pancreatic cancer Maternal Uncle     Mental illness Other     Pancreatic cancer Other     Ovarian cancer Maternal Cousin       Review of patient's allergies indicates:   Allergen Reactions    Hydrocodone-acetaminophen Other (See Comments)     Causes pt to feel extremely sick       Review of Systems   Constitutional:  Positive for fatigue. Negative for activity change, chills and fever.   HENT: Negative.     Eyes:  Negative for pain, discharge and redness.   Respiratory:  Negative for cough, shortness of breath and wheezing.    Cardiovascular:  Negative for chest pain and leg swelling.   Gastrointestinal:  Positive for nausea. Negative for constipation, diarrhea and vomiting.   Endocrine: Negative.    Genitourinary: Negative.    Musculoskeletal:  Positive for back pain. Negative for arthralgias and myalgias.   Integumentary:  Negative.   Allergic/Immunologic: Negative.    Neurological:  Negative for light-headedness, numbness and headaches.   Hematological: Negative.    Psychiatric/Behavioral: Negative.           Objective:        There were no vitals filed for this visit.             Physical Exam  Constitutional:       General: She is not in acute distress.     Appearance: Normal appearance. She is not ill-appearing.   HENT:      Head: Normocephalic.   Eyes:      Extraocular Movements: Extraocular movements intact.   Pulmonary:      Effort: Pulmonary effort is normal. No respiratory distress.   Neurological:      Mental Status: She is alert and oriented to person, place, and time. Mental status is at baseline.   Psychiatric:         Mood and Affect: Mood normal.           LABS / IMAGING      - 06/06/2023 RIGHT ILIAC CREST BONE MARROW  ASPIRATE, BONE MARROW CLOT, AND BONE MARROW CORE BIOPSY WITH:     CELLULARITY=40-60%, TRILINEAGE HEMATOPOIETIC ACTIVITY (M/E=2.9:1).   CONSISTENT WITH PLASMA CELL NEOPLASM(60%).  SEE COMMENT.   FOCAL GRADE 1 RETICULAR FIBROSIS.   CONGO RED NEGATIVE.   INCREASED STORAGE IRON.   ADEQUATE NUMBER OF MEGAKARYOCYTES.    Bone marrow karyotype results: 46, XX[20], female karyotype.     Myeloma fixed cell, high-risk, FISH:  Normal.  The result is within normal limits for 1q duplication, TP53 deletion and IGH rearrangement.       - 04/10/2023 PET: Numerous FDG avid predominately lytic osseous lesions as above.  Primary differential considerations would include multiple myeloma versus metastatic disease.  2. No FDG avid soft tissue masses or adenopathy demonstrated.  3. Mild pathologic compression deformity of the L1 vertebral body.                                            Assessment:     IgG lambda Multiple myeloma, R-ISS stage I  - BMBx : 60 % plasma cells - 4/6/2023  - SPEP/MECHE showed IgG lambda - monoclonal protein 3.19 g/dl - (3/27/2023).  - R-ISS stage I (beta 2 microglobulin 1.9, albumin 3.5, , normal karyotype and no high-risk mutations on fish panel)  - PET-CT ( 4/10/2023) - showed extensive lytic lesions in the axial skeleton and mild L1 compression deformity.  - Started with Induction chemotherapy with VRD regimen (Velcde/Revlimid/Decadron) - (5/10/2023 - 6/28/2023)  - Aspirin for thrombosis prophylaxis; Acyclovir 400 mg p.o BID for herpes Zoster prophylaxis .  - patient was evaluated by transplant team and then switched to D-VRD (7/6/2023 -    - repeat creatinine clearance is more than 60, adjust the dose of Revlimid to 25 mg daily for 21 days on 7 days off as recommended by the transplant team but patient unable to tolerate it and thus decreased it back to 10 mg daily    Cancer-related pain / palliative care by specialist  -  checked 05/10/2023   - switch [place] to Percocet as patient is unable to  tolerate [place]    Plan:     - Patient treatment was held last week for COPD exacerbation. Restart C3 D1 today with Revlimid was decreased back to 10 mg daily as unable to tolerate higher dose.   - dental clearance given, continue with zometa q4wk   - potassium chloride 40 mEq for hypokalemia  - Continue Aspirin; Acyclovir 400 mg p.o BID for herpes Zoster prophylaxis .  - follow up with [facility] transplant team   - MD / LABS / TREATMENT VISIT - 1 WEEK for cycle 3 day 8 treatment       Med Onc Chart Routing      Follow up with physician 1 week.   Follow up with WERNER    Infusion scheduling note   daratumumab, bortezomib, zometa today and potassium chloride   Injection scheduling note return in one week for cycle 3 day 8   Labs CBC, CMP, LDH and magnesium   Scheduling:  Preferred lab:  Lab interval:  labs before next visit   Imaging    Pharmacy appointment    Other referrals              The patient was seen, interviewed and examined. Pertinent lab and radiology studies were reviewed. Pt instructed to call should develop concerning signs/symptoms or have further questions.       Portions of the record may have been created with voice recognition software. Occasional wrong-word or sound-a-like substitutions may have occurred due to the inherent limitations of voice recognition software. Read the chart carefully and recognize, using context, where substitutions have occurred.    Jose Dela Cruz MD  Hematology / Oncology

## 2023-09-13 ENCOUNTER — OFFICE VISIT (OUTPATIENT)
Dept: HEMATOLOGY/ONCOLOGY | Facility: CLINIC | Age: 63
End: 2023-09-13
Payer: COMMERCIAL

## 2023-09-13 ENCOUNTER — INFUSION (OUTPATIENT)
Dept: INFUSION THERAPY | Facility: HOSPITAL | Age: 63
End: 2023-09-13
Attending: INTERNAL MEDICINE
Payer: COMMERCIAL

## 2023-09-13 ENCOUNTER — OFFICE VISIT (OUTPATIENT)
Dept: OTOLARYNGOLOGY | Facility: CLINIC | Age: 63
End: 2023-09-13
Payer: COMMERCIAL

## 2023-09-13 VITALS
SYSTOLIC BLOOD PRESSURE: 135 MMHG | BODY MASS INDEX: 21.86 KG/M2 | RESPIRATION RATE: 18 BRPM | TEMPERATURE: 98 F | WEIGHT: 136 LBS | OXYGEN SATURATION: 98 % | HEIGHT: 66 IN | DIASTOLIC BLOOD PRESSURE: 88 MMHG | HEART RATE: 74 BPM

## 2023-09-13 DIAGNOSIS — Z79.899 IMMUNODEFICIENCY DUE TO CHEMOTHERAPY: ICD-10-CM

## 2023-09-13 DIAGNOSIS — C90.00 MULTIPLE MYELOMA NOT HAVING ACHIEVED REMISSION: ICD-10-CM

## 2023-09-13 DIAGNOSIS — C90.00 MULTIPLE MYELOMA, REMISSION STATUS UNSPECIFIED: ICD-10-CM

## 2023-09-13 DIAGNOSIS — J30.89 NON-SEASONAL ALLERGIC RHINITIS, UNSPECIFIED TRIGGER: Primary | ICD-10-CM

## 2023-09-13 DIAGNOSIS — C90.00 MULTIPLE MYELOMA, REMISSION STATUS UNSPECIFIED: Primary | ICD-10-CM

## 2023-09-13 DIAGNOSIS — C90.00 MULTIPLE MYELOMA, REMISSION STATUS UNSPECIFIED: Chronic | ICD-10-CM

## 2023-09-13 DIAGNOSIS — Z51.11 ENCOUNTER FOR ANTINEOPLASTIC CHEMOTHERAPY: Primary | ICD-10-CM

## 2023-09-13 DIAGNOSIS — T45.1X5A IMMUNODEFICIENCY DUE TO CHEMOTHERAPY: ICD-10-CM

## 2023-09-13 DIAGNOSIS — D84.821 IMMUNODEFICIENCY DUE TO CHEMOTHERAPY: ICD-10-CM

## 2023-09-13 PROCEDURE — 3044F PR MOST RECENT HEMOGLOBIN A1C LEVEL <7.0%: ICD-10-PCS | Mod: CPTII,S$GLB,, | Performed by: OTOLARYNGOLOGY

## 2023-09-13 PROCEDURE — 4010F ACE/ARB THERAPY RXD/TAKEN: CPT | Mod: CPTII,S$GLB,, | Performed by: OTOLARYNGOLOGY

## 2023-09-13 PROCEDURE — 3044F HG A1C LEVEL LT 7.0%: CPT | Mod: CPTII,95,, | Performed by: INTERNAL MEDICINE

## 2023-09-13 PROCEDURE — 25000003 PHARM REV CODE 250: Performed by: INTERNAL MEDICINE

## 2023-09-13 PROCEDURE — 3044F PR MOST RECENT HEMOGLOBIN A1C LEVEL <7.0%: ICD-10-PCS | Mod: CPTII,95,, | Performed by: INTERNAL MEDICINE

## 2023-09-13 PROCEDURE — 99214 PR OFFICE/OUTPT VISIT, EST, LEVL IV, 30-39 MIN: ICD-10-PCS | Mod: S$GLB,,, | Performed by: OTOLARYNGOLOGY

## 2023-09-13 PROCEDURE — 99215 OFFICE O/P EST HI 40 MIN: CPT | Mod: 95,,, | Performed by: INTERNAL MEDICINE

## 2023-09-13 PROCEDURE — 3044F HG A1C LEVEL LT 7.0%: CPT | Mod: CPTII,S$GLB,, | Performed by: OTOLARYNGOLOGY

## 2023-09-13 PROCEDURE — 4010F PR ACE/ARB THEARPY RXD/TAKEN: ICD-10-PCS | Mod: CPTII,95,, | Performed by: INTERNAL MEDICINE

## 2023-09-13 PROCEDURE — 99214 OFFICE O/P EST MOD 30 MIN: CPT | Mod: S$GLB,,, | Performed by: OTOLARYNGOLOGY

## 2023-09-13 PROCEDURE — 1159F PR MEDICATION LIST DOCUMENTED IN MEDICAL RECORD: ICD-10-PCS | Mod: CPTII,S$GLB,, | Performed by: OTOLARYNGOLOGY

## 2023-09-13 PROCEDURE — 99999 PR PBB SHADOW E&M-EST. PATIENT-LVL IV: CPT | Mod: PBBFAC,,, | Performed by: OTOLARYNGOLOGY

## 2023-09-13 PROCEDURE — 4010F ACE/ARB THERAPY RXD/TAKEN: CPT | Mod: CPTII,95,, | Performed by: INTERNAL MEDICINE

## 2023-09-13 PROCEDURE — 99215 PR OFFICE/OUTPT VISIT, EST, LEVL V, 40-54 MIN: ICD-10-PCS | Mod: 95,,, | Performed by: INTERNAL MEDICINE

## 2023-09-13 PROCEDURE — 4010F PR ACE/ARB THEARPY RXD/TAKEN: ICD-10-PCS | Mod: CPTII,S$GLB,, | Performed by: OTOLARYNGOLOGY

## 2023-09-13 PROCEDURE — 1159F MED LIST DOCD IN RCRD: CPT | Mod: CPTII,S$GLB,, | Performed by: OTOLARYNGOLOGY

## 2023-09-13 PROCEDURE — 99999 PR PBB SHADOW E&M-EST. PATIENT-LVL IV: ICD-10-PCS | Mod: PBBFAC,,, | Performed by: OTOLARYNGOLOGY

## 2023-09-13 RX ORDER — HEPARIN 100 UNIT/ML
500 SYRINGE INTRAVENOUS
Status: CANCELLED | OUTPATIENT
Start: 2023-09-13

## 2023-09-13 RX ORDER — METHYLPREDNISOLONE 4 MG/1
TABLET ORAL
Qty: 1 EACH | Refills: 0 | Status: SHIPPED | OUTPATIENT
Start: 2023-09-13

## 2023-09-13 RX ORDER — SODIUM CHLORIDE 0.9 % (FLUSH) 0.9 %
10 SYRINGE (ML) INJECTION
Status: CANCELLED | OUTPATIENT
Start: 2023-09-13

## 2023-09-13 RX ORDER — POTASSIUM CHLORIDE 750 MG/1
40 TABLET, EXTENDED RELEASE ORAL ONCE
Status: CANCELLED
Start: 2023-09-13

## 2023-09-13 RX ORDER — PROCHLORPERAZINE EDISYLATE 5 MG/ML
5 INJECTION INTRAMUSCULAR; INTRAVENOUS ONCE AS NEEDED
Status: CANCELLED
Start: 2023-09-13

## 2023-09-13 RX ORDER — ZOLEDRONIC ACID 0.04 MG/ML
4 INJECTION, SOLUTION INTRAVENOUS
Status: DISCONTINUED | OUTPATIENT
Start: 2023-09-13 | End: 2023-09-13 | Stop reason: HOSPADM

## 2023-09-13 RX ORDER — POTASSIUM CHLORIDE 750 MG/1
40 CAPSULE, EXTENDED RELEASE ORAL ONCE
Status: COMPLETED | OUTPATIENT
Start: 2023-09-13 | End: 2023-09-13

## 2023-09-13 RX ORDER — BORTEZOMIB 3.5 MG/1
1.3 INJECTION, POWDER, LYOPHILIZED, FOR SOLUTION INTRAVENOUS; SUBCUTANEOUS
Status: CANCELLED | OUTPATIENT
Start: 2023-09-13

## 2023-09-13 RX ORDER — BORTEZOMIB 3.5 MG/1
1.3 INJECTION, POWDER, LYOPHILIZED, FOR SOLUTION INTRAVENOUS; SUBCUTANEOUS
Status: DISCONTINUED | OUTPATIENT
Start: 2023-09-13 | End: 2023-09-13 | Stop reason: HOSPADM

## 2023-09-13 RX ORDER — LEVOCETIRIZINE DIHYDROCHLORIDE 5 MG/1
5 TABLET, FILM COATED ORAL NIGHTLY
Qty: 30 TABLET | Refills: 11 | Status: SHIPPED | OUTPATIENT
Start: 2023-09-13 | End: 2024-09-12

## 2023-09-13 RX ADMIN — POTASSIUM CHLORIDE 40 MEQ: 10 CAPSULE, COATED, EXTENDED RELEASE ORAL at 09:09

## 2023-09-13 NOTE — PROGRESS NOTES
Referring Provider:    Self, Aaareferral  No address on file  Subjective:   Patient: Aan Bonilla [id number], :1960   Visit date:2023 12:49 PM    Chief Complaint:  Sinusitis (Sinus congestion, left facial swelling, runny nose )    HPI:    Prior notes reviewed by myself.  Clinical documentation obtained by nursing staff reviewed.     64 y/o female here for evaluation of possible sinus infection and allergy symptoms.  She has been having several weeks of nasal congestion, rhinorrhea - all clear.  She denies any fever, facial pressure, pain.  She has had some periorbital swelling more on the left side which is nontender.  She has a lifelong history of allergic rhinitis symptoms.  She recently finished a round of doxycycline as well as oral steroids.  She is not using any antihistamines or nasal steroid spray.      Objective:     Physical Exam:  Vitals:  There were no vitals taken for this visit.  General appearance:  Well developed, well nourished. Some supraorbital edema/swelling - soft to palpation/nontender.      Ears:  Otoscopy of external auditory canals and tympanic membranes was normal, clinical speech reception thresholds grossly intact, no mass/lesion of auricle.    Nose:  No masses/lesions of external nose, nasal mucosa, septum, and turbinates were within normal limits. Clear rhinorrhea    Mouth:  No mass/lesion of lips, teeth, gums, hard/soft palate, tongue, tonsils, or oropharynx.    Neck & Lymphatics:  No cervical lymphadenopathy, no neck mass/crepitus/ asymmetry, trachea is midline, no thyroid enlargement/tenderness/mass.        [x]  Data Reviewed:    Lab Results   Component Value Date    WBC 7.93 2023    HGB 11.9 (L) 2023    HCT 36.6 (L) 2023    MCV 97 2023    EOSINOPHIL 0.3 2023             Assessment & Plan:   Non-seasonal allergic rhinitis, unspecified trigger  -     levocetirizine (XYZAL) 5 MG tablet; Take 1 tablet (5 mg total) by mouth every evening.   Dispense: 30 tablet; Refill: 11  -     methylPREDNISolone (MEDROL DOSEPACK) 4 mg tablet; use as directed  Dispense: 1 each; Refill: 0    Immunodeficiency due to chemotherapy    Multiple myeloma, remission status unspecified        She just finished a round of doxycycline.  She continues to primarily complained of clear rhinorrhea, itchy watery eyes.  She has had some periorbital swelling.  I do not see any convincing signs of a sinus infection.  We did discuss what to look for including discolored drainage, facial pain, headache, fever.  I explained that we would have a low threshold for starting her on another round of stronger antibiotics given her immunodeficiency.  But, for the time being, I recommended that she start on a daily antihistamine and a nasal steroid spray.  Will also treat with the another round of methylprednisolone

## 2023-09-13 NOTE — NURSING
0947: Pt administered Potassium, see MAR. Dr. Dela Cruz ordered to hold treatment today. Pt eyes remains swollen along with left side of pt's face. Pt voiced she still having sinus problems.

## 2023-09-15 ENCOUNTER — TELEPHONE (OUTPATIENT)
Dept: HEMATOLOGY/ONCOLOGY | Facility: CLINIC | Age: 63
End: 2023-09-15
Payer: COMMERCIAL

## 2023-09-15 NOTE — TELEPHONE ENCOUNTER
SW called pt re: recent distress score. Pt stated that she's been sick with COPD/respiratory issues so treatments have been on hold. Pt stated that her anxiety has been managed by rx Xanax. Pt will see new PCP at the end of next month due to previous PCP leaving. Pt stated that she will see Palliative next week for pain mgt and ACP but she does not like too many pain meds. No needs/concerns at this time per pt. Pt will call SW if needs arise. SW will remain available.

## 2023-09-18 ENCOUNTER — DOCUMENTATION ONLY (OUTPATIENT)
Dept: PALLIATIVE MEDICINE | Facility: CLINIC | Age: 63
End: 2023-09-18
Payer: COMMERCIAL

## 2023-09-18 NOTE — PROGRESS NOTES
Nurse called pt to r/s apt due to SHAWN of provider's scheduled, pt accepted next available apt and placed on wait list.

## 2023-09-19 ENCOUNTER — HOSPITAL ENCOUNTER (OUTPATIENT)
Dept: CARDIOLOGY | Facility: HOSPITAL | Age: 63
Discharge: HOME | End: 2023-09-19
Attending: INTERNAL MEDICINE
Payer: COMMERCIAL

## 2023-09-19 VITALS
DIASTOLIC BLOOD PRESSURE: 88 MMHG | SYSTOLIC BLOOD PRESSURE: 135 MMHG | HEART RATE: 70 BPM | BODY MASS INDEX: 21.86 KG/M2 | HEIGHT: 66 IN | WEIGHT: 136 LBS

## 2023-09-19 DIAGNOSIS — I10 ESSENTIAL HYPERTENSION: ICD-10-CM

## 2023-09-19 DIAGNOSIS — C90.00 MULTIPLE MYELOMA, REMISSION STATUS UNSPECIFIED: Chronic | ICD-10-CM

## 2023-09-19 DIAGNOSIS — I35.1 NONRHEUMATIC AORTIC VALVE INSUFFICIENCY: ICD-10-CM

## 2023-09-19 DIAGNOSIS — Z72.0 TOBACCO USE: ICD-10-CM

## 2023-09-19 LAB
AORTIC ROOT ANNULUS: 2.8 CM
ASCENDING AORTA: 2.61 CM
AV INDEX (PROSTH): 0.77
AV MEAN GRADIENT: 6 MMHG
AV PEAK GRADIENT: 11 MMHG
AV REGURGITATION PRESSURE HALF TIME: 358.18 MS
AV VALVE AREA BY VELOCITY RATIO: 2.07 CM²
AV VALVE AREA: 2.11 CM²
AV VELOCITY RATIO: 0.75
BSA FOR ECHO PROCEDURE: 1.69 M2
CV ECHO LV RWT: 0.34 CM
DOP CALC AO PEAK VEL: 1.67 M/S
DOP CALC AO VTI: 35.1 CM
DOP CALC LVOT AREA: 2.7 CM2
DOP CALC LVOT DIAMETER: 1.87 CM
DOP CALC LVOT PEAK VEL: 1.26 M/S
DOP CALC LVOT STROKE VOLUME: 74.12 CM3
DOP CALC RVOT PEAK VEL: 0.66 M/S
DOP CALC RVOT VTI: 17.2 CM
DOP CALCLVOT PEAK VEL VTI: 27 CM
E WAVE DECELERATION TIME: 181.81 MSEC
E/A RATIO: 0.85
E/E' RATIO: 7.05 M/S
ECHO LV POSTERIOR WALL: 0.76 CM (ref 0.6–1.1)
EJECTION FRACTION: 60 %
FRACTIONAL SHORTENING: 33 % (ref 28–44)
INTERVENTRICULAR SEPTUM: 0.84 CM (ref 0.6–1.1)
IVC DIAMETER: 1.66 CM
IVRT: 85.63 MSEC
LA MAJOR: 3.83 CM
LA MINOR: 4.02 CM
LA WIDTH: 3 CM
LEFT ATRIUM SIZE: 3.17 CM
LEFT ATRIUM VOLUME INDEX MOD: 14.6 ML/M2
LEFT ATRIUM VOLUME INDEX: 18.7 ML/M2
LEFT ATRIUM VOLUME MOD: 24.81 CM3
LEFT ATRIUM VOLUME: 31.71 CM3
LEFT INTERNAL DIMENSION IN SYSTOLE: 2.98 CM (ref 2.1–4)
LEFT VENTRICLE DIASTOLIC VOLUME INDEX: 52.35 ML/M2
LEFT VENTRICLE DIASTOLIC VOLUME: 89 ML
LEFT VENTRICLE MASS INDEX: 65 G/M2
LEFT VENTRICLE SYSTOLIC VOLUME INDEX: 20.2 ML/M2
LEFT VENTRICLE SYSTOLIC VOLUME: 34.34 ML
LEFT VENTRICULAR INTERNAL DIMENSION IN DIASTOLE: 4.43 CM (ref 3.5–6)
LEFT VENTRICULAR MASS: 110.69 G
LV LATERAL E/E' RATIO: 6.7 M/S
LV SEPTAL E/E' RATIO: 7.44 M/S
LVOT MG: 3.02 MMHG
LVOT MV: 0.79 CM/S
MV PEAK A VEL: 0.79 M/S
MV PEAK E VEL: 0.67 M/S
MV STENOSIS PRESSURE HALF TIME: 52.73 MS
MV VALVE AREA P 1/2 METHOD: 4.17 CM2
PISA AR MAX VEL: 4.56 M/S
PISA TR MAX VEL: 2.77 M/S
PULM VEIN S/D RATIO: 1.31
PV MEAN GRADIENT: 1 MMHG
PV PEAK D VEL: 0.45 M/S
PV PEAK GRADIENT: 3 MMHG
PV PEAK S VEL: 0.59 M/S
PV PEAK VELOCITY: 0.9 M/S
RA MAJOR: 3.37 CM
RA PRESSURE ESTIMATED: 3 MMHG
RA WIDTH: 2.67 CM
RIGHT VENTRICULAR END-DIASTOLIC DIMENSION: 2.34 CM
RV TB RVSP: 6 MMHG
SINUS: 2.29 CM
STJ: 2.05 CM
TDI LATERAL: 0.1 M/S
TDI SEPTAL: 0.09 M/S
TDI: 0.1 M/S
TR MAX PG: 31 MMHG
TRICUSPID ANNULAR PLANE SYSTOLIC EXCURSION: 1.73 CM
TV REST PULMONARY ARTERY PRESSURE: 34 MMHG
Z-SCORE OF LEFT VENTRICULAR DIMENSION IN END DIASTOLE: -0.65
Z-SCORE OF LEFT VENTRICULAR DIMENSION IN END SYSTOLE: 0.14

## 2023-09-19 PROCEDURE — 93306 ECHO (CUPID ONLY): ICD-10-PCS | Mod: 26,,, | Performed by: INTERNAL MEDICINE

## 2023-09-19 PROCEDURE — 93306 TTE W/DOPPLER COMPLETE: CPT | Mod: 26,,, | Performed by: INTERNAL MEDICINE

## 2023-09-19 PROCEDURE — 93306 TTE W/DOPPLER COMPLETE: CPT

## 2023-09-19 NOTE — PROGRESS NOTES
Subjective:       Patient ID: Ana Bonilla is a 63 y.o. female.    Chief Complaint: Chemotherapy and Multiple Myeloma    Primary Oncologist/Hematologist: Dr. Dela Cruz    HPI: Ms. Bonilla is a 63 year old female who is following up for her multiple myeloma. She is here for cycle 3 day 8 of bortezomib (velcade) + daratumumab (darzalex) + lenalidomide + dexamethasone. She takes lenalidomide 10mg on days 1-21 on a 28 day cycle. She will only be getting Velcade today.  She also gets zometa q 4 weeks. Dental clearance confirmed. This has not been started yet.   Cancer Hx:  BMBx on 4/6/2023 showed 60 % plasma cells. PET-CT on 4/10/2023 showed extensive lytic bony lesions throughout the spine, sternum, bilateral ribs, pelvis and mild compression deformity in L1 vertebral body.    Today:   She has constipation but takes linzess. She is nauseated at times, and takes her medication but wants to try to stop taking her anti-emetics because it makes her fatigued. She sees pain management, Dr. Smith. He has cleared her to go back to work, she worked on the floor at [employer]. She doesn't take calcium or vitamin D because it makes her nauseated. Spoke with dentist and she doesn't need anything done.   She states her appetite has been too good. She does complain of weight gain. She has been staying hydrated. She had recent COPD exacerbation and allergy flare up. She does have some L sided jamal-orbital swelling. She is seeing ENT for this who does not see signs of infection. She will follow up with them again in 1 week. She recently finished abx and steroids for this. She is now taking flonase and xyzal for allergies. She states she is feeling better in terms of congestion, cough. She denies any bleeding, fevers, sob, chest pain. She does continue to smoke, working with smoking cessation clinic.     Social History     Socioeconomic History    Marital status:     Number of children: 2   Occupational History     Employer:  [employer]   Tobacco Use    Smoking status: Every Day     Current packs/day: 0.50     Average packs/day: 0.5 packs/day for 50.0 years (25.0 ttl pk-yrs)     Types: Cigarettes    Smokeless tobacco: Never   Substance and Sexual Activity    Alcohol use: Never     Comment: quit    Drug use: No    Sexual activity: Never     Social Determinants of Health     Stress: No Stress Concern Present (7/8/2019)    [place] [employer] - Occupational Stress Questionnaire     Feeling of Stress : Not at all       Past Medical History:   Diagnosis Date    Allergy     Amblyopia OS    Anxiety     Asthma     COPD (chronic obstructive pulmonary disease)     NO HOME o2    GERD (gastroesophageal reflux disease)     Hyperlipidemia     Hypertension     PONV (postoperative nausea and vomiting)     Thyroid disease        Family History   Problem Relation Age of Onset    Hypertension Mother     Diabetes Mother     Diabetes Father     Stroke Maternal Grandmother     Prostate cancer Maternal Grandfather     Mental illness Son     Pancreatic cancer Maternal Uncle     Mental illness Other     Pancreatic cancer Other     Ovarian cancer Maternal Cousin        Past Surgical History:   Procedure Laterality Date    APPENDECTOMY      BIOPSY N/A 6/8/2023    Procedure: BIOPSY;  Surgeon: Fausto Smith MD;  Location: [facility] OR;  Service: Neurosurgery;  Laterality: N/A;  L1    BUNIONECTOMY      COLONOSCOPY N/A 12/4/2019    Procedure: COLONOSCOPY;  Surgeon: Danny Matos III, MD;  Location: [facility] ENDO;  Service: Endoscopy;  Laterality: N/A;    COLONOSCOPY N/A 10/24/2022    Procedure: COLONOSCOPY;  Surgeon: Danny Matos III, MD;  Location: [facility];  Service: Endoscopy;  Laterality: N/A;    ESOPHAGOGASTRODUODENOSCOPY N/A 10/24/2022    Procedure: EGD (ESOPHAGOGASTRODUODENOSCOPY);  Surgeon: Danny Matos III, MD;  Location: [facility];  Service: Endoscopy;  Laterality: N/A;    FIXATION KYPHOPLASTY Bilateral 6/8/2023    Procedure:  KYPHOPLASTY;  Surgeon: Fausto Smith MD;  Location: [facility];  Service: Neurosurgery;  Laterality: Bilateral;  kyphoplasty and radiofrequency ablation - L1    HYSTERECTOMY      PARTIAL//still with ovaries    neck fusion  08/2017    THYROIDECTOMY         Review of Systems   Constitutional:  Positive for fatigue. Negative for activity change, appetite change, chills, diaphoresis, fever and unexpected weight change.   HENT:  Negative for congestion, nosebleeds, rhinorrhea, sinus pressure, sinus pain, sneezing, sore throat and trouble swallowing.    Respiratory:  Negative for cough and shortness of breath.    Cardiovascular:  Negative for chest pain and leg swelling.   Gastrointestinal:  Negative for abdominal pain, anal bleeding, blood in stool, constipation, diarrhea, nausea and vomiting.   Genitourinary:  Negative for hematuria.   Musculoskeletal:  Positive for arthralgias and back pain. Negative for myalgias.   Skin:  Negative for color change and pallor.   Allergic/Immunologic: Positive for environmental allergies and immunocompromised state.   Neurological:  Negative for dizziness, weakness, light-headedness, numbness and headaches.         Medication List with Changes/Refills   Current Medications    ACYCLOVIR (ZOVIRAX) 400 MG TABLET    Take 1 tablet (400 mg total) by mouth 2 (two) times daily.    ALBUTEROL (PROVENTIL HFA) 90 MCG/ACTUATION INHALER    Inhale 2 puffs into the lungs every 6 (six) hours as needed for Wheezing. Rescue    ALPRAZOLAM (XANAX) 2 MG TAB    TAKE 1 TABLET BY MOUTH TWICE DAILY AS NEEDED FOR ANXIETY    AMLODIPINE-BENAZEPRIL 2.5-10 MG (LOTREL) 2.5-10 MG PER CAPSULE    TAKE 1 CAPSULE BY MOUTH EVERY DAY    ASPIRIN (ECOTRIN) 81 MG EC TABLET    Take 1 tablet (81 mg total) by mouth once daily.    BACLOFEN (LIORESAL) 10 MG TABLET    Take 10 mg by mouth 3 (three) times daily.    BENZONATATE (TESSALON) 200 MG CAPSULE    Take 200 mg by mouth.    CALCIUM CARBONATE-VIT D3- MG CALCIUM- 400 UNIT  TAB    Take 1 tablet by mouth once. for 1 dose    DEXAMETHASONE (DECADRON) 4 MG TAB    Take 10 tablets (40 mg total) by mouth every 7 days. ( once weekly by mouth on days 1, 8 and 15 every 21 day cycle)    DEXAMETHASONE (DECADRON) 4 MG TAB    Take 10 tablets (40 mg total) by mouth As instructed. Daily on days 1, 8, 15, and 22 of each chemotherapy cycle. Take with food.    DIAZEPAM (VALIUM) 5 MG TABLET    Take 1 tablet by mouth 30 minutes prior to MRI to help decrease anxiety.    DOXYCYCLINE (VIBRAMYCIN) 100 MG CAP    Take 100 mg by mouth 2 (two) times daily.    FLUTICASONE PROPIONATE (FLONASE) 50 MCG/ACTUATION NASAL SPRAY    1 spray (50 mcg total) by Each Nostril route once daily.    FLUTICASONE-SALMETEROL DISKUS INHALER 250-50 MCG    Inhale 1 puff into the lungs 2 (two) times daily. Controller    IBUPROFEN 100 MG CHEW    ibuprofen Take 1 tablet (oral) every 8 hours PRN - Pain [id number] tablet every 8 hours oral No set duration recorded No set duration amount recorded active 800 mg    LENALIDOMIDE 10 MG CAP    Take 10 mg by mouth once daily Take for 21 days and then 7 days off.    LEVOCETIRIZINE (XYZAL) 5 MG TABLET    Take 1 tablet (5 mg total) by mouth every evening.    LEVOTHYROXINE (SYNTHROID) 100 MCG TABLET    Take 1 tablet (100 mcg total) by mouth before breakfast.    LINACLOTIDE (LINZESS) 72 MCG CAP CAPSULE    Take 2 capsules (144 mcg total) by mouth before breakfast.    METHOCARBAMOL (ROBAXIN) 500 MG TAB    Take 1 tablet (500 mg total) by mouth every 8 (eight) hours as needed.    METHYLPREDNISOLONE (MEDROL DOSEPACK) 4 MG TABLET    use as directed    METOPROLOL SUCCINATE (TOPROL-XL) 50 MG 24 HR TABLET    Take 1 tablet (50 mg total) by mouth every evening.    MONTELUKAST (SINGULAIR) 10 MG TABLET    Take 1 tablet (10 mg total) by mouth every evening.    MOXIFLOXACIN (VIGAMOX) 0.5 % OPHTHALMIC SOLUTION    Instill one drop into both eyes 4 times daily for 7 days.    NICOTINE (NICODERM CQ) 14 MG/24 HR    Place 1  patch onto the skin once daily.    ONDANSETRON (ZOFRAN) 8 MG TABLET    Take 1 tablet (8 mg total) by mouth every 12 (twelve) hours as needed for Nausea.    PANTOPRAZOLE (PROTONIX) 40 MG TABLET    TAKE 1 TABLET(40 MG) BY MOUTH EVERY DAY    PROCHLORPERAZINE (COMPAZINE) 5 MG TABLET    Take 1 tablet (5 mg total) by mouth every 6 (six) hours as needed for Nausea.    PROMETHAZINE (PHENERGAN) 25 MG TABLET    Take 1 tablet (25 mg total) by mouth every 4 (four) hours.    PROMETHAZINE-CODEINE 6.25-10 MG/5 ML (PHENERGAN WITH CODEINE) 6.25-10 MG/5 ML SYRUP    Take 5 mLs by mouth every 4 (four) hours as needed for Cough.    ROSUVASTATIN (CRESTOR) 10 MG TABLET    Take 1 tablet (10 mg total) by mouth every evening.    TRAZODONE (DESYREL) 50 MG TABLET    Take 1 tablet (50 mg total) by mouth every evening.     Objective:     Vitals:    09/20/23 0726   BP: 134/77   Pulse: 93   Temp: 97.7 °F (36.5 °C)     Physical Exam  Vitals reviewed.   Constitutional:       General: She is not in acute distress.     Appearance: She is not ill-appearing, toxic-appearing or diaphoretic.   HENT:      Head: Normocephalic.      Comments: L sided jamal-orbital swelling. No tenderness noted.  Eyes:      Conjunctiva/sclera: Conjunctivae normal.   Cardiovascular:      Rate and Rhythm: Normal rate.   Musculoskeletal:      Right lower leg: No edema.      Left lower leg: No edema.   Skin:     General: Skin is warm.      Coloration: Skin is not jaundiced or pale.      Findings: No bruising, erythema, lesion or rash.   Neurological:      Mental Status: She is alert.      Motor: No weakness.      Gait: Gait normal.   Psychiatric:         Mood and Affect: Mood normal.         Behavior: Behavior normal.         Thought Content: Thought content normal.            Labs/Results:  Lab Results   Component Value Date    WBC 7.55 09/19/2023    RBC 4.15 09/19/2023    HGB 13.1 09/19/2023    HCT 41.1 09/19/2023    MCV 99 (H) 09/19/2023    MCH 31.6 (H) 09/19/2023    MCHC 31.9  (L) 09/19/2023    RDW 14.7 (H) 09/19/2023     09/19/2023    MPV 8.7 (L) 09/19/2023    GRAN 4.2 09/19/2023    GRAN 55.2 09/19/2023    LYMPH 2.4 09/19/2023    LYMPH 31.5 09/19/2023    MONO 0.8 09/19/2023    MONO 10.9 09/19/2023    EOS 0.1 09/19/2023    BASO 0.01 09/19/2023    EOSINOPHIL 1.9 09/19/2023    BASOPHIL 0.1 09/19/2023     CMP  Sodium   Date Value Ref Range Status   09/19/2023 144 136 - 145 mmol/L Final     Potassium   Date Value Ref Range Status   09/19/2023 3.6 3.5 - 5.1 mmol/L Final     Chloride   Date Value Ref Range Status   09/19/2023 106 95 - 110 mmol/L Final     CO2   Date Value Ref Range Status   09/19/2023 29 23 - 29 mmol/L Final     Glucose   Date Value Ref Range Status   09/19/2023 96 70 - 110 mg/dL Final     BUN   Date Value Ref Range Status   09/19/2023 16 8 - 23 mg/dL Final     Creatinine   Date Value Ref Range Status   09/19/2023 0.9 0.5 - 1.4 mg/dL Final     Calcium   Date Value Ref Range Status   09/19/2023 9.2 8.7 - 10.5 mg/dL Final     Total Protein   Date Value Ref Range Status   09/19/2023 6.9 6.0 - 8.4 g/dL Final     Albumin   Date Value Ref Range Status   09/19/2023 3.8 3.5 - 5.2 g/dL Final     Total Bilirubin   Date Value Ref Range Status   09/19/2023 0.7 0.1 - 1.0 mg/dL Final     Comment:     For infants and newborns, interpretation of results should be based  on gestational age, weight and in agreement with clinical  observations.    Premature Infant recommended reference ranges:  Up to 24 hours.............<8.0 mg/dL  Up to 48 hours............<12.0 mg/dL  3-5 days..................<15.0 mg/dL  6-29 days.................<15.0 mg/dL       Alkaline Phosphatase   Date Value Ref Range Status   09/19/2023 61 55 - 135 U/L Final     AST   Date Value Ref Range Status   09/19/2023 17 10 - 40 U/L Final     ALT   Date Value Ref Range Status   09/19/2023 32 10 - 44 U/L Final     Anion Gap   Date Value Ref Range Status   09/19/2023 9 8 - 16 mmol/L Final     eGFR   Date Value Ref Range  Status   09/19/2023 >60 >60 mL/min/1.73 m^2 Final        Latest Reference Range & Units 08/01/23 10:45   Protein, Serum 6.0 - 8.4 g/dL 6.8   Albumin grams/dl 3.35 - 5.55 g/dL 3.84   Alpha-1 grams/dl 0.17 - 0.41 g/dL 0.29   Alpha-2 0.43 - 0.99 g/dL 0.82   Beta 0.50 - 1.10 g/dL 0.63   Gamma 0.67 - 1.58 g/dL 1.22      Latest Reference Range & Units 08/01/23 10:45   [place] Free Light Chains 0.33 - 1.94 mg/dL 1.04   [place] Free Light Chains 0.57 - 2.63 mg/dL 1.34   Kappa/Lambda FLC Ratio 0.26 - 1.65  0.78   Normal total protein. Normal gamma globulins are decreased.   Paraprotein peak in near-gamma = 1.06 g/dL (previously, 2.15 g/dL).   Assessment:     Problem List Items Addressed This Visit          Immunology/Multi System    Immunodeficiency due to chemotherapy       Oncology    Multiple myeloma - Primary (Chronic)    Secondary malignant neoplasm of bone     Plan:     Multiple myeloma, remission status unspecified, Secondary malignant neoplasm of bone  --continue with cycle 3 day 8--> will only be getting velcade today  --continue with lenalidomide 10mg on days 1-21 on 28 day cycle (unable to tolerate at higher dose)  --continue with dexamethasone+lenalidomide+ daratumumab +bortezomib  --continue with zometa q 4 weeks. . Dental clearance has been confirmed. She has not had first dose of this yet.   --continue with calcium and vitamin D supplement--> patient does not want to take due to nausea associated with this.   --ANC:4.2, plts:244, crcl: 59.9ml/min, tibli:0.7, ast:,17 alt:32  --PET-CT ( 4/10/2023) - showed extensive lytic lesions in the axial skeleton and mild L1 compression deformity. S/p kyphoplasty on 6/8/23. Continue to follow with pain management  --continue with Aspirin daily p.o for thrombosis prophylaxis; Acyclovir 400 mg p.o BID for herpes Zoster prophylaxis   --Seen by Dr. Dalavisio SCT eval with following recommendations: weekly 28 day cycle odell-VRd x 4 total cycles >crissy autoSCT> maintenance .  Recommended Supportive medications: Levaquin, acyc, vit/d ca, asa;  zometa q month once she obtains dental clearance   --continue to follow with ENT for facial swelling and allergies. Continue with xyzal and flonase.  --start back on revlimid, 10mg. To stay on cycle days, she will take this for 2 weeks.      Follow-Up: 1 week with cbc cmp prior for cycle 3 day 15 -standing orders in--> + zometa     Halie Cortez PA-C  Hematology Oncology    Route Chart for Scheduling    Med Onc Chart Routing      Follow up with physician    Follow up with WERNER 1 week. with cbc cmp prior for cycle 3 day 15   Infusion scheduling note   1 week for cycle 3 day 15 + zometa   Injection scheduling note    Labs CBC and CMP   Scheduling:  Preferred lab:  Lab interval:  standing orders in   Imaging    Pharmacy appointment    Other referrals                  Treatment Plan Information   OP D-VRD DARATUMUMAB + BORTEZOMIB LENALIDOMIDE DEXAMETHASONE   Oscar Márquez MD   Upcoming Treatment Dates - OP D-VRD DARATUMUMAB + BORTEZOMIB LENALIDOMIDE DEXAMETHASONE    9/20/2023       Chemotherapy       bortezomib (VELCADE) injection       Supportive Care       prochlorperazine injection Soln 5 mg  9/27/2023       Chemotherapy       bortezomib (VELCADE) injection       daratumumab-hyaluronidase-fihj (DARZALEX FASPRO) subcutaneous injection 1,800 mg 15 mL       Supportive Care       prochlorperazine injection Soln 5 mg  10/4/2023       Chemotherapy       bortezomib (VELCADE) injection       Supportive Care       prochlorperazine injection Soln 5 mg  10/11/2023       Chemotherapy       bortezomib (VELCADE) injection       daratumumab-hyaluronidase-fihj (DARZALEX FASPRO) subcutaneous injection 1,800 mg 15 mL       Supportive Care       prochlorperazine injection Soln 5 mg    Supportive Plan Information  OP ZOLEDRONIC ACID (ZOMETA) Q4W   Abram Velasquez MD   Upcoming Treatment Dates - OP ZOLEDRONIC ACID (ZOMETA) Q4W    10/11/2023       Medications        zoledronic acid (ZOMETA) 4 mg in sodium chloride 0.9% 100 mL IVPB  11/8/2023       Medications       zoledronic acid (ZOMETA) 4 mg in sodium chloride 0.9% 100 mL IVPB  12/6/2023       Medications       zoledronic acid (ZOMETA) 4 mg in sodium chloride 0.9% 100 mL IVPB  1/3/2024       Medications       zoledronic acid (ZOMETA) 4 mg in sodium chloride 0.9% 100 mL IVPB    Therapy Plan Information  IV Fluids  sodium chloride 0.9% bolus 1,000 mL 1,000 mL  1,000 mL, Intravenous, Every visit  Flushes  sodium chloride 0.9% flush 10 mL  10 mL, Intravenous, PRN  heparin, porcine (PF) 100 unit/mL injection flush 500 Units  500 Units, Intravenous, PRN

## 2023-09-20 ENCOUNTER — OFFICE VISIT (OUTPATIENT)
Dept: HEMATOLOGY/ONCOLOGY | Facility: CLINIC | Age: 63
End: 2023-09-20
Payer: COMMERCIAL

## 2023-09-20 ENCOUNTER — PATIENT MESSAGE (OUTPATIENT)
Dept: CARDIOLOGY | Facility: CLINIC | Age: 63
End: 2023-09-20
Payer: COMMERCIAL

## 2023-09-20 ENCOUNTER — INFUSION (OUTPATIENT)
Dept: INFUSION THERAPY | Facility: HOSPITAL | Age: 63
End: 2023-09-20
Attending: INTERNAL MEDICINE
Payer: COMMERCIAL

## 2023-09-20 ENCOUNTER — DOCUMENTATION ONLY (OUTPATIENT)
Dept: HEMATOLOGY/ONCOLOGY | Facility: CLINIC | Age: 63
End: 2023-09-20
Payer: COMMERCIAL

## 2023-09-20 VITALS
BODY MASS INDEX: 21.4 KG/M2 | SYSTOLIC BLOOD PRESSURE: 134 MMHG | OXYGEN SATURATION: 99 % | WEIGHT: 133.19 LBS | TEMPERATURE: 98 F | HEIGHT: 66 IN | DIASTOLIC BLOOD PRESSURE: 77 MMHG | HEART RATE: 93 BPM

## 2023-09-20 VITALS
TEMPERATURE: 98 F | DIASTOLIC BLOOD PRESSURE: 75 MMHG | HEART RATE: 79 BPM | OXYGEN SATURATION: 98 % | RESPIRATION RATE: 16 BRPM | SYSTOLIC BLOOD PRESSURE: 119 MMHG

## 2023-09-20 DIAGNOSIS — D84.821 IMMUNODEFICIENCY DUE TO CHEMOTHERAPY: ICD-10-CM

## 2023-09-20 DIAGNOSIS — C90.00 MULTIPLE MYELOMA, REMISSION STATUS UNSPECIFIED: Primary | ICD-10-CM

## 2023-09-20 DIAGNOSIS — C79.51 SECONDARY MALIGNANT NEOPLASM OF BONE: ICD-10-CM

## 2023-09-20 DIAGNOSIS — Z79.899 IMMUNODEFICIENCY DUE TO CHEMOTHERAPY: ICD-10-CM

## 2023-09-20 DIAGNOSIS — T45.1X5A IMMUNODEFICIENCY DUE TO CHEMOTHERAPY: ICD-10-CM

## 2023-09-20 DIAGNOSIS — C90.00 MULTIPLE MYELOMA, REMISSION STATUS UNSPECIFIED: Primary | Chronic | ICD-10-CM

## 2023-09-20 PROCEDURE — 4010F ACE/ARB THERAPY RXD/TAKEN: CPT | Mod: CPTII,S$GLB,,

## 2023-09-20 PROCEDURE — 3008F PR BODY MASS INDEX (BMI) DOCUMENTED: ICD-10-PCS | Mod: CPTII,S$GLB,,

## 2023-09-20 PROCEDURE — 63600175 PHARM REV CODE 636 W HCPCS: Mod: JZ,JG

## 2023-09-20 PROCEDURE — 3075F PR MOST RECENT SYSTOLIC BLOOD PRESS GE 130-139MM HG: ICD-10-PCS | Mod: CPTII,S$GLB,,

## 2023-09-20 PROCEDURE — 99215 PR OFFICE/OUTPT VISIT, EST, LEVL V, 40-54 MIN: ICD-10-PCS | Mod: S$GLB,,,

## 2023-09-20 PROCEDURE — 4010F PR ACE/ARB THEARPY RXD/TAKEN: ICD-10-PCS | Mod: CPTII,S$GLB,,

## 2023-09-20 PROCEDURE — 3078F PR MOST RECENT DIASTOLIC BLOOD PRESSURE < 80 MM HG: ICD-10-PCS | Mod: CPTII,S$GLB,,

## 2023-09-20 PROCEDURE — 3008F BODY MASS INDEX DOCD: CPT | Mod: CPTII,S$GLB,,

## 2023-09-20 PROCEDURE — 3044F PR MOST RECENT HEMOGLOBIN A1C LEVEL <7.0%: ICD-10-PCS | Mod: CPTII,S$GLB,,

## 2023-09-20 PROCEDURE — 99215 OFFICE O/P EST HI 40 MIN: CPT | Mod: S$GLB,,,

## 2023-09-20 PROCEDURE — 3044F HG A1C LEVEL LT 7.0%: CPT | Mod: CPTII,S$GLB,,

## 2023-09-20 PROCEDURE — 3075F SYST BP GE 130 - 139MM HG: CPT | Mod: CPTII,S$GLB,,

## 2023-09-20 PROCEDURE — 99999 PR PBB SHADOW E&M-EST. PATIENT-LVL III: CPT | Mod: PBBFAC,,,

## 2023-09-20 PROCEDURE — 3078F DIAST BP <80 MM HG: CPT | Mod: CPTII,S$GLB,,

## 2023-09-20 PROCEDURE — 96401 CHEMO ANTI-NEOPL SQ/IM: CPT

## 2023-09-20 PROCEDURE — 99999 PR PBB SHADOW E&M-EST. PATIENT-LVL III: ICD-10-PCS | Mod: PBBFAC,,,

## 2023-09-20 RX ORDER — BORTEZOMIB 3.5 MG/1
1.3 INJECTION, POWDER, LYOPHILIZED, FOR SOLUTION INTRAVENOUS; SUBCUTANEOUS
Status: CANCELLED | OUTPATIENT
Start: 2023-09-20

## 2023-09-20 RX ORDER — BORTEZOMIB 3.5 MG/1
1.3 INJECTION, POWDER, LYOPHILIZED, FOR SOLUTION INTRAVENOUS; SUBCUTANEOUS
Status: COMPLETED | OUTPATIENT
Start: 2023-09-20 | End: 2023-09-20

## 2023-09-20 RX ORDER — SODIUM CHLORIDE 0.9 % (FLUSH) 0.9 %
10 SYRINGE (ML) INJECTION
Status: CANCELLED | OUTPATIENT
Start: 2023-09-20

## 2023-09-20 RX ORDER — HEPARIN 100 UNIT/ML
500 SYRINGE INTRAVENOUS
Status: CANCELLED | OUTPATIENT
Start: 2023-09-20

## 2023-09-20 RX ORDER — PROCHLORPERAZINE EDISYLATE 5 MG/ML
5 INJECTION INTRAMUSCULAR; INTRAVENOUS ONCE AS NEEDED
Status: CANCELLED
Start: 2023-09-20

## 2023-09-20 RX ADMIN — BORTEZOMIB 2.25 MG: 3.5 INJECTION, POWDER, LYOPHILIZED, FOR SOLUTION INTRAVENOUS; SUBCUTANEOUS at 09:09

## 2023-09-20 NOTE — LETTER
September 20, 2023      [facility]  [address] [facility]  [place] [address]  Phone: [phone]  Fax: [phone]       Patient: Ana Bonilla   YOB: 1960  Date of Visit: 09/20/2023    To Whom It May Concern:    Traci Bonilla  was at [facility] on 09/20/2023. The patient may return to work/school on 09/23/2023 with no restrictions. If you have any questions or concerns, or if I can be of further assistance, please do not hesitate to contact me.    Sincerely,    Halie Cortez PA-C

## 2023-09-20 NOTE — PROGRESS NOTES
Met with pt in lobby prior to infusion, during and after infusion visit as well. Pt has c/o fatigue and not feeling as well as she did although she hasn't had chemo in over a month. Explained that it takes 3-6 months or longer to get chemo out of your system and the s/e generally increase  while on tx. Encouraged her to rest when she can but maintain physical activity also. Was able to r/s her virtual to in person after ENT appt  all at the [facility]. All questions answered, she voiced understanding and knows to call if she needs to reach me.   Oncology Navigation   Intake  Date of Diagnosis: 23  Cancer Type: Transplant; Myeloma  Internal / External Referral: Internal  Date of Referral: 05/10/23  Initial Nurse Navigator Contact: 05/15/23  Referral to Initial Contact Timeline (days): 5  Date Worked: 23  Reason if booked > 7 days after scheduling: Additional tests/procedures; Patient request     Treatment  Current Status: Active       Medical Oncologist: Dr. FRITZ Dela Cruz  Chemotherapy: Initiated  Chemotherapy Regimen: Velcade (Durvalumab possible pending FISH)  Oral Therapy: Initiated                       Acuity  Systemic Treatment - predicted or initiated: Chemotherapy Regimen with Multiple drugs (+1)  ECO  Comorbidities in Medical History: 2   Needed: 0  Support: 0  Verbalizes Financial Concerns: 1  Transportation: 0  Psychological Factors (+1 each): Emotional during conversation  Verbalizes the need for more education: 1  Navigation Acuity: 3     Follow Up  No follow-ups on file.

## 2023-09-20 NOTE — NURSING
Velcade 2.25 mg administered sc to abdomen per order. Pt tolerated well with no adverse events. Pt to return next week for C3D15- darzalex and velcade.

## 2023-09-20 NOTE — DISCHARGE INSTRUCTIONS
[facility]  [address] [facility]  [address]  [phone] phone     [phone] fax  Hours of Operation: Monday- Friday 8:00am- 5:00pm  After hours phone  [phone]  Hematology / Oncology Physicians on call      [address] BELLE Chapman, BELLE Mckeon, BRITTANI Hernandez    Please call with any concerns regarding your appointment today.   HOME CARE AFTER CHEMOTHERAPY   Meals   Many patients feel sick and lose their appetites during treatment. Eat small meals several times a day. Choose bland foods with little taste or smell if you have problems with nausea. Be sure to cook all food thoroughly. This kills bacteria and helps you avoid intestinal infection. Soft foods are easier to swallow and digest.   Activity   Exercise keeps you strong and keeps your heart and lungs active. Talk to your doctor about an appropriate exercise program for you.   Skin Care   To prevent a skin infection, bathe or shower once a day. Use a moisturizing soap and wash with warm water. Avoid very hot or cold water. Chemotherapy can make your skin dry . Apply moisturizing lotion to help relieve dry skin. Some drugs used in high doses can cause slight burns to appear (like sunburn). Ask for a special cream to help relieve the burn and protect your skin.   Prevent Mouth Sores   During chemotherapy, many people get mouth sores. Do the following to help prevent mouth sores or to ease discomfort.   Brush your teeth with a soft-bristle toothbrush after every meal.  Don't use dental floss if your platelet count is below 50,000. Your doctor or nurse will tell you if this is the case.  Use an oral swab or special soft toothbrush if your gums bleed during regular brushing.  Use mouthwash as directed. If you can't tolerate commercial mouthwash, use salt and baking soda to clean your mouth. Mix 1 teaspoon of salt and 1 teaspoon of  baking soda into a glass of water. Swish and spit.  Call your doctor or return to this facility if you develop any of the following:   Sore throat   White patches in the mouth or throat   Fever of 100.4ºF (38ºC) or higher, or as directed by your healthcare provider  © 5084-2239 [employer], [address] [place] [address], [place] [address]. All rights reserved. This information is not intended as a substitute for professional medical care. Always follow your healthcare professional's

## 2023-09-20 NOTE — PLAN OF CARE
Plan of care reviewed with pt. All needs and concerns addressed.   Problem: Fall Injury Risk  Goal: Absence of Fall and Fall-Related Injury  Outcome: Ongoing, Progressing  Intervention: Promote Injury-Free Environment  Flowsheets (Taken 9/20/2023 1014)  Safety Promotion/Fall Prevention:   assistive device/personal item within reach   in recliner, wheels locked   room near unit station   instructed to call staff for mobility     Problem: Adult Inpatient Plan of Care  Goal: Plan of Care Review  Outcome: Ongoing, Progressing  Flowsheets (Taken 9/20/2023 1014)  Plan of Care Reviewed With: patient  Goal: Patient-Specific Goal (Individualized)  Outcome: Ongoing, Progressing  Flowsheets (Taken 9/20/2023 1014)  Anxieties, Fears or Concerns: None expressed today  Individualized Care Needs: Pt in chair feet down. Water provided.

## 2023-09-26 ENCOUNTER — TELEPHONE (OUTPATIENT)
Dept: SMOKING CESSATION | Facility: CLINIC | Age: 63
End: 2023-09-26
Payer: COMMERCIAL

## 2023-09-26 ENCOUNTER — LAB VISIT (OUTPATIENT)
Dept: LAB | Facility: HOSPITAL | Age: 63
End: 2023-09-26
Attending: INTERNAL MEDICINE
Payer: COMMERCIAL

## 2023-09-26 DIAGNOSIS — Z76.82 STEM CELL TRANSPLANT CANDIDATE: ICD-10-CM

## 2023-09-26 DIAGNOSIS — C90.00 MULTIPLE MYELOMA, REMISSION STATUS UNSPECIFIED: ICD-10-CM

## 2023-09-26 LAB
ALBUMIN SERPL BCP-MCNC: 3.7 G/DL (ref 3.5–5.2)
ALP SERPL-CCNC: 69 U/L (ref 55–135)
ALT SERPL W/O P-5'-P-CCNC: 26 U/L (ref 10–44)
ANION GAP SERPL CALC-SCNC: 10 MMOL/L (ref 8–16)
AST SERPL-CCNC: 21 U/L (ref 10–40)
BASOPHILS # BLD AUTO: 0.01 K/UL (ref 0–0.2)
BASOPHILS NFR BLD: 0.2 % (ref 0–1.9)
BILIRUB SERPL-MCNC: 0.5 MG/DL (ref 0.1–1)
BUN SERPL-MCNC: 13 MG/DL (ref 8–23)
CALCIUM SERPL-MCNC: 8.5 MG/DL (ref 8.7–10.5)
CHLORIDE SERPL-SCNC: 111 MMOL/L (ref 95–110)
CO2 SERPL-SCNC: 22 MMOL/L (ref 23–29)
CREAT SERPL-MCNC: 0.8 MG/DL (ref 0.5–1.4)
DIFFERENTIAL METHOD: ABNORMAL
EOSINOPHIL # BLD AUTO: 0.2 K/UL (ref 0–0.5)
EOSINOPHIL NFR BLD: 3 % (ref 0–8)
ERYTHROCYTE [DISTWIDTH] IN BLOOD BY AUTOMATED COUNT: 14.6 % (ref 11.5–14.5)
EST. GFR  (NO RACE VARIABLE): >60 ML/MIN/1.73 M^2
GLUCOSE SERPL-MCNC: 80 MG/DL (ref 70–110)
HCT VFR BLD AUTO: 39.4 % (ref 37–48.5)
HGB BLD-MCNC: 12.6 G/DL (ref 12–16)
IMM GRANULOCYTES # BLD AUTO: 0.02 K/UL (ref 0–0.04)
IMM GRANULOCYTES NFR BLD AUTO: 0.4 % (ref 0–0.5)
LYMPHOCYTES # BLD AUTO: 1.3 K/UL (ref 1–4.8)
LYMPHOCYTES NFR BLD: 26 % (ref 18–48)
MCH RBC QN AUTO: 31.7 PG (ref 27–31)
MCHC RBC AUTO-ENTMCNC: 32 G/DL (ref 32–36)
MCV RBC AUTO: 99 FL (ref 82–98)
MONOCYTES # BLD AUTO: 0.4 K/UL (ref 0.3–1)
MONOCYTES NFR BLD: 7.9 % (ref 4–15)
NEUTROPHILS # BLD AUTO: 3.1 K/UL (ref 1.8–7.7)
NEUTROPHILS NFR BLD: 62.5 % (ref 38–73)
NRBC BLD-RTO: 0 /100 WBC
PLATELET # BLD AUTO: 214 K/UL (ref 150–450)
PMV BLD AUTO: 9.5 FL (ref 9.2–12.9)
POTASSIUM SERPL-SCNC: 3.5 MMOL/L (ref 3.5–5.1)
PROT SERPL-MCNC: 7.1 G/DL (ref 6–8.4)
RBC # BLD AUTO: 3.98 M/UL (ref 4–5.4)
SODIUM SERPL-SCNC: 143 MMOL/L (ref 136–145)
WBC # BLD AUTO: 4.92 K/UL (ref 3.9–12.7)

## 2023-09-26 PROCEDURE — 36415 COLL VENOUS BLD VENIPUNCTURE: CPT | Performed by: INTERNAL MEDICINE

## 2023-09-26 PROCEDURE — 80053 COMPREHEN METABOLIC PANEL: CPT | Performed by: INTERNAL MEDICINE

## 2023-09-26 PROCEDURE — 85025 COMPLETE CBC W/AUTO DIFF WBC: CPT | Performed by: INTERNAL MEDICINE

## 2023-09-26 NOTE — PROGRESS NOTES
Subjective:       Patient ID: Ana Bonilla is a 63 y.o. female.    Chief Complaint:   1. Multiple myeloma, remission status unspecified  IgG lambda Multiple myeloma, R-ISS stage I        Current Treatment:  OP D-VRD DARATUMUMAB + BORTEZOMIB LENALIDOMIDE DEXAMETHASONE        OP ZOLEDRONIC ACID (ZOMETA) every 3 months started 5/2023    Treatment History:  S/p kyphoplasty with radiofrequency ablation per Dr. Smith on 6/8/2023        VRd; Rosa added with cycle #2; Revlimid dose reduced to 10mg due to kidney function    HPI: This is a 63 year old  woman with amblyopia, asthma, HTN, thyroid disease, anxiety, COPD, hyperlipidemia, allergies, and GERD who is seen in Hem/Onc for multiple myeloma. She was referred in 2/2023 by her PCP Dr. Kassidy Sibley after workup for gastritis revealed lytic lesions. CT chest showed lytic and expansile destructive lesion in the sternum and lytic lesions at L1. Biopsy of L1 lesion in 3/2023 revealed mature B cell neoplasm most consistent with plasma cell neoplasm.     Bone marrow biopsy was performed in 4/2023 that demonstrated 60% plasma cells. PET/CT showed extensive bony lesions throughout the spine, sternum, bilateral ribs, pelvis, and a mild compression deformity in L1. SPEP/MECHE showed IgG lambda monoclonal protein measuring 3.19 g/dL. Quantitative immunoglobulins on 3/27/2023 showed IgG 4,521 mg/dL. UPEP/MECHE and FLC were pending. Labs on 3/27/2023 showed Beta 2 microglobulin 1.9, .  Labs on 4/19/2023 showed Hgb, 11.5, WBC 6.3, platelets, BUN 11, Cr 0.9, calcium 9.    She was started on VRd (Velcade/Revlimid/dexamethasone) every 21 days for 3-6 cycles with the plan to evaluate her for autotransplant with consideration of adding daratumumab if she was found to be high risk after FISH panel was resulted. She was started on ASA for thrombosis prophylaxis and acyclovir for herpes zoster prophylaxis. Plan to start Zometa after dental evaluation.     In 5/2023,  Revlimid was decreased from 25mg po 10mg due to decreased creatinine clearance of 49. She underwent kyphoplasty with radiofrequency ablation on 6/8/2023 by Neurosurgery. After improvement in kidney function, her Revlimid dose was increased to 25mg. Eventually, the dose was reduced to 10mg due to intolerable side effects.     Her primary Hematologist/Oncologist is Dr. Dela Cruz .    Interval History: Patient presents for follow up on Rosa+VRd; She is scheduled to receive C3D15 today. She presents alone and reports recurrence of her back pain recently. She sees Palliative Care but does not have an appointment until 11/2023. She states ENT is scheduling her for a CT to rule out sinus infection as her face is swollen. She admits to having bronchitis; she presented to Urgent Care and was prescribed steroids. She followed up with ENT who prescribed her more steroids. She is also on decadron weekly for treatment. Labs adequate for treatment.     Reviewed labs with patient:   CBC:   Recent Labs   Lab 09/26/23  1327   WBC 4.92   RBC 3.98 L   Hemoglobin 12.6   Hematocrit 39.4   Platelets 214   MCV 99 H   MCH 31.7 H   MCHC 32.0     CMP:  Recent Labs   Lab 09/26/23  1327   Glucose 80   Calcium 8.5 L   Albumin 3.7   Total Protein 7.1   Sodium 143   Potassium 3.5   CO2 22 L   Chloride 111 H   BUN 13   Creatinine 0.8   Alkaline Phosphatase 69   ALT 26   AST 21   Total Bilirubin 0.5     Social History     Socioeconomic History    Marital status:     Number of children: 2   Occupational History     Employer: [employer]   Tobacco Use    Smoking status: Every Day     Current packs/day: 0.50     Average packs/day: 0.5 packs/day for 50.0 years (25.0 ttl pk-yrs)     Types: Cigarettes    Smokeless tobacco: Never   Substance and Sexual Activity    Alcohol use: Never     Comment: quit    Drug use: No    Sexual activity: Never     Social Determinants of Health     Stress: No Stress Concern Present (7/8/2019)    [place] [employer]  [employer] - Occupational Stress Questionnaire     Feeling of Stress : Not at all     Past Medical History:   Diagnosis Date    Allergy     Amblyopia OS    Anxiety     Asthma     COPD (chronic obstructive pulmonary disease)     NO HOME o2    GERD (gastroesophageal reflux disease)     Hyperlipidemia     Hypertension     PONV (postoperative nausea and vomiting)     Thyroid disease      Family History   Problem Relation Age of Onset    Hypertension Mother     Diabetes Mother     Diabetes Father     Stroke Maternal Grandmother     Prostate cancer Maternal Grandfather     Mental illness Son     Pancreatic cancer Maternal Uncle     Mental illness Other     Pancreatic cancer Other     Ovarian cancer Maternal Cousin      Past Surgical History:   Procedure Laterality Date    APPENDECTOMY      BIOPSY N/A 6/8/2023    Procedure: BIOPSY;  Surgeon: Fausto Smith MD;  Location: [facility];  Service: Neurosurgery;  Laterality: N/A;  L1    BUNIONECTOMY      COLONOSCOPY N/A 12/4/2019    Procedure: COLONOSCOPY;  Surgeon: Danny Matos III, MD;  Location: [facility] ENDO;  Service: Endoscopy;  Laterality: N/A;    COLONOSCOPY N/A 10/24/2022    Procedure: COLONOSCOPY;  Surgeon: Danny Matos III, MD;  Location: [facility] ENDO;  Service: Endoscopy;  Laterality: N/A;    ESOPHAGOGASTRODUODENOSCOPY N/A 10/24/2022    Procedure: EGD (ESOPHAGOGASTRODUODENOSCOPY);  Surgeon: Danny Matos III, MD;  Location: [facility] ENDO;  Service: Endoscopy;  Laterality: N/A;    FIXATION KYPHOPLASTY Bilateral 6/8/2023    Procedure: KYPHOPLASTY;  Surgeon: Fausto Smith MD;  Location: [facility];  Service: Neurosurgery;  Laterality: Bilateral;  kyphoplasty and radiofrequency ablation - L1    HYSTERECTOMY      PARTIAL//still with ovaries    neck fusion  08/2017    THYROIDECTOMY       Review of Systems   Constitutional:  Positive for appetite change and fatigue.   HENT:  Positive for rhinorrhea. Negative for mouth sores and sore throat.         Right upper  eyelid stye   Eyes: Negative.  Photophobia: decreased since surgery.   Respiratory: Negative.     Cardiovascular: Negative.    Gastrointestinal:  Positive for constipation (since surgery) and nausea (relieved by antiemetics). Negative for diarrhea and vomiting.   Endocrine: Negative.    Genitourinary: Negative.    Musculoskeletal:  Positive for arthralgias (hands aching) and back pain (4/10 back and right hip pain constant; improved with hot shower).   Integumentary:  Negative.   Allergic/Immunologic: Negative.    Neurological:  Negative for weakness and numbness.   Hematological: Negative.    Psychiatric/Behavioral:  The patient is not nervous/anxious.        Medication List with Changes/Refills   Current Medications    ACYCLOVIR (ZOVIRAX) 400 MG TABLET    Take 1 tablet (400 mg total) by mouth 2 (two) times daily.    ALBUTEROL (PROVENTIL HFA) 90 MCG/ACTUATION INHALER    Inhale 2 puffs into the lungs every 6 (six) hours as needed for Wheezing. Rescue    ALPRAZOLAM (XANAX) 2 MG TAB    TAKE 1 TABLET BY MOUTH TWICE DAILY AS NEEDED FOR ANXIETY    AMLODIPINE-BENAZEPRIL 2.5-10 MG (LOTREL) 2.5-10 MG PER CAPSULE    TAKE 1 CAPSULE BY MOUTH EVERY DAY    ASPIRIN (ECOTRIN) 81 MG EC TABLET    Take 1 tablet (81 mg total) by mouth once daily.    BACLOFEN (LIORESAL) 10 MG TABLET    Take 10 mg by mouth 3 (three) times daily.    BENZONATATE (TESSALON) 200 MG CAPSULE    Take 200 mg by mouth.    CALCIUM CARBONATE-VIT D3- MG CALCIUM- 400 UNIT TAB    Take 1 tablet by mouth once. for 1 dose    DEXAMETHASONE (DECADRON) 4 MG TAB    Take 10 tablets (40 mg total) by mouth every 7 days. ( once weekly by mouth on days 1, 8 and 15 every 21 day cycle)    DEXAMETHASONE (DECADRON) 4 MG TAB    Take 10 tablets (40 mg total) by mouth As instructed. Daily on days 1, 8, 15, and 22 of each chemotherapy cycle. Take with food.    DIAZEPAM (VALIUM) 5 MG TABLET    Take 1 tablet by mouth 30 minutes prior to MRI to help decrease anxiety.    DOXYCYCLINE  (VIBRAMYCIN) 100 MG CAP    Take 100 mg by mouth 2 (two) times daily.    FLUTICASONE PROPIONATE (FLONASE) 50 MCG/ACTUATION NASAL SPRAY    1 spray (50 mcg total) by Each Nostril route once daily.    FLUTICASONE-SALMETEROL DISKUS INHALER 250-50 MCG    Inhale 1 puff into the lungs 2 (two) times daily. Controller    IBUPROFEN 100 MG CHEW    ibuprofen Take 1 tablet (oral) every 8 hours PRN - Pain [id number] tablet every 8 hours oral No set duration recorded No set duration amount recorded active 800 mg    IPRATROPIUM (ATROVENT) 21 MCG (0.03 %) NASAL SPRAY    2 sprays by Each Nostril route 3 (three) times daily.    LENALIDOMIDE 10 MG CAP    TAKE 1 CAPSULE ONCE DAILY FOR 21 DAYS AND THEN 7 DAYS OFF    LEVOCETIRIZINE (XYZAL) 5 MG TABLET    Take 1 tablet (5 mg total) by mouth every evening.    LEVOTHYROXINE (SYNTHROID) 100 MCG TABLET    Take 1 tablet (100 mcg total) by mouth before breakfast.    LINACLOTIDE (LINZESS) 72 MCG CAP CAPSULE    Take 2 capsules (144 mcg total) by mouth before breakfast.    METHOCARBAMOL (ROBAXIN) 500 MG TAB    Take 1 tablet (500 mg total) by mouth every 8 (eight) hours as needed.    METHYLPREDNISOLONE (MEDROL DOSEPACK) 4 MG TABLET    use as directed    METOPROLOL SUCCINATE (TOPROL-XL) 50 MG 24 HR TABLET    Take 1 tablet (50 mg total) by mouth every evening.    MONTELUKAST (SINGULAIR) 10 MG TABLET    Take 1 tablet (10 mg total) by mouth every evening.    MOXIFLOXACIN (VIGAMOX) 0.5 % OPHTHALMIC SOLUTION    Instill one drop into both eyes 4 times daily for 7 days.    NICOTINE (NICODERM CQ) 14 MG/24 HR    Place 1 patch onto the skin once daily.    ONDANSETRON (ZOFRAN) 8 MG TABLET    Take 1 tablet (8 mg total) by mouth every 12 (twelve) hours as needed for Nausea.    PANTOPRAZOLE (PROTONIX) 40 MG TABLET    TAKE 1 TABLET(40 MG) BY MOUTH EVERY DAY    PROCHLORPERAZINE (COMPAZINE) 5 MG TABLET    Take 1 tablet (5 mg total) by mouth every 6 (six) hours as needed for Nausea.    PROMETHAZINE (PHENERGAN) 25 MG  TABLET    Take 1 tablet (25 mg total) by mouth every 4 (four) hours.    PROMETHAZINE-CODEINE 6.25-10 MG/5 ML (PHENERGAN WITH CODEINE) 6.25-10 MG/5 ML SYRUP    Take 5 mLs by mouth every 4 (four) hours as needed for Cough.    ROSUVASTATIN (CRESTOR) 10 MG TABLET    Take 1 tablet (10 mg total) by mouth every evening.    TRAZODONE (DESYREL) 50 MG TABLET    Take 1 tablet (50 mg total) by mouth every evening.     Objective:     Vitals:    09/27/23 1018   BP: 108/74   Pulse: 76   Resp: 20   Temp: 99.6 °F (37.6 °C)     Physical Exam  Vitals reviewed.   Constitutional:       Appearance: Normal appearance.   HENT:      Head: Normocephalic.      Mouth/Throat:      Comments: Wearing mask      Eyes:      General:         Right eye: No discharge.      Extraocular Movements: Extraocular movements intact.      Pupils: Pupils are equal, round, and reactive to light.   Cardiovascular:      Rate and Rhythm: Normal rate and regular rhythm.      Heart sounds: Normal heart sounds.   Pulmonary:      Effort: Pulmonary effort is normal.      Breath sounds: Normal breath sounds.   Abdominal:      General: Bowel sounds are normal.      Palpations: Abdomen is soft.      Comments: rounded     Genitourinary:     Comments: deferred    Musculoskeletal:         General: Normal range of motion.      Cervical back: Normal range of motion and neck supple.   Skin:     General: Skin is warm and dry.   Neurological:      Mental Status: She is alert and oriented to person, place, and time.   Psychiatric:         Behavior: Behavior normal.         Thought Content: Thought content normal.      Comments: Restless in the chair and while standing due to discomfort from pain and constipation          (2) Ambulatory and capable of self care, unable to carry out work activity, up and about > 50% or waking hours  Assessment:     Problem List Items Addressed This Visit          Oncology    Multiple myeloma - Primary (Chronic)     Diagnosed in 3/2023 after work up  for gastritis revealed lytic and expansile destructive lesion in the sternum and lytic lesions at L1. Bone marrow biopsy showed 60% plasma cells. Started VRd and Zometa. Underwent kyphoplasty and radiofrequency ablation in 6/2023. Plan to evaluate for autotransplant after 3-6 cycles.             Other    Tobacco use     Encourage smoking cessation.           Plan:     Multiple myeloma, remission status unspecified    Tobacco use    Labs reviewed.   Ok to proceed with C3D15 of Rosa+VRd today.  Continue smoking cessation.  Continue monthly Zometa every 3 months.   Per MAR, patient did not receive Zometa on 9/13/2023; will HOLD Zometa today for hypocalcemia.  Patient instructed to resume vitamins to include Caltrate.    Follow up in 1 week with Dr. Dela Cruz with CBC and Comprehensive Metabolic Panel prior to C3D22 of Rosa-VRd.     Route Chart for Scheduling    Med Onc Chart Routing      Follow up with physician 1 week. Dr. Dela Cruz   Follow up with WERNER    Infusion scheduling note    Injection scheduling note in 1 week for C3D22 Rosa-VRd   Labs CBC and CMP   Scheduling:  Preferred lab:  Lab interval:  in 1 week at  the day before   Imaging None      Pharmacy appointment No pharmacy appointment needed      Other referrals       No additional referrals needed             I will review assessment/plan with collaborating physician.      BRITTANI Enamorado

## 2023-09-26 NOTE — TELEPHONE ENCOUNTER
Attempt to contact patient for a scheduled telephone follow up. Recorded message left with return contact information

## 2023-09-27 ENCOUNTER — OFFICE VISIT (OUTPATIENT)
Dept: OTOLARYNGOLOGY | Facility: CLINIC | Age: 63
End: 2023-09-27
Payer: COMMERCIAL

## 2023-09-27 ENCOUNTER — DOCUMENTATION ONLY (OUTPATIENT)
Dept: HEMATOLOGY/ONCOLOGY | Facility: CLINIC | Age: 63
End: 2023-09-27
Payer: COMMERCIAL

## 2023-09-27 ENCOUNTER — OFFICE VISIT (OUTPATIENT)
Dept: HEMATOLOGY/ONCOLOGY | Facility: CLINIC | Age: 63
End: 2023-09-27
Payer: COMMERCIAL

## 2023-09-27 ENCOUNTER — INFUSION (OUTPATIENT)
Dept: INFUSION THERAPY | Facility: HOSPITAL | Age: 63
End: 2023-09-27
Attending: INTERNAL MEDICINE
Payer: COMMERCIAL

## 2023-09-27 ENCOUNTER — HOSPITAL ENCOUNTER (OUTPATIENT)
Dept: RADIOLOGY | Facility: HOSPITAL | Age: 63
Discharge: HOME | End: 2023-09-27
Attending: OTOLARYNGOLOGY
Payer: COMMERCIAL

## 2023-09-27 VITALS
HEIGHT: 66 IN | TEMPERATURE: 100 F | HEART RATE: 76 BPM | OXYGEN SATURATION: 98 % | DIASTOLIC BLOOD PRESSURE: 74 MMHG | RESPIRATION RATE: 20 BRPM | WEIGHT: 132.69 LBS | SYSTOLIC BLOOD PRESSURE: 108 MMHG | BODY MASS INDEX: 21.33 KG/M2

## 2023-09-27 VITALS
HEART RATE: 72 BPM | RESPIRATION RATE: 18 BRPM | DIASTOLIC BLOOD PRESSURE: 67 MMHG | OXYGEN SATURATION: 98 % | TEMPERATURE: 98 F | SYSTOLIC BLOOD PRESSURE: 98 MMHG

## 2023-09-27 VITALS — WEIGHT: 133.63 LBS | BODY MASS INDEX: 21.47 KG/M2 | HEIGHT: 66 IN

## 2023-09-27 DIAGNOSIS — C90.00 MULTIPLE MYELOMA, REMISSION STATUS UNSPECIFIED: ICD-10-CM

## 2023-09-27 DIAGNOSIS — Z72.0 TOBACCO USE: ICD-10-CM

## 2023-09-27 DIAGNOSIS — J32.4 CHRONIC PANSINUSITIS: ICD-10-CM

## 2023-09-27 DIAGNOSIS — C90.00 MULTIPLE MYELOMA NOT HAVING ACHIEVED REMISSION: ICD-10-CM

## 2023-09-27 DIAGNOSIS — J31.0 NON-ALLERGIC RHINITIS: Primary | ICD-10-CM

## 2023-09-27 DIAGNOSIS — C90.00 MULTIPLE MYELOMA, REMISSION STATUS UNSPECIFIED: Primary | ICD-10-CM

## 2023-09-27 PROCEDURE — 3078F PR MOST RECENT DIASTOLIC BLOOD PRESSURE < 80 MM HG: ICD-10-PCS | Mod: CPTII,S$GLB,, | Performed by: NURSE PRACTITIONER

## 2023-09-27 PROCEDURE — 99999 PR PBB SHADOW E&M-EST. PATIENT-LVL IV: ICD-10-PCS | Mod: PBBFAC,,, | Performed by: OTOLARYNGOLOGY

## 2023-09-27 PROCEDURE — 99215 PR OFFICE/OUTPT VISIT, EST, LEVL V, 40-54 MIN: ICD-10-PCS | Mod: 25,S$GLB,, | Performed by: NURSE PRACTITIONER

## 2023-09-27 PROCEDURE — 99214 OFFICE O/P EST MOD 30 MIN: CPT | Mod: S$GLB,,, | Performed by: OTOLARYNGOLOGY

## 2023-09-27 PROCEDURE — 99999 PR PBB SHADOW E&M-EST. PATIENT-LVL V: ICD-10-PCS | Mod: PBBFAC,,, | Performed by: NURSE PRACTITIONER

## 2023-09-27 PROCEDURE — 4010F PR ACE/ARB THEARPY RXD/TAKEN: ICD-10-PCS | Mod: CPTII,S$GLB,, | Performed by: NURSE PRACTITIONER

## 2023-09-27 PROCEDURE — 3044F HG A1C LEVEL LT 7.0%: CPT | Mod: CPTII,S$GLB,, | Performed by: NURSE PRACTITIONER

## 2023-09-27 PROCEDURE — 99999 PR PBB SHADOW E&M-EST. PATIENT-LVL IV: CPT | Mod: PBBFAC,,, | Performed by: OTOLARYNGOLOGY

## 2023-09-27 PROCEDURE — 3074F PR MOST RECENT SYSTOLIC BLOOD PRESSURE < 130 MM HG: ICD-10-PCS | Mod: CPTII,S$GLB,, | Performed by: NURSE PRACTITIONER

## 2023-09-27 PROCEDURE — 3044F PR MOST RECENT HEMOGLOBIN A1C LEVEL <7.0%: ICD-10-PCS | Mod: CPTII,S$GLB,, | Performed by: OTOLARYNGOLOGY

## 2023-09-27 PROCEDURE — 3044F HG A1C LEVEL LT 7.0%: CPT | Mod: CPTII,S$GLB,, | Performed by: OTOLARYNGOLOGY

## 2023-09-27 PROCEDURE — 3044F PR MOST RECENT HEMOGLOBIN A1C LEVEL <7.0%: ICD-10-PCS | Mod: CPTII,S$GLB,, | Performed by: NURSE PRACTITIONER

## 2023-09-27 PROCEDURE — 70486 CT MEDTRONIC SINUSES WITHOUT: ICD-10-PCS | Mod: 26,,, | Performed by: RADIOLOGY

## 2023-09-27 PROCEDURE — 70486 CT MAXILLOFACIAL W/O DYE: CPT | Mod: TC

## 2023-09-27 PROCEDURE — 1159F MED LIST DOCD IN RCRD: CPT | Mod: CPTII,S$GLB,, | Performed by: NURSE PRACTITIONER

## 2023-09-27 PROCEDURE — 99999 PR PBB SHADOW E&M-EST. PATIENT-LVL V: CPT | Mod: PBBFAC,,, | Performed by: NURSE PRACTITIONER

## 2023-09-27 PROCEDURE — 3008F PR BODY MASS INDEX (BMI) DOCUMENTED: ICD-10-PCS | Mod: CPTII,S$GLB,, | Performed by: NURSE PRACTITIONER

## 2023-09-27 PROCEDURE — 1160F RVW MEDS BY RX/DR IN RCRD: CPT | Mod: CPTII,S$GLB,, | Performed by: NURSE PRACTITIONER

## 2023-09-27 PROCEDURE — 70486 CT MAXILLOFACIAL W/O DYE: CPT | Mod: 26,,, | Performed by: RADIOLOGY

## 2023-09-27 PROCEDURE — 99215 OFFICE O/P EST HI 40 MIN: CPT | Mod: 25,S$GLB,, | Performed by: NURSE PRACTITIONER

## 2023-09-27 PROCEDURE — 4010F PR ACE/ARB THEARPY RXD/TAKEN: ICD-10-PCS | Mod: CPTII,S$GLB,, | Performed by: OTOLARYNGOLOGY

## 2023-09-27 PROCEDURE — 1159F PR MEDICATION LIST DOCUMENTED IN MEDICAL RECORD: ICD-10-PCS | Mod: CPTII,S$GLB,, | Performed by: NURSE PRACTITIONER

## 2023-09-27 PROCEDURE — 3008F PR BODY MASS INDEX (BMI) DOCUMENTED: ICD-10-PCS | Mod: CPTII,S$GLB,, | Performed by: OTOLARYNGOLOGY

## 2023-09-27 PROCEDURE — 3078F DIAST BP <80 MM HG: CPT | Mod: CPTII,S$GLB,, | Performed by: NURSE PRACTITIONER

## 2023-09-27 PROCEDURE — 4010F ACE/ARB THERAPY RXD/TAKEN: CPT | Mod: CPTII,S$GLB,, | Performed by: NURSE PRACTITIONER

## 2023-09-27 PROCEDURE — 4010F ACE/ARB THERAPY RXD/TAKEN: CPT | Mod: CPTII,S$GLB,, | Performed by: OTOLARYNGOLOGY

## 2023-09-27 PROCEDURE — 99214 PR OFFICE/OUTPT VISIT, EST, LEVL IV, 30-39 MIN: ICD-10-PCS | Mod: S$GLB,,, | Performed by: OTOLARYNGOLOGY

## 2023-09-27 PROCEDURE — 96401 CHEMO ANTI-NEOPL SQ/IM: CPT

## 2023-09-27 PROCEDURE — 1160F PR REVIEW ALL MEDS BY PRESCRIBER/CLIN PHARMACIST DOCUMENTED: ICD-10-PCS | Mod: CPTII,S$GLB,, | Performed by: NURSE PRACTITIONER

## 2023-09-27 PROCEDURE — 3008F BODY MASS INDEX DOCD: CPT | Mod: CPTII,S$GLB,, | Performed by: NURSE PRACTITIONER

## 2023-09-27 PROCEDURE — 63600175 PHARM REV CODE 636 W HCPCS: Mod: JG | Performed by: NURSE PRACTITIONER

## 2023-09-27 PROCEDURE — 3008F BODY MASS INDEX DOCD: CPT | Mod: CPTII,S$GLB,, | Performed by: OTOLARYNGOLOGY

## 2023-09-27 PROCEDURE — 3074F SYST BP LT 130 MM HG: CPT | Mod: CPTII,S$GLB,, | Performed by: NURSE PRACTITIONER

## 2023-09-27 RX ORDER — HEPARIN 100 UNIT/ML
500 SYRINGE INTRAVENOUS
Status: CANCELLED | OUTPATIENT
Start: 2023-10-11

## 2023-09-27 RX ORDER — SODIUM CHLORIDE 0.9 % (FLUSH) 0.9 %
10 SYRINGE (ML) INJECTION
Status: CANCELLED | OUTPATIENT
Start: 2023-09-27

## 2023-09-27 RX ORDER — PROCHLORPERAZINE EDISYLATE 5 MG/ML
5 INJECTION INTRAMUSCULAR; INTRAVENOUS ONCE AS NEEDED
Status: CANCELLED
Start: 2023-09-27

## 2023-09-27 RX ORDER — EPINEPHRINE 0.3 MG/.3ML
0.3 INJECTION SUBCUTANEOUS ONCE AS NEEDED
Status: CANCELLED | OUTPATIENT
Start: 2023-09-27

## 2023-09-27 RX ORDER — HEPARIN 100 UNIT/ML
500 SYRINGE INTRAVENOUS
Status: CANCELLED | OUTPATIENT
Start: 2023-09-27

## 2023-09-27 RX ORDER — SODIUM CHLORIDE 0.9 % (FLUSH) 0.9 %
10 SYRINGE (ML) INJECTION
Status: DISCONTINUED | OUTPATIENT
Start: 2023-09-27 | End: 2023-09-27 | Stop reason: HOSPADM

## 2023-09-27 RX ORDER — ZOLEDRONIC ACID 0.04 MG/ML
4 INJECTION, SOLUTION INTRAVENOUS
Status: DISCONTINUED | OUTPATIENT
Start: 2023-09-27 | End: 2023-09-27

## 2023-09-27 RX ORDER — DIPHENHYDRAMINE HYDROCHLORIDE 50 MG/ML
50 INJECTION INTRAMUSCULAR; INTRAVENOUS ONCE AS NEEDED
Status: CANCELLED | OUTPATIENT
Start: 2023-09-27

## 2023-09-27 RX ORDER — BORTEZOMIB 3.5 MG/1
1.3 INJECTION, POWDER, LYOPHILIZED, FOR SOLUTION INTRAVENOUS; SUBCUTANEOUS
Status: COMPLETED | OUTPATIENT
Start: 2023-09-27 | End: 2023-09-27

## 2023-09-27 RX ORDER — IPRATROPIUM BROMIDE 21 UG/1
2 SPRAY, METERED NASAL 3 TIMES DAILY
Qty: 30 ML | Refills: 5 | Status: SHIPPED | OUTPATIENT
Start: 2023-09-27 | End: 2023-10-30 | Stop reason: SDUPTHER

## 2023-09-27 RX ORDER — BORTEZOMIB 3.5 MG/1
1.3 INJECTION, POWDER, LYOPHILIZED, FOR SOLUTION INTRAVENOUS; SUBCUTANEOUS
Status: CANCELLED | OUTPATIENT
Start: 2023-09-27

## 2023-09-27 RX ORDER — LENALIDOMIDE 10 MG/1
CAPSULE ORAL
Qty: 21 EACH | Refills: 1 | Status: SHIPPED | OUTPATIENT
Start: 2023-09-27 | End: 2023-10-03 | Stop reason: SDUPTHER

## 2023-09-27 RX ORDER — SODIUM CHLORIDE 0.9 % (FLUSH) 0.9 %
10 SYRINGE (ML) INJECTION
Status: CANCELLED | OUTPATIENT
Start: 2023-10-11

## 2023-09-27 RX ADMIN — DARATUMUMAB AND HYALURONIDASE-FIHJ (HUMAN RECOMBINANT) 1800 MG: 1800; 30000 INJECTION SUBCUTANEOUS at 11:09

## 2023-09-27 RX ADMIN — BORTEZOMIB 2.25 MG: 3.5 INJECTION, POWDER, LYOPHILIZED, FOR SOLUTION INTRAVENOUS; SUBCUTANEOUS at 12:09

## 2023-09-27 NOTE — PROGRESS NOTES
Talked with pt during chemo infusion, states she is tired and ready to feel better. States she has a sinus CT scan scheduled after finishing tx today, hopeful Dr. Ariza will release her. No Nn needs but thanked me for coming to talk with her today. Told her I like to check on her when she comes if I'm able to.   Oncology Navigation   Intake  Date of Diagnosis: 23  Cancer Type: Transplant; Myeloma  Internal / External Referral: Internal  Date of Referral: 05/10/23  Initial Nurse Navigator Contact: 05/15/23  Referral to Initial Contact Timeline (days): 5  Date Worked: 23  Reason if booked > 7 days after scheduling: Additional tests/procedures; Patient request     Treatment  Current Status: Active       Medical Oncologist: Dr. FRITZ Dela Cruz  Chemotherapy: Initiated  Chemotherapy Regimen: Velcade (Durvalumab possible pending FISH)  Oral Therapy: Initiated                       Acuity  Systemic Treatment - predicted or initiated: Chemotherapy Regimen with Multiple drugs (+1)  ECO  Comorbidities in Medical History: 2   Needed: 0  Support: 0  Verbalizes Financial Concerns: 1  Transportation: 0  Psychological Factors (+1 each): Emotional during conversation  Verbalizes the need for more education: 1  Navigation Acuity: 3     Follow Up  No follow-ups on file.

## 2023-09-27 NOTE — PROGRESS NOTES
"Referring Provider:    No referring provider defined for this encounter.  Subjective:   Patient: Ana Bonilla [id number], :1960   Visit date:2023 12:49 PM    Chief Complaint:  Follow-up (F/u on sinus. Patient c/o eyes and face swelling)    HPI:    Prior notes reviewed by myself.  Clinical documentation obtained by nursing staff reviewed.     62 y/o female here for evaluation of possible sinus infection and allergy symptoms.  She has been having several weeks of nasal congestion, rhinorrhea - all clear.  She denies any fever, facial pressure, pain.  She has had some periorbital swelling more on the left side which is nontender.  She has a lifelong history of allergic rhinitis symptoms.  She recently finished a round of doxycycline as well as oral steroids.  She is not using any antihistamines or nasal steroid spray.    23 update:  She continues to have some sinonasal symptoms that I suspect her more due to allergic rhinitis than any bacterial infection.  However, her facial swelling and symptoms have interfered with the scheduled chemotherapy treatments that she receives for her multiple myeloma.      Objective:     Physical Exam:  Vitals:  Ht 5' 6" (1.676 m)   Wt 60.6 kg (133 lb 9.6 oz)   BMI 21.56 kg/m²   General appearance:  Well developed, well nourished. Some supraorbital edema/swelling - soft to palpation/nontender.      Ears:  Otoscopy of external auditory canals and tympanic membranes was normal, clinical speech reception thresholds grossly intact, no mass/lesion of auricle.    Nose:  No masses/lesions of external nose, moderately congested nasal mucosa, septum, and turbinates were within normal limits. Clear rhinorrhea    Mouth:  No mass/lesion of lips, teeth, gums, hard/soft palate, tongue, tonsils, or oropharynx.    Neck & Lymphatics:  No cervical lymphadenopathy, no neck mass/crepitus/ asymmetry, trachea is midline, no thyroid enlargement/tenderness/mass.        [x]  Data " Reviewed:    Lab Results   Component Value Date    WBC 4.92 09/26/2023    HGB 12.6 09/26/2023    HCT 39.4 09/26/2023    MCV 99 (H) 09/26/2023    EOSINOPHIL 3.0 09/26/2023             Assessment & Plan:   Non-allergic rhinitis  -     ipratropium (ATROVENT) 21 mcg (0.03 %) nasal spray; 2 sprays by Each Nostril route 3 (three) times daily.  Dispense: 30 mL; Refill: 5    Chronic pansinusitis  -     CT [employer] Sinuses without; Future; Expected date: 09/27/2023          She just finished a round of doxycycline.  She continues to primarily complained of clear rhinorrhea, itchy watery eyes.  She has had some periorbital swelling.  I do not see any convincing signs of a sinus infection.  We did discuss what to look for including discolored drainage, facial pain, headache, fever.  I explained that we would have a low threshold for starting her on another round of stronger antibiotics given her immunodeficiency.  But, for the time being, I recommended that she start on a daily antihistamine and a nasal steroid spray.  Will also treat with the another round of methylprednisolone    9/27/23 update:  She continues to have some chronic sinusitis symptoms and that coupled with her unusual facial swelling is interfering with the scheduling of her chemotherapy treatments.  I recommended that we obtain a CT sinus to definitively rule out sinusitis and I also recommended that we add Atrovent to her regimen of sinonasal medication.  She reports frequent rhinorrhea that happens throughout the day and does not seem to follow a particular pattern.

## 2023-10-03 ENCOUNTER — LAB VISIT (OUTPATIENT)
Dept: LAB | Facility: HOSPITAL | Age: 63
End: 2023-10-03
Attending: INTERNAL MEDICINE
Payer: COMMERCIAL

## 2023-10-03 DIAGNOSIS — C90.00 MULTIPLE MYELOMA, REMISSION STATUS UNSPECIFIED: ICD-10-CM

## 2023-10-03 DIAGNOSIS — Z72.0 TOBACCO USE: ICD-10-CM

## 2023-10-03 LAB
ALBUMIN SERPL BCP-MCNC: 3.6 G/DL (ref 3.5–5.2)
ALP SERPL-CCNC: 65 U/L (ref 55–135)
ALT SERPL W/O P-5'-P-CCNC: 23 U/L (ref 10–44)
ANION GAP SERPL CALC-SCNC: 13 MMOL/L (ref 8–16)
AST SERPL-CCNC: 18 U/L (ref 10–40)
BASOPHILS # BLD AUTO: 0.01 K/UL (ref 0–0.2)
BASOPHILS NFR BLD: 0.2 % (ref 0–1.9)
BILIRUB SERPL-MCNC: 0.6 MG/DL (ref 0.1–1)
BUN SERPL-MCNC: 11 MG/DL (ref 8–23)
CALCIUM SERPL-MCNC: 8.7 MG/DL (ref 8.7–10.5)
CHLORIDE SERPL-SCNC: 108 MMOL/L (ref 95–110)
CO2 SERPL-SCNC: 23 MMOL/L (ref 23–29)
CREAT SERPL-MCNC: 0.8 MG/DL (ref 0.5–1.4)
DIFFERENTIAL METHOD: ABNORMAL
EOSINOPHIL # BLD AUTO: 0.2 K/UL (ref 0–0.5)
EOSINOPHIL NFR BLD: 3.9 % (ref 0–8)
ERYTHROCYTE [DISTWIDTH] IN BLOOD BY AUTOMATED COUNT: 14.4 % (ref 11.5–14.5)
EST. GFR  (NO RACE VARIABLE): >60 ML/MIN/1.73 M^2
GLUCOSE SERPL-MCNC: 89 MG/DL (ref 70–110)
HCT VFR BLD AUTO: 38.5 % (ref 37–48.5)
HGB BLD-MCNC: 12.5 G/DL (ref 12–16)
IMM GRANULOCYTES # BLD AUTO: 0.01 K/UL (ref 0–0.04)
IMM GRANULOCYTES NFR BLD AUTO: 0.2 % (ref 0–0.5)
LYMPHOCYTES # BLD AUTO: 1.3 K/UL (ref 1–4.8)
LYMPHOCYTES NFR BLD: 31.6 % (ref 18–48)
MCH RBC QN AUTO: 31.1 PG (ref 27–31)
MCHC RBC AUTO-ENTMCNC: 32.5 G/DL (ref 32–36)
MCV RBC AUTO: 96 FL (ref 82–98)
MONOCYTES # BLD AUTO: 0.8 K/UL (ref 0.3–1)
MONOCYTES NFR BLD: 19.6 % (ref 4–15)
NEUTROPHILS # BLD AUTO: 1.8 K/UL (ref 1.8–7.7)
NEUTROPHILS NFR BLD: 44.5 % (ref 38–73)
NRBC BLD-RTO: 0 /100 WBC
PLATELET # BLD AUTO: 257 K/UL (ref 150–450)
PMV BLD AUTO: 10 FL (ref 9.2–12.9)
POTASSIUM SERPL-SCNC: 3.5 MMOL/L (ref 3.5–5.1)
PROT SERPL-MCNC: 6.7 G/DL (ref 6–8.4)
RBC # BLD AUTO: 4.02 M/UL (ref 4–5.4)
SODIUM SERPL-SCNC: 144 MMOL/L (ref 136–145)
WBC # BLD AUTO: 4.08 K/UL (ref 3.9–12.7)

## 2023-10-03 PROCEDURE — 36415 COLL VENOUS BLD VENIPUNCTURE: CPT | Performed by: NURSE PRACTITIONER

## 2023-10-03 PROCEDURE — 85025 COMPLETE CBC W/AUTO DIFF WBC: CPT | Performed by: NURSE PRACTITIONER

## 2023-10-03 PROCEDURE — 80053 COMPREHEN METABOLIC PANEL: CPT | Performed by: NURSE PRACTITIONER

## 2023-10-03 RX ORDER — LENALIDOMIDE 10 MG/1
CAPSULE ORAL
Qty: 21 EACH | Refills: 1 | Status: SHIPPED | OUTPATIENT
Start: 2023-10-03 | End: 2023-11-03

## 2023-10-03 NOTE — PROGRESS NOTES
HEMATOLOGY / ONCOLOGY   CLINIC NOTE          ONCOLOGICAL HISTORY:     Diagnosis:  - Multiple Myeloma IgG lambda    Treatment History:  - VRD (5/10/2023 - 6/28/2023)    Current Treatment:   - D-VRD (7/6/2023 -   - Zometa     Subjective:       Chief Complaint: Fatigue and worsening of eye infection, Multiple Myeloma, and Chemotherapy      HPI    Ana Bonilla  63 y.o.  female with past medical history significant for hypertension, COPD, asthma, GERD, hypothyroidism here for follow-up and management of multiple myeloma    She was referred in 2/2023 by her PCP after workup for gastritis revealed lytic lesions. CT chest showed lytic and expansile destructive lesion in the sternum and lytic lesions at L1. Biopsy of L1 lesion in 3/2023 revealed mature B cell neoplasm most consistent with plasma cell neoplasm.      Bone marrow biopsy was performed in 4/2023 that demonstrated 60% plasma cells. PET/CT showed extensive bony lesions throughout the spine, sternum, bilateral ribs, pelvis, and a mild compression deformity in L1. SPEP/MECHE showed IgG lambda monoclonal protein measuring 3.19 g/dL. Quantitative immunoglobulins on 3/27/2023 showed IgG 4,521 mg/dL. UPEP/MECHE and FLC were pending. Labs on 3/27/2023 showed Beta 2 microglobulin 1.9, .  Labs on 4/19/2023 showed Hgb, 11.5, WBC 6.3, platelets, BUN 11, Cr 0.9, calcium 9.     She was started on VRd (Velcade/Revlimid/dexamethasone) every 21 days and then daratumumab was added by the transplant team. She was started on ASA for thrombosis prophylaxis and acyclovir for herpes zoster prophylaxis. Plan to start Zometa after dental evaluation.      In 5/2023, Revlimid was decreased from 25mg po to 10mg due to decreased creatinine clearance of 49. She underwent kyphoplasty with radiofrequency ablation on 6/8/2023 by Neurosurgery.     Patient was evaluated by the bone Marrow transplant team who recommended to change the treatment to D-VRD with Revlimid being given for 21  days on 7 days off cycle and was started on the treatment on 07/06/2023    Interval History:     Patient here for follow-up and next cycle of treatment.  She is not feeling well and is complaining of worsening fatigue along with worsening of her eye infection and is not able to open eye with a lot of discomfort.          Past Medical History:   Diagnosis Date    Allergy     Amblyopia OS    Anxiety     Asthma     COPD (chronic obstructive pulmonary disease)     NO HOME o2    GERD (gastroesophageal reflux disease)     Hyperlipidemia     Hypertension     PONV (postoperative nausea and vomiting)     Thyroid disease       Past Surgical History:   Procedure Laterality Date    APPENDECTOMY      BIOPSY N/A 6/8/2023    Procedure: BIOPSY;  Surgeon: Fausto Smith MD;  Location: [facility] OR;  Service: Neurosurgery;  Laterality: N/A;  L1    BUNIONECTOMY      COLONOSCOPY N/A 12/4/2019    Procedure: COLONOSCOPY;  Surgeon: Danny Matos III, MD;  Location: [facility] ENDO;  Service: Endoscopy;  Laterality: N/A;    COLONOSCOPY N/A 10/24/2022    Procedure: COLONOSCOPY;  Surgeon: Danny Matos III, MD;  Location: [facility] ENDO;  Service: Endoscopy;  Laterality: N/A;    ESOPHAGOGASTRODUODENOSCOPY N/A 10/24/2022    Procedure: EGD (ESOPHAGOGASTRODUODENOSCOPY);  Surgeon: Danny Matos III, MD;  Location: [facility] ENDO;  Service: Endoscopy;  Laterality: N/A;    FIXATION KYPHOPLASTY Bilateral 6/8/2023    Procedure: KYPHOPLASTY;  Surgeon: Fausto Smith MD;  Location: [facility];  Service: Neurosurgery;  Laterality: Bilateral;  kyphoplasty and radiofrequency ablation - L1    HYSTERECTOMY      PARTIAL//still with ovaries    neck fusion  08/2017    THYROIDECTOMY       Social History     Socioeconomic History    Marital status:     Number of children: 2   Occupational History     Employer: [employer]   Tobacco Use    Smoking status: Every Day     Current packs/day: 0.50     Average packs/day: 0.5 packs/day for 50.0 years (25.0 ttl pk-yrs)      Types: Cigarettes    Smokeless tobacco: Never   Substance and Sexual Activity    Alcohol use: Never     Comment: quit    Drug use: No    Sexual activity: Never     Social Determinants of Health     Stress: No Stress Concern Present (7/8/2019)    [employer] - Occupational Stress Questionnaire     Feeling of Stress : Not at all      Family History   Problem Relation Age of Onset    Hypertension Mother     Diabetes Mother     Diabetes Father     Stroke Maternal Grandmother     Prostate cancer Maternal Grandfather     Mental illness Son     Pancreatic cancer Maternal Uncle     Mental illness Other     Pancreatic cancer Other     Ovarian cancer Maternal Cousin       Review of patient's allergies indicates:   Allergen Reactions    Hydrocodone-acetaminophen Other (See Comments)     Causes pt to feel extremely sick       Review of Systems   Constitutional:  Positive for fatigue. Negative for activity change, chills and fever.   HENT: Negative.     Eyes:  Positive for pain and redness. Negative for discharge.   Respiratory:  Negative for cough, shortness of breath and wheezing.    Cardiovascular:  Negative for chest pain and leg swelling.   Gastrointestinal:  Positive for nausea. Negative for constipation, diarrhea and vomiting.   Endocrine: Negative.    Genitourinary: Negative.    Musculoskeletal:  Positive for back pain. Negative for arthralgias and myalgias.   Integumentary:  Negative.   Allergic/Immunologic: Negative.    Neurological:  Negative for light-headedness, numbness and headaches.   Hematological: Negative.    Psychiatric/Behavioral: Negative.           Objective:        Vitals:    10/04/23 1019   BP: 114/65   Pulse: 72   Resp: 20   Temp: 97.7 °F (36.5 °C)                Physical Exam  Constitutional:       General: She is not in acute distress.     Appearance: Normal appearance. She is not ill-appearing.   HENT:      Head: Normocephalic.   Eyes:      General:         Left eye:  Hordeolum present.     Extraocular Movements: Extraocular movements intact.   Cardiovascular:      Rate and Rhythm: Normal rate and regular rhythm.   Pulmonary:      Effort: Pulmonary effort is normal. No respiratory distress.      Breath sounds: Normal breath sounds.   Abdominal:      Palpations: Abdomen is soft.   Musculoskeletal:         General: Normal range of motion.   Skin:     General: Skin is warm.   Neurological:      Mental Status: She is alert and oriented to person, place, and time. Mental status is at baseline.   Psychiatric:         Mood and Affect: Mood normal.           LABS / IMAGING      - 06/06/2023 RIGHT ILIAC CREST BONE MARROW ASPIRATE, BONE MARROW CLOT, AND BONE MARROW CORE BIOPSY WITH:     CELLULARITY=40-60%, TRILINEAGE HEMATOPOIETIC ACTIVITY (M/E=2.9:1).   CONSISTENT WITH PLASMA CELL NEOPLASM(60%).  SEE COMMENT.   FOCAL GRADE 1 RETICULAR FIBROSIS.   CONGO RED NEGATIVE.   INCREASED STORAGE IRON.   ADEQUATE NUMBER OF MEGAKARYOCYTES.    Bone marrow karyotype results: 46, XX[20], female karyotype.     Myeloma fixed cell, high-risk, FISH:  Normal.  The result is within normal limits for 1q duplication, TP53 deletion and IGH rearrangement.       - 04/10/2023 PET: Numerous FDG avid predominately lytic osseous lesions as above.  Primary differential considerations would include multiple myeloma versus metastatic disease.  2. No FDG avid soft tissue masses or adenopathy demonstrated.  3. Mild pathologic compression deformity of the L1 vertebral body.                                            Assessment:     IgG lambda Multiple myeloma, R-ISS stage I  - BMBx : 60 % plasma cells - 4/6/2023  - SPEP/MECHE showed IgG lambda - monoclonal protein 3.19 g/dl - (3/27/2023).  - R-ISS stage I (beta 2 microglobulin 1.9, albumin 3.5, , normal karyotype and no high-risk mutations on fish panel)  - PET-CT ( 4/10/2023) - showed extensive lytic lesions in the axial skeleton and mild L1 compression deformity.  -  Started with Induction chemotherapy with VRD regimen (Velcde/Revlimid/Decadron) - (5/10/2023 - 6/28/2023)  - Aspirin for thrombosis prophylaxis; Acyclovir 400 mg p.o BID for herpes Zoster prophylaxis .  - patient was evaluated by transplant team and then switched to D-VRD (7/6/2023 -    - repeat creatinine clearance is more than 60, adjust the dose of Revlimid to 25 mg daily for 21 days on 7 days off as recommended by the transplant team but patient unable to tolerate it and thus decreased it back to 10 mg daily    Cancer-related pain / palliative care by specialist  -  checked 10/04/2023   - switch [place] to Percocet as patient is unable to tolerate Norco  - refilled alprazolam - 10/04/2023     Plan:     - Patient treatment held for worsening stye and fatigue as requested by the patient, will treat with C3 D22 next week and continue with Revlimid 10 mg daily as unable to tolerate higher dose.   - dental clearance given, continue with zometa q4wk, treat next week    - Continue Aspirin; Acyclovir 400 mg p.o BID for herpes Zoster prophylaxis .  - follow up with [facility] transplant team   - follow up with Ophthalmology for management of style / hordeolum  - MD / LABS / TREATMENT VISIT - 1 WEEK for cycle 3 day 22 treatment       Med Onc Chart Routing      Follow up with physician 1 week.   Follow up with [facility]    Infusion scheduling note   hold chemo today, return in one week   Injection scheduling note    Labs CBC, CMP, magnesium, SPEP, free light chains and LDH   Scheduling:  Preferred lab: immunoglobulin levels  Lab interval:  labs before next week   Imaging    Pharmacy appointment    Other referrals                  The patient was seen, interviewed and examined. Pertinent lab and radiology studies were reviewed. Pt instructed to call should develop concerning signs/symptoms or have further questions.       Portions of the record may have been created with voice recognition software. Occasional wrong-word or  sound-a-like substitutions may have occurred due to the inherent limitations of voice recognition software. Read the chart carefully and recognize, using context, where substitutions have occurred.    Jose Dela Cruz MD  Hematology / Oncology

## 2023-10-04 ENCOUNTER — TELEPHONE (OUTPATIENT)
Dept: HEMATOLOGY/ONCOLOGY | Facility: CLINIC | Age: 63
End: 2023-10-04

## 2023-10-04 ENCOUNTER — OFFICE VISIT (OUTPATIENT)
Dept: HEMATOLOGY/ONCOLOGY | Facility: CLINIC | Age: 63
End: 2023-10-04
Payer: COMMERCIAL

## 2023-10-04 VITALS
TEMPERATURE: 98 F | BODY MASS INDEX: 21.51 KG/M2 | OXYGEN SATURATION: 100 % | SYSTOLIC BLOOD PRESSURE: 114 MMHG | HEIGHT: 66 IN | RESPIRATION RATE: 20 BRPM | HEART RATE: 72 BPM | WEIGHT: 133.81 LBS | DIASTOLIC BLOOD PRESSURE: 65 MMHG

## 2023-10-04 DIAGNOSIS — C90.00 MULTIPLE MYELOMA, REMISSION STATUS UNSPECIFIED: Primary | ICD-10-CM

## 2023-10-04 DIAGNOSIS — K59.09 CHRONIC CONSTIPATION: ICD-10-CM

## 2023-10-04 DIAGNOSIS — Z51.5 PALLIATIVE CARE BY SPECIALIST: ICD-10-CM

## 2023-10-04 DIAGNOSIS — F41.9 ANXIETY: ICD-10-CM

## 2023-10-04 PROCEDURE — 3078F DIAST BP <80 MM HG: CPT | Mod: CPTII,S$GLB,, | Performed by: INTERNAL MEDICINE

## 2023-10-04 PROCEDURE — 3078F PR MOST RECENT DIASTOLIC BLOOD PRESSURE < 80 MM HG: ICD-10-PCS | Mod: CPTII,S$GLB,, | Performed by: INTERNAL MEDICINE

## 2023-10-04 PROCEDURE — 1159F PR MEDICATION LIST DOCUMENTED IN MEDICAL RECORD: ICD-10-PCS | Mod: CPTII,S$GLB,, | Performed by: INTERNAL MEDICINE

## 2023-10-04 PROCEDURE — 3074F PR MOST RECENT SYSTOLIC BLOOD PRESSURE < 130 MM HG: ICD-10-PCS | Mod: CPTII,S$GLB,, | Performed by: INTERNAL MEDICINE

## 2023-10-04 PROCEDURE — 99999 PR PBB SHADOW E&M-EST. PATIENT-LVL V: ICD-10-PCS | Mod: PBBFAC,,, | Performed by: INTERNAL MEDICINE

## 2023-10-04 PROCEDURE — 99999 PR PBB SHADOW E&M-EST. PATIENT-LVL V: CPT | Mod: PBBFAC,,, | Performed by: INTERNAL MEDICINE

## 2023-10-04 PROCEDURE — 1159F MED LIST DOCD IN RCRD: CPT | Mod: CPTII,S$GLB,, | Performed by: INTERNAL MEDICINE

## 2023-10-04 PROCEDURE — 4010F PR ACE/ARB THEARPY RXD/TAKEN: ICD-10-PCS | Mod: CPTII,S$GLB,, | Performed by: INTERNAL MEDICINE

## 2023-10-04 PROCEDURE — 99214 PR OFFICE/OUTPT VISIT, EST, LEVL IV, 30-39 MIN: ICD-10-PCS | Mod: S$GLB,,, | Performed by: INTERNAL MEDICINE

## 2023-10-04 PROCEDURE — 3044F HG A1C LEVEL LT 7.0%: CPT | Mod: CPTII,S$GLB,, | Performed by: INTERNAL MEDICINE

## 2023-10-04 PROCEDURE — 99214 OFFICE O/P EST MOD 30 MIN: CPT | Mod: S$GLB,,, | Performed by: INTERNAL MEDICINE

## 2023-10-04 PROCEDURE — 3008F PR BODY MASS INDEX (BMI) DOCUMENTED: ICD-10-PCS | Mod: CPTII,S$GLB,, | Performed by: INTERNAL MEDICINE

## 2023-10-04 PROCEDURE — 3044F PR MOST RECENT HEMOGLOBIN A1C LEVEL <7.0%: ICD-10-PCS | Mod: CPTII,S$GLB,, | Performed by: INTERNAL MEDICINE

## 2023-10-04 PROCEDURE — 3008F BODY MASS INDEX DOCD: CPT | Mod: CPTII,S$GLB,, | Performed by: INTERNAL MEDICINE

## 2023-10-04 PROCEDURE — 3074F SYST BP LT 130 MM HG: CPT | Mod: CPTII,S$GLB,, | Performed by: INTERNAL MEDICINE

## 2023-10-04 PROCEDURE — 4010F ACE/ARB THERAPY RXD/TAKEN: CPT | Mod: CPTII,S$GLB,, | Performed by: INTERNAL MEDICINE

## 2023-10-04 RX ORDER — MULTIVITAMIN
1 TABLET ORAL EVERY 12 HOURS
Qty: 60 TABLET | Refills: 2 | Status: SHIPPED | OUTPATIENT
Start: 2023-10-04

## 2023-10-04 RX ORDER — HEPARIN 100 UNIT/ML
500 SYRINGE INTRAVENOUS
Status: CANCELLED | OUTPATIENT
Start: 2023-10-11

## 2023-10-04 RX ORDER — PROCHLORPERAZINE EDISYLATE 5 MG/ML
5 INJECTION INTRAMUSCULAR; INTRAVENOUS ONCE AS NEEDED
Status: CANCELLED
Start: 2023-10-11

## 2023-10-04 RX ORDER — BORTEZOMIB 3.5 MG/1
1.3 INJECTION, POWDER, LYOPHILIZED, FOR SOLUTION INTRAVENOUS; SUBCUTANEOUS
Status: CANCELLED | OUTPATIENT
Start: 2023-10-11

## 2023-10-04 RX ORDER — SODIUM CHLORIDE 0.9 % (FLUSH) 0.9 %
10 SYRINGE (ML) INJECTION
Status: CANCELLED | OUTPATIENT
Start: 2023-10-11

## 2023-10-04 RX ORDER — ALPRAZOLAM 2 MG/1
TABLET ORAL
Qty: 60 TABLET | Refills: 0 | Status: SHIPPED | OUTPATIENT
Start: 2023-10-04 | End: 2023-11-09 | Stop reason: SDUPTHER

## 2023-10-04 NOTE — TELEPHONE ENCOUNTER
Spoke with pharmacist Julian Panchal. Julian confirmed receipt of faxed authorization and the e-script containing the authorization number [id number] for Pts lenalidomide.

## 2023-10-06 ENCOUNTER — PATIENT MESSAGE (OUTPATIENT)
Dept: INFUSION THERAPY | Facility: HOSPITAL | Age: 63
End: 2023-10-06
Payer: COMMERCIAL

## 2023-10-09 ENCOUNTER — OFFICE VISIT (OUTPATIENT)
Dept: OPHTHALMOLOGY | Facility: CLINIC | Age: 63
End: 2023-10-09
Payer: COMMERCIAL

## 2023-10-09 DIAGNOSIS — H02.883 MEIBOMIAN GLAND DYSFUNCTION (MGD) OF BOTH EYES: Primary | ICD-10-CM

## 2023-10-09 DIAGNOSIS — Z01.00 ENCOUNTER FOR EYE EXAM: ICD-10-CM

## 2023-10-09 DIAGNOSIS — H00.014 HORDEOLUM EXTERNUM OF LEFT UPPER EYELID: ICD-10-CM

## 2023-10-09 DIAGNOSIS — H02.886 MEIBOMIAN GLAND DYSFUNCTION (MGD) OF BOTH EYES: Primary | ICD-10-CM

## 2023-10-09 PROCEDURE — 4010F ACE/ARB THERAPY RXD/TAKEN: CPT | Mod: CPTII,S$GLB,, | Performed by: OPTOMETRIST

## 2023-10-09 PROCEDURE — 1159F MED LIST DOCD IN RCRD: CPT | Mod: CPTII,S$GLB,, | Performed by: OPTOMETRIST

## 2023-10-09 PROCEDURE — 99214 OFFICE O/P EST MOD 30 MIN: CPT | Mod: S$GLB,,, | Performed by: OPTOMETRIST

## 2023-10-09 PROCEDURE — 99214 PR OFFICE/OUTPT VISIT, EST, LEVL IV, 30-39 MIN: ICD-10-PCS | Mod: S$GLB,,, | Performed by: OPTOMETRIST

## 2023-10-09 PROCEDURE — 4010F PR ACE/ARB THEARPY RXD/TAKEN: ICD-10-PCS | Mod: CPTII,S$GLB,, | Performed by: OPTOMETRIST

## 2023-10-09 PROCEDURE — 1160F PR REVIEW ALL MEDS BY PRESCRIBER/CLIN PHARMACIST DOCUMENTED: ICD-10-PCS | Mod: CPTII,S$GLB,, | Performed by: OPTOMETRIST

## 2023-10-09 PROCEDURE — 99999 PR PBB SHADOW E&M-EST. PATIENT-LVL V: CPT | Mod: PBBFAC,,, | Performed by: OPTOMETRIST

## 2023-10-09 PROCEDURE — 1160F RVW MEDS BY RX/DR IN RCRD: CPT | Mod: CPTII,S$GLB,, | Performed by: OPTOMETRIST

## 2023-10-09 PROCEDURE — 3044F PR MOST RECENT HEMOGLOBIN A1C LEVEL <7.0%: ICD-10-PCS | Mod: CPTII,S$GLB,, | Performed by: OPTOMETRIST

## 2023-10-09 PROCEDURE — 3044F HG A1C LEVEL LT 7.0%: CPT | Mod: CPTII,S$GLB,, | Performed by: OPTOMETRIST

## 2023-10-09 PROCEDURE — 1159F PR MEDICATION LIST DOCUMENTED IN MEDICAL RECORD: ICD-10-PCS | Mod: CPTII,S$GLB,, | Performed by: OPTOMETRIST

## 2023-10-09 PROCEDURE — 99999 PR PBB SHADOW E&M-EST. PATIENT-LVL V: ICD-10-PCS | Mod: PBBFAC,,, | Performed by: OPTOMETRIST

## 2023-10-09 RX ORDER — NEOMYCIN SULFATE, POLYMYXIN B SULFATE, AND DEXAMETHASONE 3.5; 10000; 1 MG/G; [USP'U]/G; MG/G
OINTMENT OPHTHALMIC 3 TIMES DAILY
Qty: 3.5 G | Refills: 0 | Status: SHIPPED | OUTPATIENT
Start: 2023-10-09

## 2023-10-09 RX ORDER — DOXYCYCLINE 100 MG/1
100 CAPSULE ORAL DAILY
Qty: 7 CAPSULE | Refills: 0 | Status: SHIPPED | OUTPATIENT
Start: 2023-10-09 | End: 2023-10-16

## 2023-10-09 NOTE — PROGRESS NOTES
HPI     Eye Problem            Comments: Pain Scale:  4  Onset:   8 days ago  OD, OS, OU:   OU  Discharge:   yes  A.M. Matting:  yes  Itch:   yes  Redness:   yes and swollen  Photophobia:   no  Foreign body sensation:   no  Deep pain:   yes yesterday  Previous occurrence:   yes  Drops:   no   Pt states she would like bump on OS lid cut and drained         Last edited by Akua Slater on 10/9/2023  2:24 PM.            Assessment /Plan     For exam results, see Encounter Report.    Meibomian gland dysfunction (MGD) of both eyes  Hordeolum externum of left upper eyelid  -     doxycycline (VIBRAMYCIN) 100 MG Cap; Take 1 capsule (100 mg total) by mouth once daily. for 7 days  Dispense: 7 capsule; Refill: 0  -     neomycin-polymyxin-dexamethasone (DEXACINE) 3.5 mg/g-10,000 unit/g-0.1 % Oint; Place into both eyes 3 (three) times daily.  Dispense: 3.5 g; Refill: 0    Bilateral MGD OD, OS with hordeolum RANDY  Ideally would start long course of doxy for help with MGD, but because currently chemo pt, will start short course of doxy  Start doxycycline qd po x 7 days  Begin warm compresses tid-qid OU for 10-15 minutes  Continue dexacine radha  D/c Vigamox  Recommended ocusoft lid scrubs      RTC next available with Dr. Trejo for hordeolum RANDY removal (per pt request) on procedure day or PRN with any worsening  Discussed above and all questions were answered.

## 2023-10-10 ENCOUNTER — LAB VISIT (OUTPATIENT)
Dept: LAB | Facility: HOSPITAL | Age: 63
End: 2023-10-10
Attending: INTERNAL MEDICINE
Payer: COMMERCIAL

## 2023-10-10 DIAGNOSIS — C90.00 MULTIPLE MYELOMA, REMISSION STATUS UNSPECIFIED: ICD-10-CM

## 2023-10-10 LAB
ALBUMIN SERPL BCP-MCNC: 3.7 G/DL (ref 3.5–5.2)
ALP SERPL-CCNC: 67 U/L (ref 55–135)
ALT SERPL W/O P-5'-P-CCNC: 24 U/L (ref 10–44)
ANION GAP SERPL CALC-SCNC: 13 MMOL/L (ref 8–16)
AST SERPL-CCNC: 17 U/L (ref 10–40)
BASOPHILS # BLD AUTO: 0.02 K/UL (ref 0–0.2)
BASOPHILS NFR BLD: 0.5 % (ref 0–1.9)
BILIRUB SERPL-MCNC: 0.5 MG/DL (ref 0.1–1)
BUN SERPL-MCNC: 9 MG/DL (ref 8–23)
CALCIUM SERPL-MCNC: 9.1 MG/DL (ref 8.7–10.5)
CHLORIDE SERPL-SCNC: 108 MMOL/L (ref 95–110)
CO2 SERPL-SCNC: 26 MMOL/L (ref 23–29)
CREAT SERPL-MCNC: 0.8 MG/DL (ref 0.5–1.4)
DIFFERENTIAL METHOD: ABNORMAL
EOSINOPHIL # BLD AUTO: 0.2 K/UL (ref 0–0.5)
EOSINOPHIL NFR BLD: 4 % (ref 0–8)
ERYTHROCYTE [DISTWIDTH] IN BLOOD BY AUTOMATED COUNT: 14.7 % (ref 11.5–14.5)
EST. GFR  (NO RACE VARIABLE): >60 ML/MIN/1.73 M^2
GLUCOSE SERPL-MCNC: 84 MG/DL (ref 70–110)
HCT VFR BLD AUTO: 39.4 % (ref 37–48.5)
HGB BLD-MCNC: 12.8 G/DL (ref 12–16)
IGA SERPL-MCNC: 38 MG/DL (ref 40–350)
IGG SERPL-MCNC: 856 MG/DL (ref 650–1600)
IGM SERPL-MCNC: 20 MG/DL (ref 50–300)
IMM GRANULOCYTES # BLD AUTO: 0 K/UL (ref 0–0.04)
IMM GRANULOCYTES NFR BLD AUTO: 0 % (ref 0–0.5)
LDH SERPL L TO P-CCNC: 212 U/L (ref 110–260)
LYMPHOCYTES # BLD AUTO: 1.4 K/UL (ref 1–4.8)
LYMPHOCYTES NFR BLD: 33.7 % (ref 18–48)
MAGNESIUM SERPL-MCNC: 1.5 MG/DL (ref 1.6–2.6)
MCH RBC QN AUTO: 32 PG (ref 27–31)
MCHC RBC AUTO-ENTMCNC: 32.5 G/DL (ref 32–36)
MCV RBC AUTO: 99 FL (ref 82–98)
MONOCYTES # BLD AUTO: 0.6 K/UL (ref 0.3–1)
MONOCYTES NFR BLD: 14.7 % (ref 4–15)
NEUTROPHILS # BLD AUTO: 1.9 K/UL (ref 1.8–7.7)
NEUTROPHILS NFR BLD: 47.1 % (ref 38–73)
NRBC BLD-RTO: 0 /100 WBC
PLATELET # BLD AUTO: 343 K/UL (ref 150–450)
PMV BLD AUTO: 9.4 FL (ref 9.2–12.9)
POTASSIUM SERPL-SCNC: 3.4 MMOL/L (ref 3.5–5.1)
PROT SERPL-MCNC: 7.1 G/DL (ref 6–8.4)
RBC # BLD AUTO: 4 M/UL (ref 4–5.4)
SODIUM SERPL-SCNC: 147 MMOL/L (ref 136–145)
WBC # BLD AUTO: 4.01 K/UL (ref 3.9–12.7)

## 2023-10-10 PROCEDURE — 82784 ASSAY IGA/IGD/IGG/IGM EACH: CPT | Mod: 59 | Performed by: INTERNAL MEDICINE

## 2023-10-10 PROCEDURE — 84165 PROTEIN E-PHORESIS SERUM: CPT | Performed by: INTERNAL MEDICINE

## 2023-10-10 PROCEDURE — 84165 PROTEIN E-PHORESIS SERUM: CPT | Mod: 26,,, | Performed by: PATHOLOGY

## 2023-10-10 PROCEDURE — 86334 PATHOLOGIST INTERPRETATION IFE: ICD-10-PCS | Mod: 26,,, | Performed by: PATHOLOGY

## 2023-10-10 PROCEDURE — 83735 ASSAY OF MAGNESIUM: CPT | Performed by: INTERNAL MEDICINE

## 2023-10-10 PROCEDURE — 36415 COLL VENOUS BLD VENIPUNCTURE: CPT | Performed by: INTERNAL MEDICINE

## 2023-10-10 PROCEDURE — 83615 LACTATE (LD) (LDH) ENZYME: CPT | Performed by: INTERNAL MEDICINE

## 2023-10-10 PROCEDURE — 86334 IMMUNOFIX E-PHORESIS SERUM: CPT | Mod: 26,,, | Performed by: PATHOLOGY

## 2023-10-10 PROCEDURE — 86334 IMMUNOFIX E-PHORESIS SERUM: CPT | Performed by: INTERNAL MEDICINE

## 2023-10-10 PROCEDURE — 84165 PATHOLOGIST INTERPRETATION SPE: ICD-10-PCS | Mod: 26,,, | Performed by: PATHOLOGY

## 2023-10-10 PROCEDURE — 80053 COMPREHEN METABOLIC PANEL: CPT | Performed by: INTERNAL MEDICINE

## 2023-10-10 PROCEDURE — 85025 COMPLETE CBC W/AUTO DIFF WBC: CPT | Performed by: INTERNAL MEDICINE

## 2023-10-10 PROCEDURE — 83521 IG LIGHT CHAINS FREE EACH: CPT | Performed by: INTERNAL MEDICINE

## 2023-10-11 ENCOUNTER — OFFICE VISIT (OUTPATIENT)
Dept: OPHTHALMOLOGY | Facility: CLINIC | Age: 63
End: 2023-10-11
Payer: COMMERCIAL

## 2023-10-11 ENCOUNTER — OFFICE VISIT (OUTPATIENT)
Dept: HEMATOLOGY/ONCOLOGY | Facility: CLINIC | Age: 63
End: 2023-10-11
Payer: COMMERCIAL

## 2023-10-11 ENCOUNTER — TELEPHONE (OUTPATIENT)
Dept: OPHTHALMOLOGY | Facility: CLINIC | Age: 63
End: 2023-10-11
Payer: COMMERCIAL

## 2023-10-11 DIAGNOSIS — D84.821 IMMUNODEFICIENCY DUE TO CHEMOTHERAPY: ICD-10-CM

## 2023-10-11 DIAGNOSIS — C90.00 MULTIPLE MYELOMA NOT HAVING ACHIEVED REMISSION: Primary | Chronic | ICD-10-CM

## 2023-10-11 DIAGNOSIS — H00.014 HORDEOLUM EXTERNUM OF LEFT UPPER EYELID: ICD-10-CM

## 2023-10-11 DIAGNOSIS — C79.51 SECONDARY MALIGNANT NEOPLASM OF BONE: ICD-10-CM

## 2023-10-11 DIAGNOSIS — Z79.899 IMMUNODEFICIENCY DUE TO CHEMOTHERAPY: ICD-10-CM

## 2023-10-11 DIAGNOSIS — H02.883 MEIBOMIAN GLAND DYSFUNCTION (MGD) OF BOTH EYES: Primary | ICD-10-CM

## 2023-10-11 DIAGNOSIS — T45.1X5A IMMUNODEFICIENCY DUE TO CHEMOTHERAPY: ICD-10-CM

## 2023-10-11 DIAGNOSIS — H02.886 MEIBOMIAN GLAND DYSFUNCTION (MGD) OF BOTH EYES: Primary | ICD-10-CM

## 2023-10-11 DIAGNOSIS — H00.14 CHALAZION OF LEFT UPPER EYELID: ICD-10-CM

## 2023-10-11 DIAGNOSIS — C90.00 MULTIPLE MYELOMA NOT HAVING ACHIEVED REMISSION: Chronic | ICD-10-CM

## 2023-10-11 DIAGNOSIS — H53.022 ANISOMETROPIC AMBLYOPIA OF LEFT EYE: ICD-10-CM

## 2023-10-11 LAB
ALBUMIN SERPL ELPH-MCNC: 3.79 G/DL (ref 3.35–5.55)
ALPHA1 GLOB SERPL ELPH-MCNC: 0.32 G/DL (ref 0.17–0.41)
ALPHA2 GLOB SERPL ELPH-MCNC: 0.92 G/DL (ref 0.43–0.99)
B-GLOBULIN SERPL ELPH-MCNC: 0.72 G/DL (ref 0.5–1.1)
GAMMA GLOB SERPL ELPH-MCNC: 0.76 G/DL (ref 0.67–1.58)
INTERPRETATION SERPL IFE-IMP: NORMAL
KAPPA LC SER QL IA: 1.87 MG/DL (ref 0.33–1.94)
KAPPA LC/LAMBDA SER IA: 1.42 (ref 0.26–1.65)
LAMBDA LC SER QL IA: 1.32 MG/DL (ref 0.57–2.63)
PATHOLOGIST INTERPRETATION IFE: NORMAL
PATHOLOGIST INTERPRETATION SPE: NORMAL
PROT SERPL-MCNC: 6.5 G/DL (ref 6–8.4)

## 2023-10-11 PROCEDURE — 4010F PR ACE/ARB THEARPY RXD/TAKEN: ICD-10-PCS | Mod: CPTII,95,, | Performed by: INTERNAL MEDICINE

## 2023-10-11 PROCEDURE — 1160F RVW MEDS BY RX/DR IN RCRD: CPT | Mod: CPTII,S$GLB,, | Performed by: OPHTHALMOLOGY

## 2023-10-11 PROCEDURE — 99214 PR OFFICE/OUTPT VISIT, EST, LEVL IV, 30-39 MIN: ICD-10-PCS | Mod: 25,S$GLB,, | Performed by: OPHTHALMOLOGY

## 2023-10-11 PROCEDURE — 99999 PR PBB SHADOW E&M-EST. PATIENT-LVL IV: ICD-10-PCS | Mod: PBBFAC,,, | Performed by: OPHTHALMOLOGY

## 2023-10-11 PROCEDURE — 4010F ACE/ARB THERAPY RXD/TAKEN: CPT | Mod: CPTII,95,, | Performed by: INTERNAL MEDICINE

## 2023-10-11 PROCEDURE — 3044F HG A1C LEVEL LT 7.0%: CPT | Mod: CPTII,S$GLB,, | Performed by: OPHTHALMOLOGY

## 2023-10-11 PROCEDURE — 11900 PR INJECTION INTO SKIN LESIONS, UP TO 7: ICD-10-PCS | Mod: S$GLB,,, | Performed by: OPHTHALMOLOGY

## 2023-10-11 PROCEDURE — 1159F MED LIST DOCD IN RCRD: CPT | Mod: CPTII,S$GLB,, | Performed by: OPHTHALMOLOGY

## 2023-10-11 PROCEDURE — 3044F HG A1C LEVEL LT 7.0%: CPT | Mod: CPTII,95,, | Performed by: INTERNAL MEDICINE

## 2023-10-11 PROCEDURE — 1159F PR MEDICATION LIST DOCUMENTED IN MEDICAL RECORD: ICD-10-PCS | Mod: CPTII,95,, | Performed by: INTERNAL MEDICINE

## 2023-10-11 PROCEDURE — 99999 PR PBB SHADOW E&M-EST. PATIENT-LVL IV: CPT | Mod: PBBFAC,,, | Performed by: OPHTHALMOLOGY

## 2023-10-11 PROCEDURE — 1160F RVW MEDS BY RX/DR IN RCRD: CPT | Mod: CPTII,95,, | Performed by: INTERNAL MEDICINE

## 2023-10-11 PROCEDURE — 99214 PR OFFICE/OUTPT VISIT, EST, LEVL IV, 30-39 MIN: ICD-10-PCS | Mod: 95,,, | Performed by: INTERNAL MEDICINE

## 2023-10-11 PROCEDURE — 4010F PR ACE/ARB THEARPY RXD/TAKEN: ICD-10-PCS | Mod: CPTII,S$GLB,, | Performed by: OPHTHALMOLOGY

## 2023-10-11 PROCEDURE — 99214 OFFICE O/P EST MOD 30 MIN: CPT | Mod: 25,S$GLB,, | Performed by: OPHTHALMOLOGY

## 2023-10-11 PROCEDURE — 99214 OFFICE O/P EST MOD 30 MIN: CPT | Mod: 95,,, | Performed by: INTERNAL MEDICINE

## 2023-10-11 PROCEDURE — 3044F PR MOST RECENT HEMOGLOBIN A1C LEVEL <7.0%: ICD-10-PCS | Mod: CPTII,S$GLB,, | Performed by: OPHTHALMOLOGY

## 2023-10-11 PROCEDURE — 1160F PR REVIEW ALL MEDS BY PRESCRIBER/CLIN PHARMACIST DOCUMENTED: ICD-10-PCS | Mod: CPTII,S$GLB,, | Performed by: OPHTHALMOLOGY

## 2023-10-11 PROCEDURE — 4010F ACE/ARB THERAPY RXD/TAKEN: CPT | Mod: CPTII,S$GLB,, | Performed by: OPHTHALMOLOGY

## 2023-10-11 PROCEDURE — 11900 INJECT SKIN LESIONS </W 7: CPT | Mod: S$GLB,,, | Performed by: OPHTHALMOLOGY

## 2023-10-11 PROCEDURE — 1159F MED LIST DOCD IN RCRD: CPT | Mod: CPTII,95,, | Performed by: INTERNAL MEDICINE

## 2023-10-11 PROCEDURE — 1160F PR REVIEW ALL MEDS BY PRESCRIBER/CLIN PHARMACIST DOCUMENTED: ICD-10-PCS | Mod: CPTII,95,, | Performed by: INTERNAL MEDICINE

## 2023-10-11 PROCEDURE — 3044F PR MOST RECENT HEMOGLOBIN A1C LEVEL <7.0%: ICD-10-PCS | Mod: CPTII,95,, | Performed by: INTERNAL MEDICINE

## 2023-10-11 PROCEDURE — 1159F PR MEDICATION LIST DOCUMENTED IN MEDICAL RECORD: ICD-10-PCS | Mod: CPTII,S$GLB,, | Performed by: OPHTHALMOLOGY

## 2023-10-11 NOTE — PROGRESS NOTES
HPI     Stye  - Dr Márquez asked for pt to be seen today for eval   Pt states that Dr   stopped her treatment and wanted her seen prior before resuming treatments           Comments: OS           Comments    Patient states stye came about two weeks ago on the left eye. Having a lot   of pain, itching, burning, and experiencing headaches but no blurred   vision.   Pain Scale:  4  Onset:   2  weeks ago  OD, OS, OU:  OS  Discharge:  Yes  A.M. Matting:  Yes  Itch:   Yes  Redness:   No  Photophobia:   No  Foreign body sensation:   Burning   Deep pain:   No  Previous occurrence:   Yes  Drops:   None            Last edited by Chelsea Hsu on 10/11/2023  1:55 PM.            Assessment /Plan     For exam results, see Encounter Report.      ICD-10-CM ICD-9-CM    1. Meibomian gland dysfunction (MGD) of both eyes  H02.883 374.89 Discussed the role of hot compresses. Patient may take the doxy that Dr Pollack dispensed but emphasized that the patient needs hot compresses as the mainstay of treatment.  Reluctant to do I & D since patient is on asa and the chalazion is adjacent to the marginal artery of the lid.       Injection of Kenalog 10 mg          H02.886        2. Hordeolum externum of left upper eyelid  H00.014 373.11         3.  Chalazion of left upper eyelid -   Kenalog 10 mg injection today given Lot [id number] exp 9/30/2024      Increase warm compresses to lids 3-4 times daily OU - discussion at length with pt that this condition is not a contraindication for treatment for multiple myeloma. Also discussed that this is a recurrent condition and is unrelated to myeloma treatment.     RETURN TO CLINIC as needed with Dr Casper if additional intervention in needed.

## 2023-10-11 NOTE — PROGRESS NOTES
Subjective:       Patient ID: Ana Bonilla is a 63 y.o. female.    Chief Complaint: Results, Chemotherapy, and Multiple Myeloma    HPI:  63-year-old history of multiple myeloma cycle 3 day 22OP D-VRD DARATUMUMAB + BORTEZOMIB LENALIDOMIDE DEXAMETHASONE the patient is been troubled by recurrent episodes of conjunctivitis patient has been seen by optometry in his scheduled to be seen by Ophthalmology reviewed note from Dr. Dickerson recently.  Patient states that she is concerned about her I in his little bit wary of proceeding with systemic therapy at this point and understandably so ECOG status 2      Past Medical History:   Diagnosis Date    Allergy     Amblyopia OS    Anxiety     Asthma     COPD (chronic obstructive pulmonary disease)     NO HOME o2    GERD (gastroesophageal reflux disease)     Hyperlipidemia     Hypertension     PONV (postoperative nausea and vomiting)     Thyroid disease      Family History   Problem Relation Age of Onset    Hypertension Mother     Diabetes Mother     Diabetes Father     Stroke Maternal Grandmother     Prostate cancer Maternal Grandfather     Mental illness Son     Pancreatic cancer Maternal Uncle     Mental illness Other     Pancreatic cancer Other     Ovarian cancer Maternal Cousin      Social History     Socioeconomic History    Marital status:     Number of children: 2   Occupational History     Employer: [employer]   Tobacco Use    Smoking status: Every Day     Current packs/day: 0.50     Average packs/day: 0.5 packs/day for 50.0 years (25.0 ttl pk-yrs)     Types: Cigarettes    Smokeless tobacco: Never   Substance and Sexual Activity    Alcohol use: Never     Comment: quit    Drug use: No    Sexual activity: Never     Social Determinants of Health     Stress: No Stress Concern Present (7/8/2019)    [employer] - Occupational Stress Questionnaire     Feeling of Stress : Not at all     Past Surgical History:   Procedure Laterality Date     "APPENDECTOMY      BIOPSY N/A 6/8/2023    Procedure: BIOPSY;  Surgeon: Fausto Smith MD;  Location: [facility] OR;  Service: Neurosurgery;  Laterality: N/A;  L1    BUNIONECTOMY      COLONOSCOPY N/A 12/4/2019    Procedure: COLONOSCOPY;  Surgeon: Danyn Matos III, MD;  Location: [facility] ENDO;  Service: Endoscopy;  Laterality: N/A;    COLONOSCOPY N/A 10/24/2022    Procedure: COLONOSCOPY;  Surgeon: Danny Matos III, MD;  Location: [facility] ENDO;  Service: Endoscopy;  Laterality: N/A;    ESOPHAGOGASTRODUODENOSCOPY N/A 10/24/2022    Procedure: EGD (ESOPHAGOGASTRODUODENOSCOPY);  Surgeon: Danny Matos III, MD;  Location: [facility] ENDO;  Service: Endoscopy;  Laterality: N/A;    FIXATION KYPHOPLASTY Bilateral 6/8/2023    Procedure: KYPHOPLASTY;  Surgeon: Fausto Smith MD;  Location: [facility] OR;  Service: Neurosurgery;  Laterality: Bilateral;  kyphoplasty and radiofrequency ablation - L1    HYSTERECTOMY      PARTIAL//still with ovaries    neck fusion  08/2017    THYROIDECTOMY         Labs:  Lab Results   Component Value Date    WBC 4.01 10/10/2023    HGB 12.8 10/10/2023    HCT 39.4 10/10/2023    MCV 99 (H) 10/10/2023     10/10/2023     BMP  Lab Results   Component Value Date     (H) 10/10/2023    K 3.4 (L) 10/10/2023     10/10/2023    CO2 26 10/10/2023    BUN 9 10/10/2023    CREATININE 0.8 10/10/2023    CALCIUM 9.1 10/10/2023    ANIONGAP 13 10/10/2023    ESTGFRAFRICA >60.0 07/14/2021    EGFRNONAA >60.0 07/14/2021     Lab Results   Component Value Date    ALT 24 10/10/2023    AST 17 10/10/2023    ALKPHOS 67 10/10/2023    BILITOT 0.5 10/10/2023       No results found for: "IRON", "TIBC", "FERRITIN", "SATURATEDIRO"  Lab Results   Component Value Date    [id number] 756 06/10/2022     Lab Results   Component Value Date    FOLATE 6.6 11/15/2019     Lab Results   Component Value Date    TSH 0.846 05/23/2023         Review of Systems   Constitutional:  Negative for activity change, appetite change, chills, " diaphoresis, fatigue, fever and unexpected weight change.   HENT:  Negative for congestion, dental problem, drooling, ear discharge, ear pain, facial swelling, hearing loss, mouth sores, nosebleeds, postnasal drip, rhinorrhea, sinus pressure, sneezing, sore throat, tinnitus, trouble swallowing and voice change.    Eyes:  Positive for photophobia, pain, discharge, redness, itching and visual disturbance.   Respiratory:  Negative for cough, choking, chest tightness, shortness of breath, wheezing and stridor.    Cardiovascular:  Negative for chest pain, palpitations and leg swelling.   Gastrointestinal:  Negative for abdominal distention, abdominal pain, anal bleeding, blood in stool, constipation, diarrhea, nausea, rectal pain and vomiting.   Endocrine: Negative for cold intolerance, heat intolerance, polydipsia, polyphagia and polyuria.   Genitourinary:  Negative for decreased urine volume, difficulty urinating, dyspareunia, dysuria, enuresis, flank pain, frequency, genital sores, hematuria, menstrual problem, pelvic pain, urgency, vaginal bleeding, vaginal discharge and vaginal pain.   Musculoskeletal:  Negative for arthralgias, back pain, gait problem, joint swelling, myalgias, neck pain and neck stiffness.   Skin:  Negative for color change, pallor and rash.   Allergic/Immunologic: Negative for environmental allergies, food allergies and immunocompromised state.   Neurological:  Negative for dizziness, tremors, seizures, syncope, facial asymmetry, speech difficulty, weakness, light-headedness, numbness and headaches.   Hematological:  Negative for adenopathy. Does not bruise/bleed easily.   Psychiatric/Behavioral:  Positive for dysphoric mood. Negative for agitation, behavioral problems, confusion, decreased concentration, hallucinations, self-injury, sleep disturbance and suicidal ideas. The patient is nervous/anxious. The patient is not hyperactive.        Objective:      Physical Exam  Constitutional:        Appearance: Normal appearance. She is ill-appearing.   Eyes:      General:         Right eye: Discharge present.         Left eye: Discharge present.            Assessment:      1. Multiple myeloma not having achieved remission    2. Immunodeficiency due to chemotherapy    3. Anisometropic amblyopia of left eye    4. Secondary malignant neoplasm of bone           Med Onc Chart Routing      Follow up with physician    Follow up with [facility]    Infusion scheduling note    Injection scheduling note Hold treatment at present time until cleared by Ophthalmology   Labs    Imaging    Pharmacy appointment    Other referrals                   Plan:     The patient location is:  Home  The chief complaint leading to consultation is:  Multiple myeloma    Visit type: audiovisual    Face to Face time with patient: 25 minutes of total time spent on the encounter, which includes face to face time and non-face to face time preparing to see the patient (eg, review of tests), Obtaining and/or reviewing separately obtained history, Documenting clinical information in the electronic or other health record, Independently interpreting results (not separately reported) and communicating results to the patient/family/caregiver, or Care coordination (not separately reported).         Each patient to whom he or she provides medical services by telemedicine is:  (1) informed of the relationship between the physician and patient and the respective role of any other health care provider with respect to management of the patient; and (2) notified that he or she may decline to receive medical services by telemedicine and may withdraw from such care at any time.    Notes:   Reviewed situation.  At this time will hold any further treatment until I condition is clarified by optometry in ophthalmology.  At this time will contact patient after they have seen in cleared patient will send note to them this morning      Oscar Márquez Jr, MD FACP

## 2023-10-11 NOTE — TELEPHONE ENCOUNTER
Dr Trejo received a message from Dr. Márquez that the pt needed to be seen today-  I called and spoke to the pt, she is coming to [facility] location today at 130 to see Dr. Trejo

## 2023-10-12 ENCOUNTER — PATIENT MESSAGE (OUTPATIENT)
Dept: ADMINISTRATIVE | Facility: OTHER | Age: 63
End: 2023-10-12
Payer: COMMERCIAL

## 2023-10-16 ENCOUNTER — PATIENT MESSAGE (OUTPATIENT)
Dept: CARDIOLOGY | Facility: CLINIC | Age: 63
End: 2023-10-16
Payer: COMMERCIAL

## 2023-10-16 ENCOUNTER — PATIENT MESSAGE (OUTPATIENT)
Dept: HEMATOLOGY/ONCOLOGY | Facility: CLINIC | Age: 63
End: 2023-10-16
Payer: COMMERCIAL

## 2023-10-16 RX ORDER — METOPROLOL SUCCINATE 50 MG/1
50 TABLET, EXTENDED RELEASE ORAL NIGHTLY
Qty: 90 TABLET | Refills: 3 | Status: SHIPPED | OUTPATIENT
Start: 2023-10-16

## 2023-10-17 ENCOUNTER — DOCUMENTATION ONLY (OUTPATIENT)
Dept: HEMATOLOGY/ONCOLOGY | Facility: CLINIC | Age: 63
End: 2023-10-17
Payer: COMMERCIAL

## 2023-10-17 ENCOUNTER — PATIENT MESSAGE (OUTPATIENT)
Dept: HEMATOLOGY/ONCOLOGY | Facility: CLINIC | Age: 63
End: 2023-10-17
Payer: COMMERCIAL

## 2023-10-17 ENCOUNTER — LAB VISIT (OUTPATIENT)
Dept: LAB | Facility: HOSPITAL | Age: 63
End: 2023-10-17
Attending: INTERNAL MEDICINE
Payer: COMMERCIAL

## 2023-10-17 DIAGNOSIS — C90.00 MULTIPLE MYELOMA NOT HAVING ACHIEVED REMISSION: Chronic | ICD-10-CM

## 2023-10-17 LAB
ALBUMIN SERPL BCP-MCNC: 3.6 G/DL (ref 3.5–5.2)
ALP SERPL-CCNC: 67 U/L (ref 55–135)
ALT SERPL W/O P-5'-P-CCNC: 16 U/L (ref 10–44)
ANION GAP SERPL CALC-SCNC: 10 MMOL/L (ref 8–16)
AST SERPL-CCNC: 19 U/L (ref 10–40)
BASOPHILS # BLD AUTO: 0.05 K/UL (ref 0–0.2)
BASOPHILS NFR BLD: 1.3 % (ref 0–1.9)
BILIRUB SERPL-MCNC: 0.4 MG/DL (ref 0.1–1)
BUN SERPL-MCNC: 10 MG/DL (ref 8–23)
CALCIUM SERPL-MCNC: 8.4 MG/DL (ref 8.7–10.5)
CHLORIDE SERPL-SCNC: 111 MMOL/L (ref 95–110)
CO2 SERPL-SCNC: 22 MMOL/L (ref 23–29)
CREAT SERPL-MCNC: 0.8 MG/DL (ref 0.5–1.4)
DIFFERENTIAL METHOD: ABNORMAL
EOSINOPHIL # BLD AUTO: 0 K/UL (ref 0–0.5)
EOSINOPHIL NFR BLD: 0.8 % (ref 0–8)
ERYTHROCYTE [DISTWIDTH] IN BLOOD BY AUTOMATED COUNT: 14.5 % (ref 11.5–14.5)
EST. GFR  (NO RACE VARIABLE): >60 ML/MIN/1.73 M^2
GLUCOSE SERPL-MCNC: 114 MG/DL (ref 70–110)
HCT VFR BLD AUTO: 38.2 % (ref 37–48.5)
HGB BLD-MCNC: 12.4 G/DL (ref 12–16)
IMM GRANULOCYTES # BLD AUTO: 0 K/UL (ref 0–0.04)
IMM GRANULOCYTES NFR BLD AUTO: 0 % (ref 0–0.5)
LYMPHOCYTES # BLD AUTO: 1.6 K/UL (ref 1–4.8)
LYMPHOCYTES NFR BLD: 40.8 % (ref 18–48)
MCH RBC QN AUTO: 31.7 PG (ref 27–31)
MCHC RBC AUTO-ENTMCNC: 32.5 G/DL (ref 32–36)
MCV RBC AUTO: 98 FL (ref 82–98)
MONOCYTES # BLD AUTO: 0.5 K/UL (ref 0.3–1)
MONOCYTES NFR BLD: 11.7 % (ref 4–15)
NEUTROPHILS # BLD AUTO: 1.8 K/UL (ref 1.8–7.7)
NEUTROPHILS NFR BLD: 45.4 % (ref 38–73)
NRBC BLD-RTO: 0 /100 WBC
PLATELET # BLD AUTO: 362 K/UL (ref 150–450)
PMV BLD AUTO: 8.7 FL (ref 9.2–12.9)
POTASSIUM SERPL-SCNC: 3.5 MMOL/L (ref 3.5–5.1)
PROT SERPL-MCNC: 6.8 G/DL (ref 6–8.4)
RBC # BLD AUTO: 3.91 M/UL (ref 4–5.4)
SODIUM SERPL-SCNC: 143 MMOL/L (ref 136–145)
WBC # BLD AUTO: 3.92 K/UL (ref 3.9–12.7)

## 2023-10-17 PROCEDURE — 84165 PATHOLOGIST INTERPRETATION SPE: ICD-10-PCS | Mod: 26,,, | Performed by: PATHOLOGY

## 2023-10-17 PROCEDURE — 84165 PROTEIN E-PHORESIS SERUM: CPT | Mod: 26,,, | Performed by: PATHOLOGY

## 2023-10-17 PROCEDURE — 36415 COLL VENOUS BLD VENIPUNCTURE: CPT | Performed by: INTERNAL MEDICINE

## 2023-10-17 PROCEDURE — 84165 PROTEIN E-PHORESIS SERUM: CPT | Performed by: INTERNAL MEDICINE

## 2023-10-17 PROCEDURE — 83521 IG LIGHT CHAINS FREE EACH: CPT | Mod: 59 | Performed by: INTERNAL MEDICINE

## 2023-10-17 PROCEDURE — 85025 COMPLETE CBC W/AUTO DIFF WBC: CPT | Performed by: INTERNAL MEDICINE

## 2023-10-17 PROCEDURE — 80053 COMPREHEN METABOLIC PANEL: CPT | Performed by: INTERNAL MEDICINE

## 2023-10-17 NOTE — PROGRESS NOTES
HEMATOLOGY / ONCOLOGY   CLINIC NOTE          ONCOLOGICAL HISTORY:     Diagnosis:  - Multiple Myeloma IgG lambda    Treatment History:  - VRD (5/10/2023 - 6/28/2023)    Current Treatment:   - D-VRD (7/6/2023 -   - Zometa (10/18/2023     Subjective:       Chief Complaint: Chemotherapy, Immunotherapy, and Multiple Myeloma      HPI    Ana Bonilla  63 y.o.  female with past medical history significant for hypertension, COPD, asthma, GERD, hypothyroidism here for follow-up and management of multiple myeloma    She was referred in 2/2023 by her PCP after workup for gastritis revealed lytic lesions. CT chest showed lytic and expansile destructive lesion in the sternum and lytic lesions at L1. Biopsy of L1 lesion in 3/2023 revealed mature B cell neoplasm most consistent with plasma cell neoplasm.      Bone marrow biopsy in 4/2023 demonstrated 60% plasma cells. PET/CT showed extensive bony lesions throughout the spine, sternum, bilateral ribs, pelvis, and a mild compression deformity in L1. SPEP/MECHE showed IgG M spike 3.19 g/dL, IgG 4,521 mg/dL.      She was started on VRd and then daratumumab was added by the transplant team. She was started on ASA for thrombosis prophylaxis and acyclovir for herpes zoster prophylaxis. Start Zometa after dental evaluation.      In 5/2023, Revlimid was decreased from 25mg po to 10mg due to decreased creatinine clearance of 49. She underwent kyphoplasty with radiofrequency ablation on 6/8/2023 by Neurosurgery.     Interval History:     Patient here for follow-up and next cycle of treatment.  She is complaining of fatigue but her eye infection is getting better after being started on treatment.  Patient is complaining of intermittent numbness and tingling in the fingers and toes but denies any problem with functioning or walking           Past Medical History:   Diagnosis Date    Allergy     Amblyopia OS    Anxiety     Asthma     COPD (chronic obstructive pulmonary disease)     NO  HOME o2    GERD (gastroesophageal reflux disease)     Hyperlipidemia     Hypertension     PONV (postoperative nausea and vomiting)     Thyroid disease       Past Surgical History:   Procedure Laterality Date    APPENDECTOMY      BIOPSY N/A 6/8/2023    Procedure: BIOPSY;  Surgeon: Fausto Smith MD;  Location: [facility] OR;  Service: Neurosurgery;  Laterality: N/A;  L1    BUNIONECTOMY      COLONOSCOPY N/A 12/4/2019    Procedure: COLONOSCOPY;  Surgeon: Danny Matos III, MD;  Location: [facility] ENDO;  Service: Endoscopy;  Laterality: N/A;    COLONOSCOPY N/A 10/24/2022    Procedure: COLONOSCOPY;  Surgeon: Danny Matos III, MD;  Location: [facility] ENDO;  Service: Endoscopy;  Laterality: N/A;    ESOPHAGOGASTRODUODENOSCOPY N/A 10/24/2022    Procedure: EGD (ESOPHAGOGASTRODUODENOSCOPY);  Surgeon: Danny Matos III, MD;  Location: [facility] ENDO;  Service: Endoscopy;  Laterality: N/A;    FIXATION KYPHOPLASTY Bilateral 6/8/2023    Procedure: KYPHOPLASTY;  Surgeon: Fausto Smith MD;  Location: [facility] OR;  Service: Neurosurgery;  Laterality: Bilateral;  kyphoplasty and radiofrequency ablation - L1    HYSTERECTOMY      PARTIAL//still with ovaries    neck fusion  08/2017    THYROIDECTOMY       Social History     Socioeconomic History    Marital status:     Number of children: 2   Occupational History     Employer: [employer]   Tobacco Use    Smoking status: Every Day     Current packs/day: 0.50     Average packs/day: 0.5 packs/day for 50.0 years (25.0 ttl pk-yrs)     Types: Cigarettes    Smokeless tobacco: Never   Substance and Sexual Activity    Alcohol use: Never     Comment: quit    Drug use: No    Sexual activity: Never     Social Determinants of Health     Stress: No Stress Concern Present (7/8/2019)    [employer] - Occupational Stress Questionnaire     Feeling of Stress : Not at all      Family History   Problem Relation Age of Onset    Hypertension Mother     Diabetes Mother     Diabetes Father      Stroke Maternal Grandmother     Prostate cancer Maternal Grandfather     Mental illness Son     Pancreatic cancer Maternal Uncle     Mental illness Other     Pancreatic cancer Other     Ovarian cancer Maternal Cousin       Review of patient's allergies indicates:   Allergen Reactions    Hydrocodone-acetaminophen Other (See Comments)     Causes pt to feel extremely sick       Review of Systems   Constitutional:  Positive for fatigue. Negative for activity change, chills and fever.   HENT: Negative.     Eyes:  Negative for pain, discharge and redness.   Respiratory:  Negative for cough, shortness of breath and wheezing.    Cardiovascular:  Negative for chest pain and leg swelling.   Gastrointestinal:  Positive for nausea. Negative for constipation, diarrhea and vomiting.   Endocrine: Negative.    Genitourinary: Negative.    Musculoskeletal:  Positive for back pain. Negative for arthralgias and myalgias.   Integumentary:  Negative.   Allergic/Immunologic: Negative.    Neurological:  Positive for numbness. Negative for light-headedness and headaches.   Hematological: Negative.    Psychiatric/Behavioral: Negative.           Objective:        Vitals:    10/18/23 0958   BP: 115/71   Pulse: 73   Resp: 20   Temp: 97.6 °F (36.4 °C)                  Physical Exam  Constitutional:       General: She is not in acute distress.     Appearance: Normal appearance. She is not ill-appearing.   HENT:      Head: Normocephalic.   Eyes:      General:         Left eye: Hordeolum present.     Extraocular Movements: Extraocular movements intact.   Cardiovascular:      Rate and Rhythm: Normal rate and regular rhythm.   Pulmonary:      Effort: Pulmonary effort is normal. No respiratory distress.      Breath sounds: Normal breath sounds.   Abdominal:      Palpations: Abdomen is soft.   Musculoskeletal:         General: Normal range of motion.   Skin:     General: Skin is warm.   Neurological:      Mental Status: She is alert and oriented to  person, place, and time. Mental status is at baseline.   Psychiatric:         Mood and Affect: Mood normal.           LABS / IMAGING      - 06/06/2023 RIGHT ILIAC CREST BONE MARROW ASPIRATE, BONE MARROW CLOT, AND BONE MARROW CORE BIOPSY WITH:     CELLULARITY=40-60%, TRILINEAGE HEMATOPOIETIC ACTIVITY (M/E=2.9:1).   CONSISTENT WITH PLASMA CELL NEOPLASM(60%).  SEE COMMENT.   FOCAL GRADE 1 RETICULAR FIBROSIS.   CONGO RED NEGATIVE.   INCREASED STORAGE IRON.   ADEQUATE NUMBER OF MEGAKARYOCYTES.    Bone marrow karyotype results: 46, XX[20], female karyotype.     Myeloma fixed cell, high-risk, FISH:  Normal.  The result is within normal limits for 1q duplication, TP53 deletion and IGH rearrangement.       - 04/10/2023 PET: Numerous FDG avid predominately lytic osseous lesions as above.  Primary differential considerations would include multiple myeloma versus metastatic disease.  2. No FDG avid soft tissue masses or adenopathy demonstrated.  3. Mild pathologic compression deformity of the L1 vertebral body.                                            Assessment:     IgG lambda Multiple myeloma, R-ISS stage I  - BMBx : 60 % plasma cells - 4/6/2023  - SPEP/MECHE showed IgG lambda - monoclonal protein 3.19 g/dl - (3/27/2023).  - R-ISS stage I (beta 2 microglobulin 1.9, albumin 3.5, , normal karyotype and no high-risk mutations on fish panel)  - PET-CT ( 4/10/2023) - showed extensive lytic lesions in the axial skeleton and mild L1 compression deformity.  - Started with Induction chemotherapy with VRD regimen (Velcde/Revlimid/Decadron) - (5/10/2023 - 6/28/2023)  - Aspirin for thrombosis prophylaxis; Acyclovir 400 mg p.o BID for herpes Zoster prophylaxis .  - patient was evaluated by transplant team and then switched to D-VRD (7/6/2023 -    - repeat creatinine clearance is more than 60, adjust the dose of Revlimid to 25 mg daily for 21 days on 7 days off as recommended by the transplant team but patient unable to tolerate it  and thus decreased it back to 10 mg daily    Cancer-related pain / palliative care by specialist  -  checked 10/04/2023   - switch [place] to Percocet as patient is unable to tolerate Norco  - refilled alprazolam - 10/04/2023     Meibomian gland dysfunction (MGD) of both eyes. Hordeolum externum of left upper eyelid   - started on doxycycline by Ophthalmology and plan of excision    Plan:     - Patient treatment had been held multiple times with few treatments cancelled and also Revlimid 10 mg daily given as unable to tolerate higher dose.   - labs reviewed, started with cycle 4 day 1.  Patient will also start Revlimid today  - dental clearance given, continue with zometa q4wk, started today (10/18/2023)   - Continue Aspirin; Acyclovir 400 mg p.o BID for herpes Zoster prophylaxis.  - follow up with [facility] transplant team   - follow up with Ophthalmology for management of style / hordeolum  - recommended to be compliant with calcium and vitamin-D for hypocalcemia  - MD / LABS / TREATMENT VISIT - 1 WEEK for cycle 3 day 22 treatment       Med Onc Chart Routing      Follow up with physician 1 week.   Follow up with WERNER    Infusion scheduling note   Daratumumab and bortezomib and Zometa today, next treatment in 1 week   Injection scheduling note    Labs CBC, CMP and magnesium   Scheduling:  Preferred lab:  Lab interval:  Labs before next visit   Imaging    Pharmacy appointment    Other referrals                  The patient was seen, interviewed and examined. Pertinent lab and radiology studies were reviewed. Pt instructed to call should develop concerning signs/symptoms or have further questions.       Portions of the record may have been created with voice recognition software. Occasional wrong-word or sound-a-like substitutions may have occurred due to the inherent limitations of voice recognition software. Read the chart carefully and recognize, using context, where substitutions have occurred.    Jose Dela Cruz,  MD  Hematology / Oncology

## 2023-10-17 NOTE — PROGRESS NOTES
Pt contacted ONN to schedule f/u. States eye doctor cleared her to continue tx but she wasn't sure if oncologist knew that. Told her yes MD aware, scheduled labs today (10/17) @ the cc per pt request. Scheduled MD and infusion 10/18 starting @ 10am at . Pt agreeable with appts, states she got pills yesterday, would like to know when to start next dose. Told her that's a question for MD, ask tomorrow. Discussed when due for appt week of 10/30, scheduling her with new oncologist, Dr. Bri Luevano. She voiced understanding, has no other questions at this time. Told her ONN will see her tomorrow.   Oncology Navigation   Intake  Date of Diagnosis: 23  Cancer Type: Transplant; Myeloma  Internal / External Referral: Internal  Date of Referral: 05/10/23  Initial Nurse Navigator Contact: 05/15/23  Referral to Initial Contact Timeline (days): 5  Date Worked: 10/17/23  Reason if booked > 7 days after scheduling: Additional tests/procedures; Patient request     Treatment  Current Status: Active       Medical Oncologist: Dr. FRITZ Dela Cruz  Chemotherapy: Initiated  Chemotherapy Regimen: Velcade (Durvalumab possible pending FISH)  Oral Therapy: Initiated                       Acuity  Systemic Treatment - predicted or initiated: Chemotherapy Regimen with Multiple drugs (+1)  ECO  Comorbidities in Medical History: 2   Needed: 0  Support: 0  Verbalizes Financial Concerns: 1  Transportation: 0  Psychological Factors (+1 each): Emotional during conversation  Verbalizes the need for more education: 1  Navigation Acuity: 3     Follow Up  No follow-ups on file.

## 2023-10-18 ENCOUNTER — OFFICE VISIT (OUTPATIENT)
Dept: HEMATOLOGY/ONCOLOGY | Facility: CLINIC | Age: 63
End: 2023-10-18
Payer: COMMERCIAL

## 2023-10-18 ENCOUNTER — INFUSION (OUTPATIENT)
Dept: INFUSION THERAPY | Facility: HOSPITAL | Age: 63
End: 2023-10-18
Attending: INTERNAL MEDICINE
Payer: COMMERCIAL

## 2023-10-18 VITALS
HEART RATE: 73 BPM | SYSTOLIC BLOOD PRESSURE: 115 MMHG | RESPIRATION RATE: 20 BRPM | HEIGHT: 67 IN | WEIGHT: 136.69 LBS | OXYGEN SATURATION: 100 % | TEMPERATURE: 98 F | BODY MASS INDEX: 21.45 KG/M2 | DIASTOLIC BLOOD PRESSURE: 71 MMHG

## 2023-10-18 VITALS
TEMPERATURE: 98 F | BODY MASS INDEX: 21.45 KG/M2 | WEIGHT: 136.69 LBS | DIASTOLIC BLOOD PRESSURE: 69 MMHG | HEIGHT: 67 IN | HEART RATE: 63 BPM | RESPIRATION RATE: 18 BRPM | SYSTOLIC BLOOD PRESSURE: 112 MMHG | OXYGEN SATURATION: 100 %

## 2023-10-18 DIAGNOSIS — Z51.11 ENCOUNTER FOR ANTINEOPLASTIC CHEMOTHERAPY: ICD-10-CM

## 2023-10-18 DIAGNOSIS — Z79.899 IMMUNODEFICIENCY DUE TO CHEMOTHERAPY: ICD-10-CM

## 2023-10-18 DIAGNOSIS — T45.1X5A IMMUNODEFICIENCY DUE TO CHEMOTHERAPY: ICD-10-CM

## 2023-10-18 DIAGNOSIS — Z51.12 ENCOUNTER FOR ANTINEOPLASTIC IMMUNOTHERAPY: ICD-10-CM

## 2023-10-18 DIAGNOSIS — C90.00 MULTIPLE MYELOMA NOT HAVING ACHIEVED REMISSION: Primary | ICD-10-CM

## 2023-10-18 DIAGNOSIS — C90.00 MULTIPLE MYELOMA, REMISSION STATUS UNSPECIFIED: Primary | ICD-10-CM

## 2023-10-18 DIAGNOSIS — E87.6 HYPOKALEMIA: ICD-10-CM

## 2023-10-18 DIAGNOSIS — D84.821 IMMUNODEFICIENCY DUE TO CHEMOTHERAPY: ICD-10-CM

## 2023-10-18 DIAGNOSIS — C90.00 MULTIPLE MYELOMA NOT HAVING ACHIEVED REMISSION: ICD-10-CM

## 2023-10-18 LAB
ALBUMIN SERPL ELPH-MCNC: 3.71 G/DL (ref 3.35–5.55)
ALPHA1 GLOB SERPL ELPH-MCNC: 0.3 G/DL (ref 0.17–0.41)
ALPHA2 GLOB SERPL ELPH-MCNC: 0.83 G/DL (ref 0.43–0.99)
B-GLOBULIN SERPL ELPH-MCNC: 0.64 G/DL (ref 0.5–1.1)
GAMMA GLOB SERPL ELPH-MCNC: 0.72 G/DL (ref 0.67–1.58)
KAPPA LC SER QL IA: 1.41 MG/DL (ref 0.33–1.94)
KAPPA LC/LAMBDA SER IA: 1.21 (ref 0.26–1.65)
LAMBDA LC SER QL IA: 1.17 MG/DL (ref 0.57–2.63)
PATHOLOGIST INTERPRETATION SPE: NORMAL
PROT SERPL-MCNC: 6.2 G/DL (ref 6–8.4)

## 2023-10-18 PROCEDURE — 4010F ACE/ARB THERAPY RXD/TAKEN: CPT | Mod: CPTII,S$GLB,, | Performed by: INTERNAL MEDICINE

## 2023-10-18 PROCEDURE — 3044F HG A1C LEVEL LT 7.0%: CPT | Mod: CPTII,S$GLB,, | Performed by: INTERNAL MEDICINE

## 2023-10-18 PROCEDURE — 3078F PR MOST RECENT DIASTOLIC BLOOD PRESSURE < 80 MM HG: ICD-10-PCS | Mod: CPTII,S$GLB,, | Performed by: INTERNAL MEDICINE

## 2023-10-18 PROCEDURE — 4010F PR ACE/ARB THEARPY RXD/TAKEN: ICD-10-PCS | Mod: CPTII,S$GLB,, | Performed by: INTERNAL MEDICINE

## 2023-10-18 PROCEDURE — 3008F PR BODY MASS INDEX (BMI) DOCUMENTED: ICD-10-PCS | Mod: CPTII,S$GLB,, | Performed by: INTERNAL MEDICINE

## 2023-10-18 PROCEDURE — 3044F PR MOST RECENT HEMOGLOBIN A1C LEVEL <7.0%: ICD-10-PCS | Mod: CPTII,S$GLB,, | Performed by: INTERNAL MEDICINE

## 2023-10-18 PROCEDURE — 63600175 PHARM REV CODE 636 W HCPCS: Performed by: INTERNAL MEDICINE

## 2023-10-18 PROCEDURE — 3074F SYST BP LT 130 MM HG: CPT | Mod: CPTII,S$GLB,, | Performed by: INTERNAL MEDICINE

## 2023-10-18 PROCEDURE — 96375 TX/PRO/DX INJ NEW DRUG ADDON: CPT

## 2023-10-18 PROCEDURE — 3074F PR MOST RECENT SYSTOLIC BLOOD PRESSURE < 130 MM HG: ICD-10-PCS | Mod: CPTII,S$GLB,, | Performed by: INTERNAL MEDICINE

## 2023-10-18 PROCEDURE — 96401 CHEMO ANTI-NEOPL SQ/IM: CPT

## 2023-10-18 PROCEDURE — 99999 PR PBB SHADOW E&M-EST. PATIENT-LVL III: CPT | Mod: PBBFAC,,, | Performed by: INTERNAL MEDICINE

## 2023-10-18 PROCEDURE — 99215 OFFICE O/P EST HI 40 MIN: CPT | Mod: S$GLB,,, | Performed by: INTERNAL MEDICINE

## 2023-10-18 PROCEDURE — 3008F BODY MASS INDEX DOCD: CPT | Mod: CPTII,S$GLB,, | Performed by: INTERNAL MEDICINE

## 2023-10-18 PROCEDURE — 99999 PR PBB SHADOW E&M-EST. PATIENT-LVL III: ICD-10-PCS | Mod: PBBFAC,,, | Performed by: INTERNAL MEDICINE

## 2023-10-18 PROCEDURE — 99215 PR OFFICE/OUTPT VISIT, EST, LEVL V, 40-54 MIN: ICD-10-PCS | Mod: S$GLB,,, | Performed by: INTERNAL MEDICINE

## 2023-10-18 PROCEDURE — 96374 THER/PROPH/DIAG INJ IV PUSH: CPT

## 2023-10-18 PROCEDURE — 3078F DIAST BP <80 MM HG: CPT | Mod: CPTII,S$GLB,, | Performed by: INTERNAL MEDICINE

## 2023-10-18 RX ORDER — HEPARIN 100 UNIT/ML
500 SYRINGE INTRAVENOUS
Status: CANCELLED | OUTPATIENT
Start: 2023-10-18

## 2023-10-18 RX ORDER — POTASSIUM CHLORIDE 750 MG/1
40 TABLET, EXTENDED RELEASE ORAL ONCE
Status: CANCELLED
Start: 2023-10-18

## 2023-10-18 RX ORDER — BORTEZOMIB 3.5 MG/1
1.3 INJECTION, POWDER, LYOPHILIZED, FOR SOLUTION INTRAVENOUS; SUBCUTANEOUS
Status: CANCELLED | OUTPATIENT
Start: 2023-10-18

## 2023-10-18 RX ORDER — FERROUS SULFATE, DRIED 160(50) MG
1 TABLET, EXTENDED RELEASE ORAL ONCE
Status: CANCELLED
Start: 2023-10-18 | End: 2023-10-18

## 2023-10-18 RX ORDER — DIPHENHYDRAMINE HYDROCHLORIDE 50 MG/ML
50 INJECTION INTRAMUSCULAR; INTRAVENOUS ONCE AS NEEDED
Status: CANCELLED | OUTPATIENT
Start: 2023-10-18

## 2023-10-18 RX ORDER — EPINEPHRINE 0.3 MG/.3ML
0.3 INJECTION SUBCUTANEOUS ONCE AS NEEDED
Status: CANCELLED | OUTPATIENT
Start: 2023-10-18

## 2023-10-18 RX ORDER — SODIUM CHLORIDE 0.9 % (FLUSH) 0.9 %
10 SYRINGE (ML) INJECTION
Status: CANCELLED | OUTPATIENT
Start: 2023-10-18

## 2023-10-18 RX ORDER — PROCHLORPERAZINE EDISYLATE 5 MG/ML
5 INJECTION INTRAMUSCULAR; INTRAVENOUS ONCE AS NEEDED
Status: CANCELLED
Start: 2023-10-18

## 2023-10-18 RX ORDER — ZOLEDRONIC ACID 0.04 MG/ML
4 INJECTION, SOLUTION INTRAVENOUS
Status: COMPLETED | OUTPATIENT
Start: 2023-10-18 | End: 2023-10-18

## 2023-10-18 RX ORDER — BORTEZOMIB 3.5 MG/1
1.3 INJECTION, POWDER, LYOPHILIZED, FOR SOLUTION INTRAVENOUS; SUBCUTANEOUS
Status: COMPLETED | OUTPATIENT
Start: 2023-10-18 | End: 2023-10-18

## 2023-10-18 RX ADMIN — BORTEZOMIB 2.25 MG: 3.5 INJECTION, POWDER, LYOPHILIZED, FOR SOLUTION INTRAVENOUS; SUBCUTANEOUS at 11:10

## 2023-10-18 RX ADMIN — DARATUMUMAB AND HYALURONIDASE-FIHJ (HUMAN RECOMBINANT) 1800 MG: 1800; 30000 INJECTION SUBCUTANEOUS at 11:10

## 2023-10-18 RX ADMIN — ZOLEDRONIC ACID 4 MG: 0.04 INJECTION, SOLUTION INTRAVENOUS at 11:10

## 2023-10-18 NOTE — NURSING
1112: Darzalex/Velcade Injection given without difficulties.Bandaid applied. Patient instructed to stay in the clinic for 15 minutes. Patient verbalized understanding and will notify nurse with any complaints.

## 2023-10-18 NOTE — PLAN OF CARE
"Pt is stable. Pt administered Darzalex/Velcade/Zometa today. Pt voiced she was dong "great," today. Pt will follow up in one week.      Problem: Fall Injury Risk  Goal: Absence of Fall and Fall-Related Injury  Outcome: Ongoing, Progressing     Problem: Infection  Goal: Absence of Infection Signs and Symptoms  Outcome: Ongoing, Progressing     Problem: Anemia (Chemotherapy Effects)  Goal: Anemia Symptom Improvement  Outcome: Ongoing, Progressing     Problem: Urinary Bleeding Risk or Actual (Chemotherapy Effects)  Goal: Absence of Hematuria  Outcome: Ongoing, Progressing     Problem: Nausea and Vomiting (Chemotherapy Effects)  Goal: Fluid and Electrolyte Balance  Outcome: Ongoing, Progressing     Problem: Neurotoxicity (Chemotherapy Effects)  Goal: Neurotoxicity Symptom Control  Outcome: Ongoing, Progressing     Problem: Neutropenia (Chemotherapy Effects)  Goal: Absence of Infection  Outcome: Ongoing, Progressing     Problem: Oral Mucositis (Chemotherapy Effects)  Goal: Improved Oral Mucous Membrane Integrity  Outcome: Ongoing, Progressing     Problem: Thrombocytopenia Bleeding Risk (Chemotherapy Effects)  Goal: Absence of Bleeding  Outcome: Ongoing, Progressing     Problem: Adult Inpatient Plan of Care  Goal: Plan of Care Review  Outcome: Ongoing, Progressing  Goal: Patient-Specific Goal (Individualized)  Outcome: Ongoing, Progressing  Flowsheets (Taken 10/18/2023 1106)  Anxieties, Fears or Concerns: Stem Cell Transplant is a concern to the patient.  Individualized Care Needs: Pt sitting in reclined position, pt decline refreshment.     "

## 2023-10-18 NOTE — DISCHARGE INSTRUCTIONS
Thank you for allowing me to care for you today,  MIS LeachN, RN    [facility]  [address] [address]  [phone] phone     [phone] fax  Hours of Operation: Monday- Friday 8:00am- 5:00pm  After hours phone  [phone]  Hematology / Oncology Physicians on call      Dr. Willi Cortez, JUANI Page, BELLE Valdez, BELLE Deluna, BELLE    Please call with any concerns regarding your appointment today.    FALL PREVENTION   Falls often occur due to slipping, tripping or losing your balance. Here are ways to reduce your risk of falling again.   Was there anything that caused your fall that can be fixed, removed or replaced?   Make your home safe by keeping walkways clear of objects you may trip over.   Use non-slip pads under rugs.   Do not walk in poorly lit areas.   Do not stand on chairs or wobbly ladders.   Use caution when reaching overhead or looking upward. This position can cause a loss of balance.   Be sure your shoes fit properly, have non-slip bottoms and are in good condition.   Be cautious when going up and down stairs, curbs, and when walking on uneven sidewalks.   If your balance is poor, consider using a cane or walker.   If your fall was related to alcohol use, stop or limit alcohol intake.   If your fall was related to use of sleeping medicines, talk to your doctor about this. You may need to reduce your dosage at bedtime if you awaken during the night to go to the bathroom.   To reduce the need for nighttime bathroom trips:   Avoid drinking fluids for several hours before going to bed   Empty your bladder before going to bed   Men can keep a urinal at the bedside   © 7207-8368 [employer] [facility], [address] [place] [address], [place] [address]. All rights reserved. This information is not intended as a substitute for professional medical care. Always follow your healthcare  professional's instructions.

## 2023-10-24 ENCOUNTER — LAB VISIT (OUTPATIENT)
Dept: LAB | Facility: HOSPITAL | Age: 63
End: 2023-10-24
Attending: INTERNAL MEDICINE
Payer: COMMERCIAL

## 2023-10-24 DIAGNOSIS — C90.00 MULTIPLE MYELOMA NOT HAVING ACHIEVED REMISSION: ICD-10-CM

## 2023-10-24 LAB
ALBUMIN SERPL BCP-MCNC: 3.7 G/DL (ref 3.5–5.2)
ALP SERPL-CCNC: 61 U/L (ref 55–135)
ALT SERPL W/O P-5'-P-CCNC: 16 U/L (ref 10–44)
ANION GAP SERPL CALC-SCNC: 11 MMOL/L (ref 8–16)
AST SERPL-CCNC: 15 U/L (ref 10–40)
BASOPHILS # BLD AUTO: 0.04 K/UL (ref 0–0.2)
BASOPHILS NFR BLD: 1 % (ref 0–1.9)
BILIRUB SERPL-MCNC: 0.5 MG/DL (ref 0.1–1)
BUN SERPL-MCNC: 8 MG/DL (ref 8–23)
CALCIUM SERPL-MCNC: 8.8 MG/DL (ref 8.7–10.5)
CHLORIDE SERPL-SCNC: 109 MMOL/L (ref 95–110)
CO2 SERPL-SCNC: 23 MMOL/L (ref 23–29)
CREAT SERPL-MCNC: 1.1 MG/DL (ref 0.5–1.4)
DIFFERENTIAL METHOD: ABNORMAL
EOSINOPHIL # BLD AUTO: 0.1 K/UL (ref 0–0.5)
EOSINOPHIL NFR BLD: 3.4 % (ref 0–8)
ERYTHROCYTE [DISTWIDTH] IN BLOOD BY AUTOMATED COUNT: 14.4 % (ref 11.5–14.5)
EST. GFR  (NO RACE VARIABLE): 56 ML/MIN/1.73 M^2
GLUCOSE SERPL-MCNC: 90 MG/DL (ref 70–110)
HCT VFR BLD AUTO: 39.6 % (ref 37–48.5)
HGB BLD-MCNC: 12.6 G/DL (ref 12–16)
IMM GRANULOCYTES # BLD AUTO: 0.01 K/UL (ref 0–0.04)
IMM GRANULOCYTES NFR BLD AUTO: 0.2 % (ref 0–0.5)
LYMPHOCYTES # BLD AUTO: 1.9 K/UL (ref 1–4.8)
LYMPHOCYTES NFR BLD: 45.6 % (ref 18–48)
MAGNESIUM SERPL-MCNC: 1.7 MG/DL (ref 1.6–2.6)
MCH RBC QN AUTO: 31.1 PG (ref 27–31)
MCHC RBC AUTO-ENTMCNC: 31.8 G/DL (ref 32–36)
MCV RBC AUTO: 98 FL (ref 82–98)
MONOCYTES # BLD AUTO: 0.6 K/UL (ref 0.3–1)
MONOCYTES NFR BLD: 14 % (ref 4–15)
NEUTROPHILS # BLD AUTO: 1.5 K/UL (ref 1.8–7.7)
NEUTROPHILS NFR BLD: 35.8 % (ref 38–73)
NRBC BLD-RTO: 0 /100 WBC
PLATELET # BLD AUTO: 290 K/UL (ref 150–450)
PMV BLD AUTO: 10.3 FL (ref 9.2–12.9)
POTASSIUM SERPL-SCNC: 3.4 MMOL/L (ref 3.5–5.1)
PROT SERPL-MCNC: 7 G/DL (ref 6–8.4)
RBC # BLD AUTO: 4.05 M/UL (ref 4–5.4)
SODIUM SERPL-SCNC: 143 MMOL/L (ref 136–145)
WBC # BLD AUTO: 4.08 K/UL (ref 3.9–12.7)

## 2023-10-24 PROCEDURE — 85025 COMPLETE CBC W/AUTO DIFF WBC: CPT | Performed by: INTERNAL MEDICINE

## 2023-10-24 PROCEDURE — 36415 COLL VENOUS BLD VENIPUNCTURE: CPT | Performed by: INTERNAL MEDICINE

## 2023-10-24 PROCEDURE — 83735 ASSAY OF MAGNESIUM: CPT | Performed by: INTERNAL MEDICINE

## 2023-10-24 PROCEDURE — 80053 COMPREHEN METABOLIC PANEL: CPT | Performed by: INTERNAL MEDICINE

## 2023-10-24 NOTE — PROGRESS NOTES
HEMATOLOGY / ONCOLOGY   CLINIC NOTE          ONCOLOGICAL HISTORY:     Diagnosis:  - Multiple Myeloma IgG lambda    Treatment History:  - VRD (5/10/2023 - 6/28/2023)    Current Treatment:   - D-VRD (7/6/2023 -   - Zometa (10/18/2023     Subjective:       Chief Complaint: Multiple Myeloma, Chemotherapy, and Immunotherapy      HPI    Ana Bonilla  63 y.o.  female with past medical history significant for hypertension, COPD, asthma, GERD, hypothyroidism here for follow-up and management of multiple myeloma    She was referred in 2/2023 by her PCP after workup for gastritis revealed lytic lesions. CT chest showed lytic and expansile destructive lesion in the sternum and lytic lesions at L1. Biopsy of L1 lesion in 3/2023 revealed mature B cell neoplasm most consistent with plasma cell neoplasm.      Bone marrow biopsy in 4/2023 demonstrated 60% plasma cells. PET/CT showed extensive bony lesions throughout the spine, sternum, bilateral ribs, pelvis, and a mild compression deformity in L1. SPEP/MECHE showed IgG M spike 3.19 g/dL, IgG 4,521 mg/dL.      She was started on VRd and then daratumumab was added by the transplant team. She was started on ASA for thrombosis prophylaxis and acyclovir for herpes zoster prophylaxis. Start Zometa after dental evaluation.      In 5/2023, Revlimid was decreased from 25mg po to 10mg due to decreased creatinine clearance of 49. She underwent kyphoplasty with radiofrequency ablation on 6/8/2023 by Neurosurgery.     Interval History:     Patient here for follow-up and next cycle of treatment.  She is complaining of fatigue and her eye infection is better after being started on treatment.  She is c/o intermittent pain in her right hip region but does not want to have any imaging done at the moment         Past Medical History:   Diagnosis Date    Allergy     Amblyopia OS    Anxiety     Asthma     COPD (chronic obstructive pulmonary disease)     NO HOME o2    GERD (gastroesophageal  reflux disease)     Hyperlipidemia     Hypertension     PONV (postoperative nausea and vomiting)     Thyroid disease       Past Surgical History:   Procedure Laterality Date    APPENDECTOMY      BIOPSY N/A 6/8/2023    Procedure: BIOPSY;  Surgeon: Fausto Smith MD;  Location: [facility] OR;  Service: Neurosurgery;  Laterality: N/A;  L1    BUNIONECTOMY      COLONOSCOPY N/A 12/4/2019    Procedure: COLONOSCOPY;  Surgeon: Danny Matos III, MD;  Location: [facility] ENDO;  Service: Endoscopy;  Laterality: N/A;    COLONOSCOPY N/A 10/24/2022    Procedure: COLONOSCOPY;  Surgeon: Danny Matos III, MD;  Location: [facility] ENDO;  Service: Endoscopy;  Laterality: N/A;    ESOPHAGOGASTRODUODENOSCOPY N/A 10/24/2022    Procedure: EGD (ESOPHAGOGASTRODUODENOSCOPY);  Surgeon: Danny Matos III, MD;  Location: [facility] ENDO;  Service: Endoscopy;  Laterality: N/A;    FIXATION KYPHOPLASTY Bilateral 6/8/2023    Procedure: KYPHOPLASTY;  Surgeon: Fausto Smith MD;  Location: [facility] OR;  Service: Neurosurgery;  Laterality: Bilateral;  kyphoplasty and radiofrequency ablation - L1    HYSTERECTOMY      PARTIAL//still with ovaries    neck fusion  08/2017    THYROIDECTOMY       Social History     Socioeconomic History    Marital status:     Number of children: 2   Occupational History     Employer: [employer]   Tobacco Use    Smoking status: Every Day     Current packs/day: 0.50     Average packs/day: 0.5 packs/day for 50.0 years (25.0 ttl pk-yrs)     Types: Cigarettes    Smokeless tobacco: Never   Substance and Sexual Activity    Alcohol use: Never     Comment: quit    Drug use: No    Sexual activity: Never     Social Determinants of Health     Stress: No Stress Concern Present (7/8/2019)    [place] citizen [place] [employer] - Occupational Stress Questionnaire     Feeling of Stress : Not at all      Family History   Problem Relation Age of Onset    Hypertension Mother     Diabetes Mother     Diabetes Father     Stroke Maternal Grandmother      Prostate cancer Maternal Grandfather     Mental illness Son     Pancreatic cancer Maternal Uncle     Mental illness Other     Pancreatic cancer Other     Ovarian cancer Maternal Cousin       Review of patient's allergies indicates:   Allergen Reactions    Hydrocodone-acetaminophen Other (See Comments)     Causes pt to feel extremely sick       Review of Systems   Constitutional:  Positive for fatigue. Negative for activity change, chills and fever.   HENT: Negative.     Eyes:  Negative for pain, discharge and redness.   Respiratory:  Negative for cough, shortness of breath and wheezing.    Cardiovascular:  Negative for chest pain and leg swelling.   Gastrointestinal:  Positive for nausea. Negative for constipation, diarrhea and vomiting.   Endocrine: Negative.    Genitourinary: Negative.    Musculoskeletal:  Positive for back pain. Negative for arthralgias and myalgias.   Integumentary:  Negative.   Allergic/Immunologic: Negative.    Neurological:  Positive for numbness. Negative for light-headedness and headaches.   Hematological: Negative.    Psychiatric/Behavioral: Negative.           Objective:        There were no vitals filed for this visit.                 Physical Exam  Constitutional:       Appearance: Normal appearance. She is not ill-appearing.   HENT:      Head: Normocephalic.   Eyes:      Extraocular Movements: Extraocular movements intact.   Pulmonary:      Effort: No respiratory distress.   Neurological:      Mental Status: She is alert and oriented to person, place, and time. Mental status is at baseline.   Psychiatric:         Mood and Affect: Mood normal.         Behavior: Behavior normal.           LABS / IMAGING      - 06/06/2023 RIGHT ILIAC CREST BONE MARROW ASPIRATE, BONE MARROW CLOT, AND BONE MARROW CORE BIOPSY WITH:     CELLULARITY=40-60%, TRILINEAGE HEMATOPOIETIC ACTIVITY (M/E=2.9:1).   CONSISTENT WITH PLASMA CELL NEOPLASM(60%).  SEE COMMENT.   FOCAL GRADE 1 RETICULAR FIBROSIS.   CONGO  RED NEGATIVE.   INCREASED STORAGE IRON.   ADEQUATE NUMBER OF MEGAKARYOCYTES.    Bone marrow karyotype results: 46, XX[20], female karyotype.     Myeloma fixed cell, high-risk, FISH:  Normal.  The result is within normal limits for 1q duplication, TP53 deletion and IGH rearrangement.       - 04/10/2023 PET: Numerous FDG avid predominately lytic osseous lesions as above.  Primary differential considerations would include multiple myeloma versus metastatic disease.  2. No FDG avid soft tissue masses or adenopathy demonstrated.  3. Mild pathologic compression deformity of the L1 vertebral body.                                            Assessment:     IgG lambda Multiple myeloma, R-ISS stage I  - BMBx : 60 % plasma cells - 4/6/2023  - SPEP/MECHE showed IgG lambda - monoclonal protein 3.19 g/dl - (3/27/2023).  - R-ISS stage I (beta 2 microglobulin 1.9, albumin 3.5, , normal karyotype and no high-risk mutations on fish panel)  - PET-CT ( 4/10/2023) - showed extensive lytic lesions in the axial skeleton and mild L1 compression deformity.  - Started with Induction chemotherapy with VRD regimen (Velcde/Revlimid/Decadron) - (5/10/2023 - 6/28/2023)  - Aspirin for thrombosis prophylaxis; Acyclovir 400 mg p.o BID for herpes Zoster prophylaxis .  - patient was evaluated by transplant team and then switched to D-VRD (7/6/2023 -    - repeat creatinine clearance is more than 60, adjust the dose of Revlimid to 25 mg daily for 21 days on 7 days off as recommended by the transplant team but patient unable to tolerate it and thus decreased it back to 10 mg daily    Cancer-related pain / palliative care by specialist  -  checked 10/04/2023   - switch McBain to Percocet as patient is unable to tolerate Norco  - refilled alprazolam - 10/04/2023     Meibomian gland dysfunction (MGD) of both eyes. Hordeolum externum of left upper eyelid   - started on doxycycline by Ophthalmology and plan of excision    Plan:     - Patient treatment  had been held multiple times with few treatments cancelled and also Revlimid 10 mg daily given as unable to tolerate higher dose.   - labs reviewed, continue with cycle 4 day 8.    - dental clearance given, continue with zometa q4wk, next dose on 11/15/2023   - Continue Aspirin; Acyclovir 400 mg p.o BID for herpes Zoster prophylaxis.  - follow up with BM transplant team   - follow up with Ophthalmology for management of style / hordeolum  - hypokalemia repleted  - recommended to be compliant with calcium and vitamin-D for hypocalcemia  - MD / LABS / TREATMENT VISIT - 1 WEEK for cycle 4 day 15 treatment       Med Onc Chart Routing      Follow up with physician 3 weeks.   Follow up with WERNER 1 week and 2 weeks.   Infusion scheduling note   Chemo today and next week   Injection scheduling note    Labs CBC, CMP and magnesium   Scheduling:  Preferred lab:  Lab interval:  Labs before next visit   Imaging    Pharmacy appointment    Other referrals         Please schedule the patient with a bone marrow transplant team             The patient was seen, interviewed and examined. Pertinent lab and radiology studies were reviewed. Pt instructed to call should develop concerning signs/symptoms or have further questions.       Portions of the record may have been created with voice recognition software. Occasional wrong-word or sound-a-like substitutions may have occurred due to the inherent limitations of voice recognition software. Read the chart carefully and recognize, using context, where substitutions have occurred.    Jose Dela Cruz MD  Hematology / Oncology

## 2023-10-25 ENCOUNTER — PATIENT MESSAGE (OUTPATIENT)
Dept: ADMINISTRATIVE | Facility: OTHER | Age: 63
End: 2023-10-25
Payer: COMMERCIAL

## 2023-10-25 ENCOUNTER — INFUSION (OUTPATIENT)
Dept: INFUSION THERAPY | Facility: HOSPITAL | Age: 63
End: 2023-10-25
Attending: INTERNAL MEDICINE
Payer: COMMERCIAL

## 2023-10-25 ENCOUNTER — OFFICE VISIT (OUTPATIENT)
Dept: HEMATOLOGY/ONCOLOGY | Facility: CLINIC | Age: 63
End: 2023-10-25
Payer: COMMERCIAL

## 2023-10-25 VITALS
DIASTOLIC BLOOD PRESSURE: 72 MMHG | RESPIRATION RATE: 18 BRPM | HEART RATE: 78 BPM | WEIGHT: 134.06 LBS | HEIGHT: 67 IN | OXYGEN SATURATION: 99 % | BODY MASS INDEX: 21.04 KG/M2 | TEMPERATURE: 98 F | SYSTOLIC BLOOD PRESSURE: 97 MMHG

## 2023-10-25 DIAGNOSIS — Z79.899 IMMUNODEFICIENCY DUE TO CHEMOTHERAPY: ICD-10-CM

## 2023-10-25 DIAGNOSIS — D84.821 IMMUNODEFICIENCY DUE TO CHEMOTHERAPY: ICD-10-CM

## 2023-10-25 DIAGNOSIS — C90.00 MULTIPLE MYELOMA NOT HAVING ACHIEVED REMISSION: Chronic | ICD-10-CM

## 2023-10-25 DIAGNOSIS — Z51.11 ENCOUNTER FOR ANTINEOPLASTIC CHEMOTHERAPY: ICD-10-CM

## 2023-10-25 DIAGNOSIS — C90.00 MULTIPLE MYELOMA, REMISSION STATUS UNSPECIFIED: Primary | ICD-10-CM

## 2023-10-25 DIAGNOSIS — G89.3 CANCER RELATED PAIN: ICD-10-CM

## 2023-10-25 DIAGNOSIS — E87.6 HYPOKALEMIA: ICD-10-CM

## 2023-10-25 DIAGNOSIS — T45.1X5A IMMUNODEFICIENCY DUE TO CHEMOTHERAPY: ICD-10-CM

## 2023-10-25 DIAGNOSIS — Z51.12 ENCOUNTER FOR ANTINEOPLASTIC IMMUNOTHERAPY: Primary | ICD-10-CM

## 2023-10-25 PROCEDURE — 99215 PR OFFICE/OUTPT VISIT, EST, LEVL V, 40-54 MIN: ICD-10-PCS | Mod: 95,,, | Performed by: INTERNAL MEDICINE

## 2023-10-25 PROCEDURE — 3044F HG A1C LEVEL LT 7.0%: CPT | Mod: CPTII,95,, | Performed by: INTERNAL MEDICINE

## 2023-10-25 PROCEDURE — 96401 CHEMO ANTI-NEOPL SQ/IM: CPT

## 2023-10-25 PROCEDURE — 99215 OFFICE O/P EST HI 40 MIN: CPT | Mod: 95,,, | Performed by: INTERNAL MEDICINE

## 2023-10-25 PROCEDURE — 63600175 PHARM REV CODE 636 W HCPCS: Mod: JZ,JG | Performed by: INTERNAL MEDICINE

## 2023-10-25 PROCEDURE — 3044F PR MOST RECENT HEMOGLOBIN A1C LEVEL <7.0%: ICD-10-PCS | Mod: CPTII,95,, | Performed by: INTERNAL MEDICINE

## 2023-10-25 PROCEDURE — 4010F ACE/ARB THERAPY RXD/TAKEN: CPT | Mod: CPTII,95,, | Performed by: INTERNAL MEDICINE

## 2023-10-25 PROCEDURE — 4010F PR ACE/ARB THEARPY RXD/TAKEN: ICD-10-PCS | Mod: CPTII,95,, | Performed by: INTERNAL MEDICINE

## 2023-10-25 RX ORDER — BORTEZOMIB 3.5 MG/1
1.3 INJECTION, POWDER, LYOPHILIZED, FOR SOLUTION INTRAVENOUS; SUBCUTANEOUS
Status: CANCELLED | OUTPATIENT
Start: 2023-10-25

## 2023-10-25 RX ORDER — SODIUM CHLORIDE 0.9 % (FLUSH) 0.9 %
10 SYRINGE (ML) INJECTION
Status: CANCELLED | OUTPATIENT
Start: 2023-10-25

## 2023-10-25 RX ORDER — POTASSIUM CHLORIDE 750 MG/1
40 TABLET, EXTENDED RELEASE ORAL ONCE
Status: CANCELLED
Start: 2023-10-25

## 2023-10-25 RX ORDER — HEPARIN 100 UNIT/ML
500 SYRINGE INTRAVENOUS
Status: CANCELLED | OUTPATIENT
Start: 2023-10-25

## 2023-10-25 RX ORDER — BORTEZOMIB 3.5 MG/1
1.3 INJECTION, POWDER, LYOPHILIZED, FOR SOLUTION INTRAVENOUS; SUBCUTANEOUS
Status: COMPLETED | OUTPATIENT
Start: 2023-10-25 | End: 2023-10-25

## 2023-10-25 RX ORDER — PROCHLORPERAZINE EDISYLATE 5 MG/ML
5 INJECTION INTRAMUSCULAR; INTRAVENOUS ONCE AS NEEDED
Status: CANCELLED
Start: 2023-10-25

## 2023-10-25 RX ADMIN — BORTEZOMIB 2.25 MG: 3.5 INJECTION, POWDER, LYOPHILIZED, FOR SOLUTION INTRAVENOUS; SUBCUTANEOUS at 10:10

## 2023-10-25 NOTE — DISCHARGE INSTRUCTIONS
Thank you for allowing me to care for you today,  MIS LeachN, RN    [facility]  [address] [address]  [phone] phone     [phone] fax  Hours of Operation: Monday- Friday 8:00am- 5:00pm  After hours phone  [phone]  Hematology / Oncology Physicians on call      Dr. Willi Cortez, JUANI Page, BELLE Valdez, BELLE Deluna, BELLE    Please call with any concerns regarding your appointment today.    FALL PREVENTION   Falls often occur due to slipping, tripping or losing your balance. Here are ways to reduce your risk of falling again.   Was there anything that caused your fall that can be fixed, removed or replaced?   Make your home safe by keeping walkways clear of objects you may trip over.   Use non-slip pads under rugs.   Do not walk in poorly lit areas.   Do not stand on chairs or wobbly ladders.   Use caution when reaching overhead or looking upward. This position can cause a loss of balance.   Be sure your shoes fit properly, have non-slip bottoms and are in good condition.   Be cautious when going up and down stairs, curbs, and when walking on uneven sidewalks.   If your balance is poor, consider using a cane or walker.   If your fall was related to alcohol use, stop or limit alcohol intake.   If your fall was related to use of sleeping medicines, talk to your doctor about this. You may need to reduce your dosage at bedtime if you awaken during the night to go to the bathroom.   To reduce the need for nighttime bathroom trips:   Avoid drinking fluids for several hours before going to bed   Empty your bladder before going to bed   Men can keep a urinal at the bedside   © 5548-7614 [facility], [address] [place] [address], [place] [address]. All rights reserved. This information is not intended as a substitute for professional medical care. Always follow your healthcare  professional's instructions.

## 2023-10-25 NOTE — PLAN OF CARE
"Pt is stable. Pt administered Velcade today. Pt voiced she was dong "alright," this morning.     Problem: Fall Injury Risk  Goal: Absence of Fall and Fall-Related Injury  Outcome: Ongoing, Progressing     Problem: Infection  Goal: Absence of Infection Signs and Symptoms  Outcome: Ongoing, Progressing     Problem: Anemia (Chemotherapy Effects)  Goal: Anemia Symptom Improvement  Outcome: Ongoing, Progressing     Problem: Urinary Bleeding Risk or Actual (Chemotherapy Effects)  Goal: Absence of Hematuria  Outcome: Ongoing, Progressing     Problem: Nausea and Vomiting (Chemotherapy Effects)  Goal: Fluid and Electrolyte Balance  Outcome: Ongoing, Progressing     Problem: Neurotoxicity (Chemotherapy Effects)  Goal: Neurotoxicity Symptom Control  Outcome: Ongoing, Progressing     Problem: Neutropenia (Chemotherapy Effects)  Goal: Absence of Infection  Outcome: Ongoing, Progressing     Problem: Oral Mucositis (Chemotherapy Effects)  Goal: Improved Oral Mucous Membrane Integrity  Outcome: Ongoing, Progressing     Problem: Adult Inpatient Plan of Care  Goal: Plan of Care Review  Outcome: Ongoing, Progressing  Goal: Patient-Specific Goal (Individualized)  Outcome: Ongoing, Progressing  Flowsheets (Taken 10/25/2023 1002)  Anxieties, Fears or Concerns: Stem Cell Transplain is a concern to the patient  Individualized Care Needs: Pt sitting in reclined position, pr delcined refreshments.     "

## 2023-10-30 ENCOUNTER — OFFICE VISIT (OUTPATIENT)
Dept: HEMATOLOGY/ONCOLOGY | Facility: CLINIC | Age: 63
End: 2023-10-30
Payer: COMMERCIAL

## 2023-10-30 DIAGNOSIS — C90.00 MULTIPLE MYELOMA, REMISSION STATUS UNSPECIFIED: Primary | ICD-10-CM

## 2023-10-30 DIAGNOSIS — F17.200 SMOKER: ICD-10-CM

## 2023-10-30 DIAGNOSIS — Z76.82 STEM CELL TRANSPLANT CANDIDATE: ICD-10-CM

## 2023-10-30 DIAGNOSIS — R45.89 ANXIETY ABOUT HEALTH: ICD-10-CM

## 2023-10-30 PROCEDURE — 4010F ACE/ARB THERAPY RXD/TAKEN: CPT | Mod: CPTII,95,, | Performed by: INTERNAL MEDICINE

## 2023-10-30 PROCEDURE — 4010F PR ACE/ARB THEARPY RXD/TAKEN: ICD-10-PCS | Mod: CPTII,95,, | Performed by: INTERNAL MEDICINE

## 2023-10-30 PROCEDURE — 3044F PR MOST RECENT HEMOGLOBIN A1C LEVEL <7.0%: ICD-10-PCS | Mod: CPTII,95,, | Performed by: INTERNAL MEDICINE

## 2023-10-30 PROCEDURE — 99215 PR OFFICE/OUTPT VISIT, EST, LEVL V, 40-54 MIN: ICD-10-PCS | Mod: 95,,, | Performed by: INTERNAL MEDICINE

## 2023-10-30 PROCEDURE — 3044F HG A1C LEVEL LT 7.0%: CPT | Mod: CPTII,95,, | Performed by: INTERNAL MEDICINE

## 2023-10-30 PROCEDURE — 99215 OFFICE O/P EST HI 40 MIN: CPT | Mod: 95,,, | Performed by: INTERNAL MEDICINE

## 2023-10-30 RX ORDER — IPRATROPIUM BROMIDE 21 UG/1
2 SPRAY, METERED NASAL 3 TIMES DAILY
COMMUNITY
Start: 2023-10-27

## 2023-10-30 NOTE — PROGRESS NOTES
"Tpatient location is: home  The chief complaint leading to consultation is: MM/SCT eval     Visit type: audiovisual    Face to Face time with patient: 20  75 minutes of total time spent on the encounter, which includes face to face time and non-face to face time preparing to see the patient (eg, review of tests), Obtaining and/or reviewing separately obtained history, Documenting clinical information in the electronic or other health record, Independently interpreting results (not separately reported) and communicating results to the patient/family/caregiver, or Care coordination (not separately reported).         Each patient to whom he or she provides medical services by telemedicine is:  (1) informed of the relationship between the physician and patient and the respective role of any other health care provider with respect to management of the patient; and (2) notified that he or she may decline to receive medical services by telemedicine and may withdraw from such care at any time.    Notes:   SECTION OF HEMATOLOGY AND BONE MARROW TRANSPLANT  Return  Patient Visit   10/30/2023  Referred by:  No ref. provider found  Referred for: MM/SCT evaluation     CHIEF COMPLAINT: No chief complaint on file.      HISTORY OF PRESENT ILLNESS:   Initial consult note June 2023:   63 y.o.female; pmh of  hypertension, COPD, asthma, GERD, hypothyroidism; her onclogic history is notable for history of Multiple Myeloma; referred for SCT by local oncologist Dr. Jose Dela Cruz.    With regard to her myeloma history; progressive bone pain.      From outside referring oncologist's note -"BMBx on 4/6/2023 showed 60 % plasma cells. PET-CT on 4/10/2023 showed extensive lytic bony lesions throughout the spine, sternum, bilateral ribs, pelvis and mild compression deformity in L1 vertebral body. SPEP/MECHE on 3/27/2023 showed IgG lambda monoclonal protein - 3.19 g/dl. Quantitative immunoglobulins on 3/27/2023 showed IgG 4,521 mg/dl. UPEP/MECHE and " "FLC were pending at that time. Labs on 3/27/2023 showed Beta 2 microglobulin 1.9, .  Labs on 4/19/2023 showed Hb, 11.5, WBC 6.3, platelets, BUN 11, Cr 0.9, calcium 9. Pt initiated on VRD 05/10/23."    Currently mid cycle 2, 21 day cycle VRd.    I do not see any fu biochemical studies done since initiating treatment.     Of note underwent kyphoplasty/biopsy of vertebrae on 6/8/23.  Some relief in back pain but other hip pain worsening.  Lidocaine patch, percocet make her groggy/nauseated.    Required dose reduction of rev to 10mg because "was affecting her kidney" but states no other side effects; on epic review her Cr has been stable.    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    and lives with .      Schizophrenic son she is caretaker for but he Lives alone. Long time active  Smoker.      Interval history -  10/30/23  Currently C4D14 Rosa-VRd; rev has been reduced back down to 10mg due to severe asthenia at 25mg and Improved.  Otherwise tolerating therapy with no major AEs.  Serial biochemical studies reveal VGPR to therapy thus far.   Chronic MSK/hip pain from old myeloma lesions persists but is stable.  Remains extremely anxious and undecided about SCT.      Has learned she needs $500 of dental work and can not afford.     PAST MEDICAL HISTORY:   Past Medical History:   Diagnosis Date    Allergy     Amblyopia OS    Anxiety     Asthma     COPD (chronic obstructive pulmonary disease)     NO HOME o2    GERD (gastroesophageal reflux disease)     Hyperlipidemia     Hypertension     PONV (postoperative nausea and vomiting)     Thyroid disease        PAST SURGICAL HISTORY:   Past Surgical History:   Procedure Laterality Date    APPENDECTOMY      BIOPSY N/A 6/8/2023    Procedure: BIOPSY;  Surgeon: Fausto Smith MD;  Location: [facility] OR;  Service: Neurosurgery;  Laterality: N/A;  L1    BUNIONECTOMY      COLONOSCOPY N/A 12/4/2019    Procedure: COLONOSCOPY;  Surgeon: Danny Matos III, MD;  " Location: [facility] ENDO;  Service: Endoscopy;  Laterality: N/A;    COLONOSCOPY N/A 10/24/2022    Procedure: COLONOSCOPY;  Surgeon: Danny Matos III, MD;  Location: [facility] ENDO;  Service: Endoscopy;  Laterality: N/A;    ESOPHAGOGASTRODUODENOSCOPY N/A 10/24/2022    Procedure: EGD (ESOPHAGOGASTRODUODENOSCOPY);  Surgeon: Danny Matos III, MD;  Location: [facility] ENDO;  Service: Endoscopy;  Laterality: N/A;    FIXATION KYPHOPLASTY Bilateral 6/8/2023    Procedure: KYPHOPLASTY;  Surgeon: Fausto Smith MD;  Location: [facility] OR;  Service: Neurosurgery;  Laterality: Bilateral;  kyphoplasty and radiofrequency ablation - L1    HYSTERECTOMY      PARTIAL//still with ovaries    neck fusion  08/2017    THYROIDECTOMY         PAST SOCIAL HISTORY:   reports that she has been smoking cigarettes. She has a 25.0 pack-year smoking history. She has never used smokeless tobacco. She reports that she does not drink alcohol and does not use drugs.    FAMILY HISTORY:  Family History   Problem Relation Age of Onset    Hypertension Mother     Diabetes Mother     Diabetes Father     Stroke Maternal Grandmother     Prostate cancer Maternal Grandfather     Mental illness Son     Pancreatic cancer Maternal Uncle     Mental illness Other     Pancreatic cancer Other     Ovarian cancer Maternal Cousin        CURRENT MEDICATIONS:   Current Outpatient Medications   Medication Sig    acyclovir (ZOVIRAX) 400 MG tablet Take 1 tablet (400 mg total) by mouth 2 (two) times daily.    albuterol (PROVENTIL HFA) 90 mcg/actuation inhaler Inhale 2 puffs into the lungs every 6 (six) hours as needed for Wheezing. Rescue    ALPRAZolam (XANAX) 2 MG Tab TAKE 1 TABLET BY MOUTH TWICE DAILY AS NEEDED FOR ANXIETY    amlodipine-benazepril 2.5-10 mg (LOTREL) 2.5-10 mg per capsule TAKE 1 CAPSULE BY MOUTH EVERY DAY    aspirin (ECOTRIN) 81 MG EC tablet Take 1 tablet (81 mg total) by mouth once daily.    benzonatate (TESSALON) 200 MG capsule Take 200 mg by mouth.     calcium-vitamin D 600 mg-10 mcg (400 unit) Tab Take 1 tablet by mouth every 12 (twelve) hours.    dexAMETHasone (DECADRON) 4 MG Tab Take 10 tablets (40 mg total) by mouth every 7 days. ( once weekly by mouth on days 1, 8 and 15 every 21 day cycle)    dexAMETHasone (DECADRON) 4 MG Tab Take 10 tablets (40 mg total) by mouth As instructed. Daily on days 1, 8, 15, and 22 of each chemotherapy cycle. Take with food.    diazePAM (VALIUM) 5 MG tablet Take 1 tablet by mouth 30 minutes prior to MRI to help decrease anxiety.    fluticasone propionate (FLONASE) 50 mcg/actuation nasal spray 1 spray (50 mcg total) by Each Nostril route once daily.    fluticasone-salmeterol diskus inhaler 250-50 mcg Inhale 1 puff into the lungs 2 (two) times daily. Controller    lenalidomide 10 mg Cap TAKE 1 CAPSULE ONCE DAILY FOR 21 DAYS AND THEN 7 DAYS OFF.    levocetirizine (XYZAL) 5 MG tablet Take 1 tablet (5 mg total) by mouth every evening.    levothyroxine (SYNTHROID) 100 MCG tablet Take 1 tablet (100 mcg total) by mouth before breakfast.    linaCLOtide (LINZESS) 72 mcg Cap capsule Take 2 capsules (144 mcg total) by mouth before breakfast.    methylPREDNISolone (MEDROL DOSEPACK) 4 mg tablet use as directed    metoprolol succinate (TOPROL-XL) 50 MG 24 hr tablet Take 1 tablet (50 mg total) by mouth every evening.    montelukast (SINGULAIR) 10 mg tablet Take 1 tablet (10 mg total) by mouth every evening.    neomycin-polymyxin-dexamethasone (DEXACINE) 3.5 mg/g-10,000 unit/g-0.1 % Oint Place into both eyes 3 (three) times daily.    nicotine (NICODERM CQ) 14 mg/24 hr Place 1 patch onto the skin once daily.    ondansetron (ZOFRAN) 8 MG tablet Take 1 tablet (8 mg total) by mouth every 12 (twelve) hours as needed for Nausea.    pantoprazole (PROTONIX) 40 MG tablet TAKE 1 TABLET(40 MG) BY MOUTH EVERY DAY (Patient taking differently: Take 40 mg by mouth daily as needed.)    prochlorperazine (COMPAZINE) 5 MG tablet Take 1 tablet (5 mg total) by mouth  every 6 (six) hours as needed for Nausea.    promethazine (PHENERGAN) 25 MG tablet Take 1 tablet (25 mg total) by mouth every 4 (four) hours.    promethazine-codeine 6.25-10 mg/5 ml (PHENERGAN WITH CODEINE) 6.25-10 mg/5 mL syrup Take 5 mLs by mouth every 4 (four) hours as needed for Cough.    rosuvastatin (CRESTOR) 10 MG tablet Take 1 tablet (10 mg total) by mouth every evening.    traZODone (DESYREL) 50 MG tablet Take 1 tablet (50 mg total) by mouth every evening.     No current facility-administered medications for this visit.     Facility-Administered Medications Ordered in Other Visits   Medication    lactated ringers infusion     ALLERGIES:   Review of patient's allergies indicates:   Allergen Reactions    Hydrocodone-acetaminophen Other (See Comments)     Causes pt to feel extremely sick              REVIEW OF SYSTEMS:   See HPI    PHYSICAL EXAM:   physical exam deferred due to telemed   Appears well on camera     ECOG Performance Status: (foot note - ECOG PS provided by Eastern Cooperative Oncology Group) 1 - Symptomatic but completely ambulatory    Karnofsky Performance Score:  80%- Normal Activity with Effort: Some Symptoms of Disease  DATA:   Lab Results   Component Value Date    WBC 4.08 10/24/2023    HGB 12.6 10/24/2023    HCT 39.6 10/24/2023    MCV 98 10/24/2023     10/24/2023       Gran # (ANC)   Date Value Ref Range Status   10/24/2023 1.5 (L) 1.8 - 7.7 K/uL Final     Gran %   Date Value Ref Range Status   10/24/2023 35.8 (L) 38.0 - 73.0 % Final     CMP  Sodium   Date Value Ref Range Status   10/24/2023 143 136 - 145 mmol/L Final     Potassium   Date Value Ref Range Status   10/24/2023 3.4 (L) 3.5 - 5.1 mmol/L Final     Chloride   Date Value Ref Range Status   10/24/2023 109 95 - 110 mmol/L Final     CO2   Date Value Ref Range Status   10/24/2023 23 23 - 29 mmol/L Final     Glucose   Date Value Ref Range Status   10/24/2023 90 70 - 110 mg/dL Final     BUN   Date Value Ref Range Status    10/24/2023 8 8 - 23 mg/dL Final     Creatinine   Date Value Ref Range Status   10/24/2023 1.1 0.5 - 1.4 mg/dL Final     Calcium   Date Value Ref Range Status   10/24/2023 8.8 8.7 - 10.5 mg/dL Final     Total Protein   Date Value Ref Range Status   10/24/2023 7.0 6.0 - 8.4 g/dL Final     Albumin   Date Value Ref Range Status   10/24/2023 3.7 3.5 - 5.2 g/dL Final     Total Bilirubin   Date Value Ref Range Status   10/24/2023 0.5 0.1 - 1.0 mg/dL Final     Comment:     For infants and newborns, interpretation of results should be based  on gestational age, weight and in agreement with clinical  observations.    Premature Infant recommended reference ranges:  Up to 24 hours.............<8.0 mg/dL  Up to 48 hours............<12.0 mg/dL  3-5 days..................<15.0 mg/dL  6-29 days.................<15.0 mg/dL       Alkaline Phosphatase   Date Value Ref Range Status   10/24/2023 61 55 - 135 U/L Final     AST   Date Value Ref Range Status   10/24/2023 15 10 - 40 U/L Final     ALT   Date Value Ref Range Status   10/24/2023 16 10 - 44 U/L Final     Anion Gap   Date Value Ref Range Status   10/24/2023 11 8 - 16 mmol/L Final     eGFR   Date Value Ref Range Status   10/24/2023 56 (A) >60 mL/min/1.73 m^2 Final     IgG   Date Value Ref Range Status   10/10/2023 856 650 - 1600 mg/dL Final     Comment:     IgG Cord Blood Reference Range: 650-1600 mg/dL.     IgA   Date Value Ref Range Status   10/10/2023 38 (L) 40 - 350 mg/dL Final     Comment:     IgA Cord Blood Reference Range: <5 mg/dL.     IgM   Date Value Ref Range Status   10/10/2023 20 (L) 50 - 300 mg/dL Final     Comment:     IgM Cord Blood Reference Range: <25 mg/dL.     Kappa Free Light Chains   Date Value Ref Range Status   10/17/2023 1.41 0.33 - 1.94 mg/dL Final   10/10/2023 1.87 0.33 - 1.94 mg/dL Final   08/01/2023 1.04 0.33 - 1.94 mg/dL Final     Lambda Free Light Chains   Date Value Ref Range Status   10/17/2023 1.17 0.57 - 2.63 mg/dL Final   10/10/2023 1.32 0.57  - 2.63 mg/dL Final   08/01/2023 1.34 0.57 - 2.63 mg/dL Final     Kappa/Lambda FLC Ratio   Date Value Ref Range Status   10/17/2023 1.21 0.26 - 1.65 Final     Comment:     Undetected antigen excess is a rare event but cannot   be excluded. If these free light chain results do not   agree with other clinical or laboratory findings or   if the sample is from a patient that has previously   demonstrated antigen excess, discuss with the testing   laboratory.   Results should always be interpreted in conjunction   with other laboratory tests and clinical evidence.     10/10/2023 1.42 0.26 - 1.65 Final     Comment:     Undetected antigen excess is a rare event but cannot   be excluded. If these free light chain results do not   agree with other clinical or laboratory findings or   if the sample is from a patient that has previously   demonstrated antigen excess, discuss with the testing   laboratory.   Results should always be interpreted in conjunction   with other laboratory tests and clinical evidence.     08/01/2023 0.78 0.26 - 1.65 Final     Comment:     Undetected antigen excess is a rare event but cannot   be excluded. If these free light chain results do not   agree with other clinical or laboratory findings or   if the sample is from a patient that has previously   demonstrated antigen excess, discuss with the testing   laboratory.   Results should always be interpreted in conjunction   with other laboratory tests and clinical evidence.       Pathologist Interpretation SPE   Date Value Ref Range Status   10/17/2023 REVIEWED  Final     Comment:       Electronically reviewed and signed by:  Monse Saldana MD  Signed on 10/18/23 at 15:14  Normal total protein. Normal gamma globulins are decreased.   Paraprotein peak in near-gamma = 0.38 g/dL (previously 0.40 g/dL).      10/10/2023 REVIEWED  Final     Comment:       Electronically reviewed and signed by:  Monse Saldana MD  Signed on 10/11/23 at  15:18  Normal total protein. Normal gamma globulins are decreased.   Paraprotein peak in near-gamma = 0.40 g/dL (previously 1.06 g/dL).      08/01/2023 REVIEWED  Final     Comment:       Electronically reviewed and signed by:  Jessica Wells D.O.  Signed on 08/06/23 at 17:25  Normal total protein. Normal gamma globulins are decreased.  Paraprotein peak in near-gamma = 1.06 g/dL (previously, 2.15 g/dL).       Pathologist Interpretation MECHE   Date Value Ref Range Status   10/10/2023 REVIEWED  Final     Comment:       Electronically reviewed and signed by:  Monse Saldana MD  Signed on 10/11/23 at 15:18  Adjacent IgG lambda and IgG kappa specific monoclonal bands present.            Bone marrow biopsy - 4/6/23  Final Pathologic Diagnosis     RIGHT ILIAC CREST BONE MARROW ASPIRATE, BONE MARROW CLOT, AND BONE MARROW CORE BIOPSY WITH:     CELLULARITY=40-60%, TRILINEAGE HEMATOPOIETIC ACTIVITY (M/E=2.9:1).   CONSISTENT WITH PLASMA CELL NEOPLASM(60%).  SEE COMMENT.   FOCAL GRADE 1 RETICULAR FIBROSIS.   CONGO RED NEGATIVE.   INCREASED STORAGE IRON.   ADEQUATE NUMBER OF MEGAKARYOCYTES.  VC      Comment: Interp By Olga Lidia Brand MD, Signed on 04/24/2023 at 16:08   Supplemental Diagnosis     [facility]:   [facility] [facility] [place] [address])     Bone marrow karyotype results: 46, XX[20], female karyotype.     Myeloma fixed cell, high-risk, FISH:  Normal.  The result is within normal limits for 1q duplication, TP53 deletion and IGH rearrangement           Verterbral biopsy - 6/8/23  1. L1 vertebral body,  biopsy:   - Consistent with plasma cell myeloma.    Results for orders placed or performed during the hospital encounter of 04/10/23 (from the past 2160 hour(s))   NM PET CT Whole Body     Impression     1. Numerous FDG avid predominately lytic osseous lesions as above.  Primary differential considerations would include multiple myeloma versus metastatic disease.  2. No FDG avid soft tissue masses or  adenopathy demonstrated.  3. Mild pathologic compression deformity of the L1 vertebral body.  This report was flagged in Epic as abnormal.        Electronically signed by:       Marino Pollack MD  Date:                                                04/10/2023  Time:                                                11:58     ASSESSMENT AND PLAN:   Encounter Diagnoses   Name Primary?    Multiple myeloma, remission status unspecified Yes    Stem cell transplant candidate     Anxiety about health     Smoker          -R-ISS stage I IgG Lambda multiple myeloma; disease complicated by diffuse lytic disease disease diagnosed after presenting with symptomatic bone disease April 2023  -initiated on VRd therapy on 5/10/23; generally tolerating  well; her revlimid has been dose reduced to 10mg from 25 mg for unclear reasons (was told was affecting her kidneys but her Cr has been stable)  -underwent subsequent kyphoplasty on 6/8/23 with significant improvement in back pain   -rosa was added with cycle 2 for Rosa-VRd and pt is currently C4D14; she has achieved biochemical VGPR thus far  -rev has been reduced back down to 10mg due to severe asthenia at 25mg and Improved but otherwise tolerating therapy no major AEs  - we again discussed role and rationale and process of SCT and pt remains hesitant,  particularly regarding side effects and time needed away from home (30 days) but is leaning towards SCT but considering  starting restaging process after new year  -we discussed 0.5%-1% transplant related mortality and likely 7-10 year median PFS for standard risk disease with quad induction>SCT>maintenance   -pt requested call from SCT coordinators to discuss dates  -will coordinate pt outreach to have a pt who has had autologousSCT to reach out to pt to discuss their experience  -will have LCSW reach out to pt to discuss assistance funds for needed dental work   -pt has asked I reach back out to her in 2 weeks to discuss her final  decision re: proceeding with SCT or not  -in meantime I would recommend proceeding with additional cycle 5 Rosa-VRd  -if she decides against  SCT will recommend D-VRd x 6 cycles > rev maintneance   -if decides to proceed with SCT will recommend transition to DVd after cycle 5 to not compromise collection and plan plan for restaging/eval/mobilization collection Q1 2024     -she has stopped smoking; encouraged pt to continue this   -monthly biochemical studies and local provider appt while on therapy for AE checks   -supportive meds: acyc, asa, bisphosphonate (assuming DDS clearance )       -continue fu with pcp for health maintenance     Follow Up:  -with Dr. Dela Cruz as above  -audio only md appt with me in 2 weeks; ok to double book   Date: 06/26/2023

## 2023-10-31 ENCOUNTER — PATIENT MESSAGE (OUTPATIENT)
Dept: HEMATOLOGY/ONCOLOGY | Facility: CLINIC | Age: 63
End: 2023-10-31
Payer: COMMERCIAL

## 2023-10-31 ENCOUNTER — TELEPHONE (OUTPATIENT)
Dept: SMOKING CESSATION | Facility: CLINIC | Age: 63
End: 2023-10-31
Payer: COMMERCIAL

## 2023-10-31 ENCOUNTER — LAB VISIT (OUTPATIENT)
Dept: LAB | Facility: HOSPITAL | Age: 63
End: 2023-10-31
Attending: INTERNAL MEDICINE
Payer: COMMERCIAL

## 2023-10-31 DIAGNOSIS — Z51.12 ENCOUNTER FOR ANTINEOPLASTIC IMMUNOTHERAPY: ICD-10-CM

## 2023-10-31 DIAGNOSIS — C90.00 MULTIPLE MYELOMA NOT HAVING ACHIEVED REMISSION: Chronic | ICD-10-CM

## 2023-10-31 LAB
ALBUMIN SERPL BCP-MCNC: 3.5 G/DL (ref 3.5–5.2)
ALP SERPL-CCNC: 58 U/L (ref 55–135)
ALT SERPL W/O P-5'-P-CCNC: 16 U/L (ref 10–44)
ANION GAP SERPL CALC-SCNC: 14 MMOL/L (ref 8–16)
AST SERPL-CCNC: 15 U/L (ref 10–40)
BASOPHILS # BLD AUTO: 0.03 K/UL (ref 0–0.2)
BASOPHILS NFR BLD: 0.5 % (ref 0–1.9)
BILIRUB SERPL-MCNC: 0.5 MG/DL (ref 0.1–1)
BUN SERPL-MCNC: 9 MG/DL (ref 8–23)
CALCIUM SERPL-MCNC: 8.1 MG/DL (ref 8.7–10.5)
CHLORIDE SERPL-SCNC: 107 MMOL/L (ref 95–110)
CO2 SERPL-SCNC: 23 MMOL/L (ref 23–29)
CREAT SERPL-MCNC: 0.9 MG/DL (ref 0.5–1.4)
DIFFERENTIAL METHOD: ABNORMAL
EOSINOPHIL # BLD AUTO: 0.3 K/UL (ref 0–0.5)
EOSINOPHIL NFR BLD: 4.9 % (ref 0–8)
ERYTHROCYTE [DISTWIDTH] IN BLOOD BY AUTOMATED COUNT: 14 % (ref 11.5–14.5)
EST. GFR  (NO RACE VARIABLE): >60 ML/MIN/1.73 M^2
GLUCOSE SERPL-MCNC: 120 MG/DL (ref 70–110)
HCT VFR BLD AUTO: 37 % (ref 37–48.5)
HGB BLD-MCNC: 12.1 G/DL (ref 12–16)
IMM GRANULOCYTES # BLD AUTO: 0.01 K/UL (ref 0–0.04)
IMM GRANULOCYTES NFR BLD AUTO: 0.2 % (ref 0–0.5)
LYMPHOCYTES # BLD AUTO: 2.7 K/UL (ref 1–4.8)
LYMPHOCYTES NFR BLD: 45.2 % (ref 18–48)
MAGNESIUM SERPL-MCNC: 1.3 MG/DL (ref 1.6–2.6)
MCH RBC QN AUTO: 31.3 PG (ref 27–31)
MCHC RBC AUTO-ENTMCNC: 32.7 G/DL (ref 32–36)
MCV RBC AUTO: 96 FL (ref 82–98)
MONOCYTES # BLD AUTO: 1 K/UL (ref 0.3–1)
MONOCYTES NFR BLD: 17.2 % (ref 4–15)
NEUTROPHILS # BLD AUTO: 1.9 K/UL (ref 1.8–7.7)
NEUTROPHILS NFR BLD: 32 % (ref 38–73)
NRBC BLD-RTO: 0 /100 WBC
PLATELET # BLD AUTO: 252 K/UL (ref 150–450)
PMV BLD AUTO: 10.3 FL (ref 9.2–12.9)
POTASSIUM SERPL-SCNC: 2.9 MMOL/L (ref 3.5–5.1)
PROT SERPL-MCNC: 6.5 G/DL (ref 6–8.4)
RBC # BLD AUTO: 3.86 M/UL (ref 4–5.4)
SODIUM SERPL-SCNC: 144 MMOL/L (ref 136–145)
WBC # BLD AUTO: 5.93 K/UL (ref 3.9–12.7)

## 2023-10-31 PROCEDURE — 80053 COMPREHEN METABOLIC PANEL: CPT | Performed by: INTERNAL MEDICINE

## 2023-10-31 PROCEDURE — 36415 COLL VENOUS BLD VENIPUNCTURE: CPT | Performed by: INTERNAL MEDICINE

## 2023-10-31 PROCEDURE — 85025 COMPLETE CBC W/AUTO DIFF WBC: CPT | Performed by: INTERNAL MEDICINE

## 2023-10-31 PROCEDURE — 83735 ASSAY OF MAGNESIUM: CPT | Performed by: INTERNAL MEDICINE

## 2023-10-31 NOTE — PROGRESS NOTES
Subjective:       Patient ID: Ana Bonilla is a 63 y.o. female.    Chief Complaint: Multiple Myeloma and Chemotherapy    Primary Oncologist/Hematologist: Dr. Dela Cruz    HPI: Ms. Bonilla is a 63 year old female who is following up for her multiple myeloma. She is here for cycle 4 day 15 of bortezomib (velcade) + daratumumab (darzalex) + lenalidomide + dexamethasone. She takes lenalidomide 10mg on days 1-21 on a 28 day cycle.   She also gets zometa q 4 weeks. Dental clearance confirmed, last one 10/18/23.   Cancer Hx:  BMBx on 4/6/2023 showed 60 % plasma cells. PET-CT on 4/10/2023 showed extensive lytic bony lesions throughout the spine, sternum, bilateral ribs, pelvis and mild compression deformity in L1 vertebral body.    Today:  She does continue to smoke, working with smoking cessation clinic. She states that it is hard for her to quit, especially given the stress she has been under. She is very stressed about making the decision to proceed with SCT or not. She seems to be leaning towards not going forward with it. She is worried about feeling bad during the transplant and the time spent in [place] after, given that she has schizophrenic son who lives behind her who she takes care of. She does have  and another son, but ultimately she is the one caring for him. She does not have any siblings or others around to help. She states she has been feeling more fatigued then her normal. She denies any fevers, illnesses. She states her appetite is good and she has been trying to stay hydrated. She continues to have sty in her L eye particularly.     Social History     Socioeconomic History    Marital status:     Number of children: 2   Occupational History     Employer: [employer]   Tobacco Use    Smoking status: Every Day     Current packs/day: 0.50     Average packs/day: 0.5 packs/day for 50.0 years (25.0 ttl pk-yrs)     Types: Cigarettes    Smokeless tobacco: Never   Substance and Sexual Activity    Alcohol  use: Never     Comment: quit    Drug use: No    Sexual activity: Never     Social Determinants of Health     Stress: No Stress Concern Present (7/8/2019)    Citizen of [place] [employer] - Occupational Stress Questionnaire     Feeling of Stress : Not at all       Past Medical History:   Diagnosis Date    Allergy     Amblyopia OS    Anxiety     Asthma     COPD (chronic obstructive pulmonary disease)     NO HOME o2    GERD (gastroesophageal reflux disease)     Hyperlipidemia     Hypertension     PONV (postoperative nausea and vomiting)     Thyroid disease        Family History   Problem Relation Age of Onset    Hypertension Mother     Diabetes Mother     Diabetes Father     Stroke Maternal Grandmother     Prostate cancer Maternal Grandfather     Mental illness Son     Pancreatic cancer Maternal Uncle     Mental illness Other     Pancreatic cancer Other     Ovarian cancer Maternal Cousin        Past Surgical History:   Procedure Laterality Date    APPENDECTOMY      BIOPSY N/A 6/8/2023    Procedure: BIOPSY;  Surgeon: Fausto Smith MD;  Location: [facility] OR;  Service: Neurosurgery;  Laterality: N/A;  L1    BUNIONECTOMY      COLONOSCOPY N/A 12/4/2019    Procedure: COLONOSCOPY;  Surgeon: Danny Matos III, MD;  Location: [facility] ENDO;  Service: Endoscopy;  Laterality: N/A;    COLONOSCOPY N/A 10/24/2022    Procedure: COLONOSCOPY;  Surgeon: Danny Matos III, MD;  Location[facility];  Service: Endoscopy;  Laterality: N/A;    ESOPHAGOGASTRODUODENOSCOPY N/A 10/24/2022    Procedure: EGD (ESOPHAGOGASTRODUODENOSCOPY);  Surgeon: Danny Matos III, MD;  Location: [facility];  Service: Endoscopy;  Laterality: N/A;    FIXATION KYPHOPLASTY Bilateral 6/8/2023    Procedure: KYPHOPLASTY;  Surgeon: Fausto Smith MD;  Location: [facility] OR;  Service: Neurosurgery;  Laterality: Bilateral;  kyphoplasty and radiofrequency ablation - L1    HYSTERECTOMY      PARTIAL//still with ovaries    neck fusion  08/2017    THYROIDECTOMY          Review of Systems   Constitutional:  Positive for fatigue. Negative for activity change, appetite change, chills, diaphoresis, fever and unexpected weight change.   HENT:  Negative for congestion, nosebleeds, rhinorrhea, sinus pressure, sinus pain, sneezing, sore throat and trouble swallowing.         L eye sty   Respiratory:  Negative for cough and shortness of breath.    Cardiovascular:  Negative for chest pain and leg swelling.   Gastrointestinal:  Negative for abdominal pain, anal bleeding, blood in stool, constipation, diarrhea, nausea and vomiting.   Genitourinary:  Negative for hematuria.   Musculoskeletal:  Positive for arthralgias and back pain. Negative for myalgias.   Skin:  Negative for color change and pallor.   Allergic/Immunologic: Positive for immunocompromised state.   Neurological:  Negative for weakness, light-headedness, numbness and headaches.   Psychiatric/Behavioral:  The patient is nervous/anxious.          Medication List with Changes/Refills   Current Medications    ACYCLOVIR (ZOVIRAX) 400 MG TABLET    Take 1 tablet (400 mg total) by mouth 2 (two) times daily.    ALBUTEROL (PROVENTIL HFA) 90 MCG/ACTUATION INHALER    Inhale 2 puffs into the lungs every 6 (six) hours as needed for Wheezing. Rescue    ALPRAZOLAM (XANAX) 2 MG TAB    TAKE 1 TABLET BY MOUTH TWICE DAILY AS NEEDED FOR ANXIETY    AMLODIPINE-BENAZEPRIL 2.5-10 MG (LOTREL) 2.5-10 MG PER CAPSULE    TAKE 1 CAPSULE BY MOUTH EVERY DAY    ASPIRIN (ECOTRIN) 81 MG EC TABLET    Take 1 tablet (81 mg total) by mouth once daily.    BENZONATATE (TESSALON) 200 MG CAPSULE    Take 200 mg by mouth.    CALCIUM-VITAMIN D 600 MG-10 MCG (400 UNIT) TAB    Take 1 tablet by mouth every 12 (twelve) hours.    DEXAMETHASONE (DECADRON) 4 MG TAB    Take 10 tablets (40 mg total) by mouth As instructed. Daily on days 1, 8, 15, and 22 of each chemotherapy cycle. Take with food.    FLUTICASONE PROPIONATE (FLONASE) 50 MCG/ACTUATION NASAL SPRAY    1 spray (50  mcg total) by Each Nostril route once daily.    FLUTICASONE-SALMETEROL DISKUS INHALER 250-50 MCG    Inhale 1 puff into the lungs 2 (two) times daily. Controller    IPRATROPIUM (ATROVENT) 21 MCG (0.03 %) NASAL SPRAY    2 sprays by Each Nostril route 3 (three) times daily.    LENALIDOMIDE 10 MG CAP    TAKE 1 CAPSULE ONCE DAILY FOR 21 DAYS AND THEN 7 DAYS OFF.    LEVOCETIRIZINE (XYZAL) 5 MG TABLET    Take 1 tablet (5 mg total) by mouth every evening.    LEVOTHYROXINE (SYNTHROID) 100 MCG TABLET    Take 1 tablet (100 mcg total) by mouth before breakfast.    LINACLOTIDE (LINZESS) 72 MCG CAP CAPSULE    Take 2 capsules (144 mcg total) by mouth before breakfast.    METHYLPREDNISOLONE (MEDROL DOSEPACK) 4 MG TABLET    use as directed    METOPROLOL SUCCINATE (TOPROL-XL) 50 MG 24 HR TABLET    Take 1 tablet (50 mg total) by mouth every evening.    MONTELUKAST (SINGULAIR) 10 MG TABLET    Take 1 tablet (10 mg total) by mouth every evening.    NEOMYCIN-POLYMYXIN-DEXAMETHASONE (DEXACINE) 3.5 MG/G-10,000 UNIT/G-0.1 % OINT    Place into both eyes 3 (three) times daily.    NICOTINE (NICODERM CQ) 14 MG/24 HR    Place 1 patch onto the skin once daily.    ONDANSETRON (ZOFRAN) 8 MG TABLET    Take 1 tablet (8 mg total) by mouth every 12 (twelve) hours as needed for Nausea.    PANTOPRAZOLE (PROTONIX) 40 MG TABLET    TAKE 1 TABLET(40 MG) BY MOUTH EVERY DAY    PROCHLORPERAZINE (COMPAZINE) 5 MG TABLET    Take 1 tablet (5 mg total) by mouth every 6 (six) hours as needed for Nausea.    PROMETHAZINE (PHENERGAN) 25 MG TABLET    Take 1 tablet (25 mg total) by mouth every 4 (four) hours.    ROSUVASTATIN (CRESTOR) 10 MG TABLET    Take 1 tablet (10 mg total) by mouth every evening.    TRAZODONE (DESYREL) 50 MG TABLET    Take 1 tablet (50 mg total) by mouth every evening.     Objective:     Vitals:    11/01/23 0902   BP: 112/74   Pulse: 77   Temp: 97.5 °F (36.4 °C)       Physical Exam  Vitals reviewed.   Constitutional:       General: She is not in acute  distress.     Appearance: She is not ill-appearing, toxic-appearing or diaphoretic.   HENT:      Head: Normocephalic.      Comments: L sided jamal-orbital swelling. No tenderness noted.  Eyes:      Conjunctiva/sclera: Conjunctivae normal.   Cardiovascular:      Rate and Rhythm: Normal rate.   Musculoskeletal:      Right lower leg: No edema.      Left lower leg: No edema.   Skin:     General: Skin is warm.      Coloration: Skin is not jaundiced or pale.      Findings: No bruising, erythema, lesion or rash.   Neurological:      Mental Status: She is alert.      Motor: No weakness.      Gait: Gait normal.   Psychiatric:         Mood and Affect: Mood normal.         Behavior: Behavior normal.         Thought Content: Thought content normal.            Labs/Results:  Lab Results   Component Value Date    WBC 5.93 10/31/2023    RBC 3.86 (L) 10/31/2023    HGB 12.1 10/31/2023    HCT 37.0 10/31/2023    MCV 96 10/31/2023    MCH 31.3 (H) 10/31/2023    MCHC 32.7 10/31/2023    RDW 14.0 10/31/2023     10/31/2023    MPV 10.3 10/31/2023    GRAN 1.9 10/31/2023    GRAN 32.0 (L) 10/31/2023    LYMPH 2.7 10/31/2023    LYMPH 45.2 10/31/2023    MONO 1.0 10/31/2023    MONO 17.2 (H) 10/31/2023    EOS 0.3 10/31/2023    BASO 0.03 10/31/2023    EOSINOPHIL 4.9 10/31/2023    BASOPHIL 0.5 10/31/2023     CMP  Sodium   Date Value Ref Range Status   10/31/2023 144 136 - 145 mmol/L Final     Potassium   Date Value Ref Range Status   10/31/2023 2.9 (L) 3.5 - 5.1 mmol/L Final     Chloride   Date Value Ref Range Status   10/31/2023 107 95 - 110 mmol/L Final     CO2   Date Value Ref Range Status   10/31/2023 23 23 - 29 mmol/L Final     Glucose   Date Value Ref Range Status   10/31/2023 120 (H) 70 - 110 mg/dL Final     BUN   Date Value Ref Range Status   10/31/2023 9 8 - 23 mg/dL Final     Creatinine   Date Value Ref Range Status   10/31/2023 0.9 0.5 - 1.4 mg/dL Final     Calcium   Date Value Ref Range Status   10/31/2023 8.1 (L) 8.7 - 10.5 mg/dL  Final     Total Protein   Date Value Ref Range Status   10/31/2023 6.5 6.0 - 8.4 g/dL Final     Albumin   Date Value Ref Range Status   10/31/2023 3.5 3.5 - 5.2 g/dL Final     Total Bilirubin   Date Value Ref Range Status   10/31/2023 0.5 0.1 - 1.0 mg/dL Final     Comment:     For infants and newborns, interpretation of results should be based  on gestational age, weight and in agreement with clinical  observations.    Premature Infant recommended reference ranges:  Up to 24 hours.............<8.0 mg/dL  Up to 48 hours............<12.0 mg/dL  3-5 days..................<15.0 mg/dL  6-29 days.................<15.0 mg/dL       Alkaline Phosphatase   Date Value Ref Range Status   10/31/2023 58 55 - 135 U/L Final     AST   Date Value Ref Range Status   10/31/2023 15 10 - 40 U/L Final     ALT   Date Value Ref Range Status   10/31/2023 16 10 - 44 U/L Final     Anion Gap   Date Value Ref Range Status   10/31/2023 14 8 - 16 mmol/L Final     eGFR   Date Value Ref Range Status   10/31/2023 >60 >60 mL/min/1.73 m^2 Final      Latest Reference Range & Units 10/17/23 10:57   Protein, Serum 6.0 - 8.4 g/dL 6.2   Albumin grams/dl 3.35 - 5.55 g/dL 3.71   Alpha-1 grams/dl 0.17 - 0.41 g/dL 0.30   Alpha-2 0.43 - 0.99 g/dL 0.83   Beta 0.50 - 1.10 g/dL 0.64   Gamma 0.67 - 1.58 g/dL 0.72   Pathologist Interpretation SPE  REVIEWED   Kappa Free Light Chains 0.33 - 1.94 mg/dL 1.41   Lambda Free Light Chains 0.57 - 2.63 mg/dL 1.17   Kappa/Lambda FLC Ratio 0.26 - 1.65  1.21     Assessment:     Problem List Items Addressed This Visit          Immunology/Multi System    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 . Dental clearance has been confirmed, last dose 10/18/23.  --continue with calcium and vitamin D supplement--> patient does not want to take due to nausea associated with this.   --ANC:1.9, plts:252, crcl:55.2ml/min, tibli:0.5, ast:15, alt:16  --PET-CT ( 4/10/2023) - showed extensive lytic lesions in the axial skeleton and mild L1 compression deformity. S/p kyphoplasty on 6/8/23. Continue to follow with pain management  --continue with Aspirin daily p.o for thrombosis prophylaxis; Acyclovir 400 mg p.o BID for herpes Zoster prophylaxis   --Seen by Dr. Calabrese SCT eval with following recommendations: weekly 28 day cycle odell-VRd x 4 total cycles >crissy autoSCT> maintenance. Recommended to proceeding with 5 cycles. Recommended Supportive medications: Levaquin, acyc, vit/d ca, asa;    --continue to follow with ENT for facial swelling and allergies. Continue with xyzal and flonase.  --spoke about SCT and stressors surrounding it with patient. Spoke about making a pros and cons list where she can write out all of her worries, ask the SCT team or med onc any questions or worries she may have. Advised her to speak with her  about process as well. She can talk with others who have gone through the process.     Hypomagnesia +hypokalemia  --start PO potassium daily   --give IVF + magnesium  --will start magox daily after today as well     Follow-Up: 1 week with cbc cmp mag prior for cycle 4 day 22 -standing orders in    Halie Cortez PA-C  Hematology Oncology    Route Chart for Scheduling    Med Onc Chart Routing      Follow up with physician    Follow up with WERNER 1 week. with cbc cmp prior for next cycle 4 day 22   Infusion scheduling note   1 weekf or cycle 4 day 22   Injection scheduling note    Labs CMP and CBC   Scheduling:  Preferred lab:  Lab interval:  standing orders in    Imaging    Pharmacy appointment    Other referrals                  Treatment Plan Information   OP D-VRD DARATUMUMAB + BORTEZOMIB LENALIDOMIDE DEXAMETHASONE   Oscar Márquez MD   Upcoming Treatment Dates - OP D-VRD DARATUMUMAB + BORTEZOMIB LENALIDOMIDE DEXAMETHASONE    11/1/2023       Chemotherapy       bortezomib (VELCADE) injection       daratumumab-hyaluronidase-fihj (DARZALEX FASPRO) subcutaneous injection 1,800 mg 15 mL       Supportive Care       prochlorperazine injection Soln 5 mg  11/8/2023       Chemotherapy       bortezomib (VELCADE) injection       Supportive Care       prochlorperazine injection Soln 5 mg  11/15/2023       Chemotherapy       bortezomib (VELCADE) injection       daratumumab-hyaluronidase-fihj (DARZALEX FASPRO) subcutaneous injection 1,800 mg 15 mL       Supportive Care       prochlorperazine injection Soln 5 mg  11/22/2023       Chemotherapy       bortezomib (VELCADE) injection       Supportive Care       prochlorperazine injection Soln 5 mg    Supportive Plan Information  OP ZOLEDRONIC ACID (ZOMETA) Q4W   Abram Velasquez MD   Upcoming Treatment Dates - OP ZOLEDRONIC ACID (ZOMETA) Q4W    11/15/2023       Medications       zoledronic acid (ZOMETA) 4 mg in sodium chloride 0.9% 100 mL IVPB  12/13/2023       Medications       zoledronic acid (ZOMETA) 4 mg in sodium chloride 0.9% 100 mL IVPB  1/10/2024       Medications       zoledronic acid (ZOMETA) 4 mg in sodium chloride 0.9% 100 mL IVPB  2/7/2024       Medications       zoledronic acid (ZOMETA) 4 mg in sodium chloride 0.9% 100 mL IVPB    Therapy Plan Information  IV Fluids  magnesium sulfate 2 g in dextrose 5 % (D5W) 250 mL IVPB  2 g, Intravenous, Once  Flushes  sodium chloride 0.9% flush 10 mL  10 mL, Intravenous, PRN  heparin, porcine (PF) 100 unit/mL injection flush 500 Units  500 Units, Intravenous, PRN

## 2023-11-01 ENCOUNTER — OFFICE VISIT (OUTPATIENT)
Dept: HEMATOLOGY/ONCOLOGY | Facility: CLINIC | Age: 63
End: 2023-11-01
Payer: COMMERCIAL

## 2023-11-01 ENCOUNTER — INFUSION (OUTPATIENT)
Dept: INFUSION THERAPY | Facility: HOSPITAL | Age: 63
End: 2023-11-01
Attending: INTERNAL MEDICINE
Payer: COMMERCIAL

## 2023-11-01 VITALS
OXYGEN SATURATION: 99 % | DIASTOLIC BLOOD PRESSURE: 57 MMHG | SYSTOLIC BLOOD PRESSURE: 87 MMHG | RESPIRATION RATE: 16 BRPM | TEMPERATURE: 97 F | HEART RATE: 68 BPM

## 2023-11-01 VITALS
BODY MASS INDEX: 23.37 KG/M2 | HEART RATE: 77 BPM | WEIGHT: 136.88 LBS | SYSTOLIC BLOOD PRESSURE: 112 MMHG | TEMPERATURE: 98 F | HEIGHT: 64 IN | OXYGEN SATURATION: 100 % | DIASTOLIC BLOOD PRESSURE: 74 MMHG

## 2023-11-01 DIAGNOSIS — E83.42 HYPOMAGNESEMIA: ICD-10-CM

## 2023-11-01 DIAGNOSIS — C79.51 SECONDARY MALIGNANT NEOPLASM OF BONE: ICD-10-CM

## 2023-11-01 DIAGNOSIS — C90.00 MULTIPLE MYELOMA, REMISSION STATUS UNSPECIFIED: ICD-10-CM

## 2023-11-01 DIAGNOSIS — E87.6 HYPOKALEMIA: ICD-10-CM

## 2023-11-01 DIAGNOSIS — D84.821 IMMUNODEFICIENCY DUE TO CHEMOTHERAPY: Primary | ICD-10-CM

## 2023-11-01 DIAGNOSIS — Z79.899 IMMUNODEFICIENCY DUE TO CHEMOTHERAPY: Primary | ICD-10-CM

## 2023-11-01 DIAGNOSIS — E86.0 DEHYDRATION: Primary | ICD-10-CM

## 2023-11-01 DIAGNOSIS — J30.9 CHRONIC ALLERGIC RHINITIS: ICD-10-CM

## 2023-11-01 DIAGNOSIS — C90.00 MULTIPLE MYELOMA NOT HAVING ACHIEVED REMISSION: ICD-10-CM

## 2023-11-01 DIAGNOSIS — T45.1X5A IMMUNODEFICIENCY DUE TO CHEMOTHERAPY: Primary | ICD-10-CM

## 2023-11-01 DIAGNOSIS — C90.00 MULTIPLE MYELOMA, REMISSION STATUS UNSPECIFIED: Chronic | ICD-10-CM

## 2023-11-01 PROCEDURE — 3008F PR BODY MASS INDEX (BMI) DOCUMENTED: ICD-10-PCS | Mod: CPTII,S$GLB,,

## 2023-11-01 PROCEDURE — 3044F PR MOST RECENT HEMOGLOBIN A1C LEVEL <7.0%: ICD-10-PCS | Mod: CPTII,S$GLB,,

## 2023-11-01 PROCEDURE — 25000003 PHARM REV CODE 250: Performed by: INTERNAL MEDICINE

## 2023-11-01 PROCEDURE — 99999 PR PBB SHADOW E&M-EST. PATIENT-LVL V: CPT | Mod: PBBFAC,,,

## 2023-11-01 PROCEDURE — 99999 PR PBB SHADOW E&M-EST. PATIENT-LVL V: ICD-10-PCS | Mod: PBBFAC,,,

## 2023-11-01 PROCEDURE — 99215 OFFICE O/P EST HI 40 MIN: CPT | Mod: S$GLB,,,

## 2023-11-01 PROCEDURE — 3044F HG A1C LEVEL LT 7.0%: CPT | Mod: CPTII,S$GLB,,

## 2023-11-01 PROCEDURE — 63600175 PHARM REV CODE 636 W HCPCS: Performed by: INTERNAL MEDICINE

## 2023-11-01 PROCEDURE — 25000003 PHARM REV CODE 250

## 2023-11-01 PROCEDURE — 3074F SYST BP LT 130 MM HG: CPT | Mod: CPTII,S$GLB,,

## 2023-11-01 PROCEDURE — 99215 PR OFFICE/OUTPT VISIT, EST, LEVL V, 40-54 MIN: ICD-10-PCS | Mod: S$GLB,,,

## 2023-11-01 PROCEDURE — 96366 THER/PROPH/DIAG IV INF ADDON: CPT

## 2023-11-01 PROCEDURE — 96401 CHEMO ANTI-NEOPL SQ/IM: CPT

## 2023-11-01 PROCEDURE — 4010F PR ACE/ARB THEARPY RXD/TAKEN: ICD-10-PCS | Mod: CPTII,S$GLB,,

## 2023-11-01 PROCEDURE — 3008F BODY MASS INDEX DOCD: CPT | Mod: CPTII,S$GLB,,

## 2023-11-01 PROCEDURE — 3078F DIAST BP <80 MM HG: CPT | Mod: CPTII,S$GLB,,

## 2023-11-01 PROCEDURE — 96375 TX/PRO/DX INJ NEW DRUG ADDON: CPT

## 2023-11-01 PROCEDURE — 3074F PR MOST RECENT SYSTOLIC BLOOD PRESSURE < 130 MM HG: ICD-10-PCS | Mod: CPTII,S$GLB,,

## 2023-11-01 PROCEDURE — 96365 THER/PROPH/DIAG IV INF INIT: CPT

## 2023-11-01 PROCEDURE — 63600175 PHARM REV CODE 636 W HCPCS

## 2023-11-01 PROCEDURE — 4010F ACE/ARB THERAPY RXD/TAKEN: CPT | Mod: CPTII,S$GLB,,

## 2023-11-01 PROCEDURE — 3078F PR MOST RECENT DIASTOLIC BLOOD PRESSURE < 80 MM HG: ICD-10-PCS | Mod: CPTII,S$GLB,,

## 2023-11-01 RX ORDER — POTASSIUM CHLORIDE 20 MEQ/1
20 TABLET, EXTENDED RELEASE ORAL DAILY
Qty: 30 TABLET | Refills: 11 | Status: SHIPPED | OUTPATIENT
Start: 2023-11-01 | End: 2024-10-31

## 2023-11-01 RX ORDER — EPINEPHRINE 0.3 MG/.3ML
0.3 INJECTION SUBCUTANEOUS ONCE AS NEEDED
Status: CANCELLED | OUTPATIENT
Start: 2023-11-01

## 2023-11-01 RX ORDER — MONTELUKAST SODIUM 10 MG/1
10 TABLET ORAL NIGHTLY
Qty: 90 TABLET | Refills: 0 | Status: SHIPPED | OUTPATIENT
Start: 2023-11-01 | End: 2024-01-16

## 2023-11-01 RX ORDER — BORTEZOMIB 3.5 MG/1
1.3 INJECTION, POWDER, LYOPHILIZED, FOR SOLUTION INTRAVENOUS; SUBCUTANEOUS
Status: COMPLETED | OUTPATIENT
Start: 2023-11-01 | End: 2023-11-01

## 2023-11-01 RX ORDER — HEPARIN 100 UNIT/ML
500 SYRINGE INTRAVENOUS
Status: CANCELLED | OUTPATIENT
Start: 2023-11-01

## 2023-11-01 RX ORDER — PROCHLORPERAZINE EDISYLATE 5 MG/ML
5 INJECTION INTRAMUSCULAR; INTRAVENOUS ONCE AS NEEDED
Status: COMPLETED | OUTPATIENT
Start: 2023-11-01 | End: 2023-11-01

## 2023-11-01 RX ORDER — SODIUM CHLORIDE 0.9 % (FLUSH) 0.9 %
10 SYRINGE (ML) INJECTION
Status: CANCELLED | OUTPATIENT
Start: 2023-11-01

## 2023-11-01 RX ORDER — DIPHENHYDRAMINE HYDROCHLORIDE 50 MG/ML
50 INJECTION INTRAMUSCULAR; INTRAVENOUS ONCE AS NEEDED
Status: CANCELLED | OUTPATIENT
Start: 2023-11-01

## 2023-11-01 RX ORDER — PROCHLORPERAZINE EDISYLATE 5 MG/ML
5 INJECTION INTRAMUSCULAR; INTRAVENOUS ONCE AS NEEDED
Status: CANCELLED
Start: 2023-11-01

## 2023-11-01 RX ORDER — MAGNESIUM SULFATE HEPTAHYDRATE 40 MG/ML
2 INJECTION, SOLUTION INTRAVENOUS ONCE
Status: CANCELLED
Start: 2023-11-01 | End: 2023-11-01

## 2023-11-01 RX ORDER — BORTEZOMIB 3.5 MG/1
1.3 INJECTION, POWDER, LYOPHILIZED, FOR SOLUTION INTRAVENOUS; SUBCUTANEOUS
Status: CANCELLED | OUTPATIENT
Start: 2023-11-01

## 2023-11-01 RX ORDER — MAGNESIUM SULFATE HEPTAHYDRATE 40 MG/ML
2 INJECTION, SOLUTION INTRAVENOUS ONCE
Status: DISCONTINUED | OUTPATIENT
Start: 2023-11-01 | End: 2023-11-01

## 2023-11-01 RX ORDER — LANOLIN ALCOHOL/MO/W.PET/CERES
400 CREAM (GRAM) TOPICAL DAILY
Qty: 90 TABLET | Refills: 1 | Status: SHIPPED | OUTPATIENT
Start: 2023-11-01 | End: 2024-10-31

## 2023-11-01 RX ADMIN — BORTEZOMIB 2.25 MG: 3.5 INJECTION, POWDER, LYOPHILIZED, FOR SOLUTION INTRAVENOUS; SUBCUTANEOUS at 11:11

## 2023-11-01 RX ADMIN — PROCHLORPERAZINE EDISYLATE 5 MG: 5 INJECTION INTRAMUSCULAR; INTRAVENOUS at 10:11

## 2023-11-01 RX ADMIN — SODIUM CHLORIDE: 9 INJECTION, SOLUTION INTRAVENOUS at 10:11

## 2023-11-01 RX ADMIN — MAGNESIUM SULFATE HEPTAHYDRATE 50 ML/HR: 500 INJECTION, SOLUTION INTRAMUSCULAR; INTRAVENOUS at 10:11

## 2023-11-01 RX ADMIN — DARATUMUMAB AND HYALURONIDASE-FIHJ (HUMAN RECOMBINANT) 1800 MG: 1800; 30000 INJECTION SUBCUTANEOUS at 11:11

## 2023-11-01 NOTE — PLAN OF CARE
Problem: Fall Injury Risk  Goal: Absence of Fall and Fall-Related Injury  Outcome: Ongoing, Progressing  Intervention: Identify and Manage Contributors  Flowsheets (Taken 11/1/2023 1038)  Self-Care Promotion: independence encouraged  Medication Review/Management: medications reviewed  Intervention: Promote Injury-Free Environment  Flowsheets (Taken 11/1/2023 1038)  Safety Promotion/Fall Prevention:   assistive device/personal item within reach   Fall Risk reviewed with patient/family   in recliner, wheels locked   medications reviewed   instructed to call staff for mobility     Problem: Anemia (Chemotherapy Effects)  Goal: Anemia Symptom Improvement  Outcome: Ongoing, Progressing  Intervention: Monitor and Manage Anemia  Flowsheets (Taken 11/1/2023 1038)  Safety Promotion/Fall Prevention:   assistive device/personal item within reach   Fall Risk reviewed with patient/family   in recliner, wheels locked   medications reviewed   instructed to call staff for mobility  Fatigue Management: frequent rest breaks encouraged     Problem: Nausea and Vomiting (Chemotherapy Effects)  Goal: Fluid and Electrolyte Balance  Outcome: Ongoing, Progressing  Intervention: Prevent and Manage Nausea and Vomiting  Flowsheets (Taken 11/1/2023 1038)  Nausea/Vomiting Interventions: (medicated) other (see comments)  Environmental Support: calm environment promoted     Problem: Neutropenia (Chemotherapy Effects)  Goal: Absence of Infection  Outcome: Ongoing, Progressing  Intervention: Prevent Infection and Maximize Resistance  Flowsheets (Taken 11/1/2023 1038)  Infection Prevention:   environmental surveillance performed   equipment surfaces disinfected   hand hygiene promoted   personal protective equipment utilized     Problem: Thrombocytopenia Bleeding Risk (Chemotherapy Effects)  Goal: Absence of Bleeding  Outcome: Ongoing, Progressing  Intervention: Minimize Bleeding Risk  Flowsheets (Taken 11/1/2023 1038)  Bleeding Precautions:  monitored for signs of bleeding     Problem: Adult Inpatient Plan of Care  Goal: Plan of Care Review  Outcome: Ongoing, Progressing  Flowsheets (Taken 11/1/2023 1038)  Plan of Care Reviewed With: patient  Goal: Patient-Specific Goal (Individualized)  Outcome: Ongoing, Progressing  Flowsheets (Taken 11/1/2023 1038)  Anxieties, Fears or Concerns: does not want to be here for 2 hours  Individualized Care Needs: feet elevated, warm blanket and snack provided

## 2023-11-03 DIAGNOSIS — C90.00 MULTIPLE MYELOMA, REMISSION STATUS UNSPECIFIED: ICD-10-CM

## 2023-11-03 RX ORDER — LENALIDOMIDE 10 MG/1
CAPSULE ORAL
Qty: 21 EACH | Refills: 1 | Status: SHIPPED | OUTPATIENT
Start: 2023-11-03 | End: 2023-12-18 | Stop reason: SDUPTHER

## 2023-11-07 ENCOUNTER — LAB VISIT (OUTPATIENT)
Dept: LAB | Facility: HOSPITAL | Age: 63
End: 2023-11-07
Attending: INTERNAL MEDICINE
Payer: COMMERCIAL

## 2023-11-07 DIAGNOSIS — C90.00 MULTIPLE MYELOMA NOT HAVING ACHIEVED REMISSION: Chronic | ICD-10-CM

## 2023-11-07 DIAGNOSIS — Z51.12 ENCOUNTER FOR ANTINEOPLASTIC IMMUNOTHERAPY: ICD-10-CM

## 2023-11-07 LAB
ALBUMIN SERPL BCP-MCNC: 3.7 G/DL (ref 3.5–5.2)
ALP SERPL-CCNC: 61 U/L (ref 55–135)
ALT SERPL W/O P-5'-P-CCNC: 17 U/L (ref 10–44)
ANION GAP SERPL CALC-SCNC: 11 MMOL/L (ref 8–16)
AST SERPL-CCNC: 16 U/L (ref 10–40)
BASOPHILS # BLD AUTO: 0.02 K/UL (ref 0–0.2)
BASOPHILS NFR BLD: 0.4 % (ref 0–1.9)
BILIRUB SERPL-MCNC: 0.4 MG/DL (ref 0.1–1)
BUN SERPL-MCNC: 11 MG/DL (ref 8–23)
CALCIUM SERPL-MCNC: 8.2 MG/DL (ref 8.7–10.5)
CHLORIDE SERPL-SCNC: 110 MMOL/L (ref 95–110)
CO2 SERPL-SCNC: 22 MMOL/L (ref 23–29)
CREAT SERPL-MCNC: 0.9 MG/DL (ref 0.5–1.4)
DIFFERENTIAL METHOD: ABNORMAL
EOSINOPHIL # BLD AUTO: 0.5 K/UL (ref 0–0.5)
EOSINOPHIL NFR BLD: 8.2 % (ref 0–8)
ERYTHROCYTE [DISTWIDTH] IN BLOOD BY AUTOMATED COUNT: 14.6 % (ref 11.5–14.5)
EST. GFR  (NO RACE VARIABLE): >60 ML/MIN/1.73 M^2
GLUCOSE SERPL-MCNC: 78 MG/DL (ref 70–110)
HCT VFR BLD AUTO: 36.4 % (ref 37–48.5)
HGB BLD-MCNC: 11.7 G/DL (ref 12–16)
IMM GRANULOCYTES # BLD AUTO: 0.01 K/UL (ref 0–0.04)
IMM GRANULOCYTES NFR BLD AUTO: 0.2 % (ref 0–0.5)
LYMPHOCYTES # BLD AUTO: 2 K/UL (ref 1–4.8)
LYMPHOCYTES NFR BLD: 34.9 % (ref 18–48)
MAGNESIUM SERPL-MCNC: 1.7 MG/DL (ref 1.6–2.6)
MCH RBC QN AUTO: 31.5 PG (ref 27–31)
MCHC RBC AUTO-ENTMCNC: 32.1 G/DL (ref 32–36)
MCV RBC AUTO: 98 FL (ref 82–98)
MONOCYTES # BLD AUTO: 1.2 K/UL (ref 0.3–1)
MONOCYTES NFR BLD: 20.5 % (ref 4–15)
NEUTROPHILS # BLD AUTO: 2 K/UL (ref 1.8–7.7)
NEUTROPHILS NFR BLD: 35.8 % (ref 38–73)
NRBC BLD-RTO: 0 /100 WBC
PLATELET # BLD AUTO: 236 K/UL (ref 150–450)
PMV BLD AUTO: 10 FL (ref 9.2–12.9)
POTASSIUM SERPL-SCNC: 3.9 MMOL/L (ref 3.5–5.1)
PROT SERPL-MCNC: 6.7 G/DL (ref 6–8.4)
RBC # BLD AUTO: 3.72 M/UL (ref 4–5.4)
SODIUM SERPL-SCNC: 143 MMOL/L (ref 136–145)
WBC # BLD AUTO: 5.62 K/UL (ref 3.9–12.7)

## 2023-11-07 PROCEDURE — 36415 COLL VENOUS BLD VENIPUNCTURE: CPT | Performed by: INTERNAL MEDICINE

## 2023-11-07 PROCEDURE — 83735 ASSAY OF MAGNESIUM: CPT | Performed by: INTERNAL MEDICINE

## 2023-11-07 PROCEDURE — 85025 COMPLETE CBC W/AUTO DIFF WBC: CPT | Performed by: INTERNAL MEDICINE

## 2023-11-07 PROCEDURE — 80053 COMPREHEN METABOLIC PANEL: CPT | Performed by: INTERNAL MEDICINE

## 2023-11-07 NOTE — PROGRESS NOTES
Subjective:     Patient ID:?Ana Bonilla is a 63 y.o. female.?? MR#: [id number]   ?   PRIMARY ONCOLOGIST:   ?   CHIEF COMPLAINT: lab review/assessment for chemo/MM ?????   ?   ONCOLOGIC DIAGNOSIS: Multiple Myeloma  ?   CURRENT TREATMENT: OP D-VRD DARATUMUMAB + BORTEZOMIB LENALIDOMIDE DEXAMETHASONE     HPI  Ms. Bonilla is a 62-year-old  female with past medical history significant for hypertension, COPD, asthma, GERD, hypothyroidism here for follow-up and management of multiple myeloma. BMBx on 4/6/2023 showed 60 % plasma cells. PET-CT on 4/10/2023 showed extensive lytic bony lesions throughout the spine, sternum, bilateral ribs, pelvis and mild compression deformity in L1 vertebral body. SPEP/MECHE on 3/27/2023 showed IgG lambda monoclonal protein - 3.19 g/dl. Quantitative immunoglobulins on 3/27/2023 showed IgG 4,521 mg/dl. UPEP/MECHE and FLC were pending at that time. Labs on 3/27/2023 showed Beta 2 microglobulin 1.9, .  Labs on 4/19/2023 showed Hb, 11.5, WBC 6.3, platelets, BUN 11, Cr 0.9, calcium 9. Pt initiated on VRD 05/10/23. Treatment planned changed to D-VRD 07/06/23.       Interval History: Pt presents today for lab review and assessment prior to Velcade.  She continues lenalidomide dose previously reduced to 10mg 05/17/23 per primary oncologist after drop in CrCl 49. Restarted on 25mg 08/04/23, now back to reduced dose of 10mg d/t asthenia. she is taking acyclovir as prescribed and has taken dexamethasone this morning.     Pt states overall she is feeling ok and notes increasing fatigue. Denies n/v/d/c, fever, chills, sob, cp, abnormal bleeding. She notes appetite waxes and wanes; denies difficulty with hydration.    Oncology History   Multiple myeloma   4/20/2023 Initial Diagnosis    Multiple myeloma     5/10/2023 - 6/28/2023 Chemotherapy    Treatment Summary   Plan Name: OP VRD - WEEKLY BORTEZOMIB LENALIDOMIDE DEXAMETHASONE Q3W  Treatment Goal: Palliative  Status: Inactive  Start Date:  5/10/2023  End Date: 6/28/2023  Provider: Abram Velasquez MD  Chemotherapy: bortezomib (VELCADE) injection 2 mg, 1.3 mg/m2 = 2 mg, Subcutaneous, Clinic/[facility] 1 time, 2 of 6 cycles  Administration: 2 mg (5/10/2023), 2 mg (5/17/2023), 2 mg (6/14/2023), 2 mg (5/31/2023), 2 mg (6/21/2023), 2 mg (6/28/2023)  lenalidomide 25 mg Cap, 25 mg, Oral, Daily, 1 of 1 cycle, Start date: 5/10/2023, End date: 5/17/2023 6/28/2023 Cancer Staged    Staging form: Plasma Cell Myeloma and Plasma Cell Disorders, AJCC 8th Edition  - Clinical stage from 6/28/2023: RISS Stage II (Beta-2-microglobulin (mg/L): 1.9, Albumin (g/dL): 3, ISS: Stage II, High-risk cytogenetics: Absent, LDH: Normal)     7/6/2023 -  Chemotherapy    Treatment Summary   Plan Name: OP D-VRD DARATUMUMAB + BORTEZOMIB LENALIDOMIDE DEXAMETHASONE  Treatment Goal: Palliative  Status: Active  Start Date: 7/6/2023  End Date: 1/3/2024 (Planned)  Provider: Oscar Márquez MD  Chemotherapy: bortezomib (VELCADE) injection 2 mg, 1.3 mg/m2 = 2 mg, Subcutaneous, Clinic/HOD 1 time, 4 of 6 cycles  Administration: 2 mg (7/6/2023), 2 mg (7/12/2023), 2 mg (8/2/2023), 2 mg (8/9/2023), 2.25 mg (10/18/2023), 2 mg (7/19/2023), 2 mg (7/26/2023), 2 mg (8/16/2023), 2.25 mg (9/20/2023), 2.25 mg (9/27/2023), 2.25 mg (10/25/2023), 2.25 mg (11/1/2023), 2.25 mg (11/8/2023)  lenalidomide 10 mg Cap, 1 capsule (100 % of original dose 1 capsule), Oral, Daily, 1 of 1 cycle, Start date: 7/26/2023, End date: 8/2/2023  Dose modification: 1 capsule (original dose 1 capsule, Cycle 0)  daratumumab-hyaluronidase-fihj subcutaneous injection 1,800 mg, 1,800 mg (100 % of original dose 1,800 mg), Subcutaneous, Clinic/[facility] 1 time, 4 of 6 cycles  Dose modification: 1,800 mg (original dose 1,800 mg, Cycle 1), 1,800 mg (original dose 1,800 mg, Cycle 1)  Administration: 1,800 mg (7/6/2023), 1,800 mg (7/12/2023), 1,800 mg (7/19/2023), 1,800 mg (7/26/2023), 1,800 mg (8/2/2023), 1,800 mg (8/9/2023), 1,800 mg (8/16/2023),  1,800 mg (9/27/2023), 1,800 mg (10/18/2023), 1,800 mg (11/1/2023)        Social History     Socioeconomic History    Marital status:     Number of children: 2   Occupational History     Employer: [employer]   Tobacco Use    Smoking status: Every Day     Current packs/day: 0.50     Average packs/day: 0.5 packs/day for 50.0 years (25.0 ttl pk-yrs)     Types: Cigarettes    Smokeless tobacco: Never   Substance and Sexual Activity    Alcohol use: Never     Comment: quit    Drug use: No    Sexual activity: Never     Social Determinants of Health     Stress: No Stress Concern Present (7/8/2019)    [place] [employer] - Occupational Stress Questionnaire     Feeling of Stress : Not at all      Family History   Problem Relation Age of Onset    Hypertension Mother     Diabetes Mother     Diabetes Father     Stroke Maternal Grandmother     Prostate cancer Maternal Grandfather     Mental illness Son     Pancreatic cancer Maternal Uncle     Mental illness Other     Pancreatic cancer Other     Ovarian cancer Maternal Cousin       Past Surgical History:   Procedure Laterality Date    APPENDECTOMY      BIOPSY N/A 6/8/2023    Procedure: BIOPSY;  Surgeon: Fausto Smith MD;  Location: [facility] OR;  Service: Neurosurgery;  Laterality: N/A;  L1    BUNIONECTOMY      COLONOSCOPY N/A 12/4/2019    Procedure: COLONOSCOPY;  Surgeon: Danny Matos III, MD;  [facility];  Service: Endoscopy;  Laterality: N/A;    COLONOSCOPY N/A 10/24/2022    Procedure: COLONOSCOPY;  Surgeon: Danny Matos III, MD;  Location: [facility] ENDO;  Service: Endoscopy;  Laterality: N/A;    ESOPHAGOGASTRODUODENOSCOPY N/A 10/24/2022    Procedure: EGD (ESOPHAGOGASTRODUODENOSCOPY);  Surgeon: Danny Matos III, MD;  Location: [facility] ENDO;  Service: Endoscopy;  Laterality: N/A;    FIXATION KYPHOPLASTY Bilateral 6/8/2023    Procedure: KYPHOPLASTY;  Surgeon: Fausto Smith MD;  Location: [facility] OR;  Service: Neurosurgery;  Laterality: Bilateral;   kyphoplasty and radiofrequency ablation - L1    HYSTERECTOMY      PARTIAL//still with ovaries    neck fusion  08/2017    THYROIDECTOMY          Review of Systems   Constitutional:  Positive for fatigue. Negative for activity change, appetite change, chills, fever and unexpected weight change.   HENT:  Negative for congestion, dental problem, mouth sores and nosebleeds.    Eyes:  Negative for visual disturbance.   Respiratory:  Negative for cough, choking and chest tightness.    Cardiovascular:  Negative for chest pain, palpitations and leg swelling.   Gastrointestinal:  Negative for abdominal distention, abdominal pain, anal bleeding, blood in stool, constipation, diarrhea, nausea and vomiting.   Endocrine: Negative.    Genitourinary:  Negative for dysuria, frequency, hematuria and urgency.   Musculoskeletal:  Positive for arthralgias and myalgias. Negative for back pain, gait problem and joint swelling.   Skin:  Negative for rash and wound.   Allergic/Immunologic: Negative for immunocompromised state.   Neurological:  Negative for dizziness, light-headedness, numbness and headaches.   Hematological:  Negative for adenopathy. Does not bruise/bleed easily.   Psychiatric/Behavioral:  The patient is nervous/anxious.        ?   A comprehensive 14-point review of systems was reviewed with patient and was negative other than as specified above.   ?     Objective:      Physical Exam  Vitals reviewed.   Constitutional:       Appearance: Normal appearance. She is not ill-appearing.   HENT:      Head: Normocephalic and atraumatic.      Right Ear: External ear normal.      Left Ear: External ear normal.   Eyes:      Conjunctiva/sclera: Conjunctivae normal.   Cardiovascular:      Rate and Rhythm: Normal rate.   Pulmonary:      Effort: Pulmonary effort is normal.   Abdominal:      General: Abdomen is flat.   Genitourinary:     Comments: deferred  Musculoskeletal:         General: Normal range of motion.      Cervical back:  Normal range of motion.      Right lower leg: No edema.      Left lower leg: No edema.   Skin:     General: Skin is warm and dry.      Capillary Refill: Capillary refill takes less than 2 seconds.      Coloration: Skin is not mottled.   Neurological:      Mental Status: She is alert and oriented to person, place, and time.      Motor: No weakness.           ?   Vitals:    11/08/23 0946   BP: 116/70   Pulse: 68   Temp: 97.6 °F (36.4 °C)      ?       ?   Laboratory:  ?   No visits with results within 1 Day(s) from this visit.   Latest known visit with results is:   Lab Visit on 11/07/2023   Component Date Value Ref Range Status    Magnesium 11/07/2023 1.7  1.6 - 2.6 mg/dL Final    Sodium 11/07/2023 143  136 - 145 mmol/L Final    Potassium 11/07/2023 3.9  3.5 - 5.1 mmol/L Final    Chloride 11/07/2023 110  95 - 110 mmol/L Final    CO2 11/07/2023 22 (L)  23 - 29 mmol/L Final    Glucose 11/07/2023 78  70 - 110 mg/dL Final    BUN 11/07/2023 11  8 - 23 mg/dL Final    Creatinine 11/07/2023 0.9  0.5 - 1.4 mg/dL Final    Calcium 11/07/2023 8.2 (L)  8.7 - 10.5 mg/dL Final    Total Protein 11/07/2023 6.7  6.0 - 8.4 g/dL Final    Albumin 11/07/2023 3.7  3.5 - 5.2 g/dL Final    Total Bilirubin 11/07/2023 0.4  0.1 - 1.0 mg/dL Final    Alkaline Phosphatase 11/07/2023 61  55 - 135 U/L Final    AST 11/07/2023 16  10 - 40 U/L Final    ALT 11/07/2023 17  10 - 44 U/L Final    eGFR 11/07/2023 >60  >60 mL/min/1.73 m^2 Final    Anion Gap 11/07/2023 11  8 - 16 mmol/L Final    WBC 11/07/2023 5.62  3.90 - 12.70 K/uL Final    RBC 11/07/2023 3.72 (L)  4.00 - 5.40 M/uL Final    Hemoglobin 11/07/2023 11.7 (L)  12.0 - 16.0 g/dL Final    Hematocrit 11/07/2023 36.4 (L)  37.0 - 48.5 % Final    MCV 11/07/2023 98  82 - 98 fL Final    MCH 11/07/2023 31.5 (H)  27.0 - 31.0 pg Final    MCHC 11/07/2023 32.1  32.0 - 36.0 g/dL Final    RDW 11/07/2023 14.6 (H)  11.5 - 14.5 % Final    Platelets 11/07/2023 236  150 - 450 K/uL Final    MPV 11/07/2023 10.0  9.2 -  12.9 fL Final    Immature Granulocytes 11/07/2023 0.2  0.0 - 0.5 % Final    Gran # (ANC) 11/07/2023 2.0  1.8 - 7.7 K/uL Final    Immature Grans (Abs) 11/07/2023 0.01  0.00 - 0.04 K/uL Final    Lymph # 11/07/2023 2.0  1.0 - 4.8 K/uL Final    Mono # 11/07/2023 1.2 (H)  0.3 - 1.0 K/uL Final    Eos # 11/07/2023 0.5  0.0 - 0.5 K/uL Final    Baso # 11/07/2023 0.02  0.00 - 0.20 K/uL Final    nRBC 11/07/2023 0  0 /100 WBC Final    Gran % 11/07/2023 35.8 (L)  38.0 - 73.0 % Final    Lymph % 11/07/2023 34.9  18.0 - 48.0 % Final    Mono % 11/07/2023 20.5 (H)  4.0 - 15.0 % Final    Eosinophil % 11/07/2023 8.2 (H)  0.0 - 8.0 % Final    Basophil % 11/07/2023 0.4  0.0 - 1.9 % Final    Differential Method 11/07/2023 Automated   Final      ?   Imaging:    No results found. However, due to the size of the patient record, not all encounters were searched. Please check Results Review for a complete set of results.       Results for orders placed or performed during the hospital encounter of 09/27/23 (from the past 2160 hour(s))   CT [employer] Sinuses without    Narrative    EXAM: CT [employer] SINUSES WITHOUT    CLINICAL INDICATION: Chronic sinusitis    TECHNIQUE: Axial and multiplanar 2-D reformations provided.  Without IV contrast    PRIORS: None    FINDINGS: There is an air-fluid level in the right sphenoid sinus consistent with acute sphenoid sinusitis.  Otherwise, the left sphenoid and the remainder of all the paranasal sinuses are clear.  Frontoethmoid recesses and ostiomeatal complexes are patent bilaterally.  No significant lena bullosa formation.  No significant nasal septal deviation.    Maxillofacial bones are normal.  Orbits intact.        Impression    1.  Acute sphenoid sinusitis with air-fluid level as described    2.  Otherwise normal paranasal sinuses with intact drainage pathways as described    All CT scans at [this location] are performed using dose modulation techniques as appropriate to a performed exam including  the following:  Automated exposure control; adjustment of the mA and/or kV according to patient size (this includes techniques or standardized protocols for targeted exams where dose is matched to indication / reason for exam; i.e. extremities or head); use of iterative reconstruction technique.    Finalized on: 9/27/2023 1:14 PM By:  Ismael Grajeda MD  [facility]# [id number]      2023-09-27 13:17:[id number]    [facility]     *Note: Due to a large number of results and/or encounters for the requested time period, some results have not been displayed. A complete set of results can be found in Results Review.          ?   Assessment/Plan:     Problem List Items Addressed This Visit          Oncology    Multiple myeloma (Chronic)     BMBx : 60 % plasma cells - 4/6/2023  - SPEP/MECHE showed IgG lambda - monoclonal protein 3.19 g/dl - (3/27/2023).  - R-ISS stage I (beta 2 microglobulin 1.9, albumin 3.5, , normal karyotype and no high-risk mutations on fish panel  - PET-CT ( 4/10/2023) - showed extensive lytic lesions in the axial skeleton and mild L1 compression deformity.  - Started with Induction chemotherapy with VRD regimen (Velcde/Revlimid/Decadron) - 05/10/2023   - Started Aspirin daily p.o for thrombosis prophylaxis; Acyclovir 400 mg p.o BID for herpes Zoster prophylaxis .  __________________________________________________    Seen by Dr. Calabrese SCT eval with following recommendations:  -weekly 28 day cycle odell-VRd x 4 total cycles >crissy autoSCT> maintenance     --S/p Kyphoplasty 06/08/23    Labs reviewed, no concerning cytopenias  --Ok to proceed with C4D22 (D-VRD) Velcade  --On Lenalidomide 10mg utd 1-21 of 28 day cycle; Dex q 7 days  --Continue Aspirin; Acyclovir 400 mg p.o BID for herpes Zoster prophylaxis    --Initiated on Zometa 05/31/23  --Pt not currently taking Ca+D supplement utd--d/t pill  --recommend Viactiv chews--plan recheck prior to next zometa infusion    Follow-up in one week with repeat labs for C5D1                  Med Onc Chart Routing      Follow up with physician . Scheduled   Follow up with WERNER    Infusion scheduling note    Injection scheduling note    Labs    Imaging    Pharmacy appointment    Other referrals                     NEW PoolP-JUANITO  Hematology/Oncology

## 2023-11-08 ENCOUNTER — INFUSION (OUTPATIENT)
Dept: INFUSION THERAPY | Facility: HOSPITAL | Age: 63
End: 2023-11-08
Attending: INTERNAL MEDICINE
Payer: COMMERCIAL

## 2023-11-08 ENCOUNTER — OFFICE VISIT (OUTPATIENT)
Dept: HEMATOLOGY/ONCOLOGY | Facility: CLINIC | Age: 63
End: 2023-11-08
Payer: COMMERCIAL

## 2023-11-08 VITALS
WEIGHT: 135.81 LBS | OXYGEN SATURATION: 100 % | TEMPERATURE: 97 F | SYSTOLIC BLOOD PRESSURE: 128 MMHG | DIASTOLIC BLOOD PRESSURE: 73 MMHG | HEIGHT: 64 IN | BODY MASS INDEX: 23.18 KG/M2 | HEART RATE: 66 BPM | RESPIRATION RATE: 16 BRPM

## 2023-11-08 VITALS
WEIGHT: 135.81 LBS | BODY MASS INDEX: 23.18 KG/M2 | SYSTOLIC BLOOD PRESSURE: 116 MMHG | OXYGEN SATURATION: 100 % | TEMPERATURE: 98 F | DIASTOLIC BLOOD PRESSURE: 70 MMHG | HEART RATE: 68 BPM | HEIGHT: 64 IN

## 2023-11-08 DIAGNOSIS — C90.00 MULTIPLE MYELOMA, REMISSION STATUS UNSPECIFIED: Primary | ICD-10-CM

## 2023-11-08 DIAGNOSIS — C90.00 MULTIPLE MYELOMA, REMISSION STATUS UNSPECIFIED: Chronic | ICD-10-CM

## 2023-11-08 PROCEDURE — 63600175 PHARM REV CODE 636 W HCPCS: Mod: JZ,JG

## 2023-11-08 PROCEDURE — 99215 PR OFFICE/OUTPT VISIT, EST, LEVL V, 40-54 MIN: ICD-10-PCS | Mod: S$GLB,,,

## 2023-11-08 PROCEDURE — 3044F HG A1C LEVEL LT 7.0%: CPT | Mod: CPTII,S$GLB,,

## 2023-11-08 PROCEDURE — 3074F SYST BP LT 130 MM HG: CPT | Mod: CPTII,S$GLB,,

## 2023-11-08 PROCEDURE — 3078F PR MOST RECENT DIASTOLIC BLOOD PRESSURE < 80 MM HG: ICD-10-PCS | Mod: CPTII,S$GLB,,

## 2023-11-08 PROCEDURE — 3074F PR MOST RECENT SYSTOLIC BLOOD PRESSURE < 130 MM HG: ICD-10-PCS | Mod: CPTII,S$GLB,,

## 2023-11-08 PROCEDURE — 4010F ACE/ARB THERAPY RXD/TAKEN: CPT | Mod: CPTII,S$GLB,,

## 2023-11-08 PROCEDURE — 99215 OFFICE O/P EST HI 40 MIN: CPT | Mod: S$GLB,,,

## 2023-11-08 PROCEDURE — 99999 PR PBB SHADOW E&M-EST. PATIENT-LVL III: ICD-10-PCS | Mod: PBBFAC,,,

## 2023-11-08 PROCEDURE — 3008F BODY MASS INDEX DOCD: CPT | Mod: CPTII,S$GLB,,

## 2023-11-08 PROCEDURE — 3008F PR BODY MASS INDEX (BMI) DOCUMENTED: ICD-10-PCS | Mod: CPTII,S$GLB,,

## 2023-11-08 PROCEDURE — 4010F PR ACE/ARB THEARPY RXD/TAKEN: ICD-10-PCS | Mod: CPTII,S$GLB,,

## 2023-11-08 PROCEDURE — 99999 PR PBB SHADOW E&M-EST. PATIENT-LVL III: CPT | Mod: PBBFAC,,,

## 2023-11-08 PROCEDURE — 96401 CHEMO ANTI-NEOPL SQ/IM: CPT

## 2023-11-08 PROCEDURE — 3044F PR MOST RECENT HEMOGLOBIN A1C LEVEL <7.0%: ICD-10-PCS | Mod: CPTII,S$GLB,,

## 2023-11-08 PROCEDURE — 3078F DIAST BP <80 MM HG: CPT | Mod: CPTII,S$GLB,,

## 2023-11-08 RX ORDER — HEPARIN 100 UNIT/ML
500 SYRINGE INTRAVENOUS
Status: CANCELLED | OUTPATIENT
Start: 2023-11-08

## 2023-11-08 RX ORDER — BORTEZOMIB 3.5 MG/1
1.3 INJECTION, POWDER, LYOPHILIZED, FOR SOLUTION INTRAVENOUS; SUBCUTANEOUS
Status: COMPLETED | OUTPATIENT
Start: 2023-11-08 | End: 2023-11-08

## 2023-11-08 RX ORDER — BORTEZOMIB 3.5 MG/1
1.3 INJECTION, POWDER, LYOPHILIZED, FOR SOLUTION INTRAVENOUS; SUBCUTANEOUS
Status: CANCELLED | OUTPATIENT
Start: 2023-11-08

## 2023-11-08 RX ORDER — SODIUM CHLORIDE 0.9 % (FLUSH) 0.9 %
10 SYRINGE (ML) INJECTION
Status: CANCELLED | OUTPATIENT
Start: 2023-11-08

## 2023-11-08 RX ORDER — PROCHLORPERAZINE EDISYLATE 5 MG/ML
5 INJECTION INTRAMUSCULAR; INTRAVENOUS ONCE AS NEEDED
Status: CANCELLED
Start: 2023-11-08

## 2023-11-08 RX ADMIN — BORTEZOMIB 2.25 MG: 3.5 INJECTION, POWDER, LYOPHILIZED, FOR SOLUTION INTRAVENOUS; SUBCUTANEOUS at 11:11

## 2023-11-08 NOTE — ASSESSMENT & PLAN NOTE
BMBx : 60 % plasma cells - 4/6/2023  - SPEP/MECHE showed IgG lambda - monoclonal protein 3.19 g/dl - (3/27/2023).  - R-ISS stage I (beta 2 microglobulin 1.9, albumin 3.5, , normal karyotype and no high-risk mutations on fish panel  - PET-CT ( 4/10/2023) - showed extensive lytic lesions in the axial skeleton and mild L1 compression deformity.  - Started with Induction chemotherapy with VRD regimen (Velcde/Revlimid/Decadron) - 05/10/2023   - Started Aspirin daily p.o for thrombosis prophylaxis; Acyclovir 400 mg p.o BID for herpes Zoster prophylaxis .  __________________________________________________    Seen by Dr. Calabrese SCT mere with following recommendations:  -weekly 28 day cycle odell-VRd x 4 total cycles >crissy autoSCT> maintenance     --S/p Kyphoplasty 06/08/23    Labs reviewed, no concerning cytopenias  --Ok to proceed with C4D22 (D-VRD) Velcade  --On Lenalidomide 10mg utd 1-21 of 28 day cycle; Dex q 7 days  --Continue Aspirin; Acyclovir 400 mg p.o BID for herpes Zoster prophylaxis    --Initiated on Zometa 05/31/23  --Pt not currently taking Ca+D supplement utd--d/t pill  --recommend Viactiv chews--plan recheck prior to next zometa infusion    Follow-up in one week with repeat labs for C5D1

## 2023-11-08 NOTE — PLAN OF CARE
Pt is stable. Pt administered Velcade today. Pt will follow up in one week.      Problem: Fall Injury Risk  Goal: Absence of Fall and Fall-Related Injury  Outcome: Ongoing, Progressing     Problem: Anemia (Chemotherapy Effects)  Goal: Anemia Symptom Improvement  Outcome: Ongoing, Progressing     Problem: Urinary Bleeding Risk or Actual (Chemotherapy Effects)  Goal: Absence of Hematuria  Outcome: Ongoing, Progressing     Problem: Nausea and Vomiting (Chemotherapy Effects)  Goal: Fluid and Electrolyte Balance  Outcome: Ongoing, Progressing     Problem: Neurotoxicity (Chemotherapy Effects)  Goal: Neurotoxicity Symptom Control  Outcome: Ongoing, Progressing     Problem: Neutropenia (Chemotherapy Effects)  Goal: Absence of Infection  Outcome: Ongoing, Progressing     Problem: Oral Mucositis (Chemotherapy Effects)  Goal: Improved Oral Mucous Membrane Integrity  Outcome: Ongoing, Progressing     Problem: Thrombocytopenia Bleeding Risk (Chemotherapy Effects)  Goal: Absence of Bleeding  Outcome: Ongoing, Progressing     Problem: Adult Inpatient Plan of Care  Goal: Plan of Care Review  Outcome: Ongoing, Progressing  Goal: Patient-Specific Goal (Individualized)  Outcome: Ongoing, Progressing  Flowsheets (Taken 11/8/2023 1103)  Anxieties, Fears or Concerns: Concerned about the stem-cell  Individualized Care Needs: Pt provided pillow. Refreshments offerred.

## 2023-11-08 NOTE — NURSING
1119: Velcade Injection given without difficulties.Bandaid applied. Patient instructed to stay in the clinic for 15 minutes. Patient verbalized understanding and will notify nurse with any complaints.

## 2023-11-08 NOTE — DISCHARGE INSTRUCTIONS
Thank you for allowing me to care for you today,  MIS LeachN, RN    [facility]  [address] [address]  [phone] phone     [phone] fax  Hours of Operation: Monday- Friday 7:00am- 5:30pm  After hours phone  [phone]  Hematology / Oncology Physicians on call      JU[address] Aguirre Dr[address] Dr., Dr., BELLE Valdez, BELLE Deluna, BELLE    Please call with any concerns regarding your appointment today.    FALL PREVENTION   Falls often occur due to slipping, tripping or losing your balance. Here are ways to reduce your risk of falling again.   Was there anything that caused your fall that can be fixed, removed or replaced?   Make your home safe by keeping walkways clear of objects you may trip over.   Use non-slip pads under rugs.   Do not walk in poorly lit areas.   Do not stand on chairs or wobbly ladders.   Use caution when reaching overhead or looking upward. This position can cause a loss of balance.   Be sure your shoes fit properly, have non-slip bottoms and are in good condition.   Be cautious when going up and down stairs, curbs, and when walking on uneven sidewalks.   If your balance is poor, consider using a cane or walker.   If your fall was related to alcohol use, stop or limit alcohol intake.   If your fall was related to use of sleeping medicines, talk to your doctor about this. You may need to reduce your dosage at bedtime if you awaken during the night to go to the bathroom.   To reduce the need for nighttime bathroom trips:   Avoid drinking fluids for several hours before going to bed   Empty your bladder before going to bed   Men can keep a urinal at the bedside   © [id number] [employer], [address] [place] [address], [place] [address]. All rights reserved. This information is not intended as a substitute for professional medical care. Always follow  your healthcare professional's instructions.

## 2023-11-09 ENCOUNTER — OFFICE VISIT (OUTPATIENT)
Dept: PALLIATIVE MEDICINE | Facility: CLINIC | Age: 63
End: 2023-11-09
Payer: COMMERCIAL

## 2023-11-09 VITALS
HEIGHT: 64 IN | DIASTOLIC BLOOD PRESSURE: 77 MMHG | OXYGEN SATURATION: 99 % | SYSTOLIC BLOOD PRESSURE: 127 MMHG | HEART RATE: 93 BPM | WEIGHT: 135.81 LBS | TEMPERATURE: 98 F | BODY MASS INDEX: 23.18 KG/M2

## 2023-11-09 DIAGNOSIS — Z51.5 PALLIATIVE CARE ENCOUNTER: ICD-10-CM

## 2023-11-09 DIAGNOSIS — C90.00 MULTIPLE MYELOMA, REMISSION STATUS UNSPECIFIED: Primary | ICD-10-CM

## 2023-11-09 DIAGNOSIS — G62.9 NEUROPATHY: ICD-10-CM

## 2023-11-09 DIAGNOSIS — F41.9 ANXIETY: ICD-10-CM

## 2023-11-09 DIAGNOSIS — M25.551 RIGHT HIP PAIN: ICD-10-CM

## 2023-11-09 PROCEDURE — 99999 PR PBB SHADOW E&M-EST. PATIENT-LVL V: CPT | Mod: PBBFAC,,,

## 2023-11-09 PROCEDURE — 99215 PR OFFICE/OUTPT VISIT, EST, LEVL V, 40-54 MIN: ICD-10-PCS | Mod: S$GLB,,,

## 2023-11-09 PROCEDURE — 99497 ADVNCD CARE PLAN 30 MIN: CPT | Mod: S$GLB,,,

## 2023-11-09 PROCEDURE — 3044F PR MOST RECENT HEMOGLOBIN A1C LEVEL <7.0%: ICD-10-PCS | Mod: CPTII,S$GLB,,

## 2023-11-09 PROCEDURE — 99999 PR PBB SHADOW E&M-EST. PATIENT-LVL V: ICD-10-PCS | Mod: PBBFAC,,,

## 2023-11-09 PROCEDURE — 3074F PR MOST RECENT SYSTOLIC BLOOD PRESSURE < 130 MM HG: ICD-10-PCS | Mod: CPTII,S$GLB,,

## 2023-11-09 PROCEDURE — 3074F SYST BP LT 130 MM HG: CPT | Mod: CPTII,S$GLB,,

## 2023-11-09 PROCEDURE — 99497 PR ADVNCD CARE PLAN 30 MIN: ICD-10-PCS | Mod: S$GLB,,,

## 2023-11-09 PROCEDURE — 99499 NO LOS: ICD-10-PCS | Mod: S$GLB,,,

## 2023-11-09 PROCEDURE — 4010F PR ACE/ARB THEARPY RXD/TAKEN: ICD-10-PCS | Mod: CPTII,S$GLB,,

## 2023-11-09 PROCEDURE — 4010F ACE/ARB THERAPY RXD/TAKEN: CPT | Mod: CPTII,S$GLB,,

## 2023-11-09 PROCEDURE — 3078F PR MOST RECENT DIASTOLIC BLOOD PRESSURE < 80 MM HG: ICD-10-PCS | Mod: CPTII,S$GLB,,

## 2023-11-09 PROCEDURE — 3078F DIAST BP <80 MM HG: CPT | Mod: CPTII,S$GLB,,

## 2023-11-09 PROCEDURE — 99215 OFFICE O/P EST HI 40 MIN: CPT | Mod: S$GLB,,,

## 2023-11-09 PROCEDURE — 3044F HG A1C LEVEL LT 7.0%: CPT | Mod: CPTII,S$GLB,,

## 2023-11-09 PROCEDURE — 99499 UNLISTED E&M SERVICE: CPT | Mod: S$GLB,,,

## 2023-11-09 RX ORDER — PREGABALIN 50 MG/1
50 CAPSULE ORAL NIGHTLY
Qty: 30 CAPSULE | Refills: 0 | Status: SHIPPED | OUTPATIENT
Start: 2023-11-09 | End: 2023-12-19

## 2023-11-09 RX ORDER — ALPRAZOLAM 2 MG/1
TABLET ORAL
Qty: 60 TABLET | Refills: 0 | Status: SHIPPED | OUTPATIENT
Start: 2023-11-09 | End: 2023-12-12 | Stop reason: SDUPTHER

## 2023-11-09 NOTE — PROGRESS NOTES
"Palliative Medicine  Consult  Consult Requested By: Dr. Jose Dela Cruz  Reason for Consult: Symptom Management/Advance Care Planning/Goals of Care Discussion        SUBJECTIVE:     History of Present Illness:  Ana Bonilla is a 63 y.o. female with Multiple Myeloma (Apr. 2023) with metastasis to thoracic/lumbar spine, complicated by L1 compression fracture. Pending SCT. She is receiving D-VRD, Lenalidomide and Zometa. S/P L1 Kyphoplasty (June 2023). Followed by Dr. Dela Cruz, Dr. Garcia, and Neurosurgery. The palliative care team was consulted to assist with symptom management, advance care planning, and goals of care discussion.       Pt attended clinic alone. She was in no acute distress. Vital signs stable on room air. I explained the role palliative care often plays in supporting patients with serious illness and their families regarding symptom management, advance care planning, and goals of care discussion.      Pt reported right hip/leg aching/tingling pain 5/10 exacerbated by prolonged standing, prolonged sitting, and walking. Tylenol not effective. Norco made her "sick". Percocet was effective, but wants to avoid taking opioids.   Gabapentin was not effective for neuropathy/sciatica (diagnosed pre-MM)  Medical Marijuana (liquid/gummies) was effective for pain, but made her "high" and unable to function.   She was previously seeing Dr. Barrios for pain management/medical marijuana prescription  Insomnia-Feels to be exacerbated by Dexamethasone, not taking Trazodone due to fear of psychotropic medications. Melatonin was ineffective.   Continues to smoke cigarettes to cope with stress and anxiety. Not using Nicotine patch d/t exacerbated insomnia.   Chronic anxiety/intermittent depression due to managing son's mental illness. She has been managing with Xanax 2mg BID PRN for the past 20 years.  Previously tried antidepressants, but it was ineffective   Does not want to try antidepressants. Wants to stay on " "Xanax because it is "fast acting"  Declined mental health counseling at this time.     We discussed advance care planning, goals of care, and code status, Full Code vs. DNR including risks, benefits, and alternatives.   She wishes to continue chemotherapy, and remains ambivalent about SCT. Her biggest is the treatment commitment, recovery time, not wanting to be a burden on her , fear of relapse post SCT, and difficulty with caring for her son (has schizophrenia) if she is sick following SCT. She wishes to remain Full Code. "Does not want to think about that".  Her elected HCPOA are: 1.  : Lars Bonilla: [phone], 2. Son: Hakeem Garcia: [phone].     LA  reviewed and summarized:          Past Medical History:   Diagnosis Date    Allergy     Amblyopia OS    Anxiety     Asthma     COPD (chronic obstructive pulmonary disease)     NO HOME o2    GERD (gastroesophageal reflux disease)     Hyperlipidemia     Hypertension     PONV (postoperative nausea and vomiting)     Thyroid disease      Past Surgical History:   Procedure Laterality Date    APPENDECTOMY      BIOPSY N/A 2023    Procedure: BIOPSY;  Surgeon: Fausto Smith MD;  Location: [facility] OR;  Service: Neurosurgery;  Laterality: N/A;  L1    BUNIONECTOMY      COLONOSCOPY N/A 2019    Procedure: COLONOSCOPY;  Surgeon: Danny Matos III, MD;  Location: [facility];  Service: Endoscopy;  Laterality: N/A;    COLONOSCOPY N/A 10/24/2022    Procedure: COLONOSCOPY;  Surgeon: Danny Matos III, MD;  Location: [facility];  Service: Endoscopy;  Laterality: N/A;    ESOPHAGOGASTRODUODENOSCOPY N/A 10/24/2022    Procedure: EGD (ESOPHAGOGASTRODUODENOSCOPY);  Surgeon: Danny Matos III, MD;  Location: [facility];  Service: Endoscopy;  Laterality: N/A;    FIXATION KYPHOPLASTY Bilateral 2023    Procedure: KYPHOPLASTY;  Surgeon: Fausto Smith MD;  Location: [facility] OR;  Service: Neurosurgery;  Laterality: Bilateral;  kyphoplasty and " radiofrequency ablation - L1    HYSTERECTOMY      PARTIAL//still with ovaries    neck fusion  08/2017    THYROIDECTOMY       Family History   Problem Relation Age of Onset    Hypertension Mother     Diabetes Mother     Diabetes Father     Stroke Maternal Grandmother     Prostate cancer Maternal Grandfather     Mental illness Son     Pancreatic cancer Maternal Uncle     Mental illness Other     Pancreatic cancer Other     Ovarian cancer Maternal Cousin      Review of patient's allergies indicates:   Allergen Reactions    Hydrocodone-acetaminophen Other (See Comments)     Causes pt to feel extremely sick        Medications:    Current Outpatient Medications:     acyclovir (ZOVIRAX) 400 MG tablet, Take 1 tablet (400 mg total) by mouth 2 (two) times daily., Disp: 60 tablet, Rfl: 11    albuterol (PROVENTIL HFA) 90 mcg/actuation inhaler, Inhale 2 puffs into the lungs every 6 (six) hours as needed for Wheezing. Rescue, Disp: 18 g, Rfl: 0    ALPRAZolam (XANAX) 2 MG Tab, TAKE 1 TABLET BY MOUTH TWICE DAILY AS NEEDED FOR ANXIETY, Disp: 60 tablet, Rfl: 0    amlodipine-benazepril 2.5-10 mg (LOTREL) 2.5-10 mg per capsule, TAKE 1 CAPSULE BY MOUTH EVERY DAY, Disp: 90 capsule, Rfl: 3    aspirin (ECOTRIN) 81 MG EC tablet, Take 1 tablet (81 mg total) by mouth once daily., Disp: 30 tablet, Rfl: 4    benzonatate (TESSALON) 200 MG capsule, Take 200 mg by mouth., Disp: , Rfl:     calcium-vitamin D 600 mg-10 mcg (400 unit) Tab, Take 1 tablet by mouth every 12 (twelve) hours., Disp: 60 tablet, Rfl: 2    dexAMETHasone (DECADRON) 4 MG Tab, Take 10 tablets (40 mg total) by mouth As instructed. Daily on days 1, 8, 15, and 22 of each chemotherapy cycle. Take with food., Disp: 40 tablet, Rfl: 5    fluticasone propionate (FLONASE) 50 mcg/actuation nasal spray, 1 spray (50 mcg total) by Each Nostril route once daily., Disp: 1 mL, Rfl: 1    fluticasone-salmeterol diskus inhaler 250-50 mcg, Inhale 1 puff into the lungs 2 (two) times daily.  Controller, Disp: 180 each, Rfl: 1    ipratropium (ATROVENT) 21 mcg (0.03 %) nasal spray, 2 sprays by Each Nostril route 3 (three) times daily., Disp: , Rfl:     lenalidomide 10 mg Cap, TAKE 1 CAPSULE ONCE DAILY FOR 21 DAYS AND THEN 7 DAYS OFF, Disp: 21 each, Rfl: 1    levocetirizine (XYZAL) 5 MG tablet, Take 1 tablet (5 mg total) by mouth every evening., Disp: 30 tablet, Rfl: 11    levothyroxine (SYNTHROID) 100 MCG tablet, Take 1 tablet (100 mcg total) by mouth before breakfast., Disp: 90 tablet, Rfl: 1    linaCLOtide (LINZESS) 72 mcg Cap capsule, Take 2 capsules (144 mcg total) by mouth before breakfast., Disp: 30 capsule, Rfl: 1    magnesium oxide (MAGOX) 400 mg (241.3 mg magnesium) tablet, Take 1 tablet (400 mg total) by mouth once daily., Disp: 90 tablet, Rfl: 1    methylPREDNISolone (MEDROL DOSEPACK) 4 mg tablet, use as directed, Disp: 1 each, Rfl: 0    metoprolol succinate (TOPROL-XL) 50 MG 24 hr tablet, Take 1 tablet (50 mg total) by mouth every evening., Disp: 90 tablet, Rfl: 3    montelukast (SINGULAIR) 10 mg tablet, Take 1 tablet (10 mg total) by mouth every evening., Disp: 90 tablet, Rfl: 0    neomycin-polymyxin-dexamethasone (DEXACINE) 3.5 mg/g-10,000 unit/g-0.1 % Oint, Place into both eyes 3 (three) times daily., Disp: 3.5 g, Rfl: 0    nicotine (NICODERM CQ) 14 mg/24 hr, Place 1 patch onto the skin once daily., Disp: 14 patch, Rfl: 0    ondansetron (ZOFRAN) 8 MG tablet, Take 1 tablet (8 mg total) by mouth every 12 (twelve) hours as needed for Nausea., Disp: 30 tablet, Rfl: 2    pantoprazole (PROTONIX) 40 MG tablet, TAKE 1 TABLET(40 MG) BY MOUTH EVERY DAY (Patient taking differently: Take 40 mg by mouth daily as needed.), Disp: 90 tablet, Rfl: 3    potassium chloride SA (K-DUR,KLOR-CON) 20 MEQ tablet, Take 1 tablet (20 mEq total) by mouth once daily., Disp: 30 tablet, Rfl: 11    prochlorperazine (COMPAZINE) 5 MG tablet, Take 1 tablet (5 mg total) by mouth every 6 (six) hours as needed for Nausea., Disp:  60 tablet, Rfl: 5    promethazine (PHENERGAN) 25 MG tablet, Take 1 tablet (25 mg total) by mouth every 4 (four) hours., Disp: 45 tablet, Rfl: 2    rosuvastatin (CRESTOR) 10 MG tablet, Take 1 tablet (10 mg total) by mouth every evening., Disp: , Rfl:     traZODone (DESYREL) 50 MG tablet, Take 1 tablet (50 mg total) by mouth every evening., Disp: 30 tablet, Rfl: 11  No current facility-administered medications for this visit.    Facility-Administered Medications Ordered in Other Visits:     lactated ringers infusion, , Intravenous, Continuous, Danny Matos III, MD    OBJECTIVE:     ROS:  Review of Systems   Constitutional:  Negative for activity change, appetite change, fatigue, fever and unexpected weight change.   HENT:  Negative for trouble swallowing and voice change.    Eyes: Negative.    Respiratory:  Negative for cough, chest tightness, shortness of breath and wheezing.    Cardiovascular:  Negative for chest pain, palpitations and leg swelling.   Gastrointestinal:  Negative for abdominal distention, abdominal pain, constipation, diarrhea, nausea and vomiting.   Musculoskeletal:  Positive for myalgias (Right leg/hip). Negative for arthralgias and joint swelling.   Skin:  Negative for color change, pallor, rash and wound.   Neurological:  Negative for dizziness, tremors, speech difficulty, weakness, numbness and headaches.   Psychiatric/Behavioral:  Positive for agitation and sleep disturbance. Negative for behavioral problems, confusion, dysphoric mood and suicidal ideas. The patient is nervous/anxious.        Review of Symptoms      Symptom Assessment (ESAS 0-10 Scale)  Pain:  5  Dyspnea:  0  Anxiety:  10  Nausea:  0  Depression:  0  Anorexia:  0  Fatigue:  3  Insomnia:  9  Restlessness:  7  Agitation:  3     CAM / Delirium:  Negative  Constipation:  Negative  Diarrhea:  Negative    Anxiety:  Is nervous/anxious  Constipation:  No constipation    Pain Assessment:    Location(s): leg    Leg       Location:  right        Quality: Burning and tingling        Quantity: 5/10 in intensity        Chronicity: Onset 0 year(s) ago, unchanged        Aggravating Factors: Activity, movement and walking        Alleviating Factors: Opiates        Associated Symptoms: Arthralgias    Performance Status:  90    ECOG Performance Status ndGndrndanddndend:nd nd2nd Living Arrangements:  Lives with family    Psychosocial/Cultural:   See Palliative Psychosocial Note: Yes   with two adult sons. Works weekends at [employer]. 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   **Primary  to Follow**  Palliative Care  Consult: No     Time-Based Charting:  No      Advance Care Planning   Advance Directives:   Living Will: No    LaPOST: No    Do Not Resuscitate Status: No    Medical Power of : Yes    Agent's Name:  1.  : Lars Bonilla: [phone], 2. Son: Hakeem Garcia: [phone].    Decision Making:  Patient answered questions  Goals of Care: What is most important right now is to focus on quality of life, even if it means sacrificing a little time, curative/life-prolongation (regardless of treatment burdens). Accordingly, we have decided that the best plan to meet the patient's goals includes continuing with treatment.            Physical Exam:  Vitals:    Physical Exam  Vitals and nursing note reviewed.   Constitutional:       General: She is not in acute distress.     Appearance: Normal appearance. She is normal weight. She is not ill-appearing.   HENT:      Head: Normocephalic and atraumatic.      Nose: Nose normal. No congestion or rhinorrhea.      Mouth/Throat:      Mouth: Mucous membranes are moist.      Pharynx: Oropharynx is clear. No oropharyngeal exudate or posterior oropharyngeal erythema.   Eyes:      General: No scleral icterus.        Right eye: No discharge.         Left eye: No discharge.      Conjunctiva/sclera: Conjunctivae normal.      Pupils: Pupils are equal, round, and reactive  to light.   Cardiovascular:      Rate and Rhythm: Normal rate and regular rhythm.      Pulses: Normal pulses.      Heart sounds: Normal heart sounds. No murmur heard.     No gallop.   Pulmonary:      Effort: Pulmonary effort is normal. No respiratory distress.      Breath sounds: Normal breath sounds. No stridor. No wheezing.   Chest:      Chest wall: No tenderness.   Abdominal:      General: Bowel sounds are normal. There is no distension.      Palpations: Abdomen is soft.      Tenderness: There is no abdominal tenderness.   Genitourinary:     Comments: Deferred  Musculoskeletal:         General: Tenderness (Right hip) present. No swelling.      Cervical back: Normal range of motion and neck supple.      Right lower leg: No edema.      Left lower leg: No edema.      Comments: Right hip/Right leg: Decreased extension at R hip.    Skin:     General: Skin is warm and dry.      Capillary Refill: Capillary refill takes less than 2 seconds.      Coloration: Skin is not jaundiced or pale.      Findings: No rash.   Neurological:      Mental Status: She is alert and oriented to person, place, and time.      Motor: Weakness (Right hip) present.      Gait: Gait abnormal.   Psychiatric:         Attention and Perception: Attention normal.         Mood and Affect: Affect normal. Mood is anxious.         Speech: Speech normal.         Behavior: Behavior normal.         Thought Content: Thought content normal.         Cognition and Memory: Cognition normal.         Judgment: Judgment normal.         Labs and radiology data reviewed    ASSESSMENT/PLAN:   Multiple Myeloma not achieved remission  Metastasis to thoracic/lumbar spine, complicated by L1 compression fracture.  Followed by Dr. Dela Cruz, Dr. Garcia, and Neurosurgery.   On D-VRD, Lenalidomide and Zometa. S/P L1 Kyphoplasty (June 2023).   Pt remains hesitant about SCT due extended recovery time/fear of relapse post SCT.  XR- L-Spine: 6/23/2023: Kyphoplasty changes at L1.  Facet hypertrophy within the mid to lower lumbar spine.  PET scan 4/10/2023: 1. Numerous FDG avid predominately lytic osseous lesions as above.  Primary differential considerations would include multiple myeloma versus metastatic disease. 2. No FDG avid soft tissue masses or adenopathy demonstrated. 3. Mild pathologic compression deformity of the L1 vertebral body.    Right Hip Pain/Neuropathy  Likely a mixture of chronic sciatica/Pelvic lytic bone lesions/sequale of L1 compression fracture  Pt has tried multiple pain management modalities with mixed response.   Wishes to avoid opioids at this time due to fear of altered mental status.  Trial Lyrica 50mg QHS can titrate to 300mg/day in BID-TID dosing.   Pt wishes to reestablish care with her pain management doctor, Dr. Barrios for medical Marijuana trial.   Referral to [facility] for medical marijuana, per pt's request.  Anxiety  Chronic and exacerbated by MM diagnosis.  Continues Xanax 2mg BID PRN  Declines SSRI trial at this time.   Declines mental health counseling at this time.    Encounter for Palliative Care  -Code status: Full Code.  -HCPOA: 1.  : Lars Bonilla: [phone], 2. Son: Hakeem Garcia: [phone].   -GOC:  Continue cancer treatment, symptom management, maintain independence and functional status.  -See HPI for further details       Follow up:  1 month. Virtual visit to review Lyrica Trial      90 minutes total visit  60 minutes of total time spent on the encounter, which includes face to face time and non-face to face time preparing to see the patient (eg, review of tests), Obtaining and/or reviewing separately obtained history, Documenting clinical information in the electronic or other health record, Independently interpreting results (not separately reported) and communicating results to the patient/family/caregiver, or Care coordination (not separately reported).    30 minutes spent in discussing ACP    Signature: RAJIV PARRA  BELLE BOYD

## 2023-11-09 NOTE — PATIENT INSTRUCTIONS
Try Lyrica 50mg at night for nerve pain in your leg.   Watch out for sleepiness while taking Lyrica  Ok to take Xanax 2mg about 2hours before/after Lyrica  You can try Terrajoule if you want to obtain a Medical Marijuana card.   Ok to go back to Dr. Barrios

## 2023-11-13 ENCOUNTER — OFFICE VISIT (OUTPATIENT)
Dept: HEMATOLOGY/ONCOLOGY | Facility: CLINIC | Age: 63
End: 2023-11-13
Payer: COMMERCIAL

## 2023-11-13 DIAGNOSIS — Z76.82 STEM CELL TRANSPLANT CANDIDATE: ICD-10-CM

## 2023-11-13 DIAGNOSIS — C90.00 MULTIPLE MYELOMA, REMISSION STATUS UNSPECIFIED: Primary | ICD-10-CM

## 2023-11-13 PROCEDURE — 99499 UNLISTED E&M SERVICE: CPT | Mod: 95,,, | Performed by: INTERNAL MEDICINE

## 2023-11-13 PROCEDURE — 99499 NO LOS: ICD-10-PCS | Mod: 95,,, | Performed by: INTERNAL MEDICINE

## 2023-11-13 NOTE — PROGRESS NOTES
Attempted to call pt on phone and home line.  No answer. Left voicemail requesting call back to discuss her thought on moving ahead with transplant vs no not pursuing transplant based therapy for her myeloma.  Have spoke with her  treating MD, Dr. Dela Cruz and patient does not desire to pursue transplant at this time but we are available at any point if she changes her mind.    If transplant is not pursued would recommend complete 6 cycles of Rosa-VRd followed by systemic restagitn PET and marrow and if VGPR or better confirmed single agent rev maintenance and appropriate supportive care.      Chano Garcia MD  Hematology & Stem Cell Transplant

## 2023-11-14 ENCOUNTER — LAB VISIT (OUTPATIENT)
Dept: LAB | Facility: HOSPITAL | Age: 63
End: 2023-11-14
Attending: INTERNAL MEDICINE
Payer: COMMERCIAL

## 2023-11-14 DIAGNOSIS — Z51.12 ENCOUNTER FOR ANTINEOPLASTIC IMMUNOTHERAPY: ICD-10-CM

## 2023-11-14 DIAGNOSIS — C90.00 MULTIPLE MYELOMA NOT HAVING ACHIEVED REMISSION: Chronic | ICD-10-CM

## 2023-11-14 LAB
ALBUMIN SERPL BCP-MCNC: 3.5 G/DL (ref 3.5–5.2)
ALP SERPL-CCNC: 55 U/L (ref 55–135)
ALT SERPL W/O P-5'-P-CCNC: 14 U/L (ref 10–44)
ANION GAP SERPL CALC-SCNC: 12 MMOL/L (ref 8–16)
AST SERPL-CCNC: 16 U/L (ref 10–40)
BASOPHILS # BLD AUTO: 0.02 K/UL (ref 0–0.2)
BASOPHILS NFR BLD: 0.4 % (ref 0–1.9)
BILIRUB SERPL-MCNC: 0.5 MG/DL (ref 0.1–1)
BUN SERPL-MCNC: 19 MG/DL (ref 8–23)
CALCIUM SERPL-MCNC: 8.4 MG/DL (ref 8.7–10.5)
CHLORIDE SERPL-SCNC: 107 MMOL/L (ref 95–110)
CO2 SERPL-SCNC: 23 MMOL/L (ref 23–29)
CREAT SERPL-MCNC: 0.9 MG/DL (ref 0.5–1.4)
DIFFERENTIAL METHOD: ABNORMAL
EOSINOPHIL # BLD AUTO: 0.2 K/UL (ref 0–0.5)
EOSINOPHIL NFR BLD: 4.2 % (ref 0–8)
ERYTHROCYTE [DISTWIDTH] IN BLOOD BY AUTOMATED COUNT: 14.3 % (ref 11.5–14.5)
EST. GFR  (NO RACE VARIABLE): >60 ML/MIN/1.73 M^2
GLUCOSE SERPL-MCNC: 98 MG/DL (ref 70–110)
HCT VFR BLD AUTO: 36.2 % (ref 37–48.5)
HGB BLD-MCNC: 11.8 G/DL (ref 12–16)
IMM GRANULOCYTES # BLD AUTO: 0 K/UL (ref 0–0.04)
IMM GRANULOCYTES NFR BLD AUTO: 0 % (ref 0–0.5)
LYMPHOCYTES # BLD AUTO: 1.9 K/UL (ref 1–4.8)
LYMPHOCYTES NFR BLD: 40.4 % (ref 18–48)
MAGNESIUM SERPL-MCNC: 1.6 MG/DL (ref 1.6–2.6)
MCH RBC QN AUTO: 31.6 PG (ref 27–31)
MCHC RBC AUTO-ENTMCNC: 32.6 G/DL (ref 32–36)
MCV RBC AUTO: 97 FL (ref 82–98)
MONOCYTES # BLD AUTO: 0.8 K/UL (ref 0.3–1)
MONOCYTES NFR BLD: 16.7 % (ref 4–15)
NEUTROPHILS # BLD AUTO: 1.8 K/UL (ref 1.8–7.7)
NEUTROPHILS NFR BLD: 38.3 % (ref 38–73)
NRBC BLD-RTO: 0 /100 WBC
PLATELET # BLD AUTO: 268 K/UL (ref 150–450)
PMV BLD AUTO: 10.6 FL (ref 9.2–12.9)
POTASSIUM SERPL-SCNC: 3.9 MMOL/L (ref 3.5–5.1)
PROT SERPL-MCNC: 6.4 G/DL (ref 6–8.4)
RBC # BLD AUTO: 3.74 M/UL (ref 4–5.4)
SODIUM SERPL-SCNC: 142 MMOL/L (ref 136–145)
WBC # BLD AUTO: 4.8 K/UL (ref 3.9–12.7)

## 2023-11-14 PROCEDURE — 36415 COLL VENOUS BLD VENIPUNCTURE: CPT | Performed by: INTERNAL MEDICINE

## 2023-11-14 PROCEDURE — 80053 COMPREHEN METABOLIC PANEL: CPT | Performed by: INTERNAL MEDICINE

## 2023-11-14 PROCEDURE — 83735 ASSAY OF MAGNESIUM: CPT | Performed by: INTERNAL MEDICINE

## 2023-11-14 PROCEDURE — 85025 COMPLETE CBC W/AUTO DIFF WBC: CPT | Performed by: INTERNAL MEDICINE

## 2023-11-14 NOTE — PROGRESS NOTES
HEMATOLOGY / ONCOLOGY   CLINIC NOTE          ONCOLOGICAL HISTORY:     Diagnosis:  - Multiple Myeloma IgG lambda    Treatment History:  - VRD (5/10/2023 - 6/28/2023)    Current Treatment:   - D-VRD (7/6/2023 -   - Zometa (10/18/2023     Subjective:       Chief Complaint: Multiple Myeloma, Chemotherapy, and Immunotherapy      HPI    Ana Bonilla  63 y.o.  female with past medical history significant for hypertension, COPD, asthma, GERD, hypothyroidism here for follow-up and management of multiple myeloma    She was referred in 2/2023 by her PCP after workup for gastritis revealed lytic lesions. CT chest showed lytic and expansile destructive lesion in the sternum and lytic lesions at L1. Biopsy of L1 lesion in 3/2023 revealed mature B cell neoplasm most consistent with plasma cell neoplasm.      Bone marrow biopsy in 4/2023 demonstrated 60% plasma cells. PET/CT showed extensive bony lesions throughout the spine, sternum, bilateral ribs, pelvis, and a mild compression deformity in L1. SPEP/MECHE showed IgG M spike 3.19 g/dL, IgG 4,521 mg/dL.      She was started on VRd and then daratumumab was added by the transplant team. She was started on ASA for thrombosis prophylaxis and acyclovir for herpes zoster prophylaxis. Start Zometa after dental evaluation.            Interval History:     Patient here for follow-up and next cycle of treatment.  She is complaining of fatigue and intermittent pain in her right hip region          Past Medical History:   Diagnosis Date    Allergy     Amblyopia OS    Anxiety     Asthma     COPD (chronic obstructive pulmonary disease)     NO HOME o2    GERD (gastroesophageal reflux disease)     Hyperlipidemia     Hypertension     PONV (postoperative nausea and vomiting)     Thyroid disease       Past Surgical History:   Procedure Laterality Date    APPENDECTOMY      BIOPSY N/A 6/8/2023    Procedure: BIOPSY;  Surgeon: Fausto Smith MD;  Location: [facility];  Service: Neurosurgery;   Laterality: N/A;  L1    BUNIONECTOMY      COLONOSCOPY N/A 12/4/2019    Procedure: COLONOSCOPY;  Surgeon: Danny Matos III, MD;  Location: [facility] ENDO;  Service: Endoscopy;  Laterality: N/A;    COLONOSCOPY N/A 10/24/2022    Procedure: COLONOSCOPY;  Surgeon: Danny Matos III, MD;  Location: [facility] ENDO;  Service: Endoscopy;  Laterality: N/A;    ESOPHAGOGASTRODUODENOSCOPY N/A 10/24/2022    Procedure: EGD (ESOPHAGOGASTRODUODENOSCOPY);  Surgeon: Danny Matos III, MD;  Location: [facility] ENDO;  Service: Endoscopy;  Laterality: N/A;    FIXATION KYPHOPLASTY Bilateral 6/8/2023    Procedure: KYPHOPLASTY;  Surgeon: Fausto Smith MD;  Location: [facility] OR;  Service: Neurosurgery;  Laterality: Bilateral;  kyphoplasty and radiofrequency ablation - L1    HYSTERECTOMY      PARTIAL//still with ovaries    neck fusion  08/2017    THYROIDECTOMY       Social History     Socioeconomic History    Marital status:     Number of children: 2   Occupational History     Employer: [employer]   Tobacco Use    Smoking status: Every Day     Current packs/day: 0.50     Average packs/day: 0.5 packs/day for 50.0 years (25.0 ttl pk-yrs)     Types: Cigarettes    Smokeless tobacco: Never   Substance and Sexual Activity    Alcohol use: Never     Comment: quit    Drug use: No    Sexual activity: Never     Social Determinants of Health     Stress: No Stress Concern Present (7/8/2019)    [employer] - Occupational Stress Questionnaire     Feeling of Stress : Not at all      Family History   Problem Relation Age of Onset    Hypertension Mother     Diabetes Mother     Diabetes Father     Stroke Maternal Grandmother     Prostate cancer Maternal Grandfather     Mental illness Son     Pancreatic cancer Maternal Uncle     Mental illness Other     Pancreatic cancer Other     Ovarian cancer Maternal Cousin       Review of patient's allergies indicates:   Allergen Reactions    Hydrocodone-acetaminophen Other (See Comments)      Causes pt to feel extremely sick       Review of Systems   Constitutional:  Positive for fatigue. Negative for activity change, chills and fever.   HENT: Negative.     Eyes:  Negative for pain, discharge and redness.   Respiratory:  Negative for cough, shortness of breath and wheezing.    Cardiovascular:  Negative for chest pain and leg swelling.   Gastrointestinal:  Positive for nausea. Negative for constipation, diarrhea and vomiting.   Endocrine: Negative.    Genitourinary: Negative.    Musculoskeletal:  Positive for back pain. Negative for arthralgias and myalgias.   Integumentary:  Negative.   Allergic/Immunologic: Negative.    Neurological:  Positive for numbness. Negative for light-headedness and headaches.   Hematological: Negative.    Psychiatric/Behavioral: Negative.           Objective:        Vitals:    11/15/23 0920   BP: (!) 87/64   Pulse: 71   Temp: 98 °F (36.7 °C)                    Physical Exam  Constitutional:       General: She is not in acute distress.     Appearance: Normal appearance. She is well-developed. She is not ill-appearing.   HENT:      Head: Normocephalic and atraumatic.      Mouth/Throat:      Mouth: Mucous membranes are moist.   Eyes:      Extraocular Movements: Extraocular movements intact.      Conjunctiva/sclera: Conjunctivae normal.   Cardiovascular:      Rate and Rhythm: Normal rate and regular rhythm.      Heart sounds: Normal heart sounds.   Pulmonary:      Effort: Pulmonary effort is normal. No respiratory distress.      Breath sounds: Normal breath sounds. No wheezing.   Abdominal:      General: There is no distension.      Palpations: Abdomen is soft.      Tenderness: There is no abdominal tenderness.   Musculoskeletal:         General: Normal range of motion.      Cervical back: Normal range of motion and neck supple.   Skin:     General: Skin is warm.   Neurological:      General: No focal deficit present.      Mental Status: She is alert and oriented to person, place,  and time. Mental status is at baseline.      Cranial Nerves: No cranial nerve deficit.   Psychiatric:         Mood and Affect: Mood normal.         Behavior: Behavior normal.           LABS / IMAGING      - 06/06/2023 RIGHT ILIAC CREST BONE MARROW ASPIRATE, BONE MARROW CLOT, AND BONE MARROW CORE BIOPSY WITH:     CELLULARITY=40-60%, TRILINEAGE HEMATOPOIETIC ACTIVITY (M/E=2.9:1).   CONSISTENT WITH PLASMA CELL NEOPLASM(60%).  SEE COMMENT.   FOCAL GRADE 1 RETICULAR FIBROSIS.   CONGO RED NEGATIVE.   INCREASED STORAGE IRON.   ADEQUATE NUMBER OF MEGAKARYOCYTES.    Bone marrow karyotype results: 46, XX[20], female karyotype.     Myeloma fixed cell, high-risk, FISH:  Normal.  The result is within normal limits for 1q duplication, TP53 deletion and IGH rearrangement.       - 04/10/2023 PET: Numerous FDG avid predominately lytic osseous lesions as above.  Primary differential considerations would include multiple myeloma versus metastatic disease.  2. No FDG avid soft tissue masses or adenopathy demonstrated.  3. Mild pathologic compression deformity of the L1 vertebral body.                                            Assessment:     IgG lambda Multiple myeloma, R-ISS stage I  - BMBx : 60 % plasma cells - 4/6/2023  - SPEP/MECHE showed IgG lambda - monoclonal protein 3.19 g/dl - (3/27/2023).  - R-ISS stage I (beta 2 microglobulin 1.9, albumin 3.5, , normal karyotype and no high-risk mutations on fish panel)  - PET-CT ( 4/10/2023) - showed extensive lytic lesions in the axial skeleton and mild L1 compression deformity.  - Started with Induction chemotherapy with VRD regimen (Velcde/Revlimid/Decadron) - (5/10/2023 - 6/28/2023)  - Aspirin for thrombosis prophylaxis; Acyclovir 400 mg p.o BID for herpes Zoster prophylaxis .  - patient was evaluated by transplant team and then switched to D-VRD (7/6/2023 -    - repeat creatinine clearance is more than 60, adjust the dose of Revlimid to 25 mg daily for 21 days on 7 days off as  recommended by the transplant team but patient unable to tolerate it and thus decreased it back to 10 mg daily    Cancer-related pain / palliative care by specialist  -  checked 10/04/2023   - switch [place] to Percocet as patient is unable to tolerate Norco  - refilled alprazolam - 10/04/2023     Meibomian gland dysfunction (MGD) of both eyes. Hordeolum externum of left upper eyelid   - started on doxycycline by Ophthalmology and plan of excision    Plan:     - Patient treatment had been held multiple times with few treatments cancelled and also Revlimid 10 mg daily given as unable to tolerate higher dose. Labs reviewed, continue with cycle 5 day 1.    - Discussed with the patient and the transplant team.  Patient does not want to make any decision for transplant now, thus as recommended by the transplant team, continue with Rosa-VRD for 6 cycles and then repeat bone marrow biopsy and PET scan. If very good partial response or better, then continue with Revlimid maintenance only.  - dental clearance given, continue with zometa q4wk, next dose today    - Continue Aspirin; Acyclovir 400 mg p.o BID for herpes Zoster prophylaxis.  - follow up with BM transplant team   - follow up with Ophthalmology for management of style / hordeolum  - recommended to be compliant with calcium and vitamin-D for hypocalcemia  - MD / LABS / TREATMENT VISIT - 1 WEEK for cycle 5 day 2 treatment       Med Onc Chart Routing      Follow up with physician 2 weeks.   Follow up with WERNER 1 week.   Infusion scheduling note   chemotherapy and zometa today.  Chemotherapy in 1 week   Injection scheduling note    Labs CBC, CMP, free light chains, SPEP and TSH   Scheduling:  Preferred lab:  Lab interval:  Labs before next visit   Imaging    Pharmacy appointment    Other referrals                  The patient was seen, interviewed and examined. Pertinent lab and radiology studies were reviewed. Pt instructed to call should develop concerning  signs/symptoms or have further questions.       Portions of the record may have been created with voice recognition software. Occasional wrong-word or sound-a-like substitutions may have occurred due to the inherent limitations of voice recognition software. Read the chart carefully and recognize, using context, where substitutions have occurred.    Jose Dela Cruz MD  Hematology / Oncology

## 2023-11-15 ENCOUNTER — INFUSION (OUTPATIENT)
Dept: INFUSION THERAPY | Facility: HOSPITAL | Age: 63
End: 2023-11-15
Attending: INTERNAL MEDICINE
Payer: COMMERCIAL

## 2023-11-15 ENCOUNTER — TELEPHONE (OUTPATIENT)
Dept: HEMATOLOGY/ONCOLOGY | Facility: CLINIC | Age: 63
End: 2023-11-15

## 2023-11-15 ENCOUNTER — OFFICE VISIT (OUTPATIENT)
Dept: HEMATOLOGY/ONCOLOGY | Facility: CLINIC | Age: 63
End: 2023-11-15
Payer: COMMERCIAL

## 2023-11-15 ENCOUNTER — PATIENT MESSAGE (OUTPATIENT)
Dept: HEMATOLOGY/ONCOLOGY | Facility: CLINIC | Age: 63
End: 2023-11-15

## 2023-11-15 VITALS
HEIGHT: 64 IN | HEART RATE: 68 BPM | RESPIRATION RATE: 16 BRPM | TEMPERATURE: 97 F | WEIGHT: 136.88 LBS | OXYGEN SATURATION: 99 % | BODY MASS INDEX: 23.37 KG/M2 | DIASTOLIC BLOOD PRESSURE: 72 MMHG | SYSTOLIC BLOOD PRESSURE: 99 MMHG

## 2023-11-15 VITALS
OXYGEN SATURATION: 99 % | TEMPERATURE: 98 F | BODY MASS INDEX: 23.5 KG/M2 | SYSTOLIC BLOOD PRESSURE: 87 MMHG | HEART RATE: 71 BPM | DIASTOLIC BLOOD PRESSURE: 64 MMHG | WEIGHT: 136.88 LBS

## 2023-11-15 DIAGNOSIS — C90.00 MULTIPLE MYELOMA, REMISSION STATUS UNSPECIFIED: Primary | ICD-10-CM

## 2023-11-15 DIAGNOSIS — C90.00 MULTIPLE MYELOMA NOT HAVING ACHIEVED REMISSION: ICD-10-CM

## 2023-11-15 DIAGNOSIS — E86.0 DEHYDRATION: ICD-10-CM

## 2023-11-15 PROCEDURE — 25000003 PHARM REV CODE 250: Performed by: INTERNAL MEDICINE

## 2023-11-15 PROCEDURE — 25000003 PHARM REV CODE 250

## 2023-11-15 PROCEDURE — 3078F DIAST BP <80 MM HG: CPT | Mod: CPTII,S$GLB,, | Performed by: INTERNAL MEDICINE

## 2023-11-15 PROCEDURE — 3078F PR MOST RECENT DIASTOLIC BLOOD PRESSURE < 80 MM HG: ICD-10-PCS | Mod: CPTII,S$GLB,, | Performed by: INTERNAL MEDICINE

## 2023-11-15 PROCEDURE — 96367 TX/PROPH/DG ADDL SEQ IV INF: CPT

## 2023-11-15 PROCEDURE — 99999 PR PBB SHADOW E&M-EST. PATIENT-LVL III: CPT | Mod: PBBFAC,,, | Performed by: INTERNAL MEDICINE

## 2023-11-15 PROCEDURE — 99215 PR OFFICE/OUTPT VISIT, EST, LEVL V, 40-54 MIN: ICD-10-PCS | Mod: S$GLB,,, | Performed by: INTERNAL MEDICINE

## 2023-11-15 PROCEDURE — 96365 THER/PROPH/DIAG IV INF INIT: CPT

## 2023-11-15 PROCEDURE — 3074F SYST BP LT 130 MM HG: CPT | Mod: CPTII,S$GLB,, | Performed by: INTERNAL MEDICINE

## 2023-11-15 PROCEDURE — 3008F BODY MASS INDEX DOCD: CPT | Mod: CPTII,S$GLB,, | Performed by: INTERNAL MEDICINE

## 2023-11-15 PROCEDURE — 4010F ACE/ARB THERAPY RXD/TAKEN: CPT | Mod: CPTII,S$GLB,, | Performed by: INTERNAL MEDICINE

## 2023-11-15 PROCEDURE — 3008F PR BODY MASS INDEX (BMI) DOCUMENTED: ICD-10-PCS | Mod: CPTII,S$GLB,, | Performed by: INTERNAL MEDICINE

## 2023-11-15 PROCEDURE — 3044F PR MOST RECENT HEMOGLOBIN A1C LEVEL <7.0%: ICD-10-PCS | Mod: CPTII,S$GLB,, | Performed by: INTERNAL MEDICINE

## 2023-11-15 PROCEDURE — 63600175 PHARM REV CODE 636 W HCPCS: Mod: JZ,JG | Performed by: INTERNAL MEDICINE

## 2023-11-15 PROCEDURE — 99215 OFFICE O/P EST HI 40 MIN: CPT | Mod: S$GLB,,, | Performed by: INTERNAL MEDICINE

## 2023-11-15 PROCEDURE — 3044F HG A1C LEVEL LT 7.0%: CPT | Mod: CPTII,S$GLB,, | Performed by: INTERNAL MEDICINE

## 2023-11-15 PROCEDURE — 4010F PR ACE/ARB THEARPY RXD/TAKEN: ICD-10-PCS | Mod: CPTII,S$GLB,, | Performed by: INTERNAL MEDICINE

## 2023-11-15 PROCEDURE — 96401 CHEMO ANTI-NEOPL SQ/IM: CPT

## 2023-11-15 PROCEDURE — 99999 PR PBB SHADOW E&M-EST. PATIENT-LVL III: ICD-10-PCS | Mod: PBBFAC,,, | Performed by: INTERNAL MEDICINE

## 2023-11-15 PROCEDURE — 3074F PR MOST RECENT SYSTOLIC BLOOD PRESSURE < 130 MM HG: ICD-10-PCS | Mod: CPTII,S$GLB,, | Performed by: INTERNAL MEDICINE

## 2023-11-15 RX ORDER — HEPARIN 100 UNIT/ML
500 SYRINGE INTRAVENOUS
Status: CANCELLED | OUTPATIENT
Start: 2023-11-29

## 2023-11-15 RX ORDER — BORTEZOMIB 3.5 MG/1
1.3 INJECTION, POWDER, LYOPHILIZED, FOR SOLUTION INTRAVENOUS; SUBCUTANEOUS
Status: CANCELLED | OUTPATIENT
Start: 2023-12-06

## 2023-11-15 RX ORDER — HEPARIN 100 UNIT/ML
500 SYRINGE INTRAVENOUS
Status: CANCELLED | OUTPATIENT
Start: 2023-12-06

## 2023-11-15 RX ORDER — HEPARIN 100 UNIT/ML
500 SYRINGE INTRAVENOUS
Status: CANCELLED | OUTPATIENT
Start: 2023-11-15

## 2023-11-15 RX ORDER — SODIUM CHLORIDE 0.9 % (FLUSH) 0.9 %
10 SYRINGE (ML) INJECTION
Status: CANCELLED | OUTPATIENT
Start: 2023-11-15

## 2023-11-15 RX ORDER — ZOLEDRONIC ACID 0.04 MG/ML
4 INJECTION, SOLUTION INTRAVENOUS
Status: DISCONTINUED | OUTPATIENT
Start: 2023-11-15 | End: 2023-11-15

## 2023-11-15 RX ORDER — SODIUM CHLORIDE 9 MG/ML
INJECTION, SOLUTION INTRAVENOUS CONTINUOUS
Status: CANCELLED
Start: 2023-11-15

## 2023-11-15 RX ORDER — HEPARIN 100 UNIT/ML
500 SYRINGE INTRAVENOUS
Status: CANCELLED | OUTPATIENT
Start: 2023-11-22

## 2023-11-15 RX ORDER — SODIUM CHLORIDE 0.9 % (FLUSH) 0.9 %
10 SYRINGE (ML) INJECTION
OUTPATIENT
Start: 2023-11-15

## 2023-11-15 RX ORDER — BORTEZOMIB 3.5 MG/1
1.3 INJECTION, POWDER, LYOPHILIZED, FOR SOLUTION INTRAVENOUS; SUBCUTANEOUS
Status: CANCELLED | OUTPATIENT
Start: 2023-11-29

## 2023-11-15 RX ORDER — BORTEZOMIB 3.5 MG/1
1.3 INJECTION, POWDER, LYOPHILIZED, FOR SOLUTION INTRAVENOUS; SUBCUTANEOUS
Status: COMPLETED | OUTPATIENT
Start: 2023-11-15 | End: 2023-11-15

## 2023-11-15 RX ORDER — DIPHENHYDRAMINE HYDROCHLORIDE 50 MG/ML
50 INJECTION INTRAMUSCULAR; INTRAVENOUS ONCE AS NEEDED
Status: CANCELLED | OUTPATIENT
Start: 2023-11-15

## 2023-11-15 RX ORDER — PROCHLORPERAZINE EDISYLATE 5 MG/ML
5 INJECTION INTRAMUSCULAR; INTRAVENOUS ONCE AS NEEDED
Status: CANCELLED
Start: 2023-11-15

## 2023-11-15 RX ORDER — BORTEZOMIB 3.5 MG/1
1.3 INJECTION, POWDER, LYOPHILIZED, FOR SOLUTION INTRAVENOUS; SUBCUTANEOUS
Status: CANCELLED | OUTPATIENT
Start: 2023-11-15

## 2023-11-15 RX ORDER — PROCHLORPERAZINE EDISYLATE 5 MG/ML
5 INJECTION INTRAMUSCULAR; INTRAVENOUS ONCE AS NEEDED
Status: CANCELLED
Start: 2023-11-29

## 2023-11-15 RX ORDER — SODIUM CHLORIDE 0.9 % (FLUSH) 0.9 %
10 SYRINGE (ML) INJECTION
Status: CANCELLED | OUTPATIENT
Start: 2023-11-22

## 2023-11-15 RX ORDER — BORTEZOMIB 3.5 MG/1
1.3 INJECTION, POWDER, LYOPHILIZED, FOR SOLUTION INTRAVENOUS; SUBCUTANEOUS
Status: CANCELLED | OUTPATIENT
Start: 2023-11-22

## 2023-11-15 RX ORDER — EPINEPHRINE 0.3 MG/.3ML
0.3 INJECTION SUBCUTANEOUS ONCE AS NEEDED
Status: CANCELLED | OUTPATIENT
Start: 2023-11-15

## 2023-11-15 RX ORDER — SODIUM CHLORIDE 0.9 % (FLUSH) 0.9 %
10 SYRINGE (ML) INJECTION
Status: CANCELLED | OUTPATIENT
Start: 2023-12-06

## 2023-11-15 RX ORDER — EPINEPHRINE 0.3 MG/.3ML
0.3 INJECTION SUBCUTANEOUS ONCE AS NEEDED
Status: CANCELLED | OUTPATIENT
Start: 2023-11-29

## 2023-11-15 RX ORDER — PROCHLORPERAZINE EDISYLATE 5 MG/ML
5 INJECTION INTRAMUSCULAR; INTRAVENOUS ONCE AS NEEDED
Status: CANCELLED
Start: 2023-12-06

## 2023-11-15 RX ORDER — SODIUM CHLORIDE 0.9 % (FLUSH) 0.9 %
10 SYRINGE (ML) INJECTION
Status: CANCELLED | OUTPATIENT
Start: 2023-11-29

## 2023-11-15 RX ORDER — HEPARIN 100 UNIT/ML
500 SYRINGE INTRAVENOUS
OUTPATIENT
Start: 2023-11-15

## 2023-11-15 RX ORDER — PROCHLORPERAZINE EDISYLATE 5 MG/ML
5 INJECTION INTRAMUSCULAR; INTRAVENOUS ONCE AS NEEDED
Status: CANCELLED
Start: 2023-11-22

## 2023-11-15 RX ORDER — DIPHENHYDRAMINE HYDROCHLORIDE 50 MG/ML
50 INJECTION INTRAMUSCULAR; INTRAVENOUS ONCE AS NEEDED
Status: CANCELLED | OUTPATIENT
Start: 2023-11-29

## 2023-11-15 RX ADMIN — BORTEZOMIB 2.25 MG: 3.5 INJECTION, POWDER, LYOPHILIZED, FOR SOLUTION INTRAVENOUS; SUBCUTANEOUS at 11:11

## 2023-11-15 RX ADMIN — ZOLEDRONIC ACID 3.5 MG: 4 INJECTION, SOLUTION, CONCENTRATE INTRAVENOUS at 11:11

## 2023-11-15 RX ADMIN — DARATUMUMAB AND HYALURONIDASE-FIHJ (HUMAN RECOMBINANT) 1800 MG: 1800; 30000 INJECTION SUBCUTANEOUS at 11:11

## 2023-11-15 RX ADMIN — SODIUM CHLORIDE 500 ML: 9 INJECTION, SOLUTION INTRAVENOUS at 11:11

## 2023-11-15 NOTE — PLAN OF CARE
"Pt is stable. Pt administered Zometa/Darzalex/Velcade today. Pt voiced she was doing "alright," today. Pt will follow up in one week.      Problem: Fall Injury Risk  Goal: Absence of Fall and Fall-Related Injury  Outcome: Ongoing, Progressing     Problem: Anemia (Chemotherapy Effects)  Goal: Anemia Symptom Improvement  Outcome: Ongoing, Progressing     Problem: Urinary Bleeding Risk or Actual (Chemotherapy Effects)  Goal: Absence of Hematuria  Outcome: Ongoing, Progressing     Problem: Nausea and Vomiting (Chemotherapy Effects)  Goal: Fluid and Electrolyte Balance  Outcome: Ongoing, Progressing     Problem: Neurotoxicity (Chemotherapy Effects)  Goal: Neurotoxicity Symptom Control  Outcome: Ongoing, Progressing     Problem: Neutropenia (Chemotherapy Effects)  Goal: Absence of Infection  Outcome: Ongoing, Progressing     Problem: Oral Mucositis (Chemotherapy Effects)  Goal: Improved Oral Mucous Membrane Integrity  Outcome: Ongoing, Progressing     Problem: Thrombocytopenia Bleeding Risk (Chemotherapy Effects)  Goal: Absence of Bleeding  Outcome: Ongoing, Progressing     Problem: Adult Inpatient Plan of Care  Goal: Plan of Care Review  Outcome: Ongoing, Progressing  Goal: Patient-Specific Goal (Individualized)  Outcome: Ongoing, Progressing  Flowsheets (Taken 11/15/2023 1106)  Anxieties, Fears or Concerns: Concerned about the stem-cell.  Individualized Care Needs: Pt provided pillow, warm blanket and refreshmetns offered.     "

## 2023-11-15 NOTE — DISCHARGE INSTRUCTIONS
Thank you for allowing me to care for you today,  MIS LeachN, RN    [facility]  [address] [address]  [phone] phone     [phone] fax  Hours of Operation: Monday- Friday 7:00am- 5:30pm  After hours phone  [phone]  Hematology / Oncology Physicians on call      JUANI Aguirre Dr., Dr., Dr., BELLE Valdez, BELLE Deluna, BELLE    Please call with any concerns regarding your appointment today.   FALL PREVENTION   Falls often occur due to slipping, tripping or losing your balance. Here are ways to reduce your risk of falling again.   Was there anything that caused your fall that can be fixed, removed or replaced?   Make your home safe by keeping walkways clear of objects you may trip over.   Use non-slip pads under rugs.   Do not walk in poorly lit areas.   Do not stand on chairs or wobbly ladders.   Use caution when reaching overhead or looking upward. This position can cause a loss of balance.   Be sure your shoes fit properly, have non-slip bottoms and are in good condition.   Be cautious when going up and down stairs, curbs, and when walking on uneven sidewalks.   If your balance is poor, consider using a cane or walker.   If your fall was related to alcohol use, stop or limit alcohol intake.   If your fall was related to use of sleeping medicines, talk to your doctor about this. You may need to reduce your dosage at bedtime if you awaken during the night to go to the bathroom.   To reduce the need for nighttime bathroom trips:   Avoid drinking fluids for several hours before going to bed   Empty your bladder before going to bed   Men can keep a urinal at the bedside   © 8119-4072 [employer], [address] [place] [address], [place] [address]. All rights reserved. This information is not intended as a substitute for professional medical care. Always follow your  healthcare professional's instructions.

## 2023-11-15 NOTE — LETTER
Novmember 13th    [facility] - Infusion 4th Fl  [address] [address] [facility]  [place] [address]  Phone: [phone]  Fax: [phone]       Patient: Ana Bonilla   YOB: 1960  Date of Visit: 11/15/2023    To Whom It May Concern:    Traci Bonilla  was at [facility] on 11/13/23 The patient may return to work/school on 11/20/23 with no restrictions. If you have any questions or concerns, or if I can be of further assistance, please do not hesitate to contact me.      Sincerely,        Karen Bess RN

## 2023-11-15 NOTE — TELEPHONE ENCOUNTER
Spoke with pharmacist Lynn Kwon  with [facility]. Authorization number [id number] provided for Lenalidomide 10 mg cap medication refill.

## 2023-11-16 ENCOUNTER — TELEPHONE (OUTPATIENT)
Dept: HEMATOLOGY/ONCOLOGY | Facility: CLINIC | Age: 63
End: 2023-11-16
Payer: COMMERCIAL

## 2023-11-16 ENCOUNTER — PATIENT MESSAGE (OUTPATIENT)
Dept: CARDIOLOGY | Facility: CLINIC | Age: 63
End: 2023-11-16
Payer: COMMERCIAL

## 2023-11-21 ENCOUNTER — LAB VISIT (OUTPATIENT)
Dept: LAB | Facility: HOSPITAL | Age: 63
End: 2023-11-21
Attending: INTERNAL MEDICINE
Payer: COMMERCIAL

## 2023-11-21 DIAGNOSIS — C90.00 MULTIPLE MYELOMA, REMISSION STATUS UNSPECIFIED: ICD-10-CM

## 2023-11-21 LAB
ALBUMIN SERPL BCP-MCNC: 3.7 G/DL (ref 3.5–5.2)
ALP SERPL-CCNC: 51 U/L (ref 55–135)
ALT SERPL W/O P-5'-P-CCNC: 15 U/L (ref 10–44)
ANION GAP SERPL CALC-SCNC: 10 MMOL/L (ref 8–16)
AST SERPL-CCNC: 17 U/L (ref 10–40)
BASOPHILS # BLD AUTO: 0.04 K/UL (ref 0–0.2)
BASOPHILS NFR BLD: 0.8 % (ref 0–1.9)
BILIRUB SERPL-MCNC: 0.6 MG/DL (ref 0.1–1)
BUN SERPL-MCNC: 7 MG/DL (ref 8–23)
CALCIUM SERPL-MCNC: 8.2 MG/DL (ref 8.7–10.5)
CHLORIDE SERPL-SCNC: 110 MMOL/L (ref 95–110)
CO2 SERPL-SCNC: 22 MMOL/L (ref 23–29)
CREAT SERPL-MCNC: 0.8 MG/DL (ref 0.5–1.4)
DIFFERENTIAL METHOD: ABNORMAL
EOSINOPHIL # BLD AUTO: 0.3 K/UL (ref 0–0.5)
EOSINOPHIL NFR BLD: 5.3 % (ref 0–8)
ERYTHROCYTE [DISTWIDTH] IN BLOOD BY AUTOMATED COUNT: 14.3 % (ref 11.5–14.5)
EST. GFR  (NO RACE VARIABLE): >60 ML/MIN/1.73 M^2
GLUCOSE SERPL-MCNC: 102 MG/DL (ref 70–110)
HCT VFR BLD AUTO: 36.2 % (ref 37–48.5)
HGB BLD-MCNC: 11.8 G/DL (ref 12–16)
IGA SERPL-MCNC: 30 MG/DL (ref 40–350)
IGG SERPL-MCNC: 733 MG/DL (ref 650–1600)
IGM SERPL-MCNC: 14 MG/DL (ref 50–300)
IMM GRANULOCYTES # BLD AUTO: 0.02 K/UL (ref 0–0.04)
IMM GRANULOCYTES NFR BLD AUTO: 0.4 % (ref 0–0.5)
LYMPHOCYTES # BLD AUTO: 1.8 K/UL (ref 1–4.8)
LYMPHOCYTES NFR BLD: 33.5 % (ref 18–48)
MCH RBC QN AUTO: 31.6 PG (ref 27–31)
MCHC RBC AUTO-ENTMCNC: 32.6 G/DL (ref 32–36)
MCV RBC AUTO: 97 FL (ref 82–98)
MONOCYTES # BLD AUTO: 0.8 K/UL (ref 0.3–1)
MONOCYTES NFR BLD: 14.9 % (ref 4–15)
NEUTROPHILS # BLD AUTO: 2.4 K/UL (ref 1.8–7.7)
NEUTROPHILS NFR BLD: 45.1 % (ref 38–73)
NRBC BLD-RTO: 0 /100 WBC
PLATELET # BLD AUTO: 266 K/UL (ref 150–450)
PMV BLD AUTO: 10.2 FL (ref 9.2–12.9)
POTASSIUM SERPL-SCNC: 3.7 MMOL/L (ref 3.5–5.1)
PROT SERPL-MCNC: 6.7 G/DL (ref 6–8.4)
RBC # BLD AUTO: 3.73 M/UL (ref 4–5.4)
SODIUM SERPL-SCNC: 142 MMOL/L (ref 136–145)
TSH SERPL DL<=0.005 MIU/L-ACNC: 1.07 UIU/ML (ref 0.4–4)
WBC # BLD AUTO: 5.25 K/UL (ref 3.9–12.7)

## 2023-11-21 PROCEDURE — 84443 ASSAY THYROID STIM HORMONE: CPT | Performed by: INTERNAL MEDICINE

## 2023-11-21 PROCEDURE — 86334 IMMUNOFIX E-PHORESIS SERUM: CPT | Mod: 26,,, | Performed by: PATHOLOGY

## 2023-11-21 PROCEDURE — 84165 PROTEIN E-PHORESIS SERUM: CPT | Mod: 26,,, | Performed by: PATHOLOGY

## 2023-11-21 PROCEDURE — 80053 COMPREHEN METABOLIC PANEL: CPT | Performed by: INTERNAL MEDICINE

## 2023-11-21 PROCEDURE — 84165 PROTEIN E-PHORESIS SERUM: CPT | Performed by: INTERNAL MEDICINE

## 2023-11-21 PROCEDURE — 84165 PATHOLOGIST INTERPRETATION SPE: ICD-10-PCS | Mod: 26,,, | Performed by: PATHOLOGY

## 2023-11-21 PROCEDURE — 86334 IMMUNOFIX E-PHORESIS SERUM: CPT | Performed by: INTERNAL MEDICINE

## 2023-11-21 PROCEDURE — 82784 ASSAY IGA/IGD/IGG/IGM EACH: CPT | Performed by: INTERNAL MEDICINE

## 2023-11-21 PROCEDURE — 85025 COMPLETE CBC W/AUTO DIFF WBC: CPT | Performed by: INTERNAL MEDICINE

## 2023-11-21 PROCEDURE — 83521 IG LIGHT CHAINS FREE EACH: CPT | Performed by: INTERNAL MEDICINE

## 2023-11-21 PROCEDURE — 36415 COLL VENOUS BLD VENIPUNCTURE: CPT | Performed by: INTERNAL MEDICINE

## 2023-11-21 PROCEDURE — 86334 PATHOLOGIST INTERPRETATION IFE: ICD-10-PCS | Mod: 26,,, | Performed by: PATHOLOGY

## 2023-11-22 ENCOUNTER — OFFICE VISIT (OUTPATIENT)
Dept: HEMATOLOGY/ONCOLOGY | Facility: CLINIC | Age: 63
End: 2023-11-22
Payer: COMMERCIAL

## 2023-11-22 ENCOUNTER — INFUSION (OUTPATIENT)
Dept: INFUSION THERAPY | Facility: HOSPITAL | Age: 63
End: 2023-11-22
Attending: INTERNAL MEDICINE
Payer: COMMERCIAL

## 2023-11-22 VITALS
OXYGEN SATURATION: 98 % | SYSTOLIC BLOOD PRESSURE: 129 MMHG | HEART RATE: 92 BPM | TEMPERATURE: 98 F | DIASTOLIC BLOOD PRESSURE: 72 MMHG | DIASTOLIC BLOOD PRESSURE: 82 MMHG | WEIGHT: 137.56 LBS | RESPIRATION RATE: 18 BRPM | HEIGHT: 64 IN | OXYGEN SATURATION: 98 % | BODY MASS INDEX: 23.49 KG/M2 | BODY MASS INDEX: 23.49 KG/M2 | SYSTOLIC BLOOD PRESSURE: 133 MMHG | HEIGHT: 64 IN | TEMPERATURE: 98 F | WEIGHT: 137.56 LBS | HEART RATE: 73 BPM

## 2023-11-22 DIAGNOSIS — C90.00 MULTIPLE MYELOMA, REMISSION STATUS UNSPECIFIED: Primary | Chronic | ICD-10-CM

## 2023-11-22 DIAGNOSIS — C90.00 MULTIPLE MYELOMA, REMISSION STATUS UNSPECIFIED: Primary | ICD-10-CM

## 2023-11-22 LAB
ALBUMIN SERPL ELPH-MCNC: 3.7 G/DL (ref 3.35–5.55)
ALPHA1 GLOB SERPL ELPH-MCNC: 0.35 G/DL (ref 0.17–0.41)
ALPHA2 GLOB SERPL ELPH-MCNC: 0.95 G/DL (ref 0.43–0.99)
B-GLOBULIN SERPL ELPH-MCNC: 0.68 G/DL (ref 0.5–1.1)
GAMMA GLOB SERPL ELPH-MCNC: 0.63 G/DL (ref 0.67–1.58)
INTERPRETATION SERPL IFE-IMP: NORMAL
KAPPA LC SER QL IA: 1.1 MG/DL (ref 0.33–1.94)
KAPPA LC/LAMBDA SER IA: 1.41 (ref 0.26–1.65)
LAMBDA LC SER QL IA: 0.78 MG/DL (ref 0.57–2.63)
PATHOLOGIST INTERPRETATION IFE: NORMAL
PATHOLOGIST INTERPRETATION SPE: NORMAL
PROT SERPL-MCNC: 6.3 G/DL (ref 6–8.4)

## 2023-11-22 PROCEDURE — 3075F SYST BP GE 130 - 139MM HG: CPT | Mod: CPTII,S$GLB,,

## 2023-11-22 PROCEDURE — 4010F PR ACE/ARB THEARPY RXD/TAKEN: ICD-10-PCS | Mod: CPTII,S$GLB,,

## 2023-11-22 PROCEDURE — 99999 PR PBB SHADOW E&M-EST. PATIENT-LVL V: ICD-10-PCS | Mod: PBBFAC,,,

## 2023-11-22 PROCEDURE — 3079F PR MOST RECENT DIASTOLIC BLOOD PRESSURE 80-89 MM HG: ICD-10-PCS | Mod: CPTII,S$GLB,,

## 2023-11-22 PROCEDURE — 99215 PR OFFICE/OUTPT VISIT, EST, LEVL V, 40-54 MIN: ICD-10-PCS | Mod: S$GLB,,,

## 2023-11-22 PROCEDURE — 3008F BODY MASS INDEX DOCD: CPT | Mod: CPTII,S$GLB,,

## 2023-11-22 PROCEDURE — 3079F DIAST BP 80-89 MM HG: CPT | Mod: CPTII,S$GLB,,

## 2023-11-22 PROCEDURE — 3044F HG A1C LEVEL LT 7.0%: CPT | Mod: CPTII,S$GLB,,

## 2023-11-22 PROCEDURE — 3044F PR MOST RECENT HEMOGLOBIN A1C LEVEL <7.0%: ICD-10-PCS | Mod: CPTII,S$GLB,,

## 2023-11-22 PROCEDURE — 4010F ACE/ARB THERAPY RXD/TAKEN: CPT | Mod: CPTII,S$GLB,,

## 2023-11-22 PROCEDURE — 99215 OFFICE O/P EST HI 40 MIN: CPT | Mod: S$GLB,,,

## 2023-11-22 PROCEDURE — 3008F PR BODY MASS INDEX (BMI) DOCUMENTED: ICD-10-PCS | Mod: CPTII,S$GLB,,

## 2023-11-22 PROCEDURE — 63600175 PHARM REV CODE 636 W HCPCS: Mod: JG | Performed by: INTERNAL MEDICINE

## 2023-11-22 PROCEDURE — 3075F PR MOST RECENT SYSTOLIC BLOOD PRESS GE 130-139MM HG: ICD-10-PCS | Mod: CPTII,S$GLB,,

## 2023-11-22 PROCEDURE — 99999 PR PBB SHADOW E&M-EST. PATIENT-LVL V: CPT | Mod: PBBFAC,,,

## 2023-11-22 PROCEDURE — 96401 CHEMO ANTI-NEOPL SQ/IM: CPT

## 2023-11-22 RX ORDER — BORTEZOMIB 3.5 MG/1
1.3 INJECTION, POWDER, LYOPHILIZED, FOR SOLUTION INTRAVENOUS; SUBCUTANEOUS
Status: COMPLETED | OUTPATIENT
Start: 2023-11-22 | End: 2023-11-22

## 2023-11-22 RX ADMIN — BORTEZOMIB 2.25 MG: 3.5 INJECTION, POWDER, LYOPHILIZED, FOR SOLUTION INTRAVENOUS; SUBCUTANEOUS at 01:11

## 2023-11-22 NOTE — PLAN OF CARE
"Pt is stable. Pt administered Velcade today. Pt voiced she was doing "alright," today. Pt will follow up in one week.      Problem: Fall Injury Risk  Goal: Absence of Fall and Fall-Related Injury  Outcome: Ongoing, Progressing     Problem: Anemia (Chemotherapy Effects)  Goal: Anemia Symptom Improvement  Outcome: Ongoing, Progressing     Problem: Urinary Bleeding Risk or Actual (Chemotherapy Effects)  Goal: Absence of Hematuria  Outcome: Ongoing, Progressing     Problem: Nausea and Vomiting (Chemotherapy Effects)  Goal: Fluid and Electrolyte Balance  Outcome: Ongoing, Progressing     Problem: Neurotoxicity (Chemotherapy Effects)  Goal: Neurotoxicity Symptom Control  Outcome: Ongoing, Progressing     Problem: Thrombocytopenia Bleeding Risk (Chemotherapy Effects)  Goal: Absence of Bleeding  Outcome: Ongoing, Progressing     Problem: Adult Inpatient Plan of Care  Goal: Plan of Care Review  Outcome: Ongoing, Progressing  Goal: Patient-Specific Goal (Individualized)  Outcome: Ongoing, Progressing  Flowsheets (Taken 11/22/2023 1342)  Anxieties, Fears or Concerns: Pt concerned about the stem-cell transplant.  Individualized Care Needs: Pt chose to sit upright.     "

## 2023-11-22 NOTE — ASSESSMENT & PLAN NOTE
BMBx : 60 % plasma cells - 4/6/2023  - SPEP/MECHE showed IgG lambda - monoclonal protein 3.19 g/dl - (3/27/2023).  - R-ISS stage I (beta 2 microglobulin 1.9, albumin 3.5, , normal karyotype and no high-risk mutations on fish panel  - PET-CT ( 4/10/2023) - showed extensive lytic lesions in the axial skeleton and mild L1 compression deformity.  - Started with Induction chemotherapy with VRD regimen (Velcde/Revlimid/Decadron) - 05/10/2023   - Started Aspirin daily p.o for thrombosis prophylaxis; Acyclovir 400 mg p.o BID for herpes Zoster prophylaxis .  __________________________________________________    Seen by Dr. Guanakito severino with following recommendations:  -weekly 28 day cycle odell-VRd x 4 total cycles >crissy autoSCT> maintenance     --S/p Kyphoplasty 06/08/23    Labs reviewed, no concerning cytopenias  --Ok to proceed with C5D8 (D-VRD) Velcade  --On Lenalidomide 10mg utd 1-21 of 28 day cycle; Dex q 7 days  --Continue Aspirin; Acyclovir 400 mg p.o BID for herpes Zoster prophylaxis    --Initiated on Zometa 05/31/23  --Pt not currently taking Ca+D supplement utd--d/t pill  --recommend Viactiv chews--pt notes she spoke to pharmacist who advised caltrate superior to viactiv--reminded pt this is recommendation after she stated she was having difficulty with large pill.  --Reiterated importance of taking supplements utd    Follow-up in one week with repeat labs for C5D1

## 2023-11-22 NOTE — PROGRESS NOTES
Subjective:     Patient ID:?Ana Bonilla is a 63 y.o. female.?? MR#: [id number]   ?   PRIMARY ONCOLOGIST:   ?   CHIEF COMPLAINT: lab review/assessment for chemo/MM ?????   ?   ONCOLOGIC DIAGNOSIS: Multiple Myeloma  ?   CURRENT TREATMENT: OP D-VRD DARATUMUMAB + BORTEZOMIB LENALIDOMIDE DEXAMETHASONE     HPI  Ms. Bonilla is a 62-year-old  female with past medical history significant for hypertension, COPD, asthma, GERD, hypothyroidism here for follow-up and management of multiple myeloma. BMBx on 4/6/2023 showed 60 % plasma cells. PET-CT on 4/10/2023 showed extensive lytic bony lesions throughout the spine, sternum, bilateral ribs, pelvis and mild compression deformity in L1 vertebral body. SPEP/MECHE on 3/27/2023 showed IgG lambda monoclonal protein - 3.19 g/dl. Quantitative immunoglobulins on 3/27/2023 showed IgG 4,521 mg/dl. UPEP/MECHE and FLC were pending at that time. Labs on 3/27/2023 showed Beta 2 microglobulin 1.9, .  Labs on 4/19/2023 showed Hb, 11.5, WBC 6.3, platelets, BUN 11, Cr 0.9, calcium 9. Pt initiated on VRD 05/10/23. Treatment planned changed to D-VRD 07/06/23.     Interval History: Pt presents today for lab review and assessment prior to Velcade.  She continues lenalidomide and taking prophylactic medications and dexamethasone this morning utd. Pt states overall she is feeling ok experienced some GI upset last week. Denies n/v/d/c, fever, chills, sob, cp, abnormal bleeding. She notes appetite waxes and wanes; denies difficulty with hydration.    Oncology History   Multiple myeloma   4/20/2023 Initial Diagnosis    Multiple myeloma     5/10/2023 - 6/28/2023 Chemotherapy    Treatment Summary   Plan Name: OP VRD - WEEKLY BORTEZOMIB LENALIDOMIDE DEXAMETHASONE Q3W  Treatment Goal: Palliative  Status: Inactive  Start Date: 5/10/2023  End Date: 6/28/2023  Provider: Abram Velasquez MD  Chemotherapy: bortezomib (VELCADE) injection 2 mg, 1.3 mg/m2 = 2 mg, Subcutaneous, Clinic/[facility] 1 time, 2 of 6  cycles  Administration: 2 mg (5/10/2023), 2 mg (5/17/2023), 2 mg (6/14/2023), 2 mg (5/31/2023), 2 mg (6/21/2023), 2 mg (6/28/2023)  lenalidomide 25 mg Cap, 25 mg, Oral, Daily, 1 of 1 cycle, Start date: 5/10/2023, End date: 5/17/2023 6/28/2023 Cancer Staged    Staging form: Plasma Cell Myeloma and Plasma Cell Disorders, AJCC 8th Edition  - Clinical stage from 6/28/2023: RISS Stage II (Beta-2-microglobulin (mg/L): 1.9, Albumin (g/dL): 3, ISS: Stage II, High-risk cytogenetics: Absent, LDH: Normal)     7/6/2023 -  Chemotherapy    Treatment Summary   Plan Name: OP D-VRD DARATUMUMAB + BORTEZOMIB LENALIDOMIDE DEXAMETHASONE  Treatment Goal: Palliative  Status: Active  Start Date: 7/6/2023  End Date: 1/3/2024 (Planned)  Provider: Oscar Márquez MD  Chemotherapy: bortezomib (VELCADE) injection 2 mg, 1.3 mg/m2 = 2 mg, Subcutaneous, Clinic/[facility] 1 time, 5 of 6 cycles  Administration: 2 mg (7/6/2023), 2 mg (7/12/2023), 2 mg (8/2/2023), 2 mg (8/9/2023), 2.25 mg (10/18/2023), 2 mg (7/19/2023), 2 mg (7/26/2023), 2 mg (8/16/2023), 2.25 mg (9/20/2023), 2.25 mg (9/27/2023), 2.25 mg (10/25/2023), 2.25 mg (11/1/2023), 2.25 mg (11/8/2023), 2.25 mg (11/15/2023), 2.25 mg (11/22/2023)  lenalidomide 10 mg Cap, 1 capsule (100 % of original dose 1 capsule), Oral, Daily, 1 of 1 cycle, Start date: 7/26/2023, End date: 8/2/2023  Dose modification: 1 capsule (original dose 1 capsule, Cycle 0)  daratumumab-hyaluronidase-fihj subcutaneous injection 1,800 mg, 1,800 mg (100 % of original dose 1,800 mg), Subcutaneous, Clinic/[facility] 1 time, 5 of 6 cycles  Dose modification: 1,800 mg (original dose 1,800 mg, Cycle 1), 1,800 mg (original dose 1,800 mg, Cycle 1)  Administration: 1,800 mg (7/6/2023), 1,800 mg (7/12/2023), 1,800 mg (7/19/2023), 1,800 mg (7/26/2023), 1,800 mg (8/2/2023), 1,800 mg (8/9/2023), 1,800 mg (8/16/2023), 1,800 mg (9/27/2023), 1,800 mg (10/18/2023), 1,800 mg (11/1/2023), 1,800 mg (11/15/2023)        Social History     Socioeconomic  History    Marital status:     Number of children: 2   Occupational History     Employer: [employer]   Tobacco Use    Smoking status: Every Day     Current packs/day: 0.50     Average packs/day: 0.5 packs/day for 50.0 years (25.0 ttl pk-yrs)     Types: Cigarettes    Smokeless tobacco: Never   Substance and Sexual Activity    Alcohol use: Never     Comment: quit    Drug use: No    Sexual activity: Never     Social Determinants of Health     Financial Resource Strain: Low Risk  (11/15/2023)    Overall Financial Resource Strain (CARDIA)     Difficulty of Paying Living Expenses: Not hard at all   Food Insecurity: No Food Insecurity (11/15/2023)    Hunger Vital Sign     Worried About Running Out of Food in the Last Year: Never true     Ran Out of Food in the Last Year: Never true   Transportation Needs: No Transportation Needs (11/15/2023)    PRAPARE - Transportation     Lack of Transportation (Medical): No     Lack of Transportation (Non-Medical): No   Physical Activity: Sufficiently Active (11/15/2023)    Exercise Vital Sign     Days of Exercise per Week: 7 days     Minutes of Exercise per Session: 150+ min   Stress: Stress Concern Present (11/15/2023)    [employer] [place] [employer] - Occupational Stress Questionnaire     Feeling of Stress : To some extent   Social Connections: Unknown (11/15/2023)    Social Connection and Isolation Panel [NHANES]     Frequency of Communication with Friends and Family: More than three times a week     Frequency of Social Gatherings with Friends and Family: Three times a week     Active Member of Clubs or Organizations: No     Attends Club or Organization Meetings: Patient refused     Marital Status:    Housing Stability: Low Risk  (11/15/2023)    Housing Stability Vital Sign     Unable to Pay for Housing in the Last Year: No     Number of Places Lived in the Last Year: 1     Unstable Housing in the Last Year: No      Family History   Problem Relation Age of Onset     Hypertension Mother     Diabetes Mother     Diabetes Father     Stroke Maternal Grandmother     Prostate cancer Maternal Grandfather     Mental illness Son     Pancreatic cancer Maternal Uncle     Mental illness Other     Pancreatic cancer Other     Ovarian cancer Maternal Cousin       Past Surgical History:   Procedure Laterality Date    APPENDECTOMY      BIOPSY N/A 6/8/2023    Procedure: BIOPSY;  Surgeon: Fausto Smith MD;  Location: [facility] OR;  Service: Neurosurgery;  Laterality: N/A;  L1    BUNIONECTOMY      COLONOSCOPY N/A 12/4/2019    Procedure: COLONOSCOPY;  Surgeon: Danny Matos III, MD;  Location: [facility] ENDO;  Service: Endoscopy;  Laterality: N/A;    COLONOSCOPY N/A 10/24/2022    Procedure: COLONOSCOPY;  Surgeon: Danny Matos III, MD;  Location: [facility] ENDO;  Service: Endoscopy;  Laterality: N/A;    ESOPHAGOGASTRODUODENOSCOPY N/A 10/24/2022    Procedure: EGD (ESOPHAGOGASTRODUODENOSCOPY);  Surgeon: Danny Matos III, MD;  Location: [facility] ENDO;  Service: Endoscopy;  Laterality: N/A;    FIXATION KYPHOPLASTY Bilateral 6/8/2023    Procedure: KYPHOPLASTY;  Surgeon: Fausto Smith MD;  Location: [facility] OR;  Service: Neurosurgery;  Laterality: Bilateral;  kyphoplasty and radiofrequency ablation - L1    HYSTERECTOMY      PARTIAL//still with ovaries    neck fusion  08/2017    THYROIDECTOMY          Review of Systems   Constitutional:  Positive for fatigue. Negative for activity change, appetite change, chills, fever and unexpected weight change.   HENT:  Negative for congestion, dental problem, mouth sores and nosebleeds.    Eyes:  Negative for visual disturbance.   Respiratory:  Negative for cough, choking and chest tightness.    Cardiovascular:  Negative for chest pain, palpitations and leg swelling.   Gastrointestinal:  Negative for abdominal distention, abdominal pain, anal bleeding, blood in stool, constipation, diarrhea, nausea and vomiting.   Endocrine: Negative.    Genitourinary:  Negative for  dysuria, frequency, hematuria and urgency.   Musculoskeletal:  Positive for arthralgias and myalgias. Negative for back pain, gait problem and joint swelling.   Skin:  Negative for rash and wound.   Allergic/Immunologic: Negative for immunocompromised state.   Neurological:  Negative for dizziness, light-headedness, numbness and headaches.   Hematological:  Negative for adenopathy. Does not bruise/bleed easily.   Psychiatric/Behavioral:  The patient is nervous/anxious.        ?   A comprehensive 14-point review of systems was reviewed with patient and was negative other than as specified above.   ?     Objective:      Physical Exam  Vitals reviewed.   Constitutional:       Appearance: Normal appearance. She is not ill-appearing.   HENT:      Head: Normocephalic and atraumatic.      Right Ear: External ear normal.      Left Ear: External ear normal.   Eyes:      Conjunctiva/sclera: Conjunctivae normal.   Cardiovascular:      Rate and Rhythm: Normal rate.   Pulmonary:      Effort: Pulmonary effort is normal.   Abdominal:      General: Abdomen is flat.   Genitourinary:     Comments: deferred  Musculoskeletal:         General: Normal range of motion.      Cervical back: Normal range of motion.      Right lower leg: No edema.      Left lower leg: No edema.   Skin:     General: Skin is warm and dry.      Capillary Refill: Capillary refill takes less than 2 seconds.      Coloration: Skin is not mottled.   Neurological:      Mental Status: She is alert and oriented to person, place, and time.      Motor: No weakness.           ?   Vitals:    11/22/23 1300   BP: 133/82   Pulse: 92   Temp: 97.9 °F (36.6 °C)      ?       ?   Laboratory:  ?   No visits with results within 1 Day(s) from this visit.   Latest known visit with results is:   Lab Visit on 11/21/2023   Component Date Value Ref Range Status    Immunofix Interp. 11/21/2023 SEE COMMENT   Final    IgG 11/21/2023 733  650 - 1600 mg/dL Final    IgA 11/21/2023 30 (L)  40 -  350 mg/dL Final    IgM 11/21/2023 14 (L)  50 - 300 mg/dL Final    Kappa Free Light Chains 11/21/2023 1.10  0.33 - 1.94 mg/dL Final    Lambda Free Light Chains 11/21/2023 0.78  0.57 - 2.63 mg/dL Final    Kappa/Lambda FLC Ratio 11/21/2023 1.41  0.26 - 1.65 Final    Protein, Serum 11/21/2023 6.3  6.0 - 8.4 g/dL Final    Albumin 11/21/2023 3.70  3.35 - 5.55 g/dL Final    Alpha-1 11/21/2023 0.35  0.17 - 0.41 g/dL Final    Alpha-2 11/21/2023 0.95  0.43 - 0.99 g/dL Final    Beta 11/21/2023 0.68  0.50 - 1.10 g/dL Final    Gamma 11/21/2023 0.63 (L)  0.67 - 1.58 g/dL Final    Sodium 11/21/2023 142  136 - 145 mmol/L Final    Potassium 11/21/2023 3.7  3.5 - 5.1 mmol/L Final    Chloride 11/21/2023 110  95 - 110 mmol/L Final    CO2 11/21/2023 22 (L)  23 - 29 mmol/L Final    Glucose 11/21/2023 102  70 - 110 mg/dL Final    BUN 11/21/2023 7 (L)  8 - 23 mg/dL Final    Creatinine 11/21/2023 0.8  0.5 - 1.4 mg/dL Final    Calcium 11/21/2023 8.2 (L)  8.7 - 10.5 mg/dL Final    Total Protein 11/21/2023 6.7  6.0 - 8.4 g/dL Final    Albumin 11/21/2023 3.7  3.5 - 5.2 g/dL Final    Total Bilirubin 11/21/2023 0.6  0.1 - 1.0 mg/dL Final    Alkaline Phosphatase 11/21/2023 51 (L)  55 - 135 U/L Final    AST 11/21/2023 17  10 - 40 U/L Final    ALT 11/21/2023 15  10 - 44 U/L Final    eGFR 11/21/2023 >60  >60 mL/min/1.73 m^2 Final    Anion Gap 11/21/2023 10  8 - 16 mmol/L Final    WBC 11/21/2023 5.25  3.90 - 12.70 K/uL Final    RBC 11/21/2023 3.73 (L)  4.00 - 5.40 M/uL Final    Hemoglobin 11/21/2023 11.8 (L)  12.0 - 16.0 g/dL Final    Hematocrit 11/21/2023 36.2 (L)  37.0 - 48.5 % Final    MCV 11/21/2023 97  82 - 98 fL Final    MCH 11/21/2023 31.6 (H)  27.0 - 31.0 pg Final    MCHC 11/21/2023 32.6  32.0 - 36.0 g/dL Final    RDW 11/21/2023 14.3  11.5 - 14.5 % Final    Platelets 11/21/2023 266  150 - 450 K/uL Final    MPV 11/21/2023 10.2  9.2 - 12.9 fL Final    Immature Granulocytes 11/21/2023 0.4  0.0 - 0.5 % Final    Gran # (ANC) 11/21/2023 2.4  1.8 -  7.7 K/uL Final    Immature Grans (Abs) 11/21/2023 0.02  0.00 - 0.04 K/uL Final    Lymph # 11/21/2023 1.8  1.0 - 4.8 K/uL Final    Mono # 11/21/2023 0.8  0.3 - 1.0 K/uL Final    Eos # 11/21/2023 0.3  0.0 - 0.5 K/uL Final    Baso # 11/21/2023 0.04  0.00 - 0.20 K/uL Final    nRBC 11/21/2023 0  0 /100 WBC Final    Gran % 11/21/2023 45.1  38.0 - 73.0 % Final    Lymph % 11/21/2023 33.5  18.0 - 48.0 % Final    Mono % 11/21/2023 14.9  4.0 - 15.0 % Final    Eosinophil % 11/21/2023 5.3  0.0 - 8.0 % Final    Basophil % 11/21/2023 0.8  0.0 - 1.9 % Final    Differential Method 11/21/2023 Automated   Final    TSH 11/21/2023 1.067  0.400 - 4.000 uIU/mL Final      ?   Imaging:    No results found. However, due to the size of the patient record, not all encounters were searched. Please check Results Review for a complete set of results.       Results for orders placed or performed during the hospital encounter of 09/27/23 (from the past 2160 hour(s))   CT [employer] Sinuses without    Narrative    EXAM: CT MEDTR[employer]IC SINUSES WITHOUT    CLINICAL INDICATION: Chronic sinusitis    TECHNIQUE: Axial and multiplanar 2-D reformations provided.  Without IV contrast    PRIORS: None    FINDINGS: There is an air-fluid level in the right sphenoid sinus consistent with acute sphenoid sinusitis.  Otherwise, the left sphenoid and the remainder of all the paranasal sinuses are clear.  Frontoethmoid recesses and ostiomeatal complexes are patent bilaterally.  No significant lena bullosa formation.  No significant nasal septal deviation.    Maxillofacial bones are normal.  Orbits intact.        Impression    1.  Acute sphenoid sinusitis with air-fluid level as described    2.  Otherwise normal paranasal sinuses with intact drainage pathways as described    All CT scans at [this location] are performed using dose modulation techniques as appropriate to a performed exam including the following:  Automated exposure control; adjustment of the mA and/or kV  according to patient size (this includes techniques or standardized protocols for targeted exams where dose is matched to indication / reason for exam; i.e. extremities or head); use of iterative reconstruction technique.    Finalized on: 9/27/2023 1:14 PM By:  Ismael Grajeda MD  [facility]# [id number]      2023-09-27 13:17:[id number]    [facility]     *Note: Due to a large number of results and/or encounters for the requested time period, some results have not been displayed. A complete set of results can be found in Results Review.          ?   Assessment/Plan:     Problem List Items Addressed This Visit          Oncology    Multiple myeloma - Primary (Chronic)     BMBx : 60 % plasma cells - 4/6/2023  - SPEP/MECHE showed IgG lambda - monoclonal protein 3.19 g/dl - (3/27/2023).  - R-ISS stage I (beta 2 microglobulin 1.9, albumin 3.5, , normal karyotype and no high-risk mutations on fish panel  - PET-CT ( 4/10/2023) - showed extensive lytic lesions in the axial skeleton and mild L1 compression deformity.  - Started with Induction chemotherapy with VRD regimen (Velcde/Revlimid/Decadron) - 05/10/2023   - Started Aspirin daily p.o for thrombosis prophylaxis; Acyclovir 400 mg p.o BID for herpes Zoster prophylaxis .  __________________________________________________    Seen by Dr. Guanakito severino with following recommendations:  -weekly 28 day cycle odell-VRd x 4 total cycles >crissy autoSCT> maintenance     --S/p Kyphoplasty 06/08/23    Labs reviewed, no concerning cytopenias  --Ok to proceed with C5D8 (D-VRD) Velcade  --On Lenalidomide 10mg utd 1-21 of 28 day cycle; Dex q 7 days  --Continue Aspirin; Acyclovir 400 mg p.o BID for herpes Zoster prophylaxis    --Initiated on Zometa 05/31/23  --Pt not currently taking Ca+D supplement utd--d/t pill  --recommend Viactiv chews--pt notes she spoke to pharmacist who advised caltrate superior to viactiv--reminded pt this is recommendation after she stated she was having difficulty with large  pill.  --Reiterated importance of taking supplements utd    Follow-up in one week with repeat labs for C5D1                 Med Onc Chart Routing      Follow up with physician . Scheduled   Follow up with WERNER    Infusion scheduling note    Injection scheduling note    Labs    Imaging    Pharmacy appointment    Other referrals                     [facility]  Hematology/Oncology

## 2023-11-22 NOTE — DISCHARGE INSTRUCTIONS
Thank you for allowing me to care for you today,  MIS LeachN, RN    [facility]  [address] [address]  [phone] phone     [phone] fax  Hours of Operation: Monday- Friday 7:00am- 5:30pm  After hours phone  [phone]  Hematology / Oncology Physicians on call      JUANI Aguirre Dr[address] Dr., Dr., BELLE Valdez, BELLE Deulna, BELLE    Please call with any concerns regarding your appointment today.   FALL PREVENTION   Falls often occur due to slipping, tripping or losing your balance. Here are ways to reduce your risk of falling again.   Was there anything that caused your fall that can be fixed, removed or replaced?   Make your home safe by keeping walkways clear of objects you may trip over.   Use non-slip pads under rugs.   Do not walk in poorly lit areas.   Do not stand on chairs or wobbly ladders.   Use caution when reaching overhead or looking upward. This position can cause a loss of balance.   Be sure your shoes fit properly, have non-slip bottoms and are in good condition.   Be cautious when going up and down stairs, curbs, and when walking on uneven sidewalks.   If your balance is poor, consider using a cane or walker.   If your fall was related to alcohol use, stop or limit alcohol intake.   If your fall was related to use of sleeping medicines, talk to your doctor about this. You may need to reduce your dosage at bedtime if you awaken during the night to go to the bathroom.   To reduce the need for nighttime bathroom trips:   Avoid drinking fluids for several hours before going to bed   Empty your bladder before going to bed   Men can keep a urinal at the bedside   © 3009-8780 [employer], [address] [place] [address], [place] [address]. All rights reserved. This information is not intended as a substitute for professional medical care. Always follow your  healthcare professional's instructions.

## 2023-11-27 ENCOUNTER — OFFICE VISIT (OUTPATIENT)
Dept: OPHTHALMOLOGY | Facility: CLINIC | Age: 63
End: 2023-11-27
Payer: COMMERCIAL

## 2023-11-27 DIAGNOSIS — H01.02B SQUAMOUS BLEPHARITIS OF UPPER AND LOWER EYELIDS OF BOTH EYES: Primary | ICD-10-CM

## 2023-11-27 DIAGNOSIS — H01.02A SQUAMOUS BLEPHARITIS OF UPPER AND LOWER EYELIDS OF BOTH EYES: Primary | ICD-10-CM

## 2023-11-27 PROCEDURE — 3044F HG A1C LEVEL LT 7.0%: CPT | Mod: CPTII,S$GLB,, | Performed by: OPTOMETRIST

## 2023-11-27 PROCEDURE — 1160F RVW MEDS BY RX/DR IN RCRD: CPT | Mod: CPTII,S$GLB,, | Performed by: OPTOMETRIST

## 2023-11-27 PROCEDURE — 1159F PR MEDICATION LIST DOCUMENTED IN MEDICAL RECORD: ICD-10-PCS | Mod: CPTII,S$GLB,, | Performed by: OPTOMETRIST

## 2023-11-27 PROCEDURE — 3044F PR MOST RECENT HEMOGLOBIN A1C LEVEL <7.0%: ICD-10-PCS | Mod: CPTII,S$GLB,, | Performed by: OPTOMETRIST

## 2023-11-27 PROCEDURE — 99213 PR OFFICE/OUTPT VISIT, EST, LEVL III, 20-29 MIN: ICD-10-PCS | Mod: S$GLB,,, | Performed by: OPTOMETRIST

## 2023-11-27 PROCEDURE — 1159F MED LIST DOCD IN RCRD: CPT | Mod: CPTII,S$GLB,, | Performed by: OPTOMETRIST

## 2023-11-27 PROCEDURE — 4010F ACE/ARB THERAPY RXD/TAKEN: CPT | Mod: CPTII,S$GLB,, | Performed by: OPTOMETRIST

## 2023-11-27 PROCEDURE — 99213 OFFICE O/P EST LOW 20 MIN: CPT | Mod: S$GLB,,, | Performed by: OPTOMETRIST

## 2023-11-27 PROCEDURE — 99999 PR PBB SHADOW E&M-EST. PATIENT-LVL II: ICD-10-PCS | Mod: PBBFAC,,, | Performed by: OPTOMETRIST

## 2023-11-27 PROCEDURE — 1160F PR REVIEW ALL MEDS BY PRESCRIBER/CLIN PHARMACIST DOCUMENTED: ICD-10-PCS | Mod: CPTII,S$GLB,, | Performed by: OPTOMETRIST

## 2023-11-27 PROCEDURE — 99999 PR PBB SHADOW E&M-EST. PATIENT-LVL II: CPT | Mod: PBBFAC,,, | Performed by: OPTOMETRIST

## 2023-11-27 PROCEDURE — 4010F PR ACE/ARB THEARPY RXD/TAKEN: ICD-10-PCS | Mod: CPTII,S$GLB,, | Performed by: OPTOMETRIST

## 2023-11-27 RX ORDER — ERYTHROMYCIN 5 MG/G
OINTMENT OPHTHALMIC
Qty: 2.5 G | Refills: 0 | Status: SHIPPED | OUTPATIENT
Start: 2023-11-27

## 2023-11-27 NOTE — PROGRESS NOTES
HPI     Follow-up            Comments: Pt here for follow up visit. Pt was given a  Kenalog 10 mg   injection on last visit and was  instructed to Increase warm compresses to   lids 3-4 times daily OU -Pt states her left eye hurts and has been   watering a lot. Pt is currently taking a Chemo pill and states the   medication could be causing the return of the chalazion.          Last edited by Ayo Casper on 11/27/2023 10:05 AM.            Assessment /Plan     For exam results, see Encounter Report.    Squamous blepharitis of upper and lower eyelids of both eyes  -     erythromycin (ROMYCIN) ophthalmic ointment; Apply ointment to lids and lashes on both eyes 2 times daily for 7 days.  Dispense: 2.5 g; Refill: 0    Continue aggressive warm compresses bid. Add erythromycin radha bid to lids and lashes x 7 days.     RTC 1 week for follow up with DFE and refraction, sooner if any changes to vision or worsneing symptoms.

## 2023-11-28 ENCOUNTER — LAB VISIT (OUTPATIENT)
Dept: LAB | Facility: HOSPITAL | Age: 63
End: 2023-11-28
Attending: INTERNAL MEDICINE
Payer: COMMERCIAL

## 2023-11-28 DIAGNOSIS — C90.00 MULTIPLE MYELOMA NOT HAVING ACHIEVED REMISSION: Chronic | ICD-10-CM

## 2023-11-28 LAB
ALBUMIN SERPL BCP-MCNC: 3.8 G/DL (ref 3.5–5.2)
ALP SERPL-CCNC: 54 U/L (ref 55–135)
ALT SERPL W/O P-5'-P-CCNC: 13 U/L (ref 10–44)
ANION GAP SERPL CALC-SCNC: 12 MMOL/L (ref 8–16)
AST SERPL-CCNC: 18 U/L (ref 10–40)
BASOPHILS # BLD AUTO: 0.02 K/UL (ref 0–0.2)
BASOPHILS NFR BLD: 0.4 % (ref 0–1.9)
BILIRUB SERPL-MCNC: 0.9 MG/DL (ref 0.1–1)
BUN SERPL-MCNC: 9 MG/DL (ref 8–23)
CALCIUM SERPL-MCNC: 8.8 MG/DL (ref 8.7–10.5)
CHLORIDE SERPL-SCNC: 109 MMOL/L (ref 95–110)
CO2 SERPL-SCNC: 24 MMOL/L (ref 23–29)
CREAT SERPL-MCNC: 1.1 MG/DL (ref 0.5–1.4)
DIFFERENTIAL METHOD: ABNORMAL
EOSINOPHIL # BLD AUTO: 0.3 K/UL (ref 0–0.5)
EOSINOPHIL NFR BLD: 5.6 % (ref 0–8)
ERYTHROCYTE [DISTWIDTH] IN BLOOD BY AUTOMATED COUNT: 13.9 % (ref 11.5–14.5)
EST. GFR  (NO RACE VARIABLE): 56 ML/MIN/1.73 M^2
GLUCOSE SERPL-MCNC: 97 MG/DL (ref 70–110)
HCT VFR BLD AUTO: 38.6 % (ref 37–48.5)
HGB BLD-MCNC: 12.5 G/DL (ref 12–16)
IMM GRANULOCYTES # BLD AUTO: 0.02 K/UL (ref 0–0.04)
IMM GRANULOCYTES NFR BLD AUTO: 0.4 % (ref 0–0.5)
LYMPHOCYTES # BLD AUTO: 1.8 K/UL (ref 1–4.8)
LYMPHOCYTES NFR BLD: 34.9 % (ref 18–48)
MCH RBC QN AUTO: 30.9 PG (ref 27–31)
MCHC RBC AUTO-ENTMCNC: 32.4 G/DL (ref 32–36)
MCV RBC AUTO: 96 FL (ref 82–98)
MONOCYTES # BLD AUTO: 1.1 K/UL (ref 0.3–1)
MONOCYTES NFR BLD: 21.6 % (ref 4–15)
NEUTROPHILS # BLD AUTO: 1.9 K/UL (ref 1.8–7.7)
NEUTROPHILS NFR BLD: 37.1 % (ref 38–73)
NRBC BLD-RTO: 0 /100 WBC
PLATELET # BLD AUTO: 269 K/UL (ref 150–450)
PMV BLD AUTO: 10.2 FL (ref 9.2–12.9)
POTASSIUM SERPL-SCNC: 3.1 MMOL/L (ref 3.5–5.1)
PROT SERPL-MCNC: 7.2 G/DL (ref 6–8.4)
RBC # BLD AUTO: 4.04 M/UL (ref 4–5.4)
SODIUM SERPL-SCNC: 145 MMOL/L (ref 136–145)
WBC # BLD AUTO: 5.01 K/UL (ref 3.9–12.7)

## 2023-11-28 PROCEDURE — 84165 PATHOLOGIST INTERPRETATION SPE: ICD-10-PCS | Mod: 26,,, | Performed by: PATHOLOGY

## 2023-11-28 PROCEDURE — 83521 IG LIGHT CHAINS FREE EACH: CPT | Mod: 59 | Performed by: INTERNAL MEDICINE

## 2023-11-28 PROCEDURE — 80053 COMPREHEN METABOLIC PANEL: CPT | Performed by: INTERNAL MEDICINE

## 2023-11-28 PROCEDURE — 84165 PROTEIN E-PHORESIS SERUM: CPT | Performed by: INTERNAL MEDICINE

## 2023-11-28 PROCEDURE — 36415 COLL VENOUS BLD VENIPUNCTURE: CPT | Performed by: INTERNAL MEDICINE

## 2023-11-28 PROCEDURE — 85025 COMPLETE CBC W/AUTO DIFF WBC: CPT | Performed by: INTERNAL MEDICINE

## 2023-11-28 PROCEDURE — 84165 PROTEIN E-PHORESIS SERUM: CPT | Mod: 26,,, | Performed by: PATHOLOGY

## 2023-11-29 ENCOUNTER — OFFICE VISIT (OUTPATIENT)
Dept: HEMATOLOGY/ONCOLOGY | Facility: CLINIC | Age: 63
End: 2023-11-29
Payer: COMMERCIAL

## 2023-11-29 ENCOUNTER — INFUSION (OUTPATIENT)
Dept: INFUSION THERAPY | Facility: HOSPITAL | Age: 63
End: 2023-11-29
Attending: INTERNAL MEDICINE
Payer: COMMERCIAL

## 2023-11-29 ENCOUNTER — PATIENT MESSAGE (OUTPATIENT)
Dept: HEMATOLOGY/ONCOLOGY | Facility: CLINIC | Age: 63
End: 2023-11-29

## 2023-11-29 VITALS
SYSTOLIC BLOOD PRESSURE: 116 MMHG | WEIGHT: 136.88 LBS | BODY MASS INDEX: 23.37 KG/M2 | OXYGEN SATURATION: 99 % | RESPIRATION RATE: 18 BRPM | DIASTOLIC BLOOD PRESSURE: 78 MMHG | TEMPERATURE: 97 F | HEIGHT: 64 IN | HEART RATE: 91 BPM

## 2023-11-29 DIAGNOSIS — C90.00 MULTIPLE MYELOMA, REMISSION STATUS UNSPECIFIED: Primary | ICD-10-CM

## 2023-11-29 DIAGNOSIS — C90.00 MULTIPLE MYELOMA, REMISSION STATUS UNSPECIFIED: ICD-10-CM

## 2023-11-29 DIAGNOSIS — M25.551 RIGHT HIP PAIN: Primary | ICD-10-CM

## 2023-11-29 LAB
ALBUMIN SERPL ELPH-MCNC: 3.87 G/DL (ref 3.35–5.55)
ALPHA1 GLOB SERPL ELPH-MCNC: 0.36 G/DL (ref 0.17–0.41)
ALPHA2 GLOB SERPL ELPH-MCNC: 1 G/DL (ref 0.43–0.99)
B-GLOBULIN SERPL ELPH-MCNC: 0.71 G/DL (ref 0.5–1.1)
GAMMA GLOB SERPL ELPH-MCNC: 0.66 G/DL (ref 0.67–1.58)
KAPPA LC SER QL IA: 1.46 MG/DL (ref 0.33–1.94)
KAPPA LC/LAMBDA SER IA: 1.42 (ref 0.26–1.65)
LAMBDA LC SER QL IA: 1.03 MG/DL (ref 0.57–2.63)
PROT SERPL-MCNC: 6.6 G/DL (ref 6–8.4)

## 2023-11-29 PROCEDURE — 63600175 PHARM REV CODE 636 W HCPCS: Mod: JZ,JG | Performed by: INTERNAL MEDICINE

## 2023-11-29 PROCEDURE — 4010F ACE/ARB THERAPY RXD/TAKEN: CPT | Mod: CPTII,95,, | Performed by: INTERNAL MEDICINE

## 2023-11-29 PROCEDURE — 96401 CHEMO ANTI-NEOPL SQ/IM: CPT

## 2023-11-29 PROCEDURE — 25000003 PHARM REV CODE 250: Performed by: INTERNAL MEDICINE

## 2023-11-29 PROCEDURE — 3044F HG A1C LEVEL LT 7.0%: CPT | Mod: CPTII,95,, | Performed by: INTERNAL MEDICINE

## 2023-11-29 PROCEDURE — 3044F PR MOST RECENT HEMOGLOBIN A1C LEVEL <7.0%: ICD-10-PCS | Mod: CPTII,95,, | Performed by: INTERNAL MEDICINE

## 2023-11-29 PROCEDURE — 99215 PR OFFICE/OUTPT VISIT, EST, LEVL V, 40-54 MIN: ICD-10-PCS | Mod: 95,,, | Performed by: INTERNAL MEDICINE

## 2023-11-29 PROCEDURE — 99215 OFFICE O/P EST HI 40 MIN: CPT | Mod: 95,,, | Performed by: INTERNAL MEDICINE

## 2023-11-29 PROCEDURE — 4010F PR ACE/ARB THEARPY RXD/TAKEN: ICD-10-PCS | Mod: CPTII,95,, | Performed by: INTERNAL MEDICINE

## 2023-11-29 RX ORDER — ONDANSETRON 8 MG/1
8 TABLET, ORALLY DISINTEGRATING ORAL ONCE
Status: CANCELLED
Start: 2023-11-29

## 2023-11-29 RX ORDER — ONDANSETRON 8 MG/1
8 TABLET, ORALLY DISINTEGRATING ORAL ONCE
Status: COMPLETED | OUTPATIENT
Start: 2023-11-29 | End: 2023-11-29

## 2023-11-29 RX ORDER — BORTEZOMIB 3.5 MG/1
1.3 INJECTION, POWDER, LYOPHILIZED, FOR SOLUTION INTRAVENOUS; SUBCUTANEOUS
Status: COMPLETED | OUTPATIENT
Start: 2023-11-29 | End: 2023-11-29

## 2023-11-29 RX ADMIN — DARATUMUMAB AND HYALURONIDASE-FIHJ (HUMAN RECOMBINANT) 1800 MG: 1800; 30000 INJECTION SUBCUTANEOUS at 11:11

## 2023-11-29 RX ADMIN — ONDANSETRON 8 MG: 8 TABLET, ORALLY DISINTEGRATING ORAL at 11:11

## 2023-11-29 RX ADMIN — BORTEZOMIB 2.25 MG: 3.5 INJECTION, POWDER, LYOPHILIZED, FOR SOLUTION INTRAVENOUS; SUBCUTANEOUS at 11:11

## 2023-11-29 NOTE — PROGRESS NOTES
[facility] - Hematol Oncol [facility]  [id number] [place] [id number]  Phone: [phone];  Fax: [phone]    Patient ID: Ana Bonilla   Chief Complaint: Follow-up and Multiple Myeloma     MRN:  [id number]     Oncologic Diagnosis:  Multiple Myeloma - IgG lambda   Previous Treatment:  VRD (5/10/2023 - 6/28/2023)   Current Treatment:  D-VRD (7/6/2023 - )    - Zometa (10/18/2023 - )       The patient location is: Home   The chief complaint leading to consultation is: MM    Visit type: audiovisual    Face to Face time with patient: 30 minutes  60 minutes of total time spent on the encounter, which includes face to face time and non-face to face time preparing to see the patient (eg, review of tests), Obtaining and/or reviewing separately obtained history, Documenting clinical information in the electronic or other health record, Independently interpreting results (not separately reported) and communicating results to the patient/family/caregiver, or Care coordination (not separately reported).         Each patient to whom he or she provides medical services by telemedicine is:  (1) informed of the relationship between the physician and patient and the respective role of any other health care provider with respect to management of the patient; and (2) notified that he or she may decline to receive medical services by telemedicine and may withdraw from such care at any time.    Notes:   Subjective   Ana Bonilla is a delightful 63 y.o. female  with IgG lambda multiple myeloma currently on D-VRD who presents to clinic via virtual visit for follow-up and treatment.  This is my 1st visit with Mrs. Bonilla.    She is tolerating the treatment well overall; after she gets Velcade and Darzalex she does notice that she feels more fatigued.  She also continues to have recurrent issues with stye on her eye.  She has a appointment with her eye doctor on Monday.  I reviewed her clinical course with her  and she is aware of the recommendation for bone marrow transplant as a potentially curative treatment for her multiple myeloma.  However she declines at this time.  We discussed that we will repeat imaging in bone marrow biopsy after her 6th cycle of treatment and she is in agreement.  She also reports that she is been having some right hip pain; we discussed doing a hip x-ray today however she will like to hold off until her next treatment because when she gets Darzalex and Velcade it is a long day for her and she does not feel well.  She has also been having some abdominal pain and cramping as well as nausea.  She is going to try Protonix and might try loly supplements.    Review of Systems:  Review of Systems   Constitutional:  Negative for activity change, appetite change, chills, diaphoresis, fatigue, fever and unexpected weight change.   HENT:  Negative for nosebleeds.    Respiratory:  Negative for shortness of breath.    Cardiovascular:  Negative for chest pain.   Gastrointestinal:  Positive for abdominal pain and nausea. Negative for abdominal distention, anal bleeding, blood in stool, constipation, diarrhea and vomiting.   Genitourinary:  Negative for difficulty urinating and hematuria.   Musculoskeletal:  Positive for arthralgias (right hip). Negative for back pain and myalgias.   Skin:  Negative for rash.   Neurological:  Negative for dizziness, weakness, light-headedness and headaches.   Hematological:  Does not bruise/bleed easily.   Psychiatric/Behavioral:  The patient is not nervous/anxious.      History     Oncology History   Multiple myeloma   4/20/2023 Initial Diagnosis    Multiple myeloma     5/10/2023 - 6/28/2023 Chemotherapy    Treatment Summary   Plan Name: OP VRD - WEEKLY BORTEZOMIB LENALIDOMIDE DEXAMETHASONE Q3W  Treatment Goal: Palliative  Status: Inactive  Start Date: 5/10/2023  End Date: 6/28/2023  Provider: Abram Velasquez MD  Chemotherapy: bortezomib (VELCADE) injection 2 mg, 1.3 mg/m2  = 2 mg, Subcutaneous, Clinic/HOD 1 time, 2 of 6 cycles  Administration: 2 mg (5/10/2023), 2 mg (5/17/2023), 2 mg (6/14/2023), 2 mg (5/31/2023), 2 mg (6/21/2023), 2 mg (6/28/2023)  lenalidomide 25 mg Cap, 25 mg, Oral, Daily, 1 of 1 cycle, Start date: 5/10/2023, End date: 5/17/2023 6/28/2023 Cancer Staged    Staging form: Plasma Cell Myeloma and Plasma Cell Disorders, AJCC 8th Edition  - Clinical stage from 6/28/2023: RISS Stage II (Beta-2-microglobulin (mg/L): 1.9, Albumin (g/dL): 3, ISS: Stage II, High-risk cytogenetics: Absent, LDH: Normal)     7/6/2023 -  Chemotherapy    Treatment Summary   Plan Name: OP D-VRD DARATUMUMAB + BORTEZOMIB LENALIDOMIDE DEXAMETHASONE  Treatment Goal: Palliative  Status: Active  Start Date: 7/6/2023  End Date: 1/3/2024 (Planned)  Provider: Oscar Márquez MD  Chemotherapy: bortezomib (VELCADE) injection 2 mg, 1.3 mg/m2 = 2 mg, Subcutaneous, Clinic/HOD 1 time, 5 of 6 cycles  Administration: 2 mg (7/6/2023), 2 mg (7/12/2023), 2 mg (8/2/2023), 2 mg (8/9/2023), 2.25 mg (10/18/2023), 2 mg (7/19/2023), 2 mg (7/26/2023), 2 mg (8/16/2023), 2.25 mg (9/20/2023), 2.25 mg (9/27/2023), 2.25 mg (10/25/2023), 2.25 mg (11/1/2023), 2.25 mg (11/8/2023), 2.25 mg (11/15/2023), 2.25 mg (11/22/2023), 2.25 mg (11/29/2023)  lenalidomide 10 mg Cap, 1 capsule (100 % of original dose 1 capsule), Oral, Daily, 1 of 1 cycle, Start date: 7/26/2023, End date: 8/2/2023  Dose modification: 1 capsule (original dose 1 capsule, Cycle 0)  daratumumab-hyaluronidase-fihj subcutaneous injection 1,800 mg, 1,800 mg (100 % of original dose 1,800 mg), Subcutaneous, Clinic/[facility] 1 time, 5 of 6 cycles  Dose modification: 1,800 mg (original dose 1,800 mg, Cycle 1), 1,800 mg (original dose 1,800 mg, Cycle 1)  Administration: 1,800 mg (7/6/2023), 1,800 mg (7/12/2023), 1,800 mg (7/19/2023), 1,800 mg (7/26/2023), 1,800 mg (8/2/2023), 1,800 mg (8/9/2023), 1,800 mg (8/16/2023), 1,800 mg (9/27/2023), 1,800 mg (10/18/2023), 1,800 mg  (11/1/2023), 1,800 mg (11/15/2023), 1,800 mg (11/29/2023)           HPI     Ana Bonilla  63 y.o.  female with past medical history significant for hypertension, COPD, asthma, GERD, hypothyroidism here for follow-up and management of multiple myeloma     She was referred in 2/2023 by her PCP after workup for gastritis revealed lytic lesions. CT chest showed lytic and expansile destructive lesion in the sternum and lytic lesions at L1. Biopsy of L1 lesion in 3/2023 revealed mature B cell neoplasm most consistent with plasma cell neoplasm.      Bone marrow biopsy in 4/2023 demonstrated 60% plasma cells. PET/CT showed extensive bony lesions throughout the spine, sternum, bilateral ribs, pelvis, and a mild compression deformity in L1. SPEP/MECHE showed IgG M spike 3.19 g/dL, IgG 4,521 mg/dL.      She was started on VRd and then daratumumab was added by the transplant team. She was started on ASA for thrombosis prophylaxis and acyclovir for herpes zoster prophylaxis. Start Zometa after dental evaluation.        Past Medical History:   Diagnosis Date    Allergy     Amblyopia OS    Anxiety     Asthma     COPD (chronic obstructive pulmonary disease)     NO HOME o2    GERD (gastroesophageal reflux disease)     Hyperlipidemia     Hypertension     PONV (postoperative nausea and vomiting)     Thyroid disease        Past Surgical History:   Procedure Laterality Date    APPENDECTOMY      BIOPSY N/A 6/8/2023    Procedure: BIOPSY;  Surgeon: Fausto Smith MD;  Location: [facility] OR;  Service: Neurosurgery;  Laterality: N/A;  L1    BUNIONECTOMY      COLONOSCOPY N/A 12/4/2019    Procedure: COLONOSCOPY;  Surgeon: Danny Matos III, MD;  Location: [facility] ENDO;  Service: Endoscopy;  Laterality: N/A;    COLONOSCOPY N/A 10/24/2022    Procedure: COLONOSCOPY;  Surgeon: Danny Matos III, MD;  Location: [facility] ENDO;  Service: Endoscopy;  Laterality: N/A;    ESOPHAGOGASTRODUODENOSCOPY N/A 10/24/2022    Procedure: EGD  (ESOPHAGOGASTRODUODENOSCOPY);  Surgeon: Danny Matos III, MD;  Location: [facility] ENDO;  Service: Endoscopy;  Laterality: N/A;    FIXATION KYPHOPLASTY Bilateral 6/8/2023    Procedure: KYPHOPLASTY;  Surgeon: Fausto Smith MD;  Location: [facility] OR;  Service: Neurosurgery;  Laterality: Bilateral;  kyphoplasty and radiofrequency ablation - L1    HYSTERECTOMY      PARTIAL//still with ovaries    neck fusion  08/2017    THYROIDECTOMY         Family History   Problem Relation Age of Onset    Hypertension Mother     Diabetes Mother     Diabetes Father     Stroke Maternal Grandmother     Prostate cancer Maternal Grandfather     Mental illness Son     Pancreatic cancer Maternal Uncle     Mental illness Other     Pancreatic cancer Other     Ovarian cancer Maternal Cousin        Review of patient's allergies indicates:   Allergen Reactions    Hydrocodone-acetaminophen Other (See Comments)     Causes pt to feel extremely sick        Social History     Tobacco Use    Smoking status: Every Day     Current packs/day: 0.50     Average packs/day: 0.5 packs/day for 50.0 years (25.0 ttl pk-yrs)     Types: Cigarettes    Smokeless tobacco: Never   Substance Use Topics    Alcohol use: Never     Comment: quit    Drug use: No       Physical Exam   ECOG:   ECOG SCORE    0 - Fully active-able to carry on all pre-disease performance without restriction              Labs   Labs:  Lab Visit on 11/28/2023   Component Date Value Ref Range Status    Sodium 11/28/2023 145  136 - 145 mmol/L Final    Potassium 11/28/2023 3.1 (L)  3.5 - 5.1 mmol/L Final    Chloride 11/28/2023 109  95 - 110 mmol/L Final    CO2 11/28/2023 24  23 - 29 mmol/L Final    Glucose 11/28/2023 97  70 - 110 mg/dL Final    BUN 11/28/2023 9  8 - 23 mg/dL Final    Creatinine 11/28/2023 1.1  0.5 - 1.4 mg/dL Final    Calcium 11/28/2023 8.8  8.7 - 10.5 mg/dL Final    Total Protein 11/28/2023 7.2  6.0 - 8.4 g/dL Final    Albumin 11/28/2023 3.8  3.5 - 5.2 g/dL Final    Total Bilirubin  11/28/2023 0.9  0.1 - 1.0 mg/dL Final    Comment: For infants and newborns, interpretation of results should be based  on gestational age, weight and in agreement with clinical  observations.    Premature Infant recommended reference ranges:  Up to 24 hours.............<8.0 mg/dL  Up to 48 hours............<12.0 mg/dL  3-5 days..................<15.0 mg/dL  6-29 days.................<15.0 mg/dL      Alkaline Phosphatase 11/28/2023 54 (L)  55 - 135 U/L Final    AST 11/28/2023 18  10 - 40 U/L Final    ALT 11/28/2023 13  10 - 44 U/L Final    eGFR 11/28/2023 56 (A)  >60 mL/min/1.73 m^2 Final    Anion Gap 11/28/2023 12  8 - 16 mmol/L Final    WBC 11/28/2023 5.01  3.90 - 12.70 K/uL Final    RBC 11/28/2023 4.04  4.00 - 5.40 M/uL Final    Hemoglobin 11/28/2023 12.5  12.0 - 16.0 g/dL Final    Hematocrit 11/28/2023 38.6  37.0 - 48.5 % Final    MCV 11/28/2023 96  82 - 98 fL Final    MCH 11/28/2023 30.9  27.0 - 31.0 pg Final    MCHC 11/28/2023 32.4  32.0 - 36.0 g/dL Final    RDW 11/28/2023 13.9  11.5 - 14.5 % Final    Platelets 11/28/2023 269  150 - 450 K/uL Final    MPV 11/28/2023 10.2  9.2 - 12.9 fL Final    Immature Granulocytes 11/28/2023 0.4  0.0 - 0.5 % Final    Gran # (ANC) 11/28/2023 1.9  1.8 - 7.7 K/uL Final    Immature Grans (Abs) 11/28/2023 0.02  0.00 - 0.04 K/uL Final    Comment: Mild elevation in immature granulocytes is non specific and   can be seen in a variety of conditions including stress response,   acute inflammation, trauma and pregnancy. Correlation with other   laboratory and clinical findings is essential.      Lymph # 11/28/2023 1.8  1.0 - 4.8 K/uL Final    Mono # 11/28/2023 1.1 (H)  0.3 - 1.0 K/uL Final    Eos # 11/28/2023 0.3  0.0 - 0.5 K/uL Final    Baso # 11/28/2023 0.02  0.00 - 0.20 K/uL Final    nRBC 11/28/2023 0  0 /100 WBC Final    Gran % 11/28/2023 37.1 (L)  38.0 - 73.0 % Final    Lymph % 11/28/2023 34.9  18.0 - 48.0 % Final    Mono % 11/28/2023 21.6 (H)  4.0 - 15.0 % Final    Eosinophil %  11/28/2023 5.6  0.0 - 8.0 % Final    Basophil % 11/28/2023 0.4  0.0 - 1.9 % Final    Differential Method 11/28/2023 Automated   Final    Kappa Free Light Chains 11/28/2023 1.46  0.33 - 1.94 mg/dL Final    Lambda Free Light Chains 11/28/2023 1.03  0.57 - 2.63 mg/dL Final    Kappa/Lambda FLC Ratio 11/28/2023 1.42  0.26 - 1.65 Final    Comment: Undetected antigen excess is a rare event but cannot   be excluded. If these free light chain results do not   agree with other clinical or laboratory findings or   if the sample is from a patient that has previously   demonstrated antigen excess, discuss with the testing   laboratory.   Results should always be interpreted in conjunction   with other laboratory tests and clinical evidence.      Protein, Serum 11/28/2023 6.6  6.0 - 8.4 g/dL Final    Comment: Serum protein electrophoresis and immunofixation results should be   interpreted in clinical context in that some therapeutic agents can   result   in false positive results (example, daratumumab). Correlation with   the   patient s therapeutic regimen is required.      Albumin 11/28/2023 3.87  3.35 - 5.55 g/dL Final    Alpha-1 11/28/2023 0.36  0.17 - 0.41 g/dL Final    Alpha-2 11/28/2023 1.00 (H)  0.43 - 0.99 g/dL Final    Beta 11/28/2023 0.71  0.50 - 1.10 g/dL Final    Gamma 11/28/2023 0.66 (L)  0.67 - 1.58 g/dL Final        Imaging/Pathology   - 06/06/2023 RIGHT ILIAC CREST BONE MARROW ASPIRATE, BONE MARROW CLOT, AND BONE MARROW CORE BIOPSY WITH:     CELLULARITY=40-60%, TRILINEAGE HEMATOPOIETIC ACTIVITY (M/E=2.9:1).   CONSISTENT WITH PLASMA CELL NEOPLASM(60%).  SEE COMMENT.   FOCAL GRADE 1 RETICULAR FIBROSIS.   CONGO RED NEGATIVE.   INCREASED STORAGE IRON.   ADEQUATE NUMBER OF MEGAKARYOCYTES.     Bone marrow karyotype results: 46, XX[20], female karyotype.     Myeloma fixed cell, high-risk, FISH:  Normal.  The result is within normal limits for 1q duplication, TP53 deletion and IGH rearrangement.         - 04/10/2023 PET:  Numerous FDG avid predominately lytic osseous lesions as above.  Primary differential considerations would include multiple myeloma versus metastatic disease.  2. No FDG avid soft tissue masses or adenopathy demonstrated.  3. Mild pathologic compression deformity of the L1 vertebral body.                                          Assessment and Plan   IgG lambda Multiple myeloma, R-ISS stage I   Diagnosed June 2023 via bone marrow biopsy  Patient treatment had been held multiple times with few treatments cancelled and also Revlimid 10 mg daily given as unable to tolerate higher dose   Previous oncologist discussed with the patient and the transplant team BMT.  However, patient declined transplant at this time and it was recommended that we continue with Rosa-VRD for 6 cycles and then repeat bone marrow biopsy and PET scan. If VGPR or better, then continue with Revlimid maintenance only.  Continue prophylaxis with aspirin, acyclovir 400 mg b.i.d.  Continue Zometa/calcium and vitamin-D supplements  Continue follow up with BMT team       R  Hip Pain  Agreed to hip xray next week      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, Hordeolum externum of left eyelid  Continue follow-up with Ophthalmology  Continue antibiotic/cream      Cancer Screening  MMG 03/17/2023:  BI-RADS 1  PAP Smear:  Partial hysterectomy  Colonoscopy 10/24/2022:  Normal repeat in 5 years      Chronic Medical Conditions  Asthma  Anxiety   COPD   GERD  Hypertension   Hyperlipidemia   Hypothyroidism   ?IBS-C on [employer] Onc Chart Routing      Follow up with physician 1 week.   Follow up with WERNER 2 weeks.   Infusion scheduling note   Darzalex and Velcade today; RTC In 1 week for Velcade   Injection scheduling note    Labs CBC and CMP   Scheduling:  Preferred lab:  Lab interval:     Imaging Other   Hip x-ray   Pharmacy appointment    Other referrals                     The patient was seen, interviewed and examined. Pertinent lab and  radiologic studies were reviewed. Pt instructed to call should they develop concerning signs/symptoms or have further questions.        Portions of the record may have been created with voice recognition software. Occasional wrong-word or sound-a-like substitutions may have occurred due to the inherent limitations of voice recognition software. Read the chart carefully and recognize, using context, where substitutions have occurred.      Bri Michelle MD    Hematology/Oncology

## 2023-11-29 NOTE — NURSING
1107: Velcade Injection given without difficulties.Bandaid applied. Patient instructed to stay in the clinic for 15 minutes. Patient verbalized understanding and will notify nurse with any complaints.   1108: Darzalex Injection given without difficulties.Bandaid applied. Patient instructed to stay in the clinic for 15 minutes. Patient verbalized understanding and will notify nurse with any complaints.

## 2023-11-29 NOTE — DISCHARGE INSTRUCTIONS
Thank you for allowing me to care for you today,  MIS LeachN, RN    [facility]  [address] [address]  [phone] phone     [phone] fax  Hours of Operation: Monday- Friday 7:00am- 5:30pm  After hours phone  [phone]  Hematology / Oncology Physicians on call      JUANI Aguirre Dr[address] Dr., Dr., BELLE Valdez, BELLE Deluna, BELLE    Please call with any concerns regarding your appointment today.   FALL PREVENTION   Falls often occur due to slipping, tripping or losing your balance. Here are ways to reduce your risk of falling again.   Was there anything that caused your fall that can be fixed, removed or replaced?   Make your home safe by keeping walkways clear of objects you may trip over.   Use non-slip pads under rugs.   Do not walk in poorly lit areas.   Do not stand on chairs or wobbly ladders.   Use caution when reaching overhead or looking upward. This position can cause a loss of balance.   Be sure your shoes fit properly, have non-slip bottoms and are in good condition.   Be cautious when going up and down stairs, curbs, and when walking on uneven sidewalks.   If your balance is poor, consider using a cane or walker.   If your fall was related to alcohol use, stop or limit alcohol intake.   If your fall was related to use of sleeping medicines, talk to your doctor about this. You may need to reduce your dosage at bedtime if you awaken during the night to go to the bathroom.   To reduce the need for nighttime bathroom trips:   Avoid drinking fluids for several hours before going to bed   Empty your bladder before going to bed   Men can keep a urinal at the bedside   © 4591-3811 [employer], [address] [place] [address], [place] [address]. All rights reserved. This information is not intended as a substitute for professional medical care. Always follow your  healthcare professional's instructions.

## 2023-11-29 NOTE — PLAN OF CARE
"Pt is stable. Pt administered Darzalex/Velcade today. Pt voiced she was doing "alright," today. Pt will follow up in one week.      Problem: Fall Injury Risk  Goal: Absence of Fall and Fall-Related Injury  Outcome: Ongoing, Progressing     Problem: Anemia (Chemotherapy Effects)  Goal: Anemia Symptom Improvement  Outcome: Ongoing, Progressing     Problem: Urinary Bleeding Risk or Actual (Chemotherapy Effects)  Goal: Absence of Hematuria  Outcome: Ongoing, Progressing     Problem: Nausea and Vomiting (Chemotherapy Effects)  Goal: Fluid and Electrolyte Balance  Outcome: Ongoing, Progressing     Problem: Neurotoxicity (Chemotherapy Effects)  Goal: Neurotoxicity Symptom Control  Outcome: Ongoing, Progressing     Problem: Neutropenia (Chemotherapy Effects)  Goal: Absence of Infection  Outcome: Ongoing, Progressing     Problem: Oral Mucositis (Chemotherapy Effects)  Goal: Improved Oral Mucous Membrane Integrity  Outcome: Ongoing, Progressing     Problem: Thrombocytopenia Bleeding Risk (Chemotherapy Effects)  Goal: Absence of Bleeding  Outcome: Ongoing, Progressing     Problem: Adult Inpatient Plan of Care  Goal: Plan of Care Review  Outcome: Ongoing, Progressing  Goal: Patient-Specific Goal (Individualized)  Outcome: Ongoing, Progressing  Flowsheets (Taken 11/29/2023 1047)  Anxieties, Fears or Concerns: Pt concerned abou the stem-cell transplant.  Individualized Care Needs: Pt chose to sit upright, ginger-melissa provided for nausea. Spouse at side.     "

## 2023-11-30 LAB — PATHOLOGIST INTERPRETATION SPE: NORMAL

## 2023-12-04 ENCOUNTER — OFFICE VISIT (OUTPATIENT)
Dept: OPHTHALMOLOGY | Facility: CLINIC | Age: 63
End: 2023-12-04
Payer: COMMERCIAL

## 2023-12-04 DIAGNOSIS — H52.223 REGULAR ASTIGMATISM OF BOTH EYES: ICD-10-CM

## 2023-12-04 DIAGNOSIS — H52.12 MYOPIA OF LEFT EYE: ICD-10-CM

## 2023-12-04 DIAGNOSIS — H25.13 NUCLEAR SCLEROSIS OF BOTH EYES: ICD-10-CM

## 2023-12-04 DIAGNOSIS — H01.02B SQUAMOUS BLEPHARITIS OF UPPER AND LOWER EYELIDS OF BOTH EYES: Primary | ICD-10-CM

## 2023-12-04 DIAGNOSIS — H52.31 ANISOMETROPIA: ICD-10-CM

## 2023-12-04 DIAGNOSIS — H01.02A SQUAMOUS BLEPHARITIS OF UPPER AND LOWER EYELIDS OF BOTH EYES: Primary | ICD-10-CM

## 2023-12-04 PROCEDURE — 99999 PR PBB SHADOW E&M-EST. PATIENT-LVL IV: CPT | Mod: PBBFAC,,, | Performed by: OPTOMETRIST

## 2023-12-04 PROCEDURE — 92015 PR REFRACTION: ICD-10-PCS | Mod: S$GLB,,, | Performed by: OPTOMETRIST

## 2023-12-04 PROCEDURE — 3044F PR MOST RECENT HEMOGLOBIN A1C LEVEL <7.0%: ICD-10-PCS | Mod: CPTII,S$GLB,, | Performed by: OPTOMETRIST

## 2023-12-04 PROCEDURE — 1159F PR MEDICATION LIST DOCUMENTED IN MEDICAL RECORD: ICD-10-PCS | Mod: CPTII,S$GLB,, | Performed by: OPTOMETRIST

## 2023-12-04 PROCEDURE — 99999 PR PBB SHADOW E&M-EST. PATIENT-LVL IV: ICD-10-PCS | Mod: PBBFAC,,, | Performed by: OPTOMETRIST

## 2023-12-04 PROCEDURE — 4010F PR ACE/ARB THEARPY RXD/TAKEN: ICD-10-PCS | Mod: CPTII,S$GLB,, | Performed by: OPTOMETRIST

## 2023-12-04 PROCEDURE — 3044F HG A1C LEVEL LT 7.0%: CPT | Mod: CPTII,S$GLB,, | Performed by: OPTOMETRIST

## 2023-12-04 PROCEDURE — 4010F ACE/ARB THERAPY RXD/TAKEN: CPT | Mod: CPTII,S$GLB,, | Performed by: OPTOMETRIST

## 2023-12-04 PROCEDURE — 92014 COMPRE OPH EXAM EST PT 1/>: CPT | Mod: S$GLB,,, | Performed by: OPTOMETRIST

## 2023-12-04 PROCEDURE — 92014 PR EYE EXAM, EST PATIENT,COMPREHESV: ICD-10-PCS | Mod: S$GLB,,, | Performed by: OPTOMETRIST

## 2023-12-04 PROCEDURE — 92015 DETERMINE REFRACTIVE STATE: CPT | Mod: S$GLB,,, | Performed by: OPTOMETRIST

## 2023-12-04 PROCEDURE — 1159F MED LIST DOCD IN RCRD: CPT | Mod: CPTII,S$GLB,, | Performed by: OPTOMETRIST

## 2023-12-04 NOTE — PROGRESS NOTES
HPI     Blurred Vision            Comments: The patient is returning today for a 1 week F/U and DFE with   refraction. The patient states her eyes are left eye is still bothering   her and still kind of swollen.            Last edited by Elle Genao on 12/4/2023 10:44 AM.            Assessment /Plan     For exam results, see Encounter Report.    Squamous blepharitis of upper and lower eyelids of both eyes  Continue warm compress/lid hygiene with erythromycin radha PRN.     Nuclear sclerosis of both eyes  Observe.     Anisometropia  Doing well with current Mrx, prefers single vision    Myopia of left eye  Eyeglass Final Rx       Eyeglass Final Rx         Sphere Cylinder Axis    Right Eunice +0.50 002    Left -5.00 +0.25 055      Type: SVL    Expiration Date: 12/4/2024                  RTC 1 yr for dilated eye exam or sooner if any changes to vision.   Discussed above and answered questions.

## 2023-12-05 ENCOUNTER — LAB VISIT (OUTPATIENT)
Dept: LAB | Facility: HOSPITAL | Age: 63
End: 2023-12-05
Attending: INTERNAL MEDICINE
Payer: COMMERCIAL

## 2023-12-05 DIAGNOSIS — C90.00 MULTIPLE MYELOMA NOT HAVING ACHIEVED REMISSION: Chronic | ICD-10-CM

## 2023-12-05 LAB
ALBUMIN SERPL BCP-MCNC: 3.6 G/DL (ref 3.5–5.2)
ALP SERPL-CCNC: 50 U/L (ref 55–135)
ALT SERPL W/O P-5'-P-CCNC: 14 U/L (ref 10–44)
ANION GAP SERPL CALC-SCNC: 10 MMOL/L (ref 8–16)
AST SERPL-CCNC: 15 U/L (ref 10–40)
BASOPHILS # BLD AUTO: 0.02 K/UL (ref 0–0.2)
BASOPHILS NFR BLD: 0.5 % (ref 0–1.9)
BILIRUB SERPL-MCNC: 1 MG/DL (ref 0.1–1)
BUN SERPL-MCNC: 7 MG/DL (ref 8–23)
CALCIUM SERPL-MCNC: 7.9 MG/DL (ref 8.7–10.5)
CHLORIDE SERPL-SCNC: 109 MMOL/L (ref 95–110)
CO2 SERPL-SCNC: 25 MMOL/L (ref 23–29)
CREAT SERPL-MCNC: 1 MG/DL (ref 0.5–1.4)
DIFFERENTIAL METHOD: ABNORMAL
EOSINOPHIL # BLD AUTO: 0.4 K/UL (ref 0–0.5)
EOSINOPHIL NFR BLD: 9.1 % (ref 0–8)
ERYTHROCYTE [DISTWIDTH] IN BLOOD BY AUTOMATED COUNT: 14.1 % (ref 11.5–14.5)
EST. GFR  (NO RACE VARIABLE): >60 ML/MIN/1.73 M^2
GLUCOSE SERPL-MCNC: 99 MG/DL (ref 70–110)
HCT VFR BLD AUTO: 33.8 % (ref 37–48.5)
HGB BLD-MCNC: 11.2 G/DL (ref 12–16)
IMM GRANULOCYTES # BLD AUTO: 0.01 K/UL (ref 0–0.04)
IMM GRANULOCYTES NFR BLD AUTO: 0.2 % (ref 0–0.5)
LYMPHOCYTES # BLD AUTO: 1.6 K/UL (ref 1–4.8)
LYMPHOCYTES NFR BLD: 36.7 % (ref 18–48)
MCH RBC QN AUTO: 31.5 PG (ref 27–31)
MCHC RBC AUTO-ENTMCNC: 33.1 G/DL (ref 32–36)
MCV RBC AUTO: 95 FL (ref 82–98)
MONOCYTES # BLD AUTO: 1 K/UL (ref 0.3–1)
MONOCYTES NFR BLD: 21.6 % (ref 4–15)
NEUTROPHILS # BLD AUTO: 1.4 K/UL (ref 1.8–7.7)
NEUTROPHILS NFR BLD: 31.9 % (ref 38–73)
NRBC BLD-RTO: 0 /100 WBC
PLATELET # BLD AUTO: 192 K/UL (ref 150–450)
PMV BLD AUTO: 10 FL (ref 9.2–12.9)
POTASSIUM SERPL-SCNC: 2.8 MMOL/L (ref 3.5–5.1)
PROT SERPL-MCNC: 6.2 G/DL (ref 6–8.4)
RBC # BLD AUTO: 3.56 M/UL (ref 4–5.4)
SODIUM SERPL-SCNC: 144 MMOL/L (ref 136–145)
WBC # BLD AUTO: 4.39 K/UL (ref 3.9–12.7)

## 2023-12-05 PROCEDURE — 80053 COMPREHEN METABOLIC PANEL: CPT | Performed by: INTERNAL MEDICINE

## 2023-12-05 PROCEDURE — 36415 COLL VENOUS BLD VENIPUNCTURE: CPT | Performed by: INTERNAL MEDICINE

## 2023-12-05 PROCEDURE — 85025 COMPLETE CBC W/AUTO DIFF WBC: CPT | Performed by: INTERNAL MEDICINE

## 2023-12-06 ENCOUNTER — INFUSION (OUTPATIENT)
Dept: INFUSION THERAPY | Facility: HOSPITAL | Age: 63
End: 2023-12-06
Attending: INTERNAL MEDICINE
Payer: COMMERCIAL

## 2023-12-06 ENCOUNTER — OFFICE VISIT (OUTPATIENT)
Dept: HEMATOLOGY/ONCOLOGY | Facility: CLINIC | Age: 63
End: 2023-12-06
Payer: COMMERCIAL

## 2023-12-06 ENCOUNTER — TELEPHONE (OUTPATIENT)
Dept: INFUSION THERAPY | Facility: HOSPITAL | Age: 63
End: 2023-12-06

## 2023-12-06 VITALS
OXYGEN SATURATION: 99 % | DIASTOLIC BLOOD PRESSURE: 68 MMHG | RESPIRATION RATE: 16 BRPM | WEIGHT: 134.94 LBS | HEART RATE: 67 BPM | TEMPERATURE: 97 F | HEIGHT: 64 IN | BODY MASS INDEX: 23.04 KG/M2 | SYSTOLIC BLOOD PRESSURE: 104 MMHG

## 2023-12-06 VITALS
TEMPERATURE: 98 F | WEIGHT: 134.94 LBS | OXYGEN SATURATION: 100 % | RESPIRATION RATE: 18 BRPM | SYSTOLIC BLOOD PRESSURE: 111 MMHG | DIASTOLIC BLOOD PRESSURE: 66 MMHG | HEIGHT: 64 IN | BODY MASS INDEX: 23.04 KG/M2 | HEART RATE: 64 BPM

## 2023-12-06 DIAGNOSIS — E87.6 HYPOKALEMIA: ICD-10-CM

## 2023-12-06 DIAGNOSIS — C79.51 SECONDARY MALIGNANT NEOPLASM OF BONE: ICD-10-CM

## 2023-12-06 DIAGNOSIS — Z51.11 ENCOUNTER FOR ANTINEOPLASTIC CHEMOTHERAPY: ICD-10-CM

## 2023-12-06 DIAGNOSIS — C90.00 MULTIPLE MYELOMA, REMISSION STATUS UNSPECIFIED: Primary | ICD-10-CM

## 2023-12-06 PROCEDURE — 3078F PR MOST RECENT DIASTOLIC BLOOD PRESSURE < 80 MM HG: ICD-10-PCS | Mod: CPTII,S$GLB,, | Performed by: INTERNAL MEDICINE

## 2023-12-06 PROCEDURE — 99215 PR OFFICE/OUTPT VISIT, EST, LEVL V, 40-54 MIN: ICD-10-PCS | Mod: S$GLB,,, | Performed by: INTERNAL MEDICINE

## 2023-12-06 PROCEDURE — 4010F PR ACE/ARB THEARPY RXD/TAKEN: ICD-10-PCS | Mod: CPTII,S$GLB,, | Performed by: INTERNAL MEDICINE

## 2023-12-06 PROCEDURE — 3074F PR MOST RECENT SYSTOLIC BLOOD PRESSURE < 130 MM HG: ICD-10-PCS | Mod: CPTII,S$GLB,, | Performed by: INTERNAL MEDICINE

## 2023-12-06 PROCEDURE — 3008F PR BODY MASS INDEX (BMI) DOCUMENTED: ICD-10-PCS | Mod: CPTII,S$GLB,, | Performed by: INTERNAL MEDICINE

## 2023-12-06 PROCEDURE — 96367 TX/PROPH/DG ADDL SEQ IV INF: CPT

## 2023-12-06 PROCEDURE — 3044F HG A1C LEVEL LT 7.0%: CPT | Mod: CPTII,S$GLB,, | Performed by: INTERNAL MEDICINE

## 2023-12-06 PROCEDURE — 96401 CHEMO ANTI-NEOPL SQ/IM: CPT

## 2023-12-06 PROCEDURE — 99215 OFFICE O/P EST HI 40 MIN: CPT | Mod: S$GLB,,, | Performed by: INTERNAL MEDICINE

## 2023-12-06 PROCEDURE — 63600175 PHARM REV CODE 636 W HCPCS: Performed by: INTERNAL MEDICINE

## 2023-12-06 PROCEDURE — 63600175 PHARM REV CODE 636 W HCPCS: Mod: JW,JG | Performed by: INTERNAL MEDICINE

## 2023-12-06 PROCEDURE — 3074F SYST BP LT 130 MM HG: CPT | Mod: CPTII,S$GLB,, | Performed by: INTERNAL MEDICINE

## 2023-12-06 PROCEDURE — 99999 PR PBB SHADOW E&M-EST. PATIENT-LVL V: CPT | Mod: PBBFAC,,, | Performed by: INTERNAL MEDICINE

## 2023-12-06 PROCEDURE — 99999 PR PBB SHADOW E&M-EST. PATIENT-LVL V: ICD-10-PCS | Mod: PBBFAC,,, | Performed by: INTERNAL MEDICINE

## 2023-12-06 PROCEDURE — 3008F BODY MASS INDEX DOCD: CPT | Mod: CPTII,S$GLB,, | Performed by: INTERNAL MEDICINE

## 2023-12-06 PROCEDURE — 4010F ACE/ARB THERAPY RXD/TAKEN: CPT | Mod: CPTII,S$GLB,, | Performed by: INTERNAL MEDICINE

## 2023-12-06 PROCEDURE — 25000003 PHARM REV CODE 250: Performed by: INTERNAL MEDICINE

## 2023-12-06 PROCEDURE — 96365 THER/PROPH/DIAG IV INF INIT: CPT

## 2023-12-06 PROCEDURE — 3078F DIAST BP <80 MM HG: CPT | Mod: CPTII,S$GLB,, | Performed by: INTERNAL MEDICINE

## 2023-12-06 PROCEDURE — 3044F PR MOST RECENT HEMOGLOBIN A1C LEVEL <7.0%: ICD-10-PCS | Mod: CPTII,S$GLB,, | Performed by: INTERNAL MEDICINE

## 2023-12-06 RX ORDER — DEXAMETHASONE SODIUM PHOSPHATE 4 MG/ML
40 INJECTION, SOLUTION INTRA-ARTICULAR; INTRALESIONAL; INTRAMUSCULAR; INTRAVENOUS; SOFT TISSUE
Status: DISCONTINUED | OUTPATIENT
Start: 2023-12-06 | End: 2023-12-06

## 2023-12-06 RX ORDER — BORTEZOMIB 3.5 MG/1
1.3 INJECTION, POWDER, LYOPHILIZED, FOR SOLUTION INTRAVENOUS; SUBCUTANEOUS
Status: COMPLETED | OUTPATIENT
Start: 2023-12-06 | End: 2023-12-06

## 2023-12-06 RX ORDER — POTASSIUM CHLORIDE 7.45 MG/ML
10 INJECTION INTRAVENOUS
Status: CANCELLED
Start: 2023-12-06

## 2023-12-06 RX ORDER — POTASSIUM CHLORIDE 7.45 MG/ML
10 INJECTION INTRAVENOUS
Status: COMPLETED | OUTPATIENT
Start: 2023-12-06 | End: 2023-12-06

## 2023-12-06 RX ORDER — DEXAMETHASONE SODIUM PHOSPHATE 4 MG/ML
40 INJECTION, SOLUTION INTRA-ARTICULAR; INTRALESIONAL; INTRAMUSCULAR; INTRAVENOUS; SOFT TISSUE
Status: CANCELLED
Start: 2023-12-06

## 2023-12-06 RX ORDER — DEXAMETHASONE SODIUM PHOSPHATE 4 MG/ML
40 INJECTION, SOLUTION INTRA-ARTICULAR; INTRALESIONAL; INTRAMUSCULAR; INTRAVENOUS; SOFT TISSUE
Status: ACTIVE | OUTPATIENT
Start: 2023-12-06

## 2023-12-06 RX ADMIN — BORTEZOMIB 2.25 MG: 3.5 INJECTION, POWDER, LYOPHILIZED, FOR SOLUTION INTRAVENOUS; SUBCUTANEOUS at 12:12

## 2023-12-06 RX ADMIN — POTASSIUM CHLORIDE 10 MEQ: 7.46 INJECTION, SOLUTION INTRAVENOUS at 11:12

## 2023-12-06 RX ADMIN — DEXAMETHASONE SODIUM PHOSPHATE 40 MG: 4 INJECTION, SOLUTION INTRA-ARTICULAR; INTRALESIONAL; INTRAMUSCULAR; INTRAVENOUS; SOFT TISSUE at 12:12

## 2023-12-06 NOTE — PLAN OF CARE
Infection precautions reviewed.  Pt states full understanding to call with any sign of infection, including temp >100.4.     Problem: Adult Inpatient Plan of Care  Goal: Plan of Care Review  Outcome: Ongoing, Progressing  Flowsheets (Taken 12/6/2023 1218)  Plan of Care Reviewed With: patient  Goal: Patient-Specific Goal (Individualized)  Outcome: Ongoing, Progressing  Flowsheets (Taken 12/6/2023 1218)  Anxieties, Fears or Concerns: none today  Individualized Care Needs: warm blanket, drink  Goal: Absence of Hospital-Acquired Illness or Injury  Outcome: Ongoing, Progressing  Intervention: Prevent Infection  Flowsheets (Taken 12/6/2023 1218)  Infection Prevention:   equipment surfaces disinfected   hand hygiene promoted   personal protective equipment utilized  Goal: Optimal Comfort and Wellbeing  Outcome: Ongoing, Progressing  Intervention: Provide Person-Centered Care  Flowsheets (Taken 12/6/2023 1218)  Trust Relationship/Rapport:   care explained   choices provided   emotional support provided   questions answered   empathic listening provided   questions encouraged   reassurance provided   thoughts/feelings acknowledged     Problem: Neutropenia (Chemotherapy Effects)  Goal: Absence of Infection  Outcome: Ongoing, Progressing  Intervention: Prevent Infection and Maximize Resistance  Flowsheets (Taken 12/6/2023 1218)  Infection Prevention:   equipment surfaces disinfected   hand hygiene promoted   personal protective equipment utilized

## 2023-12-06 NOTE — NURSING
Pt tolerated Velcade injection and Potassium infusion well. No adverse reaction noted. Pt education reinforced on possible side effects, what to expect, and when to call . Pt verbalized understanding. I reviewed pt calendar w/ pt and understanding verbalized. IV flushed w/ NS and D/C per protocol.

## 2023-12-06 NOTE — PROGRESS NOTES
Patient ID: Ana Bonilla   Chief Complaint: Follow-up and Multiple Myeloma (Treatment)  MRN:  [id number]     Oncologic Diagnosis:  Multiple Myeloma - IgG lambda   Previous Treatment:  VRD (5/10/2023 - 6/28/2023)   Current Treatment:  D-VRD (7/6/2023 - )    - Zometa (10/18/2023 - )   Subjective   Ana Bonilla is a 63 y.o. female who presents to clinic for follow up and treatment.    We discussed BM transplant again and all of her questions were answered; she is still pre-contemplative about it.  However, the frequency of this treatment is starting to wear on her.  She is still active but more fatigued.    I reviewed her labs with her; she should resume potassium daily for now. Otherwise, we are okay to proceed with treatment.  I also reviewed the plan with her regarding repeat PET-CT and BM biopsy after cycle 6, which will be in early January.    Review of Systems:  Review of Systems   Constitutional:  Negative for activity change, appetite change, chills, diaphoresis, fatigue, fever and unexpected weight change.   HENT:  Negative for nosebleeds.    Respiratory:  Negative for shortness of breath.    Cardiovascular:  Negative for chest pain.   Gastrointestinal:  Positive for constipation. Negative for abdominal distention, abdominal pain, anal bleeding, blood in stool, diarrhea, nausea and vomiting.   Genitourinary:  Negative for difficulty urinating and hematuria.   Musculoskeletal:  Positive for back pain. Negative for arthralgias and myalgias.   Skin:  Negative for rash.   Neurological:  Negative for dizziness, weakness, light-headedness and headaches.   Hematological:  Does not bruise/bleed easily.   Psychiatric/Behavioral:  The patient is not nervous/anxious.      History     Oncology History   Multiple myeloma   4/20/2023 Initial Diagnosis    Multiple myeloma     5/10/2023 - 6/28/2023 Chemotherapy    Treatment Summary   Plan Name: OP VRD - WEEKLY BORTEZOMIB LENALIDOMIDE DEXAMETHASONE Q3W  Treatment  Goal: Palliative  Status: Inactive  Start Date: 5/10/2023  End Date: 6/28/2023  Provider: Abram Velasquez MD  Chemotherapy: bortezomib (VELCADE) injection 2 mg, 1.3 mg/m2 = 2 mg, Subcutaneous, Clinic/[facility] 1 time, 2 of 6 cycles  Administration: 2 mg (5/10/2023), 2 mg (5/17/2023), 2 mg (6/14/2023), 2 mg (5/31/2023), 2 mg (6/21/2023), 2 mg (6/28/2023)  lenalidomide 25 mg Cap, 25 mg, Oral, Daily, 1 of 1 cycle, Start date: 5/10/2023, End date: 5/17/2023 6/28/2023 Cancer Staged    Staging form: Plasma Cell Myeloma and Plasma Cell Disorders, AJCC 8th Edition  - Clinical stage from 6/28/2023: RISS Stage II (Beta-2-microglobulin (mg/L): 1.9, Albumin (g/dL): 3, ISS: Stage II, High-risk cytogenetics: Absent, LDH: Normal)     7/6/2023 -  Chemotherapy    Treatment Summary   Plan Name: OP D-VRD DARATUMUMAB + BORTEZOMIB LENALIDOMIDE DEXAMETHASONE  Treatment Goal: Palliative  Status: Active  Start Date: 7/6/2023  End Date: 1/3/2024 (Planned)  Provider: Oscar Márquez MD  Chemotherapy: bortezomib (VELCADE) injection 2 mg, 1.3 mg/m2 = 2 mg, Subcutaneous, Clinic/[facility] 1 time, 5 of 6 cycles  Administration: 2 mg (7/6/2023), 2 mg (7/12/2023), 2 mg (8/2/2023), 2 mg (8/9/2023), 2.25 mg (10/18/2023), 2 mg (7/19/2023), 2 mg (7/26/2023), 2 mg (8/16/2023), 2.25 mg (9/20/2023), 2.25 mg (9/27/2023), 2.25 mg (10/25/2023), 2.25 mg (11/1/2023), 2.25 mg (11/8/2023), 2.25 mg (11/15/2023), 2.25 mg (11/22/2023), 2.25 mg (11/29/2023)  lenalidomide 10 mg Cap, 1 capsule (100 % of original dose 1 capsule), Oral, Daily, 1 of 1 cycle, Start date: 7/26/2023, End date: 8/2/2023  Dose modification: 1 capsule (original dose 1 capsule, Cycle 0)  daratumumab-hyaluronidase-fihj subcutaneous injection 1,800 mg, 1,800 mg (100 % of original dose 1,800 mg), Subcutaneous, Clinic[facility] 1 time, 5 of 6 cycles  Dose modification: 1,800 mg (original dose 1,800 mg, Cycle 1), 1,800 mg (original dose 1,800 mg, Cycle 1)  Administration: 1,800 mg (7/6/2023), 1,800 mg  (7/12/2023), 1,800 mg (7/19/2023), 1,800 mg (7/26/2023), 1,800 mg (8/2/2023), 1,800 mg (8/9/2023), 1,800 mg (8/16/2023), 1,800 mg (9/27/2023), 1,800 mg (10/18/2023), 1,800 mg (11/1/2023), 1,800 mg (11/15/2023), 1,800 mg (11/29/2023)           MARIBETH Bonilla  63 y.o.  female with past medical history significant for hypertension, COPD, asthma, GERD, hypothyroidism here for follow-up and management of multiple myeloma     She was referred in 2/2023 by her PCP after workup for gastritis revealed lytic lesions. CT chest showed lytic and expansile destructive lesion in the sternum and lytic lesions at L1. Biopsy of L1 lesion in 3/2023 revealed mature B cell neoplasm most consistent with plasma cell neoplasm.      Bone marrow biopsy in 4/2023 demonstrated 60% plasma cells. PET/CT showed extensive bony lesions throughout the spine, sternum, bilateral ribs, pelvis, and a mild compression deformity in L1. SPEP/MECHE showed IgG M spike 3.19 g/dL, IgG 4,521 mg/dL.      She was started on VRd and then daratumumab was added by the transplant team. She was started on ASA for thrombosis prophylaxis and acyclovir for herpes zoster prophylaxis. Start Zometa after dental evaluation.     Past Medical History:   Diagnosis Date    Allergy     Amblyopia OS    Anxiety     Asthma     COPD (chronic obstructive pulmonary disease)     NO HOME o2    GERD (gastroesophageal reflux disease)     Hyperlipidemia     Hypertension     PONV (postoperative nausea and vomiting)     Thyroid disease        Past Surgical History:   Procedure Laterality Date    APPENDECTOMY      BIOPSY N/A 6/8/2023    Procedure: BIOPSY;  Surgeon: Fausto Smith MD;  Location: [facility] OR;  Service: Neurosurgery;  Laterality: N/A;  L1    BUNIONECTOMY      COLONOSCOPY N/A 12/4/2019    Procedure: COLONOSCOPY;  Surgeon: Danny Matos III, MD;  Location: [facility] ENDO;  Service: Endoscopy;  Laterality: N/A;    COLONOSCOPY N/A 10/24/2022    Procedure: COLONOSCOPY;  Surgeon:  "Danny Matos III, MD;  Location: [facility] ENDO;  Service: Endoscopy;  Laterality: N/A;    ESOPHAGOGASTRODUODENOSCOPY N/A 10/24/2022    Procedure: EGD (ESOPHAGOGASTRODUODENOSCOPY);  Surgeon: Danny Matos III, MD;  Location: [facility] ENDO;  Service: Endoscopy;  Laterality: N/A;    FIXATION KYPHOPLASTY Bilateral 6/8/2023    Procedure: KYPHOPLASTY;  Surgeon: Fausto Smith MD;  Location: [facility] OR;  Service: Neurosurgery;  Laterality: Bilateral;  kyphoplasty and radiofrequency ablation - L1    HYSTERECTOMY      PARTIAL//still with ovaries    neck fusion  08/2017    THYROIDECTOMY         Family History   Problem Relation Age of Onset    Hypertension Mother     Diabetes Mother     Diabetes Father     Stroke Maternal Grandmother     Prostate cancer Maternal Grandfather     Mental illness Son     Pancreatic cancer Maternal Uncle     Mental illness Other     Pancreatic cancer Other     Ovarian cancer Maternal Cousin        Review of patient's allergies indicates:   Allergen Reactions    Hydrocodone-acetaminophen Other (See Comments)     Causes pt to feel extremely sick        Social History     Tobacco Use    Smoking status: Every Day     Current packs/day: 0.50     Average packs/day: 0.5 packs/day for 50.0 years (25.0 ttl pk-yrs)     Types: Cigarettes    Smokeless tobacco: Never   Substance Use Topics    Alcohol use: Never     Comment: quit    Drug use: No       Physical Exam   ECOG:   ECOG SCORE    0 - Fully active-able to carry on all pre-disease performance without restriction          Vitals:  /66   Pulse 64   Temp 97.6 °F (36.4 °C)   Resp 18   Ht 5' 4" (1.626 m)   Wt 61.2 kg (134 lb 14.7 oz)   SpO2 100%   BMI 23.16 kg/m²     Physical Exam:  Physical Exam  Constitutional:       General: She is not in acute distress.     Appearance: Normal appearance. She is not ill-appearing.   HENT:      Head: Normocephalic and atraumatic.   Eyes:      Extraocular Movements: Extraocular movements intact.      " Conjunctiva/sclera: Conjunctivae normal.   Cardiovascular:      Rate and Rhythm: Normal rate and regular rhythm.      Heart sounds: No murmur heard.  Pulmonary:      Effort: Pulmonary effort is normal. No respiratory distress.      Breath sounds: Normal breath sounds. No wheezing, rhonchi or rales.   Abdominal:      General: Bowel sounds are normal. There is no distension.      Palpations: Abdomen is soft. There is no hepatomegaly, splenomegaly or mass.      Tenderness: There is no abdominal tenderness. There is no guarding.   Musculoskeletal:         General: Normal range of motion.      Right lower leg: No edema.      Left lower leg: No edema.   Skin:     Findings: No rash.   Neurological:      General: No focal deficit present.      Mental Status: She is alert and oriented to person, place, and time.   Psychiatric:         Mood and Affect: Mood normal.         Behavior: Behavior normal.         Thought Content: Thought content normal.            Labs   Labs:  Lab Visit on 12/05/2023   Component Date Value Ref Range Status    Sodium 12/05/2023 144  136 - 145 mmol/L Final    Potassium 12/05/2023 2.8 (L)  3.5 - 5.1 mmol/L Final    Chloride 12/05/2023 109  95 - 110 mmol/L Final    CO2 12/05/2023 25  23 - 29 mmol/L Final    Glucose 12/05/2023 99  70 - 110 mg/dL Final    BUN 12/05/2023 7 (L)  8 - 23 mg/dL Final    Creatinine 12/05/2023 1.0  0.5 - 1.4 mg/dL Final    Calcium 12/05/2023 7.9 (L)  8.7 - 10.5 mg/dL Final    Total Protein 12/05/2023 6.2  6.0 - 8.4 g/dL Final    Albumin 12/05/2023 3.6  3.5 - 5.2 g/dL Final    Total Bilirubin 12/05/2023 1.0  0.1 - 1.0 mg/dL Final    Comment: For infants and newborns, interpretation of results should be based  on gestational age, weight and in agreement with clinical  observations.    Premature Infant recommended reference ranges:  Up to 24 hours.............<8.0 mg/dL  Up to 48 hours............<12.0 mg/dL  3-5 days..................<15.0 mg/dL  6-29 days.................<15.0  mg/dL      Alkaline Phosphatase 12/05/2023 50 (L)  55 - 135 U/L Final    AST 12/05/2023 15  10 - 40 U/L Final    ALT 12/05/2023 14  10 - 44 U/L Final    eGFR 12/05/2023 >60  >60 mL/min/1.73 m^2 Final    Anion Gap 12/05/2023 10  8 - 16 mmol/L Final    WBC 12/05/2023 4.39  3.90 - 12.70 K/uL Final    RBC 12/05/2023 3.56 (L)  4.00 - 5.40 M/uL Final    Hemoglobin 12/05/2023 11.2 (L)  12.0 - 16.0 g/dL Final    Hematocrit 12/05/2023 33.8 (L)  37.0 - 48.5 % Final    MCV 12/05/2023 95  82 - 98 fL Final    MCH 12/05/2023 31.5 (H)  27.0 - 31.0 pg Final    MCHC 12/05/2023 33.1  32.0 - 36.0 g/dL Final    RDW 12/05/2023 14.1  11.5 - 14.5 % Final    Platelets 12/05/2023 192  150 - 450 K/uL Final    MPV 12/05/2023 10.0  9.2 - 12.9 fL Final    Immature Granulocytes 12/05/2023 0.2  0.0 - 0.5 % Final    Gran # (ANC) 12/05/2023 1.4 (L)  1.8 - 7.7 K/uL Final    Immature Grans (Abs) 12/05/2023 0.01  0.00 - 0.04 K/uL Final    Comment: Mild elevation in immature granulocytes is non specific and   can be seen in a variety of conditions including stress response,   acute inflammation, trauma and pregnancy. Correlation with other   laboratory and clinical findings is essential.      Lymph # 12/05/2023 1.6  1.0 - 4.8 K/uL Final    Mono # 12/05/2023 1.0  0.3 - 1.0 K/uL Final    Eos # 12/05/2023 0.4  0.0 - 0.5 K/uL Final    Baso # 12/05/2023 0.02  0.00 - 0.20 K/uL Final    nRBC 12/05/2023 0  0 /100 WBC Final    Gran % 12/05/2023 31.9 (L)  38.0 - 73.0 % Final    Lymph % 12/05/2023 36.7  18.0 - 48.0 % Final    Mono % 12/05/2023 21.6 (H)  4.0 - 15.0 % Final    Eosinophil % 12/05/2023 9.1 (H)  0.0 - 8.0 % Final    Basophil % 12/05/2023 0.5  0.0 - 1.9 % Final    Differential Method 12/05/2023 Automated   Final         Imaging/Pathology   - 06/06/2023 RIGHT ILIAC CREST BONE MARROW ASPIRATE, BONE MARROW CLOT, AND BONE MARROW CORE BIOPSY WITH:     CELLULARITY=40-60%, TRILINEAGE HEMATOPOIETIC ACTIVITY (M/E=2.9:1).   CONSISTENT WITH PLASMA CELL  NEOPLASM(60%).  SEE COMMENT.   FOCAL GRADE 1 RETICULAR FIBROSIS.   CONGO RED NEGATIVE.   INCREASED STORAGE IRON.   ADEQUATE NUMBER OF MEGAKARYOCYTES.     Bone marrow karyotype results: 46, XX[20], female karyotype.     Myeloma fixed cell, high-risk, FISH:  Normal.  The result is within normal limits for 1q duplication, TP53 deletion and IGH rearrangement.         - 04/10/2023 PET: Numerous FDG avid predominately lytic osseous lesions as above.  Primary differential considerations would include multiple myeloma versus metastatic disease.  2. No FDG avid soft tissue masses or adenopathy demonstrated.  3. Mild pathologic compression deformity of the L1 vertebral body.                                          Assessment and Plan   IgG lambda Multiple myeloma, R-ISS stage I   Diagnosed June 2023 via bone marrow biopsy  Patient treatment had been held multiple times with few treatments cancelled and also Revlimid 10 mg daily given as unable to tolerate higher dose   Previous oncologist discussed with the patient and the transplant team BMT.  However, patient declined transplant at this time and it was recommended that we continue with Rosa-VRD for 6 cycles and then repeat bone marrow biopsy and PET scan. If VGPR or better, then continue with Revlimid maintenance only.  Continue prophylaxis with aspirin, acyclovir 400 mg b.i.d.  Continue Zometa/calcium and vitamin-D supplements (Due for Zometa 12/13/23)  Continue follow up with BMT team         Hypokalemia  Will check Magnesium  Start potassium supplement 20 mEq daily; will adjust next week      R  Hip Pain  Agreed to hip xray next week        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, Hordeolum externum of left eyelid  Continue follow-up with Ophthalmology  Continue antibiotic/cream        Cancer Screening  MMG 03/17/2023:  BI-RADS 1  PAP Smear:  Partial hysterectomy  Colonoscopy 10/24/2022:  Normal repeat in 5 years        Chronic Medical  Conditions  Asthma  Anxiety   COPD   GERD  Hypertension   Hyperlipidemia   Hypothyroidism   ?IBS-C on [facility] On[facility] Chart Routing      Follow up with physician 1 week.   Follow up with WERNER    Infusion scheduling note   Velcade and Potassium today   Injection scheduling note Velcade, Darsalex in 1 week   Labs CMP, CBC and magnesium   Scheduling:  Preferred lab:  Lab interval:     Imaging    Pharmacy appointment    Other referrals                     The patient was seen, interviewed and examined. Pertinent lab and radiologic studies were reviewed. Pt instructed to call should they develop concerning signs/symptoms or have further questions.        Portions of the record may have been created with voice recognition software. Occasional wrong-word or sound-a-like substitutions may have occurred due to the inherent limitations of voice recognition software. Read the chart carefully and recognize, using context, where substitutions have occurred.      Bri Michelle MD    Hematology/Oncology

## 2023-12-10 ENCOUNTER — PATIENT MESSAGE (OUTPATIENT)
Dept: PALLIATIVE MEDICINE | Facility: CLINIC | Age: 63
End: 2023-12-10
Payer: COMMERCIAL

## 2023-12-11 ENCOUNTER — PATIENT MESSAGE (OUTPATIENT)
Dept: PALLIATIVE MEDICINE | Facility: CLINIC | Age: 63
End: 2023-12-11
Payer: COMMERCIAL

## 2023-12-12 ENCOUNTER — LAB VISIT (OUTPATIENT)
Dept: LAB | Facility: HOSPITAL | Age: 63
End: 2023-12-12
Attending: INTERNAL MEDICINE
Payer: COMMERCIAL

## 2023-12-12 DIAGNOSIS — C90.00 MULTIPLE MYELOMA, REMISSION STATUS UNSPECIFIED: ICD-10-CM

## 2023-12-12 DIAGNOSIS — F41.9 ANXIETY: ICD-10-CM

## 2023-12-12 LAB
ALBUMIN SERPL BCP-MCNC: 3.6 G/DL (ref 3.5–5.2)
ALP SERPL-CCNC: 50 U/L (ref 55–135)
ALT SERPL W/O P-5'-P-CCNC: 14 U/L (ref 10–44)
ANION GAP SERPL CALC-SCNC: 12 MMOL/L (ref 8–16)
AST SERPL-CCNC: 18 U/L (ref 10–40)
BASOPHILS # BLD AUTO: 0.01 K/UL (ref 0–0.2)
BASOPHILS NFR BLD: 0.2 % (ref 0–1.9)
BILIRUB SERPL-MCNC: 0.6 MG/DL (ref 0.1–1)
BUN SERPL-MCNC: 10 MG/DL (ref 8–23)
CALCIUM SERPL-MCNC: 7.9 MG/DL (ref 8.7–10.5)
CHLORIDE SERPL-SCNC: 110 MMOL/L (ref 95–110)
CO2 SERPL-SCNC: 23 MMOL/L (ref 23–29)
CREAT SERPL-MCNC: 1.1 MG/DL (ref 0.5–1.4)
DIFFERENTIAL METHOD: ABNORMAL
EOSINOPHIL # BLD AUTO: 0.4 K/UL (ref 0–0.5)
EOSINOPHIL NFR BLD: 8.5 % (ref 0–8)
ERYTHROCYTE [DISTWIDTH] IN BLOOD BY AUTOMATED COUNT: 14.6 % (ref 11.5–14.5)
EST. GFR  (NO RACE VARIABLE): 56 ML/MIN/1.73 M^2
GLUCOSE SERPL-MCNC: 98 MG/DL (ref 70–110)
HCT VFR BLD AUTO: 36 % (ref 37–48.5)
HGB BLD-MCNC: 11.8 G/DL (ref 12–16)
IMM GRANULOCYTES # BLD AUTO: 0 K/UL (ref 0–0.04)
IMM GRANULOCYTES NFR BLD AUTO: 0 % (ref 0–0.5)
LYMPHOCYTES # BLD AUTO: 1.6 K/UL (ref 1–4.8)
LYMPHOCYTES NFR BLD: 37.2 % (ref 18–48)
MAGNESIUM SERPL-MCNC: 1.7 MG/DL (ref 1.6–2.6)
MCH RBC QN AUTO: 31.6 PG (ref 27–31)
MCHC RBC AUTO-ENTMCNC: 32.8 G/DL (ref 32–36)
MCV RBC AUTO: 97 FL (ref 82–98)
MONOCYTES # BLD AUTO: 0.9 K/UL (ref 0.3–1)
MONOCYTES NFR BLD: 20.6 % (ref 4–15)
NEUTROPHILS # BLD AUTO: 1.5 K/UL (ref 1.8–7.7)
NEUTROPHILS NFR BLD: 33.5 % (ref 38–73)
NRBC BLD-RTO: 0 /100 WBC
PLATELET # BLD AUTO: 296 K/UL (ref 150–450)
PMV BLD AUTO: 10.2 FL (ref 9.2–12.9)
POTASSIUM SERPL-SCNC: 3.1 MMOL/L (ref 3.5–5.1)
PROT SERPL-MCNC: 6.5 G/DL (ref 6–8.4)
RBC # BLD AUTO: 3.73 M/UL (ref 4–5.4)
SODIUM SERPL-SCNC: 145 MMOL/L (ref 136–145)
WBC # BLD AUTO: 4.33 K/UL (ref 3.9–12.7)

## 2023-12-12 PROCEDURE — 36415 COLL VENOUS BLD VENIPUNCTURE: CPT | Performed by: INTERNAL MEDICINE

## 2023-12-12 PROCEDURE — 80053 COMPREHEN METABOLIC PANEL: CPT | Performed by: INTERNAL MEDICINE

## 2023-12-12 PROCEDURE — 85025 COMPLETE CBC W/AUTO DIFF WBC: CPT | Performed by: INTERNAL MEDICINE

## 2023-12-12 PROCEDURE — 83735 ASSAY OF MAGNESIUM: CPT | Performed by: INTERNAL MEDICINE

## 2023-12-13 ENCOUNTER — OFFICE VISIT (OUTPATIENT)
Dept: HEMATOLOGY/ONCOLOGY | Facility: CLINIC | Age: 63
End: 2023-12-13
Payer: COMMERCIAL

## 2023-12-13 ENCOUNTER — PATIENT MESSAGE (OUTPATIENT)
Dept: HEMATOLOGY/ONCOLOGY | Facility: CLINIC | Age: 63
End: 2023-12-13
Payer: COMMERCIAL

## 2023-12-13 ENCOUNTER — INFUSION (OUTPATIENT)
Dept: INFUSION THERAPY | Facility: HOSPITAL | Age: 63
End: 2023-12-13
Attending: INTERNAL MEDICINE
Payer: COMMERCIAL

## 2023-12-13 VITALS
RESPIRATION RATE: 18 BRPM | TEMPERATURE: 98 F | SYSTOLIC BLOOD PRESSURE: 100 MMHG | HEART RATE: 68 BPM | BODY MASS INDEX: 23.34 KG/M2 | OXYGEN SATURATION: 99 % | WEIGHT: 136.69 LBS | DIASTOLIC BLOOD PRESSURE: 59 MMHG | HEIGHT: 64 IN

## 2023-12-13 DIAGNOSIS — C90.00 MULTIPLE MYELOMA, REMISSION STATUS UNSPECIFIED: ICD-10-CM

## 2023-12-13 DIAGNOSIS — F41.9 ANXIETY: ICD-10-CM

## 2023-12-13 DIAGNOSIS — H00.14 CHALAZION LEFT UPPER EYELID: ICD-10-CM

## 2023-12-13 DIAGNOSIS — Z51.11 ENCOUNTER FOR ANTINEOPLASTIC CHEMOTHERAPY: ICD-10-CM

## 2023-12-13 DIAGNOSIS — C79.51 SECONDARY MALIGNANT NEOPLASM OF BONE: ICD-10-CM

## 2023-12-13 DIAGNOSIS — C90.00 MULTIPLE MYELOMA, REMISSION STATUS UNSPECIFIED: Primary | ICD-10-CM

## 2023-12-13 DIAGNOSIS — C90.00 MULTIPLE MYELOMA NOT HAVING ACHIEVED REMISSION: ICD-10-CM

## 2023-12-13 DIAGNOSIS — Z51.11 ENCOUNTER FOR ANTINEOPLASTIC CHEMOTHERAPY: Primary | ICD-10-CM

## 2023-12-13 PROCEDURE — 4010F PR ACE/ARB THEARPY RXD/TAKEN: ICD-10-PCS | Mod: CPTII,95,, | Performed by: INTERNAL MEDICINE

## 2023-12-13 PROCEDURE — 3044F PR MOST RECENT HEMOGLOBIN A1C LEVEL <7.0%: ICD-10-PCS | Mod: CPTII,95,, | Performed by: INTERNAL MEDICINE

## 2023-12-13 PROCEDURE — 25000003 PHARM REV CODE 250: Performed by: INTERNAL MEDICINE

## 2023-12-13 PROCEDURE — 99215 PR OFFICE/OUTPT VISIT, EST, LEVL V, 40-54 MIN: ICD-10-PCS | Mod: 95,,, | Performed by: INTERNAL MEDICINE

## 2023-12-13 PROCEDURE — 4010F ACE/ARB THERAPY RXD/TAKEN: CPT | Mod: CPTII,95,, | Performed by: INTERNAL MEDICINE

## 2023-12-13 PROCEDURE — 63600175 PHARM REV CODE 636 W HCPCS: Mod: JZ,JG | Performed by: INTERNAL MEDICINE

## 2023-12-13 PROCEDURE — 99215 OFFICE O/P EST HI 40 MIN: CPT | Mod: 95,,, | Performed by: INTERNAL MEDICINE

## 2023-12-13 PROCEDURE — 96401 CHEMO ANTI-NEOPL SQ/IM: CPT

## 2023-12-13 PROCEDURE — 96365 THER/PROPH/DIAG IV INF INIT: CPT

## 2023-12-13 PROCEDURE — 3044F HG A1C LEVEL LT 7.0%: CPT | Mod: CPTII,95,, | Performed by: INTERNAL MEDICINE

## 2023-12-13 RX ORDER — DIPHENHYDRAMINE HYDROCHLORIDE 50 MG/ML
50 INJECTION INTRAMUSCULAR; INTRAVENOUS ONCE AS NEEDED
Status: CANCELLED | OUTPATIENT
Start: 2023-12-13

## 2023-12-13 RX ORDER — SODIUM CHLORIDE 0.9 % (FLUSH) 0.9 %
10 SYRINGE (ML) INJECTION
Status: CANCELLED | OUTPATIENT
Start: 2023-12-13

## 2023-12-13 RX ORDER — EPINEPHRINE 0.3 MG/.3ML
0.3 INJECTION SUBCUTANEOUS ONCE AS NEEDED
Status: CANCELLED | OUTPATIENT
Start: 2023-12-13

## 2023-12-13 RX ORDER — ALPRAZOLAM 2 MG/1
TABLET ORAL
Qty: 60 TABLET | Refills: 0 | Status: SHIPPED | OUTPATIENT
Start: 2023-12-13 | End: 2024-01-10 | Stop reason: SDUPTHER

## 2023-12-13 RX ORDER — ASPIRIN 81 MG/1
81 TABLET ORAL DAILY
Qty: 30 TABLET | Refills: 5 | Status: SHIPPED | OUTPATIENT
Start: 2023-12-13 | End: 2024-06-10

## 2023-12-13 RX ORDER — BORTEZOMIB 3.5 MG/1
1.3 INJECTION, POWDER, LYOPHILIZED, FOR SOLUTION INTRAVENOUS; SUBCUTANEOUS
Status: COMPLETED | OUTPATIENT
Start: 2023-12-13 | End: 2023-12-13

## 2023-12-13 RX ORDER — HEPARIN 100 UNIT/ML
500 SYRINGE INTRAVENOUS
Status: CANCELLED | OUTPATIENT
Start: 2023-12-13

## 2023-12-13 RX ORDER — PROCHLORPERAZINE EDISYLATE 5 MG/ML
5 INJECTION INTRAMUSCULAR; INTRAVENOUS ONCE AS NEEDED
Status: CANCELLED
Start: 2023-12-13

## 2023-12-13 RX ORDER — BORTEZOMIB 3.5 MG/1
1.3 INJECTION, POWDER, LYOPHILIZED, FOR SOLUTION INTRAVENOUS; SUBCUTANEOUS
Status: CANCELLED | OUTPATIENT
Start: 2023-12-13

## 2023-12-13 RX ADMIN — ZOLEDRONIC ACID 3.3 MG: 4 INJECTION, SOLUTION, CONCENTRATE INTRAVENOUS at 12:12

## 2023-12-13 RX ADMIN — DARATUMUMAB AND HYALURONIDASE-FIHJ (HUMAN RECOMBINANT) 1800 MG: 1800; 30000 INJECTION SUBCUTANEOUS at 12:12

## 2023-12-13 RX ADMIN — BORTEZOMIB 2.25 MG: 3.5 INJECTION, POWDER, LYOPHILIZED, FOR SOLUTION INTRAVENOUS; SUBCUTANEOUS at 12:12

## 2023-12-13 NOTE — DISCHARGE INSTRUCTIONS
Thank you for allowing me to care for you today,  MIS LeachN, RN    [facility]  [address] [address]  [phone] phone     [phone] fax  Hours of Operation: Monday- Friday 7:00am- 5:30pm  After hours phone  [phone]  Hematology / Oncology Physicians on call      JUANI Aguirre Dr[address] Dr., Dr., BELLE Valdez, BELLE Deluna, BELLE    Please call with any concerns regarding your appointment today.   FALL PREVENTION   Falls often occur due to slipping, tripping or losing your balance. Here are ways to reduce your risk of falling again.   Was there anything that caused your fall that can be fixed, removed or replaced?   Make your home safe by keeping walkways clear of objects you may trip over.   Use non-slip pads under rugs.   Do not walk in poorly lit areas.   Do not stand on chairs or wobbly ladders.   Use caution when reaching overhead or looking upward. This position can cause a loss of balance.   Be sure your shoes fit properly, have non-slip bottoms and are in good condition.   Be cautious when going up and down stairs, curbs, and when walking on uneven sidewalks.   If your balance is poor, consider using a cane or walker.   If your fall was related to alcohol use, stop or limit alcohol intake.   If your fall was related to use of sleeping medicines, talk to your doctor about this. You may need to reduce your dosage at bedtime if you awaken during the night to go to the bathroom.   To reduce the need for nighttime bathroom trips:   Avoid drinking fluids for several hours before going to bed   Empty your bladder before going to bed   Men can keep a urinal at the bedside   © 9422-4004 [employer], [address] [place] [address], [place] [address]. All rights reserved. This information is not intended as a substitute for professional medical care. Always follow your  healthcare professional's instructions.

## 2023-12-13 NOTE — PLAN OF CARE
"Pt is stable. Pt administered Zometa/darzalex/velcade today. Pt voiced she was doing "great," today. Pt will follow up in one week.      Problem: Adult Inpatient Plan of Care  Goal: Plan of Care Review  Outcome: Ongoing, Progressing  Goal: Patient-Specific Goal (Individualized)  Outcome: Ongoing, Progressing  Flowsheets (Taken 12/13/2023 1227)  Anxieties, Fears or Concerns: Pt denies.  Individualized Care Needs:   Pt provided pillow   warm blanket   legs elevated in reclined position.     Problem: Anemia (Chemotherapy Effects)  Goal: Anemia Symptom Improvement  Outcome: Ongoing, Progressing     Problem: Urinary Bleeding Risk or Actual (Chemotherapy Effects)  Goal: Absence of Hematuria  Outcome: Ongoing, Progressing     Problem: Nausea and Vomiting (Chemotherapy Effects)  Goal: Fluid and Electrolyte Balance  Outcome: Ongoing, Progressing     Problem: Neurotoxicity (Chemotherapy Effects)  Goal: Neurotoxicity Symptom Control  Outcome: Ongoing, Progressing     Problem: Neutropenia (Chemotherapy Effects)  Goal: Absence of Infection  Outcome: Ongoing, Progressing     Problem: Oral Mucositis (Chemotherapy Effects)  Goal: Improved Oral Mucous Membrane Integrity  Outcome: Ongoing, Progressing     Problem: Thrombocytopenia Bleeding Risk (Chemotherapy Effects)  Goal: Absence of Bleeding  Outcome: Ongoing, Progressing     Problem: Fall Injury Risk  Goal: Absence of Fall and Fall-Related Injury  Outcome: Ongoing, Progressing     "

## 2023-12-13 NOTE — PROGRESS NOTES
Patient ID: Ana Bonilla   Chief Complaint: Follow-up, Multiple Myeloma, and Chemotherapy  MRN:  [id number]     Oncologic Diagnosis:  Multiple Myeloma - IgG lambda   Previous Treatment:  VRD (5/10/2023 - 6/28/2023)   Current Treatment:  D-VRD (7/6/2023 - )    - Zometa (10/18/2023 - )       The patient location is: Home  The chief complaint leading to consultation is: Multiple Myeloma    Visit type: audiovisual    Face to Face time with patient: 38 minutes  45 minutes of total time spent on the encounter, which includes face to face time and non-face to face time preparing to see the patient (eg, review of tests), Obtaining and/or reviewing separately obtained history, Documenting clinical information in the electronic or other health record, Independently interpreting results (not separately reported) and communicating results to the patient/family/caregiver, or Care coordination (not separately reported).     Each patient to whom he or she provides medical services by telemedicine is:  (1) informed of the relationship between the physician and patient and the respective role of any other health care provider with respect to management of the patient; and (2) notified that he or she may decline to receive medical services by telemedicine and may withdraw from such care at any time.    Subjective   Ana Bonilla is a pleasant 63 y.o. female who presents for follow up and treatment via virtual visit.    Ms. Bonilla had been feeling more fatigued lately.  She has also had some noted decreased appetite.  She is under more stress than normal and thinks that it may be coming from that.  She has the continued issues with her eyes with a stye developing on her left eye; she saw Optometry last week and was prescribed some antibiotic ointment.  She isn't using the ointment consistently because it is uncomfortable and obscures her vision however I advised her to use it.  She understands.  I also reassured her that  her chalazion and eye issues are not because of the myeloma and that we can still treat her.    I reviewed her labs with her; she should resume potassium daily for now.  She was supposed to start a last week however did not but reassures me that she will start today.  We also discussed that she should start her calcium and vitamin-D supplement for bone health and her calcium is mildly low.    Otherwise, we are okay to proceed with treatment.       Review of Systems:  Review of Systems   Constitutional:  Positive for appetite change (decreased) and fatigue. Negative for activity change, chills, diaphoresis, fever and unexpected weight change.   HENT:  Negative for nosebleeds.    Respiratory:  Negative for shortness of breath.    Cardiovascular:  Negative for chest pain.   Gastrointestinal:  Positive for constipation. Negative for abdominal distention, abdominal pain, anal bleeding, blood in stool, diarrhea, nausea and vomiting.   Genitourinary:  Negative for difficulty urinating and hematuria.   Musculoskeletal:  Positive for back pain. Negative for arthralgias and myalgias.   Skin:  Negative for rash.   Neurological:  Negative for dizziness, weakness, light-headedness and headaches.   Hematological:  Does not bruise/bleed easily.   Psychiatric/Behavioral:  The patient is not nervous/anxious.      History     Oncology History   Multiple myeloma   4/20/2023 Initial Diagnosis    Multiple myeloma     5/10/2023 - 6/28/2023 Chemotherapy    Treatment Summary   Plan Name: OP VRD - WEEKLY BORTEZOMIB LENALIDOMIDE DEXAMETHASONE Q3W  Treatment Goal: Palliative  Status: Inactive  Start Date: 5/10/2023  End Date: 6/28/2023  Provider: Abram Velasquez MD  Chemotherapy: bortezomib (VELCADE) injection 2 mg, 1.3 mg/m2 = 2 mg, Subcutaneous, Clinic/HOD 1 time, 2 of 6 cycles  Administration: 2 mg (5/10/2023), 2 mg (5/17/2023), 2 mg (6/14/2023), 2 mg (5/31/2023), 2 mg (6/21/2023), 2 mg (6/28/2023)  lenalidomide 25 mg Cap, 25 mg, Oral,  Daily, 1 of 1 cycle, Start date: 5/10/2023, End date: 5/17/2023 6/28/2023 Cancer Staged    Staging form: Plasma Cell Myeloma and Plasma Cell Disorders, AJCC 8th Edition  - Clinical stage from 6/28/2023: RISS Stage II (Beta-2-microglobulin (mg/L): 1.9, Albumin (g/dL): 3, ISS: Stage II, High-risk cytogenetics: Absent, LDH: Normal)     7/6/2023 -  Chemotherapy    Treatment Summary   Plan Name: OP D-VRD DARATUMUMAB + BORTEZOMIB LENALIDOMIDE DEXAMETHASONE  Treatment Goal: Palliative  Status: Active  Start Date: 7/6/2023  End Date: 1/3/2024 (Planned)  Provider: Oscar Márquez MD  Chemotherapy: bortezomib (VELCADE) injection 2 mg, 1.3 mg/m2 = 2 mg, Subcutaneous, Clinic/HOD 1 time, 5 of 6 cycles  Administration: 2 mg (7/6/2023), 2 mg (7/12/2023), 2 mg (8/2/2023), 2 mg (8/9/2023), 2.25 mg (10/18/2023), 2 mg (7/19/2023), 2 mg (7/26/2023), 2 mg (8/16/2023), 2.25 mg (9/20/2023), 2.25 mg (9/27/2023), 2.25 mg (10/25/2023), 2.25 mg (11/1/2023), 2.25 mg (11/8/2023), 2.25 mg (12/6/2023), 2.25 mg (11/15/2023), 2.25 mg (11/22/2023), 2.25 mg (11/29/2023)  lenalidomide 10 mg Cap, 1 capsule (100 % of original dose 1 capsule), Oral, Daily, 1 of 1 cycle, Start date: 7/26/2023, End date: 8/2/2023  Dose modification: 1 capsule (original dose 1 capsule, Cycle 0)  daratumumab-hyaluronidase-fihj subcutaneous injection 1,800 mg, 1,800 mg (100 % of original dose 1,800 mg), Subcutaneous, Clinic/HOD 1 time, 5 of 6 cycles  Dose modification: 1,800 mg (original dose 1,800 mg, Cycle 1), 1,800 mg (original dose 1,800 mg, Cycle 1)  Administration: 1,800 mg (7/6/2023), 1,800 mg (7/12/2023), 1,800 mg (7/19/2023), 1,800 mg (7/26/2023), 1,800 mg (8/2/2023), 1,800 mg (8/9/2023), 1,800 mg (8/16/2023), 1,800 mg (9/27/2023), 1,800 mg (10/18/2023), 1,800 mg (11/1/2023), 1,800 mg (11/15/2023), 1,800 mg (11/29/2023)           MARIBETH Bonilla  63 y.o.  female with past medical history significant for hypertension, COPD, asthma, GERD, hypothyroidism here  for follow-up and management of multiple myeloma     She was referred in 2/2023 by her PCP after workup for gastritis revealed lytic lesions. CT chest showed lytic and expansile destructive lesion in the sternum and lytic lesions at L1. Biopsy of L1 lesion in 3/2023 revealed mature B cell neoplasm most consistent with plasma cell neoplasm.      Bone marrow biopsy in 4/2023 demonstrated 60% plasma cells. PET/CT showed extensive bony lesions throughout the spine, sternum, bilateral ribs, pelvis, and a mild compression deformity in L1. SPEP/MECHE showed IgG M spike 3.19 g/dL, IgG 4,521 mg/dL.      She was started on VRd and then daratumumab was added by the transplant team. She was started on ASA for thrombosis prophylaxis and acyclovir for herpes zoster prophylaxis. Start Zometa after dental evaluation.     Past Medical History:   Diagnosis Date    Allergy     Amblyopia OS    Anxiety     Asthma     COPD (chronic obstructive pulmonary disease)     NO HOME o2    GERD (gastroesophageal reflux disease)     Hyperlipidemia     Hypertension     PONV (postoperative nausea and vomiting)     Thyroid disease        Past Surgical History:   Procedure Laterality Date    APPENDECTOMY      BIOPSY N/A 6/8/2023    Procedure: BIOPSY;  Surgeon: Fausto Smith MD;  Location: [facility] OR;  Service: Neurosurgery;  Laterality: N/A;  L1    BUNIONECTOMY      COLONOSCOPY N/A 12/4/2019    Procedure: COLONOSCOPY;  Surgeon: Danny Matos III, MD;  Location: [facility] ENDO;  Service: Endoscopy;  Laterality: N/A;    COLONOSCOPY N/A 10/24/2022    Procedure: COLONOSCOPY;  Surgeon: Danny Matos III, MD;  Location: [facility] ENDO;  Service: Endoscopy;  Laterality: N/A;    ESOPHAGOGASTRODUODENOSCOPY N/A 10/24/2022    Procedure: EGD (ESOPHAGOGASTRODUODENOSCOPY);  Surgeon: Danny Matos III, MD;  Location: [facility] ENDO;  Service: Endoscopy;  Laterality: N/A;    FIXATION KYPHOPLASTY Bilateral 6/8/2023    Procedure: KYPHOPLASTY;  Surgeon: Fausto Smith MD;   Location: [facility] OR;  Service: Neurosurgery;  Laterality: Bilateral;  kyphoplasty and radiofrequency ablation - L1    HYSTERECTOMY      PARTIAL//still with ovaries    neck fusion  08/2017    THYROIDECTOMY         Family History   Problem Relation Age of Onset    Hypertension Mother     Diabetes Mother     Diabetes Father     Stroke Maternal Grandmother     Prostate cancer Maternal Grandfather     Mental illness Son     Pancreatic cancer Maternal Uncle     Mental illness Other     Pancreatic cancer Other     Ovarian cancer Maternal Cousin        Review of patient's allergies indicates:   Allergen Reactions    Hydrocodone-acetaminophen Other (See Comments)     Causes pt to feel extremely sick        Social History     Tobacco Use    Smoking status: Every Day     Current packs/day: 0.50     Average packs/day: 0.5 packs/day for 50.0 years (25.0 ttl pk-yrs)     Types: Cigarettes    Smokeless tobacco: Never   Substance Use Topics    Alcohol use: Never     Comment: quit    Drug use: No       Labs   Labs:  Lab Visit on 12/12/2023   Component Date Value Ref Range Status    Magnesium 12/12/2023 1.7  1.6 - 2.6 mg/dL Final    Sodium 12/12/2023 145  136 - 145 mmol/L Final    Potassium 12/12/2023 3.1 (L)  3.5 - 5.1 mmol/L Final    Chloride 12/12/2023 110  95 - 110 mmol/L Final    CO2 12/12/2023 23  23 - 29 mmol/L Final    Glucose 12/12/2023 98  70 - 110 mg/dL Final    BUN 12/12/2023 10  8 - 23 mg/dL Final    Creatinine 12/12/2023 1.1  0.5 - 1.4 mg/dL Final    Calcium 12/12/2023 7.9 (L)  8.7 - 10.5 mg/dL Final    Total Protein 12/12/2023 6.5  6.0 - 8.4 g/dL Final    Albumin 12/12/2023 3.6  3.5 - 5.2 g/dL Final    Total Bilirubin 12/12/2023 0.6  0.1 - 1.0 mg/dL Final    Comment: For infants and newborns, interpretation of results should be based  on gestational age, weight and in agreement with clinical  observations.    Premature Infant recommended reference ranges:  Up to 24 hours.............<8.0 mg/dL  Up to 48  hours............<12.0 mg/dL  3-5 days..................<15.0 mg/dL  6-29 days.................<15.0 mg/dL      Alkaline Phosphatase 12/12/2023 50 (L)  55 - 135 U/L Final    AST 12/12/2023 18  10 - 40 U/L Final    ALT 12/12/2023 14  10 - 44 U/L Final    eGFR 12/12/2023 56 (A)  >60 mL/min/1.73 m^2 Final    Anion Gap 12/12/2023 12  8 - 16 mmol/L Final    WBC 12/12/2023 4.33  3.90 - 12.70 K/uL Final    RBC 12/12/2023 3.73 (L)  4.00 - 5.40 M/uL Final    Hemoglobin 12/12/2023 11.8 (L)  12.0 - 16.0 g/dL Final    Hematocrit 12/12/2023 36.0 (L)  37.0 - 48.5 % Final    MCV 12/12/2023 97  82 - 98 fL Final    MCH 12/12/2023 31.6 (H)  27.0 - 31.0 pg Final    MCHC 12/12/2023 32.8  32.0 - 36.0 g/dL Final    RDW 12/12/2023 14.6 (H)  11.5 - 14.5 % Final    Platelets 12/12/2023 296  150 - 450 K/uL Final    MPV 12/12/2023 10.2  9.2 - 12.9 fL Final    Immature Granulocytes 12/12/2023 0.0  0.0 - 0.5 % Final    Gran # (ANC) 12/12/2023 1.5 (L)  1.8 - 7.7 K/uL Final    Immature Grans (Abs) 12/12/2023 0.00  0.00 - 0.04 K/uL Final    Comment: Mild elevation in immature granulocytes is non specific and   can be seen in a variety of conditions including stress response,   acute inflammation, trauma and pregnancy. Correlation with other   laboratory and clinical findings is essential.      Lymph # 12/12/2023 1.6  1.0 - 4.8 K/uL Final    Mono # 12/12/2023 0.9  0.3 - 1.0 K/uL Final    Eos # 12/12/2023 0.4  0.0 - 0.5 K/uL Final    Baso # 12/12/2023 0.01  0.00 - 0.20 K/uL Final    nRBC 12/12/2023 0  0 /100 WBC Final    Gran % 12/12/2023 33.5 (L)  38.0 - 73.0 % Final    Lymph % 12/12/2023 37.2  18.0 - 48.0 % Final    Mono % 12/12/2023 20.6 (H)  4.0 - 15.0 % Final    Eosinophil % 12/12/2023 8.5 (H)  0.0 - 8.0 % Final    Basophil % 12/12/2023 0.2  0.0 - 1.9 % Final    Differential Method 12/12/2023 Automated   Final         Imaging/Pathology   - 06/06/2023 RIGHT ILIAC CREST BONE MARROW ASPIRATE, BONE MARROW CLOT, AND BONE MARROW CORE BIOPSY WITH:      CELLULARITY=40-60%, TRILINEAGE HEMATOPOIETIC ACTIVITY (M/E=2.9:1).   CONSISTENT WITH PLASMA CELL NEOPLASM(60%).  SEE COMMENT.   FOCAL GRADE 1 RETICULAR FIBROSIS.   CONGO RED NEGATIVE.   INCREASED STORAGE IRON.   ADEQUATE NUMBER OF MEGAKARYOCYTES.     Bone marrow karyotype results: 46, XX[20], female karyotype.     Myeloma fixed cell, high-risk, FISH:  Normal.  The result is within normal limits for 1q duplication, TP53 deletion and IGH rearrangement.         - 04/10/2023 PET: Numerous FDG avid predominately lytic osseous lesions as above.  Primary differential considerations would include multiple myeloma versus metastatic disease.  2. No FDG avid soft tissue masses or adenopathy demonstrated.  3. Mild pathologic compression deformity of the L1 vertebral body.                                          Assessment and Plan   IgG lambda Multiple myeloma, R-ISS stage I   Diagnosed June 2023 via bone marrow biopsy  Patient treatment had been held multiple times with few treatments cancelled and also Revlimid 10 mg daily given as unable to tolerate higher dose   Previous oncologist discussed with the patient and the transplant team BMT.  However, patient declined transplant at this time and it was recommended that we continue with Rosa-VRD for 6 cycles and then repeat bone marrow biopsy and PET scan. If VGPR or better, then continue with Revlimid maintenance only.  Continue prophylaxis with aspirin, acyclovir 400 mg b.i.d.  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   Follow up with WERNER 2 weeks.   Infusion scheduling note   Darzalex, Velcade and Zometa today   Injection scheduling note RTC in 1 week for Velcade   Labs CBC, CMP, magnesium and phosphorus   Scheduling:  Preferred lab:  Lab interval:     Imaging    Pharmacy appointment    Other referrals               The patient was seen, interviewed and examined. Pertinent lab and radiologic studies were reviewed. Pt instructed to call should they develop concerning signs/symptoms or have further questions.        Portions of the record may have been created with voice recognition software. Occasional wrong-word or sound-a-like substitutions may have occurred due to the inherent limitations of voice recognition software. Read the chart carefully and recognize, using context, where substitutions have occurred.      Bri Michelle MD    Hematology/Oncology

## 2023-12-15 ENCOUNTER — TELEPHONE (OUTPATIENT)
Dept: HEMATOLOGY/ONCOLOGY | Facility: CLINIC | Age: 63
End: 2023-12-15
Payer: COMMERCIAL

## 2023-12-18 ENCOUNTER — DOCUMENTATION ONLY (OUTPATIENT)
Dept: HEMATOLOGY/ONCOLOGY | Facility: CLINIC | Age: 63
End: 2023-12-18
Payer: COMMERCIAL

## 2023-12-18 DIAGNOSIS — C90.00 MULTIPLE MYELOMA, REMISSION STATUS UNSPECIFIED: ICD-10-CM

## 2023-12-18 NOTE — PROGRESS NOTES
Incoming call from pt about not having received revlimid yet, was supposed to start . Told her I will call [facility] for status and call her back. Called [facility], spoke with Shaw, gave him current celgene auth#, he then connected me to pharmacist, Marsha. Marsha states they received script 12/15 but it wasn't signed. States for refills the normal turn around is 24-48hrs. States she will notate to expedite fill once signed script received. Thanked her for her assistance. Asked Dr. Dela Cruz nurse for signed revlimid reprint/refax to [facility] @ [phone], states she will. Called pt back to let her know status, will follow.   Oncology Navigation   Intake  Date of Diagnosis: 23  Cancer Type: Transplant; Myeloma  Internal / External Referral: Internal  Date of Referral: 05/10/23  Initial Nurse Navigator Contact: 05/15/23  Referral to Initial Contact Timeline (days): 5  Date Worked: 23  Reason if booked > 7 days after scheduling: Additional tests/procedures; Patient request     Treatment  Current Status: Active       Medical Oncologist: Dr. FRITZ Dela Cruz  Chemotherapy: Initiated  Chemotherapy Regimen: Velcade (Durvalumab possible pending FISH)  Oral Therapy: Initiated                       Acuity  Systemic Treatment - predicted or initiated: Chemotherapy Regimen with Multiple drugs (+1)  ECO  Comorbidities in Medical History: 2   Needed: 0  Support: 0  Verbalizes Financial Concerns: 1  Transportation: 0  Psychological Factors (+1 each): Emotional during conversation  Verbalizes the need for more education: 1  Navigation Acuity: 3     Follow Up  Follow up in about 2 days (around 2023) for Virtual Visit.

## 2023-12-19 ENCOUNTER — OFFICE VISIT (OUTPATIENT)
Dept: INTERNAL MEDICINE | Facility: CLINIC | Age: 63
End: 2023-12-19
Payer: COMMERCIAL

## 2023-12-19 ENCOUNTER — LAB VISIT (OUTPATIENT)
Dept: LAB | Facility: HOSPITAL | Age: 63
End: 2023-12-19
Attending: INTERNAL MEDICINE
Payer: COMMERCIAL

## 2023-12-19 VITALS
SYSTOLIC BLOOD PRESSURE: 118 MMHG | HEART RATE: 90 BPM | BODY MASS INDEX: 23.78 KG/M2 | DIASTOLIC BLOOD PRESSURE: 62 MMHG | WEIGHT: 139.31 LBS | TEMPERATURE: 98 F | RESPIRATION RATE: 18 BRPM | HEIGHT: 64 IN | OXYGEN SATURATION: 98 %

## 2023-12-19 DIAGNOSIS — C90.00 MULTIPLE MYELOMA, REMISSION STATUS UNSPECIFIED: ICD-10-CM

## 2023-12-19 DIAGNOSIS — Z72.0 TOBACCO USE: ICD-10-CM

## 2023-12-19 DIAGNOSIS — J44.89 COPD WITH ASTHMA: ICD-10-CM

## 2023-12-19 DIAGNOSIS — E78.5 DYSLIPIDEMIA: ICD-10-CM

## 2023-12-19 DIAGNOSIS — E03.9 ACQUIRED HYPOTHYROIDISM: Primary | ICD-10-CM

## 2023-12-19 DIAGNOSIS — I10 ESSENTIAL HYPERTENSION: ICD-10-CM

## 2023-12-19 DIAGNOSIS — K21.9 GASTROESOPHAGEAL REFLUX DISEASE WITHOUT ESOPHAGITIS: ICD-10-CM

## 2023-12-19 LAB
ALBUMIN SERPL BCP-MCNC: 3.7 G/DL (ref 3.5–5.2)
ALP SERPL-CCNC: 53 U/L (ref 55–135)
ALT SERPL W/O P-5'-P-CCNC: 16 U/L (ref 10–44)
ANION GAP SERPL CALC-SCNC: 10 MMOL/L (ref 8–16)
AST SERPL-CCNC: 17 U/L (ref 10–40)
BASOPHILS # BLD AUTO: 0.04 K/UL (ref 0–0.2)
BASOPHILS NFR BLD: 0.8 % (ref 0–1.9)
BILIRUB SERPL-MCNC: 0.6 MG/DL (ref 0.1–1)
BUN SERPL-MCNC: 11 MG/DL (ref 8–23)
CALCIUM SERPL-MCNC: 9 MG/DL (ref 8.7–10.5)
CHLORIDE SERPL-SCNC: 112 MMOL/L (ref 95–110)
CO2 SERPL-SCNC: 23 MMOL/L (ref 23–29)
CREAT SERPL-MCNC: 1.1 MG/DL (ref 0.5–1.4)
DIFFERENTIAL METHOD: ABNORMAL
EOSINOPHIL # BLD AUTO: 0.2 K/UL (ref 0–0.5)
EOSINOPHIL NFR BLD: 5.1 % (ref 0–8)
ERYTHROCYTE [DISTWIDTH] IN BLOOD BY AUTOMATED COUNT: 14.6 % (ref 11.5–14.5)
EST. GFR  (NO RACE VARIABLE): 56 ML/MIN/1.73 M^2
GLUCOSE SERPL-MCNC: 88 MG/DL (ref 70–110)
HCT VFR BLD AUTO: 35.2 % (ref 37–48.5)
HGB BLD-MCNC: 11.4 G/DL (ref 12–16)
IMM GRANULOCYTES # BLD AUTO: 0.01 K/UL (ref 0–0.04)
IMM GRANULOCYTES NFR BLD AUTO: 0.2 % (ref 0–0.5)
LYMPHOCYTES # BLD AUTO: 1.7 K/UL (ref 1–4.8)
LYMPHOCYTES NFR BLD: 35 % (ref 18–48)
MAGNESIUM SERPL-MCNC: 1.7 MG/DL (ref 1.6–2.6)
MCH RBC QN AUTO: 31.7 PG (ref 27–31)
MCHC RBC AUTO-ENTMCNC: 32.4 G/DL (ref 32–36)
MCV RBC AUTO: 98 FL (ref 82–98)
MONOCYTES # BLD AUTO: 0.7 K/UL (ref 0.3–1)
MONOCYTES NFR BLD: 15.2 % (ref 4–15)
NEUTROPHILS # BLD AUTO: 2.1 K/UL (ref 1.8–7.7)
NEUTROPHILS NFR BLD: 43.7 % (ref 38–73)
NRBC BLD-RTO: 0 /100 WBC
PHOSPHATE SERPL-MCNC: 2.8 MG/DL (ref 2.7–4.5)
PLATELET # BLD AUTO: 294 K/UL (ref 150–450)
PMV BLD AUTO: 10.1 FL (ref 9.2–12.9)
POTASSIUM SERPL-SCNC: 4.5 MMOL/L (ref 3.5–5.1)
PROT SERPL-MCNC: 6.5 G/DL (ref 6–8.4)
RBC # BLD AUTO: 3.6 M/UL (ref 4–5.4)
SODIUM SERPL-SCNC: 145 MMOL/L (ref 136–145)
WBC # BLD AUTO: 4.74 K/UL (ref 3.9–12.7)

## 2023-12-19 PROCEDURE — 3008F BODY MASS INDEX DOCD: CPT | Mod: CPTII,S$GLB,, | Performed by: FAMILY MEDICINE

## 2023-12-19 PROCEDURE — 99214 PR OFFICE/OUTPT VISIT, EST, LEVL IV, 30-39 MIN: ICD-10-PCS | Mod: S$GLB,,, | Performed by: FAMILY MEDICINE

## 2023-12-19 PROCEDURE — 3078F PR MOST RECENT DIASTOLIC BLOOD PRESSURE < 80 MM HG: ICD-10-PCS | Mod: CPTII,S$GLB,, | Performed by: FAMILY MEDICINE

## 2023-12-19 PROCEDURE — 3044F HG A1C LEVEL LT 7.0%: CPT | Mod: CPTII,S$GLB,, | Performed by: FAMILY MEDICINE

## 2023-12-19 PROCEDURE — 80053 COMPREHEN METABOLIC PANEL: CPT | Performed by: INTERNAL MEDICINE

## 2023-12-19 PROCEDURE — 99999 PR PBB SHADOW E&M-EST. PATIENT-LVL V: CPT | Mod: PBBFAC,,, | Performed by: FAMILY MEDICINE

## 2023-12-19 PROCEDURE — 99214 OFFICE O/P EST MOD 30 MIN: CPT | Mod: S$GLB,,, | Performed by: FAMILY MEDICINE

## 2023-12-19 PROCEDURE — 84100 ASSAY OF PHOSPHORUS: CPT | Performed by: INTERNAL MEDICINE

## 2023-12-19 PROCEDURE — 83735 ASSAY OF MAGNESIUM: CPT | Performed by: INTERNAL MEDICINE

## 2023-12-19 PROCEDURE — 3074F PR MOST RECENT SYSTOLIC BLOOD PRESSURE < 130 MM HG: ICD-10-PCS | Mod: CPTII,S$GLB,, | Performed by: FAMILY MEDICINE

## 2023-12-19 PROCEDURE — 36415 COLL VENOUS BLD VENIPUNCTURE: CPT | Performed by: INTERNAL MEDICINE

## 2023-12-19 PROCEDURE — 99999 PR PBB SHADOW E&M-EST. PATIENT-LVL V: ICD-10-PCS | Mod: PBBFAC,,, | Performed by: FAMILY MEDICINE

## 2023-12-19 PROCEDURE — 3074F SYST BP LT 130 MM HG: CPT | Mod: CPTII,S$GLB,, | Performed by: FAMILY MEDICINE

## 2023-12-19 PROCEDURE — 3078F DIAST BP <80 MM HG: CPT | Mod: CPTII,S$GLB,, | Performed by: FAMILY MEDICINE

## 2023-12-19 PROCEDURE — 4010F PR ACE/ARB THEARPY RXD/TAKEN: ICD-10-PCS | Mod: CPTII,S$GLB,, | Performed by: FAMILY MEDICINE

## 2023-12-19 PROCEDURE — 3044F PR MOST RECENT HEMOGLOBIN A1C LEVEL <7.0%: ICD-10-PCS | Mod: CPTII,S$GLB,, | Performed by: FAMILY MEDICINE

## 2023-12-19 PROCEDURE — 4010F ACE/ARB THERAPY RXD/TAKEN: CPT | Mod: CPTII,S$GLB,, | Performed by: FAMILY MEDICINE

## 2023-12-19 PROCEDURE — 85025 COMPLETE CBC W/AUTO DIFF WBC: CPT | Performed by: INTERNAL MEDICINE

## 2023-12-19 PROCEDURE — 3008F PR BODY MASS INDEX (BMI) DOCUMENTED: ICD-10-PCS | Mod: CPTII,S$GLB,, | Performed by: FAMILY MEDICINE

## 2023-12-19 RX ORDER — FLUTICASONE PROPIONATE AND SALMETEROL 250; 50 UG/1; UG/1
1 POWDER RESPIRATORY (INHALATION) 2 TIMES DAILY
Qty: 180 EACH | Refills: 1 | Status: SHIPPED | OUTPATIENT
Start: 2023-12-19 | End: 2024-12-18

## 2023-12-19 RX ORDER — LENALIDOMIDE 10 MG/1
CAPSULE ORAL
Qty: 21 EACH | Refills: 0 | OUTPATIENT
Start: 2023-12-19 | End: 2023-12-26 | Stop reason: SDUPTHER

## 2023-12-19 NOTE — PATIENT INSTRUCTIONS
Check with your pharmacist regarding RSV vaccine.      You are eligible for a covid booster, covid vaccines are available at most pharmacies.     Find a Therapist, Psychologist, Counselor - [facility]

## 2023-12-19 NOTE — PROGRESS NOTES
Subjective:       Patient ID: Ana Bonilla is a 63 y.o. female.    Chief Complaint: Establish Care    Patient presents to clinic today to establish care.    Review of Systems   Constitutional:  Positive for fatigue. Negative for chills, fever and unexpected weight change.   HENT:  Positive for rhinorrhea. Negative for congestion, dental problem, ear pain, hearing loss and trouble swallowing.    Eyes:  Positive for pain. Negative for visual disturbance.   Respiratory:  Positive for cough. Negative for shortness of breath.    Cardiovascular:  Negative for chest pain, palpitations and leg swelling.   Gastrointestinal:  Positive for nausea. Negative for abdominal distention, abdominal pain, blood in stool, constipation, diarrhea and vomiting.   Genitourinary:  Negative for difficulty urinating and vaginal discharge.   Musculoskeletal:  Positive for arthralgias and myalgias.   Skin:  Negative for rash.   Neurological:  Negative for dizziness, weakness, numbness and headaches.   Hematological:  Negative for adenopathy. Does not bruise/bleed easily.   Psychiatric/Behavioral:  Positive for sleep disturbance. Negative for dysphoric mood. The patient is nervous/anxious.      Above positive ROS are chronic/stable per patient report.  Objective:      Physical Exam  Vitals reviewed.   Constitutional:       General: She is not in acute distress.     Appearance: She is well-developed.   HENT:      Head: Normocephalic and atraumatic.   Eyes:      General: Lids are normal. No scleral icterus.     Extraocular Movements: Extraocular movements intact.      Conjunctiva/sclera: Conjunctivae normal.      Pupils: Pupils are equal, round, and reactive to light.   Pulmonary:      Effort: Pulmonary effort is normal.   Neurological:      Mental Status: She is alert and oriented to person, place, and time.      Cranial Nerves: No cranial nerve deficit.      Gait: Gait normal.   Psychiatric:         Mood and Affect: Mood and affect normal.          Assessment:       1. Acquired hypothyroidism    2. Dyslipidemia    3. COPD with asthma    4. Essential hypertension    5. Gastroesophageal reflux disease without esophagitis    6. Tobacco use        Plan:   1. Acquired hypothyroidism  Assessment & Plan:  Status pending lab, continue levothyroxine     Orders:  -     TSH; Future; Expected date: 12/19/2023  -     T4, Free; Future; Expected date: 12/19/2023    2. Dyslipidemia  Assessment & Plan:  Status pending lab, continue crestor    Orders:  -     Lipid Panel; Future; Expected date: 12/19/2023    3. COPD with asthma  -     Ambulatory referral/consult to Pulmonology; Future; Expected date: 12/26/2023  -     fluticasone-salmeterol diskus inhaler 250-50 mcg; Inhale 1 puff into the lungs 2 (two) times daily. Controller  Dispense: 180 each; Refill: 1    4. Essential hypertension  Assessment & Plan:  Controlled, continue metoprolol and amlodipine-benazepril      5. Gastroesophageal reflux disease without esophagitis  Assessment & Plan:  Stable on protonix      6. Tobacco use  Assessment & Plan:  Plans to quit in 2024, declines cessation program at this time        Health Maintenance reviewed/updated.

## 2023-12-20 ENCOUNTER — OFFICE VISIT (OUTPATIENT)
Dept: HEMATOLOGY/ONCOLOGY | Facility: CLINIC | Age: 63
End: 2023-12-20
Payer: COMMERCIAL

## 2023-12-20 ENCOUNTER — INFUSION (OUTPATIENT)
Dept: INFUSION THERAPY | Facility: HOSPITAL | Age: 63
End: 2023-12-20
Attending: INTERNAL MEDICINE
Payer: COMMERCIAL

## 2023-12-20 VITALS
HEART RATE: 72 BPM | RESPIRATION RATE: 18 BRPM | HEIGHT: 64 IN | OXYGEN SATURATION: 98 % | BODY MASS INDEX: 23.93 KG/M2 | WEIGHT: 140.19 LBS | DIASTOLIC BLOOD PRESSURE: 73 MMHG | SYSTOLIC BLOOD PRESSURE: 121 MMHG | TEMPERATURE: 98 F

## 2023-12-20 DIAGNOSIS — Z79.899 IMMUNODEFICIENCY DUE TO CHEMOTHERAPY: ICD-10-CM

## 2023-12-20 DIAGNOSIS — Z51.11 ENCOUNTER FOR ANTINEOPLASTIC CHEMOTHERAPY: Primary | ICD-10-CM

## 2023-12-20 DIAGNOSIS — D84.821 IMMUNODEFICIENCY DUE TO CHEMOTHERAPY: ICD-10-CM

## 2023-12-20 DIAGNOSIS — T45.1X5A IMMUNODEFICIENCY DUE TO CHEMOTHERAPY: ICD-10-CM

## 2023-12-20 DIAGNOSIS — C90.00 MULTIPLE MYELOMA, REMISSION STATUS UNSPECIFIED: Primary | Chronic | ICD-10-CM

## 2023-12-20 DIAGNOSIS — E87.6 HYPOKALEMIA: ICD-10-CM

## 2023-12-20 DIAGNOSIS — C90.00 MULTIPLE MYELOMA, REMISSION STATUS UNSPECIFIED: ICD-10-CM

## 2023-12-20 DIAGNOSIS — C79.51 SECONDARY MALIGNANT NEOPLASM OF BONE: ICD-10-CM

## 2023-12-20 PROCEDURE — 99215 PR OFFICE/OUTPT VISIT, EST, LEVL V, 40-54 MIN: ICD-10-PCS | Mod: 95,,, | Performed by: INTERNAL MEDICINE

## 2023-12-20 PROCEDURE — 63600175 PHARM REV CODE 636 W HCPCS: Mod: JG | Performed by: INTERNAL MEDICINE

## 2023-12-20 PROCEDURE — 3044F PR MOST RECENT HEMOGLOBIN A1C LEVEL <7.0%: ICD-10-PCS | Mod: CPTII,95,, | Performed by: INTERNAL MEDICINE

## 2023-12-20 PROCEDURE — 3044F HG A1C LEVEL LT 7.0%: CPT | Mod: CPTII,95,, | Performed by: INTERNAL MEDICINE

## 2023-12-20 PROCEDURE — 4010F ACE/ARB THERAPY RXD/TAKEN: CPT | Mod: CPTII,95,, | Performed by: INTERNAL MEDICINE

## 2023-12-20 PROCEDURE — 96401 CHEMO ANTI-NEOPL SQ/IM: CPT

## 2023-12-20 PROCEDURE — 99215 OFFICE O/P EST HI 40 MIN: CPT | Mod: 95,,, | Performed by: INTERNAL MEDICINE

## 2023-12-20 PROCEDURE — 4010F PR ACE/ARB THEARPY RXD/TAKEN: ICD-10-PCS | Mod: CPTII,95,, | Performed by: INTERNAL MEDICINE

## 2023-12-20 RX ORDER — BORTEZOMIB 3.5 MG/1
1.3 INJECTION, POWDER, LYOPHILIZED, FOR SOLUTION INTRAVENOUS; SUBCUTANEOUS
Status: CANCELLED | OUTPATIENT
Start: 2023-12-20

## 2023-12-20 RX ORDER — PROCHLORPERAZINE EDISYLATE 5 MG/ML
5 INJECTION INTRAMUSCULAR; INTRAVENOUS ONCE AS NEEDED
Status: CANCELLED
Start: 2023-12-20

## 2023-12-20 RX ORDER — HEPARIN 100 UNIT/ML
500 SYRINGE INTRAVENOUS
Status: CANCELLED | OUTPATIENT
Start: 2023-12-20

## 2023-12-20 RX ORDER — BORTEZOMIB 3.5 MG/1
1.3 INJECTION, POWDER, LYOPHILIZED, FOR SOLUTION INTRAVENOUS; SUBCUTANEOUS
Status: COMPLETED | OUTPATIENT
Start: 2023-12-20 | End: 2023-12-20

## 2023-12-20 RX ORDER — SODIUM CHLORIDE 0.9 % (FLUSH) 0.9 %
10 SYRINGE (ML) INJECTION
Status: CANCELLED | OUTPATIENT
Start: 2023-12-20

## 2023-12-20 RX ADMIN — BORTEZOMIB 2.25 MG: 3.5 INJECTION, POWDER, LYOPHILIZED, FOR SOLUTION INTRAVENOUS; SUBCUTANEOUS at 11:12

## 2023-12-20 NOTE — PROGRESS NOTES
Patient ID: Ana Bonilla   Chief Complaint: Follow-up and Multiple Myeloma  MRN:  [id number]     Oncologic Diagnosis:  Multiple Myeloma - IgG lambda   Previous Treatment:  VRD (5/10/2023 - 6/28/2023)   Current Treatment:  D-VRD (7/6/2023 - )    - Zometa (10/18/2023 - )       The patient location is: Home  The chief complaint leading to consultation is: Multiple Myeloma    Visit type: audiovisual    Face to Face time with patient: 38 minutes  45 minutes of total time spent on the encounter, which includes face to face time and non-face to face time preparing to see the patient (eg, review of tests), Obtaining and/or reviewing separately obtained history, Documenting clinical information in the electronic or other health record, Independently interpreting results (not separately reported) and communicating results to the patient/family/caregiver, or Care coordination (not separately reported).     Each patient to whom he or she provides medical services by telemedicine is:  (1) informed of the relationship between the physician and patient and the respective role of any other health care provider with respect to management of the patient; and (2) notified that he or she may decline to receive medical services by telemedicine and may withdraw from such care at any time.    Subjective   Ana Bonilla is a pleasant 63 y.o. female who presents for follow up and treatment via virtual visit.    She feels a little better; she has been taking her calcium and potassium.  I reviewed her labs with her. Her electrolytes are normal; advised her to take potassium every other day.     Otherwise, we are okay to proceed with treatment.       Review of Systems:  Review of Systems   Constitutional:  Positive for appetite change (decreased) and fatigue. Negative for activity change, chills, diaphoresis, fever and unexpected weight change.   HENT:  Negative for nosebleeds.    Respiratory:  Negative for shortness of breath.     Cardiovascular:  Negative for chest pain.   Gastrointestinal:  Negative for abdominal pain, blood in stool, diarrhea, nausea and vomiting.   Genitourinary:  Negative for difficulty urinating and hematuria.   Musculoskeletal:  Positive for back pain. Negative for arthralgias and myalgias.   Skin:  Negative for rash.   Neurological:  Negative for dizziness, weakness, light-headedness and headaches.   Hematological:  Does not bruise/bleed easily.   Psychiatric/Behavioral:  The patient is not nervous/anxious.      History     Oncology History   Multiple myeloma   4/20/2023 Initial Diagnosis    Multiple myeloma     5/10/2023 - 6/28/2023 Chemotherapy    Treatment Summary   Plan Name: OP VRD - WEEKLY BORTEZOMIB LENALIDOMIDE DEXAMETHASONE Q3W  Treatment Goal: Palliative  Status: Inactive  Start Date: 5/10/2023  End Date: 6/28/2023  Provider: Abram Velasquez MD  Chemotherapy: bortezomib (VELCADE) injection 2 mg, 1.3 mg/m2 = 2 mg, Subcutaneous, Clinic/HOD 1 time, 2 of 6 cycles  Administration: 2 mg (5/10/2023), 2 mg (5/17/2023), 2 mg (6/14/2023), 2 mg (5/31/2023), 2 mg (6/21/2023), 2 mg (6/28/2023)  lenalidomide 25 mg Cap, 25 mg, Oral, Daily, 1 of 1 cycle, Start date: 5/10/2023, End date: 5/17/2023 6/28/2023 Cancer Staged    Staging form: Plasma Cell Myeloma and Plasma Cell Disorders, AJCC 8th Edition  - Clinical stage from 6/28/2023: RISS Stage II (Beta-2-microglobulin (mg/L): 1.9, Albumin (g/dL): 3, ISS: Stage II, High-risk cytogenetics: Absent, LDH: Normal)     7/6/2023 -  Chemotherapy    Treatment Summary   Plan Name: OP D-VRD DARATUMUMAB + BORTEZOMIB LENALIDOMIDE DEXAMETHASONE  Treatment Goal: Palliative  Status: Active  Start Date: 7/6/2023  End Date: 1/3/2024 (Planned)  Provider: Oscar Márquez MD  Chemotherapy: bortezomib (VELCADE) injection 2 mg, 1.3 mg/m2 = 2 mg, Subcutaneous, Clinic/HOD 1 time, 6 of 6 cycles  Administration: 2 mg (7/6/2023), 2 mg (7/12/2023), 2 mg (8/2/2023), 2 mg (8/9/2023), 2.25 mg  (10/18/2023), 2 mg (7/19/2023), 2 mg (7/26/2023), 2 mg (8/16/2023), 2.25 mg (9/20/2023), 2.25 mg (9/27/2023), 2.25 mg (10/25/2023), 2.25 mg (11/1/2023), 2.25 mg (11/8/2023), 2.25 mg (12/6/2023), 2.25 mg (11/15/2023), 2.25 mg (11/22/2023), 2.25 mg (11/29/2023), 2.25 mg (12/13/2023)  lenalidomide 10 mg Cap, 1 capsule (100 % of original dose 1 capsule), Oral, Daily, 1 of 1 cycle, Start date: 7/26/2023, End date: 8/2/2023  Dose modification: 1 capsule (original dose 1 capsule, Cycle 0)  daratumumab-hyaluronidase-fihj subcutaneous injection 1,800 mg, 1,800 mg (100 % of original dose 1,800 mg), Subcutaneous, Clinic/hospitals 1 time, 6 of 6 cycles  Dose modification: 1,800 mg (original dose 1,800 mg, Cycle 1), 1,800 mg (original dose 1,800 mg, Cycle 1)  Administration: 1,800 mg (7/6/2023), 1,800 mg (7/12/2023), 1,800 mg (7/19/2023), 1,800 mg (7/26/2023), 1,800 mg (8/2/2023), 1,800 mg (8/9/2023), 1,800 mg (8/16/2023), 1,800 mg (9/27/2023), 1,800 mg (10/18/2023), 1,800 mg (11/1/2023), 1,800 mg (11/15/2023), 1,800 mg (11/29/2023), 1,800 mg (12/13/2023)           MARIBETH Bonilla  63 y.o.  female with past medical history significant for hypertension, COPD, asthma, GERD, hypothyroidism here for follow-up and management of multiple myeloma     She was referred in 2/2023 by her PCP after workup for gastritis revealed lytic lesions. CT chest showed lytic and expansile destructive lesion in the sternum and lytic lesions at L1. Biopsy of L1 lesion in 3/2023 revealed mature B cell neoplasm most consistent with plasma cell neoplasm.      Bone marrow biopsy in 4/2023 demonstrated 60% plasma cells. PET/CT showed extensive bony lesions throughout the spine, sternum, bilateral ribs, pelvis, and a mild compression deformity in L1. SPEP/MECHE showed IgG M spike 3.19 g/dL, IgG 4,521 mg/dL.      She was started on VRd and then daratumumab was added by the transplant team. She was started on ASA for thrombosis prophylaxis and acyclovir for  herpes zoster prophylaxis. Start Zometa after dental evaluation.     Past Medical History:   Diagnosis Date    Allergy     Amblyopia OS    Anxiety     Asthma     COPD (chronic obstructive pulmonary disease)     NO HOME o2    GERD (gastroesophageal reflux disease)     Hyperlipidemia     Hypertension     PONV (postoperative nausea and vomiting)     Thyroid disease        Past Surgical History:   Procedure Laterality Date    APPENDECTOMY      BIOPSY N/A 2023    Procedure: BIOPSY;  Surgeon: Fausto Smith MD;  Location: [facility] OR;  Service: Neurosurgery;  Laterality: N/A;  L1    BUNIONECTOMY      COLONOSCOPY N/A 2019    Procedure: COLONOSCOPY;  Surgeon: Danny Matos III, MD;  Location: [facility] ENDO;  Service: Endoscopy;  Laterality: N/A;    COLONOSCOPY N/A 10/24/2022    Procedure: COLONOSCOPY;  Surgeon: Danny Matos III, MD;  Location: [facility] ENDO;  Service: Endoscopy;  Laterality: N/A;    ESOPHAGOGASTRODUODENOSCOPY N/A 10/24/2022    Procedure: EGD (ESOPHAGOGASTRODUODENOSCOPY);  Surgeon: Danny Matos III, MD;  Location: [facility] ENDO;  Service: Endoscopy;  Laterality: N/A;    FIXATION KYPHOPLASTY Bilateral 2023    Procedure: KYPHOPLASTY;  Surgeon: Fausto Smith MD;  Location: [facility] OR;  Service: Neurosurgery;  Laterality: Bilateral;  kyphoplasty and radiofrequency ablation - L1    HYSTERECTOMY      PARTIAL//still with ovaries    neck fusion  2017    THYROIDECTOMY      TUBAL LIGATION         Family History   Problem Relation Age of Onset    Hypertension Mother     Diabetes Mother     Asthma Mother             Diabetes Father     Asthma Father     Stroke Maternal Grandmother     Prostate cancer Maternal Grandfather     Mental illness Son     Pancreatic cancer Maternal Uncle     Mental illness Other     Pancreatic cancer Other     Ovarian cancer Maternal Cousin        Review of patient's allergies indicates:   Allergen Reactions    Hydrocodone-acetaminophen Other (See Comments)      Causes pt to feel extremely sick        Social History     Tobacco Use    Smoking status: Every Day     Current packs/day: 0.50     Average packs/day: 0.5 packs/day for 50.0 years (25.0 ttl pk-yrs)     Types: Cigarettes    Smokeless tobacco: Never   Substance Use Topics    Alcohol use: Not Currently     Comment: quit    Drug use: No       Labs   Labs:  Lab Visit on 12/19/2023   Component Date Value Ref Range Status    Phosphorus 12/19/2023 2.8  2.7 - 4.5 mg/dL Final    Magnesium 12/19/2023 1.7  1.6 - 2.6 mg/dL Final    Sodium 12/19/2023 145  136 - 145 mmol/L Final    Potassium 12/19/2023 4.5  3.5 - 5.1 mmol/L Final    Chloride 12/19/2023 112 (H)  95 - 110 mmol/L Final    CO2 12/19/2023 23  23 - 29 mmol/L Final    Glucose 12/19/2023 88  70 - 110 mg/dL Final    BUN 12/19/2023 11  8 - 23 mg/dL Final    Creatinine 12/19/2023 1.1  0.5 - 1.4 mg/dL Final    Calcium 12/19/2023 9.0  8.7 - 10.5 mg/dL Final    Total Protein 12/19/2023 6.5  6.0 - 8.4 g/dL Final    Albumin 12/19/2023 3.7  3.5 - 5.2 g/dL Final    Total Bilirubin 12/19/2023 0.6  0.1 - 1.0 mg/dL Final    Comment: For infants and newborns, interpretation of results should be based  on gestational age, weight and in agreement with clinical  observations.    Premature Infant recommended reference ranges:  Up to 24 hours.............<8.0 mg/dL  Up to 48 hours............<12.0 mg/dL  3-5 days..................<15.0 mg/dL  6-29 days.................<15.0 mg/dL      Alkaline Phosphatase 12/19/2023 53 (L)  55 - 135 U/L Final    AST 12/19/2023 17  10 - 40 U/L Final    ALT 12/19/2023 16  10 - 44 U/L Final    eGFR 12/19/2023 56 (A)  >60 mL/min/1.73 m^2 Final    Anion Gap 12/19/2023 10  8 - 16 mmol/L Final    WBC 12/19/2023 4.74  3.90 - 12.70 K/uL Final    RBC 12/19/2023 3.60 (L)  4.00 - 5.40 M/uL Final    Hemoglobin 12/19/2023 11.4 (L)  12.0 - 16.0 g/dL Final    Hematocrit 12/19/2023 35.2 (L)  37.0 - 48.5 % Final    MCV 12/19/2023 98  82 - 98 fL Final    MCH 12/19/2023 31.7  (H)  27.0 - 31.0 pg Final    MCHC 12/19/2023 32.4  32.0 - 36.0 g/dL Final    RDW 12/19/2023 14.6 (H)  11.5 - 14.5 % Final    Platelets 12/19/2023 294  150 - 450 K/uL Final    MPV 12/19/2023 10.1  9.2 - 12.9 fL Final    Immature Granulocytes 12/19/2023 0.2  0.0 - 0.5 % Final    Gran # (ANC) 12/19/2023 2.1  1.8 - 7.7 K/uL Final    Immature Grans (Abs) 12/19/2023 0.01  0.00 - 0.04 K/uL Final    Comment: Mild elevation in immature granulocytes is non specific and   can be seen in a variety of conditions including stress response,   acute inflammation, trauma and pregnancy. Correlation with other   laboratory and clinical findings is essential.      Lymph # 12/19/2023 1.7  1.0 - 4.8 K/uL Final    Mono # 12/19/2023 0.7  0.3 - 1.0 K/uL Final    Eos # 12/19/2023 0.2  0.0 - 0.5 K/uL Final    Baso # 12/19/2023 0.04  0.00 - 0.20 K/uL Final    nRBC 12/19/2023 0  0 /100 WBC Final    Gran % 12/19/2023 43.7  38.0 - 73.0 % Final    Lymph % 12/19/2023 35.0  18.0 - 48.0 % Final    Mono % 12/19/2023 15.2 (H)  4.0 - 15.0 % Final    Eosinophil % 12/19/2023 5.1  0.0 - 8.0 % Final    Basophil % 12/19/2023 0.8  0.0 - 1.9 % Final    Differential Method 12/19/2023 Automated   Final         Imaging/Pathology   - 06/06/2023 RIGHT ILIAC CREST BONE MARROW ASPIRATE, BONE MARROW CLOT, AND BONE MARROW CORE BIOPSY WITH:     CELLULARITY=40-60%, TRILINEAGE HEMATOPOIETIC ACTIVITY (M/E=2.9:1).   CONSISTENT WITH PLASMA CELL NEOPLASM(60%).  SEE COMMENT.   FOCAL GRADE 1 RETICULAR FIBROSIS.   CONGO RED NEGATIVE.   INCREASED STORAGE IRON.   ADEQUATE NUMBER OF MEGAKARYOCYTES.     Bone marrow karyotype results: 46, XX[20], female karyotype.     Myeloma fixed cell, high-risk, FISH:  Normal.  The result is within normal limits for 1q duplication, TP53 deletion and IGH rearrangement.         - 04/10/2023 PET: Numerous FDG avid predominately lytic osseous lesions as above.  Primary differential considerations would include multiple myeloma versus metastatic  disease.  2. No FDG avid soft tissue masses or adenopathy demonstrated.  3. Mild pathologic compression deformity of the L1 vertebral body.                                          Assessment and Plan   IgG lambda Multiple myeloma, R-ISS stage I   Diagnosed June 2023 via bone marrow biopsy  Patient treatment had been held multiple times with few treatments cancelled and also Revlimid 10 mg daily given as unable to tolerate higher dose   Previous oncologist discussed with the patient and the transplant team BMT.  However, patient declined transplant at this time and it was recommended that we continue with Rosa-VRD for 6 cycles and then repeat bone marrow biopsy and PET scan. If VGPR or better, then continue with Revlimid maintenance only.  Continue prophylaxis with aspirin, acyclovir 400 mg b.i.d.  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, Hordeolum externum of left eyelid  Per Ophthalmology, this is a chronic condition and is unrelated to and not a contraindication for treatment of MM  Has had kenalog injection  Continue follow-up with Ophthalmology  Continue antibiotic/cream and warm compresses        Cancer Screening  MMG 03/17/2023:  BI-RADS 1  PAP Smear:  Partial hysterectomy  Colonoscopy 10/24/2022:  Normal repeat in 5 years        Chronic Medical Conditions  Asthma  Anxiety   COPD   GERD  Hypertension   Hyperlipidemia   Hypothyroidism   ?IBS-C on [employer]        [facility] Onc Chart Routing      Follow up with physician 2 weeks. 40 minute appointment   Follow up with WERNER 1 week. Ms. Mckeon   Infusion scheduling note   Velcade  today; velcade and darzalex   Injection scheduling note    Labs CBC, CMP, phosphorus and magnesium   Scheduling:  Preferred lab:  Lab interval:     Imaging    Pharmacy appointment    Other referrals               The patient was seen, interviewed and examined. Pertinent lab and radiologic studies were reviewed. Pt instructed to call should they develop concerning signs/symptoms or have further questions.        Portions of the record may have been created with voice recognition software. Occasional wrong-word or sound-a-like substitutions may have occurred due to the inherent limitations of voice recognition software. Read the chart carefully and recognize, using context, where substitutions have occurred.      Bri Michelle MD    Hematology/Oncology

## 2023-12-20 NOTE — DISCHARGE INSTRUCTIONS
Thank you for allowing me to care for you today,  MIS LeachN, RN    [facility]  [address] [address]  [phone] phone     [phone] fax  Hours of Operation: Monday- Friday 7:00am- 5:30pm  After hours phone  [phone]  Hematology / Oncology Physicians on call      JUANI Aguirre Dr[address] Dr., Dr., BELLE Valdez, BELLE Deluna, BELLE    Please call with any concerns regarding your appointment today.   FALL PREVENTION   Falls often occur due to slipping, tripping or losing your balance. Here are ways to reduce your risk of falling again.   Was there anything that caused your fall that can be fixed, removed or replaced?   Make your home safe by keeping walkways clear of objects you may trip over.   Use non-slip pads under rugs.   Do not walk in poorly lit areas.   Do not stand on chairs or wobbly ladders.   Use caution when reaching overhead or looking upward. This position can cause a loss of balance.   Be sure your shoes fit properly, have non-slip bottoms and are in good condition.   Be cautious when going up and down stairs, curbs, and when walking on uneven sidewalks.   If your balance is poor, consider using a cane or walker.   If your fall was related to alcohol use, stop or limit alcohol intake.   If your fall was related to use of sleeping medicines, talk to your doctor about this. You may need to reduce your dosage at bedtime if you awaken during the night to go to the bathroom.   To reduce the need for nighttime bathroom trips:   Avoid drinking fluids for several hours before going to bed   Empty your bladder before going to bed   Men can keep a urinal at the bedside   © 6185-1191 [employer], [address] [place] [address], [place] [address]. All rights reserved. This information is not intended as a substitute for professional medical care. Always follow your  healthcare professional's instructions.

## 2023-12-20 NOTE — NURSING
1139: Velcade Injection given without difficulties.Bandaid applied. Patient instructed to stay in the clinic for 15 minutes. Patient verbalized understanding and will notify nurse with any complaints.

## 2023-12-21 ENCOUNTER — PATIENT MESSAGE (OUTPATIENT)
Dept: PALLIATIVE MEDICINE | Facility: CLINIC | Age: 63
End: 2023-12-21

## 2023-12-21 ENCOUNTER — OFFICE VISIT (OUTPATIENT)
Dept: PALLIATIVE MEDICINE | Facility: CLINIC | Age: 63
End: 2023-12-21
Payer: COMMERCIAL

## 2023-12-21 DIAGNOSIS — F41.9 ANXIETY: ICD-10-CM

## 2023-12-21 DIAGNOSIS — G62.9 NEUROPATHY: ICD-10-CM

## 2023-12-21 DIAGNOSIS — Z51.5 PALLIATIVE CARE ENCOUNTER: ICD-10-CM

## 2023-12-21 DIAGNOSIS — M25.551 RIGHT HIP PAIN: ICD-10-CM

## 2023-12-21 DIAGNOSIS — C90.00 MULTIPLE MYELOMA, REMISSION STATUS UNSPECIFIED: Primary | ICD-10-CM

## 2023-12-21 PROCEDURE — 99499 UNLISTED E&M SERVICE: CPT | Mod: 95,,,

## 2023-12-21 PROCEDURE — 99214 OFFICE O/P EST MOD 30 MIN: CPT | Mod: 95,,,

## 2023-12-21 NOTE — PROGRESS NOTES
Palliative Medicine  Follow-up   Consult Requested By: No ref. provider found  Reason for Consult: Symptom Management/Advance Care Planning/Goals of Care Discussion        SUBJECTIVE:     History of Present Illness:  Ana Bonilla is a 63 y.o. female with Multiple Myeloma (Apr. 2023) with metastasis to thoracic/lumbar spine, complicated by L1 compression fracture. Pending SCT. She is receiving D-VRD, Lenalidomide and Zometa. S/P L1 Kyphoplasty (June 2023). Followed by Dr. Luevano, Dr. Garcia, and Neurosurgery. The palliative care team was consulted to assist with symptom management, advance care planning, and goals of care discussion.       Chief Complaint: Right hip pain/Anxiety    12/21/2023:    The patient location is: [place]  The chief complaint leading to consultation is:  Follow-up     Visit type: audiovisual     Face to Face time with patient:  20 minutes  35 minutes of total time spent on the encounter, which includes face to face time and non-face to face time preparing to see the patient (eg, review of tests), Obtaining and/or reviewing separately obtained history, Documenting clinical information in the electronic or other health record, Independently interpreting results (not separately reported) and communicating results to the patient/family/caregiver, or Care coordination (not separately reported).      Each patient to whom he or she provides medical services by telemedicine is:  (1) informed of the relationship between the physician and patient and the respective role of any other health care provider with respect to management of the patient; and (2) notified that he or she may decline to receive medical services by telemedicine and may withdraw from such care at any time.     Notes:   Pt seen via audiovisual feed. She was in no acute distress, stated she is outside a hair salon.   Now using Medical Marijuana following consult with her pain management doctor, Dr. Barrios. She notes improved  "right hip pain and anxiety with Medical Marijuana. However, she wants to adjust her dose due to "feeling high." She stopped Lyrica after one dose, stating "it made me crazy". She does not wish to continue Lyrica at this time. She is taking Xanax 2mg BID PRN.   We discussed the importance of trying therapy to improve her mood. She declined, stating she does not feel comfortable with speaking with a therapist. She would rather speak to [facility] staff, SW, and me about her feelings.    She does not wish to be vaccinated against Flu/COVID.       LA  Reviewed and Summarized:        Past Visits:    11/09/2023: Pt attended clinic alone. She was in no acute distress. Vital signs stable on room air. I explained the role palliative care often plays in supporting patients with serious illness and their families regarding symptom management, advance care planning, and goals of care discussion.      Pt reported right hip/leg aching/tingling pain 5/10 exacerbated by prolonged standing, prolonged sitting, and walking. Tylenol not effective. Norco made her "sick". Percocet was effective, but wants to avoid taking opioids.   Gabapentin was not effective for neuropathy/sciatica (diagnosed pre-MM)  Medical Marijuana (liquid/gummies) was effective for pain, but made her "high" and unable to function.   She was previously seeing Dr. Barrios for pain management/medical marijuana prescription  Insomnia-Feels to be exacerbated by Dexamethasone, not taking Trazodone due to fear of psychotropic medications. Melatonin was ineffective.   Continues to smoke cigarettes to cope with stress and anxiety. Not using Nicotine patch d/t exacerbated insomnia.   Chronic anxiety/intermittent depression due to managing son's mental illness. She has been managing with Xanax 2mg BID PRN for the past 20 years.  Previously tried antidepressants, but it was ineffective   Does not want to try antidepressants. Wants to stay on Xanax because it is "fast " "acting"  Declined mental health counseling at this time.     We discussed advance care planning, goals of care, and code status, Full Code vs. DNR including risks, benefits, and alternatives.   She wishes to continue chemotherapy, and remains ambivalent about SCT. Her biggest is the treatment commitment, recovery time, not wanting to be a burden on her , fear of relapse post SCT, and difficulty with caring for her son (has schizophrenia) if she is sick following SCT. She wishes to remain Full Code. "Does not want to think about that".  Her elected HCPOA are: 1.  : Lars Bonilla: [phone], 2. Son: Hakeem Garcia: [phone].     LA  reviewed and summarized:          Past Medical History:   Diagnosis Date    Allergy     Amblyopia OS    Anxiety     Asthma     COPD (chronic obstructive pulmonary disease)     NO HOME o2    GERD (gastroesophageal reflux disease)     Hyperlipidemia     Hypertension     PONV (postoperative nausea and vomiting)     Thyroid disease      Past Surgical History:   Procedure Laterality Date    APPENDECTOMY      BIOPSY N/A 2023    Procedure: BIOPSY;  Surgeon: Fausto Smith MD;  Location: [facility] OR;  Service: Neurosurgery;  Laterality: N/A;  L1    BUNIONECTOMY      COLONOSCOPY N/A 2019    Procedure: COLONOSCOPY;  Surgeon: Danny Matos III, MD;  Location: [facility] ENDO;  Service: Endoscopy;  Laterality: N/A;    COLONOSCOPY N/A 10/24/2022    Procedure: COLONOSCOPY;  Surgeon: Danny Matos III, MD;  Location: [facility] ENDO;  Service: Endoscopy;  Laterality: N/A;    ESOPHAGOGASTRODUODENOSCOPY N/A 10/24/2022    Procedure: EGD (ESOPHAGOGASTRODUODENOSCOPY);  Surgeon: Danny Matos III, MD;  Location: [facility] ENDO;  Service: Endoscopy;  Laterality: N/A;    FIXATION KYPHOPLASTY Bilateral 2023    Procedure: KYPHOPLASTY;  Surgeon: Fausto Smith MD;  Location: [facility] OR;  Service: Neurosurgery;  Laterality: Bilateral;  kyphoplasty and radiofrequency ablation - L1 "    HYSTERECTOMY      PARTIAL//still with ovaries    neck fusion  2017    THYROIDECTOMY      TUBAL LIGATION       Family History   Problem Relation Age of Onset    Hypertension Mother     Diabetes Mother     Asthma Mother             Diabetes Father     Asthma Father     Stroke Maternal Grandmother     Prostate cancer Maternal Grandfather     Mental illness Son     Pancreatic cancer Maternal Uncle     Mental illness Other     Pancreatic cancer Other     Ovarian cancer Maternal Cousin      Review of patient's allergies indicates:   Allergen Reactions    Hydrocodone-acetaminophen Other (See Comments)     Causes pt to feel extremely sick        Medications:    Current Outpatient Medications:     acyclovir (ZOVIRAX) 400 MG tablet, Take 1 tablet (400 mg total) by mouth 2 (two) times daily., Disp: 60 tablet, Rfl: 11    albuterol (PROVENTIL HFA) 90 mcg/actuation inhaler, Inhale 2 puffs into the lungs every 6 (six) hours as needed for Wheezing. Rescue, Disp: 18 g, Rfl: 0    ALPRAZolam (XANAX) 2 MG Tab, TAKE 1 TABLET BY MOUTH TWICE DAILY AS NEEDED FOR ANXIETY, Disp: 60 tablet, Rfl: 0    amlodipine-benazepril 2.5-10 mg (LOTREL) 2.5-10 mg per capsule, TAKE 1 CAPSULE BY MOUTH EVERY DAY, Disp: 90 capsule, Rfl: 3    aspirin (ECOTRIN) 81 MG EC tablet, Take 1 tablet (81 mg total) by mouth once daily., Disp: 30 tablet, Rfl: 5    benzonatate (TESSALON) 200 MG capsule, Take 200 mg by mouth., Disp: , Rfl:     calcium-vitamin D 600 mg-10 mcg (400 unit) Tab, Take 1 tablet by mouth every 12 (twelve) hours., Disp: 60 tablet, Rfl: 2    dexAMETHasone (DECADRON) 4 MG Tab, Take 10 tablets (40 mg total) by mouth As instructed. Daily on days 1, 8, 15, and 22 of each chemotherapy cycle. Take with food., Disp: 40 tablet, Rfl: 5    erythromycin (ROMYCIN) ophthalmic ointment, Apply ointment to lids and lashes on both eyes 2 times daily for 7 days., Disp: 2.5 g, Rfl: 0    fluticasone propionate (FLONASE) 50 mcg/actuation nasal spray, 1  spray (50 mcg total) by Each Nostril route once daily., Disp: 1 mL, Rfl: 1    fluticasone-salmeterol diskus inhaler 250-50 mcg, Inhale 1 puff into the lungs 2 (two) times daily. Controller, Disp: 180 each, Rfl: 1    ipratropium (ATROVENT) 21 mcg (0.03 %) nasal spray, 2 sprays by Each Nostril route 3 (three) times daily., Disp: , Rfl:     lenalidomide 10 mg Cap, TAKE 1 CAPSULE ONCE DAILY FOR 21 DAYS AND THEN 7 DAYS OFF., Disp: 21 each, Rfl: 0    levocetirizine (XYZAL) 5 MG tablet, Take 1 tablet (5 mg total) by mouth every evening., Disp: 30 tablet, Rfl: 11    levothyroxine (SYNTHROID) 100 MCG tablet, Take 1 tablet (100 mcg total) by mouth before breakfast., Disp: 90 tablet, Rfl: 1    linaCLOtide (LINZESS) 72 mcg Cap capsule, Take 2 capsules (144 mcg total) by mouth before breakfast., Disp: 30 capsule, Rfl: 1    magnesium oxide (MAGOX) 400 mg (241.3 mg magnesium) tablet, Take 1 tablet (400 mg total) by mouth once daily., Disp: 90 tablet, Rfl: 1    methylPREDNISolone (MEDROL DOSEPACK) 4 mg tablet, use as directed, Disp: 1 each, Rfl: 0    metoprolol succinate (TOPROL-XL) 50 MG 24 hr tablet, Take 1 tablet (50 mg total) by mouth every evening., Disp: 90 tablet, Rfl: 3    montelukast (SINGULAIR) 10 mg tablet, Take 1 tablet (10 mg total) by mouth every evening., Disp: 90 tablet, Rfl: 0    neomycin-polymyxin-dexamethasone (DEXACINE) 3.5 mg/g-10,000 unit/g-0.1 % Oint, Place into both eyes 3 (three) times daily., Disp: 3.5 g, Rfl: 0    nicotine (NICODERM CQ) 14 mg/24 hr, Place 1 patch onto the skin once daily., Disp: 14 patch, Rfl: 0    ondansetron (ZOFRAN) 8 MG tablet, Take 1 tablet (8 mg total) by mouth every 12 (twelve) hours as needed for Nausea., Disp: 30 tablet, Rfl: 2    pantoprazole (PROTONIX) 40 MG tablet, TAKE 1 TABLET(40 MG) BY MOUTH EVERY DAY (Patient taking differently: Take 40 mg by mouth daily as needed.), Disp: 90 tablet, Rfl: 3    potassium chloride SA (K-DUR,KLOR-CON) 20 MEQ tablet, Take 1 tablet (20 mEq  total) by mouth once daily., Disp: 30 tablet, Rfl: 11    pregabalin (LYRICA) 50 MG capsule, Take 1 capsule (50 mg total) by mouth nightly. (Patient not taking: Reported on 12/19/2023), Disp: 30 capsule, Rfl: 0    prochlorperazine (COMPAZINE) 5 MG tablet, Take 1 tablet (5 mg total) by mouth every 6 (six) hours as needed for Nausea., Disp: 60 tablet, Rfl: 5    promethazine (PHENERGAN) 25 MG tablet, Take 1 tablet (25 mg total) by mouth every 4 (four) hours., Disp: 45 tablet, Rfl: 2    rosuvastatin (CRESTOR) 10 MG tablet, Take 1 tablet (10 mg total) by mouth every evening., Disp: 90 tablet, Rfl: 3    traZODone (DESYREL) 50 MG tablet, Take 1 tablet (50 mg total) by mouth every evening., Disp: 30 tablet, Rfl: 11    Current Facility-Administered Medications:     dexAMETHasone injection 40 mg, 40 mg, Intravenous, 1 time in Clinic/HOD, Bri Luevano MD    Facility-Administered Medications Ordered in Other Visits:     lactated ringers infusion, , Intravenous, Continuous, Danny Matos III, MD    OBJECTIVE:     ROS:  Review of Systems   Constitutional:  Negative for activity change, appetite change, fatigue, fever and unexpected weight change.   HENT:  Negative for trouble swallowing and voice change.    Eyes: Negative.    Respiratory:  Negative for cough, chest tightness, shortness of breath and wheezing.    Cardiovascular:  Negative for chest pain, palpitations and leg swelling.   Gastrointestinal:  Negative for abdominal distention, abdominal pain, constipation, diarrhea, nausea and vomiting.   Musculoskeletal:  Positive for myalgias (Right leg/hip). Negative for arthralgias and joint swelling.   Skin:  Negative for color change, pallor, rash and wound.   Neurological:  Negative for dizziness, tremors, speech difficulty, weakness, numbness and headaches.   Psychiatric/Behavioral:  Positive for agitation and sleep disturbance. Negative for behavioral problems, confusion, dysphoric mood and suicidal ideas. The  patient is nervous/anxious (Improved with Medical Marijuana).        Review of Symptoms      Symptom Assessment (ESAS 0-10 Scale)  Pain:  0  Dyspnea:  0  Anxiety:  0  Nausea:  0  Depression:  0  Anorexia:  0  Fatigue:  0  Insomnia:  0  Restlessness:  0  Agitation:  0     CAM / Delirium:  Negative  Constipation:  Negative  Diarrhea:  Negative    Anxiety:  Is nervous/anxious (Improved with Medical Marijuana)  Constipation:  No constipation    Pain Assessment:    Location(s): leg    Leg       Location: right        Quality: Burning and tingling        Quantity: 5/10 in intensity        Chronicity: Onset 0 year(s) ago, unchanged        Aggravating Factors: Activity, movement and walking        Alleviating Factors: Opiates        Associated Symptoms: Arthralgias    Performance Status:  90    ECOG Performance Status ndGndrndanddndend:nd nd2nd Living Arrangements:  Lives with family    Psychosocial/Cultural:   See Palliative Psychosocial Note: Yes   with two adult sons. Works weekends at [employer]. 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   **Primary  to Follow**  Palliative Care  Consult: No     Time-Based Charting:  No    Advance Care Planning   Advance Directives:   Living Will: No    LaPOST: No    Do Not Resuscitate Status: No    Medical Power of : Yes    Agent's Name:  1.  : Lars Bonilla: [phone], 2. Son: Hakeem Garcia: [phone].    Decision Making:  Patient answered questions  Goals of Care: What is most important right now is to focus on quality of life, even if it means sacrificing a little time, curative/life-prolongation (regardless of treatment burdens). Accordingly, we have decided that the best plan to meet the patient's goals includes continuing with treatment.            Physical Exam:  Vitals:    Physical Exam  Vitals reviewed: Limited due to virtual visit.   Constitutional:       General: She is not in acute distress.     Appearance: Normal  appearance. She is not ill-appearing.   HENT:      Head: Normocephalic.   Eyes:      General: No scleral icterus.        Right eye: No discharge.         Left eye: No discharge.   Pulmonary:      Effort: Pulmonary effort is normal. No respiratory distress.   Neurological:      Mental Status: She is alert and oriented to person, place, and time.   Psychiatric:         Mood and Affect: Mood normal.         Behavior: Behavior normal.         Thought Content: Thought content normal.         Judgment: Judgment normal.         Labs and radiology data reviewed    ASSESSMENT/PLAN:   Multiple Myeloma not achieved remission  Metastasis to thoracic/lumbar spine, complicated by L1 compression fracture.  Followed by Dr. Luevano, Dr. Garcia, and Neurosurgery.   On D-VRD, Lenalidomide and Zometa. S/P L1 Kyphoplasty (June 2023).   Pt remains hesitant about SCT due extended recovery time/fear of relapse post SCT.  XR- L-Spine: 6/23/2023: Kyphoplasty changes at L1. Facet hypertrophy within the mid to lower lumbar spine.  PET scan 4/10/2023: 1. Numerous FDG avid predominately lytic osseous lesions as above.  Primary differential considerations would include multiple myeloma versus metastatic disease. 2. No FDG avid soft tissue masses or adenopathy demonstrated. 3. Mild pathologic compression deformity of the L1 vertebral body.    Right Hip Pain/Neuropathy  Likely a mixture of chronic sciatica/Pelvic lytic bone lesions/sequale of L1 compression fracture  Medical Marijuana effective. Prescribed by Dr. Barrios.  Pt has tried multiple pain management modalities with mixed response.   Wishes to avoid opioids at this time due to fear of altered mental status.  Stopped Lyrica due to altered mental status  Anxiety  Chronic and exacerbated by MM diagnosis.  Continues Xanax 2mg BID PRN  Declines SSRI trial at this time.   Declines mental health counseling at this time.    Encounter for Palliative Care  -Code status: Full Code.  -HCPOA: 1.   : Lars Bonilla: [phone], 2. Son: Hakeem Garcia: [phone].   -GOC:  Continue cancer treatment, symptom management, maintain independence and functional status.  -See HPI for further details       Follow up:  3 months     35 minutes of total time spent on the encounter, which includes face to face time and non-face to face time preparing to see the patient (eg, review of tests), Obtaining and/or reviewing separately obtained history, Documenting clinical information in the electronic or other health record, Independently interpreting results (not separately reported) and communicating results to the patient/family/caregiver, or Care coordination (not separately reported).    Signature: RAJIV BOYD NP

## 2023-12-21 NOTE — PATIENT INSTRUCTIONS
Ok to check with Dr. Barrios if you want to adjust your medical marijuana prescription  We can follow you as needed.

## 2023-12-22 ENCOUNTER — PATIENT MESSAGE (OUTPATIENT)
Dept: PULMONOLOGY | Facility: CLINIC | Age: 63
End: 2023-12-22
Payer: COMMERCIAL

## 2023-12-26 ENCOUNTER — OFFICE VISIT (OUTPATIENT)
Dept: HEMATOLOGY/ONCOLOGY | Facility: CLINIC | Age: 63
End: 2023-12-26
Payer: COMMERCIAL

## 2023-12-26 ENCOUNTER — LAB VISIT (OUTPATIENT)
Dept: LAB | Facility: HOSPITAL | Age: 63
End: 2023-12-26
Attending: INTERNAL MEDICINE
Payer: COMMERCIAL

## 2023-12-26 ENCOUNTER — INFUSION (OUTPATIENT)
Dept: INFUSION THERAPY | Facility: HOSPITAL | Age: 63
End: 2023-12-26
Attending: INTERNAL MEDICINE
Payer: COMMERCIAL

## 2023-12-26 VITALS
BODY MASS INDEX: 23.34 KG/M2 | TEMPERATURE: 97 F | TEMPERATURE: 97 F | SYSTOLIC BLOOD PRESSURE: 106 MMHG | SYSTOLIC BLOOD PRESSURE: 126 MMHG | RESPIRATION RATE: 18 BRPM | WEIGHT: 136.69 LBS | WEIGHT: 136.69 LBS | BODY MASS INDEX: 23.34 KG/M2 | HEART RATE: 72 BPM | HEIGHT: 64 IN | DIASTOLIC BLOOD PRESSURE: 80 MMHG | DIASTOLIC BLOOD PRESSURE: 64 MMHG | HEART RATE: 75 BPM | OXYGEN SATURATION: 98 % | OXYGEN SATURATION: 98 % | HEIGHT: 64 IN

## 2023-12-26 DIAGNOSIS — C90.00 MULTIPLE MYELOMA, REMISSION STATUS UNSPECIFIED: ICD-10-CM

## 2023-12-26 DIAGNOSIS — C90.00 MULTIPLE MYELOMA, REMISSION STATUS UNSPECIFIED: Primary | ICD-10-CM

## 2023-12-26 LAB
ALBUMIN SERPL BCP-MCNC: 3.7 G/DL (ref 3.5–5.2)
ALP SERPL-CCNC: 57 U/L (ref 55–135)
ALT SERPL W/O P-5'-P-CCNC: 15 U/L (ref 10–44)
ANION GAP SERPL CALC-SCNC: 10 MMOL/L (ref 8–16)
AST SERPL-CCNC: 18 U/L (ref 10–40)
BASOPHILS # BLD AUTO: 0.04 K/UL (ref 0–0.2)
BASOPHILS NFR BLD: 0.9 % (ref 0–1.9)
BILIRUB SERPL-MCNC: 1 MG/DL (ref 0.1–1)
BUN SERPL-MCNC: 12 MG/DL (ref 8–23)
CALCIUM SERPL-MCNC: 8.9 MG/DL (ref 8.7–10.5)
CHLORIDE SERPL-SCNC: 112 MMOL/L (ref 95–110)
CO2 SERPL-SCNC: 21 MMOL/L (ref 23–29)
CREAT SERPL-MCNC: 1 MG/DL (ref 0.5–1.4)
DIFFERENTIAL METHOD: ABNORMAL
EOSINOPHIL # BLD AUTO: 0.3 K/UL (ref 0–0.5)
EOSINOPHIL NFR BLD: 6.4 % (ref 0–8)
ERYTHROCYTE [DISTWIDTH] IN BLOOD BY AUTOMATED COUNT: 14.6 % (ref 11.5–14.5)
EST. GFR  (NO RACE VARIABLE): >60 ML/MIN/1.73 M^2
GLUCOSE SERPL-MCNC: 104 MG/DL (ref 70–110)
HCT VFR BLD AUTO: 36.9 % (ref 37–48.5)
HGB BLD-MCNC: 12.1 G/DL (ref 12–16)
IMM GRANULOCYTES # BLD AUTO: 0 K/UL (ref 0–0.04)
IMM GRANULOCYTES NFR BLD AUTO: 0 % (ref 0–0.5)
LYMPHOCYTES # BLD AUTO: 1.9 K/UL (ref 1–4.8)
LYMPHOCYTES NFR BLD: 43.5 % (ref 18–48)
MAGNESIUM SERPL-MCNC: 1.7 MG/DL (ref 1.6–2.6)
MCH RBC QN AUTO: 31.8 PG (ref 27–31)
MCHC RBC AUTO-ENTMCNC: 32.8 G/DL (ref 32–36)
MCV RBC AUTO: 97 FL (ref 82–98)
MONOCYTES # BLD AUTO: 0.5 K/UL (ref 0.3–1)
MONOCYTES NFR BLD: 12.1 % (ref 4–15)
NEUTROPHILS # BLD AUTO: 1.6 K/UL (ref 1.8–7.7)
NEUTROPHILS NFR BLD: 37.1 % (ref 38–73)
NRBC BLD-RTO: 0 /100 WBC
PHOSPHATE SERPL-MCNC: 2.5 MG/DL (ref 2.7–4.5)
PLATELET # BLD AUTO: 266 K/UL (ref 150–450)
PMV BLD AUTO: 10.1 FL (ref 9.2–12.9)
POTASSIUM SERPL-SCNC: 3.9 MMOL/L (ref 3.5–5.1)
PROT SERPL-MCNC: 6.3 G/DL (ref 6–8.4)
RBC # BLD AUTO: 3.81 M/UL (ref 4–5.4)
SODIUM SERPL-SCNC: 143 MMOL/L (ref 136–145)
WBC # BLD AUTO: 4.39 K/UL (ref 3.9–12.7)

## 2023-12-26 PROCEDURE — 63600175 PHARM REV CODE 636 W HCPCS: Mod: JZ,JG

## 2023-12-26 PROCEDURE — 3078F DIAST BP <80 MM HG: CPT | Mod: CPTII,S$GLB,,

## 2023-12-26 PROCEDURE — 96401 CHEMO ANTI-NEOPL SQ/IM: CPT

## 2023-12-26 PROCEDURE — 99215 OFFICE O/P EST HI 40 MIN: CPT | Mod: S$GLB,,,

## 2023-12-26 PROCEDURE — 3074F SYST BP LT 130 MM HG: CPT | Mod: CPTII,S$GLB,,

## 2023-12-26 PROCEDURE — 3008F PR BODY MASS INDEX (BMI) DOCUMENTED: ICD-10-PCS | Mod: CPTII,S$GLB,,

## 2023-12-26 PROCEDURE — 3074F PR MOST RECENT SYSTOLIC BLOOD PRESSURE < 130 MM HG: ICD-10-PCS | Mod: CPTII,S$GLB,,

## 2023-12-26 PROCEDURE — 85025 COMPLETE CBC W/AUTO DIFF WBC: CPT | Performed by: INTERNAL MEDICINE

## 2023-12-26 PROCEDURE — 99215 PR OFFICE/OUTPT VISIT, EST, LEVL V, 40-54 MIN: ICD-10-PCS | Mod: S$GLB,,,

## 2023-12-26 PROCEDURE — 3078F PR MOST RECENT DIASTOLIC BLOOD PRESSURE < 80 MM HG: ICD-10-PCS | Mod: CPTII,S$GLB,,

## 2023-12-26 PROCEDURE — 99999 PR PBB SHADOW E&M-EST. PATIENT-LVL V: ICD-10-PCS | Mod: PBBFAC,,,

## 2023-12-26 PROCEDURE — 84100 ASSAY OF PHOSPHORUS: CPT | Performed by: INTERNAL MEDICINE

## 2023-12-26 PROCEDURE — 99999 PR PBB SHADOW E&M-EST. PATIENT-LVL V: CPT | Mod: PBBFAC,,,

## 2023-12-26 PROCEDURE — 4010F ACE/ARB THERAPY RXD/TAKEN: CPT | Mod: CPTII,S$GLB,,

## 2023-12-26 PROCEDURE — 3044F HG A1C LEVEL LT 7.0%: CPT | Mod: CPTII,S$GLB,,

## 2023-12-26 PROCEDURE — 83735 ASSAY OF MAGNESIUM: CPT | Performed by: INTERNAL MEDICINE

## 2023-12-26 PROCEDURE — 36415 COLL VENOUS BLD VENIPUNCTURE: CPT | Performed by: INTERNAL MEDICINE

## 2023-12-26 PROCEDURE — 3044F PR MOST RECENT HEMOGLOBIN A1C LEVEL <7.0%: ICD-10-PCS | Mod: CPTII,S$GLB,,

## 2023-12-26 PROCEDURE — 80053 COMPREHEN METABOLIC PANEL: CPT | Performed by: INTERNAL MEDICINE

## 2023-12-26 PROCEDURE — 3008F BODY MASS INDEX DOCD: CPT | Mod: CPTII,S$GLB,,

## 2023-12-26 PROCEDURE — 4010F PR ACE/ARB THEARPY RXD/TAKEN: ICD-10-PCS | Mod: CPTII,S$GLB,,

## 2023-12-26 RX ORDER — HEPARIN 100 UNIT/ML
500 SYRINGE INTRAVENOUS
Status: CANCELLED | OUTPATIENT
Start: 2023-12-27

## 2023-12-26 RX ORDER — PROCHLORPERAZINE EDISYLATE 5 MG/ML
5 INJECTION INTRAMUSCULAR; INTRAVENOUS ONCE AS NEEDED
Status: CANCELLED
Start: 2023-12-27

## 2023-12-26 RX ORDER — BORTEZOMIB 3.5 MG/1
1.3 INJECTION, POWDER, LYOPHILIZED, FOR SOLUTION INTRAVENOUS; SUBCUTANEOUS
Status: COMPLETED | OUTPATIENT
Start: 2023-12-26 | End: 2023-12-26

## 2023-12-26 RX ORDER — LENALIDOMIDE 10 MG/1
CAPSULE ORAL
Qty: 21 EACH | Refills: 0 | Status: SHIPPED | OUTPATIENT
Start: 2023-12-26 | End: 2024-01-31

## 2023-12-26 RX ORDER — EPINEPHRINE 0.3 MG/.3ML
0.3 INJECTION SUBCUTANEOUS ONCE AS NEEDED
Status: CANCELLED | OUTPATIENT
Start: 2023-12-27

## 2023-12-26 RX ORDER — BORTEZOMIB 3.5 MG/1
1.3 INJECTION, POWDER, LYOPHILIZED, FOR SOLUTION INTRAVENOUS; SUBCUTANEOUS
Status: CANCELLED | OUTPATIENT
Start: 2023-12-27

## 2023-12-26 RX ORDER — SODIUM CHLORIDE 0.9 % (FLUSH) 0.9 %
10 SYRINGE (ML) INJECTION
Status: CANCELLED | OUTPATIENT
Start: 2023-12-27

## 2023-12-26 RX ORDER — DIPHENHYDRAMINE HYDROCHLORIDE 50 MG/ML
50 INJECTION INTRAMUSCULAR; INTRAVENOUS ONCE AS NEEDED
Status: CANCELLED | OUTPATIENT
Start: 2023-12-27

## 2023-12-26 RX ADMIN — BORTEZOMIB 2.25 MG: 3.5 INJECTION, POWDER, LYOPHILIZED, FOR SOLUTION INTRAVENOUS; SUBCUTANEOUS at 11:12

## 2023-12-26 RX ADMIN — DARATUMUMAB AND HYALURONIDASE-FIHJ (HUMAN RECOMBINANT) 1800 MG: 1800; 30000 INJECTION SUBCUTANEOUS at 11:12

## 2023-12-26 NOTE — PLAN OF CARE
"Pt is stable. Pt administered Darzalex/Velcade today. Pt voiced she was doing "great," today. Pt will follow up in one week. POC discussed and no concerns noted.      Problem: Adult Inpatient Plan of Care  Goal: Plan of Care Review  Outcome: Ongoing, Progressing  Goal: Patient-Specific Goal (Individualized)  Outcome: Ongoing, Progressing  Flowsheets (Taken 12/26/2023 1039)  Anxieties, Fears or Concerns: Pt denies.  Individualized Care Needs:   Pt provied pillow   feet to ground. Spouse at side.     Problem: Anemia (Chemotherapy Effects)  Goal: Anemia Symptom Improvement  Outcome: Ongoing, Progressing     Problem: Urinary Bleeding Risk or Actual (Chemotherapy Effects)  Goal: Absence of Hematuria  Outcome: Ongoing, Progressing     Problem: Nausea and Vomiting (Chemotherapy Effects)  Goal: Fluid and Electrolyte Balance  Outcome: Ongoing, Progressing     Problem: Neurotoxicity (Chemotherapy Effects)  Goal: Neurotoxicity Symptom Control  Outcome: Ongoing, Progressing     Problem: Neutropenia (Chemotherapy Effects)  Goal: Absence of Infection  Outcome: Ongoing, Progressing     Problem: Oral Mucositis (Chemotherapy Effects)  Goal: Improved Oral Mucous Membrane Integrity  Outcome: Ongoing, Progressing     Problem: Thrombocytopenia Bleeding Risk (Chemotherapy Effects)  Goal: Absence of Bleeding  Outcome: Ongoing, Progressing     Problem: Fall Injury Risk  Goal: Absence of Fall and Fall-Related Injury  Outcome: Ongoing, Progressing     "

## 2023-12-26 NOTE — NURSING
1106: Velcade injection administered see Mar.  1107: Darzalex Injection given without difficulties.Bandaid applied. Patient instructed to stay in the clinic for 15 minutes. Patient verbalized understanding and will notify nurse with any complaints.

## 2023-12-26 NOTE — ASSESSMENT & PLAN NOTE
BMBx : 60 % plasma cells - 4/6/2023  - SPEP/MECHE showed IgG lambda - monoclonal protein 3.19 g/dl - (3/27/2023).  - R-ISS stage I (beta 2 microglobulin 1.9, albumin 3.5, , normal karyotype and no high-risk mutations on fish panel  - PET-CT ( 4/10/2023) - showed extensive lytic lesions in the axial skeleton and mild L1 compression deformity.  - Started with Induction chemotherapy with VRD regimen (Velcde/Revlimid/Decadron) - 05/10/2023   - Started Aspirin daily p.o for thrombosis prophylaxis; Acyclovir 400 mg p.o BID for herpes Zoster prophylaxis .  __________________________________________________  --S/p Kyphoplasty 06/08/23    Pt seen by transplant team BMT.  However, she has declined transplant at this time and it was recommended that we continue with Rosa-VRD for 6 cycles and then repeat bone marrow biopsy and PET scan. If VGPR or better, then continue with Revlimid maintenance only.    Labs reviewed, no concerning cytopenias  --Ok to proceed with Velcade/Darzalex today  --On Lenalidomide 10mg utd 1-21 of 28 day cycle; Dex q 7 days--rx [facility]   --Continue Aspirin; Acyclovir 400 mg p.o BID for herpes Zoster prophylaxis    --Initiated on Zometa  q 4 weeks last dose 12/13/23  --Pt currently taking Ca+D supplement utd      Follow-up in one week with repeat labs for C6D15 velcade

## 2023-12-26 NOTE — PROGRESS NOTES
Subjective:     Patient ID:?Ana Bonilla is a 63 y.o. female.?? MR#: [id number]   ?   PRIMARY ONCOLOGIST:   ?   CHIEF COMPLAINT: lab review/assessment for chemo/MM ?????   ?   ONCOLOGIC DIAGNOSIS: Multiple Myeloma  ?   CURRENT TREATMENT: OP D-VRD DARATUMUMAB + BORTEZOMIB LENALIDOMIDE DEXAMETHASONE     HPI  Ms. Bonilla is a 62-year-old  female with past medical history significant for hypertension, COPD, asthma, GERD, hypothyroidism here for follow-up and management of multiple myeloma. BMBx on 4/6/2023 showed 60 % plasma cells. PET-CT on 4/10/2023 showed extensive lytic bony lesions throughout the spine, sternum, bilateral ribs, pelvis and mild compression deformity in L1 vertebral body. SPEP/MECHE on 3/27/2023 showed IgG lambda monoclonal protein - 3.19 g/dl. Quantitative immunoglobulins on 3/27/2023 showed IgG 4,521 mg/dl. UPEP/MECHE and FLC were pending at that time. Labs on 3/27/2023 showed Beta 2 microglobulin 1.9, .  Labs on 4/19/2023 showed Hb, 11.5, WBC 6.3, platelets, BUN 11, Cr 0.9, calcium 9. Pt initiated on VRD 05/10/23. Treatment planned changed to D-VRD 07/06/23.     Interval History: Pt presents today with her  for lab review and assessment prior to Velcade/Darzalex.  Pt states she hasn't taken lenalidomide for almost two weeks--states she has not received from [facility] pharmacy--will contact for clarification on this Pt states overall she is feeling okay. Denies n/v/d/c, fever, chills, sob, cp, abnormal bleeding. She notes appetite waxes and wanes; denies difficulty with hydration.      Oncology History   Multiple myeloma   4/20/2023 Initial Diagnosis    Multiple myeloma     5/10/2023 - 6/28/2023 Chemotherapy    Treatment Summary   Plan Name: OP VRD - WEEKLY BORTEZOMIB LENALIDOMIDE DEXAMETHASONE Q3W  Treatment Goal: Palliative  Status: Inactive  Start Date: 5/10/2023  End Date: 6/28/2023  Provider: Abram Velasquez MD  Chemotherapy: bortezomib (VELCADE) injection 2 mg, 1.3 mg/m2 =  2 mg, Subcutaneous, Clinic/[facility] 1 time, 2 of 6 cycles  Administration: 2 mg (5/10/2023), 2 mg (5/17/2023), 2 mg (6/14/2023), 2 mg (5/31/2023), 2 mg (6/21/2023), 2 mg (6/28/2023)  lenalidomide 25 mg Cap, 25 mg, Oral, Daily, 1 of 1 cycle, Start date: 5/10/2023, End date: 5/17/2023 6/28/2023 Cancer Staged    Staging form: Plasma Cell Myeloma and Plasma Cell Disorders, AJCC 8th Edition  - Clinical stage from 6/28/2023: RISS Stage II (Beta-2-microglobulin (mg/L): 1.9, Albumin (g/dL): 3, ISS: Stage II, High-risk cytogenetics: Absent, LDH: Normal)     7/6/2023 -  Chemotherapy    Treatment Summary   Plan Name: OP D-VRD DARATUMUMAB + BORTEZOMIB LENALIDOMIDE DEXAMETHASONE  Treatment Goal: Palliative  Status: Active  Start Date: 7/6/2023  End Date: 1/3/2024 (Planned)  Provider: Oscar Márquez MD  Chemotherapy: bortezomib (VELCADE) injection 2 mg, 1.3 mg/m2 = 2 mg, Subcutaneous, Clinic/H[facility] 1 time, 6 of 6 cycles  Administration: 2 mg (7/6/2023), 2 mg (7/12/2023), 2 mg (8/2/2023), 2 mg (8/9/2023), 2.25 mg (10/18/2023), 2 mg (7/19/2023), 2 mg (7/26/2023), 2 mg (8/16/2023), 2.25 mg (9/20/2023), 2.25 mg (9/27/2023), 2.25 mg (10/25/2023), 2.25 mg (11/1/2023), 2.25 mg (11/8/2023), 2.25 mg (12/6/2023), 2.25 mg (11/15/2023), 2.25 mg (11/22/2023), 2.25 mg (11/29/2023), 2.25 mg (12/13/2023), 2.25 mg (12/20/2023), 2.25 mg (12/26/2023)  lenalidomide 10 mg Cap, 1 capsule (100 % of original dose 1 capsule), Oral, Daily, 1 of 1 cycle, Start date: 7/26/2023, End date: 8/2/2023  Dose modification: 1 capsule (original dose 1 capsule, Cycle 0)  daratumumab-hyaluronidase-fihj subcutaneous injection 1,800 mg, 1,800 mg (100 % of original dose 1,800 mg), Subcutaneous, Clinic[facility] 1 time, 6 of 6 cycles  Dose modification: 1,800 mg (original dose 1,800 mg, Cycle 1), 1,800 mg (original dose 1,800 mg, Cycle 1)  Administration: 1,800 mg (7/6/2023), 1,800 mg (7/12/2023), 1,800 mg (7/19/2023), 1,800 mg (7/26/2023), 1,800 mg (8/2/2023), 1,800 mg  (8/9/2023), 1,800 mg (8/16/2023), 1,800 mg (9/27/2023), 1,800 mg (10/18/2023), 1,800 mg (11/1/2023), 1,800 mg (11/15/2023), 1,800 mg (11/29/2023), 1,800 mg (12/13/2023), 1,800 mg (12/26/2023)        Social History     Socioeconomic History    Marital status:     Number of children: 2   Occupational History     Employer: [employer]   Tobacco Use    Smoking status: Every Day     Current packs/day: 0.50     Average packs/day: 0.5 packs/day for 50.0 years (25.0 ttl pk-yrs)     Types: Cigarettes     Passive exposure: Never    Smokeless tobacco: Never   Substance and Sexual Activity    Alcohol use: Not Currently     Comment: quit    Drug use: No    Sexual activity: Not Currently     Partners: Male     Social Determinants of Health     Financial Resource Strain: Low Risk  (11/15/2023)    Overall Financial Resource Strain (CARDIA)     Difficulty of Paying Living Expenses: Not hard at all   Food Insecurity: No Food Insecurity (11/15/2023)    Hunger Vital Sign     Worried About Running Out of Food in the Last Year: Never true     Ran Out of Food in the Last Year: Never true   Transportation Needs: No Transportation Needs (11/15/2023)    PRAPARE - Transportation     Lack of Transportation (Medical): No     Lack of Transportation (Non-Medical): No   Physical Activity: Sufficiently Active (11/15/2023)    Exercise Vital Sign     Days of Exercise per Week: 7 days     Minutes of Exercise per Session: 150+ min   Stress: Stress Concern Present (11/15/2023)    [employer] - Occupational Stress Questionnaire     Feeling of Stress : To some extent   Social Connections: Unknown (11/15/2023)    Social Connection and Isolation Panel [NHANES]     Frequency of Communication with Friends and Family: More than three times a week     Frequency of Social Gatherings with Friends and Family: Three times a week     Active Member of Clubs or Organizations: No     Attends Club or Organization Meetings:  Patient declined     Marital Status:    Housing Stability: Low Risk  (11/15/2023)    Housing Stability Vital Sign     Unable to Pay for Housing in the Last Year: No     Number of Places Lived in the Last Year: 1     Unstable Housing in the Last Year: No      Family History   Problem Relation Age of Onset    Hypertension Mother     Diabetes Mother     Asthma Mother             Diabetes Father     Asthma Father     Stroke Maternal Grandmother     Prostate cancer Maternal Grandfather     Mental illness Son     Pancreatic cancer Maternal Uncle     Mental illness Other     Pancreatic cancer Other     Ovarian cancer Maternal Cousin       Past Surgical History:   Procedure Laterality Date    APPENDECTOMY      BIOPSY N/A 2023    Procedure: BIOPSY;  Surgeon: Fausto Smith MD;  Location: [facility] OR;  Service: Neurosurgery;  Laterality: N/A;  L1    BUNIONECTOMY      COLONOSCOPY N/A 2019    Procedure: COLONOSCOPY;  Surgeon: Danny Matos III, MD;  Location: [facility] ENDO;  Service: Endoscopy;  Laterality: N/A;    COLONOSCOPY N/A 10/24/2022    Procedure: COLONOSCOPY;  Surgeon: Danny Matos III, MD;  Location: [facility] ENDO;  Service: Endoscopy;  Laterality: N/A;    ESOPHAGOGASTRODUODENOSCOPY N/A 10/24/2022    Procedure: EGD (ESOPHAGOGASTRODUODENOSCOPY);  Surgeon: Danny Matos III, MD;  Location: [facility] ENDO;  Service: Endoscopy;  Laterality: N/A;    FIXATION KYPHOPLASTY Bilateral 2023    Procedure: KYPHOPLASTY;  Surgeon: Fausto Smith MD;  Location: [facility] OR;  Service: Neurosurgery;  Laterality: Bilateral;  kyphoplasty and radiofrequency ablation - L1    HYSTERECTOMY      PARTIAL//still with ovaries    neck fusion  2017    THYROIDECTOMY      TUBAL LIGATION          Review of Systems   Constitutional:  Positive for fatigue. Negative for activity change, appetite change, chills, fever and unexpected weight change.   HENT:  Negative for congestion, dental problem,  mouth sores and nosebleeds.    Eyes:  Negative for visual disturbance.   Respiratory:  Negative for cough, choking and chest tightness.    Cardiovascular:  Negative for chest pain, palpitations and leg swelling.   Gastrointestinal:  Negative for abdominal distention, abdominal pain, anal bleeding, blood in stool, constipation, diarrhea, nausea and vomiting.   Endocrine: Negative.    Genitourinary:  Negative for dysuria, frequency, hematuria and urgency.   Musculoskeletal:  Positive for arthralgias and myalgias. Negative for back pain, gait problem and joint swelling.   Skin:  Negative for rash and wound.   Allergic/Immunologic: Negative for immunocompromised state.   Neurological:  Negative for dizziness, light-headedness, numbness and headaches.   Hematological:  Negative for adenopathy. Does not bruise/bleed easily.   Psychiatric/Behavioral:  The patient is nervous/anxious.        ?   A comprehensive 14-point review of systems was reviewed with patient and was negative other than as specified above.   ?     Objective:      Physical Exam  Vitals reviewed.   Constitutional:       Appearance: Normal appearance. She is not ill-appearing.   HENT:      Head: Normocephalic and atraumatic.      Right Ear: External ear normal.      Left Ear: External ear normal.   Eyes:      Conjunctiva/sclera: Conjunctivae normal.   Cardiovascular:      Rate and Rhythm: Normal rate.   Pulmonary:      Effort: Pulmonary effort is normal.   Abdominal:      General: Abdomen is flat.   Genitourinary:     Comments: deferred  Musculoskeletal:         General: Normal range of motion.      Cervical back: Normal range of motion.      Right lower leg: No edema.      Left lower leg: No edema.   Skin:     General: Skin is warm and dry.      Capillary Refill: Capillary refill takes less than 2 seconds.      Coloration: Skin is not mottled.   Neurological:      Mental Status: She is alert and oriented to person, place, and time.      Motor: No weakness.          ?   Vitals:    12/26/23 1000   BP: 106/64   Pulse: 75   Temp: 97.4 °F (36.3 °C)      ?       ?   Laboratory:  ?   Lab Visit on 12/26/2023   Component Date Value Ref Range Status    Phosphorus 12/26/2023 2.5 (L)  2.7 - 4.5 mg/dL Final    Magnesium 12/26/2023 1.7  1.6 - 2.6 mg/dL Final    Sodium 12/26/2023 143  136 - 145 mmol/L Final    Potassium 12/26/2023 3.9  3.5 - 5.1 mmol/L Final    Chloride 12/26/2023 112 (H)  95 - 110 mmol/L Final    CO2 12/26/2023 21 (L)  23 - 29 mmol/L Final    Glucose 12/26/2023 104  70 - 110 mg/dL Final    BUN 12/26/2023 12  8 - 23 mg/dL Final    Creatinine 12/26/2023 1.0  0.5 - 1.4 mg/dL Final    Calcium 12/26/2023 8.9  8.7 - 10.5 mg/dL Final    Total Protein 12/26/2023 6.3  6.0 - 8.4 g/dL Final    Albumin 12/26/2023 3.7  3.5 - 5.2 g/dL Final    Total Bilirubin 12/26/2023 1.0  0.1 - 1.0 mg/dL Final    Alkaline Phosphatase 12/26/2023 57  55 - 135 U/L Final    AST 12/26/2023 18  10 - 40 U/L Final    ALT 12/26/2023 15  10 - 44 U/L Final    eGFR 12/26/2023 >60  >60 mL/min/1.73 m^2 Final    Anion Gap 12/26/2023 10  8 - 16 mmol/L Final    WBC 12/26/2023 4.39  3.90 - 12.70 K/uL Final    RBC 12/26/2023 3.81 (L)  4.00 - 5.40 M/uL Final    Hemoglobin 12/26/2023 12.1  12.0 - 16.0 g/dL Final    Hematocrit 12/26/2023 36.9 (L)  37.0 - 48.5 % Final    MCV 12/26/2023 97  82 - 98 fL Final    MCH 12/26/2023 31.8 (H)  27.0 - 31.0 pg Final    MCHC 12/26/2023 32.8  32.0 - 36.0 g/dL Final    RDW 12/26/2023 14.6 (H)  11.5 - 14.5 % Final    Platelets 12/26/2023 266  150 - 450 K/uL Final    MPV 12/26/2023 10.1  9.2 - 12.9 fL Final    Immature Granulocytes 12/26/2023 0.0  0.0 - 0.5 % Final    Gran # (ANC) 12/26/2023 1.6 (L)  1.8 - 7.7 K/uL Final    Immature Grans (Abs) 12/26/2023 0.00  0.00 - 0.04 K/uL Final    Lymph # 12/26/2023 1.9  1.0 - 4.8 K/uL Final    Mono # 12/26/2023 0.5  0.3 - 1.0 K/uL Final    Eos # 12/26/2023 0.3  0.0 - 0.5 K/uL Final    Baso # 12/26/2023 0.04   0.00 - 0.20 K/uL Final    nRBC 12/26/2023 0  0 /100 WBC Final    Gran % 12/26/2023 37.1 (L)  38.0 - 73.0 % Final    Lymph % 12/26/2023 43.5  18.0 - 48.0 % Final    Mono % 12/26/2023 12.1  4.0 - 15.0 % Final    Eosinophil % 12/26/2023 6.4  0.0 - 8.0 % Final    Basophil % 12/26/2023 0.9  0.0 - 1.9 % Final    Differential Method 12/26/2023 Automated   Final      ?   Imaging:    No results found. However, due to the size of the patient record, not all encounters were searched. Please check Results Review for a complete set of results.       No results found. However, due to the size of the patient record, not all encounters were searched. Please check Results Review for a complete set of results.         ?   Assessment/Plan:     Problem List Items Addressed This Visit          Oncology    Multiple myeloma (Chronic)     BMBx : 60 % plasma cells - 4/6/2023  - SPEP/MECHE showed IgG lambda - monoclonal protein 3.19 g/dl - (3/27/2023).  - R-ISS stage I (beta 2 microglobulin 1.9, albumin 3.5, , normal karyotype and no high-risk mutations on fish panel  - PET-CT ( 4/10/2023) - showed extensive lytic lesions in the axial skeleton and mild L1 compression deformity.  - Started with Induction chemotherapy with VRD regimen (Velcde/Revlimid/Decadron) - 05/10/2023   - Started Aspirin daily p.o for thrombosis prophylaxis; Acyclovir 400 mg p.o BID for herpes Zoster prophylaxis .  __________________________________________________  --S/p Kyphoplasty 06/08/23    Pt seen by transplant team BMT.  However, she has declined transplant at this time and it was recommended that we continue with Rosa-VRD for 6 cycles and then repeat bone marrow biopsy and PET scan. If VGPR or better, then continue with Revlimid maintenance only.    Labs reviewed, no concerning cytopenias  --Ok to proceed with Velcade/Darzalex today  --On Lenalidomide 10mg utd 1-21 of 28 day cycle; Dex q 7 days--rx [facility] to [facility]   --Continue Aspirin; Acyclovir 400  mg p.o BID for herpes Zoster prophylaxis    --Initiated on Zometa  q 4 weeks last dose 12/13/23  --Pt currently taking Ca+D supplement utd      Follow-up in one week with repeat labs for C6D15 velcade         Relevant Medications    lenalidomide 10 mg Cap            Med Onc Chart Routing      Follow up with physician . Scheduled   Follow up with WERNER    Infusion scheduling note    Injection scheduling note    Labs    Imaging    Pharmacy appointment    Other referrals                 TONYA Pool  Hematology/Oncology

## 2023-12-27 ENCOUNTER — PATIENT MESSAGE (OUTPATIENT)
Dept: HEMATOLOGY/ONCOLOGY | Facility: CLINIC | Age: 63
End: 2023-12-27
Payer: COMMERCIAL

## 2023-12-27 NOTE — ASSESSMENT & PLAN NOTE
Status pending lab, continue levothyroxine    Patient/Caregiver provided printed discharge information.

## 2024-01-02 ENCOUNTER — LAB VISIT (OUTPATIENT)
Dept: LAB | Facility: HOSPITAL | Age: 64
End: 2024-01-02
Attending: INTERNAL MEDICINE
Payer: COMMERCIAL

## 2024-01-02 DIAGNOSIS — C90.00 MULTIPLE MYELOMA, REMISSION STATUS UNSPECIFIED: ICD-10-CM

## 2024-01-02 DIAGNOSIS — E78.5 DYSLIPIDEMIA: ICD-10-CM

## 2024-01-02 LAB
ALBUMIN SERPL BCP-MCNC: 3.7 G/DL (ref 3.5–5.2)
ALP SERPL-CCNC: 51 U/L (ref 55–135)
ALT SERPL W/O P-5'-P-CCNC: 17 U/L (ref 10–44)
ANION GAP SERPL CALC-SCNC: 11 MMOL/L (ref 8–16)
AST SERPL-CCNC: 21 U/L (ref 10–40)
BASOPHILS # BLD AUTO: 0.02 K/UL (ref 0–0.2)
BASOPHILS NFR BLD: 0.3 % (ref 0–1.9)
BILIRUB SERPL-MCNC: 0.5 MG/DL (ref 0.1–1)
BUN SERPL-MCNC: 11 MG/DL (ref 8–23)
CALCIUM SERPL-MCNC: 8.6 MG/DL (ref 8.7–10.5)
CHLORIDE SERPL-SCNC: 110 MMOL/L (ref 95–110)
CO2 SERPL-SCNC: 24 MMOL/L (ref 23–29)
CREAT SERPL-MCNC: 1.1 MG/DL (ref 0.5–1.4)
DIFFERENTIAL METHOD BLD: ABNORMAL
EOSINOPHIL # BLD AUTO: 0.3 K/UL (ref 0–0.5)
EOSINOPHIL NFR BLD: 5.5 % (ref 0–8)
ERYTHROCYTE [DISTWIDTH] IN BLOOD BY AUTOMATED COUNT: 14.4 % (ref 11.5–14.5)
EST. GFR  (NO RACE VARIABLE): 56 ML/MIN/1.73 M^2
GLUCOSE SERPL-MCNC: 115 MG/DL (ref 70–110)
HCT VFR BLD AUTO: 37 % (ref 37–48.5)
HGB BLD-MCNC: 12 G/DL (ref 12–16)
IMM GRANULOCYTES # BLD AUTO: 0.02 K/UL (ref 0–0.04)
IMM GRANULOCYTES NFR BLD AUTO: 0.3 % (ref 0–0.5)
LYMPHOCYTES # BLD AUTO: 1.8 K/UL (ref 1–4.8)
LYMPHOCYTES NFR BLD: 29.7 % (ref 18–48)
MCH RBC QN AUTO: 31.7 PG (ref 27–31)
MCHC RBC AUTO-ENTMCNC: 32.4 G/DL (ref 32–36)
MCV RBC AUTO: 98 FL (ref 82–98)
MONOCYTES # BLD AUTO: 0.8 K/UL (ref 0.3–1)
MONOCYTES NFR BLD: 12.5 % (ref 4–15)
NEUTROPHILS # BLD AUTO: 3.1 K/UL (ref 1.8–7.7)
NEUTROPHILS NFR BLD: 51.7 % (ref 38–73)
NRBC BLD-RTO: 0 /100 WBC
PLATELET # BLD AUTO: 233 K/UL (ref 150–450)
PMV BLD AUTO: 9.9 FL (ref 9.2–12.9)
POTASSIUM SERPL-SCNC: 3.6 MMOL/L (ref 3.5–5.1)
PROT SERPL-MCNC: 6.5 G/DL (ref 6–8.4)
RBC # BLD AUTO: 3.78 M/UL (ref 4–5.4)
SODIUM SERPL-SCNC: 145 MMOL/L (ref 136–145)
WBC # BLD AUTO: 6.02 K/UL (ref 3.9–12.7)

## 2024-01-02 PROCEDURE — 85025 COMPLETE CBC W/AUTO DIFF WBC: CPT | Performed by: INTERNAL MEDICINE

## 2024-01-02 PROCEDURE — 80053 COMPREHEN METABOLIC PANEL: CPT | Performed by: INTERNAL MEDICINE

## 2024-01-02 PROCEDURE — 36415 COLL VENOUS BLD VENIPUNCTURE: CPT | Performed by: INTERNAL MEDICINE

## 2024-01-02 RX ORDER — ROSUVASTATIN CALCIUM 10 MG/1
10 TABLET, COATED ORAL NIGHTLY
Qty: 90 TABLET | Refills: 3
Start: 2024-01-02 | End: 2024-01-04 | Stop reason: SDUPTHER

## 2024-01-03 ENCOUNTER — TELEPHONE (OUTPATIENT)
Dept: HEMATOLOGY/ONCOLOGY | Facility: CLINIC | Age: 64
End: 2024-01-03
Payer: COMMERCIAL

## 2024-01-03 ENCOUNTER — INFUSION (OUTPATIENT)
Dept: INFUSION THERAPY | Facility: HOSPITAL | Age: 64
End: 2024-01-03
Attending: INTERNAL MEDICINE
Payer: COMMERCIAL

## 2024-01-03 ENCOUNTER — OFFICE VISIT (OUTPATIENT)
Dept: HEMATOLOGY/ONCOLOGY | Facility: CLINIC | Age: 64
End: 2024-01-03
Payer: COMMERCIAL

## 2024-01-03 VITALS
BODY MASS INDEX: 24.01 KG/M2 | SYSTOLIC BLOOD PRESSURE: 121 MMHG | DIASTOLIC BLOOD PRESSURE: 75 MMHG | HEIGHT: 64 IN | HEART RATE: 81 BPM | TEMPERATURE: 98 F | OXYGEN SATURATION: 99 % | WEIGHT: 140.63 LBS | RESPIRATION RATE: 16 BRPM

## 2024-01-03 DIAGNOSIS — Z79.899 IMMUNODEFICIENCY DUE TO CHEMOTHERAPY: ICD-10-CM

## 2024-01-03 DIAGNOSIS — D84.821 IMMUNODEFICIENCY DUE TO CHEMOTHERAPY: ICD-10-CM

## 2024-01-03 DIAGNOSIS — C90.00 MULTIPLE MYELOMA, REMISSION STATUS UNSPECIFIED: Primary | Chronic | ICD-10-CM

## 2024-01-03 DIAGNOSIS — C79.51 SECONDARY MALIGNANT NEOPLASM OF BONE: ICD-10-CM

## 2024-01-03 DIAGNOSIS — T45.1X5A IMMUNODEFICIENCY DUE TO CHEMOTHERAPY: ICD-10-CM

## 2024-01-03 DIAGNOSIS — Z72.0 TOBACCO USE: ICD-10-CM

## 2024-01-03 DIAGNOSIS — C90.00 MULTIPLE MYELOMA, REMISSION STATUS UNSPECIFIED: ICD-10-CM

## 2024-01-03 DIAGNOSIS — C79.51 SECONDARY MALIGNANT NEOPLASM OF BONE: Primary | ICD-10-CM

## 2024-01-03 DIAGNOSIS — Z51.11 ENCOUNTER FOR ANTINEOPLASTIC CHEMOTHERAPY: ICD-10-CM

## 2024-01-03 PROCEDURE — 63600175 PHARM REV CODE 636 W HCPCS: Mod: JW,JG | Performed by: INTERNAL MEDICINE

## 2024-01-03 PROCEDURE — 99215 OFFICE O/P EST HI 40 MIN: CPT | Mod: 95,,, | Performed by: INTERNAL MEDICINE

## 2024-01-03 PROCEDURE — 96401 CHEMO ANTI-NEOPL SQ/IM: CPT

## 2024-01-03 RX ORDER — BORTEZOMIB 3.5 MG/1
1.3 INJECTION, POWDER, LYOPHILIZED, FOR SOLUTION INTRAVENOUS; SUBCUTANEOUS
Status: CANCELLED | OUTPATIENT
Start: 2024-01-03

## 2024-01-03 RX ORDER — PROCHLORPERAZINE EDISYLATE 5 MG/ML
5 INJECTION INTRAMUSCULAR; INTRAVENOUS ONCE AS NEEDED
Status: CANCELLED
Start: 2024-01-03

## 2024-01-03 RX ORDER — BORTEZOMIB 3.5 MG/1
1.3 INJECTION, POWDER, LYOPHILIZED, FOR SOLUTION INTRAVENOUS; SUBCUTANEOUS
Status: COMPLETED | OUTPATIENT
Start: 2024-01-03 | End: 2024-01-03

## 2024-01-03 RX ORDER — SODIUM CHLORIDE 0.9 % (FLUSH) 0.9 %
10 SYRINGE (ML) INJECTION
Status: CANCELLED | OUTPATIENT
Start: 2024-01-03

## 2024-01-03 RX ORDER — HEPARIN 100 UNIT/ML
500 SYRINGE INTRAVENOUS
Status: CANCELLED | OUTPATIENT
Start: 2024-01-03

## 2024-01-03 RX ADMIN — BORTEZOMIB 2.25 MG: 3.5 INJECTION, POWDER, LYOPHILIZED, FOR SOLUTION INTRAVENOUS; SUBCUTANEOUS at 11:01

## 2024-01-03 NOTE — PLAN OF CARE
"Pt is stable. Pt administered Velcade today. Pt voiced she was doing "great," today. Pt will follow up in three weeks with Dr. Luevano.         Problem: Adult Inpatient Plan of Care  Goal: Plan of Care Review  Outcome: Ongoing, Progressing  Goal: Patient-Specific Goal (Individualized)  Outcome: Ongoing, Progressing  Flowsheets (Taken 1/3/2024 1128)  Anxieties, Fears or Concerns: Pt denies.  Individualized Care Needs: Pt chose to sit with feet to ground. Spouse at side.     Problem: Anemia (Chemotherapy Effects)  Goal: Anemia Symptom Improvement  Outcome: Ongoing, Progressing     Problem: Urinary Bleeding Risk or Actual (Chemotherapy Effects)  Goal: Absence of Hematuria  Outcome: Ongoing, Progressing     Problem: Nausea and Vomiting (Chemotherapy Effects)  Goal: Fluid and Electrolyte Balance  Outcome: Ongoing, Progressing     Problem: Neurotoxicity (Chemotherapy Effects)  Goal: Neurotoxicity Symptom Control  Outcome: Ongoing, Progressing     Problem: Neutropenia (Chemotherapy Effects)  Goal: Absence of Infection  Outcome: Ongoing, Progressing     Problem: Oral Mucositis (Chemotherapy Effects)  Goal: Improved Oral Mucous Membrane Integrity  Outcome: Ongoing, Progressing     Problem: Thrombocytopenia Bleeding Risk (Chemotherapy Effects)  Goal: Absence of Bleeding  Outcome: Ongoing, Progressing     Problem: Fall Injury Risk  Goal: Absence of Fall and Fall-Related Injury  Outcome: Ongoing, Progressing     "

## 2024-01-03 NOTE — TELEPHONE ENCOUNTER
Spoke with Bekah with [employer] @ [phone]. Bekah confirmed faxed request RX for lenalidomide 10 mg Cap was received on 12/26/23 at 1448pm. Asked Bekah is there anyway message can be sent high priority for someone to call pt within this week regarding setting up delivery or pickup due to pt being without medication for several days. Bekah states that someone should be reaching out to pt within this week. Thanked Bekah for her time phone call ended well.

## 2024-01-03 NOTE — DISCHARGE INSTRUCTIONS
Thank you for allowing me to care for you today,  MIS LeachN, RN    [facility]  [address] [address]  [phone] phone     [phone] fax  Hours of Operation: Monday- Friday 7:00am- 5:30pm  After hours phone  [phone]  Hematology / Oncology Physicians on call      JUANI Aguirre Dr[address] Dr., Dr., BELLE Valdez, BELLE Deluna, BELLE    Please call with any concerns regarding your appointment today.   FALL PREVENTION   Falls often occur due to slipping, tripping or losing your balance. Here are ways to reduce your risk of falling again.   Was there anything that caused your fall that can be fixed, removed or replaced?   Make your home safe by keeping walkways clear of objects you may trip over.   Use non-slip pads under rugs.   Do not walk in poorly lit areas.   Do not stand on chairs or wobbly ladders.   Use caution when reaching overhead or looking upward. This position can cause a loss of balance.   Be sure your shoes fit properly, have non-slip bottoms and are in good condition.   Be cautious when going up and down stairs, curbs, and when walking on uneven sidewalks.   If your balance is poor, consider using a cane or walker.   If your fall was related to alcohol use, stop or limit alcohol intake.   If your fall was related to use of sleeping medicines, talk to your doctor about this. You may need to reduce your dosage at bedtime if you awaken during the night to go to the bathroom.   To reduce the need for nighttime bathroom trips:   Avoid drinking fluids for several hours before going to bed   Empty your bladder before going to bed   Men can keep a urinal at the bedside   © [id number] [employer], [address] [place] [address], [place] [address]. All rights reserved. This information is not intended as a substitute for professional medical care. Always follow your  healthcare professional's instructions.

## 2024-01-03 NOTE — PROGRESS NOTES
Patient ID: Ana Bonilla   Chief Complaint: Follow-up (MM)  MRN:  [id number]     Oncologic Diagnosis:  Multiple Myeloma - IgG lambda   Previous Treatment:  VRD (5/10/2023 - 6/28/2023)   Current Treatment:    D-VRD (7/6/2023 - 01/03/2024 )      Zometa (10/18/2023 - )       The patient location is: Home  The chief complaint leading to consultation is: Multiple Myeloma    Visit type: audiovisual    Face to Face time with patient: 38 minutes  45 minutes of total time spent on the encounter, which includes face to face time and non-face to face time preparing to see the patient (eg, review of tests), Obtaining and/or reviewing separately obtained history, Documenting clinical information in the electronic or other health record, Independently interpreting results (not separately reported) and communicating results to the patient/family/caregiver, or Care coordination (not separately reported).     Each patient to whom he or she provides medical services by telemedicine is:  (1) informed of the relationship between the physician and patient and the respective role of any other health care provider with respect to management of the patient; and (2) notified that he or she may decline to receive medical services by telemedicine and may withdraw from such care at any time.    Subjective   Ana Bonilla is a pleasant 63 y.o. female who presents for follow up and treatment via virtual visit.    I reviewed her labs with her. She is doing okay.  She continues to have poor appetite and back/hip pain.  She is due for her the last day of C6 D-VRD today.  It is time for repeat bone marrow biopsy and PET-CT.  She is not yet ready to undergo transplant so we discussed that if her repeat studies show a very good response she will continue with revlimid.  If not, we will have to move to the next line of treatment.    Otherwise, we are okay to proceed with treatment.       Review of Systems:  Review of Systems    Constitutional:  Positive for appetite change (decreased) and fatigue. Negative for activity change, chills, diaphoresis, fever and unexpected weight change.   HENT:  Negative for nosebleeds.    Respiratory:  Negative for shortness of breath.    Cardiovascular:  Negative for chest pain.   Gastrointestinal:  Negative for abdominal pain, blood in stool, diarrhea, nausea and vomiting.   Genitourinary:  Negative for difficulty urinating and hematuria.   Musculoskeletal:  Positive for back pain. Negative for arthralgias and myalgias.   Skin:  Negative for rash.   Neurological:  Negative for dizziness, weakness, light-headedness and headaches.   Hematological:  Does not bruise/bleed easily.   Psychiatric/Behavioral:  The patient is not nervous/anxious.      History     Oncology History   Multiple myeloma   4/20/2023 Initial Diagnosis    Multiple myeloma     5/10/2023 - 6/28/2023 Chemotherapy    Treatment Summary   Plan Name: OP VRD - WEEKLY BORTEZOMIB LENALIDOMIDE DEXAMETHASONE Q3W  Treatment Goal: Palliative  Status: Inactive  Start Date: 5/10/2023  End Date: 6/28/2023  Provider: Abram Velasquez MD  Chemotherapy: bortezomib (VELCADE) injection 2 mg, 1.3 mg/m2 = 2 mg, Subcutaneous, Clinic/HOD 1 time, 2 of 6 cycles  Administration: 2 mg (5/10/2023), 2 mg (5/17/2023), 2 mg (6/14/2023), 2 mg (5/31/2023), 2 mg (6/21/2023), 2 mg (6/28/2023)  lenalidomide 25 mg Cap, 25 mg, Oral, Daily, 1 of 1 cycle, Start date: 5/10/2023, End date: 5/17/2023 6/28/2023 Cancer Staged    Staging form: Plasma Cell Myeloma and Plasma Cell Disorders, AJCC 8th Edition  - Clinical stage from 6/28/2023: RISS Stage II (Beta-2-microglobulin (mg/L): 1.9, Albumin (g/dL): 3, ISS: Stage II, High-risk cytogenetics: Absent, LDH: Normal)     7/6/2023 -  Chemotherapy    Treatment Summary   Plan Name: OP D-VRD DARATUMUMAB + BORTEZOMIB LENALIDOMIDE DEXAMETHASONE  Treatment Goal: Palliative  Status: Active  Start Date: 7/6/2023  End Date: 1/3/2024  (Planned)  Provider: Oscar Márquez MD  Chemotherapy: bortezomib (VELCADE) injection 2 mg, 1.3 mg/m2 = 2 mg, Subcutaneous, Clinic/HOD 1 time, 6 of 6 cycles  Administration: 2 mg (7/6/2023), 2 mg (7/12/2023), 2 mg (8/2/2023), 2 mg (8/9/2023), 2.25 mg (10/18/2023), 2 mg (7/19/2023), 2 mg (7/26/2023), 2 mg (8/16/2023), 2.25 mg (9/20/2023), 2.25 mg (9/27/2023), 2.25 mg (10/25/2023), 2.25 mg (11/1/2023), 2.25 mg (11/8/2023), 2.25 mg (12/6/2023), 2.25 mg (11/15/2023), 2.25 mg (11/22/2023), 2.25 mg (11/29/2023), 2.25 mg (12/13/2023), 2.25 mg (12/20/2023), 2.25 mg (12/26/2023)  lenalidomide 10 mg Cap, 1 capsule (100 % of original dose 1 capsule), Oral, Daily, 1 of 1 cycle, Start date: 7/26/2023, End date: 8/2/2023  Dose modification: 1 capsule (original dose 1 capsule, Cycle 0)  daratumumab-hyaluronidase-fij subcutaneous injection 1,800 mg, 1,800 mg (100 % of original dose 1,800 mg), Subcutaneous, Clinic/HOD 1 time, 6 of 6 cycles  Dose modification: 1,800 mg (original dose 1,800 mg, Cycle 1), 1,800 mg (original dose 1,800 mg, Cycle 1)  Administration: 1,800 mg (7/6/2023), 1,800 mg (7/12/2023), 1,800 mg (7/19/2023), 1,800 mg (7/26/2023), 1,800 mg (8/2/2023), 1,800 mg (8/9/2023), 1,800 mg (8/16/2023), 1,800 mg (9/27/2023), 1,800 mg (10/18/2023), 1,800 mg (11/1/2023), 1,800 mg (11/15/2023), 1,800 mg (11/29/2023), 1,800 mg (12/13/2023), 1,800 mg (12/26/2023)           MARIBETH Bonilla  63 y.o.  female with past medical history significant for hypertension, COPD, asthma, GERD, hypothyroidism here for follow-up and management of multiple myeloma     She was referred in 2/2023 by her PCP after workup for gastritis revealed lytic lesions. CT chest showed lytic and expansile destructive lesion in the sternum and lytic lesions at L1. Biopsy of L1 lesion in 3/2023 revealed mature B cell neoplasm most consistent with plasma cell neoplasm.      Bone marrow biopsy in 4/2023 demonstrated 60% plasma cells. PET/CT showed extensive  bony lesions throughout the spine, sternum, bilateral ribs, pelvis, and a mild compression deformity in L1. SPEP/MECHE showed IgG M spike 3.19 g/dL, IgG 4,521 mg/dL.      She was started on VRd and then daratumumab was added by the transplant team. She was started on ASA for thrombosis prophylaxis and acyclovir for herpes zoster prophylaxis. Start Zometa after dental evaluation.     Past Medical History:   Diagnosis Date    Allergy     Amblyopia OS    Anxiety     Asthma     COPD (chronic obstructive pulmonary disease)     NO HOME o2    GERD (gastroesophageal reflux disease)     Hyperlipidemia     Hypertension     PONV (postoperative nausea and vomiting)     Thyroid disease        Past Surgical History:   Procedure Laterality Date    APPENDECTOMY      BIOPSY N/A 2023    Procedure: BIOPSY;  Surgeon: Fausto Smith MD;  Location: [facility];  Service: Neurosurgery;  Laterality: N/A;  L1    BUNIONECTOMY      COLONOSCOPY N/A 2019    Procedure: COLONOSCOPY;  Surgeon: Danny Matos III, MD;  Location: [facility];  Service: Endoscopy;  Laterality: N/A;    COLONOSCOPY N/A 10/24/2022    Procedure: COLONOSCOPY;  Surgeon: Danny Matos III, MD;  Location: [facility];  Service: Endoscopy;  Laterality: N/A;    ESOPHAGOGASTRODUODENOSCOPY N/A 10/24/2022    Procedure: EGD (ESOPHAGOGASTRODUODENOSCOPY);  Surgeon: Danny Matos III, MD;  Location: [facility];  Service: Endoscopy;  Laterality: N/A;    FIXATION KYPHOPLASTY Bilateral 2023    Procedure: KYPHOPLASTY;  Surgeon: Fausto Smith MD;  Location: [facility];  Service: Neurosurgery;  Laterality: Bilateral;  kyphoplasty and radiofrequency ablation - L1    HYSTERECTOMY      PARTIAL//still with ovaries    neck fusion  2017    THYROIDECTOMY      TUBAL LIGATION         Family History   Problem Relation Age of Onset    Hypertension Mother     Diabetes Mother     Asthma Mother             Diabetes Father     Asthma Father     Stroke Maternal Grandmother      Prostate cancer Maternal Grandfather     Mental illness Son     Pancreatic cancer Maternal Uncle     Mental illness Other     Pancreatic cancer Other     Ovarian cancer Maternal Cousin        Review of patient's allergies indicates:   Allergen Reactions    Hydrocodone-acetaminophen Other (See Comments)     Causes pt to feel extremely sick        Social History     Tobacco Use    Smoking status: Every Day     Current packs/day: 0.50     Average packs/day: 0.5 packs/day for 50.0 years (25.0 ttl pk-yrs)     Types: Cigarettes     Passive exposure: Never    Smokeless tobacco: Never   Substance Use Topics    Alcohol use: Not Currently     Comment: quit    Drug use: No       Labs   Labs:  Lab Visit on 01/02/2024   Component Date Value Ref Range Status    Sodium 01/02/2024 145  136 - 145 mmol/L Final    Potassium 01/02/2024 3.6  3.5 - 5.1 mmol/L Final    Chloride 01/02/2024 110  95 - 110 mmol/L Final    CO2 01/02/2024 24  23 - 29 mmol/L Final    Glucose 01/02/2024 115 (H)  70 - 110 mg/dL Final    BUN 01/02/2024 11  8 - 23 mg/dL Final    Creatinine 01/02/2024 1.1  0.5 - 1.4 mg/dL Final    Calcium 01/02/2024 8.6 (L)  8.7 - 10.5 mg/dL Final    Total Protein 01/02/2024 6.5  6.0 - 8.4 g/dL Final    Albumin 01/02/2024 3.7  3.5 - 5.2 g/dL Final    Total Bilirubin 01/02/2024 0.5  0.1 - 1.0 mg/dL Final    Comment: For infants and newborns, interpretation of results should be based  on gestational age, weight and in agreement with clinical  observations.    Premature Infant recommended reference ranges:  Up to 24 hours.............<8.0 mg/dL  Up to 48 hours............<12.0 mg/dL  3-5 days..................<15.0 mg/dL  6-29 days.................<15.0 mg/dL      Alkaline Phosphatase 01/02/2024 51 (L)  55 - 135 U/L Final    AST 01/02/2024 21  10 - 40 U/L Final    ALT 01/02/2024 17  10 - 44 U/L Final    eGFR 01/02/2024 56 (A)  >60 mL/min/1.73 m^2 Final    Anion Gap 01/02/2024 11  8 - 16 mmol/L Final    WBC 01/02/2024 6.02  3.90 -  12.70 K/uL Final    RBC 01/02/2024 3.78 (L)  4.00 - 5.40 M/uL Final    Hemoglobin 01/02/2024 12.0  12.0 - 16.0 g/dL Final    Hematocrit 01/02/2024 37.0  37.0 - 48.5 % Final    MCV 01/02/2024 98  82 - 98 fL Final    MCH 01/02/2024 31.7 (H)  27.0 - 31.0 pg Final    MCHC 01/02/2024 32.4  32.0 - 36.0 g/dL Final    RDW 01/02/2024 14.4  11.5 - 14.5 % Final    Platelets 01/02/2024 233  150 - 450 K/uL Final    MPV 01/02/2024 9.9  9.2 - 12.9 fL Final    Immature Granulocytes 01/02/2024 0.3  0.0 - 0.5 % Final    Gran # (ANC) 01/02/2024 3.1  1.8 - 7.7 K/uL Final    Immature Grans (Abs) 01/02/2024 0.02  0.00 - 0.04 K/uL Final    Comment: Mild elevation in immature granulocytes is non specific and   can be seen in a variety of conditions including stress response,   acute inflammation, trauma and pregnancy. Correlation with other   laboratory and clinical findings is essential.      Lymph # 01/02/2024 1.8  1.0 - 4.8 K/uL Final    Mono # 01/02/2024 0.8  0.3 - 1.0 K/uL Final    Eos # 01/02/2024 0.3  0.0 - 0.5 K/uL Final    Baso # 01/02/2024 0.02  0.00 - 0.20 K/uL Final    nRBC 01/02/2024 0  0 /100 WBC Final    Gran % 01/02/2024 51.7  38.0 - 73.0 % Final    Lymph % 01/02/2024 29.7  18.0 - 48.0 % Final    Mono % 01/02/2024 12.5  4.0 - 15.0 % Final    Eosinophil % 01/02/2024 5.5  0.0 - 8.0 % Final    Basophil % 01/02/2024 0.3  0.0 - 1.9 % Final    Differential Method 01/02/2024 Automated   Final         Imaging/Pathology   - 06/06/2023 RIGHT ILIAC CREST BONE MARROW ASPIRATE, BONE MARROW CLOT, AND BONE MARROW CORE BIOPSY WITH:     CELLULARITY=40-60%, TRILINEAGE HEMATOPOIETIC ACTIVITY (M/E=2.9:1).   CONSISTENT WITH PLASMA CELL NEOPLASM(60%).  SEE COMMENT.   FOCAL GRADE 1 RETICULAR FIBROSIS.   CONGO RED NEGATIVE.   INCREASED STORAGE IRON.   ADEQUATE NUMBER OF MEGAKARYOCYTES.     Bone marrow karyotype results: 46, XX[20], female karyotype.     Myeloma fixed cell, high-risk, FISH:  Normal.  The result is within normal limits for 1q  duplication, TP53 deletion and IGH rearrangement.         - 04/10/2023 PET: Numerous FDG avid predominately lytic osseous lesions as above.  Primary differential considerations would include multiple myeloma versus metastatic disease.  2. No FDG avid soft tissue masses or adenopathy demonstrated.  3. Mild pathologic compression deformity of the L1 vertebral body.                                          Assessment and Plan   IgG lambda Multiple myeloma, R-ISS stage I   Diagnosed June 2023 via bone marrow biopsy  Patient treatment had been held multiple times with few treatments cancelled and also Revlimid 10 mg daily given as unable to tolerate higher dose   Previous oncologist discussed with the patient and the transplant team BMT.  However, patient declined transplant at this time and it was recommended that we continue with Rosa-VRD for 6 cycles and then repeat bone marrow biopsy and PET scan. If VGPR or better, then continue with Revlimid maintenance only.  Continue prophylaxis with aspirin, acyclovir 400 mg b.i.d.  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   Follow up with WERNER    Infusion scheduling note   Velcade today; Zometa 01/17/2023   Injection scheduling note    Labs CBC, CMP, free light chains, UPEP, SPEP, other and LDH   Scheduling:  Preferred lab:  Lab interval:  Other: labs ordered by Dr. Michelle; can be collected when she comes 01/17/2023   Imaging PET scan      Pharmacy appointment    Other referrals       Additional referrals needed  CT Bone Marrow Biopsy          The patient was seen, interviewed and examined. Pertinent lab and radiologic studies were reviewed. Pt instructed to call should they develop concerning signs/symptoms or have further questions.        Portions of the record may have been created with voice recognition software. Occasional wrong-word or sound-a-like substitutions may have occurred due to the inherent limitations of voice recognition software. Read the chart carefully and recognize, using context, where substitutions have occurred.      Bri Michelle MD    Hematology/Oncology

## 2024-01-03 NOTE — NURSING
1142: Velcade Injection given without difficulties.Bandaid applied. Patient instructed to stay in the clinic for 15 minutes. Patient verbalized understanding and will notify nurse with any complaints.

## 2024-01-04 ENCOUNTER — PATIENT MESSAGE (OUTPATIENT)
Dept: CARDIOLOGY | Facility: CLINIC | Age: 64
End: 2024-01-04
Payer: COMMERCIAL

## 2024-01-04 DIAGNOSIS — E78.5 DYSLIPIDEMIA: ICD-10-CM

## 2024-01-04 RX ORDER — ROSUVASTATIN CALCIUM 10 MG/1
10 TABLET, COATED ORAL NIGHTLY
Qty: 90 TABLET | Refills: 3 | Status: SHIPPED | OUTPATIENT
Start: 2024-01-04

## 2024-01-10 ENCOUNTER — PATIENT MESSAGE (OUTPATIENT)
Dept: INTERNAL MEDICINE | Facility: CLINIC | Age: 64
End: 2024-01-10
Payer: COMMERCIAL

## 2024-01-10 ENCOUNTER — PATIENT MESSAGE (OUTPATIENT)
Dept: HEMATOLOGY/ONCOLOGY | Facility: CLINIC | Age: 64
End: 2024-01-10
Payer: COMMERCIAL

## 2024-01-10 DIAGNOSIS — C90.00 MULTIPLE MYELOMA, REMISSION STATUS UNSPECIFIED: ICD-10-CM

## 2024-01-10 DIAGNOSIS — F41.9 ANXIETY: ICD-10-CM

## 2024-01-10 RX ORDER — ALPRAZOLAM 2 MG/1
TABLET ORAL
Qty: 60 TABLET | Refills: 0 | Status: SHIPPED | OUTPATIENT
Start: 2024-01-11 | End: 2024-02-12 | Stop reason: SDUPTHER

## 2024-01-13 DIAGNOSIS — J30.9 CHRONIC ALLERGIC RHINITIS: ICD-10-CM

## 2024-01-13 DIAGNOSIS — E03.9 PRIMARY HYPOTHYROIDISM: ICD-10-CM

## 2024-01-16 ENCOUNTER — LAB VISIT (OUTPATIENT)
Dept: LAB | Facility: HOSPITAL | Age: 64
End: 2024-01-16
Attending: INTERNAL MEDICINE
Payer: COMMERCIAL

## 2024-01-16 DIAGNOSIS — C79.51 SECONDARY MALIGNANT NEOPLASM OF BONE: ICD-10-CM

## 2024-01-16 DIAGNOSIS — C90.00 MULTIPLE MYELOMA, REMISSION STATUS UNSPECIFIED: Chronic | ICD-10-CM

## 2024-01-16 LAB
IGA SERPL-MCNC: 34 MG/DL (ref 40–350)
IGG SERPL-MCNC: 628 MG/DL (ref 650–1600)
IGM SERPL-MCNC: 14 MG/DL (ref 50–300)
LDH SERPL L TO P-CCNC: 217 U/L (ref 110–260)

## 2024-01-16 PROCEDURE — 86334 IMMUNOFIX E-PHORESIS SERUM: CPT | Mod: 26,,, | Performed by: PATHOLOGY

## 2024-01-16 PROCEDURE — 86334 IMMUNOFIX E-PHORESIS SERUM: CPT | Performed by: INTERNAL MEDICINE

## 2024-01-16 PROCEDURE — 83521 IG LIGHT CHAINS FREE EACH: CPT | Mod: 59 | Performed by: INTERNAL MEDICINE

## 2024-01-16 PROCEDURE — 82784 ASSAY IGA/IGD/IGG/IGM EACH: CPT | Mod: 59 | Performed by: INTERNAL MEDICINE

## 2024-01-16 PROCEDURE — 84165 PROTEIN E-PHORESIS SERUM: CPT | Performed by: INTERNAL MEDICINE

## 2024-01-16 PROCEDURE — 83615 LACTATE (LD) (LDH) ENZYME: CPT | Performed by: INTERNAL MEDICINE

## 2024-01-16 PROCEDURE — 82784 ASSAY IGA/IGD/IGG/IGM EACH: CPT | Performed by: INTERNAL MEDICINE

## 2024-01-16 PROCEDURE — 36415 COLL VENOUS BLD VENIPUNCTURE: CPT | Performed by: INTERNAL MEDICINE

## 2024-01-16 PROCEDURE — 84165 PROTEIN E-PHORESIS SERUM: CPT | Mod: 26,,, | Performed by: PATHOLOGY

## 2024-01-16 RX ORDER — MONTELUKAST SODIUM 10 MG/1
10 TABLET ORAL NIGHTLY
Qty: 90 TABLET | Refills: 0 | Status: SHIPPED | OUTPATIENT
Start: 2024-01-16 | End: 2024-05-01

## 2024-01-16 RX ORDER — LEVOTHYROXINE SODIUM 100 UG/1
100 TABLET ORAL
Qty: 90 TABLET | Refills: 1 | Status: SHIPPED | OUTPATIENT
Start: 2024-01-16

## 2024-01-17 ENCOUNTER — PATIENT MESSAGE (OUTPATIENT)
Dept: HEMATOLOGY/ONCOLOGY | Facility: CLINIC | Age: 64
End: 2024-01-17
Payer: COMMERCIAL

## 2024-01-17 ENCOUNTER — INFUSION (OUTPATIENT)
Dept: INFUSION THERAPY | Facility: HOSPITAL | Age: 64
End: 2024-01-17
Attending: INTERNAL MEDICINE
Payer: COMMERCIAL

## 2024-01-17 VITALS — HEIGHT: 64 IN | WEIGHT: 140.63 LBS | BODY MASS INDEX: 24.01 KG/M2

## 2024-01-17 LAB
ALBUMIN SERPL ELPH-MCNC: 3.69 G/DL (ref 3.35–5.55)
ALPHA1 GLOB SERPL ELPH-MCNC: 0.29 G/DL (ref 0.17–0.41)
ALPHA2 GLOB SERPL ELPH-MCNC: 0.74 G/DL (ref 0.43–0.99)
B-GLOBULIN SERPL ELPH-MCNC: 0.64 G/DL (ref 0.5–1.1)
GAMMA GLOB SERPL ELPH-MCNC: 0.54 G/DL (ref 0.67–1.58)
INTERPRETATION SERPL IFE-IMP: NORMAL
KAPPA LC SER QL IA: 1.49 MG/DL (ref 0.33–1.94)
KAPPA LC/LAMBDA SER IA: 1.43 (ref 0.26–1.65)
LAMBDA LC SER QL IA: 1.04 MG/DL (ref 0.57–2.63)
PROT SERPL-MCNC: 5.9 G/DL (ref 6–8.4)

## 2024-01-17 RX ORDER — HEPARIN 100 UNIT/ML
500 SYRINGE INTRAVENOUS
Status: CANCELLED | OUTPATIENT
Start: 2024-01-24

## 2024-01-17 RX ORDER — SODIUM CHLORIDE 0.9 % (FLUSH) 0.9 %
10 SYRINGE (ML) INJECTION
Status: CANCELLED | OUTPATIENT
Start: 2024-01-24

## 2024-01-18 ENCOUNTER — HOSPITAL ENCOUNTER (OUTPATIENT)
Dept: RADIOLOGY | Facility: HOSPITAL | Age: 64
Discharge: HOME | End: 2024-01-18
Attending: INTERNAL MEDICINE
Payer: COMMERCIAL

## 2024-01-18 DIAGNOSIS — C90.00 MULTIPLE MYELOMA, REMISSION STATUS UNSPECIFIED: Chronic | ICD-10-CM

## 2024-01-18 DIAGNOSIS — Z51.11 ENCOUNTER FOR ANTINEOPLASTIC CHEMOTHERAPY: ICD-10-CM

## 2024-01-18 DIAGNOSIS — C79.51 SECONDARY MALIGNANT NEOPLASM OF BONE: ICD-10-CM

## 2024-01-18 LAB
PATHOLOGIST INTERPRETATION IFE: NORMAL
PATHOLOGIST INTERPRETATION SPE: NORMAL

## 2024-01-18 PROCEDURE — 78816 PET IMAGE W/CT FULL BODY: CPT | Mod: TC

## 2024-01-18 PROCEDURE — 78816 PET IMAGE W/CT FULL BODY: CPT | Mod: 26,PS,, | Performed by: RADIOLOGY

## 2024-01-23 ENCOUNTER — LAB VISIT (OUTPATIENT)
Dept: LAB | Facility: HOSPITAL | Age: 64
End: 2024-01-23
Attending: INTERNAL MEDICINE
Payer: COMMERCIAL

## 2024-01-23 DIAGNOSIS — C90.00 MULTIPLE MYELOMA, REMISSION STATUS UNSPECIFIED: ICD-10-CM

## 2024-01-23 LAB
ALBUMIN SERPL BCP-MCNC: 3.6 G/DL (ref 3.5–5.2)
ALP SERPL-CCNC: 45 U/L (ref 55–135)
ALT SERPL W/O P-5'-P-CCNC: 17 U/L (ref 10–44)
ANION GAP SERPL CALC-SCNC: 9 MMOL/L (ref 8–16)
AST SERPL-CCNC: 20 U/L (ref 10–40)
BASOPHILS # BLD AUTO: 0.03 K/UL (ref 0–0.2)
BASOPHILS NFR BLD: 0.8 % (ref 0–1.9)
BILIRUB SERPL-MCNC: 1.1 MG/DL (ref 0.1–1)
BUN SERPL-MCNC: 7 MG/DL (ref 8–23)
CALCIUM SERPL-MCNC: 8.2 MG/DL (ref 8.7–10.5)
CHLORIDE SERPL-SCNC: 111 MMOL/L (ref 95–110)
CO2 SERPL-SCNC: 23 MMOL/L (ref 23–29)
CREAT SERPL-MCNC: 0.9 MG/DL (ref 0.5–1.4)
DIFFERENTIAL METHOD BLD: ABNORMAL
EOSINOPHIL # BLD AUTO: 0.4 K/UL (ref 0–0.5)
EOSINOPHIL NFR BLD: 9.9 % (ref 0–8)
ERYTHROCYTE [DISTWIDTH] IN BLOOD BY AUTOMATED COUNT: 14.2 % (ref 11.5–14.5)
EST. GFR  (NO RACE VARIABLE): >60 ML/MIN/1.73 M^2
GLUCOSE SERPL-MCNC: 110 MG/DL (ref 70–110)
HCT VFR BLD AUTO: 35.8 % (ref 37–48.5)
HGB BLD-MCNC: 11.5 G/DL (ref 12–16)
IMM GRANULOCYTES # BLD AUTO: 0 K/UL (ref 0–0.04)
IMM GRANULOCYTES NFR BLD AUTO: 0 % (ref 0–0.5)
LYMPHOCYTES # BLD AUTO: 1.4 K/UL (ref 1–4.8)
LYMPHOCYTES NFR BLD: 38.8 % (ref 18–48)
MCH RBC QN AUTO: 31.2 PG (ref 27–31)
MCHC RBC AUTO-ENTMCNC: 32.1 G/DL (ref 32–36)
MCV RBC AUTO: 97 FL (ref 82–98)
MONOCYTES # BLD AUTO: 0.7 K/UL (ref 0.3–1)
MONOCYTES NFR BLD: 21 % (ref 4–15)
NEUTROPHILS # BLD AUTO: 1 K/UL (ref 1.8–7.7)
NEUTROPHILS NFR BLD: 29.5 % (ref 38–73)
NRBC BLD-RTO: 0 /100 WBC
PLATELET # BLD AUTO: 224 K/UL (ref 150–450)
PMV BLD AUTO: 8.8 FL (ref 9.2–12.9)
POTASSIUM SERPL-SCNC: 3.5 MMOL/L (ref 3.5–5.1)
PROT SERPL-MCNC: 6.4 G/DL (ref 6–8.4)
RBC # BLD AUTO: 3.69 M/UL (ref 4–5.4)
SODIUM SERPL-SCNC: 143 MMOL/L (ref 136–145)
WBC # BLD AUTO: 3.53 K/UL (ref 3.9–12.7)

## 2024-01-23 PROCEDURE — 80053 COMPREHEN METABOLIC PANEL: CPT | Performed by: INTERNAL MEDICINE

## 2024-01-23 PROCEDURE — 36415 COLL VENOUS BLD VENIPUNCTURE: CPT | Performed by: INTERNAL MEDICINE

## 2024-01-23 PROCEDURE — 85025 COMPLETE CBC W/AUTO DIFF WBC: CPT | Performed by: INTERNAL MEDICINE

## 2024-01-24 ENCOUNTER — HOSPITAL ENCOUNTER (OUTPATIENT)
Dept: RADIOLOGY | Facility: HOSPITAL | Age: 64
Discharge: HOME | End: 2024-01-24
Attending: INTERNAL MEDICINE
Payer: COMMERCIAL

## 2024-01-24 ENCOUNTER — OFFICE VISIT (OUTPATIENT)
Dept: HEMATOLOGY/ONCOLOGY | Facility: CLINIC | Age: 64
End: 2024-01-24
Payer: COMMERCIAL

## 2024-01-24 ENCOUNTER — INFUSION (OUTPATIENT)
Dept: INFUSION THERAPY | Facility: HOSPITAL | Age: 64
End: 2024-01-24
Attending: INTERNAL MEDICINE
Payer: COMMERCIAL

## 2024-01-24 ENCOUNTER — PATIENT MESSAGE (OUTPATIENT)
Dept: HEMATOLOGY/ONCOLOGY | Facility: CLINIC | Age: 64
End: 2024-01-24

## 2024-01-24 VITALS
SYSTOLIC BLOOD PRESSURE: 118 MMHG | BODY MASS INDEX: 23.9 KG/M2 | WEIGHT: 139.25 LBS | OXYGEN SATURATION: 98 % | RESPIRATION RATE: 20 BRPM | TEMPERATURE: 99 F | HEART RATE: 71 BPM | DIASTOLIC BLOOD PRESSURE: 68 MMHG

## 2024-01-24 VITALS
DIASTOLIC BLOOD PRESSURE: 63 MMHG | HEIGHT: 64 IN | OXYGEN SATURATION: 99 % | HEART RATE: 62 BPM | BODY MASS INDEX: 23.78 KG/M2 | SYSTOLIC BLOOD PRESSURE: 127 MMHG | WEIGHT: 139.31 LBS | TEMPERATURE: 97 F | RESPIRATION RATE: 18 BRPM

## 2024-01-24 DIAGNOSIS — E83.51 HYPOCALCEMIA: ICD-10-CM

## 2024-01-24 DIAGNOSIS — J40 BRONCHITIS: ICD-10-CM

## 2024-01-24 DIAGNOSIS — M79.605 LEFT LEG PAIN: ICD-10-CM

## 2024-01-24 DIAGNOSIS — R05.9 COUGH, UNSPECIFIED TYPE: ICD-10-CM

## 2024-01-24 DIAGNOSIS — D84.821 IMMUNODEFICIENCY DUE TO CHEMOTHERAPY: ICD-10-CM

## 2024-01-24 DIAGNOSIS — T45.1X5A IMMUNODEFICIENCY DUE TO CHEMOTHERAPY: ICD-10-CM

## 2024-01-24 DIAGNOSIS — C90.00 MULTIPLE MYELOMA NOT HAVING ACHIEVED REMISSION: Primary | ICD-10-CM

## 2024-01-24 DIAGNOSIS — Z79.899 IMMUNODEFICIENCY DUE TO CHEMOTHERAPY: ICD-10-CM

## 2024-01-24 DIAGNOSIS — C79.51 SECONDARY MALIGNANT NEOPLASM OF BONE: ICD-10-CM

## 2024-01-24 DIAGNOSIS — C90.00 MULTIPLE MYELOMA, REMISSION STATUS UNSPECIFIED: Primary | Chronic | ICD-10-CM

## 2024-01-24 PROCEDURE — 3008F BODY MASS INDEX DOCD: CPT | Mod: CPTII,S$GLB,, | Performed by: INTERNAL MEDICINE

## 2024-01-24 PROCEDURE — 3078F DIAST BP <80 MM HG: CPT | Mod: CPTII,S$GLB,, | Performed by: INTERNAL MEDICINE

## 2024-01-24 PROCEDURE — 93971 EXTREMITY STUDY: CPT | Mod: 26,LT,, | Performed by: RADIOLOGY

## 2024-01-24 PROCEDURE — 99214 OFFICE O/P EST MOD 30 MIN: CPT | Mod: S$GLB,,, | Performed by: INTERNAL MEDICINE

## 2024-01-24 PROCEDURE — 25000003 PHARM REV CODE 250: Performed by: INTERNAL MEDICINE

## 2024-01-24 PROCEDURE — 63600175 PHARM REV CODE 636 W HCPCS: Performed by: INTERNAL MEDICINE

## 2024-01-24 PROCEDURE — 3074F SYST BP LT 130 MM HG: CPT | Mod: CPTII,S$GLB,, | Performed by: INTERNAL MEDICINE

## 2024-01-24 PROCEDURE — 96365 THER/PROPH/DIAG IV INF INIT: CPT

## 2024-01-24 PROCEDURE — 93971 EXTREMITY STUDY: CPT | Mod: TC,LT

## 2024-01-24 PROCEDURE — 99999 PR PBB SHADOW E&M-EST. PATIENT-LVL V: CPT | Mod: PBBFAC,,, | Performed by: INTERNAL MEDICINE

## 2024-01-24 RX ORDER — ZOLEDRONIC ACID 0.04 MG/ML
4 INJECTION, SOLUTION INTRAVENOUS
Status: DISCONTINUED | OUTPATIENT
Start: 2024-01-24 | End: 2024-01-24

## 2024-01-24 RX ORDER — FLUTICASONE PROPIONATE 50 MCG
1 SPRAY, SUSPENSION (ML) NASAL DAILY
Qty: 1 ML | Refills: 1 | Status: SHIPPED | OUTPATIENT
Start: 2024-01-24

## 2024-01-24 RX ORDER — CALCIUM GLUCONATE 20 MG/ML
1 INJECTION, SOLUTION INTRAVENOUS
Status: CANCELLED
Start: 2024-01-24

## 2024-01-24 RX ADMIN — ZOLEDRONIC ACID 3.5 MG: 4 INJECTION, SOLUTION, CONCENTRATE INTRAVENOUS at 10:01

## 2024-01-24 NOTE — PLAN OF CARE
"Pt is stable. Pt administered Zometa today. Pt voiced she was doing "alright," today. POC discussed and no concerns noted. Pt will follow up in four weeks.      Problem: Adult Inpatient Plan of Care  Goal: Plan of Care Review  Outcome: Ongoing, Progressing  Goal: Patient-Specific Goal (Individualized)  Outcome: Ongoing, Progressing  Flowsheets (Taken 1/24/2024 1046)  Anxieties, Fears or Concerns: Pt denies.  Individualized Care Needs:   Pt provided pillow   warm blanket   legs elevated in reclined position. Refreshments offered.  Goal: Optimal Comfort and Wellbeing  Outcome: Ongoing, Progressing     Problem: Infection  Goal: Absence of Infection Signs and Symptoms  Outcome: Ongoing, Progressing     Problem: Fall Injury Risk  Goal: Absence of Fall and Fall-Related Injury  Outcome: Ongoing, Progressing     "

## 2024-01-24 NOTE — DISCHARGE INSTRUCTIONS
Thank you for allowing me to care for you today,  MIS LeachN, RN    [facility]  [address] [address]  [phone] phone     [phone] fax  Hours of Operation: Monday- Friday 7:00am- 5:30pm  After hours phone  [phone]  Hematology / Oncology Physicians on call      JUANI Aguirre Dr[address] Dr., Dr., BELLE Valdez, BELLE Deluna, BELLE    Please call with any concerns regarding your appointment today.   FALL PREVENTION   Falls often occur due to slipping, tripping or losing your balance. Here are ways to reduce your risk of falling again.   Was there anything that caused your fall that can be fixed, removed or replaced?   Make your home safe by keeping walkways clear of objects you may trip over.   Use non-slip pads under rugs.   Do not walk in poorly lit areas.   Do not stand on chairs or wobbly ladders.   Use caution when reaching overhead or looking upward. This position can cause a loss of balance.   Be sure your shoes fit properly, have non-slip bottoms and are in good condition.   Be cautious when going up and down stairs, curbs, and when walking on uneven sidewalks.   If your balance is poor, consider using a cane or walker.   If your fall was related to alcohol use, stop or limit alcohol intake.   If your fall was related to use of sleeping medicines, talk to your doctor about this. You may need to reduce your dosage at bedtime if you awaken during the night to go to the bathroom.   To reduce the need for nighttime bathroom trips:   Avoid drinking fluids for several hours before going to bed   Empty your bladder before going to bed   Men can keep a urinal at the bedside   © 4391-1389 [employer], [address] [place] [address], [place] [address]. All rights reserved. This information is not intended as a substitute for professional medical care. Always follow your  healthcare professional's instructions.

## 2024-01-24 NOTE — PROGRESS NOTES
Patient ID: Ana Bonilla   Chief Complaint: Follow-up and Multiple Myeloma  MRN:  [id number]     Oncologic Diagnosis:  Multiple Myeloma - IgG lambda   Previous Treatment:  VRD (5/10/2023 - 6/28/2023)   Current Treatment:    D-VRD (7/6/2023 - 01/03/2024 )      Zometa (10/18/2023 - )   Subjective   Ana Bonilla is a pleasant 63 y.o. female who presents for follow up and treatment via virtual visit.    I reviewed her labs with her. She is doing okay.  She continues to have poor appetite and back/hip pain. It is time for repeat bone marrow biopsy.  PET-CT is stable. When on Revlimid the ocular styes flare and she has watery eyes.    She has also been having soreness more in her left leg than right that is provoke with flexing her foot. Recommend we obtain ultrasound.    Review of Systems:  Review of Systems   Constitutional:  Positive for appetite change (decreased) and fatigue. Negative for activity change, chills, diaphoresis, fever and unexpected weight change.   HENT:  Negative for nosebleeds.    Respiratory:  Negative for shortness of breath.    Cardiovascular:  Negative for chest pain.   Gastrointestinal:  Negative for abdominal pain, blood in stool, diarrhea, nausea and vomiting.   Genitourinary:  Negative for difficulty urinating and hematuria.   Musculoskeletal:  Positive for back pain. Negative for arthralgias and myalgias.   Skin:  Negative for rash.   Neurological:  Negative for dizziness, weakness, light-headedness and headaches.   Hematological:  Does not bruise/bleed easily.   Psychiatric/Behavioral:  The patient is not nervous/anxious.      History     Oncology History   Multiple myeloma   4/20/2023 Initial Diagnosis    Multiple myeloma     5/10/2023 - 6/28/2023 Chemotherapy    Treatment Summary   Plan Name: OP VRD - WEEKLY BORTEZOMIB LENALIDOMIDE DEXAMETHASONE Q3W  Treatment Goal: Palliative  Status: Inactive  Start Date: 5/10/2023  End Date: 6/28/2023  Provider: Abram Velasquez  MD  Chemotherapy: bortezomib (VELCADE) injection 2 mg, 1.3 mg/m2 = 2 mg, Subcutaneous, Clinic/[facility] 1 time, 2 of 6 cycles  Administration: 2 mg (5/10/2023), 2 mg (5/17/2023), 2 mg (6/14/2023), 2 mg (5/31/2023), 2 mg (6/21/2023), 2 mg (6/28/2023)  lenalidomide 25 mg Cap, 25 mg, Oral, Daily, 1 of 1 cycle, Start date: 5/10/2023, End date: 5/17/2023 6/28/2023 Cancer Staged    Staging form: Plasma Cell Myeloma and Plasma Cell Disorders, AJCC 8th Edition  - Clinical stage from 6/28/2023: RISS Stage II (Beta-2-microglobulin (mg/L): 1.9, Albumin (g/dL): 3, ISS: Stage II, High-risk cytogenetics: Absent, LDH: Normal)     7/6/2023 -  Chemotherapy    Treatment Summary   Plan Name: OP D-VRD DARATUMUMAB + BORTEZOMIB LENALIDOMIDE DEXAMETHASONE  Treatment Goal: Palliative  Status: Active  Start Date: 7/6/2023  End Date: 1/3/2024  Provider: Oscar Márquez MD  Chemotherapy: bortezomib (VELCADE) injection 2 mg, 1.3 mg/m2 = 2 mg, Subcutaneous, Clinic/[facility] 1 time, 6 of 6 cycles  Administration: 2 mg (7/6/2023), 2 mg (7/12/2023), 2 mg (8/2/2023), 2 mg (8/9/2023), 2.25 mg (10/18/2023), 2 mg (7/19/2023), 2 mg (7/26/2023), 2 mg (8/16/2023), 2.25 mg (9/20/2023), 2.25 mg (9/27/2023), 2.25 mg (10/25/2023), 2.25 mg (11/1/2023), 2.25 mg (11/8/2023), 2.25 mg (12/6/2023), 2.25 mg (1/3/2024), 2.25 mg (11/15/2023), 2.25 mg (11/22/2023), 2.25 mg (11/29/2023), 2.25 mg (12/13/2023), 2.25 mg (12/20/2023), 2.25 mg (12/26/2023)  lenalidomide 10 mg Cap, 1 capsule (100 % of original dose 1 capsule), Oral, Daily, 1 of 1 cycle, Start date: 7/26/2023, End date: 8/2/2023  Dose modification: 1 capsule (original dose 1 capsule, Cycle 0)  daratumumab-hyaluronidase-fihj subcutaneous injection 1,800 mg, 1,800 mg (100 % of original dose 1,800 mg), Subcutaneous, Clinic[facility] 1 time, 6 of 6 cycles  Dose modification: 1,800 mg (original dose 1,800 mg, Cycle 1), 1,800 mg (original dose 1,800 mg, Cycle 1)  Administration: 1,800 mg (7/6/2023), 1,800 mg (7/12/2023), 1,800  mg (7/19/2023), 1,800 mg (7/26/2023), 1,800 mg (8/2/2023), 1,800 mg (8/9/2023), 1,800 mg (8/16/2023), 1,800 mg (9/27/2023), 1,800 mg (10/18/2023), 1,800 mg (11/1/2023), 1,800 mg (11/15/2023), 1,800 mg (11/29/2023), 1,800 mg (12/13/2023), 1,800 mg (12/26/2023)           MARIBETH Bonilla  63 y.o.  female with past medical history significant for hypertension, COPD, asthma, GERD, hypothyroidism here for follow-up and management of multiple myeloma     She was referred in 2/2023 by her PCP after workup for gastritis revealed lytic lesions. CT chest showed lytic and expansile destructive lesion in the sternum and lytic lesions at L1. Biopsy of L1 lesion in 3/2023 revealed mature B cell neoplasm most consistent with plasma cell neoplasm.      Bone marrow biopsy in 4/2023 demonstrated 60% plasma cells. PET/CT showed extensive bony lesions throughout the spine, sternum, bilateral ribs, pelvis, and a mild compression deformity in L1. SPEP/MECHE showed IgG M spike 3.19 g/dL, IgG 4,521 mg/dL.      She was started on VRd and then daratumumab was added by the transplant team. She was started on ASA for thrombosis prophylaxis and acyclovir for herpes zoster prophylaxis. Start Zometa after dental evaluation.     Past Medical History:   Diagnosis Date    Allergy     Amblyopia OS    Anxiety     Asthma     COPD (chronic obstructive pulmonary disease)     NO HOME o2    GERD (gastroesophageal reflux disease)     Hyperlipidemia     Hypertension     PONV (postoperative nausea and vomiting)     Thyroid disease        Past Surgical History:   Procedure Laterality Date    APPENDECTOMY      BIOPSY N/A 06/08/2023    Procedure: BIOPSY;  Surgeon: Fausto Smith MD;  Location: [facility] OR;  Service: Neurosurgery;  Laterality: N/A;  L1    BUNIONECTOMY      COLONOSCOPY N/A 12/04/2019    Procedure: COLONOSCOPY;  Surgeon: Danny Matos III, MD;  Location: [facility] ENDO;  Service: Endoscopy;  Laterality: N/A;    COLONOSCOPY N/A 10/24/2022     Procedure: COLONOSCOPY;  Surgeon: Danny Matos III, MD;  Location: [facility] ENDO;  Service: Endoscopy;  Laterality: N/A;    ESOPHAGOGASTRODUODENOSCOPY N/A 10/24/2022    Procedure: EGD (ESOPHAGOGASTRODUODENOSCOPY);  Surgeon: Danny Matos III, MD;  Location: [facility] ENDO;  Service: Endoscopy;  Laterality: N/A;    FIXATION KYPHOPLASTY Bilateral 2023    Procedure: KYPHOPLASTY;  Surgeon: Fausto Smith MD;  Location: [facility] OR;  Service: Neurosurgery;  Laterality: Bilateral;  kyphoplasty and radiofrequency ablation - L1    HYSTERECTOMY      PARTIAL//still with ovaries    neck fusion  2017    THYROIDECTOMY      TUBAL LIGATION         Family History   Problem Relation Age of Onset    Hypertension Mother     Diabetes Mother     Asthma Mother             Diabetes Father     Asthma Father     Stroke Maternal Grandmother     Prostate cancer Maternal Grandfather     Mental illness Son     Pancreatic cancer Maternal Uncle     Mental illness Other     Pancreatic cancer Other     Ovarian cancer Maternal Cousin        Review of patient's allergies indicates:   Allergen Reactions    Hydrocodone-acetaminophen Other (See Comments)     Causes pt to feel extremely sick        Social History     Tobacco Use    Smoking status: Every Day     Current packs/day: 0.50     Average packs/day: 0.5 packs/day for 50.0 years (25.0 ttl pk-yrs)     Types: Cigarettes     Passive exposure: Never    Smokeless tobacco: Never   Substance Use Topics    Alcohol use: Not Currently     Comment: quit    Drug use: No     Vitals:    24 0827   BP: 118/68   Pulse: 71   Resp: 20   Temp: 98.6 °F (37 °C)     Physical Exam  Constitutional:       General: She is not in acute distress.     Appearance: She is not ill-appearing or toxic-appearing.   HENT:      Head: Normocephalic and atraumatic.   Pulmonary:      Effort: Pulmonary effort is normal. No respiratory distress.   Musculoskeletal:         General: Tenderness (LLE) present.      Right  lower leg: No edema.      Left lower leg: No edema.   Neurological:      General: No focal deficit present.      Mental Status: She is oriented to person, place, and time. Mental status is at baseline.   Psychiatric:         Mood and Affect: Mood normal.       Labs   Labs:  Lab Visit on 01/23/2024   Component Date Value Ref Range Status    Sodium 01/23/2024 143  136 - 145 mmol/L Final    Potassium 01/23/2024 3.5  3.5 - 5.1 mmol/L Final    Chloride 01/23/2024 111 (H)  95 - 110 mmol/L Final    CO2 01/23/2024 23  23 - 29 mmol/L Final    Glucose 01/23/2024 110  70 - 110 mg/dL Final    BUN 01/23/2024 7 (L)  8 - 23 mg/dL Final    Creatinine 01/23/2024 0.9  0.5 - 1.4 mg/dL Final    Calcium 01/23/2024 8.2 (L)  8.7 - 10.5 mg/dL Final    Total Protein 01/23/2024 6.4  6.0 - 8.4 g/dL Final    Albumin 01/23/2024 3.6  3.5 - 5.2 g/dL Final    Total Bilirubin 01/23/2024 1.1 (H)  0.1 - 1.0 mg/dL Final    Comment: For infants and newborns, interpretation of results should be based  on gestational age, weight and in agreement with clinical  observations.    Premature Infant recommended reference ranges:  Up to 24 hours.............<8.0 mg/dL  Up to 48 hours............<12.0 mg/dL  3-5 days..................<15.0 mg/dL  6-29 days.................<15.0 mg/dL      Alkaline Phosphatase 01/23/2024 45 (L)  55 - 135 U/L Final    AST 01/23/2024 20  10 - 40 U/L Final    ALT 01/23/2024 17  10 - 44 U/L Final    eGFR 01/23/2024 >60  >60 mL/min/1.73 m^2 Final    Anion Gap 01/23/2024 9  8 - 16 mmol/L Final    WBC 01/23/2024 3.53 (L)  3.90 - 12.70 K/uL Final    RBC 01/23/2024 3.69 (L)  4.00 - 5.40 M/uL Final    Hemoglobin 01/23/2024 11.5 (L)  12.0 - 16.0 g/dL Final    Hematocrit 01/23/2024 35.8 (L)  37.0 - 48.5 % Final    MCV 01/23/2024 97  82 - 98 fL Final    MCH 01/23/2024 31.2 (H)  27.0 - 31.0 pg Final    MCHC 01/23/2024 32.1  32.0 - 36.0 g/dL Final    RDW 01/23/2024 14.2  11.5 - 14.5 % Final    Platelets 01/23/2024 224  150 - 450 K/uL Final     MPV 01/23/2024 8.8 (L)  9.2 - 12.9 fL Final    Immature Granulocytes 01/23/2024 0.0  0.0 - 0.5 % Final    Gran # (ANC) 01/23/2024 1.0 (L)  1.8 - 7.7 K/uL Final    Immature Grans (Abs) 01/23/2024 0.00  0.00 - 0.04 K/uL Final    Comment: Mild elevation in immature granulocytes is non specific and   can be seen in a variety of conditions including stress response,   acute inflammation, trauma and pregnancy. Correlation with other   laboratory and clinical findings is essential.      Lymph # 01/23/2024 1.4  1.0 - 4.8 K/uL Final    Mono # 01/23/2024 0.7  0.3 - 1.0 K/uL Final    Eos # 01/23/2024 0.4  0.0 - 0.5 K/uL Final    Baso # 01/23/2024 0.03  0.00 - 0.20 K/uL Final    nRBC 01/23/2024 0  0 /100 WBC Final    Gran % 01/23/2024 29.5 (L)  38.0 - 73.0 % Final    Lymph % 01/23/2024 38.8  18.0 - 48.0 % Final    Mono % 01/23/2024 21.0 (H)  4.0 - 15.0 % Final    Eosinophil % 01/23/2024 9.9 (H)  0.0 - 8.0 % Final    Basophil % 01/23/2024 0.8  0.0 - 1.9 % Final    Differential Method 01/23/2024 Automated   Final         Imaging/Pathology   - 06/06/2023 RIGHT ILIAC CREST BONE MARROW ASPIRATE, BONE MARROW CLOT, AND BONE MARROW CORE BIOPSY WITH:     CELLULARITY=40-60%, TRILINEAGE HEMATOPOIETIC ACTIVITY (M/E=2.9:1).   CONSISTENT WITH PLASMA CELL NEOPLASM(60%).  SEE COMMENT.   FOCAL GRADE 1 RETICULAR FIBROSIS.   CONGO RED NEGATIVE.   INCREASED STORAGE IRON.   ADEQUATE NUMBER OF MEGAKARYOCYTES.     Bone marrow karyotype results: 46, XX[20], female karyotype.     Myeloma fixed cell, high-risk, FISH:  Normal.  The result is within normal limits for 1q duplication, TP53 deletion and IGH rearrangement.         - 04/10/2023 PET: Numerous FDG avid predominately lytic osseous lesions as above.  Primary differential considerations would include multiple myeloma versus metastatic disease.  2. No FDG avid soft tissue masses or adenopathy demonstrated.  3. Mild pathologic compression deformity of the L1 vertebral body.                                           Assessment and Plan   IgG lambda Multiple myeloma, R-ISS stage I   Diagnosed June 2023 via bone marrow biopsy  Patient treatment had been held multiple times with few treatments cancelled and also Revlimid 10 mg daily given as unable to tolerate higher dose   Previous oncologist discussed with the patient and the transplant team BMT.  However, patient declined transplant at this time and it was recommended that we continue with Rosa-VRD for 6 cycles and then repeat bone marrow biopsy and PET scan. If VGPR or better, then continue with Revlimid maintenance only.  Continue prophylaxis with aspirin, acyclovir 400 mg b.i.d.  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   Follow up with WERNER    Infusion scheduling note   Zometa and Calcium today   Injection scheduling note    Labs CMP, CBC, magnesium and  phosphorus   Scheduling:  Preferred lab:  Lab interval:     Imaging Other   US left leg today   Pharmacy appointment    Other referrals       Additional referrals needed  Needs BM Biopsy STAT          The patient was seen, interviewed and examined. Pertinent lab and radiologic studies were reviewed. Pt instructed to call should they develop concerning signs/symptoms or have further questions.        Portions of the record may have been created with voice recognition software. Occasional wrong-word or sound-a-like substitutions may have occurred due to the inherent limitations of voice recognition software. Read the chart carefully and recognize, using context, where substitutions have occurred.      Bri Michelle MD    Hematology/Oncology

## 2024-01-26 DIAGNOSIS — C90.00 MULTIPLE MYELOMA, REMISSION STATUS UNSPECIFIED: ICD-10-CM

## 2024-01-31 RX ORDER — LENALIDOMIDE 10 MG/1
CAPSULE ORAL
Qty: 21 EACH | Refills: 5 | Status: SHIPPED | OUTPATIENT
Start: 2024-01-31 | End: 2024-04-15 | Stop reason: SDUPTHER

## 2024-02-06 ENCOUNTER — LAB VISIT (OUTPATIENT)
Dept: LAB | Facility: HOSPITAL | Age: 64
End: 2024-02-06
Attending: INTERNAL MEDICINE
Payer: COMMERCIAL

## 2024-02-06 DIAGNOSIS — C90.00 MULTIPLE MYELOMA, REMISSION STATUS UNSPECIFIED: ICD-10-CM

## 2024-02-06 LAB
ALBUMIN SERPL BCP-MCNC: 3.7 G/DL (ref 3.5–5.2)
ALP SERPL-CCNC: 69 U/L (ref 55–135)
ALT SERPL W/O P-5'-P-CCNC: 16 U/L (ref 10–44)
ANION GAP SERPL CALC-SCNC: 9 MMOL/L (ref 8–16)
AST SERPL-CCNC: 23 U/L (ref 10–40)
BASOPHILS # BLD AUTO: 0.12 K/UL (ref 0–0.2)
BASOPHILS NFR BLD: 2.5 % (ref 0–1.9)
BILIRUB SERPL-MCNC: 0.6 MG/DL (ref 0.1–1)
BUN SERPL-MCNC: 8 MG/DL (ref 8–23)
CALCIUM SERPL-MCNC: 8.6 MG/DL (ref 8.7–10.5)
CHLORIDE SERPL-SCNC: 112 MMOL/L (ref 95–110)
CO2 SERPL-SCNC: 23 MMOL/L (ref 23–29)
CREAT SERPL-MCNC: 1.3 MG/DL (ref 0.5–1.4)
DIFFERENTIAL METHOD BLD: ABNORMAL
EOSINOPHIL # BLD AUTO: 0.3 K/UL (ref 0–0.5)
EOSINOPHIL NFR BLD: 5.3 % (ref 0–8)
ERYTHROCYTE [DISTWIDTH] IN BLOOD BY AUTOMATED COUNT: 13.4 % (ref 11.5–14.5)
EST. GFR  (NO RACE VARIABLE): 46 ML/MIN/1.73 M^2
GLUCOSE SERPL-MCNC: 100 MG/DL (ref 70–110)
HCT VFR BLD AUTO: 36.6 % (ref 37–48.5)
HGB BLD-MCNC: 12.2 G/DL (ref 12–16)
IMM GRANULOCYTES # BLD AUTO: 0.01 K/UL (ref 0–0.04)
IMM GRANULOCYTES NFR BLD AUTO: 0.2 % (ref 0–0.5)
LYMPHOCYTES # BLD AUTO: 1.9 K/UL (ref 1–4.8)
LYMPHOCYTES NFR BLD: 40.6 % (ref 18–48)
MAGNESIUM SERPL-MCNC: 1.7 MG/DL (ref 1.6–2.6)
MCH RBC QN AUTO: 31.9 PG (ref 27–31)
MCHC RBC AUTO-ENTMCNC: 33.3 G/DL (ref 32–36)
MCV RBC AUTO: 96 FL (ref 82–98)
MONOCYTES # BLD AUTO: 0.6 K/UL (ref 0.3–1)
MONOCYTES NFR BLD: 12.7 % (ref 4–15)
NEUTROPHILS # BLD AUTO: 1.8 K/UL (ref 1.8–7.7)
NEUTROPHILS NFR BLD: 38.7 % (ref 38–73)
NRBC BLD-RTO: 0 /100 WBC
PHOSPHATE SERPL-MCNC: 2 MG/DL (ref 2.7–4.5)
PLATELET # BLD AUTO: 355 K/UL (ref 150–450)
PMV BLD AUTO: 9.2 FL (ref 9.2–12.9)
POTASSIUM SERPL-SCNC: 2.9 MMOL/L (ref 3.5–5.1)
PROT SERPL-MCNC: 6.8 G/DL (ref 6–8.4)
RBC # BLD AUTO: 3.83 M/UL (ref 4–5.4)
SODIUM SERPL-SCNC: 144 MMOL/L (ref 136–145)
WBC # BLD AUTO: 4.73 K/UL (ref 3.9–12.7)

## 2024-02-06 PROCEDURE — 83735 ASSAY OF MAGNESIUM: CPT | Performed by: INTERNAL MEDICINE

## 2024-02-06 PROCEDURE — 85025 COMPLETE CBC W/AUTO DIFF WBC: CPT | Performed by: INTERNAL MEDICINE

## 2024-02-06 PROCEDURE — 80053 COMPREHEN METABOLIC PANEL: CPT | Performed by: INTERNAL MEDICINE

## 2024-02-06 PROCEDURE — 36415 COLL VENOUS BLD VENIPUNCTURE: CPT | Performed by: INTERNAL MEDICINE

## 2024-02-06 PROCEDURE — 84100 ASSAY OF PHOSPHORUS: CPT | Performed by: INTERNAL MEDICINE

## 2024-02-07 ENCOUNTER — OFFICE VISIT (OUTPATIENT)
Dept: HEMATOLOGY/ONCOLOGY | Facility: CLINIC | Age: 64
End: 2024-02-07
Payer: COMMERCIAL

## 2024-02-07 VITALS
OXYGEN SATURATION: 100 % | RESPIRATION RATE: 18 BRPM | TEMPERATURE: 98 F | BODY MASS INDEX: 23.37 KG/M2 | HEART RATE: 70 BPM | HEIGHT: 64 IN | WEIGHT: 136.88 LBS | SYSTOLIC BLOOD PRESSURE: 127 MMHG | DIASTOLIC BLOOD PRESSURE: 74 MMHG

## 2024-02-07 DIAGNOSIS — C79.51 SECONDARY MALIGNANT NEOPLASM OF BONE: Primary | ICD-10-CM

## 2024-02-07 DIAGNOSIS — Z79.899 IMMUNODEFICIENCY DUE TO CHEMOTHERAPY: ICD-10-CM

## 2024-02-07 DIAGNOSIS — C90.00 MULTIPLE MYELOMA, REMISSION STATUS UNSPECIFIED: ICD-10-CM

## 2024-02-07 DIAGNOSIS — T45.1X5A IMMUNODEFICIENCY DUE TO CHEMOTHERAPY: ICD-10-CM

## 2024-02-07 DIAGNOSIS — D84.821 IMMUNODEFICIENCY DUE TO CHEMOTHERAPY: ICD-10-CM

## 2024-02-07 PROCEDURE — 3078F DIAST BP <80 MM HG: CPT | Mod: CPTII,S$GLB,, | Performed by: INTERNAL MEDICINE

## 2024-02-07 PROCEDURE — 1159F MED LIST DOCD IN RCRD: CPT | Mod: CPTII,S$GLB,, | Performed by: INTERNAL MEDICINE

## 2024-02-07 PROCEDURE — 3008F BODY MASS INDEX DOCD: CPT | Mod: CPTII,S$GLB,, | Performed by: INTERNAL MEDICINE

## 2024-02-07 PROCEDURE — 99213 OFFICE O/P EST LOW 20 MIN: CPT | Mod: S$GLB,,, | Performed by: INTERNAL MEDICINE

## 2024-02-07 PROCEDURE — 99999 PR PBB SHADOW E&M-EST. PATIENT-LVL V: CPT | Mod: PBBFAC,,, | Performed by: INTERNAL MEDICINE

## 2024-02-07 PROCEDURE — 3074F SYST BP LT 130 MM HG: CPT | Mod: CPTII,S$GLB,, | Performed by: INTERNAL MEDICINE

## 2024-02-07 NOTE — H&P (VIEW-ONLY)
Patient ID: Ana Bonilla   Chief Complaint: Follow-up and Multiple Myeloma  MRN:  [id number]     Oncologic Diagnosis:  Multiple Myeloma - IgG lambda   Previous Treatment:  VRD (5/10/2023 - 6/28/2023)   Current Treatment:    D-VRD (7/6/2023 - 01/03/2024 )      Zometa (10/18/2023 - )   Subjective   Ana Bonilla is a pleasant 63 y.o. female who presents for follow up and treatment via virtual visit.    I reviewed her labs with her. She is doing okay.  She continues to have poor appetite and back/hip pain. It is time for repeat bone marrow biopsy.  PET-CT is stable. Her back pain is worse so will give temporary prescription for Percocet.  Review of Systems:  Review of Systems   Constitutional:  Positive for appetite change (decreased) and fatigue. Negative for activity change, chills, diaphoresis, fever and unexpected weight change.   HENT:  Negative for nosebleeds.    Respiratory:  Negative for shortness of breath.    Cardiovascular:  Negative for chest pain.   Gastrointestinal:  Negative for abdominal pain, blood in stool, diarrhea, nausea and vomiting.   Endocrine: Positive for cold intolerance.   Genitourinary:  Negative for difficulty urinating and hematuria.   Musculoskeletal:  Positive for back pain. Negative for arthralgias and myalgias.   Skin:  Negative for rash.   Neurological:  Negative for dizziness, weakness, light-headedness and headaches.   Hematological:  Does not bruise/bleed easily.   Psychiatric/Behavioral:  The patient is not nervous/anxious.      History     Oncology History   Multiple myeloma   4/20/2023 Initial Diagnosis    Multiple myeloma     5/10/2023 - 6/28/2023 Chemotherapy    Treatment Summary   Plan Name: OP VRD - WEEKLY BORTEZOMIB LENALIDOMIDE DEXAMETHASONE Q3W  Treatment Goal: Palliative  Status: Inactive  Start Date: 5/10/2023  End Date: 6/28/2023  Provider: Abram Velasquez MD  Chemotherapy: bortezomib (VELCADE) injection 2 mg, 1.3 mg/m2 = 2 mg, Subcutaneous, Clinic/[facility] 1  time, 2 of 6 cycles  Administration: 2 mg (5/10/2023), 2 mg (5/17/2023), 2 mg (6/14/2023), 2 mg (5/31/2023), 2 mg (6/21/2023), 2 mg (6/28/2023)  lenalidomide 25 mg Cap, 25 mg, Oral, Daily, 1 of 1 cycle, Start date: 5/10/2023, End date: 5/17/2023 6/28/2023 Cancer Staged    Staging form: Plasma Cell Myeloma and Plasma Cell Disorders, AJCC 8th Edition  - Clinical stage from 6/28/2023: RISS Stage II (Beta-2-microglobulin (mg/L): 1.9, Albumin (g/dL): 3, ISS: Stage II, High-risk cytogenetics: Absent, LDH: Normal)     7/6/2023 -  Chemotherapy    Treatment Summary   Plan Name: OP D-VRD DARATUMUMAB + BORTEZOMIB LENALIDOMIDE DEXAMETHASONE  Treatment Goal: Palliative  Status: Active  Start Date: 7/6/2023  End Date: 1/3/2024  Provider: Oscar Márquez MD  Chemotherapy: bortezomib (VELCADE) injection 2 mg, 1.3 mg/m2 = 2 mg, Subcutaneous, [facility] 1 time, 6 of 6 cycles  Administration: 2 mg (7/6/2023), 2 mg (7/12/2023), 2 mg (8/2/2023), 2 mg (8/9/2023), 2.25 mg (10/18/2023), 2 mg (7/19/2023), 2 mg (7/26/2023), 2 mg (8/16/2023), 2.25 mg (9/20/2023), 2.25 mg (9/27/2023), 2.25 mg (10/25/2023), 2.25 mg (11/1/2023), 2.25 mg (11/8/2023), 2.25 mg (12/6/2023), 2.25 mg (1/3/2024), 2.25 mg (11/15/2023), 2.25 mg (11/22/2023), 2.25 mg (11/29/2023), 2.25 mg (12/13/2023), 2.25 mg (12/20/2023), 2.25 mg (12/26/2023)  lenalidomide 10 mg Cap, 1 capsule (100 % of original dose 1 capsule), Oral, Daily, 1 of 1 cycle, Start date: 7/26/2023, End date: 8/2/2023  Dose modification: 1 capsule (original dose 1 capsule, Cycle 0)  daratumumab-hyaluronidase-fihj subcutaneous injection 1,800 mg, 1,800 mg (100 % of original dose 1,800 mg), Subcutaneous, [facility] 1 time, 6 of 6 cycles  Dose modification: 1,800 mg (original dose 1,800 mg, Cycle 1), 1,800 mg (original dose 1,800 mg, Cycle 1)  Administration: 1,800 mg (7/6/2023), 1,800 mg (7/12/2023), 1,800 mg (7/19/2023), 1,800 mg (7/26/2023), 1,800 mg (8/2/2023), 1,800 mg (8/9/2023), 1,800 mg  (8/16/2023), 1,800 mg (9/27/2023), 1,800 mg (10/18/2023), 1,800 mg (11/1/2023), 1,800 mg (11/15/2023), 1,800 mg (11/29/2023), 1,800 mg (12/13/2023), 1,800 mg (12/26/2023)           MARIBETH Bonilla  63 y.o.  female with past medical history significant for hypertension, COPD, asthma, GERD, hypothyroidism here for follow-up and management of multiple myeloma     She was referred in 2/2023 by her PCP after workup for gastritis revealed lytic lesions. CT chest showed lytic and expansile destructive lesion in the sternum and lytic lesions at L1. Biopsy of L1 lesion in 3/2023 revealed mature B cell neoplasm most consistent with plasma cell neoplasm.      Bone marrow biopsy in 4/2023 demonstrated 60% plasma cells. PET/CT showed extensive bony lesions throughout the spine, sternum, bilateral ribs, pelvis, and a mild compression deformity in L1. SPEP/MECHE showed IgG M spike 3.19 g/dL, IgG 4,521 mg/dL.      She was started on VRd and then daratumumab was added by the transplant team. She was started on ASA for thrombosis prophylaxis and acyclovir for herpes zoster prophylaxis. Start Zometa after dental evaluation.     Past Medical History:   Diagnosis Date    Allergy     Amblyopia OS    Anxiety     Asthma     COPD (chronic obstructive pulmonary disease)     NO HOME o2    GERD (gastroesophageal reflux disease)     Hyperlipidemia     Hypertension     PONV (postoperative nausea and vomiting)     Thyroid disease        Past Surgical History:   Procedure Laterality Date    APPENDECTOMY      BIOPSY N/A 06/08/2023    Procedure: BIOPSY;  Surgeon: Fausto Smith MD;  Location: [facility] OR;  Service: Neurosurgery;  Laterality: N/A;  L1    BUNIONECTOMY      COLONOSCOPY N/A 12/04/2019    Procedure: COLONOSCOPY;  Surgeon: Danny Matos III, MD;  Location: [facility] ENDO;  Service: Endoscopy;  Laterality: N/A;    COLONOSCOPY N/A 10/24/2022    Procedure: COLONOSCOPY;  Surgeon: Danny Matos III, MD;  Location: [facility] ENDO;  Service:  Endoscopy;  Laterality: N/A;    ESOPHAGOGASTRODUODENOSCOPY N/A 10/24/2022    Procedure: EGD (ESOPHAGOGASTRODUODENOSCOPY);  Surgeon: Danny Matos III, MD;  Location: [facility] ENDO;  Service: Endoscopy;  Laterality: N/A;    FIXATION KYPHOPLASTY Bilateral 2023    Procedure: KYPHOPLASTY;  Surgeon: Fausto Smith MD;  Location: [facility] OR;  Service: Neurosurgery;  Laterality: Bilateral;  kyphoplasty and radiofrequency ablation - L1    HYSTERECTOMY      PARTIAL//still with ovaries    neck fusion  2017    THYROIDECTOMY      TUBAL LIGATION         Family History   Problem Relation Age of Onset    Hypertension Mother     Diabetes Mother     Asthma Mother             Diabetes Father     Asthma Father     Stroke Maternal Grandmother     Prostate cancer Maternal Grandfather     Mental illness Son     Pancreatic cancer Maternal Uncle     Mental illness Other     Pancreatic cancer Other     Ovarian cancer Maternal Cousin        Review of patient's allergies indicates:   Allergen Reactions    Hydrocodone-acetaminophen Other (See Comments)     Causes pt to feel extremely sick        Social History     Tobacco Use    Smoking status: Every Day     Current packs/day: 0.50     Average packs/day: 0.5 packs/day for 50.0 years (25.0 ttl pk-yrs)     Types: Cigarettes     Passive exposure: Never    Smokeless tobacco: Never   Substance Use Topics    Alcohol use: Not Currently     Comment: quit    Drug use: No     Physical Exam     Vitals:    24 0907   BP: 127/74   Pulse: 70   Resp: 18   Temp: 97.6 °F (36.4 °C)     Physical Exam  Constitutional:       General: She is not in acute distress.     Appearance: She is not ill-appearing or toxic-appearing.   HENT:      Head: Normocephalic and atraumatic.   Pulmonary:      Effort: Pulmonary effort is normal. No respiratory distress.   Musculoskeletal:         General: Tenderness (LLE) present.      Right lower leg: No edema.      Left lower leg: No edema.   Neurological:       General: No focal deficit present.      Mental Status: She is oriented to person, place, and time. Mental status is at baseline.   Psychiatric:         Mood and Affect: Mood normal.       Labs   Labs:  Lab Visit on 02/06/2024   Component Date Value Ref Range Status    Phosphorus 02/06/2024 2.0 (L)  2.7 - 4.5 mg/dL Final    Magnesium 02/06/2024 1.7  1.6 - 2.6 mg/dL Final    Sodium 02/06/2024 144  136 - 145 mmol/L Final    Potassium 02/06/2024 2.9 (L)  3.5 - 5.1 mmol/L Final    Chloride 02/06/2024 112 (H)  95 - 110 mmol/L Final    CO2 02/06/2024 23  23 - 29 mmol/L Final    Glucose 02/06/2024 100  70 - 110 mg/dL Final    BUN 02/06/2024 8  8 - 23 mg/dL Final    Creatinine 02/06/2024 1.3  0.5 - 1.4 mg/dL Final    Calcium 02/06/2024 8.6 (L)  8.7 - 10.5 mg/dL Final    Total Protein 02/06/2024 6.8  6.0 - 8.4 g/dL Final    Albumin 02/06/2024 3.7  3.5 - 5.2 g/dL Final    Total Bilirubin 02/06/2024 0.6  0.1 - 1.0 mg/dL Final    Comment: For infants and newborns, interpretation of results should be based  on gestational age, weight and in agreement with clinical  observations.    Premature Infant recommended reference ranges:  Up to 24 hours.............<8.0 mg/dL  Up to 48 hours............<12.0 mg/dL  3-5 days..................<15.0 mg/dL  6-29 days.................<15.0 mg/dL      Alkaline Phosphatase 02/06/2024 69  55 - 135 U/L Final    AST 02/06/2024 23  10 - 40 U/L Final    ALT 02/06/2024 16  10 - 44 U/L Final    eGFR 02/06/2024 46 (A)  >60 mL/min/1.73 m^2 Final    Anion Gap 02/06/2024 9  8 - 16 mmol/L Final    WBC 02/06/2024 4.73  3.90 - 12.70 K/uL Final    RBC 02/06/2024 3.83 (L)  4.00 - 5.40 M/uL Final    Hemoglobin 02/06/2024 12.2  12.0 - 16.0 g/dL Final    Hematocrit 02/06/2024 36.6 (L)  37.0 - 48.5 % Final    MCV 02/06/2024 96  82 - 98 fL Final    MCH 02/06/2024 31.9 (H)  27.0 - 31.0 pg Final    MCHC 02/06/2024 33.3  32.0 - 36.0 g/dL Final    RDW 02/06/2024 13.4  11.5 - 14.5 % Final    Platelets 02/06/2024 355  150  - 450 K/uL Final    MPV 02/06/2024 9.2  9.2 - 12.9 fL Final    Immature Granulocytes 02/06/2024 0.2  0.0 - 0.5 % Final    Gran # (ANC) 02/06/2024 1.8  1.8 - 7.7 K/uL Final    Immature Grans (Abs) 02/06/2024 0.01  0.00 - 0.04 K/uL Final    Comment: Mild elevation in immature granulocytes is non specific and   can be seen in a variety of conditions including stress response,   acute inflammation, trauma and pregnancy. Correlation with other   laboratory and clinical findings is essential.      Lymph # 02/06/2024 1.9  1.0 - 4.8 K/uL Final    Mono # 02/06/2024 0.6  0.3 - 1.0 K/uL Final    Eos # 02/06/2024 0.3  0.0 - 0.5 K/uL Final    Baso # 02/06/2024 0.12  0.00 - 0.20 K/uL Final    nRBC 02/06/2024 0  0 /100 WBC Final    Gran % 02/06/2024 38.7  38.0 - 73.0 % Final    Lymph % 02/06/2024 40.6  18.0 - 48.0 % Final    Mono % 02/06/2024 12.7  4.0 - 15.0 % Final    Eosinophil % 02/06/2024 5.3  0.0 - 8.0 % Final    Basophil % 02/06/2024 2.5 (H)  0.0 - 1.9 % Final    Differential Method 02/06/2024 Automated   Final         Imaging/Pathology   - 06/06/2023 RIGHT ILIAC CREST BONE MARROW ASPIRATE, BONE MARROW CLOT, AND BONE MARROW CORE BIOPSY WITH:     CELLULARITY=40-60%, TRILINEAGE HEMATOPOIETIC ACTIVITY (M/E=2.9:1).   CONSISTENT WITH PLASMA CELL NEOPLASM(60%).  SEE COMMENT.   FOCAL GRADE 1 RETICULAR FIBROSIS.   CONGO RED NEGATIVE.   INCREASED STORAGE IRON.   ADEQUATE NUMBER OF MEGAKARYOCYTES.     Bone marrow karyotype results: 46, XX[20], female karyotype.     Myeloma fixed cell, high-risk, FISH:  Normal.  The result is within normal limits for 1q duplication, TP53 deletion and IGH rearrangement.         - 04/10/2023 PET: Numerous FDG avid predominately lytic osseous lesions as above.  Primary differential considerations would include multiple myeloma versus metastatic disease.  2. No FDG avid soft tissue masses or adenopathy demonstrated.  3. Mild pathologic compression deformity of the L1 vertebral body.                         "                  Assessment and Plan   IgG lambda Multiple myeloma, R-ISS stage I   Diagnosed June 2023 via bone marrow biopsy  Patient treatment had been held multiple times with few treatments cancelled and also Revlimid 10 mg daily given as unable to tolerate higher dose   Previous oncologist discussed with the patient and the transplant team BMT.  However, patient declined transplant at this time and it was recommended that we continue with Rosa-VRD for 6 cycles and then repeat bone marrow biopsy and PET scan. If VGPR or better, then continue with Revlimid maintenance only.  Continue prophylaxis with aspirin, acyclovir 400 mg b.i.d.  Continue Zometa/calcium and vitamin-D supplements (Due for Zometa 01/13/23)  Repeat PET-CT stable  BM Biopsy 02/13/2024  Continue follow up with BMT team        Back Pain, Left leg pain   Obtain LLE ultrasound - negative  Refill Percocet for pain     checked: 2/7/24  " checked" means that the [facility] controlled substance prescription fill site was checked for this patient as part of this visit.  The filled prescriptions are compared with the charted prescriptions and the restriction of pill amounts per month, numbers of prescribers and number of pharmacies used.  This complex task take 5 minutes and it is required for patients being managed with opioids.     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, Hordeolum externum of left eyelid  Per Ophthalmology, this is a chronic condition and is unrelated to and not a contraindication for treatment of MM  Has had kenalog injection  Continue follow-up with Ophthalmology  Continue antibiotic/cream and warm compresses        Cancer " Screening  MMG 03/17/2023:  BI-RADS 1  PAP Smear:  Partial hysterectomy  Colonoscopy 10/24/2022:  Normal repeat in 5 years        Chronic Medical Conditions  Asthma  Anxiety   COPD   GERD  Hypertension   Hyperlipidemia   Hypothyroidism   ?IBS-C on [employer]        [facility] Onc Chart Routing      Follow up with physician 2 weeks. Dr. Statton - Review BM Biopsy Results/Treatment Regimen   Follow up with WERNER    Infusion scheduling note    Injection scheduling note    Labs CBC, CMP, magnesium and phosphorus   Scheduling:  Preferred lab:  Lab interval:     Imaging    Pharmacy appointment    Other referrals               The patient was seen, interviewed and examined. Pertinent lab and radiologic studies were reviewed. Pt instructed to call should they develop concerning signs/symptoms or have further questions.        Portions of the record may have been created with voice recognition software. Occasional wrong-word or sound-a-like substitutions may have occurred due to the inherent limitations of voice recognition software. Read the chart carefully and recognize, using context, where substitutions have occurred.      Bri Michelle MD    Hematology/Oncology

## 2024-02-07 NOTE — PROGRESS NOTES
Patient ID: Ana Bonilla   Chief Complaint: Follow-up and Multiple Myeloma  MRN:  [id number]     Oncologic Diagnosis:  Multiple Myeloma - IgG lambda   Previous Treatment:  VRD (5/10/2023 - 6/28/2023)   Current Treatment:    D-VRD (7/6/2023 - 01/03/2024 )      Zometa (10/18/2023 - )   Subjective   Ana Bonilla is a pleasant 63 y.o. female who presents for follow up and treatment via virtual visit.    I reviewed her labs with her. She is doing okay.  She continues to have poor appetite and back/hip pain. It is time for repeat bone marrow biopsy.  PET-CT is stable. Her back pain is worse so will give temporary prescription for Percocet.  Review of Systems:  Review of Systems   Constitutional:  Positive for appetite change (decreased) and fatigue. Negative for activity change, chills, diaphoresis, fever and unexpected weight change.   HENT:  Negative for nosebleeds.    Respiratory:  Negative for shortness of breath.    Cardiovascular:  Negative for chest pain.   Gastrointestinal:  Negative for abdominal pain, blood in stool, diarrhea, nausea and vomiting.   Endocrine: Positive for cold intolerance.   Genitourinary:  Negative for difficulty urinating and hematuria.   Musculoskeletal:  Positive for back pain. Negative for arthralgias and myalgias.   Skin:  Negative for rash.   Neurological:  Negative for dizziness, weakness, light-headedness and headaches.   Hematological:  Does not bruise/bleed easily.   Psychiatric/Behavioral:  The patient is not nervous/anxious.      History     Oncology History   Multiple myeloma   4/20/2023 Initial Diagnosis    Multiple myeloma     5/10/2023 - 6/28/2023 Chemotherapy    Treatment Summary   Plan Name: OP VRD - WEEKLY BORTEZOMIB LENALIDOMIDE DEXAMETHASONE Q3W  Treatment Goal: Palliative  Status: Inactive  Start Date: 5/10/2023  End Date: 6/28/2023  Provider: Abram Velasquez MD  Chemotherapy: bortezomib (VELCADE) injection 2 mg, 1.3 mg/m2 = 2 mg, Subcutaneous, Clinic/[facility] 1  time, 2 of 6 cycles  Administration: 2 mg (5/10/2023), 2 mg (5/17/2023), 2 mg (6/14/2023), 2 mg (5/31/2023), 2 mg (6/21/2023), 2 mg (6/28/2023)  lenalidomide 25 mg Cap, 25 mg, Oral, Daily, 1 of 1 cycle, Start date: 5/10/2023, End date: 5/17/2023 6/28/2023 Cancer Staged    Staging form: Plasma Cell Myeloma and Plasma Cell Disorders, AJCC 8th Edition  - Clinical stage from 6/28/2023: RISS Stage II (Beta-2-microglobulin (mg/L): 1.9, Albumin (g/dL): 3, ISS: Stage II, High-risk cytogenetics: Absent, LDH: Normal)     7/6/2023 -  Chemotherapy    Treatment Summary   Plan Name: OP D-VRD DARATUMUMAB + BORTEZOMIB LENALIDOMIDE DEXAMETHASONE  Treatment Goal: Palliative  Status: Active  Start Date: 7/6/2023  End Date: 1/3/2024  Provider: Oscar Márquez MD  Chemotherapy: bortezomib (VELCADE) injection 2 mg, 1.3 mg/m2 = 2 mg, Subcutaneous, Clinic/[facility] 1 time, 6 of 6 cycles  Administration: 2 mg (7/6/2023), 2 mg (7/12/2023), 2 mg (8/2/2023), 2 mg (8/9/2023), 2.25 mg (10/18/2023), 2 mg (7/19/2023), 2 mg (7/26/2023), 2 mg (8/16/2023), 2.25 mg (9/20/2023), 2.25 mg (9/27/2023), 2.25 mg (10/25/2023), 2.25 mg (11/1/2023), 2.25 mg (11/8/2023), 2.25 mg (12/6/2023), 2.25 mg (1/3/2024), 2.25 mg (11/15/2023), 2.25 mg (11/22/2023), 2.25 mg (11/29/2023), 2.25 mg (12/13/2023), 2.25 mg (12/20/2023), 2.25 mg (12/26/2023)  lenalidomide 10 mg Cap, 1 capsule (100 % of original dose 1 capsule), Oral, Daily, 1 of 1 cycle, Start date: 7/26/2023, End date: 8/2/2023  Dose modification: 1 capsule (original dose 1 capsule, Cycle 0)  daratumumab-hyaluronidase-fihj subcutaneous injection 1,800 mg, 1,800 mg (100 % of original dose 1,800 mg), Subcutaneous, Clinic/[facility] 1 time, 6 of 6 cycles  Dose modification: 1,800 mg (original dose 1,800 mg, Cycle 1), 1,800 mg (original dose 1,800 mg, Cycle 1)  Administration: 1,800 mg (7/6/2023), 1,800 mg (7/12/2023), 1,800 mg (7/19/2023), 1,800 mg (7/26/2023), 1,800 mg (8/2/2023), 1,800 mg (8/9/2023), 1,800 mg  (8/16/2023), 1,800 mg (9/27/2023), 1,800 mg (10/18/2023), 1,800 mg (11/1/2023), 1,800 mg (11/15/2023), 1,800 mg (11/29/2023), 1,800 mg (12/13/2023), 1,800 mg (12/26/2023)           MARIBETH Bonilla  63 y.o.  female with past medical history significant for hypertension, COPD, asthma, GERD, hypothyroidism here for follow-up and management of multiple myeloma     She was referred in 2/2023 by her PCP after workup for gastritis revealed lytic lesions. CT chest showed lytic and expansile destructive lesion in the sternum and lytic lesions at L1. Biopsy of L1 lesion in 3/2023 revealed mature B cell neoplasm most consistent with plasma cell neoplasm.      Bone marrow biopsy in 4/2023 demonstrated 60% plasma cells. PET/CT showed extensive bony lesions throughout the spine, sternum, bilateral ribs, pelvis, and a mild compression deformity in L1. SPEP/MECHE showed IgG M spike 3.19 g/dL, IgG 4,521 mg/dL.      She was started on VRd and then daratumumab was added by the transplant team. She was started on ASA for thrombosis prophylaxis and acyclovir for herpes zoster prophylaxis. Start Zometa after dental evaluation.     Past Medical History:   Diagnosis Date    Allergy     Amblyopia OS    Anxiety     Asthma     COPD (chronic obstructive pulmonary disease)     NO HOME o2    GERD (gastroesophageal reflux disease)     Hyperlipidemia     Hypertension     PONV (postoperative nausea and vomiting)     Thyroid disease        Past Surgical History:   Procedure Laterality Date    APPENDECTOMY      BIOPSY N/A 06/08/2023    Procedure: BIOPSY;  Surgeon: Fausto Smith MD;  Location: [facility] OR;  Service: Neurosurgery;  Laterality: N/A;  L1    BUNIONECTOMY      COLONOSCOPY N/A 12/04/2019    Procedure: COLONOSCOPY;  Surgeon: Danny Matos III, MD;  Location: [facility] ENDO;  Service: Endoscopy;  Laterality: N/A;    COLONOSCOPY N/A 10/24/2022    Procedure: COLONOSCOPY;  Surgeon: Danny Matos III, MD;  Location: [facility] ENDO;  Service:  Endoscopy;  Laterality: N/A;    ESOPHAGOGASTRODUODENOSCOPY N/A 10/24/2022    Procedure: EGD (ESOPHAGOGASTRODUODENOSCOPY);  Surgeon: Danny Matos III, MD;  Location: [facility] ENDO;  Service: Endoscopy;  Laterality: N/A;    FIXATION KYPHOPLASTY Bilateral 2023    Procedure: KYPHOPLASTY;  Surgeon: Fausto Smith MD;  Location: [facility] OR;  Service: Neurosurgery;  Laterality: Bilateral;  kyphoplasty and radiofrequency ablation - L1    HYSTERECTOMY      PARTIAL//still with ovaries    neck fusion  2017    THYROIDECTOMY      TUBAL LIGATION         Family History   Problem Relation Age of Onset    Hypertension Mother     Diabetes Mother     Asthma Mother             Diabetes Father     Asthma Father     Stroke Maternal Grandmother     Prostate cancer Maternal Grandfather     Mental illness Son     Pancreatic cancer Maternal Uncle     Mental illness Other     Pancreatic cancer Other     Ovarian cancer Maternal Cousin        Review of patient's allergies indicates:   Allergen Reactions    Hydrocodone-acetaminophen Other (See Comments)     Causes pt to feel extremely sick        Social History     Tobacco Use    Smoking status: Every Day     Current packs/day: 0.50     Average packs/day: 0.5 packs/day for 50.0 years (25.0 ttl pk-yrs)     Types: Cigarettes     Passive exposure: Never    Smokeless tobacco: Never   Substance Use Topics    Alcohol use: Not Currently     Comment: quit    Drug use: No     Physical Exam     Vitals:    24 0907   BP: 127/74   Pulse: 70   Resp: 18   Temp: 97.6 °F (36.4 °C)     Physical Exam  Constitutional:       General: She is not in acute distress.     Appearance: She is not ill-appearing or toxic-appearing.   HENT:      Head: Normocephalic and atraumatic.   Pulmonary:      Effort: Pulmonary effort is normal. No respiratory distress.   Musculoskeletal:         General: Tenderness (LLE) present.      Right lower leg: No edema.      Left lower leg: No edema.   Neurological:       General: No focal deficit present.      Mental Status: She is oriented to person, place, and time. Mental status is at baseline.   Psychiatric:         Mood and Affect: Mood normal.       Labs   Labs:  Lab Visit on 02/06/2024   Component Date Value Ref Range Status    Phosphorus 02/06/2024 2.0 (L)  2.7 - 4.5 mg/dL Final    Magnesium 02/06/2024 1.7  1.6 - 2.6 mg/dL Final    Sodium 02/06/2024 144  136 - 145 mmol/L Final    Potassium 02/06/2024 2.9 (L)  3.5 - 5.1 mmol/L Final    Chloride 02/06/2024 112 (H)  95 - 110 mmol/L Final    CO2 02/06/2024 23  23 - 29 mmol/L Final    Glucose 02/06/2024 100  70 - 110 mg/dL Final    BUN 02/06/2024 8  8 - 23 mg/dL Final    Creatinine 02/06/2024 1.3  0.5 - 1.4 mg/dL Final    Calcium 02/06/2024 8.6 (L)  8.7 - 10.5 mg/dL Final    Total Protein 02/06/2024 6.8  6.0 - 8.4 g/dL Final    Albumin 02/06/2024 3.7  3.5 - 5.2 g/dL Final    Total Bilirubin 02/06/2024 0.6  0.1 - 1.0 mg/dL Final    Comment: For infants and newborns, interpretation of results should be based  on gestational age, weight and in agreement with clinical  observations.    Premature Infant recommended reference ranges:  Up to 24 hours.............<8.0 mg/dL  Up to 48 hours............<12.0 mg/dL  3-5 days..................<15.0 mg/dL  6-29 days.................<15.0 mg/dL      Alkaline Phosphatase 02/06/2024 69  55 - 135 U/L Final    AST 02/06/2024 23  10 - 40 U/L Final    ALT 02/06/2024 16  10 - 44 U/L Final    eGFR 02/06/2024 46 (A)  >60 mL/min/1.73 m^2 Final    Anion Gap 02/06/2024 9  8 - 16 mmol/L Final    WBC 02/06/2024 4.73  3.90 - 12.70 K/uL Final    RBC 02/06/2024 3.83 (L)  4.00 - 5.40 M/uL Final    Hemoglobin 02/06/2024 12.2  12.0 - 16.0 g/dL Final    Hematocrit 02/06/2024 36.6 (L)  37.0 - 48.5 % Final    MCV 02/06/2024 96  82 - 98 fL Final    MCH 02/06/2024 31.9 (H)  27.0 - 31.0 pg Final    MCHC 02/06/2024 33.3  32.0 - 36.0 g/dL Final    RDW 02/06/2024 13.4  11.5 - 14.5 % Final    Platelets 02/06/2024 355  150  - 450 K/uL Final    MPV 02/06/2024 9.2  9.2 - 12.9 fL Final    Immature Granulocytes 02/06/2024 0.2  0.0 - 0.5 % Final    Gran # (ANC) 02/06/2024 1.8  1.8 - 7.7 K/uL Final    Immature Grans (Abs) 02/06/2024 0.01  0.00 - 0.04 K/uL Final    Comment: Mild elevation in immature granulocytes is non specific and   can be seen in a variety of conditions including stress response,   acute inflammation, trauma and pregnancy. Correlation with other   laboratory and clinical findings is essential.      Lymph # 02/06/2024 1.9  1.0 - 4.8 K/uL Final    Mono # 02/06/2024 0.6  0.3 - 1.0 K/uL Final    Eos # 02/06/2024 0.3  0.0 - 0.5 K/uL Final    Baso # 02/06/2024 0.12  0.00 - 0.20 K/uL Final    nRBC 02/06/2024 0  0 /100 WBC Final    Gran % 02/06/2024 38.7  38.0 - 73.0 % Final    Lymph % 02/06/2024 40.6  18.0 - 48.0 % Final    Mono % 02/06/2024 12.7  4.0 - 15.0 % Final    Eosinophil % 02/06/2024 5.3  0.0 - 8.0 % Final    Basophil % 02/06/2024 2.5 (H)  0.0 - 1.9 % Final    Differential Method 02/06/2024 Automated   Final         Imaging/Pathology   - 06/06/2023 RIGHT ILIAC CREST BONE MARROW ASPIRATE, BONE MARROW CLOT, AND BONE MARROW CORE BIOPSY WITH:     CELLULARITY=40-60%, TRILINEAGE HEMATOPOIETIC ACTIVITY (M/E=2.9:1).   CONSISTENT WITH PLASMA CELL NEOPLASM(60%).  SEE COMMENT.   FOCAL GRADE 1 RETICULAR FIBROSIS.   CONGO RED NEGATIVE.   INCREASED STORAGE IRON.   ADEQUATE NUMBER OF MEGAKARYOCYTES.     Bone marrow karyotype results: 46, XX[20], female karyotype.     Myeloma fixed cell, high-risk, FISH:  Normal.  The result is within normal limits for 1q duplication, TP53 deletion and IGH rearrangement.         - 04/10/2023 PET: Numerous FDG avid predominately lytic osseous lesions as above.  Primary differential considerations would include multiple myeloma versus metastatic disease.  2. No FDG avid soft tissue masses or adenopathy demonstrated.  3. Mild pathologic compression deformity of the L1 vertebral body.                         "                  Assessment and Plan   IgG lambda Multiple myeloma, R-ISS stage I   Diagnosed June 2023 via bone marrow biopsy  Patient treatment had been held multiple times with few treatments cancelled and also Revlimid 10 mg daily given as unable to tolerate higher dose   Previous oncologist discussed with the patient and the transplant team BMT.  However, patient declined transplant at this time and it was recommended that we continue with Rosa-VRD for 6 cycles and then repeat bone marrow biopsy and PET scan. If VGPR or better, then continue with Revlimid maintenance only.  Continue prophylaxis with aspirin, acyclovir 400 mg b.i.d.  Continue Zometa/calcium and vitamin-D supplements (Due for Zometa 01/13/23)  Repeat PET-CT stable  BM Biopsy 02/13/2024  Continue follow up with BMT team        Back Pain, Left leg pain   Obtain LLE ultrasound - negative  Refill Percocet for pain     checked: 2/7/24  " checked" means that the [facility] controlled substance prescription fill site was checked for this patient as part of this visit.  The filled prescriptions are compared with the charted prescriptions and the restriction of pill amounts per month, numbers of prescribers and number of pharmacies used.  This complex task take 5 minutes and it is required for patients being managed with opioids.     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, Hordeolum externum of left eyelid  Per Ophthalmology, this is a chronic condition and is unrelated to and not a contraindication for treatment of MM  Has had kenalog injection  Continue follow-up with Ophthalmology  Continue antibiotic/cream and warm compresses        Cancer " Screening  MMG 03/17/2023:  BI-RADS 1  PAP Smear:  Partial hysterectomy  Colonoscopy 10/24/2022:  Normal repeat in 5 years        Chronic Medical Conditions  Asthma  Anxiety   COPD   GERD  Hypertension   Hyperlipidemia   Hypothyroidism   ?IBS-C on [employer]        [employer] Onc Chart Routing      Follow up with physician 2 weeks. Dr. Statton - Review BM Biopsy Results/Treatment Regimen   Follow up with WERNER    Infusion scheduling note    Injection scheduling note    Labs CBC, CMP, magnesium and phosphorus   Scheduling:  Preferred lab:  Lab interval:     Imaging    Pharmacy appointment    Other referrals               The patient was seen, interviewed and examined. Pertinent lab and radiologic studies were reviewed. Pt instructed to call should they develop concerning signs/symptoms or have further questions.        Portions of the record may have been created with voice recognition software. Occasional wrong-word or sound-a-like substitutions may have occurred due to the inherent limitations of voice recognition software. Read the chart carefully and recognize, using context, where substitutions have occurred.      Bri Michelle MD    Hematology/Oncology

## 2024-02-08 RX ORDER — OXYCODONE AND ACETAMINOPHEN 7.5; 325 MG/1; MG/1
1 TABLET ORAL EVERY 4 HOURS PRN
Qty: 45 TABLET | Refills: 0 | Status: SHIPPED | OUTPATIENT
Start: 2024-02-08 | End: 2024-06-06 | Stop reason: DRUGHIGH

## 2024-02-12 ENCOUNTER — PATIENT MESSAGE (OUTPATIENT)
Dept: PALLIATIVE MEDICINE | Facility: CLINIC | Age: 64
End: 2024-02-12
Payer: COMMERCIAL

## 2024-02-12 ENCOUNTER — TELEPHONE (OUTPATIENT)
Dept: RADIOLOGY | Facility: HOSPITAL | Age: 64
End: 2024-02-12
Payer: COMMERCIAL

## 2024-02-12 DIAGNOSIS — C90.00 MULTIPLE MYELOMA, REMISSION STATUS UNSPECIFIED: Primary | ICD-10-CM

## 2024-02-12 DIAGNOSIS — F41.9 ANXIETY: ICD-10-CM

## 2024-02-12 NOTE — TELEPHONE ENCOUNTER
Left message for pt to be here at [facility] on [address] at 0900, to be NPO after midnight, no blood thinners or NSAIDs, and to have someone to drive them home from procedure or phone number of someone to pick them up.

## 2024-02-13 RX ORDER — ALPRAZOLAM 2 MG/1
TABLET ORAL
Qty: 60 TABLET | Refills: 0 | Status: SHIPPED | OUTPATIENT
Start: 2024-02-13 | End: 2024-03-15

## 2024-02-15 ENCOUNTER — PATIENT MESSAGE (OUTPATIENT)
Dept: HEMATOLOGY/ONCOLOGY | Facility: CLINIC | Age: 64
End: 2024-02-15
Payer: COMMERCIAL

## 2024-02-19 ENCOUNTER — HOSPITAL ENCOUNTER (OUTPATIENT)
Dept: RADIOLOGY | Facility: HOSPITAL | Age: 64
Discharge: HOME | End: 2024-02-19
Attending: INTERNAL MEDICINE
Payer: COMMERCIAL

## 2024-02-19 VITALS
SYSTOLIC BLOOD PRESSURE: 130 MMHG | HEART RATE: 70 BPM | RESPIRATION RATE: 18 BRPM | DIASTOLIC BLOOD PRESSURE: 60 MMHG | WEIGHT: 129 LBS | BODY MASS INDEX: 22.02 KG/M2 | OXYGEN SATURATION: 97 % | HEIGHT: 64 IN

## 2024-02-19 DIAGNOSIS — C90.00 MULTIPLE MYELOMA, REMISSION STATUS UNSPECIFIED: Chronic | ICD-10-CM

## 2024-02-19 DIAGNOSIS — C79.51 SECONDARY MALIGNANT NEOPLASM OF BONE: ICD-10-CM

## 2024-02-19 DIAGNOSIS — Z51.11 ENCOUNTER FOR ANTINEOPLASTIC CHEMOTHERAPY: ICD-10-CM

## 2024-02-19 LAB
ALBUMIN SERPL BCP-MCNC: 3.8 G/DL (ref 3.5–5.2)
ALP SERPL-CCNC: 62 U/L (ref 55–135)
ALT SERPL W/O P-5'-P-CCNC: 14 U/L (ref 10–44)
ANION GAP SERPL CALC-SCNC: 9 MMOL/L (ref 8–16)
AST SERPL-CCNC: 20 U/L (ref 10–40)
BILIRUB SERPL-MCNC: 0.8 MG/DL (ref 0.1–1)
BUN SERPL-MCNC: 9 MG/DL (ref 8–23)
CALCIUM SERPL-MCNC: 9.2 MG/DL (ref 8.7–10.5)
CHLORIDE SERPL-SCNC: 108 MMOL/L (ref 95–110)
CO2 SERPL-SCNC: 28 MMOL/L (ref 23–29)
CREAT SERPL-MCNC: 1.1 MG/DL (ref 0.5–1.4)
EST. GFR  (NO RACE VARIABLE): 56 ML/MIN/1.73 M^2
GLUCOSE SERPL-MCNC: 109 MG/DL (ref 70–110)
INR PPP: 0.9 (ref 0.8–1.2)
POTASSIUM SERPL-SCNC: 2.9 MMOL/L (ref 3.5–5.1)
PROT SERPL-MCNC: 7.2 G/DL (ref 6–8.4)
PROTHROMBIN TIME: 10.8 SEC (ref 9–12.5)
SODIUM SERPL-SCNC: 145 MMOL/L (ref 136–145)

## 2024-02-19 PROCEDURE — 88264 CHROMOSOME ANALYSIS 20-25: CPT | Performed by: INTERNAL MEDICINE

## 2024-02-19 PROCEDURE — 88341 IMHCHEM/IMCYTCHM EA ADD ANTB: CPT | Performed by: PATHOLOGY

## 2024-02-19 PROCEDURE — 88342 IMHCHEM/IMCYTCHM 1ST ANTB: CPT | Performed by: PATHOLOGY

## 2024-02-19 PROCEDURE — 88185 FLOWCYTOMETRY/TC ADD-ON: CPT | Mod: 59 | Performed by: PATHOLOGY

## 2024-02-19 PROCEDURE — 85097 BONE MARROW INTERPRETATION: CPT | Mod: ,,, | Performed by: PATHOLOGY

## 2024-02-19 PROCEDURE — 38221 DX BONE MARROW BIOPSIES: CPT | Mod: RT,,, | Performed by: RADIOLOGY

## 2024-02-19 PROCEDURE — 88341 IMHCHEM/IMCYTCHM EA ADD ANTB: CPT | Mod: 26,,, | Performed by: PATHOLOGY

## 2024-02-19 PROCEDURE — 63600175 PHARM REV CODE 636 W HCPCS: Performed by: RADIOLOGY

## 2024-02-19 PROCEDURE — 88365 INSITU HYBRIDIZATION (FISH): CPT | Performed by: PATHOLOGY

## 2024-02-19 PROCEDURE — 88365 INSITU HYBRIDIZATION (FISH): CPT | Mod: 26,,, | Performed by: PATHOLOGY

## 2024-02-19 PROCEDURE — 88342 IMHCHEM/IMCYTCHM 1ST ANTB: CPT | Mod: 26,59,, | Performed by: PATHOLOGY

## 2024-02-19 PROCEDURE — C1830 POWER BONE MARROW BX NEEDLE: HCPCS

## 2024-02-19 PROCEDURE — 88364 INSITU HYBRIDIZATION (FISH): CPT | Performed by: PATHOLOGY

## 2024-02-19 PROCEDURE — 88305 TISSUE EXAM BY PATHOLOGIST: CPT | Mod: 26,,, | Performed by: PATHOLOGY

## 2024-02-19 PROCEDURE — 88311 DECALCIFY TISSUE: CPT | Performed by: PATHOLOGY

## 2024-02-19 PROCEDURE — 88237 TISSUE CULTURE BONE MARROW: CPT | Performed by: INTERNAL MEDICINE

## 2024-02-19 PROCEDURE — 77012 CT SCAN FOR NEEDLE BIOPSY: CPT | Mod: TC

## 2024-02-19 PROCEDURE — 80053 COMPREHEN METABOLIC PANEL: CPT | Performed by: RADIOLOGY

## 2024-02-19 PROCEDURE — 77012 CT SCAN FOR NEEDLE BIOPSY: CPT | Mod: 26,,, | Performed by: RADIOLOGY

## 2024-02-19 PROCEDURE — 88189 FLOWCYTOMETRY/READ 16 & >: CPT | Mod: ,,, | Performed by: PATHOLOGY

## 2024-02-19 PROCEDURE — 88364 INSITU HYBRIDIZATION (FISH): CPT | Mod: 26,,, | Performed by: PATHOLOGY

## 2024-02-19 PROCEDURE — 88305 TISSUE EXAM BY PATHOLOGIST: CPT | Mod: 59 | Performed by: PATHOLOGY

## 2024-02-19 PROCEDURE — 88313 SPECIAL STAINS GROUP 2: CPT | Mod: 26,,, | Performed by: PATHOLOGY

## 2024-02-19 PROCEDURE — 88184 FLOWCYTOMETRY/ TC 1 MARKER: CPT | Performed by: PATHOLOGY

## 2024-02-19 PROCEDURE — 85610 PROTHROMBIN TIME: CPT | Performed by: RADIOLOGY

## 2024-02-19 PROCEDURE — 88313 SPECIAL STAINS GROUP 2: CPT | Mod: 59 | Performed by: PATHOLOGY

## 2024-02-19 RX ORDER — ONDANSETRON HYDROCHLORIDE 2 MG/ML
4 INJECTION, SOLUTION INTRAVENOUS ONCE
Status: COMPLETED | OUTPATIENT
Start: 2024-02-19 | End: 2024-02-19

## 2024-02-19 RX ORDER — FENTANYL CITRATE 50 UG/ML
INJECTION, SOLUTION INTRAMUSCULAR; INTRAVENOUS CODE/TRAUMA/SEDATION MEDICATION
Status: COMPLETED | OUTPATIENT
Start: 2024-02-19 | End: 2024-02-19

## 2024-02-19 RX ORDER — MIDAZOLAM HYDROCHLORIDE 1 MG/ML
INJECTION INTRAMUSCULAR; INTRAVENOUS CODE/TRAUMA/SEDATION MEDICATION
Status: COMPLETED | OUTPATIENT
Start: 2024-02-19 | End: 2024-02-19

## 2024-02-19 RX ORDER — ONDANSETRON HYDROCHLORIDE 2 MG/ML
INJECTION, SOLUTION INTRAVENOUS CODE/TRAUMA/SEDATION MEDICATION
Status: COMPLETED | OUTPATIENT
Start: 2024-02-19 | End: 2024-02-19

## 2024-02-19 RX ADMIN — ONDANSETRON 4 MG: 2 INJECTION INTRAMUSCULAR; INTRAVENOUS at 11:02

## 2024-02-19 RX ADMIN — FENTANYL CITRATE 50 MCG: 50 INJECTION, SOLUTION INTRAMUSCULAR; INTRAVENOUS at 10:02

## 2024-02-19 RX ADMIN — MIDAZOLAM HYDROCHLORIDE 1 MG: 1 INJECTION, SOLUTION INTRAMUSCULAR; INTRAVENOUS at 10:02

## 2024-02-19 RX ADMIN — ONDANSETRON 4 MG: 2 INJECTION INTRAMUSCULAR; INTRAVENOUS at 10:02

## 2024-02-19 NOTE — DISCHARGE INSTRUCTIONS
Please return to ER if any of these symptoms occur:  Fever over 101 degrees,  Any purulent drainage from site (pus, yellow or has foul odor), or any redness or swelling to site  Bleeding from the puncture site not controlled, If bleeding occurs at site hold pressure for 5 mins.  If bleeding continues go to ER  Pain not controlled with Aleve or Tylenol,     No driving for 24 hours after procedure due to sedation given during procedure.      Do not submerge in standing water for 24 hours after biopsy but you may shower.     May change bandage if it becomes soiled and bandage may be removed in 24 hours.     Rest for the next couple of days. Do not lift any thing heavier than a gallon of milk.  Increase activity as tolerated.     Resume home medications and diet     Biopsy results will be with Dr. Luevano in 5-7 days, please follow up with her for results and any other questions or concerns that you may have.

## 2024-02-19 NOTE — DISCHARGE SUMMARY
[facility])  Discharge Note  Short Stay    CT Biopsy Bone Marrow (xpd)      OUTCOME: Patient tolerated treatment/procedure well without complication and is now ready for discharge.    DISPOSITION: Home or Self Care    FINAL DIAGNOSIS:  <principal problem not specified>    FOLLOWUP: In clinic    DISCHARGE INSTRUCTIONS:  No discharge procedures on file.      Clinical Reference Documents Added to Patient Instructions         Document    BONE MARROW ASPIRATION OR BIOPSY (ENGLISH)    PROCEDURAL SEDATION, ADULT ED (ENGLISH)            TIME SPENT ON DISCHARGE: 15 minutes    Pre Op Diagnosis: multiple myeloma     Post Op Diagnosis: same     Procedure:  Bone marrow biopsy     Procedure performed by: Wendy PAREDES, Yaakov OWUSU     Written Informed Consent Obtained: Yes     Specimen Removed:  yes     Estimated Blood Loss:  minimal     Findings: Local anesthesia and moderate sedation were used.     The patient tolerated the procedure well and there were no complications.      Disposition:  F/U in clinic    Discharge instructions:  Light activity for 24 hours.  Remove band aid in 24 hours.  No baths (showers are appropriate).    F/U with ordering physician    Sterile technique was performed in the right iliac, lidocaine was used as a local anesthetic.  Multiple samples taken percutaneously from the right iliac bone.  Pt tolerated the procedure well without immediate complications.  Please see radiologist report for details. F/u with PCP and/or ordering physician.

## 2024-02-19 NOTE — PLAN OF CARE
Band aid to lower back C/D/I with no bleeding/redness/swelling noted. VSS, NADN, and pt meets criteria for discharge. Discharge instructions given to and reviewed with pt, and pt verbalized understanding of all. Pt discharged to home, taken out via wheelchair and driven home by .

## 2024-02-19 NOTE — INTERVAL H&P NOTE
The patient has been examined and the H&P has been reviewed:    I concur with the findings and no changes have occurred since H&P was written.    Risks, benefits and alternatives to sedation and the procedure were given.  Informed consent was obatained.          There are no hospital problems to display for this patient.

## 2024-02-19 NOTE — PLAN OF CARE
Received patient from procedure via stretcher. VSS. Breathing even and unlabored. No complaints of pain or headache. Complains of nausea, zofran given.Procedure to bone marrow, bandage CDI. Family updated with patients status. Patient resting on stretcher.

## 2024-02-21 ENCOUNTER — OFFICE VISIT (OUTPATIENT)
Dept: FAMILY MEDICINE | Facility: CLINIC | Age: 64
End: 2024-02-21
Payer: COMMERCIAL

## 2024-02-21 VITALS
DIASTOLIC BLOOD PRESSURE: 70 MMHG | HEART RATE: 77 BPM | SYSTOLIC BLOOD PRESSURE: 124 MMHG | BODY MASS INDEX: 23.07 KG/M2 | OXYGEN SATURATION: 97 % | RESPIRATION RATE: 18 BRPM | WEIGHT: 135.13 LBS | TEMPERATURE: 97 F | HEIGHT: 64 IN

## 2024-02-21 DIAGNOSIS — M54.2 NECK PAIN: ICD-10-CM

## 2024-02-21 DIAGNOSIS — R05.9 COUGH, UNSPECIFIED TYPE: ICD-10-CM

## 2024-02-21 DIAGNOSIS — I73.9 PVD (PERIPHERAL VASCULAR DISEASE): ICD-10-CM

## 2024-02-21 DIAGNOSIS — J30.9 ALLERGIC RHINITIS, UNSPECIFIED SEASONALITY, UNSPECIFIED TRIGGER: ICD-10-CM

## 2024-02-21 DIAGNOSIS — M46.1 SACROILIITIS: Primary | ICD-10-CM

## 2024-02-21 DIAGNOSIS — R06.2 WHEEZING: ICD-10-CM

## 2024-02-21 DIAGNOSIS — U07.1 COVID-19: ICD-10-CM

## 2024-02-21 DIAGNOSIS — C90.00 MULTIPLE MYELOMA, REMISSION STATUS UNSPECIFIED: Chronic | ICD-10-CM

## 2024-02-21 DIAGNOSIS — R09.89 RUNNY NOSE: ICD-10-CM

## 2024-02-21 PROCEDURE — 3008F BODY MASS INDEX DOCD: CPT | Mod: CPTII,S$GLB,, | Performed by: NURSE PRACTITIONER

## 2024-02-21 PROCEDURE — 96372 THER/PROPH/DIAG INJ SC/IM: CPT | Mod: 59,S$GLB,, | Performed by: NURSE PRACTITIONER

## 2024-02-21 PROCEDURE — 94640 AIRWAY INHALATION TREATMENT: CPT | Mod: S$GLB,,, | Performed by: NURSE PRACTITIONER

## 2024-02-21 PROCEDURE — 99214 OFFICE O/P EST MOD 30 MIN: CPT | Mod: 25,S$GLB,, | Performed by: NURSE PRACTITIONER

## 2024-02-21 PROCEDURE — 1160F RVW MEDS BY RX/DR IN RCRD: CPT | Mod: CPTII,S$GLB,, | Performed by: NURSE PRACTITIONER

## 2024-02-21 PROCEDURE — 1159F MED LIST DOCD IN RCRD: CPT | Mod: CPTII,S$GLB,, | Performed by: NURSE PRACTITIONER

## 2024-02-21 PROCEDURE — 3078F DIAST BP <80 MM HG: CPT | Mod: CPTII,S$GLB,, | Performed by: NURSE PRACTITIONER

## 2024-02-21 PROCEDURE — 4010F ACE/ARB THERAPY RXD/TAKEN: CPT | Mod: CPTII,S$GLB,, | Performed by: NURSE PRACTITIONER

## 2024-02-21 PROCEDURE — 3074F SYST BP LT 130 MM HG: CPT | Mod: CPTII,S$GLB,, | Performed by: NURSE PRACTITIONER

## 2024-02-21 PROCEDURE — 99999 PR PBB SHADOW E&M-EST. PATIENT-LVL V: CPT | Mod: PBBFAC,,, | Performed by: NURSE PRACTITIONER

## 2024-02-21 RX ORDER — DEXBROMPHENIRAMINE MALEATE, PHENYLEPHRINE HYDROCHLORIDE 2; 7.5 MG/1; MG/1
1 TABLET ORAL EVERY 6 HOURS PRN
Qty: 30 TABLET | Refills: 2 | Status: SHIPPED | OUTPATIENT
Start: 2024-02-21 | End: 2024-05-07

## 2024-02-21 RX ORDER — IPRATROPIUM BROMIDE AND ALBUTEROL SULFATE 2.5; .5 MG/3ML; MG/3ML
3 SOLUTION RESPIRATORY (INHALATION)
Status: COMPLETED | OUTPATIENT
Start: 2024-02-21 | End: 2024-02-21

## 2024-02-21 RX ORDER — BENZONATATE 200 MG/1
200 CAPSULE ORAL 3 TIMES DAILY PRN
Qty: 30 CAPSULE | Refills: 2 | Status: SHIPPED | OUTPATIENT
Start: 2024-02-21

## 2024-02-21 RX ORDER — KETOROLAC TROMETHAMINE 30 MG/ML
60 INJECTION, SOLUTION INTRAMUSCULAR; INTRAVENOUS
Status: COMPLETED | OUTPATIENT
Start: 2024-02-21 | End: 2024-02-21

## 2024-02-21 RX ADMIN — KETOROLAC TROMETHAMINE 60 MG: 30 INJECTION, SOLUTION INTRAMUSCULAR; INTRAVENOUS at 03:02

## 2024-02-21 RX ADMIN — IPRATROPIUM BROMIDE AND ALBUTEROL SULFATE 3 ML: 2.5; .5 SOLUTION RESPIRATORY (INHALATION) at 03:02

## 2024-02-22 ENCOUNTER — PATIENT MESSAGE (OUTPATIENT)
Dept: HEMATOLOGY/ONCOLOGY | Facility: CLINIC | Age: 64
End: 2024-02-22
Payer: COMMERCIAL

## 2024-02-22 LAB
BODY SITE - BONE MARROW: NORMAL
CLINICAL DIAGNOSIS - BONE MARROW: NORMAL
COMMENT: NORMAL
FINAL PATHOLOGIC DIAGNOSIS: NORMAL
FLOW CYTOMETRY ANTIBODIES ANALYZED - BONE MARROW: NORMAL
FLOW CYTOMETRY COMMENT - BONE MARROW: NORMAL
FLOW CYTOMETRY INTERPRETATION - BONE MARROW: NORMAL
GROSS: NORMAL
Lab: NORMAL
MICROSCOPIC EXAM: NORMAL

## 2024-02-23 LAB
ANNOTATION COMMENT IMP: NORMAL
HOLDF INTERPRETATION: NORMAL
HOLDF REASON FOR REFERRAL: NORMAL
HOLDF RELEASED BY: NORMAL
HOLDF REQUESTED FISH TEST: NORMAL
HOLDF SOURCE: NORMAL
MOL DX INTERP BLD/T QL: NORMAL
REF LAB TEST METHOD: NORMAL
SPECIMEN TYPE: NORMAL

## 2024-02-27 LAB
CHROM BANDING METHOD: NORMAL
CHROMOSOME ANALYSIS BM ADDITIONAL INFORMATION: NORMAL
CHROMOSOME ANALYSIS BM RELEASED BY: NORMAL
CHROMOSOME ANALYSIS BM RESULT SUMMARY: NORMAL
CLINICAL CYTOGENETICIST REVIEW: NORMAL
KARYOTYP MAR: NORMAL
REASON FOR REFERRAL (NARRATIVE): NORMAL
REF LAB TEST METHOD: NORMAL
SPECIMEN SOURCE: NORMAL
SPECIMEN: NORMAL

## 2024-03-05 ENCOUNTER — OFFICE VISIT (OUTPATIENT)
Dept: PALLIATIVE MEDICINE | Facility: CLINIC | Age: 64
End: 2024-03-05
Payer: COMMERCIAL

## 2024-03-05 VITALS
TEMPERATURE: 98 F | DIASTOLIC BLOOD PRESSURE: 82 MMHG | OXYGEN SATURATION: 98 % | BODY MASS INDEX: 23.05 KG/M2 | SYSTOLIC BLOOD PRESSURE: 150 MMHG | HEART RATE: 86 BPM | WEIGHT: 134.25 LBS

## 2024-03-05 DIAGNOSIS — C90.00 MULTIPLE MYELOMA, REMISSION STATUS UNSPECIFIED: Primary | ICD-10-CM

## 2024-03-05 DIAGNOSIS — Z51.5 PALLIATIVE CARE ENCOUNTER: ICD-10-CM

## 2024-03-05 DIAGNOSIS — G62.9 NEUROPATHY: ICD-10-CM

## 2024-03-05 DIAGNOSIS — M25.551 RIGHT HIP PAIN: ICD-10-CM

## 2024-03-05 DIAGNOSIS — F41.9 ANXIETY: ICD-10-CM

## 2024-03-05 PROCEDURE — 4010F ACE/ARB THERAPY RXD/TAKEN: CPT | Mod: CPTII,S$GLB,,

## 2024-03-05 PROCEDURE — 1159F MED LIST DOCD IN RCRD: CPT | Mod: CPTII,S$GLB,,

## 2024-03-05 PROCEDURE — 3079F DIAST BP 80-89 MM HG: CPT | Mod: CPTII,S$GLB,,

## 2024-03-05 PROCEDURE — 99999 PR PBB SHADOW E&M-EST. PATIENT-LVL IV: CPT | Mod: PBBFAC,,,

## 2024-03-05 PROCEDURE — 3077F SYST BP >= 140 MM HG: CPT | Mod: CPTII,S$GLB,,

## 2024-03-05 PROCEDURE — 99215 OFFICE O/P EST HI 40 MIN: CPT | Mod: S$GLB,,,

## 2024-03-05 PROCEDURE — 3008F BODY MASS INDEX DOCD: CPT | Mod: CPTII,S$GLB,,

## 2024-03-05 NOTE — Clinical Note
Abe Chung,   I plan to see in 3mos if her new PCP will not prescribe her antianxiety meds.   Thank you,   EB

## 2024-03-05 NOTE — PROGRESS NOTES
Palliative Medicine  Follow-up   Consult Requested By: No ref. provider found  Reason for Consult: Symptom Management/Advance Care Planning/Goals of Care Discussion        SUBJECTIVE:     History of Present Illness:  Ana Bonilla is a 63 y.o. female with Multiple Myeloma (Apr. 2023) with metastasis to thoracic/lumbar spine, complicated by L1 compression fracture.  Pt declined SCT.   She was receiving D-VRD, Lenalidomide and Zometa. Currently on treatment holiday.   S/P L1 Kyphoplasty (June 2023).   Followed by Dr. Luevano, Dr. Garcia, and Neurosurgery.   The palliative care team was consulted to assist with symptom management, advance care planning, and goals of care discussion.     Chief Complaint: Right hip pain/Anxiety    3/5/2024 :Pt attended clinic alone. She was in no acute distress. /82. Pt is back on Amlodipine/Benazepril. She reports feeling stressed due to managing her son and 's health.   She is taking Xanax 2mg BID PRN. She does not wish to start mental health counseling.   She is also looking for a new PCP. She does not wish to continue seeing Dr. Yun. She will attempt re-establishing care with Kenrick Locke NP or Monse Castillo NP.   She is taking Percocet 7.5mg PRN for right hip/lower back pain. She has 40 of 45 tabs left.   She continues to use Medical Marijuana for pain and anxiety.   Her functional status has improved since treatment holiday.     LA  Reviewed and Summarized:          Past Visits:    12/21/2023:    The patient location is: [place]  The chief complaint leading to consultation is:  Follow-up     Visit type: audiovisual     Face to Face time with patient:  20 minutes  35 minutes of total time spent on the encounter, which includes face to face time and non-face to face time preparing to see the patient (eg, review of tests), Obtaining and/or reviewing separately obtained history, Documenting clinical information in the electronic or other health  "record, Independently interpreting results (not separately reported) and communicating results to the patient/family/caregiver, or Care coordination (not separately reported).      Each patient to whom he or she provides medical services by telemedicine is:  (1) informed of the relationship between the physician and patient and the respective role of any other health care provider with respect to management of the patient; and (2) notified that he or she may decline to receive medical services by telemedicine and may withdraw from such care at any time.     Notes:   Pt seen via audiovisual feed. She was in no acute distress, stated she is outside a hair salon.   Now using Medical Marijuana following consult with her pain management doctor, Dr. Barrios. She notes improved right hip pain and anxiety with Medical Marijuana. However, she wants to adjust her dose due to "feeling high." She stopped Lyrica after one dose, stating "it made me crazy". She does not wish to continue Lyrica at this time. She is taking Xanax 2mg BID PRN.   We discussed the importance of trying therapy to improve her mood. She declined, stating she does not feel comfortable with speaking with a therapist. She would rather speak to [facility] staff, SW, and me about her feelings.    She does not wish to be vaccinated against Flu/COVID.       LA  Reviewed and Summarized:        11/09/2023: Pt attended clinic alone. She was in no acute distress. Vital signs stable on room air. I explained the role palliative care often plays in supporting patients with serious illness and their families regarding symptom management, advance care planning, and goals of care discussion.      Pt reported right hip/leg aching/tingling pain 5/10 exacerbated by prolonged standing, prolonged sitting, and walking. Tylenol not effective. Norco made her "sick". Percocet was effective, but wants to avoid taking opioids.   Gabapentin was not effective for neuropathy/sciatica " "(diagnosed pre-MM)  Medical Marijuana (liquid/gummies) was effective for pain, but made her "high" and unable to function.   She was previously seeing Dr. Barrios for pain management/medical marijuana prescription  Insomnia-Feels to be exacerbated by Dexamethasone, not taking Trazodone due to fear of psychotropic medications. Melatonin was ineffective.   Continues to smoke cigarettes to cope with stress and anxiety. Not using Nicotine patch d/t exacerbated insomnia.   Chronic anxiety/intermittent depression due to managing son's mental illness. She has been managing with Xanax 2mg BID PRN for the past 20 years.  Previously tried antidepressants, but it was ineffective   Does not want to try antidepressants. Wants to stay on Xanax because it is "fast acting"  Declined mental health counseling at this time.     We discussed advance care planning, goals of care, and code status, Full Code vs. DNR including risks, benefits, and alternatives.   She wishes to continue chemotherapy, and remains ambivalent about SCT. Her biggest is the treatment commitment, recovery time, not wanting to be a burden on her , fear of relapse post SCT, and difficulty with caring for her son (has schizophrenia) if she is sick following SCT. She wishes to remain Full Code. "Does not want to think about that".  Her elected HCPOA are: 1.  : Lars Bonilla: [phone], 2. Son: Hakeem Garcia: [phone].     LA  reviewed and summarized:          Past Medical History:   Diagnosis Date    Allergy     Amblyopia OS    Anxiety     Asthma     COPD (chronic obstructive pulmonary disease)     NO HOME o2    GERD (gastroesophageal reflux disease)     Hyperlipidemia     Hypertension     PONV (postoperative nausea and vomiting)     Thyroid disease      Past Surgical History:   Procedure Laterality Date    APPENDECTOMY      BIOPSY N/A 2023    Procedure: BIOPSY;  Surgeon: Fausto Smith MD;  Location: [facility] OR;  Service: Neurosurgery; "  Laterality: N/A;  L1    BUNIONECTOMY      COLONOSCOPY N/A 2019    Procedure: COLONOSCOPY;  Surgeon: Danny Matos III, MD;  Location: [facility] ENDO;  Service: Endoscopy;  Laterality: N/A;    COLONOSCOPY N/A 10/24/2022    Procedure: COLONOSCOPY;  Surgeon: Danny Matos III, MD;  Location: [facility] ENDO;  Service: Endoscopy;  Laterality: N/A;    ESOPHAGOGASTRODUODENOSCOPY N/A 10/24/2022    Procedure: EGD (ESOPHAGOGASTRODUODENOSCOPY);  Surgeon: Danny Matos III, MD;  Location: [facility] ENDO;  Service: Endoscopy;  Laterality: N/A;    FIXATION KYPHOPLASTY Bilateral 2023    Procedure: KYPHOPLASTY;  Surgeon: Fausto Smith MD;  Location: [facility] OR;  Service: Neurosurgery;  Laterality: Bilateral;  kyphoplasty and radiofrequency ablation - L1    HYSTERECTOMY      PARTIAL//still with ovaries    neck fusion  2017    THYROIDECTOMY      TUBAL LIGATION       Family History   Problem Relation Age of Onset    Hypertension Mother     Diabetes Mother     Asthma Mother             Diabetes Father     Asthma Father     Stroke Maternal Grandmother     Prostate cancer Maternal Grandfather     Mental illness Son     Pancreatic cancer Maternal Uncle     Mental illness Other     Pancreatic cancer Other     Ovarian cancer Maternal Cousin      Review of patient's allergies indicates:   Allergen Reactions    Hydrocodone-acetaminophen Other (See Comments)     Causes pt to feel extremely sick        Medications:    Current Outpatient Medications:     acyclovir (ZOVIRAX) 400 MG tablet, Take 1 tablet (400 mg total) by mouth 2 (two) times daily., Disp: 60 tablet, Rfl: 11    albuterol (PROVENTIL HFA) 90 mcg/actuation inhaler, Inhale 2 puffs into the lungs every 6 (six) hours as needed for Wheezing. Rescue, Disp: 18 g, Rfl: 0    ALPRAZolam (XANAX) 2 MG Tab, TAKE 1 TABLET BY MOUTH TWICE DAILY AS NEEDED FOR ANXIETY, Disp: 60 tablet, Rfl: 0    amlodipine-benazepril 2.5-10 mg (LOTREL) 2.5-10 mg per capsule, TAKE 1 CAPSULE BY MOUTH  EVERY DAY, Disp: 90 capsule, Rfl: 3    aspirin (ECOTRIN) 81 MG EC tablet, Take 1 tablet (81 mg total) by mouth once daily., Disp: 30 tablet, Rfl: 5    benzonatate (TESSALON) 200 MG capsule, Take 1 capsule (200 mg total) by mouth 3 (three) times daily as needed for Cough., Disp: 30 capsule, Rfl: 2    calcium-vitamin D 600 mg-10 mcg (400 unit) Tab, Take 1 tablet by mouth every 12 (twelve) hours., Disp: 60 tablet, Rfl: 2    dexAMETHasone (DECADRON) 4 MG Tab, Take 10 tablets (40 mg total) by mouth As instructed. Daily on days 1, 8, 15, and 22 of each chemotherapy cycle. Take with food., Disp: 40 tablet, Rfl: 5    dexbrompheniramine-phenyleph (ALAHIST PE) 2-7.5 mg Tab, Take 1 tablet by mouth every 6 (six) hours as needed (sinus congestion)., Disp: 30 tablet, Rfl: 2    erythromycin (ROMYCIN) ophthalmic ointment, Apply ointment to lids and lashes on both eyes 2 times daily for 7 days., Disp: 2.5 g, Rfl: 0    fluticasone propionate (FLONASE) 50 mcg/actuation nasal spray, 1 spray (50 mcg total) by Each Nostril route once daily., Disp: 1 mL, Rfl: 1    fluticasone-salmeterol diskus inhaler 250-50 mcg, Inhale 1 puff into the lungs 2 (two) times daily. Controller, Disp: 180 each, Rfl: 1    ipratropium (ATROVENT) 21 mcg (0.03 %) nasal spray, 2 sprays by Each Nostril route 3 (three) times daily., Disp: , Rfl:     lenalidomide 10 mg Cap, TAKE 1 CAPSULE ONCE DAILY FOR 21 DAYS AND THEN 7 DAYS OFF, Disp: 21 each, Rfl: 5    levocetirizine (XYZAL) 5 MG tablet, Take 1 tablet (5 mg total) by mouth every evening., Disp: 30 tablet, Rfl: 11    levothyroxine (SYNTHROID) 100 MCG tablet, TAKE 1 TABLET(100 MCG) BY MOUTH BEFORE BREAKFAST, Disp: 90 tablet, Rfl: 1    linaCLOtide (LINZESS) 72 mcg Cap capsule, Take 2 capsules (144 mcg total) by mouth before breakfast., Disp: 30 capsule, Rfl: 1    magnesium oxide (MAGOX) 400 mg (241.3 mg magnesium) tablet, Take 1 tablet (400 mg total) by mouth once daily., Disp: 90 tablet, Rfl: 1    methylPREDNISolone  (MEDROL DOSEPACK) 4 mg tablet, use as directed (Patient not taking: Reported on 2/21/2024), Disp: 1 each, Rfl: 0    metoprolol succinate (TOPROL-XL) 50 MG 24 hr tablet, Take 1 tablet (50 mg total) by mouth every evening., Disp: 90 tablet, Rfl: 3    montelukast (SINGULAIR) 10 mg tablet, TAKE 1 TABLET(10 MG) BY MOUTH EVERY EVENING, Disp: 90 tablet, Rfl: 0    neomycin-polymyxin-dexamethasone (DEXACINE) 3.5 mg/g-10,000 unit/g-0.1 % Oint, Place into both eyes 3 (three) times daily., Disp: 3.5 g, Rfl: 0    nicotine (NICODERM CQ) 14 mg/24 hr, Place 1 patch onto the skin once daily. (Patient not taking: Reported on 2/21/2024), Disp: 14 patch, Rfl: 0    oxyCODONE-acetaminophen (PERCOCET) 7.5-325 mg per tablet, Take 1 tablet by mouth every 4 (four) hours as needed for Pain., Disp: 45 tablet, Rfl: 0    pantoprazole (PROTONIX) 40 MG tablet, TAKE 1 TABLET(40 MG) BY MOUTH EVERY DAY (Patient taking differently: Take 40 mg by mouth daily as needed.), Disp: 90 tablet, Rfl: 3    potassium chloride SA (K-DUR,KLOR-CON) 20 MEQ tablet, Take 1 tablet (20 mEq total) by mouth once daily., Disp: 30 tablet, Rfl: 11    pregabalin (LYRICA) 50 MG capsule, Take 1 capsule (50 mg total) by mouth nightly. (Patient not taking: Reported on 12/19/2023), Disp: 30 capsule, Rfl: 0    prochlorperazine (COMPAZINE) 5 MG tablet, Take 1 tablet (5 mg total) by mouth every 6 (six) hours as needed for Nausea., Disp: 60 tablet, Rfl: 5    promethazine (PHENERGAN) 25 MG tablet, Take 1 tablet (25 mg total) by mouth every 4 (four) hours., Disp: 45 tablet, Rfl: 2    rosuvastatin (CRESTOR) 10 MG tablet, Take 1 tablet (10 mg total) by mouth every evening., Disp: 90 tablet, Rfl: 3    traZODone (DESYREL) 50 MG tablet, Take 1 tablet (50 mg total) by mouth every evening., Disp: 30 tablet, Rfl: 11    Current Facility-Administered Medications:     dexAMETHasone injection 40 mg, 40 mg, Intravenous, 1 time in Clinic/HOD, Bri Luevano MD    Facility-Administered Medications  Ordered in Other Visits:     lactated ringers infusion, , Intravenous, Continuous, Danny Matos III, MD    OBJECTIVE:     ROS:  Review of Systems   Constitutional:  Negative for activity change, appetite change, fatigue, fever and unexpected weight change.   HENT:  Negative for trouble swallowing and voice change.    Eyes: Negative.    Respiratory:  Negative for cough, chest tightness, shortness of breath and wheezing.    Cardiovascular:  Negative for chest pain, palpitations and leg swelling.   Gastrointestinal:  Negative for abdominal distention, abdominal pain, constipation, diarrhea, nausea and vomiting.   Musculoskeletal:  Positive for myalgias (Right leg/hip). Negative for arthralgias and joint swelling.   Skin:  Negative for color change, pallor, rash and wound.   Neurological:  Negative for dizziness, tremors, speech difficulty, weakness, numbness and headaches.   Psychiatric/Behavioral:  Positive for agitation and sleep disturbance. Negative for behavioral problems, confusion, dysphoric mood and suicidal ideas. The patient is nervous/anxious (Improved with Medical Marijuana).        Review of Symptoms      Symptom Assessment (ESAS 0-10 Scale)  Pain:  5  Dyspnea:  0  Anxiety:  5  Nausea:  0  Depression:  0  Anorexia:  5  Fatigue:  0  Insomnia:  0  Restlessness:  0  Agitation:  0     CAM / Delirium:  Negative  Constipation:  Negative  Diarrhea:  Negative    Anxiety:  Is nervous/anxious (Improved with Medical Marijuana)  Constipation:  No constipation    Pain Assessment:    Location(s): leg    Leg       Location: right        Quality: Burning and tingling        Quantity: 5/10 in intensity        Chronicity: Onset 0 year(s) ago, unchanged        Aggravating Factors: Activity, movement and walking        Alleviating Factors: Opiates        Associated Symptoms: Arthralgias    Modified Rhonda Scale:  0    Performance Status:  90    ECOG Performance Status ndGndrndanddndend:nd nd2nd Living Arrangements:  Lives with  family    Psychosocial/Cultural:   See Palliative Psychosocial Note: Yes   with two adult sons. Works weekends at [employer]. 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   **Primary  to Follow**  Palliative Care  Consult: No     Time-Based Charting:  No      Advance Care Planning   Advance Directives:   Living Will: No    LaPOST: No    Do Not Resuscitate Status: No    Medical Power of : Yes    Agent's Name:  1.  : Lars Bonilla: [phone], 2. Son: Hakeem Garcia: [phone].    Decision Making:  Patient answered questions  Goals of Care: What is most important right now is to focus on quality of life, even if it means sacrificing a little time, curative/life-prolongation (regardless of treatment burdens). Accordingly, we have decided that the best plan to meet the patient's goals includes continuing with treatment.            Physical Exam:  Vitals:    Physical Exam  Vitals and nursing note reviewed.   Constitutional:       General: She is not in acute distress.     Appearance: Normal appearance. She is normal weight. She is not ill-appearing.   HENT:      Head: Normocephalic and atraumatic.      Nose: Nose normal. No congestion or rhinorrhea.      Mouth/Throat:      Mouth: Mucous membranes are moist.      Pharynx: Oropharynx is clear. No oropharyngeal exudate or posterior oropharyngeal erythema.   Eyes:      General: No scleral icterus.        Right eye: No discharge.         Left eye: No discharge.      Conjunctiva/sclera: Conjunctivae normal.      Pupils: Pupils are equal, round, and reactive to light.   Cardiovascular:      Rate and Rhythm: Normal rate and regular rhythm.      Pulses: Normal pulses.      Heart sounds: Normal heart sounds. No murmur heard.     No gallop.   Pulmonary:      Effort: Pulmonary effort is normal. No respiratory distress.      Breath sounds: Normal breath sounds. No stridor. No wheezing.   Chest:      Chest wall: No  tenderness.   Abdominal:      General: Bowel sounds are normal. There is no distension.      Palpations: Abdomen is soft.      Tenderness: There is no abdominal tenderness.   Genitourinary:     Comments: Deferred  Musculoskeletal:         General: No swelling or tenderness. Normal range of motion.      Cervical back: Normal range of motion and neck supple.      Right lower leg: No edema.      Left lower leg: No edema.   Skin:     General: Skin is warm and dry.      Capillary Refill: Capillary refill takes less than 2 seconds.      Coloration: Skin is not jaundiced or pale.      Findings: No rash.   Neurological:      Mental Status: She is alert and oriented to person, place, and time.      Motor: No weakness.   Psychiatric:         Attention and Perception: Attention normal.         Mood and Affect: Affect normal. Mood is anxious.         Speech: Speech normal.         Behavior: Behavior normal. Behavior is cooperative.         Thought Content: Thought content normal.         Cognition and Memory: Cognition and memory normal.         Judgment: Judgment normal.         Labs and radiology data reviewed    ASSESSMENT/PLAN:   Multiple Myeloma not achieved remission  Metastasis to thoracic/lumbar spine, complicated by L1 compression fracture.  Followed by Dr. Luevano, Dr. Garcia, and Neurosurgery.   On D-VRD, Lenalidomide and Zometa. S/P L1 Kyphoplasty (June 2023).   On treatment holiday.   Pt remains hesitant about SCT due extended recovery time/fear of relapse post SCT  PET scan 1/18/2024: 1. Findings consistent with treatment response with significantly decreased FDG uptake associated with the previously identified lesions. No new FDG avid lesions are identified     Right Hip Pain/Neuropathy  Likely a mixture of chronic sciatica/Pelvic lytic bone lesions/sequale of L1 compression fracture  Medical Marijuana effective. Prescribed by Dr. Barrios.  Pt has tried multiple pain management modalities with mixed response.    On Percocet 7.5mg PRN. Wishes to minimize opioids at this time due to fear of altered mental status.  Stopped Lyrica due to altered mental status  Anxiety  Chronic and exacerbated by MM diagnosis and family-related stress. .  Pt and I extensively discussed stress management strategies including avoiding triggers, sleep hygiene, and proper nutrition.   Continues Xanax 2mg BID PRN  Declines SSRI trial at this time.   Declines mental health counseling at this time.  UDS- Collected. Results pending.    Encounter for Palliative Care  -Code status: Full Code.  -HCPOA: 1.  : Lars Bonilla: [phone], 2. Son: Hakeem Garcia: [phone].   -GOC:  Continue cancer treatment, symptom management, maintain independence and functional status.  -See HPI for further details       Follow up:  3 months     35 minutes of total time spent on the encounter, which includes face to face time and non-face to face time preparing to see the patient (eg, review of tests), Obtaining and/or reviewing separately obtained history, Documenting clinical information in the electronic or other health record, Independently interpreting results (not separately reported) and communicating results to the patient/family/caregiver, or Care coordination (not separately reported).    Signature: RAJIV BOYD NP

## 2024-03-14 ENCOUNTER — PATIENT MESSAGE (OUTPATIENT)
Dept: HEMATOLOGY/ONCOLOGY | Facility: CLINIC | Age: 64
End: 2024-03-14
Payer: COMMERCIAL

## 2024-03-14 ENCOUNTER — TELEPHONE (OUTPATIENT)
Dept: HEMATOLOGY/ONCOLOGY | Facility: CLINIC | Age: 64
End: 2024-03-14
Payer: COMMERCIAL

## 2024-03-14 DIAGNOSIS — I10 ESSENTIAL HYPERTENSION: Primary | ICD-10-CM

## 2024-03-14 DIAGNOSIS — R00.0 TACHYCARDIA: ICD-10-CM

## 2024-03-14 NOTE — NURSING
----- Message from Bri Luevano MD sent at 3/13/2024  5:21 PM CDT -----  Hello,      No unless she feels unwell, she can see him on 4/10 for formal treatment planning.     Thanks  ----- Message -----  From: Abbie Amor RN  Sent: 3/13/2024   3:43 PM CDT  To: MD Dr. Bassem Miller,      Pt states her consult with Dr. Munoz got r/s from 3/22 to 4/10. She wants to know if she need to see you in the meantime,  b/c she hasn't had treatment in weeks?     Thanks       Returned call to pt to let her know the above. Asked pt if she feels she needs to be seen by med onc, states no. She just needs to see her PCP for a lingering cough since covid. Denies any other c/o, states she feels fine and will call if she has any concerns/problems r/t her MM.

## 2024-03-14 NOTE — TELEPHONE ENCOUNTER
----- Message from Bri Luevano MD sent at 3/13/2024  5:21 PM CDT -----  Hello,     No unless she feels unwell, she can see him on 4/10 for formal treatment planning.    Thanks  ----- Message -----  From: Abbie Amor RN  Sent: 3/13/2024   3:43 PM CDT  To: MD Dr. Bassem Miller,     Pt states her consult with Dr. Munoz got r/s from 3/22 to 4/10. She wants to know if she need to see you in the meantime,  b/c she hasn't had treatment in weeks?    Thanks

## 2024-03-15 ENCOUNTER — HOSPITAL ENCOUNTER (OUTPATIENT)
Dept: CARDIOLOGY | Facility: HOSPITAL | Age: 64
Discharge: HOME | End: 2024-03-15
Attending: INTERNAL MEDICINE
Payer: COMMERCIAL

## 2024-03-15 ENCOUNTER — OFFICE VISIT (OUTPATIENT)
Dept: CARDIOLOGY | Facility: CLINIC | Age: 64
End: 2024-03-15
Payer: COMMERCIAL

## 2024-03-15 VITALS
RESPIRATION RATE: 16 BRPM | WEIGHT: 130.94 LBS | BODY MASS INDEX: 22.35 KG/M2 | HEART RATE: 68 BPM | SYSTOLIC BLOOD PRESSURE: 110 MMHG | HEIGHT: 64 IN | DIASTOLIC BLOOD PRESSURE: 80 MMHG | OXYGEN SATURATION: 96 %

## 2024-03-15 DIAGNOSIS — R00.0 TACHYCARDIA: ICD-10-CM

## 2024-03-15 DIAGNOSIS — I35.1 NONRHEUMATIC AORTIC VALVE INSUFFICIENCY: ICD-10-CM

## 2024-03-15 DIAGNOSIS — Z72.0 TOBACCO USE: ICD-10-CM

## 2024-03-15 DIAGNOSIS — C90.00 MULTIPLE MYELOMA, REMISSION STATUS UNSPECIFIED: Primary | ICD-10-CM

## 2024-03-15 DIAGNOSIS — F41.9 ANXIETY: ICD-10-CM

## 2024-03-15 DIAGNOSIS — I73.9 CLAUDICATION: ICD-10-CM

## 2024-03-15 DIAGNOSIS — I10 ESSENTIAL HYPERTENSION: Primary | ICD-10-CM

## 2024-03-15 DIAGNOSIS — C90.00 MULTIPLE MYELOMA, REMISSION STATUS UNSPECIFIED: Chronic | ICD-10-CM

## 2024-03-15 DIAGNOSIS — I73.9 PVD (PERIPHERAL VASCULAR DISEASE): ICD-10-CM

## 2024-03-15 DIAGNOSIS — I10 ESSENTIAL HYPERTENSION: ICD-10-CM

## 2024-03-15 DIAGNOSIS — E78.5 DYSLIPIDEMIA: ICD-10-CM

## 2024-03-15 DIAGNOSIS — R07.2 PRECORDIAL PAIN: ICD-10-CM

## 2024-03-15 PROCEDURE — 3008F BODY MASS INDEX DOCD: CPT | Mod: CPTII,S$GLB,, | Performed by: INTERNAL MEDICINE

## 2024-03-15 PROCEDURE — 93010 ELECTROCARDIOGRAM REPORT: CPT | Mod: ,,, | Performed by: INTERNAL MEDICINE

## 2024-03-15 PROCEDURE — 99999 PR PBB SHADOW E&M-EST. PATIENT-LVL V: CPT | Mod: PBBFAC,,, | Performed by: INTERNAL MEDICINE

## 2024-03-15 PROCEDURE — 3079F DIAST BP 80-89 MM HG: CPT | Mod: CPTII,S$GLB,, | Performed by: INTERNAL MEDICINE

## 2024-03-15 PROCEDURE — 93005 ELECTROCARDIOGRAM TRACING: CPT

## 2024-03-15 PROCEDURE — 99214 OFFICE O/P EST MOD 30 MIN: CPT | Mod: S$GLB,,, | Performed by: INTERNAL MEDICINE

## 2024-03-15 PROCEDURE — 3074F SYST BP LT 130 MM HG: CPT | Mod: CPTII,S$GLB,, | Performed by: INTERNAL MEDICINE

## 2024-03-15 PROCEDURE — 4010F ACE/ARB THERAPY RXD/TAKEN: CPT | Mod: CPTII,S$GLB,, | Performed by: INTERNAL MEDICINE

## 2024-03-15 PROCEDURE — 1159F MED LIST DOCD IN RCRD: CPT | Mod: CPTII,S$GLB,, | Performed by: INTERNAL MEDICINE

## 2024-03-15 PROCEDURE — 1160F RVW MEDS BY RX/DR IN RCRD: CPT | Mod: CPTII,S$GLB,, | Performed by: INTERNAL MEDICINE

## 2024-03-15 RX ORDER — ALPRAZOLAM 2 MG/1
TABLET ORAL
Qty: 60 TABLET | Refills: 0 | Status: SHIPPED | OUTPATIENT
Start: 2024-03-15 | End: 2024-04-16

## 2024-03-15 NOTE — PROGRESS NOTES
Subjective:   Patient ID:  Ana Bonilla is a 63 y.o. female who presents for follow up of No chief complaint on file.      62 yo female came in for 6 M  PMH HTN, HLD, mod AI, PAD, multiple myeloma COPD, hiatal hernia, hypothyroidism, s/p neck spinal fusion. Smokes 1 ppd for 40 yrs. Occasional drinking.    visit. Some chest hurt, on left, once a month, lasted for minutes, triggered by stress. No radiation.  Chronic BRAGG. No dizziness, palpitation and syncope.  Left calf pain at rest, worse with walking. Had leg cramp at night two weeks ago.  Father  of DM at 30'. Mother HTN. Brother  of MVA.  EKG on  NSR  ECHO in  normal EF and moderate AI. LE arterial US no lesion  Now some vaping     MPI no ischemia and EF normal   states that some stress. Some chronic BRAGG. Occasional CP triggered by stress. May relate to GERD.  Occasional calf and ankle pain, occurred at rest. EKG NSR LAE    2021 visit  C/o RLE leg. Used to have the pain starting from the lower back. Now the pain on the thigh and calf. Ocurred at rest and with exertion.   F/u with podiatric for right foot pain and will have the therapy. Orthopedic gave her Toradol.   Still smoking.     visit  H/o COVID in    Lost her mother in  and her son is in behave hospital for drug issue. A lot of stress   echo normal EF and mod AI.   LE arterial US mild disease  BRAGG chronic. Still has bad cough after COVID 19 infection  EKG NSR and nonspecific T wave     visit  Intermittent chest pain for 2 weeks, worse with cough and stretching the arms. Feels sore, and worse pain when laying down. Muscle relaxant caused the Crazy feeling and ibupofen     visit  Chronic lower sternal CP for days and weeks. Worse with coughing stretching. EGD in     09/23 visit  Dx of MM in  and on chemo Rx.  VRd (Velcade/Revlimid/dexamethasone. Had elevated Cr. And the dosage adjusted. Off 3 weeks due to eye  infection  On smoking cessation.   No leg swelling orthopnea. Question PND due to COPD.     Interval history  09/23 echo nml biv function and moderate AI  Had headache when BP was at 140 mmhg and improved after added amlodipine and BP at 120 mmHG  Congestion after covid infection  Calf pain. LE venous US negative in 01/24  BP LDL and A1C controlled        Past Medical History:   Diagnosis Date    Allergy     Amblyopia OS    Anxiety     Asthma     COPD (chronic obstructive pulmonary disease)     NO HOME o2    GERD (gastroesophageal reflux disease)     Hyperlipidemia     Hypertension     PONV (postoperative nausea and vomiting)     Thyroid disease        Past Surgical History:   Procedure Laterality Date    APPENDECTOMY      BIOPSY N/A 06/08/2023    Procedure: BIOPSY;  Surgeon: Fausto Smith MD;  Location: [facility];  Service: Neurosurgery;  Laterality: N/A;  L1    BUNIONECTOMY      COLONOSCOPY N/A 12/04/2019    Procedure: COLONOSCOPY;  Surgeon: Danny Matos III, MD;  Location: [facility];  Service: Endoscopy;  Laterality: N/A;    COLONOSCOPY N/A 10/24/2022    Procedure: COLONOSCOPY;  Surgeon: Danny Matos III, MD;  Location: [facility];  Service: Endoscopy;  Laterality: N/A;    ESOPHAGOGASTRODUODENOSCOPY N/A 10/24/2022    Procedure: EGD (ESOPHAGOGASTRODUODENOSCOPY);  Surgeon: Danny Matos III, MD;  Location: [facility];  Service: Endoscopy;  Laterality: N/A;    FIXATION KYPHOPLASTY Bilateral 06/08/2023    Procedure: KYPHOPLASTY;  Surgeon: Fausto Smith MD;  Location: [facility];  Service: Neurosurgery;  Laterality: Bilateral;  kyphoplasty and radiofrequency ablation - L1    HYSTERECTOMY      PARTIAL//still with ovaries    neck fusion  08/2017    THYROIDECTOMY      TUBAL LIGATION         Social History     Tobacco Use    Smoking status: Every Day     Current packs/day: 0.50     Average packs/day: 0.5 packs/day for 50.0 years (25.0 ttl pk-yrs)     Types: Cigarettes     Passive  exposure: Never    Smokeless tobacco: Never   Substance Use Topics    Alcohol use: Not Currently     Comment: quit    Drug use: No       Family History   Problem Relation Age of Onset    Hypertension Mother     Diabetes Mother     Asthma Mother             Diabetes Father     Asthma Father     Stroke Maternal Grandmother     Prostate cancer Maternal Grandfather     Mental illness Son     Pancreatic cancer Maternal Uncle     Mental illness Other     Pancreatic cancer Other     Ovarian cancer Maternal Cousin          ROS    Objective:   Physical Exam  HENT:      Head: Normocephalic.   Eyes:      Pupils: Pupils are equal, round, and reactive to light.   Neck:      Thyroid: No thyromegaly.      Vascular: Normal carotid pulses. No carotid bruit or JVD.   Cardiovascular:      Rate and Rhythm: Normal rate and regular rhythm. No extrasystoles are present.     Chest Wall: PMI is not displaced.      Pulses:           Dorsalis pedis pulses are 2+ on the right side and 1+ on the left side.        Posterior tibial pulses are 2+ on the right side and 1+ on the left side.      Heart sounds: Normal heart sounds. No murmur heard.     No gallop. No S3 sounds.   Pulmonary:      Effort: No respiratory distress.      Breath sounds: No stridor. Decreased breath sounds and rhonchi present.   Chest:      Comments: Diffuse + tenderness on both chests  Abdominal:      General: Bowel sounds are normal.      Palpations: Abdomen is soft.      Tenderness: There is no abdominal tenderness. There is no rebound.   Musculoskeletal:         General: Normal range of motion.   Skin:     Findings: No rash.   Neurological:      Mental Status: She is alert and oriented to person, place, and time.   Psychiatric:         Behavior: Behavior normal.       Lab Results   Component Value Date    CHOL 176 2022    CHOL 168 2021    CHOL 159 2020     Lab Results   Component Value Date    HDL 74 2022    HDL 74 2021     HDL 73 02/28/2020     Lab Results   Component Value Date    LDLCALC 92.6 08/25/2022    LDLCALC 80.4 07/14/2021    LDLCALC 75.6 02/28/2020     Lab Results   Component Value Date    TRIG 47 08/25/2022    TRIG 68 07/14/2021    TRIG 52 02/28/2020     Lab Results   Component Value Date    CHOLHDL 42.0 08/25/2022    CHOLHDL 44.0 07/14/2021    CHOLHDL 45.9 02/28/2020       Chemistry        Component Value Date/Time     02/19/2024 0929    K 2.9 (L) 02/19/2024 0929     02/19/2024 0929    CO2 28 02/19/2024 0929    BUN 9 02/19/2024 0929    CREATININE 1.1 02/19/2024 0929     02/19/2024 0929        Component Value Date/Time    CALCIUM 9.2 02/19/2024 0929    ALKPHOS 62 02/19/2024 0929    AST 20 02/19/2024 0929    ALT 14 02/19/2024 0929    BILITOT 0.8 02/19/2024 0929    ESTGFRAFRICA >60.0 07/14/2021 0927    EGFRNONAA >60.0 07/14/2021 0927          Lab Results   Component Value Date    HGBA1C 5.7 (H) 01/10/2023     Lab Results   Component Value Date    TSH 1.067 11/21/2023     Lab Results   Component Value Date    INR 0.9 02/19/2024    INR 1.0 03/10/2023    INR 1.0 11/08/2019     Lab Results   Component Value Date    WBC 4.73 02/06/2024    HGB 12.2 02/06/2024    HCT 36.6 (L) 02/06/2024    MCV 96 02/06/2024     02/06/2024     BMP  Sodium   Date Value Ref Range Status   02/19/2024 145 136 - 145 mmol/L Final     Potassium   Date Value Ref Range Status   02/19/2024 2.9 (L) 3.5 - 5.1 mmol/L Final     Chloride   Date Value Ref Range Status   02/19/2024 108 95 - 110 mmol/L Final     CO2   Date Value Ref Range Status   02/19/2024 28 23 - 29 mmol/L Final     BUN   Date Value Ref Range Status   02/19/2024 9 8 - 23 mg/dL Final     Creatinine   Date Value Ref Range Status   02/19/2024 1.1 0.5 - 1.4 mg/dL Final     Calcium   Date Value Ref Range Status   02/19/2024 9.2 8.7 - 10.5 mg/dL Final     Anion Gap   Date Value Ref Range Status   02/19/2024 9 8 - 16 mmol/L Final     eGFR if    Date Value Ref  Range Status   07/14/2021 >60.0 >60 mL/min/1.73 m^2 Final     eGFR if non    Date Value Ref Range Status   07/14/2021 >60.0 >60 mL/min/1.73 m^2 Final     Comment:     Calculation used to obtain the estimated glomerular filtration  rate (eGFR) is the CKD-EPI equation.        BNP  @LABRCNTIP(BNP,BNPTRIAGEBLO)@  @LABRCNTIP(troponini)@  CrCl cannot be calculated (Patient's most recent lab result is older than the maximum 7 days allowed.).  No results found in the last 24 hours.  No results found in the last 24 hours.  No results found in the last 24 hours.    Assessment:      1. Essential hypertension    2. Dyslipidemia    3. Nonrheumatic aortic valve insufficiency    4. Precordial pain    5. PVD (peripheral vascular disease)    6. Tachycardia    7. Claudication    8. Multiple myeloma, remission status unspecified    9. Tobacco use      Mod AI stable  BP C  PVD controlled    Plan:     Continue asa amlodipine/ACEI toprolXL statin for CAD HTn HLD   Smoking cessation    Counseled DASH  Check Lipid profile with PCP in 6 months  Recommend heart-healthy diet, weight control and regular exercise.  Yasmine. Risk modification.   I have reviewed all pertinent labs and cardiac studies independently. Plans and recommendations have been formulated under my direct supervision. All questions answered and patient voiced understanding.   If symptoms persist go to the ED  RTC in 6 months

## 2024-03-16 LAB
OHS QRS DURATION: 78 MS
OHS QTC CALCULATION: 419 MS

## 2024-04-10 ENCOUNTER — OFFICE VISIT (OUTPATIENT)
Dept: HEMATOLOGY/ONCOLOGY | Facility: CLINIC | Age: 64
End: 2024-04-10
Payer: COMMERCIAL

## 2024-04-10 ENCOUNTER — DOCUMENTATION ONLY (OUTPATIENT)
Dept: HEMATOLOGY/ONCOLOGY | Facility: CLINIC | Age: 64
End: 2024-04-10
Payer: COMMERCIAL

## 2024-04-10 DIAGNOSIS — E78.5 DYSLIPIDEMIA: ICD-10-CM

## 2024-04-10 DIAGNOSIS — Z79.899 IMMUNODEFICIENCY DUE TO CHEMOTHERAPY: ICD-10-CM

## 2024-04-10 DIAGNOSIS — K21.9 GASTROESOPHAGEAL REFLUX DISEASE WITHOUT ESOPHAGITIS: ICD-10-CM

## 2024-04-10 DIAGNOSIS — D84.821 IMMUNODEFICIENCY DUE TO CHEMOTHERAPY: ICD-10-CM

## 2024-04-10 DIAGNOSIS — I10 ESSENTIAL HYPERTENSION: ICD-10-CM

## 2024-04-10 DIAGNOSIS — J44.89 COPD WITH ASTHMA: Primary | ICD-10-CM

## 2024-04-10 DIAGNOSIS — T45.1X5A IMMUNODEFICIENCY DUE TO CHEMOTHERAPY: ICD-10-CM

## 2024-04-10 DIAGNOSIS — E03.9 ACQUIRED HYPOTHYROIDISM: ICD-10-CM

## 2024-04-10 DIAGNOSIS — C90.00 MULTIPLE MYELOMA, REMISSION STATUS UNSPECIFIED: Chronic | ICD-10-CM

## 2024-04-10 PROCEDURE — 4010F ACE/ARB THERAPY RXD/TAKEN: CPT | Mod: CPTII,95,, | Performed by: INTERNAL MEDICINE

## 2024-04-10 PROCEDURE — 99214 OFFICE O/P EST MOD 30 MIN: CPT | Mod: 95,,, | Performed by: INTERNAL MEDICINE

## 2024-04-10 NOTE — PROGRESS NOTES
STEM CELL TRANSPLANT CONSULT NOTE              The patient location is: home  The chief complaint leading to consultation is: multiple myeloma    Visit type: audiovisual    Face to Face time with patient: 45 minutes  60 minutes of total time spent on the encounter, which includes face to face time and non-face to face time preparing to see the patient (eg, review of tests), Obtaining and/or reviewing separately obtained history, Documenting clinical information in the electronic or other health record, Independently interpreting results (not separately reported) and communicating results to the patient/family/caregiver, or Care coordination (not separately reported).         Each patient to whom he or she provides medical services by telemedicine is:  (1) informed of the relationship between the physician and patient and the respective role of any other health care provider with respect to management of the patient; and (2) notified that he or she may decline to receive medical services by telemedicine and may withdraw from such care at any time.    Notes:                 Patient ID: Ana Bonilla   Chief Complaint: No chief complaint on file.  MRN:  [id number]     Oncologic Diagnosis:  Multiple Myeloma - IgG lambda   Previous Treatment:  VRD (5/10/2023 - 6/28/2023)   Current Treatment:    D-VRD (7/6/2023 - 01/03/2024 )      Zometa (10/18/2023 - )   Subjective   Ana Bonilla is a pleasant 63 y.o. female with multiple myeloma.  Bone marrow biopsy done on 4/6/2023 showed 60 % plasma cells. PET-CT on 4/10/2023 showed extensive lytic bony lesions throughout the spine, sternum, bilateral ribs, pelvis and mild compression deformity in L1 vertebral body. SPEP/MECHE on 3/27/2023 showed IgG lambda monoclonal protein - 3.19 g/dl.  Quantitative immunoglobulins on 3/27/2023 showed IgG 4,521 mg/dl.   Labs on 3/27/2023 showed Beta 2 microglobulin 1.9, .  Labs on 4/19/2023 showed Hb, 11.5, WBC 6.3, platelets, BUN 11, Cr 0.9, calcium 9.  She was started on VRd , in May 2023, and then transitioned to Rosa VRd from July 2023 - January 2024.  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           Review of Systems:  Review of Systems   Constitutional:  Positive for fatigue and malaise/fatigue. Negative for activity change, appetite change (decreased), chills, diaphoresis, fever and unexpected weight change.   HENT:  Negative for nosebleeds.    Respiratory:  Negative for shortness of breath.    Cardiovascular:  Negative for chest pain.   Gastrointestinal:  Negative for abdominal pain, blood in stool, diarrhea, nausea and vomiting.   Endocrine: Negative for cold intolerance.   Genitourinary:  Negative for difficulty urinating and hematuria.   Musculoskeletal:  Positive for back pain. Negative for arthralgias and myalgias.   Skin:  Negative for rash.   Neurological:  Negative for dizziness, weakness, light-headedness and headaches.   Hematological:  Does not bruise/bleed easily.   Psychiatric/Behavioral:  The patient is not nervous/anxious.      History     Oncology History   Multiple myeloma   4/20/2023 Initial Diagnosis    Multiple myeloma     5/10/2023 - 6/28/2023 Chemotherapy    Treatment Summary   Plan Name: OP VRD - WEEKLY BORTEZOMIB LENALIDOMIDE DEXAMETHASONE Q3W  Treatment Goal: Palliative  Status: Inactive  Start Date: 5/10/2023  End Date: 6/28/2023  Provider: Abram Velasquez MD  Chemotherapy: bortezomib (VELCADE) injection 2 mg, 1.3 mg/m2 = 2 mg, Subcutaneous, Clinic/[facility] 1 time, 2 of 6 cycles  Administration: 2 mg (5/10/2023), 2 mg (5/17/2023), 2 mg (6/14/2023), 2 mg (5/31/2023), 2 mg (6/21/2023), 2 mg (6/28/2023)  lenalidomide 25 mg  Cap, 25 mg, Oral, Daily, 1 of 1 cycle, Start date: 5/10/2023, End date: 5/17/2023 6/28/2023 Cancer Staged    Staging form: Plasma Cell Myeloma and Plasma Cell Disorders, AJCC 8th Edition  - Clinical stage from 6/28/2023: RISS Stage II (Beta-2-microglobulin (mg/L): 1.9, Albumin (g/dL): 3, ISS: Stage II, High-risk cytogenetics: Absent, LDH: Normal)     7/6/2023 -  Chemotherapy    Treatment Summary   Plan Name: OP D-VRD DARATUMUMAB + BORTEZOMIB LENALIDOMIDE DEXAMETHASONE  Treatment Goal: Palliative  Status: Active  Start Date: 7/6/2023  End Date: 1/3/2024  Provider: Oscar Márquez MD  Chemotherapy: bortezomib (VELCADE) injection 2 mg, 1.3 mg/m2 = 2 mg, Subcutaneous, Clinic/[facility] 1 time, 6 of 6 cycles  Administration: 2 mg (7/6/2023), 2 mg (7/12/2023), 2 mg (8/2/2023), 2 mg (8/9/2023), 2.25 mg (10/18/2023), 2 mg (7/19/2023), 2 mg (7/26/2023), 2 mg (8/16/2023), 2.25 mg (9/20/2023), 2.25 mg (9/27/2023), 2.25 mg (10/25/2023), 2.25 mg (11/1/2023), 2.25 mg (11/8/2023), 2.25 mg (12/6/2023), 2.25 mg (1/3/2024), 2.25 mg (11/15/2023), 2.25 mg (11/22/2023), 2.25 mg (11/29/2023), 2.25 mg (12/13/2023), 2.25 mg (12/20/2023), 2.25 mg (12/26/2023)  lenalidomide 10 mg Cap, 1 capsule (100 % of original dose 1 capsule), Oral, Daily, 1 of 1 cycle, Start date: 7/26/2023, End date: 8/2/2023  Dose modification: 1 capsule (original dose 1 capsule, Cycle 0)  daratumumab-hyaluronidase-fihj subcutaneous injection 1,800 mg, 1,800 mg (100 % of original dose 1,800 mg), Subcutaneous, Clinic/[facility] 1 time, 6 of 6 cycles  Dose modification: 1,800 mg (original dose 1,800 mg, Cycle 1), 1,800 mg (original dose 1,800 mg, Cycle 1)  Administration: 1,800 mg (7/6/2023), 1,800 mg (7/12/2023), 1,800 mg (7/19/2023), 1,800 mg (7/26/2023), 1,800 mg (8/2/2023), 1,800 mg (8/9/2023), 1,800 mg (8/16/2023), 1,800 mg (9/27/2023), 1,800 mg (10/18/2023), 1,800 mg (11/1/2023), 1,800 mg (11/15/2023), 1,800 mg (11/29/2023), 1,800 mg (12/13/2023), 1,800 mg (12/26/2023)            MARIBETH GONZALEZ Bonilla  63 y.o.  female with past medical history significant for hypertension, COPD, asthma, GERD, hypothyroidism here for follow-up and management of multiple myeloma     She was referred in 2/2023 by her PCP after workup for gastritis revealed lytic lesions. CT chest showed lytic and expansile destructive lesion in the sternum and lytic lesions at L1. Biopsy of L1 lesion in 3/2023 revealed mature B cell neoplasm most consistent with plasma cell neoplasm.      Bone marrow biopsy in 4/2023 demonstrated 60% plasma cells. PET/CT showed extensive bony lesions throughout the spine, sternum, bilateral ribs, pelvis, and a mild compression deformity in L1. SPEP/MECHE showed IgG M spike 3.19 g/dL, IgG 4,521 mg/dL.      She was started on VRd and then daratumumab was added by the transplant team. She was started on ASA for thrombosis prophylaxis and acyclovir for herpes zoster prophylaxis. Start Zometa after dental evaluation.     Past Medical History:   Diagnosis Date    Allergy     Amblyopia OS    Anxiety     Asthma     COPD (chronic obstructive pulmonary disease)     NO HOME o2    GERD (gastroesophageal reflux disease)     Hyperlipidemia     Hypertension     PONV (postoperative nausea and vomiting)     Thyroid disease        Past Surgical History:   Procedure Laterality Date    APPENDECTOMY      BIOPSY N/A 06/08/2023    Procedure: BIOPSY;  Surgeon: Fausto Smith MD;  Location: [facility] OR;  Service: Neurosurgery;  Laterality: N/A;  L1    BUNIONECTOMY      COLONOSCOPY N/A 12/04/2019    Procedure: COLONOSCOPY;  Surgeon: Danny Matos III, MD;  Location: [facility];  Service: Endoscopy;  Laterality: N/A;    COLONOSCOPY N/A 10/24/2022    Procedure: COLONOSCOPY;  Surgeon: Danny Matos III, MD;  Location: [facility];  Service: Endoscopy;  Laterality: N/A;    ESOPHAGOGASTRODUODENOSCOPY N/A 10/24/2022    Procedure: EGD (ESOPHAGOGASTRODUODENOSCOPY);  Surgeon: Danny Matos III, MD;  Location: [facility];   Service: Endoscopy;  Laterality: N/A;    FIXATION KYPHOPLASTY Bilateral 2023    Procedure: KYPHOPLASTY;  Surgeon: Fausto Smith MD;  Location: [facility] OR;  Service: Neurosurgery;  Laterality: Bilateral;  kyphoplasty and radiofrequency ablation - L1    HYSTERECTOMY      PARTIAL//still with ovaries    neck fusion  2017    THYROIDECTOMY      TUBAL LIGATION         Family History   Problem Relation Age of Onset    Hypertension Mother     Diabetes Mother     Asthma Mother             Diabetes Father     Asthma Father     Stroke Maternal Grandmother     Prostate cancer Maternal Grandfather     Mental illness Son     Pancreatic cancer Maternal Uncle     Mental illness Other     Pancreatic cancer Other     Ovarian cancer Maternal Cousin        Review of patient's allergies indicates:   Allergen Reactions    Hydrocodone-acetaminophen Other (See Comments)     Causes pt to feel extremely sick        Social History     Tobacco Use    Smoking status: Every Day     Current packs/day: 0.50     Average packs/day: 0.5 packs/day for 50.0 years (25.0 ttl pk-yrs)     Types: Cigarettes     Passive exposure: Never    Smokeless tobacco: Never   Substance Use Topics    Alcohol use: Not Currently     Comment: quit    Drug use: No         Review of patient's allergies indicates:   Allergen Reactions    Hydrocodone-acetaminophen Other (See Comments)     Causes pt to feel extremely sick        Current Outpatient Medications   Medication Sig    acyclovir (ZOVIRAX) 400 MG tablet Take 1 tablet (400 mg total) by mouth 2 (two) times daily.    albuterol (PROVENTIL HFA) 90 mcg/actuation inhaler Inhale 2 puffs into the lungs every 6 (six) hours as needed for Wheezing. Rescue    ALPRAZolam (XANAX) 2 MG Tab TAKE 1 TABLET BY MOUTH TWICE DAILY AS NEEDED FOR ANXIETY    amlodipine-benazepril 2.5-10 mg (LOTREL) 2.5-10 mg per capsule TAKE 1 CAPSULE BY MOUTH EVERY DAY    aspirin (ECOTRIN) 81 MG EC tablet Take 1 tablet (81 mg total) by mouth  once daily.    benzonatate (TESSALON) 200 MG capsule Take 1 capsule (200 mg total) by mouth 3 (three) times daily as needed for Cough.    calcium-vitamin D 600 mg-10 mcg (400 unit) Tab Take 1 tablet by mouth every 12 (twelve) hours.    dexAMETHasone (DECADRON) 4 MG Tab Take 10 tablets (40 mg total) by mouth As instructed. Daily on days 1, 8, 15, and 22 of each chemotherapy cycle. Take with food. (Patient not taking: Reported on 3/15/2024)    dexbrompheniramine-phenyleph (ALAHIST PE) 2-7.5 mg Tab Take 1 tablet by mouth every 6 (six) hours as needed (sinus congestion).    erythromycin (ROMYCIN) ophthalmic ointment Apply ointment to lids and lashes on both eyes 2 times daily for 7 days. (Patient not taking: Reported on 3/15/2024)    fluticasone propionate (FLONASE) 50 mcg/actuation nasal spray 1 spray (50 mcg total) by Each Nostril route once daily.    fluticasone-salmeterol diskus inhaler 250-50 mcg Inhale 1 puff into the lungs 2 (two) times daily. Controller    ipratropium (ATROVENT) 21 mcg (0.03 %) nasal spray 2 sprays by Each Nostril route 3 (three) times daily.    lenalidomide 10 mg Cap TAKE 1 CAPSULE ONCE DAILY FOR 21 DAYS AND THEN 7 DAYS OFF (Patient not taking: Reported on 3/15/2024.)    levocetirizine (XYZAL) 5 MG tablet Take 1 tablet (5 mg total) by mouth every evening. (Patient not taking: Reported on 3/15/2024)    levothyroxine (SYNTHROID) 100 MCG tablet TAKE 1 TABLET(100 MCG) BY MOUTH BEFORE BREAKFAST    linaCLOtide (LINZESS) 72 mcg Cap capsule Take 2 capsules (144 mcg total) by mouth before breakfast.    magnesium oxide (MAGOX) 400 mg (241.3 mg magnesium) tablet Take 1 tablet (400 mg total) by mouth once daily.    methylPREDNISolone (MEDROL DOSEPACK) 4 mg tablet use as directed (Patient not taking: Reported on 3/15/2024)    metoprolol succinate (TOPROL-XL) 50 MG 24 hr tablet Take 1 tablet (50 mg total) by mouth every evening.    montelukast (SINGULAIR) 10 mg tablet TAKE 1 TABLET(10 MG) BY MOUTH EVERY  EVENING    neomycin-polymyxin-dexamethasone (DEXACINE) 3.5 mg/g-10,000 unit/g-0.1 % Oint Place into both eyes 3 (three) times daily. (Patient not taking: Reported on 3/15/2024)    nicotine (NICODERM CQ) 14 mg/24 hr Place 1 patch onto the skin once daily. (Patient not taking: Reported on 3/15/2024)    oxyCODONE-acetaminophen (PERCOCET) 7.5-325 mg per tablet Take 1 tablet by mouth every 4 (four) hours as needed for Pain.    pantoprazole (PROTONIX) 40 MG tablet TAKE 1 TABLET(40 MG) BY MOUTH EVERY DAY (Patient taking differently: Take 40 mg by mouth daily as needed.)    potassium chloride SA (K-DUR,KLOR-CON) 20 MEQ tablet Take 1 tablet (20 mEq total) by mouth once daily.    pregabalin (LYRICA) 50 MG capsule Take 1 capsule (50 mg total) by mouth nightly.    prochlorperazine (COMPAZINE) 5 MG tablet Take 1 tablet (5 mg total) by mouth every 6 (six) hours as needed for Nausea.    promethazine (PHENERGAN) 25 MG tablet Take 1 tablet (25 mg total) by mouth every 4 (four) hours.    rosuvastatin (CRESTOR) 10 MG tablet Take 1 tablet (10 mg total) by mouth every evening.    traZODone (DESYREL) 50 MG tablet Take 1 tablet (50 mg total) by mouth every evening.     Current Facility-Administered Medications   Medication    dexAMETHasone injection 40 mg     Facility-Administered Medications Ordered in Other Visits   Medication    lactated ringers infusion            Review of Systems   Constitutional:  Positive for fatigue and malaise/fatigue. Negative for activity change, appetite change (decreased), chills, diaphoresis, fever and unexpected weight change.   HENT:  Negative for nosebleeds.    Respiratory:  Negative for shortness of breath.    Cardiovascular:  Negative for chest pain.   Gastrointestinal:  Negative for abdominal pain, blood in stool, diarrhea, nausea and vomiting.   Genitourinary:  Negative for difficulty urinating and hematuria.   Musculoskeletal:  Positive for back pain. Negative for arthralgias and myalgias.   Skin:   Negative for rash.   Neurological:  Negative for dizziness, weakness, light-headedness and headaches.   Endo/Heme/Allergies:  Negative for cold intolerance. Does not bruise/bleed easily.   Psychiatric/Behavioral:  The patient is not nervous/anxious.       Physical Exam     There were no vitals filed for this visit.    Deferred due to nature of visit today    Labs   Labs:  No visits with results within 2 Day(s) from this visit.   Latest known visit with results is:   Hospital Outpatient Visit on 03/15/2024   Component Date Value Ref Range Status    QRS Duration 03/15/2024 78  ms Final    [facility] QTC Calculation 03/15/2024 419  ms Final         Component      Latest Ref Rng 1/16/2024 1/23/2024   WBC      3.90 - 12.70 K/uL  3.53 (L)    RBC      4.00 - 5.40 M/uL  3.69 (L)    Hemoglobin      12.0 - 16.0 g/dL  11.5 (L)    Hematocrit      37.0 - 48.5 %  35.8 (L)    MCV      82 - 98 fL  97    MCH      27.0 - 31.0 pg  31.2 (H)    MCHC      32.0 - 36.0 g/dL  32.1    RDW      11.5 - 14.5 %  14.2    Platelet Count      150 - 450 K/uL  224    MPV      9.2 - 12.9 fL  8.8 (L)    Immature Granulocytes      0.0 - 0.5 %  0.0    Gran # (ANC)      1.8 - 7.7 K/uL  1.0 (L)    Immature Grans (Abs)      0.00 - 0.04 K/uL  0.00    Lymph #      1.0 - 4.8 K/uL  1.4    Mono #      0.3 - 1.0 K/uL  0.7    Eos #      0.0 - 0.5 K/uL  0.4    Baso #      0.00 - 0.20 K/uL  0.03    nRBC      0 /100 WBC  0    Gran %      38.0 - 73.0 %  29.5 (L)    Lymph %      18.0 - 48.0 %  38.8    Mono %      4.0 - 15.0 %  21.0 (H)    Eos %      0.0 - 8.0 %  9.9 (H)    Basophil %      0.0 - 1.9 %  0.8    Differential Method  Automated    Sodium      136 - 145 mmol/L  143    Potassium      3.5 - 5.1 mmol/L  3.5    Chloride      95 - 110 mmol/L  111 (H)    CO2      23 - 29 mmol/L  23    Glucose      70 - 110 mg/dL  110    BUN      8 - 23 mg/dL  7 (L)    Creatinine      0.5 - 1.4 mg/dL  0.9    Calcium      8.7 - 10.5 mg/dL  8.2 (L)    PROTEIN TOTAL      6.0 - 8.4 g/dL  6.4     Albumin      3.5 - 5.2 g/dL  3.6    BILIRUBIN TOTAL      0.1 - 1.0 mg/dL  1.1 (H)    ALP      55 - 135 U/L  45 (L)    AST      10 - 40 U/L  20    ALT      10 - 44 U/L  17    eGFR      >60 mL/min/1.73 m^2  >60    Anion Gap      8 - 16 mmol/L  9    Clinical Diagnosis - Bone Marrow     Body Site - Bone Marrow     Bone Marrow Interpretation     Bone Marrow Antibodies Analyzed     Bone Marrow Comment     Kappa Free Light Chains      0.33 - 1.94 mg/dL 1.49     Lambda Free Light Chains      0.57 - 2.63 mg/dL 1.04     Kappa/Lambda FLC Ratio      0.26 - 1.65  1.43     IgM      50 - 300 mg/dL 14 (L)     IgG      650 - 1600 mg/dL 628 (L)     IgA      40 - 350 mg/dL 34 (L)     Lactate Dehydrogenase      110 - 260 U/L 217          1/16/24 SPEP: Decreased total protein. Normal gamma globulins are decreased. Paraprotein peak in gamma = 0.20 g/dL (previously 0.25 g/dL).   1/16/24 serum MECHE:  Adjacent IgG lambda and IgG kappa specific monoclonal bands present.           Imaging/Pathology          04/10/2023 PET:     1.Numerous FDG avid predominately lytic osseous lesions as above.  Primary differential considerations would include multiple myeloma versus metastatic disease.  2. No FDG avid soft tissue masses or adenopathy demonstrated.  3. Mild pathologic compression deformity of the L1 vertebral body.       06/06/2023 RIGHT ILIAC CREST BONE MARROW ASPIRATE, BONE MARROW CLOT, AND BONE MARROW CORE BIOPSY WITH:     CELLULARITY=40-60%, TRILINEAGE HEMATOPOIETIC ACTIVITY (M/E=2.9:1).   CONSISTENT WITH PLASMA CELL NEOPLASM(60%).  SEE COMMENT.   FOCAL GRADE 1 RETICULAR FIBROSIS.   CONGO RED NEGATIVE.   INCREASED STORAGE IRON.   ADEQUATE NUMBER OF MEGAKARYOCYTES.     Bone marrow karyotype results: 46, XX[20], female karyotype.     Myeloma fixed cell, high-risk, FISH:  Normal.  The result is within normal limits for 1q duplication, TP53 deletion and IGH rearrangement.       1/18/24 PET CT      FINDINGS:  Head/neck: There is normal  physiologic uptake noted within the visualized brain parenchyma. No FDG avid adenopathy within the neck.     Chest: No FDG avid pulmonary nodules/masses. No FDG avid mediastinal, hilar or axillary lymph nodes.     Abdomen/Pelvis: Normal physiologic uptake noted within the liver, spleen, urinary tract, and bowel.There is a cyst seen projecting off the upper pole of the right kidney.     Skeletal: Previously identified predominately lytic FDG avid lesions involving the spine, sternum, ribs and pelvis have significantly diminished in uptake when compared to the prior exam.  The previously described index lesion within the sternum now demonstrates an SUV max of up to 3.7 as compared to 13.0 on the prior exam.  The lesion within the L1 vertebral body demonstrates a SUV max of 3 as compared to 15 on the prior exam.  Left femoral head lesion demonstrates no significant uptake as compared to an SUV max of 7.7.  Posterior right acetabular lesion now demonstrating SUV max of 1.8 as compared to 11 on the prior exam.     Impression:     1. Findings consistent with treatment response with significantly decreased FDG uptake associated with the previously identified lesions.  No new FDG avid lesions are identified.        2/19/24 BONE MARROW, RIGHT ILIAC CREST (ASPIRATE SMEAR, TOUCH IMPRINT, CLOT SECTION, AND CORE BIOPSY):   -- RESIDUAL PLASMA CELL MYELOMA (5% OF MARROW CELLULARITY).   -- NORMOCELLULAR MARROW (40%) WITH TRILINEAGE HEMATOPOIESIS.   -- ADEQUATE STORAGE IRON.   -- SEE COMMENT                                               Assessment and Plan   1. IgG lambda Multiple myeloma, R-ISS stage I   Diagnosed June 2023 via bone marrow biopsy  She has not been able to tolerate more than 10mg of revlimid as it made her extremely fatigued  Continue prophylaxis with aspirin, acyclovir 400 mg b.i.d.  Continue Zometa/calcium and vitamin-D supplements (Due for Zometa 01/13/23)  Repeat PET-CT on 1/18/24 with significantly decreased  FDG uptake associated with the previously identified lesions and no new FDG avid lesions   BM Biopsy 02/13/2024 shows significant improvement, with the marrow now showing only 5% plasma cells ( was 60% at baseline)  1/16/24 SPEP shows a paraprotein peak in gamma = 0.20 g/d; was 3.19g/dl at baseline  She still has issues coming to [place] for SCT, mostly concerning her son with psychiatric illness  She will resume daratumumab and , decadron and revlimid, as well as zometa     2. Back Pain, Left leg pain   LLE ultrasound - negative  She  is on percocet for pain    3. R  Hip Pain  No new bone lesions on PET CT done in jan 2024          4. HTn    --on metoprolol, amlodipine-benazepril  --Follows with her PCP      5. HLD      --on crestor  --Follows with her PCP      6. Anxiety    --on trazodone, xanax  --follows with her PCP        7. Fatigue    --due to neoplasm  -recommend regular activity and exercise      8. Hypothyroidism    -on synthroid  -follows with her PCVP      9. Asthma    -currently asymptomatic  She is on singulair, albuterol          BMT Chart Routing      Follow up with physician 6 months.   Follow up with WERNER    Provider visit type    Infusion scheduling note    Injection scheduling note    Labs CBC, CMP, immunoglobulins, free light chains, immunofixation and SPEP   Scheduling:  Preferred lab:  Lab interval:  labs at [facility] on 4/11   Imaging    Pharmacy appointment    Other referrals

## 2024-04-10 NOTE — PROGRESS NOTES
Incoming call from pt to schedule med onc f/u, will need to restart tx per her visit with Dr. Munoz today. Offered 4/15 @ 1030 with Dr. Luevano @ [facility] location and labs ordered by Dr. Munoz  @ [facility] location. Pt is agreeable with both appts. Asked about oral med being sent to [email or web address], told her it will be through [facility] b/c that's who her insurance is contracted with. States she may switch to her 's insurance, [employer]. Told her to call them first to see which SP she'd be able to use. States that's what she's going to do. She has no other questions at this time but she does have my call back number.  Oncology Navigation   Intake  Date of Diagnosis: 23  Cancer Type: Transplant; Myeloma  Type of Referral: Internal  Date of Referral: 05/10/23  Initial Nurse Navigator Contact: 05/15/23  Referral to Initial Contact Timeline (days): 5  Date Worked: 04/10/24  Reason if booked > 7 days after scheduling: Additional tests/procedures; Patient request     Treatment  Current Status: Active       Medical Oncologist: Dr. FRITZ Dela Cruz  Chemotherapy: Initiated  Chemotherapy Regimen: Velcade (Durvalumab possible pending FISH)  Oral Therapy: Initiated                       Acuity  Systemic Treatment - predicted or initiated: Chemotherapy Regimen with Multiple drugs (+1)  ECO  Comorbidities in Medical History: 2   Needed: 0  Support: 0  Verbalizes Financial Concerns: 1  Transportation: 0  Psychological Factors (+1 each): Emotional during conversation  Verbalizes the need for more education: 1  Navigation Acuity: 3     Follow Up  No follow-ups on file.

## 2024-04-11 ENCOUNTER — LAB VISIT (OUTPATIENT)
Dept: LAB | Facility: HOSPITAL | Age: 64
End: 2024-04-11
Attending: INTERNAL MEDICINE
Payer: COMMERCIAL

## 2024-04-11 DIAGNOSIS — C90.00 MULTIPLE MYELOMA, REMISSION STATUS UNSPECIFIED: Chronic | ICD-10-CM

## 2024-04-11 LAB
ALBUMIN SERPL BCP-MCNC: 4.1 G/DL (ref 3.5–5.2)
ALP SERPL-CCNC: 53 U/L (ref 55–135)
ALT SERPL W/O P-5'-P-CCNC: 15 U/L (ref 10–44)
ANION GAP SERPL CALC-SCNC: 10 MMOL/L (ref 8–16)
AST SERPL-CCNC: 22 U/L (ref 10–40)
BASOPHILS # BLD AUTO: 0.05 K/UL (ref 0–0.2)
BASOPHILS NFR BLD: 1.1 % (ref 0–1.9)
BILIRUB SERPL-MCNC: 0.7 MG/DL (ref 0.1–1)
BUN SERPL-MCNC: 12 MG/DL (ref 8–23)
CALCIUM SERPL-MCNC: 9.7 MG/DL (ref 8.7–10.5)
CHLORIDE SERPL-SCNC: 108 MMOL/L (ref 95–110)
CO2 SERPL-SCNC: 24 MMOL/L (ref 23–29)
CREAT SERPL-MCNC: 0.9 MG/DL (ref 0.5–1.4)
DIFFERENTIAL METHOD BLD: ABNORMAL
EOSINOPHIL # BLD AUTO: 0.3 K/UL (ref 0–0.5)
EOSINOPHIL NFR BLD: 7.2 % (ref 0–8)
ERYTHROCYTE [DISTWIDTH] IN BLOOD BY AUTOMATED COUNT: 13 % (ref 11.5–14.5)
EST. GFR  (NO RACE VARIABLE): >60 ML/MIN/1.73 M^2
GLUCOSE SERPL-MCNC: 80 MG/DL (ref 70–110)
HCT VFR BLD AUTO: 41.2 % (ref 37–48.5)
HGB BLD-MCNC: 13.4 G/DL (ref 12–16)
IMM GRANULOCYTES # BLD AUTO: 0 K/UL (ref 0–0.04)
IMM GRANULOCYTES NFR BLD AUTO: 0 % (ref 0–0.5)
LYMPHOCYTES # BLD AUTO: 2.2 K/UL (ref 1–4.8)
LYMPHOCYTES NFR BLD: 49.1 % (ref 18–48)
MCH RBC QN AUTO: 31.8 PG (ref 27–31)
MCHC RBC AUTO-ENTMCNC: 32.5 G/DL (ref 32–36)
MCV RBC AUTO: 98 FL (ref 82–98)
MONOCYTES # BLD AUTO: 0.5 K/UL (ref 0.3–1)
MONOCYTES NFR BLD: 12.2 % (ref 4–15)
NEUTROPHILS # BLD AUTO: 1.3 K/UL (ref 1.8–7.7)
NEUTROPHILS NFR BLD: 30.4 % (ref 38–73)
NRBC BLD-RTO: 0 /100 WBC
PLATELET # BLD AUTO: 265 K/UL (ref 150–450)
PMV BLD AUTO: 10 FL (ref 9.2–12.9)
POTASSIUM SERPL-SCNC: 3.8 MMOL/L (ref 3.5–5.1)
PROT SERPL-MCNC: 7.3 G/DL (ref 6–8.4)
RBC # BLD AUTO: 4.21 M/UL (ref 4–5.4)
SODIUM SERPL-SCNC: 142 MMOL/L (ref 136–145)
WBC # BLD AUTO: 4.42 K/UL (ref 3.9–12.7)

## 2024-04-11 PROCEDURE — 85025 COMPLETE CBC W/AUTO DIFF WBC: CPT | Performed by: INTERNAL MEDICINE

## 2024-04-11 PROCEDURE — 82784 ASSAY IGA/IGD/IGG/IGM EACH: CPT | Mod: 59 | Performed by: INTERNAL MEDICINE

## 2024-04-11 PROCEDURE — 84165 PROTEIN E-PHORESIS SERUM: CPT | Performed by: INTERNAL MEDICINE

## 2024-04-11 PROCEDURE — 86334 IMMUNOFIX E-PHORESIS SERUM: CPT | Mod: 26,,, | Performed by: PATHOLOGY

## 2024-04-11 PROCEDURE — 84165 PROTEIN E-PHORESIS SERUM: CPT | Mod: 26,,, | Performed by: PATHOLOGY

## 2024-04-11 PROCEDURE — 36415 COLL VENOUS BLD VENIPUNCTURE: CPT | Performed by: INTERNAL MEDICINE

## 2024-04-11 PROCEDURE — 86334 IMMUNOFIX E-PHORESIS SERUM: CPT | Performed by: INTERNAL MEDICINE

## 2024-04-11 PROCEDURE — 80053 COMPREHEN METABOLIC PANEL: CPT | Performed by: INTERNAL MEDICINE

## 2024-04-11 PROCEDURE — 83521 IG LIGHT CHAINS FREE EACH: CPT | Mod: 59 | Performed by: INTERNAL MEDICINE

## 2024-04-12 LAB
ALBUMIN SERPL ELPH-MCNC: 4.46 G/DL (ref 3.35–5.55)
ALPHA1 GLOB SERPL ELPH-MCNC: 0.3 G/DL (ref 0.17–0.41)
ALPHA2 GLOB SERPL ELPH-MCNC: 0.84 G/DL (ref 0.43–0.99)
B-GLOBULIN SERPL ELPH-MCNC: 0.71 G/DL (ref 0.5–1.1)
GAMMA GLOB SERPL ELPH-MCNC: 0.8 G/DL (ref 0.67–1.58)
IGA SERPL-MCNC: 52 MG/DL (ref 40–350)
IGG SERPL-MCNC: 941 MG/DL (ref 650–1600)
IGM SERPL-MCNC: 22 MG/DL (ref 50–300)
INTERPRETATION SERPL IFE-IMP: NORMAL
KAPPA LC SER QL IA: 0.9 MG/DL (ref 0.33–1.94)
KAPPA LC/LAMBDA SER IA: 0.94 (ref 0.26–1.65)
LAMBDA LC SER QL IA: 0.96 MG/DL (ref 0.57–2.63)
PROT SERPL-MCNC: 7.1 G/DL (ref 6–8.4)

## 2024-04-15 ENCOUNTER — OFFICE VISIT (OUTPATIENT)
Dept: HEMATOLOGY/ONCOLOGY | Facility: CLINIC | Age: 64
End: 2024-04-15
Payer: COMMERCIAL

## 2024-04-15 VITALS
OXYGEN SATURATION: 100 % | HEART RATE: 76 BPM | WEIGHT: 126.75 LBS | HEIGHT: 64 IN | RESPIRATION RATE: 18 BRPM | DIASTOLIC BLOOD PRESSURE: 74 MMHG | TEMPERATURE: 98 F | SYSTOLIC BLOOD PRESSURE: 124 MMHG | BODY MASS INDEX: 21.64 KG/M2

## 2024-04-15 DIAGNOSIS — C90.00 MULTIPLE MYELOMA, REMISSION STATUS UNSPECIFIED: Primary | ICD-10-CM

## 2024-04-15 DIAGNOSIS — F41.9 ANXIETY: ICD-10-CM

## 2024-04-15 LAB
PATHOLOGIST INTERPRETATION IFE: NORMAL
PATHOLOGIST INTERPRETATION SPE: NORMAL

## 2024-04-15 PROCEDURE — 3008F BODY MASS INDEX DOCD: CPT | Mod: CPTII,S$GLB,, | Performed by: INTERNAL MEDICINE

## 2024-04-15 PROCEDURE — 4010F ACE/ARB THERAPY RXD/TAKEN: CPT | Mod: CPTII,S$GLB,, | Performed by: INTERNAL MEDICINE

## 2024-04-15 PROCEDURE — 99999 PR PBB SHADOW E&M-EST. PATIENT-LVL III: CPT | Mod: PBBFAC,,, | Performed by: INTERNAL MEDICINE

## 2024-04-15 PROCEDURE — 1159F MED LIST DOCD IN RCRD: CPT | Mod: CPTII,S$GLB,, | Performed by: INTERNAL MEDICINE

## 2024-04-15 PROCEDURE — 3078F DIAST BP <80 MM HG: CPT | Mod: CPTII,S$GLB,, | Performed by: INTERNAL MEDICINE

## 2024-04-15 PROCEDURE — 3074F SYST BP LT 130 MM HG: CPT | Mod: CPTII,S$GLB,, | Performed by: INTERNAL MEDICINE

## 2024-04-15 PROCEDURE — 99214 OFFICE O/P EST MOD 30 MIN: CPT | Mod: S$GLB,,, | Performed by: INTERNAL MEDICINE

## 2024-04-15 RX ORDER — NAPROXEN SODIUM 220 MG/1
81 TABLET, FILM COATED ORAL DAILY
Qty: 30 TABLET | Refills: 11 | Status: SHIPPED | OUTPATIENT
Start: 2024-04-15

## 2024-04-15 RX ORDER — LENALIDOMIDE 10 MG/1
CAPSULE ORAL
Qty: 21 EACH | Refills: 7 | Status: SHIPPED | OUTPATIENT
Start: 2024-04-15 | End: 2024-05-23 | Stop reason: SDUPTHER

## 2024-04-15 RX ORDER — PROCHLORPERAZINE MALEATE 5 MG
10 TABLET ORAL EVERY 6 HOURS PRN
Qty: 20 TABLET | Refills: 5 | Status: SHIPPED | OUTPATIENT
Start: 2024-04-15 | End: 2024-04-15

## 2024-04-15 RX ORDER — DEXAMETHASONE 4 MG/1
40 TABLET ORAL
Qty: 40 TABLET | Refills: 11 | Status: SHIPPED | OUTPATIENT
Start: 2024-04-15

## 2024-04-15 RX ORDER — PROCHLORPERAZINE MALEATE 5 MG
10 TABLET ORAL EVERY 6 HOURS PRN
Qty: 20 TABLET | Refills: 5 | Status: SHIPPED | OUTPATIENT
Start: 2024-04-15 | End: 2024-06-06 | Stop reason: ALTCHOICE

## 2024-04-15 RX ORDER — ACYCLOVIR 400 MG/1
400 TABLET ORAL 2 TIMES DAILY
Qty: 60 TABLET | Refills: 11 | Status: SHIPPED | OUTPATIENT
Start: 2024-04-15

## 2024-04-15 NOTE — PROGRESS NOTES
Patient ID: Ana Bonilla   Chief Complaint: Follow-up  MRN:  [id number]     Oncologic Diagnosis:  Multiple Myeloma - IgG lambda   Previous Treatment:  VRD (5/10/2023 - 6/28/2023)   Current Treatment:    D-VRD (7/6/2023 - 01/03/2024 )      Zometa (10/18/2023 - )   Subjective   Ana Bonilla is a pleasant 63 y.o. female who presents for follow up.    She is followed up with the myeloma specialist.  She reports that she still is not ready for stem cell transplant.  Because of this he has recommended a maintenance regimen which we will start as soon as possible.    She does report the her appetite is decreased since she continues to feel fatigued.  Her back pain is unchanged.  She has been having shortness of breath worsening on exertion since she had COVID a few weeks ago.  She continues to smoke he may have smoked more the last several weeks due to stress.  I recommend that given multiple risk for PE that we obtain a CTA.  She would like to hold off and see how she does over the next couple of weeks.  She understands that she is to seek out help if any of her symptoms worsen.  We reviewed the overall treatment recommendation of stem-cell transplant but we will proceed with maintenance therapy.      Review of Systems   Constitutional:  Positive for appetite change (decreased) and fatigue. Negative for activity change, chills, diaphoresis, fever and unexpected weight change.   HENT:  Negative for nosebleeds.    Respiratory:  Positive for shortness of breath.    Cardiovascular:  Negative for chest pain.   Gastrointestinal:  Negative for abdominal pain, blood in stool, diarrhea, nausea and vomiting.   Endocrine: Positive for cold intolerance.   Genitourinary:  Negative for difficulty urinating and hematuria.   Musculoskeletal:  Positive for back pain. Negative for arthralgias and myalgias.   Skin:  Negative for rash.   Neurological:  Negative for dizziness, weakness, light-headedness and headaches.    Hematological:  Does not bruise/bleed easily.   Psychiatric/Behavioral:  The patient is not nervous/anxious.      History     Oncology History   Multiple myeloma   4/20/2023 Initial Diagnosis    Multiple myeloma     5/10/2023 - 6/28/2023 Chemotherapy    Treatment Summary   Plan Name: OP VRD - WEEKLY BORTEZOMIB LENALIDOMIDE DEXAMETHASONE Q3W  Treatment Goal: Palliative  Status: Inactive  Start Date: 5/10/2023  End Date: 6/28/2023  Provider: Abram Velasquez MD  Chemotherapy: bortezomib (VELCADE) injection 2 mg, 1.3 mg/m2 = 2 mg, Subcutaneous, Clinic/HOD 1 time, 2 of 6 cycles  Administration: 2 mg (5/10/2023), 2 mg (5/17/2023), 2 mg (6/14/2023), 2 mg (5/31/2023), 2 mg (6/21/2023), 2 mg (6/28/2023)  lenalidomide 25 mg Cap, 25 mg, Oral, Daily, 1 of 1 cycle, Start date: 5/10/2023, End date: 5/17/2023 6/28/2023 Cancer Staged    Staging form: Plasma Cell Myeloma and Plasma Cell Disorders, AJCC 8th Edition  - Clinical stage from 6/28/2023: RISS Stage II (Beta-2-microglobulin (mg/L): 1.9, Albumin (g/dL): 3, ISS: Stage II, High-risk cytogenetics: Absent, LDH: Normal)     7/6/2023 -  Chemotherapy    Treatment Summary   Plan Name: OP D-VRD DARATUMUMAB + BORTEZOMIB LENALIDOMIDE DEXAMETHASONE  Treatment Goal: Palliative  Status: Active  Start Date: 7/6/2023  End Date: 1/3/2024  Provider: Oscar Márquez MD  Chemotherapy: bortezomib (VELCADE) injection 2 mg, 1.3 mg/m2 = 2 mg, Subcutaneous, Clinic/HOD 1 time, 6 of 6 cycles  Administration: 2 mg (7/6/2023), 2 mg (7/12/2023), 2 mg (8/2/2023), 2 mg (8/9/2023), 2.25 mg (10/18/2023), 2 mg (7/19/2023), 2 mg (7/26/2023), 2 mg (8/16/2023), 2.25 mg (9/20/2023), 2.25 mg (9/27/2023), 2.25 mg (10/25/2023), 2.25 mg (11/1/2023), 2.25 mg (11/8/2023), 2.25 mg (12/6/2023), 2.25 mg (1/3/2024), 2.25 mg (11/15/2023), 2.25 mg (11/22/2023), 2.25 mg (11/29/2023), 2.25 mg (12/13/2023), 2.25 mg (12/20/2023), 2.25 mg (12/26/2023)  lenalidomide 10 mg Cap, 1 capsule (100 % of original dose 1 capsule),  Oral, Daily, 1 of 1 cycle, Start date: 7/26/2023, End date: 8/2/2023  Dose modification: 1 capsule (original dose 1 capsule, Cycle 0)  daratumumab-hyaluronidase-fihj subcutaneous injection 1,800 mg, 1,800 mg (100 % of original dose 1,800 mg), Subcutaneous, Clinic/HOD 1 time, 6 of 6 cycles  Dose modification: 1,800 mg (original dose 1,800 mg, Cycle 1), 1,800 mg (original dose 1,800 mg, Cycle 1)  Administration: 1,800 mg (7/6/2023), 1,800 mg (7/12/2023), 1,800 mg (7/19/2023), 1,800 mg (7/26/2023), 1,800 mg (8/2/2023), 1,800 mg (8/9/2023), 1,800 mg (8/16/2023), 1,800 mg (9/27/2023), 1,800 mg (10/18/2023), 1,800 mg (11/1/2023), 1,800 mg (11/15/2023), 1,800 mg (11/29/2023), 1,800 mg (12/13/2023), 1,800 mg (12/26/2023)     1/10/2024 -  Chemotherapy    Treatment Summary   Plan Name: OP Myeloma KIA-RD daratumumab lenalidomide dexAMETHasone Q4W  Treatment Goal: Palliative  Status: Future  Start Date: 1/10/2024 (Planned)  End Date: 11/13/2024 (Planned)  Provider: Bri Luevano MD  Chemotherapy: LENALIDOMIDE 25 MG ORAL CAP, 25 mg, Oral, Daily, 0 of 1 cycle, Start date: --, End date: --  DARATUMUMAB-HYALURONIDASE-FIHJ 1,800 MG-30,000 UNIT/15 ML SUBQ SOLN, 1,800 mg, Subcutaneous, Clinic/HOD 1 time, 0 of 12 cycles           [facility]  Ana GONZALEZ Bonilla  63 y.o.  female with past medical history significant for hypertension, COPD, asthma, GERD, hypothyroidism here for follow-up and management of multiple myeloma     She was referred in 2/2023 by her PCP after workup for gastritis revealed lytic lesions. CT chest showed lytic and expansile destructive lesion in the sternum and lytic lesions at L1. Biopsy of L1 lesion in 3/2023 revealed mature B cell neoplasm most consistent with plasma cell neoplasm.      Bone marrow biopsy in 4/2023 demonstrated 60% plasma cells. PET/CT showed extensive bony lesions throughout the spine, sternum, bilateral ribs, pelvis, and a mild compression deformity in L1. SPEP/MECHE showed IgG M spike 3.19 g/dL,  IgG 4,521 mg/dL.      She was started on VRd and then daratumumab was added by the transplant team. She was started on ASA for thrombosis prophylaxis and acyclovir for herpes zoster prophylaxis. Start Zometa after dental evaluation.     Past Medical History:   Diagnosis Date    Allergy     Amblyopia OS    Anxiety     Asthma     COPD (chronic obstructive pulmonary disease)     NO HOME o2    GERD (gastroesophageal reflux disease)     Hyperlipidemia     Hypertension     PONV (postoperative nausea and vomiting)     Thyroid disease        Past Surgical History:   Procedure Laterality Date    APPENDECTOMY      BIOPSY N/A 2023    Procedure: BIOPSY;  Surgeon: Fausto Smith MD;  Location: [facility] OR;  Service: Neurosurgery;  Laterality: N/A;  L1    BUNIONECTOMY      COLONOSCOPY N/A 2019    Procedure: COLONOSCOPY;  Surgeon: Danny Matos III, MD;  Location: [facility] ENDO;  Service: Endoscopy;  Laterality: N/A;    COLONOSCOPY N/A 10/24/2022    Procedure: COLONOSCOPY;  Surgeon: Danny Matos III, MD;  Location: [facility] ENDO;  Service: Endoscopy;  Laterality: N/A;    ESOPHAGOGASTRODUODENOSCOPY N/A 10/24/2022    Procedure: EGD (ESOPHAGOGASTRODUODENOSCOPY);  Surgeon: Danny Matos III, MD;  Location: [facility] ENDO;  Service: Endoscopy;  Laterality: N/A;    FIXATION KYPHOPLASTY Bilateral 2023    Procedure: KYPHOPLASTY;  Surgeon: Fausto Smiht MD;  Location: [facility];  Service: Neurosurgery;  Laterality: Bilateral;  kyphoplasty and radiofrequency ablation - L1    HYSTERECTOMY      PARTIAL//still with ovaries    neck fusion  2017    THYROIDECTOMY      TUBAL LIGATION         Family History   Problem Relation Name Age of Onset    Hypertension Mother Vivian     Diabetes Mother Vivian     Asthma Mother Vivian             Diabetes Father      Asthma Father      Stroke Maternal Grandmother  grandmother     Prostate cancer Maternal Grandfather      Mental illness Son Lukasz Garcia      Pancreatic cancer Maternal Uncle      Mental illness Other Pat side     Pancreatic cancer Other Mat side     Ovarian cancer Maternal Cousin         Review of patient's allergies indicates:   Allergen Reactions    Hydrocodone-acetaminophen Other (See Comments)     Causes pt to feel extremely sick        Social History     Tobacco Use    Smoking status: Every Day     Current packs/day: 0.50     Average packs/day: 0.5 packs/day for 50.0 years (25.0 ttl pk-yrs)     Types: Cigarettes     Passive exposure: Never    Smokeless tobacco: Never   Substance Use Topics    Alcohol use: Not Currently     Comment: quit    Drug use: No     Physical Exam     Vitals:    04/15/24 1026   BP: 124/74   Pulse: 76   Resp: 18   Temp: 97.9 °F (36.6 °C)     Physical Exam  Constitutional:       General: She is not in acute distress.     Appearance: She is not ill-appearing or toxic-appearing.   HENT:      Head: Normocephalic and atraumatic.   Pulmonary:      Effort: Pulmonary effort is normal. No respiratory distress.   Musculoskeletal:         General: Tenderness (LLE) present.      Right lower leg: No edema.      Left lower leg: No edema.   Neurological:      General: No focal deficit present.      Mental Status: She is oriented to person, place, and time. Mental status is at baseline.   Psychiatric:         Mood and Affect: Mood normal.       Labs   Labs:  No visits with results within 2 Day(s) from this visit.   Latest known visit with results is:   Lab Visit on 04/11/2024   Component Date Value Ref Range Status    WBC 04/11/2024 4.42  3.90 - 12.70 K/uL Final    RBC 04/11/2024 4.21  4.00 - 5.40 M/uL Final    Hemoglobin 04/11/2024 13.4  12.0 - 16.0 g/dL Final    Hematocrit 04/11/2024 41.2  37.0 - 48.5 % Final    MCV 04/11/2024 98  82 - 98 fL Final    MCH 04/11/2024 31.8 (H)  27.0 - 31.0 pg Final    MCHC 04/11/2024 32.5  32.0 - 36.0 g/dL Final    RDW 04/11/2024 13.0  11.5 - 14.5 % Final    Platelets 04/11/2024 265  150 - 450 K/uL Final     MPV 04/11/2024 10.0  9.2 - 12.9 fL Final    Immature Granulocytes 04/11/2024 0.0  0.0 - 0.5 % Final    Gran # (ANC) 04/11/2024 1.3 (L)  1.8 - 7.7 K/uL Final    Immature Grans (Abs) 04/11/2024 0.00  0.00 - 0.04 K/uL Final    Comment: Mild elevation in immature granulocytes is non specific and   can be seen in a variety of conditions including stress response,   acute inflammation, trauma and pregnancy. Correlation with other   laboratory and clinical findings is essential.      Lymph # 04/11/2024 2.2  1.0 - 4.8 K/uL Final    Mono # 04/11/2024 0.5  0.3 - 1.0 K/uL Final    Eos # 04/11/2024 0.3  0.0 - 0.5 K/uL Final    Baso # 04/11/2024 0.05  0.00 - 0.20 K/uL Final    nRBC 04/11/2024 0  0 /100 WBC Final    Gran % 04/11/2024 30.4 (L)  38.0 - 73.0 % Final    Lymph % 04/11/2024 49.1 (H)  18.0 - 48.0 % Final    Mono % 04/11/2024 12.2  4.0 - 15.0 % Final    Eosinophil % 04/11/2024 7.2  0.0 - 8.0 % Final    Basophil % 04/11/2024 1.1  0.0 - 1.9 % Final    Differential Method 04/11/2024 Automated   Final    Sodium 04/11/2024 142  136 - 145 mmol/L Final    Potassium 04/11/2024 3.8  3.5 - 5.1 mmol/L Final    Chloride 04/11/2024 108  95 - 110 mmol/L Final    CO2 04/11/2024 24  23 - 29 mmol/L Final    Glucose 04/11/2024 80  70 - 110 mg/dL Final    BUN 04/11/2024 12  8 - 23 mg/dL Final    Creatinine 04/11/2024 0.9  0.5 - 1.4 mg/dL Final    Calcium 04/11/2024 9.7  8.7 - 10.5 mg/dL Final    Total Protein 04/11/2024 7.3  6.0 - 8.4 g/dL Final    Albumin 04/11/2024 4.1  3.5 - 5.2 g/dL Final    Total Bilirubin 04/11/2024 0.7  0.1 - 1.0 mg/dL Final    Comment: For infants and newborns, interpretation of results should be based  on gestational age, weight and in agreement with clinical  observations.    Premature Infant recommended reference ranges:  Up to 24 hours.............<8.0 mg/dL  Up to 48 hours............<12.0 mg/dL  3-5 days..................<15.0 mg/dL  6-29 days.................<15.0 mg/dL      Alkaline Phosphatase 04/11/2024  53 (L)  55 - 135 U/L Final    AST 04/11/2024 22  10 - 40 U/L Final    ALT 04/11/2024 15  10 - 44 U/L Final    eGFR 04/11/2024 >60.0  >60 mL/min/1.73 m^2 Final    Anion Gap 04/11/2024 10  8 - 16 mmol/L Final    IgG 04/11/2024 941  650 - 1600 mg/dL Final    IgG Cord Blood Reference Range: 650-1600 mg/dL.    IgA 04/11/2024 52  40 - 350 mg/dL Final    IgA Cord Blood Reference Range: <5 mg/dL.    IgM 04/11/2024 22 (L)  50 - 300 mg/dL Final    IgM Cord Blood Reference Range: <25 mg/dL.    Protein, Serum 04/11/2024 7.1  6.0 - 8.4 g/dL Final    Comment: Serum protein electrophoresis and immunofixation results should be   interpreted in clinical context in that some therapeutic agents can   result   in false positive results (example, daratumumab). Correlation with   the   patient s therapeutic regimen is required.      Albumin 04/11/2024 4.46  3.35 - 5.55 g/dL Final    Alpha-1 04/11/2024 0.30  0.17 - 0.41 g/dL Final    Alpha-2 04/11/2024 0.84  0.43 - 0.99 g/dL Final    Beta 04/11/2024 0.71  0.50 - 1.10 g/dL Final    Gamma 04/11/2024 0.80  0.67 - 1.58 g/dL Final    Immunofix Interp. 04/11/2024 SEE COMMENT   Final    Comment: Serum protein electrophoresis and immunofixation results should be   interpreted in clinical context in that some therapeutic agents can   result   in false positive results (example, daratumumab). Correlation with   the   patient s therapeutic regimen is required.  See pathologist's interpretation.      [place] Free Light Chains 04/11/2024 0.90  0.33 - 1.94 mg/dL Final    Lambda Free Light Chains 04/11/2024 0.96  0.57 - 2.63 mg/dL Final    Kappa/Lambda FLC Ratio 04/11/2024 0.94  0.26 - 1.65 Final    Comment: Undetected antigen excess is a rare event but cannot   be excluded. If these free light chain results do not   agree with other clinical or laboratory findings or   if the sample is from a patient that has previously   demonstrated antigen excess, discuss with the testing   laboratory.   Results  should always be interpreted in conjunction   with other laboratory tests and clinical evidence.           Imaging/Pathology   - 06/06/2023 RIGHT ILIAC CREST BONE MARROW ASPIRATE, BONE MARROW CLOT, AND BONE MARROW CORE BIOPSY WITH:     CELLULARITY=40-60%, TRILINEAGE HEMATOPOIETIC ACTIVITY (M/E=2.9:1).   CONSISTENT WITH PLASMA CELL NEOPLASM(60%).  SEE COMMENT.   FOCAL GRADE 1 RETICULAR FIBROSIS.   CONGO RED NEGATIVE.   INCREASED STORAGE IRON.   ADEQUATE NUMBER OF MEGAKARYOCYTES.     Bone marrow karyotype results: 46, XX[20], female karyotype.     Myeloma fixed cell, high-risk, FISH:  Normal.  The result is within normal limits for 1q duplication, TP53 deletion and IGH rearrangement.         - 04/10/2023 PET: Numerous FDG avid predominately lytic osseous lesions as above.  Primary differential considerations would include multiple myeloma versus metastatic disease.  2. No FDG avid soft tissue masses or adenopathy demonstrated.  3. Mild pathologic compression deformity of the L1 vertebral body.                                          Assessment and Plan   IgG lambda Multiple myeloma, R-ISS stage I   Diagnosed June 2023 via bone marrow biopsy  Patient treatment had been held multiple times with few treatments cancelled and also Revlimid 10 mg daily given as unable to tolerate higher dose   Previous oncologist discussed with the patient and the transplant team BMT.  However, patient declined transplant at this time and it was recommended that we continue with Kia-VRD for 6 cycles and then repeat bone marrow biopsy and PET scan. If VGPR or better, then continue with Revlimid maintenance only.  Continue prophylaxis with aspirin, acyclovir 400 mg b.i.d.  Continue Zometa/calcium and vitamin-D supplements (Due for Zometa 01/13/23)  Repeat PET-CT stable  BM Biopsy 02/13/2024 decrease in plasma cell burden  Continue follow up with myeloma team on follow up with myeloma specialist we will proceed with maintenance KIA-RD and  "Zometa as patient not yet ready to proceed with stem cell transplant  New Revlimid prescription sent       Shortness of Breath  The patient reports decreased exercise tolerance; she can not walk a long distance without becoming short of breath in his also has some chest discomfort  Recommended CTA to assess for thromboses however patient declines and prefers to be reassessed at next visit      Poor Appetite   She has lost 10 lb in approximately 8 weeks   Once she resumes treatment and starts steroids her appetite may improve; if this does not help we will start appetite stimulant      Back Pain, Left leg pain   Obtain LLE ultrasound - negative  Refill Percocet for pain     checked: 4/15/24  " checked" means that the [facility] controlled substance prescription fill site was checked for this patient as part of this visit.  The filled prescriptions are compared with the charted prescriptions and the restriction of pill amounts per month, numbers of prescribers and number of pharmacies used.  This complex task take 5 minutes and it is required for patients being managed with opioids.     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, Hordeolum externum of left eyelid  Per Ophthalmology, this is a chronic condition and is unrelated to and not a contraindication for treatment of MM  Has had kenalog injection  Continue follow-up with Ophthalmology  Continue antibiotic/cream and warm compresses        Cancer Screening  MMG 03/17/2023:  BI-RADS 1  PAP Smear:  Partial hysterectomy  Colonoscopy 10/24/2022:  Normal repeat in 5 years        Chronic Medical Conditions  Asthma  Anxiety   COPD   GERD  Hypertension   Hyperlipidemia "   Hypothyroidism   ?IBS-C on [facility] Chart Routing      Follow up with physician 2 weeks. At the [facility] to resume treatment   Follow up with WERNER    Infusion scheduling note   C1 KIA-RD at the [facility]   Injection scheduling note    Labs CMP, CBC, phosphorus and magnesium   Scheduling:  Preferred lab:  Lab interval:     Imaging    Pharmacy appointment    Other referrals               The patient was seen, interviewed and examined. Pertinent lab and radiologic studies were reviewed. Pt instructed to call should they develop concerning signs/symptoms or have further questions.        Portions of the record may have been created with voice recognition software. Occasional wrong-word or sound-a-like substitutions may have occurred due to the inherent limitations of voice recognition software. Read the chart carefully and recognize, using context, where substitutions have occurred.      Bri Michelle MD    Hematology/Oncology

## 2024-04-16 ENCOUNTER — DOCUMENTATION ONLY (OUTPATIENT)
Dept: HEMATOLOGY/ONCOLOGY | Facility: CLINIC | Age: 64
End: 2024-04-16
Payer: COMMERCIAL

## 2024-04-16 RX ORDER — ALPRAZOLAM 2 MG/1
TABLET ORAL
Qty: 60 TABLET | Refills: 0 | Status: SHIPPED | OUTPATIENT
Start: 2024-04-16 | End: 2024-05-13 | Stop reason: SDUPTHER

## 2024-04-18 NOTE — PROGRESS NOTES
Pt picked up Vanilla Ensure Samples from registration desk. SW had met with pt last week re: questions r/t [facility] insurance. Pt was considering transitioning to VA insurance or another insurance. SW advised pt to call [facility] directly with specific coverage questions and reminded her that she can make insurance changes during open enrollment, which is usually 10/15-12/7, and she can discuss insurance options at this time with insurance Co. of choice. No other needs expressed. SW will remain available.

## 2024-04-22 ENCOUNTER — DOCUMENTATION ONLY (OUTPATIENT)
Dept: HEMATOLOGY/ONCOLOGY | Facility: CLINIC | Age: 64
End: 2024-04-22
Payer: COMMERCIAL

## 2024-04-22 NOTE — PROGRESS NOTES
Called pt to check status of oral chemo receipt from [facility]. Pt states she hasn't heard from them, after further review noted that script was authorized on 4/19. Told pt since it was authorized Friday, it may take several days before they reach out to her. We reviewed her appts for next week she agrees and plans to attend all appts as scheduled. All of her questions were answered, she has my call back number in case she needs to reach me.   Oncology Navigation   Intake  Cancer Type: Transplant; Myeloma  Date of Referral: 05/10/23  Initial Nurse Navigator Contact: 05/15/23  Referral to Initial Contact Timeline (days): 5  Date Worked: 04/22/24  Reason if booked > 7 days after scheduling: Additional tests/procedures; Patient request     Treatment  Current Status: Active       Medical Oncologist: Dr. FRITZ Dela Cruz  Chemotherapy: Initiated  Oral Therapy: Initiated                       Acuity   Needed: 0  Transportation: 0  Psychological Factors (+1 each): Emotional during conversation  Verbalizes the need for more education: 1  Navigation Acuity: 3     Follow Up  Follow up in 1 week (on 4/29/2024) for Resume treatment.

## 2024-04-29 ENCOUNTER — LAB VISIT (OUTPATIENT)
Dept: LAB | Facility: HOSPITAL | Age: 64
End: 2024-04-29
Attending: INTERNAL MEDICINE
Payer: COMMERCIAL

## 2024-04-29 DIAGNOSIS — C90.00 MULTIPLE MYELOMA, REMISSION STATUS UNSPECIFIED: ICD-10-CM

## 2024-04-29 LAB
ALBUMIN SERPL BCP-MCNC: 4.1 G/DL (ref 3.5–5.2)
ALP SERPL-CCNC: 50 U/L (ref 55–135)
ALT SERPL W/O P-5'-P-CCNC: 12 U/L (ref 10–44)
ANION GAP SERPL CALC-SCNC: 9 MMOL/L (ref 8–16)
AST SERPL-CCNC: 19 U/L (ref 10–40)
BASOPHILS # BLD AUTO: 0.05 K/UL (ref 0–0.2)
BASOPHILS NFR BLD: 1.2 % (ref 0–1.9)
BILIRUB SERPL-MCNC: 0.6 MG/DL (ref 0.1–1)
BUN SERPL-MCNC: 15 MG/DL (ref 8–23)
CALCIUM SERPL-MCNC: 9.7 MG/DL (ref 8.7–10.5)
CHLORIDE SERPL-SCNC: 111 MMOL/L (ref 95–110)
CO2 SERPL-SCNC: 21 MMOL/L (ref 23–29)
CREAT SERPL-MCNC: 0.9 MG/DL (ref 0.5–1.4)
DIFFERENTIAL METHOD BLD: ABNORMAL
EOSINOPHIL # BLD AUTO: 0.3 K/UL (ref 0–0.5)
EOSINOPHIL NFR BLD: 6.2 % (ref 0–8)
ERYTHROCYTE [DISTWIDTH] IN BLOOD BY AUTOMATED COUNT: 12.7 % (ref 11.5–14.5)
EST. GFR  (NO RACE VARIABLE): >60 ML/MIN/1.73 M^2
GLUCOSE SERPL-MCNC: 113 MG/DL (ref 70–110)
HCT VFR BLD AUTO: 39.9 % (ref 37–48.5)
HGB BLD-MCNC: 13.2 G/DL (ref 12–16)
IMM GRANULOCYTES # BLD AUTO: 0.01 K/UL (ref 0–0.04)
IMM GRANULOCYTES NFR BLD AUTO: 0.2 % (ref 0–0.5)
LYMPHOCYTES # BLD AUTO: 1.8 K/UL (ref 1–4.8)
LYMPHOCYTES NFR BLD: 43.4 % (ref 18–48)
MAGNESIUM SERPL-MCNC: 1.8 MG/DL (ref 1.6–2.6)
MCH RBC QN AUTO: 31.7 PG (ref 27–31)
MCHC RBC AUTO-ENTMCNC: 33.1 G/DL (ref 32–36)
MCV RBC AUTO: 96 FL (ref 82–98)
MONOCYTES # BLD AUTO: 0.4 K/UL (ref 0.3–1)
MONOCYTES NFR BLD: 9.8 % (ref 4–15)
NEUTROPHILS # BLD AUTO: 1.6 K/UL (ref 1.8–7.7)
NEUTROPHILS NFR BLD: 39.2 % (ref 38–73)
NRBC BLD-RTO: 0 /100 WBC
PHOSPHATE SERPL-MCNC: 3.1 MG/DL (ref 2.7–4.5)
PLATELET # BLD AUTO: 289 K/UL (ref 150–450)
PMV BLD AUTO: 8.8 FL (ref 9.2–12.9)
POTASSIUM SERPL-SCNC: 4 MMOL/L (ref 3.5–5.1)
PROT SERPL-MCNC: 7.1 G/DL (ref 6–8.4)
RBC # BLD AUTO: 4.16 M/UL (ref 4–5.4)
SODIUM SERPL-SCNC: 141 MMOL/L (ref 136–145)
WBC # BLD AUTO: 4.17 K/UL (ref 3.9–12.7)

## 2024-04-29 PROCEDURE — 83735 ASSAY OF MAGNESIUM: CPT | Performed by: INTERNAL MEDICINE

## 2024-04-29 PROCEDURE — 80053 COMPREHEN METABOLIC PANEL: CPT | Performed by: INTERNAL MEDICINE

## 2024-04-29 PROCEDURE — 36415 COLL VENOUS BLD VENIPUNCTURE: CPT | Performed by: INTERNAL MEDICINE

## 2024-04-29 PROCEDURE — 84100 ASSAY OF PHOSPHORUS: CPT | Performed by: INTERNAL MEDICINE

## 2024-04-29 PROCEDURE — 85025 COMPLETE CBC W/AUTO DIFF WBC: CPT | Performed by: INTERNAL MEDICINE

## 2024-04-30 ENCOUNTER — OFFICE VISIT (OUTPATIENT)
Dept: HEMATOLOGY/ONCOLOGY | Facility: CLINIC | Age: 64
End: 2024-04-30
Payer: COMMERCIAL

## 2024-04-30 ENCOUNTER — DOCUMENTATION ONLY (OUTPATIENT)
Dept: HEMATOLOGY/ONCOLOGY | Facility: CLINIC | Age: 64
End: 2024-04-30

## 2024-04-30 ENCOUNTER — INFUSION (OUTPATIENT)
Dept: INFUSION THERAPY | Facility: HOSPITAL | Age: 64
End: 2024-04-30
Attending: INTERNAL MEDICINE
Payer: COMMERCIAL

## 2024-04-30 VITALS
DIASTOLIC BLOOD PRESSURE: 67 MMHG | HEART RATE: 73 BPM | TEMPERATURE: 98 F | SYSTOLIC BLOOD PRESSURE: 94 MMHG | OXYGEN SATURATION: 100 % | BODY MASS INDEX: 21.42 KG/M2 | RESPIRATION RATE: 16 BRPM | WEIGHT: 125.44 LBS | HEIGHT: 64 IN

## 2024-04-30 VITALS
RESPIRATION RATE: 18 BRPM | OXYGEN SATURATION: 100 % | HEART RATE: 87 BPM | HEIGHT: 64 IN | TEMPERATURE: 98 F | BODY MASS INDEX: 21.42 KG/M2 | DIASTOLIC BLOOD PRESSURE: 71 MMHG | WEIGHT: 125.44 LBS | SYSTOLIC BLOOD PRESSURE: 111 MMHG

## 2024-04-30 DIAGNOSIS — C90.00 MULTIPLE MYELOMA, REMISSION STATUS UNSPECIFIED: Primary | ICD-10-CM

## 2024-04-30 DIAGNOSIS — T45.1X5A IMMUNODEFICIENCY DUE TO CHEMOTHERAPY: ICD-10-CM

## 2024-04-30 DIAGNOSIS — Z79.899 IMMUNODEFICIENCY DUE TO CHEMOTHERAPY: ICD-10-CM

## 2024-04-30 DIAGNOSIS — D84.821 IMMUNODEFICIENCY DUE TO CHEMOTHERAPY: ICD-10-CM

## 2024-04-30 DIAGNOSIS — C90.00 MULTIPLE MYELOMA, REMISSION STATUS UNSPECIFIED: Primary | Chronic | ICD-10-CM

## 2024-04-30 DIAGNOSIS — Z72.0 TOBACCO USE: ICD-10-CM

## 2024-04-30 DIAGNOSIS — R63.0 POOR APPETITE: ICD-10-CM

## 2024-04-30 PROCEDURE — 3008F BODY MASS INDEX DOCD: CPT | Mod: CPTII,S$GLB,, | Performed by: INTERNAL MEDICINE

## 2024-04-30 PROCEDURE — 4010F ACE/ARB THERAPY RXD/TAKEN: CPT | Mod: CPTII,S$GLB,, | Performed by: INTERNAL MEDICINE

## 2024-04-30 PROCEDURE — 99215 OFFICE O/P EST HI 40 MIN: CPT | Mod: S$GLB,,, | Performed by: INTERNAL MEDICINE

## 2024-04-30 PROCEDURE — 1159F MED LIST DOCD IN RCRD: CPT | Mod: CPTII,S$GLB,, | Performed by: INTERNAL MEDICINE

## 2024-04-30 PROCEDURE — 3074F SYST BP LT 130 MM HG: CPT | Mod: CPTII,S$GLB,, | Performed by: INTERNAL MEDICINE

## 2024-04-30 PROCEDURE — 99999 PR PBB SHADOW E&M-EST. PATIENT-LVL V: CPT | Mod: PBBFAC,,, | Performed by: INTERNAL MEDICINE

## 2024-04-30 PROCEDURE — 25000003 PHARM REV CODE 250: Performed by: INTERNAL MEDICINE

## 2024-04-30 PROCEDURE — 63600175 PHARM REV CODE 636 W HCPCS: Mod: JZ,JG | Performed by: INTERNAL MEDICINE

## 2024-04-30 PROCEDURE — 96401 CHEMO ANTI-NEOPL SQ/IM: CPT

## 2024-04-30 PROCEDURE — 3078F DIAST BP <80 MM HG: CPT | Mod: CPTII,S$GLB,, | Performed by: INTERNAL MEDICINE

## 2024-04-30 RX ORDER — DIPHENHYDRAMINE HYDROCHLORIDE 50 MG/ML
50 INJECTION INTRAMUSCULAR; INTRAVENOUS ONCE AS NEEDED
Status: CANCELLED | OUTPATIENT
Start: 2024-04-30

## 2024-04-30 RX ORDER — HEPARIN 100 UNIT/ML
500 SYRINGE INTRAVENOUS
Status: CANCELLED | OUTPATIENT
Start: 2024-04-30

## 2024-04-30 RX ORDER — SODIUM CHLORIDE 0.9 % (FLUSH) 0.9 %
10 SYRINGE (ML) INJECTION
Status: CANCELLED | OUTPATIENT
Start: 2024-04-30

## 2024-04-30 RX ORDER — EPINEPHRINE 0.3 MG/.3ML
0.3 INJECTION SUBCUTANEOUS ONCE AS NEEDED
Status: CANCELLED | OUTPATIENT
Start: 2024-04-30

## 2024-04-30 RX ORDER — ACETAMINOPHEN 325 MG/1
650 TABLET ORAL
Status: CANCELLED | OUTPATIENT
Start: 2024-04-30

## 2024-04-30 RX ORDER — DIPHENHYDRAMINE HCL 25 MG
25 CAPSULE ORAL
Status: COMPLETED | OUTPATIENT
Start: 2024-04-30 | End: 2024-04-30

## 2024-04-30 RX ORDER — DIPHENHYDRAMINE HCL 25 MG
25 CAPSULE ORAL
Status: CANCELLED | OUTPATIENT
Start: 2024-04-30

## 2024-04-30 RX ORDER — PROCHLORPERAZINE EDISYLATE 5 MG/ML
10 INJECTION INTRAMUSCULAR; INTRAVENOUS ONCE AS NEEDED
Status: CANCELLED | OUTPATIENT
Start: 2024-04-30

## 2024-04-30 RX ORDER — ACETAMINOPHEN 325 MG/1
650 TABLET ORAL
Status: COMPLETED | OUTPATIENT
Start: 2024-04-30 | End: 2024-04-30

## 2024-04-30 RX ADMIN — DIPHENHYDRAMINE HYDROCHLORIDE 25 MG: 25 CAPSULE ORAL at 09:04

## 2024-04-30 RX ADMIN — DARATUMUMAB AND HYALURONIDASE-FIHJ (HUMAN RECOMBINANT) 1800 MG: 1800; 30000 INJECTION SUBCUTANEOUS at 10:04

## 2024-04-30 RX ADMIN — ACETAMINOPHEN 650 MG: 325 TABLET ORAL at 09:04

## 2024-04-30 NOTE — PROGRESS NOTES
SWER was consulted to assist with Ensure samples for patient. SWER provided nurse vanilla ensure samples to provide to patient. SWER will remain available.

## 2024-04-30 NOTE — PROGRESS NOTES
Patient ID: Ana Bonilla   Chief Complaint: Follow-up  MRN:  [id number]     Oncologic Diagnosis:  Multiple Myeloma - IgG lambda   Previous Treatment:  VRD (5/10/2023 - 6/28/2023)   Current Treatment:    D-VRD (7/6/2023 - 01/03/2024 )      Zometa (10/18/2023 - )   Subjective   Ana Bonilla is a pleasant 63 y.o. female who presents for follow up.    Overall the patient is stable.  She continues to have some fatigue and decrease in appetite.  I think this is mostly due to stress.  Her back pain and hip pain has resolved.  She feels short of breath.  We are to resume maintenance treatment today.  She has received Revlimid.  Labs reviewed and stable for treatment.  She should have Zometa today as well but prefers to wait until next week. Counseled to continue with electrolyte supplementation.    Review of Systems   Constitutional:  Positive for appetite change (decreased) and fatigue. Negative for activity change, chills, diaphoresis, fever and unexpected weight change.   HENT:  Negative for nosebleeds.    Respiratory:  Negative for shortness of breath.    Cardiovascular:  Negative for chest pain.   Gastrointestinal:  Negative for abdominal pain, blood in stool, diarrhea, nausea and vomiting.   Genitourinary:  Negative for difficulty urinating and hematuria.   Musculoskeletal:  Negative for arthralgias, back pain and myalgias.   Skin:  Negative for rash.   Neurological:  Negative for dizziness, weakness, light-headedness and headaches.   Hematological:  Does not bruise/bleed easily.   Psychiatric/Behavioral:  The patient is not nervous/anxious.      History     Oncology History   Multiple myeloma   4/20/2023 Initial Diagnosis    Multiple myeloma     5/10/2023 - 6/28/2023 Chemotherapy    Treatment Summary   Plan Name: OP VRD - WEEKLY BORTEZOMIB LENALIDOMIDE DEXAMETHASONE Q3W  Treatment Goal: Palliative  Status: Inactive  Start Date: 5/10/2023  End Date: 6/28/2023  Provider: Abram Velasquez MD  Chemotherapy:  bortezomib (VELCADE) injection 2 mg, 1.3 mg/m2 = 2 mg, Subcutaneous, Clinic/[facility] 1 time, 2 of 6 cycles  Administration: 2 mg (5/10/2023), 2 mg (5/17/2023), 2 mg (6/14/2023), 2 mg (5/31/2023), 2 mg (6/21/2023), 2 mg (6/28/2023)  lenalidomide 25 mg Cap, 25 mg, Oral, Daily, 1 of 1 cycle, Start date: 5/10/2023, End date: 5/17/2023 6/28/2023 Cancer Staged    Staging form: Plasma Cell Myeloma and Plasma Cell Disorders, AJCC 8th Edition  - Clinical stage from 6/28/2023: RISS Stage II (Beta-2-microglobulin (mg/L): 1.9, Albumin (g/dL): 3, ISS: Stage II, High-risk cytogenetics: Absent, LDH: Normal)     7/6/2023 - 1/3/2024 Chemotherapy    Treatment Summary   Plan Name: OP D-VRD DARATUMUMAB + BORTEZOMIB LENALIDOMIDE DEXAMETHASONE  Treatment Goal: Palliative  Status: Inactive  Start Date: 7/6/2023  End Date: 1/3/2024  Provider: Oscar Márquez MD  Chemotherapy: bortezomib (VELCADE) injection 2 mg, 1.3 mg/m2 = 2 mg, Subcutaneous, Clinic/[facility] 1 time, 6 of 6 cycles  Administration: 2 mg (7/6/2023), 2 mg (7/12/2023), 2 mg (8/2/2023), 2 mg (8/9/2023), 2.25 mg (10/18/2023), 2 mg (7/19/2023), 2 mg (7/26/2023), 2 mg (8/16/2023), 2.25 mg (9/20/2023), 2.25 mg (9/27/2023), 2.25 mg (10/25/2023), 2.25 mg (11/1/2023), 2.25 mg (11/8/2023), 2.25 mg (12/6/2023), 2.25 mg (1/3/2024), 2.25 mg (11/15/2023), 2.25 mg (11/22/2023), 2.25 mg (11/29/2023), 2.25 mg (12/13/2023), 2.25 mg (12/20/2023), 2.25 mg (12/26/2023)  lenalidomide 10 mg Cap, 1 capsule (100 % of original dose 1 capsule), Oral, Daily, 1 of 1 cycle, Start date: 7/26/2023, End date: 8/2/2023  Dose modification: 1 capsule (original dose 1 capsule, Cycle 0)  daratumumab-hyaluronidase-fihj subcutaneous injection 1,800 mg, 1,800 mg (100 % of original dose 1,800 mg), Subcutaneous, Clinic/[facility] 1 time, 6 of 6 cycles  Dose modification: 1,800 mg (original dose 1,800 mg, Cycle 1), 1,800 mg (original dose 1,800 mg, Cycle 1)  Administration: 1,800 mg (7/6/2023), 1,800 mg (7/12/2023), 1,800 mg  (7/19/2023), 1,800 mg (7/26/2023), 1,800 mg (8/2/2023), 1,800 mg (8/9/2023), 1,800 mg (8/16/2023), 1,800 mg (9/27/2023), 1,800 mg (10/18/2023), 1,800 mg (11/1/2023), 1,800 mg (11/15/2023), 1,800 mg (11/29/2023), 1,800 mg (12/13/2023), 1,800 mg (12/26/2023)     4/30/2024 -  Chemotherapy    Treatment Summary   Plan Name: OP Myeloma KIA-RD daratumumab lenalidomide dexAMETHasone Q4W  Treatment Goal: Palliative  Status: Active  Start Date: 4/30/2024  End Date: 3/4/2025 (Planned)  Provider: Bri Luevano MD  Chemotherapy: daratumumab-hyaluronidase-fihj subcutaneous injection 1,800 mg, 1,800 mg, Subcutaneous, Clinic/[facility] 1 time, 1 of 12 cycles  Administration: 1,800 mg (4/30/2024)           \[facility]\""  Ana GONZALEZ Bonilla  63 y.o.  female with past medical history significant for hypertension, COPD, asthma, GERD, hypothyroidism here for follow-up and management of multiple myeloma     She was referred in 2/2023 by her PCP after workup for gastritis revealed lytic lesions. CT chest showed lytic and expansile destructive lesion in the sternum and lytic lesions at L1. Biopsy of L1 lesion in 3/2023 revealed mature B cell neoplasm most consistent with plasma cell neoplasm.      Bone marrow biopsy in 4/2023 demonstrated 60% plasma cells. PET/CT showed extensive bony lesions throughout the spine, sternum, bilateral ribs, pelvis, and a mild compression deformity in L1. SPEP/MECHE showed IgG M spike 3.19 g/dL, IgG 4,521 mg/dL.      She was started on VRd and then daratumumab was added by the transplant team. She was started on ASA for thrombosis prophylaxis and acyclovir for herpes zoster prophylaxis. Start Zometa after dental evaluation.     Past Medical History:   Diagnosis Date    Allergy     Amblyopia OS    Anxiety     Asthma     COPD (chronic obstructive pulmonary disease)     NO HOME o2    GERD (gastroesophageal reflux disease)     Hyperlipidemia     Hypertension     PONV (postoperative nausea and vomiting)     Thyroid disease         Past Surgical History:   Procedure Laterality Date    APPENDECTOMY      BIOPSY N/A 2023    Procedure: BIOPSY;  Surgeon: Fausto Smith MD;  Location: [facility] OR;  Service: Neurosurgery;  Laterality: N/A;  L1    BUNIONECTOMY      COLONOSCOPY N/A 2019    Procedure: COLONOSCOPY;  Surgeon: Danny Matos III, MD;  Location: [facility] ENDO;  Service: Endoscopy;  Laterality: N/A;    COLONOSCOPY N/A 10/24/2022    Procedure: COLONOSCOPY;  Surgeon: Danny Matos III, MD;  Location: [facility] ENDO;  Service: Endoscopy;  Laterality: N/A;    ESOPHAGOGASTRODUODENOSCOPY N/A 10/24/2022    Procedure: EGD (ESOPHAGOGASTRODUODENOSCOPY);  Surgeon: Danny Matos III, MD;  Location: [facility] ENDO;  Service: Endoscopy;  Laterality: N/A;    FIXATION KYPHOPLASTY Bilateral 2023    Procedure: KYPHOPLASTY;  Surgeon: Fausto Smith MD;  Location: [facility] OR;  Service: Neurosurgery;  Laterality: Bilateral;  kyphoplasty and radiofrequency ablation - L1    HYSTERECTOMY      PARTIAL//still with ovaries    neck fusion  2017    THYROIDECTOMY      TUBAL LIGATION         Family History   Problem Relation Name Age of Onset    Hypertension Mother Vivian     Diabetes Mother Vivian     Asthma Mother Vivian             Diabetes Father      Asthma Father      Stroke Maternal Grandmother  grandmother     Prostate cancer Maternal Grandfather      Mental illness Son Lukasz Garcia     Pancreatic cancer Maternal Uncle      Mental illness Other Pat side     Pancreatic cancer Other Mat side     Ovarian cancer Maternal Cousin         Review of patient's allergies indicates:   Allergen Reactions    Hydrocodone-acetaminophen Other (See Comments)     Causes pt to feel extremely sick        Social History     Tobacco Use    Smoking status: Every Day     Current packs/day: 0.50     Average packs/day: 0.5 packs/day for 50.0 years (25.0 ttl pk-yrs)     Types: Cigarettes     Passive exposure: Never    Smokeless  tobacco: Never   Substance Use Topics    Alcohol use: Not Currently     Comment: quit    Drug use: No     Physical Exam     Vitals:    04/30/24 0814   BP: 111/71   Pulse: 87   Resp: 18   Temp: 97.9 °F (36.6 °C)     Physical Exam  Constitutional:       General: She is not in acute distress.     Appearance: She is not ill-appearing or toxic-appearing.   HENT:      Head: Normocephalic and atraumatic.   Pulmonary:      Effort: Pulmonary effort is normal. No respiratory distress.   Musculoskeletal:         General: Tenderness (LLE) present.      Right lower leg: No edema.      Left lower leg: No edema.   Neurological:      General: No focal deficit present.      Mental Status: She is oriented to person, place, and time. Mental status is at baseline.   Psychiatric:         Mood and Affect: Mood normal.       Labs   Labs:  Lab Visit on 04/29/2024   Component Date Value Ref Range Status    Phosphorus 04/29/2024 3.1  2.7 - 4.5 mg/dL Final    Magnesium 04/29/2024 1.8  1.6 - 2.6 mg/dL Final    Sodium 04/29/2024 141  136 - 145 mmol/L Final    Potassium 04/29/2024 4.0  3.5 - 5.1 mmol/L Final    Chloride 04/29/2024 111 (H)  95 - 110 mmol/L Final    CO2 04/29/2024 21 (L)  23 - 29 mmol/L Final    Glucose 04/29/2024 113 (H)  70 - 110 mg/dL Final    BUN 04/29/2024 15  8 - 23 mg/dL Final    Creatinine 04/29/2024 0.9  0.5 - 1.4 mg/dL Final    Calcium 04/29/2024 9.7  8.7 - 10.5 mg/dL Final    Total Protein 04/29/2024 7.1  6.0 - 8.4 g/dL Final    Albumin 04/29/2024 4.1  3.5 - 5.2 g/dL Final    Total Bilirubin 04/29/2024 0.6  0.1 - 1.0 mg/dL Final    Comment: For infants and newborns, interpretation of results should be based  on gestational age, weight and in agreement with clinical  observations.    Premature Infant recommended reference ranges:  Up to 24 hours.............<8.0 mg/dL  Up to 48 hours............<12.0 mg/dL  3-5 days..................<15.0 mg/dL  6-29 days.................<15.0 mg/dL      Alkaline Phosphatase 04/29/2024  50 (L)  55 - 135 U/L Final    AST 04/29/2024 19  10 - 40 U/L Final    ALT 04/29/2024 12  10 - 44 U/L Final    eGFR 04/29/2024 >60  >60 mL/min/1.73 m^2 Final    Anion Gap 04/29/2024 9  8 - 16 mmol/L Final    WBC 04/29/2024 4.17  3.90 - 12.70 K/uL Final    RBC 04/29/2024 4.16  4.00 - 5.40 M/uL Final    Hemoglobin 04/29/2024 13.2  12.0 - 16.0 g/dL Final    Hematocrit 04/29/2024 39.9  37.0 - 48.5 % Final    MCV 04/29/2024 96  82 - 98 fL Final    MCH 04/29/2024 31.7 (H)  27.0 - 31.0 pg Final    MCHC 04/29/2024 33.1  32.0 - 36.0 g/dL Final    RDW 04/29/2024 12.7  11.5 - 14.5 % Final    Platelets 04/29/2024 289  150 - 450 K/uL Final    MPV 04/29/2024 8.8 (L)  9.2 - 12.9 fL Final    Immature Granulocytes 04/29/2024 0.2  0.0 - 0.5 % Final    Gran # (ANC) 04/29/2024 1.6 (L)  1.8 - 7.7 K/uL Final    Immature Grans (Abs) 04/29/2024 0.01  0.00 - 0.04 K/uL Final    Comment: Mild elevation in immature granulocytes is non specific and   can be seen in a variety of conditions including stress response,   acute inflammation, trauma and pregnancy. Correlation with other   laboratory and clinical findings is essential.      Lymph # 04/29/2024 1.8  1.0 - 4.8 K/uL Final    Mono # 04/29/2024 0.4  0.3 - 1.0 K/uL Final    Eos # 04/29/2024 0.3  0.0 - 0.5 K/uL Final    Baso # 04/29/2024 0.05  0.00 - 0.20 K/uL Final    nRBC 04/29/2024 0  0 /100 WBC Final    Gran % 04/29/2024 39.2  38.0 - 73.0 % Final    Lymph % 04/29/2024 43.4  18.0 - 48.0 % Final    Mono % 04/29/2024 9.8  4.0 - 15.0 % Final    Eosinophil % 04/29/2024 6.2  0.0 - 8.0 % Final    Basophil % 04/29/2024 1.2  0.0 - 1.9 % Final    Differential Method 04/29/2024 Automated   Final         Imaging/Pathology   - 06/06/2023 RIGHT ILIAC CREST BONE MARROW ASPIRATE, BONE MARROW CLOT, AND BONE MARROW CORE BIOPSY WITH:     CELLULARITY=40-60%, TRILINEAGE HEMATOPOIETIC ACTIVITY (M/E=2.9:1).   CONSISTENT WITH PLASMA CELL NEOPLASM(60%).  SEE COMMENT.   FOCAL GRADE 1 RETICULAR FIBROSIS.   CONGO RED  NEGATIVE.   INCREASED STORAGE IRON.   ADEQUATE NUMBER OF MEGAKARYOCYTES.     Bone marrow karyotype results: 46, XX[20], female karyotype.     Myeloma fixed cell, high-risk, FISH:  Normal.  The result is within normal limits for 1q duplication, TP53 deletion and IGH rearrangement.         - 04/10/2023 PET: Numerous FDG avid predominately lytic osseous lesions as above.  Primary differential considerations would include multiple myeloma versus metastatic disease.  2. No FDG avid soft tissue masses or adenopathy demonstrated.  3. Mild pathologic compression deformity of the L1 vertebral body.                                          Assessment and Plan   IgG lambda Multiple myeloma, R-ISS stage I   Diagnosed June 2023 via bone marrow biopsy  Patient treatment had been held multiple times with few treatments cancelled and also Revlimid 10 mg daily given as unable to tolerate higher dose   Previous oncologist discussed with the patient and the transplant team BMT.  However, patient declined transplant at this time and it was recommended that we continue with Kia-VRD for 6 cycles and then repeat bone marrow biopsy and PET scan. If VGPR or better, then continue with Revlimid maintenance only.  Continue prophylaxis with aspirin, acyclovir 400 mg b.i.d.  Continue Zometa/calcium and vitamin-D supplements (Due for Zometa 01/13/23)  Repeat PET-CT stable  BM Biopsy 02/13/2024 decrease in plasma cell burden  Continue follow up with myeloma team on follow up with myeloma specialist we will proceed with maintenance KIA-RD and Zometa as patient not yet ready to proceed with stem cell transplant  Resume daratumumab and Revlimid today      Poor Appetite   She has lost 10 lb in approximately 8 weeks   Once she resumes treatment and starts steroids her appetite may improve; if this does not help we will start appetite stimulant      Back Pain, Left leg pain   Obtain LLE ultrasound - negative  Refill Percocet for pain     checked:  "4/30/24  " checked" means that the [facility] controlled substance prescription fill site was checked for this patient as part of this visit.  The filled prescriptions are compared with the charted prescriptions and the restriction of pill amounts per month, numbers of prescribers and number of pharmacies used.  This complex task take 5 minutes and it is required for patients being managed with opioids.     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, Hordeolum externum of left eyelid  Per Ophthalmology, this is a chronic condition and is unrelated to and not a contraindication for treatment of MM  Has had kenalog injection  Continue follow-up with Ophthalmology  Continue antibiotic/cream and warm compresses        Shortness of Breath, resolved  The patient reports decreased exercise tolerance; she can not walk a long distance without becoming short of breath in his also has some chest discomfort  Recommended CTA to assess for thromboses however patient declines and prefers to be reassessed at next visit      Cancer Screening  MMG 03/17/2023:  BI-RADS 1  PAP Smear:  Partial hysterectomy  Colonoscopy 10/24/2022:  Normal repeat in 5 years        Chronic Medical Conditions  Asthma  Anxiety   COPD   GERD  Hypertension   Hyperlipidemia   Hypothyroidism   ?IBS-C on [employer] Onc Chart Routing      Follow up with physician 2 weeks.   Follow up with WERNER 1 week. [place]   Infusion scheduling note   Daratumumab weekly x 8; Zometa next week   Injection scheduling note    Labs CMP, CBC, phosphorus and magnesium   Scheduling:  Preferred lab:  Lab interval:     Imaging  "   Pharmacy appointment    Other referrals               The patient was seen, interviewed and examined. Pertinent lab and radiologic studies were reviewed. Pt instructed to call should they develop concerning signs/symptoms or have further questions.        Portions of the record may have been created with voice recognition software. Occasional wrong-word or sound-a-like substitutions may have occurred due to the inherent limitations of voice recognition software. Read the chart carefully and recognize, using context, where substitutions have occurred.      Bri Michelle MD    Hematology/Oncology

## 2024-04-30 NOTE — PLAN OF CARE
Plan of care reviewed with patient. Discussed if there are any new or ongoing concerns. Denies.   Problem: Adult Inpatient Plan of Care  Goal: Plan of Care Review  Outcome: Progressing  Flowsheets (Taken 4/30/2024 1045)  Plan of Care Reviewed With: patient  Goal: Absence of Hospital-Acquired Illness or Injury  Outcome: Progressing  Intervention: Identify and Manage Fall Risk  Flowsheets (Taken 4/30/2024 1045)  Safety Promotion/Fall Prevention: in recliner, wheels locked  Goal: Optimal Comfort and Wellbeing  Outcome: Progressing  Intervention: Provide Person-Centered Care  Flowsheets (Taken 4/30/2024 1045)  Trust Relationship/Rapport:   care explained   questions encouraged   choices provided   reassurance provided   emotional support provided   thoughts/feelings acknowledged   empathic listening provided   questions answered

## 2024-05-01 DIAGNOSIS — J30.9 CHRONIC ALLERGIC RHINITIS: ICD-10-CM

## 2024-05-01 RX ORDER — AMLODIPINE BESYLATE AND BENAZEPRIL HYDROCHLORIDE 2.5; 1 MG/1; MG/1
CAPSULE ORAL
Qty: 90 CAPSULE | Refills: 3 | Status: SHIPPED | OUTPATIENT
Start: 2024-05-01

## 2024-05-01 RX ORDER — MONTELUKAST SODIUM 10 MG/1
10 TABLET ORAL NIGHTLY
Qty: 90 TABLET | Refills: 0 | Status: SHIPPED | OUTPATIENT
Start: 2024-05-01

## 2024-05-06 ENCOUNTER — LAB VISIT (OUTPATIENT)
Dept: LAB | Facility: HOSPITAL | Age: 64
End: 2024-05-06
Attending: INTERNAL MEDICINE
Payer: COMMERCIAL

## 2024-05-06 DIAGNOSIS — C90.00 MULTIPLE MYELOMA, REMISSION STATUS UNSPECIFIED: ICD-10-CM

## 2024-05-06 LAB
ALBUMIN SERPL BCP-MCNC: 3.9 G/DL (ref 3.5–5.2)
ALP SERPL-CCNC: 47 U/L (ref 55–135)
ALT SERPL W/O P-5'-P-CCNC: 14 U/L (ref 10–44)
ANION GAP SERPL CALC-SCNC: 6 MMOL/L (ref 8–16)
AST SERPL-CCNC: 19 U/L (ref 10–40)
BASOPHILS # BLD AUTO: 0.02 K/UL (ref 0–0.2)
BASOPHILS NFR BLD: 0.5 % (ref 0–1.9)
BILIRUB SERPL-MCNC: 0.6 MG/DL (ref 0.1–1)
BUN SERPL-MCNC: 13 MG/DL (ref 8–23)
CALCIUM SERPL-MCNC: 9.4 MG/DL (ref 8.7–10.5)
CHLORIDE SERPL-SCNC: 109 MMOL/L (ref 95–110)
CO2 SERPL-SCNC: 24 MMOL/L (ref 23–29)
CREAT SERPL-MCNC: 0.9 MG/DL (ref 0.5–1.4)
DIFFERENTIAL METHOD BLD: ABNORMAL
EOSINOPHIL # BLD AUTO: 0.4 K/UL (ref 0–0.5)
EOSINOPHIL NFR BLD: 10.5 % (ref 0–8)
ERYTHROCYTE [DISTWIDTH] IN BLOOD BY AUTOMATED COUNT: 12.9 % (ref 11.5–14.5)
EST. GFR  (NO RACE VARIABLE): >60 ML/MIN/1.73 M^2
GLUCOSE SERPL-MCNC: 104 MG/DL (ref 70–110)
HCT VFR BLD AUTO: 38.9 % (ref 37–48.5)
HGB BLD-MCNC: 12.9 G/DL (ref 12–16)
IMM GRANULOCYTES # BLD AUTO: 0.01 K/UL (ref 0–0.04)
IMM GRANULOCYTES NFR BLD AUTO: 0.3 % (ref 0–0.5)
LYMPHOCYTES # BLD AUTO: 1.5 K/UL (ref 1–4.8)
LYMPHOCYTES NFR BLD: 37.3 % (ref 18–48)
MAGNESIUM SERPL-MCNC: 1.7 MG/DL (ref 1.6–2.6)
MCH RBC QN AUTO: 31.9 PG (ref 27–31)
MCHC RBC AUTO-ENTMCNC: 33.2 G/DL (ref 32–36)
MCV RBC AUTO: 96 FL (ref 82–98)
MONOCYTES # BLD AUTO: 0.5 K/UL (ref 0.3–1)
MONOCYTES NFR BLD: 13 % (ref 4–15)
NEUTROPHILS # BLD AUTO: 1.5 K/UL (ref 1.8–7.7)
NEUTROPHILS NFR BLD: 38.4 % (ref 38–73)
NRBC BLD-RTO: 0 /100 WBC
PHOSPHATE SERPL-MCNC: 2.6 MG/DL (ref 2.7–4.5)
PLATELET # BLD AUTO: 257 K/UL (ref 150–450)
PMV BLD AUTO: 9.4 FL (ref 9.2–12.9)
POTASSIUM SERPL-SCNC: 3.7 MMOL/L (ref 3.5–5.1)
PROT SERPL-MCNC: 6.9 G/DL (ref 6–8.4)
RBC # BLD AUTO: 4.04 M/UL (ref 4–5.4)
SODIUM SERPL-SCNC: 139 MMOL/L (ref 136–145)
WBC # BLD AUTO: 4 K/UL (ref 3.9–12.7)

## 2024-05-06 PROCEDURE — 80053 COMPREHEN METABOLIC PANEL: CPT | Performed by: INTERNAL MEDICINE

## 2024-05-06 PROCEDURE — 84100 ASSAY OF PHOSPHORUS: CPT | Performed by: INTERNAL MEDICINE

## 2024-05-06 PROCEDURE — 83735 ASSAY OF MAGNESIUM: CPT | Performed by: INTERNAL MEDICINE

## 2024-05-06 PROCEDURE — 85025 COMPLETE CBC W/AUTO DIFF WBC: CPT | Performed by: INTERNAL MEDICINE

## 2024-05-06 PROCEDURE — 36415 COLL VENOUS BLD VENIPUNCTURE: CPT | Performed by: INTERNAL MEDICINE

## 2024-05-07 ENCOUNTER — OFFICE VISIT (OUTPATIENT)
Dept: HEMATOLOGY/ONCOLOGY | Facility: CLINIC | Age: 64
End: 2024-05-07
Payer: COMMERCIAL

## 2024-05-07 ENCOUNTER — INFUSION (OUTPATIENT)
Dept: INFUSION THERAPY | Facility: HOSPITAL | Age: 64
End: 2024-05-07
Attending: INTERNAL MEDICINE
Payer: COMMERCIAL

## 2024-05-07 VITALS
WEIGHT: 126.31 LBS | DIASTOLIC BLOOD PRESSURE: 67 MMHG | TEMPERATURE: 99 F | HEIGHT: 64 IN | OXYGEN SATURATION: 97 % | SYSTOLIC BLOOD PRESSURE: 114 MMHG | BODY MASS INDEX: 21.56 KG/M2 | HEART RATE: 88 BPM

## 2024-05-07 VITALS
DIASTOLIC BLOOD PRESSURE: 63 MMHG | OXYGEN SATURATION: 97 % | RESPIRATION RATE: 16 BRPM | TEMPERATURE: 98 F | HEART RATE: 67 BPM | SYSTOLIC BLOOD PRESSURE: 105 MMHG

## 2024-05-07 DIAGNOSIS — L29.9 ITCHING: Primary | ICD-10-CM

## 2024-05-07 DIAGNOSIS — C90.00 MULTIPLE MYELOMA, REMISSION STATUS UNSPECIFIED: Primary | ICD-10-CM

## 2024-05-07 DIAGNOSIS — C90.00 MULTIPLE MYELOMA, REMISSION STATUS UNSPECIFIED: Chronic | ICD-10-CM

## 2024-05-07 DIAGNOSIS — C90.00 MULTIPLE MYELOMA NOT HAVING ACHIEVED REMISSION: ICD-10-CM

## 2024-05-07 PROCEDURE — 3074F SYST BP LT 130 MM HG: CPT | Mod: CPTII,S$GLB,,

## 2024-05-07 PROCEDURE — 3078F DIAST BP <80 MM HG: CPT | Mod: CPTII,S$GLB,,

## 2024-05-07 PROCEDURE — 63600175 PHARM REV CODE 636 W HCPCS

## 2024-05-07 PROCEDURE — 96401 CHEMO ANTI-NEOPL SQ/IM: CPT

## 2024-05-07 PROCEDURE — 99999 PR PBB SHADOW E&M-EST. PATIENT-LVL V: CPT | Mod: PBBFAC,,,

## 2024-05-07 PROCEDURE — 4010F ACE/ARB THERAPY RXD/TAKEN: CPT | Mod: CPTII,S$GLB,,

## 2024-05-07 PROCEDURE — 96365 THER/PROPH/DIAG IV INF INIT: CPT

## 2024-05-07 PROCEDURE — 99215 OFFICE O/P EST HI 40 MIN: CPT | Mod: S$GLB,,,

## 2024-05-07 PROCEDURE — 25000003 PHARM REV CODE 250

## 2024-05-07 PROCEDURE — 3008F BODY MASS INDEX DOCD: CPT | Mod: CPTII,S$GLB,,

## 2024-05-07 RX ORDER — SODIUM CHLORIDE 0.9 % (FLUSH) 0.9 %
10 SYRINGE (ML) INJECTION
Status: CANCELLED | OUTPATIENT
Start: 2024-05-07

## 2024-05-07 RX ORDER — HEPARIN 100 UNIT/ML
500 SYRINGE INTRAVENOUS
Status: CANCELLED | OUTPATIENT
Start: 2024-05-07

## 2024-05-07 RX ORDER — ZOLEDRONIC ACID 0.04 MG/ML
4 INJECTION, SOLUTION INTRAVENOUS
Status: DISCONTINUED | OUTPATIENT
Start: 2024-05-07 | End: 2024-05-07

## 2024-05-07 RX ORDER — ACETAMINOPHEN 325 MG/1
650 TABLET ORAL
Status: CANCELLED | OUTPATIENT
Start: 2024-05-07

## 2024-05-07 RX ORDER — ACETAMINOPHEN 325 MG/1
650 TABLET ORAL
Status: COMPLETED | OUTPATIENT
Start: 2024-05-07 | End: 2024-05-07

## 2024-05-07 RX ORDER — DIPHENHYDRAMINE HCL 25 MG
25 CAPSULE ORAL
Status: COMPLETED | OUTPATIENT
Start: 2024-05-07 | End: 2024-05-07

## 2024-05-07 RX ORDER — DIPHENHYDRAMINE HCL 25 MG
25 CAPSULE ORAL
Status: CANCELLED | OUTPATIENT
Start: 2024-05-07

## 2024-05-07 RX ORDER — DIPHENHYDRAMINE HYDROCHLORIDE 50 MG/ML
50 INJECTION INTRAMUSCULAR; INTRAVENOUS ONCE AS NEEDED
Status: CANCELLED | OUTPATIENT
Start: 2024-05-07

## 2024-05-07 RX ORDER — PROCHLORPERAZINE EDISYLATE 5 MG/ML
10 INJECTION INTRAMUSCULAR; INTRAVENOUS ONCE AS NEEDED
Status: CANCELLED | OUTPATIENT
Start: 2024-05-07

## 2024-05-07 RX ORDER — EPINEPHRINE 0.3 MG/.3ML
0.3 INJECTION SUBCUTANEOUS ONCE AS NEEDED
Status: CANCELLED | OUTPATIENT
Start: 2024-05-07

## 2024-05-07 RX ORDER — HYDROXYZINE HYDROCHLORIDE 25 MG/1
25 TABLET, FILM COATED ORAL 3 TIMES DAILY PRN
Qty: 21 TABLET | Refills: 0 | Status: SHIPPED | OUTPATIENT
Start: 2024-05-07

## 2024-05-07 RX ADMIN — ZOLEDRONIC ACID 3.5 MG: 4 INJECTION, SOLUTION, CONCENTRATE INTRAVENOUS at 02:05

## 2024-05-07 RX ADMIN — ACETAMINOPHEN 650 MG: 325 TABLET ORAL at 02:05

## 2024-05-07 RX ADMIN — DIPHENHYDRAMINE HYDROCHLORIDE 25 MG: 25 CAPSULE ORAL at 02:05

## 2024-05-07 RX ADMIN — DARATUMUMAB AND HYALURONIDASE-FIHJ (HUMAN RECOMBINANT) 1800 MG: 1800; 30000 INJECTION SUBCUTANEOUS at 02:05

## 2024-05-07 NOTE — PROGRESS NOTES
Subjective:     Patient ID:?Ana Bonilla is a 63 y.o. female.?? MR#: [id number]   ?   PRIMARY ONCOLOGIST:   ?   CHIEF COMPLAINT: lab review/assessment for chemo/MM ?????   ?   ONCOLOGIC DIAGNOSIS: Multiple Myeloma  ?   CURRENT TREATMENT: OP Myeloma KIA-RD daratumumab lenalidomide dexAMETHasone Q4W     HPI  Ms. Bonilla is a 62-year-old  female with past medical history significant for hypertension, COPD, asthma, GERD, hypothyroidism here for follow-up and management of multiple myeloma. BMBx on 4/6/2023 showed 60 % plasma cells. PET-CT on 4/10/2023 showed extensive lytic bony lesions throughout the spine, sternum, bilateral ribs, pelvis and mild compression deformity in L1 vertebral body. SPEP/MECHE on 3/27/2023 showed IgG lambda monoclonal protein - 3.19 g/dl. Quantitative immunoglobulins on 3/27/2023 showed IgG 4,521 mg/dl. UPEP/MECHE and FLC were pending at that time. Labs on 3/27/2023 showed Beta 2 microglobulin 1.9, .  Labs on 4/19/2023 showed Hb, 11.5, WBC 6.3, platelets, BUN 11, Cr 0.9, calcium 9. Pt initiated on VRD 05/10/23. Treatment planned changed to D-VRD 07/06/23.     Interval History: Pt presents today with her  for lab review and assessment prior to Darzalex.  Pt states overall she is feeling okay. Denies n/v/d/c, fever, chills, sob, cp, abnormal bleeding. She notes appetite waxes and wanes; denies difficulty with hydration.      Oncology History   Multiple myeloma   4/20/2023 Initial Diagnosis    Multiple myeloma     5/10/2023 - 6/28/2023 Chemotherapy    Treatment Summary   Plan Name: OP VRD - WEEKLY BORTEZOMIB LENALIDOMIDE DEXAMETHASONE Q3W  Treatment Goal: Palliative  Status: Inactive  Start Date: 5/10/2023  End Date: 6/28/2023  Provider: Abram Velasquez MD  Chemotherapy: bortezomib (VELCADE) injection 2 mg, 1.3 mg/m2 = 2 mg, Subcutaneous, Clinic/[facility] 1 time, 2 of 6 cycles  Administration: 2 mg (5/10/2023), 2 mg (5/17/2023), 2 mg (6/14/2023), 2 mg (5/31/2023), 2 mg  (6/21/2023), 2 mg (6/28/2023)  lenalidomide 25 mg Cap, 25 mg, Oral, Daily, 1 of 1 cycle, Start date: 5/10/2023, End date: 5/17/2023 6/28/2023 Cancer Staged    Staging form: Plasma Cell Myeloma and Plasma Cell Disorders, AJCC 8th Edition  - Clinical stage from 6/28/2023: RISS Stage II (Beta-2-microglobulin (mg/L): 1.9, Albumin (g/dL): 3, ISS: Stage II, High-risk cytogenetics: Absent, LDH: Normal)     7/6/2023 - 1/3/2024 Chemotherapy    Treatment Summary   Plan Name: OP D-VRD DARATUMUMAB + BORTEZOMIB LENALIDOMIDE DEXAMETHASONE  Treatment Goal: Palliative  Status: Inactive  Start Date: 7/6/2023  End Date: 1/3/2024  Provider: Oscar Márquez MD  Chemotherapy: bortezomib (VELCADE) injection 2 mg, 1.3 mg/m2 = 2 mg, Subcutaneous, [facility] [place] 1 time, 6 of 6 cycles  Administration: 2 mg (7/6/2023), 2 mg (7/12/2023), 2 mg (8/2/2023), 2 mg (8/9/2023), 2.25 mg (10/18/2023), 2 mg (7/19/2023), 2 mg (7/26/2023), 2 mg (8/16/2023), 2.25 mg (9/20/2023), 2.25 mg (9/27/2023), 2.25 mg (10/25/2023), 2.25 mg (11/1/2023), 2.25 mg (11/8/2023), 2.25 mg (12/6/2023), 2.25 mg (1/3/2024), 2.25 mg (11/15/2023), 2.25 mg (11/22/2023), 2.25 mg (11/29/2023), 2.25 mg (12/13/2023), 2.25 mg (12/20/2023), 2.25 mg (12/26/2023)  lenalidomide 10 mg Cap, 1 capsule (100 % of original dose 1 capsule), Oral, Daily, 1 of 1 cycle, Start date: 7/26/2023, End date: 8/2/2023  Dose modification: 1 capsule (original dose 1 capsule, Cycle 0)  daratumumab-hyaluronidase-fihj subcutaneous injection 1,800 mg, 1,800 mg (100 % of original dose 1,800 mg), Subcutaneous, [facility] [place] 1 time, 6 of 6 cycles  Dose modification: 1,800 mg (original dose 1,800 mg, Cycle 1), 1,800 mg (original dose 1,800 mg, Cycle 1)  Administration: 1,800 mg (7/6/2023), 1,800 mg (7/12/2023), 1,800 mg (7/19/2023), 1,800 mg (7/26/2023), 1,800 mg (8/2/2023), 1,800 mg (8/9/2023), 1,800 mg (8/16/2023), 1,800 mg (9/27/2023), 1,800 mg (10/18/2023), 1,800 mg (11/1/2023), 1,800 mg (11/15/2023), 1,800 mg  (11/29/2023), 1,800 mg (12/13/2023), 1,800 mg (12/26/2023)     4/30/2024 -  Chemotherapy    Treatment Summary   Plan Name: OP Myeloma KIA-RD daratumumab lenalidomide dexAMETHasone Q4W  Treatment Goal: Palliative  Status: Active  Start Date: 4/30/2024  End Date: 3/4/2025 (Planned)  Provider: Bri Luevano MD  Chemotherapy: daratumumab-hyaluronidase-fihj subcutaneous injection 1,800 mg, 1,800 mg, Subcutaneous, [facility] 1 time, 1 of 12 cycles  Administration: 1,800 mg (4/30/2024), 1,800 mg (5/7/2024)        Social History     Socioeconomic History    Marital status:     Number of children: 2   Occupational History     Employer: [employer]   Tobacco Use    Smoking status: Every Day     Current packs/day: 0.50     Average packs/day: 0.5 packs/day for 50.0 years (25.0 ttl pk-yrs)     Types: Cigarettes     Passive exposure: Never    Smokeless tobacco: Never   Substance and Sexual Activity    Alcohol use: Not Currently     Comment: quit    Drug use: No    Sexual activity: Not Currently     Partners: Male     Social Determinants of Health     Financial Resource Strain: Low Risk  (11/15/2023)    Overall Financial Resource Strain (CARDIA)     Difficulty of Paying Living Expenses: Not hard at all   Food Insecurity: No Food Insecurity (11/15/2023)    Hunger Vital Sign     Worried About Running Out of Food in the Last Year: Never true     Ran Out of Food in the Last Year: Never true   Transportation Needs: No Transportation Needs (11/15/2023)    PRAPARE - Transportation     Lack of Transportation (Medical): No     Lack of Transportation (Non-Medical): No   Physical Activity: Sufficiently Active (11/15/2023)    Exercise Vital Sign     Days of Exercise per Week: 7 days     Minutes of Exercise per Session: 150+ min   Stress: Stress Concern Present (11/15/2023)    [employer] - Occupational Stress Questionnaire     Feeling of Stress : To some extent   Housing Stability: Low Risk  (11/15/2023)     Housing Stability Vital Sign     Unable to Pay for Housing in the Last Year: No     Number of Places Lived in the Last Year: 1     Unstable Housing in the Last Year: No      Family History   Problem Relation Name Age of Onset    Hypertension Mother Vivian     Diabetes Mother Vivian     Asthma Mother Vivian             Diabetes Father      Asthma Father      Stroke Maternal Grandmother  grandmother     Prostate cancer Maternal Grandfather      Mental illness Son Lukasz Garcia     Pancreatic cancer Maternal Uncle      Mental illness Other Pat side     Pancreatic cancer Other Mat side     Ovarian cancer Maternal Cousin        Past Surgical History:   Procedure Laterality Date    APPENDECTOMY      BIOPSY N/A 2023    Procedure: BIOPSY;  Surgeon: Fausto Smith MD;  Location: [facility];  Service: Neurosurgery;  Laterality: N/A;  L1    BUNIONECTOMY      COLONOSCOPY N/A 2019    Procedure: COLONOSCOPY;  Surgeon: Danny Matos III, MD;  Location: [facility];  Service: Endoscopy;  Laterality: N/A;    COLONOSCOPY N/A 10/24/2022    Procedure: COLONOSCOPY;  Surgeon: Danny Matos III, MD;  Location: [facility];  Service: Endoscopy;  Laterality: N/A;    ESOPHAGOGASTRODUODENOSCOPY N/A 10/24/2022    Procedure: EGD (ESOPHAGOGASTRODUODENOSCOPY);  Surgeon: Danny Matos III, MD;  Location: [facility];  Service: Endoscopy;  Laterality: N/A;    FIXATION KYPHOPLASTY Bilateral 2023    Procedure: KYPHOPLASTY;  Surgeon: Fausto Smith MD;  Location: [facility];  Service: Neurosurgery;  Laterality: Bilateral;  kyphoplasty and radiofrequency ablation - L1    HYSTERECTOMY      PARTIAL//still with ovaries    neck fusion  2017    THYROIDECTOMY      TUBAL LIGATION          Review of Systems   Constitutional:  Positive for fatigue. Negative for activity change, appetite change, chills, fever and unexpected weight change.   HENT:  Negative for congestion, dental problem, mouth sores and  nosebleeds.    Eyes:  Negative for visual disturbance.   Respiratory:  Negative for cough, choking and chest tightness.    Cardiovascular:  Negative for chest pain, palpitations and leg swelling.   Gastrointestinal:  Negative for abdominal distention, abdominal pain, anal bleeding, blood in stool, constipation, diarrhea, nausea and vomiting.   Endocrine: Negative.    Genitourinary:  Negative for dysuria, frequency, hematuria and urgency.   Musculoskeletal:  Positive for arthralgias and myalgias. Negative for back pain, gait problem and joint swelling.   Skin:  Negative for rash and wound.   Allergic/Immunologic: Negative for immunocompromised state.   Neurological:  Negative for dizziness, light-headedness, numbness and headaches.   Hematological:  Negative for adenopathy. Does not bruise/bleed easily.   Psychiatric/Behavioral:  The patient is nervous/anxious.        ?   A comprehensive 14-point review of systems was reviewed with patient and was negative other than as specified above.   ?     Objective:      Physical Exam  Vitals reviewed.   Constitutional:       Appearance: Normal appearance. She is not ill-appearing.   HENT:      Head: Normocephalic and atraumatic.      Right Ear: External ear normal.      Left Ear: External ear normal.   Eyes:      Conjunctiva/sclera: Conjunctivae normal.   Cardiovascular:      Rate and Rhythm: Normal rate.   Pulmonary:      Effort: Pulmonary effort is normal.   Abdominal:      General: Abdomen is flat.   Genitourinary:     Comments: deferred  Musculoskeletal:         General: Normal range of motion.      Cervical back: Normal range of motion.      Right lower leg: No edema.      Left lower leg: No edema.   Skin:     General: Skin is warm and dry.      Capillary Refill: Capillary refill takes less than 2 seconds.      Coloration: Skin is not mottled.   Neurological:      Mental Status: She is alert and oriented to person, place, and time.      Motor: No weakness.           ?    Vitals:    05/07/24 1324   BP: 114/67   Pulse: 88   Temp: 98.8 °F (37.1 °C)      ?       ?   Laboratory:  ?   No visits with results within 1 Day(s) from this visit.   Latest known visit with results is:   Lab Visit on 05/06/2024   Component Date Value Ref Range Status    Phosphorus 05/06/2024 2.6 (L)  2.7 - 4.5 mg/dL Final    Magnesium 05/06/2024 1.7  1.6 - 2.6 mg/dL Final    Sodium 05/06/2024 139  136 - 145 mmol/L Final    Potassium 05/06/2024 3.7  3.5 - 5.1 mmol/L Final    Chloride 05/06/2024 109  95 - 110 mmol/L Final    CO2 05/06/2024 24  23 - 29 mmol/L Final    Glucose 05/06/2024 104  70 - 110 mg/dL Final    BUN 05/06/2024 13  8 - 23 mg/dL Final    Creatinine 05/06/2024 0.9  0.5 - 1.4 mg/dL Final    Calcium 05/06/2024 9.4  8.7 - 10.5 mg/dL Final    Total Protein 05/06/2024 6.9  6.0 - 8.4 g/dL Final    Albumin 05/06/2024 3.9  3.5 - 5.2 g/dL Final    Total Bilirubin 05/06/2024 0.6  0.1 - 1.0 mg/dL Final    Alkaline Phosphatase 05/06/2024 47 (L)  55 - 135 U/L Final    AST 05/06/2024 19  10 - 40 U/L Final    ALT 05/06/2024 14  10 - 44 U/L Final    eGFR 05/06/2024 >60  >60 mL/min/1.73 m^2 Final    Anion Gap 05/06/2024 6 (L)  8 - 16 mmol/L Final    WBC 05/06/2024 4.00  3.90 - 12.70 K/uL Final    RBC 05/06/2024 4.04  4.00 - 5.40 M/uL Final    Hemoglobin 05/06/2024 12.9  12.0 - 16.0 g/dL Final    Hematocrit 05/06/2024 38.9  37.0 - 48.5 % Final    MCV 05/06/2024 96  82 - 98 fL Final    MCH 05/06/2024 31.9 (H)  27.0 - 31.0 pg Final    MCHC 05/06/2024 33.2  32.0 - 36.0 g/dL Final    RDW 05/06/2024 12.9  11.5 - 14.5 % Final    Platelets 05/06/2024 257  150 - 450 K/uL Final    MPV 05/06/2024 9.4  9.2 - 12.9 fL Final    Immature Granulocytes 05/06/2024 0.3  0.0 - 0.5 % Final    Gran # (ANC) 05/06/2024 1.5 (L)  1.8 - 7.7 K/uL Final    Immature Grans (Abs) 05/06/2024 0.01  0.00 - 0.04 K/uL Final    Lymph # 05/06/2024 1.5  1.0 - 4.8 K/uL Final    Mono # 05/06/2024 0.5  0.3 - 1.0 K/uL Final    Eos # 05/06/2024 0.4  0.0 - 0.5  K/uL Final    Baso # 05/06/2024 0.02  0.00 - 0.20 K/uL Final    nRBC 05/06/2024 0  0 /100 WBC Final    Gran % 05/06/2024 38.4  38.0 - 73.0 % Final    Lymph % 05/06/2024 37.3  18.0 - 48.0 % Final    Mono % 05/06/2024 13.0  4.0 - 15.0 % Final    Eosinophil % 05/06/2024 10.5 (H)  0.0 - 8.0 % Final    Basophil % 05/06/2024 0.5  0.0 - 1.9 % Final    Differential Method 05/06/2024 Automated   Final      ?   Imaging:    No results found. However, due to the size of the patient record, not all encounters were searched. Please check Results Review for a complete set of results.       Results for orders placed or performed during the hospital encounter of 02/19/24 (from the past 2160 hour(s))   CT Biopsy Bone Marrow (xpd)    Narrative    EXAMINATION:  CT BIOPSY BONE MARROW (XPD)    CLINICAL HISTORY:  Multiple myeloma not having achieved remission    TECHNIQUE:  1% lidocaine locally    :KIET Nguyen    Complications: None    COMPARISON:  None    FINDINGS:  Procedure: The risks and benefits of this procedure were discussed with the patient, written informed consent was obtained.  The patient was placed prone on the CT gantry.  Preprocedural imaging revealed normal positioning of the iliac crests.  A suitable skin site for biopsy was selected. Moderate sedation using versed and fentanyl was provided with and trained observer monitoring the patient's vital signs and level of consciousness. The total time of sedation was 30 minutes.    The skin site was prepped and draped in sterile fashion.  The skin was anesthetized with 1% lidocaine.    Using CT guidance a 11 gauge introducer needle was guided into the right iliac crest.  Prior to biopsy appropriate needle positioning was confirmed with CT. 20 cc of marrow aspirate was obtained and given to pathology.  Single 11 gauge marrow core biopsy was then obtained and given to pathology.    The needle was removed.    Postprocedural imaging was acquired. The site was bandaged  sterilely. The patient left the room in stable condition.    Findings:    1.  Preprocedural CT showed appropriate imaging characteristics for right iliac bone marrow aspiration and biopsy.    2.  CT-guided biopsy of a right iliac crest with 11 gauge samples acquired.  3.  Post procedural CT showed No complication.      Impression    CT guided marrow aspiration and biopsy from the right iliac crest.    All CT scans at this facility use dose modulation, iterative reconstruction, and/or weight based dosing when appropriate to reduce radiation dose to as low as reasonable achievable.      Electronically signed by: Blayne Nguyen MD  Date:    02/19/2024  Time:    10:54     *Note: Due to a large number of results and/or encounters for the requested time period, some results have not been displayed. A complete set of results can be found in Results Review.            ?   Assessment/Plan:     Problem List Items Addressed This Visit          Oncology    Multiple myeloma (Chronic)     BMBx : 60 % plasma cells - 4/6/2023  - SPEP/MECHE showed IgG lambda - monoclonal protein 3.19 g/dl - (3/27/2023).  - R-ISS stage I (beta 2 microglobulin 1.9, albumin 3.5, , normal karyotype and no high-risk mutations on fish panel  - PET-CT ( 4/10/2023) - showed extensive lytic lesions in the axial skeleton and mild L1 compression deformity.  - Started with Induction chemotherapy with VRD regimen (Velcde/Revlimid/Decadron) - 05/10/2023   - Started Aspirin daily p.o for thrombosis prophylaxis; Acyclovir 400 mg p.o BID for herpes Zoster prophylaxis .  __________________________________________________  --S/p Kyphoplasty 06/08/23    Pt seen by transplant team BMT.  However, she has declined transplant at this time and it was recommended that we continue with Rosa-VRD for 6 cycles and then repeat bone marrow biopsy and PET scan. If VGPR or better, then continue with Revlimid maintenance only.    Labs reviewed, no concerning cytopenias  --Ok to  proceed with Darzalex today    F/u in one week with labs prior for darzalex               Other Visit Diagnoses       Itching    -  Primary    Relevant Medications    hydrOXYzine HCL (ATARAX) 25 MG tablet                 Med Onc Chart Routing      Follow up with physician . Scheduled   Follow up with WERNER    Infusion scheduling note    Injection scheduling note    Labs    Imaging    Pharmacy appointment    Other referrals                     [facility]  Hematology/Oncology

## 2024-05-10 NOTE — ASSESSMENT & PLAN NOTE
BMBx : 60 % plasma cells - 4/6/2023  - SPEP/MECHE showed IgG lambda - monoclonal protein 3.19 g/dl - (3/27/2023).  - R-ISS stage I (beta 2 microglobulin 1.9, albumin 3.5, , normal karyotype and no high-risk mutations on fish panel  - PET-CT ( 4/10/2023) - showed extensive lytic lesions in the axial skeleton and mild L1 compression deformity.  - Started with Induction chemotherapy with VRD regimen (Velcde/Revlimid/Decadron) - 05/10/2023   - Started Aspirin daily p.o for thrombosis prophylaxis; Acyclovir 400 mg p.o BID for herpes Zoster prophylaxis .  __________________________________________________  --S/p Kyphoplasty 06/08/23    Pt seen by transplant team BMT.  However, she has declined transplant at this time and it was recommended that we continue with Rosa-VRD for 6 cycles and then repeat bone marrow biopsy and PET scan. If VGPR or better, then continue with Revlimid maintenance only.    Labs reviewed, no concerning cytopenias  --Ok to proceed with Darzalex today    F/u in one week with labs prior for darzalex

## 2024-05-13 ENCOUNTER — LAB VISIT (OUTPATIENT)
Dept: LAB | Facility: HOSPITAL | Age: 64
End: 2024-05-13
Attending: INTERNAL MEDICINE
Payer: COMMERCIAL

## 2024-05-13 DIAGNOSIS — C90.00 MULTIPLE MYELOMA, REMISSION STATUS UNSPECIFIED: ICD-10-CM

## 2024-05-13 DIAGNOSIS — F41.9 ANXIETY: ICD-10-CM

## 2024-05-13 LAB
ALBUMIN SERPL BCP-MCNC: 3.9 G/DL (ref 3.5–5.2)
ALP SERPL-CCNC: 58 U/L (ref 55–135)
ALT SERPL W/O P-5'-P-CCNC: 19 U/L (ref 10–44)
ANION GAP SERPL CALC-SCNC: 12 MMOL/L (ref 8–16)
AST SERPL-CCNC: 19 U/L (ref 10–40)
BASOPHILS # BLD AUTO: 0.02 K/UL (ref 0–0.2)
BASOPHILS NFR BLD: 0.3 % (ref 0–1.9)
BILIRUB SERPL-MCNC: 1.1 MG/DL (ref 0.1–1)
BUN SERPL-MCNC: 12 MG/DL (ref 8–23)
CALCIUM SERPL-MCNC: 9.5 MG/DL (ref 8.7–10.5)
CHLORIDE SERPL-SCNC: 108 MMOL/L (ref 95–110)
CO2 SERPL-SCNC: 20 MMOL/L (ref 23–29)
CREAT SERPL-MCNC: 0.9 MG/DL (ref 0.5–1.4)
DIFFERENTIAL METHOD BLD: ABNORMAL
EOSINOPHIL # BLD AUTO: 0.4 K/UL (ref 0–0.5)
EOSINOPHIL NFR BLD: 5.4 % (ref 0–8)
ERYTHROCYTE [DISTWIDTH] IN BLOOD BY AUTOMATED COUNT: 13.1 % (ref 11.5–14.5)
EST. GFR  (NO RACE VARIABLE): >60 ML/MIN/1.73 M^2
GLUCOSE SERPL-MCNC: 140 MG/DL (ref 70–110)
HCT VFR BLD AUTO: 39.1 % (ref 37–48.5)
HGB BLD-MCNC: 12.6 G/DL (ref 12–16)
IMM GRANULOCYTES # BLD AUTO: 0.02 K/UL (ref 0–0.04)
IMM GRANULOCYTES NFR BLD AUTO: 0.3 % (ref 0–0.5)
LYMPHOCYTES # BLD AUTO: 2 K/UL (ref 1–4.8)
LYMPHOCYTES NFR BLD: 27.7 % (ref 18–48)
MAGNESIUM SERPL-MCNC: 1.6 MG/DL (ref 1.6–2.6)
MCH RBC QN AUTO: 31.3 PG (ref 27–31)
MCHC RBC AUTO-ENTMCNC: 32.2 G/DL (ref 32–36)
MCV RBC AUTO: 97 FL (ref 82–98)
MONOCYTES # BLD AUTO: 1.2 K/UL (ref 0.3–1)
MONOCYTES NFR BLD: 16.5 % (ref 4–15)
NEUTROPHILS # BLD AUTO: 3.5 K/UL (ref 1.8–7.7)
NEUTROPHILS NFR BLD: 49.8 % (ref 38–73)
NRBC BLD-RTO: 0 /100 WBC
PHOSPHATE SERPL-MCNC: 2.8 MG/DL (ref 2.7–4.5)
PLATELET # BLD AUTO: 270 K/UL (ref 150–450)
PMV BLD AUTO: 9.2 FL (ref 9.2–12.9)
POTASSIUM SERPL-SCNC: 3.6 MMOL/L (ref 3.5–5.1)
PROT SERPL-MCNC: 7.1 G/DL (ref 6–8.4)
RBC # BLD AUTO: 4.03 M/UL (ref 4–5.4)
SODIUM SERPL-SCNC: 140 MMOL/L (ref 136–145)
WBC # BLD AUTO: 7.07 K/UL (ref 3.9–12.7)

## 2024-05-13 PROCEDURE — 80053 COMPREHEN METABOLIC PANEL: CPT | Performed by: INTERNAL MEDICINE

## 2024-05-13 PROCEDURE — 36415 COLL VENOUS BLD VENIPUNCTURE: CPT | Performed by: INTERNAL MEDICINE

## 2024-05-13 PROCEDURE — 83735 ASSAY OF MAGNESIUM: CPT | Performed by: INTERNAL MEDICINE

## 2024-05-13 PROCEDURE — 85025 COMPLETE CBC W/AUTO DIFF WBC: CPT | Performed by: INTERNAL MEDICINE

## 2024-05-13 PROCEDURE — 84100 ASSAY OF PHOSPHORUS: CPT | Performed by: INTERNAL MEDICINE

## 2024-05-14 ENCOUNTER — OFFICE VISIT (OUTPATIENT)
Dept: HEMATOLOGY/ONCOLOGY | Facility: CLINIC | Age: 64
End: 2024-05-14
Payer: COMMERCIAL

## 2024-05-14 ENCOUNTER — INFUSION (OUTPATIENT)
Dept: INFUSION THERAPY | Facility: HOSPITAL | Age: 64
End: 2024-05-14
Attending: INTERNAL MEDICINE
Payer: COMMERCIAL

## 2024-05-14 VITALS
TEMPERATURE: 97 F | HEART RATE: 61 BPM | OXYGEN SATURATION: 99 % | DIASTOLIC BLOOD PRESSURE: 58 MMHG | HEIGHT: 64 IN | WEIGHT: 126.75 LBS | RESPIRATION RATE: 18 BRPM | SYSTOLIC BLOOD PRESSURE: 99 MMHG | BODY MASS INDEX: 21.64 KG/M2

## 2024-05-14 VITALS
RESPIRATION RATE: 18 BRPM | SYSTOLIC BLOOD PRESSURE: 106 MMHG | TEMPERATURE: 98 F | WEIGHT: 126.75 LBS | OXYGEN SATURATION: 100 % | HEIGHT: 64 IN | DIASTOLIC BLOOD PRESSURE: 66 MMHG | HEART RATE: 98 BPM | BODY MASS INDEX: 21.64 KG/M2

## 2024-05-14 DIAGNOSIS — C79.51 SECONDARY MALIGNANT NEOPLASM OF BONE: ICD-10-CM

## 2024-05-14 DIAGNOSIS — Z51.11 ENCOUNTER FOR ANTINEOPLASTIC CHEMOTHERAPY: ICD-10-CM

## 2024-05-14 DIAGNOSIS — C90.00 MULTIPLE MYELOMA, REMISSION STATUS UNSPECIFIED: Primary | Chronic | ICD-10-CM

## 2024-05-14 DIAGNOSIS — T45.1X5A IMMUNODEFICIENCY DUE TO CHEMOTHERAPY: ICD-10-CM

## 2024-05-14 DIAGNOSIS — C90.00 MULTIPLE MYELOMA, REMISSION STATUS UNSPECIFIED: Primary | ICD-10-CM

## 2024-05-14 DIAGNOSIS — D84.821 IMMUNODEFICIENCY DUE TO CHEMOTHERAPY: ICD-10-CM

## 2024-05-14 DIAGNOSIS — Z79.899 IMMUNODEFICIENCY DUE TO CHEMOTHERAPY: ICD-10-CM

## 2024-05-14 PROCEDURE — 3078F DIAST BP <80 MM HG: CPT | Mod: CPTII,S$GLB,, | Performed by: INTERNAL MEDICINE

## 2024-05-14 PROCEDURE — 3074F SYST BP LT 130 MM HG: CPT | Mod: CPTII,S$GLB,, | Performed by: INTERNAL MEDICINE

## 2024-05-14 PROCEDURE — 3008F BODY MASS INDEX DOCD: CPT | Mod: CPTII,S$GLB,, | Performed by: INTERNAL MEDICINE

## 2024-05-14 PROCEDURE — 4010F ACE/ARB THERAPY RXD/TAKEN: CPT | Mod: CPTII,S$GLB,, | Performed by: INTERNAL MEDICINE

## 2024-05-14 PROCEDURE — 99999 PR PBB SHADOW E&M-EST. PATIENT-LVL V: CPT | Mod: PBBFAC,,, | Performed by: INTERNAL MEDICINE

## 2024-05-14 PROCEDURE — 25000003 PHARM REV CODE 250: Performed by: INTERNAL MEDICINE

## 2024-05-14 PROCEDURE — 99215 OFFICE O/P EST HI 40 MIN: CPT | Mod: S$GLB,,, | Performed by: INTERNAL MEDICINE

## 2024-05-14 PROCEDURE — 96401 CHEMO ANTI-NEOPL SQ/IM: CPT

## 2024-05-14 PROCEDURE — 63600175 PHARM REV CODE 636 W HCPCS: Mod: JZ,JG | Performed by: INTERNAL MEDICINE

## 2024-05-14 RX ORDER — SODIUM CHLORIDE 0.9 % (FLUSH) 0.9 %
10 SYRINGE (ML) INJECTION
Status: CANCELLED | OUTPATIENT
Start: 2024-05-14

## 2024-05-14 RX ORDER — SODIUM CHLORIDE 0.9 % (FLUSH) 0.9 %
10 SYRINGE (ML) INJECTION
Status: CANCELLED | OUTPATIENT
Start: 2024-05-21

## 2024-05-14 RX ORDER — EPINEPHRINE 0.3 MG/.3ML
0.3 INJECTION SUBCUTANEOUS ONCE AS NEEDED
Status: CANCELLED | OUTPATIENT
Start: 2024-05-14

## 2024-05-14 RX ORDER — EPINEPHRINE 0.3 MG/.3ML
0.3 INJECTION SUBCUTANEOUS ONCE AS NEEDED
Status: CANCELLED | OUTPATIENT
Start: 2024-05-21

## 2024-05-14 RX ORDER — HEPARIN 100 UNIT/ML
500 SYRINGE INTRAVENOUS
Status: CANCELLED | OUTPATIENT
Start: 2024-06-11

## 2024-05-14 RX ORDER — ACETAMINOPHEN 325 MG/1
650 TABLET ORAL
Status: COMPLETED | OUTPATIENT
Start: 2024-05-14 | End: 2024-05-14

## 2024-05-14 RX ORDER — DIPHENHYDRAMINE HYDROCHLORIDE 50 MG/ML
50 INJECTION INTRAMUSCULAR; INTRAVENOUS ONCE AS NEEDED
Status: CANCELLED | OUTPATIENT
Start: 2024-06-04

## 2024-05-14 RX ORDER — SODIUM CHLORIDE 0.9 % (FLUSH) 0.9 %
10 SYRINGE (ML) INJECTION
Status: CANCELLED | OUTPATIENT
Start: 2024-06-04

## 2024-05-14 RX ORDER — HEPARIN 100 UNIT/ML
500 SYRINGE INTRAVENOUS
Status: CANCELLED | OUTPATIENT
Start: 2024-06-18

## 2024-05-14 RX ORDER — ACETAMINOPHEN 325 MG/1
650 TABLET ORAL
Status: CANCELLED | OUTPATIENT
Start: 2024-05-14

## 2024-05-14 RX ORDER — PROCHLORPERAZINE EDISYLATE 5 MG/ML
10 INJECTION INTRAMUSCULAR; INTRAVENOUS ONCE AS NEEDED
Status: CANCELLED | OUTPATIENT
Start: 2024-06-04

## 2024-05-14 RX ORDER — DIPHENHYDRAMINE HYDROCHLORIDE 50 MG/ML
50 INJECTION INTRAMUSCULAR; INTRAVENOUS ONCE AS NEEDED
Status: CANCELLED | OUTPATIENT
Start: 2024-05-28

## 2024-05-14 RX ORDER — PROCHLORPERAZINE EDISYLATE 5 MG/ML
10 INJECTION INTRAMUSCULAR; INTRAVENOUS ONCE AS NEEDED
Status: CANCELLED | OUTPATIENT
Start: 2024-05-28

## 2024-05-14 RX ORDER — HEPARIN 100 UNIT/ML
500 SYRINGE INTRAVENOUS
Status: CANCELLED | OUTPATIENT
Start: 2024-05-14

## 2024-05-14 RX ORDER — DIPHENHYDRAMINE HYDROCHLORIDE 50 MG/ML
50 INJECTION INTRAMUSCULAR; INTRAVENOUS ONCE AS NEEDED
Status: CANCELLED | OUTPATIENT
Start: 2024-06-18

## 2024-05-14 RX ORDER — HEPARIN 100 UNIT/ML
500 SYRINGE INTRAVENOUS
Status: CANCELLED | OUTPATIENT
Start: 2024-06-04

## 2024-05-14 RX ORDER — PROCHLORPERAZINE EDISYLATE 5 MG/ML
10 INJECTION INTRAMUSCULAR; INTRAVENOUS ONCE AS NEEDED
Status: CANCELLED | OUTPATIENT
Start: 2024-05-21

## 2024-05-14 RX ORDER — PROCHLORPERAZINE EDISYLATE 5 MG/ML
10 INJECTION INTRAMUSCULAR; INTRAVENOUS ONCE AS NEEDED
Status: CANCELLED | OUTPATIENT
Start: 2024-05-14

## 2024-05-14 RX ORDER — EPINEPHRINE 0.3 MG/.3ML
0.3 INJECTION SUBCUTANEOUS ONCE AS NEEDED
Status: CANCELLED | OUTPATIENT
Start: 2024-05-28

## 2024-05-14 RX ORDER — SODIUM CHLORIDE 0.9 % (FLUSH) 0.9 %
10 SYRINGE (ML) INJECTION
Status: CANCELLED | OUTPATIENT
Start: 2024-05-28

## 2024-05-14 RX ORDER — EPINEPHRINE 0.3 MG/.3ML
0.3 INJECTION SUBCUTANEOUS ONCE AS NEEDED
Status: CANCELLED | OUTPATIENT
Start: 2024-06-04

## 2024-05-14 RX ORDER — PROCHLORPERAZINE EDISYLATE 5 MG/ML
10 INJECTION INTRAMUSCULAR; INTRAVENOUS ONCE AS NEEDED
Status: CANCELLED | OUTPATIENT
Start: 2024-06-11

## 2024-05-14 RX ORDER — DIPHENHYDRAMINE HYDROCHLORIDE 50 MG/ML
50 INJECTION INTRAMUSCULAR; INTRAVENOUS ONCE AS NEEDED
Status: CANCELLED | OUTPATIENT
Start: 2024-05-14

## 2024-05-14 RX ORDER — SODIUM CHLORIDE 0.9 % (FLUSH) 0.9 %
10 SYRINGE (ML) INJECTION
Status: CANCELLED | OUTPATIENT
Start: 2024-06-18

## 2024-05-14 RX ORDER — HEPARIN 100 UNIT/ML
500 SYRINGE INTRAVENOUS
Status: CANCELLED | OUTPATIENT
Start: 2024-05-21

## 2024-05-14 RX ORDER — DIPHENHYDRAMINE HYDROCHLORIDE 50 MG/ML
50 INJECTION INTRAMUSCULAR; INTRAVENOUS ONCE AS NEEDED
Status: CANCELLED | OUTPATIENT
Start: 2024-05-21

## 2024-05-14 RX ORDER — EPINEPHRINE 0.3 MG/.3ML
0.3 INJECTION SUBCUTANEOUS ONCE AS NEEDED
Status: CANCELLED | OUTPATIENT
Start: 2024-06-18

## 2024-05-14 RX ORDER — DIPHENHYDRAMINE HYDROCHLORIDE 50 MG/ML
50 INJECTION INTRAMUSCULAR; INTRAVENOUS ONCE AS NEEDED
Status: CANCELLED | OUTPATIENT
Start: 2024-06-11

## 2024-05-14 RX ORDER — DIPHENHYDRAMINE HCL 25 MG
25 CAPSULE ORAL
Status: COMPLETED | OUTPATIENT
Start: 2024-05-14 | End: 2024-05-14

## 2024-05-14 RX ORDER — HEPARIN 100 UNIT/ML
500 SYRINGE INTRAVENOUS
Status: CANCELLED | OUTPATIENT
Start: 2024-05-28

## 2024-05-14 RX ORDER — PROCHLORPERAZINE EDISYLATE 5 MG/ML
10 INJECTION INTRAMUSCULAR; INTRAVENOUS ONCE AS NEEDED
Status: CANCELLED | OUTPATIENT
Start: 2024-06-18

## 2024-05-14 RX ORDER — SODIUM CHLORIDE 0.9 % (FLUSH) 0.9 %
10 SYRINGE (ML) INJECTION
Status: CANCELLED | OUTPATIENT
Start: 2024-06-11

## 2024-05-14 RX ORDER — ALPRAZOLAM 2 MG/1
TABLET ORAL
Qty: 60 TABLET | Refills: 0 | Status: SHIPPED | OUTPATIENT
Start: 2024-05-14 | End: 2024-06-06 | Stop reason: SDUPTHER

## 2024-05-14 RX ORDER — EPINEPHRINE 0.3 MG/.3ML
0.3 INJECTION SUBCUTANEOUS ONCE AS NEEDED
Status: CANCELLED | OUTPATIENT
Start: 2024-06-11

## 2024-05-14 RX ORDER — DIPHENHYDRAMINE HCL 25 MG
25 CAPSULE ORAL
Status: CANCELLED | OUTPATIENT
Start: 2024-05-14

## 2024-05-14 RX ADMIN — ACETAMINOPHEN 650 MG: 325 TABLET ORAL at 09:05

## 2024-05-14 RX ADMIN — DIPHENHYDRAMINE HYDROCHLORIDE 25 MG: 25 CAPSULE ORAL at 09:05

## 2024-05-14 RX ADMIN — DARATUMUMAB AND HYALURONIDASE-FIHJ (HUMAN RECOMBINANT) 1800 MG: 1800; 30000 INJECTION SUBCUTANEOUS at 09:05

## 2024-05-14 NOTE — PLAN OF CARE
"Pt is stable, pt administered Darzalex today. Pt voiced she was doing "great." Plan of care reviewed with patient. Discussed if there are any new or ongoing concerns.  Pt will follow up in one week.      Problem: Adult Inpatient Plan of Care  Goal: Plan of Care Review  Outcome: Progressing  Goal: Patient-Specific Goal (Individualized)  Outcome: Progressing  Flowsheets (Taken 5/14/2024 1024)  Individualized Care Needs: Pt's legs elevated in reclined position. Water with pre-meds.  Anxieties, Fears or Concerns: Pt denies.  Goal: Optimal Comfort and Wellbeing  Outcome: Progressing     Problem: Chemotherapy Effects  Goal: Anemia Symptom Improvement  Outcome: Progressing  Goal: Safety Maintained  Outcome: Progressing  Goal: Absence of Hematuria  Outcome: Progressing  Goal: Nausea and Vomiting Relief  Outcome: Progressing  Goal: Neurotoxicity Symptom Control  Outcome: Progressing  Goal: Absence of Infection  Outcome: Progressing  Goal: Absence of Bleeding  Outcome: Progressing     Problem: Fall Injury Risk  Goal: Absence of Fall and Fall-Related Injury  Outcome: Progressing     "

## 2024-05-14 NOTE — PROGRESS NOTES
Patient ID: Ana Bonilla   Chief Complaint: Follow-up and Multiple Myeloma  MRN:  [id number]     Oncologic Diagnosis:  Multiple Myeloma - IgG lambda   Previous Treatment:  VRD (5/10/2023 - 6/28/2023)   Current Treatment:    D-VRD (7/6/2023 - 01/03/2024 )      Zometa (10/18/2023 - )   Subjective   Ana Bonilla is a pleasant 63 y.o. female who presents for follow up.    Overall the patient is stable.  She feels well after resuming treatment.  Her energy is not completely normal and her appetite is still low however no changes from last visit.  She is compliant with her electrolyte replacement.  Labs reviewed and stable.  We will continue with current maintenance treatment.      Review of Systems   Constitutional:  Positive for appetite change (decreased) and fatigue. Negative for activity change, chills, diaphoresis, fever and unexpected weight change.   HENT:  Negative for nosebleeds.    Respiratory:  Negative for shortness of breath.    Cardiovascular:  Negative for chest pain.   Gastrointestinal:  Negative for abdominal pain, blood in stool, diarrhea, nausea and vomiting.   Genitourinary:  Negative for difficulty urinating and hematuria.   Musculoskeletal:  Negative for arthralgias, back pain and myalgias.   Skin:  Negative for rash.   Neurological:  Negative for dizziness, weakness, light-headedness and headaches.   Hematological:  Does not bruise/bleed easily.   Psychiatric/Behavioral:  The patient is not nervous/anxious.      History     Oncology History   Multiple myeloma   4/20/2023 Initial Diagnosis    Multiple myeloma     5/10/2023 - 6/28/2023 Chemotherapy    Treatment Summary   Plan Name: OP VRD - WEEKLY BORTEZOMIB LENALIDOMIDE DEXAMETHASONE Q3W  Treatment Goal: Palliative  Status: Inactive  Start Date: 5/10/2023  End Date: 6/28/2023  Provider: Abram Velasquez MD  Chemotherapy: bortezomib (VELCADE) injection 2 mg, 1.3 mg/m2 = 2 mg, Subcutaneous, Clinic/[facility] 1 time, 2 of 6  cycles  Administration: 2 mg (5/10/2023), 2 mg (5/17/2023), 2 mg (6/14/2023), 2 mg (5/31/2023), 2 mg (6/21/2023), 2 mg (6/28/2023)  lenalidomide 25 mg Cap, 25 mg, Oral, Daily, 1 of 1 cycle, Start date: 5/10/2023, End date: 5/17/2023 6/28/2023 Cancer Staged    Staging form: Plasma Cell Myeloma and Plasma Cell Disorders, AJCC 8th Edition  - Clinical stage from 6/28/2023: RISS Stage II (Beta-2-microglobulin (mg/L): 1.9, Albumin (g/dL): 3, ISS: Stage II, High-risk cytogenetics: Absent, LDH: Normal)     7/6/2023 - 1/3/2024 Chemotherapy    Treatment Summary   Plan Name: OP D-VRD DARATUMUMAB + BORTEZOMIB LENALIDOMIDE DEXAMETHASONE  Treatment Goal: Palliative  Status: Inactive  Start Date: 7/6/2023  End Date: 1/3/2024  Provider: Oscar Márquez MD  Chemotherapy: bortezomib (VELCADE) injection 2 mg, 1.3 mg/m2 = 2 mg, Subcutaneous, Clinic/[facility] 1 time, 6 of 6 cycles  Administration: 2 mg (7/6/2023), 2 mg (7/12/2023), 2 mg (8/2/2023), 2 mg (8/9/2023), 2.25 mg (10/18/2023), 2 mg (7/19/2023), 2 mg (7/26/2023), 2 mg (8/16/2023), 2.25 mg (9/20/2023), 2.25 mg (9/27/2023), 2.25 mg (10/25/2023), 2.25 mg (11/1/2023), 2.25 mg (11/8/2023), 2.25 mg (12/6/2023), 2.25 mg (1/3/2024), 2.25 mg (11/15/2023), 2.25 mg (11/22/2023), 2.25 mg (11/29/2023), 2.25 mg (12/13/2023), 2.25 mg (12/20/2023), 2.25 mg (12/26/2023)  lenalidomide 10 mg Cap, 1 capsule (100 % of original dose 1 capsule), Oral, Daily, 1 of 1 cycle, Start date: 7/26/2023, End date: 8/2/2023  Dose modification: 1 capsule (original dose 1 capsule, Cycle 0)  daratumumab-hyaluronidase-fihj subcutaneous injection 1,800 mg, 1,800 mg (100 % of original dose 1,800 mg), Subcutaneous, Clinic/[facility] 1 time, 6 of 6 cycles  Dose modification: 1,800 mg (original dose 1,800 mg, Cycle 1), 1,800 mg (original dose 1,800 mg, Cycle 1)  Administration: 1,800 mg (7/6/2023), 1,800 mg (7/12/2023), 1,800 mg (7/19/2023), 1,800 mg (7/26/2023), 1,800 mg (8/2/2023), 1,800 mg (8/9/2023), 1,800 mg (8/16/2023),  1,800 mg (9/27/2023), 1,800 mg (10/18/2023), 1,800 mg (11/1/2023), 1,800 mg (11/15/2023), 1,800 mg (11/29/2023), 1,800 mg (12/13/2023), 1,800 mg (12/26/2023)     4/30/2024 -  Chemotherapy    Treatment Summary   Plan Name: OP Myeloma KIA-RD daratumumab lenalidomide dexAMETHasone Q4W  Treatment Goal: Palliative  Status: Active  Start Date: 4/30/2024  End Date: 3/4/2025 (Planned)  Provider: Bri Luevano MD  Chemotherapy: daratumumab-hyaluronidase-fihj subcutaneous injection 1,800 mg, 1,800 mg, Subcutaneous, Clinic/[facility] 1 time, 1 of 12 cycles  Administration: 1,800 mg (4/30/2024), 1,800 mg (5/7/2024)           MARIBETH  Ana GONZALEZ Bonilla  63 y.o.  female with past medical history significant for hypertension, COPD, asthma, GERD, hypothyroidism here for follow-up and management of multiple myeloma     She was referred in 2/2023 by her PCP after workup for gastritis revealed lytic lesions. CT chest showed lytic and expansile destructive lesion in the sternum and lytic lesions at L1. Biopsy of L1 lesion in 3/2023 revealed mature B cell neoplasm most consistent with plasma cell neoplasm.      Bone marrow biopsy in 4/2023 demonstrated 60% plasma cells. PET/CT showed extensive bony lesions throughout the spine, sternum, bilateral ribs, pelvis, and a mild compression deformity in L1. SPEP/MECHE showed IgG M spike 3.19 g/dL, IgG 4,521 mg/dL.      She was started on VRd and then daratumumab was added by the transplant team. She was started on ASA for thrombosis prophylaxis and acyclovir for herpes zoster prophylaxis. Start Zometa after dental evaluation.     Past Medical History:   Diagnosis Date    Allergy     Amblyopia OS    Anxiety     Asthma     COPD (chronic obstructive pulmonary disease)     NO HOME o2    GERD (gastroesophageal reflux disease)     Hyperlipidemia     Hypertension     PONV (postoperative nausea and vomiting)     Thyroid disease        Past Surgical History:   Procedure Laterality Date    APPENDECTOMY       BIOPSY N/A 2023    Procedure: BIOPSY;  Surgeon: Fausto Smith MD;  Location: [facility] OR;  Service: Neurosurgery;  Laterality: N/A;  L1    BUNIONECTOMY      COLONOSCOPY N/A 2019    Procedure: COLONOSCOPY;  Surgeon: Danny Matos III, MD;  Location: [facility] ENDO;  Service: Endoscopy;  Laterality: N/A;    COLONOSCOPY N/A 10/24/2022    Procedure: COLONOSCOPY;  Surgeon: Danny Matos III, MD;  Location: [facility] ENDO;  Service: Endoscopy;  Laterality: N/A;    ESOPHAGOGASTRODUODENOSCOPY N/A 10/24/2022    Procedure: EGD (ESOPHAGOGASTRODUODENOSCOPY);  Surgeon: Danny Matos III, MD;  Location: [facility] ENDO;  Service: Endoscopy;  Laterality: N/A;    FIXATION KYPHOPLASTY Bilateral 2023    Procedure: KYPHOPLASTY;  Surgeon: Fausto Smith MD;  Location: [facility] OR;  Service: Neurosurgery;  Laterality: Bilateral;  kyphoplasty and radiofrequency ablation - L1    HYSTERECTOMY      PARTIAL//still with ovaries    neck fusion  2017    THYROIDECTOMY      TUBAL LIGATION         Family History   Problem Relation Name Age of Onset    Hypertension Mother Vivian     Diabetes Mother Vivian     Asthma Mother Vivian             Diabetes Father      Asthma Father      Stroke Maternal Grandmother  grandmother     Prostate cancer Maternal Grandfather      Mental illness Son Lukasz Garcia     Pancreatic cancer Maternal Uncle      Mental illness Other Pat side     Pancreatic cancer Other Mat side     Ovarian cancer Maternal Cousin         Review of patient's allergies indicates:   Allergen Reactions    Hydrocodone-acetaminophen Other (See Comments)     Causes pt to feel extremely sick        Social History     Tobacco Use    Smoking status: Every Day     Current packs/day: 0.50     Average packs/day: 0.5 packs/day for 50.0 years (25.0 ttl pk-yrs)     Types: Cigarettes     Passive exposure: Never    Smokeless tobacco: Never   Substance Use Topics    Alcohol use: Not Currently     Comment:  quit    Drug use: No     Physical Exam     ECOG SCORE    0 - Fully active-able to carry on all pre-disease performance without restriction         Vitals:    05/14/24 0805   BP: 106/66   Pulse: 98   Resp: 18   Temp: 98.3 °F (36.8 °C)     Physical Exam  Constitutional:       General: She is not in acute distress.     Appearance: She is not ill-appearing or toxic-appearing.   HENT:      Head: Normocephalic and atraumatic.   Pulmonary:      Effort: Pulmonary effort is normal. No respiratory distress.   Musculoskeletal:         General: No tenderness.      Right lower leg: No edema.      Left lower leg: No edema.   Neurological:      General: No focal deficit present.      Mental Status: She is oriented to person, place, and time. Mental status is at baseline.   Psychiatric:         Mood and Affect: Mood normal.       Labs   Labs:  Lab Visit on 05/13/2024   Component Date Value Ref Range Status    Phosphorus 05/13/2024 2.8  2.7 - 4.5 mg/dL Final    Magnesium 05/13/2024 1.6  1.6 - 2.6 mg/dL Final    Sodium 05/13/2024 140  136 - 145 mmol/L Final    Potassium 05/13/2024 3.6  3.5 - 5.1 mmol/L Final    Chloride 05/13/2024 108  95 - 110 mmol/L Final    CO2 05/13/2024 20 (L)  23 - 29 mmol/L Final    Glucose 05/13/2024 140 (H)  70 - 110 mg/dL Final    BUN 05/13/2024 12  8 - 23 mg/dL Final    Creatinine 05/13/2024 0.9  0.5 - 1.4 mg/dL Final    Calcium 05/13/2024 9.5  8.7 - 10.5 mg/dL Final    Total Protein 05/13/2024 7.1  6.0 - 8.4 g/dL Final    Albumin 05/13/2024 3.9  3.5 - 5.2 g/dL Final    Total Bilirubin 05/13/2024 1.1 (H)  0.1 - 1.0 mg/dL Final    Comment: For infants and newborns, interpretation of results should be based  on gestational age, weight and in agreement with clinical  observations.    Premature Infant recommended reference ranges:  Up to 24 hours.............<8.0 mg/dL  Up to 48 hours............<12.0 mg/dL  3-5 days..................<15.0 mg/dL  6-29 days.................<15.0 mg/dL      Alkaline Phosphatase  05/13/2024 58  55 - 135 U/L Final    AST 05/13/2024 19  10 - 40 U/L Final    ALT 05/13/2024 19  10 - 44 U/L Final    eGFR 05/13/2024 >60  >60 mL/min/1.73 m^2 Final    Anion Gap 05/13/2024 12  8 - 16 mmol/L Final    WBC 05/13/2024 7.07  3.90 - 12.70 K/uL Final    RBC 05/13/2024 4.03  4.00 - 5.40 M/uL Final    Hemoglobin 05/13/2024 12.6  12.0 - 16.0 g/dL Final    Hematocrit 05/13/2024 39.1  37.0 - 48.5 % Final    MCV 05/13/2024 97  82 - 98 fL Final    MCH 05/13/2024 31.3 (H)  27.0 - 31.0 pg Final    MCHC 05/13/2024 32.2  32.0 - 36.0 g/dL Final    RDW 05/13/2024 13.1  11.5 - 14.5 % Final    Platelets 05/13/2024 270  150 - 450 K/uL Final    MPV 05/13/2024 9.2  9.2 - 12.9 fL Final    Immature Granulocytes 05/13/2024 0.3  0.0 - 0.5 % Final    Gran # (ANC) 05/13/2024 3.5  1.8 - 7.7 K/uL Final    Immature Grans (Abs) 05/13/2024 0.02  0.00 - 0.04 K/uL Final    Comment: Mild elevation in immature granulocytes is non specific and   can be seen in a variety of conditions including stress response,   acute inflammation, trauma and pregnancy. Correlation with other   laboratory and clinical findings is essential.      Lymph # 05/13/2024 2.0  1.0 - 4.8 K/uL Final    Mono # 05/13/2024 1.2 (H)  0.3 - 1.0 K/uL Final    Eos # 05/13/2024 0.4  0.0 - 0.5 K/uL Final    Baso # 05/13/2024 0.02  0.00 - 0.20 K/uL Final    nRBC 05/13/2024 0  0 /100 WBC Final    Gran % 05/13/2024 49.8  38.0 - 73.0 % Final    Lymph % 05/13/2024 27.7  18.0 - 48.0 % Final    Mono % 05/13/2024 16.5 (H)  4.0 - 15.0 % Final    Eosinophil % 05/13/2024 5.4  0.0 - 8.0 % Final    Basophil % 05/13/2024 0.3  0.0 - 1.9 % Final    Differential Method 05/13/2024 Automated   Final         Imaging/Pathology   - 06/06/2023 RIGHT ILIAC CREST BONE MARROW ASPIRATE, BONE MARROW CLOT, AND BONE MARROW CORE BIOPSY WITH:     CELLULARITY=40-60%, TRILINEAGE HEMATOPOIETIC ACTIVITY (M/E=2.9:1).   CONSISTENT WITH PLASMA CELL NEOPLASM(60%).  SEE COMMENT.   FOCAL GRADE 1 RETICULAR FIBROSIS.    CONGO RED NEGATIVE.   INCREASED STORAGE IRON.   ADEQUATE NUMBER OF MEGAKARYOCYTES.     Bone marrow karyotype results: 46, XX[20], female karyotype.     Myeloma fixed cell, high-risk, FISH:  Normal.  The result is within normal limits for 1q duplication, TP53 deletion and IGH rearrangement.         - 04/10/2023 PET: Numerous FDG avid predominately lytic osseous lesions as above.  Primary differential considerations would include multiple myeloma versus metastatic disease.  2. No FDG avid soft tissue masses or adenopathy demonstrated.  3. Mild pathologic compression deformity of the L1 vertebral body.                                          Assessment and Plan   IgG lambda Multiple myeloma, R-ISS stage I   Diagnosed June 2023 via bone marrow biopsy  Patient treatment had been held multiple times with few treatments cancelled and also Revlimid 10 mg daily given as unable to tolerate higher dose   Previous oncologist discussed with the patient and the transplant team BMT.  However, patient declined transplant at this time and it was recommended that we continue with Kia-VRD for 6 cycles and then repeat bone marrow biopsy and PET scan. If VGPR or better, then continue with Revlimid maintenance only.  Continue prophylaxis with aspirin, acyclovir 400 mg b.i.d.  Continue Zometa/calcium and vitamin-D supplements (Due for Zometa 01/13/23)  Repeat PET-CT stable  BM Biopsy 02/13/2024 decrease in plasma cell burden  Continue follow up with myeloma team on follow up with myeloma specialist we will proceed with maintenance KIA-RD and Zometa as patient not yet ready to proceed with stem cell transplant  Continue with daratumumab and Revlimid today      Poor Appetite   She has lost 10 lb in approximately 8 weeks   Once she resumes treatment and starts steroids her appetite may improve; if this does not help we will start appetite stimulant      Back Pain, Left leg pain   Obtain LLE ultrasound - negative  Refill Percocet for  "pain     checked: 5/14/24  " checked" means that the [facility] controlled substance prescription fill site was checked for this patient as part of this visit.  The filled prescriptions are compared with the charted prescriptions and the restriction of pill amounts per month, numbers of prescribers and number of pharmacies used.  This complex task take 5 minutes and it is required for patients being managed with opioids.     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, Hordeolum externum of left eyelid  Per Ophthalmology, this is a chronic condition and is unrelated to and not a contraindication for treatment of MM  Has had kenalog injection  Continue follow-up with Ophthalmology  Continue antibiotic/cream and warm compresses        Shortness of Breath, resolved  The patient reports decreased exercise tolerance; she can not walk a long distance without becoming short of breath in his also has some chest discomfort  Recommended CTA to assess for thromboses however patient declines and prefers to be reassessed at next visit      Cancer Screening  MMG 03/17/2023:  BI-RADS 1  PAP Smear:  Partial hysterectomy  Colonoscopy 10/24/2022:  Normal repeat in 5 years        Chronic Medical Conditions  Asthma  Anxiety   COPD   GERD  Hypertension   Hyperlipidemia   Hypothyroidism   ?IBS-C on [employer]        Med Onc Chart Routing      Follow up with physician . 5 weeks   Follow up with WERNER 3 weeks. Linda for consideration of Zometa   Infusion scheduling note   Daratumumab today and weekly x 5; Zometa in 3 weeks   Injection scheduling note    Labs CBC, CMP, phosphorus and magnesium "   Scheduling:  Preferred lab:  Lab interval:  labs in 3 weeks   Imaging    Pharmacy appointment    Other referrals               The patient was seen, interviewed and examined. Pertinent lab and radiologic studies were reviewed. Pt instructed to call should they develop concerning signs/symptoms or have further questions.        Portions of the record may have been created with voice recognition software. Occasional wrong-word or sound-a-like substitutions may have occurred due to the inherent limitations of voice recognition software. Read the chart carefully and recognize, using context, where substitutions have occurred.      Bri Michelle MD    Hematology/Oncology

## 2024-05-14 NOTE — DISCHARGE INSTRUCTIONS
Thank you for allowing me to care for you today,  MIS LeachN, RN    [facility]  [address] [address]  [phone] phone     [phone] fax  Hours of Operation: Monday- Friday 7:00am- 5:30pm  After hours phone  [phone]  Hematology / Oncology Physicians on call      JUANI Chaudhary Dr., Dr., Dr., Dr., Dr., Dr., Dr., Dr., Dr., Dr., Dr., Dr., Dr., Dr., Dr., BELLE Mckeon, BELLE Deluna, BELLE Page, NP  Please call with any concerns regarding your appointment today.   FALL PREVENTION   Falls often occur due to slipping, tripping or losing your balance. Here are ways to reduce your risk of falling again.   Was there anything that caused your fall that can be fixed, removed or replaced?   Make your home safe by keeping walkways clear of objects you may trip over.   Use non-slip pads under rugs.   Do not walk in poorly lit areas.   Do not stand on chairs or wobbly ladders.   Use caution when reaching overhead or looking upward. This position can cause a loss of balance.   Be sure your shoes fit properly, have non-slip bottoms and are in good condition.   Be cautious when going up and down stairs, curbs, and when walking on uneven sidewalks.   If your balance is poor, consider using a cane or walker.   If your fall was related to alcohol use, stop or limit alcohol intake.   If your fall was related to use of sleeping medicines, talk to your doctor about this. You may need to reduce your dosage at bedtime if you awaken during the night to go to the bathroom.   To reduce the need for nighttime bathroom trips:   Avoid drinking fluids for several hours before going to bed   Empty your bladder before going to bed   Men can keep a urinal at the bedside   © 9616-3275 [employer], [address] [place] [address], [place] [address]. All rights reserved. This information is not intended as a  substitute for professional medical care. Always follow your healthcare professional's instructions.

## 2024-05-14 NOTE — PROGRESS NOTES
Spoke with patient today in regard to smoking cessation progress for 3 month telephone follow up, she states she is not tobacco free. Currently attending chemotherapy infusions for multiple myeloma. She states she is not interested in returning to smoking cessation clinic at this time. Informed patient of benefit period, future follow ups, and contact information if any further help or support is needed. Will complete smart form for Quit attempt #2.    
Statement Selected

## 2024-05-14 NOTE — NURSING
0905: Darzalex Injection given without difficulties.Bandaid applied. Patient instructed to stay in the clinic for 15 minutes. Patient verbalized understanding and pt chose not to wait.

## 2024-05-21 ENCOUNTER — INFUSION (OUTPATIENT)
Dept: INFUSION THERAPY | Facility: HOSPITAL | Age: 64
End: 2024-05-21
Attending: INTERNAL MEDICINE
Payer: COMMERCIAL

## 2024-05-21 VITALS
BODY MASS INDEX: 21.64 KG/M2 | TEMPERATURE: 98 F | OXYGEN SATURATION: 99 % | HEIGHT: 64 IN | DIASTOLIC BLOOD PRESSURE: 64 MMHG | HEART RATE: 68 BPM | WEIGHT: 126.75 LBS | RESPIRATION RATE: 16 BRPM | SYSTOLIC BLOOD PRESSURE: 112 MMHG

## 2024-05-21 DIAGNOSIS — C90.00 MULTIPLE MYELOMA, REMISSION STATUS UNSPECIFIED: Primary | ICD-10-CM

## 2024-05-21 PROCEDURE — 63600175 PHARM REV CODE 636 W HCPCS: Mod: JZ,JG | Performed by: INTERNAL MEDICINE

## 2024-05-21 PROCEDURE — 96401 CHEMO ANTI-NEOPL SQ/IM: CPT

## 2024-05-21 RX ADMIN — DARATUMUMAB AND HYALURONIDASE-FIHJ (HUMAN RECOMBINANT) 1800 MG: 1800; 30000 INJECTION SUBCUTANEOUS at 09:05

## 2024-05-21 NOTE — PLAN OF CARE
"Pt is stable, pt administered Darzalex today. Pt voiced she was doing "well." Pt will follow up in one week.  Plan of care reviewed with patient. Discussed if there are any new or ongoing concerns.    "

## 2024-05-21 NOTE — DISCHARGE INSTRUCTIONS
Thank you for allowing me to care for you today,  MIS LeachN, RN    [facility]  [address] [address]  [phone] phone     [phone] fax  Hours of Operation: Monday- Friday 7:00am- 5:30pm  After hours phone  [phone]  Hematology / Oncology Physicians on call      JUANI Chaudhary Dr., Dr., Dr., Dr., Dr., Dr., Dr., Dr., Dr., Dr., Dr., Dr., Dr., Dr., Dr., BELLE Mckeon, BELLE Deluna, BELLE Page, NP  Please call with any concerns regarding your appointment today.   FALL PREVENTION   Falls often occur due to slipping, tripping or losing your balance. Here are ways to reduce your risk of falling again.   Was there anything that caused your fall that can be fixed, removed or replaced?   Make your home safe by keeping walkways clear of objects you may trip over.   Use non-slip pads under rugs.   Do not walk in poorly lit areas.   Do not stand on chairs or wobbly ladders.   Use caution when reaching overhead or looking upward. This position can cause a loss of balance.   Be sure your shoes fit properly, have non-slip bottoms and are in good condition.   Be cautious when going up and down stairs, curbs, and when walking on uneven sidewalks.   If your balance is poor, consider using a cane or walker.   If your fall was related to alcohol use, stop or limit alcohol intake.   If your fall was related to use of sleeping medicines, talk to your doctor about this. You may need to reduce your dosage at bedtime if you awaken during the night to go to the bathroom.   To reduce the need for nighttime bathroom trips:   Avoid drinking fluids for several hours before going to bed   Empty your bladder before going to bed   Men can keep a urinal at the bedside   © 0908-6454 [employer], [address] [place] [address], [place] [address]. All rights reserved. This information is not intended as a  substitute for professional medical care. Always follow your healthcare professional's instructions.

## 2024-05-21 NOTE — NURSING
0902: Darzalex Injection given without difficulties.Bandaid applied. Patient instructed to stay in the clinic for 15 minutes. Patient verbalized understanding and pt chose not to wait.

## 2024-05-23 DIAGNOSIS — C90.00 MULTIPLE MYELOMA, REMISSION STATUS UNSPECIFIED: ICD-10-CM

## 2024-05-24 RX ORDER — LENALIDOMIDE 10 MG/1
CAPSULE ORAL
Qty: 21 EACH | Refills: 7 | Status: SHIPPED | OUTPATIENT
Start: 2024-05-24 | End: 2024-05-30 | Stop reason: SDUPTHER

## 2024-05-28 ENCOUNTER — INFUSION (OUTPATIENT)
Dept: INFUSION THERAPY | Facility: HOSPITAL | Age: 64
End: 2024-05-28
Attending: INTERNAL MEDICINE
Payer: COMMERCIAL

## 2024-05-28 VITALS
HEIGHT: 64 IN | WEIGHT: 131.19 LBS | RESPIRATION RATE: 18 BRPM | HEART RATE: 71 BPM | OXYGEN SATURATION: 99 % | DIASTOLIC BLOOD PRESSURE: 60 MMHG | TEMPERATURE: 98 F | BODY MASS INDEX: 22.4 KG/M2 | SYSTOLIC BLOOD PRESSURE: 114 MMHG

## 2024-05-28 DIAGNOSIS — C90.00 MULTIPLE MYELOMA, REMISSION STATUS UNSPECIFIED: Primary | ICD-10-CM

## 2024-05-28 PROCEDURE — 63600175 PHARM REV CODE 636 W HCPCS: Mod: JZ,JG | Performed by: INTERNAL MEDICINE

## 2024-05-28 PROCEDURE — 96401 CHEMO ANTI-NEOPL SQ/IM: CPT

## 2024-05-28 RX ADMIN — DARATUMUMAB AND HYALURONIDASE-FIHJ (HUMAN RECOMBINANT) 1800 MG: 1800; 30000 INJECTION SUBCUTANEOUS at 09:05

## 2024-05-28 NOTE — DISCHARGE INSTRUCTIONS
Thank you for allowing me to care for you today,  MIS LeachN, RN    [facility]  [address] [address]  [phone] phone     [phone] fax  Hours of Operation: Monday- Friday 7:00am- 5:30pm  After hours phone  [phone]  Hematology / Oncology Physicians on call      JUANI Chaudhary Dr., Dr., Dr., Dr., Dr., Dr., Dr., Dr., Dr., Dr., Dr., Dr., Dr., Dr., Dr., BELLE Mckeon, BELLE Deluna, BELLE Page, NP  Please call with any concerns regarding your appointment today.   FALL PREVENTION   Falls often occur due to slipping, tripping or losing your balance. Here are ways to reduce your risk of falling again.   Was there anything that caused your fall that can be fixed, removed or replaced?   Make your home safe by keeping walkways clear of objects you may trip over.   Use non-slip pads under rugs.   Do not walk in poorly lit areas.   Do not stand on chairs or wobbly ladders.   Use caution when reaching overhead or looking upward. This position can cause a loss of balance.   Be sure your shoes fit properly, have non-slip bottoms and are in good condition.   Be cautious when going up and down stairs, curbs, and when walking on uneven sidewalks.   If your balance is poor, consider using a cane or walker.   If your fall was related to alcohol use, stop or limit alcohol intake.   If your fall was related to use of sleeping medicines, talk to your doctor about this. You may need to reduce your dosage at bedtime if you awaken during the night to go to the bathroom.   To reduce the need for nighttime bathroom trips:   Avoid drinking fluids for several hours before going to bed   Empty your bladder before going to bed   Men can keep a urinal at the bedside   © 7269-7050 [employer], [address] [place] [address], [place] [address]. All rights reserved. This information is not intended as a  substitute for professional medical care. Always follow your healthcare professional's instructions.

## 2024-05-28 NOTE — PLAN OF CARE
"Pt is stable, pt administered Darzalex today. Pt voiced she was doing  "great." Pt will follow up in one week. Plan of care reviewed with patient. Discussed if there are any new or ongoing concerns.        Problem: Adult Inpatient Plan of Care  Goal: Plan of Care Review  Outcome: Progressing  Goal: Patient-Specific Goal (Individualized)  Outcome: Progressing  Flowsheets (Taken 5/28/2024 7058)  Individualized Care Needs: Pt chose to sit up with feet to the ground.  Anxieties, Fears or Concerns: Pt denies.  Goal: Optimal Comfort and Wellbeing  Outcome: Progressing     Problem: Fatigue  Goal: Improved Activity Tolerance  Outcome: Progressing     Problem: Fall Injury Risk  Goal: Absence of Fall and Fall-Related Injury  Outcome: Progressing     "

## 2024-05-28 NOTE — NURSING
0907: Darzalex Injection given without difficulties.Bandaid applied. Patient instructed to stay in the clinic for 15 minutes. Patient verbalized understanding and will notify nurse with any complaints.

## 2024-05-30 DIAGNOSIS — C90.00 MULTIPLE MYELOMA, REMISSION STATUS UNSPECIFIED: ICD-10-CM

## 2024-06-01 ENCOUNTER — PATIENT MESSAGE (OUTPATIENT)
Dept: HEMATOLOGY/ONCOLOGY | Facility: CLINIC | Age: 64
End: 2024-06-01
Payer: COMMERCIAL

## 2024-06-01 RX ORDER — LENALIDOMIDE 10 MG/1
CAPSULE ORAL
Qty: 21 EACH | Refills: 7 | Status: SHIPPED | OUTPATIENT
Start: 2024-06-01

## 2024-06-03 ENCOUNTER — LAB VISIT (OUTPATIENT)
Dept: LAB | Facility: HOSPITAL | Age: 64
End: 2024-06-03
Attending: INTERNAL MEDICINE
Payer: COMMERCIAL

## 2024-06-03 DIAGNOSIS — C90.00 MULTIPLE MYELOMA, REMISSION STATUS UNSPECIFIED: ICD-10-CM

## 2024-06-03 LAB
ALBUMIN SERPL BCP-MCNC: 3.7 G/DL (ref 3.5–5.2)
ALP SERPL-CCNC: 51 U/L (ref 55–135)
ALT SERPL W/O P-5'-P-CCNC: 23 U/L (ref 10–44)
ANION GAP SERPL CALC-SCNC: 12 MMOL/L (ref 8–16)
AST SERPL-CCNC: 18 U/L (ref 10–40)
BASOPHILS # BLD AUTO: 0.07 K/UL (ref 0–0.2)
BASOPHILS NFR BLD: 1.4 % (ref 0–1.9)
BILIRUB SERPL-MCNC: 0.9 MG/DL (ref 0.1–1)
BUN SERPL-MCNC: 9 MG/DL (ref 8–23)
CALCIUM SERPL-MCNC: 9.4 MG/DL (ref 8.7–10.5)
CHLORIDE SERPL-SCNC: 109 MMOL/L (ref 95–110)
CO2 SERPL-SCNC: 22 MMOL/L (ref 23–29)
CREAT SERPL-MCNC: 0.9 MG/DL (ref 0.5–1.4)
DIFFERENTIAL METHOD BLD: ABNORMAL
EOSINOPHIL # BLD AUTO: 0.6 K/UL (ref 0–0.5)
EOSINOPHIL NFR BLD: 12.7 % (ref 0–8)
ERYTHROCYTE [DISTWIDTH] IN BLOOD BY AUTOMATED COUNT: 14.6 % (ref 11.5–14.5)
EST. GFR  (NO RACE VARIABLE): >60 ML/MIN/1.73 M^2
GLUCOSE SERPL-MCNC: 103 MG/DL (ref 70–110)
HCT VFR BLD AUTO: 38.3 % (ref 37–48.5)
HGB BLD-MCNC: 12.5 G/DL (ref 12–16)
IMM GRANULOCYTES # BLD AUTO: 0.01 K/UL (ref 0–0.04)
IMM GRANULOCYTES NFR BLD AUTO: 0.2 % (ref 0–0.5)
LYMPHOCYTES # BLD AUTO: 1.6 K/UL (ref 1–4.8)
LYMPHOCYTES NFR BLD: 31.7 % (ref 18–48)
MAGNESIUM SERPL-MCNC: 1.6 MG/DL (ref 1.6–2.6)
MCH RBC QN AUTO: 32 PG (ref 27–31)
MCHC RBC AUTO-ENTMCNC: 32.6 G/DL (ref 32–36)
MCV RBC AUTO: 98 FL (ref 82–98)
MONOCYTES # BLD AUTO: 0.6 K/UL (ref 0.3–1)
MONOCYTES NFR BLD: 11.7 % (ref 4–15)
NEUTROPHILS # BLD AUTO: 2.1 K/UL (ref 1.8–7.7)
NEUTROPHILS NFR BLD: 42.3 % (ref 38–73)
NRBC BLD-RTO: 0 /100 WBC
PHOSPHATE SERPL-MCNC: 2.7 MG/DL (ref 2.7–4.5)
PLATELET # BLD AUTO: 310 K/UL (ref 150–450)
PMV BLD AUTO: 9.6 FL (ref 9.2–12.9)
POTASSIUM SERPL-SCNC: 3.8 MMOL/L (ref 3.5–5.1)
PROT SERPL-MCNC: 6.9 G/DL (ref 6–8.4)
RBC # BLD AUTO: 3.91 M/UL (ref 4–5.4)
SODIUM SERPL-SCNC: 143 MMOL/L (ref 136–145)
WBC # BLD AUTO: 4.96 K/UL (ref 3.9–12.7)

## 2024-06-03 PROCEDURE — 84100 ASSAY OF PHOSPHORUS: CPT | Performed by: INTERNAL MEDICINE

## 2024-06-03 PROCEDURE — 36415 COLL VENOUS BLD VENIPUNCTURE: CPT | Performed by: INTERNAL MEDICINE

## 2024-06-03 PROCEDURE — 80053 COMPREHEN METABOLIC PANEL: CPT | Performed by: INTERNAL MEDICINE

## 2024-06-03 PROCEDURE — 85025 COMPLETE CBC W/AUTO DIFF WBC: CPT | Performed by: INTERNAL MEDICINE

## 2024-06-03 PROCEDURE — 83735 ASSAY OF MAGNESIUM: CPT | Performed by: INTERNAL MEDICINE

## 2024-06-04 ENCOUNTER — OFFICE VISIT (OUTPATIENT)
Dept: HEMATOLOGY/ONCOLOGY | Facility: CLINIC | Age: 64
End: 2024-06-04
Payer: COMMERCIAL

## 2024-06-04 ENCOUNTER — INFUSION (OUTPATIENT)
Dept: INFUSION THERAPY | Facility: HOSPITAL | Age: 64
End: 2024-06-04
Attending: INTERNAL MEDICINE
Payer: COMMERCIAL

## 2024-06-04 VITALS
OXYGEN SATURATION: 98 % | RESPIRATION RATE: 18 BRPM | BODY MASS INDEX: 21.22 KG/M2 | WEIGHT: 124.31 LBS | TEMPERATURE: 98 F | DIASTOLIC BLOOD PRESSURE: 66 MMHG | SYSTOLIC BLOOD PRESSURE: 114 MMHG | HEART RATE: 70 BPM | HEIGHT: 64 IN

## 2024-06-04 VITALS
HEIGHT: 64 IN | DIASTOLIC BLOOD PRESSURE: 67 MMHG | TEMPERATURE: 98 F | BODY MASS INDEX: 21.22 KG/M2 | WEIGHT: 124.31 LBS | HEART RATE: 78 BPM | SYSTOLIC BLOOD PRESSURE: 112 MMHG

## 2024-06-04 DIAGNOSIS — C79.51 SECONDARY MALIGNANT NEOPLASM OF BONE: ICD-10-CM

## 2024-06-04 DIAGNOSIS — C90.00 MULTIPLE MYELOMA, REMISSION STATUS UNSPECIFIED: ICD-10-CM

## 2024-06-04 DIAGNOSIS — C90.00 MULTIPLE MYELOMA, REMISSION STATUS UNSPECIFIED: Primary | ICD-10-CM

## 2024-06-04 DIAGNOSIS — C90.00 MULTIPLE MYELOMA NOT HAVING ACHIEVED REMISSION: Primary | ICD-10-CM

## 2024-06-04 DIAGNOSIS — Z51.11 ENCOUNTER FOR ANTINEOPLASTIC CHEMOTHERAPY: ICD-10-CM

## 2024-06-04 PROCEDURE — 96365 THER/PROPH/DIAG IV INF INIT: CPT

## 2024-06-04 PROCEDURE — 3078F DIAST BP <80 MM HG: CPT | Mod: CPTII,S$GLB,,

## 2024-06-04 PROCEDURE — 96367 TX/PROPH/DG ADDL SEQ IV INF: CPT

## 2024-06-04 PROCEDURE — 3074F SYST BP LT 130 MM HG: CPT | Mod: CPTII,S$GLB,,

## 2024-06-04 PROCEDURE — 96401 CHEMO ANTI-NEOPL SQ/IM: CPT

## 2024-06-04 PROCEDURE — 63600175 PHARM REV CODE 636 W HCPCS: Mod: JZ,JG | Performed by: INTERNAL MEDICINE

## 2024-06-04 PROCEDURE — 99215 OFFICE O/P EST HI 40 MIN: CPT | Mod: S$GLB,,,

## 2024-06-04 PROCEDURE — 25000003 PHARM REV CODE 250: Performed by: INTERNAL MEDICINE

## 2024-06-04 PROCEDURE — 4010F ACE/ARB THERAPY RXD/TAKEN: CPT | Mod: CPTII,S$GLB,,

## 2024-06-04 PROCEDURE — 3008F BODY MASS INDEX DOCD: CPT | Mod: CPTII,S$GLB,,

## 2024-06-04 PROCEDURE — 99999 PR PBB SHADOW E&M-EST. PATIENT-LVL V: CPT | Mod: PBBFAC,,,

## 2024-06-04 RX ORDER — ZOLEDRONIC ACID 0.04 MG/ML
4 INJECTION, SOLUTION INTRAVENOUS
Status: DISCONTINUED | OUTPATIENT
Start: 2024-06-04 | End: 2024-06-04

## 2024-06-04 RX ADMIN — ZOLEDRONIC ACID 3.5 MG: 4 INJECTION, SOLUTION, CONCENTRATE INTRAVENOUS at 10:06

## 2024-06-04 RX ADMIN — DARATUMUMAB AND HYALURONIDASE-FIHJ (HUMAN RECOMBINANT) 1800 MG: 1800; 30000 INJECTION SUBCUTANEOUS at 10:06

## 2024-06-04 NOTE — ASSESSMENT & PLAN NOTE
BMBx : 60 % plasma cells - 4/6/2023  - SPEP/MECHE showed IgG lambda - monoclonal protein 3.19 g/dl - (3/27/2023).  - R-ISS stage I (beta 2 microglobulin 1.9, albumin 3.5, , normal karyotype and no high-risk mutations on fish panel  - PET-CT ( 4/10/2023) - showed extensive lytic lesions in the axial skeleton and mild L1 compression deformity.  - Started with Induction chemotherapy with VRD regimen (Velcde/Revlimid/Decadron) - 05/10/2023   - Started Aspirin daily p.o for thrombosis prophylaxis; Acyclovir 400 mg p.o BID for herpes Zoster prophylaxis .  __________________________________________________  --S/p Kyphoplasty 06/08/23    Pt seen by transplant team BMT.  However, she has declined transplant at this time and it was recommended that we continue with Rosa-VRD for 6 cycles and then repeat bone marrow biopsy and PET scan. If VGPR or better, then continue with Revlimid maintenance only.    Labs reviewed, no concerning cytopenias  --Ok to proceed with Darzalex and zometa today    F/u in two weeks with labs prior for darzalex

## 2024-06-04 NOTE — PROGRESS NOTES
Subjective:     Patient ID:?Ana Bonilla is a 63 y.o. female.?? MR#: [id number]   ?   PRIMARY ONCOLOGIST:   ?   CHIEF COMPLAINT: lab review/assessment for chemo/MM ?????   ?   ONCOLOGIC DIAGNOSIS: Multiple Myeloma  ?   CURRENT TREATMENT: OP Myeloma KIA-RD daratumumab lenalidomide dexAMETHasone Q4W     HPI  Ms. Bonilla is a 62-year-old  female with past medical history significant for hypertension, COPD, asthma, GERD, hypothyroidism here for follow-up and management of multiple myeloma. BMBx on 4/6/2023 showed 60 % plasma cells. PET-CT on 4/10/2023 showed extensive lytic bony lesions throughout the spine, sternum, bilateral ribs, pelvis and mild compression deformity in L1 vertebral body. SPEP/MECHE on 3/27/2023 showed IgG lambda monoclonal protein - 3.19 g/dl. Quantitative immunoglobulins on 3/27/2023 showed IgG 4,521 mg/dl. UPEP/MECHE and FLC were pending at that time. Labs on 3/27/2023 showed Beta 2 microglobulin 1.9, .  Labs on 4/19/2023 showed Hb, 11.5, WBC 6.3, platelets, BUN 11, Cr 0.9, calcium 9. Pt initiated on VRD 05/10/23. Treatment planned changed to D-VRD 07/06/23.     Interval History: Pt presents today with her  for lab review and assessment prior to Darzalex/zometa.  Pt states overall she is feeling okay. Denies n/v/d/c, fever, chills, sob, cp, abnormal bleeding. She notes appetite waxes and wanes; denies difficulty with hydration.      Oncology History   Multiple myeloma   4/20/2023 Initial Diagnosis    Multiple myeloma     5/10/2023 - 6/28/2023 Chemotherapy    Treatment Summary   Plan Name: OP VRD - WEEKLY BORTEZOMIB LENALIDOMIDE DEXAMETHASONE Q3W  Treatment Goal: Palliative  Status: Inactive  Start Date: 5/10/2023  End Date: 6/28/2023  Provider: Abram Velasquez MD  Chemotherapy: bortezomib (VELCADE) injection 2 mg, 1.3 mg/m2 = 2 mg, Subcutaneous, Clinic/[facility] 1 time, 2 of 6 cycles  Administration: 2 mg (5/10/2023), 2 mg (5/17/2023), 2 mg (6/14/2023), 2 mg (5/31/2023), 2 mg  (6/21/2023), 2 mg (6/28/2023)  lenalidomide 25 mg Cap, 25 mg, Oral, Daily, 1 of 1 cycle, Start date: 5/10/2023, End date: 5/17/2023 6/28/2023 Cancer Staged    Staging form: Plasma Cell Myeloma and Plasma Cell Disorders, AJCC 8th Edition  - Clinical stage from 6/28/2023: RISS Stage II (Beta-2-microglobulin (mg/L): 1.9, Albumin (g/dL): 3, ISS: Stage II, High-risk cytogenetics: Absent, LDH: Normal)     7/6/2023 - 1/3/2024 Chemotherapy    Treatment Summary   Plan Name: OP D-VRD DARATUMUMAB + BORTEZOMIB LENALIDOMIDE DEXAMETHASONE  Treatment Goal: Palliative  Status: Inactive  Start Date: 7/6/2023  End Date: 1/3/2024  Provider: Oscar Márquez MD  Chemotherapy: bortezomib (VELCADE) injection 2 mg, 1.3 mg/m2 = 2 mg, Subcutaneous, Clinic/[facility] 1 time, 6 of 6 cycles  Administration: 2 mg (7/6/2023), 2 mg (7/12/2023), 2 mg (8/2/2023), 2 mg (8/9/2023), 2.25 mg (10/18/2023), 2 mg (7/19/2023), 2 mg (7/26/2023), 2 mg (8/16/2023), 2.25 mg (9/20/2023), 2.25 mg (9/27/2023), 2.25 mg (10/25/2023), 2.25 mg (11/1/2023), 2.25 mg (11/8/2023), 2.25 mg (12/6/2023), 2.25 mg (1/3/2024), 2.25 mg (11/15/2023), 2.25 mg (11/22/2023), 2.25 mg (11/29/2023), 2.25 mg (12/13/2023), 2.25 mg (12/20/2023), 2.25 mg (12/26/2023)  lenalidomide 10 mg Cap, 1 capsule (100 % of original dose 1 capsule), Oral, Daily, 1 of 1 cycle, Start date: 7/26/2023, End date: 8/2/2023  Dose modification: 1 capsule (original dose 1 capsule, Cycle 0)  daratumumab-hyaluronidase-fihj subcutaneous injection 1,800 mg, 1,800 mg (100 % of original dose 1,800 mg), Subcutaneous, Clinic/[facility] 1 time, 6 of 6 cycles  Dose modification: 1,800 mg (original dose 1,800 mg, Cycle 1), 1,800 mg (original dose 1,800 mg, Cycle 1)  Administration: 1,800 mg (7/6/2023), 1,800 mg (7/12/2023), 1,800 mg (7/19/2023), 1,800 mg (7/26/2023), 1,800 mg (8/2/2023), 1,800 mg (8/9/2023), 1,800 mg (8/16/2023), 1,800 mg (9/27/2023), 1,800 mg (10/18/2023), 1,800 mg (11/1/2023), 1,800 mg (11/15/2023), 1,800 mg  (11/29/2023), 1,800 mg (12/13/2023), 1,800 mg (12/26/2023)     4/30/2024 -  Chemotherapy    Treatment Summary   Plan Name: OP Myeloma KIA-RD daratumumab lenalidomide dexAMETHasone Q4W  Treatment Goal: Palliative  Status: Active  Start Date: 4/30/2024  End Date: 3/4/2025 (Planned)  Provider: Bri Luevano MD  Chemotherapy: daratumumab-hyaluronidase-fij subcutaneous injection 1,800 mg, 1,800 mg, Subcutaneous, Clinic/[facility] 1 time, 2 of 12 cycles  Administration: 1,800 mg (4/30/2024), 1,800 mg (5/7/2024), 1,800 mg (5/21/2024), 1,800 mg (5/28/2024), 1,800 mg (6/4/2024), 1,800 mg (5/14/2024)        Social History     Socioeconomic History    Marital status:     Number of children: 2   Occupational History     Employer: [employer]   Tobacco Use    Smoking status: Every Day     Current packs/day: 0.50     Average packs/day: 0.5 packs/day for 50.0 years (25.0 ttl pk-yrs)     Types: Cigarettes     Passive exposure: Never    Smokeless tobacco: Never   Substance and Sexual Activity    Alcohol use: Not Currently     Comment: quit    Drug use: No    Sexual activity: Not Currently     Partners: Male     Social Determinants of Health     Financial Resource Strain: Low Risk  (11/15/2023)    Overall Financial Resource Strain (CARDIA)     Difficulty of Paying Living Expenses: Not hard at all   Food Insecurity: No Food Insecurity (11/15/2023)    Hunger Vital Sign     Worried About Running Out of Food in the Last Year: Never true     Ran Out of Food in the Last Year: Never true   Transportation Needs: No Transportation Needs (11/15/2023)    PRAPARE - Transportation     Lack of Transportation (Medical): No     Lack of Transportation (Non-Medical): No   Physical Activity: Sufficiently Active (11/15/2023)    Exercise Vital Sign     Days of Exercise per Week: 7 days     Minutes of Exercise per Session: 150+ min   Stress: Stress Concern Present (11/15/2023)    [place] [employer] - Occupational Stress Questionnaire      Feeling of Stress : To some extent   Housing Stability: Low Risk  (11/15/2023)    Housing Stability Vital Sign     Unable to Pay for Housing in the Last Year: No     Number of Places Lived in the Last Year: 1     Unstable Housing in the Last Year: No      Family History   Problem Relation Name Age of Onset    Hypertension Mother Vivian     Diabetes Mother Vivian     Asthma Mother Vivian             Diabetes Father      Asthma Father      Stroke Maternal Grandmother  grandmother     Prostate cancer Maternal Grandfather      Mental illness Son Lukasz Garcia     Pancreatic cancer Maternal Uncle      Mental illness Other Pat side     Pancreatic cancer Other Mat side     Ovarian cancer Maternal Cousin        Past Surgical History:   Procedure Laterality Date    APPENDECTOMY      BIOPSY N/A 2023    Procedure: BIOPSY;  Surgeon: Fausto Smith MD;  Location: [facility] OR;  Service: Neurosurgery;  Laterality: N/A;  L1    BUNIONECTOMY      COLONOSCOPY N/A 2019    Procedure: COLONOSCOPY;  Surgeon: Danny Matos III, MD;  Location: [facility] ENDO;  Service: Endoscopy;  Laterality: N/A;    COLONOSCOPY N/A 10/24/2022    Procedure: COLONOSCOPY;  Surgeon: Danny Matos III, MD;  Location: [facility] ENDO;  Service: Endoscopy;  Laterality: N/A;    ESOPHAGOGASTRODUODENOSCOPY N/A 10/24/2022    Procedure: EGD (ESOPHAGOGASTRODUODENOSCOPY);  Surgeon: Danny Matos III, MD;  Location: [facility];  Service: Endoscopy;  Laterality: N/A;    FIXATION KYPHOPLASTY Bilateral 2023    Procedure: KYPHOPLASTY;  Surgeon: Fausto Smith MD;  Location: [facility] OR;  Service: Neurosurgery;  Laterality: Bilateral;  kyphoplasty and radiofrequency ablation - L1    HYSTERECTOMY      PARTIAL//still with ovaries    neck fusion  2017    THYROIDECTOMY      TUBAL LIGATION          Review of Systems   Constitutional:  Positive for fatigue. Negative for activity change, appetite change, chills, fever and  unexpected weight change.   HENT:  Negative for congestion, dental problem, mouth sores and nosebleeds.    Eyes:  Negative for visual disturbance.   Respiratory:  Negative for cough, choking and chest tightness.    Cardiovascular:  Negative for chest pain, palpitations and leg swelling.   Gastrointestinal:  Negative for abdominal distention, abdominal pain, anal bleeding, blood in stool, constipation, diarrhea, nausea and vomiting.   Endocrine: Negative.    Genitourinary:  Negative for dysuria, frequency, hematuria and urgency.   Musculoskeletal:  Positive for arthralgias and myalgias. Negative for back pain, gait problem and joint swelling.   Skin:  Negative for rash and wound.   Allergic/Immunologic: Negative for immunocompromised state.   Neurological:  Negative for dizziness, light-headedness, numbness and headaches.   Hematological:  Negative for adenopathy. Does not bruise/bleed easily.   Psychiatric/Behavioral:  The patient is nervous/anxious.        ?   A comprehensive 14-point review of systems was reviewed with patient and was negative other than as specified above.   ?     Objective:      Physical Exam  Vitals reviewed.   Constitutional:       Appearance: Normal appearance. She is not ill-appearing.   HENT:      Head: Normocephalic and atraumatic.      Right Ear: External ear normal.      Left Ear: External ear normal.   Eyes:      Conjunctiva/sclera: Conjunctivae normal.   Cardiovascular:      Rate and Rhythm: Normal rate.   Pulmonary:      Effort: Pulmonary effort is normal.   Abdominal:      General: Abdomen is flat.   Genitourinary:     Comments: deferred  Musculoskeletal:         General: Normal range of motion.      Cervical back: Normal range of motion.      Right lower leg: No edema.      Left lower leg: No edema.   Skin:     General: Skin is warm and dry.      Capillary Refill: Capillary refill takes less than 2 seconds.      Coloration: Skin is not mottled.   Neurological:      Mental Status:  She is alert and oriented to person, place, and time.      Motor: No weakness.           ?   Vitals:    06/04/24 0853   BP: 112/67   Pulse: 78   Temp: 97.7 °F (36.5 °C)      ?       ?   Laboratory:  ?   No visits with results within 1 Day(s) from this visit.   Latest known visit with results is:   Lab Visit on 06/03/2024   Component Date Value Ref Range Status    Phosphorus 06/03/2024 2.7  2.7 - 4.5 mg/dL Final    Magnesium 06/03/2024 1.6  1.6 - 2.6 mg/dL Final    Sodium 06/03/2024 143  136 - 145 mmol/L Final    Potassium 06/03/2024 3.8  3.5 - 5.1 mmol/L Final    Chloride 06/03/2024 109  95 - 110 mmol/L Final    CO2 06/03/2024 22 (L)  23 - 29 mmol/L Final    Glucose 06/03/2024 103  70 - 110 mg/dL Final    BUN 06/03/2024 9  8 - 23 mg/dL Final    Creatinine 06/03/2024 0.9  0.5 - 1.4 mg/dL Final    Calcium 06/03/2024 9.4  8.7 - 10.5 mg/dL Final    Total Protein 06/03/2024 6.9  6.0 - 8.4 g/dL Final    Albumin 06/03/2024 3.7  3.5 - 5.2 g/dL Final    Total Bilirubin 06/03/2024 0.9  0.1 - 1.0 mg/dL Final    Alkaline Phosphatase 06/03/2024 51 (L)  55 - 135 U/L Final    AST 06/03/2024 18  10 - 40 U/L Final    ALT 06/03/2024 23  10 - 44 U/L Final    eGFR 06/03/2024 >60  >60 mL/min/1.73 m^2 Final    Anion Gap 06/03/2024 12  8 - 16 mmol/L Final    WBC 06/03/2024 4.96  3.90 - 12.70 K/uL Final    RBC 06/03/2024 3.91 (L)  4.00 - 5.40 M/uL Final    Hemoglobin 06/03/2024 12.5  12.0 - 16.0 g/dL Final    Hematocrit 06/03/2024 38.3  37.0 - 48.5 % Final    MCV 06/03/2024 98  82 - 98 fL Final    MCH 06/03/2024 32.0 (H)  27.0 - 31.0 pg Final    MCHC 06/03/2024 32.6  32.0 - 36.0 g/dL Final    RDW 06/03/2024 14.6 (H)  11.5 - 14.5 % Final    Platelets 06/03/2024 310  150 - 450 K/uL Final    MPV 06/03/2024 9.6  9.2 - 12.9 fL Final    Immature Granulocytes 06/03/2024 0.2  0.0 - 0.5 % Final    Gran # (ANC) 06/03/2024 2.1  1.8 - 7.7 K/uL Final    Immature Grans (Abs) 06/03/2024 0.01  0.00 - 0.04 K/uL Final    Lymph # 06/03/2024 1.6  1.0 - 4.8  K/uL Final    Mono # 06/03/2024 0.6  0.3 - 1.0 K/uL Final    Eos # 06/03/2024 0.6 (H)  0.0 - 0.5 K/uL Final    Baso # 06/03/2024 0.07  0.00 - 0.20 K/uL Final    nRBC 06/03/2024 0  0 /100 WBC Final    Gran % 06/03/2024 42.3  38.0 - 73.0 % Final    Lymph % 06/03/2024 31.7  18.0 - 48.0 % Final    Mono % 06/03/2024 11.7  4.0 - 15.0 % Final    Eosinophil % 06/03/2024 12.7 (H)  0.0 - 8.0 % Final    Basophil % 06/03/2024 1.4  0.0 - 1.9 % Final    Differential Method 06/03/2024 Automated   Final      ?   Imaging:    No results found. However, due to the size of the patient record, not all encounters were searched. Please check Results Review for a complete set of results.       No results found. However, due to the size of the patient record, not all encounters were searched. Please check Results Review for a complete set of results.           ?   Assessment/Plan:     Problem List Items Addressed This Visit          Oncology    Multiple myeloma - Primary (Chronic)     BMBx : 60 % plasma cells - 4/6/2023  - SPEP/MECHE showed IgG lambda - monoclonal protein 3.19 g/dl - (3/27/2023).  - R-ISS stage I (beta 2 microglobulin 1.9, albumin 3.5, , normal karyotype and no high-risk mutations on fish panel  - PET-CT ( 4/10/2023) - showed extensive lytic lesions in the axial skeleton and mild L1 compression deformity.  - Started with Induction chemotherapy with VRD regimen (Velcde/Revlimid/Decadron) - 05/10/2023   - Started Aspirin daily p.o for thrombosis prophylaxis; Acyclovir 400 mg p.o BID for herpes Zoster prophylaxis .  __________________________________________________  --S/p Kyphoplasty 06/08/23    Pt seen by transplant team BMT.  However, she has declined transplant at this time and it was recommended that we continue with Rosa-VRD for 6 cycles and then repeat bone marrow biopsy and PET scan. If VGPR or better, then continue with Revlimid maintenance only.    Labs reviewed, no concerning cytopenias  --Ok to proceed with  Darzalex and zometa today    F/u in two weeks with labs prior for darzalex              Relevant Orders    CBC Auto Differential    Comprehensive Metabolic Panel    Secondary malignant neoplasm of bone    Relevant Orders    CBC Auto Differential    Comprehensive Metabolic Panel    Encounter for antineoplastic chemotherapy    Relevant Orders    CBC Auto Differential    Comprehensive Metabolic Panel              Med Onc Chart Routing      Follow up with physician . Scheduled   Follow up with WERNER    Infusion scheduling note    Injection scheduling note    Labs    Imaging    Pharmacy appointment    Other referrals                     [facility]  Hematology/Oncology

## 2024-06-04 NOTE — PLAN OF CARE
"Pt is stable, pt voiced she was doing "great," today. Pt administered Zometa/Darzalex. Pt will follow up in one week. Plan of care reviewed with patient. Discussed if there are any new or ongoing concerns.        Problem: Adult Inpatient Plan of Care  Goal: Plan of Care Review  Outcome: Progressing  Goal: Patient-Specific Goal (Individualized)  Outcome: Progressing  Flowsheets (Taken 6/4/2024 1056)  Individualized Care Needs: Pt provided pillow and chose to sit with feet to ground.  at side.  Anxieties, Fears or Concerns: Pt denies.  Goal: Optimal Comfort and Wellbeing  Outcome: Progressing     Problem: Chemotherapy Effects  Goal: Anemia Symptom Improvement  Outcome: Progressing  Goal: Safety Maintained  Outcome: Progressing  Goal: Absence of Hematuria  Outcome: Progressing  Goal: Nausea and Vomiting Relief  Outcome: Progressing  Goal: Neurotoxicity Symptom Control  Outcome: Progressing  Goal: Absence of Infection  Outcome: Progressing  Goal: Absence of Bleeding  Outcome: Progressing     Problem: Fall Injury Risk  Goal: Absence of Fall and Fall-Related Injury  Outcome: Progressing     "

## 2024-06-04 NOTE — DISCHARGE INSTRUCTIONS
Thank you for allowing me to care for you today,  MIS LeachN, RN    [facility]  [address] [address]  [phone] phone     [phone] fax  Hours of Operation: Monday- Friday 7:00am- 5:30pm  After hours phone  [phone]  Hematology / Oncology Physicians on call      JUANI Chaudhary Dr., Dr., Dr., Dr., Dr., Dr., Dr., Dr., Dr., Dr., Dr., Dr., Dr., Dr., Dr., BELLE Mckeon, BELLE Deluna, BELLE Page, NP  Please call with any concerns regarding your appointment today.   FALL PREVENTION   Falls often occur due to slipping, tripping or losing your balance. Here are ways to reduce your risk of falling again.   Was there anything that caused your fall that can be fixed, removed or replaced?   Make your home safe by keeping walkways clear of objects you may trip over.   Use non-slip pads under rugs.   Do not walk in poorly lit areas.   Do not stand on chairs or wobbly ladders.   Use caution when reaching overhead or looking upward. This position can cause a loss of balance.   Be sure your shoes fit properly, have non-slip bottoms and are in good condition.   Be cautious when going up and down stairs, curbs, and when walking on uneven sidewalks.   If your balance is poor, consider using a cane or walker.   If your fall was related to alcohol use, stop or limit alcohol intake.   If your fall was related to use of sleeping medicines, talk to your doctor about this. You may need to reduce your dosage at bedtime if you awaken during the night to go to the bathroom.   To reduce the need for nighttime bathroom trips:   Avoid drinking fluids for several hours before going to bed   Empty your bladder before going to bed   Men can keep a urinal at the bedside   © 6983-1124 [employer], [address] [place] [address], [place] [address]. All rights reserved. This information is not intended as a  substitute for professional medical care. Always follow your healthcare professional's instructions.

## 2024-06-05 ENCOUNTER — TELEPHONE (OUTPATIENT)
Dept: HEMATOLOGY/ONCOLOGY | Facility: CLINIC | Age: 64
End: 2024-06-05
Payer: COMMERCIAL

## 2024-06-05 NOTE — TELEPHONE ENCOUNTER
6/4/2024     8:54 AM 5/14/2024     8:05 AM 5/7/2024     1:25 PM 4/30/2024     8:13 AM 4/15/2024    10:25 AM 4/13/2024     7:11 AM 3/5/2024     1:30 PM   DISTRESS SCREENING   Distress Score 5 0 - No Distress 0 - No Distress 0 - No Distress 0 - No Distress 1 0 - No Distress   Practical Concerns None of these None of these None of these None of these None of these None of these None of these   Social Concerns None of these None of these None of these None of these None of these Relationship with children;None of these None of these   Emotional Concerns Worry or anxiety None of these None of these None of these None of these Worry or anxiety;None of these None of these   Spiritual or Tenriism Concerns None of these None of these None of these None of these None of these None of these None of these   Physical Concerns None of these Fatigue None of these Fatigue;Changes in eating Changes in eating;Fatigue Pain;Tobacco use;None of these None of these   Other Problems    Nausea Cough No other problem      SW contacted pt regarding distress score of 5 for emotional concerns> pt reported she has been worried and anxious due to her son's continuous mental health issues. Pt reported she is not concerned about her cancer, but her son's recent behavior has been overwhelming for her. SW provided pt with emotional support. Pt reported she has medication to take when she becomes overwhelmed. Pt denied any other concerns. SW encouraged pt to contact SS as needed and SW will remain available.

## 2024-06-05 NOTE — TELEPHONE ENCOUNTER
Spoke with Nava at [facility] pharmacy regarding pt medications. She states rx was delivered today at 1150am

## 2024-06-06 ENCOUNTER — OFFICE VISIT (OUTPATIENT)
Dept: PALLIATIVE MEDICINE | Facility: CLINIC | Age: 64
End: 2024-06-06
Payer: COMMERCIAL

## 2024-06-06 VITALS
WEIGHT: 124.56 LBS | TEMPERATURE: 98 F | OXYGEN SATURATION: 98 % | HEIGHT: 64 IN | HEART RATE: 70 BPM | DIASTOLIC BLOOD PRESSURE: 69 MMHG | BODY MASS INDEX: 21.27 KG/M2 | RESPIRATION RATE: 18 BRPM | SYSTOLIC BLOOD PRESSURE: 111 MMHG

## 2024-06-06 DIAGNOSIS — F17.200 NEEDS SMOKING CESSATION EDUCATION: ICD-10-CM

## 2024-06-06 DIAGNOSIS — G89.3 CHRONIC NEOPLASM-RELATED PAIN: ICD-10-CM

## 2024-06-06 DIAGNOSIS — G89.3 NEOPLASM RELATED PAIN: ICD-10-CM

## 2024-06-06 DIAGNOSIS — R11.0 NAUSEA: ICD-10-CM

## 2024-06-06 DIAGNOSIS — C90.00 MULTIPLE MYELOMA, REMISSION STATUS UNSPECIFIED: Primary | ICD-10-CM

## 2024-06-06 DIAGNOSIS — F41.9 ANXIETY: ICD-10-CM

## 2024-06-06 DIAGNOSIS — Z51.5 PALLIATIVE CARE ENCOUNTER: ICD-10-CM

## 2024-06-06 PROCEDURE — 4010F ACE/ARB THERAPY RXD/TAKEN: CPT | Mod: CPTII,S$GLB,,

## 2024-06-06 PROCEDURE — 99999 PR PBB SHADOW E&M-EST. PATIENT-LVL V: CPT | Mod: PBBFAC,,,

## 2024-06-06 PROCEDURE — 3078F DIAST BP <80 MM HG: CPT | Mod: CPTII,S$GLB,,

## 2024-06-06 PROCEDURE — 3074F SYST BP LT 130 MM HG: CPT | Mod: CPTII,S$GLB,,

## 2024-06-06 PROCEDURE — 3008F BODY MASS INDEX DOCD: CPT | Mod: CPTII,S$GLB,,

## 2024-06-06 PROCEDURE — 99214 OFFICE O/P EST MOD 30 MIN: CPT | Mod: S$GLB,,,

## 2024-06-06 RX ORDER — ONDANSETRON 4 MG/1
4 TABLET, ORALLY DISINTEGRATING ORAL
Qty: 30 TABLET | Refills: 0 | Status: SHIPPED | OUTPATIENT
Start: 2024-06-06 | End: 2024-07-06

## 2024-06-06 RX ORDER — ALPRAZOLAM 2 MG/1
2 TABLET ORAL 2 TIMES DAILY PRN
Qty: 60 TABLET | Refills: 0 | Status: SHIPPED | OUTPATIENT
Start: 2024-06-12 | End: 2024-07-12

## 2024-06-06 RX ORDER — NALOXONE HYDROCHLORIDE 4 MG/.1ML
1 SPRAY NASAL ONCE
Qty: 1 EACH | Refills: 0 | Status: SHIPPED | OUTPATIENT
Start: 2024-06-06 | End: 2024-06-06

## 2024-06-06 RX ORDER — OXYCODONE AND ACETAMINOPHEN 5; 325 MG/1; MG/1
1 TABLET ORAL EVERY 12 HOURS PRN
Qty: 14 TABLET | Refills: 0 | Status: SHIPPED | OUTPATIENT
Start: 2024-06-06 | End: 2024-06-13

## 2024-06-06 NOTE — PATIENT INSTRUCTIONS
Miralax 1pack/day or Senna 2 tabs at bedtime for opioid-induced constipation    Ok to take 1/2 tablet of your percocet for pain twice a day as needed for pain  We will ask the smoking cessation team to see you.

## 2024-06-06 NOTE — PROGRESS NOTES
Palliative Medicine Clinic Note  Follow-up        Consult Requested By: No ref. provider found      Reason for Consult: Symptom Management/Advance Care Planning/Goals of Care Discussion    Chief Complaint: R hip pain.         ASSESSMENT/PLAN:      Plan/Recommendations:    Problem List Items Addressed This Visit          Psychiatric    Anxiety     Chronic and exacerbated by MM diagnosis and family-related stress. .  Pt and I extensively discussed stress management strategies including avoiding triggers, sleep hygiene, and proper nutrition.   Continues Xanax 2mg BID PRN  Declines SSRI trial at this time.   Declines mental health counseling at this time.  UDS- Collected. Results pending.           Relevant Medications    ALPRAZolam (XANAX) 2 MG Tab       Oncology    Multiple myeloma - Primary (Chronic)     Metastasis to thoracic/lumbar spine, complicated by L1 compression fracture.  Followed by Dr. Luevano, Dr. Garcia, and Neurosurgery.   On D-VRD, Lenalidomide and Zometa. S/P L1 Kyphoplasty (June 2023).   Pt remains hesitant about SCT due extended recovery time/fear of relapse post SCT  PET scan 1/18/2024: 1. Findings consistent with treatment response with significantly decreased FDG uptake associated with the previously identified lesions. No new FDG avid lesions are identified          Relevant Medications    ondansetron (ZOFRAN-ODT) 4 MG TbDL    ALPRAZolam (XANAX) 2 MG Tab    Chronic neoplasm-related pain     Likely a mixture of chronic sciatica/Pelvic lytic bone lesions/sequale of L1 compression fracture  Medical Marijuana effective, but too sedating. Prescribed by Dr. Barrios.  Pt has tried multiple pain management modalities with mixed response.   On Percocet 5mg PRN. Wishes to minimize opioids at this time due to fear of altered mental status.  Miralax 1pack/day or Senna 2 tabs at bedtime for opioid-induced constipation    Stopped Lyrica due to altered mental status            Palliative Care    Palliative  "care encounter     -Code status: Full Code.  -HCPOA: 1.  : Lars Bonilla: [phone], 2. Son: Hakeem Garcia: [phone].   -GOC:  Continue cancer treatment, symptom management, maintain independence and functional status.  -See HPI for further details             Other    Needs smoking cessation education     Pt self-weaning to less than 1/2 pack/day  Referral to CTTS placed per pt's request.           Other Visit Diagnoses       Neoplasm related pain        Nausea        Relevant Medications    ondansetron (ZOFRAN-ODT) 4 MG TbDL             Problem List Items Addressed This Visit          Psychiatric    Anxiety    Relevant Medications    ALPRAZolam (XANAX) 2 MG Tab       Oncology    Multiple myeloma - Primary (Chronic)    Relevant Medications    ondansetron (ZOFRAN-ODT) 4 MG TbDL    ALPRAZolam (XANAX) 2 MG Tab     Other Visit Diagnoses       Neoplasm related pain        Nausea        Relevant Medications    ondansetron (ZOFRAN-ODT) 4 MG TbDL                Advance Care Planning   Advance Directives:   Living Will: No    [place]POST: No    Do Not Resuscitate Status: No    Medical Power of : Yes    Agent's Name:  1.  : Lars Bonilla: [phone], 2. Son: Hakeem Garcia: [phone].    Decision Making:  Patient answered questions  Goals of Care: What is most important right now is to focus on quality of life, even if it means sacrificing a little time, curative/life-prolongation (regardless of treatment burdens). Accordingly, we have decided that the best plan to meet the patient's goals includes continuing with treatment.    Pt declined stem cell transplant due to concerns about extended recovery time. She stated she is concerned that she will be unable to care for her family if she undergoes SCT and the subsequent recovery time.     She wishes to remain Full Code. She "does not want to think about that".                Follow up: 3 months       SUBJECTIVE:      History of Present " "Illness / Interval History:  Ana Bonilla is 63 y.o. female with Multiple Myeloma (Apr. 2023) with metastasis to thoracic/lumbar spine, complicated by L1 compression fracture. Pt declined SCT. . S/P L1 Kyphoplasty (June 2023).   On D-VRD, Lenalidomide and Zometa  Followed by Dr. Luevano, Dr. Garcia, and Neurosurgery.    Presents to Palliative Care Clinic for physical symptoms and additional support.. Please see Hem/Onc note for more details on pt's care.       6/6/24  History obtained from: Patient.     Pt attended clinic alone. She was in no acute distress. Vital signs stable on room air.   She reported persistent right hip pain 5/10. Percocet 5mg-10mg PRN was effective, but she ran out. She is taking Tylenol 500mg Q6H PRN with some relief. She stated she does not want to take opioids due to fear of oversedation and addiction.   However, she requested a short course of Percocet to manage severe pain. She continues to use Medical Marijuana for anxiety/pain, but notes "taking a full dose is too strong".   Her anxiety is stable on Xanax 2mg BID PRN.   She continues to smoke, but notes she is gradually self-weaning. She wishes to enrol in smoking cessation program.         ROS:  Review of Systems   Constitutional:  Positive for activity change and fatigue. Negative for appetite change and unexpected weight change.   HENT:  Negative for hearing loss, sore throat, trouble swallowing and voice change.    Eyes:  Negative for visual disturbance.   Respiratory:  Negative for cough, chest tightness, shortness of breath and wheezing.    Cardiovascular:  Negative for chest pain, palpitations and leg swelling.   Gastrointestinal:  Positive for constipation (Chronic, Intermittent. On Linzess). Negative for abdominal pain, blood in stool, nausea, rectal pain and vomiting.   Genitourinary:  Negative for bladder incontinence, difficulty urinating, flank pain, hematuria, nocturia, pelvic pain and urgency.   Musculoskeletal:  " Positive for arthralgias, back pain (R lower back) and myalgias (Right hip). Negative for neck pain.   Integumentary:  Negative for wound.   Allergic/Immunologic: Negative for environmental allergies, food allergies and immunocompromised state.   Neurological:  Negative for dizziness, tremors, weakness, numbness, headaches, memory loss and coordination difficulties.   Hematological:  Negative for adenopathy. Does not bruise/bleed easily.   Psychiatric/Behavioral:  Positive for sleep disturbance (Related to cancer diagnosis). Negative for agitation, behavioral problems, confusion, decreased concentration, dysphoric mood, self-injury and suicidal ideas. The patient is nervous/anxious (Chronic- On Xanax. Declined antidepressant.).        Review of Symptoms      Symptom Assessment (ESAS 0-10 Scale)  Pain:  5  Dyspnea:  0  Anxiety:  0  Nausea:  0  Depression:  0  Anorexia:  0  Fatigue:  10  Insomnia:  10  Restlessness:  10  Agitation:  0     CAM / Delirium:  Negative  Constipation:  Positive (Intermittent)  Diarrhea:  Negative    Anxiety:  Is nervous/anxious (Chronic- On Xanax. Declined antidepressant.)  Constipation:  Constipation (Chronic, Intermittent. On Linzess)    Bowel Management Plan (BMP):  Yes      Pain Assessment:    Location(s): leg (Right Hip)    Leg       Location: right        Quality: Aching, stabbing, sharp and tingling        Quantity: 5/10 in intensity        Chronicity: Onset 0 year(s) ago, unchanged        Aggravating Factors: Movement        Alleviating Factors: Opiates and recumbency        Associated Symptoms: Arthralgias and myalgias    Modified Rhodna Scale:  0    Performance Status:  90    ECOG Performance Status ndGndrndanddndend:nd nd2nd Living Arrangements:  Lives with family    Psychosocial/Cultural:   See Palliative Psychosocial Note: Yes   with two adult sons. Works weekends at [employer]. 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   **Primary  to Follow**  Palliative Care   Consult: No            Medications:    Current Outpatient Medications:     acyclovir (ZOVIRAX) 400 MG tablet, Take 1 tablet (400 mg total) by mouth 2 (two) times daily., Disp: 60 tablet, Rfl: 11    albuterol (PROVENTIL HFA) 90 mcg/actuation inhaler, Inhale 2 puffs into the lungs every 6 (six) hours as needed for Wheezing. Rescue, Disp: 18 g, Rfl: 0    amlodipine-benazepril 2.5-10 mg (LOTREL) 2.5-10 mg per capsule, TAKE 1 CAPSULE BY MOUTH EVERY DAY, Disp: 90 capsule, Rfl: 3    aspirin 81 MG Chew, Take 1 tablet (81 mg total) by mouth once daily., Disp: 30 tablet, Rfl: 11    benzonatate (TESSALON) 200 MG capsule, Take 1 capsule (200 mg total) by mouth 3 (three) times daily as needed for Cough., Disp: 30 capsule, Rfl: 2    calcium-vitamin D 600 mg-10 mcg (400 unit) Tab, Take 1 tablet by mouth every 12 (twelve) hours., Disp: 60 tablet, Rfl: 2    dexAMETHasone (DECADRON) 4 MG Tab, Take 10 tablets (40 mg total) by mouth every 7 days. Take with food., Disp: 40 tablet, Rfl: 11    erythromycin (ROMYCIN) ophthalmic ointment, Apply ointment to lids and lashes on both eyes 2 times daily for 7 days., Disp: 2.5 g, Rfl: 0    fluticasone propionate (FLONASE) 50 mcg/actuation nasal spray, 1 spray (50 mcg total) by Each Nostril route once daily., Disp: 1 mL, Rfl: 1    fluticasone-salmeterol diskus inhaler 250-50 mcg, Inhale 1 puff into the lungs 2 (two) times daily. Controller, Disp: 180 each, Rfl: 1    hydrOXYzine HCL (ATARAX) 25 MG tablet, Take 1 tablet (25 mg total) by mouth 3 (three) times daily as needed for Itching., Disp: 21 tablet, Rfl: 0    ipratropium (ATROVENT) 21 mcg (0.03 %) nasal spray, 2 sprays by Each Nostril route 3 (three) times daily., Disp: , Rfl:     lenalidomide 10 mg Cap, TAKE 1 CAPSULE ONCE DAILY FOR 21 DAYS AND THEN 7 DAYS OFF., Disp: 21 each, Rfl: 7    levocetirizine (XYZAL) 5 MG tablet, Take 1 tablet (5 mg total) by mouth every evening., Disp: 30 tablet, Rfl: 11    levothyroxine (SYNTHROID) 100  MCG tablet, TAKE 1 TABLET(100 MCG) BY MOUTH BEFORE BREAKFAST, Disp: 90 tablet, Rfl: 1    linaCLOtide (LINZESS) 72 mcg Cap capsule, Take 2 capsules (144 mcg total) by mouth before breakfast., Disp: 30 capsule, Rfl: 1    methylPREDNISolone (MEDROL DOSEPACK) 4 mg tablet, use as directed, Disp: 1 each, Rfl: 0    metoprolol succinate (TOPROL-XL) 50 MG 24 hr tablet, Take 1 tablet (50 mg total) by mouth every evening., Disp: 90 tablet, Rfl: 3    montelukast (SINGULAIR) 10 mg tablet, TAKE 1 TABLET(10 MG) BY MOUTH EVERY EVENING, Disp: 90 tablet, Rfl: 0    neomycin-polymyxin-dexamethasone (DEXACINE) 3.5 mg/g-10,000 unit/g-0.1 % Oint, Place into both eyes 3 (three) times daily., Disp: 3.5 g, Rfl: 0    nicotine (NICODERM CQ) 14 mg/24 hr, Place 1 patch onto the skin once daily., Disp: 14 patch, Rfl: 0    pantoprazole (PROTONIX) 40 MG tablet, TAKE 1 TABLET(40 MG) BY MOUTH EVERY DAY, Disp: 90 tablet, Rfl: 3    potassium chloride SA (K-DUR,KLOR-CON) 20 MEQ tablet, Take 1 tablet (20 mEq total) by mouth once daily., Disp: 30 tablet, Rfl: 11    promethazine (PHENERGAN) 25 MG tablet, Take 1 tablet (25 mg total) by mouth every 4 (four) hours., Disp: 45 tablet, Rfl: 2    rosuvastatin (CRESTOR) 10 MG tablet, Take 1 tablet (10 mg total) by mouth every evening., Disp: 90 tablet, Rfl: 3    ALPRAZolam (XANAX) 2 MG Tab, Take 1 tablet (2 mg total) by mouth 2 (two) times daily as needed (Anxiety). TAKE 1 TABLET BY MOUTH TWICE DAILY AS NEEDED FOR ANXIETY, Disp: 60 tablet, Rfl: 0    magnesium oxide (MAGOX) 400 mg (241.3 mg magnesium) tablet, Take 1 tablet (400 mg total) by mouth once daily., Disp: 90 tablet, Rfl: 1    ondansetron (ZOFRAN-ODT) 4 MG TbDL, Take 1 tablet (4 mg total) by mouth every 24 hours as needed (nausea)., Disp: 30 tablet, Rfl: 0    pregabalin (LYRICA) 50 MG capsule, Take 1 capsule (50 mg total) by mouth nightly., Disp: 30 capsule, Rfl: 0    traZODone (DESYREL) 50 MG tablet, Take 1 tablet (50 mg total) by mouth every evening.,  Disp: 30 tablet, Rfl: 11    Current Facility-Administered Medications:     dexAMETHasone injection 40 mg, 40 mg, Intravenous, 1 time in Clinic/HOD, Bri Luevano MD    Facility-Administered Medications Ordered in Other Visits:     lactated ringers infusion, , Intravenous, Continuous, Danny Matos III, MD      External  database queried on 06/14/2024  by RAJIV RUIZ :      Review of patient's allergies indicates:   Allergen Reactions    Hydrocodone-acetaminophen Other (See Comments)     Causes pt to feel extremely sick            OBJECTIVE:         Physical Exam:  Vitals: Temp: 97.8 °F (36.6 °C) (06/06/24 1038)  Pulse: 70 (06/06/24 1038)  Resp: 18 (06/06/24 1038)  BP: 111/69 (06/06/24 1038)  SpO2: 98 % (06/06/24 1038)    Physical Exam  Vitals and nursing note reviewed.   Constitutional:       General: She is not in acute distress.     Appearance: Normal appearance. She is normal weight. She is not ill-appearing.   HENT:      Head: Normocephalic and atraumatic.      Nose: Nose normal. No congestion or rhinorrhea.      Mouth/Throat:      Mouth: Mucous membranes are moist.      Pharynx: Oropharynx is clear. No oropharyngeal exudate or posterior oropharyngeal erythema.   Eyes:      General: No scleral icterus.        Right eye: No discharge.         Left eye: No discharge.      Conjunctiva/sclera: Conjunctivae normal.      Pupils: Pupils are equal, round, and reactive to light.   Cardiovascular:      Rate and Rhythm: Normal rate and regular rhythm.      Pulses: Normal pulses.      Heart sounds: Normal heart sounds. No murmur heard.     No gallop.   Pulmonary:      Effort: Pulmonary effort is normal. No respiratory distress.      Breath sounds: Normal breath sounds. No stridor. No wheezing.   Chest:      Chest wall: No tenderness.   Abdominal:      General: Bowel sounds are normal. There is no distension.      Palpations: Abdomen is soft.      Tenderness: There is no abdominal tenderness.    Genitourinary:     Comments: Deferred  Musculoskeletal:         General: Tenderness (R lower back/R hip) present. No swelling. Normal range of motion.      Cervical back: Normal range of motion and neck supple.      Right lower leg: No edema.      Left lower leg: No edema.   Skin:     General: Skin is warm and dry.      Capillary Refill: Capillary refill takes less than 2 seconds.      Coloration: Skin is not jaundiced or pale.      Findings: No rash.   Neurological:      Mental Status: She is alert and oriented to person, place, and time.      Motor: No weakness.   Psychiatric:         Attention and Perception: Attention and perception normal.         Mood and Affect: Mood and affect normal.         Speech: Speech normal.         Behavior: Behavior normal. Behavior is cooperative.         Thought Content: Thought content normal. Thought content does not include suicidal ideation. Thought content does not include suicidal plan.         Cognition and Memory: Cognition and memory normal.         Judgment: Judgment normal.           Labs: BMP  Lab Results   Component Value Date     06/03/2024    K 3.8 06/03/2024     06/03/2024    CO2 22 (L) 06/03/2024    BUN 9 06/03/2024    CREATININE 0.9 06/03/2024    CALCIUM 9.4 06/03/2024    ANIONGAP 12 06/03/2024    EGFRNORACEVR >60 06/03/2024     Lab Results   Component Value Date    WBC 4.96 06/03/2024    HGB 12.5 06/03/2024    HCT 38.3 06/03/2024    MCV 98 06/03/2024     06/03/2024         Imaging: PET: 01/18/2024: 1. Findings consistent with treatment response with significantly decreased FDG uptake associated with the previously identified lesions. No new FDG avid lesions are identified.        I spent a total of 35 minutes on the day of the visit. This includes face to face time in discussion of goals of care, symptom assessment, coordination of care and emotional support.  This also includes non-face to face time preparing to see the patient (eg, review  of tests/imaging), obtaining and/or reviewing separately obtained history, documenting clinical information in the electronic or other health record, independently interpreting results and communicating results to the patient/family/caregiver, or care coordinator.       RAJIV BOYD NP

## 2024-06-11 ENCOUNTER — INFUSION (OUTPATIENT)
Dept: INFUSION THERAPY | Facility: HOSPITAL | Age: 64
End: 2024-06-11
Attending: INTERNAL MEDICINE
Payer: COMMERCIAL

## 2024-06-11 VITALS
DIASTOLIC BLOOD PRESSURE: 57 MMHG | HEART RATE: 65 BPM | SYSTOLIC BLOOD PRESSURE: 92 MMHG | RESPIRATION RATE: 18 BRPM | OXYGEN SATURATION: 98 % | TEMPERATURE: 98 F | BODY MASS INDEX: 21.27 KG/M2 | HEIGHT: 64 IN | WEIGHT: 124.56 LBS

## 2024-06-11 DIAGNOSIS — C90.00 MULTIPLE MYELOMA, REMISSION STATUS UNSPECIFIED: Primary | ICD-10-CM

## 2024-06-11 PROCEDURE — 96401 CHEMO ANTI-NEOPL SQ/IM: CPT

## 2024-06-11 PROCEDURE — 63600175 PHARM REV CODE 636 W HCPCS: Mod: JZ,JG | Performed by: INTERNAL MEDICINE

## 2024-06-11 RX ADMIN — DARATUMUMAB AND HYALURONIDASE-FIHJ (HUMAN RECOMBINANT) 1800 MG: 1800; 30000 INJECTION SUBCUTANEOUS at 10:06

## 2024-06-11 NOTE — DISCHARGE INSTRUCTIONS
Thank you for allowing me to care for you today,  MIS LeachN, RN    [facility]  [address] [address]  [phone] phone     [phone] fax  Hours of Operation: Monday- Friday 7:00am- 5:30pm  After hours phone  [phone]  Hematology / Oncology Physicians on call      JUANI Chaudhary Dr., Dr., Dr., Dr., Dr., Dr., Dr., Dr., Dr., Dr., Dr., Dr., Dr., Dr., Dr., BELLE Mckeon, BELLE Deluna, BELLE Page, NP  Please call with any concerns regarding your appointment today.   FALL PREVENTION   Falls often occur due to slipping, tripping or losing your balance. Here are ways to reduce your risk of falling again.   Was there anything that caused your fall that can be fixed, removed or replaced?   Make your home safe by keeping walkways clear of objects you may trip over.   Use non-slip pads under rugs.   Do not walk in poorly lit areas.   Do not stand on chairs or wobbly ladders.   Use caution when reaching overhead or looking upward. This position can cause a loss of balance.   Be sure your shoes fit properly, have non-slip bottoms and are in good condition.   Be cautious when going up and down stairs, curbs, and when walking on uneven sidewalks.   If your balance is poor, consider using a cane or walker.   If your fall was related to alcohol use, stop or limit alcohol intake.   If your fall was related to use of sleeping medicines, talk to your doctor about this. You may need to reduce your dosage at bedtime if you awaken during the night to go to the bathroom.   To reduce the need for nighttime bathroom trips:   Avoid drinking fluids for several hours before going to bed   Empty your bladder before going to bed   Men can keep a urinal at the bedside   © 1986-3888 [employer], [address] [place] [address], [place] [address]. All rights reserved. This information is not intended as a  substitute for professional medical care. Always follow your healthcare professional's instructions.

## 2024-06-11 NOTE — NURSING
1042: Darzalex Injection given without difficulties.Bandaid applied. Patient instructed to stay in the clinic for 15 minutes. Patient verbalized understanding and will notify nurse with any complaints.

## 2024-06-15 PROBLEM — F17.200 NEEDS SMOKING CESSATION EDUCATION: Status: ACTIVE | Noted: 2024-06-15

## 2024-06-15 PROBLEM — Z51.5 PALLIATIVE CARE ENCOUNTER: Status: ACTIVE | Noted: 2023-12-06

## 2024-06-15 PROBLEM — G89.3 CHRONIC NEOPLASM-RELATED PAIN: Status: ACTIVE | Noted: 2024-06-15

## 2024-06-15 NOTE — ASSESSMENT & PLAN NOTE
Likely a mixture of chronic sciatica/Pelvic lytic bone lesions/sequale of L1 compression fracture  Medical Marijuana effective, but too sedating. Prescribed by Dr. Barrios.  Pt has tried multiple pain management modalities with mixed response.   On Percocet 5mg PRN. Wishes to minimize opioids at this time due to fear of altered mental status.  Miralax 1pack/day or Senna 2 tabs at bedtime for opioid-induced constipation    Stopped Lyrica due to altered mental status

## 2024-06-15 NOTE — ASSESSMENT & PLAN NOTE
Chronic and exacerbated by MM diagnosis and family-related stress. .  Pt and I extensively discussed stress management strategies including avoiding triggers, sleep hygiene, and proper nutrition.   Continues Xanax 2mg BID PRN  Declines SSRI trial at this time.   Declines mental health counseling at this time.  UDS- Collected. Results pending.

## 2024-06-15 NOTE — ASSESSMENT & PLAN NOTE
Metastasis to thoracic/lumbar spine, complicated by L1 compression fracture.  Followed by Dr. Luevano, Dr. Garcia, and Neurosurgery.   On D-VRD, Lenalidomide and Zometa. S/P L1 Kyphoplasty (June 2023).   Pt remains hesitant about SCT due extended recovery time/fear of relapse post SCT  PET scan 1/18/2024: 1. Findings consistent with treatment response with significantly decreased FDG uptake associated with the previously identified lesions. No new FDG avid lesions are identified

## 2024-06-15 NOTE — ASSESSMENT & PLAN NOTE
-Code status: Full Code.  -HCPOA: 1.  : Lars Bonilla: [phone], 2. Son: Hakeem Garcia: [phone].   -GOC:  Continue cancer treatment, symptom management, maintain independence and functional status.  -See HPI for further details

## 2024-06-18 ENCOUNTER — INFUSION (OUTPATIENT)
Dept: INFUSION THERAPY | Facility: HOSPITAL | Age: 64
End: 2024-06-18
Attending: INTERNAL MEDICINE
Payer: COMMERCIAL

## 2024-06-18 ENCOUNTER — OFFICE VISIT (OUTPATIENT)
Dept: HEMATOLOGY/ONCOLOGY | Facility: CLINIC | Age: 64
End: 2024-06-18
Payer: COMMERCIAL

## 2024-06-18 VITALS
WEIGHT: 122.56 LBS | RESPIRATION RATE: 18 BRPM | SYSTOLIC BLOOD PRESSURE: 133 MMHG | HEART RATE: 69 BPM | BODY MASS INDEX: 20.92 KG/M2 | HEIGHT: 64 IN | DIASTOLIC BLOOD PRESSURE: 70 MMHG | OXYGEN SATURATION: 99 % | TEMPERATURE: 97 F

## 2024-06-18 DIAGNOSIS — D84.821 IMMUNODEFICIENCY DUE TO CHEMOTHERAPY: ICD-10-CM

## 2024-06-18 DIAGNOSIS — C79.51 SECONDARY MALIGNANT NEOPLASM OF BONE: ICD-10-CM

## 2024-06-18 DIAGNOSIS — T45.1X5A IMMUNODEFICIENCY DUE TO CHEMOTHERAPY: ICD-10-CM

## 2024-06-18 DIAGNOSIS — C90.00 MULTIPLE MYELOMA, REMISSION STATUS UNSPECIFIED: Primary | ICD-10-CM

## 2024-06-18 DIAGNOSIS — Z79.899 IMMUNODEFICIENCY DUE TO CHEMOTHERAPY: ICD-10-CM

## 2024-06-18 DIAGNOSIS — C90.00 MULTIPLE MYELOMA, REMISSION STATUS UNSPECIFIED: Primary | Chronic | ICD-10-CM

## 2024-06-18 PROCEDURE — 99215 OFFICE O/P EST HI 40 MIN: CPT | Mod: 95,,, | Performed by: INTERNAL MEDICINE

## 2024-06-18 PROCEDURE — 96401 CHEMO ANTI-NEOPL SQ/IM: CPT

## 2024-06-18 PROCEDURE — 63600175 PHARM REV CODE 636 W HCPCS: Mod: JZ,JG | Performed by: INTERNAL MEDICINE

## 2024-06-18 PROCEDURE — 4010F ACE/ARB THERAPY RXD/TAKEN: CPT | Mod: CPTII,95,, | Performed by: INTERNAL MEDICINE

## 2024-06-18 RX ADMIN — DARATUMUMAB AND HYALURONIDASE-FIHJ (HUMAN RECOMBINANT) 1800 MG: 1800; 30000 INJECTION SUBCUTANEOUS at 10:06

## 2024-06-18 NOTE — PLAN OF CARE
Problem: Adult Inpatient Plan of Care  Goal: Plan of Care Review  Outcome: Progressing  Flowsheets (Taken 6/18/2024 1007)  Plan of Care Reviewed With: patient  Goal: Patient-Specific Goal (Individualized)  Outcome: Progressing  Flowsheets (Taken 6/18/2024 1007)  Individualized Care Needs: FEET ELEVATED  Anxieties, Fears or Concerns: denies  Goal: Optimal Comfort and Wellbeing  Outcome: Progressing  Intervention: Monitor Pain and Promote Comfort  Flowsheets (Taken 6/18/2024 1007)  Pain Management Interventions:   relaxation techniques promoted   quiet environment facilitated   position adjusted  Intervention: Provide Person-Centered Care  Flowsheets (Taken 6/18/2024 1007)  Trust Relationship/Rapport:   care explained   choices provided   questions encouraged   reassurance provided   emotional support provided   thoughts/feelings acknowledged   empathic listening provided   questions answered     Problem: Chemotherapy Effects  Goal: Anemia Symptom Improvement  Outcome: Progressing  Intervention: Monitor and Manage Anemia  Flowsheets (Taken 6/18/2024 1007)  Safety Promotion/Fall Prevention:   assistive device/personal item within reach   Fall Risk reviewed with patient/family   in recliner, wheels locked   medications reviewed   instructed to call staff for mobility  Fatigue Management: fatigue-related activity identified  Goal: Safety Maintained  Outcome: Progressing  Intervention: Promote Safe Chemotherapy Delivery  Flowsheets (Taken 6/18/2024 1007)  Infection Prevention:   environmental surveillance performed   equipment surfaces disinfected   hand hygiene promoted   personal protective equipment utilized  Chemotherapy Environmental Safety:   chemotherapy waste containers in room   meticulous hand hygiene promoted   patient environment monitored for safety   personal protective equipment utilized  Goal: Absence of Hematuria  Outcome: Progressing  Intervention: Prevent or Manage Hemorrhagic Cystitis  Flowsheets  (Taken 6/18/2024 1007)  Pain Management Interventions:   relaxation techniques promoted   quiet environment facilitated   position adjusted  Goal: Nausea and Vomiting Relief  Outcome: Progressing  Intervention: Prevent or Manage Nausea and Vomiting  Flowsheets (Taken 6/18/2024 1007)  Environmental Support: calm environment promoted  Goal: Neurotoxicity Symptom Control  Outcome: Progressing  Goal: Absence of Infection  Outcome: Progressing  Intervention: Prevent Infection and Maximize Resistance  Flowsheets (Taken 6/18/2024 1007)  Infection Prevention:   environmental surveillance performed   equipment surfaces disinfected   hand hygiene promoted   personal protective equipment utilized  Goal: Absence of Bleeding  Outcome: Progressing  Intervention: Minimize Bleeding Risk  Flowsheets (Taken 6/18/2024 1007)  Bleeding Precautions:   blood pressure closely monitored   monitored for signs of bleeding     Problem: Fall Injury Risk  Goal: Absence of Fall and Fall-Related Injury  Outcome: Progressing  Intervention: Identify and Manage Contributors  Flowsheets (Taken 6/18/2024 1007)  Self-Care Promotion:   independence encouraged   BADL personal objects within reach  Medication Review/Management: medications reviewed  Intervention: Promote Injury-Free Environment  Flowsheets (Taken 6/18/2024 1007)  Safety Promotion/Fall Prevention:   assistive device/personal item within reach   Fall Risk reviewed with patient/family   in recliner, wheels locked   medications reviewed   instructed to call staff for mobility

## 2024-06-18 NOTE — PROGRESS NOTES
Patient ID: Ana Bonilla   Chief Complaint: Follow-up  MRN:  [id number]     Oncologic Diagnosis:  Multiple Myeloma - IgG lambda   Previous Treatment:  VRD (5/10/2023 - 6/28/2023)   Current Treatment:    D-VRD (7/6/2023 - 01/03/2024 )      Zometa (10/18/2023 - )   Subjective   Ana Bonilla is a pleasant 63 y.o. female who presents for follow up.    Overall the patient is stable.  She has had cramping in her fingers.  She has missed some potassium doses. She has been having constipation and fatigue the last 2 weeks.  She also requests refill on Revlimid and is going to start her 7 days off tomorrow.     Review of Systems   Constitutional:  Positive for fatigue. Negative for activity change, appetite change, chills, diaphoresis, fever and unexpected weight change.   HENT:  Negative for nosebleeds.    Respiratory:  Negative for shortness of breath.    Cardiovascular:  Negative for chest pain.   Gastrointestinal:  Negative for abdominal pain, blood in stool, diarrhea, nausea and vomiting.   Genitourinary:  Negative for difficulty urinating and hematuria.   Musculoskeletal:  Negative for arthralgias, back pain and myalgias.   Skin:  Negative for rash.   Neurological:  Negative for dizziness, weakness, light-headedness and headaches.   Hematological:  Does not bruise/bleed easily.   Psychiatric/Behavioral:  The patient is not nervous/anxious.      History     Oncology History   Multiple myeloma   4/20/2023 Initial Diagnosis    Multiple myeloma     5/10/2023 - 6/28/2023 Chemotherapy    Treatment Summary   Plan Name: OP VRD - WEEKLY BORTEZOMIB LENALIDOMIDE DEXAMETHASONE Q3W  Treatment Goal: Palliative  Status: Inactive  Start Date: 5/10/2023  End Date: 6/28/2023  Provider: Abram Velasquez MD  Chemotherapy: bortezomib (VELCADE) injection 2 mg, 1.3 mg/m2 = 2 mg, Subcutaneous, Clinic/[facility] 1 time, 2 of 6 cycles  Administration: 2 mg (5/10/2023), 2 mg (5/17/2023), 2 mg (6/14/2023), 2 mg (5/31/2023), 2 mg (6/21/2023),  2 mg (6/28/2023)  lenalidomide 25 mg Cap, 25 mg, Oral, Daily, 1 of 1 cycle, Start date: 5/10/2023, End date: 5/17/2023 6/28/2023 Cancer Staged    Staging form: Plasma Cell Myeloma and Plasma Cell Disorders, AJCC 8th Edition  - Clinical stage from 6/28/2023: RISS Stage II (Beta-2-microglobulin (mg/L): 1.9, Albumin (g/dL): 3, ISS: Stage II, High-risk cytogenetics: Absent, LDH: Normal)     7/6/2023 - 1/3/2024 Chemotherapy    Treatment Summary   Plan Name: OP D-VRD DARATUMUMAB + BORTEZOMIB LENALIDOMIDE DEXAMETHASONE  Treatment Goal: Palliative  Status: Inactive  Start Date: 7/6/2023  End Date: 1/3/2024  Provider: Oscar Márquez MD  Chemotherapy: bortezomib (VELCADE) injection 2 mg, 1.3 mg/m2 = 2 mg, Subcutaneous, Clinic/[facility] 1 time, 6 of 6 cycles  Administration: 2 mg (7/6/2023), 2 mg (7/12/2023), 2 mg (8/2/2023), 2 mg (8/9/2023), 2.25 mg (10/18/2023), 2 mg (7/19/2023), 2 mg (7/26/2023), 2 mg (8/16/2023), 2.25 mg (9/20/2023), 2.25 mg (9/27/2023), 2.25 mg (10/25/2023), 2.25 mg (11/1/2023), 2.25 mg (11/8/2023), 2.25 mg (12/6/2023), 2.25 mg (1/3/2024), 2.25 mg (11/15/2023), 2.25 mg (11/22/2023), 2.25 mg (11/29/2023), 2.25 mg (12/13/2023), 2.25 mg (12/20/2023), 2.25 mg (12/26/2023)  lenalidomide 10 mg Cap, 1 capsule (100 % of original dose 1 capsule), Oral, Daily, 1 of 1 cycle, Start date: 7/26/2023, End date: 8/2/2023  Dose modification: 1 capsule (original dose 1 capsule, Cycle 0)  daratumumab-hyaluronidase-fihj subcutaneous injection 1,800 mg, 1,800 mg (100 % of original dose 1,800 mg), Subcutaneous, Clinic/[facility] 1 time, 6 of 6 cycles  Dose modification: 1,800 mg (original dose 1,800 mg, Cycle 1), 1,800 mg (original dose 1,800 mg, Cycle 1)  Administration: 1,800 mg (7/6/2023), 1,800 mg (7/12/2023), 1,800 mg (7/19/2023), 1,800 mg (7/26/2023), 1,800 mg (8/2/2023), 1,800 mg (8/9/2023), 1,800 mg (8/16/2023), 1,800 mg (9/27/2023), 1,800 mg (10/18/2023), 1,800 mg (11/1/2023), 1,800 mg (11/15/2023), 1,800 mg (11/29/2023),  1,800 mg (12/13/2023), 1,800 mg (12/26/2023)     4/30/2024 -  Chemotherapy    Treatment Summary   Plan Name: OP Myeloma KIA-RD daratumumab lenalidomide dexAMETHasone Q4W  Treatment Goal: Palliative  Status: Active  Start Date: 4/30/2024  End Date: 3/4/2025 (Planned)  Provider: Bri Luevano MD  Chemotherapy: daratumumab-hyaluronidase-fij subcutaneous injection 1,800 mg, 1,800 mg, Subcutaneous, Clinic/[facility] 1 time, 2 of 12 cycles  Administration: 1,800 mg (4/30/2024), 1,800 mg (5/7/2024), 1,800 mg (5/21/2024), 1,800 mg (5/28/2024), 1,800 mg (6/4/2024), 1,800 mg (5/14/2024), 1,800 mg (6/11/2024)           MARIBETH  Ana Bonilla  63 y.o.  female with past medical history significant for hypertension, COPD, asthma, GERD, hypothyroidism here for follow-up and management of multiple myeloma     She was referred in 2/2023 by her PCP after workup for gastritis revealed lytic lesions. CT chest showed lytic and expansile destructive lesion in the sternum and lytic lesions at L1. Biopsy of L1 lesion in 3/2023 revealed mature B cell neoplasm most consistent with plasma cell neoplasm.      Bone marrow biopsy in 4/2023 demonstrated 60% plasma cells. PET/CT showed extensive bony lesions throughout the spine, sternum, bilateral ribs, pelvis, and a mild compression deformity in L1. SPEP/MECHE showed IgG M spike 3.19 g/dL, IgG 4,521 mg/dL.      She was started on VRd and then daratumumab was added by the transplant team. She was started on ASA for thrombosis prophylaxis and acyclovir for herpes zoster prophylaxis. Start Zometa after dental evaluation.     Past Medical History:   Diagnosis Date    Allergy     Amblyopia OS    Anxiety     Asthma     COPD (chronic obstructive pulmonary disease)     NO HOME o2    GERD (gastroesophageal reflux disease)     Hyperlipidemia     Hypertension     PONV (postoperative nausea and vomiting)     Thyroid disease        Past Surgical History:   Procedure Laterality Date    APPENDECTOMY      BIOPSY  N/A 2023    Procedure: BIOPSY;  Surgeon: Fausto Smith MD;  Location: [facility] OR;  Service: Neurosurgery;  Laterality: N/A;  L1    BUNIONECTOMY      COLONOSCOPY N/A 2019    Procedure: COLONOSCOPY;  Surgeon: Danny Matos III, MD;  Location: [facility] ENDO;  Service: Endoscopy;  Laterality: N/A;    COLONOSCOPY N/A 10/24/2022    Procedure: COLONOSCOPY;  Surgeon: Danny Matos III, MD;  Location: [facility] ENDO;  Service: Endoscopy;  Laterality: N/A;    ESOPHAGOGASTRODUODENOSCOPY N/A 10/24/2022    Procedure: EGD (ESOPHAGOGASTRODUODENOSCOPY);  Surgeon: Danny Matos III, MD;  Location: [facility] ENDO;  Service: Endoscopy;  Laterality: N/A;    FIXATION KYPHOPLASTY Bilateral 2023    Procedure: KYPHOPLASTY;  Surgeon: Fausto Smith MD;  Location: [facility] OR;  Service: Neurosurgery;  Laterality: Bilateral;  kyphoplasty and radiofrequency ablation - L1    HYSTERECTOMY      PARTIAL//still with ovaries    neck fusion  2017    THYROIDECTOMY      TUBAL LIGATION         Family History   Problem Relation Name Age of Onset    Hypertension Mother Vivian     Diabetes Mother Vivian     Asthma Mother Vivian             Diabetes Father      Asthma Father      Stroke Maternal Grandmother  grandmother     Prostate cancer Maternal Grandfather      Mental illness Son Lukasz Garcia     Pancreatic cancer Maternal Uncle      Mental illness Other Pat side     Pancreatic cancer Other Mat side     Ovarian cancer Maternal Cousin         Review of patient's allergies indicates:   Allergen Reactions    Hydrocodone-acetaminophen Other (See Comments)     Causes pt to feel extremely sick        Social History     Tobacco Use    Smoking status: Every Day     Current packs/day: 0.50     Average packs/day: 0.5 packs/day for 50.0 years (25.0 ttl pk-yrs)     Types: Cigarettes     Passive exposure: Never    Smokeless tobacco: Never   Substance Use Topics    Alcohol use: Not Currently     Comment: quit     Drug use: No     Labs   Labs:  No visits with results within 2 Day(s) from this visit.   Latest known visit with results is:   Lab Visit on 06/03/2024   Component Date Value Ref Range Status    Phosphorus 06/03/2024 2.7  2.7 - 4.5 mg/dL Final    Magnesium 06/03/2024 1.6  1.6 - 2.6 mg/dL Final    Sodium 06/03/2024 143  136 - 145 mmol/L Final    Potassium 06/03/2024 3.8  3.5 - 5.1 mmol/L Final    Chloride 06/03/2024 109  95 - 110 mmol/L Final    CO2 06/03/2024 22 (L)  23 - 29 mmol/L Final    Glucose 06/03/2024 103  70 - 110 mg/dL Final    BUN 06/03/2024 9  8 - 23 mg/dL Final    Creatinine 06/03/2024 0.9  0.5 - 1.4 mg/dL Final    Calcium 06/03/2024 9.4  8.7 - 10.5 mg/dL Final    Total Protein 06/03/2024 6.9  6.0 - 8.4 g/dL Final    Albumin 06/03/2024 3.7  3.5 - 5.2 g/dL Final    Total Bilirubin 06/03/2024 0.9  0.1 - 1.0 mg/dL Final    Comment: For infants and newborns, interpretation of results should be based  on gestational age, weight and in agreement with clinical  observations.    Premature Infant recommended reference ranges:  Up to 24 hours.............<8.0 mg/dL  Up to 48 hours............<12.0 mg/dL  3-5 days..................<15.0 mg/dL  6-29 days.................<15.0 mg/dL      Alkaline Phosphatase 06/03/2024 51 (L)  55 - 135 U/L Final    AST 06/03/2024 18  10 - 40 U/L Final    ALT 06/03/2024 23  10 - 44 U/L Final    eGFR 06/03/2024 >60  >60 mL/min/1.73 m^2 Final    Anion Gap 06/03/2024 12  8 - 16 mmol/L Final    WBC 06/03/2024 4.96  3.90 - 12.70 K/uL Final    RBC 06/03/2024 3.91 (L)  4.00 - 5.40 M/uL Final    Hemoglobin 06/03/2024 12.5  12.0 - 16.0 g/dL Final    Hematocrit 06/03/2024 38.3  37.0 - 48.5 % Final    MCV 06/03/2024 98  82 - 98 fL Final    MCH 06/03/2024 32.0 (H)  27.0 - 31.0 pg Final    MCHC 06/03/2024 32.6  32.0 - 36.0 g/dL Final    RDW 06/03/2024 14.6 (H)  11.5 - 14.5 % Final    Platelets 06/03/2024 310  150 - 450 K/uL Final    MPV 06/03/2024 9.6  9.2 - 12.9 fL Final    Immature Granulocytes  06/03/2024 0.2  0.0 - 0.5 % Final    Gran # (ANC) 06/03/2024 2.1  1.8 - 7.7 K/uL Final    Immature Grans (Abs) 06/03/2024 0.01  0.00 - 0.04 K/uL Final    Comment: Mild elevation in immature granulocytes is non specific and   can be seen in a variety of conditions including stress response,   acute inflammation, trauma and pregnancy. Correlation with other   laboratory and clinical findings is essential.      Lymph # 06/03/2024 1.6  1.0 - 4.8 K/uL Final    Mono # 06/03/2024 0.6  0.3 - 1.0 K/uL Final    Eos # 06/03/2024 0.6 (H)  0.0 - 0.5 K/uL Final    Baso # 06/03/2024 0.07  0.00 - 0.20 K/uL Final    nRBC 06/03/2024 0  0 /100 WBC Final    Gran % 06/03/2024 42.3  38.0 - 73.0 % Final    Lymph % 06/03/2024 31.7  18.0 - 48.0 % Final    Mono % 06/03/2024 11.7  4.0 - 15.0 % Final    Eosinophil % 06/03/2024 12.7 (H)  0.0 - 8.0 % Final    Basophil % 06/03/2024 1.4  0.0 - 1.9 % Final    Differential Method 06/03/2024 Automated   Final         Imaging/Pathology   - 06/06/2023 RIGHT ILIAC CREST BONE MARROW ASPIRATE, BONE MARROW CLOT, AND BONE MARROW CORE BIOPSY WITH:     CELLULARITY=40-60%, TRILINEAGE HEMATOPOIETIC ACTIVITY (M/E=2.9:1).   CONSISTENT WITH PLASMA CELL NEOPLASM(60%).  SEE COMMENT.   FOCAL GRADE 1 RETICULAR FIBROSIS.   CONGO RED NEGATIVE.   INCREASED STORAGE IRON.   ADEQUATE NUMBER OF MEGAKARYOCYTES.     Bone marrow karyotype results: 46, XX[20], female karyotype.     Myeloma fixed cell, high-risk, FISH:  Normal.  The result is within normal limits for 1q duplication, TP53 deletion and IGH rearrangement.         - 04/10/2023 PET: Numerous FDG avid predominately lytic osseous lesions as above.  Primary differential considerations would include multiple myeloma versus metastatic disease.  2. No FDG avid soft tissue masses or adenopathy demonstrated.  3. Mild pathologic compression deformity of the L1 vertebral body.                                          Assessment and Plan   IgG lambda Multiple myeloma, R-ISS  stage I   Diagnosed June 2023 via bone marrow biopsy  Patient treatment had been held multiple times with few treatments cancelled and also Revlimid 10 mg daily given as unable to tolerate higher dose   Previous oncologist discussed with the patient and the transplant team BMT.  However, patient declined transplant at this time and it was recommended that we continue with Kia-VRD for 6 cycles and then repeat bone marrow biopsy and PET scan. If VGPR or better, then continue with Revlimid maintenance only.  Continue prophylaxis with aspirin, acyclovir 400 mg b.i.d.  Continue Zometa/calcium and vitamin-D supplements (Due for Zometa 01/13/23)  Repeat PET-CT stable  BM Biopsy 02/13/2024 decrease in plasma cell burden  Continue follow up with myeloma team on follow up with myeloma specialist we will proceed with maintenance KIA-RD and Zometa as patient not yet ready to proceed with stem cell transplant  Continue with daratumumab and Revlimid today        Poor Appetite  R  Hip Pain  Back Pain, Left leg pain   PET-CT showed treatment response with decreased FDG uptake in the previously identified bone lesion  Obtained LLE ultrasound - negative   She has lost 10 lb in approximately 8 weeks   Once she resumes treatment and starts steroids her appetite may improve; if this does not help we will start appetite stimulant  Continue follow up with palliative care        Chronic Hypokalemia  Magnesium Normal  She did not start her daily potassium; she will start today  Was taking potassium 20 mEq daily  Advised to take potassium every other da        Chalazion Left Upper Eyelid, 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, Hordeolum externum of left eyelid  Per Ophthalmology, this is a chronic condition and is unrelated to and not a contraindication for treatment of MM  Has had kenalog injection  Continue follow-up with Ophthalmology  Continue antibiotic/cream and warm compresses PRN        Shortness of Breath,  resolved  The patient reports decreased exercise tolerance; she can not walk a long distance without becoming short of breath in his also has some chest discomfort  Recommended CTA to assess for thromboses however patient declines and prefers to be reassessed at next visit      Cancer Screening  MMG 03/17/2023:  BI-RADS 1  PAP Smear:  Partial hysterectomy  Colonoscopy 10/24/2022:  Normal repeat in 5 years        Chronic Medical Conditions  Asthma  Anxiety   COPD   GERD  Hypertension   Hyperlipidemia   Hypothyroidism   ?IBS-C on [employer] Onc Chart Routing      Follow up with physician    Follow up with WERNER 1 week. Mckeon   Infusion scheduling note   Daratumumab today and in 1 week   Injection scheduling note    Labs CBC, CMP, phosphorus and magnesium   Scheduling:  Preferred lab:  Lab interval:  labs in 1 week   Imaging    Pharmacy appointment    Other referrals               The patient was seen, interviewed and examined. Pertinent lab and radiologic studies were reviewed. Pt instructed to call should they develop concerning signs/symptoms or have further questions.        Portions of the record may have been created with voice recognition software. Occasional wrong-word or sound-a-like substitutions may have occurred due to the inherent limitations of voice recognition software. Read the chart carefully and recognize, using context, where substitutions have occurred.      Bri Michelle MD    Hematology/Oncology

## 2024-06-24 ENCOUNTER — LAB VISIT (OUTPATIENT)
Dept: LAB | Facility: HOSPITAL | Age: 64
End: 2024-06-24
Attending: INTERNAL MEDICINE
Payer: COMMERCIAL

## 2024-06-24 DIAGNOSIS — C79.51 SECONDARY MALIGNANT NEOPLASM OF BONE: ICD-10-CM

## 2024-06-24 DIAGNOSIS — C90.00 MULTIPLE MYELOMA, REMISSION STATUS UNSPECIFIED: ICD-10-CM

## 2024-06-24 DIAGNOSIS — Z51.11 ENCOUNTER FOR ANTINEOPLASTIC CHEMOTHERAPY: ICD-10-CM

## 2024-06-24 LAB
ALBUMIN SERPL BCP-MCNC: 3.3 G/DL (ref 3.5–5.2)
ALP SERPL-CCNC: 57 U/L (ref 55–135)
ALT SERPL W/O P-5'-P-CCNC: 20 U/L (ref 10–44)
ANION GAP SERPL CALC-SCNC: 9 MMOL/L (ref 8–16)
AST SERPL-CCNC: 19 U/L (ref 10–40)
BASOPHILS # BLD AUTO: 0.05 K/UL (ref 0–0.2)
BASOPHILS NFR BLD: 0.9 % (ref 0–1.9)
BILIRUB SERPL-MCNC: 0.6 MG/DL (ref 0.1–1)
BUN SERPL-MCNC: 13 MG/DL (ref 8–23)
CALCIUM SERPL-MCNC: 8.6 MG/DL (ref 8.7–10.5)
CHLORIDE SERPL-SCNC: 110 MMOL/L (ref 95–110)
CO2 SERPL-SCNC: 25 MMOL/L (ref 23–29)
CREAT SERPL-MCNC: 1.1 MG/DL (ref 0.5–1.4)
DIFFERENTIAL METHOD BLD: ABNORMAL
EOSINOPHIL # BLD AUTO: 0.6 K/UL (ref 0–0.5)
EOSINOPHIL NFR BLD: 12.1 % (ref 0–8)
ERYTHROCYTE [DISTWIDTH] IN BLOOD BY AUTOMATED COUNT: 14.1 % (ref 11.5–14.5)
EST. GFR  (NO RACE VARIABLE): 56 ML/MIN/1.73 M^2
GLUCOSE SERPL-MCNC: 107 MG/DL (ref 70–110)
HCT VFR BLD AUTO: 35.3 % (ref 37–48.5)
HGB BLD-MCNC: 11.7 G/DL (ref 12–16)
IMM GRANULOCYTES # BLD AUTO: 0.01 K/UL (ref 0–0.04)
IMM GRANULOCYTES NFR BLD AUTO: 0.2 % (ref 0–0.5)
LYMPHOCYTES # BLD AUTO: 1.7 K/UL (ref 1–4.8)
LYMPHOCYTES NFR BLD: 31.8 % (ref 18–48)
MAGNESIUM SERPL-MCNC: 1.4 MG/DL (ref 1.6–2.6)
MCH RBC QN AUTO: 32.1 PG (ref 27–31)
MCHC RBC AUTO-ENTMCNC: 33.1 G/DL (ref 32–36)
MCV RBC AUTO: 97 FL (ref 82–98)
MONOCYTES # BLD AUTO: 0.7 K/UL (ref 0.3–1)
MONOCYTES NFR BLD: 13.6 % (ref 4–15)
NEUTROPHILS # BLD AUTO: 2.2 K/UL (ref 1.8–7.7)
NEUTROPHILS NFR BLD: 41.4 % (ref 38–73)
NRBC BLD-RTO: 0 /100 WBC
PHOSPHATE SERPL-MCNC: 1.8 MG/DL (ref 2.7–4.5)
PLATELET # BLD AUTO: 331 K/UL (ref 150–450)
PMV BLD AUTO: 9.1 FL (ref 9.2–12.9)
POTASSIUM SERPL-SCNC: 2.9 MMOL/L (ref 3.5–5.1)
PROT SERPL-MCNC: 6.4 G/DL (ref 6–8.4)
RBC # BLD AUTO: 3.65 M/UL (ref 4–5.4)
SODIUM SERPL-SCNC: 144 MMOL/L (ref 136–145)
WBC # BLD AUTO: 5.29 K/UL (ref 3.9–12.7)

## 2024-06-24 PROCEDURE — 80053 COMPREHEN METABOLIC PANEL: CPT

## 2024-06-24 PROCEDURE — 84100 ASSAY OF PHOSPHORUS: CPT | Performed by: INTERNAL MEDICINE

## 2024-06-24 PROCEDURE — 83735 ASSAY OF MAGNESIUM: CPT | Performed by: INTERNAL MEDICINE

## 2024-06-24 PROCEDURE — 36415 COLL VENOUS BLD VENIPUNCTURE: CPT | Performed by: INTERNAL MEDICINE

## 2024-06-24 PROCEDURE — 85025 COMPLETE CBC W/AUTO DIFF WBC: CPT

## 2024-06-25 ENCOUNTER — INFUSION (OUTPATIENT)
Dept: INFUSION THERAPY | Facility: HOSPITAL | Age: 64
End: 2024-06-25
Attending: INTERNAL MEDICINE
Payer: COMMERCIAL

## 2024-06-25 ENCOUNTER — OFFICE VISIT (OUTPATIENT)
Dept: HEMATOLOGY/ONCOLOGY | Facility: CLINIC | Age: 64
End: 2024-06-25
Payer: COMMERCIAL

## 2024-06-25 ENCOUNTER — PATIENT MESSAGE (OUTPATIENT)
Dept: HEMATOLOGY/ONCOLOGY | Facility: CLINIC | Age: 64
End: 2024-06-25

## 2024-06-25 VITALS
TEMPERATURE: 98 F | SYSTOLIC BLOOD PRESSURE: 113 MMHG | HEART RATE: 83 BPM | DIASTOLIC BLOOD PRESSURE: 67 MMHG | HEIGHT: 64 IN | WEIGHT: 128.5 LBS | BODY MASS INDEX: 21.94 KG/M2

## 2024-06-25 VITALS
OXYGEN SATURATION: 99 % | WEIGHT: 128.5 LBS | DIASTOLIC BLOOD PRESSURE: 74 MMHG | HEIGHT: 64 IN | BODY MASS INDEX: 21.94 KG/M2 | TEMPERATURE: 98 F | SYSTOLIC BLOOD PRESSURE: 118 MMHG | RESPIRATION RATE: 18 BRPM | HEART RATE: 80 BPM

## 2024-06-25 DIAGNOSIS — Z51.11 ENCOUNTER FOR ANTINEOPLASTIC CHEMOTHERAPY: ICD-10-CM

## 2024-06-25 DIAGNOSIS — Z79.899 IMMUNODEFICIENCY DUE TO CHEMOTHERAPY: ICD-10-CM

## 2024-06-25 DIAGNOSIS — E87.6 HYPOKALEMIA: ICD-10-CM

## 2024-06-25 DIAGNOSIS — C90.00 MULTIPLE MYELOMA, REMISSION STATUS UNSPECIFIED: ICD-10-CM

## 2024-06-25 DIAGNOSIS — E86.0 DEHYDRATION: ICD-10-CM

## 2024-06-25 DIAGNOSIS — D84.821 IMMUNODEFICIENCY DUE TO CHEMOTHERAPY: ICD-10-CM

## 2024-06-25 DIAGNOSIS — C90.00 MULTIPLE MYELOMA NOT HAVING ACHIEVED REMISSION: ICD-10-CM

## 2024-06-25 DIAGNOSIS — T45.1X5A IMMUNODEFICIENCY DUE TO CHEMOTHERAPY: ICD-10-CM

## 2024-06-25 DIAGNOSIS — E83.39 HYPOPHOSPHATEMIA: Primary | ICD-10-CM

## 2024-06-25 DIAGNOSIS — C79.51 SECONDARY MALIGNANT NEOPLASM OF BONE: ICD-10-CM

## 2024-06-25 DIAGNOSIS — C90.00 MULTIPLE MYELOMA, REMISSION STATUS UNSPECIFIED: Primary | ICD-10-CM

## 2024-06-25 PROCEDURE — 96365 THER/PROPH/DIAG IV INF INIT: CPT

## 2024-06-25 PROCEDURE — 99999 PR PBB SHADOW E&M-EST. PATIENT-LVL V: CPT | Mod: PBBFAC,,,

## 2024-06-25 PROCEDURE — 3078F DIAST BP <80 MM HG: CPT | Mod: CPTII,S$GLB,,

## 2024-06-25 PROCEDURE — 96366 THER/PROPH/DIAG IV INF ADDON: CPT

## 2024-06-25 PROCEDURE — 3074F SYST BP LT 130 MM HG: CPT | Mod: CPTII,S$GLB,,

## 2024-06-25 PROCEDURE — 3008F BODY MASS INDEX DOCD: CPT | Mod: CPTII,S$GLB,,

## 2024-06-25 PROCEDURE — 96401 CHEMO ANTI-NEOPL SQ/IM: CPT

## 2024-06-25 PROCEDURE — 63600175 PHARM REV CODE 636 W HCPCS

## 2024-06-25 PROCEDURE — 25000003 PHARM REV CODE 250

## 2024-06-25 PROCEDURE — G2211 COMPLEX E/M VISIT ADD ON: HCPCS | Mod: S$GLB,,,

## 2024-06-25 PROCEDURE — 4010F ACE/ARB THERAPY RXD/TAKEN: CPT | Mod: CPTII,S$GLB,,

## 2024-06-25 PROCEDURE — 99215 OFFICE O/P EST HI 40 MIN: CPT | Mod: S$GLB,,,

## 2024-06-25 RX ORDER — SODIUM CHLORIDE 0.9 % (FLUSH) 0.9 %
10 SYRINGE (ML) INJECTION
OUTPATIENT
Start: 2024-06-25

## 2024-06-25 RX ORDER — HEPARIN 100 UNIT/ML
500 SYRINGE INTRAVENOUS
Status: CANCELLED | OUTPATIENT
Start: 2024-06-25

## 2024-06-25 RX ORDER — SODIUM CHLORIDE AND POTASSIUM CHLORIDE 150; 900 MG/100ML; MG/100ML
INJECTION, SOLUTION INTRAVENOUS CONTINUOUS
Status: CANCELLED
Start: 2024-06-25

## 2024-06-25 RX ORDER — LENALIDOMIDE 10 MG/1
CAPSULE ORAL
Qty: 21 EACH | Refills: 7 | Status: SHIPPED | OUTPATIENT
Start: 2024-06-25

## 2024-06-25 RX ORDER — SODIUM CHLORIDE AND POTASSIUM CHLORIDE 150; 900 MG/100ML; MG/100ML
INJECTION, SOLUTION INTRAVENOUS CONTINUOUS
Status: DISCONTINUED | OUTPATIENT
Start: 2024-06-25 | End: 2024-06-25

## 2024-06-25 RX ORDER — DIPHENHYDRAMINE HYDROCHLORIDE 50 MG/ML
50 INJECTION INTRAMUSCULAR; INTRAVENOUS ONCE AS NEEDED
Status: CANCELLED | OUTPATIENT
Start: 2024-06-25

## 2024-06-25 RX ORDER — PROCHLORPERAZINE EDISYLATE 5 MG/ML
10 INJECTION INTRAMUSCULAR; INTRAVENOUS ONCE AS NEEDED
Status: CANCELLED | OUTPATIENT
Start: 2024-06-25

## 2024-06-25 RX ORDER — MAGNESIUM SULFATE HEPTAHYDRATE 40 MG/ML
2 INJECTION, SOLUTION INTRAVENOUS
Status: CANCELLED
Start: 2024-06-25

## 2024-06-25 RX ORDER — EPINEPHRINE 0.3 MG/.3ML
0.3 INJECTION SUBCUTANEOUS ONCE AS NEEDED
Status: CANCELLED | OUTPATIENT
Start: 2024-06-25

## 2024-06-25 RX ORDER — MAGNESIUM SULFATE HEPTAHYDRATE 40 MG/ML
2 INJECTION, SOLUTION INTRAVENOUS
Status: DISCONTINUED | OUTPATIENT
Start: 2024-06-25 | End: 2024-06-25

## 2024-06-25 RX ORDER — SODIUM CHLORIDE 0.9 % (FLUSH) 0.9 %
10 SYRINGE (ML) INJECTION
Status: CANCELLED | OUTPATIENT
Start: 2024-06-25

## 2024-06-25 RX ORDER — HEPARIN 100 UNIT/ML
500 SYRINGE INTRAVENOUS
OUTPATIENT
Start: 2024-06-25

## 2024-06-25 RX ORDER — SODIUM,POTASSIUM PHOSPHATES 280-250MG
1 POWDER IN PACKET (EA) ORAL ONCE
Qty: 1 PACKET | Refills: 0 | Status: SHIPPED | OUTPATIENT
Start: 2024-06-25 | End: 2024-06-25

## 2024-06-25 RX ADMIN — DARATUMUMAB AND HYALURONIDASE-FIHJ (HUMAN RECOMBINANT) 1800 MG: 1800; 30000 INJECTION SUBCUTANEOUS at 11:06

## 2024-06-25 RX ADMIN — MAGNESIUM SULFATE HEPTAHYDRATE 500 ML/HR: 500 INJECTION, SOLUTION INTRAMUSCULAR; INTRAVENOUS at 11:06

## 2024-06-25 NOTE — NURSING
1105: Darzalex Injection given without difficulties.Bandaid applied. Patient instructed to stay in the clinic for 15 minutes. Patient verbalized understanding and will notify nurse with any complaints.

## 2024-06-25 NOTE — DISCHARGE INSTRUCTIONS
Thank you for allowing me to care for you today,  MIS LeachN, RN    [facility]  [address] [address]  [phone] phone     [phone] fax  Hours of Operation: Monday- Friday 7:00am- 5:30pm  After hours phone  [phone]  Hematology / Oncology Physicians on call      JUANI Chaudhary Dr., Dr., Dr., Dr., Dr., Dr., Dr., Dr., Dr., Dr., Dr., Dr., Dr., Dr., Dr., BELLE Mckeon, BELLE Deluna, BELLE Page, NP  Please call with any concerns regarding your appointment today.   FALL PREVENTION   Falls often occur due to slipping, tripping or losing your balance. Here are ways to reduce your risk of falling again.   Was there anything that caused your fall that can be fixed, removed or replaced?   Make your home safe by keeping walkways clear of objects you may trip over.   Use non-slip pads under rugs.   Do not walk in poorly lit areas.   Do not stand on chairs or wobbly ladders.   Use caution when reaching overhead or looking upward. This position can cause a loss of balance.   Be sure your shoes fit properly, have non-slip bottoms and are in good condition.   Be cautious when going up and down stairs, curbs, and when walking on uneven sidewalks.   If your balance is poor, consider using a cane or walker.   If your fall was related to alcohol use, stop or limit alcohol intake.   If your fall was related to use of sleeping medicines, talk to your doctor about this. You may need to reduce your dosage at bedtime if you awaken during the night to go to the bathroom.   To reduce the need for nighttime bathroom trips:   Avoid drinking fluids for several hours before going to bed   Empty your bladder before going to bed   Men can keep a urinal at the bedside   © 5785-0380 [employer], [address] [place] [address], [place] [address]. All rights reserved. This information is not intended as a  substitute for professional medical care. Always follow your healthcare professional's instructions.

## 2024-06-25 NOTE — PROGRESS NOTES
Subjective:     Patient ID:?Ana Bonilla is a 63 y.o. female.?? MR#: [id number]   ?   PRIMARY ONCOLOGIST:   ?   CHIEF COMPLAINT: lab review/assessment for chemo/MM ?????   ?   ONCOLOGIC DIAGNOSIS: Multiple Myeloma  ?   CURRENT TREATMENT: OP Myeloma KIA-RD daratumumab lenalidomide dexAMETHasone Q4W     HPI  Ms. Bonilla is a 62-year-old  female with past medical history significant for hypertension, COPD, asthma, GERD, hypothyroidism here for follow-up and management of multiple myeloma. BMBx on 4/6/2023 showed 60 % plasma cells. PET-CT on 4/10/2023 showed extensive lytic bony lesions throughout the spine, sternum, bilateral ribs, pelvis and mild compression deformity in L1 vertebral body. SPEP/MECHE on 3/27/2023 showed IgG lambda monoclonal protein - 3.19 g/dl. Quantitative immunoglobulins on 3/27/2023 showed IgG 4,521 mg/dl. UPEP/MECHE and FLC were pending at that time. Labs on 3/27/2023 showed Beta 2 microglobulin 1.9, .  Labs on 4/19/2023 showed Hb, 11.5, WBC 6.3, platelets, BUN 11, Cr 0.9, calcium 9. Pt initiated on VRD 05/10/23. Treatment planned changed to D-VRD 07/06/23.     Interval History: Pt presents for lab review and assessment prior to Darzalex.  Pt states overall she is feeling okay. Denies n/v/d/c, fever, chills, sob, cp, abnormal bleeding. She notes appetite waxes and wanes; denies difficulty with hydration.      Oncology History   Multiple myeloma   4/20/2023 Initial Diagnosis    Multiple myeloma     5/10/2023 - 6/28/2023 Chemotherapy    Treatment Summary   Plan Name: OP VRD - WEEKLY BORTEZOMIB LENALIDOMIDE DEXAMETHASONE Q3W  Treatment Goal: Palliative  Status: Inactive  Start Date: 5/10/2023  End Date: 6/28/2023  Provider: Abram Velasquez MD  Chemotherapy: bortezomib (VELCADE) injection 2 mg, 1.3 mg/m2 = 2 mg, Subcutaneous, [facility], 2 of 6 cycles  Administration: 2 mg (5/10/2023), 2 mg (5/17/2023), 2 mg (6/14/2023), 2 mg (5/31/2023), 2 mg (6/21/2023), 2 mg  (6/28/2023)  lenalidomide 25 mg Cap, 25 mg, Oral, Daily, 1 of 1 cycle, Start date: 5/10/2023, End date: 5/17/2023 6/28/2023 Cancer Staged    Staging form: Plasma Cell Myeloma and Plasma Cell Disorders, AJCC 8th Edition  - Clinical stage from 6/28/2023: RISS Stage II (Beta-2-microglobulin (mg/L): 1.9, Albumin (g/dL): 3, ISS: Stage II, High-risk cytogenetics: Absent, LDH: Normal)     7/6/2023 - 1/3/2024 Chemotherapy    Treatment Summary   Plan Name: OP D-VRD DARATUMUMAB + BORTEZOMIB LENALIDOMIDE DEXAMETHASONE  Treatment Goal: Palliative  Status: Inactive  Start Date: 7/6/2023  End Date: 1/3/2024  Provider: Oscar Márquez MD  Chemotherapy: bortezomib (VELCADE) injection 2 mg, 1.3 mg/m2 = 2 mg, Subcutaneous, Clinic/[facility] 1 time, 6 of 6 cycles  Administration: 2 mg (7/6/2023), 2 mg (7/12/2023), 2 mg (8/2/2023), 2 mg (8/9/2023), 2.25 mg (10/18/2023), 2 mg (7/19/2023), 2 mg (7/26/2023), 2 mg (8/16/2023), 2.25 mg (9/20/2023), 2.25 mg (9/27/2023), 2.25 mg (10/25/2023), 2.25 mg (11/1/2023), 2.25 mg (11/8/2023), 2.25 mg (12/6/2023), 2.25 mg (1/3/2024), 2.25 mg (11/15/2023), 2.25 mg (11/22/2023), 2.25 mg (11/29/2023), 2.25 mg (12/13/2023), 2.25 mg (12/20/2023), 2.25 mg (12/26/2023)  lenalidomide 10 mg Cap, 1 capsule (100 % of original dose 1 capsule), Oral, Daily, 1 of 1 cycle, Start date: 7/26/2023, End date: 8/2/2023  Dose modification: 1 capsule (original dose 1 capsule, Cycle 0)  daratumumab-hyaluronidase-fihj subcutaneous injection 1,800 mg, 1,800 mg (100 % of original dose 1,800 mg), Subcutaneous, Clinic/[facility] 1 time, 6 of 6 cycles  Dose modification: 1,800 mg (original dose 1,800 mg, Cycle 1), 1,800 mg (original dose 1,800 mg, Cycle 1)  Administration: 1,800 mg (7/6/2023), 1,800 mg (7/12/2023), 1,800 mg (7/19/2023), 1,800 mg (7/26/2023), 1,800 mg (8/2/2023), 1,800 mg (8/9/2023), 1,800 mg (8/16/2023), 1,800 mg (9/27/2023), 1,800 mg (10/18/2023), 1,800 mg (11/1/2023), 1,800 mg (11/15/2023), 1,800 mg (11/29/2023), 1,800  mg (12/13/2023), 1,800 mg (12/26/2023)     4/30/2024 -  Chemotherapy    Treatment Summary   Plan Name: OP Myeloma KIA-RD daratumumab lenalidomide dexAMETHasone Q4W  Treatment Goal: Palliative  Status: Active  Start Date: 4/30/2024  End Date: 3/4/2025 (Planned)  Provider: Bri Luevano MD  Chemotherapy: daratumumab-hyaluronidase-fi subcutaneous injection 1,800 mg, 1,800 mg, Subcutaneous, Clinic/[facility] 1 time, 3 of 12 cycles  Administration: 1,800 mg (4/30/2024), 1,800 mg (5/7/2024), 1,800 mg (5/21/2024), 1,800 mg (5/28/2024), 1,800 mg (6/4/2024), 1,800 mg (6/25/2024), 1,800 mg (7/9/2024), 1,800 mg (5/14/2024), 1,800 mg (6/11/2024), 1,800 mg (6/18/2024)        Social History     Socioeconomic History    Marital status:     Number of children: 2   Occupational History     Employer: [employer]   Tobacco Use    Smoking status: Every Day     Current packs/day: 0.50     Average packs/day: 0.5 packs/day for 50.0 years (25.0 ttl pk-yrs)     Types: Cigarettes     Passive exposure: Never    Smokeless tobacco: Never   Substance and Sexual Activity    Alcohol use: Not Currently     Comment: quit    Drug use: No    Sexual activity: Not Currently     Partners: Male     Social Determinants of Health     Financial Resource Strain: Low Risk  (11/15/2023)    Overall Financial Resource Strain (CARDIA)     Difficulty of Paying Living Expenses: Not hard at all   Food Insecurity: No Food Insecurity (11/15/2023)    Hunger Vital Sign     Worried About Running Out of Food in the Last Year: Never true     Ran Out of Food in the Last Year: Never true   Transportation Needs: No Transportation Needs (11/15/2023)    PRAPARE - Transportation     Lack of Transportation (Medical): No     Lack of Transportation (Non-Medical): No   Physical Activity: Sufficiently Active (11/15/2023)    Exercise Vital Sign     Days of Exercise per Week: 7 days     Minutes of Exercise per Session: 150+ min   Stress: Stress Concern Present (11/15/2023)    [place]  [place] [employer] - Occupational Stress Questionnaire     Feeling of Stress : To some extent   Housing Stability: Low Risk  (11/15/2023)    Housing Stability Vital Sign     Unable to Pay for Housing in the Last Year: No     Number of Places Lived in the Last Year: 1     Unstable Housing in the Last Year: No      Family History   Problem Relation Name Age of Onset    Hypertension Mother Vivian     Diabetes Mother Vivian     Asthma Mother Vivian             Diabetes Father      Asthma Father      Stroke Maternal Grandmother  grandmother     Prostate cancer Maternal Grandfather      Mental illness Son Lukasz Garcia     Pancreatic cancer Maternal Uncle      Mental illness Other Pat side     Pancreatic cancer Other Mat side     Ovarian cancer Maternal Cousin        Past Surgical History:   Procedure Laterality Date    APPENDECTOMY      BIOPSY N/A 2023    Procedure: BIOPSY;  Surgeon: Fausto Smith MD;  Location: [facility] OR;  Service: Neurosurgery;  Laterality: N/A;  L1    BUNIONECTOMY      COLONOSCOPY N/A 2019    Procedure: COLONOSCOPY;  Surgeon: Danny Matos III, MD;  Location: [facility] ENDO;  Service: Endoscopy;  Laterality: N/A;    COLONOSCOPY N/A 10/24/2022    Procedure: COLONOSCOPY;  Surgeon: Danny Matos III, MD;  Location: [facility];  Service: Endoscopy;  Laterality: N/A;    ESOPHAGOGASTRODUODENOSCOPY N/A 10/24/2022    Procedure: EGD (ESOPHAGOGASTRODUODENOSCOPY);  Surgeon: Danny Matos III, MD;  Location: [facility];  Service: Endoscopy;  Laterality: N/A;    FIXATION KYPHOPLASTY Bilateral 2023    Procedure: KYPHOPLASTY;  Surgeon: Fausto Smith MD;  Location: [facility] OR;  Service: Neurosurgery;  Laterality: Bilateral;  kyphoplasty and radiofrequency ablation - L1    HYSTERECTOMY      PARTIAL//still with ovaries    neck fusion  2017    THYROIDECTOMY      TUBAL LIGATION          Review of Systems   Constitutional:  Positive for fatigue.  Negative for activity change, appetite change, chills, fever and unexpected weight change.   HENT:  Negative for congestion, dental problem, mouth sores and nosebleeds.    Eyes:  Negative for visual disturbance.   Respiratory:  Negative for cough, choking and chest tightness.    Cardiovascular:  Negative for chest pain, palpitations and leg swelling.   Gastrointestinal:  Negative for abdominal distention, abdominal pain, anal bleeding, blood in stool, constipation, diarrhea, nausea and vomiting.   Endocrine: Negative.    Genitourinary:  Negative for dysuria, frequency, hematuria and urgency.   Musculoskeletal:  Positive for arthralgias and myalgias. Negative for back pain, gait problem and joint swelling.   Skin:  Negative for rash and wound.   Allergic/Immunologic: Negative for immunocompromised state.   Neurological:  Negative for dizziness, light-headedness, numbness and headaches.   Hematological:  Negative for adenopathy. Does not bruise/bleed easily.   Psychiatric/Behavioral:  The patient is nervous/anxious.        ?   A comprehensive 14-point review of systems was reviewed with patient and was negative other than as specified above.   ?     Objective:      Physical Exam  Vitals reviewed.   Constitutional:       Appearance: Normal appearance. She is not ill-appearing.   HENT:      Head: Normocephalic and atraumatic.      Right Ear: External ear normal.      Left Ear: External ear normal.   Eyes:      Conjunctiva/sclera: Conjunctivae normal.   Cardiovascular:      Rate and Rhythm: Normal rate.   Pulmonary:      Effort: Pulmonary effort is normal.   Abdominal:      General: Abdomen is flat.   Genitourinary:     Comments: deferred  Musculoskeletal:         General: Normal range of motion.      Cervical back: Normal range of motion.      Right lower leg: No edema.      Left lower leg: No edema.   Skin:     General: Skin is warm and dry.      Capillary Refill: Capillary refill takes less than 2 seconds.       Coloration: Skin is not mottled.   Neurological:      Mental Status: She is alert and oriented to person, place, and time.      Motor: No weakness.           ?   Vitals:    06/25/24 0839   BP: 113/67   Pulse: 83   Temp: 97.9 °F (36.6 °C)      ?       ?   Laboratory:  ?   No visits with results within 1 Day(s) from this visit.   Latest known visit with results is:   Lab Visit on 06/24/2024   Component Date Value Ref Range Status    Phosphorus 06/24/2024 1.8 (L)  2.7 - 4.5 mg/dL Final    Magnesium 06/24/2024 1.4 (L)  1.6 - 2.6 mg/dL Final    WBC 06/24/2024 5.29  3.90 - 12.70 K/uL Final    RBC 06/24/2024 3.65 (L)  4.00 - 5.40 M/uL Final    Hemoglobin 06/24/2024 11.7 (L)  12.0 - 16.0 g/dL Final    Hematocrit 06/24/2024 35.3 (L)  37.0 - 48.5 % Final    MCV 06/24/2024 97  82 - 98 fL Final    MCH 06/24/2024 32.1 (H)  27.0 - 31.0 pg Final    MCHC 06/24/2024 33.1  32.0 - 36.0 g/dL Final    RDW 06/24/2024 14.1  11.5 - 14.5 % Final    Platelets 06/24/2024 331  150 - 450 K/uL Final    MPV 06/24/2024 9.1 (L)  9.2 - 12.9 fL Final    Immature Granulocytes 06/24/2024 0.2  0.0 - 0.5 % Final    Gran # (ANC) 06/24/2024 2.2  1.8 - 7.7 K/uL Final    Immature Grans (Abs) 06/24/2024 0.01  0.00 - 0.04 K/uL Final    Lymph # 06/24/2024 1.7  1.0 - 4.8 K/uL Final    Mono # 06/24/2024 0.7  0.3 - 1.0 K/uL Final    Eos # 06/24/2024 0.6 (H)  0.0 - 0.5 K/uL Final    Baso # 06/24/2024 0.05  0.00 - 0.20 K/uL Final    nRBC 06/24/2024 0  0 /100 WBC Final    Gran % 06/24/2024 41.4  38.0 - 73.0 % Final    Lymph % 06/24/2024 31.8  18.0 - 48.0 % Final    Mono % 06/24/2024 13.6  4.0 - 15.0 % Final    Eosinophil % 06/24/2024 12.1 (H)  0.0 - 8.0 % Final    Basophil % 06/24/2024 0.9  0.0 - 1.9 % Final    Differential Method 06/24/2024 Automated   Final    Sodium 06/24/2024 144  136 - 145 mmol/L Final    Potassium 06/24/2024 2.9 (L)  3.5 - 5.1 mmol/L Final    Chloride 06/24/2024 110  95 - 110 mmol/L Final    CO2 06/24/2024 25  23 - 29 mmol/L Final    Glucose  06/24/2024 107  70 - 110 mg/dL Final    BUN 06/24/2024 13  8 - 23 mg/dL Final    Creatinine 06/24/2024 1.1  0.5 - 1.4 mg/dL Final    Calcium 06/24/2024 8.6 (L)  8.7 - 10.5 mg/dL Final    Total Protein 06/24/2024 6.4  6.0 - 8.4 g/dL Final    Albumin 06/24/2024 3.3 (L)  3.5 - 5.2 g/dL Final    Total Bilirubin 06/24/2024 0.6  0.1 - 1.0 mg/dL Final    Alkaline Phosphatase 06/24/2024 57  55 - 135 U/L Final    AST 06/24/2024 19  10 - 40 U/L Final    ALT 06/24/2024 20  10 - 44 U/L Final    eGFR 06/24/2024 56 (A)  >60 mL/min/1.73 m^2 Final    Anion Gap 06/24/2024 9  8 - 16 mmol/L Final      ?   Imaging:    No results found. However, due to the size of the patient record, not all encounters were searched. Please check Results Review for a complete set of results.       No results found. However, due to the size of the patient record, not all encounters were searched. Please check Results Review for a complete set of results.           ?   Assessment/Plan:     Problem List Items Addressed This Visit          Renal/    Hypokalemia     Low potassium and magnesium  Plan for IV repletion today  Advised pt restart on daily K and Mag supplementation            Immunology/Multi System    Immunodeficiency due to chemotherapy    Relevant Orders    CBC Auto Differential (Completed)    Comprehensive Metabolic Panel (Completed)       Oncology    Multiple myeloma (Chronic)     BMBx : 60 % plasma cells - 4/6/2023  - SPEP/MECHE showed IgG lambda - monoclonal protein 3.19 g/dl - (3/27/2023).  - R-ISS stage I (beta 2 microglobulin 1.9, albumin 3.5, , normal karyotype and no high-risk mutations on fish panel  - PET-CT ( 4/10/2023) - showed extensive lytic lesions in the axial skeleton and mild L1 compression deformity.  - Started with Induction chemotherapy with VRD regimen (Velcde/Revlimid/Decadron) - 05/10/2023   - Started Aspirin daily p.o for thrombosis prophylaxis; Acyclovir 400 mg p.o BID for herpes Zoster prophylaxis  .  __________________________________________________  --S/p Kyphoplasty 06/08/23    Pt seen by transplant team BMT.  However, she has declined transplant at this time and it was recommended that we continue with Rosa-VRD for 6 cycles and then repeat bone marrow biopsy and PET scan. If VGPR or better, then continue with Revlimid maintenance only.    Labs reviewed, no concerning cytopenias  --Ok to proceed with Darzalex and zometa today    F/u in two weeks with labs prior for darzalex              Relevant Orders    CBC Auto Differential (Completed)    Comprehensive Metabolic Panel (Completed)    Secondary malignant neoplasm of bone    Relevant Orders    CBC Auto Differential (Completed)    Comprehensive Metabolic Panel (Completed)     Other Visit Diagnoses       Hypophosphatemia    -  Primary    Encounter for antineoplastic chemotherapy        Relevant Orders    CBC Auto Differential (Completed)    Comprehensive Metabolic Panel (Completed)                   Med Onc Chart Routing      Follow up with physician 2 weeks. with repeat labs for darzalex/zometa   Follow up with WERNER    Infusion scheduling note   darzalex/zometa   Injection scheduling note    Labs CBC, CMP, magnesium and phosphorus   Scheduling:  Preferred lab:  Lab interval:     Imaging    Pharmacy appointment    Other referrals                     TONYA Pool  Hematology/Oncology

## 2024-07-03 ENCOUNTER — OFFICE VISIT (OUTPATIENT)
Dept: FAMILY MEDICINE | Facility: CLINIC | Age: 64
End: 2024-07-03
Payer: COMMERCIAL

## 2024-07-03 VITALS
OXYGEN SATURATION: 97 % | RESPIRATION RATE: 18 BRPM | TEMPERATURE: 97 F | HEIGHT: 64 IN | BODY MASS INDEX: 21.64 KG/M2 | HEART RATE: 96 BPM | SYSTOLIC BLOOD PRESSURE: 100 MMHG | DIASTOLIC BLOOD PRESSURE: 68 MMHG | WEIGHT: 126.75 LBS

## 2024-07-03 DIAGNOSIS — R05.9 COUGH, UNSPECIFIED TYPE: ICD-10-CM

## 2024-07-03 DIAGNOSIS — R11.0 NAUSEA: Primary | ICD-10-CM

## 2024-07-03 DIAGNOSIS — K59.09 CHRONIC CONSTIPATION: ICD-10-CM

## 2024-07-03 DIAGNOSIS — J30.89 NON-SEASONAL ALLERGIC RHINITIS, UNSPECIFIED TRIGGER: ICD-10-CM

## 2024-07-03 DIAGNOSIS — J40 BRONCHITIS: ICD-10-CM

## 2024-07-03 PROCEDURE — 99999 PR PBB SHADOW E&M-EST. PATIENT-LVL V: CPT | Mod: PBBFAC,,, | Performed by: NURSE PRACTITIONER

## 2024-07-03 RX ORDER — ONDANSETRON 4 MG/1
4 TABLET, FILM COATED ORAL EVERY 6 HOURS PRN
Qty: 30 TABLET | Refills: 5 | Status: SHIPPED | OUTPATIENT
Start: 2024-07-03

## 2024-07-03 RX ORDER — FLUTICASONE PROPIONATE 50 MCG
1 SPRAY, SUSPENSION (ML) NASAL DAILY
Qty: 1 ML | Refills: 1 | Status: SHIPPED | OUTPATIENT
Start: 2024-07-03

## 2024-07-03 RX ORDER — LEVOCETIRIZINE DIHYDROCHLORIDE 5 MG/1
5 TABLET, FILM COATED ORAL NIGHTLY
Qty: 30 TABLET | Refills: 11 | Status: SHIPPED | OUTPATIENT
Start: 2024-07-03 | End: 2025-07-03

## 2024-07-03 RX ORDER — AZITHROMYCIN 250 MG/1
TABLET, FILM COATED ORAL
Qty: 6 TABLET | Refills: 0 | Status: SHIPPED | OUTPATIENT
Start: 2024-07-03

## 2024-07-03 RX ORDER — DEXAMETHASONE SODIUM PHOSPHATE 100 MG/10ML
10 INJECTION INTRAMUSCULAR; INTRAVENOUS
Status: COMPLETED | OUTPATIENT
Start: 2024-07-03 | End: 2024-07-03

## 2024-07-03 RX ORDER — PROMETHAZINE HYDROCHLORIDE AND DEXTROMETHORPHAN HYDROBROMIDE 6.25; 15 MG/5ML; MG/5ML
10 SYRUP ORAL EVERY 6 HOURS PRN
Qty: 240 ML | Refills: 0 | Status: SHIPPED | OUTPATIENT
Start: 2024-07-03 | End: 2024-07-13

## 2024-07-03 RX ADMIN — DEXAMETHASONE SODIUM PHOSPHATE 10 MG: 100 INJECTION INTRAMUSCULAR; INTRAVENOUS at 01:07

## 2024-07-03 NOTE — PROGRESS NOTES
Ana GONZALEZ Kelly  07/05/2024  [id number]    No, Primary Doctor  Patient Care Team:  No, Primary Doctor as PCP - General  Abbie Amor RN (Inactive) as Oncology Navigator  Elle Collins LCSW as  (Hematology and Oncology)  Arabella Ferrer LMSW as  (Hematology and Oncology)  Cristina Peters PA-C as Physician Assistant (Internal Medicine)  Bri Luevano MD as Consulting Physician (Hematology and Oncology)  Patricia Martinez LMSW as  (Hematology and Oncology)          Visit Type:an urgent visit for a new problem    Chief Complaint:  Chief Complaint   Patient presents with    Nasal Congestion    Headache       History of Present Illness:    62 yo female presents with co cough, throat pain, sinus congestion for days.   OTC saline, benadryl, Claritin with no relief.  Pt denies fever, chills, sweats, CP, SOB, NVD, abdominal pain, fatigue, headache, body aches, loss of taste or smell.     History:  Past Medical History:   Diagnosis Date    Allergy     Amblyopia OS    Anxiety     Asthma     COPD (chronic obstructive pulmonary disease)     NO HOME o2    GERD (gastroesophageal reflux disease)     Hyperlipidemia     Hypertension     PONV (postoperative nausea and vomiting)     Thyroid disease      Past Surgical History:   Procedure Laterality Date    APPENDECTOMY      BIOPSY N/A 06/08/2023    Procedure: BIOPSY;  Surgeon: Fausto Smith MD;  Location: [facility] OR;  Service: Neurosurgery;  Laterality: N/A;  L1    BUNIONECTOMY      COLONOSCOPY N/A 12/04/2019    Procedure: COLONOSCOPY;  Surgeon: Danny Matos III, MD;  Location: [facility] ENDO;  Service: Endoscopy;  Laterality: N/A;    COLONOSCOPY N/A 10/24/2022    Procedure: COLONOSCOPY;  Surgeon: Danny Matos III, MD;  Location: [facility] ENDO;  Service: Endoscopy;  Laterality: N/A;    ESOPHAGOGASTRODUODENOSCOPY N/A 10/24/2022    Procedure: EGD (ESOPHAGOGASTRODUODENOSCOPY);  Surgeon: Danny Matos III, MD;  Location: [facility]  ENDO;  Service: Endoscopy;  Laterality: N/A;    FIXATION KYPHOPLASTY Bilateral 2023    Procedure: KYPHOPLASTY;  Surgeon: Fausto Smith MD;  Location: [facility];  Service: Neurosurgery;  Laterality: Bilateral;  kyphoplasty and radiofrequency ablation - L1    HYSTERECTOMY      PARTIAL//still with ovaries    neck fusion  2017    THYROIDECTOMY      TUBAL LIGATION       Family History   Problem Relation Name Age of Onset    Hypertension Mother Vivian     Diabetes Mother Vivian     Asthma Mother Vivian             Diabetes Father      Asthma Father      Stroke Maternal Grandmother  grandmother     Prostate cancer Maternal Grandfather      Mental illness Son Lukasz Garcia     Pancreatic cancer Maternal Uncle      Mental illness Other Pat side     Pancreatic cancer Other Mat side     Ovarian cancer Maternal Cousin       Social History     Socioeconomic History    Marital status:     Number of children: 2   Occupational History     Employer: [employer]   Tobacco Use    Smoking status: Every Day     Current packs/day: 0.50     Average packs/day: 0.5 packs/day for 50.0 years (25.0 ttl pk-yrs)     Types: Cigarettes     Passive exposure: Never    Smokeless tobacco: Never   Substance and Sexual Activity    Alcohol use: Not Currently     Comment: quit    Drug use: No    Sexual activity: Not Currently     Partners: Male     Social Determinants of Health     Financial Resource Strain: Low Risk  (11/15/2023)    Overall Financial Resource Strain (CARDIA)     Difficulty of Paying Living Expenses: Not hard at all   Food Insecurity: No Food Insecurity (11/15/2023)    Hunger Vital Sign     Worried About Running Out of Food in the Last Year: Never true     Ran Out of Food in the Last Year: Never true   Transportation Needs: No Transportation Needs (11/15/2023)    PRAPARE - Transportation     Lack of Transportation (Medical): No     Lack of Transportation (Non-Medical): No   Physical Activity:  Sufficiently Active (11/15/2023)    Exercise Vital Sign     Days of Exercise per Week: 7 days     Minutes of Exercise per Session: 150+ min   Stress: Stress Concern Present (11/15/2023)    [employer] - Occupational Stress Questionnaire     Feeling of Stress : To some extent   Housing Stability: Low Risk  (11/15/2023)    Housing Stability Vital Sign     Unable to Pay for Housing in the Last Year: No     Number of Places Lived in the Last Year: 1     Unstable Housing in the Last Year: No     Patient Active Problem List   Diagnosis    COPD with asthma    GERD (gastroesophageal reflux disease)    Acquired hypothyroidism    Tobacco use    PVD (peripheral vascular disease)    Anxiety    Dyslipidemia    Claudication    Essential hypertension    Allergic rhinitis    Pain in both lower extremities    Anisometropic amblyopia of left eye    Nonrheumatic aortic valve insufficiency    Colon polyps    Precordial pain    Tachycardia    Pain of right lower extremity    Difficulty walking    Low back pain, unspecified    Multiple myeloma    Compressed spine fracture    Dehydration    Immunodeficiency due to chemotherapy    Secondary malignant neoplasm of bone    Hypokalemia    Palliative care encounter    Chalazion left upper eyelid    Hypocalcemia    Sacroiliitis    Poor appetite    Chronic neoplasm-related pain    Needs smoking cessation education     Review of patient's allergies indicates:   Allergen Reactions    Hydrocodone-acetaminophen Other (See Comments)     Causes pt to feel extremely sick        The following were reviewed at this visit: active problem list, medication list, allergies, family history, social history, and health maintenance.    Medications:  Current Outpatient Medications on File Prior to Visit   Medication Sig Dispense Refill    acyclovir (ZOVIRAX) 400 MG tablet Take 1 tablet (400 mg total) by mouth 2 (two) times daily. 60 tablet 11    albuterol (PROVENTIL HFA) 90  mcg/actuation inhaler Inhale 2 puffs into the lungs every 6 (six) hours as needed for Wheezing. Rescue 18 g 0    ALPRAZolam (XANAX) 2 MG Tab Take 1 tablet (2 mg total) by mouth 2 (two) times daily as needed (Anxiety). TAKE 1 TABLET BY MOUTH TWICE DAILY AS NEEDED FOR ANXIETY 60 tablet 0    amlodipine-benazepril 2.5-10 mg (LOTREL) 2.5-10 mg per capsule TAKE 1 CAPSULE BY MOUTH EVERY DAY 90 capsule 3    aspirin 81 MG Chew Take 1 tablet (81 mg total) by mouth once daily. 30 tablet 11    calcium-vitamin D 600 mg-10 mcg (400 unit) Tab Take 1 tablet by mouth every 12 (twelve) hours. 60 tablet 2    dexAMETHasone (DECADRON) 4 MG Tab Take 10 tablets (40 mg total) by mouth every 7 days. Take with food. 40 tablet 11    erythromycin (ROMYCIN) ophthalmic ointment Apply ointment to lids and lashes on both eyes 2 times daily for 7 days. 2.5 g 0    fluticasone-salmeterol diskus inhaler 250-50 mcg Inhale 1 puff into the lungs 2 (two) times daily. Controller 180 each 1    hydrOXYzine HCL (ATARAX) 25 MG tablet Take 1 tablet (25 mg total) by mouth 3 (three) times daily as needed for Itching. 21 tablet 0    ipratropium (ATROVENT) 21 mcg (0.03 %) nasal spray 2 sprays by Each Nostril route 3 (three) times daily.      lenalidomide 10 mg Cap TAKE 1 CAPSULE ONCE DAILY FOR 21 DAYS AND THEN 7 DAYS OFF. 21 each 7    levothyroxine (SYNTHROID) 100 MCG tablet TAKE 1 TABLET(100 MCG) BY MOUTH BEFORE BREAKFAST 90 tablet 1    magnesium oxide (MAGOX) 400 mg (241.3 mg magnesium) tablet Take 1 tablet (400 mg total) by mouth once daily. 90 tablet 1    metoprolol succinate (TOPROL-XL) 50 MG 24 hr tablet Take 1 tablet (50 mg total) by mouth every evening. 90 tablet 3    montelukast (SINGULAIR) 10 mg tablet TAKE 1 TABLET(10 MG) BY MOUTH EVERY EVENING 90 tablet 0    neomycin-polymyxin-dexamethasone (DEXACINE) 3.5 mg/g-10,000 unit/g-0.1 % Oint Place into both eyes 3 (three) times daily. 3.5 g 0    nicotine (NICODERM CQ) 14 mg/24 hr Place 1 patch onto the skin  once daily. 14 patch 0    ondansetron (ZOFRAN-ODT) 4 MG TbDL Take 1 tablet (4 mg total) by mouth every 24 hours as needed (nausea). 30 tablet 0    pantoprazole (PROTONIX) 40 MG tablet TAKE 1 TABLET(40 MG) BY MOUTH EVERY DAY 90 tablet 3    potassium chloride SA (K-DUR,KLOR-CON) 20 MEQ tablet Take 1 tablet (20 mEq total) by mouth once daily. 30 tablet 11    pregabalin (LYRICA) 50 MG capsule Take 1 capsule (50 mg total) by mouth nightly. 30 capsule 0    promethazine (PHENERGAN) 25 MG tablet Take 1 tablet (25 mg total) by mouth every 4 (four) hours. 45 tablet 2    rosuvastatin (CRESTOR) 10 MG tablet Take 1 tablet (10 mg total) by mouth every evening. 90 tablet 3    traZODone (DESYREL) 50 MG tablet Take 1 tablet (50 mg total) by mouth every evening. 30 tablet 11    methylPREDNISolone (MEDROL DOSEPACK) 4 mg tablet use as directed (Patient not taking: Reported on 7/3/2024) 1 each 0     Current Facility-Administered Medications on File Prior to Visit   Medication Dose Route Frequency Provider Last Rate Last Admin    dexAMETHasone injection 40 mg  40 mg Intravenous 1 time in Clinic/HOD Bri Luevano MD        lactated ringers infusion   Intravenous Continuous Danny Matos III, MD           Medications have been reviewed and reconciled with patient at this visit.  Barriers to medications reviewed with patient.    Adverse reactions to current medications reviewed with patient..    Over the counter medications reviewed and reconciled with patient.    Exam:  Wt Readings from Last 3 Encounters:   07/03/24 57.5 kg (126 lb 12.2 oz)   06/25/24 58.3 kg (128 lb 8.5 oz)   06/25/24 58.3 kg (128 lb 8.5 oz)     Temp Readings from Last 3 Encounters:   07/03/24 97.4 °F (36.3 °C) (Tympanic)   06/25/24 97.8 °F (36.6 °C)   06/25/24 97.9 °F (36.6 °C) (Temporal)     BP Readings from Last 3 Encounters:   07/03/24 100/68   06/25/24 118/74   06/25/24 113/67     Pulse Readings from Last 3 Encounters:   07/03/24 96   06/25/24 80   06/25/24  83     Body mass index is 21.76 kg/m².      Review of Systems   Constitutional:  Negative for chills, fever and malaise/fatigue.   HENT:  Positive for congestion and sinus pain. Negative for ear pain and sore throat.    Respiratory:  Positive for cough and sputum production. Negative for shortness of breath and wheezing.    Neurological:  Negative for headaches.     Physical Exam  Vitals and nursing note reviewed.   Constitutional:       Appearance: Normal appearance.   HENT:      Head: Normocephalic and atraumatic.      Right Ear: Tympanic membrane, ear canal and external ear normal.      Left Ear: Tympanic membrane, ear canal and external ear normal.      Nose: Congestion and rhinorrhea present.      Mouth/Throat:      Mouth: Mucous membranes are moist.      Pharynx: Oropharynx is clear.   Eyes:      Extraocular Movements: Extraocular movements intact.      Conjunctiva/sclera: Conjunctivae normal.      Pupils: Pupils are equal, round, and reactive to light.   Cardiovascular:      Rate and Rhythm: Normal rate and regular rhythm.      Pulses: Normal pulses.      Heart sounds: Normal heart sounds.   Pulmonary:      Effort: Pulmonary effort is normal. No respiratory distress.      Breath sounds: No wheezing.   Abdominal:      General: Bowel sounds are normal.      Palpations: Abdomen is soft.   Musculoskeletal:         General: Normal range of motion.      Cervical back: Normal range of motion.   Skin:     General: Skin is warm.      Capillary Refill: Capillary refill takes less than 2 seconds.   Neurological:      General: No focal deficit present.      Mental Status: She is alert and oriented to person, place, and time.   Psychiatric:         Mood and Affect: Mood normal.         Behavior: Behavior normal.         Thought Content: Thought content normal.         Judgment: Judgment normal.         Laboratory Reviewed ({Yes)  Lab Results   Component Value Date    WBC 5.29 06/24/2024    HGB 11.7 (L) 06/24/2024    HCT  35.3 (L) 06/24/2024     06/24/2024    CHOL 176 08/25/2022    TRIG 47 08/25/2022    HDL 74 08/25/2022    ALT 20 06/24/2024    AST 19 06/24/2024     06/24/2024    K 2.9 (L) 06/24/2024     06/24/2024    CREATININE 1.1 06/24/2024    BUN 13 06/24/2024    CO2 25 06/24/2024    TSH 1.067 11/21/2023    INR 0.9 02/19/2024    HGBA1C 5.7 (H) 01/10/2023       Ana was seen today for nasal congestion and headache.    Diagnoses and all orders for this visit:    Nausea  -     ondansetron (ZOFRAN) 4 MG tablet; Take 1 tablet (4 mg total) by mouth every 6 (six) hours as needed for Nausea.    Non-seasonal allergic rhinitis, unspecified trigger  -     azithromycin (Z-JESIKA) 250 MG tablet; Take as directed  -     dexAMETHasone injection 10 mg  -     levocetirizine (XYZAL) 5 MG tablet; Take 1 tablet (5 mg total) by mouth every evening.    Chronic constipation  -     linaCLOtide (LINZESS) 72 mcg Cap capsule; Take 2 capsules (144 mcg total) by mouth before breakfast.    Cough, unspecified type  -     promethazine-dextromethorphan (PROMETHAZINE-DM) 6.25-15 mg/5 mL Syrp; Take 10 mLs by mouth every 6 (six) hours as needed.  -     fluticasone propionate (FLONASE) 50 mcg/actuation nasal spray; 1 spray (50 mcg total) by Each Nostril route once daily.    Bronchitis  -     fluticasone propionate (FLONASE) 50 mcg/actuation nasal spray; 1 spray (50 mcg total) by Each Nostril route once daily.      Plan   IM Decadron  Start medications prescribed today   Medication refills           Care Plan/Goals: Reviewed    Goals    None     Ana was seen today for nasal congestion and headache.    Diagnoses and all orders for this visit:    Nausea  -     ondansetron (ZOFRAN) 4 MG tablet; Take 1 tablet (4 mg total) by mouth every 6 (six) hours as needed for Nausea.    Non-seasonal allergic rhinitis, unspecified trigger  -     azithromycin (Z-JESIKA) 250 MG tablet; Take as directed  -     dexAMETHasone injection 10 mg  -     levocetirizine (XYZAL) 5  MG tablet; Take 1 tablet (5 mg total) by mouth every evening.    Chronic constipation  -     linaCLOtide (LINZESS) 72 mcg Cap capsule; Take 2 capsules (144 mcg total) by mouth before breakfast.    Cough, unspecified type  -     promethazine-dextromethorphan (PROMETHAZINE-DM) 6.25-15 mg/5 mL Syrp; Take 10 mLs by mouth every 6 (six) hours as needed.  -     fluticasone propionate (FLONASE) 50 mcg/actuation nasal spray; 1 spray (50 mcg total) by Each Nostril route once daily.    Bronchitis  -     fluticasone propionate (FLONASE) 50 mcg/actuation nasal spray; 1 spray (50 mcg total) by Each Nostril route once daily.         Follow up: Follow up for with  for 6 month follow up.    After visit summary was printed and given to patient upon discharge today.  Patient goals and care plan are included in After Visit Summary.

## 2024-07-08 ENCOUNTER — LAB VISIT (OUTPATIENT)
Dept: LAB | Facility: HOSPITAL | Age: 64
End: 2024-07-08
Attending: INTERNAL MEDICINE
Payer: COMMERCIAL

## 2024-07-08 DIAGNOSIS — Z79.899 IMMUNODEFICIENCY DUE TO CHEMOTHERAPY: ICD-10-CM

## 2024-07-08 DIAGNOSIS — Z51.11 ENCOUNTER FOR ANTINEOPLASTIC CHEMOTHERAPY: ICD-10-CM

## 2024-07-08 DIAGNOSIS — C79.51 SECONDARY MALIGNANT NEOPLASM OF BONE: ICD-10-CM

## 2024-07-08 DIAGNOSIS — D84.821 IMMUNODEFICIENCY DUE TO CHEMOTHERAPY: ICD-10-CM

## 2024-07-08 DIAGNOSIS — T45.1X5A IMMUNODEFICIENCY DUE TO CHEMOTHERAPY: ICD-10-CM

## 2024-07-08 DIAGNOSIS — C90.00 MULTIPLE MYELOMA, REMISSION STATUS UNSPECIFIED: Primary | ICD-10-CM

## 2024-07-08 DIAGNOSIS — C90.00 MULTIPLE MYELOMA, REMISSION STATUS UNSPECIFIED: ICD-10-CM

## 2024-07-08 DIAGNOSIS — F41.9 ANXIETY: ICD-10-CM

## 2024-07-08 LAB
ALBUMIN SERPL BCP-MCNC: 3.3 G/DL (ref 3.5–5.2)
ALP SERPL-CCNC: 59 U/L (ref 55–135)
ALT SERPL W/O P-5'-P-CCNC: 21 U/L (ref 10–44)
ANION GAP SERPL CALC-SCNC: 9 MMOL/L (ref 8–16)
AST SERPL-CCNC: 20 U/L (ref 10–40)
BASOPHILS # BLD AUTO: 0.05 K/UL (ref 0–0.2)
BASOPHILS NFR BLD: 1 % (ref 0–1.9)
BILIRUB SERPL-MCNC: 0.7 MG/DL (ref 0.1–1)
BUN SERPL-MCNC: 16 MG/DL (ref 8–23)
CALCIUM SERPL-MCNC: 8.6 MG/DL (ref 8.7–10.5)
CHLORIDE SERPL-SCNC: 111 MMOL/L (ref 95–110)
CO2 SERPL-SCNC: 22 MMOL/L (ref 23–29)
CREAT SERPL-MCNC: 1 MG/DL (ref 0.5–1.4)
DIFFERENTIAL METHOD BLD: ABNORMAL
EOSINOPHIL # BLD AUTO: 0.5 K/UL (ref 0–0.5)
EOSINOPHIL NFR BLD: 9.3 % (ref 0–8)
ERYTHROCYTE [DISTWIDTH] IN BLOOD BY AUTOMATED COUNT: 14 % (ref 11.5–14.5)
EST. GFR  (NO RACE VARIABLE): >60 ML/MIN/1.73 M^2
GLUCOSE SERPL-MCNC: 100 MG/DL (ref 70–110)
HCT VFR BLD AUTO: 36.4 % (ref 37–48.5)
HGB BLD-MCNC: 11.8 G/DL (ref 12–16)
IMM GRANULOCYTES # BLD AUTO: 0.03 K/UL (ref 0–0.04)
IMM GRANULOCYTES NFR BLD AUTO: 0.6 % (ref 0–0.5)
LYMPHOCYTES # BLD AUTO: 1.7 K/UL (ref 1–4.8)
LYMPHOCYTES NFR BLD: 31.6 % (ref 18–48)
MAGNESIUM SERPL-MCNC: 1.6 MG/DL (ref 1.6–2.6)
MCH RBC QN AUTO: 32.2 PG (ref 27–31)
MCHC RBC AUTO-ENTMCNC: 32.4 G/DL (ref 32–36)
MCV RBC AUTO: 99 FL (ref 82–98)
MONOCYTES # BLD AUTO: 1 K/UL (ref 0.3–1)
MONOCYTES NFR BLD: 18.3 % (ref 4–15)
NEUTROPHILS # BLD AUTO: 2.1 K/UL (ref 1.8–7.7)
NEUTROPHILS NFR BLD: 39.2 % (ref 38–73)
NRBC BLD-RTO: 0 /100 WBC
PHOSPHATE SERPL-MCNC: 2.5 MG/DL (ref 2.7–4.5)
PLATELET # BLD AUTO: 240 K/UL (ref 150–450)
PMV BLD AUTO: 9.8 FL (ref 9.2–12.9)
POTASSIUM SERPL-SCNC: 3.6 MMOL/L (ref 3.5–5.1)
PROT SERPL-MCNC: 6.5 G/DL (ref 6–8.4)
RBC # BLD AUTO: 3.67 M/UL (ref 4–5.4)
SODIUM SERPL-SCNC: 142 MMOL/L (ref 136–145)
WBC # BLD AUTO: 5.26 K/UL (ref 3.9–12.7)

## 2024-07-08 PROCEDURE — 80053 COMPREHEN METABOLIC PANEL: CPT

## 2024-07-08 PROCEDURE — 36415 COLL VENOUS BLD VENIPUNCTURE: CPT | Performed by: INTERNAL MEDICINE

## 2024-07-08 PROCEDURE — 85025 COMPLETE CBC W/AUTO DIFF WBC: CPT

## 2024-07-08 PROCEDURE — 83735 ASSAY OF MAGNESIUM: CPT | Performed by: INTERNAL MEDICINE

## 2024-07-08 PROCEDURE — 84100 ASSAY OF PHOSPHORUS: CPT | Performed by: INTERNAL MEDICINE

## 2024-07-08 RX ORDER — ALPRAZOLAM 2 MG/1
2 TABLET ORAL 2 TIMES DAILY PRN
Qty: 60 TABLET | Refills: 0 | Status: SHIPPED | OUTPATIENT
Start: 2024-07-08 | End: 2024-08-07

## 2024-07-09 ENCOUNTER — INFUSION (OUTPATIENT)
Dept: INFUSION THERAPY | Facility: HOSPITAL | Age: 64
End: 2024-07-09
Attending: INTERNAL MEDICINE
Payer: COMMERCIAL

## 2024-07-09 ENCOUNTER — OFFICE VISIT (OUTPATIENT)
Dept: HEMATOLOGY/ONCOLOGY | Facility: CLINIC | Age: 64
End: 2024-07-09
Payer: COMMERCIAL

## 2024-07-09 VITALS
HEART RATE: 72 BPM | SYSTOLIC BLOOD PRESSURE: 128 MMHG | RESPIRATION RATE: 18 BRPM | TEMPERATURE: 98 F | BODY MASS INDEX: 21.86 KG/M2 | OXYGEN SATURATION: 98 % | HEIGHT: 64 IN | DIASTOLIC BLOOD PRESSURE: 71 MMHG | WEIGHT: 128.06 LBS

## 2024-07-09 VITALS
DIASTOLIC BLOOD PRESSURE: 62 MMHG | RESPIRATION RATE: 18 BRPM | HEART RATE: 76 BPM | TEMPERATURE: 97 F | SYSTOLIC BLOOD PRESSURE: 110 MMHG | OXYGEN SATURATION: 99 %

## 2024-07-09 DIAGNOSIS — D84.821 IMMUNODEFICIENCY DUE TO CHEMOTHERAPY: ICD-10-CM

## 2024-07-09 DIAGNOSIS — E83.51 HYPOCALCEMIA: ICD-10-CM

## 2024-07-09 DIAGNOSIS — C90.00 MULTIPLE MYELOMA, REMISSION STATUS UNSPECIFIED: Primary | ICD-10-CM

## 2024-07-09 DIAGNOSIS — C90.00 MULTIPLE MYELOMA, REMISSION STATUS UNSPECIFIED: ICD-10-CM

## 2024-07-09 DIAGNOSIS — C90.00 MULTIPLE MYELOMA NOT HAVING ACHIEVED REMISSION: Primary | ICD-10-CM

## 2024-07-09 DIAGNOSIS — T45.1X5A IMMUNODEFICIENCY DUE TO CHEMOTHERAPY: ICD-10-CM

## 2024-07-09 DIAGNOSIS — Z79.899 IMMUNODEFICIENCY DUE TO CHEMOTHERAPY: ICD-10-CM

## 2024-07-09 DIAGNOSIS — E87.6 HYPOKALEMIA: ICD-10-CM

## 2024-07-09 PROCEDURE — 25000003 PHARM REV CODE 250: Performed by: INTERNAL MEDICINE

## 2024-07-09 PROCEDURE — 96365 THER/PROPH/DIAG IV INF INIT: CPT

## 2024-07-09 PROCEDURE — 96401 CHEMO ANTI-NEOPL SQ/IM: CPT

## 2024-07-09 PROCEDURE — 63600175 PHARM REV CODE 636 W HCPCS: Mod: JZ,JG | Performed by: INTERNAL MEDICINE

## 2024-07-09 PROCEDURE — 99999 PR PBB SHADOW E&M-EST. PATIENT-LVL V: CPT | Mod: PBBFAC,,, | Performed by: INTERNAL MEDICINE

## 2024-07-09 PROCEDURE — 4010F ACE/ARB THERAPY RXD/TAKEN: CPT | Mod: CPTII,S$GLB,, | Performed by: INTERNAL MEDICINE

## 2024-07-09 PROCEDURE — 3008F BODY MASS INDEX DOCD: CPT | Mod: CPTII,S$GLB,, | Performed by: INTERNAL MEDICINE

## 2024-07-09 PROCEDURE — 99215 OFFICE O/P EST HI 40 MIN: CPT | Mod: S$GLB,,, | Performed by: INTERNAL MEDICINE

## 2024-07-09 PROCEDURE — 3074F SYST BP LT 130 MM HG: CPT | Mod: CPTII,S$GLB,, | Performed by: INTERNAL MEDICINE

## 2024-07-09 PROCEDURE — 3078F DIAST BP <80 MM HG: CPT | Mod: CPTII,S$GLB,, | Performed by: INTERNAL MEDICINE

## 2024-07-09 RX ORDER — SODIUM CHLORIDE 0.9 % (FLUSH) 0.9 %
10 SYRINGE (ML) INJECTION
Status: CANCELLED | OUTPATIENT
Start: 2024-07-09

## 2024-07-09 RX ORDER — HEPARIN 100 UNIT/ML
500 SYRINGE INTRAVENOUS
Status: CANCELLED | OUTPATIENT
Start: 2024-07-09

## 2024-07-09 RX ORDER — EPINEPHRINE 0.3 MG/.3ML
0.3 INJECTION SUBCUTANEOUS ONCE AS NEEDED
Status: CANCELLED | OUTPATIENT
Start: 2024-07-09

## 2024-07-09 RX ORDER — DIPHENHYDRAMINE HYDROCHLORIDE 50 MG/ML
50 INJECTION INTRAMUSCULAR; INTRAVENOUS ONCE AS NEEDED
Status: CANCELLED | OUTPATIENT
Start: 2024-07-09

## 2024-07-09 RX ORDER — PROCHLORPERAZINE EDISYLATE 5 MG/ML
10 INJECTION INTRAMUSCULAR; INTRAVENOUS ONCE AS NEEDED
Status: CANCELLED | OUTPATIENT
Start: 2024-07-09

## 2024-07-09 RX ADMIN — DARATUMUMAB AND HYALURONIDASE-FIHJ (HUMAN RECOMBINANT) 1800 MG: 1800; 30000 INJECTION SUBCUTANEOUS at 02:07

## 2024-07-09 RX ADMIN — ZOLEDRONIC ACID 3.3 MG: 4 INJECTION INTRAVENOUS at 02:07

## 2024-07-09 NOTE — NURSING
1435: Darzalex Injection given without difficulties.Bandaid applied. Patient instructed to stay in the clinic for 15 minutes. Patient verbalized understanding and pt chose not to wait.

## 2024-07-09 NOTE — PROGRESS NOTES
Patient ID: Ana Bonilla   Chief Complaint: Follow-up  MRN:  [id number]     Oncologic Diagnosis:  Multiple Myeloma - IgG lambda   Previous Treatment:  VRD (5/10/2023 - 6/28/2023)   Current Treatment:    D-VRD (7/6/2023 - 01/03/2024 )      Zometa (10/18/2023 - )   Subjective   Ana Bonilla is a pleasant 63 y.o. female who presents for follow up.    Overall the patient is stable.  Her muscle cramping is improved.  She is taking her vitamin supplements. Labs reviewed and electrolytes improved.     Review of Systems   Constitutional:  Positive for fatigue. Negative for activity change, appetite change, chills, diaphoresis, fever and unexpected weight change.   HENT:  Positive for sinus pressure. Negative for nosebleeds.    Respiratory:  Negative for shortness of breath.    Cardiovascular:  Negative for chest pain.   Gastrointestinal:  Negative for abdominal pain, blood in stool, diarrhea, nausea and vomiting.   Genitourinary:  Negative for difficulty urinating and hematuria.   Musculoskeletal:  Negative for arthralgias, back pain and myalgias.   Skin:  Negative for rash.   Neurological:  Negative for dizziness, weakness, light-headedness and headaches.   Hematological:  Does not bruise/bleed easily.   Psychiatric/Behavioral:  The patient is not nervous/anxious.      History     Oncology History   Multiple myeloma   4/20/2023 Initial Diagnosis    Multiple myeloma     5/10/2023 - 6/28/2023 Chemotherapy    Treatment Summary   Plan Name: OP VRD - WEEKLY BORTEZOMIB LENALIDOMIDE DEXAMETHASONE Q3W  Treatment Goal: Palliative  Status: Inactive  Start Date: 5/10/2023  End Date: 6/28/2023  Provider: Abram Velasquez MD  Chemotherapy: bortezomib (VELCADE) injection 2 mg, 1.3 mg/m2 = 2 mg, Subcutaneous, Clinic/[facility] 1 time, 2 of 6 cycles  Administration: 2 mg (5/10/2023), 2 mg (5/17/2023), 2 mg (6/14/2023), 2 mg (5/31/2023), 2 mg (6/21/2023), 2 mg (6/28/2023)  lenalidomide 25 mg Cap, 25 mg, Oral, Daily, 1 of 1 cycle,  Start date: 5/10/2023, End date: 5/17/2023 6/28/2023 Cancer Staged    Staging form: Plasma Cell Myeloma and Plasma Cell Disorders, AJCC 8th Edition  - Clinical stage from 6/28/2023: RISS Stage II (Beta-2-microglobulin (mg/L): 1.9, Albumin (g/dL): 3, ISS: Stage II, High-risk cytogenetics: Absent, LDH: Normal)     7/6/2023 - 1/3/2024 Chemotherapy    Treatment Summary   Plan Name: OP D-VRD DARATUMUMAB + BORTEZOMIB LENALIDOMIDE DEXAMETHASONE  Treatment Goal: Palliative  Status: Inactive  Start Date: 7/6/2023  End Date: 1/3/2024  Provider: Oscar Márquez MD  Chemotherapy: bortezomib (VELCADE) injection 2 mg, 1.3 mg/m2 = 2 mg, Subcutaneous, [facility] 1 time, 6 of 6 cycles  Administration: 2 mg (7/6/2023), 2 mg (7/12/2023), 2 mg (8/2/2023), 2 mg (8/9/2023), 2.25 mg (10/18/2023), 2 mg (7/19/2023), 2 mg (7/26/2023), 2 mg (8/16/2023), 2.25 mg (9/20/2023), 2.25 mg (9/27/2023), 2.25 mg (10/25/2023), 2.25 mg (11/1/2023), 2.25 mg (11/8/2023), 2.25 mg (12/6/2023), 2.25 mg (1/3/2024), 2.25 mg (11/15/2023), 2.25 mg (11/22/2023), 2.25 mg (11/29/2023), 2.25 mg (12/13/2023), 2.25 mg (12/20/2023), 2.25 mg (12/26/2023)  lenalidomide 10 mg Cap, 1 capsule (100 % of original dose 1 capsule), Oral, Daily, 1 of 1 cycle, Start date: 7/26/2023, End date: 8/2/2023  Dose modification: 1 capsule (original dose 1 capsule, Cycle 0)  daratumumab-hyaluronidase-fij subcutaneous injection 1,800 mg, 1,800 mg (100 % of original dose 1,800 mg), Subcutaneous, Clinic/[facility] 1 time, 6 of 6 cycles  Dose modification: 1,800 mg (original dose 1,800 mg, Cycle 1), 1,800 mg (original dose 1,800 mg, Cycle 1)  Administration: 1,800 mg (7/6/2023), 1,800 mg (7/12/2023), 1,800 mg (7/19/2023), 1,800 mg (7/26/2023), 1,800 mg (8/2/2023), 1,800 mg (8/9/2023), 1,800 mg (8/16/2023), 1,800 mg (9/27/2023), 1,800 mg (10/18/2023), 1,800 mg (11/1/2023), 1,800 mg (11/15/2023), 1,800 mg (11/29/2023), 1,800 mg (12/13/2023), 1,800 mg (12/26/2023)     4/30/2024 -  Chemotherapy     Treatment Summary   Plan Name: OP Myeloma KIA-RD daratumumab lenalidomide dexAMETHasone Q4W  Treatment Goal: Palliative  Status: Active  Start Date: 4/30/2024  End Date: 3/4/2025 (Planned)  Provider: Bri Luevano MD  Chemotherapy: daratumumab-hyaluronidase-fihj subcutaneous injection 1,800 mg, 1,800 mg, Subcutaneous, [facility] [place] 1 time, 3 of 12 cycles  Administration: 1,800 mg (4/30/2024), 1,800 mg (5/7/2024), 1,800 mg (5/21/2024), 1,800 mg (5/28/2024), 1,800 mg (6/4/2024), 1,800 mg (5/14/2024), 1,800 mg (6/11/2024), 1,800 mg (6/18/2024)           MARIBETH  Ana Bonilla  63 y.o.  female with past medical history significant for hypertension, COPD, asthma, GERD, hypothyroidism here for follow-up and management of multiple myeloma     She was referred in 2/2023 by her PCP after workup for gastritis revealed lytic lesions. CT chest showed lytic and expansile destructive lesion in the sternum and lytic lesions at L1. Biopsy of L1 lesion in 3/2023 revealed mature B cell neoplasm most consistent with plasma cell neoplasm.      Bone marrow biopsy in 4/2023 demonstrated 60% plasma cells. PET/CT showed extensive bony lesions throughout the spine, sternum, bilateral ribs, pelvis, and a mild compression deformity in L1. SPEP/MECHE showed IgG M spike 3.19 g/dL, IgG 4,521 mg/dL.      She was started on VRd and then daratumumab was added by the transplant team. She was started on ASA for thrombosis prophylaxis and acyclovir for herpes zoster prophylaxis. Start Zometa after dental evaluation.     Past Medical History:   Diagnosis Date    Allergy     Amblyopia OS    Anxiety     Asthma     COPD (chronic obstructive pulmonary disease)     NO HOME o2    GERD (gastroesophageal reflux disease)     Hyperlipidemia     Hypertension     PONV (postoperative nausea and vomiting)     Thyroid disease        Past Surgical History:   Procedure Laterality Date    APPENDECTOMY      BIOPSY N/A 06/08/2023    Procedure: BIOPSY;  Surgeon: Fausto LAUREN  MD Sarah;  Location: [facility] OR;  Service: Neurosurgery;  Laterality: N/A;  L1    BUNIONECTOMY      COLONOSCOPY N/A 2019    Procedure: COLONOSCOPY;  Surgeon: Danny Matos III, MD;  Location: [facility] ENDO;  Service: Endoscopy;  Laterality: N/A;    COLONOSCOPY N/A 10/24/2022    Procedure: COLONOSCOPY;  Surgeon: Danny Matos III, MD;  Location: [facility] ENDO;  Service: Endoscopy;  Laterality: N/A;    ESOPHAGOGASTRODUODENOSCOPY N/A 10/24/2022    Procedure: EGD (ESOPHAGOGASTRODUODENOSCOPY);  Surgeon: Danny Matos III, MD;  Location: [facility] ENDO;  Service: Endoscopy;  Laterality: N/A;    FIXATION KYPHOPLASTY Bilateral 2023    Procedure: KYPHOPLASTY;  Surgeon: Fausto Smith MD;  Location: [facility] OR;  Service: Neurosurgery;  Laterality: Bilateral;  kyphoplasty and radiofrequency ablation - L1    HYSTERECTOMY      PARTIAL//still with ovaries    neck fusion  2017    THYROIDECTOMY      TUBAL LIGATION         Family History   Problem Relation Name Age of Onset    Hypertension Mother Vivian     Diabetes Mother Vivian     Asthma Mother Vivian             Diabetes Father      Asthma Father      Stroke Maternal Grandmother  grandmother     Prostate cancer Maternal Grandfather      Mental illness Son Lukasz Garcia     Pancreatic cancer Maternal Uncle      Mental illness Other Pat side     Pancreatic cancer Other Mat side     Ovarian cancer Maternal Cousin         Review of patient's allergies indicates:   Allergen Reactions    Hydrocodone-acetaminophen Other (See Comments)     Causes pt to feel extremely sick        Social History     Tobacco Use    Smoking status: Every Day     Current packs/day: 0.50     Average packs/day: 0.5 packs/day for 50.0 years (25.0 ttl pk-yrs)     Types: Cigarettes     Passive exposure: Never    Smokeless tobacco: Never   Substance Use Topics    Alcohol use: Not Currently     Comment: quit    Drug use: No     Physical Exam     ECOG SCORE    0 - Fully  active-able to carry on all pre-disease performance without restriction       Vitals:    07/09/24 1306   BP: 128/71   Pulse: 72   Resp: 18   Temp: 97.6 °F (36.4 °C)     Physical Exam  Constitutional:       General: She is not in acute distress.     Appearance: She is normal weight. She is not ill-appearing, toxic-appearing or diaphoretic.   HENT:      Head: Normocephalic.   Eyes:      Extraocular Movements: Extraocular movements intact.      Conjunctiva/sclera: Conjunctivae normal.   Cardiovascular:      Rate and Rhythm: Normal rate.   Pulmonary:      Effort: Pulmonary effort is normal.   Skin:     General: Skin is warm.   Neurological:      General: No focal deficit present.      Mental Status: She is alert and oriented to person, place, and time.   Psychiatric:         Mood and Affect: Mood normal.         Behavior: Behavior normal.       Labs   Labs:  Lab Visit on 07/08/2024   Component Date Value Ref Range Status    Phosphorus 07/08/2024 2.5 (L)  2.7 - 4.5 mg/dL Final    Magnesium 07/08/2024 1.6  1.6 - 2.6 mg/dL Final    WBC 07/08/2024 5.26  3.90 - 12.70 K/uL Final    RBC 07/08/2024 3.67 (L)  4.00 - 5.40 M/uL Final    Hemoglobin 07/08/2024 11.8 (L)  12.0 - 16.0 g/dL Final    Hematocrit 07/08/2024 36.4 (L)  37.0 - 48.5 % Final    MCV 07/08/2024 99 (H)  82 - 98 fL Final    MCH 07/08/2024 32.2 (H)  27.0 - 31.0 pg Final    MCHC 07/08/2024 32.4  32.0 - 36.0 g/dL Final    RDW 07/08/2024 14.0  11.5 - 14.5 % Final    Platelets 07/08/2024 240  150 - 450 K/uL Final    MPV 07/08/2024 9.8  9.2 - 12.9 fL Final    Immature Granulocytes 07/08/2024 0.6 (H)  0.0 - 0.5 % Final    Gran # (ANC) 07/08/2024 2.1  1.8 - 7.7 K/uL Final    Immature Grans (Abs) 07/08/2024 0.03  0.00 - 0.04 K/uL Final    Comment: Mild elevation in immature granulocytes is non specific and   can be seen in a variety of conditions including stress response,   acute inflammation, trauma and pregnancy. Correlation with other   laboratory and clinical findings  is essential.      Lymph # 07/08/2024 1.7  1.0 - 4.8 K/uL Final    Mono # 07/08/2024 1.0  0.3 - 1.0 K/uL Final    Eos # 07/08/2024 0.5  0.0 - 0.5 K/uL Final    Baso # 07/08/2024 0.05  0.00 - 0.20 K/uL Final    nRBC 07/08/2024 0  0 /100 WBC Final    Gran % 07/08/2024 39.2  38.0 - 73.0 % Final    Lymph % 07/08/2024 31.6  18.0 - 48.0 % Final    Mono % 07/08/2024 18.3 (H)  4.0 - 15.0 % Final    Eosinophil % 07/08/2024 9.3 (H)  0.0 - 8.0 % Final    Basophil % 07/08/2024 1.0  0.0 - 1.9 % Final    Differential Method 07/08/2024 Automated   Final    Sodium 07/08/2024 142  136 - 145 mmol/L Final    Potassium 07/08/2024 3.6  3.5 - 5.1 mmol/L Final    Chloride 07/08/2024 111 (H)  95 - 110 mmol/L Final    CO2 07/08/2024 22 (L)  23 - 29 mmol/L Final    Glucose 07/08/2024 100  70 - 110 mg/dL Final    BUN 07/08/2024 16  8 - 23 mg/dL Final    Creatinine 07/08/2024 1.0  0.5 - 1.4 mg/dL Final    Calcium 07/08/2024 8.6 (L)  8.7 - 10.5 mg/dL Final    Total Protein 07/08/2024 6.5  6.0 - 8.4 g/dL Final    Albumin 07/08/2024 3.3 (L)  3.5 - 5.2 g/dL Final    Total Bilirubin 07/08/2024 0.7  0.1 - 1.0 mg/dL Final    Comment: For infants and newborns, interpretation of results should be based  on gestational age, weight and in agreement with clinical  observations.    Premature Infant recommended reference ranges:  Up to 24 hours.............<8.0 mg/dL  Up to 48 hours............<12.0 mg/dL  3-5 days..................<15.0 mg/dL  6-29 days.................<15.0 mg/dL      Alkaline Phosphatase 07/08/2024 59  55 - 135 U/L Final    AST 07/08/2024 20  10 - 40 U/L Final    ALT 07/08/2024 21  10 - 44 U/L Final    eGFR 07/08/2024 >60  >60 mL/min/1.73 m^2 Final    Anion Gap 07/08/2024 9  8 - 16 mmol/L Final         Imaging/Pathology   - 06/06/2023 RIGHT ILIAC CREST BONE MARROW ASPIRATE, BONE MARROW CLOT, AND BONE MARROW CORE BIOPSY WITH:     CELLULARITY=40-60%, TRILINEAGE HEMATOPOIETIC ACTIVITY (M/E=2.9:1).   CONSISTENT WITH PLASMA CELL  NEOPLASM(60%).  SEE COMMENT.   FOCAL GRADE 1 RETICULAR FIBROSIS.   CONGO RED NEGATIVE.   INCREASED STORAGE IRON.   ADEQUATE NUMBER OF MEGAKARYOCYTES.     Bone marrow karyotype results: 46, XX[20], female karyotype.     Myeloma fixed cell, high-risk, FISH:  Normal.  The result is within normal limits for 1q duplication, TP53 deletion and IGH rearrangement.         - 04/10/2023 PET: Numerous FDG avid predominately lytic osseous lesions as above.  Primary differential considerations would include multiple myeloma versus metastatic disease.  2. No FDG avid soft tissue masses or adenopathy demonstrated.  3. Mild pathologic compression deformity of the L1 vertebral body.                                          Assessment and Plan   IgG lambda Multiple myeloma, R-ISS stage I   Diagnosed June 2023 via bone marrow biopsy  Patient treatment had been held multiple times with few treatments cancelled and also Revlimid 10 mg daily given as unable to tolerate higher dose   Previous oncologist discussed with the patient and the transplant team BMT.  However, patient declined transplant at this time and it was recommended that we continue with Rosa-VRD for 6 cycles and then repeat bone marrow biopsy and PET scan. If VGPR or better, then continue with Revlimid maintenance only.  Continue prophylaxis with aspirin, acyclovir 400 mg b.i.d.  Continue Zometa/calcium and vitamin-D supplements (Due for Zometa 01/13/23)  Repeat PET-CT stable  BM Biopsy 02/13/2024 decrease in plasma cell burden  Continue follow up with myeloma team  Continue with daratumumab and Revlimid today; recheck immunoglobulins/protein studies        Poor Appetite  R  Hip Pain  Back Pain, Left leg pain   PET-CT showed treatment response with decreased FDG uptake in the previously identified bone lesion  Obtained LLE ultrasound - negative   She had lost 10 lb in approximately 8 weeks   Weight improving  Continue follow up with palliative care        Chronic  Hypokalemia  Magnesium Normal  Advised to take potassium every other day        Chalazion Left Upper Eyelid, 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, Hordeolum externum of left eyelid  Per Ophthalmology, this is a chronic condition and is unrelated to and not a contraindication for treatment of MM  Has had kenalog injection  Continue follow-up with Ophthalmology  Continue antibiotic/cream and warm compresses PRN        Shortness of Breath, resolved  The patient reports decreased exercise tolerance; she can not walk a long distance without becoming short of breath in his also has some chest discomfort  Recommended CTA to assess for thromboses however patient declines and prefers to be reassessed at next visit      Cancer Screening  MMG 03/17/2023:  BI-RADS 1  PAP Smear:  Partial hysterectomy  Colonoscopy 10/24/2022:  Normal repeat in 5 years        Chronic Medical Conditions  Asthma  Anxiety   COPD   GERD  Hypertension   Hyperlipidemia   Hypothyroidism   ?IBS-C on [facility] On[facility] Chart Routing      Follow up with physician 4 weeks.   Follow up with WERNER 2 weeks.   Infusion scheduling note   daratumumab and Zometa today; Daratumumab in 2 weeks   Injection scheduling note    Labs CBC, CMP, phosphorus, magnesium, other, SPEP and free light chains   Scheduling:  Preferred lab:  Lab interval:     Imaging    Pharmacy appointment    Other referrals               The patient was seen, interviewed and examined. Pertinent lab and radiologic studies were reviewed. Pt instructed to call should they develop concerning signs/symptoms or have further questions.        Portions of the record may have been created with voice recognition software. Occasional wrong-word or sound-a-like substitutions may have occurred due to the inherent limitations of voice recognition software. Read the chart carefully and recognize, using context, where substitutions have occurred.      Bri Michelle MD    Hematology/Oncology

## 2024-07-09 NOTE — ASSESSMENT & PLAN NOTE
Low potassium and magnesium  Plan for IV repletion today  Advised pt restart on daily K and Mag supplementation

## 2024-07-09 NOTE — PLAN OF CARE
"Pt is stable, pt administered Darzalex and zometa. Pt voiced she was doing "well." Pt will follow up in two weeks per provider. Plan of care reviewed with patient. Discussed if there are any new or ongoing concerns.        Problem: Adult Inpatient Plan of Care  Goal: Plan of Care Review  Outcome: Progressing  Goal: Patient-Specific Goal (Individualized)  Outcome: Progressing  Flowsheets (Taken 7/9/2024 1432)  Individualized Care Needs: Pt provied pillow, warm blanket, legs elevated in reclined position. Sprite and cheeze-its provided.  Anxieties, Fears or Concerns: Pt denies.  Goal: Optimal Comfort and Wellbeing  Outcome: Progressing     Problem: Fatigue  Goal: Improved Activity Tolerance  Outcome: Progressing     Problem: Infection  Goal: Absence of Infection Signs and Symptoms  Outcome: Progressing     Problem: Chemotherapy Effects  Goal: Anemia Symptom Improvement  Outcome: Progressing  Goal: Safety Maintained  Outcome: Progressing  Goal: Absence of Hematuria  Outcome: Progressing  Goal: Nausea and Vomiting Relief  Outcome: Progressing  Goal: Neurotoxicity Symptom Control  Outcome: Progressing  Goal: Absence of Infection  Outcome: Progressing  Goal: Absence of Bleeding  Outcome: Progressing     "

## 2024-07-09 NOTE — DISCHARGE INSTRUCTIONS
Thank you for allowing me to care for you today,  MIS LeachN, RN    [facility]  [address] [address]  [phone] phone     [phone] fax  Hours of Operation: Monday- Friday 7:00am- 5:30pm  After hours phone  [phone]  Hematology / Oncology Physicians on call      JUANI Chaudhary Dr., Dr., Dr., Dr., Dr., Dr., Dr., Dr., Dr., Dr., Dr., Dr., Dr., Dr., Dr., BELLE Mckeon, BELLE Deluna, BELLE Page, NP  Please call with any concerns regarding your appointment today.   FALL PREVENTION   Falls often occur due to slipping, tripping or losing your balance. Here are ways to reduce your risk of falling again.   Was there anything that caused your fall that can be fixed, removed or replaced?   Make your home safe by keeping walkways clear of objects you may trip over.   Use non-slip pads under rugs.   Do not walk in poorly lit areas.   Do not stand on chairs or wobbly ladders.   Use caution when reaching overhead or looking upward. This position can cause a loss of balance.   Be sure your shoes fit properly, have non-slip bottoms and are in good condition.   Be cautious when going up and down stairs, curbs, and when walking on uneven sidewalks.   If your balance is poor, consider using a cane or walker.   If your fall was related to alcohol use, stop or limit alcohol intake.   If your fall was related to use of sleeping medicines, talk to your doctor about this. You may need to reduce your dosage at bedtime if you awaken during the night to go to the bathroom.   To reduce the need for nighttime bathroom trips:   Avoid drinking fluids for several hours before going to bed   Empty your bladder before going to bed   Men can keep a urinal at the bedside   © 8969-4164 [employer], [address] [place] [address], [place] [address]. All rights reserved. This information is not intended as a  substitute for professional medical care. Always follow your healthcare professional's instructions.

## 2024-07-22 ENCOUNTER — LAB VISIT (OUTPATIENT)
Dept: LAB | Facility: HOSPITAL | Age: 64
End: 2024-07-22
Attending: INTERNAL MEDICINE
Payer: COMMERCIAL

## 2024-07-22 DIAGNOSIS — C90.00 MULTIPLE MYELOMA, REMISSION STATUS UNSPECIFIED: ICD-10-CM

## 2024-07-22 DIAGNOSIS — C90.00 MULTIPLE MYELOMA NOT HAVING ACHIEVED REMISSION: Chronic | ICD-10-CM

## 2024-07-22 LAB
ALBUMIN SERPL BCP-MCNC: 3.5 G/DL (ref 3.5–5.2)
ALP SERPL-CCNC: 62 U/L (ref 55–135)
ALT SERPL W/O P-5'-P-CCNC: 21 U/L (ref 10–44)
ANION GAP SERPL CALC-SCNC: 9 MMOL/L (ref 8–16)
AST SERPL-CCNC: 26 U/L (ref 10–40)
BASOPHILS # BLD AUTO: 0.06 K/UL (ref 0–0.2)
BASOPHILS NFR BLD: 1.4 % (ref 0–1.9)
BILIRUB SERPL-MCNC: 0.8 MG/DL (ref 0.1–1)
BUN SERPL-MCNC: 10 MG/DL (ref 8–23)
CALCIUM SERPL-MCNC: 8.9 MG/DL (ref 8.7–10.5)
CHLORIDE SERPL-SCNC: 110 MMOL/L (ref 95–110)
CO2 SERPL-SCNC: 24 MMOL/L (ref 23–29)
CREAT SERPL-MCNC: 1.5 MG/DL (ref 0.5–1.4)
DIFFERENTIAL METHOD BLD: ABNORMAL
EOSINOPHIL # BLD AUTO: 0.4 K/UL (ref 0–0.5)
EOSINOPHIL NFR BLD: 9.2 % (ref 0–8)
ERYTHROCYTE [DISTWIDTH] IN BLOOD BY AUTOMATED COUNT: 13.6 % (ref 11.5–14.5)
EST. GFR  (NO RACE VARIABLE): 39 ML/MIN/1.73 M^2
GLUCOSE SERPL-MCNC: 101 MG/DL (ref 70–110)
HCT VFR BLD AUTO: 35.6 % (ref 37–48.5)
HGB BLD-MCNC: 11.7 G/DL (ref 12–16)
IMM GRANULOCYTES # BLD AUTO: 0.01 K/UL (ref 0–0.04)
IMM GRANULOCYTES NFR BLD AUTO: 0.2 % (ref 0–0.5)
LYMPHOCYTES # BLD AUTO: 1.7 K/UL (ref 1–4.8)
LYMPHOCYTES NFR BLD: 39 % (ref 18–48)
MCH RBC QN AUTO: 32.2 PG (ref 27–31)
MCHC RBC AUTO-ENTMCNC: 32.9 G/DL (ref 32–36)
MCV RBC AUTO: 98 FL (ref 82–98)
MONOCYTES # BLD AUTO: 0.5 K/UL (ref 0.3–1)
MONOCYTES NFR BLD: 12.5 % (ref 4–15)
NEUTROPHILS # BLD AUTO: 1.6 K/UL (ref 1.8–7.7)
NEUTROPHILS NFR BLD: 37.7 % (ref 38–73)
NRBC BLD-RTO: 0 /100 WBC
PHOSPHATE SERPL-MCNC: 1.8 MG/DL (ref 2.7–4.5)
PLATELET # BLD AUTO: 313 K/UL (ref 150–450)
PMV BLD AUTO: 9.3 FL (ref 9.2–12.9)
POTASSIUM SERPL-SCNC: 2.9 MMOL/L (ref 3.5–5.1)
PROT SERPL-MCNC: 6.8 G/DL (ref 6–8.4)
RBC # BLD AUTO: 3.63 M/UL (ref 4–5.4)
SODIUM SERPL-SCNC: 143 MMOL/L (ref 136–145)
WBC # BLD AUTO: 4.23 K/UL (ref 3.9–12.7)

## 2024-07-22 PROCEDURE — 85025 COMPLETE CBC W/AUTO DIFF WBC: CPT | Performed by: INTERNAL MEDICINE

## 2024-07-22 PROCEDURE — 80053 COMPREHEN METABOLIC PANEL: CPT | Performed by: INTERNAL MEDICINE

## 2024-07-22 PROCEDURE — 84100 ASSAY OF PHOSPHORUS: CPT | Performed by: INTERNAL MEDICINE

## 2024-07-22 PROCEDURE — 36415 COLL VENOUS BLD VENIPUNCTURE: CPT | Performed by: INTERNAL MEDICINE

## 2024-07-23 ENCOUNTER — INFUSION (OUTPATIENT)
Dept: INFUSION THERAPY | Facility: HOSPITAL | Age: 64
End: 2024-07-23
Attending: INTERNAL MEDICINE
Payer: COMMERCIAL

## 2024-07-23 ENCOUNTER — OFFICE VISIT (OUTPATIENT)
Dept: HEMATOLOGY/ONCOLOGY | Facility: CLINIC | Age: 64
End: 2024-07-23
Payer: COMMERCIAL

## 2024-07-23 VITALS
SYSTOLIC BLOOD PRESSURE: 121 MMHG | HEART RATE: 70 BPM | OXYGEN SATURATION: 98 % | TEMPERATURE: 97 F | RESPIRATION RATE: 18 BRPM | DIASTOLIC BLOOD PRESSURE: 69 MMHG

## 2024-07-23 DIAGNOSIS — E87.6 HYPOKALEMIA: ICD-10-CM

## 2024-07-23 DIAGNOSIS — E83.39 HYPOPHOSPHATEMIA: ICD-10-CM

## 2024-07-23 DIAGNOSIS — Z00.00 ENCOUNTER FOR SCREENING AND PREVENTATIVE CARE: ICD-10-CM

## 2024-07-23 DIAGNOSIS — C90.00 MULTIPLE MYELOMA NOT HAVING ACHIEVED REMISSION: Primary | ICD-10-CM

## 2024-07-23 DIAGNOSIS — E86.0 DEHYDRATION: ICD-10-CM

## 2024-07-23 DIAGNOSIS — C90.00 MULTIPLE MYELOMA, REMISSION STATUS UNSPECIFIED: Primary | ICD-10-CM

## 2024-07-23 PROCEDURE — G2211 COMPLEX E/M VISIT ADD ON: HCPCS | Mod: 95,,,

## 2024-07-23 PROCEDURE — 96360 HYDRATION IV INFUSION INIT: CPT

## 2024-07-23 PROCEDURE — 25000003 PHARM REV CODE 250

## 2024-07-23 PROCEDURE — 99215 OFFICE O/P EST HI 40 MIN: CPT | Mod: 95,,,

## 2024-07-23 PROCEDURE — 63600175 PHARM REV CODE 636 W HCPCS

## 2024-07-23 PROCEDURE — 96361 HYDRATE IV INFUSION ADD-ON: CPT

## 2024-07-23 PROCEDURE — 96366 THER/PROPH/DIAG IV INF ADDON: CPT

## 2024-07-23 PROCEDURE — 4010F ACE/ARB THERAPY RXD/TAKEN: CPT | Mod: CPTII,95,,

## 2024-07-23 PROCEDURE — 96365 THER/PROPH/DIAG IV INF INIT: CPT

## 2024-07-23 RX ORDER — SODIUM CHLORIDE AND POTASSIUM CHLORIDE 150; 900 MG/100ML; MG/100ML
INJECTION, SOLUTION INTRAVENOUS CONTINUOUS
Status: DISCONTINUED | OUTPATIENT
Start: 2024-07-23 | End: 2024-07-23 | Stop reason: HOSPADM

## 2024-07-23 RX ORDER — SODIUM CHLORIDE AND POTASSIUM CHLORIDE 150; 900 MG/100ML; MG/100ML
INJECTION, SOLUTION INTRAVENOUS CONTINUOUS
Status: CANCELLED
Start: 2024-07-23

## 2024-07-23 RX ORDER — SODIUM CHLORIDE 0.9 % (FLUSH) 0.9 %
10 SYRINGE (ML) INJECTION
OUTPATIENT
Start: 2024-07-23

## 2024-07-23 RX ORDER — LENALIDOMIDE 10 MG/1
CAPSULE ORAL
Qty: 21 EACH | Refills: 7 | Status: SHIPPED | OUTPATIENT
Start: 2024-07-23 | End: 2024-07-25 | Stop reason: SDUPTHER

## 2024-07-23 RX ORDER — HEPARIN 100 UNIT/ML
500 SYRINGE INTRAVENOUS
OUTPATIENT
Start: 2024-07-23

## 2024-07-23 RX ORDER — SODIUM,POTASSIUM PHOSPHATES 280-250MG
1 POWDER IN PACKET (EA) ORAL ONCE
Qty: 1 PACKET | Refills: 0 | Status: SHIPPED | OUTPATIENT
Start: 2024-07-23 | End: 2024-07-23

## 2024-07-23 RX ADMIN — POTASSIUM CHLORIDE 500 ML/HR: 2 INJECTION, SOLUTION, CONCENTRATE INTRAVENOUS at 11:07

## 2024-07-23 NOTE — DISCHARGE INSTRUCTIONS
Thank you for allowing me to care for you today,  MIS LeachN, RN    [facility]  [address] [address]  [phone] phone     [phone] fax  Hours of Operation: Monday- Friday 7:00am- 5:30pm  After hours phone  [phone]  Hematology / Oncology Physicians on call      JUANI Chaudhary Dr., Dr., Dr., Dr., Dr., Dr., Dr., Dr., Dr., Dr., Dr., Dr., Dr., Dr., Dr., BELLE Mckeon, BELLE Deluna, BELLE Page, NP  Please call with any concerns regarding your appointment today.   FALL PREVENTION   Falls often occur due to slipping, tripping or losing your balance. Here are ways to reduce your risk of falling again.   Was there anything that caused your fall that can be fixed, removed or replaced?   Make your home safe by keeping walkways clear of objects you may trip over.   Use non-slip pads under rugs.   Do not walk in poorly lit areas.   Do not stand on chairs or wobbly ladders.   Use caution when reaching overhead or looking upward. This position can cause a loss of balance.   Be sure your shoes fit properly, have non-slip bottoms and are in good condition.   Be cautious when going up and down stairs, curbs, and when walking on uneven sidewalks.   If your balance is poor, consider using a cane or walker.   If your fall was related to alcohol use, stop or limit alcohol intake.   If your fall was related to use of sleeping medicines, talk to your doctor about this. You may need to reduce your dosage at bedtime if you awaken during the night to go to the bathroom.   To reduce the need for nighttime bathroom trips:   Avoid drinking fluids for several hours before going to bed   Empty your bladder before going to bed   Men can keep a urinal at the bedside   © 5068-1883 [employer], [address] [place] [address], [place] [address]. All rights reserved. This information is not intended as a  substitute for professional medical care. Always follow your healthcare professional's instructions.

## 2024-07-23 NOTE — PLAN OF CARE
"Pt is stable, pt administered potassium today. Pt voiced she was doing "alright." Pt will follow up in week. Plan of care reviewed with patient. Discussed if there are any new or ongoing concerns.    "

## 2024-07-23 NOTE — PROGRESS NOTES
Subjective:     Patient ID:?Ana Bonilla is a 63 y.o. female.?? MR#: [id number]   ?   PRIMARY ONCOLOGIST:   ?   CHIEF COMPLAINT: lab review/assessment for chemo/MM ?????   ?   ONCOLOGIC DIAGNOSIS: Multiple Myeloma  ?   CURRENT TREATMENT: OP Myeloma KIA-RD daratumumab lenalidomide dexAMETHasone Q4W     The patient location is: [place]  The chief complaint leading to consultation is: follow-up    Visit type: audio only    Face to Face time with patient: 15    20  minutes of total time spent on the encounter, which includes face to face time and non-face to face time preparing to see the patient (eg, review of tests), Obtaining and/or reviewing separately obtained history, Documenting clinical information in the electronic or other health record, Independently interpreting results (not separately reported) and communicating results to the patient/family/caregiver, or Care coordination (not separately reported).         Each patient to whom he or she provides medical services by telemedicine is:  (1) informed of the relationship between the physician and patient and the respective role of any other health care provider with respect to management of the patient; and (2) notified that he or she may decline to receive medical services by telemedicine and may withdraw from such care at any time.    Notes:    HPI  Ms. Bonilla is a 62-year-old  female with past medical history significant for hypertension, COPD, asthma, GERD, hypothyroidism here for follow-up and management of multiple myeloma. BMBx on 4/6/2023 showed 60 % plasma cells. PET-CT on 4/10/2023 showed extensive lytic bony lesions throughout the spine, sternum, bilateral ribs, pelvis and mild compression deformity in L1 vertebral body. SPEP/MECHE on 3/27/2023 showed IgG lambda monoclonal protein - 3.19 g/dl. Quantitative immunoglobulins on 3/27/2023 showed IgG 4,521 mg/dl. UPEP/MECHE and FLC were pending at that time. Labs on 3/27/2023 showed Beta 2  microglobulin 1.9, .  Labs on 4/19/2023 showed Hb, 11.5, WBC 6.3, platelets, BUN 11, Cr 0.9, calcium 9. Pt initiated on VRD 05/10/23. Treatment planned changed to D-VRD 07/06/23.     Interval History: Pt presents for lab review and assessment prior to Darzalex.  Pt states overall she is feeling tired. Denies n/v/d/c, fever, chills, sob, cp, abnormal bleeding. She notes appetite waxes and wanes; she notes  difficulty with hydration this week which she states may be related to working more and drinking more cokes. She has not been taking K supplement because it causes flatulence--per pt primary oncologist's recommendation to take at night encouraged pt to trial this. She continues on Mag daily.     Oncology History   Multiple myeloma   4/20/2023 Initial Diagnosis    Multiple myeloma     5/10/2023 - 6/28/2023 Chemotherapy    Treatment Summary   Plan Name: OP VRD - WEEKLY BORTEZOMIB LENALIDOMIDE DEXAMETHASONE Q3W  Treatment Goal: Palliative  Status: Inactive  Start Date: 5/10/2023  End Date: 6/28/2023  Provider: Abram Velasquez MD  Chemotherapy: bortezomib (VELCADE) injection 2 mg, 1.3 mg/m2 = 2 mg, Subcutaneous, Clinic/[facility] 1 time, 2 of 6 cycles  Administration: 2 mg (5/10/2023), 2 mg (5/17/2023), 2 mg (6/14/2023), 2 mg (5/31/2023), 2 mg (6/21/2023), 2 mg (6/28/2023)  lenalidomide 25 mg Cap, 25 mg, Oral, Daily, 1 of 1 cycle, Start date: 5/10/2023, End date: 5/17/2023 6/28/2023 Cancer Staged    Staging form: Plasma Cell Myeloma and Plasma Cell Disorders, AJCC 8th Edition  - Clinical stage from 6/28/2023: RISS Stage II (Beta-2-microglobulin (mg/L): 1.9, Albumin (g/dL): 3, ISS: Stage II, High-risk cytogenetics: Absent, LDH: Normal)     7/6/2023 - 1/3/2024 Chemotherapy    Treatment Summary   Plan Name: OP D-VRD DARATUMUMAB + BORTEZOMIB LENALIDOMIDE DEXAMETHASONE  Treatment Goal: Palliative  Status: Inactive  Start Date: 7/6/2023  End Date: 1/3/2024  Provider: Oscar Márquez MD  Chemotherapy: bortezomib  (VELCADE) injection 2 mg, 1.3 mg/m2 = 2 mg, Subcutaneous, Clinic/[facility] 1 time, 6 of 6 cycles  Administration: 2 mg (7/6/2023), 2 mg (7/12/2023), 2 mg (8/2/2023), 2 mg (8/9/2023), 2.25 mg (10/18/2023), 2 mg (7/19/2023), 2 mg (7/26/2023), 2 mg (8/16/2023), 2.25 mg (9/20/2023), 2.25 mg (9/27/2023), 2.25 mg (10/25/2023), 2.25 mg (11/1/2023), 2.25 mg (11/8/2023), 2.25 mg (12/6/2023), 2.25 mg (1/3/2024), 2.25 mg (11/15/2023), 2.25 mg (11/22/2023), 2.25 mg (11/29/2023), 2.25 mg (12/13/2023), 2.25 mg (12/20/2023), 2.25 mg (12/26/2023)  lenalidomide 10 mg Cap, 1 capsule (100 % of original dose 1 capsule), Oral, Daily, 1 of 1 cycle, Start date: 7/26/2023, End date: 8/2/2023  Dose modification: 1 capsule (original dose 1 capsule, Cycle 0)  daratumumab-hyaluronidase-fij subcutaneous injection 1,800 mg, 1,800 mg (100 % of original dose 1,800 mg), Subcutaneous, Clinic/[facility] 1 time, 6 of 6 cycles  Dose modification: 1,800 mg (original dose 1,800 mg, Cycle 1), 1,800 mg (original dose 1,800 mg, Cycle 1)  Administration: 1,800 mg (7/6/2023), 1,800 mg (7/12/2023), 1,800 mg (7/19/2023), 1,800 mg (7/26/2023), 1,800 mg (8/2/2023), 1,800 mg (8/9/2023), 1,800 mg (8/16/2023), 1,800 mg (9/27/2023), 1,800 mg (10/18/2023), 1,800 mg (11/1/2023), 1,800 mg (11/15/2023), 1,800 mg (11/29/2023), 1,800 mg (12/13/2023), 1,800 mg (12/26/2023)     4/30/2024 -  Chemotherapy    Treatment Summary   Plan Name: OP Myeloma KIA-RD daratumumab lenalidomide dexAMETHasone Q4W  Treatment Goal: Palliative  Status: Active  Start Date: 4/30/2024  End Date: 3/4/2025 (Planned)  Provider: Bri Luevano MD  Chemotherapy: daratumumab-hyaluronidase-fij subcutaneous injection 1,800 mg, 1,800 mg, Subcutaneous, Clinic/[facility] 1 time, 3 of 12 cycles  Administration: 1,800 mg (4/30/2024), 1,800 mg (5/7/2024), 1,800 mg (5/21/2024), 1,800 mg (5/28/2024), 1,800 mg (6/4/2024), 1,800 mg (6/25/2024), 1,800 mg (7/9/2024), 1,800 mg (5/14/2024), 1,800 mg (6/11/2024), 1,800 mg  (2024)        Social History     Socioeconomic History    Marital status:     Number of children: 2   Occupational History     Employer: [employer]   Tobacco Use    Smoking status: Every Day     Current packs/day: 0.50     Average packs/day: 0.5 packs/day for 50.0 years (25.0 ttl pk-yrs)     Types: Cigarettes     Passive exposure: Never    Smokeless tobacco: Never   Substance and Sexual Activity    Alcohol use: Not Currently     Comment: quit    Drug use: No    Sexual activity: Not Currently     Partners: Male     Social Determinants of Health     Financial Resource Strain: Low Risk  (11/15/2023)    Overall Financial Resource Strain (CARDIA)     Difficulty of Paying Living Expenses: Not hard at all   Food Insecurity: No Food Insecurity (11/15/2023)    Hunger Vital Sign     Worried About Running Out of Food in the Last Year: Never true     Ran Out of Food in the Last Year: Never true   Transportation Needs: No Transportation Needs (11/15/2023)    PRAPARE - Transportation     Lack of Transportation (Medical): No     Lack of Transportation (Non-Medical): No   Physical Activity: Sufficiently Active (11/15/2023)    Exercise Vital Sign     Days of Exercise per Week: 7 days     Minutes of Exercise per Session: 150+ min   Stress: Stress Concern Present (11/15/2023)    [employer] [place] [employer] - Occupational Stress Questionnaire     Feeling of Stress : To some extent   Housing Stability: Low Risk  (11/15/2023)    Housing Stability Vital Sign     Unable to Pay for Housing in the Last Year: No     Number of Places Lived in the Last Year: 1     Unstable Housing in the Last Year: No      Family History   Problem Relation Name Age of Onset    Hypertension Mother Vivian     Diabetes Mother Vivian     Asthma Mother Vivian             Diabetes Father      Asthma Father      Stroke Maternal Grandmother  grandmother     Prostate cancer Maternal Grandfather      Mental illness  Son Lukasz Garcia     Pancreatic cancer Maternal Uncle      Mental illness Other Pat side     Pancreatic cancer Other Mat side     Ovarian cancer Maternal Cousin        Past Surgical History:   Procedure Laterality Date    APPENDECTOMY      BIOPSY N/A 06/08/2023    Procedure: BIOPSY;  Surgeon: Fausto Smith MD;  Location: [facility] OR;  Service: Neurosurgery;  Laterality: N/A;  L1    BUNIONECTOMY      COLONOSCOPY N/A 12/04/2019    Procedure: COLONOSCOPY;  Surgeon: Danny Matos III, MD;  Location: [facility] ENDO;  Service: Endoscopy;  Laterality: N/A;    COLONOSCOPY N/A 10/24/2022    Procedure: COLONOSCOPY;  Surgeon: Danny Matos III, MD;  Location: [facility] ENDO;  Service: Endoscopy;  Laterality: N/A;    ESOPHAGOGASTRODUODENOSCOPY N/A 10/24/2022    Procedure: EGD (ESOPHAGOGASTRODUODENOSCOPY);  Surgeon: Danny Matos III, MD;  Location: [facility] ENDO;  Service: Endoscopy;  Laterality: N/A;    FIXATION KYPHOPLASTY Bilateral 06/08/2023    Procedure: KYPHOPLASTY;  Surgeon: Fausto Smith MD;  Location: [facility] OR;  Service: Neurosurgery;  Laterality: Bilateral;  kyphoplasty and radiofrequency ablation - L1    HYSTERECTOMY      PARTIAL//still with ovaries    neck fusion  08/2017    THYROIDECTOMY      TUBAL LIGATION          Review of Systems   Constitutional:  Positive for fatigue. Negative for activity change, appetite change, chills, fever and unexpected weight change.   HENT:  Negative for congestion, dental problem, mouth sores and nosebleeds.    Eyes:  Negative for visual disturbance.   Respiratory:  Negative for cough, choking and chest tightness.    Cardiovascular:  Negative for chest pain, palpitations and leg swelling.   Gastrointestinal:  Negative for abdominal distention, abdominal pain, anal bleeding, blood in stool, constipation, diarrhea, nausea and vomiting.   Endocrine: Negative.    Genitourinary:  Negative for dysuria, frequency, hematuria and urgency.   Musculoskeletal:  Positive for arthralgias and  myalgias. Negative for back pain, gait problem and joint swelling.   Skin:  Negative for rash and wound.   Allergic/Immunologic: Negative for immunocompromised state.   Neurological:  Negative for dizziness, light-headedness, numbness and headaches.   Hematological:  Negative for adenopathy. Does not bruise/bleed easily.   Psychiatric/Behavioral:  The patient is nervous/anxious.        ?   A comprehensive 14-point review of systems was reviewed with patient and was negative other than as specified above.   ?     Objective:      Physical Exam  Vitals reviewed: virtual visit.           ?   There were no vitals filed for this visit.     ?       ?   Laboratory:  ?   No visits with results within 1 Day(s) from this visit.   Latest known visit with results is:   Lab Visit on 07/22/2024   Component Date Value Ref Range Status    Sodium 07/22/2024 143  136 - 145 mmol/L Final    Potassium 07/22/2024 2.9 (L)  3.5 - 5.1 mmol/L Final    Chloride 07/22/2024 110  95 - 110 mmol/L Final    CO2 07/22/2024 24  23 - 29 mmol/L Final    Glucose 07/22/2024 101  70 - 110 mg/dL Final    BUN 07/22/2024 10  8 - 23 mg/dL Final    Creatinine 07/22/2024 1.5 (H)  0.5 - 1.4 mg/dL Final    Calcium 07/22/2024 8.9  8.7 - 10.5 mg/dL Final    Total Protein 07/22/2024 6.8  6.0 - 8.4 g/dL Final    Albumin 07/22/2024 3.5  3.5 - 5.2 g/dL Final    Total Bilirubin 07/22/2024 0.8  0.1 - 1.0 mg/dL Final    Alkaline Phosphatase 07/22/2024 62  55 - 135 U/L Final    AST 07/22/2024 26  10 - 40 U/L Final    ALT 07/22/2024 21  10 - 44 U/L Final    eGFR 07/22/2024 39 (A)  >60 mL/min/1.73 m^2 Final    Anion Gap 07/22/2024 9  8 - 16 mmol/L Final    WBC 07/22/2024 4.23  3.90 - 12.70 K/uL Final    RBC 07/22/2024 3.63 (L)  4.00 - 5.40 M/uL Final    Hemoglobin 07/22/2024 11.7 (L)  12.0 - 16.0 g/dL Final    Hematocrit 07/22/2024 35.6 (L)  37.0 - 48.5 % Final    MCV 07/22/2024 98  82 - 98 fL Final    MCH 07/22/2024 32.2 (H)  27.0 - 31.0 pg Final    MCHC 07/22/2024 32.9   32.0 - 36.0 g/dL Final    RDW 07/22/2024 13.6  11.5 - 14.5 % Final    Platelets 07/22/2024 313  150 - 450 K/uL Final    MPV 07/22/2024 9.3  9.2 - 12.9 fL Final    Immature Granulocytes 07/22/2024 0.2  0.0 - 0.5 % Final    Gran # (ANC) 07/22/2024 1.6 (L)  1.8 - 7.7 K/uL Final    Immature Grans (Abs) 07/22/2024 0.01  0.00 - 0.04 K/uL Final    Lymph # 07/22/2024 1.7  1.0 - 4.8 K/uL Final    Mono # 07/22/2024 0.5  0.3 - 1.0 K/uL Final    Eos # 07/22/2024 0.4  0.0 - 0.5 K/uL Final    Baso # 07/22/2024 0.06  0.00 - 0.20 K/uL Final    nRBC 07/22/2024 0  0 /100 WBC Final    Gran % 07/22/2024 37.7 (L)  38.0 - 73.0 % Final    Lymph % 07/22/2024 39.0  18.0 - 48.0 % Final    Mono % 07/22/2024 12.5  4.0 - 15.0 % Final    Eosinophil % 07/22/2024 9.2 (H)  0.0 - 8.0 % Final    Basophil % 07/22/2024 1.4  0.0 - 1.9 % Final    Differential Method 07/22/2024 Automated   Final    Phosphorus 07/22/2024 1.8 (L)  2.7 - 4.5 mg/dL Final      ?   Imaging:    No results found. However, due to the size of the patient record, not all encounters were searched. Please check Results Review for a complete set of results.       No results found. However, due to the size of the patient record, not all encounters were searched. Please check Results Review for a complete set of results.           ?   Assessment/Plan:     Problem List Items Addressed This Visit          Renal/    Hypokalemia    Relevant Orders    CBC Auto Differential    Comprehensive Metabolic Panel    Magnesium    Phosphorus       Oncology    Multiple myeloma - Primary (Chronic)     BMBx : 60 % plasma cells - 4/6/2023  - SPEP/MECHE showed IgG lambda - monoclonal protein 3.19 g/dl - (3/27/2023).  - R-ISS stage I (beta 2 microglobulin 1.9, albumin 3.5, , normal karyotype and no high-risk mutations on fish panel  - PET-CT ( 4/10/2023) - showed extensive lytic lesions in the axial skeleton and mild L1 compression deformity.  - Started with Induction chemotherapy with VRD regimen  (Velcde/Revlimid/Decadron) - 05/10/2023   - Started Aspirin daily p.o for thrombosis prophylaxis; Acyclovir 400 mg p.o BID for herpes Zoster prophylaxis .  __________________________________________________  --S/p Kyphoplasty 06/08/23    Pt seen by transplant team BMT.  However, she has declined transplant at this time and it was recommended that we continue with Rosa-VRD for 6 cycles and then repeat bone marrow biopsy and PET scan. If VGPR or better, then continue with Revlimid maintenance only.    Labs reviewed  No Darzalex today; Hold Revlamid  Plan for 1L IVF + 20meq K today     F/u in one week with repeat labs for Darzalex             Relevant Medications    lenalidomide 10 mg Cap    Other Relevant Orders    CBC Auto Differential    Comprehensive Metabolic Panel    Magnesium    Phosphorus     Other Visit Diagnoses       Hypophosphatemia        Relevant Medications    potassium, sodium phosphates (PHOS-NAK) 280-160-250 mg PwPk    Other Relevant Orders    CBC Auto Differential    Comprehensive Metabolic Panel    Magnesium    Phosphorus    Encounter for screening and preventative care        Relevant Orders    Mammo Digital Screening Bilat                   Med Onc Chart Routing      Follow up with physician 1 week. with repeat labs for darzalex   Follow up with WERNER    Infusion scheduling note   darzalex   Injection scheduling note    Labs CBC, CMP, magnesium and phosphorus   Scheduling:  Preferred lab:  Lab interval:     Imaging    Pharmacy appointment    Other referrals                     TONYA Pool  Hematology/Oncology

## 2024-07-23 NOTE — ASSESSMENT & PLAN NOTE
BMBx : 60 % plasma cells - 4/6/2023  - SPEP/MECHE showed IgG lambda - monoclonal protein 3.19 g/dl - (3/27/2023).  - R-ISS stage I (beta 2 microglobulin 1.9, albumin 3.5, , normal karyotype and no high-risk mutations on fish panel  - PET-CT ( 4/10/2023) - showed extensive lytic lesions in the axial skeleton and mild L1 compression deformity.  - Started with Induction chemotherapy with VRD regimen (Velcde/Revlimid/Decadron) - 05/10/2023   - Started Aspirin daily p.o for thrombosis prophylaxis; Acyclovir 400 mg p.o BID for herpes Zoster prophylaxis .  __________________________________________________  --S/p Kyphoplasty 06/08/23    Pt seen by transplant team BMT.  However, she has declined transplant at this time and it was recommended that we continue with Rosa-VRD for 6 cycles and then repeat bone marrow biopsy and PET scan. If VGPR or better, then continue with Revlimid maintenance only.    Labs reviewed  No Darzalex today; Hold Revlamid  Plan for 1L IVF + 20meq K today     F/u in one week with repeat labs for Darzalex

## 2024-07-25 DIAGNOSIS — C90.00 MULTIPLE MYELOMA, REMISSION STATUS UNSPECIFIED: ICD-10-CM

## 2024-07-25 RX ORDER — LENALIDOMIDE 10 MG/1
CAPSULE ORAL
Qty: 21 EACH | Refills: 7 | Status: SHIPPED | OUTPATIENT
Start: 2024-07-25

## 2024-07-29 ENCOUNTER — LAB VISIT (OUTPATIENT)
Dept: LAB | Facility: HOSPITAL | Age: 64
End: 2024-07-29
Payer: COMMERCIAL

## 2024-07-29 DIAGNOSIS — E87.6 HYPOKALEMIA: ICD-10-CM

## 2024-07-29 DIAGNOSIS — E83.39 HYPOPHOSPHATEMIA: ICD-10-CM

## 2024-07-29 DIAGNOSIS — C90.00 MULTIPLE MYELOMA, REMISSION STATUS UNSPECIFIED: ICD-10-CM

## 2024-07-29 LAB
ALBUMIN SERPL BCP-MCNC: 3.5 G/DL (ref 3.5–5.2)
ALP SERPL-CCNC: 69 U/L (ref 55–135)
ALT SERPL W/O P-5'-P-CCNC: 41 U/L (ref 10–44)
ANION GAP SERPL CALC-SCNC: 7 MMOL/L (ref 8–16)
AST SERPL-CCNC: 34 U/L (ref 10–40)
BASOPHILS # BLD AUTO: 0.11 K/UL (ref 0–0.2)
BASOPHILS NFR BLD: 2.5 % (ref 0–1.9)
BILIRUB SERPL-MCNC: 0.6 MG/DL (ref 0.1–1)
BUN SERPL-MCNC: 10 MG/DL (ref 8–23)
CALCIUM SERPL-MCNC: 8 MG/DL (ref 8.7–10.5)
CHLORIDE SERPL-SCNC: 114 MMOL/L (ref 95–110)
CO2 SERPL-SCNC: 22 MMOL/L (ref 23–29)
CREAT SERPL-MCNC: 1 MG/DL (ref 0.5–1.4)
DIFFERENTIAL METHOD BLD: ABNORMAL
EOSINOPHIL # BLD AUTO: 0.2 K/UL (ref 0–0.5)
EOSINOPHIL NFR BLD: 5 % (ref 0–8)
ERYTHROCYTE [DISTWIDTH] IN BLOOD BY AUTOMATED COUNT: 14.3 % (ref 11.5–14.5)
EST. GFR  (NO RACE VARIABLE): >60 ML/MIN/1.73 M^2
GLUCOSE SERPL-MCNC: 102 MG/DL (ref 70–110)
HCT VFR BLD AUTO: 35 % (ref 37–48.5)
HGB BLD-MCNC: 11.4 G/DL (ref 12–16)
IMM GRANULOCYTES # BLD AUTO: 0.01 K/UL (ref 0–0.04)
IMM GRANULOCYTES NFR BLD AUTO: 0.2 % (ref 0–0.5)
LYMPHOCYTES # BLD AUTO: 1.5 K/UL (ref 1–4.8)
LYMPHOCYTES NFR BLD: 34.3 % (ref 18–48)
MAGNESIUM SERPL-MCNC: 1.7 MG/DL (ref 1.6–2.6)
MCH RBC QN AUTO: 32.6 PG (ref 27–31)
MCHC RBC AUTO-ENTMCNC: 32.6 G/DL (ref 32–36)
MCV RBC AUTO: 100 FL (ref 82–98)
MONOCYTES # BLD AUTO: 0.8 K/UL (ref 0.3–1)
MONOCYTES NFR BLD: 17.4 % (ref 4–15)
NEUTROPHILS # BLD AUTO: 1.8 K/UL (ref 1.8–7.7)
NEUTROPHILS NFR BLD: 40.6 % (ref 38–73)
NRBC BLD-RTO: 0 /100 WBC
PHOSPHATE SERPL-MCNC: 1.2 MG/DL (ref 2.7–4.5)
PLATELET # BLD AUTO: 297 K/UL (ref 150–450)
PMV BLD AUTO: 9.9 FL (ref 9.2–12.9)
POTASSIUM SERPL-SCNC: 3.5 MMOL/L (ref 3.5–5.1)
PROT SERPL-MCNC: 6.7 G/DL (ref 6–8.4)
RBC # BLD AUTO: 3.5 M/UL (ref 4–5.4)
SODIUM SERPL-SCNC: 143 MMOL/L (ref 136–145)
WBC # BLD AUTO: 4.43 K/UL (ref 3.9–12.7)

## 2024-07-29 PROCEDURE — 80053 COMPREHEN METABOLIC PANEL: CPT

## 2024-07-29 PROCEDURE — 83735 ASSAY OF MAGNESIUM: CPT

## 2024-07-29 PROCEDURE — 84100 ASSAY OF PHOSPHORUS: CPT

## 2024-07-29 PROCEDURE — 36415 COLL VENOUS BLD VENIPUNCTURE: CPT

## 2024-07-29 PROCEDURE — 85025 COMPLETE CBC W/AUTO DIFF WBC: CPT

## 2024-07-30 ENCOUNTER — PATIENT MESSAGE (OUTPATIENT)
Dept: HEMATOLOGY/ONCOLOGY | Facility: CLINIC | Age: 64
End: 2024-07-30

## 2024-07-30 ENCOUNTER — INFUSION (OUTPATIENT)
Dept: INFUSION THERAPY | Facility: HOSPITAL | Age: 64
End: 2024-07-30
Attending: INTERNAL MEDICINE
Payer: COMMERCIAL

## 2024-07-30 ENCOUNTER — OFFICE VISIT (OUTPATIENT)
Dept: HEMATOLOGY/ONCOLOGY | Facility: CLINIC | Age: 64
End: 2024-07-30
Payer: COMMERCIAL

## 2024-07-30 VITALS
OXYGEN SATURATION: 98 % | SYSTOLIC BLOOD PRESSURE: 129 MMHG | HEART RATE: 69 BPM | BODY MASS INDEX: 22.32 KG/M2 | DIASTOLIC BLOOD PRESSURE: 76 MMHG | HEIGHT: 64 IN | RESPIRATION RATE: 16 BRPM | WEIGHT: 130.75 LBS | TEMPERATURE: 97 F

## 2024-07-30 DIAGNOSIS — C90.00 MULTIPLE MYELOMA, REMISSION STATUS UNSPECIFIED: Primary | Chronic | ICD-10-CM

## 2024-07-30 DIAGNOSIS — E83.39 HYPOPHOSPHATEMIA: ICD-10-CM

## 2024-07-30 DIAGNOSIS — C90.00 MULTIPLE MYELOMA, REMISSION STATUS UNSPECIFIED: Primary | ICD-10-CM

## 2024-07-30 PROCEDURE — 99215 OFFICE O/P EST HI 40 MIN: CPT | Mod: 95,,,

## 2024-07-30 PROCEDURE — 96401 CHEMO ANTI-NEOPL SQ/IM: CPT

## 2024-07-30 PROCEDURE — G2211 COMPLEX E/M VISIT ADD ON: HCPCS | Mod: 95,,,

## 2024-07-30 PROCEDURE — 4010F ACE/ARB THERAPY RXD/TAKEN: CPT | Mod: CPTII,95,,

## 2024-07-30 PROCEDURE — 63600175 PHARM REV CODE 636 W HCPCS: Mod: JZ,JG

## 2024-07-30 RX ORDER — DIPHENHYDRAMINE HYDROCHLORIDE 50 MG/ML
50 INJECTION INTRAMUSCULAR; INTRAVENOUS ONCE AS NEEDED
Status: CANCELLED | OUTPATIENT
Start: 2024-07-30

## 2024-07-30 RX ORDER — SODIUM,POTASSIUM PHOSPHATES 280-250MG
1 POWDER IN PACKET (EA) ORAL
Qty: 4 PACKET | Refills: 0 | Status: SHIPPED | OUTPATIENT
Start: 2024-07-30

## 2024-07-30 RX ORDER — EPINEPHRINE 0.3 MG/.3ML
0.3 INJECTION SUBCUTANEOUS ONCE AS NEEDED
Status: CANCELLED | OUTPATIENT
Start: 2024-07-30

## 2024-07-30 RX ORDER — SODIUM CHLORIDE 0.9 % (FLUSH) 0.9 %
10 SYRINGE (ML) INJECTION
Status: CANCELLED | OUTPATIENT
Start: 2024-07-30

## 2024-07-30 RX ORDER — PROCHLORPERAZINE EDISYLATE 5 MG/ML
10 INJECTION INTRAMUSCULAR; INTRAVENOUS ONCE AS NEEDED
Status: CANCELLED | OUTPATIENT
Start: 2024-07-30

## 2024-07-30 RX ORDER — HEPARIN 100 UNIT/ML
500 SYRINGE INTRAVENOUS
Status: CANCELLED | OUTPATIENT
Start: 2024-07-30

## 2024-07-30 RX ADMIN — DARATUMUMAB AND HYALURONIDASE-FIHJ (HUMAN RECOMBINANT) 1800 MG: 1800; 30000 INJECTION SUBCUTANEOUS at 10:07

## 2024-07-30 NOTE — PROGRESS NOTES
Subjective:     Patient ID:?Ana Bonilla is a 63 y.o. female.?? MR#: [id number]   ?   PRIMARY ONCOLOGIST: -->  ?   CHIEF COMPLAINT: lab review/assessment for chemo/MM ?????   ?   ONCOLOGIC DIAGNOSIS: Multiple Myeloma  ?   CURRENT TREATMENT: OP Myeloma KIA-RD daratumumab lenalidomide dexAMETHasone Q4W     The patient location is: [place]  The chief complaint leading to consultation is: follow-up    Visit type: audio only    Face to Face time with patient: 19    20  minutes of total time spent on the encounter, which includes face to face time and non-face to face time preparing to see the patient (eg, review of tests), Obtaining and/or reviewing separately obtained history, Documenting clinical information in the electronic or other health record, Independently interpreting results (not separately reported) and communicating results to the patient/family/caregiver, or Care coordination (not separately reported).         Each patient to whom he or she provides medical services by telemedicine is:  (1) informed of the relationship between the physician and patient and the respective role of any other health care provider with respect to management of the patient; and (2) notified that he or she may decline to receive medical services by telemedicine and may withdraw from such care at any time.    Notes:    HPI  Ms. Bonilla is a 62-year-old  female with past medical history significant for hypertension, COPD, asthma, GERD, hypothyroidism here for follow-up and management of multiple myeloma. BMBx on 4/6/2023 showed 60 % plasma cells. PET-CT on 4/10/2023 showed extensive lytic bony lesions throughout the spine, sternum, bilateral ribs, pelvis and mild compression deformity in L1 vertebral body. SPEP/MECHE on 3/27/2023 showed IgG lambda monoclonal protein - 3.19 g/dl. Quantitative immunoglobulins on 3/27/2023 showed IgG 4,521 mg/dl. UPEP/MECHE and FLC were pending at that time. Labs on 3/27/2023 showed Beta  2 microglobulin 1.9, .  Labs on 4/19/2023 showed Hb, 11.5, WBC 6.3, platelets, BUN 11, Cr 0.9, calcium 9. Pt initiated on VRD 05/10/23. Treatment planned changed to D-VRD 07/06/23.     Interval History: Pt presents for lab review and assessment prior to Darzalex.  Pt states overall she is feeling tired. Denies n/v/d/c, fever, chills, sob, cp, abnormal bleeding. She notes appetite waxes and wanes; Pt is not currently taking calcium supplement--reiterated importance of this--she notes difficulty with swallowing Calcitriol; previously on Ca+d chews--pt states advised by primary oncologist to avoid these d/t sugar content--advised pt to resume on these as she needs to replete and maintain calcium levels. She notes not having revlimid for greater than two weeks--states she has not heard from [facility]--advised pt to contact [facility] at last visit we reacghed out to them and they stated difficulty with getting in touch with pt. She does have some leftover revlimid that she can start today. She continues on Mag daily.     Oncology History   Multiple myeloma   4/20/2023 Initial Diagnosis    Multiple myeloma     5/10/2023 - 6/28/2023 Chemotherapy    Treatment Summary   Plan Name: OP VRD - WEEKLY BORTEZOMIB LENALIDOMIDE DEXAMETHASONE Q3W  Treatment Goal: Palliative  Status: Inactive  Start Date: 5/10/2023  End Date: 6/28/2023  Provider: Abram Velasquez MD  Chemotherapy: bortezomib (VELCADE) injection 2 mg, 1.3 mg/m2 = 2 mg, Subcutaneous, Clinic/HOD 1 time, 2 of 6 cycles  Administration: 2 mg (5/10/2023), 2 mg (5/17/2023), 2 mg (6/14/2023), 2 mg (5/31/2023), 2 mg (6/21/2023), 2 mg (6/28/2023)  lenalidomide 25 mg Cap, 25 mg, Oral, Daily, 1 of 1 cycle, Start date: 5/10/2023, End date: 5/17/2023 6/28/2023 Cancer Staged    Staging form: Plasma Cell Myeloma and Plasma Cell Disorders, AJCC 8th Edition  - Clinical stage from 6/28/2023: RISS Stage II (Beta-2-microglobulin (mg/L): 1.9, Albumin (g/dL): 3, ISS: Stage II,  High-risk cytogenetics: Absent, LDH: Normal)     7/6/2023 - 1/3/2024 Chemotherapy    Treatment Summary   Plan Name: OP D-VRD DARATUMUMAB + BORTEZOMIB LENALIDOMIDE DEXAMETHASONE  Treatment Goal: Palliative  Status: Inactive  Start Date: 7/6/2023  End Date: 1/3/2024  Provider: Oscar Márquez MD  Chemotherapy: bortezomib (VELCADE) injection 2 mg, 1.3 mg/m2 = 2 mg, Subcutaneous, Clinic/HOD 1 time, 6 of 6 cycles  Administration: 2 mg (7/6/2023), 2 mg (7/12/2023), 2 mg (8/2/2023), 2 mg (8/9/2023), 2.25 mg (10/18/2023), 2 mg (7/19/2023), 2 mg (7/26/2023), 2 mg (8/16/2023), 2.25 mg (9/20/2023), 2.25 mg (9/27/2023), 2.25 mg (10/25/2023), 2.25 mg (11/1/2023), 2.25 mg (11/8/2023), 2.25 mg (12/6/2023), 2.25 mg (1/3/2024), 2.25 mg (11/15/2023), 2.25 mg (11/22/2023), 2.25 mg (11/29/2023), 2.25 mg (12/13/2023), 2.25 mg (12/20/2023), 2.25 mg (12/26/2023)  lenalidomide 10 mg Cap, 1 capsule (100 % of original dose 1 capsule), Oral, Daily, 1 of 1 cycle, Start date: 7/26/2023, End date: 8/2/2023  Dose modification: 1 capsule (original dose 1 capsule, Cycle 0)  daratumumab-hyaluronidase-fihj subcutaneous injection 1,800 mg, 1,800 mg (100 % of original dose 1,800 mg), Subcutaneous, Clinic/HOD 1 time, 6 of 6 cycles  Dose modification: 1,800 mg (original dose 1,800 mg, Cycle 1), 1,800 mg (original dose 1,800 mg, Cycle 1)  Administration: 1,800 mg (7/6/2023), 1,800 mg (7/12/2023), 1,800 mg (7/19/2023), 1,800 mg (7/26/2023), 1,800 mg (8/2/2023), 1,800 mg (8/9/2023), 1,800 mg (8/16/2023), 1,800 mg (9/27/2023), 1,800 mg (10/18/2023), 1,800 mg (11/1/2023), 1,800 mg (11/15/2023), 1,800 mg (11/29/2023), 1,800 mg (12/13/2023), 1,800 mg (12/26/2023)     4/30/2024 -  Chemotherapy    Treatment Summary   Plan Name: OP Myeloma KIA-RD daratumumab lenalidomide dexAMETHasone Q4W  Treatment Goal: Palliative  Status: Active  Start Date: 4/30/2024  End Date: 3/4/2025 (Planned)  Provider: Bri Luevano MD  Chemotherapy: daratumumab-hyaluronidase-[facility]  subcutaneous injection 1,800 mg, 1,800 mg, Subcutaneous, Clinic/HOD 1 time, 4 of 12 cycles  Administration: 1,800 mg (4/30/2024), 1,800 mg (5/7/2024), 1,800 mg (5/21/2024), 1,800 mg (5/28/2024), 1,800 mg (6/4/2024), 1,800 mg (6/25/2024), 1,800 mg (7/9/2024), 1,800 mg (5/14/2024), 1,800 mg (6/11/2024), 1,800 mg (6/18/2024), 1,800 mg (7/30/2024)        Social History     Socioeconomic History    Marital status:     Number of children: 2   Occupational History     Employer: [employer]   Tobacco Use    Smoking status: Every Day     Current packs/day: 0.50     Average packs/day: 0.5 packs/day for 50.0 years (25.0 ttl pk-yrs)     Types: Cigarettes     Passive exposure: Never    Smokeless tobacco: Never   Substance and Sexual Activity    Alcohol use: Not Currently     Comment: quit    Drug use: No    Sexual activity: Not Currently     Partners: Male     Social Determinants of Health     Financial Resource Strain: Low Risk  (11/15/2023)    Overall Financial Resource Strain (CARDIA)     Difficulty of Paying Living Expenses: Not hard at all   Food Insecurity: No Food Insecurity (11/15/2023)    Hunger Vital Sign     Worried About Running Out of Food in the Last Year: Never true     Ran Out of Food in the Last Year: Never true   Transportation Needs: No Transportation Needs (11/15/2023)    PRAPARE - Transportation     Lack of Transportation (Medical): No     Lack of Transportation (Non-Medical): No   Physical Activity: Sufficiently Active (11/15/2023)    Exercise Vital Sign     Days of Exercise per Week: 7 days     Minutes of Exercise per Session: 150+ min   Stress: Stress Concern Present (11/15/2023)    [employer] [employer] - Occupational Stress Questionnaire     Feeling of Stress : To some extent   Housing Stability: Low Risk  (11/15/2023)    Housing Stability Vital Sign     Unable to Pay for Housing in the Last Year: No     Number of Places Lived in the Last Year: 1     Unstable Housing in the Last Year:  No      Family History   Problem Relation Name Age of Onset    Hypertension Mother Vivian     Diabetes Mother Vivian     Asthma Mother Vivian             Diabetes Father      Asthma Father      Stroke Maternal Grandmother  grandmother     Prostate cancer Maternal Grandfather      Mental illness Son Lukasz Garcia     Pancreatic cancer Maternal Uncle      Mental illness Other Pat side     Pancreatic cancer Other Mat side     Ovarian cancer Maternal Cousin        Past Surgical History:   Procedure Laterality Date    APPENDECTOMY      BIOPSY N/A 2023    Procedure: BIOPSY;  Surgeon: Fausto Smith MD;  Location: [facility] OR;  Service: Neurosurgery;  Laterality: N/A;  L1    BUNIONECTOMY      COLONOSCOPY N/A 2019    Procedure: COLONOSCOPY;  Surgeon: Danny Matos III, MD;  Location: [facility] ENDO;  Service: Endoscopy;  Laterality: N/A;    COLONOSCOPY N/A 10/24/2022    Procedure: COLONOSCOPY;  Surgeon: Danny Matos III, MD;  Location: [facility] ENDO;  Service: Endoscopy;  Laterality: N/A;    ESOPHAGOGASTRODUODENOSCOPY N/A 10/24/2022    Procedure: EGD (ESOPHAGOGASTRODUODENOSCOPY);  Surgeon: Danny Matos III, MD;  Location: [facility] ENDO;  Service: Endoscopy;  Laterality: N/A;    FIXATION KYPHOPLASTY Bilateral 2023    Procedure: KYPHOPLASTY;  Surgeon: Fausto Smith MD;  Location: [facility] OR;  Service: Neurosurgery;  Laterality: Bilateral;  kyphoplasty and radiofrequency ablation - L1    HYSTERECTOMY      PARTIAL//still with ovaries    neck fusion  2017    THYROIDECTOMY      TUBAL LIGATION          Review of Systems   Constitutional:  Positive for fatigue. Negative for activity change, appetite change, chills, fever and unexpected weight change.   HENT:  Negative for congestion, dental problem, mouth sores and nosebleeds.    Eyes:  Negative for visual disturbance.   Respiratory:  Negative for cough, choking and chest tightness.    Cardiovascular:  Negative for chest pain,  palpitations and leg swelling.   Gastrointestinal:  Negative for abdominal distention, abdominal pain, anal bleeding, blood in stool, constipation, diarrhea, nausea and vomiting.   Endocrine: Negative.    Genitourinary:  Negative for dysuria, frequency, hematuria and urgency.   Musculoskeletal:  Positive for arthralgias and myalgias. Negative for back pain, gait problem and joint swelling.   Skin:  Negative for rash and wound.   Allergic/Immunologic: Negative for immunocompromised state.   Neurological:  Negative for dizziness, light-headedness, numbness and headaches.   Hematological:  Negative for adenopathy. Does not bruise/bleed easily.   Psychiatric/Behavioral:  The patient is nervous/anxious.        ?   A comprehensive 14-point review of systems was reviewed with patient and was negative other than as specified above.   ?     Objective:      Physical Exam  Vitals reviewed: virtual visit.           ?   There were no vitals filed for this visit.     ?       ?   Laboratory:  ?   No visits with results within 1 Day(s) from this visit.   Latest known visit with results is:   Lab Visit on 07/29/2024   Component Date Value Ref Range Status    WBC 07/29/2024 4.43  3.90 - 12.70 K/uL Final    RBC 07/29/2024 3.50 (L)  4.00 - 5.40 M/uL Final    Hemoglobin 07/29/2024 11.4 (L)  12.0 - 16.0 g/dL Final    Hematocrit 07/29/2024 35.0 (L)  37.0 - 48.5 % Final    MCV 07/29/2024 100 (H)  82 - 98 fL Final    MCH 07/29/2024 32.6 (H)  27.0 - 31.0 pg Final    MCHC 07/29/2024 32.6  32.0 - 36.0 g/dL Final    RDW 07/29/2024 14.3  11.5 - 14.5 % Final    Platelets 07/29/2024 297  150 - 450 K/uL Final    MPV 07/29/2024 9.9  9.2 - 12.9 fL Final    Immature Granulocytes 07/29/2024 0.2  0.0 - 0.5 % Final    Gran # (ANC) 07/29/2024 1.8  1.8 - 7.7 K/uL Final    Immature Grans (Abs) 07/29/2024 0.01  0.00 - 0.04 K/uL Final    Lymph # 07/29/2024 1.5  1.0 - 4.8 K/uL Final    Mono # 07/29/2024 0.8  0.3 - 1.0 K/uL Final    Eos # 07/29/2024 0.2  0.0 -  0.5 K/uL Final    Baso # 07/29/2024 0.11  0.00 - 0.20 K/uL Final    nRBC 07/29/2024 0  0 /100 WBC Final    Gran % 07/29/2024 40.6  38.0 - 73.0 % Final    Lymph % 07/29/2024 34.3  18.0 - 48.0 % Final    Mono % 07/29/2024 17.4 (H)  4.0 - 15.0 % Final    Eosinophil % 07/29/2024 5.0  0.0 - 8.0 % Final    Basophil % 07/29/2024 2.5 (H)  0.0 - 1.9 % Final    Differential Method 07/29/2024 Automated   Final    Sodium 07/29/2024 143  136 - 145 mmol/L Final    Potassium 07/29/2024 3.5  3.5 - 5.1 mmol/L Final    Chloride 07/29/2024 114 (H)  95 - 110 mmol/L Final    CO2 07/29/2024 22 (L)  23 - 29 mmol/L Final    Glucose 07/29/2024 102  70 - 110 mg/dL Final    BUN 07/29/2024 10  8 - 23 mg/dL Final    Creatinine 07/29/2024 1.0  0.5 - 1.4 mg/dL Final    Calcium 07/29/2024 8.0 (L)  8.7 - 10.5 mg/dL Final    Total Protein 07/29/2024 6.7  6.0 - 8.4 g/dL Final    Albumin 07/29/2024 3.5  3.5 - 5.2 g/dL Final    Total Bilirubin 07/29/2024 0.6  0.1 - 1.0 mg/dL Final    Alkaline Phosphatase 07/29/2024 69  55 - 135 U/L Final    AST 07/29/2024 34  10 - 40 U/L Final    ALT 07/29/2024 41  10 - 44 U/L Final    eGFR 07/29/2024 >60  >60 mL/min/1.73 m^2 Final    Anion Gap 07/29/2024 7 (L)  8 - 16 mmol/L Final    Magnesium 07/29/2024 1.7  1.6 - 2.6 mg/dL Final    Phosphorus 07/29/2024 1.2 (L)  2.7 - 4.5 mg/dL Final      ?   Imaging:    No results found. However, due to the size of the patient record, not all encounters were searched. Please check Results Review for a complete set of results.       No results found. However, due to the size of the patient record, not all encounters were searched. Please check Results Review for a complete set of results.           ?   Assessment/Plan:     Problem List Items Addressed This Visit          Oncology    Multiple myeloma - Primary (Chronic)     BMBx : 60 % plasma cells - 4/6/2023  - SPEP/MECHE showed IgG lambda - monoclonal protein 3.19 g/dl - (3/27/2023).  - R-ISS stage I (beta 2 microglobulin 1.9,  albumin 3.5, , normal karyotype and no high-risk mutations on fish panel  - PET-CT ( 4/10/2023) - showed extensive lytic lesions in the axial skeleton and mild L1 compression deformity.  - Started with Induction chemotherapy with VRD regimen (Velcde/Revlimid/Decadron) - 05/10/2023   - Started Aspirin daily p.o for thrombosis prophylaxis; Acyclovir 400 mg p.o BID for herpes Zoster prophylaxis .  __________________________________________________  --S/p Kyphoplasty 06/08/23    Pt seen by transplant team BMT.  However, she has declined transplant at this time and it was recommended that we continue with Rosa-VRD for 6 cycles and then repeat bone marrow biopsy and PET scan. If VGPR or better, then continue with Revlimid maintenance only.    Labs reviewed  Proceed with Darzalex today  Start on Revlimid today  Pt has taken dex as prescribed  Hypophosphatemia--repeat phos-nak x 4 doses  Hypocalcemia--pt currently not on Ca/D supplementation will restart today    Repeat CMP and phosphorus and mag in one week  RTC in two weeks with repeat labs for Darzalex/Zometa    F/u in one week with repeat labs for Darzalex             Relevant Orders    CBC Auto Differential    Comprehensive Metabolic Panel    Magnesium    Phosphorus    Comprehensive Metabolic Panel    Phosphorus     Other Visit Diagnoses       Hypophosphatemia        Relevant Medications    potassium, sodium phosphates (PHOS-NAK) 280-160-250 mg PwPk    Other Relevant Orders    Comprehensive Metabolic Panel    Phosphorus                   Med Onc Chart Routing      Follow up with physician 2 weeks. with repeat labs prior for darzalex/zometa; repeat cmp and phosphorus in one week at    Follow up with WERNER    Infusion scheduling note   darzalex/zometa   Injection scheduling note    Labs CBC, CMP, magnesium and phosphorus   Scheduling:  Preferred lab:  Lab interval:     Imaging    Pharmacy appointment    Other referrals                     KALEN Mckeon  FNP-C  Hematology/Oncology

## 2024-07-30 NOTE — ASSESSMENT & PLAN NOTE
BMBx : 60 % plasma cells - 4/6/2023  - SPEP/MECHE showed IgG lambda - monoclonal protein 3.19 g/dl - (3/27/2023).  - R-ISS stage I (beta 2 microglobulin 1.9, albumin 3.5, , normal karyotype and no high-risk mutations on fish panel  - PET-CT ( 4/10/2023) - showed extensive lytic lesions in the axial skeleton and mild L1 compression deformity.  - Started with Induction chemotherapy with VRD regimen (Velcde/Revlimid/Decadron) - 05/10/2023   - Started Aspirin daily p.o for thrombosis prophylaxis; Acyclovir 400 mg p.o BID for herpes Zoster prophylaxis .  __________________________________________________  --S/p Kyphoplasty 06/08/23    Pt seen by transplant team BMT.  However, she has declined transplant at this time and it was recommended that we continue with Rosa-VRD for 6 cycles and then repeat bone marrow biopsy and PET scan. If VGPR or better, then continue with Revlimid maintenance only.    Labs reviewed  Proceed with Darzalex today  Start on Revlimid today  Pt has taken dex as prescribed  Hypophosphatemia--repeat phos-nak x 4 doses  Hypocalcemia--pt currently not on Ca/D supplementation will restart today    Repeat CMP and phosphorus and mag in one week  RTC in two weeks with repeat labs for Darzalex/Zometa    F/u in one week with repeat labs for Darzalex

## 2024-07-30 NOTE — DISCHARGE INSTRUCTIONS
Thank you for allowing me to care for you today,  MIS LeachN, RN    [facility]  [address] [address]  [phone] phone     [phone] fax  Hours of Operation: Monday- Friday 7:00am- 5:30pm  After hours phone  [phone]  Hematology / Oncology Physicians on call      JUANI Chaudhary Dr., Dr., Dr., Dr., Dr., Dr., Dr., Dr., Dr., Dr., Dr., Dr., Dr., Dr., Dr., BELLE Mckeon, BELLE Deluna, BELLE Page, NP  Please call with any concerns regarding your appointment today.   FALL PREVENTION   Falls often occur due to slipping, tripping or losing your balance. Here are ways to reduce your risk of falling again.   Was there anything that caused your fall that can be fixed, removed or replaced?   Make your home safe by keeping walkways clear of objects you may trip over.   Use non-slip pads under rugs.   Do not walk in poorly lit areas.   Do not stand on chairs or wobbly ladders.   Use caution when reaching overhead or looking upward. This position can cause a loss of balance.   Be sure your shoes fit properly, have non-slip bottoms and are in good condition.   Be cautious when going up and down stairs, curbs, and when walking on uneven sidewalks.   If your balance is poor, consider using a cane or walker.   If your fall was related to alcohol use, stop or limit alcohol intake.   If your fall was related to use of sleeping medicines, talk to your doctor about this. You may need to reduce your dosage at bedtime if you awaken during the night to go to the bathroom.   To reduce the need for nighttime bathroom trips:   Avoid drinking fluids for several hours before going to bed   Empty your bladder before going to bed   Men can keep a urinal at the bedside   © 4650-6021 [employer], [address] [place] [address], [place] [address]. All rights reserved. This information is not intended as a  substitute for professional medical care. Always follow your healthcare professional's instructions.

## 2024-07-30 NOTE — PLAN OF CARE
"Pt is stable, pt administered Darzalex today. Pt voiced she was doing "alright." Pt will follow up in one week. Plan of care reviewed with patient. Discussed if there are any new or ongoing concerns.        Problem: Adult Inpatient Plan of Care  Goal: Plan of Care Review  Outcome: Progressing  Goal: Patient-Specific Goal (Individualized)  7/30/2024 1000 by Karen Bess, RN  Outcome: Progressing  7/30/2024 0959 by Karen Bess, RN  Flowsheets (Taken 7/30/2024 0959)  Individualized Care Needs: Pt chose to sit up, refshresments offered.  Anxieties, Fears or Concerns: Pt concerned about labs.  Goal: Optimal Comfort and Wellbeing  Outcome: Progressing     Problem: Chemotherapy Effects  Goal: Anemia Symptom Improvement  Outcome: Progressing  Goal: Safety Maintained  Outcome: Progressing  Goal: Absence of Hematuria  Outcome: Progressing  Goal: Nausea and Vomiting Relief  Outcome: Progressing  Goal: Neurotoxicity Symptom Control  Outcome: Progressing  Goal: Absence of Infection  Outcome: Progressing  Goal: Absence of Bleeding  Outcome: Progressing     Problem: Fall Injury Risk  Goal: Absence of Fall and Fall-Related Injury  Outcome: Progressing     "

## 2024-07-30 NOTE — NURSING
1017: Darzalex Injection given without difficulties.Bandaid applied. Patient instructed to stay in the clinic for 15 minutes. Patient verbalized understanding and will notify nurse with any complaints.

## 2024-07-31 ENCOUNTER — PATIENT MESSAGE (OUTPATIENT)
Dept: HEMATOLOGY/ONCOLOGY | Facility: CLINIC | Age: 64
End: 2024-07-31
Payer: COMMERCIAL

## 2024-08-02 DIAGNOSIS — J44.89 COPD WITH ASTHMA: ICD-10-CM

## 2024-08-02 DIAGNOSIS — E83.42 HYPOMAGNESEMIA: ICD-10-CM

## 2024-08-02 RX ORDER — LANOLIN ALCOHOL/MO/W.PET/CERES
400 CREAM (GRAM) TOPICAL
Qty: 90 TABLET | Refills: 1 | Status: SHIPPED | OUTPATIENT
Start: 2024-08-02

## 2024-08-05 RX ORDER — FLUTICASONE PROPIONATE AND SALMETEROL 250; 50 UG/1; UG/1
1 POWDER RESPIRATORY (INHALATION) 2 TIMES DAILY
Qty: 180 EACH | Refills: 1 | Status: SHIPPED | OUTPATIENT
Start: 2024-08-05

## 2024-08-07 DIAGNOSIS — C90.00 MULTIPLE MYELOMA, REMISSION STATUS UNSPECIFIED: Primary | ICD-10-CM

## 2024-08-07 DIAGNOSIS — F41.9 ANXIETY: ICD-10-CM

## 2024-08-07 RX ORDER — ALPRAZOLAM 2 MG/1
2 TABLET ORAL 2 TIMES DAILY PRN
Qty: 60 TABLET | Refills: 0 | Status: SHIPPED | OUTPATIENT
Start: 2024-08-08 | End: 2024-09-07

## 2024-08-12 ENCOUNTER — TELEPHONE (OUTPATIENT)
Dept: HEMATOLOGY/ONCOLOGY | Facility: CLINIC | Age: 64
End: 2024-08-12
Payer: COMMERCIAL

## 2024-08-12 ENCOUNTER — LAB VISIT (OUTPATIENT)
Dept: LAB | Facility: HOSPITAL | Age: 64
End: 2024-08-12
Payer: COMMERCIAL

## 2024-08-12 DIAGNOSIS — C90.00 MULTIPLE MYELOMA, REMISSION STATUS UNSPECIFIED: Chronic | ICD-10-CM

## 2024-08-12 LAB
ALBUMIN SERPL BCP-MCNC: 3.7 G/DL (ref 3.5–5.2)
ALP SERPL-CCNC: 62 U/L (ref 55–135)
ALT SERPL W/O P-5'-P-CCNC: 18 U/L (ref 10–44)
ANION GAP SERPL CALC-SCNC: 9 MMOL/L (ref 8–16)
AST SERPL-CCNC: 22 U/L (ref 10–40)
BASOPHILS # BLD AUTO: 0.05 K/UL (ref 0–0.2)
BASOPHILS NFR BLD: 0.8 % (ref 0–1.9)
BILIRUB SERPL-MCNC: 0.8 MG/DL (ref 0.1–1)
BUN SERPL-MCNC: 15 MG/DL (ref 8–23)
CALCIUM SERPL-MCNC: 8.4 MG/DL (ref 8.7–10.5)
CHLORIDE SERPL-SCNC: 110 MMOL/L (ref 95–110)
CO2 SERPL-SCNC: 23 MMOL/L (ref 23–29)
CREAT SERPL-MCNC: 1 MG/DL (ref 0.5–1.4)
DIFFERENTIAL METHOD BLD: ABNORMAL
EOSINOPHIL # BLD AUTO: 0.6 K/UL (ref 0–0.5)
EOSINOPHIL NFR BLD: 9.8 % (ref 0–8)
ERYTHROCYTE [DISTWIDTH] IN BLOOD BY AUTOMATED COUNT: 13.1 % (ref 11.5–14.5)
EST. GFR  (NO RACE VARIABLE): >60 ML/MIN/1.73 M^2
GLUCOSE SERPL-MCNC: 104 MG/DL (ref 70–110)
HCT VFR BLD AUTO: 36.2 % (ref 37–48.5)
HGB BLD-MCNC: 11.9 G/DL (ref 12–16)
IMM GRANULOCYTES # BLD AUTO: 0.01 K/UL (ref 0–0.04)
IMM GRANULOCYTES NFR BLD AUTO: 0.2 % (ref 0–0.5)
LYMPHOCYTES # BLD AUTO: 2.2 K/UL (ref 1–4.8)
LYMPHOCYTES NFR BLD: 34.6 % (ref 18–48)
MAGNESIUM SERPL-MCNC: 1.5 MG/DL (ref 1.6–2.6)
MCH RBC QN AUTO: 32.2 PG (ref 27–31)
MCHC RBC AUTO-ENTMCNC: 32.9 G/DL (ref 32–36)
MCV RBC AUTO: 98 FL (ref 82–98)
MONOCYTES # BLD AUTO: 1.1 K/UL (ref 0.3–1)
MONOCYTES NFR BLD: 17.3 % (ref 4–15)
NEUTROPHILS # BLD AUTO: 2.4 K/UL (ref 1.8–7.7)
NEUTROPHILS NFR BLD: 37.3 % (ref 38–73)
NRBC BLD-RTO: 0 /100 WBC
PHOSPHATE SERPL-MCNC: 2.2 MG/DL (ref 2.7–4.5)
PLATELET # BLD AUTO: 205 K/UL (ref 150–450)
PMV BLD AUTO: 9.7 FL (ref 9.2–12.9)
POTASSIUM SERPL-SCNC: 3.3 MMOL/L (ref 3.5–5.1)
PROT SERPL-MCNC: 6.8 G/DL (ref 6–8.4)
RBC # BLD AUTO: 3.7 M/UL (ref 4–5.4)
SODIUM SERPL-SCNC: 142 MMOL/L (ref 136–145)
WBC # BLD AUTO: 6.35 K/UL (ref 3.9–12.7)

## 2024-08-12 PROCEDURE — 80053 COMPREHEN METABOLIC PANEL: CPT

## 2024-08-12 PROCEDURE — 83735 ASSAY OF MAGNESIUM: CPT

## 2024-08-12 PROCEDURE — 84100 ASSAY OF PHOSPHORUS: CPT

## 2024-08-12 PROCEDURE — 85025 COMPLETE CBC W/AUTO DIFF WBC: CPT

## 2024-08-12 PROCEDURE — 36415 COLL VENOUS BLD VENIPUNCTURE: CPT

## 2024-08-12 NOTE — TELEPHONE ENCOUNTER
Pt requested to speak with swer on today, however swer was unable to meet with pt during pt requested time.       11:26 - Swer called pt ([phone]). Pt reports she needs a letter stating she is currently on chemotherapy and that she has an active cancer dx. Pt reports this is for [employer]. Swer will draft for MD review/signature. Pt reports she will be at the [facility] on . Swer will have letter signed by MD on tm and have copy available for pt pickup with assistance of co-workers.

## 2024-08-13 ENCOUNTER — HOSPITAL ENCOUNTER (OUTPATIENT)
Dept: RADIOLOGY | Facility: HOSPITAL | Age: 64
Discharge: HOME | End: 2024-08-13
Payer: COMMERCIAL

## 2024-08-13 ENCOUNTER — DOCUMENTATION ONLY (OUTPATIENT)
Dept: HEMATOLOGY/ONCOLOGY | Facility: CLINIC | Age: 64
End: 2024-08-13
Payer: COMMERCIAL

## 2024-08-13 ENCOUNTER — OFFICE VISIT (OUTPATIENT)
Dept: HEMATOLOGY/ONCOLOGY | Facility: CLINIC | Age: 64
End: 2024-08-13
Payer: COMMERCIAL

## 2024-08-13 ENCOUNTER — INFUSION (OUTPATIENT)
Dept: INFUSION THERAPY | Facility: HOSPITAL | Age: 64
End: 2024-08-13
Attending: INTERNAL MEDICINE
Payer: COMMERCIAL

## 2024-08-13 VITALS
HEART RATE: 67 BPM | SYSTOLIC BLOOD PRESSURE: 116 MMHG | WEIGHT: 127.19 LBS | OXYGEN SATURATION: 99 % | RESPIRATION RATE: 16 BRPM | DIASTOLIC BLOOD PRESSURE: 71 MMHG | BODY MASS INDEX: 21.71 KG/M2 | TEMPERATURE: 99 F | HEIGHT: 64 IN

## 2024-08-13 DIAGNOSIS — E83.39 HYPOPHOSPHATEMIA: ICD-10-CM

## 2024-08-13 DIAGNOSIS — E03.9 ACQUIRED HYPOTHYROIDISM: ICD-10-CM

## 2024-08-13 DIAGNOSIS — E83.51 HYPOCALCEMIA: ICD-10-CM

## 2024-08-13 DIAGNOSIS — M79.641 BILATERAL HAND PAIN: Primary | ICD-10-CM

## 2024-08-13 DIAGNOSIS — C90.00 MULTIPLE MYELOMA, REMISSION STATUS UNSPECIFIED: ICD-10-CM

## 2024-08-13 DIAGNOSIS — E87.6 HYPOKALEMIA: ICD-10-CM

## 2024-08-13 DIAGNOSIS — C90.00 MULTIPLE MYELOMA, REMISSION STATUS UNSPECIFIED: Primary | ICD-10-CM

## 2024-08-13 DIAGNOSIS — M79.641 BILATERAL HAND PAIN: ICD-10-CM

## 2024-08-13 DIAGNOSIS — Z76.89 ENCOUNTER TO ESTABLISH CARE WITH NEW DOCTOR: ICD-10-CM

## 2024-08-13 DIAGNOSIS — E03.9 PRIMARY HYPOTHYROIDISM: ICD-10-CM

## 2024-08-13 DIAGNOSIS — M79.642 BILATERAL HAND PAIN: Primary | ICD-10-CM

## 2024-08-13 DIAGNOSIS — E83.42 HYPOMAGNESEMIA: ICD-10-CM

## 2024-08-13 DIAGNOSIS — M79.642 BILATERAL HAND PAIN: ICD-10-CM

## 2024-08-13 PROCEDURE — 99215 OFFICE O/P EST HI 40 MIN: CPT | Mod: 95,,,

## 2024-08-13 PROCEDURE — 96401 CHEMO ANTI-NEOPL SQ/IM: CPT

## 2024-08-13 PROCEDURE — 73130 X-RAY EXAM OF HAND: CPT | Mod: TC,50

## 2024-08-13 PROCEDURE — 4010F ACE/ARB THERAPY RXD/TAKEN: CPT | Mod: CPTII,95,,

## 2024-08-13 PROCEDURE — G2211 COMPLEX E/M VISIT ADD ON: HCPCS | Mod: 95,,,

## 2024-08-13 PROCEDURE — 63600175 PHARM REV CODE 636 W HCPCS: Mod: JZ,JG

## 2024-08-13 PROCEDURE — 73130 X-RAY EXAM OF HAND: CPT | Mod: 26,,, | Performed by: RADIOLOGY

## 2024-08-13 RX ORDER — EPINEPHRINE 0.3 MG/.3ML
0.3 INJECTION SUBCUTANEOUS ONCE AS NEEDED
Status: CANCELLED | OUTPATIENT
Start: 2024-08-13

## 2024-08-13 RX ORDER — SODIUM,POTASSIUM PHOSPHATES 280-250MG
1 POWDER IN PACKET (EA) ORAL
Qty: 4 PACKET | Refills: 0 | Status: SHIPPED | OUTPATIENT
Start: 2024-08-13

## 2024-08-13 RX ORDER — SODIUM CHLORIDE 0.9 % (FLUSH) 0.9 %
10 SYRINGE (ML) INJECTION
Status: CANCELLED | OUTPATIENT
Start: 2024-08-13

## 2024-08-13 RX ORDER — DIPHENHYDRAMINE HYDROCHLORIDE 50 MG/ML
50 INJECTION INTRAMUSCULAR; INTRAVENOUS ONCE AS NEEDED
Status: CANCELLED | OUTPATIENT
Start: 2024-08-13

## 2024-08-13 RX ORDER — LEVOTHYROXINE SODIUM 100 UG/1
100 TABLET ORAL
Qty: 90 TABLET | Refills: 1 | Status: SHIPPED | OUTPATIENT
Start: 2024-08-13

## 2024-08-13 RX ORDER — DEXAMETHASONE 4 MG/1
40 TABLET ORAL
Status: CANCELLED
Start: 2024-08-13

## 2024-08-13 RX ORDER — HEPARIN 100 UNIT/ML
500 SYRINGE INTRAVENOUS
Status: CANCELLED | OUTPATIENT
Start: 2024-08-13

## 2024-08-13 RX ORDER — PROCHLORPERAZINE EDISYLATE 5 MG/ML
10 INJECTION INTRAMUSCULAR; INTRAVENOUS ONCE AS NEEDED
Status: CANCELLED | OUTPATIENT
Start: 2024-08-13

## 2024-08-13 RX ORDER — DEXAMETHASONE 4 MG/1
40 TABLET ORAL
Status: COMPLETED | OUTPATIENT
Start: 2024-08-13 | End: 2024-08-13

## 2024-08-13 RX ADMIN — DEXAMETHASONE 40 MG: 4 TABLET ORAL at 11:08

## 2024-08-13 RX ADMIN — DARATUMUMAB AND HYALURONIDASE-FIHJ (HUMAN RECOMBINANT) 1800 MG: 1800; 30000 INJECTION SUBCUTANEOUS at 11:08

## 2024-08-13 NOTE — ASSESSMENT & PLAN NOTE
Pt notes out of levothyroxine for last 3 days   She does not currently have PCP at this time  --referral placed  Levothyroxine refilled--will repeat labs with next visit

## 2024-08-13 NOTE — ASSESSMENT & PLAN NOTE
BMBx : 60 % plasma cells - 4/6/2023  - SPEP/MECHE showed IgG lambda - monoclonal protein 3.19 g/dl - (3/27/2023).  - R-ISS stage I (beta 2 microglobulin 1.9, albumin 3.5, , normal karyotype and no high-risk mutations on fish panel  - PET-CT ( 4/10/2023) - showed extensive lytic lesions in the axial skeleton and mild L1 compression deformity.  - Started with Induction chemotherapy with VRD regimen (Velcde/Revlimid/Decadron) - 05/10/2023   - Started Aspirin daily p.o for thrombosis prophylaxis; Acyclovir 400 mg p.o BID for herpes Zoster prophylaxis .  __________________________________________________  --S/p Kyphoplasty 06/08/23    Pt seen by transplant team BMT.  However, she has declined transplant at this time and it was recommended that we continue with Rosa-VRD for 6 cycles and then repeat bone marrow biopsy and PET scan. If VGPR or better, then continue with Revlimid maintenance only.    Labs reviewed  Proceed with Darzalex today  Continues on Revlimid utd  Pt has not taken dex as prescribed  Hypophosphatemia improved from prior--repeat phos-nak x 4 doses  Hypocalcemia--pt currently on Ca/D supplementation--improved from prior--continue  Plan for imaging of bilateral hands today, if possible    RTC in two weeks with repeat labs for Darzalex/Zometa

## 2024-08-13 NOTE — ASSESSMENT & PLAN NOTE
Lab Results   Component Value Date    K 3.3 (L) 08/12/2024     Continue on oral supplementation daily

## 2024-08-13 NOTE — PLAN OF CARE
Patient tolerated Darzalex well today; no adverse reaction noted.  POC reviewed with pt.  NAD noted upon discharge.   Has f/u appt(s) scheduled per MD request.    Problem: Adult Inpatient Plan of Care  Goal: Plan of Care Review  8/13/2024 1250 by Misti Max RN  Outcome: Progressing  8/13/2024 1249 by Misti Max RN  Outcome: Progressing  Goal: Patient-Specific Goal (Individualized)  8/13/2024 1250 by Misti Max RN  Outcome: Progressing  8/13/2024 1249 by Misti Max RN  Outcome: Progressing  Goal: Optimal Comfort and Wellbeing  8/13/2024 1250 by Misti Max RN  Outcome: Progressing  8/13/2024 1249 by Misti Max RN  Outcome: Progressing  Intervention: Provide Person-Centered Care  Flowsheets (Taken 8/13/2024 1250)  Trust Relationship/Rapport:   care explained   reassurance provided   choices provided   thoughts/feelings acknowledged   emotional support provided   empathic listening provided   questions answered   questions encouraged  Goal: Absence of Hospital-Acquired Illness or Injury  Outcome: Progressing  Intervention: Identify and Manage Fall Risk  Flowsheets (Taken 8/13/2024 1250)  Safety Promotion/Fall Prevention: in recliner, wheels locked

## 2024-08-13 NOTE — ASSESSMENT & PLAN NOTE
Lab Results   Component Value Date    CALCIUM 8.4 (L) 08/12/2024    PHOS 2.2 (L) 08/12/2024     Continue on oral supplementation daily   Improved from prior

## 2024-08-13 NOTE — PROGRESS NOTES
Subjective:     Patient ID:?Ana Bonilla is a 64 y.o. female.?? MR#: [id number]   ?   PRIMARY ONCOLOGIST: -->  ?   CHIEF COMPLAINT: lab review/assessment for chemo/MM ?????   ?   ONCOLOGIC DIAGNOSIS: Multiple Myeloma  ?   CURRENT TREATMENT: OP Myeloma KIA-RD daratumumab lenalidomide dexAMETHasone Q4W     The patient location is: [place]  The chief complaint leading to consultation is: follow-up    Visit type: audio only    Face to Face time with patient: 19    20  minutes of total time spent on the encounter, which includes face to face time and non-face to face time preparing to see the patient (eg, review of tests), Obtaining and/or reviewing separately obtained history, Documenting clinical information in the electronic or other health record, Independently interpreting results (not separately reported) and communicating results to the patient/family/caregiver, or Care coordination (not separately reported).         Each patient to whom he or she provides medical services by telemedicine is:  (1) informed of the relationship between the physician and patient and the respective role of any other health care provider with respect to management of the patient; and (2) notified that he or she may decline to receive medical services by telemedicine and may withdraw from such care at any time.    Notes:    HPI  Ms. Bonilla is a 62-year-old  female with past medical history significant for hypertension, COPD, asthma, GERD, hypothyroidism here for follow-up and management of multiple myeloma. BMBx on 4/6/2023 showed 60 % plasma cells. PET-CT on 4/10/2023 showed extensive lytic bony lesions throughout the spine, sternum, bilateral ribs, pelvis and mild compression deformity in L1 vertebral body. SPEP/MECHE on 3/27/2023 showed IgG lambda monoclonal protein - 3.19 g/dl. Quantitative immunoglobulins on 3/27/2023 showed IgG 4,521 mg/dl. UPEP/MECHE and FLC were pending at that time. Labs on 3/27/2023 showed Beta  2 microglobulin 1.9, .  Labs on 4/19/2023 showed Hb, 11.5, WBC 6.3, platelets, BUN 11, Cr 0.9, calcium 9. Pt initiated on VRD 05/10/23. Treatment planned changed to D-VRD 07/06/23.     Interval History: Pt presents for lab review and assessment prior to Darzalex.  Pt states overall she is feeling tired. Denies n/v/d/c, fever, chills, sob, cp, abnormal bleeding. She notes appetite waxes and wanes; She continues on Mag daily, Ca, and K supplementation. Pt c/o bilateral hand pain which has persisted for the last month. She also  notes back pain for the last couple of days which may be related to work--advised to pay close attention to this and with worsening we will reimage.     Oncology History   Multiple myeloma   4/20/2023 Initial Diagnosis    Multiple myeloma     5/10/2023 - 6/28/2023 Chemotherapy    Treatment Summary   Plan Name: OP VRD - WEEKLY BORTEZOMIB LENALIDOMIDE DEXAMETHASONE Q3W  Treatment Goal: Palliative  Status: Inactive  Start Date: 5/10/2023  End Date: 6/28/2023  Provider: Abram Velasquez MD  Chemotherapy: bortezomib (VELCADE) injection 2 mg, 1.3 mg/m2 = 2 mg, Subcutaneous, Clinic[facility] 1 time, 2 of 6 cycles  Administration: 2 mg (5/10/2023), 2 mg (5/17/2023), 2 mg (6/14/2023), 2 mg (5/31/2023), 2 mg (6/21/2023), 2 mg (6/28/2023)  lenalidomide 25 mg Cap, 25 mg, Oral, Daily, 1 of 1 cycle, Start date: 5/10/2023, End date: 5/17/2023 6/28/2023 Cancer Staged    Staging form: Plasma Cell Myeloma and Plasma Cell Disorders, AJCC 8th Edition  - Clinical stage from 6/28/2023: RISS Stage II (Beta-2-microglobulin (mg/L): 1.9, Albumin (g/dL): 3, ISS: Stage II, High-risk cytogenetics: Absent, LDH: Normal)     7/6/2023 - 1/3/2024 Chemotherapy    Treatment Summary   Plan Name: OP D-VRD DARATUMUMAB + BORTEZOMIB LENALIDOMIDE DEXAMETHASONE  Treatment Goal: Palliative  Status: Inactive  Start Date: 7/6/2023  End Date: 1/3/2024  Provider: Oscar Márquez MD  Chemotherapy: bortezomib (VELCADE) injection 2 mg, 1.3  mg/m2 = 2 mg, Subcutaneous, [facility] 1 time, 6 of 6 cycles  Administration: 2 mg (7/6/2023), 2 mg (7/12/2023), 2 mg (8/2/2023), 2 mg (8/9/2023), 2.25 mg (10/18/2023), 2 mg (7/19/2023), 2 mg (7/26/2023), 2 mg (8/16/2023), 2.25 mg (9/20/2023), 2.25 mg (9/27/2023), 2.25 mg (10/25/2023), 2.25 mg (11/1/2023), 2.25 mg (11/8/2023), 2.25 mg (12/6/2023), 2.25 mg (1/3/2024), 2.25 mg (11/15/2023), 2.25 mg (11/22/2023), 2.25 mg (11/29/2023), 2.25 mg (12/13/2023), 2.25 mg (12/20/2023), 2.25 mg (12/26/2023)  lenalidomide 10 mg Cap, 1 capsule (100 % of original dose 1 capsule), Oral, Daily, 1 of 1 cycle, Start date: 7/26/2023, End date: 8/2/2023  Dose modification: 1 capsule (original dose 1 capsule, Cycle 0)  daratumumab-hyaluronidase-fij subcutaneous injection 1,800 mg, 1,800 mg (100 % of original dose 1,800 mg), Subcutaneous, [facility] 1 time, 6 of 6 cycles  Dose modification: 1,800 mg (original dose 1,800 mg, Cycle 1), 1,800 mg (original dose 1,800 mg, Cycle 1)  Administration: 1,800 mg (7/6/2023), 1,800 mg (7/12/2023), 1,800 mg (7/19/2023), 1,800 mg (7/26/2023), 1,800 mg (8/2/2023), 1,800 mg (8/9/2023), 1,800 mg (8/16/2023), 1,800 mg (9/27/2023), 1,800 mg (10/18/2023), 1,800 mg (11/1/2023), 1,800 mg (11/15/2023), 1,800 mg (11/29/2023), 1,800 mg (12/13/2023), 1,800 mg (12/26/2023)     4/30/2024 -  Chemotherapy    Treatment Summary   Plan Name: OP Myeloma KIA-RD daratumumab lenalidomide dexAMETHasone Q4W  Treatment Goal: Palliative  Status: Active  Start Date: 4/30/2024  End Date: 3/11/2025 (Planned)  Provider: Bri Luevano MD  Chemotherapy: daratumumab-hyaluronidase-fij subcutaneous injection 1,800 mg, 1,800 mg, Subcutaneous, [facility] 1 time, 4 of 12 cycles  Administration: 1,800 mg (4/30/2024), 1,800 mg (5/7/2024), 1,800 mg (5/21/2024), 1,800 mg (5/28/2024), 1,800 mg (6/4/2024), 1,800 mg (6/25/2024), 1,800 mg (7/9/2024), 1,800 mg (5/14/2024), 1,800 mg (6/11/2024), 1,800 mg (6/18/2024), 1,800 mg (7/30/2024), 1,800  mg (2024)        Social History     Socioeconomic History    Marital status:     Number of children: 2   Occupational History     Employer: [employer]   Tobacco Use    Smoking status: Every Day     Current packs/day: 0.50     Average packs/day: 0.5 packs/day for 50.0 years (25.0 ttl pk-yrs)     Types: Cigarettes     Passive exposure: Never    Smokeless tobacco: Never   Substance and Sexual Activity    Alcohol use: Not Currently     Comment: quit    Drug use: No    Sexual activity: Not Currently     Partners: Male     Social Determinants of Health     Financial Resource Strain: Low Risk  (11/15/2023)    Overall Financial Resource Strain (CARDIA)     Difficulty of Paying Living Expenses: Not hard at all   Food Insecurity: No Food Insecurity (11/15/2023)    Hunger Vital Sign     Worried About Running Out of Food in the Last Year: Never true     Ran Out of Food in the Last Year: Never true   Transportation Needs: No Transportation Needs (11/15/2023)    PRAPARE - Transportation     Lack of Transportation (Medical): No     Lack of Transportation (Non-Medical): No   Physical Activity: Sufficiently Active (11/15/2023)    Exercise Vital Sign     Days of Exercise per Week: 7 days     Minutes of Exercise per Session: 150+ min   Stress: Stress Concern Present (11/15/2023)    [employer] [place] [employer] - Occupational Stress Questionnaire     Feeling of Stress : To some extent   Housing Stability: Low Risk  (11/15/2023)    Housing Stability Vital Sign     Unable to Pay for Housing in the Last Year: No     Number of Places Lived in the Last Year: 1     Unstable Housing in the Last Year: No      Family History   Problem Relation Name Age of Onset    Hypertension Mother Vivian     Diabetes Mother Vivian     Asthma Mother Vivian             Diabetes Father      Asthma Father      Stroke Maternal Grandmother  grandmother     Prostate cancer Maternal Grandfather      Mental illness  Son Lukasz Garcia     Pancreatic cancer Maternal Uncle      Mental illness Other Pat side     Pancreatic cancer Other Mat side     Ovarian cancer Maternal Cousin        Past Surgical History:   Procedure Laterality Date    APPENDECTOMY      BIOPSY N/A 06/08/2023    Procedure: BIOPSY;  Surgeon: Fausto Smith MD;  Location: [facility] OR;  Service: Neurosurgery;  Laterality: N/A;  L1    BUNIONECTOMY      COLONOSCOPY N/A 12/04/2019    Procedure: COLONOSCOPY;  Surgeon: Danny Matos III, MD;  Location: [facility] ENDO;  Service: Endoscopy;  Laterality: N/A;    COLONOSCOPY N/A 10/24/2022    Procedure: COLONOSCOPY;  Surgeon: Danny Matos III, MD;  Location: [facility] ENDO;  Service: Endoscopy;  Laterality: N/A;    ESOPHAGOGASTRODUODENOSCOPY N/A 10/24/2022    Procedure: EGD (ESOPHAGOGASTRODUODENOSCOPY);  Surgeon: Danny Matos III, MD;  Location: [facility] ENDO;  Service: Endoscopy;  Laterality: N/A;    FIXATION KYPHOPLASTY Bilateral 06/08/2023    Procedure: KYPHOPLASTY;  Surgeon: Fuasto Smith MD;  Location: [facility] OR;  Service: Neurosurgery;  Laterality: Bilateral;  kyphoplasty and radiofrequency ablation - L1    HYSTERECTOMY      PARTIAL//still with ovaries    neck fusion  08/2017    THYROIDECTOMY      TUBAL LIGATION          Review of Systems   Constitutional:  Positive for fatigue. Negative for activity change, appetite change, chills, fever and unexpected weight change.   HENT:  Negative for congestion, dental problem, mouth sores and nosebleeds.    Eyes:  Negative for visual disturbance.   Respiratory:  Negative for cough, choking and chest tightness.    Cardiovascular:  Negative for chest pain, palpitations and leg swelling.   Gastrointestinal:  Negative for abdominal distention, abdominal pain, anal bleeding, blood in stool, constipation, diarrhea, nausea and vomiting.   Endocrine: Negative.    Genitourinary:  Negative for dysuria, frequency, hematuria and urgency.   Musculoskeletal:  Positive for arthralgias and  myalgias. Negative for back pain, gait problem and joint swelling.   Skin:  Negative for rash and wound.   Allergic/Immunologic: Negative for immunocompromised state.   Neurological:  Negative for dizziness, light-headedness, numbness and headaches.   Hematological:  Negative for adenopathy. Does not bruise/bleed easily.   Psychiatric/Behavioral:  The patient is nervous/anxious.        ?   A comprehensive 14-point review of systems was reviewed with patient and was negative other than as specified above.   ?     Objective:      Physical Exam  Vitals reviewed: virtual visit.           ?   There were no vitals filed for this visit.     ?       ?   Laboratory:  ?   No visits with results within 1 Day(s) from this visit.   Latest known visit with results is:   Lab Visit on 08/12/2024   Component Date Value Ref Range Status    WBC 08/12/2024 6.35  3.90 - 12.70 K/uL Final    RBC 08/12/2024 3.70 (L)  4.00 - 5.40 M/uL Final    Hemoglobin 08/12/2024 11.9 (L)  12.0 - 16.0 g/dL Final    Hematocrit 08/12/2024 36.2 (L)  37.0 - 48.5 % Final    MCV 08/12/2024 98  82 - 98 fL Final    MCH 08/12/2024 32.2 (H)  27.0 - 31.0 pg Final    MCHC 08/12/2024 32.9  32.0 - 36.0 g/dL Final    RDW 08/12/2024 13.1  11.5 - 14.5 % Final    Platelets 08/12/2024 205  150 - 450 K/uL Final    MPV 08/12/2024 9.7  9.2 - 12.9 fL Final    Immature Granulocytes 08/12/2024 0.2  0.0 - 0.5 % Final    Gran # (ANC) 08/12/2024 2.4  1.8 - 7.7 K/uL Final    Immature Grans (Abs) 08/12/2024 0.01  0.00 - 0.04 K/uL Final    Lymph # 08/12/2024 2.2  1.0 - 4.8 K/uL Final    Mono # 08/12/2024 1.1 (H)  0.3 - 1.0 K/uL Final    Eos # 08/12/2024 0.6 (H)  0.0 - 0.5 K/uL Final    Baso # 08/12/2024 0.05  0.00 - 0.20 K/uL Final    nRBC 08/12/2024 0  0 /100 WBC Final    Gran % 08/12/2024 37.3 (L)  38.0 - 73.0 % Final    Lymph % 08/12/2024 34.6  18.0 - 48.0 % Final    Mono % 08/12/2024 17.3 (H)  4.0 - 15.0 % Final    Eosinophil % 08/12/2024 9.8 (H)  0.0 - 8.0 % Final    Basophil %  08/12/2024 0.8  0.0 - 1.9 % Final    Differential Method 08/12/2024 Automated   Final    Sodium 08/12/2024 142  136 - 145 mmol/L Final    Potassium 08/12/2024 3.3 (L)  3.5 - 5.1 mmol/L Final    Chloride 08/12/2024 110  95 - 110 mmol/L Final    CO2 08/12/2024 23  23 - 29 mmol/L Final    Glucose 08/12/2024 104  70 - 110 mg/dL Final    BUN 08/12/2024 15  8 - 23 mg/dL Final    Creatinine 08/12/2024 1.0  0.5 - 1.4 mg/dL Final    Calcium 08/12/2024 8.4 (L)  8.7 - 10.5 mg/dL Final    Total Protein 08/12/2024 6.8  6.0 - 8.4 g/dL Final    Albumin 08/12/2024 3.7  3.5 - 5.2 g/dL Final    Total Bilirubin 08/12/2024 0.8  0.1 - 1.0 mg/dL Final    Alkaline Phosphatase 08/12/2024 62  55 - 135 U/L Final    AST 08/12/2024 22  10 - 40 U/L Final    ALT 08/12/2024 18  10 - 44 U/L Final    eGFR 08/12/2024 >60  >60 mL/min/1.73 m^2 Final    Anion Gap 08/12/2024 9  8 - 16 mmol/L Final    Magnesium 08/12/2024 1.5 (L)  1.6 - 2.6 mg/dL Final    Phosphorus 08/12/2024 2.2 (L)  2.7 - 4.5 mg/dL Final      ?   Imaging:    No results found. However, due to the size of the patient record, not all encounters were searched. Please check Results Review for a complete set of results.       No results found. However, due to the size of the patient record, not all encounters were searched. Please check Results Review for a complete set of results.           ?   Assessment/Plan:     Problem List Items Addressed This Visit          Renal/    Hypokalemia     Lab Results   Component Value Date    K 3.3 (L) 08/12/2024     Continue on oral supplementation daily          Hypocalcemia     Lab Results   Component Value Date    CALCIUM 8.4 (L) 08/12/2024    PHOS 2.2 (L) 08/12/2024     Continue on oral supplementation daily   Improved from prior            Oncology    Multiple myeloma (Chronic)     BMBx : 60 % plasma cells - 4/6/2023  - SPEP/MECHE showed IgG lambda - monoclonal protein 3.19 g/dl - (3/27/2023).  - R-ISS stage I (beta 2 microglobulin 1.9, albumin 3.5,  , normal karyotype and no high-risk mutations on fish panel  - PET-CT ( 4/10/2023) - showed extensive lytic lesions in the axial skeleton and mild L1 compression deformity.  - Started with Induction chemotherapy with VRD regimen (Velcde/Revlimid/Decadron) - 05/10/2023   - Started Aspirin daily p.o for thrombosis prophylaxis; Acyclovir 400 mg p.o BID for herpes Zoster prophylaxis .  __________________________________________________  --S/p Kyphoplasty 06/08/23    Pt seen by transplant team BMT.  However, she has declined transplant at this time and it was recommended that we continue with Rosa-VRD for 6 cycles and then repeat bone marrow biopsy and PET scan. If VGPR or better, then continue with Revlimid maintenance only.    Labs reviewed  Proceed with Darzalex today  Continues on Revlimid utd  Pt has not taken dex as prescribed  Hypophosphatemia improved from prior--repeat phos-nak x 4 doses  Hypocalcemia--pt currently on Ca/D supplementation--improved from prior--continue  Plan for imaging of bilateral hands today, if possible    RTC in two weeks with repeat labs for Darzalex/Zometa                 Relevant Orders    CBC Auto Differential    Comprehensive Metabolic Panel    Phosphorus    Magnesium    TSH       Endocrine    Acquired hypothyroidism     Pt notes out of levothyroxine for last 3 days   She does not currently have PCP at this time  --referral placed  Levothyroxine refilled--will repeat labs with next visit            Relevant Medications    levothyroxine (SYNTHROID) 100 MCG tablet     Other Visit Diagnoses       Bilateral hand pain    -  Primary    Relevant Orders    X-Ray Hand 3 View Bilateral (Completed)    CBC Auto Differential    Comprehensive Metabolic Panel    Phosphorus    Magnesium    TSH    Encounter to establish care with new doctor        Relevant Orders    Ambulatory referral/consult to Internal Medicine    CBC Auto Differential    Comprehensive Metabolic Panel    Phosphorus     Magnesium    TSH    Primary hypothyroidism        Relevant Medications    levothyroxine (SYNTHROID) 100 MCG tablet    Other Relevant Orders    TSH    T3, FREE    T4, FREE    Hypophosphatemia        Relevant Medications    potassium, sodium phosphates (PHOS-NAK) 280-160-250 mg PwPk    Other Relevant Orders    CBC Auto Differential    Comprehensive Metabolic Panel    Phosphorus    Magnesium    TSH    Hypomagnesemia        Relevant Orders    CBC Auto Differential    Comprehensive Metabolic Panel    Phosphorus    Magnesium    TSH                   Med Onc Chart Routing      Follow up with physician 2 weeks. with labs prior for darzalex   Follow up with WERNER    Infusion scheduling note   darzalex   Injection scheduling note    Labs CBC, CMP, magnesium, phosphorus, free T4, TSH and other   Scheduling:  Preferred lab:  Lab interval:     Imaging    Pharmacy appointment    Other referrals                     [facility]  Hematology/Oncology

## 2024-08-13 NOTE — NURSING
Patient forgot to take steroid prior to coming today. Steroid was ordered by Natividad Mckeon NP. Steroid given and 30 minute wait time before darzalex injection.

## 2024-08-14 NOTE — PROGRESS NOTES
NEERAJ provided patient signed letter from MD. Pt asked NEERAJ to fax letter for her. NEERAJ faxed letter and provided her original for her records.

## 2024-08-20 ENCOUNTER — TELEPHONE (OUTPATIENT)
Dept: PHARMACY | Facility: CLINIC | Age: 64
End: 2024-08-20
Payer: COMMERCIAL

## 2024-08-20 NOTE — TELEPHONE ENCOUNTER
[facility]/Population Health Chart Review & Patient Outreach Details For Medication Adherence Project    Reason for Outreach Encounter: 3rd Party payor non-compliance report ([employer], [employer], C, etc)    2.  Patient Outreach Method: Reviewed patient chart     3.   Medication in question:   Hypertension Medications               amlodipine-benazepril 2.5-10 mg (LOTREL) 2.5-10 mg per capsule TAKE 1 CAPSULE BY MOUTH EVERY DAY           Hyperlipidemia Medications               rosuvastatin (CRESTOR) 10 MG tablet Take 1 tablet (10 mg total) by mouth every evening.                LAST FILLED: 7/3/24 for 90 day supply - rosuvastatin  Lotrel LF 8/7/24 for 90 ds    4.  Reviewed and or Updates Made To: Patient Chart    5. Outreach Outcomes and/or actions taken: Patient filled medication and is on track to be adherent    Additional Notes:

## 2024-08-26 ENCOUNTER — LAB VISIT (OUTPATIENT)
Dept: LAB | Facility: HOSPITAL | Age: 64
End: 2024-08-26
Payer: COMMERCIAL

## 2024-08-26 DIAGNOSIS — E83.42 HYPOMAGNESEMIA: ICD-10-CM

## 2024-08-26 DIAGNOSIS — C90.00 MULTIPLE MYELOMA, REMISSION STATUS UNSPECIFIED: ICD-10-CM

## 2024-08-26 DIAGNOSIS — E03.9 PRIMARY HYPOTHYROIDISM: ICD-10-CM

## 2024-08-26 DIAGNOSIS — Z76.89 ENCOUNTER TO ESTABLISH CARE WITH NEW DOCTOR: ICD-10-CM

## 2024-08-26 DIAGNOSIS — E83.39 HYPOPHOSPHATEMIA: ICD-10-CM

## 2024-08-26 DIAGNOSIS — M79.642 BILATERAL HAND PAIN: ICD-10-CM

## 2024-08-26 DIAGNOSIS — M79.641 BILATERAL HAND PAIN: ICD-10-CM

## 2024-08-26 LAB
ALBUMIN SERPL BCP-MCNC: 3.6 G/DL (ref 3.5–5.2)
ALP SERPL-CCNC: 62 U/L (ref 55–135)
ALT SERPL W/O P-5'-P-CCNC: 20 U/L (ref 10–44)
ANION GAP SERPL CALC-SCNC: 11 MMOL/L (ref 8–16)
AST SERPL-CCNC: 23 U/L (ref 10–40)
BASOPHILS # BLD AUTO: 0.07 K/UL (ref 0–0.2)
BASOPHILS NFR BLD: 1.6 % (ref 0–1.9)
BILIRUB SERPL-MCNC: 0.7 MG/DL (ref 0.1–1)
BUN SERPL-MCNC: 15 MG/DL (ref 8–23)
CALCIUM SERPL-MCNC: 9 MG/DL (ref 8.7–10.5)
CHLORIDE SERPL-SCNC: 110 MMOL/L (ref 95–110)
CO2 SERPL-SCNC: 22 MMOL/L (ref 23–29)
CREAT SERPL-MCNC: 0.9 MG/DL (ref 0.5–1.4)
DIFFERENTIAL METHOD BLD: ABNORMAL
EOSINOPHIL # BLD AUTO: 0.4 K/UL (ref 0–0.5)
EOSINOPHIL NFR BLD: 8.4 % (ref 0–8)
ERYTHROCYTE [DISTWIDTH] IN BLOOD BY AUTOMATED COUNT: 13.2 % (ref 11.5–14.5)
EST. GFR  (NO RACE VARIABLE): >60 ML/MIN/1.73 M^2
GLUCOSE SERPL-MCNC: 113 MG/DL (ref 70–110)
HCT VFR BLD AUTO: 37.7 % (ref 37–48.5)
HGB BLD-MCNC: 12.2 G/DL (ref 12–16)
IMM GRANULOCYTES # BLD AUTO: 0.01 K/UL (ref 0–0.04)
IMM GRANULOCYTES NFR BLD AUTO: 0.2 % (ref 0–0.5)
LYMPHOCYTES # BLD AUTO: 1.9 K/UL (ref 1–4.8)
LYMPHOCYTES NFR BLD: 44.3 % (ref 18–48)
MAGNESIUM SERPL-MCNC: 1.7 MG/DL (ref 1.6–2.6)
MCH RBC QN AUTO: 32 PG (ref 27–31)
MCHC RBC AUTO-ENTMCNC: 32.4 G/DL (ref 32–36)
MCV RBC AUTO: 99 FL (ref 82–98)
MONOCYTES # BLD AUTO: 0.4 K/UL (ref 0.3–1)
MONOCYTES NFR BLD: 9.6 % (ref 4–15)
NEUTROPHILS # BLD AUTO: 1.5 K/UL (ref 1.8–7.7)
NEUTROPHILS NFR BLD: 35.9 % (ref 38–73)
NRBC BLD-RTO: 0 /100 WBC
PHOSPHATE SERPL-MCNC: 1.9 MG/DL (ref 2.7–4.5)
PLATELET # BLD AUTO: 383 K/UL (ref 150–450)
PMV BLD AUTO: 9.4 FL (ref 9.2–12.9)
POTASSIUM SERPL-SCNC: 3.6 MMOL/L (ref 3.5–5.1)
PROT SERPL-MCNC: 6.9 G/DL (ref 6–8.4)
RBC # BLD AUTO: 3.81 M/UL (ref 4–5.4)
SODIUM SERPL-SCNC: 143 MMOL/L (ref 136–145)
T4 FREE SERPL-MCNC: 1.29 NG/DL (ref 0.71–1.51)
TSH SERPL DL<=0.005 MIU/L-ACNC: 2.54 UIU/ML (ref 0.4–4)
WBC # BLD AUTO: 4.29 K/UL (ref 3.9–12.7)

## 2024-08-26 PROCEDURE — 84100 ASSAY OF PHOSPHORUS: CPT

## 2024-08-26 PROCEDURE — 36415 COLL VENOUS BLD VENIPUNCTURE: CPT

## 2024-08-26 PROCEDURE — 80053 COMPREHEN METABOLIC PANEL: CPT

## 2024-08-26 PROCEDURE — 84443 ASSAY THYROID STIM HORMONE: CPT

## 2024-08-26 PROCEDURE — 85025 COMPLETE CBC W/AUTO DIFF WBC: CPT

## 2024-08-26 PROCEDURE — 84439 ASSAY OF FREE THYROXINE: CPT

## 2024-08-26 PROCEDURE — 83735 ASSAY OF MAGNESIUM: CPT

## 2024-08-27 ENCOUNTER — INFUSION (OUTPATIENT)
Dept: INFUSION THERAPY | Facility: HOSPITAL | Age: 64
End: 2024-08-27
Attending: INTERNAL MEDICINE
Payer: COMMERCIAL

## 2024-08-27 ENCOUNTER — OFFICE VISIT (OUTPATIENT)
Dept: HEMATOLOGY/ONCOLOGY | Facility: CLINIC | Age: 64
End: 2024-08-27
Payer: COMMERCIAL

## 2024-08-27 VITALS
TEMPERATURE: 98 F | RESPIRATION RATE: 16 BRPM | SYSTOLIC BLOOD PRESSURE: 122 MMHG | OXYGEN SATURATION: 100 % | HEART RATE: 65 BPM | HEIGHT: 64 IN | WEIGHT: 130.31 LBS | DIASTOLIC BLOOD PRESSURE: 73 MMHG | BODY MASS INDEX: 22.25 KG/M2

## 2024-08-27 DIAGNOSIS — C90.00 MULTIPLE MYELOMA, REMISSION STATUS UNSPECIFIED: Primary | ICD-10-CM

## 2024-08-27 DIAGNOSIS — C90.00 MULTIPLE MYELOMA, REMISSION STATUS UNSPECIFIED: Primary | Chronic | ICD-10-CM

## 2024-08-27 DIAGNOSIS — C79.51 SECONDARY MALIGNANT NEOPLASM OF BONE: ICD-10-CM

## 2024-08-27 PROCEDURE — 96401 CHEMO ANTI-NEOPL SQ/IM: CPT

## 2024-08-27 PROCEDURE — 99215 OFFICE O/P EST HI 40 MIN: CPT | Mod: 95,,, | Performed by: INTERNAL MEDICINE

## 2024-08-27 PROCEDURE — 4010F ACE/ARB THERAPY RXD/TAKEN: CPT | Mod: CPTII,95,, | Performed by: INTERNAL MEDICINE

## 2024-08-27 PROCEDURE — 63600175 PHARM REV CODE 636 W HCPCS: Mod: JZ,JG | Performed by: INTERNAL MEDICINE

## 2024-08-27 RX ORDER — HEPARIN 100 UNIT/ML
500 SYRINGE INTRAVENOUS
OUTPATIENT
Start: 2024-08-27

## 2024-08-27 RX ORDER — PROCHLORPERAZINE EDISYLATE 5 MG/ML
10 INJECTION INTRAMUSCULAR; INTRAVENOUS ONCE AS NEEDED
OUTPATIENT
Start: 2024-08-27

## 2024-08-27 RX ORDER — DIPHENHYDRAMINE HYDROCHLORIDE 50 MG/ML
50 INJECTION INTRAMUSCULAR; INTRAVENOUS ONCE AS NEEDED
OUTPATIENT
Start: 2024-08-27

## 2024-08-27 RX ORDER — SODIUM CHLORIDE 0.9 % (FLUSH) 0.9 %
10 SYRINGE (ML) INJECTION
OUTPATIENT
Start: 2024-08-27

## 2024-08-27 RX ORDER — EPINEPHRINE 0.3 MG/.3ML
0.3 INJECTION SUBCUTANEOUS ONCE AS NEEDED
OUTPATIENT
Start: 2024-08-27

## 2024-08-27 RX ADMIN — DARATUMUMAB AND HYALURONIDASE-FIHJ (HUMAN RECOMBINANT) 1800 MG: 1800; 30000 INJECTION SUBCUTANEOUS at 11:08

## 2024-08-27 NOTE — PLAN OF CARE
Patient tolerated Darzalex well today; no adverse reaction noted.  POC reviewed with pt.  NAD noted upon discharge.   Has f/u appt(s) scheduled per MD request.    Problem: Adult Inpatient Plan of Care  Goal: Plan of Care Review  Outcome: Progressing  Goal: Patient-Specific Goal (Individualized)  Outcome: Progressing  Goal: Optimal Comfort and Wellbeing  Outcome: Progressing  Intervention: Provide Person-Centered Care  Flowsheets (Taken 8/27/2024 1058)  Trust Relationship/Rapport:   care explained   questions encouraged   choices provided   reassurance provided   emotional support provided   empathic listening provided   thoughts/feelings acknowledged   questions answered  Goal: Absence of Hospital-Acquired Illness or Injury  Outcome: Progressing  Intervention: Identify and Manage Fall Risk  Flowsheets (Taken 8/27/2024 1058)  Safety Promotion/Fall Prevention: in recliner, wheels locked

## 2024-08-27 NOTE — PROGRESS NOTES
Patient ID: Ana Bonilla   Chief Complaint: Follow-up  MRN:  [id number]     Oncologic Diagnosis:  Multiple Myeloma - IgG lambda   Previous Treatment:  VRD (5/10/2023 - 6/28/2023)   Current Treatment:    D-VRD (7/6/2023 - 01/03/2024 )      Zometa (10/18/2023 - )     The patient location is: [place], [place]  The chief complaint leading to consultation is: follow up    Visit type: audiovisual    Face to Face time with patient: 15  30 minutes of total time spent on the encounter, which includes face to face time and non-face to face time preparing to see the patient (eg, review of tests), Obtaining and/or reviewing separately obtained history, Documenting clinical information in the electronic or other health record, Independently interpreting results (not separately reported) and communicating results to the patient/family/caregiver, or Care coordination (not separately reported).     Each patient to whom he or she provides medical services by telemedicine is:  (1) informed of the relationship between the physician and patient and the respective role of any other health care provider with respect to management of the patient; and (2) notified that he or she may decline to receive medical services by telemedicine and may withdraw from such care at any time.    Notes:   Subjective   Ana Bonilla is a pleasant 64 y.o. female who presents for follow up.    Overall the patient is stable. Unfortunately, her  had a seizure and was just discharged from the hospital.  She has been having difficulty taking care of herself during this time.  She is taking her vitamin supplements. She is on her 7 days off of Revlimid and will resume tomorrow. Labs reviewed and stable for treatment.    Review of Systems   Constitutional:  Positive for fatigue. Negative for activity change, appetite change, chills, diaphoresis, fever and unexpected weight change.   HENT:  Negative for nosebleeds.    Respiratory:  Negative for  shortness of breath.    Cardiovascular:  Negative for chest pain.   Gastrointestinal:  Positive for constipation. Negative for abdominal pain, blood in stool, diarrhea, nausea and vomiting.   Genitourinary:  Negative for difficulty urinating and hematuria.   Musculoskeletal:  Negative for arthralgias, back pain and myalgias.   Skin:  Negative for rash.   Neurological:  Negative for dizziness, weakness, light-headedness and headaches.   Hematological:  Does not bruise/bleed easily.   Psychiatric/Behavioral:  The patient is not nervous/anxious.      History     Oncology History   Multiple myeloma   4/20/2023 Initial Diagnosis    Multiple myeloma     5/10/2023 - 6/28/2023 Chemotherapy    Treatment Summary   Plan Name: OP VRD - WEEKLY BORTEZOMIB LENALIDOMIDE DEXAMETHASONE Q3W  Treatment Goal: Palliative  Status: Inactive  Start Date: 5/10/2023  End Date: 6/28/2023  Provider: Abram Velasquez MD  Chemotherapy: bortezomib (VELCADE) injection 2 mg, 1.3 mg/m2 = 2 mg, Subcutaneous, Clinic/[facility] 1 time, 2 of 6 cycles  Administration: 2 mg (5/10/2023), 2 mg (5/17/2023), 2 mg (6/14/2023), 2 mg (5/31/2023), 2 mg (6/21/2023), 2 mg (6/28/2023)  lenalidomide 25 mg Cap, 25 mg, Oral, Daily, 1 of 1 cycle, Start date: 5/10/2023, End date: 5/17/2023 6/28/2023 Cancer Staged    Staging form: Plasma Cell Myeloma and Plasma Cell Disorders, AJCC 8th Edition  - Clinical stage from 6/28/2023: RISS Stage II (Beta-2-microglobulin (mg/L): 1.9, Albumin (g/dL): 3, ISS: Stage II, High-risk cytogenetics: Absent, LDH: Normal)     7/6/2023 - 1/3/2024 Chemotherapy    Treatment Summary   Plan Name: OP D-VRD DARATUMUMAB + BORTEZOMIB LENALIDOMIDE DEXAMETHASONE  Treatment Goal: Palliative  Status: Inactive  Start Date: 7/6/2023  End Date: 1/3/2024  Provider: sOcar Márquez MD  Chemotherapy: bortezomib (VELCADE) injection 2 mg, 1.3 mg/m2 = 2 mg, Subcutaneous, Clinic/[facility] 1 time, 6 of 6 cycles  Administration: 2 mg (7/6/2023), 2 mg (7/12/2023), 2 mg  (8/2/2023), 2 mg (8/9/2023), 2.25 mg (10/18/2023), 2 mg (7/19/2023), 2 mg (7/26/2023), 2 mg (8/16/2023), 2.25 mg (9/20/2023), 2.25 mg (9/27/2023), 2.25 mg (10/25/2023), 2.25 mg (11/1/2023), 2.25 mg (11/8/2023), 2.25 mg (12/6/2023), 2.25 mg (1/3/2024), 2.25 mg (11/15/2023), 2.25 mg (11/22/2023), 2.25 mg (11/29/2023), 2.25 mg (12/13/2023), 2.25 mg (12/20/2023), 2.25 mg (12/26/2023)  lenalidomide 10 mg Cap, 1 capsule (100 % of original dose 1 capsule), Oral, Daily, 1 of 1 cycle, Start date: 7/26/2023, End date: 8/2/2023  Dose modification: 1 capsule (original dose 1 capsule, Cycle 0)  daratumumab-hyaluronidase-fihj subcutaneous injection 1,800 mg, 1,800 mg (100 % of original dose 1,800 mg), Subcutaneous, Clinic/[facility] 1 time, 6 of 6 cycles  Dose modification: 1,800 mg (original dose 1,800 mg, Cycle 1), 1,800 mg (original dose 1,800 mg, Cycle 1)  Administration: 1,800 mg (7/6/2023), 1,800 mg (7/12/2023), 1,800 mg (7/19/2023), 1,800 mg (7/26/2023), 1,800 mg (8/2/2023), 1,800 mg (8/9/2023), 1,800 mg (8/16/2023), 1,800 mg (9/27/2023), 1,800 mg (10/18/2023), 1,800 mg (11/1/2023), 1,800 mg (11/15/2023), 1,800 mg (11/29/2023), 1,800 mg (12/13/2023), 1,800 mg (12/26/2023)     4/30/2024 -  Chemotherapy    Treatment Summary   Plan Name: OP Myeloma KIA-RD daratumumab lenalidomide dexAMETHasone Q4W  Treatment Goal: Palliative  Status: Active  Start Date: 4/30/2024  End Date: 3/11/2025 (Planned)  Provider: Bri Luevano MD  Chemotherapy: daratumumab-hyaluronidase-[facility] subcutaneous injection 1,800 mg, 1,800 mg, Subcutaneous, Clinic/[facility] 1 time, 4 of 12 cycles  Administration: 1,800 mg (4/30/2024), 1,800 mg (5/7/2024), 1,800 mg (5/21/2024), 1,800 mg (5/28/2024), 1,800 mg (6/4/2024), 1,800 mg (6/25/2024), 1,800 mg (7/9/2024), 1,800 mg (5/14/2024), 1,800 mg (6/11/2024), 1,800 mg (6/18/2024), 1,800 mg (7/30/2024), 1,800 mg (8/13/2024)           MARIBETH Bonilla  63 y.o.  female with past medical history significant for  hypertension, COPD, asthma, GERD, hypothyroidism here for follow-up and management of multiple myeloma     She was referred in 2/2023 by her PCP after workup for gastritis revealed lytic lesions. CT chest showed lytic and expansile destructive lesion in the sternum and lytic lesions at L1. Biopsy of L1 lesion in 3/2023 revealed mature B cell neoplasm most consistent with plasma cell neoplasm.      Bone marrow biopsy in 4/2023 demonstrated 60% plasma cells. PET/CT showed extensive bony lesions throughout the spine, sternum, bilateral ribs, pelvis, and a mild compression deformity in L1. SPEP/MECHE showed IgG M spike 3.19 g/dL, IgG 4,521 mg/dL.      She was started on VRd and then daratumumab was added by the transplant team. She was started on ASA for thrombosis prophylaxis and acyclovir for herpes zoster prophylaxis. Start Zometa after dental evaluation.     Past Medical History:   Diagnosis Date    Allergy     Amblyopia OS    Anxiety     Asthma     COPD (chronic obstructive pulmonary disease)     NO HOME o2    GERD (gastroesophageal reflux disease)     Hyperlipidemia     Hypertension     PONV (postoperative nausea and vomiting)     Thyroid disease        Past Surgical History:   Procedure Laterality Date    APPENDECTOMY      BIOPSY N/A 06/08/2023    Procedure: BIOPSY;  Surgeon: Fausto Smith MD;  Location: [facility];  Service: Neurosurgery;  Laterality: N/A;  L1    BUNIONECTOMY      COLONOSCOPY N/A 12/04/2019    Procedure: COLONOSCOPY;  Surgeon: Danny Matos III, MD;  Location: [facility];  Service: Endoscopy;  Laterality: N/A;    COLONOSCOPY N/A 10/24/2022    Procedure: COLONOSCOPY;  Surgeon: Danny Matos III, MD;  Location: [facility] ENDO;  Service: Endoscopy;  Laterality: N/A;    ESOPHAGOGASTRODUODENOSCOPY N/A 10/24/2022    Procedure: EGD (ESOPHAGOGASTRODUODENOSCOPY);  Surgeon: Danny Matos III, MD;  Location: [facility] ENDO;  Service: Endoscopy;  Laterality: N/A;    FIXATION KYPHOPLASTY  Bilateral 2023    Procedure: KYPHOPLASTY;  Surgeon: Fausto Smith MD;  Location: [facility] OR;  Service: Neurosurgery;  Laterality: Bilateral;  kyphoplasty and radiofrequency ablation - L1    HYSTERECTOMY      PARTIAL//still with ovaries    neck fusion  2017    THYROIDECTOMY      TUBAL LIGATION         Family History   Problem Relation Name Age of Onset    Hypertension Mother Vivian     Diabetes Mother Vivian     Asthma Mother Vivian             Diabetes Father      Asthma Father      Stroke Maternal Grandmother  grandmother     Prostate cancer Maternal Grandfather      Mental illness Son Lukasz Garcia     Pancreatic cancer Maternal Uncle      Mental illness Other Pat side     Pancreatic cancer Other Mat side     Ovarian cancer Maternal Cousin         Review of patient's allergies indicates:   Allergen Reactions    Hydrocodone-acetaminophen Other (See Comments)     Causes pt to feel extremely sick        Social History     Tobacco Use    Smoking status: Every Day     Current packs/day: 0.50     Average packs/day: 0.5 packs/day for 50.0 years (25.0 ttl pk-yrs)     Types: Cigarettes     Passive exposure: Never    Smokeless tobacco: Never   Substance Use Topics    Alcohol use: Not Currently     Comment: quit    Drug use: No     Labs   Labs:  Lab Visit on 2024   Component Date Value Ref Range Status    WBC 2024 4.29  3.90 - 12.70 K/uL Final    RBC 2024 3.81 (L)  4.00 - 5.40 M/uL Final    Hemoglobin 2024 12.2  12.0 - 16.0 g/dL Final    Hematocrit 2024 37.7  37.0 - 48.5 % Final    MCV 2024 99 (H)  82 - 98 fL Final    MCH 2024 32.0 (H)  27.0 - 31.0 pg Final    MCHC 2024 32.4  32.0 - 36.0 g/dL Final    RDW 2024 13.2  11.5 - 14.5 % Final    Platelets 2024 383  150 - 450 K/uL Final    MPV 2024 9.4  9.2 - 12.9 fL Final    Immature Granulocytes 2024 0.2  0.0 - 0.5 % Final    Gran #  (ANC) 08/26/2024 1.5 (L)  1.8 - 7.7 K/uL Final    Immature Grans (Abs) 08/26/2024 0.01  0.00 - 0.04 K/uL Final    Comment: Mild elevation in immature granulocytes is non specific and   can be seen in a variety of conditions including stress response,   acute inflammation, trauma and pregnancy. Correlation with other   laboratory and clinical findings is essential.      Lymph # 08/26/2024 1.9  1.0 - 4.8 K/uL Final    Mono # 08/26/2024 0.4  0.3 - 1.0 K/uL Final    Eos # 08/26/2024 0.4  0.0 - 0.5 K/uL Final    Baso # 08/26/2024 0.07  0.00 - 0.20 K/uL Final    nRBC 08/26/2024 0  0 /100 WBC Final    Gran % 08/26/2024 35.9 (L)  38.0 - 73.0 % Final    Lymph % 08/26/2024 44.3  18.0 - 48.0 % Final    Mono % 08/26/2024 9.6  4.0 - 15.0 % Final    Eosinophil % 08/26/2024 8.4 (H)  0.0 - 8.0 % Final    Basophil % 08/26/2024 1.6  0.0 - 1.9 % Final    Differential Method 08/26/2024 Automated   Final    Sodium 08/26/2024 143  136 - 145 mmol/L Final    Potassium 08/26/2024 3.6  3.5 - 5.1 mmol/L Final    Chloride 08/26/2024 110  95 - 110 mmol/L Final    CO2 08/26/2024 22 (L)  23 - 29 mmol/L Final    Glucose 08/26/2024 113 (H)  70 - 110 mg/dL Final    BUN 08/26/2024 15  8 - 23 mg/dL Final    Creatinine 08/26/2024 0.9  0.5 - 1.4 mg/dL Final    Calcium 08/26/2024 9.0  8.7 - 10.5 mg/dL Final    Total Protein 08/26/2024 6.9  6.0 - 8.4 g/dL Final    Albumin 08/26/2024 3.6  3.5 - 5.2 g/dL Final    Total Bilirubin 08/26/2024 0.7  0.1 - 1.0 mg/dL Final    Comment: For infants and newborns, interpretation of results should be based  on gestational age, weight and in agreement with clinical  observations.    Premature Infant recommended reference ranges:  Up to 24 hours.............<8.0 mg/dL  Up to 48 hours............<12.0 mg/dL  3-5 days..................<15.0 mg/dL  6-29 days.................<15.0 mg/dL      Alkaline Phosphatase 08/26/2024 62  55 - 135 U/L Final    AST 08/26/2024 23  10 - 40 U/L Final    ALT  08/26/2024 20  10 - 44 U/L Final    eGFR 08/26/2024 >60  >60 mL/min/1.73 m^2 Final    Anion Gap 08/26/2024 11  8 - 16 mmol/L Final    Phosphorus 08/26/2024 1.9 (L)  2.7 - 4.5 mg/dL Final    Magnesium 08/26/2024 1.7  1.6 - 2.6 mg/dL Final    TSH 08/26/2024 2.540  0.400 - 4.000 uIU/mL Final    Free T4 08/26/2024 1.29  0.71 - 1.51 ng/dL Final         Imaging/Pathology   - 06/06/2023 RIGHT ILIAC CREST BONE MARROW ASPIRATE, BONE MARROW CLOT, AND BONE MARROW CORE BIOPSY WITH:     CELLULARITY=40-60%, TRILINEAGE HEMATOPOIETIC ACTIVITY (M/E=2.9:1).   CONSISTENT WITH PLASMA CELL NEOPLASM(60%).  SEE COMMENT.   FOCAL GRADE 1 RETICULAR FIBROSIS.   CONGO RED NEGATIVE.   INCREASED STORAGE IRON.   ADEQUATE NUMBER OF MEGAKARYOCYTES.     Bone marrow karyotype results: 46, XX[20], female karyotype.     Myeloma fixed cell, high-risk, FISH:  Normal.  The result is within normal limits for 1q duplication, TP53 deletion and IGH rearrangement.         - 04/10/2023 PET: Numerous FDG avid predominately lytic osseous lesions as above.  Primary differential considerations would include multiple myeloma versus metastatic disease.  2. No FDG avid soft tissue masses or adenopathy demonstrated.  3. Mild pathologic compression deformity of the L1 vertebral body.                                          Assessment and Plan   IgG lambda Multiple myeloma, R-ISS stage I   Diagnosed June 2023 via bone marrow biopsy  Patient treatment had been held multiple times with few treatments cancelled and also Revlimid 10 mg daily given as unable to tolerate higher dose   Previous oncologist discussed with the patient and the transplant team BMT.  However, patient declined transplant at this time and it was recommended that we continue with Rosa-VRD for 6 cycles and then repeat bone marrow biopsy and PET scan. If VGPR or better, then continue with Revlimid maintenance only.  Continue prophylaxis with aspirin, acyclovir 400 mg b.i.d.  Continue Zometa/calcium  and vitamin-D supplements (Due for Zometa 01/13/23)  Repeat PET-CT stable  BM Biopsy 02/13/2024 decrease in plasma cell burden  Continue follow up with myeloma team  Continue with daratumumab and Revlimid today; recheck immunoglobulins/protein studies        Poor Appetite  R  Hip Pain  Back Pain, Left leg pain   PET-CT showed treatment response with decreased FDG uptake in the previously identified bone lesion  Obtained LLE ultrasound - negative   She had lost 10 lb in approximately 8 weeks   Weight improving  Continue follow up with palliative care        Chronic Hypokalemia  Magnesium Normal  Advised to take potassium every other day        Chalazion Left Upper Eyelid, 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, Hordeolum externum of left eyelid  Per Ophthalmology, this is a chronic condition and is unrelated to and not a contraindication for treatment of MM  Has had kenalog injection  Continue follow-up with Ophthalmology  Continue antibiotic/cream and warm compresses PRN        Shortness of Breath, resolved  The patient reports decreased exercise tolerance; she can not walk a long distance without becoming short of breath in his also has some chest discomfort  Recommended CTA to assess for thromboses however patient declines and prefers to be reassessed at next visit      Cancer Screening  MMG 03/17/2023:  BI-RADS 1  PAP Smear:  Partial hysterectomy  Colonoscopy 10/24/2022:  Normal repeat in 5 years        Chronic Medical Conditions  Asthma  Anxiety   COPD   GERD  Hypertension   Hyperlipidemia   Hypothyroidism   ?IBS-C on [employer] On[facility] Chart Routing      Follow up with physician 2 months.   Follow up with WERNER 2 weeks, 4 weeks and 6 weeks.   Infusion scheduling note   Daratumumab today and in 2 weeks; Zometa in 2 weeks   Injection scheduling note    Labs CBC, CMP, phosphorus, magnesium, free light chains and other   Scheduling:  Preferred lab:  Lab interval:     Imaging    Pharmacy appointment     Other referrals           The patient was seen, interviewed and examined. Pertinent lab and radiologic studies were reviewed. Pt instructed to call should they develop concerning signs/symptoms or have further questions.        Portions of the record may have been created with voice recognition software. Occasional wrong-word or sound-a-like substitutions may have occurred due to the inherent limitations of voice recognition software. Read the chart carefully and recognize, using context, where substitutions have occurred.      Bri Michelle MD    Hematology/Oncology

## 2024-09-04 ENCOUNTER — PATIENT MESSAGE (OUTPATIENT)
Dept: HEMATOLOGY/ONCOLOGY | Facility: CLINIC | Age: 64
End: 2024-09-04
Payer: COMMERCIAL

## 2024-09-04 DIAGNOSIS — C90.00 MULTIPLE MYELOMA, REMISSION STATUS UNSPECIFIED: ICD-10-CM

## 2024-09-04 RX ORDER — LENALIDOMIDE 10 MG/1
CAPSULE ORAL
Qty: 21 EACH | Refills: 7 | Status: SHIPPED | OUTPATIENT
Start: 2024-09-04

## 2024-09-05 ENCOUNTER — TELEPHONE (OUTPATIENT)
Dept: HEMATOLOGY/ONCOLOGY | Facility: CLINIC | Age: 64
End: 2024-09-05
Payer: COMMERCIAL

## 2024-09-06 ENCOUNTER — OFFICE VISIT (OUTPATIENT)
Dept: FAMILY MEDICINE | Facility: CLINIC | Age: 64
End: 2024-09-06
Payer: COMMERCIAL

## 2024-09-06 VITALS
HEART RATE: 105 BPM | TEMPERATURE: 99 F | RESPIRATION RATE: 17 BRPM | OXYGEN SATURATION: 91 % | SYSTOLIC BLOOD PRESSURE: 120 MMHG | DIASTOLIC BLOOD PRESSURE: 74 MMHG | BODY MASS INDEX: 21.71 KG/M2 | WEIGHT: 127.13 LBS | HEIGHT: 64 IN

## 2024-09-06 DIAGNOSIS — I10 ESSENTIAL HYPERTENSION: ICD-10-CM

## 2024-09-06 DIAGNOSIS — J06.9 VIRAL UPPER RESPIRATORY TRACT INFECTION: Primary | ICD-10-CM

## 2024-09-06 DIAGNOSIS — J44.89 COPD WITH ASTHMA: ICD-10-CM

## 2024-09-06 PROCEDURE — 99999 PR PBB SHADOW E&M-EST. PATIENT-LVL V: CPT | Mod: PBBFAC,,, | Performed by: INTERNAL MEDICINE

## 2024-09-06 RX ORDER — DEXAMETHASONE SODIUM PHOSPHATE 10 MG/ML
10 INJECTION INTRAMUSCULAR; INTRAVENOUS
Status: COMPLETED | OUTPATIENT
Start: 2024-09-06 | End: 2024-09-06

## 2024-09-06 RX ORDER — IPRATROPIUM BROMIDE AND ALBUTEROL SULFATE 2.5; .5 MG/3ML; MG/3ML
3 SOLUTION RESPIRATORY (INHALATION)
Status: COMPLETED | OUTPATIENT
Start: 2024-09-06 | End: 2024-09-06

## 2024-09-06 RX ORDER — PROMETHAZINE HYDROCHLORIDE AND DEXTROMETHORPHAN HYDROBROMIDE 6.25; 15 MG/5ML; MG/5ML
5 SYRUP ORAL EVERY 4 HOURS PRN
Qty: 118 ML | Refills: 0 | Status: SHIPPED | OUTPATIENT
Start: 2024-09-06 | End: 2024-09-16

## 2024-09-06 RX ADMIN — IPRATROPIUM BROMIDE AND ALBUTEROL SULFATE 3 ML: 2.5; .5 SOLUTION RESPIRATORY (INHALATION) at 01:09

## 2024-09-06 RX ADMIN — DEXAMETHASONE SODIUM PHOSPHATE 10 MG: 10 INJECTION INTRAMUSCULAR; INTRAVENOUS at 01:09

## 2024-09-06 NOTE — PROGRESS NOTES
Patient ID: Ana Bonilla is a 64 y.o. female.    Chief Complaint: Sinus Problem (Wheezing /Hx of COPD/Started with sore throat)      History of Present Illness    The patient presents with a sore throat, runny nose, and wheezing.    Ms. Bonilla reports a sore throat that began 4 days ago, along with a runny nose and sinus drainage causing cough. She has been taking prescribed sinus medication nightly, but nasal discharge persists. She also took  promethazine, which typically provides relief. Audible wheezing is present. The patient uses an inhaler in a silver pack but is uncertain of its name. She reports difficulty sleeping and inability to lie down due to symptoms. Symptoms worsened yesterday. The patient attributes illness onset to exposure from a house visitor with a sore throat, noting increased susceptibility to infections due to chemotherapy treatments. She reports feeling cold during the exam.      ROS:  General: denies fever, denies chills, denies fatigue, denies weight gain, denies weight loss  Eyes: denies vision changes, denies redness, denies discharge  ENT: denies ear pain, complains of nasal congestion, complains of sore throat  Cardiovascular: denies chest pain, denies palpitations, denies lower extremity edema  Respiratory: complains of cough, denies shortness of breath, complains of wheezing  Gastrointestinal: denies abdominal pain, denies nausea, denies vomiting, denies diarrhea, denies constipation, denies blood in stool  Genitourinary: denies dysuria, denies hematuria, denies frequency  Musculoskeletal: denies joint pain, denies muscle pain  Skin: denies rash, denies lesion  Neurological: denies headache, denies dizziness, denies numbness, denies tingling  Psychiatric: denies anxiety, denies depression, complains of sleep difficulty            Physical Exam    General: In no acute distress.  Head: Normocephalic. Non traumatic.  Eyes: PERRLA. EOMs full. Conjunctivae clear. Fundi  grossly normal.  Ears: EACs clear. TMs normal.  Nose: Mucosa pink. Mucosa moist. No obstruction.  Throat: Clear. No exudates. No lesions.  Neck: Supple. No masses. No thyromegaly. No bruits.  Chest: No rales. No rhonchi. AUDIBLE WHEEZING.  Heart: RRR. No murmurs. No rubs. No gallops.  Abdomen: Soft. No tenderness. No masses. BS normal.  : Normal external genitalia. No lesions. No discharge. No hernias  noted.  Back: Normal curvature. No scoliosis. No tenderness.  Extremities: Warm. Well perfused. No upper extremity edema. No lower extremity edema. FROM. No deformities. No joint erythema.  Neuro: No focal deficits appreciated. Good muscle tone. Normal response to visual stimuli. Normal response to auditory stimuli.  Skin: Normal. No rashes. No lesions noted.            Current Outpatient Medications:     acyclovir (ZOVIRAX) 400 MG tablet, Take 1 tablet (400 mg total) by mouth 2 (two) times daily., Disp: 60 tablet, Rfl: 11    albuterol (PROVENTIL HFA) 90 mcg/actuation inhaler, Inhale 2 puffs into the lungs every 6 (six) hours as needed for Wheezing. Rescue, Disp: 18 g, Rfl: 0    ALPRAZolam (XANAX) 2 MG Tab, Take 1 tablet (2 mg total) by mouth 2 (two) times daily as needed (Anxiety). TAKE 1 TABLET BY MOUTH TWICE DAILY AS NEEDED FOR ANXIETY, Disp: 60 tablet, Rfl: 0    amlodipine-benazepril 2.5-10 mg (LOTREL) 2.5-10 mg per capsule, TAKE 1 CAPSULE BY MOUTH EVERY DAY, Disp: 90 capsule, Rfl: 3    aspirin 81 MG Chew, Take 1 tablet (81 mg total) by mouth once daily., Disp: 30 tablet, Rfl: 11    azithromycin (Z-JESIKA) 250 MG tablet, Take as directed, Disp: 6 tablet, Rfl: 0    calcium-vitamin D 600 mg-10 mcg (400 unit) Tab, Take 1 tablet by mouth every 12 (twelve) hours., Disp: 60 tablet, Rfl: 2    dexAMETHasone (DECADRON) 4 MG Tab, Take 10 tablets (40 mg total) by mouth every 7 days. Take with food., Disp: 40 tablet, Rfl: 11    erythromycin (ROMYCIN) ophthalmic ointment, Apply ointment to lids and lashes on both eyes 2 times  daily for 7 days., Disp: 2.5 g, Rfl: 0    fluticasone propionate (FLONASE) 50 mcg/actuation nasal spray, 1 spray (50 mcg total) by Each Nostril route once daily., Disp: 1 mL, Rfl: 1    hydrOXYzine HCL (ATARAX) 25 MG tablet, Take 1 tablet (25 mg total) by mouth 3 (three) times daily as needed for Itching., Disp: 21 tablet, Rfl: 0    ipratropium (ATROVENT) 21 mcg (0.03 %) nasal spray, 2 sprays by Each Nostril route 3 (three) times daily., Disp: , Rfl:     lenalidomide 10 mg Cap, TAKE 1 CAPSULE ONCE DAILY FOR 21 DAYS AND THEN 7 DAYS OFF., Disp: 21 each, Rfl: 7    levocetirizine (XYZAL) 5 MG tablet, Take 1 tablet (5 mg total) by mouth every evening., Disp: 30 tablet, Rfl: 11    levothyroxine (SYNTHROID) 100 MCG tablet, Take 1 tablet (100 mcg total) by mouth before breakfast., Disp: 90 tablet, Rfl: 1    linaCLOtide (LINZESS) 72 mcg Cap capsule, Take 2 capsules (144 mcg total) by mouth before breakfast., Disp: 30 capsule, Rfl: 1    magnesium oxide (MAG-OX) 400 mg (241.3 mg magnesium) tablet, TAKE 1 TABLET(400 MG) BY MOUTH EVERY DAY, Disp: 90 tablet, Rfl: 1    methylPREDNISolone (MEDROL DOSEPACK) 4 mg tablet, use as directed, Disp: 1 each, Rfl: 0    metoprolol succinate (TOPROL-XL) 50 MG 24 hr tablet, Take 1 tablet (50 mg total) by mouth every evening., Disp: 90 tablet, Rfl: 3    montelukast (SINGULAIR) 10 mg tablet, TAKE 1 TABLET(10 MG) BY MOUTH EVERY EVENING, Disp: 90 tablet, Rfl: 0    neomycin-polymyxin-dexamethasone (DEXACINE) 3.5 mg/g-10,000 unit/g-0.1 % Oint, Place into both eyes 3 (three) times daily., Disp: 3.5 g, Rfl: 0    nicotine (NICODERM CQ) 14 mg/24 hr, Place 1 patch onto the skin once daily., Disp: 14 patch, Rfl: 0    ondansetron (ZOFRAN) 4 MG tablet, Take 1 tablet (4 mg total) by mouth every 6 (six) hours as needed for Nausea., Disp: 30 tablet, Rfl: 5    pantoprazole (PROTONIX) 40 MG tablet, TAKE 1 TABLET(40 MG) BY MOUTH EVERY DAY, Disp: 90 tablet, Rfl: 3    potassium chloride SA (K-DUR,KLOR-CON) 20 MEQ  "tablet, Take 1 tablet (20 mEq total) by mouth once daily., Disp: 30 tablet, Rfl: 11    potassium, sodium phosphates (PHOS-NAK) 280-160-250 mg PwPk, Take 1 packet by mouth 4 (four) times daily with meals and nightly., Disp: 4 packet, Rfl: 0    pregabalin (LYRICA) 50 MG capsule, Take 1 capsule (50 mg total) by mouth nightly., Disp: 30 capsule, Rfl: 0    promethazine (PHENERGAN) 25 MG tablet, Take 1 tablet (25 mg total) by mouth every 4 (four) hours., Disp: 45 tablet, Rfl: 2    promethazine-dextromethorphan (PROMETHAZINE-DM) 6.25-15 mg/5 mL Syrp, Take 5 mLs by mouth every 4 (four) hours as needed., Disp: 118 mL, Rfl: 0    rosuvastatin (CRESTOR) 10 MG tablet, Take 1 tablet (10 mg total) by mouth every evening., Disp: 90 tablet, Rfl: 3    traZODone (DESYREL) 50 MG tablet, Take 1 tablet (50 mg total) by mouth every evening., Disp: 30 tablet, Rfl: 11    WIXELA INHUB 250-50 mcg/dose diskus inhaler, INHALE 1 PUFF INTO THE LUNGS TWICE DAILY, Disp: 180 each, Rfl: 1    Current Facility-Administered Medications:     albuterol-ipratropium 2.5 mg-0.5 mg/3 mL nebulizer solution 3 mL, 3 mL, Nebulization, 1 time in Clinic/HOD,     dexAMETHasone injection 10 mg, 10 mg, Intramuscular, 1 time in Clinic/HOD,     dexAMETHasone injection 40 mg, 40 mg, Intravenous, 1 time in Clinic/HOD, Bri Luevano MD    Facility-Administered Medications Ordered in Other Visits:     lactated ringers infusion, , Intravenous, Continuous, Danny Matos III, MD            VITAL SIGNS:  Vitals:    09/06/24 1314   BP: 120/74   BP Location: Left arm   Patient Position: Sitting   BP Method: Medium (Manual)   Pulse: 105   Resp: 17   Temp: 98.9 °F (37.2 °C)   TempSrc: Temporal   SpO2: (!) 91%   Weight: 57.6 kg (127 lb 1.5 oz)   Height: 5' 4" (1.626 m)       CURRENT BMI:   Body mass index is 21.82 kg/m².    LABS REVIEWED:    CBC:  Lab Results   Component Value Date    WBC 4.29 08/26/2024    RBC 3.81 (L) 08/26/2024    HGB 12.2 08/26/2024    HCT 37.7 " 08/26/2024    MCV 99 (H) 08/26/2024    MCH 32.0 (H) 08/26/2024    MCHC 32.4 08/26/2024    RDW 13.2 08/26/2024     08/26/2024    MPV 9.4 08/26/2024    GRAN 1.5 (L) 08/26/2024    GRAN 35.9 (L) 08/26/2024    LYMPH 1.9 08/26/2024    LYMPH 44.3 08/26/2024    MONO 0.4 08/26/2024    MONO 9.6 08/26/2024    EOS 0.4 08/26/2024    BASO 0.07 08/26/2024    EOSINOPHIL 8.4 (H) 08/26/2024    BASOPHIL 1.6 08/26/2024       CHEMISTRY:  Sodium   Date Value Ref Range Status   08/26/2024 143 136 - 145 mmol/L Final     Potassium   Date Value Ref Range Status   08/26/2024 3.6 3.5 - 5.1 mmol/L Final     Chloride   Date Value Ref Range Status   08/26/2024 110 95 - 110 mmol/L Final     CO2   Date Value Ref Range Status   08/26/2024 22 (L) 23 - 29 mmol/L Final     Glucose   Date Value Ref Range Status   08/26/2024 113 (H) 70 - 110 mg/dL Final     BUN   Date Value Ref Range Status   08/26/2024 15 8 - 23 mg/dL Final     Creatinine   Date Value Ref Range Status   08/26/2024 0.9 0.5 - 1.4 mg/dL Final     Calcium   Date Value Ref Range Status   08/26/2024 9.0 8.7 - 10.5 mg/dL Final     Total Protein   Date Value Ref Range Status   08/26/2024 6.9 6.0 - 8.4 g/dL Final     Albumin   Date Value Ref Range Status   08/26/2024 3.6 3.5 - 5.2 g/dL Final     Total Bilirubin   Date Value Ref Range Status   08/26/2024 0.7 0.1 - 1.0 mg/dL Final     Comment:     For infants and newborns, interpretation of results should be based  on gestational age, weight and in agreement with clinical  observations.    Premature Infant recommended reference ranges:  Up to 24 hours.............<8.0 mg/dL  Up to 48 hours............<12.0 mg/dL  3-5 days..................<15.0 mg/dL  6-29 days.................<15.0 mg/dL       Alkaline Phosphatase   Date Value Ref Range Status   08/26/2024 62 55 - 135 U/L Final     AST   Date Value Ref Range Status   08/26/2024 23 10 - 40 U/L Final     ALT   Date Value Ref Range Status   08/26/2024 20 10 - 44 U/L Final     Anion Gap   Date  Value Ref Range Status   08/26/2024 11 8 - 16 mmol/L Final     eGFR   Date Value Ref Range Status   08/26/2024 >60 >60 mL/min/1.73 m^2 Final       LIPID PANEL:  Lab Results   Component Value Date    CHOL 176 08/25/2022    CHOL 168 07/14/2021     Lab Results   Component Value Date    TRIG 47 08/25/2022    TRIG 68 07/14/2021     Lab Results   Component Value Date    HDL 74 08/25/2022    HDL 74 07/14/2021     Lab Results   Component Value Date    LDLCALC 92.6 08/25/2022    LDLCALC 80.4 07/14/2021       THYROID:  Lab Results   Component Value Date    TSH 2.540 08/26/2024    A6HDSZH 72 06/10/2022    V1NIPEO 6.0 05/09/2013    FREET4 1.29 08/26/2024       DIABETES:  Lab Results   Component Value Date    HGBA1C 5.7 (H) 01/10/2023     Microalb/Creat Ratio   Date Value Ref Range Status   06/07/2018 31.1 (H) 0.0 - 30.0 ug/mg Final       Assessment and Plan     Assessment & Plan    ASTHMA:  - Administered intramuscular steroid injection to alleviate wheezing.  - Continued albuterol inhaler use as previously prescribed.  - Administered DuoNeb nebulizer treatment in office.  - Administered breathing treatment (DuoNeb) in office.    ALLERGIC RHINITIS:  - Continued Flonase nasal spray, emphasizing need for continued use despite slow onset of action.  - Explained that nasal congestion may persist while having a cold, emphasizing the need for patience with nasal spray effectiveness.    HYPERTENSION:  - Continued OTC cold medication for hypertension symptoms.          1. Viral upper respiratory tract infection  Comments:  Recommend OTC cold medicaitons formulated for HBP  Orders:  -     promethazine-dextromethorphan (PROMETHAZINE-DM) 6.25-15 mg/5 mL Syrp; Take 5 mLs by mouth every 4 (four) hours as needed.  Dispense: 118 mL; Refill: 0    2. COPD with asthma  Comments:  Will give injection of Dexamethasone and will give a Duonebs treatment  Orders:  -     dexAMETHasone injection 10 mg  -     albuterol-ipratropium 2.5 mg-0.5 mg/3 mL  nebulizer solution 3 mL    3. Essential hypertension  Comments:  Take OTC cold medicine formulated for high blood pressure           No follow-ups on file.  36 of total time spent on the encounter, which includes face to face time and non-face to face time preparing to see the patient. This includes obtaining and/or reviewing separately obtained history, performing a medically appropriate examination and/or evaluation, and counseling and educating the patient/family/caregiver. Includes documenting clinical information in the electronic or other health record, independently interpreting results (not separately reported) and communicating results to the patient/family/caregiver, with care coordination (not separately reported). Medications, tests and/or procedures ordered as necessary along with referring and communicating with other health professionals (when not separately reported).      This note was generated with the assistance of ambient listening technology. Verbal consent was obtained by the patient and accompanying visitor(s) for the recording of patient appointment to facilitate this note. I attest to having reviewed and edited the generated note for accuracy, though some syntax or spelling errors may persist. Please contact the author of this note for any clarification.

## 2024-09-09 ENCOUNTER — LAB VISIT (OUTPATIENT)
Dept: LAB | Facility: HOSPITAL | Age: 64
End: 2024-09-09
Attending: INTERNAL MEDICINE
Payer: COMMERCIAL

## 2024-09-09 DIAGNOSIS — F41.9 ANXIETY: ICD-10-CM

## 2024-09-09 DIAGNOSIS — C90.00 MULTIPLE MYELOMA NOT HAVING ACHIEVED REMISSION: Chronic | ICD-10-CM

## 2024-09-09 DIAGNOSIS — E83.39 HYPOPHOSPHATEMIA: ICD-10-CM

## 2024-09-09 DIAGNOSIS — C90.00 MULTIPLE MYELOMA, REMISSION STATUS UNSPECIFIED: ICD-10-CM

## 2024-09-09 DIAGNOSIS — C90.00 MULTIPLE MYELOMA, REMISSION STATUS UNSPECIFIED: Primary | ICD-10-CM

## 2024-09-09 DIAGNOSIS — E87.6 HYPOKALEMIA: ICD-10-CM

## 2024-09-09 LAB
ALBUMIN SERPL BCP-MCNC: 3.4 G/DL (ref 3.5–5.2)
ALP SERPL-CCNC: 58 U/L (ref 55–135)
ALT SERPL W/O P-5'-P-CCNC: 27 U/L (ref 10–44)
ANION GAP SERPL CALC-SCNC: 12 MMOL/L (ref 8–16)
AST SERPL-CCNC: 27 U/L (ref 10–40)
BASOPHILS # BLD AUTO: 0.06 K/UL (ref 0–0.2)
BASOPHILS NFR BLD: 1.1 % (ref 0–1.9)
BILIRUB SERPL-MCNC: 0.5 MG/DL (ref 0.1–1)
BUN SERPL-MCNC: 9 MG/DL (ref 8–23)
CALCIUM SERPL-MCNC: 8.4 MG/DL (ref 8.7–10.5)
CHLORIDE SERPL-SCNC: 110 MMOL/L (ref 95–110)
CO2 SERPL-SCNC: 22 MMOL/L (ref 23–29)
CREAT SERPL-MCNC: 0.8 MG/DL (ref 0.5–1.4)
DIFFERENTIAL METHOD BLD: ABNORMAL
EOSINOPHIL # BLD AUTO: 0.5 K/UL (ref 0–0.5)
EOSINOPHIL NFR BLD: 9.6 % (ref 0–8)
ERYTHROCYTE [DISTWIDTH] IN BLOOD BY AUTOMATED COUNT: 13.4 % (ref 11.5–14.5)
EST. GFR  (NO RACE VARIABLE): >60 ML/MIN/1.73 M^2
GLUCOSE SERPL-MCNC: 93 MG/DL (ref 70–110)
HCT VFR BLD AUTO: 39.1 % (ref 37–48.5)
HGB BLD-MCNC: 12.6 G/DL (ref 12–16)
IMM GRANULOCYTES # BLD AUTO: 0.02 K/UL (ref 0–0.04)
IMM GRANULOCYTES NFR BLD AUTO: 0.4 % (ref 0–0.5)
LYMPHOCYTES # BLD AUTO: 1.8 K/UL (ref 1–4.8)
LYMPHOCYTES NFR BLD: 33.7 % (ref 18–48)
MAGNESIUM SERPL-MCNC: 1.5 MG/DL (ref 1.6–2.6)
MCH RBC QN AUTO: 32.4 PG (ref 27–31)
MCHC RBC AUTO-ENTMCNC: 32.2 G/DL (ref 32–36)
MCV RBC AUTO: 101 FL (ref 82–98)
MONOCYTES # BLD AUTO: 0.8 K/UL (ref 0.3–1)
MONOCYTES NFR BLD: 15.1 % (ref 4–15)
NEUTROPHILS # BLD AUTO: 2.1 K/UL (ref 1.8–7.7)
NEUTROPHILS NFR BLD: 40.1 % (ref 38–73)
NRBC BLD-RTO: 0 /100 WBC
PHOSPHATE SERPL-MCNC: 2 MG/DL (ref 2.7–4.5)
PLATELET # BLD AUTO: 237 K/UL (ref 150–450)
PMV BLD AUTO: 9.8 FL (ref 9.2–12.9)
POTASSIUM SERPL-SCNC: 3.1 MMOL/L (ref 3.5–5.1)
PROT SERPL-MCNC: 6.5 G/DL (ref 6–8.4)
RBC # BLD AUTO: 3.89 M/UL (ref 4–5.4)
SODIUM SERPL-SCNC: 144 MMOL/L (ref 136–145)
WBC # BLD AUTO: 5.23 K/UL (ref 3.9–12.7)

## 2024-09-09 PROCEDURE — 83521 IG LIGHT CHAINS FREE EACH: CPT | Mod: 59 | Performed by: INTERNAL MEDICINE

## 2024-09-09 PROCEDURE — 84100 ASSAY OF PHOSPHORUS: CPT

## 2024-09-09 PROCEDURE — 36415 COLL VENOUS BLD VENIPUNCTURE: CPT | Performed by: INTERNAL MEDICINE

## 2024-09-09 PROCEDURE — 83735 ASSAY OF MAGNESIUM: CPT | Performed by: INTERNAL MEDICINE

## 2024-09-09 PROCEDURE — 80053 COMPREHEN METABOLIC PANEL: CPT

## 2024-09-09 PROCEDURE — 85025 COMPLETE CBC W/AUTO DIFF WBC: CPT | Performed by: INTERNAL MEDICINE

## 2024-09-09 RX ORDER — ALPRAZOLAM 2 MG/1
2 TABLET ORAL 2 TIMES DAILY PRN
Qty: 60 TABLET | Refills: 0 | Status: SHIPPED | OUTPATIENT
Start: 2024-09-09 | End: 2024-10-09

## 2024-09-10 ENCOUNTER — OFFICE VISIT (OUTPATIENT)
Dept: PALLIATIVE MEDICINE | Facility: CLINIC | Age: 64
End: 2024-09-10
Payer: COMMERCIAL

## 2024-09-10 ENCOUNTER — INFUSION (OUTPATIENT)
Dept: INFUSION THERAPY | Facility: HOSPITAL | Age: 64
End: 2024-09-10
Attending: INTERNAL MEDICINE
Payer: COMMERCIAL

## 2024-09-10 ENCOUNTER — OFFICE VISIT (OUTPATIENT)
Dept: HEMATOLOGY/ONCOLOGY | Facility: CLINIC | Age: 64
End: 2024-09-10
Payer: COMMERCIAL

## 2024-09-10 VITALS
TEMPERATURE: 97 F | SYSTOLIC BLOOD PRESSURE: 107 MMHG | OXYGEN SATURATION: 99 % | WEIGHT: 131.38 LBS | BODY MASS INDEX: 22.43 KG/M2 | HEART RATE: 70 BPM | RESPIRATION RATE: 16 BRPM | HEIGHT: 64 IN | DIASTOLIC BLOOD PRESSURE: 66 MMHG

## 2024-09-10 DIAGNOSIS — F51.04 PSYCHOPHYSIOLOGICAL INSOMNIA: ICD-10-CM

## 2024-09-10 DIAGNOSIS — C90.00 MULTIPLE MYELOMA, REMISSION STATUS UNSPECIFIED: Primary | ICD-10-CM

## 2024-09-10 DIAGNOSIS — F41.9 ANXIETY: ICD-10-CM

## 2024-09-10 DIAGNOSIS — C90.00 MULTIPLE MYELOMA, REMISSION STATUS UNSPECIFIED: Primary | Chronic | ICD-10-CM

## 2024-09-10 DIAGNOSIS — Z51.5 PALLIATIVE CARE ENCOUNTER: ICD-10-CM

## 2024-09-10 DIAGNOSIS — C90.00 MULTIPLE MYELOMA NOT HAVING ACHIEVED REMISSION: Primary | Chronic | ICD-10-CM

## 2024-09-10 LAB
KAPPA LC SER QL IA: 1.25 MG/DL (ref 0.33–1.94)
KAPPA LC/LAMBDA SER IA: 0.99 (ref 0.26–1.65)
LAMBDA LC SER QL IA: 1.26 MG/DL (ref 0.57–2.63)

## 2024-09-10 PROCEDURE — 63600175 PHARM REV CODE 636 W HCPCS: Mod: JZ,JG

## 2024-09-10 PROCEDURE — 4010F ACE/ARB THERAPY RXD/TAKEN: CPT | Mod: CPTII,95,,

## 2024-09-10 PROCEDURE — 1159F MED LIST DOCD IN RCRD: CPT | Mod: CPTII,95,,

## 2024-09-10 PROCEDURE — 96401 CHEMO ANTI-NEOPL SQ/IM: CPT

## 2024-09-10 PROCEDURE — 99214 OFFICE O/P EST MOD 30 MIN: CPT | Mod: 95,,,

## 2024-09-10 PROCEDURE — G2211 COMPLEX E/M VISIT ADD ON: HCPCS | Mod: 95,,,

## 2024-09-10 PROCEDURE — 1160F RVW MEDS BY RX/DR IN RCRD: CPT | Mod: CPTII,95,,

## 2024-09-10 PROCEDURE — 99215 OFFICE O/P EST HI 40 MIN: CPT | Mod: 95,,,

## 2024-09-10 RX ORDER — EPINEPHRINE 0.3 MG/.3ML
0.3 INJECTION SUBCUTANEOUS ONCE AS NEEDED
Status: CANCELLED | OUTPATIENT
Start: 2024-09-10

## 2024-09-10 RX ORDER — DIPHENHYDRAMINE HYDROCHLORIDE 50 MG/ML
50 INJECTION INTRAMUSCULAR; INTRAVENOUS ONCE AS NEEDED
Status: CANCELLED | OUTPATIENT
Start: 2024-09-10

## 2024-09-10 RX ORDER — HEPARIN 100 UNIT/ML
500 SYRINGE INTRAVENOUS
Status: CANCELLED | OUTPATIENT
Start: 2024-09-10

## 2024-09-10 RX ORDER — PROCHLORPERAZINE EDISYLATE 5 MG/ML
10 INJECTION INTRAMUSCULAR; INTRAVENOUS ONCE AS NEEDED
Status: CANCELLED | OUTPATIENT
Start: 2024-09-10

## 2024-09-10 RX ORDER — SODIUM CHLORIDE 0.9 % (FLUSH) 0.9 %
10 SYRINGE (ML) INJECTION
Status: CANCELLED | OUTPATIENT
Start: 2024-09-10

## 2024-09-10 RX ADMIN — DARATUMUMAB AND HYALURONIDASE-FIHJ (HUMAN RECOMBINANT) 1800 MG: 1800; 30000 INJECTION SUBCUTANEOUS at 09:09

## 2024-09-10 NOTE — ASSESSMENT & PLAN NOTE
BMBx : 60 % plasma cells - 4/6/2023  - SPEP/MECHE showed IgG lambda - monoclonal protein 3.19 g/dl - (3/27/2023).  - R-ISS stage I (beta 2 microglobulin 1.9, albumin 3.5, , normal karyotype and no high-risk mutations on fish panel  - PET-CT ( 4/10/2023) - showed extensive lytic lesions in the axial skeleton and mild L1 compression deformity.  - Started with Induction chemotherapy with VRD regimen (Velcde/Revlimid/Decadron) - 05/10/2023   - Started Aspirin daily p.o for thrombosis prophylaxis; Acyclovir 400 mg p.o BID for herpes Zoster prophylaxis .  __________________________________________________  --S/p Kyphoplasty 06/08/23    Pt seen by transplant team BMT.  However, she has declined transplant at this time and it was recommended that we continue with Rosa-VRD for 6 cycles and then repeat bone marrow biopsy and PET scan. If VGPR or better, then continue with Revlimid maintenance only.    Labs reviewed  Proceed with Darzalex today  Continues on Revlimid utd  Pt has dex as prescribed  Hypophosphatemia improved from prior--repeat phos-nak Hypocalcemia--pt currently on Ca/D supplementation--improved from prior--continue supplementation    RTC in two weeks with repeat labs for Darzalex/Zometa--scheduled

## 2024-09-10 NOTE — PROGRESS NOTES
Subjective:     Patient ID:?Ana Bonilla is a 64 y.o. female.?? MR#: [id number]   ?   PRIMARY ONCOLOGIST: -->  ?   CHIEF COMPLAINT: lab review/assessment for chemo/MM ?????   ?   ONCOLOGIC DIAGNOSIS: Multiple Myeloma  ?   CURRENT TREATMENT: OP Myeloma KIA-RD daratumumab lenalidomide dexAMETHasone Q4W     The patient location is: [place]  The chief complaint leading to consultation is: follow-up    Visit type: audiovisual    Face to Face time with patient: 19    20  minutes of total time spent on the encounter, which includes face to face time and non-face to face time preparing to see the patient (eg, review of tests), Obtaining and/or reviewing separately obtained history, Documenting clinical information in the electronic or other health record, Independently interpreting results (not separately reported) and communicating results to the patient/family/caregiver, or Care coordination (not separately reported).         Each patient to whom he or she provides medical services by telemedicine is:  (1) informed of the relationship between the physician and patient and the respective role of any other health care provider with respect to management of the patient; and (2) notified that he or she may decline to receive medical services by telemedicine and may withdraw from such care at any time.    Notes:    HPI  Ms. Bonilla is a 62-year-old  female with past medical history significant for hypertension, COPD, asthma, GERD, hypothyroidism here for follow-up and management of multiple myeloma. BMBx on 4/6/2023 showed 60 % plasma cells. PET-CT on 4/10/2023 showed extensive lytic bony lesions throughout the spine, sternum, bilateral ribs, pelvis and mild compression deformity in L1 vertebral body. SPEP/MECHE on 3/27/2023 showed IgG lambda monoclonal protein - 3.19 g/dl. Quantitative immunoglobulins on 3/27/2023 showed IgG 4,521 mg/dl. UPEP/MECHE and FLC were pending at that time. Labs on 3/27/2023 showed Beta  2 microglobulin 1.9, .  Labs on 4/19/2023 showed Hb, 11.5, WBC 6.3, platelets, BUN 11, Cr 0.9, calcium 9. Pt initiated on VRD 05/10/23. Treatment planned changed to D-VRD 07/06/23.     Interval History: Pt presents for lab review and assessment prior to Darzalex.  Pt states overall she is feeling very anxious as her  has had some significant health issues. Denies n/v/d/c, fever, chills, sob, cp, abnormal bleeding. She notes appetite has been poor with recent stressors and has restarted on Ensure; She continues on Mag daily, Ca, and K supplementation with inconsistency--reiterated importance of taking utd.   Oncology History   Multiple myeloma   4/20/2023 Initial Diagnosis    Multiple myeloma     5/10/2023 - 6/28/2023 Chemotherapy    Treatment Summary   Plan Name: OP VRD - WEEKLY BORTEZOMIB LENALIDOMIDE DEXAMETHASONE Q3W  Treatment Goal: Palliative  Status: Inactive  Start Date: 5/10/2023  End Date: 6/28/2023  Provider: Abram Velasquez MD  Chemotherapy: bortezomib (VELCADE) injection 2 mg, 1.3 mg/m2 = 2 mg, Subcutaneous, Clinic[facility] 1 time, 2 of 6 cycles  Administration: 2 mg (5/10/2023), 2 mg (5/17/2023), 2 mg (6/14/2023), 2 mg (5/31/2023), 2 mg (6/21/2023), 2 mg (6/28/2023)  lenalidomide 25 mg Cap, 25 mg, Oral, Daily, 1 of 1 cycle, Start date: 5/10/2023, End date: 5/17/2023 6/28/2023 Cancer Staged    Staging form: Plasma Cell Myeloma and Plasma Cell Disorders, AJCC 8th Edition  - Clinical stage from 6/28/2023: RISS Stage II (Beta-2-microglobulin (mg/L): 1.9, Albumin (g/dL): 3, ISS: Stage II, High-risk cytogenetics: Absent, LDH: Normal)     7/6/2023 - 1/3/2024 Chemotherapy    Treatment Summary   Plan Name: OP D-VRD DARATUMUMAB + BORTEZOMIB LENALIDOMIDE DEXAMETHASONE  Treatment Goal: Palliative  Status: Inactive  Start Date: 7/6/2023  End Date: 1/3/2024  Provider: Oscar Márquez MD  Chemotherapy: bortezomib (VELCADE) injection 2 mg, 1.3 mg/m2 = 2 mg, Subcutaneous, Clinic/[facility] 1 time, 6 of 6  cycles  Administration: 2 mg (7/6/2023), 2 mg (7/12/2023), 2 mg (8/2/2023), 2 mg (8/9/2023), 2.25 mg (10/18/2023), 2 mg (7/19/2023), 2 mg (7/26/2023), 2 mg (8/16/2023), 2.25 mg (9/20/2023), 2.25 mg (9/27/2023), 2.25 mg (10/25/2023), 2.25 mg (11/1/2023), 2.25 mg (11/8/2023), 2.25 mg (12/6/2023), 2.25 mg (1/3/2024), 2.25 mg (11/15/2023), 2.25 mg (11/22/2023), 2.25 mg (11/29/2023), 2.25 mg (12/13/2023), 2.25 mg (12/20/2023), 2.25 mg (12/26/2023)  lenalidomide 10 mg Cap, 1 capsule (100 % of original dose 1 capsule), Oral, Daily, 1 of 1 cycle, Start date: 7/26/2023, End date: 8/2/2023  Dose modification: 1 capsule (original dose 1 capsule, Cycle 0)  daratumumab-hyaluronidase-fihj subcutaneous injection 1,800 mg, 1,800 mg (100 % of original dose 1,800 mg), Subcutaneous, Clinic/[facility] 1 time, 6 of 6 cycles  Dose modification: 1,800 mg (original dose 1,800 mg, Cycle 1), 1,800 mg (original dose 1,800 mg, Cycle 1)  Administration: 1,800 mg (7/6/2023), 1,800 mg (7/12/2023), 1,800 mg (7/19/2023), 1,800 mg (7/26/2023), 1,800 mg (8/2/2023), 1,800 mg (8/9/2023), 1,800 mg (8/16/2023), 1,800 mg (9/27/2023), 1,800 mg (10/18/2023), 1,800 mg (11/1/2023), 1,800 mg (11/15/2023), 1,800 mg (11/29/2023), 1,800 mg (12/13/2023), 1,800 mg (12/26/2023)     4/30/2024 -  Chemotherapy    Treatment Summary   Plan Name: OP Myeloma KIA-RD daratumumab lenalidomide dexAMETHasone Q4W  Treatment Goal: Palliative  Status: Active  Start Date: 4/30/2024  End Date: 3/11/2025 (Planned)  Provider: Bri Luevano MD  Chemotherapy: daratumumab-hyaluronidase-[facility] subcutaneous injection 1,800 mg, 1,800 mg, Subcutaneous, Clinic/[facility] 1 time, 5 of 12 cycles  Administration: 1,800 mg (4/30/2024), 1,800 mg (5/7/2024), 1,800 mg (5/21/2024), 1,800 mg (5/28/2024), 1,800 mg (6/4/2024), 1,800 mg (6/25/2024), 1,800 mg (7/9/2024), 1,800 mg (5/14/2024), 1,800 mg (6/11/2024), 1,800 mg (6/18/2024), 1,800 mg (7/30/2024), 1,800 mg (8/13/2024)        Social History      Socioeconomic History    Marital status:     Number of children: 2   Occupational History     Employer: [employer]   Tobacco Use    Smoking status: Every Day     Current packs/day: 0.50     Average packs/day: 0.5 packs/day for 50.0 years (25.0 ttl pk-yrs)     Types: Cigarettes     Passive exposure: Never    Smokeless tobacco: Never   Substance and Sexual Activity    Alcohol use: Not Currently     Comment: quit    Drug use: No    Sexual activity: Not Currently     Partners: Male     Social Determinants of Health     Financial Resource Strain: Low Risk  (11/15/2023)    Overall Financial Resource Strain (CARDIA)     Difficulty of Paying Living Expenses: Not hard at all   Food Insecurity: No Food Insecurity (11/15/2023)    Hunger Vital Sign     Worried About Running Out of Food in the Last Year: Never true     Ran Out of Food in the Last Year: Never true   Transportation Needs: No Transportation Needs (11/15/2023)    PRAPARE - Transportation     Lack of Transportation (Medical): No     Lack of Transportation (Non-Medical): No   Physical Activity: Sufficiently Active (11/15/2023)    Exercise Vital Sign     Days of Exercise per Week: 7 days     Minutes of Exercise per Session: 150+ min   Stress: Stress Concern Present (11/15/2023)    [employer] [place] [employer] - Occupational Stress Questionnaire     Feeling of Stress : To some extent   Housing Stability: Low Risk  (11/15/2023)    Housing Stability Vital Sign     Unable to Pay for Housing in the Last Year: No     Number of Places Lived in the Last Year: 1     Unstable Housing in the Last Year: No      Family History   Problem Relation Name Age of Onset    Hypertension Mother Vivian     Diabetes Mother Vivian     Asthma Mother Vivian             Diabetes Father      Asthma Father      Stroke Maternal Grandmother  grandmother     Prostate cancer Maternal Grandfather      Mental illness Son Lukasz Garcia     Pancreatic  cancer Maternal Uncle      Mental illness Other Pat side     Pancreatic cancer Other Mat side     Ovarian cancer Maternal Cousin        Past Surgical History:   Procedure Laterality Date    APPENDECTOMY      BIOPSY N/A 06/08/2023    Procedure: BIOPSY;  Surgeon: Fausto Smith MD;  Location: [facility] OR;  Service: Neurosurgery;  Laterality: N/A;  L1    BUNIONECTOMY      COLONOSCOPY N/A 12/04/2019    Procedure: COLONOSCOPY;  Surgeon: Danny Matos III, MD;  Location: [facility] ENDO;  Service: Endoscopy;  Laterality: N/A;    COLONOSCOPY N/A 10/24/2022    Procedure: COLONOSCOPY;  Surgeon: Danny Matos III, MD;  Location: [facility] ENDO;  Service: Endoscopy;  Laterality: N/A;    ESOPHAGOGASTRODUODENOSCOPY N/A 10/24/2022    Procedure: EGD (ESOPHAGOGASTRODUODENOSCOPY);  Surgeon: Danny Matos III, MD;  Location: [facility] ENDO;  Service: Endoscopy;  Laterality: N/A;    FIXATION KYPHOPLASTY Bilateral 06/08/2023    Procedure: KYPHOPLASTY;  Surgeon: Fausto Smith MD;  Location: [facility] OR;  Service: Neurosurgery;  Laterality: Bilateral;  kyphoplasty and radiofrequency ablation - L1    HYSTERECTOMY      PARTIAL//still with ovaries    neck fusion  08/2017    THYROIDECTOMY      TUBAL LIGATION          Review of Systems   Constitutional:  Positive for fatigue. Negative for activity change, appetite change, chills, fever and unexpected weight change.   HENT:  Negative for congestion, dental problem, mouth sores and nosebleeds.    Eyes:  Negative for visual disturbance.   Respiratory:  Negative for cough, choking and chest tightness.    Cardiovascular:  Negative for chest pain, palpitations and leg swelling.   Gastrointestinal:  Negative for abdominal distention, abdominal pain, anal bleeding, blood in stool, constipation, diarrhea, nausea and vomiting.   Endocrine: Negative.    Genitourinary:  Negative for dysuria, frequency, hematuria and urgency.   Musculoskeletal:  Positive for arthralgias and myalgias. Negative for back pain, gait  problem and joint swelling.   Skin:  Negative for rash and wound.   Allergic/Immunologic: Negative for immunocompromised state.   Neurological:  Negative for dizziness, light-headedness, numbness and headaches.   Hematological:  Negative for adenopathy. Does not bruise/bleed easily.   Psychiatric/Behavioral:  The patient is nervous/anxious.        ?   A comprehensive 14-point review of systems was reviewed with patient and was negative other than as specified above.   ?     Objective:      Physical Exam  Vitals reviewed: virtual visit.           ?   There were no vitals filed for this visit.     ?       ?   Laboratory:  ?   No visits with results within 1 Day(s) from this visit.   Latest known visit with results is:   Lab Visit on 09/09/2024   Component Date Value Ref Range Status    WBC 09/09/2024 5.23  3.90 - 12.70 K/uL Final    RBC 09/09/2024 3.89 (L)  4.00 - 5.40 M/uL Final    Hemoglobin 09/09/2024 12.6  12.0 - 16.0 g/dL Final    Hematocrit 09/09/2024 39.1  37.0 - 48.5 % Final    MCV 09/09/2024 101 (H)  82 - 98 fL Final    MCH 09/09/2024 32.4 (H)  27.0 - 31.0 pg Final    MCHC 09/09/2024 32.2  32.0 - 36.0 g/dL Final    RDW 09/09/2024 13.4  11.5 - 14.5 % Final    Platelets 09/09/2024 237  150 - 450 K/uL Final    MPV 09/09/2024 9.8  9.2 - 12.9 fL Final    Immature Granulocytes 09/09/2024 0.4  0.0 - 0.5 % Final    Gran # (ANC) 09/09/2024 2.1  1.8 - 7.7 K/uL Final    Immature Grans (Abs) 09/09/2024 0.02  0.00 - 0.04 K/uL Final    Lymph # 09/09/2024 1.8  1.0 - 4.8 K/uL Final    Mono # 09/09/2024 0.8  0.3 - 1.0 K/uL Final    Eos # 09/09/2024 0.5  0.0 - 0.5 K/uL Final    Baso # 09/09/2024 0.06  0.00 - 0.20 K/uL Final    nRBC 09/09/2024 0  0 /100 WBC Final    Gran % 09/09/2024 40.1  38.0 - 73.0 % Final    Lymph % 09/09/2024 33.7  18.0 - 48.0 % Final    Mono % 09/09/2024 15.1 (H)  4.0 - 15.0 % Final    Eosinophil % 09/09/2024 9.6 (H)  0.0 - 8.0 % Final    Basophil % 09/09/2024 1.1  0.0 - 1.9 % Final    Differential Method  09/09/2024 Automated   Final    Magnesium 09/09/2024 1.5 (L)  1.6 - 2.6 mg/dL Final    Sodium 09/09/2024 144  136 - 145 mmol/L Final    Potassium 09/09/2024 3.1 (L)  3.5 - 5.1 mmol/L Final    Chloride 09/09/2024 110  95 - 110 mmol/L Final    CO2 09/09/2024 22 (L)  23 - 29 mmol/L Final    Glucose 09/09/2024 93  70 - 110 mg/dL Final    BUN 09/09/2024 9  8 - 23 mg/dL Final    Creatinine 09/09/2024 0.8  0.5 - 1.4 mg/dL Final    Calcium 09/09/2024 8.4 (L)  8.7 - 10.5 mg/dL Final    Total Protein 09/09/2024 6.5  6.0 - 8.4 g/dL Final    Albumin 09/09/2024 3.4 (L)  3.5 - 5.2 g/dL Final    Total Bilirubin 09/09/2024 0.5  0.1 - 1.0 mg/dL Final    Alkaline Phosphatase 09/09/2024 58  55 - 135 U/L Final    AST 09/09/2024 27  10 - 40 U/L Final    ALT 09/09/2024 27  10 - 44 U/L Final    eGFR 09/09/2024 >60  >60 mL/min/1.73 m^2 Final    Anion Gap 09/09/2024 12  8 - 16 mmol/L Final    Phosphorus 09/09/2024 2.0 (L)  2.7 - 4.5 mg/dL Final      ?   Imaging:    No results found. However, due to the size of the patient record, not all encounters were searched. Please check Results Review for a complete set of results.       No results found. However, due to the size of the patient record, not all encounters were searched. Please check Results Review for a complete set of results.           ?   Assessment/Plan:     Problem List Items Addressed This Visit          Oncology    Multiple myeloma - Primary (Chronic)     BMBx : 60 % plasma cells - 4/6/2023  - SPEP/MECHE showed IgG lambda - monoclonal protein 3.19 g/dl - (3/27/2023).  - R-ISS stage I (beta 2 microglobulin 1.9, albumin 3.5, , normal karyotype and no high-risk mutations on fish panel  - PET-CT ( 4/10/2023) - showed extensive lytic lesions in the axial skeleton and mild L1 compression deformity.  - Started with Induction chemotherapy with VRD regimen (Velcde/Revlimid/Decadron) - 05/10/2023   - Started Aspirin daily p.o for thrombosis prophylaxis; Acyclovir 400 mg p.o BID for  herpes Zoster prophylaxis .  __________________________________________________  --S/p Kyphoplasty 06/08/23    Pt seen by transplant team BMT.  However, she has declined transplant at this time and it was recommended that we continue with Rosa-VRD for 6 cycles and then repeat bone marrow biopsy and PET scan. If VGPR or better, then continue with Revlimid maintenance only.    Labs reviewed  Proceed with Darzalex today  Continues on Revlimid utd  Pt has dex as prescribed  Hypophosphatemia improved from prior--repeat phos-nak Hypocalcemia--pt currently on Ca/D supplementation--improved from prior--continue supplementation    RTC in two weeks with repeat labs for Darzalex/Zometa--scheduled                              Med Onc Chart Routing      Follow up with physician    Follow up with WERNER . Scheduled   Infusion scheduling note    Injection scheduling note    Labs    Imaging    Pharmacy appointment    Other referrals                     TONYA Pool  Hematology/Oncology

## 2024-09-10 NOTE — PROGRESS NOTES
Palliative Medicine Clinic Note  Follow-up        Consult Requested By: Dr. Luevano      Reason for Consult: Symptom Management/Advance Care Planning/Goals of Care Discussion    Chief Complaint: Anxiety         ASSESSMENT/PLAN:      Plan/Recommendations:    Problem List Items Addressed This Visit          Psychiatric    Anxiety     Chronic and exacerbated by MM diagnosis and 's recent illness (seizures)  Pt and I extensively discussed stress management strategies including avoiding triggers, sleep hygiene, and proper nutrition.   Continues Xanax 2mg BID PRN- Informed pt to remain on this regimen, as she may run out early.  Declines SSRI trial at this time.   Declines mental health counseling at this time.  UDS- Repeat at next in-person visit            Oncology    Multiple myeloma - Primary (Chronic)     Metastasis to thoracic/lumbar spine, complicated by L1 compression fracture.  Followed by Dr. Luevano, Dr. Garcia, and Neurosurgery.   On D-VRD, Lenalidomide and Zometa. S/P L1 Kyphoplasty (2023).   Pt remains hesitant about SCT due extended recovery time/fear of relapse post SCT  PET scan 2024: 1. Findings consistent with treatment response with significantly decreased FDG uptake associated with the previously identified lesions. No new FDG avid lesions are identified             Palliative Care    Palliative care encounter     -Code status: Full Code.  -HCPOA: 1.  : Lars Bonilla: [phone], 2. Son: Hakeem Garcia: [phone].   -GOC:  Continue cancer treatment, symptom management, maintain independence and functional status.  -See HPI for further details             Other    Insomnia     Related to increased anxiety about her  having a seizure at night.   Ok with Melatonin trial.                   Advance Care Planning   Advance Directives:   Living Will: No    LaPOST: No    Do Not Resuscitate Status: No    Medical Power of : Yes    Agent's Name:  1.  :  "Lars Bonilla: [phone], 2. Son: Hakeem Garcia: [phone].    Decision Making:  Patient answered questions  Goals of Care: What is most important right now is to focus on quality of life, even if it means sacrificing a little time, curative/life-prolongation (regardless of treatment burdens). Accordingly, we have decided that the best plan to meet the patient's goals includes continuing with treatment.    Pt declined stem cell transplant due to concerns about extended recovery time. She stated she is concerned that she will be unable to care for her family if she undergoes SCT and the subsequent recovery time.     She wishes to remain Full Code. She "does not want to think about that".                Follow up: 1 month w/ repeat UDS       SUBJECTIVE:      History of Present Illness / Interval History:  Ana Bonilla is 64 y.o. female with Multiple Myeloma (2023) with metastasis to thoracic/lumbar spine, complicated by L1 compression fracture. Pt declined SCT. . S/P L1 Kyphoplasty (2023).   On D-VRD, Lenalidomide and Zometa  Followed by Dr. Luevano, Dr. Garcia, and Neurosurgery.    Presents to Palliative Care Clinic for physical symptoms and additional support.. Please see Hem/Onc note for more details on pt's care.       9/10/24:    The patient location is: [facility]  The chief complaint leading to consultation is:  Follow-up     Visit type: audiovisual     Face to Face time with patient:  25 minutes  35 minutes of total time spent on the encounter, which includes face to face time and non-face to face time preparing to see the patient (eg, review of tests), Obtaining and/or reviewing separately obtained history, Documenting clinical information in the electronic or other health record, Independently interpreting results (not separately reported) and communicating results to the patient/family/caregiver, or Care coordination (not separately reported).      Each patient to whom he or " "she provides medical services by telemedicine is:  (1) informed of the relationship between the physician and patient and the respective role of any other health care provider with respect to management of the patient; and (2) notified that he or she may decline to receive medical services by telemedicine and may withdraw from such care at any time.     Notes: Pt seen via audiovisual feed. A&O x3. No acute distress.   She reported progressive anxiety, insomnia, fatigue, and restlessness/aggravation due to her 's recent illness (seizures).   She notes she is her 's sole caregiver, and she does not have help. Her son is managing schizophrenia and often unable to help.   She is using Xanax 2mg TID PRN. Wishes to try Melatonin for insomnia. She declined mental health counseling at this time. [facility]     Past visits:    06/06/2024:  History obtained from: Patient and medical record  Pt attended clinic alone. She was in no acute distress. Vital signs stable on room air.   She reported persistent right hip pain 5/10. Percocet 5mg-10mg PRN was effective, but she ran out. She is taking Tylenol 500mg Q6H PRN with some relief. She stated she does not want to take opioids due to fear of oversedation and addiction.   However, she requested a short course of Percocet to manage severe pain. She continues to use Medical Marijuana for anxiety/pain, but notes "taking a full dose is too strong".   Her anxiety is stable on Xanax 2mg BID PRN.   She continues to smoke, but notes she is gradually self-weaning. She wishes to enrol in smoking cessation program.         ROS:  Review of Systems   Constitutional:  Positive for activity change and fatigue. Negative for appetite change and unexpected weight change.   HENT:  Negative for hearing loss, sore throat, trouble swallowing and voice change.    Eyes:  Negative for visual disturbance.   Respiratory:  Negative for cough, chest " tightness, shortness of breath and wheezing.    Cardiovascular:  Negative for chest pain, palpitations and leg swelling.   Gastrointestinal:  Negative for abdominal pain, blood in stool, constipation (Chronic, Intermittent. On Linzess), nausea, rectal pain and vomiting.   Genitourinary:  Negative for bladder incontinence, difficulty urinating, flank pain, hematuria, nocturia, pelvic pain and urgency.   Musculoskeletal:  Positive for arthralgias and myalgias (Right hip). Negative for back pain and neck pain.   Integumentary:  Negative for wound.   Allergic/Immunologic: Negative for environmental allergies, food allergies and immunocompromised state.   Neurological:  Negative for dizziness, tremors, weakness, numbness, headaches, memory loss and coordination difficulties.   Hematological:  Negative for adenopathy. Does not bruise/bleed easily.   Psychiatric/Behavioral:  Positive for sleep disturbance (Due to fear of  having a seizure). Negative for agitation, behavioral problems, confusion, decreased concentration, dysphoric mood, self-injury and suicidal ideas. The patient is nervous/anxious (Chronic, exacerbated by 's recent illness. On Xanax. Declined antidepressant.).        Review of Symptoms      Symptom Assessment (ESAS 0-10 Scale)  Pain:  0  Dyspnea:  0  Anxiety:  8  Nausea:  0  Depression:  0  Anorexia:  0  Fatigue:  10  Insomnia:  10  Restlessness:  10  Agitation:  0     CAM / Delirium:  Negative  Constipation:  Negative  Diarrhea:  Negative    Anxiety:  Is nervous/anxious (Chronic, exacerbated by 's recent illness. On Xanax. Declined antidepressant.)  Constipation:  No constipation (Chronic, Intermittent. On Linzess)    Bowel Management Plan (BMP):  Yes      Pain Assessment:    Location(s): leg (Right Hip)    Leg       Location: right        Quality: Aching, stabbing, sharp and tingling        Quantity: 0/10 in intensity        Chronicity: Onset 0 year(s) ago, unchanged         Aggravating Factors: Movement        Alleviating Factors: Opiates and recumbency        Associated Symptoms: Arthralgias and myalgias    Modified Rhonda Scale:  0    Performance Status:  90    ECOG Performance Status ndGndrndanddndend:nd nd2nd Living Arrangements:  Lives with family    Psychosocial/Cultural:   See Palliative Psychosocial Note: Yes   with two adult sons. Works weekends at [employer]. 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   **Primary  to Follow**  Palliative Care  Consult: No            Medications:    Current Outpatient Medications:     acyclovir (ZOVIRAX) 400 MG tablet, Take 1 tablet (400 mg total) by mouth 2 (two) times daily., Disp: 60 tablet, Rfl: 11    albuterol (PROVENTIL HFA) 90 mcg/actuation inhaler, Inhale 2 puffs into the lungs every 6 (six) hours as needed for Wheezing. Rescue, Disp: 18 g, Rfl: 0    ALPRAZolam (XANAX) 2 MG Tab, Take 1 tablet (2 mg total) by mouth 2 (two) times daily as needed (Anxiety)., Disp: 60 tablet, Rfl: 0    amlodipine-benazepril 2.5-10 mg (LOTREL) 2.5-10 mg per capsule, TAKE 1 CAPSULE BY MOUTH EVERY DAY, Disp: 90 capsule, Rfl: 3    aspirin 81 MG Chew, Take 1 tablet (81 mg total) by mouth once daily., Disp: 30 tablet, Rfl: 11    azithromycin (Z-JESIKA) 250 MG tablet, Take as directed, Disp: 6 tablet, Rfl: 0    calcium-vitamin D 600 mg-10 mcg (400 unit) Tab, Take 1 tablet by mouth every 12 (twelve) hours., Disp: 60 tablet, Rfl: 2    dexAMETHasone (DECADRON) 4 MG Tab, Take 10 tablets (40 mg total) by mouth every 7 days. Take with food., Disp: 40 tablet, Rfl: 11    erythromycin (ROMYCIN) ophthalmic ointment, Apply ointment to lids and lashes on both eyes 2 times daily for 7 days., Disp: 2.5 g, Rfl: 0    fluticasone propionate (FLONASE) 50 mcg/actuation nasal spray, 1 spray (50 mcg total) by Each Nostril route once daily., Disp: 1 mL, Rfl: 1    hydrOXYzine HCL (ATARAX) 25 MG tablet, Take 1 tablet (25 mg total) by mouth 3 (three) times daily  as needed for Itching., Disp: 21 tablet, Rfl: 0    ipratropium (ATROVENT) 21 mcg (0.03 %) nasal spray, 2 sprays by Each Nostril route 3 (three) times daily., Disp: , Rfl:     lenalidomide 10 mg Cap, TAKE 1 CAPSULE ONCE DAILY FOR 21 DAYS AND THEN 7 DAYS OFF., Disp: 21 each, Rfl: 7    levocetirizine (XYZAL) 5 MG tablet, Take 1 tablet (5 mg total) by mouth every evening., Disp: 30 tablet, Rfl: 11    levothyroxine (SYNTHROID) 100 MCG tablet, Take 1 tablet (100 mcg total) by mouth before breakfast., Disp: 90 tablet, Rfl: 1    linaCLOtide (LINZESS) 72 mcg Cap capsule, Take 2 capsules (144 mcg total) by mouth before breakfast., Disp: 30 capsule, Rfl: 1    magnesium oxide (MAG-OX) 400 mg (241.3 mg magnesium) tablet, TAKE 1 TABLET(400 MG) BY MOUTH EVERY DAY, Disp: 90 tablet, Rfl: 1    methylPREDNISolone (MEDROL DOSEPACK) 4 mg tablet, use as directed, Disp: 1 each, Rfl: 0    metoprolol succinate (TOPROL-XL) 50 MG 24 hr tablet, Take 1 tablet (50 mg total) by mouth every evening., Disp: 90 tablet, Rfl: 3    montelukast (SINGULAIR) 10 mg tablet, TAKE 1 TABLET(10 MG) BY MOUTH EVERY EVENING, Disp: 90 tablet, Rfl: 0    neomycin-polymyxin-dexamethasone (DEXACINE) 3.5 mg/g-10,000 unit/g-0.1 % Oint, Place into both eyes 3 (three) times daily., Disp: 3.5 g, Rfl: 0    nicotine (NICODERM CQ) 14 mg/24 hr, Place 1 patch onto the skin once daily., Disp: 14 patch, Rfl: 0    ondansetron (ZOFRAN) 4 MG tablet, Take 1 tablet (4 mg total) by mouth every 6 (six) hours as needed for Nausea., Disp: 30 tablet, Rfl: 5    pantoprazole (PROTONIX) 40 MG tablet, TAKE 1 TABLET(40 MG) BY MOUTH EVERY DAY, Disp: 90 tablet, Rfl: 3    potassium chloride SA (K-DUR,KLOR-CON) 20 MEQ tablet, Take 1 tablet (20 mEq total) by mouth once daily., Disp: 30 tablet, Rfl: 11    potassium, sodium phosphates (PHOS-NAK) 280-160-250 mg PwPk, Take 1 packet by mouth 4 (four) times daily with meals and nightly., Disp: 4 packet, Rfl: 0    pregabalin (LYRICA) 50 MG capsule, Take 1  capsule (50 mg total) by mouth nightly., Disp: 30 capsule, Rfl: 0    promethazine (PHENERGAN) 25 MG tablet, Take 1 tablet (25 mg total) by mouth every 4 (four) hours., Disp: 45 tablet, Rfl: 2    rosuvastatin (CRESTOR) 10 MG tablet, Take 1 tablet (10 mg total) by mouth every evening., Disp: 90 tablet, Rfl: 3    traZODone (DESYREL) 50 MG tablet, Take 1 tablet (50 mg total) by mouth every evening., Disp: 30 tablet, Rfl: 11    WIXELA INHUB 250-50 mcg/dose diskus inhaler, INHALE 1 PUFF INTO THE LUNGS TWICE DAILY, Disp: 180 each, Rfl: 1    Current Facility-Administered Medications:     dexAMETHasone injection 40 mg, 40 mg, Intravenous, 1 time in Clinic/HOD, Bri Luevano MD    Facility-Administered Medications Ordered in Other Visits:     lactated ringers infusion, , Intravenous, Continuous, Danny Matos III, MD      External  database queried on 09/19/2024  by RAJIV RUIZ :          Review of patient's allergies indicates:   Allergen Reactions    Hydrocodone-acetaminophen Other (See Comments)     Causes pt to feel extremely sick            OBJECTIVE:         Physical Exam:  Vitals:      Physical Exam  Vitals reviewed: Limited due to virtual visit.   Constitutional:       General: She is not in acute distress.     Appearance: Normal appearance. She is not ill-appearing.   Eyes:      General: No scleral icterus.        Right eye: No discharge.         Left eye: No discharge.      Conjunctiva/sclera: Conjunctivae normal.   Pulmonary:      Effort: Pulmonary effort is normal. No respiratory distress.   Neurological:      Mental Status: She is alert and oriented to person, place, and time.   Psychiatric:         Attention and Perception: Attention and perception normal.         Mood and Affect: Affect normal. Mood is anxious.         Speech: Speech normal.         Behavior: Behavior normal. Behavior is cooperative.         Thought Content: Thought content normal. Thought content does not include  suicidal ideation. Thought content does not include suicidal plan.         Cognition and Memory: Cognition and memory normal.         Judgment: Judgment normal.           Labs: BMP  Lab Results   Component Value Date     09/09/2024    K 3.1 (L) 09/09/2024     09/09/2024    CO2 22 (L) 09/09/2024    BUN 9 09/09/2024    CREATININE 0.8 09/09/2024    CALCIUM 8.4 (L) 09/09/2024    ANIONGAP 12 09/09/2024    EGFRNORACEVR >60 09/09/2024     Lab Results   Component Value Date    WBC 5.23 09/09/2024    HGB 12.6 09/09/2024    HCT 39.1 09/09/2024     (H) 09/09/2024     09/09/2024         Imaging: PET: 01/18/2024: 1. Findings consistent with treatment response with significantly decreased FDG uptake associated with the previously identified lesions. No new FDG avid lesions are identified.        I spent a total of 35 minutes on the day of the visit. This includes face to face time in discussion of goals of care, symptom assessment, coordination of care and emotional support.  This also includes non-face to face time preparing to see the patient (eg, review of tests/imaging), obtaining and/or reviewing separately obtained history, documenting clinical information in the electronic or other health record, independently interpreting results and communicating results to the patient/family/caregiver, or care coordinator.       RAJIV BOYD NP

## 2024-09-10 NOTE — NURSING
0947: Darzalex Injection given without difficulties.Bandaid applied. Patient instructed to stay in the clinic for 15 minutes. Patient verbalized understanding and pt chose not to wait.

## 2024-09-10 NOTE — DISCHARGE INSTRUCTIONS
Thank you for allowing me to care for you today,  MIS LeachN, RN    [facility]  [address] [address]  [phone] phone     [phone] fax  Hours of Operation: Monday- Friday 7:00am- 5:30pm  After hours phone  [phone]  Hematology / Oncology Physicians on call      JUANI Chaudhary Dr., Dr., Dr., Dr., Dr., Dr., Dr., Dr., Dr., Dr., Dr., Dr., Dr., Dr., Dr., BELLE Mckeon, BELLE Deluna, BELLE Page, NP  Please call with any concerns regarding your appointment today.   FALL PREVENTION   Falls often occur due to slipping, tripping or losing your balance. Here are ways to reduce your risk of falling again.   Was there anything that caused your fall that can be fixed, removed or replaced?   Make your home safe by keeping walkways clear of objects you may trip over.   Use non-slip pads under rugs.   Do not walk in poorly lit areas.   Do not stand on chairs or wobbly ladders.   Use caution when reaching overhead or looking upward. This position can cause a loss of balance.   Be sure your shoes fit properly, have non-slip bottoms and are in good condition.   Be cautious when going up and down stairs, curbs, and when walking on uneven sidewalks.   If your balance is poor, consider using a cane or walker.   If your fall was related to alcohol use, stop or limit alcohol intake.   If your fall was related to use of sleeping medicines, talk to your doctor about this. You may need to reduce your dosage at bedtime if you awaken during the night to go to the bathroom.   To reduce the need for nighttime bathroom trips:   Avoid drinking fluids for several hours before going to bed   Empty your bladder before going to bed   Men can keep a urinal at the bedside   © 6402-2566 [employer], [address] [place] [address], [place] [address]. All rights reserved. This information is not intended as a  substitute for professional medical care. Always follow your healthcare professional's instructions.

## 2024-09-19 PROBLEM — G47.00 INSOMNIA: Status: ACTIVE | Noted: 2024-09-19

## 2024-09-19 NOTE — ASSESSMENT & PLAN NOTE
-Code status: Full Code.  -HCPOA: 1.  : Lars Bonilla: [phone], 2. Son: Hakeem Garcia: [phone].   -GOC:  Continue cancer treatment, symptom management, maintain independence and functional status.  -See HPI for further details

## 2024-09-19 NOTE — ASSESSMENT & PLAN NOTE
Chronic and exacerbated by MM diagnosis and 's recent illness (seizures)  Pt and I extensively discussed stress management strategies including avoiding triggers, sleep hygiene, and proper nutrition.   Continues Xanax 2mg BID PRN- Informed pt to remain on this regimen, as she may run out early.  Declines SSRI trial at this time.   Declines mental health counseling at this time.  UDS- Repeat at next in-person visit

## 2024-09-23 ENCOUNTER — DOCUMENTATION ONLY (OUTPATIENT)
Dept: HEMATOLOGY/ONCOLOGY | Facility: CLINIC | Age: 64
End: 2024-09-23
Payer: COMMERCIAL

## 2024-09-23 ENCOUNTER — LAB VISIT (OUTPATIENT)
Dept: LAB | Facility: HOSPITAL | Age: 64
End: 2024-09-23
Attending: INTERNAL MEDICINE
Payer: COMMERCIAL

## 2024-09-23 DIAGNOSIS — C90.00 MULTIPLE MYELOMA NOT HAVING ACHIEVED REMISSION: Chronic | ICD-10-CM

## 2024-09-23 LAB
BASOPHILS # BLD AUTO: 0.07 K/UL (ref 0–0.2)
BASOPHILS NFR BLD: 1.6 % (ref 0–1.9)
DIFFERENTIAL METHOD BLD: ABNORMAL
EOSINOPHIL # BLD AUTO: 0.6 K/UL (ref 0–0.5)
EOSINOPHIL NFR BLD: 14.2 % (ref 0–8)
ERYTHROCYTE [DISTWIDTH] IN BLOOD BY AUTOMATED COUNT: 13.6 % (ref 11.5–14.5)
HCT VFR BLD AUTO: 37.5 % (ref 37–48.5)
HGB BLD-MCNC: 12.1 G/DL (ref 12–16)
IMM GRANULOCYTES # BLD AUTO: 0 K/UL (ref 0–0.04)
IMM GRANULOCYTES NFR BLD AUTO: 0 % (ref 0–0.5)
LYMPHOCYTES # BLD AUTO: 1.7 K/UL (ref 1–4.8)
LYMPHOCYTES NFR BLD: 37.7 % (ref 18–48)
MCH RBC QN AUTO: 31.9 PG (ref 27–31)
MCHC RBC AUTO-ENTMCNC: 32.3 G/DL (ref 32–36)
MCV RBC AUTO: 99 FL (ref 82–98)
MONOCYTES # BLD AUTO: 0.5 K/UL (ref 0.3–1)
MONOCYTES NFR BLD: 12.1 % (ref 4–15)
NEUTROPHILS # BLD AUTO: 1.5 K/UL (ref 1.8–7.7)
NEUTROPHILS NFR BLD: 34.4 % (ref 38–73)
NRBC BLD-RTO: 0 /100 WBC
PLATELET # BLD AUTO: 303 K/UL (ref 150–450)
PMV BLD AUTO: 9.4 FL (ref 9.2–12.9)
RBC # BLD AUTO: 3.79 M/UL (ref 4–5.4)
WBC # BLD AUTO: 4.38 K/UL (ref 3.9–12.7)

## 2024-09-23 PROCEDURE — 36415 COLL VENOUS BLD VENIPUNCTURE: CPT | Performed by: INTERNAL MEDICINE

## 2024-09-23 PROCEDURE — 85025 COMPLETE CBC W/AUTO DIFF WBC: CPT | Performed by: INTERNAL MEDICINE

## 2024-09-23 NOTE — PROGRESS NOTES
Subjective:       Patient ID: Ana Bonilla is a 64 y.o. female.    Chief Complaint:   1. Multiple myeloma, remission status unspecified  IgG lambda Multiple myeloma, R-ISS stage I        Current Treatment:  OP Myeloma ROSA-RD daratumumab lenalidomide dexAMETHasone Q4W        OP ZOLEDRONIC ACID (ZOMETA) every 3 months started 5/2023    Treatment History:  S/p kyphoplasty with radiofrequency ablation per Dr. Smith on 6/8/2023        VRd; Rosa added with cycle #2; Revlimid dose reduced to 10mg due to kidney function    HPI: This is a 64 year old  woman with amblyopia, asthma, HTN, thyroid disease, anxiety, COPD, hyperlipidemia, allergies, and GERD who is seen in Hem/Onc for multiple myeloma. She was referred in 2/2023 by her PCP Dr. Kassidy Sibley after workup for gastritis revealed lytic lesions. CT chest showed lytic and expansile destructive lesion in the sternum and lytic lesions at L1. Biopsy of L1 lesion in 3/2023 revealed mature B cell neoplasm most consistent with plasma cell neoplasm.     Bone marrow biopsy was performed in 4/2023 that demonstrated 60% plasma cells. PET/CT showed extensive bony lesions throughout the spine, sternum, bilateral ribs, pelvis, and a mild compression deformity in L1. SPEP/MECHE showed IgG lambda monoclonal protein measuring 3.19 g/dL. Quantitative immunoglobulins on 3/27/2023 showed IgG 4,521 mg/dL. UPEP/MECHE and FLC were pending. Labs on 3/27/2023 showed Beta 2 microglobulin 1.9, .  Labs on 4/19/2023 showed Hgb, 11.5, WBC 6.3, platelets, BUN 11, Cr 0.9, calcium 9.    She was started on VRd (Velcade/Revlimid/dexamethasone) every 21 days for 3-6 cycles with the plan to evaluate her for autotransplant with consideration of adding daratumumab if she was found to be high risk after FISH panel was resulted. She was started on ASA for thrombosis prophylaxis and acyclovir for herpes zoster prophylaxis. Plan to start Zometa after dental evaluation.     In 5/2023,  Revlimid was decreased from 25mg po 10mg due to decreased creatinine clearance of 49. She underwent kyphoplasty with radiofrequency ablation on 6/8/2023 by Neurosurgery. After improvement in kidney function, her Revlimid dose was increased to 25mg. Eventually, the dose was reduced to 10mg due to intolerable side effects.     Her primary Hematologist/Oncologist is Dr. Luevano.    Interval History: Patient presents for follow up on Rosa+Vrd & Zometa; She is scheduled to receive C6D1 today. She presents with her  who she states has been having seizures and is unable to drive. She states he was taking care of her initially and now she is taking care of him. She denies back pain today but states she has not been sleeping and will likely be up for 2 days after today's treatment due to the steroids. She reports having asked Palliative Care for sleep medication but did not receive any. She states she was instructed to take Xanax but she has not. Offered referral to Onc Psych; she states she will think about it. She also mentions that she may ask her PCP for sleep medication. CBC, CMP adequate for treatment. She last received Zometa on 7/9/2024; due again in 10/2024.     Reviewed labs with patient:   CBC:   Recent Labs   Lab 09/23/24  1138   WBC 4.38   RBC 3.79 L   Hemoglobin 12.1   Hematocrit 37.5   Platelets 303   MCV 99 H   MCH 31.9 H   MCHC 32.3     CMP:  Recent Labs   Lab 09/24/24  0947   Glucose 107   Calcium 9.2   Albumin 3.6   Total Protein 7.0   Sodium 143   Potassium 3.5   CO2 23   Chloride 108   BUN 9   Creatinine 1.0   Alkaline Phosphatase 54 L   ALT 19   AST 19   Total Bilirubin 0.7     Social History     Socioeconomic History    Marital status:     Number of children: 2   Occupational History     Employer: [employer]   Tobacco Use    Smoking status: Every Day     Current packs/day: 0.50     Average packs/day: 0.5 packs/day for 50.0 years (25.0 ttl pk-yrs)     Types: Cigarettes     Passive exposure:  Never    Smokeless tobacco: Never   Substance and Sexual Activity    Alcohol use: Not Currently     Comment: quit    Drug use: No    Sexual activity: Not Currently     Partners: Male     Social Determinants of Health     Financial Resource Strain: Low Risk  (11/15/2023)    Overall Financial Resource Strain (CARDIA)     Difficulty of Paying Living Expenses: Not hard at all   Food Insecurity: No Food Insecurity (11/15/2023)    Hunger Vital Sign     Worried About Running Out of Food in the Last Year: Never true     Ran Out of Food in the Last Year: Never true   Transportation Needs: No Transportation Needs (11/15/2023)    PRAPARE - Transportation     Lack of Transportation (Medical): No     Lack of Transportation (Non-Medical): No   Physical Activity: Sufficiently Active (11/15/2023)    Exercise Vital Sign     Days of Exercise per Week: 7 days     Minutes of Exercise per Session: 150+ min   Stress: Stress Concern Present (11/15/2023)    [employer] - Occupational Stress Questionnaire     Feeling of Stress : To some extent   Housing Stability: Low Risk  (11/15/2023)    Housing Stability Vital Sign     Unable to Pay for Housing in the Last Year: No     Number of Places Lived in the Last Year: 1     Unstable Housing in the Last Year: No     Past Medical History:   Diagnosis Date    Allergy     Amblyopia OS    Anxiety     Asthma     COPD (chronic obstructive pulmonary disease)     NO HOME o2    GERD (gastroesophageal reflux disease)     Hyperlipidemia     Hypertension     PONV (postoperative nausea and vomiting)     Thyroid disease      Family History   Problem Relation Name Age of Onset    Hypertension Mother Vivian     Diabetes Mother Vivian     Asthma Mother Vivian             Diabetes Father      Asthma Father      Stroke Maternal Grandmother  grandmother     Prostate cancer Maternal Grandfather      Mental illness Son Lukasz Garcia     Pancreatic cancer  Maternal Uncle      Mental illness Other Pat side     Pancreatic cancer Other Mat side     Ovarian cancer Maternal Cousin       Past Surgical History:   Procedure Laterality Date    APPENDECTOMY      BIOPSY N/A 06/08/2023    Procedure: BIOPSY;  Surgeon: Fausto Smith MD;  Location: [facility] OR;  Service: Neurosurgery;  Laterality: N/A;  L1    BUNIONECTOMY      COLONOSCOPY N/A 12/04/2019    Procedure: COLONOSCOPY;  Surgeon: Danny Matos III, MD;  Location: [facility] ENDO;  Service: Endoscopy;  Laterality: N/A;    COLONOSCOPY N/A 10/24/2022    Procedure: COLONOSCOPY;  Surgeon: Danny Matos III, MD;  Location: [facility] ENDO;  Service: Endoscopy;  Laterality: N/A;    ESOPHAGOGASTRODUODENOSCOPY N/A 10/24/2022    Procedure: EGD (ESOPHAGOGASTRODUODENOSCOPY);  Surgeon: Danny Matos III, MD;  Location: [facility] ENDO;  Service: Endoscopy;  Laterality: N/A;    FIXATION KYPHOPLASTY Bilateral 06/08/2023    Procedure: KYPHOPLASTY;  Surgeon: Fausto Smith MD;  Location: [facility] OR;  Service: Neurosurgery;  Laterality: Bilateral;  kyphoplasty and radiofrequency ablation - L1    HYSTERECTOMY      PARTIAL//still with ovaries    neck fusion  08/2017    THYROIDECTOMY      TUBAL LIGATION       Review of Systems   Constitutional:  Positive for appetite change and fatigue.   HENT:  Positive for rhinorrhea. Negative for mouth sores and sore throat.         Right upper eyelid stye   Eyes: Negative.  Photophobia: decreased since surgery.   Respiratory: Negative.     Cardiovascular: Negative.    Gastrointestinal:  Positive for constipation (since surgery) and nausea (relieved by antiemetics). Negative for diarrhea and vomiting.   Endocrine: Negative.    Genitourinary: Negative.    Musculoskeletal:  Positive for arthralgias (hands aching) and back pain (4/10 back and right hip pain constant; improved with hot shower).   Integumentary:  Negative.   Allergic/Immunologic: Negative.    Neurological:  Negative for weakness and numbness.    Hematological: Negative.    Psychiatric/Behavioral:  The patient is not nervous/anxious.        Medication List with Changes/Refills   Current Medications    ACYCLOVIR (ZOVIRAX) 400 MG TABLET    Take 1 tablet (400 mg total) by mouth 2 (two) times daily.    ALBUTEROL (PROVENTIL HFA) 90 MCG/ACTUATION INHALER    Inhale 2 puffs into the lungs every 6 (six) hours as needed for Wheezing. Rescue    ALPRAZOLAM (XANAX) 2 MG TAB    Take 1 tablet (2 mg total) by mouth 2 (two) times daily as needed (Anxiety).    AMLODIPINE-BENAZEPRIL 2.5-10 MG (LOTREL) 2.5-10 MG PER CAPSULE    TAKE 1 CAPSULE BY MOUTH EVERY DAY    ASPIRIN 81 MG CHEW    Take 1 tablet (81 mg total) by mouth once daily.    AZITHROMYCIN (Z-JESIKA) 250 MG TABLET    Take as directed    CALCIUM-VITAMIN D 600 MG-10 MCG (400 UNIT) TAB    Take 1 tablet by mouth every 12 (twelve) hours.    DEXAMETHASONE (DECADRON) 4 MG TAB    Take 10 tablets (40 mg total) by mouth every 7 days. Take with food.    ERYTHROMYCIN (ROMYCIN) OPHTHALMIC OINTMENT    Apply ointment to lids and lashes on both eyes 2 times daily for 7 days.    FLUTICASONE PROPIONATE (FLONASE) 50 MCG/ACTUATION NASAL SPRAY    1 spray (50 mcg total) by Each Nostril route once daily.    HYDROXYZINE HCL (ATARAX) 25 MG TABLET    Take 1 tablet (25 mg total) by mouth 3 (three) times daily as needed for Itching.    IPRATROPIUM (ATROVENT) 21 MCG (0.03 %) NASAL SPRAY    2 sprays by Each Nostril route 3 (three) times daily.    LENALIDOMIDE 10 MG CAP    TAKE 1 CAPSULE ONCE DAILY FOR 21 DAYS AND THEN 7 DAYS OFF.    LEVOCETIRIZINE (XYZAL) 5 MG TABLET    Take 1 tablet (5 mg total) by mouth every evening.    LEVOTHYROXINE (SYNTHROID) 100 MCG TABLET    Take 1 tablet (100 mcg total) by mouth before breakfast.    LINACLOTIDE (LINZESS) 72 MCG CAP CAPSULE    Take 2 capsules (144 mcg total) by mouth before breakfast.    MAGNESIUM OXIDE (MAG-OX) 400 MG (241.3 MG MAGNESIUM) TABLET    TAKE 1 TABLET(400 MG) BY MOUTH EVERY DAY     METHYLPREDNISOLONE (MEDROL DOSEPACK) 4 MG TABLET    use as directed    METOPROLOL SUCCINATE (TOPROL-XL) 50 MG 24 HR TABLET    Take 1 tablet (50 mg total) by mouth every evening.    MONTELUKAST (SINGULAIR) 10 MG TABLET    TAKE 1 TABLET(10 MG) BY MOUTH EVERY EVENING    NALOXONE (NARCAN) 4 MG/ACTUATION SPRY    CALL 911. SPR CONTENTS OF ONE SPRAYER (0.1ML) INTO ONE NOSTRIL. REPEAT IN 2-3 MIN IF SYMPTOMS OF OPIOID EMERGENCY PERSIST, ALTERNATE NOSTRILS    NEOMYCIN-POLYMYXIN-DEXAMETHASONE (DEXACINE) 3.5 MG/G-10,000 UNIT/G-0.1 % OINT    Place into both eyes 3 (three) times daily.    NICOTINE (NICODERM CQ) 14 MG/24 HR    Place 1 patch onto the skin once daily.    ONDANSETRON (ZOFRAN) 4 MG TABLET    Take 1 tablet (4 mg total) by mouth every 6 (six) hours as needed for Nausea.    PANTOPRAZOLE (PROTONIX) 40 MG TABLET    TAKE 1 TABLET(40 MG) BY MOUTH EVERY DAY    POTASSIUM CHLORIDE SA (K-DUR,KLOR-CON) 20 MEQ TABLET    Take 1 tablet (20 mEq total) by mouth once daily.    POTASSIUM, SODIUM PHOSPHATES (PHOS-NAK) 280-160-250 MG PWPK    Take 1 packet by mouth 4 (four) times daily with meals and nightly.    PREGABALIN (LYRICA) 50 MG CAPSULE    Take 1 capsule (50 mg total) by mouth nightly.    PROMETHAZINE (PHENERGAN) 25 MG TABLET    Take 1 tablet (25 mg total) by mouth every 4 (four) hours.    ROSUVASTATIN (CRESTOR) 10 MG TABLET    Take 1 tablet (10 mg total) by mouth every evening.    TRAZODONE (DESYREL) 50 MG TABLET    Take 1 tablet (50 mg total) by mouth every evening.    WIXELA INHUB 250-50 MCG/DOSE DISKUS INHALER    INHALE 1 PUFF INTO THE LUNGS TWICE DAILY     Objective:     Vitals:    09/24/24 0958   BP: 103/67   Pulse: 71   Temp: 97.8 °F (36.6 °C)     Physical Exam  Vitals reviewed.   Constitutional:       Appearance: Normal appearance.   HENT:      Head: Normocephalic.      Mouth/Throat:      Comments: Wearing mask      Eyes:      General:         Right eye: No discharge.      Extraocular Movements: Extraocular movements  intact.      Pupils: Pupils are equal, round, and reactive to light.   Cardiovascular:      Rate and Rhythm: Normal rate and regular rhythm.      Heart sounds: Normal heart sounds.   Pulmonary:      Effort: Pulmonary effort is normal.      Breath sounds: Normal breath sounds.   Abdominal:      General: Bowel sounds are normal.      Palpations: Abdomen is soft.      Comments: rounded     Genitourinary:     Comments: deferred    Musculoskeletal:         General: Normal range of motion.      Cervical back: Normal range of motion and neck supple.   Skin:     General: Skin is warm and dry.   Neurological:      Mental Status: She is alert and oriented to person, place, and time.   Psychiatric:         Behavior: Behavior normal.         Thought Content: Thought content normal.      Comments: Restless in the chair and while standing due to discomfort from pain and constipation          (2) Ambulatory and capable of self care, unable to carry out work activity, up and about > 50% or waking hours  Assessment:     Problem List Items Addressed This Visit          Oncology    Multiple myeloma - Primary (Chronic)     Diagnosed in 3/2023 after work up for gastritis revealed lytic and expansile destructive lesion in the sternum and lytic lesions at L1. Bone marrow biopsy showed 60% plasma cells. Started VRd and Zometa. Underwent kyphoplasty and radiofrequency ablation in 6/2023. Plan to evaluate for autotransplant after 3-6 cycles. Patient declined transplant. Recommendation to continue with Rosa-VRD for 6 cycles and then repeat bone marrow biopsy and PET scan. If VGPR or better, then continue with Revlimid maintenance only.           Plan:     Multiple myeloma, remission status unspecified    Labs reviewed.   Ok to proceed with C6D1 of Rosa+VRd today.  Continue smoking cessation.  Continue monthly Zometa every 3 months; due 10/2024.   Patient instructed to continue vitamins to include Caltrate, potassium QOD.    Follow up in 2  weeks with [facility] with  Mg, Phos, CBC, and Comprehensive Metabolic Panel prior to C6D15 of Rosa-VRd.   Follow up in 4 weeks with  SPEP, MECHE, FLC, Mg, Phos, CBC, and Comprehensive Metabolic Panel prior to C7D1 and Zometa.    Route Chart for Scheduling    Med Onc Chart Routing      Follow up with physician 4 weeks. Dr. Luevano   Follow up with [facility] 2 weeks. [facility]   Infusion scheduling note   in 2 weeks for C6D15 Rosa-VRd   Injection scheduling note    Labs CBC, CMP, magnesium and phosphorus   Scheduling:  Preferred lab:  Lab interval:  in 2 weeks   Imaging None      Pharmacy appointment No pharmacy appointment needed      Other referrals       No additional referrals needed             I will review assessment/plan with collaborating physician.      BRITTANI Enamorado

## 2024-09-23 NOTE — ASSESSMENT & PLAN NOTE
Diagnosed in 3/2023 after work up for gastritis revealed lytic and expansile destructive lesion in the sternum and lytic lesions at L1. Bone marrow biopsy showed 60% plasma cells. Started VRd and Zometa. Underwent kyphoplasty and radiofrequency ablation in 6/2023. Plan to evaluate for autotransplant after 3-6 cycles. Patient declined transplant. Recommendation to continue with Rosa-VRD for 6 cycles and then repeat bone marrow biopsy and PET scan. If VGPR or better, then continue with Revlimid maintenance only.

## 2024-09-23 NOTE — PROGRESS NOTES
"Spoke with pt today for 3 mo+ F/U.  Pt states doing "ok".  States chemo making her tired, but otherwise ok.  States wishes she had more energy, but plans to start taking vitamins.  I told her vitamins are ok to take, but to stay away from energy drinks, herbs, and supplements as too much caffeine in the energy drinks is not good and can lead to dehydration, and the herbs and supplements do not mesh well with chemo.  She said she does not like any of these.  She is staying hydrated, but is just tired.  I told her to get plenty of rest, and to please let us know if she has any other questions or concerns or if we can help with anything.  She states she will do so.      "

## 2024-09-24 ENCOUNTER — LAB VISIT (OUTPATIENT)
Dept: LAB | Facility: HOSPITAL | Age: 64
End: 2024-09-24
Attending: NURSE PRACTITIONER
Payer: COMMERCIAL

## 2024-09-24 ENCOUNTER — OFFICE VISIT (OUTPATIENT)
Dept: PALLIATIVE MEDICINE | Facility: CLINIC | Age: 64
End: 2024-09-24
Payer: COMMERCIAL

## 2024-09-24 ENCOUNTER — INFUSION (OUTPATIENT)
Dept: INFUSION THERAPY | Facility: HOSPITAL | Age: 64
End: 2024-09-24
Attending: INTERNAL MEDICINE
Payer: COMMERCIAL

## 2024-09-24 ENCOUNTER — DOCUMENTATION ONLY (OUTPATIENT)
Dept: PALLIATIVE MEDICINE | Facility: CLINIC | Age: 64
End: 2024-09-24
Payer: COMMERCIAL

## 2024-09-24 ENCOUNTER — OFFICE VISIT (OUTPATIENT)
Dept: HEMATOLOGY/ONCOLOGY | Facility: CLINIC | Age: 64
End: 2024-09-24
Payer: COMMERCIAL

## 2024-09-24 VITALS
HEIGHT: 64 IN | WEIGHT: 127.19 LBS | TEMPERATURE: 98 F | BODY MASS INDEX: 21.71 KG/M2 | SYSTOLIC BLOOD PRESSURE: 93 MMHG | RESPIRATION RATE: 18 BRPM | OXYGEN SATURATION: 99 % | DIASTOLIC BLOOD PRESSURE: 60 MMHG | SYSTOLIC BLOOD PRESSURE: 103 MMHG | HEART RATE: 71 BPM | OXYGEN SATURATION: 100 % | DIASTOLIC BLOOD PRESSURE: 67 MMHG | TEMPERATURE: 98 F | HEART RATE: 64 BPM

## 2024-09-24 DIAGNOSIS — C90.00 MULTIPLE MYELOMA, REMISSION STATUS UNSPECIFIED: Primary | ICD-10-CM

## 2024-09-24 DIAGNOSIS — C90.00 MULTIPLE MYELOMA NOT HAVING ACHIEVED REMISSION: Chronic | ICD-10-CM

## 2024-09-24 DIAGNOSIS — F51.04 PSYCHOPHYSIOLOGICAL INSOMNIA: ICD-10-CM

## 2024-09-24 DIAGNOSIS — G47.00 INSOMNIA, UNSPECIFIED TYPE: ICD-10-CM

## 2024-09-24 DIAGNOSIS — C90.00 MULTIPLE MYELOMA, REMISSION STATUS UNSPECIFIED: ICD-10-CM

## 2024-09-24 DIAGNOSIS — C79.51 SECONDARY MALIGNANT NEOPLASM OF BONE: ICD-10-CM

## 2024-09-24 DIAGNOSIS — Z51.5 PALLIATIVE CARE ENCOUNTER: ICD-10-CM

## 2024-09-24 DIAGNOSIS — F41.9 ANXIETY: Primary | ICD-10-CM

## 2024-09-24 LAB
ALBUMIN SERPL BCP-MCNC: 3.6 G/DL (ref 3.5–5.2)
ALP SERPL-CCNC: 54 U/L (ref 55–135)
ALT SERPL W/O P-5'-P-CCNC: 19 U/L (ref 10–44)
ANION GAP SERPL CALC-SCNC: 12 MMOL/L (ref 8–16)
AST SERPL-CCNC: 19 U/L (ref 10–40)
BILIRUB SERPL-MCNC: 0.7 MG/DL (ref 0.1–1)
BUN SERPL-MCNC: 9 MG/DL (ref 8–23)
CALCIUM SERPL-MCNC: 9.2 MG/DL (ref 8.7–10.5)
CHLORIDE SERPL-SCNC: 108 MMOL/L (ref 95–110)
CO2 SERPL-SCNC: 23 MMOL/L (ref 23–29)
CREAT SERPL-MCNC: 1 MG/DL (ref 0.5–1.4)
EST. GFR  (NO RACE VARIABLE): >60 ML/MIN/1.73 M^2
GLUCOSE SERPL-MCNC: 107 MG/DL (ref 70–110)
POTASSIUM SERPL-SCNC: 3.5 MMOL/L (ref 3.5–5.1)
PROT SERPL-MCNC: 7 G/DL (ref 6–8.4)
SODIUM SERPL-SCNC: 143 MMOL/L (ref 136–145)

## 2024-09-24 PROCEDURE — 80053 COMPREHEN METABOLIC PANEL: CPT | Performed by: NURSE PRACTITIONER

## 2024-09-24 PROCEDURE — 4010F ACE/ARB THERAPY RXD/TAKEN: CPT | Mod: CPTII,95,,

## 2024-09-24 PROCEDURE — 3078F DIAST BP <80 MM HG: CPT | Mod: CPTII,S$GLB,, | Performed by: NURSE PRACTITIONER

## 2024-09-24 PROCEDURE — 3074F SYST BP LT 130 MM HG: CPT | Mod: CPTII,S$GLB,, | Performed by: NURSE PRACTITIONER

## 2024-09-24 PROCEDURE — 99999 PR PBB SHADOW E&M-EST. PATIENT-LVL V: CPT | Mod: PBBFAC,,, | Performed by: NURSE PRACTITIONER

## 2024-09-24 PROCEDURE — 99215 OFFICE O/P EST HI 40 MIN: CPT | Mod: 25,S$GLB,, | Performed by: NURSE PRACTITIONER

## 2024-09-24 PROCEDURE — 99214 OFFICE O/P EST MOD 30 MIN: CPT | Mod: 95,,,

## 2024-09-24 PROCEDURE — 4010F ACE/ARB THERAPY RXD/TAKEN: CPT | Mod: CPTII,S$GLB,, | Performed by: NURSE PRACTITIONER

## 2024-09-24 PROCEDURE — 36415 COLL VENOUS BLD VENIPUNCTURE: CPT | Performed by: NURSE PRACTITIONER

## 2024-09-24 PROCEDURE — 63600175 PHARM REV CODE 636 W HCPCS: Mod: JZ,JG | Performed by: NURSE PRACTITIONER

## 2024-09-24 PROCEDURE — 96401 CHEMO ANTI-NEOPL SQ/IM: CPT

## 2024-09-24 PROCEDURE — 3008F BODY MASS INDEX DOCD: CPT | Mod: CPTII,S$GLB,, | Performed by: NURSE PRACTITIONER

## 2024-09-24 RX ORDER — DIPHENHYDRAMINE HYDROCHLORIDE 50 MG/ML
50 INJECTION INTRAMUSCULAR; INTRAVENOUS ONCE AS NEEDED
OUTPATIENT
Start: 2024-10-08

## 2024-09-24 RX ORDER — SODIUM CHLORIDE 0.9 % (FLUSH) 0.9 %
10 SYRINGE (ML) INJECTION
Status: DISCONTINUED | OUTPATIENT
Start: 2024-09-24 | End: 2024-09-24 | Stop reason: HOSPADM

## 2024-09-24 RX ORDER — PROCHLORPERAZINE EDISYLATE 5 MG/ML
10 INJECTION INTRAMUSCULAR; INTRAVENOUS ONCE AS NEEDED
OUTPATIENT
Start: 2024-10-08

## 2024-09-24 RX ORDER — PROCHLORPERAZINE EDISYLATE 5 MG/ML
10 INJECTION INTRAMUSCULAR; INTRAVENOUS ONCE AS NEEDED
Status: CANCELLED | OUTPATIENT
Start: 2024-09-24

## 2024-09-24 RX ORDER — EPINEPHRINE 0.3 MG/.3ML
0.3 INJECTION SUBCUTANEOUS ONCE AS NEEDED
Status: CANCELLED | OUTPATIENT
Start: 2024-09-24

## 2024-09-24 RX ORDER — HEPARIN 100 UNIT/ML
500 SYRINGE INTRAVENOUS
OUTPATIENT
Start: 2024-10-08

## 2024-09-24 RX ORDER — NALOXONE HYDROCHLORIDE 4 MG/.1ML
SPRAY NASAL
COMMUNITY
Start: 2024-06-06

## 2024-09-24 RX ORDER — SODIUM CHLORIDE 0.9 % (FLUSH) 0.9 %
10 SYRINGE (ML) INJECTION
OUTPATIENT
Start: 2024-10-08

## 2024-09-24 RX ORDER — DIPHENHYDRAMINE HYDROCHLORIDE 50 MG/ML
50 INJECTION INTRAMUSCULAR; INTRAVENOUS ONCE AS NEEDED
Status: CANCELLED | OUTPATIENT
Start: 2024-09-24

## 2024-09-24 RX ORDER — SODIUM CHLORIDE 0.9 % (FLUSH) 0.9 %
10 SYRINGE (ML) INJECTION
Status: CANCELLED | OUTPATIENT
Start: 2024-09-24

## 2024-09-24 RX ORDER — HEPARIN 100 UNIT/ML
500 SYRINGE INTRAVENOUS
Status: CANCELLED | OUTPATIENT
Start: 2024-09-24

## 2024-09-24 RX ORDER — EPINEPHRINE 0.3 MG/.3ML
0.3 INJECTION SUBCUTANEOUS ONCE AS NEEDED
OUTPATIENT
Start: 2024-10-08

## 2024-09-24 RX ADMIN — DARATUMUMAB AND HYALURONIDASE-FIHJ (HUMAN RECOMBINANT) 1800 MG: 1800; 30000 INJECTION SUBCUTANEOUS at 11:09

## 2024-09-24 NOTE — PROGRESS NOTES
Palliative Nurse Nurse:    Nurse was called into exam room at the [facility] to assist Ms. Burris with questions regarding her chronic insomnia, she is a pt of palliative.  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   pt stated she has discussed with EB the need for sleep aid, however, pt stated EB in Palliative discuss what is safe for her while taking xanax while not safe to have added prescribed sleep meds. Nurse expressed to pt a message or call would be sent to EB.nurse ensured pt the safe approach will be taken by the provider when it comes to prescribing sleep meds alone with Xanax, pt stated she has been taken the xanax for over 11 years and it helps with her anxiety, but does not promote sleep, Ms Perez stated she may or may not get 2 hours of sleep or no sleep at all some days. Pt stated she was told by her oncology NP she would prescribed the meds to help with sleep, Ms. Perez also stated she loves EB, and will wait to discuss with her regarding her chronic insomnia, Ms burris stated she has tried OTC sleep aids with any results, EB was messaged with this information, informed me to offer Ms. Burris an apt, pt requested to have a virtual set today, it was scheduled and EB notified.

## 2024-09-24 NOTE — PROGRESS NOTES
Palliative Medicine Clinic Note  Follow-up        Consult Requested By: Dr. Luevano      Reason for Consult: Symptom Management/Advance Care Planning/Goals of Care Discussion    Chief Complaint: Anxiety/insomnia        ASSESSMENT/PLAN:      Plan/Recommendations:    Problem List Items Addressed This Visit          Psychiatric    Anxiety - Primary     Chronic and exacerbated by MM diagnosis and 's recent illness (seizures)  Pt and I extensively discussed stress management strategies including avoiding triggers, sleep hygiene, and proper nutrition.   Continues Xanax 2mg BID PRN- Pt declined switch to Clonazepam..  Declines SSRI trial at this time.   Declines mental health counseling at this time.  UDS- Repeat at next in-person visit            Oncology    Multiple myeloma (Chronic)     Metastasis to thoracic/lumbar spine, complicated by L1 compression fracture.  Followed by Dr. Luevano, Dr. Garcia, and Neurosurgery.   On D-VRD, Lenalidomide and Zometa. S/P L1 Kyphoplasty (2023).   Pt remains hesitant about SCT due extended recovery time/fear of relapse post SCT  PET scan 2024: 1. Findings consistent with treatment response with significantly decreased FDG uptake associated with the previously identified lesions. No new FDG avid lesions are identified             Palliative Care    Palliative care encounter     -Code status: Full Code.  -HCPOA: 1.  : Lars Bonilla: [phone], 2. Son: Hakeem Garcia: [phone].   -GOC:  Continue cancer treatment, symptom management, maintain independence and functional status.  -See HPI for further details             Other    Psychophysiological insomnia     Related to increased anxiety about her  having a seizure at night.   Melatonin not effective   We discussed it is unsafe for pt to take multiple hypnotics due to increased risk for CNS depression. I informed pt it may be safer to change Xanax to a longer acting benzodiazepine (Clonazepam)  "to manage anxiety and insomnia.   Pt stated she will discuss possible adjustments to medical marijuana prescription for insomnia.                          Advance Care Planning   Advance Directives:   Living Will: No    [place]: No    Do Not Resuscitate Status: No    Medical Power of : Yes    Agent's Name:  1.  : Lars Bonilla: [phone], 2. Son: Hakeem Garcia: [phone].    Decision Making:  Patient answered questions  Goals of Care: What is most important right now is to focus on quality of life, even if it means sacrificing a little time, curative/life-prolongation (regardless of treatment burdens). Accordingly, we have decided that the best plan to meet the patient's goals includes continuing with treatment.    Pt declined stem cell transplant due to concerns about extended recovery time. She stated she is concerned that she will be unable to care for her family if she undergoes SCT and the subsequent recovery time.     She wishes to remain Full Code. She "does not want to think about that".                Follow up: 1 month w/ repeat UDS       SUBJECTIVE:      History of Present Illness / Interval History:  Ana Bonilla is 64 y.o. female with Multiple Myeloma (2023) with metastasis to thoracic/lumbar spine, complicated by L1 compression fracture. Pt declined SCT. . S/P L1 Kyphoplasty (2023).   On D-VRD, Lenalidomide and Zometa  Followed by Dr. Luevano, Dr. Garcia, and Neurosurgery.    Presents to Palliative Care Clinic for physical symptoms and additional support.. Please see Hem/Onc note for more details on pt's care.       24:     The patient location is: home  The chief complaint leading to consultation is:  Follow-up     Visit type: audiovisual     Face to Face time with patient:  20 minutes  35 minutes of total time spent on the encounter, which includes face to face time and non-face to face time preparing to see the patient (eg, review of tests), Obtaining " "and/or reviewing separately obtained history, Documenting clinical information in the electronic or other health record, Independently interpreting results (not separately reported) and communicating results to the patient/family/caregiver, or Care coordination (not separately reported).      Each patient to whom he or she provides medical services by telemedicine is:  (1) informed of the relationship between the physician and patient and the respective role of any other health care provider with respect to management of the patient; and (2) notified that he or she may decline to receive medical services by telemedicine and may withdraw from such care at any time.     Notes:   Spoke to pt via phone. Unsuccessful attempt to connect via audiovisual feed. A&O x3. No acute distress.   Reported persistent insomnia despite taking Xanax 2mg BID PRN and medical marijuana. She notes she is sleeping 3 hours per night.   She declined changing from Xanax to Clonazepam. She stated she "will deal with it for now".   We discussed it is unsafe for pt to take multiple hypnotics due to increased risk for CNS depression. I informed pt it may be safer to change Xanax to a longer acting benzodiazepine (Clonazepam) for anxiety and insomnia.   She stated she will discuss changing medical marijuana prescription for insomnia, but she "does not want to be high"  She stated she will update PM clinic if she wishes to try Clonazepam.     09/18/2024:   The patient location is: [facility]  The chief complaint leading to consultation is:  Follow-up     Visit type: audiovisual     Face to Face time with patient:  25 minutes  35 minutes of total time spent on the encounter, which includes face to face time and non-face to face time preparing to see the patient (eg, review of tests), Obtaining and/or reviewing separately obtained history, Documenting clinical information in the electronic or other health record, Independently interpreting " "results (not separately reported) and communicating results to the patient/family/caregiver, or Care coordination (not separately reported).      Each patient to whom he or she provides medical services by telemedicine is:  (1) informed of the relationship between the physician and patient and the respective role of any other health care provider with respect to management of the patient; and (2) notified that he or she may decline to receive medical services by telemedicine and may withdraw from such care at any time.     Notes: Pt seen via audiovisual feed. A&O x3. No acute distress.   She reported progressive anxiety, insomnia, fatigue, and restlessness/aggravation due to her 's recent illness (seizures).   She notes she is her 's sole caregiver, and she does not have help. Her son is managing schizophrenia and often unable to help.   She is using Xanax 2mg TID PRN. Wishes to try Melatonin for insomnia. She declined mental health counseling at this time. [facility]     Past visits:    06/06/2024:  History obtained from: Patient and medical record  Pt attended clinic alone. She was in no acute distress. Vital signs stable on room air.   She reported persistent right hip pain 5/10. Percocet 5mg-10mg PRN was effective, but she ran out. She is taking Tylenol 500mg Q6H PRN with some relief. She stated she does not want to take opioids due to fear of oversedation and addiction.   However, she requested a short course of Percocet to manage severe pain. She continues to use Medical Marijuana for anxiety/pain, but notes "taking a full dose is too strong".   Her anxiety is stable on Xanax 2mg BID PRN.   She continues to smoke, but notes she is gradually self-weaning. She wishes to enrol in smoking cessation program.         ROS:  Review of Systems   Constitutional:  Positive for activity change and fatigue. Negative for appetite change and unexpected weight change. "   HENT:  Negative for hearing loss, sore throat, trouble swallowing and voice change.    Eyes:  Negative for visual disturbance.   Respiratory:  Negative for cough, chest tightness, shortness of breath and wheezing.    Cardiovascular:  Negative for chest pain, palpitations and leg swelling.   Gastrointestinal:  Negative for abdominal pain, blood in stool, constipation (Chronic, Intermittent. On Linzess), nausea, rectal pain and vomiting.   Genitourinary:  Negative for bladder incontinence, difficulty urinating, flank pain, hematuria, nocturia, pelvic pain and urgency.   Musculoskeletal:  Positive for arthralgias and myalgias (Right hip). Negative for back pain and neck pain.   Integumentary:  Negative for wound.   Allergic/Immunologic: Negative for environmental allergies, food allergies and immunocompromised state.   Neurological:  Negative for dizziness, tremors, weakness, numbness, headaches, memory loss and coordination difficulties.   Hematological:  Negative for adenopathy. Does not bruise/bleed easily.   Psychiatric/Behavioral:  Positive for sleep disturbance (Due to fear of  having a seizure). Negative for agitation, behavioral problems, confusion, decreased concentration, dysphoric mood, self-injury and suicidal ideas. The patient is nervous/anxious (Chronic, exacerbated by 's recent illness. On Xanax. Declined antidepressant.).        Review of Symptoms      Symptom Assessment (ESAS 0-10 Scale)  Pain:  0  Dyspnea:  0  Anxiety:  8  Nausea:  0  Depression:  0  Anorexia:  0  Fatigue:  10  Insomnia:  10  Restlessness:  10  Agitation:  0     CAM / Delirium:  Negative  Constipation:  Negative  Diarrhea:  Negative    Anxiety:  Is nervous/anxious (Chronic, exacerbated by 's recent illness. On Xanax. Declined antidepressant.)  Constipation:  No constipation (Chronic, Intermittent. On Linzess)    Bowel Management Plan (BMP):  Yes      Pain Assessment:    Location(s): leg (Right Hip)    Leg        Location: right        Quality: Aching, stabbing, sharp and tingling        Quantity: 0/10 in intensity        Chronicity: Onset 0 year(s) ago, unchanged        Aggravating Factors: Movement        Alleviating Factors: Opiates and recumbency        Associated Symptoms: Arthralgias and myalgias    Modified Rhonda Scale:  0    Performance Status:  90    ECOG Performance Status ndGndrndanddndend:nd nd2nd Living Arrangements:  Lives with family    Psychosocial/Cultural:   See Palliative Psychosocial Note: Yes   with two adult sons. Works weekends at [employer]. 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   **Primary  to Follow**  Palliative Care  Consult: No            Medications:    Current Outpatient Medications:     acyclovir (ZOVIRAX) 400 MG tablet, Take 1 tablet (400 mg total) by mouth 2 (two) times daily., Disp: 60 tablet, Rfl: 11    albuterol (PROVENTIL HFA) 90 mcg/actuation inhaler, Inhale 2 puffs into the lungs every 6 (six) hours as needed for Wheezing. Rescue, Disp: 18 g, Rfl: 0    ALPRAZolam (XANAX) 2 MG Tab, Take 1 tablet (2 mg total) by mouth 2 (two) times daily as needed (Anxiety)., Disp: 60 tablet, Rfl: 0    amlodipine-benazepril 2.5-10 mg (LOTREL) 2.5-10 mg per capsule, TAKE 1 CAPSULE BY MOUTH EVERY DAY, Disp: 90 capsule, Rfl: 3    aspirin 81 MG Chew, Take 1 tablet (81 mg total) by mouth once daily., Disp: 30 tablet, Rfl: 11    azithromycin (Z-JESIKA) 250 MG tablet, Take as directed, Disp: 6 tablet, Rfl: 0    calcium-vitamin D 600 mg-10 mcg (400 unit) Tab, Take 1 tablet by mouth every 12 (twelve) hours., Disp: 60 tablet, Rfl: 2    dexAMETHasone (DECADRON) 4 MG Tab, Take 10 tablets (40 mg total) by mouth every 7 days. Take with food., Disp: 40 tablet, Rfl: 11    erythromycin (ROMYCIN) ophthalmic ointment, Apply ointment to lids and lashes on both eyes 2 times daily for 7 days., Disp: 2.5 g, Rfl: 0    fluticasone propionate (FLONASE) 50 mcg/actuation nasal spray, 1 spray (50 mcg  total) by Each Nostril route once daily., Disp: 1 mL, Rfl: 1    hydrOXYzine HCL (ATARAX) 25 MG tablet, Take 1 tablet (25 mg total) by mouth 3 (three) times daily as needed for Itching., Disp: 21 tablet, Rfl: 0    ipratropium (ATROVENT) 21 mcg (0.03 %) nasal spray, 2 sprays by Each Nostril route 3 (three) times daily., Disp: , Rfl:     lenalidomide 10 mg Cap, TAKE 1 CAPSULE ONCE DAILY FOR 21 DAYS AND THEN 7 DAYS OFF., Disp: 21 each, Rfl: 7    levocetirizine (XYZAL) 5 MG tablet, Take 1 tablet (5 mg total) by mouth every evening., Disp: 30 tablet, Rfl: 11    levothyroxine (SYNTHROID) 100 MCG tablet, Take 1 tablet (100 mcg total) by mouth before breakfast., Disp: 90 tablet, Rfl: 1    linaCLOtide (LINZESS) 72 mcg Cap capsule, Take 2 capsules (144 mcg total) by mouth before breakfast., Disp: 30 capsule, Rfl: 1    magnesium oxide (MAG-OX) 400 mg (241.3 mg magnesium) tablet, TAKE 1 TABLET(400 MG) BY MOUTH EVERY DAY, Disp: 90 tablet, Rfl: 1    methylPREDNISolone (MEDROL DOSEPACK) 4 mg tablet, use as directed, Disp: 1 each, Rfl: 0    metoprolol succinate (TOPROL-XL) 50 MG 24 hr tablet, Take 1 tablet (50 mg total) by mouth every evening., Disp: 90 tablet, Rfl: 3    montelukast (SINGULAIR) 10 mg tablet, TAKE 1 TABLET(10 MG) BY MOUTH EVERY EVENING, Disp: 90 tablet, Rfl: 0    naloxone (NARCAN) 4 mg/actuation Spry, CALL 911. SPR CONTENTS OF ONE SPRAYER (0.1ML) INTO ONE NOSTRIL. REPEAT IN 2-3 MIN IF SYMPTOMS OF OPIOID EMERGENCY PERSIST, ALTERNATE NOSTRILS, Disp: , Rfl:     neomycin-polymyxin-dexamethasone (DEXACINE) 3.5 mg/g-10,000 unit/g-0.1 % Oint, Place into both eyes 3 (three) times daily., Disp: 3.5 g, Rfl: 0    nicotine (NICODERM CQ) 14 mg/24 hr, Place 1 patch onto the skin once daily., Disp: 14 patch, Rfl: 0    ondansetron (ZOFRAN) 4 MG tablet, Take 1 tablet (4 mg total) by mouth every 6 (six) hours as needed for Nausea., Disp: 30 tablet, Rfl: 5    pantoprazole (PROTONIX) 40 MG tablet, TAKE 1 TABLET(40 MG) BY MOUTH EVERY  DAY, Disp: 90 tablet, Rfl: 3    potassium chloride SA (K-DUR,KLOR-CON) 20 MEQ tablet, Take 1 tablet (20 mEq total) by mouth once daily., Disp: 30 tablet, Rfl: 11    potassium, sodium phosphates (PHOS-NAK) 280-160-250 mg PwPk, Take 1 packet by mouth 4 (four) times daily with meals and nightly., Disp: 4 packet, Rfl: 0    pregabalin (LYRICA) 50 MG capsule, Take 1 capsule (50 mg total) by mouth nightly., Disp: 30 capsule, Rfl: 0    promethazine (PHENERGAN) 25 MG tablet, Take 1 tablet (25 mg total) by mouth every 4 (four) hours., Disp: 45 tablet, Rfl: 2    rosuvastatin (CRESTOR) 10 MG tablet, Take 1 tablet (10 mg total) by mouth every evening., Disp: 90 tablet, Rfl: 3    traZODone (DESYREL) 50 MG tablet, Take 1 tablet (50 mg total) by mouth every evening., Disp: 30 tablet, Rfl: 11    WIXELA INHUB 250-50 mcg/dose diskus inhaler, INHALE 1 PUFF INTO THE LUNGS TWICE DAILY, Disp: 180 each, Rfl: 1    Current Facility-Administered Medications:     dexAMETHasone injection 40 mg, 40 mg, Intravenous, 1 time in Clinic/HOD, Bri Luevano MD    Facility-Administered Medications Ordered in Other Visits:     0.9% NaCl 250 mL flush bag, , Intravenous, 1 time in Clinic/HOD, Carolina Constantino FNP    lactated ringers infusion, , Intravenous, Continuous, Danny Matos III, MD    sodium chloride 0.9% flush 10 mL, 10 mL, Intravenous, PRN, Carolina Constantino FNP      External  database queried on 09/24/2024  by RAJIV RUIZ :          Review of patient's allergies indicates:   Allergen Reactions    Hydrocodone-acetaminophen Other (See Comments)     Causes pt to feel extremely sick            OBJECTIVE:         Physical Exam:  Vitals:      Physical Exam  Vitals reviewed: Limited due to virtual visit.   Constitutional:       General: She is not in acute distress.  Neurological:      Mental Status: She is oriented to person, place, and time.   Psychiatric:         Attention and Perception: Attention and perception normal.          Speech: Speech normal.         Behavior: Behavior normal. Behavior is cooperative.         Thought Content: Thought content normal. Thought content does not include suicidal ideation. Thought content does not include suicidal plan.         Cognition and Memory: Cognition and memory normal.         Judgment: Judgment normal.           Labs: BMP  Lab Results   Component Value Date     09/24/2024    K 3.5 09/24/2024     09/24/2024    CO2 23 09/24/2024    BUN 9 09/24/2024    CREATININE 1.0 09/24/2024    CALCIUM 9.2 09/24/2024    ANIONGAP 12 09/24/2024    EGFRNORACEVR >60 09/24/2024     Lab Results   Component Value Date    WBC 4.38 09/23/2024    HGB 12.1 09/23/2024    HCT 37.5 09/23/2024    MCV 99 (H) 09/23/2024     09/23/2024         Imaging: PET: 01/18/2024: 1. Findings consistent with treatment response with significantly decreased FDG uptake associated with the previously identified lesions. No new FDG avid lesions are identified.        I spent a total of 35 minutes on the day of the visit. This includes face to face time in discussion of goals of care, symptom assessment, coordination of care and emotional support.  This also includes non-face to face time preparing to see the patient (eg, review of tests/imaging), obtaining and/or reviewing separately obtained history, documenting clinical information in the electronic or other health record, independently interpreting results and communicating results to the patient/family/caregiver, or care coordinator.       RAJIV BOYD NP

## 2024-09-24 NOTE — PATIENT INSTRUCTIONS
Please contact the clinic if you would like to switch from Xanax to Klonopin  We can refer you to a therapist when you are ready. They offer virtual visits.   Please let us know if the new medical marijuana prescription helps you to sleep better.

## 2024-09-24 NOTE — PLAN OF CARE
Problem: Adult Inpatient Plan of Care  Goal: Plan of Care Review  Outcome: Progressing  Flowsheets (Taken 9/24/2024 1125)  Plan of Care Reviewed With:   patient   spouse  Goal: Patient-Specific Goal (Individualized)  Outcome: Progressing  Flowsheets (Taken 9/24/2024 1125)  Individualized Care Needs: feet elevated, pillow warm blanket and snack provided  Anxieties, Fears or Concerns: denies  Goal: Optimal Comfort and Wellbeing  Outcome: Progressing  Intervention: Monitor Pain and Promote Comfort  Flowsheets (Taken 9/24/2024 1125)  Pain Management Interventions:   relaxation techniques promoted   quiet environment facilitated   warm blanket provided   position adjusted  Intervention: Provide Person-Centered Care  Flowsheets (Taken 9/24/2024 1125)  Trust Relationship/Rapport:   care explained   questions encouraged   choices provided   reassurance provided   emotional support provided   thoughts/feelings acknowledged   empathic listening provided   questions answered     Problem: Chemotherapy Effects  Goal: Anemia Symptom Improvement  Outcome: Progressing  Intervention: Monitor and Manage Anemia  Flowsheets (Taken 9/24/2024 1125)  Safety Promotion/Fall Prevention:   assistive device/personal item within reach   Fall Risk reviewed with patient/family   family expresses understanding of fall risk and prevention   in recliner, wheels locked   instructed to call staff for mobility   medications reviewed  Fatigue Management: frequent rest breaks encouraged  Goal: Safety Maintained  Outcome: Progressing  Intervention: Promote Safe Chemotherapy Delivery  Flowsheets (Taken 9/24/2024 1125)  Infection Prevention:   environmental surveillance performed   equipment surfaces disinfected   hand hygiene promoted   personal protective equipment utilized  Chemotherapy Environmental Safety:   chemotherapy waste containers in room   meticulous hand hygiene promoted   patient environment monitored for safety   personal protective  equipment utilized  Goal: Absence of Hematuria  Outcome: Progressing  Intervention: Prevent or Manage Hemorrhagic Cystitis  Flowsheets (Taken 9/24/2024 1125)  Pain Management Interventions:   relaxation techniques promoted   quiet environment facilitated   warm blanket provided   position adjusted  Goal: Nausea and Vomiting Relief  Outcome: Progressing  Intervention: Prevent or Manage Nausea and Vomiting  Flowsheets (Taken 9/24/2024 1125)  Environmental Support: calm environment promoted  Goal: Neurotoxicity Symptom Control  Outcome: Progressing  Goal: Absence of Infection  Outcome: Progressing  Intervention: Prevent Infection and Maximize Resistance  Flowsheets (Taken 9/24/2024 1125)  Infection Prevention:   environmental surveillance performed   equipment surfaces disinfected   hand hygiene promoted   personal protective equipment utilized  Goal: Absence of Bleeding  Outcome: Progressing  Intervention: Minimize Bleeding Risk  Flowsheets (Taken 9/24/2024 1125)  Bleeding Precautions:   blood pressure closely monitored   monitored for signs of bleeding     Problem: Fall Injury Risk  Goal: Absence of Fall and Fall-Related Injury  Outcome: Progressing  Intervention: Identify and Manage Contributors  Flowsheets (Taken 9/24/2024 1125)  Self-Care Promotion:   independence encouraged   BADL personal objects within reach  Medication Review/Management: medications reviewed  Intervention: Promote Injury-Free Environment  Flowsheets (Taken 9/24/2024 1125)  Safety Promotion/Fall Prevention:   assistive device/personal item within reach   Fall Risk reviewed with patient/family   family expresses understanding of fall risk and prevention   in recliner, wheels locked   instructed to call staff for mobility   medications reviewed

## 2024-09-27 PROBLEM — F51.04 PSYCHOPHYSIOLOGICAL INSOMNIA: Status: ACTIVE | Noted: 2024-09-19

## 2024-09-27 NOTE — ASSESSMENT & PLAN NOTE
Related to increased anxiety about her  having a seizure at night.   Melatonin not effective   We discussed it is unsafe for pt to take multiple hypnotics due to increased risk for CNS depression. I informed pt it may be safer to change Xanax to a longer acting benzodiazepine (Clonazepam) to manage anxiety and insomnia.   Pt stated she will discuss possible adjustments to medical marijuana prescription for insomnia.

## 2024-09-27 NOTE — ASSESSMENT & PLAN NOTE
-Code status: Full Code.  -HCPOA: 1.  : Lars Bonilla: [phone], 2. Son: Hakeem Garcia: [phone].   -GOC:  Continue cancer treatment, symptom management, maintain independence and functional status.  -See HPI for further details

## 2024-09-27 NOTE — ASSESSMENT & PLAN NOTE
Chronic and exacerbated by MM diagnosis and 's recent illness (seizures)  Pt and I extensively discussed stress management strategies including avoiding triggers, sleep hygiene, and proper nutrition.   Continues Xanax 2mg BID PRN- Pt declined switch to Clonazepam..  Declines SSRI trial at this time.   Declines mental health counseling at this time.  UDS- Repeat at next in-person visit

## 2024-10-04 ENCOUNTER — TELEPHONE (OUTPATIENT)
Dept: FAMILY MEDICINE | Facility: CLINIC | Age: 64
End: 2024-10-04
Payer: COMMERCIAL

## 2024-10-04 ENCOUNTER — OFFICE VISIT (OUTPATIENT)
Dept: INTERNAL MEDICINE | Facility: CLINIC | Age: 64
End: 2024-10-04
Payer: COMMERCIAL

## 2024-10-04 VITALS
DIASTOLIC BLOOD PRESSURE: 64 MMHG | OXYGEN SATURATION: 99 % | HEART RATE: 61 BPM | WEIGHT: 129.19 LBS | BODY MASS INDEX: 22.06 KG/M2 | TEMPERATURE: 97 F | HEIGHT: 64 IN | SYSTOLIC BLOOD PRESSURE: 116 MMHG

## 2024-10-04 DIAGNOSIS — J44.1 COPD WITH ACUTE EXACERBATION: Primary | ICD-10-CM

## 2024-10-04 DIAGNOSIS — R06.2 WHEEZING: ICD-10-CM

## 2024-10-04 PROCEDURE — 99396 PREV VISIT EST AGE 40-64: CPT | Mod: 25,S$GLB,, | Performed by: NURSE PRACTITIONER

## 2024-10-04 PROCEDURE — 99999 PR PBB SHADOW E&M-EST. PATIENT-LVL V: CPT | Mod: PBBFAC,,, | Performed by: NURSE PRACTITIONER

## 2024-10-04 PROCEDURE — 3008F BODY MASS INDEX DOCD: CPT | Mod: CPTII,S$GLB,, | Performed by: NURSE PRACTITIONER

## 2024-10-04 PROCEDURE — 3078F DIAST BP <80 MM HG: CPT | Mod: CPTII,S$GLB,, | Performed by: NURSE PRACTITIONER

## 2024-10-04 PROCEDURE — 4010F ACE/ARB THERAPY RXD/TAKEN: CPT | Mod: CPTII,S$GLB,, | Performed by: NURSE PRACTITIONER

## 2024-10-04 PROCEDURE — 94640 AIRWAY INHALATION TREATMENT: CPT | Mod: S$GLB,,, | Performed by: NURSE PRACTITIONER

## 2024-10-04 PROCEDURE — 3074F SYST BP LT 130 MM HG: CPT | Mod: CPTII,S$GLB,, | Performed by: NURSE PRACTITIONER

## 2024-10-04 RX ORDER — ALBUTEROL SULFATE 90 UG/1
2 INHALANT RESPIRATORY (INHALATION) EVERY 4 HOURS PRN
Qty: 18 G | Refills: 1 | Status: SHIPPED | OUTPATIENT
Start: 2024-10-04

## 2024-10-04 RX ORDER — AMOXICILLIN AND CLAVULANATE POTASSIUM 875; 125 MG/1; MG/1
1 TABLET, FILM COATED ORAL EVERY 12 HOURS
Qty: 14 TABLET | Refills: 0 | Status: SHIPPED | OUTPATIENT
Start: 2024-10-04 | End: 2024-10-11

## 2024-10-04 RX ORDER — IPRATROPIUM BROMIDE AND ALBUTEROL SULFATE 2.5; .5 MG/3ML; MG/3ML
3 SOLUTION RESPIRATORY (INHALATION)
Status: COMPLETED | OUTPATIENT
Start: 2024-10-04 | End: 2024-10-04

## 2024-10-04 RX ORDER — PROMETHAZINE HYDROCHLORIDE AND DEXTROMETHORPHAN HYDROBROMIDE 6.25; 15 MG/5ML; MG/5ML
5 SYRUP ORAL EVERY 4 HOURS PRN
Qty: 120 ML | Refills: 0 | Status: SHIPPED | OUTPATIENT
Start: 2024-10-04

## 2024-10-04 RX ADMIN — IPRATROPIUM BROMIDE AND ALBUTEROL SULFATE 3 ML: 2.5; .5 SOLUTION RESPIRATORY (INHALATION) at 04:10

## 2024-10-04 NOTE — LETTER
October 4, 2024      The [address]  [address] [facility]  [place] [address]  Phone: [phone]  Fax: [phone]       Patient: Ana Bonilla   YOB: 1960  Date of Visit: 10/04/2024    To Whom It May Concern:    Traci Bonilla  was at [facility] on 10/04/2024.  If you have any questions or concerns, or if I can be of further assistance, please do not hesitate to contact me.    Sincerely,        Rosemary Buenrostro MA

## 2024-10-04 NOTE — PROGRESS NOTES
"  Subjective:      Patient ID: Ana Bonilla is a 64 y.o. female.    Chief Complaint: Nasal Congestion    History of Present Illness    CHIEF COMPLAINT:  Ana presents today for cough and congestion.    RESPIRATORY SYMPTOMS:  She reports a cough, congestion, runny nose, and wheezing that started two days ago. She denies fever, with a recorded temperature of 97°F. She attributes her persistent runny nose to dust exposure. She denies wanting a COVID test. She experienced similar symptoms 2-3 weeks ago, receiving a steroid injection and breathing treatment for temporary relief. She denies feeling severely ill, stating it's "just cough." She reports nasal congestion when in [place] environments.    MEDICAL HISTORY:  She has a history of COPD, asthma/bronchitis, and multiple myeloma. She is currently under palliative care and is seeking a primary care physician.    MEDICATIONS:  She reports using:  - Maintenance inhaler (disc) for COPD/asthma  - Generic Revlimid 10 mg (chemo pills) on a 21-day cycle followed by a 7-day break  - Levothyroxine 5 mg  - Zyrtec nightly for allergies  - Singulair (instructed to take in the morning)  - Xanax (for 14-15 years)  Her albuterol inhaler  last month. She takes 10 pills every Tuesday, possibly referring to her chemo regimen.    ALLERGIES:  She denies any allergies.    SOCIAL HISTORY:  She works two days a week.    CONCERNS:  She expresses concern about potentially having pneumonia and declines an x-ray. She is worried about potential interactions between antibiotics and her chemotherapy medications, recalling having to stop her chemotherapy when taking a Z-Brandon in September. She expresses a desire for antibiotics due to persistent rhinorrhea and for her albuterol inhaler.    SUBSTANCE USE:  She reports considering medical marijuana use as recommended by her palliative care provider, but expresses uncertainty and seeks a primary care physician's input on the " matter.      ROS:  General: -fever, -chills, -fatigue, -weight gain, -weight loss  Eyes: -vision changes, -redness, -discharge  ENT: -ear pain, +nasal congestion, -sore throat  Cardiovascular: -chest pain, -palpitations, -lower extremity edema  Respiratory: +cough, -shortness of breath, +wheezing  Gastrointestinal: -abdominal pain, -nausea, -vomiting, -diarrhea, -constipation, -blood in stool  Genitourinary: -dysuria, -hematuria, -frequency  Musculoskeletal: -joint pain, -muscle pain  Skin: -rash, -lesion  Neurological: -headache, -dizziness, -numbness, -tingling  Psychiatric: -anxiety, -depression, -sleep difficulty          Patient Active Problem List   Diagnosis    COPD with asthma    GERD (gastroesophageal reflux disease)    Acquired hypothyroidism    Tobacco use    PVD (peripheral vascular disease)    Anxiety    Dyslipidemia    Claudication    Essential hypertension    Allergic rhinitis    Pain in both lower extremities    Anisometropic amblyopia of left eye    Nonrheumatic aortic valve insufficiency    Colon polyps    Precordial pain    Tachycardia    Pain of right lower extremity    Difficulty walking    Low back pain, unspecified    Multiple myeloma    Compressed spine fracture    Dehydration    Immunodeficiency due to chemotherapy    Secondary malignant neoplasm of bone    Hypokalemia    Palliative care encounter    Chalazion left upper eyelid    Hypocalcemia    Sacroiliitis    Poor appetite    Chronic neoplasm-related pain    Needs smoking cessation education    Psychophysiological insomnia         Current Outpatient Medications:     acyclovir (ZOVIRAX) 400 MG tablet, Take 1 tablet (400 mg total) by mouth 2 (two) times daily., Disp: 60 tablet, Rfl: 11    amlodipine-benazepril 2.5-10 mg (LOTREL) 2.5-10 mg per capsule, TAKE 1 CAPSULE BY MOUTH EVERY DAY, Disp: 90 capsule, Rfl: 3    aspirin 81 MG Chew, Take 1 tablet (81 mg total) by mouth once daily., Disp: 30 tablet, Rfl: 11    calcium-vitamin D 600 mg-10 mcg  (400 unit) Tab, Take 1 tablet by mouth every 12 (twelve) hours., Disp: 60 tablet, Rfl: 2    dexAMETHasone (DECADRON) 4 MG Tab, Take 10 tablets (40 mg total) by mouth every 7 days. Take with food., Disp: 40 tablet, Rfl: 11    fluticasone propionate (FLONASE) 50 mcg/actuation nasal spray, 1 spray (50 mcg total) by Each Nostril route once daily., Disp: 1 mL, Rfl: 1    levocetirizine (XYZAL) 5 MG tablet, Take 1 tablet (5 mg total) by mouth every evening., Disp: 30 tablet, Rfl: 11    levothyroxine (SYNTHROID) 100 MCG tablet, Take 1 tablet (100 mcg total) by mouth before breakfast., Disp: 90 tablet, Rfl: 1    linaCLOtide (LINZESS) 72 mcg Cap capsule, Take 2 capsules (144 mcg total) by mouth before breakfast., Disp: 30 capsule, Rfl: 1    magnesium oxide (MAG-OX) 400 mg (241.3 mg magnesium) tablet, TAKE 1 TABLET(400 MG) BY MOUTH EVERY DAY, Disp: 90 tablet, Rfl: 1    metoprolol succinate (TOPROL-XL) 50 MG 24 hr tablet, Take 1 tablet (50 mg total) by mouth every evening., Disp: 90 tablet, Rfl: 3    ondansetron (ZOFRAN) 4 MG tablet, Take 1 tablet (4 mg total) by mouth every 6 (six) hours as needed for Nausea., Disp: 30 tablet, Rfl: 5    potassium chloride SA (K-DUR,KLOR-CON) 20 MEQ tablet, Take 1 tablet (20 mEq total) by mouth once daily., Disp: 30 tablet, Rfl: 11    WIXELA INHUB 250-50 mcg/dose diskus inhaler, INHALE 1 PUFF INTO THE LUNGS TWICE DAILY, Disp: 180 each, Rfl: 1    albuterol (VENTOLIN HFA) 90 mcg/actuation inhaler, Inhale 2 puffs into the lungs every 4 (four) hours as needed for Wheezing. Rescue, Disp: 18 g, Rfl: 1    ALPRAZolam (XANAX) 2 MG Tab, Take 1 tablet (2 mg total) by mouth 2 (two) times daily as needed (Anxiety)., Disp: 60 tablet, Rfl: 0    amoxicillin-clavulanate 875-125mg (AUGMENTIN) 875-125 mg per tablet, Take 1 tablet by mouth every 12 (twelve) hours. for 7 days, Disp: 14 tablet, Rfl: 0    azithromycin (Z-JESIKA) 250 MG tablet, Take as directed (Patient not taking: Reported on 10/7/2024), Disp: 6 tablet,  Rfl: 0    erythromycin (ROMYCIN) ophthalmic ointment, Apply ointment to lids and lashes on both eyes 2 times daily for 7 days., Disp: 2.5 g, Rfl: 0    hydrOXYzine HCL (ATARAX) 25 MG tablet, Take 1 tablet (25 mg total) by mouth 3 (three) times daily as needed for Itching., Disp: 21 tablet, Rfl: 0    ipratropium (ATROVENT) 21 mcg (0.03 %) nasal spray, 2 sprays by Each Nostril route 3 (three) times daily., Disp: , Rfl:     lenalidomide 10 mg Cap, TAKE 1 CAPSULE ONCE DAILY FOR 21 DAYS AND THEN 7 DAYS OFF., Disp: 21 each, Rfl: 7    methylPREDNISolone (MEDROL DOSEPACK) 4 mg tablet, use as directed, Disp: 1 each, Rfl: 0    montelukast (SINGULAIR) 10 mg tablet, TAKE 1 TABLET(10 MG) BY MOUTH EVERY EVENING, Disp: 90 tablet, Rfl: 0    naloxone (NARCAN) 4 mg/actuation [place], , Disp: , Rfl:     neomycin-polymyxin-dexamethasone (DEXACINE) 3.5 mg/g-10,000 unit/g-0.1 % Oint, Place into both eyes 3 (three) times daily., Disp: 3.5 g, Rfl: 0    nicotine (NICODERM CQ) 14 mg/24 hr, Place 1 patch onto the skin once daily., Disp: 14 patch, Rfl: 0    pantoprazole (PROTONIX) 40 MG tablet, TAKE 1 TABLET(40 MG) BY MOUTH EVERY DAY, Disp: 90 tablet, Rfl: 3    potassium, sodium phosphates (PHOS-NAK) 280-160-250 mg PwPk, Take 1 packet by mouth 4 (four) times daily with meals and nightly., Disp: 4 packet, Rfl: 0    pregabalin (LYRICA) 50 MG capsule, Take 1 capsule (50 mg total) by mouth nightly., Disp: 30 capsule, Rfl: 0    promethazine (PHENERGAN) 25 MG tablet, Take 1 tablet (25 mg total) by mouth every 4 (four) hours., Disp: 45 tablet, Rfl: 2    promethazine-dextromethorphan (PROMETHAZINE-DM) 6.25-15 mg/5 mL Syrp, Take 5 mLs by mouth every 4 (four) hours as needed (coughing)., Disp: 120 mL, Rfl: 0    rosuvastatin (CRESTOR) 10 MG tablet, Take 1 tablet (10 mg total) by mouth every evening., Disp: 90 tablet, Rfl: 3    traZODone (DESYREL) 50 MG tablet, Take 1 tablet (50 mg total) by mouth every evening. (Patient not taking: Reported on 10/4/2024),  "Disp: 30 tablet, Rfl: 11    Current Facility-Administered Medications:     dexAMETHasone injection 40 mg, 40 mg, Intravenous, 1 time in Clinic/HOD, Bri Luevano MD    Facility-Administered Medications Ordered in Other Visits:     lactated ringers infusion, , Intravenous, Continuous, Danny Matos III, MD      Objective:   /64 (BP Location: Right arm, Patient Position: Sitting)   Pulse 61   Temp 97 °F (36.1 °C) (Tympanic)   Ht 5' 4" (1.626 m)   Wt 58.6 kg (129 lb 3 oz)   SpO2 99%   BMI 22.18 kg/m²     Physical Exam             Physical Exam  Vitals and nursing note reviewed.   Constitutional:       General: She is awake. She is not in acute distress.     Appearance: Normal appearance. She is well-developed and well-groomed. She is not ill-appearing, toxic-appearing or diaphoretic.   HENT:      Head: Normocephalic and atraumatic.      Right Ear: Tympanic membrane, ear canal and external ear normal.      Left Ear: External ear normal.      Nose: No congestion or rhinorrhea.      Mouth/Throat:      Pharynx: No oropharyngeal exudate or posterior oropharyngeal erythema.   Eyes:      Conjunctiva/sclera: Conjunctivae normal.      Pupils: Pupils are equal, round, and reactive to light.   Cardiovascular:      Rate and Rhythm: Normal rate and regular rhythm.      Heart sounds: Normal heart sounds.   Pulmonary:      Effort: Pulmonary effort is normal. No tachypnea, bradypnea, accessory muscle usage, prolonged expiration, respiratory distress or retractions.      Breath sounds: No stridor, decreased air movement or transmitted upper airway sounds. Wheezing present. No decreased breath sounds, rhonchi or rales.   Abdominal:      General: Abdomen is flat. Bowel sounds are normal.      Palpations: Abdomen is soft.   Musculoskeletal:         General: No swelling, tenderness, deformity or signs of injury.      Cervical back: Normal range of motion and neck supple.      Right lower leg: No edema.      Left lower " leg: No edema.   Skin:     General: Skin is warm and dry.      Capillary Refill: Capillary refill takes less than 2 seconds.   Neurological:      General: No focal deficit present.      Mental Status: She is alert and oriented to person, place, and time.      Gait: Gait normal.   Psychiatric:         Attention and Perception: Attention and perception normal.         Mood and Affect: Mood and affect normal.         Speech: Speech normal.         Behavior: Behavior normal. Behavior is cooperative.         Thought Content: Thought content normal.         Cognition and Memory: Cognition normal.          Assessment:     1. COPD with acute exacerbation    2. Wheezing      Plan:   Assessment & Plan    Patient presenting with acute exacerbation of COPD/asthma  Considered COVID-19 as a differential but patient declined testing  Assessed need for rescue inhaler in addition to maintenance inhaler  Evaluated potential drug interactions with patient's ongoing chemotherapy regimen  Decided against administering steroid injection due to potential long-term side effects and immunosuppression concerns  Considered antibiotic therapy    COPD/ASTHMA:  - Educated on the importance of using both maintenance and rescue inhalers during COPD/asthma exacerbations.  - Discussed the potential negative effects of frequent steroid injections, including osteoporosis and immune system suppression.  - Explained the difference between maintenance and rescue inhalers.  - Started Albuterol inhaler as rescue inhaler.  - Continued maintenance inhaler (disc inhaler).  - Ordered breathing treatment.    INFECTION:  - Started antibiotic.    ALLERGIES:  - Continued Zyrtec daily for allergies.  - Continued Singulair, instructed to take in the morning instead of at night.    CANCER:  - Continued chemotherapy medication (generic Revlimid 10 mg).  - Follow up with chemotherapy doctor regarding potential drug interactions with antibiotics.    OTHER  INSTRUCTIONS:  - Provided work excuse note for patient.        Pt rechecked post breathing treatment with improved breath sounds (HR 66).  Recheck pulse ox 99.  Pt breathing comfortably in no distress.     No follow-ups on file.

## 2024-10-07 ENCOUNTER — PATIENT MESSAGE (OUTPATIENT)
Dept: PALLIATIVE MEDICINE | Facility: CLINIC | Age: 64
End: 2024-10-07
Payer: COMMERCIAL

## 2024-10-07 ENCOUNTER — HOSPITAL ENCOUNTER (OUTPATIENT)
Dept: CARDIOLOGY | Facility: HOSPITAL | Age: 64
Discharge: HOME | End: 2024-10-07
Attending: INTERNAL MEDICINE
Payer: COMMERCIAL

## 2024-10-07 ENCOUNTER — OFFICE VISIT (OUTPATIENT)
Dept: CARDIOLOGY | Facility: CLINIC | Age: 64
End: 2024-10-07
Payer: COMMERCIAL

## 2024-10-07 VITALS
HEIGHT: 64 IN | WEIGHT: 131 LBS | HEART RATE: 73 BPM | DIASTOLIC BLOOD PRESSURE: 70 MMHG | SYSTOLIC BLOOD PRESSURE: 126 MMHG | BODY MASS INDEX: 22.36 KG/M2 | OXYGEN SATURATION: 99 %

## 2024-10-07 DIAGNOSIS — R00.0 TACHYCARDIA: ICD-10-CM

## 2024-10-07 DIAGNOSIS — C90.00 MULTIPLE MYELOMA NOT HAVING ACHIEVED REMISSION: Chronic | ICD-10-CM

## 2024-10-07 DIAGNOSIS — I10 ESSENTIAL HYPERTENSION: ICD-10-CM

## 2024-10-07 DIAGNOSIS — I35.1 NONRHEUMATIC AORTIC VALVE INSUFFICIENCY: Primary | ICD-10-CM

## 2024-10-07 DIAGNOSIS — F41.9 ANXIETY: ICD-10-CM

## 2024-10-07 DIAGNOSIS — I10 ESSENTIAL HYPERTENSION: Primary | ICD-10-CM

## 2024-10-07 DIAGNOSIS — I73.9 PVD (PERIPHERAL VASCULAR DISEASE): ICD-10-CM

## 2024-10-07 DIAGNOSIS — E78.5 DYSLIPIDEMIA: ICD-10-CM

## 2024-10-07 DIAGNOSIS — C90.00 MULTIPLE MYELOMA, REMISSION STATUS UNSPECIFIED: Primary | ICD-10-CM

## 2024-10-07 LAB
OHS QRS DURATION: 80 MS
OHS QTC CALCULATION: 455 MS

## 2024-10-07 PROCEDURE — 3078F DIAST BP <80 MM HG: CPT | Mod: CPTII,S$GLB,, | Performed by: INTERNAL MEDICINE

## 2024-10-07 PROCEDURE — 3008F BODY MASS INDEX DOCD: CPT | Mod: CPTII,S$GLB,, | Performed by: INTERNAL MEDICINE

## 2024-10-07 PROCEDURE — 4010F ACE/ARB THERAPY RXD/TAKEN: CPT | Mod: CPTII,S$GLB,, | Performed by: INTERNAL MEDICINE

## 2024-10-07 PROCEDURE — 99999 PR PBB SHADOW E&M-EST. PATIENT-LVL V: CPT | Mod: PBBFAC,,, | Performed by: INTERNAL MEDICINE

## 2024-10-07 PROCEDURE — 93010 ELECTROCARDIOGRAM REPORT: CPT | Mod: ,,, | Performed by: INTERNAL MEDICINE

## 2024-10-07 PROCEDURE — 1160F RVW MEDS BY RX/DR IN RCRD: CPT | Mod: CPTII,S$GLB,, | Performed by: INTERNAL MEDICINE

## 2024-10-07 PROCEDURE — 99214 OFFICE O/P EST MOD 30 MIN: CPT | Mod: S$GLB,,, | Performed by: INTERNAL MEDICINE

## 2024-10-07 PROCEDURE — 3074F SYST BP LT 130 MM HG: CPT | Mod: CPTII,S$GLB,, | Performed by: INTERNAL MEDICINE

## 2024-10-07 PROCEDURE — 1159F MED LIST DOCD IN RCRD: CPT | Mod: CPTII,S$GLB,, | Performed by: INTERNAL MEDICINE

## 2024-10-07 PROCEDURE — 93005 ELECTROCARDIOGRAM TRACING: CPT

## 2024-10-07 NOTE — PROGRESS NOTES
Subjective:   Patient ID:  Ana Bonilla is a 64 y.o. female who presents for follow up of Shortness of Breath      65 yo female came in for 6 M  PMH HTN, HLD, mod AI, PAD, multiple myeloma COPD, hiatal hernia, hypothyroidism, s/p neck spinal fusion. Smokes 1 ppd for 40 yrs. Occasional drinking.    visit. Some chest hurt, on left, once a month, lasted for minutes, triggered by stress. No radiation.  Chronic BRAGG. No dizziness, palpitation and syncope.  Left calf pain at rest, worse with walking. Had leg cramp at night two weeks ago.  Father  of DM at 30'. Mother HTN. Brother  of MVA.  EKG on  NSR  ECHO in  normal EF and moderate AI. LE arterial US no lesion  Now some vaping     MPI no ischemia and EF normal   states that some stress. Some chronic BRAGG. Occasional CP triggered by stress. May relate to GERD.  Occasional calf and ankle pain, occurred at rest. EKG NSR LAE    2021 visit  C/o RLE leg. Used to have the pain starting from the lower back. Now the pain on the thigh and calf. Ocurred at rest and with exertion.   F/u with podiatric for right foot pain and will have the therapy. Orthopedic gave her Toradol.   Still smoking.     visit  H/o COVID in    Lost her mother in  and her son is in behave hospital for drug issue. A lot of stress   echo normal EF and mod AI.   LE arterial US mild disease  BRAGG chronic. Still has bad cough after COVID 19 infection  EKG NSR and nonspecific T wave     visit  Intermittent chest pain for 2 weeks, worse with cough and stretching the arms. Feels sore, and worse pain when laying down. Muscle relaxant caused the Crazy feeling and ibupofen     visit  Chronic lower sternal CP for days and weeks. Worse with coughing stretching. EGD in     09/23 visit  Dx of MM in  and on chemo Rx.  VRd (Velcade/Revlimid/dexamethasone. Had elevated Cr. And the dosage adjusted. Off 3 weeks due to eye  infection  On smoking cessation.   No leg swelling orthopnea. Question PND due to COPD.     03/24 visit  09/23 echo nml biv function and moderate AI  Had headache when BP was at 140 mmhg and improved after added amlodipine and BP at 120 mmHG  Congestion after covid infection  Calf pain. LE venous US negative in 01/24  BP LDL and A1C controlled    Interval history  States that chemo Rx of MM caused the low BP.  LDL 93. No chest pain dyspnea dizziness  EKG reviewed by myself today reveals NSR   Occasional chest pain               Past Medical History:   Diagnosis Date    Allergy     Amblyopia OS    Anxiety     Asthma     COPD (chronic obstructive pulmonary disease)     NO HOME o2    GERD (gastroesophageal reflux disease)     Hyperlipidemia     Hypertension     PONV (postoperative nausea and vomiting)     Thyroid disease        Past Surgical History:   Procedure Laterality Date    APPENDECTOMY      BIOPSY N/A 06/08/2023    Procedure: BIOPSY;  Surgeon: Fausto Smith MD;  Location: [facility];  Service: Neurosurgery;  Laterality: N/A;  L1    BUNIONECTOMY      COLONOSCOPY N/A 12/04/2019    Procedure: COLONOSCOPY;  Surgeon: Danny Matos III, MD;  Location: [facility];  Service: Endoscopy;  Laterality: N/A;    COLONOSCOPY N/A 10/24/2022    Procedure: COLONOSCOPY;  Surgeon: Danny Matos III, MD;  Location: [facility];  Service: Endoscopy;  Laterality: N/A;    ESOPHAGOGASTRODUODENOSCOPY N/A 10/24/2022    Procedure: EGD (ESOPHAGOGASTRODUODENOSCOPY);  Surgeon: Danny Matos III, MD;  Location: [facility];  Service: Endoscopy;  Laterality: N/A;    FIXATION KYPHOPLASTY Bilateral 06/08/2023    Procedure: KYPHOPLASTY;  Surgeon: Fausto Smith MD;  Location: [facility];  Service: Neurosurgery;  Laterality: Bilateral;  kyphoplasty and radiofrequency ablation - L1    HYSTERECTOMY      PARTIAL//still with ovaries    neck fusion  08/2017    THYROIDECTOMY      TUBAL LIGATION         Social History     Tobacco Use    Smoking  status: Every Day     Current packs/day: 0.50     Average packs/day: 0.5 packs/day for 50.0 years (25.0 ttl pk-yrs)     Types: Cigarettes     Passive exposure: Never    Smokeless tobacco: Never   Substance Use Topics    Alcohol use: Not Currently     Comment: quit    Drug use: No       Family History   Problem Relation Name Age of Onset    Hypertension Mother Vivian     Diabetes Mother Vivian     Asthma Mother Vivian             Diabetes Father      Asthma Father      Stroke Maternal Grandmother  grandmother     Prostate cancer Maternal Grandfather      Mental illness Son Lukasz Garcia     Pancreatic cancer Maternal Uncle      Mental illness Other Pat side     Pancreatic cancer Other Mat side     Ovarian cancer Maternal Cousin           ROS    Objective:   Physical Exam  HENT:      Head: Normocephalic.   Eyes:      Pupils: Pupils are equal, round, and reactive to light.   Neck:      Thyroid: No thyromegaly.      Vascular: Normal carotid pulses. No carotid bruit or JVD.   Cardiovascular:      Rate and Rhythm: Normal rate and regular rhythm. No extrasystoles are present.     Chest Wall: PMI is not displaced.      Pulses:           Dorsalis pedis pulses are 2+ on the right side and 1+ on the left side.        Posterior tibial pulses are 2+ on the right side and 1+ on the left side.      Heart sounds: Normal heart sounds. No murmur heard.     No gallop. No S3 sounds.   Pulmonary:      Effort: No respiratory distress.      Breath sounds: No stridor. Decreased breath sounds present.   Chest:      Comments: Diffuse + tenderness on both chests  Abdominal:      General: Bowel sounds are normal.      Palpations: Abdomen is soft.      Tenderness: There is no abdominal tenderness. There is no rebound.   Musculoskeletal:         General: Normal range of motion.   Skin:     Findings: No rash.   Neurological:      Mental Status: She is alert and oriented to person, place, and time.    Psychiatric:         Behavior: Behavior normal.         Lab Results   Component Value Date    CHOL 176 08/25/2022    CHOL 168 07/14/2021    CHOL 159 02/28/2020     Lab Results   Component Value Date    HDL 74 08/25/2022    HDL 74 07/14/2021    HDL 73 02/28/2020     Lab Results   Component Value Date    LDLCALC 92.6 08/25/2022    LDLCALC 80.4 07/14/2021    LDLCALC 75.6 02/28/2020     Lab Results   Component Value Date    TRIG 47 08/25/2022    TRIG 68 07/14/2021    TRIG 52 02/28/2020     Lab Results   Component Value Date    CHOLHDL 42.0 08/25/2022    CHOLHDL 44.0 07/14/2021    CHOLHDL 45.9 02/28/2020       Chemistry        Component Value Date/Time     09/24/2024 0947    K 3.5 09/24/2024 0947     09/24/2024 0947    CO2 23 09/24/2024 0947    BUN 9 09/24/2024 0947    CREATININE 1.0 09/24/2024 0947     09/24/2024 0947        Component Value Date/Time    CALCIUM 9.2 09/24/2024 0947    ALKPHOS 54 (L) 09/24/2024 0947    AST 19 09/24/2024 0947    ALT 19 09/24/2024 0947    BILITOT 0.7 09/24/2024 0947    ESTGFRAFRICA >60.0 07/14/2021 0927    EGFRNONAA >60.0 07/14/2021 0927          Lab Results   Component Value Date    HGBA1C 5.7 (H) 01/10/2023     Lab Results   Component Value Date    TSH 2.540 08/26/2024     Lab Results   Component Value Date    INR 0.9 02/19/2024    INR 1.0 03/10/2023    INR 1.0 11/08/2019     Lab Results   Component Value Date    WBC 4.38 09/23/2024    HGB 12.1 09/23/2024    HCT 37.5 09/23/2024    MCV 99 (H) 09/23/2024     09/23/2024     BMP  Sodium   Date Value Ref Range Status   09/24/2024 143 136 - 145 mmol/L Final     Potassium   Date Value Ref Range Status   09/24/2024 3.5 3.5 - 5.1 mmol/L Final     Chloride   Date Value Ref Range Status   09/24/2024 108 95 - 110 mmol/L Final     CO2   Date Value Ref Range Status   09/24/2024 23 23 - 29 mmol/L Final     BUN   Date Value Ref Range Status   09/24/2024 9 8 - 23 mg/dL Final     Creatinine   Date Value Ref Range Status    09/24/2024 1.0 0.5 - 1.4 mg/dL Final     Calcium   Date Value Ref Range Status   09/24/2024 9.2 8.7 - 10.5 mg/dL Final     Anion Gap   Date Value Ref Range Status   09/24/2024 12 8 - 16 mmol/L Final     eGFR if    Date Value Ref Range Status   07/14/2021 >60.0 >60 mL/min/1.73 m^2 Final     eGFR if non    Date Value Ref Range Status   07/14/2021 >60.0 >60 mL/min/1.73 m^2 Final     Comment:     Calculation used to obtain the estimated glomerular filtration  rate (eGFR) is the CKD-EPI equation.        BNP  @LABRCNTIP(BNP,BNPTRIAGEBLO)@  @LABRCNTIP(troponini)@  CrCl cannot be calculated (Patient's most recent lab result is older than the maximum 7 days allowed.).  No results found in the last 24 hours.  No results found in the last 24 hours.  No results found in the last 24 hours.    Assessment:      1. Nonrheumatic aortic valve insufficiency    2. Essential hypertension    3. Dyslipidemia    4. PVD (peripheral vascular disease)    5. Multiple myeloma not having achieved remission        Plan:   Continue asa amlodipine/hctz toproXL statin for HTN and HLD  F/u hem for stem cell Tx for MM  RTC in 6m

## 2024-10-08 ENCOUNTER — PATIENT MESSAGE (OUTPATIENT)
Dept: HEMATOLOGY/ONCOLOGY | Facility: CLINIC | Age: 64
End: 2024-10-08

## 2024-10-08 ENCOUNTER — OFFICE VISIT (OUTPATIENT)
Dept: HEMATOLOGY/ONCOLOGY | Facility: CLINIC | Age: 64
End: 2024-10-08
Payer: COMMERCIAL

## 2024-10-08 DIAGNOSIS — C90.00 MULTIPLE MYELOMA, REMISSION STATUS UNSPECIFIED: ICD-10-CM

## 2024-10-08 PROCEDURE — 1160F RVW MEDS BY RX/DR IN RCRD: CPT | Mod: CPTII,95,,

## 2024-10-08 PROCEDURE — 99215 OFFICE O/P EST HI 40 MIN: CPT | Mod: 95,,,

## 2024-10-08 PROCEDURE — 1159F MED LIST DOCD IN RCRD: CPT | Mod: CPTII,95,,

## 2024-10-08 PROCEDURE — G2211 COMPLEX E/M VISIT ADD ON: HCPCS | Mod: 95,,,

## 2024-10-08 PROCEDURE — 4010F ACE/ARB THERAPY RXD/TAKEN: CPT | Mod: CPTII,95,,

## 2024-10-08 RX ORDER — LENALIDOMIDE 10 MG/1
CAPSULE ORAL
Qty: 21 EACH | Refills: 7 | Status: SHIPPED | OUTPATIENT
Start: 2024-10-08

## 2024-10-08 RX ORDER — ALPRAZOLAM 2 MG/1
2 TABLET ORAL 2 TIMES DAILY PRN
Qty: 60 TABLET | Refills: 0 | Status: SHIPPED | OUTPATIENT
Start: 2024-10-08 | End: 2024-11-07

## 2024-10-08 NOTE — ASSESSMENT & PLAN NOTE
BMBx : 60 % plasma cells - 4/6/2023  - SPEP/MECHE showed IgG lambda - monoclonal protein 3.19 g/dl - (3/27/2023).  - R-ISS stage I (beta 2 microglobulin 1.9, albumin 3.5, , normal karyotype and no high-risk mutations on fish panel  - PET-CT ( 4/10/2023) - showed extensive lytic lesions in the axial skeleton and mild L1 compression deformity.  - Started with Induction chemotherapy with VRD regimen (Velcde/Revlimid/Decadron) - 05/10/2023   - Started Aspirin daily p.o for thrombosis prophylaxis; Acyclovir 400 mg p.o BID for herpes Zoster prophylaxis .  __________________________________________________  --S/p Kyphoplasty 06/08/23    Pt seen by transplant team BMT.  However, she has declined transplant at this time   Repeat PET-CT on 1/18/24 with significantly decreased FDG uptake associated with the previously identified lesions and no new FDG avid lesions   BM Biopsy 02/13/2024 shows significant improvement, with the marrow now showing only 5% plasma cells ( was 60% at baseline)    Labs reviewed  Hold Darzalex today until completion of abx therapy  Pt notes currently out of revlimid--will reorder     RTC in one week with repeat labs for Darzalex/Zometa

## 2024-10-09 ENCOUNTER — PATIENT MESSAGE (OUTPATIENT)
Dept: HEMATOLOGY/ONCOLOGY | Facility: CLINIC | Age: 64
End: 2024-10-09
Payer: COMMERCIAL

## 2024-10-14 ENCOUNTER — PATIENT MESSAGE (OUTPATIENT)
Dept: HEMATOLOGY/ONCOLOGY | Facility: CLINIC | Age: 64
End: 2024-10-14
Payer: COMMERCIAL

## 2024-10-14 ENCOUNTER — PATIENT MESSAGE (OUTPATIENT)
Dept: INTERNAL MEDICINE | Facility: CLINIC | Age: 64
End: 2024-10-14
Payer: COMMERCIAL

## 2024-10-14 ENCOUNTER — LAB VISIT (OUTPATIENT)
Dept: LAB | Facility: HOSPITAL | Age: 64
End: 2024-10-14
Payer: COMMERCIAL

## 2024-10-14 DIAGNOSIS — C90.00 MULTIPLE MYELOMA, REMISSION STATUS UNSPECIFIED: ICD-10-CM

## 2024-10-14 LAB
ALBUMIN SERPL BCP-MCNC: 3.5 G/DL (ref 3.5–5.2)
ALP SERPL-CCNC: 56 U/L (ref 55–135)
ALT SERPL W/O P-5'-P-CCNC: 21 U/L (ref 10–44)
ANION GAP SERPL CALC-SCNC: 10 MMOL/L (ref 8–16)
AST SERPL-CCNC: 21 U/L (ref 10–40)
BASOPHILS # BLD AUTO: 0.06 K/UL (ref 0–0.2)
BASOPHILS NFR BLD: 1.2 % (ref 0–1.9)
BILIRUB SERPL-MCNC: 0.7 MG/DL (ref 0.1–1)
BUN SERPL-MCNC: 12 MG/DL (ref 8–23)
CALCIUM SERPL-MCNC: 9.2 MG/DL (ref 8.7–10.5)
CHLORIDE SERPL-SCNC: 110 MMOL/L (ref 95–110)
CO2 SERPL-SCNC: 25 MMOL/L (ref 23–29)
CREAT SERPL-MCNC: 0.9 MG/DL (ref 0.5–1.4)
DIFFERENTIAL METHOD BLD: ABNORMAL
EOSINOPHIL # BLD AUTO: 0.2 K/UL (ref 0–0.5)
EOSINOPHIL NFR BLD: 4.4 % (ref 0–8)
ERYTHROCYTE [DISTWIDTH] IN BLOOD BY AUTOMATED COUNT: 13.9 % (ref 11.5–14.5)
EST. GFR  (NO RACE VARIABLE): >60 ML/MIN/1.73 M^2
GLUCOSE SERPL-MCNC: 113 MG/DL (ref 70–110)
HCT VFR BLD AUTO: 38.2 % (ref 37–48.5)
HGB BLD-MCNC: 12.2 G/DL (ref 12–16)
IMM GRANULOCYTES # BLD AUTO: 0.01 K/UL (ref 0–0.04)
IMM GRANULOCYTES NFR BLD AUTO: 0.2 % (ref 0–0.5)
LYMPHOCYTES # BLD AUTO: 1.6 K/UL (ref 1–4.8)
LYMPHOCYTES NFR BLD: 32.4 % (ref 18–48)
MAGNESIUM SERPL-MCNC: 1.7 MG/DL (ref 1.6–2.6)
MCH RBC QN AUTO: 31.5 PG (ref 27–31)
MCHC RBC AUTO-ENTMCNC: 31.9 G/DL (ref 32–36)
MCV RBC AUTO: 99 FL (ref 82–98)
MONOCYTES # BLD AUTO: 0.5 K/UL (ref 0.3–1)
MONOCYTES NFR BLD: 9.5 % (ref 4–15)
NEUTROPHILS # BLD AUTO: 2.6 K/UL (ref 1.8–7.7)
NEUTROPHILS NFR BLD: 52.3 % (ref 38–73)
NRBC BLD-RTO: 0 /100 WBC
PHOSPHATE SERPL-MCNC: 2.1 MG/DL (ref 2.7–4.5)
PLATELET # BLD AUTO: 244 K/UL (ref 150–450)
PMV BLD AUTO: 9 FL (ref 9.2–12.9)
POTASSIUM SERPL-SCNC: 3.5 MMOL/L (ref 3.5–5.1)
PROT SERPL-MCNC: 6.5 G/DL (ref 6–8.4)
RBC # BLD AUTO: 3.87 M/UL (ref 4–5.4)
SODIUM SERPL-SCNC: 145 MMOL/L (ref 136–145)
WBC # BLD AUTO: 5.03 K/UL (ref 3.9–12.7)

## 2024-10-14 PROCEDURE — 85025 COMPLETE CBC W/AUTO DIFF WBC: CPT

## 2024-10-14 PROCEDURE — 84100 ASSAY OF PHOSPHORUS: CPT

## 2024-10-14 PROCEDURE — 83735 ASSAY OF MAGNESIUM: CPT

## 2024-10-14 PROCEDURE — 80053 COMPREHEN METABOLIC PANEL: CPT

## 2024-10-14 PROCEDURE — 36415 COLL VENOUS BLD VENIPUNCTURE: CPT

## 2024-10-14 RX ORDER — FLUCONAZOLE 150 MG/1
150 TABLET ORAL WEEKLY
Qty: 2 TABLET | Refills: 0 | Status: SHIPPED | OUTPATIENT
Start: 2024-10-14

## 2024-10-14 NOTE — TELEPHONE ENCOUNTER
Nurse spoke with pt in regard to her medication for Revlimid. Pt stated  the medication was sent to the wrong pharmacy. Pt is requesting to have medication sent to [facility]. Nurse informed the pt that both  and BELLE Mckeon were currently out of the office and will return on tomorrow, pt  verbalized understanding. A message will be sent to both  and Natividad Mckeon NP to advise.

## 2024-10-15 ENCOUNTER — OFFICE VISIT (OUTPATIENT)
Dept: HEMATOLOGY/ONCOLOGY | Facility: CLINIC | Age: 64
End: 2024-10-15
Payer: COMMERCIAL

## 2024-10-15 ENCOUNTER — INFUSION (OUTPATIENT)
Dept: INFUSION THERAPY | Facility: HOSPITAL | Age: 64
End: 2024-10-15
Attending: INTERNAL MEDICINE
Payer: COMMERCIAL

## 2024-10-15 VITALS
TEMPERATURE: 97 F | DIASTOLIC BLOOD PRESSURE: 72 MMHG | HEIGHT: 64 IN | SYSTOLIC BLOOD PRESSURE: 113 MMHG | BODY MASS INDEX: 22.5 KG/M2 | OXYGEN SATURATION: 99 % | WEIGHT: 131.81 LBS | HEART RATE: 72 BPM | RESPIRATION RATE: 16 BRPM

## 2024-10-15 DIAGNOSIS — C90.00 MULTIPLE MYELOMA NOT HAVING ACHIEVED REMISSION: Primary | ICD-10-CM

## 2024-10-15 DIAGNOSIS — C90.00 MULTIPLE MYELOMA, REMISSION STATUS UNSPECIFIED: ICD-10-CM

## 2024-10-15 DIAGNOSIS — C90.00 MULTIPLE MYELOMA NOT HAVING ACHIEVED REMISSION: Primary | Chronic | ICD-10-CM

## 2024-10-15 PROCEDURE — 4010F ACE/ARB THERAPY RXD/TAKEN: CPT | Mod: CPTII,95,,

## 2024-10-15 PROCEDURE — 63600175 PHARM REV CODE 636 W HCPCS: Mod: JZ,JG | Performed by: NURSE PRACTITIONER

## 2024-10-15 PROCEDURE — 1160F RVW MEDS BY RX/DR IN RCRD: CPT | Mod: CPTII,95,,

## 2024-10-15 PROCEDURE — 96365 THER/PROPH/DIAG IV INF INIT: CPT

## 2024-10-15 PROCEDURE — 25000003 PHARM REV CODE 250

## 2024-10-15 PROCEDURE — 63600175 PHARM REV CODE 636 W HCPCS

## 2024-10-15 PROCEDURE — G2211 COMPLEX E/M VISIT ADD ON: HCPCS | Mod: 95,,,

## 2024-10-15 PROCEDURE — 99215 OFFICE O/P EST HI 40 MIN: CPT | Mod: 95,,,

## 2024-10-15 PROCEDURE — 1159F MED LIST DOCD IN RCRD: CPT | Mod: CPTII,95,,

## 2024-10-15 PROCEDURE — 96401 CHEMO ANTI-NEOPL SQ/IM: CPT

## 2024-10-15 RX ORDER — HEPARIN 100 UNIT/ML
500 SYRINGE INTRAVENOUS
Status: CANCELLED | OUTPATIENT
Start: 2024-10-15

## 2024-10-15 RX ORDER — SODIUM CHLORIDE 0.9 % (FLUSH) 0.9 %
10 SYRINGE (ML) INJECTION
Status: CANCELLED | OUTPATIENT
Start: 2024-10-15

## 2024-10-15 RX ORDER — ZOLEDRONIC ACID 0.04 MG/ML
4 INJECTION, SOLUTION INTRAVENOUS
Status: DISCONTINUED | OUTPATIENT
Start: 2024-10-15 | End: 2024-10-15

## 2024-10-15 RX ORDER — LENALIDOMIDE 10 MG/1
CAPSULE ORAL
Qty: 21 EACH | Refills: 7 | Status: SHIPPED | OUTPATIENT
Start: 2024-10-15

## 2024-10-15 RX ADMIN — DARATUMUMAB AND HYALURONIDASE-FIHJ (HUMAN RECOMBINANT) 1800 MG: 1800; 30000 INJECTION SUBCUTANEOUS at 08:10

## 2024-10-15 RX ADMIN — ZOLEDRONIC ACID 3.5 MG: 4 INJECTION, SOLUTION, CONCENTRATE INTRAVENOUS at 08:10

## 2024-10-15 NOTE — PLAN OF CARE
Patient tolerated Darzalex/Zometa well today; no adverse reaction noted.  POC reviewed with pt.  NAD noted upon discharge.   Has f/u appt(s) scheduled per MD request.    Problem: Adult Inpatient Plan of Care  Goal: Plan of Care Review  Outcome: Progressing  Goal: Patient-Specific Goal (Individualized)  Outcome: Progressing  Goal: Absence of Hospital-Acquired Illness or Injury  Outcome: Progressing  Intervention: Identify and Manage Fall Risk  Flowsheets (Taken 10/15/2024 0937)  Safety Promotion/Fall Prevention: in recliner, wheels locked  Goal: Optimal Comfort and Wellbeing  Outcome: Progressing  Intervention: Provide Person-Centered Care  Flowsheets (Taken 10/15/2024 0937)  Trust Relationship/Rapport:   care explained   reassurance provided   choices provided   thoughts/feelings acknowledged   emotional support provided   empathic listening provided   questions answered   questions encouraged

## 2024-10-15 NOTE — ASSESSMENT & PLAN NOTE
BMBx : 60 % plasma cells - 4/6/2023  - SPEP/MECHE showed IgG lambda - monoclonal protein 3.19 g/dl - (3/27/2023).  - R-ISS stage I (beta 2 microglobulin 1.9, albumin 3.5, , normal karyotype and no high-risk mutations on fish panel  - PET-CT ( 4/10/2023) - showed extensive lytic lesions in the axial skeleton and mild L1 compression deformity.  - Started with Induction chemotherapy with VRD regimen (Velcde/Revlimid/Decadron) - 05/10/2023   - Started Aspirin daily p.o for thrombosis prophylaxis; Acyclovir 400 mg p.o BID for herpes Zoster prophylaxis .  __________________________________________________  --S/p Kyphoplasty 06/08/23    Pt seen by transplant team BMT.  However, she has declined transplant at this time   Repeat PET-CT on 1/18/24 with significantly decreased FDG uptake associated with the previously identified lesions and no new FDG avid lesions   BM Biopsy 02/13/2024 shows significant improvement, with the marrow now showing only 5% plasma cells ( was 60% at baseline)    Labs reviewed  Ok to proceed darzalex/zometa today   Pt notes currently out of revlimid--pt notes contacted pharmacy and authorization needed--will contact to get resolved    RTC in two weeks with repeat labs for Darzalex

## 2024-10-15 NOTE — PROGRESS NOTES
Subjective:     Patient ID:?Ana Bonilla is a 64 y.o. female.?? MR#: [id number]   ?   PRIMARY ONCOLOGIST: -->  ?   CHIEF COMPLAINT: lab review/assessment for chemo/MM ?????   ?   ONCOLOGIC DIAGNOSIS: Multiple Myeloma  ?   CURRENT TREATMENT: OP Myeloma KIA-RD daratumumab lenalidomide dexAMETHasone Q4W     The patient location is: [place]  The chief complaint leading to consultation is: follow-up    Visit type: audiovisual    Face to Face time with patient: 15    20 minutes of total time spent on the encounter, which includes face to face time and non-face to face time preparing to see the patient (eg, review of tests), Obtaining and/or reviewing separately obtained history, Documenting clinical information in the electronic or other health record, Independently interpreting results (not separately reported) and communicating results to the patient/family/caregiver, or Care coordination (not separately reported).         Each patient to whom he or she provides medical services by telemedicine is:  (1) informed of the relationship between the physician and patient and the respective role of any other health care provider with respect to management of the patient; and (2) notified that he or she may decline to receive medical services by telemedicine and may withdraw from such care at any time.    Notes:    HPI  Ms. Bonilla is a 64-year-old  female with past medical history significant for hypertension, COPD, asthma, GERD, hypothyroidism here for follow-up and management of multiple myeloma. BMBx on 4/6/2023 showed 60 % plasma cells. PET-CT on 4/10/2023 showed extensive lytic bony lesions throughout the spine, sternum, bilateral ribs, pelvis and mild compression deformity in L1 vertebral body. SPEP/MECHE on 3/27/2023 showed IgG lambda monoclonal protein - 3.19 g/dl. Quantitative immunoglobulins on 3/27/2023 showed IgG 4,521 mg/dl. UPEP/MECHE and FLC were pending at that time. Labs on 3/27/2023 showed Beta  2 microglobulin 1.9, .  Labs on 4/19/2023 showed Hb, 11.5, WBC 6.3, platelets, BUN 11, Cr 0.9, calcium 9. Pt initiated on VRD 05/10/23. Treatment planned changed to D-VRD 07/06/23.     Interval History: Pt presents for lab review and assessment prior to Darzalex.  Denies n/v/d/c, fever, chills, sob, cp, abnormal bleeding  Oncology History   Multiple myeloma   4/20/2023 Initial Diagnosis    Multiple myeloma     5/10/2023 - 6/28/2023 Chemotherapy    Treatment Summary   Plan Name: OP VRD - WEEKLY BORTEZOMIB LENALIDOMIDE DEXAMETHASONE Q3W  Treatment Goal: Palliative  Status: Inactive  Start Date: 5/10/2023  End Date: 6/28/2023  Provider: Abram Velasquez MD  Chemotherapy: bortezomib (VELCADE) injection 2 mg, 1.3 mg/m2 = 2 mg, Subcutaneous, Clinic/HOD 1 time, 2 of 6 cycles  Administration: 2 mg (5/10/2023), 2 mg (5/17/2023), 2 mg (6/14/2023), 2 mg (5/31/2023), 2 mg (6/21/2023), 2 mg (6/28/2023)  lenalidomide 25 mg Cap, 25 mg, Oral, Daily, 1 of 1 cycle, Start date: 5/10/2023, End date: 5/17/2023 6/28/2023 Cancer Staged    Staging form: Plasma Cell Myeloma and Plasma Cell Disorders, AJCC 8th Edition  - Clinical stage from 6/28/2023: RISS Stage II (Beta-2-microglobulin (mg/L): 1.9, Albumin (g/dL): 3, ISS: Stage II, High-risk cytogenetics: Absent, LDH: Normal)     7/6/2023 - 1/3/2024 Chemotherapy    Treatment Summary   Plan Name: OP D-VRD DARATUMUMAB + BORTEZOMIB LENALIDOMIDE DEXAMETHASONE  Treatment Goal: Palliative  Status: Inactive  Start Date: 7/6/2023  End Date: 1/3/2024  Provider: Oscar Márquez MD  Chemotherapy: bortezomib (VELCADE) injection 2 mg, 1.3 mg/m2 = 2 mg, Subcutaneous, Clinic/HOD 1 time, 6 of 6 cycles  Administration: 2 mg (7/6/2023), 2 mg (7/12/2023), 2 mg (8/2/2023), 2 mg (8/9/2023), 2.25 mg (10/18/2023), 2 mg (7/19/2023), 2 mg (7/26/2023), 2 mg (8/16/2023), 2.25 mg (9/20/2023), 2.25 mg (9/27/2023), 2.25 mg (10/25/2023), 2.25 mg (11/1/2023), 2.25 mg (11/8/2023), 2.25 mg (12/6/2023), 2.25 mg  (1/3/2024), 2.25 mg (11/15/2023), 2.25 mg (11/22/2023), 2.25 mg (11/29/2023), 2.25 mg (12/13/2023), 2.25 mg (12/20/2023), 2.25 mg (12/26/2023)  lenalidomide 10 mg Cap, 1 capsule (100 % of original dose 1 capsule), Oral, Daily, 1 of 1 cycle, Start date: 7/26/2023, End date: 8/2/2023  Dose modification: 1 capsule (original dose 1 capsule, Cycle 0)  daratumumab-hyaluronidase-fihj subcutaneous injection 1,800 mg, 1,800 mg (100 % of original dose 1,800 mg), Subcutaneous, Clinic/HOD 1 time, 6 of 6 cycles  Dose modification: 1,800 mg (original dose 1,800 mg, Cycle 1), 1,800 mg (original dose 1,800 mg, Cycle 1)  Administration: 1,800 mg (7/6/2023), 1,800 mg (7/12/2023), 1,800 mg (7/19/2023), 1,800 mg (7/26/2023), 1,800 mg (8/2/2023), 1,800 mg (8/9/2023), 1,800 mg (8/16/2023), 1,800 mg (9/27/2023), 1,800 mg (10/18/2023), 1,800 mg (11/1/2023), 1,800 mg (11/15/2023), 1,800 mg (11/29/2023), 1,800 mg (12/13/2023), 1,800 mg (12/26/2023)     4/30/2024 -  Chemotherapy    Treatment Summary   Plan Name: OP Myeloma KIA-RD daratumumab lenalidomide dexAMETHasone Q4W  Treatment Goal: Palliative  Status: Active  Start Date: 4/30/2024  End Date: 3/11/2025 (Planned)  Provider: Bri Luevano MD  Chemotherapy: daratumumab-hyaluronidase-fihj subcutaneous injection 1,800 mg, 1,800 mg, Subcutaneous, Clinic/HOD 1 time, 6 of 12 cycles  Administration: 1,800 mg (4/30/2024), 1,800 mg (5/7/2024), 1,800 mg (5/21/2024), 1,800 mg (5/28/2024), 1,800 mg (6/4/2024), 1,800 mg (6/25/2024), 1,800 mg (7/9/2024), 1,800 mg (5/14/2024), 1,800 mg (6/11/2024), 1,800 mg (6/18/2024), 1,800 mg (7/30/2024), 1,800 mg (8/13/2024), 1,800 mg (8/27/2024), 1,800 mg (9/10/2024), 1,800 mg (9/24/2024)        Social History     Socioeconomic History    Marital status:     Number of children: 2   Occupational History     Employer: [employer]   Tobacco Use    Smoking status: Every Day     Current packs/day: 0.50     Average packs/day: 0.5 packs/day for 50.0 years (25.0 ttl  pk-yrs)     Types: Cigarettes     Passive exposure: Never    Smokeless tobacco: Never   Substance and Sexual Activity    Alcohol use: Not Currently     Comment: quit    Drug use: No    Sexual activity: Not Currently     Partners: Male     Social Drivers of Health     Financial Resource Strain: Low Risk  (11/15/2023)    Overall Financial Resource Strain (CARDIA)     Difficulty of Paying Living Expenses: Not hard at all   Food Insecurity: No Food Insecurity (11/15/2023)    Hunger Vital Sign     Worried About Running Out of Food in the Last Year: Never true     Ran Out of Food in the Last Year: Never true   Transportation Needs: No Transportation Needs (11/15/2023)    PRAPARE - Transportation     Lack of Transportation (Medical): No     Lack of Transportation (Non-Medical): No   Physical Activity: Sufficiently Active (11/15/2023)    Exercise Vital Sign     Days of Exercise per Week: 7 days     Minutes of Exercise per Session: 150+ min   Stress: Stress Concern Present (11/15/2023)    [employer] [employer] [place] [employer] - Occupational Stress Questionnaire     Feeling of Stress : To some extent   Housing Stability: Low Risk  (11/15/2023)    Housing Stability Vital Sign     Unable to Pay for Housing in the Last Year: No     Number of Places Lived in the Last Year: 1     Unstable Housing in the Last Year: No      Family History   Problem Relation Name Age of Onset    Hypertension Mother Vivian     Diabetes Mother Vivian     Asthma Mother Vivian             Diabetes Father      Asthma Father      Stroke Maternal Grandmother  grandmother     Prostate cancer Maternal Grandfather      Mental illness Son Lukasz Garcia     Pancreatic cancer Maternal Uncle      Mental illness Other Pat side     Pancreatic cancer Other Mat side     Ovarian cancer Maternal Cousin        Past Surgical History:   Procedure Laterality Date    APPENDECTOMY      BIOPSY N/A 2023    Procedure: BIOPSY;   Surgeon: Fausto Smith MD;  Location: [facility] OR;  Service: Neurosurgery;  Laterality: N/A;  L1    BUNIONECTOMY      COLONOSCOPY N/A 12/04/2019    Procedure: COLONOSCOPY;  Surgeon: Danny Matos III, MD;  Location: [facility] ENDO;  Service: Endoscopy;  Laterality: N/A;    COLONOSCOPY N/A 10/24/2022    Procedure: COLONOSCOPY;  Surgeon: Danny Matos III, MD;  Location: [facility] ENDO;  Service: Endoscopy;  Laterality: N/A;    ESOPHAGOGASTRODUODENOSCOPY N/A 10/24/2022    Procedure: EGD (ESOPHAGOGASTRODUODENOSCOPY);  Surgeon: Danny Matos III, MD;  Location: [facility] ENDO;  Service: Endoscopy;  Laterality: N/A;    FIXATION KYPHOPLASTY Bilateral 06/08/2023    Procedure: KYPHOPLASTY;  Surgeon: Fausto Smith MD;  Location: [facility] OR;  Service: Neurosurgery;  Laterality: Bilateral;  kyphoplasty and radiofrequency ablation - L1    HYSTERECTOMY      PARTIAL//still with ovaries    neck fusion  08/2017    THYROIDECTOMY      TUBAL LIGATION          Review of Systems   Constitutional:  Positive for fatigue. Negative for activity change, appetite change, chills, fever and unexpected weight change.   HENT:  Positive for congestion. Negative for dental problem, mouth sores and nosebleeds.    Eyes:  Negative for visual disturbance.   Respiratory:  Positive for cough. Negative for choking and chest tightness.    Cardiovascular:  Negative for chest pain, palpitations and leg swelling.   Gastrointestinal:  Negative for abdominal distention, abdominal pain, anal bleeding, blood in stool, constipation, diarrhea, nausea and vomiting.   Endocrine: Negative.    Genitourinary:  Negative for dysuria, frequency, hematuria and urgency.   Musculoskeletal:  Positive for arthralgias and myalgias. Negative for back pain, gait problem and joint swelling.   Skin:  Negative for rash and wound.   Allergic/Immunologic: Negative for immunocompromised state.   Neurological:  Negative for dizziness, light-headedness, numbness and headaches.   Hematological:   Negative for adenopathy. Does not bruise/bleed easily.   Psychiatric/Behavioral:  The patient is nervous/anxious.        ?   A comprehensive 14-point review of systems was reviewed with patient and was negative other than as specified above.   ?     Objective:      Physical Exam  Vitals reviewed: virtual visit.           ?   There were no vitals filed for this visit.     ?       ?   Laboratory:  ?   No visits with results within 1 Day(s) from this visit.   Latest known visit with results is:   Lab Visit on 10/14/2024   Component Date Value Ref Range Status    WBC 10/14/2024 5.03  3.90 - 12.70 K/uL Final    RBC 10/14/2024 3.87 (L)  4.00 - 5.40 M/uL Final    Hemoglobin 10/14/2024 12.2  12.0 - 16.0 g/dL Final    Hematocrit 10/14/2024 38.2  37.0 - 48.5 % Final    MCV 10/14/2024 99 (H)  82 - 98 fL Final    MCH 10/14/2024 31.5 (H)  27.0 - 31.0 pg Final    MCHC 10/14/2024 31.9 (L)  32.0 - 36.0 g/dL Final    RDW 10/14/2024 13.9  11.5 - 14.5 % Final    Platelets 10/14/2024 244  150 - 450 K/uL Final    MPV 10/14/2024 9.0 (L)  9.2 - 12.9 fL Final    Immature Granulocytes 10/14/2024 0.2  0.0 - 0.5 % Final    Gran # (ANC) 10/14/2024 2.6  1.8 - 7.7 K/uL Final    Immature Grans (Abs) 10/14/2024 0.01  0.00 - 0.04 K/uL Final    Lymph # 10/14/2024 1.6  1.0 - 4.8 K/uL Final    Mono # 10/14/2024 0.5  0.3 - 1.0 K/uL Final    Eos # 10/14/2024 0.2  0.0 - 0.5 K/uL Final    Baso # 10/14/2024 0.06  0.00 - 0.20 K/uL Final    nRBC 10/14/2024 0  0 /100 WBC Final    Gran % 10/14/2024 52.3  38.0 - 73.0 % Final    Lymph % 10/14/2024 32.4  18.0 - 48.0 % Final    Mono % 10/14/2024 9.5  4.0 - 15.0 % Final    Eosinophil % 10/14/2024 4.4  0.0 - 8.0 % Final    Basophil % 10/14/2024 1.2  0.0 - 1.9 % Final    Differential Method 10/14/2024 Automated   Final    Sodium 10/14/2024 145  136 - 145 mmol/L Final    Potassium 10/14/2024 3.5  3.5 - 5.1 mmol/L Final    Chloride 10/14/2024 110  95 - 110 mmol/L Final    CO2 10/14/2024 25  23 - 29 mmol/L Final     Glucose 10/14/2024 113 (H)  70 - 110 mg/dL Final    BUN 10/14/2024 12  8 - 23 mg/dL Final    Creatinine 10/14/2024 0.9  0.5 - 1.4 mg/dL Final    Calcium 10/14/2024 9.2  8.7 - 10.5 mg/dL Final    Total Protein 10/14/2024 6.5  6.0 - 8.4 g/dL Final    Albumin 10/14/2024 3.5  3.5 - 5.2 g/dL Final    Total Bilirubin 10/14/2024 0.7  0.1 - 1.0 mg/dL Final    Alkaline Phosphatase 10/14/2024 56  55 - 135 U/L Final    AST 10/14/2024 21  10 - 40 U/L Final    ALT 10/14/2024 21  10 - 44 U/L Final    eGFR 10/14/2024 >60  >60 mL/min/1.73 m^2 Final    Anion Gap 10/14/2024 10  8 - 16 mmol/L Final    Magnesium 10/14/2024 1.7  1.6 - 2.6 mg/dL Final    Phosphorus 10/14/2024 2.1 (L)  2.7 - 4.5 mg/dL Final      ?   Imaging:    No results found. However, due to the size of the patient record, not all encounters were searched. Please check Results Review for a complete set of results.       No results found. However, due to the size of the patient record, not all encounters were searched. Please check Results Review for a complete set of results.           ?   Assessment/Plan:     Problem List Items Addressed This Visit       Multiple myeloma - Primary (Chronic)     BMBx : 60 % plasma cells - 4/6/2023  - SPEP/MECHE showed IgG lambda - monoclonal protein 3.19 g/dl - (3/27/2023).  - R-ISS stage I (beta 2 microglobulin 1.9, albumin 3.5, , normal karyotype and no high-risk mutations on fish panel  - PET-CT ( 4/10/2023) - showed extensive lytic lesions in the axial skeleton and mild L1 compression deformity.  - Started with Induction chemotherapy with VRD regimen (Velcde/Revlimid/Decadron) - 05/10/2023   - Started Aspirin daily p.o for thrombosis prophylaxis; Acyclovir 400 mg p.o BID for herpes Zoster prophylaxis .  __________________________________________________  --S/p Kyphoplasty 06/08/23    Pt seen by transplant team BMT.  However, she has declined transplant at this time   Repeat PET-CT on 1/18/24 with significantly decreased FDG  uptake associated with the previously identified lesions and no new FDG avid lesions   BM Biopsy 02/13/2024 shows significant improvement, with the marrow now showing only 5% plasma cells ( was 60% at baseline)    Labs reviewed  Ok to proceed darzalex/zometa today   Pt notes currently out of revlimid--pt notes contacted pharmacy and authorization needed--will contact to get resolved    RTC in two weeks with repeat labs for Darzalex             Relevant Orders    CBC Auto Differential    Comprehensive Metabolic Panel    Magnesium    Phosphorus    Protein Electrophoresis, Serum    Immunoglobulin Free LT Chains Blood                    Med Onc Chart Routing      Follow up with physician 2 weeks. with repeat labs prior for darzalex   Follow up with WERNER    Infusion scheduling note   darzalex   Injection scheduling note    Labs CBC, CMP, free light chains, SPEP, magnesium and phosphorus   Scheduling:  Preferred lab:  Lab interval:     Imaging    Pharmacy appointment    Other referrals                     [facility]  Hematology/Oncology

## 2024-10-17 ENCOUNTER — PATIENT MESSAGE (OUTPATIENT)
Dept: HEMATOLOGY/ONCOLOGY | Facility: CLINIC | Age: 64
End: 2024-10-17
Payer: COMMERCIAL

## 2024-10-20 DIAGNOSIS — E78.5 DYSLIPIDEMIA: ICD-10-CM

## 2024-10-21 RX ORDER — ROSUVASTATIN CALCIUM 10 MG/1
10 TABLET, COATED ORAL NIGHTLY
Qty: 90 TABLET | Refills: 3 | Status: SHIPPED | OUTPATIENT
Start: 2024-10-21

## 2024-10-21 NOTE — PROGRESS NOTES
Section of Hematology and Stem Cell Transplantation  Follow-Up Note     10/23/2024  Primary Oncologic Diagnosis: Multiple myeloma, remission status unspecified [C90.00]    The patient location is: Home  The chief complaint leading to consultation is: Multiple Myeloma Follow Up    Visit type: audiovisual    Face to Face time with patient: 27 minutes  40 minutes of total time spent on the encounter, which includes face to face time and non-face to face time preparing to see the patient (eg, review of tests), Obtaining and/or reviewing separately obtained history, Documenting clinical information in the electronic or other health record, Independently interpreting results (not separately reported) and communicating results to the patient/family/caregiver, or Care coordination (not separately reported).     Each patient to whom he or she provides medical services by telemedicine is:  (1) informed of the relationship between the physician and patient and the respective role of any other health care provider with respect to management of the patient; and (2) notified that he or she may decline to receive medical services by telemedicine and may withdraw from such care at any time.    History of Present Ilness:   Ana Membreno) is a pleasant 64 y.o.female from [place], [place], here for follow up of her multiple myeloma. She is followed by Dr. Bri Michelle and her team locally in [place]. Past medical history of amblyopia, asthma, HTN, thyroid disease, anxiety, COPD, hyperlipidemia, allergies, and GERD.     Oncologic History from provider notes:  Referred in 2/2023 by her PCP Dr. Kassidy Sibley after workup for gastritis revealed lytic lesions. CT chest showed lytic and expansile destructive lesion in the sternum and lytic lesions at L1. Biopsy of L1 lesion in 3/2023 revealed mature B cell neoplasm most consistent with plasma cell neoplasm.      BMBx on 4/2023 that demonstrated 60% plasma cells. PET/CT on  4/10/23 showed extensive bony lesions throughout the spine, sternum, bilateral ribs, pelvis, and a mild compression deformity in L1.    3/27/23 Labs: SPEP/MECHE showed IgG lambda monoclonal protein, 3.19 g/dl, quantitative immunoglobulins showed IgG 4,521 mg/dl, Beta 2 microglobulin 1.9, .   4/19/23 Labs: Hgb, 11.5, WBC 6.3, platelets, BUN 11, Cr 0.9, calcium 9.     May 2023: she was started on VRd (Velcade/Revlimid/dexamethasone) every 21 days for 3-6 cycles with the plan to evaluate her for autotransplant with consideration of adding daratumumab if she was found to be high risk after FISH panel was resulted. She was started on ASA for thrombosis prophylaxis and acyclovir for herpes zoster prophylaxis. Plan to start Zometa after dental evaluation. Rev dose-reduced to 10 mg for CrCl of 49, then increased after kidney improvement but dose-reduced again due to intolerable side effects.    May 2023: she underwent kyphoplasty with radiofrequency ablation on 6/8/2023 by Dr. Smith, neurosurgery.  July 2023: transitioned to kia-VRd with cycle 2, through January 2024     Stem cell transplant has been previously discussed with Dr. 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     Current Treatment:  OP Myeloma KIA-RD daratumumab lenalidomide dexAMETHasone Q4W         OP ZOLEDRONIC ACID (ZOMETA) every 3 months started 5/2023       Interval History: Patient presents for follow up on Kia+Vrd & Zometa; She is scheduled to receive C6D1 today. She presents with her  who she states has been having seizures and is unable to drive. She states he was taking care of her initially and now she is taking care of him. She denies back pain today but states she has not been sleeping and will likely be up for 2 days after today's treatment due to the steroids. She reports having asked Palliative Care for sleep medication but did not receive any. She states  she was instructed to take Xanax but she has not. Offered referral to Onc Psych; she states she will think about it. She also mentions that she may ask her PCP for sleep medication. CBC, CMP adequate for treatment. She last received Zometa on 7/9/2024; due again in 10/2024.    Interval History 10/22/2024:  Patient reports being frustrated, she has been off her revlimid for approximately 2 weeks due to delays with [facility] and medication. Her most recent faspro was last Tuesday. She has 2 days of insomnia, even with PRN Xanax. She is working part time. She saw palliative care and reports that they wouldn't give her anything for sleep due to the xanax. She has been on xanax for 15 years to deal with anxiety in caring for her son. Denies fever, chills, drenching night sweats, unexplained weight, chest pain, shortness of breath, rash, N/V/D.    Past Medical History, Social History, and Past Family History are unchanged since last evaluation except for HPI.    CURRENT MEDICATIONS:   Current Outpatient Medications   Medication Sig    acyclovir (ZOVIRAX) 400 MG tablet Take 1 tablet (400 mg total) by mouth 2 (two) times daily.    albuterol (VENTOLIN HFA) 90 mcg/actuation inhaler Inhale 2 puffs into the lungs every 4 (four) hours as needed for Wheezing. Rescue    ALPRAZolam (XANAX) 2 MG Tab Take 1 tablet (2 mg total) by mouth 2 (two) times daily as needed (Anxiety).    amlodipine-benazepril 2.5-10 mg (LOTREL) 2.5-10 mg per capsule TAKE 1 CAPSULE BY MOUTH EVERY DAY    aspirin 81 MG Chew Take 1 tablet (81 mg total) by mouth once daily.    azithromycin (Z-JESIKA) 250 MG tablet Take as directed (Patient not taking: Reported on 10/7/2024)    calcium-vitamin D 600 mg-10 mcg (400 unit) Tab Take 1 tablet by mouth every 12 (twelve) hours.    dexAMETHasone (DECADRON) 4 MG Tab Take 10 tablets (40 mg total) by mouth every 7 days. Take with food.    erythromycin (ROMYCIN) ophthalmic ointment Apply ointment to lids and lashes on both eyes 2  times daily for 7 days.    fluconazole (DIFLUCAN) 150 MG Tab Take 1 tablet (150 mg total) by mouth once a week.    fluticasone propionate (FLONASE) 50 mcg/actuation nasal spray 1 spray (50 mcg total) by Each Nostril route once daily.    hydrOXYzine HCL (ATARAX) 25 MG tablet Take 1 tablet (25 mg total) by mouth 3 (three) times daily as needed for Itching.    ipratropium (ATROVENT) 21 mcg (0.03 %) nasal spray 2 sprays by Each Nostril route 3 (three) times daily.    k phos di & mono-sod phos mono (PHOSPHOROUS) 250 mg Tab Take 1 tablet by mouth 4 (four) times daily with meals and nightly. for 5 days    lenalidomide 10 mg Cap TAKE 1 CAPSULE ONCE DAILY FOR 21 DAYS AND THEN 7 DAYS OFF.    levocetirizine (XYZAL) 5 MG tablet Take 1 tablet (5 mg total) by mouth every evening.    levothyroxine (SYNTHROID) 100 MCG tablet Take 1 tablet (100 mcg total) by mouth before breakfast.    linaCLOtide (LINZESS) 72 mcg Cap capsule Take 2 capsules (144 mcg total) by mouth before breakfast.    magnesium oxide (MAG-OX) 400 mg (241.3 mg magnesium) tablet TAKE 1 TABLET(400 MG) BY MOUTH EVERY DAY    methylPREDNISolone (MEDROL DOSEPACK) 4 mg tablet use as directed    metoprolol succinate (TOPROL-XL) 50 MG 24 hr tablet Take 1 tablet (50 mg total) by mouth every evening.    montelukast (SINGULAIR) 10 mg tablet TAKE 1 TABLET(10 MG) BY MOUTH EVERY EVENING    naloxone (NARCAN) 4 mg/actuation Spry     neomycin-polymyxin-dexamethasone (DEXACINE) 3.5 mg/g-10,000 unit/g-0.1 % Oint Place into both eyes 3 (three) times daily.    nicotine (NICODERM CQ) 14 mg/24 hr Place 1 patch onto the skin once daily.    ondansetron (ZOFRAN) 4 MG tablet Take 1 tablet (4 mg total) by mouth every 6 (six) hours as needed for Nausea.    pantoprazole (PROTONIX) 40 MG tablet TAKE 1 TABLET(40 MG) BY MOUTH EVERY DAY    potassium chloride SA (K-DUR,KLOR-CON) 20 MEQ tablet Take 1 tablet (20 mEq total) by mouth once daily.    potassium, sodium phosphates (PHOS-NAK) 280-160-250 mg  PwPk Take 1 packet by mouth 4 (four) times daily with meals and nightly.    pregabalin (LYRICA) 50 MG capsule Take 1 capsule (50 mg total) by mouth nightly.    promethazine (PHENERGAN) 25 MG tablet Take 1 tablet (25 mg total) by mouth every 4 (four) hours.    promethazine-dextromethorphan (PROMETHAZINE-DM) 6.25-15 mg/5 mL Syrp Take 5 mLs by mouth every 4 (four) hours as needed (coughing).    rosuvastatin (CRESTOR) 10 MG tablet TAKE 1 TABLET(10 MG) BY MOUTH EVERY EVENING    traZODone (DESYREL) 50 MG tablet Take 1 tablet (50 mg total) by mouth every evening. (Patient not taking: Reported on 10/4/2024)    WIXELA INHUB 250-50 mcg/dose diskus inhaler INHALE 1 PUFF INTO THE LUNGS TWICE DAILY     Current Facility-Administered Medications   Medication    dexAMETHasone injection 40 mg     Facility-Administered Medications Ordered in Other Visits   Medication    lactated ringers infusion       ALLERGIES:   Review of patient's allergies indicates:   Allergen Reactions    Hydrocodone-acetaminophen Other (See Comments)     Causes pt to feel extremely sick        Review of Systems:     Review of Systems   Constitutional:  Positive for malaise/fatigue. Negative for chills, diaphoresis, fever and weight loss.   HENT:  Negative for congestion, sinus pain and sore throat.    Eyes:  Negative for blurred vision and pain.   Respiratory:  Negative for shortness of breath and wheezing.    Cardiovascular:  Negative for chest pain and palpitations.   Gastrointestinal:  Negative for blood in stool, diarrhea, nausea and vomiting.   Genitourinary:  Negative for dysuria and hematuria.   Musculoskeletal:  Negative for back pain and falls.   Skin:  Negative for rash.   Neurological:  Negative for dizziness, sensory change, weakness and headaches.   Psychiatric/Behavioral:  The patient is not nervous/anxious and does not have insomnia.        Physical Exam:     Vitals and physical exam deferred for telemedicine visit.    ECOG Performance Status:  (foot note - ECOG PS provided by Eastern Cooperative Oncology Group) 1 - Symptomatic but completely ambulatory    Karnofsky Performance Score:  90%- Able to Carry on Normal Activity: Minor Symptoms of Disease    Labs:   Lab Results   Component Value Date    WBC 5.03 10/14/2024    HGB 12.2 10/14/2024    HCT 38.2 10/14/2024    MCV 99 (H) 10/14/2024     10/14/2024       Lab Results   Component Value Date     10/14/2024    K 3.5 10/14/2024     10/14/2024    CO2 25 10/14/2024    BUN 12 10/14/2024    CREATININE 0.9 10/14/2024    ALBUMIN 3.5 10/14/2024    BILITOT 0.7 10/14/2024    ALKPHOS 56 10/14/2024    AST 21 10/14/2024    ALT 21 10/14/2024       Imaging:   Whole Body PET CT 1/8/2024  Impression:  1. Findings consistent with treatment response with significantly decreased FDG uptake associated with the previously identified lesions.  No new FDG avid lesions are identified.    Pathology:  2/19/2024 Bone Marrow Biopsy  BONE MARROW, RIGHT ILIAC CREST (ASPIRATE SMEAR, TOUCH IMPRINT, CLOT SECTION, AND CORE BIOPSY):   -- RESIDUAL PLASMA CELL MYELOMA (5% OF MARROW CELLULARITY).   -- NORMOCELLULAR MARROW (40%) WITH TRILINEAGE HEMATOPOIESIS.   -- ADEQUATE STORAGE IRON.   -- SEE COMMENT.      Assessment and Plan:     1. IgG lambda Multiple myeloma, R-ISS stage I   - Diagnosed June 2023 via bone marrow biopsy  - She has not been able to tolerate more than 10mg of revlimid as it made her extremely fatigued  - Continue prophylaxis with aspirin, acyclovir 400 mg b.i.d.  - Continue Zometa/calcium and vitamin-D supplements (Due for Zometa 01/13/23)  - Repeat PET-CT on 1/18/24 with significantly decreased FDG uptake associated with the previously identified lesions and no new FDG avid lesions   - BM Biopsy 02/13/2024 shows significant improvement, with the marrow now showing only 5% plasma cells (was 60% at baseline)  - 1/16/24 SPEP shows a paraprotein peak in gamma = 0.20 g/d; was 3.19g/dl at baseline  - She still  has issues coming to [place] for SCT, mostly concerning her son with psychiatric illness  - Continue monthly labs (CBC, CMP, quantitative immunoglobulin, serum free light chains, serum electrophoresis, serum immunofixation) with Dr. Michelle in [place].  - Continue daratumumab and, decadron (dose reduced to 20 mg weekly for insomnia) and revlimid until disease progression or intolerance, as well as zometa  - Obtain biochemical studies next week at currently scheduled labs for review of m-spike level.    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 Back Pain, Left leg pain   - LLE ultrasound - negative  - She is on percocet for pain     4. R  Hip Pain  No new bone lesions on PET CT done in jan 2024     5. HTn  --on metoprolol, amlodipine-benazepril  --Follows with her PCP     6. HLD  -on crestor  --Follows with her PCP     7. Anxiety  --Secondary to caring for her son with mental health issues  --on trazodone, xanax  --follows with her PCP     8. Fatigue  --due to neoplasm  -recommend regular activity and exercise     9. Hypothyroidism  -on synthroid  -follows with her PCVP     10. Asthma  -currently asymptomatic  She is on singulair, albuterol    11. Hypophosphatemia  - Noted on recent labs  - Start phosphorous replacement, 4 times daily for 5 days  - Recheck phosphorous next week    Orders Placed:      Orders Placed This Encounter    Immunoglobulins (IgG, IgA, IgM) Quantitative    PROTEIN ELECTROPHORESIS, SERUM    Immunofixation Electrophoresis    Immunoglobulin Free LT Chains Blood    k phos di & mono-sod phos mono (PHOSPHOROUS) 250 mg Tab       Thank you for allowing me to partake in the care of this patient. If there are any questions, please do not hesitate to reach out.    Alyx Campbell, FNP-C  Hematologic Malignancies, Stem Cell Transplantation, and Cellular Therapy  [facility] [place]

## 2024-10-22 ENCOUNTER — OFFICE VISIT (OUTPATIENT)
Dept: HEMATOLOGY/ONCOLOGY | Facility: CLINIC | Age: 64
End: 2024-10-22
Payer: COMMERCIAL

## 2024-10-22 DIAGNOSIS — F41.9 ANXIETY: ICD-10-CM

## 2024-10-22 DIAGNOSIS — C90.00 MULTIPLE MYELOMA, REMISSION STATUS UNSPECIFIED: Primary | ICD-10-CM

## 2024-10-22 DIAGNOSIS — E83.39 HYPOPHOSPHATEMIA: ICD-10-CM

## 2024-10-22 DIAGNOSIS — D84.821 IMMUNODEFICIENCY DUE TO DRUGS: ICD-10-CM

## 2024-10-22 DIAGNOSIS — Z79.899 IMMUNODEFICIENCY DUE TO DRUGS: ICD-10-CM

## 2024-10-22 PROCEDURE — 99215 OFFICE O/P EST HI 40 MIN: CPT | Mod: 95,,,

## 2024-10-22 PROCEDURE — 1159F MED LIST DOCD IN RCRD: CPT | Mod: CPTII,95,,

## 2024-10-22 PROCEDURE — 4010F ACE/ARB THERAPY RXD/TAKEN: CPT | Mod: CPTII,95,,

## 2024-10-22 PROCEDURE — 1160F RVW MEDS BY RX/DR IN RCRD: CPT | Mod: CPTII,95,,

## 2024-10-25 ENCOUNTER — PATIENT MESSAGE (OUTPATIENT)
Dept: HEMATOLOGY/ONCOLOGY | Facility: CLINIC | Age: 64
End: 2024-10-25
Payer: COMMERCIAL

## 2024-10-28 ENCOUNTER — LAB VISIT (OUTPATIENT)
Dept: LAB | Facility: HOSPITAL | Age: 64
End: 2024-10-28
Payer: COMMERCIAL

## 2024-10-28 DIAGNOSIS — C90.00 MULTIPLE MYELOMA, REMISSION STATUS UNSPECIFIED: ICD-10-CM

## 2024-10-28 LAB
ALBUMIN SERPL BCP-MCNC: 3.7 G/DL (ref 3.5–5.2)
ALP SERPL-CCNC: 54 U/L (ref 40–150)
ALT SERPL W/O P-5'-P-CCNC: 26 U/L (ref 10–44)
ANION GAP SERPL CALC-SCNC: 10 MMOL/L (ref 8–16)
AST SERPL-CCNC: 29 U/L (ref 10–40)
BILIRUB SERPL-MCNC: 0.7 MG/DL (ref 0.1–1)
BUN SERPL-MCNC: 9 MG/DL (ref 8–23)
CALCIUM SERPL-MCNC: 8.7 MG/DL (ref 8.7–10.5)
CHLORIDE SERPL-SCNC: 111 MMOL/L (ref 95–110)
CO2 SERPL-SCNC: 25 MMOL/L (ref 23–29)
CREAT SERPL-MCNC: 0.9 MG/DL (ref 0.5–1.4)
ERYTHROCYTE [DISTWIDTH] IN BLOOD BY AUTOMATED COUNT: 13.5 % (ref 11.5–14.5)
EST. GFR  (NO RACE VARIABLE): >60 ML/MIN/1.73 M^2
GLUCOSE SERPL-MCNC: 100 MG/DL (ref 70–110)
HCT VFR BLD AUTO: 35.8 % (ref 37–48.5)
HGB BLD-MCNC: 11.7 G/DL (ref 12–16)
IGA SERPL-MCNC: 66 MG/DL (ref 40–350)
IGG SERPL-MCNC: 784 MG/DL (ref 650–1600)
IGM SERPL-MCNC: 28 MG/DL (ref 50–300)
IMM GRANULOCYTES # BLD AUTO: 0.01 K/UL (ref 0–0.04)
MAGNESIUM SERPL-MCNC: 1.7 MG/DL (ref 1.6–2.6)
MCH RBC QN AUTO: 31.6 PG (ref 27–31)
MCHC RBC AUTO-ENTMCNC: 32.7 G/DL (ref 32–36)
MCV RBC AUTO: 97 FL (ref 82–98)
NEUTROPHILS # BLD AUTO: 3.3 K/UL (ref 1.8–7.7)
PHOSPHATE SERPL-MCNC: 2.8 MG/DL (ref 2.7–4.5)
PLATELET # BLD AUTO: 227 K/UL (ref 150–450)
PMV BLD AUTO: 9.5 FL (ref 9.2–12.9)
POTASSIUM SERPL-SCNC: 3.2 MMOL/L (ref 3.5–5.1)
PROT SERPL-MCNC: 6.7 G/DL (ref 6–8.4)
RBC # BLD AUTO: 3.7 M/UL (ref 4–5.4)
SODIUM SERPL-SCNC: 146 MMOL/L (ref 136–145)
WBC # BLD AUTO: 5.3 K/UL (ref 3.9–12.7)

## 2024-10-28 PROCEDURE — 83735 ASSAY OF MAGNESIUM: CPT | Performed by: NURSE PRACTITIONER

## 2024-10-28 PROCEDURE — 36415 COLL VENOUS BLD VENIPUNCTURE: CPT

## 2024-10-28 PROCEDURE — 86334 IMMUNOFIX E-PHORESIS SERUM: CPT | Mod: 26,,, | Performed by: PATHOLOGY

## 2024-10-28 PROCEDURE — 85027 COMPLETE CBC AUTOMATED: CPT | Performed by: NURSE PRACTITIONER

## 2024-10-28 PROCEDURE — 84165 PROTEIN E-PHORESIS SERUM: CPT | Mod: 26,,, | Performed by: PATHOLOGY

## 2024-10-28 PROCEDURE — 84100 ASSAY OF PHOSPHORUS: CPT | Performed by: NURSE PRACTITIONER

## 2024-10-28 PROCEDURE — 80053 COMPREHEN METABOLIC PANEL: CPT | Performed by: NURSE PRACTITIONER

## 2024-10-28 PROCEDURE — 83521 IG LIGHT CHAINS FREE EACH: CPT

## 2024-10-28 PROCEDURE — 86334 IMMUNOFIX E-PHORESIS SERUM: CPT

## 2024-10-28 PROCEDURE — 82784 ASSAY IGA/IGD/IGG/IGM EACH: CPT | Mod: 59

## 2024-10-28 PROCEDURE — 84165 PROTEIN E-PHORESIS SERUM: CPT

## 2024-10-29 ENCOUNTER — INFUSION (OUTPATIENT)
Dept: INFUSION THERAPY | Facility: HOSPITAL | Age: 64
End: 2024-10-29
Attending: INTERNAL MEDICINE
Payer: COMMERCIAL

## 2024-10-29 ENCOUNTER — OFFICE VISIT (OUTPATIENT)
Dept: HEMATOLOGY/ONCOLOGY | Facility: CLINIC | Age: 64
End: 2024-10-29
Payer: COMMERCIAL

## 2024-10-29 VITALS
SYSTOLIC BLOOD PRESSURE: 120 MMHG | HEART RATE: 75 BPM | BODY MASS INDEX: 22.74 KG/M2 | TEMPERATURE: 98 F | OXYGEN SATURATION: 99 % | HEIGHT: 64 IN | DIASTOLIC BLOOD PRESSURE: 76 MMHG | RESPIRATION RATE: 18 BRPM | WEIGHT: 133.19 LBS

## 2024-10-29 DIAGNOSIS — R05.9 COUGH, UNSPECIFIED TYPE: ICD-10-CM

## 2024-10-29 DIAGNOSIS — C90.00 MULTIPLE MYELOMA, REMISSION STATUS UNSPECIFIED: Primary | ICD-10-CM

## 2024-10-29 DIAGNOSIS — J40 BRONCHITIS: ICD-10-CM

## 2024-10-29 LAB
ALBUMIN SERPL ELPH-MCNC: 3.88 G/DL (ref 3.35–5.55)
ALPHA1 GLOB SERPL ELPH-MCNC: 0.29 G/DL (ref 0.17–0.41)
ALPHA2 GLOB SERPL ELPH-MCNC: 0.79 G/DL (ref 0.43–0.99)
B-GLOBULIN SERPL ELPH-MCNC: 0.66 G/DL (ref 0.5–1.1)
GAMMA GLOB SERPL ELPH-MCNC: 0.67 G/DL (ref 0.67–1.58)
INTERPRETATION SERPL IFE-IMP: NORMAL
KAPPA LC SER QL IA: 1.24 MG/DL (ref 0.33–1.94)
KAPPA LC/LAMBDA SER IA: 1.07 (ref 0.26–1.65)
LAMBDA LC SER QL IA: 1.16 MG/DL (ref 0.57–2.63)
PATHOLOGIST INTERPRETATION IFE: NORMAL
PATHOLOGIST INTERPRETATION SPE: NORMAL
PROT SERPL-MCNC: 6.3 G/DL (ref 6–8.4)

## 2024-10-29 PROCEDURE — 4010F ACE/ARB THERAPY RXD/TAKEN: CPT | Mod: CPTII,95,, | Performed by: NURSE PRACTITIONER

## 2024-10-29 PROCEDURE — 96401 CHEMO ANTI-NEOPL SQ/IM: CPT

## 2024-10-29 PROCEDURE — 99215 OFFICE O/P EST HI 40 MIN: CPT | Mod: 25,95,, | Performed by: NURSE PRACTITIONER

## 2024-10-29 PROCEDURE — 63600175 PHARM REV CODE 636 W HCPCS: Mod: JZ,JG | Performed by: NURSE PRACTITIONER

## 2024-10-29 RX ORDER — FLUTICASONE PROPIONATE 50 MCG
1 SPRAY, SUSPENSION (ML) NASAL DAILY
Qty: 1 ML | Refills: 1 | Status: SHIPPED | OUTPATIENT
Start: 2024-10-29

## 2024-10-29 RX ORDER — HEPARIN 100 UNIT/ML
500 SYRINGE INTRAVENOUS
Status: CANCELLED | OUTPATIENT
Start: 2024-10-29

## 2024-10-29 RX ORDER — DIPHENHYDRAMINE HYDROCHLORIDE 50 MG/ML
50 INJECTION INTRAMUSCULAR; INTRAVENOUS ONCE AS NEEDED
Status: CANCELLED | OUTPATIENT
Start: 2024-10-29

## 2024-10-29 RX ORDER — EPINEPHRINE 0.3 MG/.3ML
0.3 INJECTION SUBCUTANEOUS ONCE AS NEEDED
Status: CANCELLED | OUTPATIENT
Start: 2024-10-29

## 2024-10-29 RX ORDER — PROCHLORPERAZINE EDISYLATE 5 MG/ML
10 INJECTION INTRAMUSCULAR; INTRAVENOUS ONCE AS NEEDED
Status: CANCELLED | OUTPATIENT
Start: 2024-10-29

## 2024-10-29 RX ORDER — SODIUM CHLORIDE 0.9 % (FLUSH) 0.9 %
10 SYRINGE (ML) INJECTION
Status: CANCELLED | OUTPATIENT
Start: 2024-10-29

## 2024-10-29 RX ADMIN — DARATUMUMAB AND HYALURONIDASE-FIHJ (HUMAN RECOMBINANT) 1800 MG: 1800; 30000 INJECTION SUBCUTANEOUS at 09:10

## 2024-11-06 ENCOUNTER — PATIENT MESSAGE (OUTPATIENT)
Dept: HEMATOLOGY/ONCOLOGY | Facility: CLINIC | Age: 64
End: 2024-11-06
Payer: COMMERCIAL

## 2024-11-06 DIAGNOSIS — E78.5 DYSLIPIDEMIA: ICD-10-CM

## 2024-11-07 RX ORDER — ROSUVASTATIN CALCIUM 10 MG/1
10 TABLET, COATED ORAL NIGHTLY
Qty: 90 TABLET | Refills: 3 | Status: SHIPPED | OUTPATIENT
Start: 2024-11-07

## 2024-11-18 DIAGNOSIS — C90.00 MULTIPLE MYELOMA, REMISSION STATUS UNSPECIFIED: ICD-10-CM

## 2024-11-18 RX ORDER — LENALIDOMIDE 10 MG/1
CAPSULE ORAL
Qty: 21 EACH | Refills: 7 | Status: SHIPPED | OUTPATIENT
Start: 2024-11-18 | End: 2024-11-21 | Stop reason: SDUPTHER

## 2024-11-19 DIAGNOSIS — E03.9 PRIMARY HYPOTHYROIDISM: ICD-10-CM

## 2024-11-19 RX ORDER — LEVOTHYROXINE SODIUM 100 UG/1
100 TABLET ORAL
Qty: 90 TABLET | Refills: 1 | Status: SHIPPED | OUTPATIENT
Start: 2024-11-19

## 2024-11-21 DIAGNOSIS — C90.00 MULTIPLE MYELOMA, REMISSION STATUS UNSPECIFIED: ICD-10-CM

## 2024-11-21 RX ORDER — LENALIDOMIDE 10 MG/1
CAPSULE ORAL
Qty: 21 EACH | Refills: 7 | Status: SHIPPED | OUTPATIENT
Start: 2024-11-21

## 2024-11-21 RX ORDER — LENALIDOMIDE 10 MG/1
CAPSULE ORAL
Qty: 21 EACH | Refills: 7 | Status: SHIPPED | OUTPATIENT
Start: 2024-11-21 | End: 2024-11-21 | Stop reason: SDUPTHER

## 2024-11-25 ENCOUNTER — LAB VISIT (OUTPATIENT)
Dept: LAB | Facility: HOSPITAL | Age: 64
End: 2024-11-25
Attending: INTERNAL MEDICINE
Payer: COMMERCIAL

## 2024-11-25 ENCOUNTER — OFFICE VISIT (OUTPATIENT)
Dept: PALLIATIVE MEDICINE | Facility: CLINIC | Age: 64
End: 2024-11-25
Payer: COMMERCIAL

## 2024-11-25 DIAGNOSIS — Z51.5 PALLIATIVE CARE ENCOUNTER: ICD-10-CM

## 2024-11-25 DIAGNOSIS — C90.00 MULTIPLE MYELOMA, REMISSION STATUS UNSPECIFIED: ICD-10-CM

## 2024-11-25 DIAGNOSIS — G89.3 CHRONIC NEOPLASM-RELATED PAIN: ICD-10-CM

## 2024-11-25 DIAGNOSIS — F41.9 ANXIETY: ICD-10-CM

## 2024-11-25 DIAGNOSIS — M54.41 LOW BACK PAIN WITH RIGHT-SIDED SCIATICA, UNSPECIFIED BACK PAIN LATERALITY, UNSPECIFIED CHRONICITY: ICD-10-CM

## 2024-11-25 DIAGNOSIS — C90.00 MULTIPLE MYELOMA, REMISSION STATUS UNSPECIFIED: Primary | ICD-10-CM

## 2024-11-25 LAB
ALBUMIN SERPL BCP-MCNC: 4 G/DL (ref 3.5–5.2)
ALP SERPL-CCNC: 59 U/L (ref 40–150)
ALT SERPL W/O P-5'-P-CCNC: 30 U/L (ref 10–44)
ANION GAP SERPL CALC-SCNC: 8 MMOL/L (ref 8–16)
AST SERPL-CCNC: 26 U/L (ref 10–40)
BASOPHILS # BLD AUTO: 0.14 K/UL (ref 0–0.2)
BASOPHILS NFR BLD: 3 % (ref 0–1.9)
BILIRUB SERPL-MCNC: 0.9 MG/DL (ref 0.1–1)
BUN SERPL-MCNC: 8 MG/DL (ref 8–23)
CALCIUM SERPL-MCNC: 8.6 MG/DL (ref 8.7–10.5)
CHLORIDE SERPL-SCNC: 109 MMOL/L (ref 95–110)
CO2 SERPL-SCNC: 24 MMOL/L (ref 23–29)
CREAT SERPL-MCNC: 1.1 MG/DL (ref 0.5–1.4)
DIFFERENTIAL METHOD BLD: ABNORMAL
EOSINOPHIL # BLD AUTO: 0.1 K/UL (ref 0–0.5)
EOSINOPHIL NFR BLD: 2 % (ref 0–8)
ERYTHROCYTE [DISTWIDTH] IN BLOOD BY AUTOMATED COUNT: 14.3 % (ref 11.5–14.5)
EST. GFR  (NO RACE VARIABLE): 56 ML/MIN/1.73 M^2
GLUCOSE SERPL-MCNC: 106 MG/DL (ref 70–110)
HCT VFR BLD AUTO: 40 % (ref 37–48.5)
HGB BLD-MCNC: 13.2 G/DL (ref 12–16)
IMM GRANULOCYTES # BLD AUTO: 0 K/UL (ref 0–0.04)
IMM GRANULOCYTES NFR BLD AUTO: 0 % (ref 0–0.5)
LYMPHOCYTES # BLD AUTO: 2 K/UL (ref 1–4.8)
LYMPHOCYTES NFR BLD: 44.1 % (ref 18–48)
MAGNESIUM SERPL-MCNC: 1.8 MG/DL (ref 1.6–2.6)
MCH RBC QN AUTO: 32 PG (ref 27–31)
MCHC RBC AUTO-ENTMCNC: 33 G/DL (ref 32–36)
MCV RBC AUTO: 97 FL (ref 82–98)
MONOCYTES # BLD AUTO: 0.7 K/UL (ref 0.3–1)
MONOCYTES NFR BLD: 15 % (ref 4–15)
NEUTROPHILS # BLD AUTO: 1.7 K/UL (ref 1.8–7.7)
NEUTROPHILS NFR BLD: 35.9 % (ref 38–73)
NRBC BLD-RTO: 0 /100 WBC
PHOSPHATE SERPL-MCNC: 2 MG/DL (ref 2.7–4.5)
PLATELET # BLD AUTO: 270 K/UL (ref 150–450)
PMV BLD AUTO: 9.4 FL (ref 9.2–12.9)
POTASSIUM SERPL-SCNC: 4 MMOL/L (ref 3.5–5.1)
PROT SERPL-MCNC: 7.4 G/DL (ref 6–8.4)
RBC # BLD AUTO: 4.12 M/UL (ref 4–5.4)
SODIUM SERPL-SCNC: 141 MMOL/L (ref 136–145)
WBC # BLD AUTO: 4.6 K/UL (ref 3.9–12.7)

## 2024-11-25 PROCEDURE — 85025 COMPLETE CBC W/AUTO DIFF WBC: CPT | Performed by: NURSE PRACTITIONER

## 2024-11-25 PROCEDURE — 84165 PROTEIN E-PHORESIS SERUM: CPT | Mod: 26,,, | Performed by: PATHOLOGY

## 2024-11-25 PROCEDURE — 86334 IMMUNOFIX E-PHORESIS SERUM: CPT | Mod: 26,,, | Performed by: PATHOLOGY

## 2024-11-25 PROCEDURE — 36415 COLL VENOUS BLD VENIPUNCTURE: CPT | Performed by: NURSE PRACTITIONER

## 2024-11-25 PROCEDURE — 84165 PROTEIN E-PHORESIS SERUM: CPT | Performed by: NURSE PRACTITIONER

## 2024-11-25 PROCEDURE — 80053 COMPREHEN METABOLIC PANEL: CPT | Performed by: NURSE PRACTITIONER

## 2024-11-25 PROCEDURE — 84100 ASSAY OF PHOSPHORUS: CPT | Performed by: NURSE PRACTITIONER

## 2024-11-25 PROCEDURE — 83735 ASSAY OF MAGNESIUM: CPT | Performed by: NURSE PRACTITIONER

## 2024-11-25 PROCEDURE — 86334 IMMUNOFIX E-PHORESIS SERUM: CPT | Performed by: NURSE PRACTITIONER

## 2024-11-25 PROCEDURE — 83521 IG LIGHT CHAINS FREE EACH: CPT | Performed by: NURSE PRACTITIONER

## 2024-11-25 RX ORDER — ALPRAZOLAM 2 MG/1
2 TABLET ORAL 2 TIMES DAILY PRN
Qty: 60 TABLET | Refills: 0 | Status: SHIPPED | OUTPATIENT
Start: 2024-11-25 | End: 2024-12-25

## 2024-11-25 NOTE — PATIENT INSTRUCTIONS
Ok to use Tylenol 500mg-1000mg every 6 hours  Avoid ibuprofen and Aleve due to your high blood pressure  Ok to use Lidocaine patches for your back pain  Make sure you book your visit with Dr. Smith's office so they can re-assess your back pain.

## 2024-11-25 NOTE — PROGRESS NOTES
Palliative Medicine Clinic Note  Follow-up        Consult Requested By: Dr. Luevano      Reason for Consult: Symptom Management/Advance Care Planning/Goals of Care Discussion    Chief Complaint: Anxiety/insomnia        ASSESSMENT/PLAN:      Plan/Recommendations:    1. Multiple myeloma, remission status unspecified  Assessment & Plan:  Metastasis to thoracic/lumbar spine, complicated by L1 compression fracture.  Followed by Dr. Luevano, Dr. Garcia, and Neurosurgery.   On D-VRD, Lenalidomide and Zometa. S/P L1 Kyphoplasty (June 2023).   Pt remains hesitant about SCT due extended recovery time/fear of relapse post SCT  PET scan 1/18/2024: 1. Findings consistent with treatment response with significantly decreased FDG uptake associated with the previously identified lesions. No new FDG avid lesions are identified     Orders:  -     ALPRAZolam (XANAX) 2 MG Tab; Take 1 tablet (2 mg total) by mouth 2 (two) times daily as needed (Anxiety).  Dispense: 60 tablet; Refill: 0    2. Anxiety  Assessment & Plan:  Chronic and exacerbated by MM diagnosis and 's recent illness (seizures)  Pt and I extensively discussed stress management strategies including avoiding triggers, sleep hygiene, and proper nutrition.   Continues Xanax 2mg BID PRN- Pt declined switch to Clonazepam..  Declines SSRI trial at this time.   Declines mental health counseling at this time.  UDS- 10/9/2024: Consistent with prescribed Xanax and Marijuana    Orders:  -     ALPRAZolam (XANAX) 2 MG Tab; Take 1 tablet (2 mg total) by mouth 2 (two) times daily as needed (Anxiety).  Dispense: 60 tablet; Refill: 0    3. Low back pain with right-sided sciatica, unspecified back pain laterality, unspecified chronicity  Assessment & Plan:  Chronic + MM etiology   PRN Tylenol 500mg-100mg Q6H PRN  Pt wishes to try lidocaine patches  She does not wish to take opioids and marijuana due to fear of altered mental status.   Deferred physiotherapy. Wishes to f/u with   "Sarah for reassessment       4. Chronic neoplasm-related pain  Assessment & Plan:  Likely a mixture of chronic sciatica/Pelvic lytic bone lesions/sequale of L1 compression fracture  Medical Marijuana effective, but too sedating. Prescribed by Dr. Barrios.  Pt has tried multiple pain management modalities with mixed response.   Wishes to minimize opioids at this time due to fear of altered mental status.  Miralax 1pack/day or Senna 2 tabs at bedtime for opioid-induced constipation    Stopped Lyrica due to altered mental status  Pt wishes to f/u w/ Dr. Smith      5. Palliative care encounter  Assessment & Plan:  -Code status: Full Code.  -HCPOA: 1.  : Lars Bonilla: [phone], 2. Son: Hakeem Garcia: [phone].   -GOC:  Continue cancer treatment, symptom management, maintain independence and functional status.  -See HPI for further details                    Advance Care Planning   Advance Directives:   Living Will: No    LaPOST: No    Do Not Resuscitate Status: No    Medical Power of : Yes    Agent's Name:  1.  : Lars Bonilla: [phone], 2. Son: Hakeem Garcia: [phone].    Decision Making:  Patient answered questions  Goals of Care: What is most important right now is to focus on quality of life, even if it means sacrificing a little time, curative/life-prolongation (regardless of treatment burdens). Accordingly, we have decided that the best plan to meet the patient's goals includes continuing with treatment.    Pt declined stem cell transplant due to concerns about extended recovery time. She stated she is concerned that she will be unable to care for her family if she undergoes SCT and the subsequent recovery time.     She wishes to remain Full Code. She "does not want to think about that".       Follow up: 3 months       SUBJECTIVE:      History of Present Illness / Interval History:  Ana Bonilla is 64 y.o. female with Multiple Myeloma (2023) with metastasis to " "thoracic/lumbar spine, complicated by L1 compression fracture. Pt declined SCT. . S/P L1 Kyphoplasty (June 2023).   On D-VRD, Lenalidomide and Zometa  Followed by Dr. Luevano, Dr. Garcia, and Neurosurgery.    Presents to Palliative Care Clinic for physical symptoms and additional support.. Please see Hem/Onc note for more details on pt's care.       11/25/24:      The patient location is: [facility]  The chief complaint leading to consultation is:  Follow-up     Visit type: audiovisual     Face to Face time with patient: 20 minutes  35 minutes of total time spent on the encounter, which includes face to face time and non-face to face time preparing to see the patient (eg, review of tests), Obtaining and/or reviewing separately obtained history, Documenting clinical information in the electronic or other health record, Independently interpreting results (not separately reported) and communicating results to the patient/family/caregiver, or Care coordination (not separately reported).      Each patient to whom he or she provides medical services by telemedicine is:  (1) informed of the relationship between the physician and patient and the respective role of any other health care provider with respect to management of the patient; and (2) notified that he or she may decline to receive medical services by telemedicine and may withdraw from such care at any time.     Notes:   Pt seen via audiovisual feed. A&O x3. No acute distress.   Lower back pain 3/10 at rest.  Exacerbated by prolonged standing at work. Tylenol 500mg-100mg is somewhat effective. She is aware not to use NSAIDs due to HTN history.   She does not wish to try opioids at this time due to fear of daytime sedation at work. She is considering resigning due to pain and stress of managing work and her 's health.   Medical marijuana is effective for pain, but "too strong"  She will try OTC lidocaine patches. She wishes to follow-up with  " "Sarah to reassess back pain.   She is unable to attend physical therapy due to potential scheduling conflicts with her 's medical appointments.   Persistent anxiety due to managing 's health and medical appointments. She does not want to enrol in mental health counseling at this time.      Past visits:     09/24/2024: The patient location is: home  The chief complaint leading to consultation is:  Follow-up     Visit type: audiovisual     Face to Face time with patient:  20 minutes  35 minutes of total time spent on the encounter, which includes face to face time and non-face to face time preparing to see the patient (eg, review of tests), Obtaining and/or reviewing separately obtained history, Documenting clinical information in the electronic or other health record, Independently interpreting results (not separately reported) and communicating results to the patient/family/caregiver, or Care coordination (not separately reported).      Each patient to whom he or she provides medical services by telemedicine is:  (1) informed of the relationship between the physician and patient and the respective role of any other health care provider with respect to management of the patient; and (2) notified that he or she may decline to receive medical services by telemedicine and may withdraw from such care at any time.     Notes:   Spoke to pt via phone. Unsuccessful attempt to connect via audiovisual feed. A&O x3. No acute distress.   Reported persistent insomnia despite taking Xanax 2mg BID PRN and medical marijuana. She notes she is sleeping 3 hours per night.   She declined changing from Xanax to Clonazepam. She stated she "will deal with it for now".   We discussed it is unsafe for pt to take multiple hypnotics due to increased risk for CNS depression. I informed pt it may be safer to change Xanax to a longer acting benzodiazepine (Clonazepam) for anxiety and insomnia.   She stated she will discuss changing " "medical marijuana prescription for insomnia, but she "does not want to be high"  She stated she will update PM clinic if she wishes to try Clonazepam.     09/18/2024:   The patient location is: [facility]  The chief complaint leading to consultation is:  Follow-up     Visit type: audiovisual     Face to Face time with patient:  25 minutes  35 minutes of total time spent on the encounter, which includes face to face time and non-face to face time preparing to see the patient (eg, review of tests), Obtaining and/or reviewing separately obtained history, Documenting clinical information in the electronic or other health record, Independently interpreting results (not separately reported) and communicating results to the patient/family/caregiver, or Care coordination (not separately reported).      Each patient to whom he or she provides medical services by telemedicine is:  (1) informed of the relationship between the physician and patient and the respective role of any other health care provider with respect to management of the patient; and (2) notified that he or she may decline to receive medical services by telemedicine and may withdraw from such care at any time.     Notes: Pt seen via audiovisual feed. A&O x3. No acute distress.   She reported progressive anxiety, insomnia, fatigue, and restlessness/aggravation due to her 's recent illness (seizures).   She notes she is her 's sole caregiver, and she does not have help. Her son is managing schizophrenia and often unable to help.   She is using Xanax 2mg TID PRN. Wishes to try Melatonin for insomnia. She declined mental health counseling at this time. [facility]     Past visits:    06/06/2024:  History obtained from: Patient and medical record  Pt attended clinic alone. She was in no acute distress. Vital signs stable on room air.   She reported persistent right hip pain 5/10. Percocet 5mg-10mg PRN was " "effective, but she ran out. She is taking Tylenol 500mg Q6H PRN with some relief. She stated she does not want to take opioids due to fear of oversedation and addiction.   However, she requested a short course of Percocet to manage severe pain. She continues to use Medical Marijuana for anxiety/pain, but notes "taking a full dose is too strong".   Her anxiety is stable on Xanax 2mg BID PRN.   She continues to smoke, but notes she is gradually self-weaning. She wishes to enrol in smoking cessation program.         ROS:  Review of Systems   Constitutional:  Positive for activity change and fatigue. Negative for appetite change and unexpected weight change.   HENT:  Negative for hearing loss, sore throat, trouble swallowing and voice change.    Eyes:  Negative for visual disturbance.   Respiratory:  Negative for cough, chest tightness, shortness of breath and wheezing.    Cardiovascular:  Negative for chest pain, palpitations and leg swelling.   Gastrointestinal:  Negative for abdominal pain, blood in stool, constipation (Chronic, Intermittent. On Linzess), nausea, rectal pain and vomiting.   Genitourinary:  Negative for bladder incontinence, difficulty urinating, flank pain, hematuria, nocturia, pelvic pain and urgency.   Musculoskeletal:  Positive for arthralgias, back pain (lower) and myalgias (Right hip). Negative for neck pain.   Integumentary:  Negative for wound.   Allergic/Immunologic: Negative for environmental allergies, food allergies and immunocompromised state.   Neurological:  Negative for dizziness, tremors, weakness, numbness, headaches, memory loss and coordination difficulties.   Hematological:  Negative for adenopathy. Does not bruise/bleed easily.   Psychiatric/Behavioral:  Positive for sleep disturbance (Due to fear of  having a seizure). Negative for agitation, behavioral problems, confusion, decreased concentration, dysphoric mood, self-injury and suicidal ideas. The patient is " nervous/anxious (Chronic, exacerbated by 's recent illness. On Xanax. Declined antidepressant.).        Review of Symptoms      Symptom Assessment (ESAS 0-10 Scale)  Pain:  3  Dyspnea:  0  Anxiety:  8  Nausea:  0  Depression:  0  Anorexia:  0  Fatigue:  10  Insomnia:  10  Restlessness:  10  Agitation:  0     CAM / Delirium:  Negative  Constipation:  Negative  Diarrhea:  Negative    Anxiety:  Is nervous/anxious (Chronic, exacerbated by 's recent illness. On Xanax. Declined antidepressant.)  Constipation:  No constipation (Chronic, Intermittent. On Linzess)    Bowel Management Plan (BMP):  Yes      Pain Assessment:    Location(s): leg (Right Hip)    Leg       Location: right        Quality: Aching, stabbing, sharp and tingling        Quantity: 0/10 in intensity        Chronicity: Onset 0 year(s) ago, unchanged        Aggravating Factors: Movement        Alleviating Factors: Opiates and recumbency        Associated Symptoms: Arthralgias and myalgias    Modified Rhonda Scale:  0    Performance Status:  90    ECOG Performance Status ndGndrndanddndend:nd nd2nd Living Arrangements:  Lives with family    Psychosocial/Cultural:   See Palliative Psychosocial Note: Yes   with two adult sons. Works weekends at [employer]. 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   **Primary  to Follow**  Palliative Care  Consult: No            Medications:    Current Outpatient Medications:     acyclovir (ZOVIRAX) 400 MG tablet, Take 1 tablet (400 mg total) by mouth 2 (two) times daily., Disp: 60 tablet, Rfl: 11    albuterol (VENTOLIN HFA) 90 mcg/actuation inhaler, Inhale 2 puffs into the lungs every 4 (four) hours as needed for Wheezing. Rescue, Disp: 18 g, Rfl: 1    ALPRAZolam (XANAX) 2 MG Tab, Take 1 tablet (2 mg total) by mouth 2 (two) times daily as needed (Anxiety)., Disp: 60 tablet, Rfl: 0    amlodipine-benazepril 2.5-10 mg (LOTREL) 2.5-10 mg per capsule, TAKE 1 CAPSULE BY MOUTH EVERY DAY,  Disp: 90 capsule, Rfl: 3    aspirin 81 MG Chew, Take 1 tablet (81 mg total) by mouth once daily., Disp: 30 tablet, Rfl: 11    calcium-vitamin D 600 mg-10 mcg (400 unit) Tab, Take 1 tablet by mouth every 12 (twelve) hours., Disp: 60 tablet, Rfl: 2    dexAMETHasone (DECADRON) 4 MG Tab, Take 10 tablets (40 mg total) by mouth every 7 days. Take with food., Disp: 40 tablet, Rfl: 11    fluconazole (DIFLUCAN) 150 MG Tab, Take 1 tablet (150 mg total) by mouth once a week., Disp: 2 tablet, Rfl: 0    fluticasone propionate (FLONASE) 50 mcg/actuation nasal spray, 1 spray (50 mcg total) by Each Nostril route once daily., Disp: 1 mL, Rfl: 1    hydrOXYzine HCL (ATARAX) 25 MG tablet, Take 1 tablet (25 mg total) by mouth 3 (three) times daily as needed for Itching., Disp: 21 tablet, Rfl: 0    ipratropium (ATROVENT) 21 mcg (0.03 %) nasal spray, 2 sprays by Each Nostril route 3 (three) times daily., Disp: , Rfl:     k phos di & mono-sod phos mono (PHOSPHOROUS) 250 mg Tab, Take 1 tablet by mouth 4 (four) times daily with meals and nightly. for 5 days, Disp: 20 each, Rfl: 0    lenalidomide 10 mg Cap, TAKE 1 CAPSULE ONCE DAILY FOR 21 DAYS AND THEN 7 DAYS OFF., Disp: 21 each, Rfl: 7    levocetirizine (XYZAL) 5 MG tablet, Take 1 tablet (5 mg total) by mouth every evening., Disp: 30 tablet, Rfl: 11    levothyroxine (SYNTHROID) 100 MCG tablet, TAKE 1 TABLET(100 MCG) BY MOUTH BEFORE BREAKFAST, Disp: 90 tablet, Rfl: 1    linaCLOtide (LINZESS) 72 mcg Cap capsule, Take 2 capsules (144 mcg total) by mouth before breakfast., Disp: 30 capsule, Rfl: 1    magnesium oxide (MAG-OX) 400 mg (241.3 mg magnesium) tablet, TAKE 1 TABLET(400 MG) BY MOUTH EVERY DAY, Disp: 90 tablet, Rfl: 1    methylPREDNISolone (MEDROL DOSEPACK) 4 mg tablet, use as directed, Disp: 1 each, Rfl: 0    metoprolol succinate (TOPROL-XL) 50 MG 24 hr tablet, Take 1 tablet (50 mg total) by mouth every evening., Disp: 90 tablet, Rfl: 3    montelukast (SINGULAIR) 10 mg tablet, TAKE 1  TABLET(10 MG) BY MOUTH EVERY EVENING, Disp: 90 tablet, Rfl: 0    naloxone (NARCAN) 4 mg/actuation [place], , Disp: , Rfl:     neomycin-polymyxin-dexamethasone (DEXACINE) 3.5 mg/g-10,000 unit/g-0.1 % Oint, Place into both eyes 3 (three) times daily., Disp: 3.5 g, Rfl: 0    nicotine (NICODERM CQ) 14 mg/24 hr, Place 1 patch onto the skin once daily., Disp: 14 patch, Rfl: 0    ondansetron (ZOFRAN) 4 MG tablet, Take 1 tablet (4 mg total) by mouth every 6 (six) hours as needed for Nausea., Disp: 30 tablet, Rfl: 5    pantoprazole (PROTONIX) 40 MG tablet, TAKE 1 TABLET(40 MG) BY MOUTH EVERY DAY, Disp: 90 tablet, Rfl: 3    potassium phosphate, monobasic, (K-PHOS) 500 mg TbSO, Take 1 tablet (500 mg total) by mouth once daily., Disp: 30 tablet, Rfl: 11    potassium, sodium phosphates (PHOS-NAK) 280-160-250 mg PwPk, Take 1 packet by mouth 4 (four) times daily with meals and nightly., Disp: 4 packet, Rfl: 0    pregabalin (LYRICA) 50 MG capsule, Take 1 capsule (50 mg total) by mouth nightly., Disp: 30 capsule, Rfl: 0    promethazine (PHENERGAN) 25 MG tablet, Take 1 tablet (25 mg total) by mouth every 4 (four) hours., Disp: 45 tablet, Rfl: 2    promethazine-dextromethorphan (PROMETHAZINE-DM) 6.25-15 mg/5 mL Syrp, Take 5 mLs by mouth every 4 (four) hours as needed (coughing)., Disp: 120 mL, Rfl: 0    rosuvastatin (CRESTOR) 10 MG tablet, Take 1 tablet (10 mg total) by mouth every evening., Disp: 90 tablet, Rfl: 3    traZODone (DESYREL) 50 MG tablet, Take 1 tablet (50 mg total) by mouth every evening. (Patient not taking: Reported on 10/4/2024), Disp: 30 tablet, Rfl: 11    WIXELA INHUB 250-50 mcg/dose diskus inhaler, INHALE 1 PUFF INTO THE LUNGS TWICE DAILY, Disp: 180 each, Rfl: 1    Current Facility-Administered Medications:     dexAMETHasone injection 40 mg, 40 mg, Intravenous, 1 time in Clinic/HOD, Bri Luevano MD    Facility-Administered Medications Ordered in Other Visits:     lactated ringers infusion, , Intravenous,  Carlo Moon George W. III, MD      External  database queried on 11/25/2024  by RAJIV RUIZ :            Review of patient's allergies indicates:   Allergen Reactions    Hydrocodone-acetaminophen Other (See Comments)     Causes pt to feel extremely sick            OBJECTIVE:         Physical Exam:  Vitals:      Physical Exam  Vitals reviewed: Limited due to virtual visit.   Constitutional:       General: She is not in acute distress.  Neurological:      Mental Status: She is oriented to person, place, and time.   Psychiatric:         Attention and Perception: Attention and perception normal.         Speech: Speech normal.         Behavior: Behavior normal. Behavior is cooperative.         Thought Content: Thought content normal. Thought content does not include suicidal ideation. Thought content does not include suicidal plan.         Cognition and Memory: Cognition and memory normal.         Judgment: Judgment normal.           Labs: BMP  Lab Results   Component Value Date     11/25/2024    K 4.0 11/25/2024     11/25/2024    CO2 24 11/25/2024    BUN 8 11/25/2024    CREATININE 1.1 11/25/2024    CALCIUM 8.6 (L) 11/25/2024    ANIONGAP 8 11/25/2024    EGFRNORACEVR 56 (A) 11/25/2024     Lab Results   Component Value Date    WBC 4.60 11/25/2024    HGB 13.2 11/25/2024    HCT 40.0 11/25/2024    MCV 97 11/25/2024     11/25/2024         Imaging: PET: 01/18/2024: 1. Findings consistent with treatment response with significantly decreased FDG uptake associated with the previously identified lesions. No new FDG avid lesions are identified.        I spent a total of 35 minutes on the day of the visit. This includes face to face time in discussion of goals of care, symptom assessment, coordination of care and emotional support.  This also includes non-face to face time preparing to see the patient (eg, review of tests/imaging), obtaining and/or reviewing separately obtained history,  documenting clinical information in the electronic or other health record, independently interpreting results and communicating results to the patient/family/caregiver, or care coordinator.       RAJIV BOYD NP

## 2024-11-26 ENCOUNTER — INFUSION (OUTPATIENT)
Dept: INFUSION THERAPY | Facility: HOSPITAL | Age: 64
End: 2024-11-26
Attending: INTERNAL MEDICINE
Payer: COMMERCIAL

## 2024-11-26 ENCOUNTER — OFFICE VISIT (OUTPATIENT)
Dept: HEMATOLOGY/ONCOLOGY | Facility: CLINIC | Age: 64
End: 2024-11-26
Payer: COMMERCIAL

## 2024-11-26 VITALS
OXYGEN SATURATION: 99 % | WEIGHT: 132.5 LBS | SYSTOLIC BLOOD PRESSURE: 133 MMHG | RESPIRATION RATE: 18 BRPM | BODY MASS INDEX: 22.62 KG/M2 | HEIGHT: 64 IN | DIASTOLIC BLOOD PRESSURE: 72 MMHG | HEART RATE: 83 BPM | TEMPERATURE: 97 F

## 2024-11-26 DIAGNOSIS — E83.39 HYPOPHOSPHATEMIA: ICD-10-CM

## 2024-11-26 DIAGNOSIS — R11.0 NAUSEA: ICD-10-CM

## 2024-11-26 DIAGNOSIS — C90.00 MULTIPLE MYELOMA, REMISSION STATUS UNSPECIFIED: Primary | ICD-10-CM

## 2024-11-26 LAB
ALBUMIN SERPL ELPH-MCNC: 4.11 G/DL (ref 3.35–5.55)
ALPHA1 GLOB SERPL ELPH-MCNC: 0.36 G/DL (ref 0.17–0.41)
ALPHA2 GLOB SERPL ELPH-MCNC: 0.87 G/DL (ref 0.43–0.99)
B-GLOBULIN SERPL ELPH-MCNC: 0.8 G/DL (ref 0.5–1.1)
GAMMA GLOB SERPL ELPH-MCNC: 0.77 G/DL (ref 0.67–1.58)
INTERPRETATION SERPL IFE-IMP: NORMAL
KAPPA LC SER QL IA: 1.13 MG/DL (ref 0.33–1.94)
KAPPA LC/LAMBDA SER IA: 1.12 (ref 0.26–1.65)
LAMBDA LC SER QL IA: 1.01 MG/DL (ref 0.57–2.63)
PATHOLOGIST INTERPRETATION IFE: NORMAL
PATHOLOGIST INTERPRETATION SPE: NORMAL
PROT SERPL-MCNC: 6.9 G/DL (ref 6–8.4)

## 2024-11-26 PROCEDURE — 4010F ACE/ARB THERAPY RXD/TAKEN: CPT | Mod: CPTII,95,,

## 2024-11-26 PROCEDURE — 99215 OFFICE O/P EST HI 40 MIN: CPT | Mod: 95,,,

## 2024-11-26 PROCEDURE — G2211 COMPLEX E/M VISIT ADD ON: HCPCS | Mod: 95,,,

## 2024-11-26 PROCEDURE — 63600175 PHARM REV CODE 636 W HCPCS: Mod: JZ,JG

## 2024-11-26 PROCEDURE — 96401 CHEMO ANTI-NEOPL SQ/IM: CPT

## 2024-11-26 PROCEDURE — 25000003 PHARM REV CODE 250

## 2024-11-26 RX ORDER — ONDANSETRON 4 MG/1
4 TABLET, FILM COATED ORAL
Status: COMPLETED | OUTPATIENT
Start: 2024-11-26 | End: 2024-11-26

## 2024-11-26 RX ORDER — HEPARIN 100 UNIT/ML
500 SYRINGE INTRAVENOUS
Status: CANCELLED | OUTPATIENT
Start: 2024-11-26

## 2024-11-26 RX ORDER — EPINEPHRINE 0.3 MG/.3ML
0.3 INJECTION SUBCUTANEOUS ONCE AS NEEDED
Status: CANCELLED | OUTPATIENT
Start: 2024-11-26

## 2024-11-26 RX ORDER — ONDANSETRON 4 MG/1
4 TABLET, FILM COATED ORAL
Status: CANCELLED
Start: 2024-11-26

## 2024-11-26 RX ORDER — PROCHLORPERAZINE EDISYLATE 5 MG/ML
10 INJECTION INTRAMUSCULAR; INTRAVENOUS ONCE AS NEEDED
Status: CANCELLED | OUTPATIENT
Start: 2024-11-26

## 2024-11-26 RX ORDER — SODIUM CHLORIDE 0.9 % (FLUSH) 0.9 %
10 SYRINGE (ML) INJECTION
Status: CANCELLED | OUTPATIENT
Start: 2024-11-26

## 2024-11-26 RX ORDER — ONDANSETRON 4 MG/1
4 TABLET, FILM COATED ORAL EVERY 6 HOURS PRN
Qty: 30 TABLET | Refills: 5 | Status: SHIPPED | OUTPATIENT
Start: 2024-11-26

## 2024-11-26 RX ORDER — DIPHENHYDRAMINE HYDROCHLORIDE 50 MG/ML
50 INJECTION INTRAMUSCULAR; INTRAVENOUS ONCE AS NEEDED
Status: CANCELLED | OUTPATIENT
Start: 2024-11-26

## 2024-11-26 RX ADMIN — DARATUMUMAB AND HYALURONIDASE-FIHJ (HUMAN RECOMBINANT) 1800 MG: 1800; 30000 INJECTION SUBCUTANEOUS at 09:11

## 2024-11-26 RX ADMIN — ONDANSETRON HYDROCHLORIDE 4 MG: 4 TABLET, FILM COATED ORAL at 09:11

## 2024-11-26 NOTE — PLAN OF CARE
"Pt is stable, pt administered Darzalex. Pt voiced she was doing "well." Pt will follow up in four weeks. Plan of care reviewed with patient. Discussed if there are any new or ongoing concerns.        Problem: Adult Inpatient Plan of Care  Goal: Plan of Care Review  Outcome: Progressing  Goal: Patient-Specific Goal (Individualized)  Outcome: Progressing  Flowsheets (Taken 11/26/2024 8958)  Individualized Care Needs: Pt chose to have feet to the ground. Spirte.  Anxieties, Fears or Concerns: Pt denies.  Patient/Family-Specific Goals (Include Timeframe): Pt will toleerate injection without adverse drug reaction.  at side.  Goal: Optimal Comfort and Wellbeing  Outcome: Progressing     Problem: Chemotherapy Effects  Goal: Anemia Symptom Improvement  Outcome: Progressing  Goal: Safety Maintained  Outcome: Progressing  Goal: Absence of Hematuria  Outcome: Progressing  Goal: Nausea and Vomiting Relief  Outcome: Progressing  Goal: Neurotoxicity Symptom Control  Outcome: Progressing  Goal: Absence of Infection  Outcome: Progressing  Goal: Absence of Bleeding  Outcome: Progressing     Problem: Fall Injury Risk  Goal: Absence of Fall and Fall-Related Injury  Outcome: Progressing     "

## 2024-11-26 NOTE — NURSING
0946: Darzalex Injection given without difficulties.Bandaid applied. Patient instructed to stay in the clinic for 15 minutes. Patient verbalized understanding and will notify nurse with any complaints.

## 2024-11-26 NOTE — DISCHARGE INSTRUCTIONS
Thank you for allowing me to care for you today,  MIS LeachN, RN    [facility]  [address] [address]  [phone] phone     [phone] fax  Hours of Operation: Monday- Friday 7:00am- 5:30pm  After hours phone  [phone]  Hematology / Oncology Physicians on call      JUANI Chaudhary Dr., Dr., Dr., Dr., Dr., Dr., Dr., Dr., Dr., Dr., Dr., Dr., Dr., Dr., Dr., BELLE Mckeon, BELLE Deluna, BELLE Page, NP  Please call with any concerns regarding your appointment today.   FALL PREVENTION   Falls often occur due to slipping, tripping or losing your balance. Here are ways to reduce your risk of falling again.   Was there anything that caused your fall that can be fixed, removed or replaced?   Make your home safe by keeping walkways clear of objects you may trip over.   Use non-slip pads under rugs.   Do not walk in poorly lit areas.   Do not stand on chairs or wobbly ladders.   Use caution when reaching overhead or looking upward. This position can cause a loss of balance.   Be sure your shoes fit properly, have non-slip bottoms and are in good condition.   Be cautious when going up and down stairs, curbs, and when walking on uneven sidewalks.   If your balance is poor, consider using a cane or walker.   If your fall was related to alcohol use, stop or limit alcohol intake.   If your fall was related to use of sleeping medicines, talk to your doctor about this. You may need to reduce your dosage at bedtime if you awaken during the night to go to the bathroom.   To reduce the need for nighttime bathroom trips:   Avoid drinking fluids for several hours before going to bed   Empty your bladder before going to bed   Men can keep a urinal at the bedside   © [phone] [employer], [address] [place] [address], [place] [address]. All rights reserved. This information is not intended as a  substitute for professional medical care. Always follow your healthcare professional's instructions.

## 2024-11-26 NOTE — PROGRESS NOTES
Subjective:     Patient ID:?Ana Bonilla is a 64 y.o. female.?? MR#: [id number]   ?   PRIMARY ONCOLOGIST: -->  ?   CHIEF COMPLAINT: lab review/assessment for chemo/MM ?????   ?   ONCOLOGIC DIAGNOSIS: Multiple Myeloma  ?   CURRENT TREATMENT: OP Myeloma KIA-RD daratumumab lenalidomide dexAMETHasone Q4W     The patient location is: [place]  The chief complaint leading to consultation is: follow-up    Visit type: audiovisual    Face to Face time with patient: 15    20 minutes of total time spent on the encounter, which includes face to face time and non-face to face time preparing to see the patient (eg, review of tests), Obtaining and/or reviewing separately obtained history, Documenting clinical information in the electronic or other health record, Independently interpreting results (not separately reported) and communicating results to the patient/family/caregiver, or Care coordination (not separately reported).         Each patient to whom he or she provides medical services by telemedicine is:  (1) informed of the relationship between the physician and patient and the respective role of any other health care provider with respect to management of the patient; and (2) notified that he or she may decline to receive medical services by telemedicine and may withdraw from such care at any time.    Notes:    HPI  Ms. Bonilla is a 64-year-old  female with past medical history significant for hypertension, COPD, asthma, GERD, hypothyroidism here for follow-up and management of multiple myeloma. BMBx on 4/6/2023 showed 60 % plasma cells. PET-CT on 4/10/2023 showed extensive lytic bony lesions throughout the spine, sternum, bilateral ribs, pelvis and mild compression deformity in L1 vertebral body. SPEP/MECHE on 3/27/2023 showed IgG lambda monoclonal protein - 3.19 g/dl. Quantitative immunoglobulins on 3/27/2023 showed IgG 4,521 mg/dl. UPEP/MECHE and FLC were pending at that time. Labs on 3/27/2023 showed Beta  2 microglobulin 1.9, .  Labs on 4/19/2023 showed Hb, 11.5, WBC 6.3, platelets, BUN 11, Cr 0.9, calcium 9. Pt initiated on VRD 05/10/23. Treatment planned changed to D-VRD 07/06/23.     Interval History: Pt presents for lab review and assessment prior to Darzalex.  Denies n/v/d/c, fever, chills, sob, cp, abnormal bleeding  Oncology History   Multiple myeloma   4/20/2023 Initial Diagnosis    Multiple myeloma     5/10/2023 - 6/28/2023 Chemotherapy    Treatment Summary   Plan Name: OP VRD - WEEKLY BORTEZOMIB LENALIDOMIDE DEXAMETHASONE Q3W  Treatment Goal: Palliative  Status: Inactive  Start Date: 5/10/2023  End Date: 6/28/2023  Provider: Abram Velasquez MD  Chemotherapy: bortezomib (VELCADE) injection 2 mg, 1.3 mg/m2 = 2 mg, Subcutaneous, Clinic/HOD 1 time, 2 of 6 cycles  Administration: 2 mg (5/10/2023), 2 mg (5/17/2023), 2 mg (6/14/2023), 2 mg (5/31/2023), 2 mg (6/21/2023), 2 mg (6/28/2023)  lenalidomide 25 mg Cap, 25 mg, Oral, Daily, 1 of 1 cycle, Start date: 5/10/2023, End date: 5/17/2023 6/28/2023 Cancer Staged    Staging form: Plasma Cell Myeloma and Plasma Cell Disorders, AJCC 8th Edition  - Clinical stage from 6/28/2023: RISS Stage II (Beta-2-microglobulin (mg/L): 1.9, Albumin (g/dL): 3, ISS: Stage II, High-risk cytogenetics: Absent, LDH: Normal)     7/6/2023 - 1/3/2024 Chemotherapy    Treatment Summary   Plan Name: OP D-VRD DARATUMUMAB + BORTEZOMIB LENALIDOMIDE DEXAMETHASONE  Treatment Goal: Palliative  Status: Inactive  Start Date: 7/6/2023  End Date: 1/3/2024  Provider: Oscar Márquez MD  Chemotherapy: bortezomib (VELCADE) injection 2 mg, 1.3 mg/m2 = 2 mg, Subcutaneous, Clinic/HOD 1 time, 6 of 6 cycles  Administration: 2 mg (7/6/2023), 2 mg (7/12/2023), 2 mg (8/2/2023), 2 mg (8/9/2023), 2.25 mg (10/18/2023), 2 mg (7/19/2023), 2 mg (7/26/2023), 2 mg (8/16/2023), 2.25 mg (9/20/2023), 2.25 mg (9/27/2023), 2.25 mg (10/25/2023), 2.25 mg (11/1/2023), 2.25 mg (11/8/2023), 2.25 mg (12/6/2023), 2.25 mg  (1/3/2024), 2.25 mg (11/15/2023), 2.25 mg (11/22/2023), 2.25 mg (11/29/2023), 2.25 mg (12/13/2023), 2.25 mg (12/20/2023), 2.25 mg (12/26/2023)  lenalidomide 10 mg Cap, 1 capsule (100 % of original dose 1 capsule), Oral, Daily, 1 of 1 cycle, Start date: 7/26/2023, End date: 8/2/2023  Dose modification: 1 capsule (original dose 1 capsule, Cycle 0)  daratumumab-hyaluronidase-fihj subcutaneous injection 1,800 mg, 1,800 mg (100 % of original dose 1,800 mg), Subcutaneous, Clinic/HOD 1 time, 6 of 6 cycles  Dose modification: 1,800 mg (original dose 1,800 mg, Cycle 1), 1,800 mg (original dose 1,800 mg, Cycle 1)  Administration: 1,800 mg (7/6/2023), 1,800 mg (7/12/2023), 1,800 mg (7/19/2023), 1,800 mg (7/26/2023), 1,800 mg (8/2/2023), 1,800 mg (8/9/2023), 1,800 mg (8/16/2023), 1,800 mg (9/27/2023), 1,800 mg (10/18/2023), 1,800 mg (11/1/2023), 1,800 mg (11/15/2023), 1,800 mg (11/29/2023), 1,800 mg (12/13/2023), 1,800 mg (12/26/2023)     4/30/2024 -  Chemotherapy    Treatment Summary   Plan Name: OP Myeloma KIA-RD daratumumab lenalidomide dexAMETHasone Q4W  Treatment Goal: Palliative  Status: Active  Start Date: 4/30/2024  End Date: 3/11/2025 (Planned)  Provider: Bri Luevano MD  Chemotherapy: daratumumab-hyaluronidase-fihj subcutaneous injection 1,800 mg, 1,800 mg, Subcutaneous, Clinic/HOD 1 time, 8 of 12 cycles  Administration: 1,800 mg (4/30/2024), 1,800 mg (5/7/2024), 1,800 mg (5/21/2024), 1,800 mg (5/28/2024), 1,800 mg (6/4/2024), 1,800 mg (6/25/2024), 1,800 mg (7/9/2024), 1,800 mg (10/29/2024), 1,800 mg (5/14/2024), 1,800 mg (6/11/2024), 1,800 mg (6/18/2024), 1,800 mg (7/30/2024), 1,800 mg (8/13/2024), 1,800 mg (8/27/2024), 1,800 mg (9/10/2024), 1,800 mg (9/24/2024), 1,800 mg (10/15/2024), 1,800 mg (11/26/2024)        Social History     Socioeconomic History    Marital status:     Number of children: 2   Occupational History     Employer: [employer]   Tobacco Use    Smoking status: Every Day     Current packs/day:  0.50     Average packs/day: 0.5 packs/day for 50.0 years (25.0 ttl pk-yrs)     Types: Cigarettes     Passive exposure: Never    Smokeless tobacco: Never   Substance and Sexual Activity    Alcohol use: Not Currently     Comment: quit    Drug use: No    Sexual activity: Not Currently     Partners: Male     Social Drivers of Health     Financial Resource Strain: Patient Declined (2024)    Overall Financial Resource Strain (CARDIA)     Difficulty of Paying Living Expenses: Patient declined   Food Insecurity: Patient Declined (2024)    Hunger Vital Sign     Worried About Running Out of Food in the Last Year: Patient declined     Ran Out of Food in the Last Year: Patient declined   Transportation Needs: No Transportation Needs (11/15/2023)    PRAPARE - Transportation     Lack of Transportation (Medical): No     Lack of Transportation (Non-Medical): No   Physical Activity: Sufficiently Active (2024)    Exercise Vital Sign     Days of Exercise per Week: 3 days     Minutes of Exercise per Session: 60 min   Stress: Stress Concern Present (11/15/2023)    [place] [place] [employer] - Occupational Stress Questionnaire     Feeling of Stress : To some extent   Housing Stability: Low Risk  (11/15/2023)    Housing Stability Vital Sign     Unable to Pay for Housing in the Last Year: No     Number of Places Lived in the Last Year: 1     Unstable Housing in the Last Year: No      Family History   Problem Relation Name Age of Onset    Hypertension Mother Vivian     Diabetes Mother Vivian     Asthma Mother Vivian             Diabetes Father      Asthma Father      Stroke Maternal Grandmother  grandmother     Prostate cancer Maternal Grandfather      Mental illness Son Lukasz Garcia     Pancreatic cancer Maternal Uncle      Mental illness Other Pat side     Pancreatic cancer Other Mat side     Ovarian cancer Maternal Cousin        Past Surgical History:   Procedure  Laterality Date    APPENDECTOMY      BIOPSY N/A 06/08/2023    Procedure: BIOPSY;  Surgeon: Fausto Smith MD;  Location: [facility] OR;  Service: Neurosurgery;  Laterality: N/A;  L1    BUNIONECTOMY      COLONOSCOPY N/A 12/04/2019    Procedure: COLONOSCOPY;  Surgeon: Danny Matos III, MD;  Location: [facility] ENDO;  Service: Endoscopy;  Laterality: N/A;    COLONOSCOPY N/A 10/24/2022    Procedure: COLONOSCOPY;  Surgeon: Danny Matos III, MD;  Location: [facility] ENDO;  Service: Endoscopy;  Laterality: N/A;    ESOPHAGOGASTRODUODENOSCOPY N/A 10/24/2022    Procedure: EGD (ESOPHAGOGASTRODUODENOSCOPY);  Surgeon: Danny Matos III, MD;  Location: [facility] ENDO;  Service: Endoscopy;  Laterality: N/A;    FIXATION KYPHOPLASTY Bilateral 06/08/2023    Procedure: KYPHOPLASTY;  Surgeon: Fausto Smith MD;  Location: [facility] OR;  Service: Neurosurgery;  Laterality: Bilateral;  kyphoplasty and radiofrequency ablation - L1    HYSTERECTOMY      PARTIAL//still with ovaries    neck fusion  08/2017    THYROIDECTOMY      TUBAL LIGATION          Review of Systems   Constitutional:  Positive for fatigue. Negative for activity change, appetite change, chills, fever and unexpected weight change.   HENT:  Positive for congestion. Negative for dental problem, mouth sores and nosebleeds.    Eyes:  Negative for visual disturbance.   Respiratory:  Positive for cough. Negative for choking and chest tightness.    Cardiovascular:  Negative for chest pain, palpitations and leg swelling.   Gastrointestinal:  Negative for abdominal distention, abdominal pain, anal bleeding, blood in stool, constipation, diarrhea, nausea and vomiting.   Endocrine: Negative.    Genitourinary:  Negative for dysuria, frequency, hematuria and urgency.   Musculoskeletal:  Positive for arthralgias and myalgias. Negative for back pain, gait problem and joint swelling.   Skin:  Negative for rash and wound.   Allergic/Immunologic: Negative for immunocompromised state.   Neurological:   Negative for dizziness, light-headedness, numbness and headaches.   Hematological:  Negative for adenopathy. Does not bruise/bleed easily.   Psychiatric/Behavioral:  The patient is nervous/anxious.        ?   A comprehensive 14-point review of systems was reviewed with patient and was negative other than as specified above.   ?     Objective:      Physical Exam  Vitals reviewed: virtual visit.           ?   There were no vitals filed for this visit.     ?       ?   Laboratory:  ?   No visits with results within 1 Day(s) from this visit.   Latest known visit with results is:   Lab Visit on 11/25/2024   Component Date Value Ref Range Status    Sodium 11/25/2024 141  136 - 145 mmol/L Final    Potassium 11/25/2024 4.0  3.5 - 5.1 mmol/L Final    Chloride 11/25/2024 109  95 - 110 mmol/L Final    CO2 11/25/2024 24  23 - 29 mmol/L Final    Glucose 11/25/2024 106  70 - 110 mg/dL Final    BUN 11/25/2024 8  8 - 23 mg/dL Final    Creatinine 11/25/2024 1.1  0.5 - 1.4 mg/dL Final    Calcium 11/25/2024 8.6 (L)  8.7 - 10.5 mg/dL Final    Total Protein 11/25/2024 7.4  6.0 - 8.4 g/dL Final    Albumin 11/25/2024 4.0  3.5 - 5.2 g/dL Final    Total Bilirubin 11/25/2024 0.9  0.1 - 1.0 mg/dL Final    Alkaline Phosphatase 11/25/2024 59  40 - 150 U/L Final    AST 11/25/2024 26  10 - 40 U/L Final    ALT 11/25/2024 30  10 - 44 U/L Final    eGFR 11/25/2024 56 (A)  >60 mL/min/1.73 m^2 Final    Anion Gap 11/25/2024 8  8 - 16 mmol/L Final    WBC 11/25/2024 4.60  3.90 - 12.70 K/uL Final    RBC 11/25/2024 4.12  4.00 - 5.40 M/uL Final    Hemoglobin 11/25/2024 13.2  12.0 - 16.0 g/dL Final    Hematocrit 11/25/2024 40.0  37.0 - 48.5 % Final    MCV 11/25/2024 97  82 - 98 fL Final    MCH 11/25/2024 32.0 (H)  27.0 - 31.0 pg Final    MCHC 11/25/2024 33.0  32.0 - 36.0 g/dL Final    RDW 11/25/2024 14.3  11.5 - 14.5 % Final    Platelets 11/25/2024 270  150 - 450 K/uL Final    MPV 11/25/2024 9.4  9.2 - 12.9 fL Final    Immature Granulocytes 11/25/2024 0.0  0.0  - 0.5 % Final    Gran # (ANC) 11/25/2024 1.7 (L)  1.8 - 7.7 K/uL Final    Immature Grans (Abs) 11/25/2024 0.00  0.00 - 0.04 K/uL Final    Lymph # 11/25/2024 2.0  1.0 - 4.8 K/uL Final    Mono # 11/25/2024 0.7  0.3 - 1.0 K/uL Final    Eos # 11/25/2024 0.1  0.0 - 0.5 K/uL Final    Baso # 11/25/2024 0.14  0.00 - 0.20 K/uL Final    nRBC 11/25/2024 0  0 /100 WBC Final    Gran % 11/25/2024 35.9 (L)  38.0 - 73.0 % Final    Lymph % 11/25/2024 44.1  18.0 - 48.0 % Final    Mono % 11/25/2024 15.0  4.0 - 15.0 % Final    Eosinophil % 11/25/2024 2.0  0.0 - 8.0 % Final    Basophil % 11/25/2024 3.0 (H)  0.0 - 1.9 % Final    Differential Method 11/25/2024 Automated   Final    Magnesium 11/25/2024 1.8  1.6 - 2.6 mg/dL Final    Phosphorus 11/25/2024 2.0 (L)  2.7 - 4.5 mg/dL Final    Immunofix Interp. 11/25/2024 SEE COMMENT   Final    Protein, Serum 11/25/2024 6.9  6.0 - 8.4 g/dL Final    Albumin 11/25/2024 4.11  3.35 - 5.55 g/dL Final    Alpha-1 11/25/2024 0.36  0.17 - 0.41 g/dL Final    Alpha-2 11/25/2024 0.87  0.43 - 0.99 g/dL Final    Beta 11/25/2024 0.80  0.50 - 1.10 g/dL Final    Gamma 11/25/2024 0.77  0.67 - 1.58 g/dL Final    Kappa Free Light Chains 11/25/2024 1.13  0.33 - 1.94 mg/dL Final    Lambda Free Light Chains 11/25/2024 1.01  0.57 - 2.63 mg/dL Final    Kappa/Lambda FLC Ratio 11/25/2024 1.12  0.26 - 1.65 Final    Pathologist Interpretation MECHE 11/25/2024 REVIEWED   Final    Pathologist Interpretation SPE 11/25/2024 REVIEWED   Final      ?   Imaging:    No results found. However, due to the size of the patient record, not all encounters were searched. Please check Results Review for a complete set of results.       No results found. However, due to the size of the patient record, not all encounters were searched. Please check Results Review for a complete set of results.           ?   Assessment/Plan:     Problem List Items Addressed This Visit       Multiple myeloma - Primary (Chronic)     BMBx : 60 % plasma cells -  4/6/2023  - SPEP/MECHE showed IgG lambda - monoclonal protein 3.19 g/dl - (3/27/2023).  - R-ISS stage I (beta 2 microglobulin 1.9, albumin 3.5, , normal karyotype and no high-risk mutations on fish panel  - PET-CT ( 4/10/2023) - showed extensive lytic lesions in the axial skeleton and mild L1 compression deformity.  - Started with Induction chemotherapy with VRD regimen (Velcde/Revlimid/Decadron) - 05/10/2023   - Started Aspirin daily p.o for thrombosis prophylaxis; Acyclovir 400 mg p.o BID for herpes Zoster prophylaxis .  __________________________________________________  --S/p Kyphoplasty 06/08/23    Pt seen by transplant team BMT.  However, she has declined transplant at this time   Repeat PET-CT on 1/18/24 with significantly decreased FDG uptake associated with the previously identified lesions and no new FDG avid lesions   BM Biopsy 02/13/2024 shows significant improvement, with the marrow now showing only 5% plasma cells ( was 60% at baseline)    Labs reviewed  Will resend phosnak for low phosphorus   Repeat phos in one week   Ok to proceed darzalex/zometa today   Pt notes currently out of revlimid--pt notes contacted pharmacy and authorization needed--will contact to get resolved    RTC in 4 weeks with repeat labs for Darzalex             Relevant Orders    CBC Auto Differential    Comprehensive Metabolic Panel    Magnesium    Phosphorus    Immunoglobulin Free LT Chains Blood    Protein Electrophoresis, Serum     Other Visit Diagnoses       Hypophosphatemia        Relevant Orders    CBC Auto Differential    Comprehensive Metabolic Panel    Magnesium    Phosphorus    Immunoglobulin Free LT Chains Blood    Protein Electrophoresis, Serum    Nausea        Relevant Medications    ondansetron (ZOFRAN) 4 MG tablet    Other Relevant Orders    CBC Auto Differential    Comprehensive Metabolic Panel    Magnesium    Phosphorus    Immunoglobulin Free LT Chains Blood    Protein Electrophoresis, Serum                          Med Onc Chart Routing      Follow up with physician 4 weeks. with repeat labs one week prior   Follow up with WERNER    Infusion scheduling note    Injection scheduling note Darzalex   Labs CBC, CMP, free light chains, SPEP, other, magnesium and phosphorus   Scheduling:  Preferred lab:  Lab interval:     Imaging    Pharmacy appointment    Other referrals                     KALEN Mckeon, FNP-C  Hematology/Oncology

## 2024-12-04 ENCOUNTER — TELEPHONE (OUTPATIENT)
Dept: HEMATOLOGY/ONCOLOGY | Facility: CLINIC | Age: 64
End: 2024-12-04
Payer: COMMERCIAL

## 2024-12-04 DIAGNOSIS — C90.00 MULTIPLE MYELOMA, REMISSION STATUS UNSPECIFIED: ICD-10-CM

## 2024-12-04 RX ORDER — LENALIDOMIDE 10 MG/1
CAPSULE ORAL
Qty: 21 EACH | Refills: 7 | Status: SHIPPED | OUTPATIENT
Start: 2024-12-04

## 2024-12-04 NOTE — ASSESSMENT & PLAN NOTE
Chronic and exacerbated by MM diagnosis and 's recent illness (seizures)  Pt and I extensively discussed stress management strategies including avoiding triggers, sleep hygiene, and proper nutrition.   Continues Xanax 2mg BID PRN- Pt declined switch to Clonazepam..  Declines SSRI trial at this time.   Declines mental health counseling at this time.  UDS- 10/9/2024: Consistent with prescribed Xanax and Marijuana

## 2024-12-04 NOTE — ASSESSMENT & PLAN NOTE
-Code status: Full Code.  -HCPOA: 1.  : Lars Bonilla: [phone], 2. Son: Hakeem Garcia: [phone].   -GOC:  Continue cancer treatment, symptom management, maintain independence and functional status.  -See HPI for further details

## 2024-12-04 NOTE — ASSESSMENT & PLAN NOTE
Chronic + MM etiology   PRN Tylenol 500mg-100mg Q6H PRN  Pt wishes to try lidocaine patches  She does not wish to take opioids and marijuana due to fear of altered mental status.   Deferred physiotherapy. Wishes to f/u with Dr. Smith for reassessment

## 2024-12-04 NOTE — TELEPHONE ENCOUNTER
Spoke to patient: Confirmed that Revlimid to be continued reorder from [facility].    [facility] notified via fax to cancel

## 2024-12-04 NOTE — ASSESSMENT & PLAN NOTE
Likely a mixture of chronic sciatica/Pelvic lytic bone lesions/sequale of L1 compression fracture  Medical Marijuana effective, but too sedating. Prescribed by Dr. Barrios.  Pt has tried multiple pain management modalities with mixed response.   Wishes to minimize opioids at this time due to fear of altered mental status.  Miralax 1pack/day or Senna 2 tabs at bedtime for opioid-induced constipation    Stopped Lyrica due to altered mental status  Pt wishes to f/u w/ Dr. Smith

## 2024-12-08 ENCOUNTER — PATIENT MESSAGE (OUTPATIENT)
Dept: HEMATOLOGY/ONCOLOGY | Facility: CLINIC | Age: 64
End: 2024-12-08
Payer: COMMERCIAL

## 2024-12-09 ENCOUNTER — PATIENT MESSAGE (OUTPATIENT)
Dept: HEMATOLOGY/ONCOLOGY | Facility: CLINIC | Age: 64
End: 2024-12-09
Payer: COMMERCIAL

## 2024-12-09 RX ORDER — METOPROLOL SUCCINATE 50 MG/1
50 TABLET, EXTENDED RELEASE ORAL NIGHTLY
Qty: 90 TABLET | Refills: 3 | Status: SHIPPED | OUTPATIENT
Start: 2024-12-09

## 2024-12-10 ENCOUNTER — OFFICE VISIT (OUTPATIENT)
Dept: NEUROSURGERY | Facility: CLINIC | Age: 64
End: 2024-12-10
Payer: COMMERCIAL

## 2024-12-10 ENCOUNTER — HOSPITAL ENCOUNTER (OUTPATIENT)
Dept: RADIOLOGY | Facility: HOSPITAL | Age: 64
Discharge: HOME | End: 2024-12-10
Attending: PHYSICIAN ASSISTANT
Payer: COMMERCIAL

## 2024-12-10 ENCOUNTER — PATIENT MESSAGE (OUTPATIENT)
Dept: NEUROSURGERY | Facility: CLINIC | Age: 64
End: 2024-12-10
Payer: COMMERCIAL

## 2024-12-10 VITALS
BODY MASS INDEX: 22.33 KG/M2 | SYSTOLIC BLOOD PRESSURE: 119 MMHG | HEART RATE: 86 BPM | WEIGHT: 130.06 LBS | DIASTOLIC BLOOD PRESSURE: 75 MMHG

## 2024-12-10 DIAGNOSIS — Z98.890 STATUS POST KYPHOPLASTY: ICD-10-CM

## 2024-12-10 DIAGNOSIS — Z98.890 STATUS POST KYPHOPLASTY: Primary | ICD-10-CM

## 2024-12-10 PROCEDURE — 3078F DIAST BP <80 MM HG: CPT | Mod: CPTII,S$GLB,, | Performed by: PHYSICIAN ASSISTANT

## 2024-12-10 PROCEDURE — 99999 PR PBB SHADOW E&M-EST. PATIENT-LVL V: CPT | Mod: PBBFAC,,, | Performed by: PHYSICIAN ASSISTANT

## 2024-12-10 PROCEDURE — 72082 X-RAY EXAM ENTIRE SPI 2/3 VW: CPT | Mod: 26,59,, | Performed by: STUDENT IN AN ORGANIZED HEALTH CARE EDUCATION/TRAINING PROGRAM

## 2024-12-10 PROCEDURE — 72070 X-RAY EXAM THORAC SPINE 2VWS: CPT | Mod: TC

## 2024-12-10 PROCEDURE — 4010F ACE/ARB THERAPY RXD/TAKEN: CPT | Mod: CPTII,S$GLB,, | Performed by: PHYSICIAN ASSISTANT

## 2024-12-10 PROCEDURE — 72100 X-RAY EXAM L-S SPINE 2/3 VWS: CPT | Mod: TC

## 2024-12-10 PROCEDURE — 3074F SYST BP LT 130 MM HG: CPT | Mod: CPTII,S$GLB,, | Performed by: PHYSICIAN ASSISTANT

## 2024-12-10 PROCEDURE — 3008F BODY MASS INDEX DOCD: CPT | Mod: CPTII,S$GLB,, | Performed by: PHYSICIAN ASSISTANT

## 2024-12-10 PROCEDURE — 99214 OFFICE O/P EST MOD 30 MIN: CPT | Mod: S$GLB,,, | Performed by: PHYSICIAN ASSISTANT

## 2024-12-10 PROCEDURE — 72082 X-RAY EXAM ENTIRE SPI 2/3 VW: CPT | Mod: TC

## 2024-12-10 RX ORDER — HYDROCODONE BITARTRATE AND ACETAMINOPHEN 5; 325 MG/1; MG/1
1 TABLET ORAL EVERY 6 HOURS PRN
Qty: 14 TABLET | Refills: 0 | Status: SHIPPED | OUTPATIENT
Start: 2024-12-10

## 2024-12-10 RX ORDER — TIZANIDINE 4 MG/1
4 TABLET ORAL EVERY 6 HOURS PRN
Qty: 30 TABLET | Refills: 0 | Status: SHIPPED | OUTPATIENT
Start: 2024-12-10 | End: 2024-12-20

## 2024-12-10 NOTE — TELEPHONE ENCOUNTER
I spoke with the pt, who has been schedule this after at 2:30 PM but will arrive at 1:30 PM. The pt confirmed appt date and time.

## 2024-12-10 NOTE — PROGRESS NOTES
Patient is a 64-year-old female with multiple myeloma, COPD, longstanding history of tobacco use who presents for evaluation of back pain. Patient has been having back pain progressively worsening over the past few weeks. 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 States she has been busy at work increased walking and standing. Pt. Reports she also has been driving more due her  having seizure; and his inability to drive.     States her back pain is slightly worse on the right compared to the left. She does have right sided hip pain as well. She denies shooting pain down the right leg.       Feels as though she is walking bent over, interested in back brace.     She has had previous kyphoplasty with Dr. Sanz in the past, due to erosion of vertebral body secondary to diagnosis of multiple myeloma    She reports that her back pain has been of primary concern recently.          No acute weakness or BB dysfunction           Pertinent positive and negative ROS documented above in HPI, all other systems reviewed and found to be negative.       PREVIOUS HPI      Subjective:      Patient ID: Ana Bonilla is a 64 y.o. female.    Chief Complaint: No chief complaint on file.    HPI  Pt here s/p kypho L1 for pathologic compression fx from mutiple myeloma   Back pain resolved   Still with some r hip pain   Pain 3/10   Ambulates unassisted   Not wearing brace   Wants to try to go back to work at "[employer]"   No other new complaints at this time       Date of Procedure: 6/8/2023    Procedure: Procedure(s) (LRB):  Kyphoplasty (Bilateral)  BIOPSY (N/A)   Radiofrequency ablation     Operative Findings (including complications, if any):   L1 compression fracture secondary to tumor    Description of Technical Procedures:   Kyphoplasty L1   Vertebral body biopsy L1   Radiofrequency ablation bilateral L1              Objective:            General    Constitutional: She is oriented to person, place, and time. She appears  well-developed and well-nourished.   Cardiovascular:  Normal rate and regular rhythm.            Pulmonary/Chest: Effort normal.   Abdominal: Soft.   Neurological: She is alert and oriented to person, place, and time.   Psychiatric: She has a normal mood and affect. Her behavior is normal.               Neurosurgery exam:  Vitals reviewed  Moves all 4 ext  Incisions are healed             Component 2 wk ago   Final Pathologic Diagnosis 1. L1 vertebral body,  biopsy:   - Consistent with plasma cell myeloma.  See comment.    Comment: Interp By Olga Lidia Brand MD, Signed on 06/15/2023 at 09:15   Comment Flow cytometry analysis of tissue was not submitted.     Immunohistochemical studies were performed on the paraffin embedded tissue block with adequate positive and negative controls.  The plasma cells are  positive and AE1/AE3 negative.  In Situ hybridization for kappa and lambda shows lambda light chain    restricted plasma cells.     Findings are consistent with plasma cell myeloma.  Correlate clinically.        Assessment:       Encounter Diagnosis   Name Primary?    Status post kyphoplasty Yes          Plan:     Patient is a nice 64-year-old female with known multiple myeloma metastatic lesions to the spine she is status post kyphoplasty for pathologic compression fracture she is neurologically stable she presents to clinic today reporting back pain where exacerbated with standing and relieved with rest given she has not had any updated imaging in over a year I would like to obtain x-rays to start I prescribed pain medication muscle relaxers patient will follow up after imaging complaint I advised that signs and symptoms worsen that I will obtain stat MRI  Diagnoses and all orders for this visit:    Status post kyphoplasty  -     X-Ray Scoliosis Complete; Future  -     X-Ray Lumbar Spine AP And Lateral; Future  -     HYDROcodone-acetaminophen (NORCO) 5-325 mg per tablet; Take 1 tablet by mouth every 6 (six) hours as  needed for Pain.  -     X-Ray Thoracic Spine AP Lateral; Future  -     Back/Cervical Brace For Home Use    Other orders  -     tiZANidine (ZANAFLEX) 4 MG tablet; Take 1 tablet (4 mg total) by mouth every 6 (six) hours as needed (spasming).                Haven Geller PA-C  Neurosurgery     Attestation:  Patsy HU PA-C have obtained HPI, performed Physical Examination on the above patient, reviewed the pertinent labs, tests, imaging, other relevant data and recorded my findings in this clinic note.      This note was produced using dictation software of voice recognition technology, and some typographical errors may be present.         Disclaimer: This note was prepared using a voice recognition system and is likely to have sound alike errors within the text.

## 2024-12-14 DIAGNOSIS — C90.00 MULTIPLE MYELOMA, REMISSION STATUS UNSPECIFIED: ICD-10-CM

## 2024-12-16 ENCOUNTER — LAB VISIT (OUTPATIENT)
Dept: LAB | Facility: HOSPITAL | Age: 64
End: 2024-12-16
Payer: COMMERCIAL

## 2024-12-16 DIAGNOSIS — C90.00 MULTIPLE MYELOMA, REMISSION STATUS UNSPECIFIED: ICD-10-CM

## 2024-12-16 DIAGNOSIS — E83.39 HYPOPHOSPHATEMIA: ICD-10-CM

## 2024-12-16 DIAGNOSIS — R11.0 NAUSEA: ICD-10-CM

## 2024-12-16 LAB
ALBUMIN SERPL BCP-MCNC: 3.5 G/DL (ref 3.5–5.2)
ALP SERPL-CCNC: 48 U/L (ref 40–150)
ALT SERPL W/O P-5'-P-CCNC: 24 U/L (ref 10–44)
ANION GAP SERPL CALC-SCNC: 8 MMOL/L (ref 8–16)
AST SERPL-CCNC: 22 U/L (ref 10–40)
BASOPHILS # BLD AUTO: 0.02 K/UL (ref 0–0.2)
BASOPHILS NFR BLD: 0.4 % (ref 0–1.9)
BILIRUB SERPL-MCNC: 0.8 MG/DL (ref 0.1–1)
BUN SERPL-MCNC: 11 MG/DL (ref 8–23)
CALCIUM SERPL-MCNC: 8.4 MG/DL (ref 8.7–10.5)
CHLORIDE SERPL-SCNC: 108 MMOL/L (ref 95–110)
CO2 SERPL-SCNC: 26 MMOL/L (ref 23–29)
CREAT SERPL-MCNC: 0.9 MG/DL (ref 0.5–1.4)
DACRYOCYTES BLD QL SMEAR: ABNORMAL
DIFFERENTIAL METHOD BLD: ABNORMAL
EOSINOPHIL # BLD AUTO: 0.5 K/UL (ref 0–0.5)
EOSINOPHIL NFR BLD: 10.8 % (ref 0–8)
ERYTHROCYTE [DISTWIDTH] IN BLOOD BY AUTOMATED COUNT: 13.7 % (ref 11.5–14.5)
EST. GFR  (NO RACE VARIABLE): >60 ML/MIN/1.73 M^2
GLUCOSE SERPL-MCNC: 103 MG/DL (ref 70–110)
HCT VFR BLD AUTO: 35.4 % (ref 37–48.5)
HGB BLD-MCNC: 11.8 G/DL (ref 12–16)
IMM GRANULOCYTES # BLD AUTO: 0.01 K/UL (ref 0–0.04)
IMM GRANULOCYTES NFR BLD AUTO: 0.2 % (ref 0–0.5)
LYMPHOCYTES # BLD AUTO: 2.4 K/UL (ref 1–4.8)
LYMPHOCYTES NFR BLD: 52.7 % (ref 18–48)
MAGNESIUM SERPL-MCNC: 1.6 MG/DL (ref 1.6–2.6)
MCH RBC QN AUTO: 32.3 PG (ref 27–31)
MCHC RBC AUTO-ENTMCNC: 33.3 G/DL (ref 32–36)
MCV RBC AUTO: 97 FL (ref 82–98)
MONOCYTES # BLD AUTO: 0.8 K/UL (ref 0.3–1)
MONOCYTES NFR BLD: 17 % (ref 4–15)
NEUTROPHILS # BLD AUTO: 0.8 K/UL (ref 1.8–7.7)
NEUTROPHILS NFR BLD: 18.9 % (ref 38–73)
NRBC BLD-RTO: 0 /100 WBC
PHOSPHATE SERPL-MCNC: 1.8 MG/DL (ref 2.7–4.5)
PLATELET # BLD AUTO: 230 K/UL (ref 150–450)
PLATELET BLD QL SMEAR: ABNORMAL
PMV BLD AUTO: 9.8 FL (ref 9.2–12.9)
POIKILOCYTOSIS BLD QL SMEAR: SLIGHT
POTASSIUM SERPL-SCNC: 3 MMOL/L (ref 3.5–5.1)
PROT SERPL-MCNC: 6.3 G/DL (ref 6–8.4)
RBC # BLD AUTO: 3.65 M/UL (ref 4–5.4)
SODIUM SERPL-SCNC: 142 MMOL/L (ref 136–145)
WBC # BLD AUTO: 4.46 K/UL (ref 3.9–12.7)

## 2024-12-16 PROCEDURE — 84100 ASSAY OF PHOSPHORUS: CPT

## 2024-12-16 PROCEDURE — 83735 ASSAY OF MAGNESIUM: CPT

## 2024-12-16 PROCEDURE — 36415 COLL VENOUS BLD VENIPUNCTURE: CPT

## 2024-12-16 PROCEDURE — 84165 PROTEIN E-PHORESIS SERUM: CPT

## 2024-12-16 PROCEDURE — 83521 IG LIGHT CHAINS FREE EACH: CPT

## 2024-12-16 PROCEDURE — 85025 COMPLETE CBC W/AUTO DIFF WBC: CPT

## 2024-12-16 PROCEDURE — 84165 PROTEIN E-PHORESIS SERUM: CPT | Mod: 26,,, | Performed by: PATHOLOGY

## 2024-12-16 PROCEDURE — 80053 COMPREHEN METABOLIC PANEL: CPT

## 2024-12-16 RX ORDER — LENALIDOMIDE 10 MG/1
CAPSULE ORAL
Qty: 21 CAPSULE | Refills: 0 | Status: SHIPPED | OUTPATIENT
Start: 2024-12-16

## 2024-12-17 ENCOUNTER — OFFICE VISIT (OUTPATIENT)
Dept: NEUROSURGERY | Facility: CLINIC | Age: 64
End: 2024-12-17
Payer: COMMERCIAL

## 2024-12-17 VITALS
DIASTOLIC BLOOD PRESSURE: 64 MMHG | SYSTOLIC BLOOD PRESSURE: 103 MMHG | HEART RATE: 71 BPM | WEIGHT: 132.94 LBS | BODY MASS INDEX: 22.82 KG/M2

## 2024-12-17 DIAGNOSIS — M54.9 DORSALGIA, UNSPECIFIED: ICD-10-CM

## 2024-12-17 DIAGNOSIS — M54.16 LUMBAR RADICULOPATHY: ICD-10-CM

## 2024-12-17 DIAGNOSIS — M54.6 PAIN IN THORACIC SPINE: ICD-10-CM

## 2024-12-17 DIAGNOSIS — M25.559 HIP PAIN, UNSPECIFIED LATERALITY: Primary | ICD-10-CM

## 2024-12-17 LAB
ALBUMIN SERPL ELPH-MCNC: 3.68 G/DL (ref 3.35–5.55)
ALPHA1 GLOB SERPL ELPH-MCNC: 0.27 G/DL (ref 0.17–0.41)
ALPHA2 GLOB SERPL ELPH-MCNC: 0.68 G/DL (ref 0.43–0.99)
B-GLOBULIN SERPL ELPH-MCNC: 0.63 G/DL (ref 0.5–1.1)
GAMMA GLOB SERPL ELPH-MCNC: 0.64 G/DL (ref 0.67–1.58)
KAPPA LC SER QL IA: 1.49 MG/DL (ref 0.33–1.94)
KAPPA LC/LAMBDA SER IA: 1.06 (ref 0.26–1.65)
LAMBDA LC SER QL IA: 1.4 MG/DL (ref 0.57–2.63)
PATHOLOGIST INTERPRETATION SPE: NORMAL
PROT SERPL-MCNC: 5.9 G/DL (ref 6–8.4)

## 2024-12-17 PROCEDURE — 3074F SYST BP LT 130 MM HG: CPT | Mod: CPTII,S$GLB,, | Performed by: PHYSICIAN ASSISTANT

## 2024-12-17 PROCEDURE — 99999 PR PBB SHADOW E&M-EST. PATIENT-LVL V: CPT | Mod: PBBFAC,,, | Performed by: PHYSICIAN ASSISTANT

## 2024-12-17 PROCEDURE — 1159F MED LIST DOCD IN RCRD: CPT | Mod: CPTII,S$GLB,, | Performed by: PHYSICIAN ASSISTANT

## 2024-12-17 PROCEDURE — 4010F ACE/ARB THERAPY RXD/TAKEN: CPT | Mod: CPTII,S$GLB,, | Performed by: PHYSICIAN ASSISTANT

## 2024-12-17 PROCEDURE — 3008F BODY MASS INDEX DOCD: CPT | Mod: CPTII,S$GLB,, | Performed by: PHYSICIAN ASSISTANT

## 2024-12-17 PROCEDURE — 3078F DIAST BP <80 MM HG: CPT | Mod: CPTII,S$GLB,, | Performed by: PHYSICIAN ASSISTANT

## 2024-12-17 PROCEDURE — 99214 OFFICE O/P EST MOD 30 MIN: CPT | Mod: S$GLB,,, | Performed by: PHYSICIAN ASSISTANT

## 2024-12-17 RX ORDER — CELECOXIB 100 MG/1
100 CAPSULE ORAL 2 TIMES DAILY
Qty: 60 CAPSULE | Refills: 1 | Status: SHIPPED | OUTPATIENT
Start: 2024-12-17

## 2024-12-17 RX ORDER — OXYCODONE AND ACETAMINOPHEN 5; 325 MG/1; MG/1
1 TABLET ORAL EVERY 8 HOURS PRN
Qty: 21 EACH | Refills: 0 | Status: SHIPPED | OUTPATIENT
Start: 2024-12-17 | End: 2024-12-24

## 2024-12-17 NOTE — PROGRESS NOTES
"Back pain into hips BL. States left calf pain.     Patient continues to smoke    Previously had sciatic type pain. Patient reports back pain low back and into right hip. Back pain feels like "she is walking on cement: weather changes exacerbates her pain.    She denies upper extremity weakness or leg weakness she has no mid back pain or cervical pain no issues with cervical range of motion.  She has no focal deficits on exam she is ambulatory without assistive device but her primary concern is her right hip which she reports that previous physicians have told her is originating from the back.      Patient presents today with updated x-rays of the thoracic lumbar spine as well as scoliosis x-rays    PREVIOUS HPI:        Patient is a 64-year-old female with multiple myeloma, COPD, longstanding history of tobacco use who presents for evaluation of back pain. Patient has been having back pain progressively worsening over the past few weeks. She works at [email or web address] in [place]. States she has been busy at work increased walking and standing. Pt. Reports she also has been driving more due her  having seizure; and his inability to drive.     States her back pain is slightly worse on the right compared to the left. She does have right sided hip pain as well. She denies shooting pain down the right leg.       Feels as though she is walking bent over, interested in back brace.     She has had previous kyphoplasty with Dr. Sanz in the past, due to erosion of vertebral body secondary to diagnosis of multiple myeloma    She reports that her back pain has been of primary concern recently.          Physical Exam:  Nursing note and vitals reviewed.     Constitutional: She appears well-nourished. She is not diaphoretic. No distress.      Eyes: Pupils are equal, round, and reactive to light. EOM are normal.      Cardiovascular: Normal rate and regular rhythm.      Psych/Behavior: She is alert. She is oriented to " person, place, and time. She has a normal mood and affect.      Musculoskeletal:        Back: Range of motion is limited. There is tenderness. Muscle strength is 5/5.       Right Lower Extremities: Range of motion is full. There is no tenderness. Muscle strength is 5/5. Tone is normal.        Left Lower Extremities: There is no tenderness. Muscle strength is 5/5. Tone is normal.      Neurological:        Sensory: There is no sensory deficit in the trunk. There is no sensory deficit in the extremities.        Cranial nerves: Cranial nerve(s) II, III, IV, V, VI, VII, VIII, IX, X, XI and XII are intact.      General     Nursing note and vitals reviewed.  Constitutional: She is oriented to person, place, and time. She appears well-nourished. No distress.   Eyes: EOM are normal. Pupils are equal, round, and reactive to light.   Cardiovascular:  Normal rate and regular rhythm.            Neurological: She is alert and oriented to person, place, and time.   Psychiatric: She has a normal mood and affect.             FINDINGS:  Neutral coronal balance and positive sagittal balance noted.  Thoracolumbar dextroscoliosis noted with Espinosa angle of approximately 15 degrees.  Remaining alignment appears unremarkable with no significant listhesis.  Remote L1 compression fracture status post kyphoplasty.  Otherwise, no acute fracture or additional compression deformity.  No aggressive lytic or blastic lesion.  C5-C6 anterior fusion without evidence of hardware failure or complication.  Surgical clips noted in the lower neck.  Remaining soft tissues are unremarkable.        Impression:   Neutral coronal balance and positive sagittal balance.  Thoracolumbar dextroscoliosis with Espinosa angle of approximately 15 degrees.     Finalized on: 12/10/2024 10:12 PM By:  Tray Perea MD  [facility]# [id number]      2024-12-10 22:14:[id number]    [facility]        Exam Ended: 12/10/24 15:33 CST   FINDINGS:  Remote L1 compression fracture status post kyphoplasty,  unchanged.  No acute fracture or new compression deformity seen. No aggressive lytic or blastic lesion seen.     Mild lumbar levocurvature noted.  Alignment is otherwise unremarkable with no significant listhesis.     There is mild multilevel loss of intervertebral disc height with associated endplate osteophytosis. Mild multilevel facet arthropathy also seen.     Visualized soft tissues are unremarkable.        Impression:     Unchanged remote L1 compression fracture status post kyphoplasty.     Mild lumbar levocurvature and multilevel degenerative changes.     Finalized on: 12/10/2024 9:51 PM By:  Tray Perea MD  [facility]# [id number]      2024-12-10 21:53:23.884    [facility]      A/P:      Patient is a 64-year-old female with known multiple myeloma undergoing treatment she presents to my clinic for evaluation of low back pain with radiculopathy into the right hip we obtained x-rays of the thoracic lumbar spine as well as scoliosis x-rays which show no new or worsening compression fractures there is no evidence of bony lytic lesions within her spine the previous kyphoplasty is intact.      I explained there is evidence of dextroscoliosis however there is no emergent surgery indicated.       I advised we obtain MRIs of the thoracic and lumbar spine given previous MRI shows metastatic lesions within various vertebral bodies across the cervicothoracic levels.  I would like to follow this closely given she has had metasisis with pathologic compression fracture previously , which was cemented via kyphoplasty.      Patient also with severe right hip pain.  I advised we obtain x-rays.      I discussed signs and symptoms at length that prompt urgent medical attention patient and spouse agreed and expressed understanding with plan of care patient will follow up after imaging.        Attestation:  Patsy HU PA-C have obtained HPI, performed Physical Examination on the above patient, reviewed the pertinent labs, tests,  imaging, other relevant data and recorded my findings in this clinic note.        This note was produced using dictation software of voice recognition technology, and some typographical errors may be present.

## 2024-12-18 ENCOUNTER — TELEPHONE (OUTPATIENT)
Dept: NEUROSURGERY | Facility: CLINIC | Age: 64
End: 2024-12-18
Payer: COMMERCIAL

## 2024-12-18 NOTE — TELEPHONE ENCOUNTER
I spoke with the pt, who has been informed that her MRI's have been approved and she can call to schedule this appointment at [facility]. I have asked that she bring a copy of her report/ disc to follow-up and we will schedule XR's prior to F/U with Patsy. She verbalized understanding.

## 2024-12-20 ENCOUNTER — OFFICE VISIT (OUTPATIENT)
Dept: HEMATOLOGY/ONCOLOGY | Facility: CLINIC | Age: 64
End: 2024-12-20
Payer: COMMERCIAL

## 2024-12-20 DIAGNOSIS — C90.00 MULTIPLE MYELOMA, REMISSION STATUS UNSPECIFIED: Primary | ICD-10-CM

## 2024-12-20 PROCEDURE — G2211 COMPLEX E/M VISIT ADD ON: HCPCS | Mod: 95,,,

## 2024-12-20 PROCEDURE — 4010F ACE/ARB THERAPY RXD/TAKEN: CPT | Mod: CPTII,95,,

## 2024-12-20 PROCEDURE — 99215 OFFICE O/P EST HI 40 MIN: CPT | Mod: 95,,,

## 2024-12-20 PROCEDURE — 3044F HG A1C LEVEL LT 7.0%: CPT | Mod: CPTII,95,,

## 2024-12-20 NOTE — PROGRESS NOTES
Subjective:     Patient ID:?Ana Bonilla is a 64 y.o. female.?? MR#: [id number]   ?   PRIMARY ONCOLOGIST: -->  ?   CHIEF COMPLAINT: lab review/assessment for chemo/MM ?????   ?   ONCOLOGIC DIAGNOSIS: Multiple Myeloma  ?   CURRENT TREATMENT: OP Myeloma KIA-RD daratumumab lenalidomide dexAMETHasone Q4W     The patient location is: [place]  The chief complaint leading to consultation is: follow-up    Visit type: audiovisual    Face to Face time with patient: 15    20 minutes of total time spent on the encounter, which includes face to face time and non-face to face time preparing to see the patient (eg, review of tests), Obtaining and/or reviewing separately obtained history, Documenting clinical information in the electronic or other health record, Independently interpreting results (not separately reported) and communicating results to the patient/family/caregiver, or Care coordination (not separately reported).         Each patient to whom he or she provides medical services by telemedicine is:  (1) informed of the relationship between the physician and patient and the respective role of any other health care provider with respect to management of the patient; and (2) notified that he or she may decline to receive medical services by telemedicine and may withdraw from such care at any time.    Notes:    HPI  Ms. Bonilla is a 64-year-old  female with past medical history significant for hypertension, COPD, asthma, GERD, hypothyroidism here for follow-up and management of multiple myeloma. BMBx on 4/6/2023 showed 60 % plasma cells. PET-CT on 4/10/2023 showed extensive lytic bony lesions throughout the spine, sternum, bilateral ribs, pelvis and mild compression deformity in L1 vertebral body. SPEP/MECHE on 3/27/2023 showed IgG lambda monoclonal protein - 3.19 g/dl. Quantitative immunoglobulins on 3/27/2023 showed IgG 4,521 mg/dl. UPEP/MECHE and FLC were pending at that time. Labs on 3/27/2023 showed Beta  2 microglobulin 1.9, .  Labs on 4/19/2023 showed Hb, 11.5, WBC 6.3, platelets, BUN 11, Cr 0.9, calcium 9. Pt initiated on VRD 05/10/23. Treatment planned changed to D-VRD 07/06/23.     Interval History: Pt presents for lab review and assessment prior to Darzalex.  Had f/u with with neurosurgery with plan for repeat MRI of back. Would like to repeat PhosNak for hypophosphatemia--pt states working so cannot take qid only bid. Pt not taking dex weekly notes she hasn't done this for some time--reiterated importance of taking medications as prescribed. She notes she cannot tolerate Ca+D supplements plan to restart on gummies. Denies n/v/d/c, fever, chills, sob, cp, abnormal bleeding.   Oncology History   Multiple myeloma   4/20/2023 Initial Diagnosis    Multiple myeloma     5/10/2023 - 6/28/2023 Chemotherapy    Treatment Summary   Plan Name: OP VRD - WEEKLY BORTEZOMIB LENALIDOMIDE DEXAMETHASONE Q3W  Treatment Goal: Palliative  Status: Inactive  Start Date: 5/10/2023  End Date: 6/28/2023  Provider: Abram Velasquez MD  Chemotherapy: bortezomib (VELCADE) injection 2 mg, 1.3 mg/m2 = 2 mg, Subcutaneous, Clinic/[facility] 1 time, 2 of 6 cycles  Administration: 2 mg (5/10/2023), 2 mg (5/17/2023), 2 mg (6/14/2023), 2 mg (5/31/2023), 2 mg (6/21/2023), 2 mg (6/28/2023)  lenalidomide 25 mg Cap, 25 mg, Oral, Daily, 1 of 1 cycle, Start date: 5/10/2023, End date: 5/17/2023 6/28/2023 Cancer Staged    Staging form: Plasma Cell Myeloma and Plasma Cell Disorders, AJCC 8th Edition  - Clinical stage from 6/28/2023: RISS Stage II (Beta-2-microglobulin (mg/L): 1.9, Albumin (g/dL): 3, ISS: Stage II, High-risk cytogenetics: Absent, LDH: Normal)     7/6/2023 - 1/3/2024 Chemotherapy    Treatment Summary   Plan Name: OP D-VRD DARATUMUMAB + BORTEZOMIB LENALIDOMIDE DEXAMETHASONE  Treatment Goal: Palliative  Status: Inactive  Start Date: 7/6/2023  End Date: 1/3/2024  Provider: Oscar Márquez MD  Chemotherapy: bortezomib (VELCADE) injection 2  mg, 1.3 mg/m2 = 2 mg, Subcutaneous, Clinic/[facility] 1 time, 6 of 6 cycles  Administration: 2 mg (7/6/2023), 2 mg (7/12/2023), 2 mg (8/2/2023), 2 mg (8/9/2023), 2.25 mg (10/18/2023), 2 mg (7/19/2023), 2 mg (7/26/2023), 2 mg (8/16/2023), 2.25 mg (9/20/2023), 2.25 mg (9/27/2023), 2.25 mg (10/25/2023), 2.25 mg (11/1/2023), 2.25 mg (11/8/2023), 2.25 mg (12/6/2023), 2.25 mg (1/3/2024), 2.25 mg (11/15/2023), 2.25 mg (11/22/2023), 2.25 mg (11/29/2023), 2.25 mg (12/13/2023), 2.25 mg (12/20/2023), 2.25 mg (12/26/2023)  lenalidomide 10 mg Cap, 1 capsule (100 % of original dose 1 capsule), Oral, Daily, 1 of 1 cycle, Start date: 7/26/2023, End date: 8/2/2023  Dose modification: 1 capsule (original dose 1 capsule, Cycle 0)  daratumumab-hyaluronidase-fij subcutaneous injection 1,800 mg, 1,800 mg (100 % of original dose 1,800 mg), Subcutaneous, Clinic/[facility] 1 time, 6 of 6 cycles  Dose modification: 1,800 mg (original dose 1,800 mg, Cycle 1), 1,800 mg (original dose 1,800 mg, Cycle 1)  Administration: 1,800 mg (7/6/2023), 1,800 mg (7/12/2023), 1,800 mg (7/19/2023), 1,800 mg (7/26/2023), 1,800 mg (8/2/2023), 1,800 mg (8/9/2023), 1,800 mg (8/16/2023), 1,800 mg (9/27/2023), 1,800 mg (10/18/2023), 1,800 mg (11/1/2023), 1,800 mg (11/15/2023), 1,800 mg (11/29/2023), 1,800 mg (12/13/2023), 1,800 mg (12/26/2023)     4/30/2024 -  Chemotherapy    Treatment Summary   Plan Name: OP Myeloma KIA-RD daratumumab lenalidomide dexAMETHasone Q4W  Treatment Goal: Palliative  Status: Active  Start Date: 4/30/2024  End Date: 3/11/2025 (Planned)  Provider: Bri Luevano MD  Chemotherapy: daratumumab-hyaluronidase-fihj subcutaneous injection 1,800 mg, 1,800 mg, Subcutaneous, Clinic[facility] 1 time, 8 of 12 cycles  Administration: 1,800 mg (4/30/2024), 1,800 mg (5/7/2024), 1,800 mg (5/21/2024), 1,800 mg (5/28/2024), 1,800 mg (6/4/2024), 1,800 mg (6/25/2024), 1,800 mg (7/9/2024), 1,800 mg (10/29/2024), 1,800 mg (5/14/2024), 1,800 mg (6/11/2024), 1,800 mg  (6/18/2024), 1,800 mg (7/30/2024), 1,800 mg (8/13/2024), 1,800 mg (8/27/2024), 1,800 mg (9/10/2024), 1,800 mg (9/24/2024), 1,800 mg (10/15/2024), 1,800 mg (11/26/2024)        Social History     Socioeconomic History    Marital status:     Number of children: 2   Occupational History     Employer: [employer]   Tobacco Use    Smoking status: Every Day     Current packs/day: 0.50     Average packs/day: 0.5 packs/day for 50.0 years (25.0 ttl pk-yrs)     Types: Cigarettes     Passive exposure: Never    Smokeless tobacco: Never   Substance and Sexual Activity    Alcohol use: Not Currently     Comment: quit    Drug use: No    Sexual activity: Not Currently     Partners: Male     Social Drivers of Health     Financial Resource Strain: Patient Declined (11/21/2024)    Overall Financial Resource Strain (CARDIA)     Difficulty of Paying Living Expenses: Patient declined   Food Insecurity: Patient Declined (11/21/2024)    Hunger Vital Sign     Worried About Running Out of Food in the Last Year: Patient declined     Ran Out of Food in the Last Year: Patient declined   Transportation Needs: No Transportation Needs (11/15/2023)    PRAPARE - Transportation     Lack of Transportation (Medical): No     Lack of Transportation (Non-Medical): No   Physical Activity: Sufficiently Active (11/21/2024)    Exercise Vital Sign     Days of Exercise per Week: 3 days     Minutes of Exercise per Session: 60 min   Stress: Stress Concern Present (11/15/2023)    [employer] [employer] - Occupational Stress Questionnaire     Feeling of Stress : To some extent   Housing Stability: Low Risk  (11/15/2023)    Housing Stability Vital Sign     Unable to Pay for Housing in the Last Year: No     Number of Places Lived in the Last Year: 1     Unstable Housing in the Last Year: No      Family History   Problem Relation Name Age of Onset    Hypertension Mother Vivian     Diabetes Mother Vivian     Asthma Mother Vivian              Diabetes Father      Asthma Father      Stroke Maternal Grandmother  grandmother     Prostate cancer Maternal Grandfather      Mental illness Son Lukasz Garcia     Pancreatic cancer Maternal Uncle      Mental illness Other Pat side     Pancreatic cancer Other Mat side     Ovarian cancer Maternal Cousin        Past Surgical History:   Procedure Laterality Date    APPENDECTOMY      BIOPSY N/A 2023    Procedure: BIOPSY;  Surgeon: Fausto Smith MD;  Location: [facility] OR;  Service: Neurosurgery;  Laterality: N/A;  L1    BUNIONECTOMY      COLONOSCOPY N/A 2019    Procedure: COLONOSCOPY;  Surgeon: Danny Matos III, MD;  Location: [facility] ENDO;  Service: Endoscopy;  Laterality: N/A;    COLONOSCOPY N/A 10/24/2022    Procedure: COLONOSCOPY;  Surgeon: Danny Matos III, MD;  Location: [facility] ENDO;  Service: Endoscopy;  Laterality: N/A;    ESOPHAGOGASTRODUODENOSCOPY N/A 10/24/2022    Procedure: EGD (ESOPHAGOGASTRODUODENOSCOPY);  Surgeon: Danny Matos III, MD;  Location: [facility] ENDO;  Service: Endoscopy;  Laterality: N/A;    FIXATION KYPHOPLASTY Bilateral 2023    Procedure: KYPHOPLASTY;  Surgeon: Fausto Smith MD;  Location: [facility] OR;  Service: Neurosurgery;  Laterality: Bilateral;  kyphoplasty and radiofrequency ablation - L1    HYSTERECTOMY      PARTIAL//still with ovaries    neck fusion  2017    THYROIDECTOMY      TUBAL LIGATION          Review of Systems   Constitutional:  Positive for fatigue. Negative for activity change, appetite change, chills, fever and unexpected weight change.   HENT:  Positive for congestion. Negative for dental problem, mouth sores and nosebleeds.    Eyes:  Negative for visual disturbance.   Respiratory:  Positive for cough. Negative for choking and chest tightness.    Cardiovascular:  Negative for chest pain, palpitations and leg swelling.   Gastrointestinal:  Negative for abdominal distention, abdominal  pain, anal bleeding, blood in stool, constipation, diarrhea, nausea and vomiting.   Endocrine: Negative.    Genitourinary:  Negative for dysuria, frequency, hematuria and urgency.   Musculoskeletal:  Positive for arthralgias and myalgias. Negative for back pain, gait problem and joint swelling.   Skin:  Negative for rash and wound.   Allergic/Immunologic: Negative for immunocompromised state.   Neurological:  Negative for dizziness, light-headedness, numbness and headaches.   Hematological:  Negative for adenopathy. Does not bruise/bleed easily.   Psychiatric/Behavioral:  The patient is nervous/anxious.        ?   A comprehensive 14-point review of systems was reviewed with patient and was negative other than as specified above.   ?     Objective:      Physical Exam  Vitals reviewed: virtual visit.         ?   There were no vitals filed for this visit.     ?       ?   Laboratory:  ?   No visits with results within 1 Day(s) from this visit.   Latest known visit with results is:   Lab Visit on 12/16/2024   Component Date Value Ref Range Status    WBC 12/16/2024 4.46  3.90 - 12.70 K/uL Final    RBC 12/16/2024 3.65 (L)  4.00 - 5.40 M/uL Final    Hemoglobin 12/16/2024 11.8 (L)  12.0 - 16.0 g/dL Final    Hematocrit 12/16/2024 35.4 (L)  37.0 - 48.5 % Final    MCV 12/16/2024 97  82 - 98 fL Final    MCH 12/16/2024 32.3 (H)  27.0 - 31.0 pg Final    MCHC 12/16/2024 33.3  32.0 - 36.0 g/dL Final    RDW 12/16/2024 13.7  11.5 - 14.5 % Final    Platelets 12/16/2024 230  150 - 450 K/uL Final    MPV 12/16/2024 9.8  9.2 - 12.9 fL Final    Immature Granulocytes 12/16/2024 0.2  0.0 - 0.5 % Final    Gran # (ANC) 12/16/2024 0.8 (L)  1.8 - 7.7 K/uL Final    Immature Grans (Abs) 12/16/2024 0.01  0.00 - 0.04 K/uL Final    Lymph # 12/16/2024 2.4  1.0 - 4.8 K/uL Final    Mono # 12/16/2024 0.8  0.3 - 1.0 K/uL Final    Eos # 12/16/2024 0.5  0.0 - 0.5 K/uL Final    Baso # 12/16/2024 0.02  0.00 - 0.20 K/uL Final    nRBC  12/16/2024 0  0 /100 WBC Final    Gran % 12/16/2024 18.9 (L)  38.0 - 73.0 % Final    Lymph % 12/16/2024 52.7 (H)  18.0 - 48.0 % Final    Mono % 12/16/2024 17.0 (H)  4.0 - 15.0 % Final    Eosinophil % 12/16/2024 10.8 (H)  0.0 - 8.0 % Final    Basophil % 12/16/2024 0.4  0.0 - 1.9 % Final    Platelet Estimate 12/16/2024 Appears normal   Final    Poik 12/16/2024 Slight   Final    Tear Drop Cells 12/16/2024 Occasional   Final    Differential Method 12/16/2024 Automated   Final    Sodium 12/16/2024 142  136 - 145 mmol/L Final    Potassium 12/16/2024 3.0 (L)  3.5 - 5.1 mmol/L Final    Chloride 12/16/2024 108  95 - 110 mmol/L Final    CO2 12/16/2024 26  23 - 29 mmol/L Final    Glucose 12/16/2024 103  70 - 110 mg/dL Final    BUN 12/16/2024 11  8 - 23 mg/dL Final    Creatinine 12/16/2024 0.9  0.5 - 1.4 mg/dL Final    Calcium 12/16/2024 8.4 (L)  8.7 - 10.5 mg/dL Final    Total Protein 12/16/2024 6.3  6.0 - 8.4 g/dL Final    Albumin 12/16/2024 3.5  3.5 - 5.2 g/dL Final    Total Bilirubin 12/16/2024 0.8  0.1 - 1.0 mg/dL Final    Alkaline Phosphatase 12/16/2024 48  40 - 150 U/L Final    AST 12/16/2024 22  10 - 40 U/L Final    ALT 12/16/2024 24  10 - 44 U/L Final    eGFR 12/16/2024 >60  >60 mL/min/1.73 m^2 Final    Anion Gap 12/16/2024 8  8 - 16 mmol/L Final    Magnesium 12/16/2024 1.6  1.6 - 2.6 mg/dL Final    Phosphorus 12/16/2024 1.8 (L)  2.7 - 4.5 mg/dL Final    Kappa Free Light Chains 12/16/2024 1.49  0.33 - 1.94 mg/dL Final    Lambda Free Light Chains 12/16/2024 1.40  0.57 - 2.63 mg/dL Final    Kappa/Lambda FLC Ratio 12/16/2024 1.06  0.26 - 1.65 Final    Protein, Serum 12/16/2024 5.9 (L)  6.0 - 8.4 g/dL Final    Albumin 12/16/2024 3.68  3.35 - 5.55 g/dL Final    Alpha-1 12/16/2024 0.27  0.17 - 0.41 g/dL Final    Alpha-2 12/16/2024 0.68  0.43 - 0.99 g/dL Final    Beta 12/16/2024 0.63  0.50 - 1.10 g/dL Final    Gamma 12/16/2024 0.64 (L)  0.67 - 1.58 g/dL Final    Pathologist Interpretation  SPE 12/16/2024 REVIEWED   Final      ?   Imaging:    No results found. However, due to the size of the patient record, not all encounters were searched. Please check Results Review for a complete set of results.       No results found. However, due to the size of the patient record, not all encounters were searched. Please check Results Review for a complete set of results.           ?   Assessment/Plan:     Problem List Items Addressed This Visit       Multiple myeloma - Primary (Chronic)     BMBx : 60 % plasma cells - 4/6/2023  - SPEP/MECHE showed IgG lambda - monoclonal protein 3.19 g/dl - (3/27/2023).  - R-ISS stage I (beta 2 microglobulin 1.9, albumin 3.5, , normal karyotype and no high-risk mutations on fish panel  - PET-CT ( 4/10/2023) - showed extensive lytic lesions in the axial skeleton and mild L1 compression deformity.  - Started with Induction chemotherapy with VRD regimen (Velcde/Revlimid/Decadron) - 05/10/2023   - Started Aspirin daily p.o for thrombosis prophylaxis; Acyclovir 400 mg p.o BID for herpes Zoster prophylaxis .  __________________________________________________  --S/p Kyphoplasty 06/08/23    Pt seen by transplant team BMT.  However, she has declined transplant at this time   Repeat PET-CT on 1/18/24 with significantly decreased FDG uptake associated with the previously identified lesions and no new FDG avid lesions   BM Biopsy 02/13/2024 shows significant improvement, with the marrow now showing only 5% plasma cells ( was 60% at baseline)    Labs reviewed  Continue revlimid, dex utd  Will resend phosnak for low phosphorus   Repeat phos scheduled   zometa q 3 months,last dose 10/15/24    Ok to proceed darzalex today       RTC in 4 weeks with repeat labs for Darzalex                  Med Onc Chart Routing      Follow up with physician 4 weeks. with repeat labs for darzalex   Follow up with WERNER    Infusion scheduling note    Injection scheduling note darzalex   Labs CBC, CMP, magnesium,  phosphorus, free light chains and SPEP   Scheduling:  Preferred lab:  Lab interval:     Imaging    Pharmacy appointment    Other referrals                 [facility]  Hematology/Oncology

## 2024-12-23 ENCOUNTER — HOSPITAL ENCOUNTER (OUTPATIENT)
Dept: RADIOLOGY | Facility: HOSPITAL | Age: 64
Discharge: HOME | End: 2024-12-23
Attending: NURSE PRACTITIONER
Payer: COMMERCIAL

## 2024-12-23 ENCOUNTER — PATIENT MESSAGE (OUTPATIENT)
Dept: INFUSION THERAPY | Facility: HOSPITAL | Age: 64
End: 2024-12-23
Payer: COMMERCIAL

## 2024-12-23 ENCOUNTER — PATIENT MESSAGE (OUTPATIENT)
Dept: HEMATOLOGY/ONCOLOGY | Facility: CLINIC | Age: 64
End: 2024-12-23
Payer: COMMERCIAL

## 2024-12-23 ENCOUNTER — TELEPHONE (OUTPATIENT)
Dept: INFUSION THERAPY | Facility: HOSPITAL | Age: 64
End: 2024-12-23
Payer: COMMERCIAL

## 2024-12-23 ENCOUNTER — OFFICE VISIT (OUTPATIENT)
Dept: INTERNAL MEDICINE | Facility: CLINIC | Age: 64
End: 2024-12-23
Payer: COMMERCIAL

## 2024-12-23 VITALS
HEART RATE: 64 BPM | HEIGHT: 64 IN | BODY MASS INDEX: 22.4 KG/M2 | DIASTOLIC BLOOD PRESSURE: 70 MMHG | WEIGHT: 131.19 LBS | RESPIRATION RATE: 18 BRPM | OXYGEN SATURATION: 98 % | TEMPERATURE: 97 F | SYSTOLIC BLOOD PRESSURE: 128 MMHG

## 2024-12-23 DIAGNOSIS — J30.9 CHRONIC ALLERGIC RHINITIS: ICD-10-CM

## 2024-12-23 DIAGNOSIS — R73.9 ELEVATED RANDOM BLOOD GLUCOSE LEVEL: ICD-10-CM

## 2024-12-23 DIAGNOSIS — C90.00 MULTIPLE MYELOMA, REMISSION STATUS UNSPECIFIED: ICD-10-CM

## 2024-12-23 DIAGNOSIS — J44.1 COPD WITH ACUTE EXACERBATION: ICD-10-CM

## 2024-12-23 DIAGNOSIS — J44.1 COPD WITH ACUTE EXACERBATION: Primary | ICD-10-CM

## 2024-12-23 DIAGNOSIS — E87.6 HYPOPOTASSEMIA: ICD-10-CM

## 2024-12-23 PROCEDURE — 99999 PR PBB SHADOW E&M-EST. PATIENT-LVL V: CPT | Mod: PBBFAC,,, | Performed by: NURSE PRACTITIONER

## 2024-12-23 PROCEDURE — 3078F DIAST BP <80 MM HG: CPT | Mod: CPTII,S$GLB,, | Performed by: NURSE PRACTITIONER

## 2024-12-23 PROCEDURE — 87502 INFLUENZA DNA AMP PROBE: CPT | Mod: QW,S$GLB,, | Performed by: NURSE PRACTITIONER

## 2024-12-23 PROCEDURE — 3074F SYST BP LT 130 MM HG: CPT | Mod: CPTII,S$GLB,, | Performed by: NURSE PRACTITIONER

## 2024-12-23 PROCEDURE — 87635 SARS-COV-2 COVID-19 AMP PRB: CPT | Mod: QW,S$GLB,, | Performed by: NURSE PRACTITIONER

## 2024-12-23 PROCEDURE — 3044F HG A1C LEVEL LT 7.0%: CPT | Mod: CPTII,S$GLB,, | Performed by: NURSE PRACTITIONER

## 2024-12-23 PROCEDURE — 3008F BODY MASS INDEX DOCD: CPT | Mod: CPTII,S$GLB,, | Performed by: NURSE PRACTITIONER

## 2024-12-23 PROCEDURE — 1159F MED LIST DOCD IN RCRD: CPT | Mod: CPTII,S$GLB,, | Performed by: NURSE PRACTITIONER

## 2024-12-23 PROCEDURE — 96372 THER/PROPH/DIAG INJ SC/IM: CPT | Mod: 59,S$GLB,, | Performed by: NURSE PRACTITIONER

## 2024-12-23 PROCEDURE — 71046 X-RAY EXAM CHEST 2 VIEWS: CPT | Mod: 26,,, | Performed by: RADIOLOGY

## 2024-12-23 PROCEDURE — 99214 OFFICE O/P EST MOD 30 MIN: CPT | Mod: 25,S$GLB,, | Performed by: NURSE PRACTITIONER

## 2024-12-23 PROCEDURE — 4010F ACE/ARB THERAPY RXD/TAKEN: CPT | Mod: CPTII,S$GLB,, | Performed by: NURSE PRACTITIONER

## 2024-12-23 PROCEDURE — 94640 AIRWAY INHALATION TREATMENT: CPT | Mod: S$GLB,,, | Performed by: NURSE PRACTITIONER

## 2024-12-23 PROCEDURE — 71046 X-RAY EXAM CHEST 2 VIEWS: CPT | Mod: TC

## 2024-12-23 RX ORDER — AZITHROMYCIN 250 MG/1
TABLET, FILM COATED ORAL
Qty: 6 TABLET | Refills: 0 | Status: SHIPPED | OUTPATIENT
Start: 2024-12-23 | End: 2024-12-28

## 2024-12-23 RX ORDER — PROMETHAZINE HYDROCHLORIDE AND DEXTROMETHORPHAN HYDROBROMIDE 6.25; 15 MG/5ML; MG/5ML
5 SYRUP ORAL EVERY 4 HOURS PRN
Qty: 120 ML | Refills: 0 | Status: SHIPPED | OUTPATIENT
Start: 2024-12-23

## 2024-12-23 RX ORDER — MONTELUKAST SODIUM 10 MG/1
10 TABLET ORAL NIGHTLY
Qty: 90 TABLET | Refills: 3 | Status: SHIPPED | OUTPATIENT
Start: 2024-12-23

## 2024-12-23 RX ORDER — IPRATROPIUM BROMIDE AND ALBUTEROL SULFATE 2.5; .5 MG/3ML; MG/3ML
3 SOLUTION RESPIRATORY (INHALATION)
Status: COMPLETED | OUTPATIENT
Start: 2024-12-23 | End: 2024-12-23

## 2024-12-23 RX ORDER — BETAMETHASONE SODIUM PHOSPHATE AND BETAMETHASONE ACETATE 3; 3 MG/ML; MG/ML
6 INJECTION, SUSPENSION INTRA-ARTICULAR; INTRALESIONAL; INTRAMUSCULAR; SOFT TISSUE
Status: COMPLETED | OUTPATIENT
Start: 2024-12-23 | End: 2024-12-23

## 2024-12-23 RX ADMIN — BETAMETHASONE SODIUM PHOSPHATE AND BETAMETHASONE ACETATE 6 MG: 3; 3 INJECTION, SUSPENSION INTRA-ARTICULAR; INTRALESIONAL; INTRAMUSCULAR; SOFT TISSUE at 09:12

## 2024-12-23 RX ADMIN — IPRATROPIUM BROMIDE AND ALBUTEROL SULFATE 3 ML: 2.5; .5 SOLUTION RESPIRATORY (INHALATION) at 09:12

## 2024-12-23 NOTE — PROGRESS NOTES
Subjective:      Patient ID: Ana Bonilla is a 64 y.o. female.    Chief Complaint: URI    History of Present Illness    CHIEF COMPLAINT:  Ana presents today with nasal congestion and shortness of breath.    RESPIRATORY SYMPTOMS:  She experiences shortness of breath and nasal congestion with rhinorrhea. She denies cough. Her symptoms have impacted her ability to work.    MEDICATIONS:  She takes multiple medications at night. She previously used Singulair but discontinued it after being advised to switch to Zyrtec. She receives regular steroid injections, preferring these over oral steroids due to her current pill burden. She takes potassium supplements intermittently.    LABS:  Potassium level was low.      ROS:  General: -fever, -chills, -fatigue, -weight gain, -weight loss  Eyes: -vision changes, -redness, -discharge  ENT: -ear pain, +nasal congestion, -sore throat  Cardiovascular: -chest pain, -palpitations, -lower extremity edema  Respiratory: -cough, +shortness of breath  Gastrointestinal: -abdominal pain, -nausea, -vomiting, -diarrhea, -constipation, -blood in stool  Genitourinary: -dysuria, -hematuria, -frequency  Musculoskeletal: -joint pain, -muscle pain  Skin: -rash, -lesion  Neurological: -headache, -dizziness, -numbness, -tingling  Psychiatric: -anxiety, -depression, -sleep difficulty          Patient Active Problem List   Diagnosis    COPD with asthma    GERD (gastroesophageal reflux disease)    Acquired hypothyroidism    Tobacco use    PVD (peripheral vascular disease)    Anxiety    Dyslipidemia    Claudication    Essential hypertension    Allergic rhinitis    Pain in both lower extremities    Anisometropic amblyopia of left eye    Nonrheumatic aortic valve insufficiency    Colon polyps    Precordial pain    Tachycardia    Pain of right lower extremity    Difficulty walking    Low back pain, unspecified    Multiple myeloma    Compressed spine fracture    Dehydration    Immunodeficiency due to  chemotherapy    Secondary malignant neoplasm of bone    Hypokalemia    Palliative care encounter    Chalazion left upper eyelid    Hypocalcemia    Sacroiliitis    Poor appetite    Chronic neoplasm-related pain    Needs smoking cessation education    Psychophysiological insomnia         Current Outpatient Medications:     acyclovir (ZOVIRAX) 400 MG tablet, Take 1 tablet (400 mg total) by mouth 2 (two) times daily., Disp: 60 tablet, Rfl: 11    albuterol (VENTOLIN HFA) 90 mcg/actuation inhaler, Inhale 2 puffs into the lungs every 4 (four) hours as needed for Wheezing. Rescue, Disp: 18 g, Rfl: 1    ALPRAZolam (XANAX) 2 MG Tab, Take 1 tablet (2 mg total) by mouth 2 (two) times daily as needed (Anxiety)., Disp: 60 tablet, Rfl: 0    amlodipine-benazepril 2.5-10 mg (LOTREL) 2.5-10 mg per capsule, TAKE 1 CAPSULE BY MOUTH EVERY DAY, Disp: 90 capsule, Rfl: 3    aspirin 81 MG Chew, Take 1 tablet (81 mg total) by mouth once daily., Disp: 30 tablet, Rfl: 11    calcium-vitamin D 600 mg-10 mcg (400 unit) Tab, Take 1 tablet by mouth every 12 (twelve) hours., Disp: 60 tablet, Rfl: 2    celecoxib (CELEBREX) 100 MG capsule, Take 1 capsule (100 mg total) by mouth 2 (two) times daily., Disp: 60 capsule, Rfl: 1    dexAMETHasone (DECADRON) 4 MG Tab, Take 10 tablets (40 mg total) by mouth every 7 days. Take with food., Disp: 40 tablet, Rfl: 11    fluticasone propionate (FLONASE) 50 mcg/actuation nasal spray, 1 spray (50 mcg total) by Each Nostril route once daily., Disp: 1 mL, Rfl: 1    hydrOXYzine HCL (ATARAX) 25 MG tablet, Take 1 tablet (25 mg total) by mouth 3 (three) times daily as needed for Itching., Disp: 21 tablet, Rfl: 0    lenalidomide 10 mg Cap, TAKE 1 CAPSULE ONCE DAILY FOR 21 DAYS AND THEN 7 DAYS OFF, Disp: 21 capsule, Rfl: 0    levothyroxine (SYNTHROID) 100 MCG tablet, TAKE 1 TABLET(100 MCG) BY MOUTH BEFORE BREAKFAST, Disp: 90 tablet, Rfl: 1    linaCLOtide (LINZESS) 72 mcg Cap capsule, Take 2 capsules (144 mcg total) by mouth  before breakfast., Disp: 30 capsule, Rfl: 1    magnesium oxide (MAG-OX) 400 mg (241.3 mg magnesium) tablet, TAKE 1 TABLET(400 MG) BY MOUTH EVERY DAY, Disp: 90 tablet, Rfl: 1    metoprolol succinate (TOPROL-XL) 50 MG 24 hr tablet, Take 1 tablet (50 mg total) by mouth every evening., Disp: 90 tablet, Rfl: 3    naloxone (NARCAN) 4 mg/actuation [place], , Disp: , Rfl:     neomycin-polymyxin-dexamethasone (DEXACINE) 3.5 mg/g-10,000 unit/g-0.1 % Oint, Place into both eyes 3 (three) times daily., Disp: 3.5 g, Rfl: 0    nicotine (NICODERM CQ) 14 mg/24 hr, Place 1 patch onto the skin once daily., Disp: 14 patch, Rfl: 0    pantoprazole (PROTONIX) 40 MG tablet, TAKE 1 TABLET(40 MG) BY MOUTH EVERY DAY, Disp: 90 tablet, Rfl: 3    potassium phosphate, monobasic, (K-PHOS) 500 mg TbSO, Take 1 tablet (500 mg total) by mouth once daily., Disp: 30 tablet, Rfl: 11    potassium, sodium phosphates (PHOS-NAK) 280-160-250 mg PwPk, Take 1 packet by mouth 4 (four) times daily with meals and nightly., Disp: 4 packet, Rfl: 0    rosuvastatin (CRESTOR) 10 MG tablet, Take 1 tablet (10 mg total) by mouth every evening., Disp: 90 tablet, Rfl: 3    WIXELA INHUB 250-50 mcg/dose diskus inhaler, INHALE 1 PUFF INTO THE LUNGS TWICE DAILY, Disp: 180 each, Rfl: 1    k phos di & mono-sod phos mono (PHOSPHOROUS) 250 mg Tab, Take 1 tablet by mouth 4 (four) times daily with meals and nightly. for 5 days, Disp: 20 each, Rfl: 0    montelukast (SINGULAIR) 10 mg tablet, Take 1 tablet (10 mg total) by mouth every evening., Disp: 90 tablet, Rfl: 3    promethazine-dextromethorphan (PROMETHAZINE-DM) 6.25-15 mg/5 mL Syrp, Take 5 mLs by mouth every 4 (four) hours as needed (coughing)., Disp: 120 mL, Rfl: 0    Current Facility-Administered Medications:     dexAMETHasone injection 40 mg, 40 mg, Intravenous, 1 time in Clinic/HOD, Bri Luevano MD    Facility-Administered Medications Ordered in Other Visits:     lactated ringers infusion, , Intravenous, Continuous,  "Danny Matos III, MD      Objective:   /70 (BP Location: Right arm, Patient Position: Sitting)   Pulse 64   Temp 97.3 °F (36.3 °C) (Tympanic)   Resp 18   Ht 5' 4" (1.626 m)   Wt 59.5 kg (131 lb 2.8 oz)   SpO2 98%   BMI 22.52 kg/m²     Physical Exam             Physical Exam  Vitals and nursing note reviewed.   Constitutional:       General: She is awake. She is not in acute distress.     Appearance: Normal appearance. She is well-developed and well-groomed. She is not ill-appearing, toxic-appearing or diaphoretic.   HENT:      Head: Normocephalic and atraumatic.      Right Ear: External ear normal.      Left Ear: External ear normal.      Nose: No congestion or rhinorrhea.      Mouth/Throat:      Pharynx: No oropharyngeal exudate or posterior oropharyngeal erythema.   Eyes:      Conjunctiva/sclera: Conjunctivae normal.      Pupils: Pupils are equal, round, and reactive to light.   Cardiovascular:      Rate and Rhythm: Normal rate and regular rhythm.      Heart sounds: Normal heart sounds. No murmur heard.  Pulmonary:      Effort: Accessory muscle usage present. No tachypnea, bradypnea, prolonged expiration, respiratory distress or retractions.      Breath sounds: Normal breath sounds. No stridor, decreased air movement or transmitted upper airway sounds. No decreased breath sounds, wheezing, rhonchi or rales.   Abdominal:      General: Abdomen is flat. Bowel sounds are normal.      Palpations: Abdomen is soft.   Musculoskeletal:         General: No swelling, tenderness, deformity or signs of injury.      Cervical back: Normal range of motion and neck supple.      Right lower leg: No edema.      Left lower leg: No edema.   Skin:     General: Skin is warm and dry.      Capillary Refill: Capillary refill takes less than 2 seconds.   Neurological:      General: No focal deficit present.      Mental Status: She is alert and oriented to person, place, and time.      Gait: Gait normal.   Psychiatric:    "      Attention and Perception: Attention and perception normal.         Mood and Affect: Mood and affect normal.         Speech: Speech normal.         Behavior: Behavior normal. Behavior is cooperative.         Cognition and Memory: Cognition normal.          Assessment/Plan :   1. COPD with acute exacerbation  -     POCT COVID-19 Rapid Screening  -     POCT Influenza A/B Molecular  -     azithromycin (Z-JESIKA) 250 MG tablet; Take 2 tablets by mouth on day 1; Take 1 tablet by mouth on days 2-5  Dispense: 6 tablet; Refill: 0  -     Cancel: X-Ray Chest PA And Lateral; Future; Expected date: 12/23/2024  -     X-Ray Chest PA And Lateral; Future; Expected date: 12/23/2024  -     promethazine-dextromethorphan (PROMETHAZINE-DM) 6.25-15 mg/5 mL Syrp; Take 5 mLs by mouth every 4 (four) hours as needed (coughing).  Dispense: 120 mL; Refill: 0    2. Chronic allergic rhinitis  -     montelukast (SINGULAIR) 10 mg tablet; Take 1 tablet (10 mg total) by mouth every evening.  Dispense: 90 tablet; Refill: 3    3. Multiple myeloma, remission status unspecified    4. Hypopotassemia    5. Elevated random blood glucose level  -     HEMOGLOBIN A1C; Future; Expected date: 12/23/2024    Other orders  -     albuterol-ipratropium 2.5 mg-0.5 mg/3 mL nebulizer solution 3 mL  -     betamethasone acetate-betamethasone sodium phosphate injection 6 mg         Assessment & Plan    Evaluated respiratory symptoms, including shortness of breath, nasal congestion, and productive cough with purulent sputum  Considered possibility of pneumonia due to wet-sounding cough  Reviewed medication regimen, including steroid use and inhaler adherence  Assessed potassium levels, noting low potassium and associated risks  Planned to check A1C to screen for diabetes    SEVERE PERSISTENT ASTHMA:  - Started Singulair to be taken at night.  - Discontinued Zyrtec.  - Continued budesonide/formoterol (Symbicort) inhaler.  - Chest XR ordered to rule out pneumonia.  -  Breathing treatment ordered.    HYPOKALEMIA:  - Explained risks of low potassium, including muscle cramps, weakness, fatigue, irregular heartbeats, vomiting, increased thirst and urination, confusion, mental fog, numbness and tingling.  - Restarted potassium supplements.  - Potassium level check ordered.    LONG TERM USE OF SYSTEMIC STEROIDS:  - Discussed potential long-term effects of frequent steroid injections, including risk of osteopenia.  - Started prednisone.    GENERAL HEALTH RECOMMENDATIONS:  - Ana to keep walking around and moving, avoid staying in bed.  - Recommend starting a daily probiotic.    FOLLOW-UP:  - A1C test ordered.          No follow-ups on file.    This note was generated with the assistance of ambient listening technology. Verbal consent was obtained by the patient and accompanying visitor(s) for the recording of patient appointment to facilitate this note. I attest to having reviewed and edited the generated note for accuracy, though some syntax or spelling errors may persist. Please contact the author of this note for any clarification.       Answers submitted by the patient for this visit:  Cough Questionnaire (Submitted on 12/23/2024)  Chief Complaint: Cough  Onset: in the past 7 days  Frequency: every few minutes  Cough characteristics: productive of purulent sputum  chest pain: No  chills: Yes  ear pain: Yes  fever: Yes  nasal congestion: Yes  postnasal drip: Yes  rhinorrhea: Yes  shortness of breath: Yes  wheezing: Yes  Aggravated by: dust, fumes, pollens  bronchitis: Yes  COPD: Yes  Treatments tried: a beta-agonist inhaler, prescription cough suppressant, rest  Improvement on treatment: mild

## 2024-12-27 ENCOUNTER — TELEPHONE (OUTPATIENT)
Dept: INFUSION THERAPY | Facility: HOSPITAL | Age: 64
End: 2024-12-27
Payer: COMMERCIAL

## 2024-12-27 ENCOUNTER — PATIENT MESSAGE (OUTPATIENT)
Dept: HEMATOLOGY/ONCOLOGY | Facility: CLINIC | Age: 64
End: 2024-12-27
Payer: COMMERCIAL

## 2024-12-27 NOTE — ASSESSMENT & PLAN NOTE
BMBx : 60 % plasma cells - 4/6/2023  - SPEP/MECHE showed IgG lambda - monoclonal protein 3.19 g/dl - (3/27/2023).  - R-ISS stage I (beta 2 microglobulin 1.9, albumin 3.5, , normal karyotype and no high-risk mutations on fish panel  - PET-CT ( 4/10/2023) - showed extensive lytic lesions in the axial skeleton and mild L1 compression deformity.  - Started with Induction chemotherapy with VRD regimen (Velcde/Revlimid/Decadron) - 05/10/2023   - Started Aspirin daily p.o for thrombosis prophylaxis; Acyclovir 400 mg p.o BID for herpes Zoster prophylaxis .  __________________________________________________  --S/p Kyphoplasty 06/08/23    Pt seen by transplant team BMT.  However, she has declined transplant at this time   Repeat PET-CT on 1/18/24 with significantly decreased FDG uptake associated with the previously identified lesions and no new FDG avid lesions   BM Biopsy 02/13/2024 shows significant improvement, with the marrow now showing only 5% plasma cells ( was 60% at baseline)    Labs reviewed  Continue revlimid, dex utd  Will resend phosnak for low phosphorus   Repeat phos scheduled   zometa q 3 months,last dose 10/15/24    Ok to proceed darzalex today       RTC in 4 weeks with repeat labs for Darzalex

## 2024-12-30 ENCOUNTER — TELEPHONE (OUTPATIENT)
Dept: INFUSION THERAPY | Facility: HOSPITAL | Age: 64
End: 2024-12-30
Payer: COMMERCIAL

## 2024-12-30 NOTE — TELEPHONE ENCOUNTER
returned call rg r/s missed appt    ----- Message from Ryland sent at 12/27/2024  4:45 PM CST -----  Contact: Ana  Type:  Patient Returning Call    Who Called:Ana   Who Left Message for Patient: Hope  Does the patient know what this is regarding?: schedule infusions   Would the patient rather a call back or a response via [facility]?  Call back  Best Call Back Number: [phone]  Additional Information:     Thanks   Am

## 2025-01-06 ENCOUNTER — LAB VISIT (OUTPATIENT)
Dept: LAB | Facility: HOSPITAL | Age: 65
End: 2025-01-06
Payer: COMMERCIAL

## 2025-01-06 DIAGNOSIS — F41.9 ANXIETY: ICD-10-CM

## 2025-01-06 DIAGNOSIS — C90.00 MULTIPLE MYELOMA, REMISSION STATUS UNSPECIFIED: ICD-10-CM

## 2025-01-06 DIAGNOSIS — C90.00 MULTIPLE MYELOMA, REMISSION STATUS UNSPECIFIED: Primary | ICD-10-CM

## 2025-01-06 LAB
ALBUMIN SERPL BCP-MCNC: 4 G/DL (ref 3.5–5.2)
ALP SERPL-CCNC: 63 U/L (ref 40–150)
ALT SERPL W/O P-5'-P-CCNC: 42 U/L (ref 10–44)
ANION GAP SERPL CALC-SCNC: 13 MMOL/L (ref 8–16)
AST SERPL-CCNC: 46 U/L (ref 10–40)
BASOPHILS # BLD AUTO: 0.04 K/UL (ref 0–0.2)
BASOPHILS NFR BLD: 0.9 % (ref 0–1.9)
BILIRUB SERPL-MCNC: 1 MG/DL (ref 0.1–1)
BUN SERPL-MCNC: 15 MG/DL (ref 8–23)
CALCIUM SERPL-MCNC: 9.5 MG/DL (ref 8.7–10.5)
CHLORIDE SERPL-SCNC: 107 MMOL/L (ref 95–110)
CO2 SERPL-SCNC: 22 MMOL/L (ref 23–29)
CREAT SERPL-MCNC: 1 MG/DL (ref 0.5–1.4)
DIFFERENTIAL METHOD BLD: ABNORMAL
EOSINOPHIL # BLD AUTO: 0.1 K/UL (ref 0–0.5)
EOSINOPHIL NFR BLD: 2.3 % (ref 0–8)
ERYTHROCYTE [DISTWIDTH] IN BLOOD BY AUTOMATED COUNT: 13.2 % (ref 11.5–14.5)
EST. GFR  (NO RACE VARIABLE): >60 ML/MIN/1.73 M^2
GLUCOSE SERPL-MCNC: 104 MG/DL (ref 70–110)
HCT VFR BLD AUTO: 41.3 % (ref 37–48.5)
HGB BLD-MCNC: 13.2 G/DL (ref 12–16)
IMM GRANULOCYTES # BLD AUTO: 0.01 K/UL (ref 0–0.04)
IMM GRANULOCYTES NFR BLD AUTO: 0.2 % (ref 0–0.5)
LYMPHOCYTES # BLD AUTO: 2.1 K/UL (ref 1–4.8)
LYMPHOCYTES NFR BLD: 48.8 % (ref 18–48)
MAGNESIUM SERPL-MCNC: 1.7 MG/DL (ref 1.6–2.6)
MCH RBC QN AUTO: 31.8 PG (ref 27–31)
MCHC RBC AUTO-ENTMCNC: 32 G/DL (ref 32–36)
MCV RBC AUTO: 100 FL (ref 82–98)
MONOCYTES # BLD AUTO: 0.5 K/UL (ref 0.3–1)
MONOCYTES NFR BLD: 10.9 % (ref 4–15)
NEUTROPHILS # BLD AUTO: 1.6 K/UL (ref 1.8–7.7)
NEUTROPHILS NFR BLD: 36.9 % (ref 38–73)
NRBC BLD-RTO: 0 /100 WBC
PHOSPHATE SERPL-MCNC: 2.3 MG/DL (ref 2.7–4.5)
PLATELET # BLD AUTO: 226 K/UL (ref 150–450)
PMV BLD AUTO: 9.6 FL (ref 9.2–12.9)
POTASSIUM SERPL-SCNC: 3.7 MMOL/L (ref 3.5–5.1)
PROT SERPL-MCNC: 7.3 G/DL (ref 6–8.4)
RBC # BLD AUTO: 4.15 M/UL (ref 4–5.4)
SODIUM SERPL-SCNC: 142 MMOL/L (ref 136–145)
WBC # BLD AUTO: 4.3 K/UL (ref 3.9–12.7)

## 2025-01-06 PROCEDURE — 83521 IG LIGHT CHAINS FREE EACH: CPT

## 2025-01-06 PROCEDURE — 85025 COMPLETE CBC W/AUTO DIFF WBC: CPT

## 2025-01-06 PROCEDURE — 84165 PROTEIN E-PHORESIS SERUM: CPT

## 2025-01-06 PROCEDURE — 84100 ASSAY OF PHOSPHORUS: CPT

## 2025-01-06 PROCEDURE — 84165 PROTEIN E-PHORESIS SERUM: CPT | Mod: 26,,, | Performed by: PATHOLOGY

## 2025-01-06 PROCEDURE — 36415 COLL VENOUS BLD VENIPUNCTURE: CPT

## 2025-01-06 PROCEDURE — 83735 ASSAY OF MAGNESIUM: CPT

## 2025-01-06 PROCEDURE — 80053 COMPREHEN METABOLIC PANEL: CPT

## 2025-01-06 RX ORDER — ALPRAZOLAM 2 MG/1
TABLET ORAL
Qty: 60 TABLET | Refills: 0 | Status: SHIPPED | OUTPATIENT
Start: 2025-01-06 | End: 2025-02-05

## 2025-01-07 ENCOUNTER — INFUSION (OUTPATIENT)
Dept: INFUSION THERAPY | Facility: HOSPITAL | Age: 65
End: 2025-01-07
Attending: INTERNAL MEDICINE
Payer: COMMERCIAL

## 2025-01-07 ENCOUNTER — OFFICE VISIT (OUTPATIENT)
Dept: HEMATOLOGY/ONCOLOGY | Facility: CLINIC | Age: 65
End: 2025-01-07
Payer: COMMERCIAL

## 2025-01-07 VITALS
WEIGHT: 131.19 LBS | RESPIRATION RATE: 18 BRPM | TEMPERATURE: 98 F | BODY MASS INDEX: 22.4 KG/M2 | HEART RATE: 73 BPM | HEIGHT: 64 IN | SYSTOLIC BLOOD PRESSURE: 116 MMHG | OXYGEN SATURATION: 98 % | DIASTOLIC BLOOD PRESSURE: 72 MMHG

## 2025-01-07 DIAGNOSIS — C90.00 MULTIPLE MYELOMA NOT HAVING ACHIEVED REMISSION: Primary | Chronic | ICD-10-CM

## 2025-01-07 DIAGNOSIS — C90.00 MULTIPLE MYELOMA, REMISSION STATUS UNSPECIFIED: Primary | ICD-10-CM

## 2025-01-07 LAB
ALBUMIN SERPL ELPH-MCNC: 4.18 G/DL (ref 3.35–5.55)
ALPHA1 GLOB SERPL ELPH-MCNC: 0.29 G/DL (ref 0.17–0.41)
ALPHA2 GLOB SERPL ELPH-MCNC: 0.78 G/DL (ref 0.43–0.99)
B-GLOBULIN SERPL ELPH-MCNC: 0.77 G/DL (ref 0.5–1.1)
GAMMA GLOB SERPL ELPH-MCNC: 0.78 G/DL (ref 0.67–1.58)
KAPPA LC SER QL IA: 1.05 MG/DL (ref 0.33–1.94)
KAPPA LC/LAMBDA SER IA: 0.98 (ref 0.26–1.65)
LAMBDA LC SER QL IA: 1.07 MG/DL (ref 0.57–2.63)
PATHOLOGIST INTERPRETATION SPE: NORMAL
PROT SERPL-MCNC: 6.8 G/DL (ref 6–8.4)

## 2025-01-07 PROCEDURE — 63600175 PHARM REV CODE 636 W HCPCS: Mod: JZ,TB

## 2025-01-07 PROCEDURE — 96401 CHEMO ANTI-NEOPL SQ/IM: CPT

## 2025-01-07 RX ORDER — DIPHENHYDRAMINE HYDROCHLORIDE 50 MG/ML
50 INJECTION INTRAMUSCULAR; INTRAVENOUS ONCE AS NEEDED
Status: CANCELLED | OUTPATIENT
Start: 2025-01-07

## 2025-01-07 RX ORDER — HEPARIN 100 UNIT/ML
500 SYRINGE INTRAVENOUS
Status: CANCELLED | OUTPATIENT
Start: 2025-01-07

## 2025-01-07 RX ORDER — EPINEPHRINE 0.3 MG/.3ML
0.3 INJECTION SUBCUTANEOUS ONCE AS NEEDED
Status: CANCELLED | OUTPATIENT
Start: 2025-01-07

## 2025-01-07 RX ORDER — PROCHLORPERAZINE EDISYLATE 5 MG/ML
10 INJECTION INTRAMUSCULAR; INTRAVENOUS ONCE AS NEEDED
Status: CANCELLED | OUTPATIENT
Start: 2025-01-07

## 2025-01-07 RX ORDER — SODIUM CHLORIDE 0.9 % (FLUSH) 0.9 %
10 SYRINGE (ML) INJECTION
Status: CANCELLED | OUTPATIENT
Start: 2025-01-07

## 2025-01-07 RX ADMIN — DARATUMUMAB AND HYALURONIDASE-FIHJ (HUMAN RECOMBINANT) 1800 MG: 1800; 30000 INJECTION SUBCUTANEOUS at 09:01

## 2025-01-07 NOTE — NURSING
0926: Darzalex Injection given without difficulties.Bandaid applied. Patient instructed to stay in the clinic for 15 minutes. Patient verbalized understanding and will notify nurse with any complaints.

## 2025-01-07 NOTE — PROGRESS NOTES
Subjective:     Patient ID:?Ana Bonilla is a 64 y.o. female.?? MR#: [id number]   ?   PRIMARY ONCOLOGIST: -->  ?   CHIEF COMPLAINT: lab review/assessment for chemo/MM ?????   ?   ONCOLOGIC DIAGNOSIS: Multiple Myeloma  ?   CURRENT TREATMENT: OP Myeloma KIA-RD daratumumab lenalidomide dexAMETHasone Q4W     The patient location is: [place]  The chief complaint leading to consultation is: follow-up    Visit type: audiovisual    Face to Face time with patient: 15    20 minutes of total time spent on the encounter, which includes face to face time and non-face to face time preparing to see the patient (eg, review of tests), Obtaining and/or reviewing separately obtained history, Documenting clinical information in the electronic or other health record, Independently interpreting results (not separately reported) and communicating results to the patient/family/caregiver, or Care coordination (not separately reported).         Each patient to whom he or she provides medical services by telemedicine is:  (1) informed of the relationship between the physician and patient and the respective role of any other health care provider with respect to management of the patient; and (2) notified that he or she may decline to receive medical services by telemedicine and may withdraw from such care at any time.    Notes:    HPI  Ms. Bonilla is a 64-year-old  female with past medical history significant for hypertension, COPD, asthma, GERD, hypothyroidism here for follow-up and management of multiple myeloma. BMBx on 4/6/2023 showed 60 % plasma cells. PET-CT on 4/10/2023 showed extensive lytic bony lesions throughout the spine, sternum, bilateral ribs, pelvis and mild compression deformity in L1 vertebral body. SPEP/MECHE on 3/27/2023 showed IgG lambda monoclonal protein - 3.19 g/dl. Quantitative immunoglobulins on 3/27/2023 showed IgG 4,521 mg/dl. UPEP/MECHE and FLC were pending at that time. Labs on 3/27/2023 showed Beta  2 microglobulin 1.9, .  Labs on 4/19/2023 showed Hb, 11.5, WBC 6.3, platelets, BUN 11, Cr 0.9, calcium 9. Pt initiated on VRD 05/10/23. Treatment planned changed to D-VRD 07/06/23.     Interval History: Pt presents for lab review and assessment prior to Darzalex.  She states overall she is feeling well and even notes in improvement in back pain. Pt notes she is getting over cold seen by PCP and treated with abx for which she has since completed-- runny nose continues--rec trial antihistamine for relief. She notes she has held revlimid d/t cold and abx tx but will start back today. She has also recently restarted on sugar-free gummie MVI. Denies n/v/d/c, fever, chills, sob, cp, abnormal bleeding.   Oncology History   Multiple myeloma   4/20/2023 Initial Diagnosis    Multiple myeloma     5/10/2023 - 6/28/2023 Chemotherapy    Treatment Summary   Plan Name: OP VRD - WEEKLY BORTEZOMIB LENALIDOMIDE DEXAMETHASONE Q3W  Treatment Goal: Palliative  Status: Inactive  Start Date: 5/10/2023  End Date: 6/28/2023  Provider: Abram Velasquez MD  Chemotherapy: bortezomib (VELCADE) injection 2 mg, 1.3 mg/m2 = 2 mg, Subcutaneous, Clinic/[facility] 1 time, 2 of 6 cycles  Administration: 2 mg (5/10/2023), 2 mg (5/17/2023), 2 mg (6/14/2023), 2 mg (5/31/2023), 2 mg (6/21/2023), 2 mg (6/28/2023)  lenalidomide 25 mg Cap, 25 mg, Oral, Daily, 1 of 1 cycle, Start date: 5/10/2023, End date: 5/17/2023 6/28/2023 Cancer Staged    Staging form: Plasma Cell Myeloma and Plasma Cell Disorders, AJCC 8th Edition  - Clinical stage from 6/28/2023: RISS Stage II (Beta-2-microglobulin (mg/L): 1.9, Albumin (g/dL): 3, ISS: Stage II, High-risk cytogenetics: Absent, LDH: Normal)     7/6/2023 - 1/3/2024 Chemotherapy    Treatment Summary   Plan Name: OP D-VRD DARATUMUMAB + BORTEZOMIB LENALIDOMIDE DEXAMETHASONE  Treatment Goal: Palliative  Status: Inactive  Start Date: 7/6/2023  End Date: 1/3/2024  Provider: Oscar Márquez MD  Chemotherapy: bortezomib  (VELCADE) injection 2 mg, 1.3 mg/m2 = 2 mg, Subcutaneous, Clinic/[facility] 1 time, 6 of 6 cycles  Administration: 2 mg (7/6/2023), 2 mg (7/12/2023), 2 mg (8/2/2023), 2 mg (8/9/2023), 2.25 mg (10/18/2023), 2 mg (7/19/2023), 2 mg (7/26/2023), 2 mg (8/16/2023), 2.25 mg (9/20/2023), 2.25 mg (9/27/2023), 2.25 mg (10/25/2023), 2.25 mg (11/1/2023), 2.25 mg (11/8/2023), 2.25 mg (12/6/2023), 2.25 mg (1/3/2024), 2.25 mg (11/15/2023), 2.25 mg (11/22/2023), 2.25 mg (11/29/2023), 2.25 mg (12/13/2023), 2.25 mg (12/20/2023), 2.25 mg (12/26/2023)  lenalidomide 10 mg Cap, 1 capsule (100 % of original dose 1 capsule), Oral, Daily, 1 of 1 cycle, Start date: 7/26/2023, End date: 8/2/2023  Dose modification: 1 capsule (original dose 1 capsule, Cycle 0)  daratumumab-hyaluronidase-fij subcutaneous injection 1,800 mg, 1,800 mg (100 % of original dose 1,800 mg), Subcutaneous, Clinic/[facility] 1 time, 6 of 6 cycles  Dose modification: 1,800 mg (original dose 1,800 mg, Cycle 1), 1,800 mg (original dose 1,800 mg, Cycle 1)  Administration: 1,800 mg (7/6/2023), 1,800 mg (7/12/2023), 1,800 mg (7/19/2023), 1,800 mg (7/26/2023), 1,800 mg (8/2/2023), 1,800 mg (8/9/2023), 1,800 mg (8/16/2023), 1,800 mg (9/27/2023), 1,800 mg (10/18/2023), 1,800 mg (11/1/2023), 1,800 mg (11/15/2023), 1,800 mg (11/29/2023), 1,800 mg (12/13/2023), 1,800 mg (12/26/2023)     4/30/2024 -  Chemotherapy    Treatment Summary   Plan Name: OP Myeloma KIA-RD daratumumab lenalidomide dexAMETHasone Q4W  Treatment Goal: Palliative  Status: Active  Start Date: 4/30/2024  End Date: 3/11/2025 (Planned)  Provider: Bri Luevano MD  Chemotherapy: daratumumab-hyaluronidase-fij subcutaneous injection 1,800 mg, 1,800 mg, Subcutaneous, Clinic[facility] 1 time, 8 of 12 cycles  Administration: 1,800 mg (4/30/2024), 1,800 mg (5/7/2024), 1,800 mg (5/21/2024), 1,800 mg (5/28/2024), 1,800 mg (6/4/2024), 1,800 mg (6/25/2024), 1,800 mg (7/9/2024), 1,800 mg (10/29/2024), 1,800 mg (5/14/2024), 1,800 mg  (6/11/2024), 1,800 mg (6/18/2024), 1,800 mg (7/30/2024), 1,800 mg (8/13/2024), 1,800 mg (8/27/2024), 1,800 mg (9/10/2024), 1,800 mg (9/24/2024), 1,800 mg (10/15/2024), 1,800 mg (11/26/2024)        Social History     Socioeconomic History    Marital status:     Number of children: 2   Occupational History     Employer: [employer]   Tobacco Use    Smoking status: Every Day     Current packs/day: 0.50     Average packs/day: 0.5 packs/day for 50.0 years (25.0 ttl pk-yrs)     Types: Cigarettes     Passive exposure: Never    Smokeless tobacco: Never   Substance and Sexual Activity    Alcohol use: Not Currently     Comment: quit    Drug use: No    Sexual activity: Not Currently     Partners: Male     Social Drivers of Health     Financial Resource Strain: Patient Declined (11/21/2024)    Overall Financial Resource Strain (CARDIA)     Difficulty of Paying Living Expenses: Patient declined   Food Insecurity: Patient Declined (11/21/2024)    Hunger Vital Sign     Worried About Running Out of Food in the Last Year: Patient declined     Ran Out of Food in the Last Year: Patient declined   Transportation Needs: No Transportation Needs (11/15/2023)    PRAPARE - Transportation     Lack of Transportation (Medical): No     Lack of Transportation (Non-Medical): No   Physical Activity: Sufficiently Active (11/21/2024)    Exercise Vital Sign     Days of Exercise per Week: 3 days     Minutes of Exercise per Session: 60 min   Stress: Stress Concern Present (11/15/2023)    [employer] - Occupational Stress Questionnaire     Feeling of Stress : To some extent   Housing Stability: Low Risk  (11/15/2023)    Housing Stability Vital Sign     Unable to Pay for Housing in the Last Year: No     Number of Places Lived in the Last Year: 1     Unstable Housing in the Last Year: No      Family History   Problem Relation Name Age of Onset    Hypertension Mother Vivian     Diabetes Mother Vivian     Asthma Mother Vivian              Diabetes Father      Asthma Father      Stroke Maternal Grandmother  grandmother     Prostate cancer Maternal Grandfather      Mental illness Son Lukasz Garcia     Pancreatic cancer Maternal Uncle      Mental illness Other Pat side     Pancreatic cancer Other Mat side     Ovarian cancer Maternal Cousin        Past Surgical History:   Procedure Laterality Date    APPENDECTOMY      BIOPSY N/A 2023    Procedure: BIOPSY;  Surgeon: Fausto Smith MD;  Location: [facility] OR;  Service: Neurosurgery;  Laterality: N/A;  L1    BUNIONECTOMY      COLONOSCOPY N/A 2019    Procedure: COLONOSCOPY;  Surgeon: Danny Matos III, MD;  Location: [facility] ENDO;  Service: Endoscopy;  Laterality: N/A;    COLONOSCOPY N/A 10/24/2022    Procedure: COLONOSCOPY;  Surgeon: Danny Matos III, MD;  Location: [facility] ENDO;  Service: Endoscopy;  Laterality: N/A;    ESOPHAGOGASTRODUODENOSCOPY N/A 10/24/2022    Procedure: EGD (ESOPHAGOGASTRODUODENOSCOPY);  Surgeon: Danny Matos III, MD;  Location: [facility] ENDO;  Service: Endoscopy;  Laterality: N/A;    FIXATION KYPHOPLASTY Bilateral 2023    Procedure: KYPHOPLASTY;  Surgeon: Fausto Smith MD;  Location: [facility] OR;  Service: Neurosurgery;  Laterality: Bilateral;  kyphoplasty and radiofrequency ablation - L1    HYSTERECTOMY      PARTIAL//still with ovaries    neck fusion  2017    THYROIDECTOMY      TUBAL LIGATION          Review of Systems   Constitutional:  Negative for activity change, appetite change, chills, fatigue, fever and unexpected weight change.   HENT:  Positive for rhinorrhea. Negative for congestion, dental problem, mouth sores and nosebleeds.    Eyes:  Negative for visual disturbance.   Respiratory:  Negative for cough, choking and chest tightness.    Cardiovascular:  Negative for chest pain, palpitations and leg swelling.   Gastrointestinal:  Negative for abdominal distention, abdominal pain, anal bleeding, blood in stool,  constipation, diarrhea, nausea and vomiting.   Endocrine: Negative.    Genitourinary:  Negative for dysuria, frequency, hematuria and urgency.   Musculoskeletal:  Negative for arthralgias, back pain, gait problem, joint swelling and myalgias.   Skin:  Negative for rash and wound.   Allergic/Immunologic: Negative for immunocompromised state.   Neurological:  Negative for dizziness, light-headedness, numbness and headaches.   Hematological:  Negative for adenopathy. Does not bruise/bleed easily.   Psychiatric/Behavioral:  The patient is not nervous/anxious.        ?   A comprehensive 14-point review of systems was reviewed with patient and was negative other than as specified above.   ?     Objective:      Physical Exam  Vitals reviewed: virtual visit.           ?   There were no vitals filed for this visit.     ?       ?   Laboratory:  ?   No visits with results within 1 Day(s) from this visit.   Latest known visit with results is:   Lab Visit on 01/06/2025   Component Date Value Ref Range Status    WBC 01/06/2025 4.30  3.90 - 12.70 K/uL Final    RBC 01/06/2025 4.15  4.00 - 5.40 M/uL Final    Hemoglobin 01/06/2025 13.2  12.0 - 16.0 g/dL Final    Hematocrit 01/06/2025 41.3  37.0 - 48.5 % Final    MCV 01/06/2025 100 (H)  82 - 98 fL Final    MCH 01/06/2025 31.8 (H)  27.0 - 31.0 pg Final    MCHC 01/06/2025 32.0  32.0 - 36.0 g/dL Final    RDW 01/06/2025 13.2  11.5 - 14.5 % Final    Platelets 01/06/2025 226  150 - 450 K/uL Final    MPV 01/06/2025 9.6  9.2 - 12.9 fL Final    Immature Granulocytes 01/06/2025 0.2  0.0 - 0.5 % Final    Gran # (ANC) 01/06/2025 1.6 (L)  1.8 - 7.7 K/uL Final    Immature Grans (Abs) 01/06/2025 0.01  0.00 - 0.04 K/uL Final    Lymph # 01/06/2025 2.1  1.0 - 4.8 K/uL Final    Mono # 01/06/2025 0.5  0.3 - 1.0 K/uL Final    Eos # 01/06/2025 0.1  0.0 - 0.5 K/uL Final    Baso # 01/06/2025 0.04  0.00 - 0.20 K/uL Final    nRBC 01/06/2025 0  0 /100 WBC Final    Gran % 01/06/2025 36.9 (L)  38.0 - 73.0 % Final     Lymph % 01/06/2025 48.8 (H)  18.0 - 48.0 % Final    Mono % 01/06/2025 10.9  4.0 - 15.0 % Final    Eosinophil % 01/06/2025 2.3  0.0 - 8.0 % Final    Basophil % 01/06/2025 0.9  0.0 - 1.9 % Final    Differential Method 01/06/2025 Automated   Final    Sodium 01/06/2025 142  136 - 145 mmol/L Final    Potassium 01/06/2025 3.7  3.5 - 5.1 mmol/L Final    Chloride 01/06/2025 107  95 - 110 mmol/L Final    CO2 01/06/2025 22 (L)  23 - 29 mmol/L Final    Glucose 01/06/2025 104  70 - 110 mg/dL Final    BUN 01/06/2025 15  8 - 23 mg/dL Final    Creatinine 01/06/2025 1.0  0.5 - 1.4 mg/dL Final    Calcium 01/06/2025 9.5  8.7 - 10.5 mg/dL Final    Total Protein 01/06/2025 7.3  6.0 - 8.4 g/dL Final    Albumin 01/06/2025 4.0  3.5 - 5.2 g/dL Final    Total Bilirubin 01/06/2025 1.0  0.1 - 1.0 mg/dL Final    Alkaline Phosphatase 01/06/2025 63  40 - 150 U/L Final    AST 01/06/2025 46 (H)  10 - 40 U/L Final    ALT 01/06/2025 42  10 - 44 U/L Final    eGFR 01/06/2025 >60  >60 mL/min/1.73 m^2 Final    Anion Gap 01/06/2025 13  8 - 16 mmol/L Final    Protein, Serum 01/06/2025 6.8  6.0 - 8.4 g/dL Final    Magnesium 01/06/2025 1.7  1.6 - 2.6 mg/dL Final    Phosphorus 01/06/2025 2.3 (L)  2.7 - 4.5 mg/dL Final      ?   Imaging:    No results found. However, due to the size of the patient record, not all encounters were searched. Please check Results Review for a complete set of results.       No results found. However, due to the size of the patient record, not all encounters were searched. Please check Results Review for a complete set of results.           ?   Assessment/Plan:     Problem List Items Addressed This Visit       Multiple myeloma - Primary (Chronic)     BMBx : 60 % plasma cells - 4/6/2023  - SPEP/MECHE showed IgG lambda - monoclonal protein 3.19 g/dl - (3/27/2023).  - R-ISS stage I (beta 2 microglobulin 1.9, albumin 3.5, , normal karyotype and no high-risk mutations on fish panel  - PET-CT ( 4/10/2023) - showed extensive lytic  lesions in the axial skeleton and mild L1 compression deformity.  - Started with Induction chemotherapy with VRD regimen (Velcde/Revlimid/Decadron) - 05/10/2023   - Started Aspirin daily p.o for thrombosis prophylaxis; Acyclovir 400 mg p.o BID for herpes Zoster prophylaxis .  __________________________________________________  --S/p Kyphoplasty 06/08/23    Pt seen by transplant team BMT.  However, she has declined transplant at this time   Repeat PET-CT on 1/18/24 with significantly decreased FDG uptake associated with the previously identified lesions and no new FDG avid lesions   BM Biopsy 02/13/2024 shows significant improvement, with the marrow now showing only 5% plasma cells ( was 60% at baseline)    Labs reviewed, no concerning findings   Continue revlimid, dex utd  zometa q 3 months,last dose 10/15/24--will schedule with next tx    Ok to proceed darzalex today     RTC in 4 weeks with repeat labs for Darzalex/zometa             Relevant Orders    CBC Auto Differential    Comprehensive Metabolic Panel    Magnesium    Phosphorus           Med Onc Chart Routing      Follow up with physician 4 weeks. with labs prior for darzalex/zometa   Follow up with WERNER    Infusion scheduling note   darzalex/zometa   Injection scheduling note    Labs CBC, CMP, magnesium and phosphorus   Scheduling:  Preferred lab:  Lab interval:     Imaging    Pharmacy appointment    Other referrals                     TONYA Pool  Hematology/Oncology

## 2025-01-07 NOTE — ASSESSMENT & PLAN NOTE
BMBx : 60 % plasma cells - 4/6/2023  - SPEP/MECHE showed IgG lambda - monoclonal protein 3.19 g/dl - (3/27/2023).  - R-ISS stage I (beta 2 microglobulin 1.9, albumin 3.5, , normal karyotype and no high-risk mutations on fish panel  - PET-CT ( 4/10/2023) - showed extensive lytic lesions in the axial skeleton and mild L1 compression deformity.  - Started with Induction chemotherapy with VRD regimen (Velcde/Revlimid/Decadron) - 05/10/2023   - Started Aspirin daily p.o for thrombosis prophylaxis; Acyclovir 400 mg p.o BID for herpes Zoster prophylaxis .  __________________________________________________  --S/p Kyphoplasty 06/08/23    Pt seen by transplant team BMT.  However, she has declined transplant at this time   Repeat PET-CT on 1/18/24 with significantly decreased FDG uptake associated with the previously identified lesions and no new FDG avid lesions   BM Biopsy 02/13/2024 shows significant improvement, with the marrow now showing only 5% plasma cells ( was 60% at baseline)    Labs reviewed, no concerning findings   Continue revlimid, dex utd  zometa q 3 months,last dose 10/15/24--will schedule with next tx    Ok to proceed darzalex today     RTC in 4 weeks with repeat labs for Darzalex/zometa

## 2025-01-07 NOTE — DISCHARGE INSTRUCTIONS
Thank you for allowing me to care for you today,  MIS LeachN, RN    [facility]  [address] [address]  [phone] phone     [phone] fax  Hours of Operation: Monday- Friday 7:00am- 5:30pm  After hours phone  [phone]  Hematology / Oncology Physicians on call      JUANI Chaudhary Dr., Dr., Dr., Dr., Dr., Dr., Dr., Dr., Dr., Dr., Dr., Dr., Dr., Dr., Dr., BELLE Mckeon, BELLE Deluna, BELLE Page, NP  Please call with any concerns regarding your appointment today.   FALL PREVENTION   Falls often occur due to slipping, tripping or losing your balance. Here are ways to reduce your risk of falling again.   Was there anything that caused your fall that can be fixed, removed or replaced?   Make your home safe by keeping walkways clear of objects you may trip over.   Use non-slip pads under rugs.   Do not walk in poorly lit areas.   Do not stand on chairs or wobbly ladders.   Use caution when reaching overhead or looking upward. This position can cause a loss of balance.   Be sure your shoes fit properly, have non-slip bottoms and are in good condition.   Be cautious when going up and down stairs, curbs, and when walking on uneven sidewalks.   If your balance is poor, consider using a cane or walker.   If your fall was related to alcohol use, stop or limit alcohol intake.   If your fall was related to use of sleeping medicines, talk to your doctor about this. You may need to reduce your dosage at bedtime if you awaken during the night to go to the bathroom.   To reduce the need for nighttime bathroom trips:   Avoid drinking fluids for several hours before going to bed   Empty your bladder before going to bed   Men can keep a urinal at the bedside   © [id number] [employer], [address] [place] [address], [place] [address]. All rights reserved. This information is not intended as a  substitute for professional medical care. Always follow your healthcare professional's instructions.

## 2025-01-12 DIAGNOSIS — C90.00 MULTIPLE MYELOMA, REMISSION STATUS UNSPECIFIED: ICD-10-CM

## 2025-01-13 RX ORDER — LENALIDOMIDE 10 MG/1
CAPSULE ORAL
Qty: 21 CAPSULE | Refills: 0 | Status: SHIPPED | OUTPATIENT
Start: 2025-01-13

## 2025-01-22 ENCOUNTER — PATIENT MESSAGE (OUTPATIENT)
Dept: HEMATOLOGY/ONCOLOGY | Facility: CLINIC | Age: 65
End: 2025-01-22
Payer: COMMERCIAL

## 2025-01-25 ENCOUNTER — PATIENT MESSAGE (OUTPATIENT)
Dept: HEMATOLOGY/ONCOLOGY | Facility: CLINIC | Age: 65
End: 2025-01-25
Payer: COMMERCIAL

## 2025-02-03 ENCOUNTER — HOSPITAL ENCOUNTER (OUTPATIENT)
Dept: RADIOLOGY | Facility: HOSPITAL | Age: 65
Discharge: HOME | End: 2025-02-03
Attending: PHYSICIAN ASSISTANT
Payer: COMMERCIAL

## 2025-02-03 DIAGNOSIS — M25.559 HIP PAIN, UNSPECIFIED LATERALITY: ICD-10-CM

## 2025-02-03 PROCEDURE — 73502 X-RAY EXAM HIP UNI 2-3 VIEWS: CPT | Mod: 26,RT,, | Performed by: RADIOLOGY

## 2025-02-03 PROCEDURE — 73502 X-RAY EXAM HIP UNI 2-3 VIEWS: CPT | Mod: TC,RT

## 2025-02-04 ENCOUNTER — INFUSION (OUTPATIENT)
Dept: INFUSION THERAPY | Facility: HOSPITAL | Age: 65
End: 2025-02-04
Attending: INTERNAL MEDICINE
Payer: COMMERCIAL

## 2025-02-04 ENCOUNTER — OFFICE VISIT (OUTPATIENT)
Dept: HEMATOLOGY/ONCOLOGY | Facility: CLINIC | Age: 65
End: 2025-02-04
Payer: COMMERCIAL

## 2025-02-04 VITALS
BODY MASS INDEX: 21.86 KG/M2 | WEIGHT: 128.06 LBS | OXYGEN SATURATION: 99 % | HEART RATE: 79 BPM | TEMPERATURE: 98 F | DIASTOLIC BLOOD PRESSURE: 70 MMHG | HEIGHT: 64 IN | RESPIRATION RATE: 18 BRPM | SYSTOLIC BLOOD PRESSURE: 109 MMHG

## 2025-02-04 DIAGNOSIS — E83.39 HYPOPHOSPHATEMIA: ICD-10-CM

## 2025-02-04 DIAGNOSIS — C90.00 MULTIPLE MYELOMA NOT HAVING ACHIEVED REMISSION: ICD-10-CM

## 2025-02-04 DIAGNOSIS — C90.00 MULTIPLE MYELOMA, REMISSION STATUS UNSPECIFIED: Primary | ICD-10-CM

## 2025-02-04 DIAGNOSIS — C79.51 SECONDARY MALIGNANT NEOPLASM OF BONE: ICD-10-CM

## 2025-02-04 PROCEDURE — 96401 CHEMO ANTI-NEOPL SQ/IM: CPT

## 2025-02-04 PROCEDURE — 63600175 PHARM REV CODE 636 W HCPCS: Mod: JZ,TB

## 2025-02-04 RX ORDER — PROCHLORPERAZINE EDISYLATE 5 MG/ML
10 INJECTION INTRAMUSCULAR; INTRAVENOUS ONCE AS NEEDED
Status: CANCELLED | OUTPATIENT
Start: 2025-02-04

## 2025-02-04 RX ORDER — DIPHENHYDRAMINE HYDROCHLORIDE 50 MG/ML
50 INJECTION INTRAMUSCULAR; INTRAVENOUS ONCE AS NEEDED
Status: CANCELLED | OUTPATIENT
Start: 2025-02-04

## 2025-02-04 RX ORDER — HEPARIN 100 UNIT/ML
500 SYRINGE INTRAVENOUS
Status: CANCELLED | OUTPATIENT
Start: 2025-02-04

## 2025-02-04 RX ORDER — SODIUM CHLORIDE 0.9 % (FLUSH) 0.9 %
10 SYRINGE (ML) INJECTION
Status: CANCELLED | OUTPATIENT
Start: 2025-02-04

## 2025-02-04 RX ORDER — EPINEPHRINE 0.3 MG/.3ML
0.3 INJECTION SUBCUTANEOUS ONCE AS NEEDED
Status: CANCELLED | OUTPATIENT
Start: 2025-02-04

## 2025-02-04 RX ADMIN — DARATUMUMAB AND HYALURONIDASE-FIHJ (HUMAN RECOMBINANT) 1800 MG: 1800; 30000 INJECTION SUBCUTANEOUS at 10:02

## 2025-02-04 NOTE — PROGRESS NOTES
Subjective:     Patient ID:?Ana Bonilla is a 64 y.o. female.?? MR#: [id number]   ?   PRIMARY ONCOLOGIST: -->  ?   CHIEF COMPLAINT: lab review/assessment for chemo/MM ?????   ?   ONCOLOGIC DIAGNOSIS: Multiple Myeloma  ?   CURRENT TREATMENT: OP Myeloma KIA-RD daratumumab lenalidomide dexAMETHasone Q4W     The patient location is: [place]  The chief complaint leading to consultation is: follow-up    Visit type: audiovisual    Face to Face time with patient: 15    20 minutes of total time spent on the encounter, which includes face to face time and non-face to face time preparing to see the patient (eg, review of tests), Obtaining and/or reviewing separately obtained history, Documenting clinical information in the electronic or other health record, Independently interpreting results (not separately reported) and communicating results to the patient/family/caregiver, or Care coordination (not separately reported).         Each patient to whom he or she provides medical services by telemedicine is:  (1) informed of the relationship between the physician and patient and the respective role of any other health care provider with respect to management of the patient; and (2) notified that he or she may decline to receive medical services by telemedicine and may withdraw from such care at any time.    Notes:    HPI  Ms. Bonilla is a 64-year-old  female with past medical history significant for hypertension, COPD, asthma, GERD, hypothyroidism here for follow-up and management of multiple myeloma. BMBx on 4/6/2023 showed 60 % plasma cells. PET-CT on 4/10/2023 showed extensive lytic bony lesions throughout the spine, sternum, bilateral ribs, pelvis and mild compression deformity in L1 vertebral body. SPEP/MECHE on 3/27/2023 showed IgG lambda monoclonal protein - 3.19 g/dl. Quantitative immunoglobulins on 3/27/2023 showed IgG 4,521 mg/dl. UPEP/MECHE and FLC were pending at that time. Labs on 3/27/2023 showed Beta  2 microglobulin 1.9, .  Labs on 4/19/2023 showed Hb, 11.5, WBC 6.3, platelets, BUN 11, Cr 0.9, calcium 9. Pt initiated on VRD 05/10/23. Treatment planned changed to D-VRD 07/06/23.     Interval History: Pt presents for lab review and assessment prior to Darzalex and zometa.  She states overall she is feeling well and even notes in improvement in back pain. She does notes intermittent cramping of hands. Denies n/v/d/c, fever, chills, sob, cp, abnormal bleeding.   Oncology History   Multiple myeloma   4/20/2023 Initial Diagnosis    Multiple myeloma     5/10/2023 - 6/28/2023 Chemotherapy    Treatment Summary   Plan Name: OP VRD - WEEKLY BORTEZOMIB LENALIDOMIDE DEXAMETHASONE Q3W  Treatment Goal: Palliative  Status: Inactive  Start Date: 5/10/2023  End Date: 6/28/2023  Provider: Abram Velasquez MD  Chemotherapy: bortezomib (VELCADE) injection 2 mg, 1.3 mg/m2 = 2 mg, Subcutaneous, Clinic/[facility] 1 time, 2 of 6 cycles  Administration: 2 mg (5/10/2023), 2 mg (5/17/2023), 2 mg (6/14/2023), 2 mg (5/31/2023), 2 mg (6/21/2023), 2 mg (6/28/2023)  lenalidomide 25 mg Cap, 25 mg, Oral, Daily, 1 of 1 cycle, Start date: 5/10/2023, End date: 5/17/2023 6/28/2023 Cancer Staged    Staging form: Plasma Cell Myeloma and Plasma Cell Disorders, AJCC 8th Edition  - Clinical stage from 6/28/2023: RISS Stage II (Beta-2-microglobulin (mg/L): 1.9, Albumin (g/dL): 3, ISS: Stage II, High-risk cytogenetics: Absent, LDH: Normal)     7/6/2023 - 1/3/2024 Chemotherapy    Treatment Summary   Plan Name: OP D-VRD DARATUMUMAB + BORTEZOMIB LENALIDOMIDE DEXAMETHASONE  Treatment Goal: Palliative  Status: Inactive  Start Date: 7/6/2023  End Date: 1/3/2024  Provider: Oscar Márquez MD  Chemotherapy: bortezomib (VELCADE) injection 2 mg, 1.3 mg/m2 = 2 mg, Subcutaneous, [facility] 1 time, 6 of 6 cycles  Administration: 2 mg (7/6/2023), 2 mg (7/12/2023), 2 mg (8/2/2023), 2 mg (8/9/2023), 2.25 mg (10/18/2023), 2 mg (7/19/2023), 2 mg (7/26/2023), 2 mg  (8/16/2023), 2.25 mg (9/20/2023), 2.25 mg (9/27/2023), 2.25 mg (10/25/2023), 2.25 mg (11/1/2023), 2.25 mg (11/8/2023), 2.25 mg (12/6/2023), 2.25 mg (1/3/2024), 2.25 mg (11/15/2023), 2.25 mg (11/22/2023), 2.25 mg (11/29/2023), 2.25 mg (12/13/2023), 2.25 mg (12/20/2023), 2.25 mg (12/26/2023)  lenalidomide 10 mg Cap, 1 capsule (100 % of original dose 1 capsule), Oral, Daily, 1 of 1 cycle, Start date: 7/26/2023, End date: 8/2/2023  Dose modification: 1 capsule (original dose 1 capsule, Cycle 0)  daratumumab-hyaluronidase-fihj subcutaneous injection 1,800 mg, 1,800 mg (100 % of original dose 1,800 mg), Subcutaneous, Clinic/[facility] 1 time, 6 of 6 cycles  Dose modification: 1,800 mg (original dose 1,800 mg, Cycle 1), 1,800 mg (original dose 1,800 mg, Cycle 1)  Administration: 1,800 mg (7/6/2023), 1,800 mg (7/12/2023), 1,800 mg (7/19/2023), 1,800 mg (7/26/2023), 1,800 mg (8/2/2023), 1,800 mg (8/9/2023), 1,800 mg (8/16/2023), 1,800 mg (9/27/2023), 1,800 mg (10/18/2023), 1,800 mg (11/1/2023), 1,800 mg (11/15/2023), 1,800 mg (11/29/2023), 1,800 mg (12/13/2023), 1,800 mg (12/26/2023)     4/30/2024 -  Chemotherapy    Treatment Summary   Plan Name: OP Myeloma KIA-RD daratumumab lenalidomide dexAMETHasone Q4W  Treatment Goal: Palliative  Status: Active  Start Date: 4/30/2024  End Date: 4/1/2025 (Planned)  Provider: Bri Luevano MD  Chemotherapy[facility] 1,800 mg, 1,800 mg, Subcutaneous, Clinic/[facility] 1 time, 11 of 12 cycles  Administration: 1,800 mg (4/30/2024), 1,800 mg (5/7/2024), 1,800 mg (5/21/2024), 1,800 mg (5/28/2024), 1,800 mg (6/4/2024), 1,800 mg (6/25/2024), 1,800 mg (7/9/2024), 1,800 mg (10/29/2024), 1,800 mg (5/14/2024), 1,800 mg (6/11/2024), 1,800 mg (6/18/2024), 1,800 mg (7/30/2024), 1,800 mg (8/13/2024), 1,800 mg (8/27/2024), 1,800 mg (9/10/2024), 1,800 mg (9/24/2024), 1,800 mg (10/15/2024), 1,800 mg (11/26/2024), 1,800 mg (1/7/2025), 1,800 mg (2/4/2025)        Social  History     Socioeconomic History    Marital status:     Number of children: 2   Occupational History     Employer: [employer]   Tobacco Use    Smoking status: Every Day     Current packs/day: 0.50     Average packs/day: 0.5 packs/day for 50.0 years (25.0 ttl pk-yrs)     Types: Cigarettes     Passive exposure: Never    Smokeless tobacco: Never   Substance and Sexual Activity    Alcohol use: Not Currently     Comment: quit    Drug use: No    Sexual activity: Not Currently     Partners: Male     Social Drivers of Health     Financial Resource Strain: Patient Declined (2024)    Overall Financial Resource Strain (CARDIA)     Difficulty of Paying Living Expenses: Patient declined   Food Insecurity: Patient Declined (2024)    Hunger Vital Sign     Worried About Running Out of Food in the Last Year: Patient declined     Ran Out of Food in the Last Year: Patient declined   Transportation Needs: No Transportation Needs (11/15/2023)    PRAPARE - Transportation     Lack of Transportation (Medical): No     Lack of Transportation (Non-Medical): No   Physical Activity: Sufficiently Active (2024)    Exercise Vital Sign     Days of Exercise per Week: 3 days     Minutes of Exercise per Session: 60 min   Stress: Stress Concern Present (11/15/2023)    [place] [employer] - Occupational Stress Questionnaire     Feeling of Stress : To some extent   Housing Stability: Low Risk  (11/15/2023)    Housing Stability Vital Sign     Unable to Pay for Housing in the Last Year: No     Number of Places Lived in the Last Year: 1     Unstable Housing in the Last Year: No      Family History   Problem Relation Name Age of Onset    Hypertension Mother Vivian     Diabetes Mother Vivian     Asthma Mother Vivian             Diabetes Father      Asthma Father      Stroke Maternal Grandmother  grandmother     Prostate cancer Maternal Grandfather      Mental illness Son Lukasz  Jose     Pancreatic cancer Maternal Uncle      Mental illness Other Pat side     Pancreatic cancer Other Mat side     Ovarian cancer Maternal Cousin        Past Surgical History:   Procedure Laterality Date    APPENDECTOMY      BIOPSY N/A 06/08/2023    Procedure: BIOPSY;  Surgeon: Fausto Smith MD;  Location: [facility] OR;  Service: Neurosurgery;  Laterality: N/A;  L1    BUNIONECTOMY      COLONOSCOPY N/A 12/04/2019    Procedure: COLONOSCOPY;  Surgeon: Danny Matso III, MD;  Location: [facility] ENDO;  Service: Endoscopy;  Laterality: N/A;    COLONOSCOPY N/A 10/24/2022    Procedure: COLONOSCOPY;  Surgeon: Danny Matos III, MD;  Location: [facility] ENDO;  Service: Endoscopy;  Laterality: N/A;    ESOPHAGOGASTRODUODENOSCOPY N/A 10/24/2022    Procedure: EGD (ESOPHAGOGASTRODUODENOSCOPY);  Surgeon: Danny Matos III, MD;  Location: [facility] ENDO;  Service: Endoscopy;  Laterality: N/A;    FIXATION KYPHOPLASTY Bilateral 06/08/2023    Procedure: KYPHOPLASTY;  Surgeon: Fausto Smith MD;  Location: [facility] OR;  Service: Neurosurgery;  Laterality: Bilateral;  kyphoplasty and radiofrequency ablation - L1    HYSTERECTOMY      PARTIAL//still with ovaries    neck fusion  08/2017    THYROIDECTOMY      TUBAL LIGATION          Review of Systems   Constitutional:  Negative for activity change, appetite change, chills, fatigue, fever and unexpected weight change.   HENT:  Negative for congestion, dental problem, mouth sores, nosebleeds and rhinorrhea.    Eyes:  Negative for visual disturbance.   Respiratory:  Negative for cough, choking and chest tightness.    Cardiovascular:  Negative for chest pain, palpitations and leg swelling.   Gastrointestinal:  Negative for abdominal distention, abdominal pain, anal bleeding, blood in stool, constipation, diarrhea, nausea and vomiting.   Endocrine: Negative.    Genitourinary:  Negative for dysuria, frequency, hematuria and urgency.   Musculoskeletal:  Negative for arthralgias, back pain, gait  problem, joint swelling and myalgias.   Skin:  Negative for rash and wound.   Allergic/Immunologic: Negative for immunocompromised state.   Neurological:  Negative for dizziness, light-headedness, numbness and headaches.   Hematological:  Negative for adenopathy. Does not bruise/bleed easily.   Psychiatric/Behavioral:  The patient is not nervous/anxious.        ?   A comprehensive 14-point review of systems was reviewed with patient and was negative other than as specified above.   ?     Objective:      Physical Exam  Vitals reviewed: virtual visit.   Neurological:      Mental Status: She is alert.           ?   There were no vitals filed for this visit.     ?       ?   Laboratory:  ?   No visits with results within 1 Day(s) from this visit.   Latest known visit with results is:   Lab Visit on 02/03/2025   Component Date Value Ref Range Status    WBC 02/03/2025 5.35  3.90 - 12.70 K/uL Final    RBC 02/03/2025 3.89 (L)  4.00 - 5.40 M/uL Final    Hemoglobin 02/03/2025 12.5  12.0 - 16.0 g/dL Final    Hematocrit 02/03/2025 39.2  37.0 - 48.5 % Final    MCV 02/03/2025 101 (H)  82 - 98 fL Final    MCH 02/03/2025 32.1 (H)  27.0 - 31.0 pg Final    MCHC 02/03/2025 31.9 (L)  32.0 - 36.0 g/dL Final    RDW 02/03/2025 13.5  11.5 - 14.5 % Final    Platelets 02/03/2025 278  150 - 450 K/uL Final    MPV 02/03/2025 9.0 (L)  9.2 - 12.9 fL Final    Immature Granulocytes 02/03/2025 0.2  0.0 - 0.5 % Final    Gran # (ANC) 02/03/2025 1.9  1.8 - 7.7 K/uL Final    Immature Grans (Abs) 02/03/2025 0.01  0.00 - 0.04 K/uL Final    Lymph # 02/03/2025 2.5  1.0 - 4.8 K/uL Final    Mono # 02/03/2025 0.8  0.3 - 1.0 K/uL Final    Eos # 02/03/2025 0.1  0.0 - 0.5 K/uL Final    Baso # 02/03/2025 0.06  0.00 - 0.20 K/uL Final    nRBC 02/03/2025 0  0 /100 WBC Final    Gran % 02/03/2025 35.2 (L)  38.0 - 73.0 % Final    Lymph % 02/03/2025 46.0  18.0 - 48.0 % Final    Mono % 02/03/2025 15.3 (H)  4.0 - 15.0 % Final    Eosinophil % 02/03/2025 2.2  0.0 - 8.0 %  Final    Basophil % 02/03/2025 1.1  0.0 - 1.9 % Final    Differential Method 02/03/2025 Automated   Final    Sodium 02/03/2025 145  136 - 145 mmol/L Final    Potassium 02/03/2025 3.7  3.5 - 5.1 mmol/L Final    Chloride 02/03/2025 114 (H)  95 - 110 mmol/L Final    CO2 02/03/2025 23  23 - 29 mmol/L Final    Glucose 02/03/2025 92  70 - 110 mg/dL Final    BUN 02/03/2025 8  8 - 23 mg/dL Final    Creatinine 02/03/2025 0.9  0.5 - 1.4 mg/dL Final    Calcium 02/03/2025 8.5 (L)  8.7 - 10.5 mg/dL Final    Total Protein 02/03/2025 6.5  6.0 - 8.4 g/dL Final    Albumin 02/03/2025 3.6  3.5 - 5.2 g/dL Final    Total Bilirubin 02/03/2025 0.9  0.1 - 1.0 mg/dL Final    Alkaline Phosphatase 02/03/2025 52  40 - 150 U/L Final    AST 02/03/2025 24  10 - 40 U/L Final    ALT 02/03/2025 33  10 - 44 U/L Final    eGFR 02/03/2025 >60  >60 mL/min/1.73 m^2 Final    Anion Gap 02/03/2025 8  8 - 16 mmol/L Final    Magnesium 02/03/2025 1.7  1.6 - 2.6 mg/dL Final    Phosphorus 02/03/2025 1.7 (L)  2.7 - 4.5 mg/dL Final      ?   Imaging:    No results found. However, due to the size of the patient record, not all encounters were searched. Please check Results Review for a complete set of results.       No results found. However, due to the size of the patient record, not all encounters were searched. Please check Results Review for a complete set of results.           ?   Assessment/Plan:     Problem List Items Addressed This Visit       Multiple myeloma - Primary (Chronic)     BMBx : 60 % plasma cells - 4/6/2023  - SPEP/MECHE showed IgG lambda - monoclonal protein 3.19 g/dl - (3/27/2023).  - R-ISS stage I (beta 2 microglobulin 1.9, albumin 3.5, , normal karyotype and no high-risk mutations on fish panel  - PET-CT ( 4/10/2023) - showed extensive lytic lesions in the axial skeleton and mild L1 compression deformity.  - Started with Induction chemotherapy with VRD regimen (Velcde/Revlimid/Decadron) - 05/10/2023   - Started Aspirin daily p.o for  thrombosis prophylaxis; Acyclovir 400 mg p.o BID for herpes Zoster prophylaxis .  __________________________________________________  --S/p Kyphoplasty 06/08/23    Pt seen by transplant team BMT.  However, she has declined transplant at this time   Repeat PET-CT on 1/18/24 with significantly decreased FDG uptake associated with the previously identified lesions and no new FDG avid lesions   BM Biopsy 02/13/2024 shows significant improvement, with the marrow now showing only 5% plasma cells ( was 60% at baseline)    Labs reviewed, no concerning findings   Continue revlimid, dex utd  Hold zometa today for low Ca  Take PhosNak utd    Ok to proceed darzalex today     RTC in 4 weeks with repeat labs for Darzalex/zometa             Relevant Orders    CBC Auto Differential    Comprehensive Metabolic Panel (Completed)    Magnesium    Phosphorus (Completed)    Immunoglobulin Free LT Chains Blood    Protein Electrophoresis, Serum    Comprehensive Metabolic Panel    Phosphorus    CBC Auto Differential    Comprehensive Metabolic Panel (Completed)    Magnesium    Phosphorus (Completed)    Immunoglobulin Free LT Chains Blood    Protein Electrophoresis, Serum    Comprehensive Metabolic Panel    Phosphorus    Secondary malignant neoplasm of bone    Relevant Orders    CBC Auto Differential    Comprehensive Metabolic Panel (Completed)    Magnesium    Phosphorus (Completed)    Immunoglobulin Free LT Chains Blood    Protein Electrophoresis, Serum    Comprehensive Metabolic Panel    Phosphorus     Other Visit Diagnoses       Hypophosphatemia        Relevant Orders    CBC Auto Differential    Comprehensive Metabolic Panel (Completed)    Magnesium    Phosphorus (Completed)    Immunoglobulin Free LT Chains Blood    Protein Electrophoresis, Serum    Comprehensive Metabolic Panel    Phosphorus                  Med Onc Chart Routing      Follow up with physician 4 weeks. with repeat labs prior for darzalex; one week with repeat phos CMP for  zometa   Follow up with WERNER    Infusion scheduling note   1 week zometa   Injection scheduling note 4 weeks darzalex   Labs CBC, CMP, magnesium, phosphorus, SPEP and free light chains   Scheduling:  Preferred lab:  Lab interval:     Imaging    Pharmacy appointment    Other referrals                     [facility]  Hematology/Oncology

## 2025-02-04 NOTE — NURSING
Injection given without difficulties to Right Lower Abdomen.Bandaid applied. Patient instructed to stay in the clinic for 15 minutes. Patient verbalized understanding and will notify nurse with any complaints.

## 2025-02-05 DIAGNOSIS — F41.9 ANXIETY: ICD-10-CM

## 2025-02-05 DIAGNOSIS — C90.00 MULTIPLE MYELOMA, REMISSION STATUS UNSPECIFIED: ICD-10-CM

## 2025-02-05 DIAGNOSIS — C90.00 MULTIPLE MYELOMA, REMISSION STATUS UNSPECIFIED: Primary | ICD-10-CM

## 2025-02-05 RX ORDER — ALPRAZOLAM 2 MG/1
TABLET ORAL
Qty: 60 TABLET | OUTPATIENT
Start: 2025-02-05 | End: 2025-03-07

## 2025-02-05 RX ORDER — ALPRAZOLAM 2 MG/1
2 TABLET ORAL 2 TIMES DAILY PRN
Qty: 60 TABLET | Refills: 0 | Status: SHIPPED | OUTPATIENT
Start: 2025-02-06 | End: 2025-03-04 | Stop reason: SDUPTHER

## 2025-02-10 ENCOUNTER — PATIENT MESSAGE (OUTPATIENT)
Dept: HEMATOLOGY/ONCOLOGY | Facility: CLINIC | Age: 65
End: 2025-02-10
Payer: COMMERCIAL

## 2025-02-10 ENCOUNTER — LAB VISIT (OUTPATIENT)
Dept: LAB | Facility: HOSPITAL | Age: 65
End: 2025-02-10
Payer: COMMERCIAL

## 2025-02-10 DIAGNOSIS — C90.00 MULTIPLE MYELOMA, REMISSION STATUS UNSPECIFIED: ICD-10-CM

## 2025-02-10 DIAGNOSIS — E83.39 HYPOPHOSPHATEMIA: ICD-10-CM

## 2025-02-10 DIAGNOSIS — C79.51 SECONDARY MALIGNANT NEOPLASM OF BONE: ICD-10-CM

## 2025-02-10 DIAGNOSIS — C90.00 MULTIPLE MYELOMA NOT HAVING ACHIEVED REMISSION: ICD-10-CM

## 2025-02-10 LAB
ALBUMIN SERPL BCP-MCNC: 3.7 G/DL (ref 3.5–5.2)
ALP SERPL-CCNC: 52 U/L (ref 40–150)
ALT SERPL W/O P-5'-P-CCNC: 27 U/L (ref 10–44)
ANION GAP SERPL CALC-SCNC: 8 MMOL/L (ref 8–16)
AST SERPL-CCNC: 18 U/L (ref 10–40)
BILIRUB SERPL-MCNC: 0.6 MG/DL (ref 0.1–1)
BUN SERPL-MCNC: 10 MG/DL (ref 8–23)
CALCIUM SERPL-MCNC: 9.1 MG/DL (ref 8.7–10.5)
CHLORIDE SERPL-SCNC: 109 MMOL/L (ref 95–110)
CO2 SERPL-SCNC: 27 MMOL/L (ref 23–29)
CREAT SERPL-MCNC: 0.9 MG/DL (ref 0.5–1.4)
EST. GFR  (NO RACE VARIABLE): >60 ML/MIN/1.73 M^2
GLUCOSE SERPL-MCNC: 77 MG/DL (ref 70–110)
PHOSPHATE SERPL-MCNC: 2.7 MG/DL (ref 2.7–4.5)
POTASSIUM SERPL-SCNC: 3.4 MMOL/L (ref 3.5–5.1)
PROT SERPL-MCNC: 6.7 G/DL (ref 6–8.4)
SODIUM SERPL-SCNC: 144 MMOL/L (ref 136–145)

## 2025-02-10 PROCEDURE — 84100 ASSAY OF PHOSPHORUS: CPT

## 2025-02-10 PROCEDURE — 36415 COLL VENOUS BLD VENIPUNCTURE: CPT

## 2025-02-10 PROCEDURE — 80053 COMPREHEN METABOLIC PANEL: CPT

## 2025-02-11 RX ORDER — SODIUM CHLORIDE 0.9 % (FLUSH) 0.9 %
10 SYRINGE (ML) INJECTION
Status: CANCELLED | OUTPATIENT
Start: 2025-02-11

## 2025-02-11 RX ORDER — HEPARIN 100 UNIT/ML
500 SYRINGE INTRAVENOUS
Status: CANCELLED | OUTPATIENT
Start: 2025-02-11

## 2025-02-12 ENCOUNTER — INFUSION (OUTPATIENT)
Dept: INFUSION THERAPY | Facility: HOSPITAL | Age: 65
End: 2025-02-12
Attending: INTERNAL MEDICINE
Payer: COMMERCIAL

## 2025-02-12 ENCOUNTER — TELEPHONE (OUTPATIENT)
Dept: INFUSION THERAPY | Facility: HOSPITAL | Age: 65
End: 2025-02-12
Payer: COMMERCIAL

## 2025-02-12 VITALS
WEIGHT: 127.88 LBS | RESPIRATION RATE: 18 BRPM | BODY MASS INDEX: 21.83 KG/M2 | HEART RATE: 64 BPM | SYSTOLIC BLOOD PRESSURE: 116 MMHG | DIASTOLIC BLOOD PRESSURE: 66 MMHG | TEMPERATURE: 98 F | HEIGHT: 64 IN | OXYGEN SATURATION: 100 %

## 2025-02-12 DIAGNOSIS — C90.00 MULTIPLE MYELOMA NOT HAVING ACHIEVED REMISSION: Primary | ICD-10-CM

## 2025-02-12 PROCEDURE — 96374 THER/PROPH/DIAG INJ IV PUSH: CPT

## 2025-02-12 PROCEDURE — 25000003 PHARM REV CODE 250

## 2025-02-12 PROCEDURE — 63600175 PHARM REV CODE 636 W HCPCS

## 2025-02-12 RX ADMIN — SODIUM CHLORIDE 3.5 MG: 9 INJECTION, SOLUTION INTRAVENOUS at 11:02

## 2025-02-12 NOTE — DISCHARGE INSTRUCTIONS
[facility]  [address] [address]  [address]  [phone] phone     [phone] fax  Hours of Operation: Monday- Friday 8:00am- 5:00pm  After hours phone  [phone]  Hematology / Oncology Physicians on call      Dr. Willi Castillo      Please call with any concerns regarding your appointment today.

## 2025-02-12 NOTE — TELEPHONE ENCOUNTER
Patient calling stating that she has to meet with son's  at 9am and wants to see if she can come later for her injection.  Spoke with Infusion nurse (cira SOLOMON) who ok'd patient to come after her appt.  Patient acknowledged. Call ended well

## 2025-02-14 NOTE — ASSESSMENT & PLAN NOTE
BMBx : 60 % plasma cells - 4/6/2023  - SPEP/MECHE showed IgG lambda - monoclonal protein 3.19 g/dl - (3/27/2023).  - R-ISS stage I (beta 2 microglobulin 1.9, albumin 3.5, , normal karyotype and no high-risk mutations on fish panel  - PET-CT ( 4/10/2023) - showed extensive lytic lesions in the axial skeleton and mild L1 compression deformity.  - Started with Induction chemotherapy with VRD regimen (Velcde/Revlimid/Decadron) - 05/10/2023   - Started Aspirin daily p.o for thrombosis prophylaxis; Acyclovir 400 mg p.o BID for herpes Zoster prophylaxis .  __________________________________________________  --S/p Kyphoplasty 06/08/23    Pt seen by transplant team BMT.  However, she has declined transplant at this time   Repeat PET-CT on 1/18/24 with significantly decreased FDG uptake associated with the previously identified lesions and no new FDG avid lesions   BM Biopsy 02/13/2024 shows significant improvement, with the marrow now showing only 5% plasma cells ( was 60% at baseline)    Labs reviewed, no concerning findings   Continue revlimid, dex utd  Hold zometa today for low Ca  Take PhosNak utd    Ok to proceed darzalex today     RTC in 4 weeks with repeat labs for Darzalex/zometa

## 2025-02-20 DIAGNOSIS — C90.00 MULTIPLE MYELOMA, REMISSION STATUS UNSPECIFIED: ICD-10-CM

## 2025-02-20 RX ORDER — LENALIDOMIDE 10 MG/1
CAPSULE ORAL
Qty: 21 CAPSULE | Refills: 0 | Status: SHIPPED | OUTPATIENT
Start: 2025-02-20

## 2025-02-26 ENCOUNTER — PATIENT MESSAGE (OUTPATIENT)
Dept: NEUROSURGERY | Facility: CLINIC | Age: 65
End: 2025-02-26
Payer: COMMERCIAL

## 2025-03-03 ENCOUNTER — LAB VISIT (OUTPATIENT)
Dept: LAB | Facility: HOSPITAL | Age: 65
End: 2025-03-03
Payer: COMMERCIAL

## 2025-03-03 DIAGNOSIS — E83.39 HYPOPHOSPHATEMIA: ICD-10-CM

## 2025-03-03 DIAGNOSIS — C90.00 MULTIPLE MYELOMA, REMISSION STATUS UNSPECIFIED: ICD-10-CM

## 2025-03-03 DIAGNOSIS — C79.51 SECONDARY MALIGNANT NEOPLASM OF BONE: ICD-10-CM

## 2025-03-03 DIAGNOSIS — C90.00 MULTIPLE MYELOMA NOT HAVING ACHIEVED REMISSION: ICD-10-CM

## 2025-03-03 LAB
ALBUMIN SERPL BCP-MCNC: 4 G/DL (ref 3.5–5.2)
ALP SERPL-CCNC: 53 U/L (ref 40–150)
ALT SERPL W/O P-5'-P-CCNC: 30 U/L (ref 10–44)
ANION GAP SERPL CALC-SCNC: 10 MMOL/L (ref 8–16)
AST SERPL-CCNC: 26 U/L (ref 10–40)
BASOPHILS # BLD AUTO: 0.05 K/UL (ref 0–0.2)
BASOPHILS NFR BLD: 0.8 % (ref 0–1.9)
BILIRUB SERPL-MCNC: 0.6 MG/DL (ref 0.1–1)
BUN SERPL-MCNC: 10 MG/DL (ref 8–23)
CALCIUM SERPL-MCNC: 8.7 MG/DL (ref 8.7–10.5)
CHLORIDE SERPL-SCNC: 109 MMOL/L (ref 95–110)
CO2 SERPL-SCNC: 21 MMOL/L (ref 23–29)
CREAT SERPL-MCNC: 0.9 MG/DL (ref 0.5–1.4)
DIFFERENTIAL METHOD BLD: ABNORMAL
EOSINOPHIL # BLD AUTO: 0.3 K/UL (ref 0–0.5)
EOSINOPHIL NFR BLD: 4.1 % (ref 0–8)
ERYTHROCYTE [DISTWIDTH] IN BLOOD BY AUTOMATED COUNT: 13.4 % (ref 11.5–14.5)
EST. GFR  (NO RACE VARIABLE): >60 ML/MIN/1.73 M^2
GLUCOSE SERPL-MCNC: 111 MG/DL (ref 70–110)
HCT VFR BLD AUTO: 39.4 % (ref 37–48.5)
HGB BLD-MCNC: 12.5 G/DL (ref 12–16)
IMM GRANULOCYTES # BLD AUTO: 0.02 K/UL (ref 0–0.04)
IMM GRANULOCYTES NFR BLD AUTO: 0.3 % (ref 0–0.5)
LYMPHOCYTES # BLD AUTO: 2.1 K/UL (ref 1–4.8)
LYMPHOCYTES NFR BLD: 32 % (ref 18–48)
MAGNESIUM SERPL-MCNC: 1.8 MG/DL (ref 1.6–2.6)
MCH RBC QN AUTO: 32.1 PG (ref 27–31)
MCHC RBC AUTO-ENTMCNC: 31.7 G/DL (ref 32–36)
MCV RBC AUTO: 101 FL (ref 82–98)
MONOCYTES # BLD AUTO: 0.7 K/UL (ref 0.3–1)
MONOCYTES NFR BLD: 10.3 % (ref 4–15)
NEUTROPHILS # BLD AUTO: 3.4 K/UL (ref 1.8–7.7)
NEUTROPHILS NFR BLD: 52.5 % (ref 38–73)
NRBC BLD-RTO: 0 /100 WBC
PHOSPHATE SERPL-MCNC: 2.3 MG/DL (ref 2.7–4.5)
PLATELET # BLD AUTO: 325 K/UL (ref 150–450)
PMV BLD AUTO: 9.3 FL (ref 9.2–12.9)
POTASSIUM SERPL-SCNC: 3.8 MMOL/L (ref 3.5–5.1)
PROT SERPL-MCNC: 7.1 G/DL (ref 6–8.4)
RBC # BLD AUTO: 3.89 M/UL (ref 4–5.4)
SODIUM SERPL-SCNC: 140 MMOL/L (ref 136–145)
WBC # BLD AUTO: 6.4 K/UL (ref 3.9–12.7)

## 2025-03-03 PROCEDURE — 36415 COLL VENOUS BLD VENIPUNCTURE: CPT

## 2025-03-03 PROCEDURE — 80053 COMPREHEN METABOLIC PANEL: CPT

## 2025-03-03 PROCEDURE — 83735 ASSAY OF MAGNESIUM: CPT

## 2025-03-03 PROCEDURE — 84100 ASSAY OF PHOSPHORUS: CPT

## 2025-03-03 PROCEDURE — 84165 PROTEIN E-PHORESIS SERUM: CPT

## 2025-03-03 PROCEDURE — 83521 IG LIGHT CHAINS FREE EACH: CPT | Mod: 59

## 2025-03-03 PROCEDURE — 85025 COMPLETE CBC W/AUTO DIFF WBC: CPT

## 2025-03-03 PROCEDURE — 84165 PROTEIN E-PHORESIS SERUM: CPT | Mod: 26,,, | Performed by: PATHOLOGY

## 2025-03-04 ENCOUNTER — OFFICE VISIT (OUTPATIENT)
Dept: PALLIATIVE MEDICINE | Facility: CLINIC | Age: 65
End: 2025-03-04
Payer: COMMERCIAL

## 2025-03-04 ENCOUNTER — OFFICE VISIT (OUTPATIENT)
Dept: HEMATOLOGY/ONCOLOGY | Facility: CLINIC | Age: 65
End: 2025-03-04
Payer: COMMERCIAL

## 2025-03-04 ENCOUNTER — INFUSION (OUTPATIENT)
Dept: INFUSION THERAPY | Facility: HOSPITAL | Age: 65
End: 2025-03-04
Attending: INTERNAL MEDICINE
Payer: COMMERCIAL

## 2025-03-04 VITALS
HEIGHT: 64 IN | DIASTOLIC BLOOD PRESSURE: 64 MMHG | BODY MASS INDEX: 21.98 KG/M2 | SYSTOLIC BLOOD PRESSURE: 117 MMHG | HEART RATE: 64 BPM | DIASTOLIC BLOOD PRESSURE: 64 MMHG | SYSTOLIC BLOOD PRESSURE: 111 MMHG | TEMPERATURE: 97 F | HEART RATE: 68 BPM | OXYGEN SATURATION: 99 % | RESPIRATION RATE: 18 BRPM | OXYGEN SATURATION: 99 % | TEMPERATURE: 97 F | WEIGHT: 128.75 LBS

## 2025-03-04 VITALS
BODY MASS INDEX: 21.98 KG/M2 | HEART RATE: 68 BPM | DIASTOLIC BLOOD PRESSURE: 64 MMHG | SYSTOLIC BLOOD PRESSURE: 111 MMHG | OXYGEN SATURATION: 99 % | RESPIRATION RATE: 18 BRPM | HEIGHT: 64 IN | WEIGHT: 128.75 LBS | TEMPERATURE: 97 F

## 2025-03-04 DIAGNOSIS — E83.39 HYPOPHOSPHATEMIA: Primary | ICD-10-CM

## 2025-03-04 DIAGNOSIS — M25.551 RIGHT HIP PAIN: ICD-10-CM

## 2025-03-04 DIAGNOSIS — F19.982 DRUG-INDUCED INSOMNIA: Primary | ICD-10-CM

## 2025-03-04 DIAGNOSIS — C90.00 MULTIPLE MYELOMA, REMISSION STATUS UNSPECIFIED: Primary | ICD-10-CM

## 2025-03-04 DIAGNOSIS — E87.6 HYPOKALEMIA: ICD-10-CM

## 2025-03-04 DIAGNOSIS — C90.00 MULTIPLE MYELOMA NOT HAVING ACHIEVED REMISSION: Primary | Chronic | ICD-10-CM

## 2025-03-04 DIAGNOSIS — G89.3 CANCER RELATED PAIN: ICD-10-CM

## 2025-03-04 DIAGNOSIS — E03.9 ACQUIRED HYPOTHYROIDISM: ICD-10-CM

## 2025-03-04 DIAGNOSIS — G89.3 CHRONIC NEOPLASM-RELATED PAIN: ICD-10-CM

## 2025-03-04 DIAGNOSIS — C79.51 SECONDARY MALIGNANT NEOPLASM OF BONE: ICD-10-CM

## 2025-03-04 DIAGNOSIS — C90.00 MULTIPLE MYELOMA NOT HAVING ACHIEVED REMISSION: Chronic | ICD-10-CM

## 2025-03-04 DIAGNOSIS — C90.00 MULTIPLE MYELOMA, REMISSION STATUS UNSPECIFIED: ICD-10-CM

## 2025-03-04 DIAGNOSIS — F41.9 ANXIETY: ICD-10-CM

## 2025-03-04 DIAGNOSIS — Z51.5 PALLIATIVE CARE ENCOUNTER: ICD-10-CM

## 2025-03-04 PROCEDURE — 99215 OFFICE O/P EST HI 40 MIN: CPT | Mod: S$GLB,,, | Performed by: NURSE PRACTITIONER

## 2025-03-04 PROCEDURE — 63600175 PHARM REV CODE 636 W HCPCS: Mod: JZ,TB | Performed by: INTERNAL MEDICINE

## 2025-03-04 PROCEDURE — 1159F MED LIST DOCD IN RCRD: CPT | Mod: CPTII,S$GLB,, | Performed by: NURSE PRACTITIONER

## 2025-03-04 PROCEDURE — 3074F SYST BP LT 130 MM HG: CPT | Mod: CPTII,S$GLB,, | Performed by: INTERNAL MEDICINE

## 2025-03-04 PROCEDURE — 1160F RVW MEDS BY RX/DR IN RCRD: CPT | Mod: CPTII,S$GLB,, | Performed by: NURSE PRACTITIONER

## 2025-03-04 PROCEDURE — 96401 CHEMO ANTI-NEOPL SQ/IM: CPT

## 2025-03-04 PROCEDURE — 3078F DIAST BP <80 MM HG: CPT | Mod: CPTII,S$GLB,, | Performed by: INTERNAL MEDICINE

## 2025-03-04 PROCEDURE — 3008F BODY MASS INDEX DOCD: CPT | Mod: CPTII,S$GLB,, | Performed by: INTERNAL MEDICINE

## 2025-03-04 PROCEDURE — 3078F DIAST BP <80 MM HG: CPT | Mod: CPTII,S$GLB,, | Performed by: NURSE PRACTITIONER

## 2025-03-04 PROCEDURE — 99215 OFFICE O/P EST HI 40 MIN: CPT | Mod: S$GLB,,, | Performed by: INTERNAL MEDICINE

## 2025-03-04 PROCEDURE — 99999 PR PBB SHADOW E&M-EST. PATIENT-LVL V: CPT | Mod: PBBFAC,,, | Performed by: NURSE PRACTITIONER

## 2025-03-04 PROCEDURE — 3074F SYST BP LT 130 MM HG: CPT | Mod: CPTII,S$GLB,, | Performed by: NURSE PRACTITIONER

## 2025-03-04 PROCEDURE — 99417 PROLNG OP E/M EACH 15 MIN: CPT | Mod: S$GLB,,, | Performed by: NURSE PRACTITIONER

## 2025-03-04 PROCEDURE — 3008F BODY MASS INDEX DOCD: CPT | Mod: CPTII,S$GLB,, | Performed by: NURSE PRACTITIONER

## 2025-03-04 PROCEDURE — 99999 PR PBB SHADOW E&M-EST. PATIENT-LVL V: CPT | Mod: PBBFAC,,, | Performed by: INTERNAL MEDICINE

## 2025-03-04 RX ORDER — POTASSIUM CHLORIDE 750 MG/1
10 CAPSULE, EXTENDED RELEASE ORAL ONCE
Qty: 1 CAPSULE | Refills: 0 | Status: CANCELLED | OUTPATIENT
Start: 2025-03-04 | End: 2025-03-04

## 2025-03-04 RX ORDER — OXYCODONE HYDROCHLORIDE 5 MG/1
5 TABLET ORAL EVERY 6 HOURS PRN
Qty: 60 TABLET | Refills: 0 | Status: SHIPPED | OUTPATIENT
Start: 2025-03-04 | End: 2025-04-03

## 2025-03-04 RX ORDER — PROCHLORPERAZINE EDISYLATE 5 MG/ML
10 INJECTION INTRAMUSCULAR; INTRAVENOUS ONCE AS NEEDED
Status: CANCELLED | OUTPATIENT
Start: 2025-04-01

## 2025-03-04 RX ORDER — HEPARIN 100 UNIT/ML
500 SYRINGE INTRAVENOUS
Status: CANCELLED | OUTPATIENT
Start: 2025-04-01

## 2025-03-04 RX ORDER — ALPRAZOLAM 2 MG/1
2 TABLET ORAL 2 TIMES DAILY PRN
Qty: 60 TABLET | Refills: 0 | Status: SHIPPED | OUTPATIENT
Start: 2025-03-04 | End: 2025-04-03

## 2025-03-04 RX ORDER — SODIUM CHLORIDE 0.9 % (FLUSH) 0.9 %
10 SYRINGE (ML) INJECTION
Status: CANCELLED | OUTPATIENT
Start: 2025-04-01

## 2025-03-04 RX ORDER — ZOLPIDEM TARTRATE 5 MG/1
5 TABLET ORAL NIGHTLY PRN
Qty: 20 TABLET | Refills: 0 | Status: SHIPPED | OUTPATIENT
Start: 2025-03-04 | End: 2025-04-03

## 2025-03-04 RX ORDER — DIPHENHYDRAMINE HYDROCHLORIDE 50 MG/ML
50 INJECTION INTRAMUSCULAR; INTRAVENOUS ONCE AS NEEDED
Status: CANCELLED | OUTPATIENT
Start: 2025-04-01

## 2025-03-04 RX ORDER — POTASSIUM CHLORIDE 750 MG/1
10 TABLET, EXTENDED RELEASE ORAL DAILY
Qty: 30 TABLET | Refills: 0 | Status: SHIPPED | OUTPATIENT
Start: 2025-03-04

## 2025-03-04 RX ORDER — EPINEPHRINE 0.3 MG/.3ML
0.3 INJECTION SUBCUTANEOUS ONCE AS NEEDED
Status: CANCELLED | OUTPATIENT
Start: 2025-04-01

## 2025-03-04 RX ADMIN — DARATUMUMAB AND HYALURONIDASE-FIHJ (HUMAN RECOMBINANT) 1800 MG: 1800; 30000 INJECTION SUBCUTANEOUS at 01:03

## 2025-03-04 NOTE — PATIENT INSTRUCTIONS
Take Ambien as directed as needed for sleep. Use caution since taking Xanax - can increase risk of sedation, confusion and falls.   Use oxycodone at nighttime as needed for pain if this is keeping you awake.       For insomnia and sleep try meditation podcast: My Thought  Meditation Station - Faster Better Sleep    Good sleep habits (sometimes referred to as sleep hygiene) can help you get a good nights sleep.  Some habits that can improve your sleep health:    Bedtime routine  Be consistent. Go to bed at the same time each night and get up at the same time each morning, including on the weekends  Even if you fall asleep late wake up at a consistent time to reset your circadian rhythm.  Minimize fluids after 8 p.m.  Avoid large meals, caffeine, and alcohol in the evening and before bedtime  Environment conducive to sleep  Keep the environment dark during the night and bright during the day  Make sure your bedroom is quiet, dark, relaxing, and at a comfortable temperature  Remove electronic devices, such as TVs, computers, and smart phones, from the bedroom  Play soothing or relaxing music before bedtime. Consider meditation at night.   Daytime routine to facilitate sleep at night  Get some exercise. Being physically active early during the day can help you fall asleep more easily at night.  Be sure to expose yourself to SUNLIGHT during daytime hours to help regulate sleep cycle.  Exercise during the day  Avoid/minimize daytime naps

## 2025-03-04 NOTE — ASSESSMENT & PLAN NOTE
Worse when she takes dexamethasone.  Discussed management options.  Meditation  Cannot tolerate trazodone.   Rx Ambien, she tolerated this well previously with good results.   Discussed possible side effects, concern of hypersomnolence/falls/confusion with other medications.

## 2025-03-04 NOTE — PROGRESS NOTES
Palliative Medicine Clinic Note    Chief Complaint: No chief complaint on file.       ASSESSMENT/PLAN:      Plan/Recommendations:  1. Drug-induced insomnia  Assessment & Plan:  Worse when she takes dexamethasone.  Discussed management options.  Meditation  Cannot tolerate trazodone.   Rx Ambien, she tolerated this well previously with good results.   Discussed possible side effects, concern of hypersomnolence/falls/confusion with other medications.     Orders:  -     zolpidem (AMBIEN) 5 MG Tab; Take 1 tablet (5 mg total) by mouth nightly as needed (insomnia).  Dispense: 20 tablet; Refill: 0    2. Multiple myeloma, remission status unspecified  Assessment & Plan:  Followed closely by oncology.   Tolerating treatment with palliative goal.    Orders:  -     ALPRAZolam (XANAX) 2 MG Tab; Take 1 tablet (2 mg total) by mouth 2 (two) times daily as needed (for anxiety. Up to 2 doses/day).  Dispense: 60 tablet; Refill: 0  -     oxyCODONE (ROXICODONE) 5 MG immediate release tablet; Take 1 tablet (5 mg total) by mouth every 6 (six) hours as needed for Pain.  Dispense: 60 tablet; Refill: 0    3. Anxiety  Assessment & Plan:  Worse due to situation: cancer,  and son are ill and she is only caregiver.  Discussed mgmt options:   Declines daily antidepressants or counseling.   Does not want to change to another benzodiazepine.  Encouraged meditation. Declines acupuncture, probably not covered by insurer until she turns 65.   Will continue alprazolam at current dose.     Orders:  -     ALPRAZolam (XANAX) 2 MG Tab; Take 1 tablet (2 mg total) by mouth 2 (two) times daily as needed (for anxiety. Up to 2 doses/day).  Dispense: 60 tablet; Refill: 0  -     TSH; Future; Expected date: 03/04/2025  -     T4, Free; Future; Expected date: 03/04/2025    4. Chronic neoplasm-related pain  Assessment & Plan:  Recent increase in pain to right gluteal and hip areas.   Encouraged use of oxycodone PRN, especially at night if this is contributing  to insomnia.   Cannot tolerate additional steroids due to insomnia.   Consider adding pregabalin or gabapentin.      5. Secondary malignant neoplasm of bone  -     oxyCODONE (ROXICODONE) 5 MG immediate release tablet; Take 1 tablet (5 mg total) by mouth every 6 (six) hours as needed for Pain.  Dispense: 60 tablet; Refill: 0    6. Cancer related pain  Assessment & Plan:  Recent increase in pain to right gluteal and hip areas.   Encouraged use of oxycodone PRN, especially at night if this is contributing to insomnia.   Cannot tolerate additional steroids due to insomnia.   Consider adding pregabalin or gabapentin.    Orders:  -     oxyCODONE (ROXICODONE) 5 MG immediate release tablet; Take 1 tablet (5 mg total) by mouth every 6 (six) hours as needed for Pain.  Dispense: 60 tablet; Refill: 0    7. Acquired hypothyroidism  Assessment & Plan:  Repeat TSH and free T4 due to increased anxiety sx.      8. Palliative care encounter  Assessment & Plan:  Goals of care: Discuss further when pain/sx better controlled  Advanced directive: full code  HC POA: on file  Symptoms: pain, anxiety, insomnia  Follow up: 1 month            Advance Care Planning   Advance Directives:   Living Will: No    [place]POST: No    Do Not Resuscitate Status: No    Medical Power of : Yes      Decision Making:  Patient answered questions  Goals of Care: The patient endorses that what is most important right now is to focus on symptom/pain control and improvement in condition but with limits to invasive therapies    Accordingly, we have decided that the best plan to meet the patient's goals includes continuing with treatment           Thank you for involving me in the care of this patient.     No follow-ups on file.    Future Appointments   Date Time Provider Department Center   3/28/2025  9:10 AM ALYCIA MOE CC LAB [facility] LAB DS [facility]   4/1/2025  9:30 AM Carolina Constantino FNP [facility] HEM ONC [facility]   4/1/2025 10:00 AM CHAIR 05 ROULA ROLUA [facility] [place]  "  4/14/2025  1:20 PM Joe Lovell MD [facility]        SUBJECTIVE:      History of Present Illness / Interval History:  Ana Bonilla is 64 y.o. female with Multiple Myeloma (Apr. 2023) with metastasis to thoracic/lumbar spine, complicated by L1 compression fracture. Pt declined SCT. . S/P L1 Kyphoplasty (June 2023).   On D-VRD, Lenalidomide and Zometa  Followed by Dr. Luevano, Dr. Garcia, and Neurosurgery.    Presents to Palliative Care Clinic for physical symptoms and additional support.. Please see Hem/Onc note for more details on pt's care.       11/25/24 LOV PC  Pain - Likely a mixture of chronic sciatica/Pelvic lytic bone lesions/sequale of L1 compression fracture   Pt has tried multiple pain management modalities with mixed response.   Wishes to minimize opioids at this time due to fear of altered mental status.   Deferred physiotherapy. Wishes to f/u with Dr. Smith for reassessment   MM with bone mets, compression fx - Pt remains hesitant about SCT due extended recovery time/fear of relapse post SCT     3/4/25  History obtained from: patient and medical record.    Mrs Bonilla is established with palliative care but new to me. She is seen in the infusion clinic.     Multiple concerns:  Right hip pain. Chronic but worse the past 3 days, attributes to mis-stepping while wearing clogs Sunday. No falls, no other injury. Doesn't like to take opioids, causes drowsiness and she likes to remain active. Recommended x-ray but she declined.   Anxiety. Well-controlled with present dose of alprazolam. Has been taking this dose for many years and she does not ant alternative tx. Does not like other antidepressants or anxiety medication, they make her feel "weird," including SSRI, trazodone  Insomnia. Most severe after receiving steroid injection. Lons standing problem now worse. Ruminates sometimes. Trouble falling asleep and maintaining sleep.       Today we discussed role of palliative care, symptom " management, and goals of care.        ROS:  Review of Systems    Review of Symptoms      Symptom Assessment (ESAS 0-10 Scale)  Pain:  10  Dyspnea:  0  Anxiety:  0  Nausea:  0  Depression:  0  Anorexia:  0  Fatigue:  0  Insomnia:  10  Restlessness:  0  Agitation:  3         Pain Assessment:    Location(s): back    Back       Location: lower        Quality: Pressure-like and aching        Quantity: 10/10 in intensity        Chronicity: Onset 1 year(s) ago, rapidly worsening        Aggravating Factors: Standing and movement        Alleviating Factors: None       Associated Symptoms: None    ECOG Performance Status ndGndrndanddndend:nd nd2nd Living Arrangements:  Lives with spouse    Psychosocial/Cultural:   See Palliative Psychosocial Note: No  **Primary  to Follow**  Palliative Care  Consult: No        Medications:  Current Medications[1]    External  database queried on 03/04/2025  by Sirena RUIZ :         Review of patient's allergies indicates:   Allergen Reactions    Hydrocodone-acetaminophen Other (See Comments)     Causes pt to feel extremely sick         OBJECTIVE:   Physical Exam:  Vitals: Temp: 97.3 °F (36.3 °C) (03/04/25 1119)  Pulse: 68 (03/04/25 1119)  BP: 111/64 (03/04/25 1119)  SpO2: 99 % (03/04/25 1119)    Physical Exam  Constitutional:       General: She is not in acute distress.     Appearance: She is not ill-appearing.   Eyes:      General: No scleral icterus.  Pulmonary:      Effort: Pulmonary effort is normal.   Musculoskeletal:      Comments: Pain reproduced at L1 and right hip/groin. No deformity, able to bear weight.   Skin:     Coloration: Skin is not jaundiced.   Neurological:      Mental Status: She is alert.      Gait: Gait normal.   Psychiatric:         Mood and Affect: Mood normal.         Behavior: Behavior normal.         Thought Content: Thought content normal.         Judgment: Judgment normal.         Wt Readings from Last 3 Encounters:   03/04/25 1119 58.4  kg (128 lb 12 oz)   03/04/25 1033 58.4 kg (128 lb 12 oz)   02/12/25 1039 58 kg (127 lb 13.9 oz)     BP Readings from Last 3 Encounters:   03/04/25 111/64   03/04/25 117/64   03/04/25 111/64       Labs:  Lab Results   Component Value Date    WBC 6.40 03/03/2025    HGB 12.5 03/03/2025    HCT 39.4 03/03/2025     (H) 03/03/2025     03/03/2025           Imaging:  Reviewed     I spent a total of 75 minutes on the day of the visit. This includes face to face time in discussion of goals of care, symptom assessment, coordination of care and emotional support.  This also includes non-face to face time preparing to see the patient (eg, review of tests/imaging), obtaining and/or reviewing separately obtained history, documenting clinical information in the electronic or other health record, independently interpreting results and communicating results to the patient/family/caregiver, or care coordinator.     Additional 5 min time spent on a voluntary advance care planning and /or goals of care discussion, providing emotional support, formulating and communicating prognosis and exploring burden/benefit of various approaches of treatment.       Sirena Alexander NP         [1]   Current Outpatient Medications:     acyclovir (ZOVIRAX) 400 MG tablet, Take 1 tablet (400 mg total) by mouth 2 (two) times daily., Disp: 60 tablet, Rfl: 11    albuterol (VENTOLIN HFA) 90 mcg/actuation inhaler, Inhale 2 puffs into the lungs every 4 (four) hours as needed for Wheezing. Rescue, Disp: 18 g, Rfl: 1    amlodipine-benazepril 2.5-10 mg (LOTREL) 2.5-10 mg per capsule, TAKE 1 CAPSULE BY MOUTH EVERY DAY, Disp: 90 capsule, Rfl: 3    aspirin 81 MG Chew, Take 1 tablet (81 mg total) by mouth once daily., Disp: 30 tablet, Rfl: 11    calcium-vitamin D 600 mg-10 mcg (400 unit) Tab, Take 1 tablet by mouth every 12 (twelve) hours., Disp: 60 tablet, Rfl: 2    dexAMETHasone (DECADRON) 4 MG Tab, Take 10 tablets (40 mg total) by mouth every 7 days.  Take with food., Disp: 40 tablet, Rfl: 11    fluticasone propionate (FLONASE) 50 mcg/actuation nasal spray, 1 spray (50 mcg total) by Each Nostril route once daily., Disp: 1 mL, Rfl: 1    hydrOXYzine HCL (ATARAX) 25 MG tablet, Take 1 tablet (25 mg total) by mouth 3 (three) times daily as needed for Itching., Disp: 21 tablet, Rfl: 0    lenalidomide 10 mg Cap, TAKE 1 CAPSULE ONCE DAILY FOR 21 DAYS AND THEN 7 DAYS OFF, Disp: 21 capsule, Rfl: 0    levothyroxine (SYNTHROID) 100 MCG tablet, TAKE 1 TABLET(100 MCG) BY MOUTH BEFORE BREAKFAST, Disp: 90 tablet, Rfl: 1    linaCLOtide (LINZESS) 72 mcg Cap capsule, Take 2 capsules (144 mcg total) by mouth before breakfast., Disp: 30 capsule, Rfl: 1    magnesium oxide (MAG-OX) 400 mg (241.3 mg magnesium) tablet, TAKE 1 TABLET(400 MG) BY MOUTH EVERY DAY, Disp: 90 tablet, Rfl: 1    metoprolol succinate (TOPROL-XL) 50 MG 24 hr tablet, Take 1 tablet (50 mg total) by mouth every evening., Disp: 90 tablet, Rfl: 3    montelukast (SINGULAIR) 10 mg tablet, Take 1 tablet (10 mg total) by mouth every evening., Disp: 90 tablet, Rfl: 3    naloxone (NARCAN) 4 mg/actuation [place], , Disp: , Rfl:     neomycin-polymyxin-dexamethasone (DEXACINE) 3.5 mg/g-10,000 unit/g-0.1 % Oint, Place into both eyes 3 (three) times daily., Disp: 3.5 g, Rfl: 0    nicotine (NICODERM CQ) 14 mg/24 hr, Place 1 patch onto the skin once daily., Disp: 14 patch, Rfl: 0    pantoprazole (PROTONIX) 40 MG tablet, TAKE 1 TABLET(40 MG) BY MOUTH EVERY DAY, Disp: 90 tablet, Rfl: 3    potassium phosphate, monobasic, (K-PHOS) 500 mg TbSO, Take 1 tablet (500 mg total) by mouth once daily., Disp: 30 tablet, Rfl: 11    potassium, sodium phosphates (PHOS-NAK) 280-160-250 mg PwPk, Take 1 packet by mouth 4 (four) times daily with meals and nightly., Disp: 4 packet, Rfl: 0    promethazine-dextromethorphan (PROMETHAZINE-DM) 6.25-15 mg/5 mL Syrp, Take 5 mLs by mouth every 4 (four) hours as needed (coughing)., Disp: 120 mL, Rfl: 0     rosuvastatin (CRESTOR) 10 MG tablet, Take 1 tablet (10 mg total) by mouth every evening., Disp: 90 tablet, Rfl: 3    WIXELA INHUB 250-50 mcg/dose diskus inhaler, INHALE 1 PUFF INTO THE LUNGS TWICE DAILY, Disp: 180 each, Rfl: 1    ALPRAZolam (XANAX) 2 MG Tab, Take 1 tablet (2 mg total) by mouth 2 (two) times daily as needed (for anxiety. Up to 2 doses/day)., Disp: 60 tablet, Rfl: 0    k phos di & mono-sod phos mono (PHOSPHOROUS) 250 mg Tab, Take 1 tablet by mouth 4 (four) times daily with meals and nightly. for 5 days, Disp: 20 each, Rfl: 0    oxyCODONE (ROXICODONE) 5 MG immediate release tablet, Take 1 tablet (5 mg total) by mouth every 6 (six) hours as needed for Pain., Disp: 60 tablet, Rfl: 0    zolpidem (AMBIEN) 5 MG Tab, Take 1 tablet (5 mg total) by mouth nightly as needed (insomnia)., Disp: 20 tablet, Rfl: 0    Current Facility-Administered Medications:     dexAMETHasone injection 40 mg, 40 mg, Intravenous, 1 time in Clinic/HOD, Bri Luevano MD    Facility-Administered Medications Ordered in Other Visits:     lactated ringers infusion, , Intravenous, Continuous, Danny Matos III, MD

## 2025-03-04 NOTE — ASSESSMENT & PLAN NOTE
Recent increase in pain to right gluteal and hip areas.   Encouraged use of oxycodone PRN, especially at night if this is contributing to insomnia.   Cannot tolerate additional steroids due to insomnia.   Consider adding pregabalin or gabapentin.

## 2025-03-04 NOTE — ASSESSMENT & PLAN NOTE
Worse due to situation: cancer,  and son are ill and she is only caregiver.  Discussed mgmt options:   Declines daily antidepressants or counseling.   Does not want to change to another benzodiazepine.  Encouraged meditation. Declines acupuncture, probably not covered by insurer until she turns 65.   Will continue alprazolam at current dose.

## 2025-03-04 NOTE — PROGRESS NOTES
Patient ID: Ana Bonilla   Reason for Visit: Follow-up and Multiple Myeloma  MRN:  [id number]     Oncologic Diagnosis:  Multiple Myeloma - IgG lambda   Previous Treatment:  VRD (5/10/2023 - 6/28/2023)   Current Treatment:    D-VRD (7/6/2023 - 01/03/2024 )      Zometa (10/18/2023 - )     Subjective   Ana Bonilla is a pleasant 64 y.o. female who presents for follow up and is accompanied by her .    She reports that she continues to have cramping in her hands.  She is intermittently compliant with all of her electrolytes supplementation.  She does state that her diet is not consistent it may be poor.  I counseled her on the importance of compliance with electrolyte replacement.  I suggest for her hypokalemia, which is replete today, we trial low-dose potassium supplementation daily which may eliminate some of the side effects she is having.  She also reports that she tried taking her Revlimid during the day instead of at night; she notes that it makes her tired but she may continue to take it during the day.  It seems that the pain in her right hip is worsened.  She was seen a physician at the [facility] [place] and was supposed to have open MRI however insurance would not approve it per the patient.  She is trying to figure out how she can have MRI done.  Offered to order MRI for her.  I am concerned that given her worsening pain could indicate a myelomatous lesion.  Her last protein studies with stable but we should still evaluate this.  She is in agreement.    Review of Systems   Constitutional:  Positive for fatigue. Negative for activity change, appetite change, chills, diaphoresis, fever and unexpected weight change.   HENT:  Negative for nosebleeds.    Respiratory:  Negative for shortness of breath.    Cardiovascular:  Negative for chest pain.   Gastrointestinal:  Negative for abdominal pain, blood in stool, constipation, diarrhea, nausea and vomiting.   Musculoskeletal:  Positive for  arthralgias (right hip) and myalgias (hands). Negative for back pain.   Skin:  Negative for rash.   Neurological:  Negative for dizziness, weakness, light-headedness and headaches.   Hematological:  Does not bruise/bleed easily.   Psychiatric/Behavioral:  The patient is not nervous/anxious.      History     Oncology History   Multiple myeloma   4/20/2023 Initial Diagnosis    Multiple myeloma     5/10/2023 - 6/28/2023 Chemotherapy    Treatment Summary   Plan Name: OP VRD - WEEKLY BORTEZOMIB LENALIDOMIDE DEXAMETHASONE Q3W  Treatment Goal: Palliative  Status: Inactive  Start Date: 5/10/2023  End Date: 6/28/2023  Provider: Abram Velasquez MD  Chemotherapy: bortezomib (VELCADE) injection 2 mg, 1.3 mg/m2 = 2 mg, Subcutaneous, Clinic/HOD 1 time, 2 of 6 cycles  Administration: 2 mg (5/10/2023), 2 mg (5/17/2023), 2 mg (6/14/2023), 2 mg (5/31/2023), 2 mg (6/21/2023), 2 mg (6/28/2023)  lenalidomide 25 mg Cap, 25 mg, Oral, Daily, 1 of 1 cycle, Start date: 5/10/2023, End date: 5/17/2023 6/28/2023 Cancer Staged    Staging form: Plasma Cell Myeloma and Plasma Cell Disorders, AJCC 8th Edition  - Clinical stage from 6/28/2023: RISS Stage II (Beta-2-microglobulin (mg/L): 1.9, Albumin (g/dL): 3, ISS: Stage II, High-risk cytogenetics: Absent, LDH: Normal)     7/6/2023 - 1/3/2024 Chemotherapy    Treatment Summary   Plan Name: OP D-VRD DARATUMUMAB + BORTEZOMIB LENALIDOMIDE DEXAMETHASONE  Treatment Goal: Palliative  Status: Inactive  Start Date: 7/6/2023  End Date: 1/3/2024  Provider: Oscar Márquez MD  Chemotherapy: bortezomib (VELCADE) injection 2 mg, 1.3 mg/m2 = 2 mg, Subcutaneous, Clinic/HOD 1 time, 6 of 6 cycles  Administration: 2 mg (7/6/2023), 2 mg (7/12/2023), 2 mg (8/2/2023), 2 mg (8/9/2023), 2.25 mg (10/18/2023), 2 mg (7/19/2023), 2 mg (7/26/2023), 2 mg (8/16/2023), 2.25 mg (9/20/2023), 2.25 mg (9/27/2023), 2.25 mg (10/25/2023), 2.25 mg (11/1/2023), 2.25 mg (11/8/2023), 2.25 mg (12/6/2023), 2.25 mg (1/3/2024), 2.25 mg  (11/15/2023), 2.25 mg (11/22/2023), 2.25 mg (11/29/2023), 2.25 mg (12/13/2023), 2.25 mg (12/20/2023), 2.25 mg (12/26/2023)  lenalidomide 10 mg Cap, 1 capsule (100 % of original dose 1 capsule), Oral, Daily, 1 of 1 cycle, Start date: 7/26/2023, End date: 8/2/2023  Dose modification: 1 capsule (original dose 1 capsule, Cycle 0)  daratumumab-hyaluronidase-fihj subcutaneous injection 1,800 mg, 1,800 mg (100 % of original dose 1,800 mg), Subcutaneous, Clinic/HOD 1 time, 6 of 6 cycles  Dose modification: 1,800 mg (original dose 1,800 mg, Cycle 1), 1,800 mg (original dose 1,800 mg, Cycle 1)  Administration: 1,800 mg (7/6/2023), 1,800 mg (7/12/2023), 1,800 mg (7/19/2023), 1,800 mg (7/26/2023), 1,800 mg (8/2/2023), 1,800 mg (8/9/2023), 1,800 mg (8/16/2023), 1,800 mg (9/27/2023), 1,800 mg (10/18/2023), 1,800 mg (11/1/2023), 1,800 mg (11/15/2023), 1,800 mg (11/29/2023), 1,800 mg (12/13/2023), 1,800 mg (12/26/2023)     4/30/2024 -  Chemotherapy    Treatment Summary   Plan Name: OP Myeloma KIA-RD daratumumab lenalidomide dexAMETHasone Q4W  Treatment Goal: Palliative  Status: Active  Start Date: 4/30/2024  End Date: 3/4/2025  Provider: Bri Luevano MD  Chemotherapy: daratumumab-hyaluronidase-fihj subcutaneous injection 1,800 mg, 1,800 mg, Subcutaneous, Clinic/HOD 1 time, 12 of 12 cycles  Administration: 1,800 mg (4/30/2024), 1,800 mg (5/7/2024), 1,800 mg (5/21/2024), 1,800 mg (5/28/2024), 1,800 mg (6/4/2024), 1,800 mg (6/25/2024), 1,800 mg (7/9/2024), 1,800 mg (10/29/2024), 1,800 mg (5/14/2024), 1,800 mg (6/11/2024), 1,800 mg (6/18/2024), 1,800 mg (7/30/2024), 1,800 mg (8/13/2024), 1,800 mg (8/27/2024), 1,800 mg (9/10/2024), 1,800 mg (9/24/2024), 1,800 mg (10/15/2024), 1,800 mg (11/26/2024), 1,800 mg (1/7/2025), 1,800 mg (2/4/2025), 1,800 mg (3/4/2025)           MARIBETH Bonilla  63 y.o.  female with past medical history significant for hypertension, COPD, asthma, GERD, hypothyroidism here for follow-up and management  of multiple myeloma     She was referred in 2/2023 by her PCP after workup for gastritis revealed lytic lesions. CT chest showed lytic and expansile destructive lesion in the sternum and lytic lesions at L1. Biopsy of L1 lesion in 3/2023 revealed mature B cell neoplasm most consistent with plasma cell neoplasm.      Bone marrow biopsy in 4/2023 demonstrated 60% plasma cells. PET/CT showed extensive bony lesions throughout the spine, sternum, bilateral ribs, pelvis, and a mild compression deformity in L1. SPEP/MECHE showed IgG M spike 3.19 g/dL, IgG 4,521 mg/dL.      She was started on VRd and then daratumumab was added by the transplant team. She was started on ASA for thrombosis prophylaxis and acyclovir for herpes zoster prophylaxis. Start Zometa after dental evaluation.     Past Medical History:   Diagnosis Date    Allergy     Amblyopia OS    Anxiety     Asthma     COPD (chronic obstructive pulmonary disease)     NO HOME o2    GERD (gastroesophageal reflux disease)     Hyperlipidemia     Hypertension     PONV (postoperative nausea and vomiting)     Thyroid disease        Past Surgical History:   Procedure Laterality Date    APPENDECTOMY      BIOPSY N/A 06/08/2023    Procedure: BIOPSY;  Surgeon: Fausto Smith MD;  Location: [facility];  Service: Neurosurgery;  Laterality: N/A;  L1    BUNIONECTOMY      COLONOSCOPY N/A 12/04/2019    Procedure: COLONOSCOPY;  Surgeon: Danny Matos III, MD;  Location: [facility];  Service: Endoscopy;  Laterality: N/A;    COLONOSCOPY N/A 10/24/2022    Procedure: COLONOSCOPY;  Surgeon: Danny Matos III, MD;  Location: [facility];  Service: Endoscopy;  Laterality: N/A;    ESOPHAGOGASTRODUODENOSCOPY N/A 10/24/2022    Procedure: EGD (ESOPHAGOGASTRODUODENOSCOPY);  Surgeon: Danny Matos III, MD;  Location: [facility];  Service: Endoscopy;  Laterality: N/A;    FIXATION KYPHOPLASTY Bilateral 06/08/2023    Procedure: KYPHOPLASTY;  Surgeon: Fausto Smith MD;  Location: [facility];   Service: Neurosurgery;  Laterality: Bilateral;  kyphoplasty and radiofrequency ablation - L1    HYSTERECTOMY      PARTIAL//still with ovaries    neck fusion  2017    THYROIDECTOMY      TUBAL LIGATION         Family History   Problem Relation Name Age of Onset    Hypertension Mother Vivian     Diabetes Mother Vivian     Asthma Mother Vivian             Diabetes Father      Asthma Father      Stroke Maternal Grandmother  grandmother     Prostate cancer Maternal Grandfather      Mental illness Son Lukasz Garcia     Pancreatic cancer Maternal Uncle      Mental illness Other Pat side     Pancreatic cancer Other Mat side     Ovarian cancer Maternal Cousin         Review of patient's allergies indicates:   Allergen Reactions    Hydrocodone-acetaminophen Other (See Comments)     Causes pt to feel extremely sick        Social History     Tobacco Use    Smoking status: Every Day     Current packs/day: 0.50     Average packs/day: 0.5 packs/day for 50.0 years (25.0 ttl pk-yrs)     Types: Cigarettes     Passive exposure: Never    Smokeless tobacco: Never   Substance Use Topics    Alcohol use: Not Currently     Comment: quit    Drug use: No     Labs     ECOG SCORE    0 - Fully active-able to carry on all pre-disease performance without restriction       Vitals:    25 1033   BP: 111/64   Pulse: 68   Resp: 18   Temp: 97.3 °F (36.3 °C)     Physical Exam  Constitutional:       General: She is not in acute distress.     Appearance: Normal appearance. She is not ill-appearing, toxic-appearing or diaphoretic.   Eyes:      Extraocular Movements: Extraocular movements intact.      Conjunctiva/sclera: Conjunctivae normal.   Cardiovascular:      Rate and Rhythm: Normal rate.   Pulmonary:      Effort: Pulmonary effort is normal. No respiratory distress.   Skin:     General: Skin is warm.   Neurological:      General: No focal deficit present.      Mental Status: She is alert and oriented to  person, place, and time. Mental status is at baseline.   Psychiatric:         Mood and Affect: Mood normal.         Behavior: Behavior normal.         Thought Content: Thought content normal.         Labs   Labs:  Lab Visit on 03/03/2025   Component Date Value Ref Range Status    WBC 03/03/2025 6.40  3.90 - 12.70 K/uL Final    RBC 03/03/2025 3.89 (L)  4.00 - 5.40 M/uL Final    Hemoglobin 03/03/2025 12.5  12.0 - 16.0 g/dL Final    Hematocrit 03/03/2025 39.4  37.0 - 48.5 % Final    MCV 03/03/2025 101 (H)  82 - 98 fL Final    MCH 03/03/2025 32.1 (H)  27.0 - 31.0 pg Final    MCHC 03/03/2025 31.7 (L)  32.0 - 36.0 g/dL Final    RDW 03/03/2025 13.4  11.5 - 14.5 % Final    Platelets 03/03/2025 325  150 - 450 K/uL Final    MPV 03/03/2025 9.3  9.2 - 12.9 fL Final    Immature Granulocytes 03/03/2025 0.3  0.0 - 0.5 % Final    Gran # (ANC) 03/03/2025 3.4  1.8 - 7.7 K/uL Final    Immature Grans (Abs) 03/03/2025 0.02  0.00 - 0.04 K/uL Final    Comment: Mild elevation in immature granulocytes is non specific and   can be seen in a variety of conditions including stress response,   acute inflammation, trauma and pregnancy. Correlation with other   laboratory and clinical findings is essential.      Lymph # 03/03/2025 2.1  1.0 - 4.8 K/uL Final    Mono # 03/03/2025 0.7  0.3 - 1.0 K/uL Final    Eos # 03/03/2025 0.3  0.0 - 0.5 K/uL Final    Baso # 03/03/2025 0.05  0.00 - 0.20 K/uL Final    nRBC 03/03/2025 0  0 /100 WBC Final    Gran % 03/03/2025 52.5  38.0 - 73.0 % Final    Lymph % 03/03/2025 32.0  18.0 - 48.0 % Final    Mono % 03/03/2025 10.3  4.0 - 15.0 % Final    Eosinophil % 03/03/2025 4.1  0.0 - 8.0 % Final    Basophil % 03/03/2025 0.8  0.0 - 1.9 % Final    Differential Method 03/03/2025 Automated   Final    Magnesium 03/03/2025 1.8  1.6 - 2.6 mg/dL Final    Protein, Serum 03/03/2025 6.7  6.0 - 8.4 g/dL Final    Comment: Serum protein electrophoresis and immunofixation results should be   interpreted in clinical context in that  some therapeutic agents can   result   in false positive results (example, daratumumab). Correlation with   the   patient s therapeutic regimen is required.      Sodium 03/03/2025 140  136 - 145 mmol/L Final    Potassium 03/03/2025 3.8  3.5 - 5.1 mmol/L Final    Chloride 03/03/2025 109  95 - 110 mmol/L Final    CO2 03/03/2025 21 (L)  23 - 29 mmol/L Final    Glucose 03/03/2025 111 (H)  70 - 110 mg/dL Final    BUN 03/03/2025 10  8 - 23 mg/dL Final    Creatinine 03/03/2025 0.9  0.5 - 1.4 mg/dL Final    Calcium 03/03/2025 8.7  8.7 - 10.5 mg/dL Final    Total Protein 03/03/2025 7.1  6.0 - 8.4 g/dL Final    Albumin 03/03/2025 4.0  3.5 - 5.2 g/dL Final    Total Bilirubin 03/03/2025 0.6  0.1 - 1.0 mg/dL Final    Comment: For infants and newborns, interpretation of results should be based  on gestational age, weight and in agreement with clinical  observations.    Premature Infant recommended reference ranges:  Up to 24 hours.............<8.0 mg/dL  Up to 48 hours............<12.0 mg/dL  3-5 days..................<15.0 mg/dL  6-29 days.................<15.0 mg/dL      Alkaline Phosphatase 03/03/2025 53  40 - 150 U/L Final    AST 03/03/2025 26  10 - 40 U/L Final    ALT 03/03/2025 30  10 - 44 U/L Final    eGFR 03/03/2025 >60  >60 mL/min/1.73 m^2 Final    Anion Gap 03/03/2025 10  8 - 16 mmol/L Final    Phosphorus 03/03/2025 2.3 (L)  2.7 - 4.5 mg/dL Final         Imaging/Pathology   - 06/06/2023 RIGHT ILIAC CREST BONE MARROW ASPIRATE, BONE MARROW CLOT, AND BONE MARROW CORE BIOPSY WITH:     CELLULARITY=40-60%, TRILINEAGE HEMATOPOIETIC ACTIVITY (M/E=2.9:1).   CONSISTENT WITH PLASMA CELL NEOPLASM(60%).  SEE COMMENT.   FOCAL GRADE 1 RETICULAR FIBROSIS.   CONGO RED NEGATIVE.   INCREASED STORAGE IRON.   ADEQUATE NUMBER OF MEGAKARYOCYTES.     Bone marrow karyotype results: 46, XX[20], female karyotype.     Myeloma fixed cell, high-risk, FISH:  Normal.  The result is within normal limits for 1q duplication, TP53 deletion and IGH  rearrangement.         - 04/10/2023 PET: Numerous FDG avid predominately lytic osseous lesions as above.  Primary differential considerations would include multiple myeloma versus metastatic disease.  2. No FDG avid soft tissue masses or adenopathy demonstrated.  3. Mild pathologic compression deformity of the L1 vertebral body.                                          Assessment and Plan   IgG lambda Multiple myeloma, R-ISS stage I   Diagnosed June 2023 via bone marrow biopsy  Patient treatment had been held multiple times with few treatments cancelled and also Revlimid 10 mg daily given as unable to tolerate higher dose   Previous oncologist discussed with the patient and the transplant team BMT.  However, patient declined transplant at this time and it was recommended that we continue with Rosa-VRD for 6 cycles and then repeat bone marrow biopsy and PET scan. If VGPR or better, then continue with Revlimid maintenance only.  Continue prophylaxis with aspirin, acyclovir 400 mg b.i.d.  Continue Zometa/calcium and vitamin-D supplements (Due for Zometa 01/13/23)  Repeat PET-CT stable  BM Biopsy 02/13/2024 decrease in plasma cell burden  Continue follow up with myeloma team  Continue with daratumumab and Revlimid today      R  Hip Pain  Back Pain, Left leg pain   PET-CT showed treatment response with decreased FDG uptake in the previously identified bone lesion  Obtained LLE ultrasound - negative   She previously lost 10 lb in approximately 8 weeks; weight stable today   Continue follow up with palliative care  Will obtain MRI of right hip      Hypophosphatemia  Take phosphorus supplement daily x 3 days      Chronic Hypokalemia  Magnesium Normal  Potassium low normal today.  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, Hordeolum externum of left eyelid  Per Ophthalmology,  this is a chronic condition and is unrelated to and not a contraindication for treatment of MM  Has had kenalog injection  Continue follow-up with Ophthalmology  Continue antibiotic/cream and warm compresses PRN        Shortness of Breath, resolved  The patient reports decreased exercise tolerance; she can not walk a long distance without becoming short of breath in his also has some chest discomfort  Recommended CTA to assess for thromboses however patient declines and prefers to be reassessed at next visit      Cancer Screening  MMG 03/17/2023:  BI-RADS 1  PAP Smear:  Partial hysterectomy  Colonoscopy 10/24/2022:  Normal repeat in 5 years        Chronic Medical Conditions  Asthma  Anxiety   COPD   GERD  Hypertension   Hyperlipidemia   Hypothyroidism   ?IBS-C on [employer] Onc Chart Routing      Follow up with physician . She should see the myeloma team   Follow up with [facility] 4 weeks.   Infusion scheduling note   Darzalex today and in 4 weeks   Injection scheduling note    Labs CBC, CMP, phosphorus, magnesium, SPEP, LDH and free light chains   Scheduling:  Preferred lab:  Lab interval:     Imaging    Pharmacy appointment    Other referrals               The patient was seen, interviewed and examined. Pertinent lab and radiologic studies were reviewed. Pt instructed to call should they develop concerning signs/symptoms or have further questions.        Portions of the record may have been created with voice recognition software. Occasional wrong-word or sound-a-like substitutions may have occurred due to the inherent limitations of voice recognition software. Read the chart carefully and recognize, using context, where substitutions have occurred.      Bri Michelle MD    Hematology/Oncology

## 2025-03-05 LAB
ALBUMIN SERPL ELPH-MCNC: 4.05 G/DL (ref 3.35–5.55)
ALPHA1 GLOB SERPL ELPH-MCNC: 0.31 G/DL (ref 0.17–0.41)
ALPHA2 GLOB SERPL ELPH-MCNC: 0.84 G/DL (ref 0.43–0.99)
B-GLOBULIN SERPL ELPH-MCNC: 0.76 G/DL (ref 0.5–1.1)
GAMMA GLOB SERPL ELPH-MCNC: 0.75 G/DL (ref 0.67–1.58)
KAPPA LC SER QL IA: 1.03 MG/DL (ref 0.33–1.94)
KAPPA LC/LAMBDA SER IA: 0.94 (ref 0.26–1.65)
LAMBDA LC SER QL IA: 1.09 MG/DL (ref 0.57–2.63)
PATHOLOGIST INTERPRETATION SPE: NORMAL
PROT SERPL-MCNC: 6.7 G/DL (ref 6–8.4)

## 2025-03-17 ENCOUNTER — PATIENT MESSAGE (OUTPATIENT)
Dept: HEMATOLOGY/ONCOLOGY | Facility: CLINIC | Age: 65
End: 2025-03-17
Payer: COMMERCIAL

## 2025-03-20 DIAGNOSIS — C90.00 MULTIPLE MYELOMA, REMISSION STATUS UNSPECIFIED: ICD-10-CM

## 2025-03-20 RX ORDER — LENALIDOMIDE 10 MG/1
CAPSULE ORAL
Qty: 21 CAPSULE | Refills: 0 | Status: SHIPPED | OUTPATIENT
Start: 2025-03-20

## 2025-03-24 ENCOUNTER — PATIENT MESSAGE (OUTPATIENT)
Dept: HEMATOLOGY/ONCOLOGY | Facility: CLINIC | Age: 65
End: 2025-03-24
Payer: COMMERCIAL

## 2025-03-26 DIAGNOSIS — E83.42 HYPOMAGNESEMIA: ICD-10-CM

## 2025-03-26 RX ORDER — LANOLIN ALCOHOL/MO/W.PET/CERES
400 CREAM (GRAM) TOPICAL
Qty: 90 TABLET | Refills: 1 | Status: SHIPPED | OUTPATIENT
Start: 2025-03-26

## 2025-03-31 ENCOUNTER — LAB VISIT (OUTPATIENT)
Dept: LAB | Facility: HOSPITAL | Age: 65
End: 2025-03-31
Attending: INTERNAL MEDICINE
Payer: COMMERCIAL

## 2025-03-31 DIAGNOSIS — C90.00 MULTIPLE MYELOMA NOT HAVING ACHIEVED REMISSION: ICD-10-CM

## 2025-03-31 DIAGNOSIS — C90.00 MULTIPLE MYELOMA, REMISSION STATUS UNSPECIFIED: ICD-10-CM

## 2025-03-31 LAB
ABSOLUTE EOSINOPHIL (OHS): 0.19 K/UL
ABSOLUTE MONOCYTE (OHS): 0.49 K/UL (ref 0.3–1)
ABSOLUTE NEUTROPHIL COUNT (OHS): 1.97 K/UL (ref 1.8–7.7)
ALBUMIN SERPL BCP-MCNC: 4 G/DL (ref 3.5–5.2)
ALP SERPL-CCNC: 62 UNIT/L (ref 40–150)
ALT SERPL W/O P-5'-P-CCNC: 24 UNIT/L (ref 10–44)
ANION GAP (OHS): 10 MMOL/L (ref 8–16)
AST SERPL-CCNC: 26 UNIT/L (ref 11–45)
BASOPHILS # BLD AUTO: 0.06 K/UL
BASOPHILS NFR BLD AUTO: 1.4 %
BILIRUB SERPL-MCNC: 0.7 MG/DL (ref 0.1–1)
BUN SERPL-MCNC: 15 MG/DL (ref 8–23)
CALCIUM SERPL-MCNC: 9.3 MG/DL (ref 8.7–10.5)
CHLORIDE SERPL-SCNC: 108 MMOL/L (ref 95–110)
CO2 SERPL-SCNC: 22 MMOL/L (ref 23–29)
CREAT SERPL-MCNC: 1 MG/DL (ref 0.5–1.4)
ERYTHROCYTE [DISTWIDTH] IN BLOOD BY AUTOMATED COUNT: 13.5 % (ref 11.5–14.5)
GFR SERPLBLD CREATININE-BSD FMLA CKD-EPI: >60 ML/MIN/1.73/M2
GLUCOSE SERPL-MCNC: 93 MG/DL (ref 70–110)
HCT VFR BLD AUTO: 38.5 % (ref 37–48.5)
HGB BLD-MCNC: 12.8 GM/DL (ref 12–16)
IMM GRANULOCYTES # BLD AUTO: 0.01 K/UL (ref 0–0.04)
IMM GRANULOCYTES NFR BLD AUTO: 0.2 % (ref 0–0.5)
LDH SERPL-CCNC: 214 U/L (ref 110–260)
LYMPHOCYTES # BLD AUTO: 1.67 K/UL (ref 1–4.8)
MAGNESIUM SERPL-MCNC: 1.6 MG/DL (ref 1.6–2.6)
MCH RBC QN AUTO: 32.8 PG (ref 27–31)
MCHC RBC AUTO-ENTMCNC: 33.2 G/DL (ref 32–36)
MCV RBC AUTO: 99 FL (ref 82–98)
NUCLEATED RBC (/100WBC) (OHS): 0 /100 WBC
PHOSPHATE SERPL-MCNC: 3.1 MG/DL (ref 2.7–4.5)
PLATELET # BLD AUTO: 294 K/UL (ref 150–450)
PMV BLD AUTO: 9.4 FL (ref 9.2–12.9)
POTASSIUM SERPL-SCNC: 3.9 MMOL/L (ref 3.5–5.1)
PROT SERPL-MCNC: 7.5 GM/DL (ref 6–8.4)
RBC # BLD AUTO: 3.9 M/UL (ref 4–5.4)
RELATIVE EOSINOPHIL (OHS): 4.3 %
RELATIVE LYMPHOCYTE (OHS): 38 % (ref 18–48)
RELATIVE MONOCYTE (OHS): 11.2 % (ref 4–15)
RELATIVE NEUTROPHIL (OHS): 44.9 % (ref 38–73)
SODIUM SERPL-SCNC: 140 MMOL/L (ref 136–145)
WBC # BLD AUTO: 4.39 K/UL (ref 3.9–12.7)

## 2025-03-31 PROCEDURE — 85025 COMPLETE CBC W/AUTO DIFF WBC: CPT

## 2025-03-31 PROCEDURE — 83615 LACTATE (LD) (LDH) ENZYME: CPT

## 2025-03-31 PROCEDURE — 36415 COLL VENOUS BLD VENIPUNCTURE: CPT | Mod: 59

## 2025-03-31 PROCEDURE — 83735 ASSAY OF MAGNESIUM: CPT

## 2025-03-31 PROCEDURE — 84100 ASSAY OF PHOSPHORUS: CPT

## 2025-03-31 PROCEDURE — 82040 ASSAY OF SERUM ALBUMIN: CPT

## 2025-03-31 PROCEDURE — 84165 PROTEIN E-PHORESIS SERUM: CPT

## 2025-03-31 PROCEDURE — 83521 IG LIGHT CHAINS FREE EACH: CPT

## 2025-03-31 PROCEDURE — 84165 PROTEIN E-PHORESIS SERUM: CPT | Mod: ,,, | Performed by: PATHOLOGY

## 2025-03-31 NOTE — PROGRESS NOTES
Subjective:       Patient ID: Ana Bonilla is a 64 y.o. female.    Chief Complaint:   1. Multiple myeloma, remission status unspecified  IgG lambda Multiple myeloma, R-ISS stage I        Current Treatment:  OP Myeloma ROSA-RD daratumumab lenalidomide dexAMETHasone Q4W        OP ZOLEDRONIC ACID (ZOMETA) every 3 months started 5/2023    Treatment History:  S/p kyphoplasty with radiofrequency ablation per Dr. Smith on 6/8/2023        VRd; Rosa added with cycle #2; Revlimid dose reduced to 10mg due to kidney function    HPI: This is a 64 year old  woman with amblyopia, asthma, HTN, thyroid disease, anxiety, COPD, hyperlipidemia, allergies, and GERD who is seen in Hem/Onc for multiple myeloma. She was referred in 2/2023 by her PCP Dr. Kassidy Sibley after workup for gastritis revealed lytic lesions. CT chest showed lytic and expansile destructive lesion in the sternum and lytic lesions at L1. Biopsy of L1 lesion in 3/2023 revealed mature B cell neoplasm most consistent with plasma cell neoplasm.     Bone marrow biopsy was performed in 4/2023 that demonstrated 60% plasma cells. PET/CT showed extensive bony lesions throughout the spine, sternum, bilateral ribs, pelvis, and a mild compression deformity in L1. SPEP/MECHE showed IgG lambda monoclonal protein measuring 3.19 g/dL. Quantitative immunoglobulins on 3/27/2023 showed IgG 4,521 mg/dL. UPEP/MECHE and FLC were pending. Labs on 3/27/2023 showed Beta 2 microglobulin 1.9, .  Labs on 4/19/2023 showed Hgb, 11.5, WBC 6.3, platelets, BUN 11, Cr 0.9, calcium 9.    She was started on VRd (Velcade/Revlimid/dexamethasone) every 21 days for 3-6 cycles with the plan to evaluate her for autotransplant with consideration of adding daratumumab if she was found to be high risk after FISH panel was resulted. She was started on ASA for thrombosis prophylaxis and acyclovir for herpes zoster prophylaxis. Plan to start Zometa after dental evaluation.     In 5/2023,  Revlimid was decreased from 25mg po 10mg due to decreased creatinine clearance of 49. She underwent kyphoplasty with radiofrequency ablation on 6/8/2023 by Neurosurgery. After improvement in kidney function, her Revlimid dose was increased to 25mg. Eventually, the dose was reduced to 10mg due to intolerable side effects.     Her primary Hematologist/Oncologist is Dr. Luevano.    Interval History: Patient presents for follow up on Rosa+Vrd & Zometa; She is scheduled to receive C13D1 of Rosa today. [facility] and has no new complaints. She is concerned that she is losing weight; she states she eats but eating healthy is difficult. She states she avoids processed foods and sandwiches as she was instructed by education. Discussed eating the foods she likes and can tolerate and supplementing with Ensure when necessary. CBC with no anemia, neutropenia, or thrombocytopenia. RBC mildly low. CMP with normal kidney and liver function. M spike 0.29g/dL with normal FLC and ratio. LDH WNL. Will proceed with treatment and Zometa today.     Reviewed labs with patient:   CBC:   Recent Labs   Lab 03/31/25  1239   WBC 4.39   RBC 3.90 L   HGB 12.8   HCT 38.5   Platelet Count 294   MCV 99 H   MCH 32.8 H   MCHC 33.2     CMP:  Recent Labs   Lab 03/03/25  1114 03/31/25  1239   Glucose 111 H  --    Calcium 8.7 9.3   Albumin 4.0 4.0   Total Protein 7.1  --    Sodium 140 140   Potassium 3.8 3.9   CO2 21 L 22 L   Chloride 109 108   BUN 10 15   Creatinine 0.9 1.0   Alkaline Phosphatase 53  --    ALP  --  62   ALT 30 24   AST 26 26   Total Bilirubin 0.6  --    Bilirubin Total  --  0.7     Lab Results   Component Value Date     03/31/2025     01/16/2024     10/10/2023      The patient location is: [place]  The chief complaint leading to consultation is: multiple myeloma    Visit type: audiovisual    Face to Face time with patient: 24 minutes  40 minutes of total time spent on the encounter, which includes  face to face time and non-face to face time preparing to see the patient (eg, review of tests), Obtaining and/or reviewing separately obtained history, Documenting clinical information in the electronic or other health record, Independently interpreting results (not separately reported) and communicating results to the patient/family/caregiver, or Care coordination (not separately reported).     Each patient to whom he or she provides medical services by telemedicine is:  (1) informed of the relationship between the physician and patient and the respective role of any other health care provider with respect to management of the patient; and (2) notified that he or she may decline to receive medical services by telemedicine and may withdraw from such care at any time.    Social History     Socioeconomic History    Marital status:     Number of children: 2   Occupational History     Employer: [employer]   Tobacco Use    Smoking status: Every Day     Current packs/day: 0.50     Average packs/day: 0.5 packs/day for 50.0 years (25.0 ttl pk-yrs)     Types: Cigarettes     Passive exposure: Never    Smokeless tobacco: Never   Substance and Sexual Activity    Alcohol use: Not Currently     Comment: quit    Drug use: No    Sexual activity: Not Currently     Partners: Male     Social Drivers of Health     Financial Resource Strain: Patient Declined (11/21/2024)    Overall Financial Resource Strain (CARDIA)     Difficulty of Paying Living Expenses: Patient declined   Food Insecurity: Patient Declined (11/21/2024)    Hunger Vital Sign     Worried About Running Out of Food in the Last Year: Patient declined     Ran Out of Food in the Last Year: Patient declined   Transportation Needs: No Transportation Needs (11/15/2023)    PRAPARE - Transportation     Lack of Transportation (Medical): No     Lack of Transportation (Non-Medical): No   Physical Activity: Sufficiently Active (11/21/2024)    Exercise Vital Sign     Days of Exercise  per Week: 3 days     Minutes of Exercise per Session: 60 min   Stress: Stress Concern Present (11/15/2023)    [employer] [place] [employer] - Occupational Stress Questionnaire     Feeling of Stress : To some extent   Housing Stability: Low Risk  (11/15/2023)    Housing Stability Vital Sign     Unable to Pay for Housing in the Last Year: No     Number of Places Lived in the Last Year: 1     Unstable Housing in the Last Year: No     Past Medical History:   Diagnosis Date    Allergy     Amblyopia OS    Anxiety     Asthma     COPD (chronic obstructive pulmonary disease)     NO HOME o2    GERD (gastroesophageal reflux disease)     Hyperlipidemia     Hypertension     PONV (postoperative nausea and vomiting)     Thyroid disease      Family History   Problem Relation Name Age of Onset    Hypertension Mother Vivian     Diabetes Mother Vivian     Asthma Mother Vivian             Diabetes Father      Asthma Father      Stroke Maternal Grandmother  grandmother     Prostate cancer Maternal Grandfather      Mental illness Son Lukasz Garcia     Pancreatic cancer Maternal Uncle      Mental illness Other Pat side     Pancreatic cancer Other Mat side     Ovarian cancer Maternal Cousin       Past Surgical History:   Procedure Laterality Date    APPENDECTOMY      BIOPSY N/A 2023    Procedure: BIOPSY;  Surgeon: Fausto Smith MD;  Location: [facility] OR;  Service: Neurosurgery;  Laterality: N/A;  L1    BUNIONECTOMY      COLONOSCOPY N/A 2019    Procedure: COLONOSCOPY;  Surgeon: Danny Matos III, MD;  Location: [facility] ENDO;  Service: Endoscopy;  Laterality: N/A;    COLONOSCOPY N/A 10/24/2022    Procedure: COLONOSCOPY;  Surgeon: Danny Matos III, MD;  Location: [facility];  Service: Endoscopy;  Laterality: N/A;    ESOPHAGOGASTRODUODENOSCOPY N/A 10/24/2022    Procedure: EGD (ESOPHAGOGASTRODUODENOSCOPY);  Surgeon: Danny Matos III, MD;  Location: [facility];  Service:  Endoscopy;  Laterality: N/A;    FIXATION KYPHOPLASTY Bilateral 06/08/2023    Procedure: KYPHOPLASTY;  Surgeon: Fausto Smith MD;  Location: [facility];  Service: Neurosurgery;  Laterality: Bilateral;  kyphoplasty and radiofrequency ablation - L1    HYSTERECTOMY      PARTIAL//still with ovaries    neck fusion  08/2017    THYROIDECTOMY      TUBAL LIGATION       Review of Systems   Constitutional:  Positive for appetite change and fatigue.   HENT:  Negative for mouth sores, rhinorrhea and sore throat.         Right upper eyelid stye   Eyes: Negative.  Photophobia: decreased since surgery.   Respiratory: Negative.     Cardiovascular: Negative.    Gastrointestinal:  Positive for constipation (since surgery) and nausea (relieved by antiemetics). Negative for diarrhea and vomiting.   Endocrine: Negative.    Genitourinary: Negative.    Musculoskeletal:  Positive for arthralgias (hands aching) and back pain (4/10 back and right hip pain constant; improved with hot shower).   Integumentary:  Negative.   Allergic/Immunologic: Negative.    Neurological:  Negative for weakness and numbness.   Hematological: Negative.    Psychiatric/Behavioral:  The patient is not nervous/anxious.        Medication List with Changes/Refills   Current Medications    ACYCLOVIR (ZOVIRAX) 400 MG TABLET    Take 1 tablet (400 mg total) by mouth 2 (two) times daily.    ALBUTEROL (VENTOLIN HFA) 90 MCG/ACTUATION INHALER    Inhale 2 puffs into the lungs every 4 (four) hours as needed for Wheezing. Rescue    ALPRAZOLAM (XANAX) 2 MG TAB    Take 1 tablet (2 mg total) by mouth 2 (two) times daily as needed (for anxiety. Up to 2 doses/day).    AMLODIPINE-BENAZEPRIL 2.5-10 MG (LOTREL) 2.5-10 MG PER CAPSULE    TAKE 1 CAPSULE BY MOUTH EVERY DAY    ASPIRIN 81 MG CHEW    Take 1 tablet (81 mg total) by mouth once daily.    CALCIUM-VITAMIN D 600 MG-10 MCG (400 UNIT) TAB    Take 1 tablet by mouth every 12 (twelve) hours.    DEXAMETHASONE (DECADRON) 4 MG TAB    Take 10  tablets (40 mg total) by mouth every 7 days. Take with food.    FLUTICASONE PROPIONATE (FLONASE) 50 MCG/ACTUATION NASAL SPRAY    1 spray (50 mcg total) by Each Nostril route once daily.    HYDROXYZINE HCL (ATARAX) 25 MG TABLET    Take 1 tablet (25 mg total) by mouth 3 (three) times daily as needed for Itching.    K PHOS DI & MONO-SOD PHOS MONO (PHOSPHOROUS) 250 MG TAB    Take 1 tablet by mouth 4 (four) times daily with meals and nightly. for 5 days    LENALIDOMIDE 10 MG CAP    TAKE 1 CAPSULE ONCE DAILY FOR 21 DAYS AND THEN 7 DAYS OFF    LEVOTHYROXINE (SYNTHROID) 100 MCG TABLET    TAKE 1 TABLET(100 MCG) BY MOUTH BEFORE BREAKFAST    LINACLOTIDE (LINZESS) 72 MCG CAP CAPSULE    Take 2 capsules (144 mcg total) by mouth before breakfast.    MAGNESIUM OXIDE (MAG-OX) 400 MG (241.3 MG MAGNESIUM) TABLET    TAKE 1 TABLET(400 MG) BY MOUTH EVERY DAY    METOPROLOL SUCCINATE (TOPROL-XL) 50 MG 24 HR TABLET    Take 1 tablet (50 mg total) by mouth every evening.    MONTELUKAST (SINGULAIR) 10 MG TABLET    Take 1 tablet (10 mg total) by mouth every evening.    NALOXONE (NARCAN) 4 MG/ACTUATION SPRY        NEOMYCIN-POLYMYXIN-DEXAMETHASONE (DEXACINE) 3.5 MG/G-10,000 UNIT/G-0.1 % OINT    Place into both eyes 3 (three) times daily.    NICOTINE (NICODERM CQ) 14 MG/24 HR    Place 1 patch onto the skin once daily.    ONDANSETRON (ZOFRAN) 4 MG TABLET    Take 4 mg by mouth every 8 (eight) hours as needed.    OXYCODONE (ROXICODONE) 5 MG IMMEDIATE RELEASE TABLET    Take 1 tablet (5 mg total) by mouth every 6 (six) hours as needed for Pain.    PANTOPRAZOLE (PROTONIX) 40 MG TABLET    TAKE 1 TABLET(40 MG) BY MOUTH EVERY DAY    POTASSIUM CHLORIDE (KLOR-CON) 10 MEQ TBSR    Take 1 tablet (10 mEq total) by mouth once daily.    POTASSIUM PHOSPHATE, MONOBASIC, (K-PHOS) 500 MG TBSO    Take 1 tablet (500 mg total) by mouth once daily.    POTASSIUM, SODIUM PHOSPHATES (PHOS-NAK) 280-160-250 MG PWPK    Take 1 packet by mouth 4 (four) times daily with meals and  nightly.    PROMETHAZINE-DEXTROMETHORPHAN (PROMETHAZINE-DM) 6.25-15 MG/5 ML SYRP    Take 5 mLs by mouth every 4 (four) hours as needed (coughing).    ROSUVASTATIN (CRESTOR) 10 MG TABLET    Take 1 tablet (10 mg total) by mouth every evening.    WIXELA INHUB 250-50 MCG/DOSE DISKUS INHALER    INHALE 1 PUFF INTO THE LUNGS TWICE DAILY    ZOLPIDEM (AMBIEN) 5 MG TAB    Take 1 tablet (5 mg total) by mouth nightly as needed (insomnia).     Objective:     There were no vitals filed for this visit.  Physical Exam     Unable to assess due to virtual visit.    (2) Ambulatory and capable of self care, unable to carry out work activity, up and about > 50% or waking hours  Assessment:     Problem List Items Addressed This Visit          Oncology    Multiple myeloma - Primary (Chronic)    Diagnosed in 3/2023 after work up for gastritis revealed lytic and expansile destructive lesion in the sternum and lytic lesions at L1. Bone marrow biopsy showed 60% plasma cells. Started VRd and Zometa. Underwent kyphoplasty and radiofrequency ablation in 6/2023. Plan to evaluate for autotransplant after 3-6 cycles. Patient declined transplant. Recommendation to continue with Rosa-VRD for 6 cycles and then repeat bone marrow biopsy and PET scan. If VGPR or better, then continue with Revlimid maintenance only.           Plan:     Multiple myeloma, remission status unspecified    Labs reviewed.   Ok to proceed with C13D1 of Rosa+VRd today; starts Revlimid tomorrow.  Continue monthly Zometa; due 4/2025.   Ok to proceed with C15D1 of Zometa today; Ca ++ 9.3.   Continue smoking cessation.  Patient instructed to continue vitamins to include Caltrate, potassium QOD.    Patient may reduce decadron to 20mg weekly.   Continue KPhos to maintain adequate phosphorus levels and improve K level.   Follow up in 4 weeks with Dr. Luevano with  SPEP, MECHE, FLC, Mg, Phos, CBC, and Comprehensive Metabolic Panel prior to C14D1 of Rosa+Vrd and C16D1 of Zometa.    Route  Chart for Scheduling    Med Onc Chart Routing      Follow up with physician 4 weeks. Dr. Luevano   Follow up with WERNER    Infusion scheduling note   in 4 weeks for C13D1 Rosa+VRd   Injection scheduling note in 4 weeks for C16D1 Zometa   Labs CBC, CMP, magnesium, phosphorus, SPEP, free light chains and other   Scheduling:  Preferred lab:  Lab interval:  and MECHE in 4 weeks   Imaging None      Pharmacy appointment No pharmacy appointment needed      Other referrals       No additional referrals needed             I will review assessment/plan with collaborating physician.      BRITTANI Enamorado

## 2025-04-01 ENCOUNTER — DOCUMENTATION ONLY (OUTPATIENT)
Dept: HEMATOLOGY/ONCOLOGY | Facility: CLINIC | Age: 65
End: 2025-04-01
Payer: COMMERCIAL

## 2025-04-01 ENCOUNTER — OFFICE VISIT (OUTPATIENT)
Dept: HEMATOLOGY/ONCOLOGY | Facility: CLINIC | Age: 65
End: 2025-04-01
Payer: COMMERCIAL

## 2025-04-01 ENCOUNTER — INFUSION (OUTPATIENT)
Dept: INFUSION THERAPY | Facility: HOSPITAL | Age: 65
End: 2025-04-01
Attending: INTERNAL MEDICINE
Payer: COMMERCIAL

## 2025-04-01 VITALS
HEART RATE: 64 BPM | HEIGHT: 64 IN | SYSTOLIC BLOOD PRESSURE: 112 MMHG | BODY MASS INDEX: 22.13 KG/M2 | DIASTOLIC BLOOD PRESSURE: 73 MMHG | OXYGEN SATURATION: 98 % | WEIGHT: 129.63 LBS | TEMPERATURE: 98 F | RESPIRATION RATE: 18 BRPM

## 2025-04-01 DIAGNOSIS — C90.00 MULTIPLE MYELOMA, REMISSION STATUS UNSPECIFIED: Primary | ICD-10-CM

## 2025-04-01 DIAGNOSIS — C90.00 MULTIPLE MYELOMA NOT HAVING ACHIEVED REMISSION: Primary | ICD-10-CM

## 2025-04-01 DIAGNOSIS — C90.00 MULTIPLE MYELOMA, REMISSION STATUS UNSPECIFIED: ICD-10-CM

## 2025-04-01 LAB
ALBUMIN, SPE (OHS): 3.86 G/DL (ref 3.35–5.55)
ALPHA 1 GLOB (OHS): 0.33 GM/DL (ref 0.17–0.41)
ALPHA 2 GLOB (OHS): 0.84 GM/DL (ref 0.43–0.99)
BETA GLOB (OHS): 0.76 GM/DL (ref 0.5–1.1)
GAMMA GLOBULIN (OHS): 0.72 GM/DL (ref 0.67–1.58)
KAPPA LC FREE SER-MCNC: 1 MG/L (ref 0.26–1.65)
KAPPA LC FREE/LAMBDA FREE SER: 1.13 MG/DL (ref 0.33–1.94)
LAMBDA LC FREE SERPL-MCNC: 1.13 MG/DL (ref 0.57–2.63)
PATHOLOGIST REVIEW - SPE (OHS): NORMAL
PROT SERPL-MCNC: 6.5 GM/DL (ref 6–8.4)

## 2025-04-01 PROCEDURE — 63600175 PHARM REV CODE 636 W HCPCS: Mod: JZ,TB | Performed by: NURSE PRACTITIONER

## 2025-04-01 PROCEDURE — 96365 THER/PROPH/DIAG IV INF INIT: CPT

## 2025-04-01 PROCEDURE — 96401 CHEMO ANTI-NEOPL SQ/IM: CPT

## 2025-04-01 PROCEDURE — 25000003 PHARM REV CODE 250: Performed by: NURSE PRACTITIONER

## 2025-04-01 RX ORDER — HEPARIN 100 UNIT/ML
500 SYRINGE INTRAVENOUS
Status: CANCELLED | OUTPATIENT
Start: 2025-05-27

## 2025-04-01 RX ORDER — ONDANSETRON 4 MG/1
4 TABLET, FILM COATED ORAL EVERY 8 HOURS PRN
COMMUNITY
Start: 2025-03-26

## 2025-04-01 RX ORDER — PROCHLORPERAZINE EDISYLATE 5 MG/ML
10 INJECTION INTRAMUSCULAR; INTRAVENOUS ONCE AS NEEDED
Status: CANCELLED | OUTPATIENT
Start: 2025-05-27

## 2025-04-01 RX ORDER — SODIUM CHLORIDE 0.9 % (FLUSH) 0.9 %
10 SYRINGE (ML) INJECTION
Status: CANCELLED | OUTPATIENT
Start: 2025-04-01

## 2025-04-01 RX ORDER — SODIUM CHLORIDE 0.9 % (FLUSH) 0.9 %
10 SYRINGE (ML) INJECTION
Status: DISCONTINUED | OUTPATIENT
Start: 2025-04-01 | End: 2025-04-01 | Stop reason: HOSPADM

## 2025-04-01 RX ORDER — EPINEPHRINE 0.3 MG/.3ML
0.3 INJECTION SUBCUTANEOUS ONCE AS NEEDED
Status: CANCELLED | OUTPATIENT
Start: 2025-05-27

## 2025-04-01 RX ORDER — DIPHENHYDRAMINE HYDROCHLORIDE 50 MG/ML
50 INJECTION, SOLUTION INTRAMUSCULAR; INTRAVENOUS ONCE AS NEEDED
Status: CANCELLED | OUTPATIENT
Start: 2025-05-27

## 2025-04-01 RX ORDER — HEPARIN 100 UNIT/ML
500 SYRINGE INTRAVENOUS
Status: CANCELLED | OUTPATIENT
Start: 2025-04-01

## 2025-04-01 RX ORDER — SODIUM CHLORIDE 0.9 % (FLUSH) 0.9 %
10 SYRINGE (ML) INJECTION
Status: CANCELLED | OUTPATIENT
Start: 2025-05-27

## 2025-04-01 RX ADMIN — DARATUMUMAB AND HYALURONIDASE-FIHJ (HUMAN RECOMBINANT) 1800 MG: 1800; 30000 INJECTION SUBCUTANEOUS at 11:04

## 2025-04-01 RX ADMIN — ZOLEDRONIC ACID 3.3 MG: 4 INJECTION, SOLUTION, CONCENTRATE INTRAVENOUS at 11:04

## 2025-04-01 NOTE — PLAN OF CARE
Last Zometa:  2/12/2025    Labs reviewed: Cr and Ca WNL    Patient denied any recent or upcoming dental work that would prevent pt from receiving Zometa today.    Patient stated she takes calcium and Vit D supplements at home:   Yes        Patient tolerated Zometa well today; no adverse reaction noted.  No significant complaints voiced.  IV discontinued with catheter intact and pressure dressing applied to the site.  Has f/u appt(s) scheduled per MD request.  No questions or concerns voiced.  NAD noted upon discharge.      Darzalex injection given without difficulty to right lower abd by EFFIE Smith RN.   Band-Aid applied to site.  Patient instructed to stay in the clinic for 15 minutes.  Patient verbalized understanding and will notify nurse with any complaints.  NAD noted upon discharge.

## 2025-04-01 NOTE — DISCHARGE INSTRUCTIONS
Thank you for letting me take care of YOU!!    Esther Guo, RN          [facility]  [address] [facility]  [id number] [place] [address] Drive  [phone] phone     [phone] fax  Hours of Operation: Monday- Friday 8:00am- 5:00pm  After hours phone  [phone]  Hematology / Oncology Physicians on call:    Dr. Willi Cleaning        Nurse Practitioners:    Joanna Chapman, BELLE Cardenas, JUANI Duncan, BELLE Constantino, BELLE Page, BELLE      Please don't hesitate to call if you have any concerns.

## 2025-04-02 NOTE — PROGRESS NOTES
NEERAJ provided patient a case of vanilla Boost. Pt states she can drink them while she is at work. Pt is still employed at [employer]. SWER will remain available.

## 2025-04-09 ENCOUNTER — PATIENT MESSAGE (OUTPATIENT)
Dept: PALLIATIVE MEDICINE | Facility: CLINIC | Age: 65
End: 2025-04-09
Payer: COMMERCIAL

## 2025-04-09 DIAGNOSIS — F41.9 ANXIETY: ICD-10-CM

## 2025-04-09 DIAGNOSIS — C90.00 MULTIPLE MYELOMA, REMISSION STATUS UNSPECIFIED: ICD-10-CM

## 2025-04-09 RX ORDER — ALPRAZOLAM 2 MG/1
2 TABLET ORAL 2 TIMES DAILY PRN
Qty: 56 TABLET | Refills: 0 | Status: SHIPPED | OUTPATIENT
Start: 2025-04-09 | End: 2025-05-07

## 2025-04-17 ENCOUNTER — HOSPITAL ENCOUNTER (OUTPATIENT)
Dept: RADIOLOGY | Facility: HOSPITAL | Age: 65
Discharge: HOME | End: 2025-04-17
Attending: INTERNAL MEDICINE
Payer: COMMERCIAL

## 2025-04-17 DIAGNOSIS — M25.551 RIGHT HIP PAIN: ICD-10-CM

## 2025-04-17 PROCEDURE — 25500020 PHARM REV CODE 255: Performed by: INTERNAL MEDICINE

## 2025-04-17 PROCEDURE — 73723 MRI JOINT LWR EXTR W/O&W/DYE: CPT | Mod: 26,RT,, | Performed by: RADIOLOGY

## 2025-04-17 PROCEDURE — 73723 MRI JOINT LWR EXTR W/O&W/DYE: CPT | Mod: TC,RT

## 2025-04-17 PROCEDURE — A9585 GADOBUTROL INJECTION: HCPCS | Performed by: INTERNAL MEDICINE

## 2025-04-17 RX ORDER — GADOBUTROL 604.72 MG/ML
10 INJECTION INTRAVENOUS
Status: COMPLETED | OUTPATIENT
Start: 2025-04-17 | End: 2025-04-17

## 2025-04-17 RX ADMIN — GADOBUTROL 5 ML: 604.72 INJECTION INTRAVENOUS at 01:04

## 2025-04-21 ENCOUNTER — RESULTS FOLLOW-UP (OUTPATIENT)
Dept: HEMATOLOGY/ONCOLOGY | Facility: CLINIC | Age: 65
End: 2025-04-21

## 2025-04-23 DIAGNOSIS — C90.00 MULTIPLE MYELOMA, REMISSION STATUS UNSPECIFIED: ICD-10-CM

## 2025-04-23 RX ORDER — LENALIDOMIDE 10 MG/1
CAPSULE ORAL
Qty: 21 CAPSULE | Refills: 0 | Status: SHIPPED | OUTPATIENT
Start: 2025-04-23

## 2025-04-28 ENCOUNTER — LAB VISIT (OUTPATIENT)
Dept: LAB | Facility: HOSPITAL | Age: 65
End: 2025-04-28
Attending: INTERNAL MEDICINE
Payer: COMMERCIAL

## 2025-04-28 ENCOUNTER — OFFICE VISIT (OUTPATIENT)
Dept: INTERNAL MEDICINE | Facility: CLINIC | Age: 65
End: 2025-04-28
Payer: COMMERCIAL

## 2025-04-28 DIAGNOSIS — J34.89 RHINORRHEA: ICD-10-CM

## 2025-04-28 DIAGNOSIS — Z13.220 ENCOUNTER FOR SCREENING FOR LIPID DISORDER: ICD-10-CM

## 2025-04-28 DIAGNOSIS — J44.89 COPD WITH ASTHMA: Primary | ICD-10-CM

## 2025-04-28 DIAGNOSIS — J30.2 SEASONAL ALLERGIC RHINITIS, UNSPECIFIED TRIGGER: ICD-10-CM

## 2025-04-28 DIAGNOSIS — C90.00 MULTIPLE MYELOMA, REMISSION STATUS UNSPECIFIED: ICD-10-CM

## 2025-04-28 DIAGNOSIS — R11.0 NAUSEA: ICD-10-CM

## 2025-04-28 DIAGNOSIS — R09.82 PND (POST-NASAL DRIP): ICD-10-CM

## 2025-04-28 LAB
ABSOLUTE NEUTROPHIL COUNT (OHS): 1.9 K/UL (ref 1.8–7.7)
ALBUMIN SERPL BCP-MCNC: 3.7 G/DL (ref 3.5–5.2)
ALP SERPL-CCNC: 62 UNIT/L (ref 40–150)
ALT SERPL W/O P-5'-P-CCNC: 29 UNIT/L (ref 10–44)
ANION GAP (OHS): 8 MMOL/L (ref 8–16)
AST SERPL-CCNC: 27 UNIT/L (ref 11–45)
BILIRUB SERPL-MCNC: 0.5 MG/DL (ref 0.1–1)
BUN SERPL-MCNC: 12 MG/DL (ref 8–23)
CALCIUM SERPL-MCNC: 8.6 MG/DL (ref 8.7–10.5)
CHLORIDE SERPL-SCNC: 112 MMOL/L (ref 95–110)
CO2 SERPL-SCNC: 23 MMOL/L (ref 23–29)
CREAT SERPL-MCNC: 1 MG/DL (ref 0.5–1.4)
ERYTHROCYTE [DISTWIDTH] IN BLOOD BY AUTOMATED COUNT: 13.2 % (ref 11.5–14.5)
GFR SERPLBLD CREATININE-BSD FMLA CKD-EPI: >60 ML/MIN/1.73/M2
GLUCOSE SERPL-MCNC: 96 MG/DL (ref 70–110)
HCT VFR BLD AUTO: 36.8 % (ref 37–48.5)
HGB BLD-MCNC: 12 GM/DL (ref 12–16)
IMM GRANULOCYTES # BLD AUTO: 0.01 K/UL (ref 0–0.04)
MAGNESIUM SERPL-MCNC: 1.7 MG/DL (ref 1.6–2.6)
MCH RBC QN AUTO: 33.1 PG (ref 27–31)
MCHC RBC AUTO-ENTMCNC: 32.6 G/DL (ref 32–36)
MCV RBC AUTO: 101 FL (ref 82–98)
PHOSPHATE SERPL-MCNC: 2.1 MG/DL (ref 2.7–4.5)
PLATELET # BLD AUTO: 276 K/UL (ref 150–450)
PMV BLD AUTO: 9.1 FL (ref 9.2–12.9)
POTASSIUM SERPL-SCNC: 3.6 MMOL/L (ref 3.5–5.1)
PROT SERPL-MCNC: 6.8 GM/DL (ref 6–8.4)
RBC # BLD AUTO: 3.63 M/UL (ref 4–5.4)
SODIUM SERPL-SCNC: 143 MMOL/L (ref 136–145)
WBC # BLD AUTO: 5.4 K/UL (ref 3.9–12.7)

## 2025-04-28 PROCEDURE — 84100 ASSAY OF PHOSPHORUS: CPT

## 2025-04-28 PROCEDURE — 86334 IMMUNOFIX E-PHORESIS SERUM: CPT

## 2025-04-28 PROCEDURE — 36415 COLL VENOUS BLD VENIPUNCTURE: CPT

## 2025-04-28 PROCEDURE — 83521 IG LIGHT CHAINS FREE EACH: CPT

## 2025-04-28 PROCEDURE — 83735 ASSAY OF MAGNESIUM: CPT

## 2025-04-28 PROCEDURE — 84075 ASSAY ALKALINE PHOSPHATASE: CPT

## 2025-04-28 PROCEDURE — 84165 PROTEIN E-PHORESIS SERUM: CPT

## 2025-04-28 PROCEDURE — 85027 COMPLETE CBC AUTOMATED: CPT

## 2025-04-28 PROCEDURE — 4010F ACE/ARB THERAPY RXD/TAKEN: CPT | Mod: CPTII,95,, | Performed by: NURSE PRACTITIONER

## 2025-04-28 PROCEDURE — G2211 COMPLEX E/M VISIT ADD ON: HCPCS | Mod: 95,,, | Performed by: NURSE PRACTITIONER

## 2025-04-28 PROCEDURE — 98005 SYNCH AUDIO-VIDEO EST LOW 20: CPT | Mod: 95,,, | Performed by: NURSE PRACTITIONER

## 2025-04-28 RX ORDER — FLUTICASONE PROPIONATE AND SALMETEROL 250; 50 UG/1; UG/1
1 POWDER RESPIRATORY (INHALATION) 2 TIMES DAILY
Qty: 180 EACH | Refills: 1 | Status: SHIPPED | OUTPATIENT
Start: 2025-04-28

## 2025-04-28 RX ORDER — ONDANSETRON 4 MG/1
4 TABLET, FILM COATED ORAL EVERY 8 HOURS PRN
Qty: 30 TABLET | Refills: 0 | Status: SHIPPED | OUTPATIENT
Start: 2025-04-28

## 2025-04-28 RX ORDER — FLUTICASONE PROPIONATE 50 MCG
1 SPRAY, SUSPENSION (ML) NASAL DAILY
Qty: 16 ML | Refills: 5 | Status: SHIPPED | OUTPATIENT
Start: 2025-04-28 | End: 2025-05-06 | Stop reason: SDUPTHER

## 2025-04-28 NOTE — PROGRESS NOTES
Primary Care Telemedicine Note  The patient location is:  Patient Home in [place]  The chief complaint leading to consultation is: Allergies  Total time spent with patient: 20 minutes    Visit type: Virtual visit with synchronous audio and video  Each patient to whom he or she provides medical services by telemedicine is:  (1) informed of the relationship between the physician and patient and the respective role of any other health care provider with respect to management of the patient; and (2) notified that he or she may decline to receive medical services by telemedicine and may withdraw from such care at any time.    CC:Attention Deficit Disorder  HPI:  History of Present Illness    CHIEF COMPLAINT:  Ana presents today for persistent nasal congestion and rhinorrhea    ALLERGIC RHINITIS:  She reports persistent runny nose with associated nausea and eye watering. Symptoms worsen with exposure to perfume at work and improve when at home. Her symptoms are particularly bothersome while working at [employer] near the [employer] department, where she works part-time 2-3 days per week.    MULTIPLE MYELOMA:  She declined stem cell transplant for multiple myeloma treatment. Her current treatment regimen includes weekly steroids at 5 tablets per week, reduced from 10 tablets per week.    MEDICATIONS:  She takes Singulair (montelukast) nightly and Xanax. She has an oxycodone prescription but does not take it due to adverse effects. She previously tried Ambien 5mg which was ineffective for sleep and no longer desires this medication.      Problem List Items Addressed This Visit       COPD with asthma - Primary    Relevant Medications    fluticasone-salmeterol diskus inhaler 250-50 mcg    Allergic rhinitis    Relevant Medications    fluticasone propionate (FLONASE) 50 mcg/actuation nasal spray     Other Visit Diagnoses         PND (post-nasal drip)        Relevant Medications    fluticasone propionate (FLONASE) 50  mcg/actuation nasal spray      Nausea        Relevant Medications    ondansetron (ZOFRAN) 4 MG tablet      Rhinorrhea        Relevant Medications    fluticasone propionate (FLONASE) 50 mcg/actuation nasal spray      Encounter for screening for lipid disorder        Relevant Orders    Lipid Panel            The patient's Health Maintenance was reviewed and the following appears to be due at this time:  Health Maintenance Due   Topic Date Due    TETANUS VACCINE  Never done    Pneumococcal Vaccines (Age 50+) (1 of 2 - PCV) Never done    Shingles Vaccine (2 of 2) 07/22/2018    RSV Vaccine (Age 60+ and Pregnant patients) (1 - Risk 60-74 years 1-dose series) Never done    COVID-19 Vaccine (3 - Pfizer risk series) 05/07/2021    Lipid Panel  08/25/2023    LDCT Lung Screen  02/02/2024    Mammogram  03/17/2024       [unfilled]  Medications Prior to Visit[1]   ROS   Physical Exam   @[email or web address]@  There were no vitals taken for this visit.    Constitutional: The patient appears well-developed and well-nourished. No distress.   Psychiatric: The mood appears not anxious and the affect is not angry, not blunt, not labile and not inappropriate. Speech is not rapid and/or pressured, not delayed, not tangential and not slurred. The patient is not agitated, not aggressive, is not hyperactive, not slowed, not withdrawn, not actively hallucinating and not combative. Thought content is not paranoid and not delusional. Cognition and memory are not impaired. The patient does not express impulsivity or inappropriate judgment and does not exhibit a depressed mood. The patient expresses no homicidal and no suicidal ideation and has no suicidal plans and no homicidal plans. The patient is communicative and exhibits a normal recent memory and normal remote memory. The patient is attentive.     Encounter Diagnoses   Name Primary?    COPD with asthma Yes    PND (post-nasal drip)     Nausea     Rhinorrhea     Seasonal allergic rhinitis,  unspecified trigger     Encounter for screening for lipid disorder        PLAN:    Assessment & Plan    Considered chronic sinus problems and allergies, recommending a combination of OTC antihistamines and prescribed medications.  Evaluated concerns about nausea potentially related to sinus drainage and explained that nasal drip can cause nausea.  Noted overdue for low-dose CT Chest to screen for lung cancer, last PET scan was 15 months ago.  Considered reluctance to undergo stem cell transplant for multiple myeloma, acknowledging personal circumstances and preferences with discussed success rate of 50-60%.    COPD WITH ACUTE EXACERBATION:  - Monitored patient's persistent rhinorrhea and nasal congestion, exacerbated by perfumes and outdoor environments.  - Ana reported nausea (possibly related to sinus drainage) and ocular irritation affecting ability to wear lashes due to allergies.  - Acknowledged potential correlation between nasal drip and nausea.    MEDICATION MANAGEMENT:  - - Continued montelukast (Singulair) nightly for allergy management. - Prescribed daily Zyrtec or Claritin in addition to montelukast. - Prescribed Flonase nasal spray for symptom relief. - Offered a steroid Dosepak for acute symptom management. - Continued dexamethasone 5mg once weekly as part of current treatment regimen.    MULTIPLE MYELOMA:  - Scheduled discussion of new treatment options with Dr. Michelel at upcoming appointment, coinciding with infusion day on a Monday, Tuesday, or Wednesday.  - Ana declines stem cell transplant; alternative options will be reviewed.  - Continuing weekly dexamethasone, dose reduced from 10mg to 5mg to manage side effects including sleep disturbances.  - Previously identified lesions in chest and hip from PET scannoted.    ALLERGIC RHINITIS:  - Educated patient on consistent daily use of allergy medications, including availability of generic versions at [employer] stores.  - Initiated daily morning  dose of Zyrtec (to be taken at night) or Claritin (during day) for symptom management.  - Noted symptom exacerbation at work and improvement at home.  - Recommend consultation with allergist Dr. Buenrostro for comprehensive evaluation and treatment.    ANXIETY DISORDER:  - Referred patient to Dr. Miranda (primary care physician) for establishment of care and potential management of anxiety medication.  - Noted 17-year history of Xanax use and patient's frustration with changes in anxiety medication management since cancer diagnosis.  - Continuing current Xanax prescription for anxiety management.    OCCUPATIONAL EXPOSURE TO AIR CONTAMINANTS:  - Recommend use of mask when exposed to perfumes at work to mitigate allergy symptoms.    FAMILY HISTORY OF NERVOUS SYSTEM DISORDERS:  - Noted patient's report of having a schizophrenic son, indicating a family history of nervous system disorders.    FOLLOW-UP:  - Recommend low-dose CT to screen for potential lung cancer.       I have discontinued Ana Bonilla's fluticasone propionate. I have changed her WIXELA INHUB to fluticasone-salmeterol 250-50 mcg/dose. I have also changed her ondansetron. Additionally, I am having her start on fluticasone propionate. Lastly, I am having her maintain her pantoprazole, nicotine, calcium-vitamin D, neomycin-polymyxin-dexamethasone, acyclovir, aspirin, dexAMETHasone, amlodipine-benazepril 2.5-10 mg, hydrOXYzine HCL, linaCLOtide, (potassium, sodium phosphates), naloxone, albuterol, potassium phosphate (monobasic), rosuvastatin, levothyroxine, k phos di & mono-sod phos mono, metoprolol succinate, montelukast, zolpidem, potassium chloride, magnesium oxide, ALPRAZolam, and lenalidomide. We will continue to administer dexAMETHasone.  No problem-specific Assessment & Plan notes found for this encounter.      Follow up if symptoms worsen or fail to improve.    Ana was seen today for sinus problem.    Diagnoses and all orders for this  visit:    COPD with asthma  -     fluticasone-salmeterol diskus inhaler 250-50 mcg; Inhale 1 puff into the lungs 2 (two) times daily.    PND (post-nasal drip)  -     fluticasone propionate (FLONASE) 50 mcg/actuation nasal spray; 1 spray (50 mcg total) by Each Nostril route once daily.    Nausea  -     ondansetron (ZOFRAN) 4 MG tablet; Take 1 tablet (4 mg total) by mouth every 8 (eight) hours as needed for Nausea.    Rhinorrhea  -     fluticasone propionate (FLONASE) 50 mcg/actuation nasal spray; 1 spray (50 mcg total) by Each Nostril route once daily.    Seasonal allergic rhinitis, unspecified trigger  -     fluticasone propionate (FLONASE) 50 mcg/actuation nasal spray; 1 spray (50 mcg total) by Each Nostril route once daily.    Encounter for screening for lipid disorder  -     Lipid Panel; Future      Medications Ordered This Encounter   Medications    fluticasone propionate (FLONASE) 50 mcg/actuation nasal spray     Si spray (50 mcg total) by Each Nostril route once daily.     Dispense:  16 mL     Refill:  5    fluticasone-salmeterol diskus inhaler 250-50 mcg     Sig: Inhale 1 puff into the lungs 2 (two) times daily.     Dispense:  180 each     Refill:  1    ondansetron (ZOFRAN) 4 MG tablet     Sig: Take 1 tablet (4 mg total) by mouth every 8 (eight) hours as needed for Nausea.     Dispense:  30 tablet     Refill:  0     [unfilled]  Orders Placed This Encounter   Procedures    Lipid Panel     Standing Status:   Future     Expected Date:   2025     Expiration Date:   2026       Medication List with Changes/Refills   New Medications    FLUTICASONE PROPIONATE (FLONASE) 50 MCG/ACTUATION NASAL SPRAY    1 spray (50 mcg total) by Each Nostril route once daily.   Current Medications    ACYCLOVIR (ZOVIRAX) 400 MG TABLET    Take 1 tablet (400 mg total) by mouth 2 (two) times daily.    ALBUTEROL (VENTOLIN HFA) 90 MCG/ACTUATION INHALER    Inhale 2 puffs into the lungs every 4 (four) hours as needed for Wheezing.  Rescue    ALPRAZOLAM (XANAX) 2 MG TAB    Take 1 tablet (2 mg total) by mouth 2 (two) times daily as needed (for anxiety. Up to 2 doses/day).    AMLODIPINE-BENAZEPRIL 2.5-10 MG (LOTREL) 2.5-10 MG PER CAPSULE    TAKE 1 CAPSULE BY MOUTH EVERY DAY    ASPIRIN 81 MG CHEW    Take 1 tablet (81 mg total) by mouth once daily.    CALCIUM-VITAMIN D 600 MG-10 MCG (400 UNIT) TAB    Take 1 tablet by mouth every 12 (twelve) hours.    DEXAMETHASONE (DECADRON) 4 MG TAB    Take 10 tablets (40 mg total) by mouth every 7 days. Take with food.    HYDROXYZINE HCL (ATARAX) 25 MG TABLET    Take 1 tablet (25 mg total) by mouth 3 (three) times daily as needed for Itching.    K PHOS DI & MONO-SOD PHOS MONO (PHOSPHOROUS) 250 MG TAB    Take 1 tablet by mouth 4 (four) times daily with meals and nightly. for 5 days    LENALIDOMIDE 10 MG CAP    TAKE 1 CAPSULE ONCE DAILY FOR 21 DAYS, AND THEN 7 DAYS OFF    LEVOTHYROXINE (SYNTHROID) 100 MCG TABLET    TAKE 1 TABLET(100 MCG) BY MOUTH BEFORE BREAKFAST    LINACLOTIDE (LINZESS) 72 MCG CAP CAPSULE    Take 2 capsules (144 mcg total) by mouth before breakfast.    MAGNESIUM OXIDE (MAG-OX) 400 MG (241.3 MG MAGNESIUM) TABLET    TAKE 1 TABLET(400 MG) BY MOUTH EVERY DAY    METOPROLOL SUCCINATE (TOPROL-XL) 50 MG 24 HR TABLET    Take 1 tablet (50 mg total) by mouth every evening.    MONTELUKAST (SINGULAIR) 10 MG TABLET    Take 1 tablet (10 mg total) by mouth every evening.    NALOXONE (NARCAN) 4 MG/ACTUATION SPRY        NEOMYCIN-POLYMYXIN-DEXAMETHASONE (DEXACINE) 3.5 MG/G-10,000 UNIT/G-0.1 % OINT    Place into both eyes 3 (three) times daily.    NICOTINE (NICODERM CQ) 14 MG/24 HR    Place 1 patch onto the skin once daily.    PANTOPRAZOLE (PROTONIX) 40 MG TABLET    TAKE 1 TABLET(40 MG) BY MOUTH EVERY DAY    POTASSIUM CHLORIDE (KLOR-CON) 10 MEQ TBSR    Take 1 tablet (10 mEq total) by mouth once daily.    POTASSIUM PHOSPHATE, MONOBASIC, (K-PHOS) 500 MG TBSO    Take 1 tablet (500 mg total) by mouth once daily.     POTASSIUM, SODIUM PHOSPHATES (PHOS-NAK) 280-160-250 MG PWPK    Take 1 packet by mouth 4 (four) times daily with meals and nightly.    ROSUVASTATIN (CRESTOR) 10 MG TABLET    Take 1 tablet (10 mg total) by mouth every evening.    ZOLPIDEM (AMBIEN) 5 MG TAB    Take 1 tablet (5 mg total) by mouth nightly as needed (insomnia).   Changed and/or Refilled Medications    Modified Medication Previous Medication    FLUTICASONE-SALMETEROL DISKUS INHALER 250-50 MCG WIXELA INHUB 250-50 mcg/dose diskus inhaler       Inhale 1 puff into the lungs 2 (two) times daily.    INHALE 1 PUFF INTO THE LUNGS TWICE DAILY    ONDANSETRON (ZOFRAN) 4 MG TABLET ondansetron (ZOFRAN) 4 MG tablet       Take 1 tablet (4 mg total) by mouth every 8 (eight) hours as needed for Nausea.    Take 4 mg by mouth every 8 (eight) hours as needed.   Discontinued Medications    FLUTICASONE PROPIONATE (FLONASE) 50 MCG/ACTUATION NASAL SPRAY    1 spray (50 mcg total) by Each Nostril route once daily.    PROMETHAZINE-DEXTROMETHORPHAN (PROMETHAZINE-DM) 6.25-15 MG/5 ML SYRP    Take 5 mLs by mouth every 4 (four) hours as needed (coughing).      Medication List with Changes/Refills   New Medications    FLUTICASONE PROPIONATE (FLONASE) 50 MCG/ACTUATION NASAL SPRAY    1 spray (50 mcg total) by Each Nostril route once daily.       Start Date: 4/28/2025 End Date: --   Current Medications    ACYCLOVIR (ZOVIRAX) 400 MG TABLET    Take 1 tablet (400 mg total) by mouth 2 (two) times daily.       Start Date: 4/15/2024 End Date: --    ALBUTEROL (VENTOLIN HFA) 90 MCG/ACTUATION INHALER    Inhale 2 puffs into the lungs every 4 (four) hours as needed for Wheezing. Rescue       Start Date: 10/4/2024 End Date: --    ALPRAZOLAM (XANAX) 2 MG TAB    Take 1 tablet (2 mg total) by mouth 2 (two) times daily as needed (for anxiety. Up to 2 doses/day).       Start Date: 4/9/2025  End Date: 5/7/2025    AMLODIPINE-BENAZEPRIL 2.5-10 MG (LOTREL) 2.5-10 MG PER CAPSULE    TAKE 1 CAPSULE BY MOUTH EVERY  DAY       Start Date: 5/1/2024  End Date: --    ASPIRIN 81 MG CHEW    Take 1 tablet (81 mg total) by mouth once daily.       Start Date: 4/15/2024 End Date: --    CALCIUM-VITAMIN D 600 MG-10 MCG (400 UNIT) TAB    Take 1 tablet by mouth every 12 (twelve) hours.       Start Date: 10/4/2023 End Date: --    DEXAMETHASONE (DECADRON) 4 MG TAB    Take 10 tablets (40 mg total) by mouth every 7 days. Take with food.       Start Date: 4/15/2024 End Date: --    HYDROXYZINE HCL (ATARAX) 25 MG TABLET    Take 1 tablet (25 mg total) by mouth 3 (three) times daily as needed for Itching.       Start Date: 5/7/2024  End Date: --    K PHOS DI & MONO-SOD PHOS MONO (PHOSPHOROUS) 250 MG TAB    Take 1 tablet by mouth 4 (four) times daily with meals and nightly. for 5 days       Start Date: 11/26/2024End Date: 12/1/2024    LENALIDOMIDE 10 MG CAP    TAKE 1 CAPSULE ONCE DAILY FOR 21 DAYS, AND THEN 7 DAYS OFF       Start Date: 4/23/2025 End Date: --    LEVOTHYROXINE (SYNTHROID) 100 MCG TABLET    TAKE 1 TABLET(100 MCG) BY MOUTH BEFORE BREAKFAST       Start Date: 11/19/2024End Date: --    LINACLOTIDE (LINZESS) 72 MCG CAP CAPSULE    Take 2 capsules (144 mcg total) by mouth before breakfast.       Start Date: 7/3/2024  End Date: --    MAGNESIUM OXIDE (MAG-OX) 400 MG (241.3 MG MAGNESIUM) TABLET    TAKE 1 TABLET(400 MG) BY MOUTH EVERY DAY       Start Date: 3/26/2025 End Date: --    METOPROLOL SUCCINATE (TOPROL-XL) 50 MG 24 HR TABLET    Take 1 tablet (50 mg total) by mouth every evening.       Start Date: 12/9/2024 End Date: --    MONTELUKAST (SINGULAIR) 10 MG TABLET    Take 1 tablet (10 mg total) by mouth every evening.       Start Date: 12/23/2024End Date: --    NALOXONE (NARCAN) 4 MG/ACTUATION SPRY           Start Date: 6/6/2024  End Date: --    NEOMYCIN-POLYMYXIN-DEXAMETHASONE (DEXACINE) 3.5 MG/G-10,000 UNIT/G-0.1 % OINT    Place into both eyes 3 (three) times daily.       Start Date: 10/9/2023 End Date: --    NICOTINE (NICODERM CQ) 14 MG/24 HR     Place 1 patch onto the skin once daily.       Start Date: 8/31/2023 End Date: --    PANTOPRAZOLE (PROTONIX) 40 MG TABLET    TAKE 1 TABLET(40 MG) BY MOUTH EVERY DAY       Start Date: 9/7/2022  End Date: --    POTASSIUM CHLORIDE (KLOR-CON) 10 MEQ TBSR    Take 1 tablet (10 mEq total) by mouth once daily.       Start Date: 3/4/2025  End Date: --    POTASSIUM PHOSPHATE, MONOBASIC, (K-PHOS) 500 MG TBSO    Take 1 tablet (500 mg total) by mouth once daily.       Start Date: 10/29/2024End Date: 10/29/2025    POTASSIUM, SODIUM PHOSPHATES (PHOS-NAK) 280-160-250 MG PWPK    Take 1 packet by mouth 4 (four) times daily with meals and nightly.       Start Date: 8/13/2024 End Date: --    ROSUVASTATIN (CRESTOR) 10 MG TABLET    Take 1 tablet (10 mg total) by mouth every evening.       Start Date: 11/7/2024 End Date: --    ZOLPIDEM (AMBIEN) 5 MG TAB    Take 1 tablet (5 mg total) by mouth nightly as needed (insomnia).       Start Date: 3/4/2025  End Date: 4/3/2025   Changed and/or Refilled Medications    Modified Medication Previous Medication    FLUTICASONE-SALMETEROL DISKUS INHALER 250-50 MCG WIXELA INHUB 250-50 mcg/dose diskus inhaler       Inhale 1 puff into the lungs 2 (two) times daily.    INHALE 1 PUFF INTO THE LUNGS TWICE DAILY       Start Date: 4/28/2025 End Date: --    Start Date: 8/5/2024  End Date: 4/28/2025    ONDANSETRON (ZOFRAN) 4 MG TABLET ondansetron (ZOFRAN) 4 MG tablet       Take 1 tablet (4 mg total) by mouth every 8 (eight) hours as needed for Nausea.    Take 4 mg by mouth every 8 (eight) hours as needed.       Start Date: 4/28/2025 End Date: --    Start Date: 3/26/2025 End Date: 4/28/2025   Discontinued Medications    FLUTICASONE PROPIONATE (FLONASE) 50 MCG/ACTUATION NASAL SPRAY    1 spray (50 mcg total) by Each Nostril route once daily.       Start Date: 10/29/2024End Date: 4/28/2025    PROMETHAZINE-DEXTROMETHORPHAN (PROMETHAZINE-DM) 6.25-15 MG/5 ML SYRP    Take 5 mLs by mouth every 4 (four) hours as needed  (coughing).       Start Date: 12/23/2024End Date: 4/28/2025                      [1]   Outpatient Medications Prior to Visit   Medication Sig Dispense Refill    acyclovir (ZOVIRAX) 400 MG tablet Take 1 tablet (400 mg total) by mouth 2 (two) times daily. 60 tablet 11    albuterol (VENTOLIN HFA) 90 mcg/actuation inhaler Inhale 2 puffs into the lungs every 4 (four) hours as needed for Wheezing. Rescue 18 g 1    ALPRAZolam (XANAX) 2 MG Tab Take 1 tablet (2 mg total) by mouth 2 (two) times daily as needed (for anxiety. Up to 2 doses/day). 56 tablet 0    amlodipine-benazepril 2.5-10 mg (LOTREL) 2.5-10 mg per capsule TAKE 1 CAPSULE BY MOUTH EVERY DAY 90 capsule 3    aspirin 81 MG Chew Take 1 tablet (81 mg total) by mouth once daily. 30 tablet 11    calcium-vitamin D 600 mg-10 mcg (400 unit) Tab Take 1 tablet by mouth every 12 (twelve) hours. 60 tablet 2    dexAMETHasone (DECADRON) 4 MG Tab Take 10 tablets (40 mg total) by mouth every 7 days. Take with food. 40 tablet 11    hydrOXYzine HCL (ATARAX) 25 MG tablet Take 1 tablet (25 mg total) by mouth 3 (three) times daily as needed for Itching. 21 tablet 0    lenalidomide 10 mg Cap TAKE 1 CAPSULE ONCE DAILY FOR 21 DAYS, AND THEN 7 DAYS OFF 21 capsule 0    levothyroxine (SYNTHROID) 100 MCG tablet TAKE 1 TABLET(100 MCG) BY MOUTH BEFORE BREAKFAST 90 tablet 1    linaCLOtide (LINZESS) 72 mcg Cap capsule Take 2 capsules (144 mcg total) by mouth before breakfast. 30 capsule 1    magnesium oxide (MAG-OX) 400 mg (241.3 mg magnesium) tablet TAKE 1 TABLET(400 MG) BY MOUTH EVERY DAY 90 tablet 1    metoprolol succinate (TOPROL-XL) 50 MG 24 hr tablet Take 1 tablet (50 mg total) by mouth every evening. 90 tablet 3    montelukast (SINGULAIR) 10 mg tablet Take 1 tablet (10 mg total) by mouth every evening. 90 tablet 3    naloxone (NARCAN) 4 mg/actuation Spry       neomycin-polymyxin-dexamethasone (DEXACINE) 3.5 mg/g-10,000 unit/g-0.1 % Oint Place into both eyes 3 (three) times daily. 3.5 g 0     nicotine (NICODERM CQ) 14 mg/24 hr Place 1 patch onto the skin once daily. 14 patch 0    pantoprazole (PROTONIX) 40 MG tablet TAKE 1 TABLET(40 MG) BY MOUTH EVERY DAY 90 tablet 3    potassium chloride (KLOR-CON) 10 MEQ TbSR Take 1 tablet (10 mEq total) by mouth once daily. 30 tablet 0    potassium phosphate, monobasic, (K-PHOS) 500 mg TbSO Take 1 tablet (500 mg total) by mouth once daily. 30 tablet 11    potassium, sodium phosphates (PHOS-NAK) 280-160-250 mg PwPk Take 1 packet by mouth 4 (four) times daily with meals and nightly. 4 packet 0    rosuvastatin (CRESTOR) 10 MG tablet Take 1 tablet (10 mg total) by mouth every evening. 90 tablet 3    fluticasone propionate (FLONASE) 50 mcg/actuation nasal spray 1 spray (50 mcg total) by Each Nostril route once daily. 1 mL 1    ondansetron (ZOFRAN) 4 MG tablet Take 4 mg by mouth every 8 (eight) hours as needed.      promethazine-dextromethorphan (PROMETHAZINE-DM) 6.25-15 mg/5 mL Syrp Take 5 mLs by mouth every 4 (four) hours as needed (coughing). 120 mL 0    WIXELA INHUB 250-50 mcg/dose diskus inhaler INHALE 1 PUFF INTO THE LUNGS TWICE DAILY 180 each 1    k phos di & mono-sod phos mono (PHOSPHOROUS) 250 mg Tab Take 1 tablet by mouth 4 (four) times daily with meals and nightly. for 5 days 20 each 0    zolpidem (AMBIEN) 5 MG Tab Take 1 tablet (5 mg total) by mouth nightly as needed (insomnia). 20 tablet 0     Facility-Administered Medications Prior to Visit   Medication Dose Route Frequency Provider Last Rate Last Admin    dexAMETHasone injection 40 mg  40 mg Intravenous 1 time in Clinic/HOD Bri Luevano MD        lactated ringers infusion   Intravenous Continuous Danny Matos III, MD

## 2025-04-29 ENCOUNTER — OFFICE VISIT (OUTPATIENT)
Dept: HEMATOLOGY/ONCOLOGY | Facility: CLINIC | Age: 65
End: 2025-04-29
Payer: COMMERCIAL

## 2025-04-29 ENCOUNTER — OFFICE VISIT (OUTPATIENT)
Dept: PALLIATIVE MEDICINE | Facility: CLINIC | Age: 65
End: 2025-04-29
Payer: COMMERCIAL

## 2025-04-29 ENCOUNTER — INFUSION (OUTPATIENT)
Dept: INFUSION THERAPY | Facility: HOSPITAL | Age: 65
End: 2025-04-29
Attending: INTERNAL MEDICINE
Payer: COMMERCIAL

## 2025-04-29 VITALS
HEIGHT: 64 IN | OXYGEN SATURATION: 99 % | BODY MASS INDEX: 21.83 KG/M2 | HEART RATE: 65 BPM | SYSTOLIC BLOOD PRESSURE: 114 MMHG | TEMPERATURE: 97 F | DIASTOLIC BLOOD PRESSURE: 68 MMHG | WEIGHT: 127.88 LBS

## 2025-04-29 VITALS
HEART RATE: 65 BPM | SYSTOLIC BLOOD PRESSURE: 114 MMHG | DIASTOLIC BLOOD PRESSURE: 68 MMHG | OXYGEN SATURATION: 99 % | BODY MASS INDEX: 21.83 KG/M2 | RESPIRATION RATE: 18 BRPM | WEIGHT: 127.88 LBS | HEIGHT: 64 IN | TEMPERATURE: 97 F

## 2025-04-29 VITALS
OXYGEN SATURATION: 100 % | RESPIRATION RATE: 16 BRPM | HEART RATE: 60 BPM | WEIGHT: 127.88 LBS | SYSTOLIC BLOOD PRESSURE: 128 MMHG | HEIGHT: 64 IN | DIASTOLIC BLOOD PRESSURE: 79 MMHG | BODY MASS INDEX: 21.83 KG/M2 | TEMPERATURE: 97 F

## 2025-04-29 DIAGNOSIS — C79.51 SECONDARY MALIGNANT NEOPLASM OF BONE: Primary | ICD-10-CM

## 2025-04-29 DIAGNOSIS — G89.3 CANCER RELATED PAIN: ICD-10-CM

## 2025-04-29 DIAGNOSIS — C90.00 MULTIPLE MYELOMA, REMISSION STATUS UNSPECIFIED: Primary | ICD-10-CM

## 2025-04-29 DIAGNOSIS — Z51.5 PALLIATIVE CARE ENCOUNTER: ICD-10-CM

## 2025-04-29 DIAGNOSIS — C90.00 MULTIPLE MYELOMA NOT HAVING ACHIEVED REMISSION: ICD-10-CM

## 2025-04-29 DIAGNOSIS — C90.00 MULTIPLE MYELOMA, REMISSION STATUS UNSPECIFIED: Chronic | ICD-10-CM

## 2025-04-29 DIAGNOSIS — Z86.19 HISTORY OF HELICOBACTER PYLORI INFECTION: ICD-10-CM

## 2025-04-29 DIAGNOSIS — Z01.419 WELL WOMAN EXAM: ICD-10-CM

## 2025-04-29 DIAGNOSIS — C90.00 MULTIPLE MYELOMA, REMISSION STATUS UNSPECIFIED: Primary | Chronic | ICD-10-CM

## 2025-04-29 DIAGNOSIS — E83.39 HYPOPHOSPHATEMIA: ICD-10-CM

## 2025-04-29 DIAGNOSIS — F41.9 ANXIETY: ICD-10-CM

## 2025-04-29 LAB
ALBUMIN, SPE (OHS): 3.66 G/DL (ref 3.35–5.55)
ALPHA 1 GLOB (OHS): 0.28 GM/DL (ref 0.17–0.41)
ALPHA 2 GLOB (OHS): 0.77 GM/DL (ref 0.43–0.99)
BETA GLOB (OHS): 0.65 GM/DL (ref 0.5–1.1)
GAMMA GLOBULIN (OHS): 0.64 GM/DL (ref 0.67–1.58)
KAPPA LC FREE SER-MCNC: 0.98 MG/L (ref 0.26–1.65)
KAPPA LC FREE/LAMBDA FREE SER: 1.18 MG/DL (ref 0.33–1.94)
LAMBDA LC FREE SERPL-MCNC: 1.21 MG/DL (ref 0.57–2.63)
PATHOLOGIST INTERPRETATION - IFE SERUM (OHS): NORMAL
PATHOLOGIST REVIEW - SPE (OHS): NORMAL
PROT SERPL-MCNC: 6 GM/DL (ref 6–8.4)

## 2025-04-29 PROCEDURE — 3008F BODY MASS INDEX DOCD: CPT | Mod: CPTII,S$GLB,, | Performed by: NURSE PRACTITIONER

## 2025-04-29 PROCEDURE — 63600175 PHARM REV CODE 636 W HCPCS: Mod: JZ,TB | Performed by: INTERNAL MEDICINE

## 2025-04-29 PROCEDURE — 99999 PR PBB SHADOW E&M-EST. PATIENT-LVL III: CPT | Mod: PBBFAC,,, | Performed by: NURSE PRACTITIONER

## 2025-04-29 PROCEDURE — 99215 OFFICE O/P EST HI 40 MIN: CPT | Mod: S$GLB,,, | Performed by: NURSE PRACTITIONER

## 2025-04-29 PROCEDURE — 99999 PR PBB SHADOW E&M-EST. PATIENT-LVL IV: CPT | Mod: PBBFAC,,, | Performed by: INTERNAL MEDICINE

## 2025-04-29 PROCEDURE — 96365 THER/PROPH/DIAG IV INF INIT: CPT

## 2025-04-29 PROCEDURE — 96401 CHEMO ANTI-NEOPL SQ/IM: CPT

## 2025-04-29 PROCEDURE — 4010F ACE/ARB THERAPY RXD/TAKEN: CPT | Mod: CPTII,S$GLB,, | Performed by: NURSE PRACTITIONER

## 2025-04-29 PROCEDURE — 3078F DIAST BP <80 MM HG: CPT | Mod: CPTII,S$GLB,, | Performed by: NURSE PRACTITIONER

## 2025-04-29 PROCEDURE — 25000003 PHARM REV CODE 250: Performed by: INTERNAL MEDICINE

## 2025-04-29 PROCEDURE — 3074F SYST BP LT 130 MM HG: CPT | Mod: CPTII,S$GLB,, | Performed by: NURSE PRACTITIONER

## 2025-04-29 RX ORDER — EPINEPHRINE 0.3 MG/.3ML
0.3 INJECTION SUBCUTANEOUS ONCE AS NEEDED
Status: CANCELLED | OUTPATIENT
Start: 2025-04-29

## 2025-04-29 RX ORDER — SODIUM CHLORIDE 0.9 % (FLUSH) 0.9 %
10 SYRINGE (ML) INJECTION
Status: DISCONTINUED | OUTPATIENT
Start: 2025-04-29 | End: 2025-04-29 | Stop reason: HOSPADM

## 2025-04-29 RX ORDER — HEPARIN 100 UNIT/ML
500 SYRINGE INTRAVENOUS
Status: CANCELLED | OUTPATIENT
Start: 2025-04-29

## 2025-04-29 RX ORDER — DIPHENHYDRAMINE HYDROCHLORIDE 50 MG/ML
50 INJECTION, SOLUTION INTRAMUSCULAR; INTRAVENOUS ONCE AS NEEDED
Status: CANCELLED | OUTPATIENT
Start: 2025-04-29

## 2025-04-29 RX ORDER — PROCHLORPERAZINE EDISYLATE 5 MG/ML
10 INJECTION INTRAMUSCULAR; INTRAVENOUS ONCE AS NEEDED
Status: CANCELLED | OUTPATIENT
Start: 2025-04-29

## 2025-04-29 RX ORDER — ZOLEDRONIC ACID 0.04 MG/ML
4 INJECTION, SOLUTION INTRAVENOUS
Status: CANCELLED | OUTPATIENT
Start: 2025-04-29

## 2025-04-29 RX ORDER — SODIUM CHLORIDE 0.9 % (FLUSH) 0.9 %
10 SYRINGE (ML) INJECTION
Status: CANCELLED | OUTPATIENT
Start: 2025-04-29

## 2025-04-29 RX ADMIN — ZOLEDRONIC ACID 3.3 MG: 4 INJECTION, SOLUTION, CONCENTRATE INTRAVENOUS at 01:04

## 2025-04-29 RX ADMIN — SODIUM CHLORIDE: 9 INJECTION, SOLUTION INTRAVENOUS at 01:04

## 2025-04-29 RX ADMIN — DARATUMUMAB AND HYALURONIDASE-FIHJ (HUMAN RECOMBINANT) 1800 MG: 1800; 30000 INJECTION SUBCUTANEOUS at 01:04

## 2025-04-29 NOTE — ASSESSMENT & PLAN NOTE
Goals of care: Hopeful to control cancer as long as possible  Advanced directive: Desires full code.  HC POA: : Lars Bonilla: [phone], 2. Son: Hakeem Garcia: [phone]  Symptoms: anxiety, restless  Follow up: 6 weeks

## 2025-04-29 NOTE — PROGRESS NOTES
Palliative Medicine Clinic Note    Chief Complaint: No chief complaint on file.  F/U insomnia, cancer-related pain.     ASSESSMENT/PLAN:      Plan/Recommendations:  1. Multiple myeloma, remission status unspecified  Assessment & Plan:  Followed closely by oncology, has follow up scheduled today.   Tolerating treatment with palliative goal.      2. Anxiety  Assessment & Plan:  Worse due to situation: cancer,  and son are ill and she is sole caregiver.  Believes sx well-managed with alprazolam. We discussed tolerance and benefit versus burden and concerns combining with Ambien, oxycodone.   She intends to not take alprazolam when taking Ambien. Uses oxycodone sparingly.  Encouraged meditation.   Declines counseling, SSRI/SNRI        3. Cancer related pain  Assessment & Plan:  Controlled with APAP, oxyIR PRN.       4. Palliative care encounter  Assessment & Plan:  Goals of care: Hopeful to control cancer as long as possible  Advanced directive: Desires full code.  HC POA: : Lars Bonilla: [phone], 2. Son: Hakeem Garcia: [phone]  Symptoms: anxiety, restless  Follow up: 6 weeks            Advance Care Planning   Advance Directives:   Medical Power of : Yes    Agent's Name:  1.  : Lars Bonilla: [phone], 2. Son: Hakeem Garcia: [phone].  Goals of Care: What is most important right now is to focus on symptom/pain control, improvement in condition but with limits to invasive therapies. Accordingly, we have decided that the best plan to meet the patient's goals includes continuing with treatment.           Thank you for involving me in the care of this patient.     Follow up in about 6 weeks (around 6/10/2025).    Future Appointments   Date Time Provider Department Center   2025  3:40 PM BENJAMIN Miranda MD [facility] IM [facility]   2025 12:00 PM ALYCIA MOE [facility]   2025  9:30 AM Natividad Mckeon NP [facility] HEM ONC [facility]   2025 10:00  AM CHAIR 02 [facility] [place]   6/9/2025  9:40 AM Joe Lovell MD [facility]   6/30/2025  9:00 AM Rodolfo Dutta NP [facility]  [facility] [place]        SUBJECTIVE:      History of Present Illness / Interval History:  Ana Bonilla is 64 y.o. female with Multiple Myeloma (Apr. 2023) with metastasis to thoracic/lumbar spine, complicated by L1 compression fracture, s/p kyphoplasty with radiofrequency ablation June, 2023. Pt declined bone marrow transplant. Continues systemic therapy, dexamethasone, Zometa with palliative goal.   Followed by Dr. Luevano, Dr. Garcia, and Neurosurgery.    Presents to Palliative Care Clinic for physical symptoms and additional support. Please see Hem/Onc note for more details on pt's care.         4/29/25  History obtained from: patient and medical record.    Prescribed Ambien LOV for insomnia r/t dexamethasone, renewed oxycodone for cancer-related pain and alprazolam for anxiety. Only partial relief with Ambien 5 mg PRN.    Xanax effective for anxiety relief.   Agitated today, death of long-time friend. Stressors at home, spouse ill, adult son has mental health problems.   Limited support and pt's concerns about the severity of side effects prohibited transplant.       Today we discussed symptom management and goals of care.        ROS:  Review of Systems    Review of Symptoms      Symptom Assessment (ESAS 0-10 Scale)  Pain:  2  Dyspnea:  0  Anxiety:  0  Nausea:  0  Depression:  0  Anorexia:  0  Fatigue:  0  Insomnia:  2  Restlessness:  3  Agitation:  10         Pain Assessment:    Location(s): leg    Leg       Location: right        Quality: Aching        Quantity: 2/10 in intensity        Chronicity: Onset 2 month(s) ago, unchanged        Aggravating Factors: Standing        Alleviating Factors: Opiates        Associated Symptoms: None    ECOG Performance Status ndGndrndanddndend:nd nd2nd Living Arrangements:  Lives with family    Psychosocial/Cultural:   See Palliative  Psychosocial Note: Yes  **Primary  to Follow**  Palliative Care  Consult: No        Medications:  Current Medications[1]    External  database queried on 2025  by Sirena RUIZ :         Review of patient's allergies indicates:   Allergen Reactions    Hydrocodone-acetaminophen Other (See Comments)     Causes pt to feel extremely sick         OBJECTIVE:   Physical Exam:  Vitals: Temp: 97.3 °F (36.3 °C) (25 0940)  Pulse: 65 (25 0940)  BP: 114/68 (25 0940)  SpO2: 99 % (25 0940)    Physical Exam  Constitutional:       General: She is not in acute distress.     Appearance: She is not ill-appearing.   Eyes:      General: No scleral icterus.  Pulmonary:      Effort: Pulmonary effort is normal.   Neurological:      Mental Status: She is alert.      Gait: Gait normal.   Psychiatric:         Mood and Affect: Mood normal.         Behavior: Behavior normal.         Thought Content: Thought content normal.         Judgment: Judgment normal.         Wt Readings from Last 3 Encounters:   25 1215 58 kg (127 lb 13.9 oz)   25 1054 58 kg (127 lb 13.9 oz)   25 0940 58 kg (127 lb 13.9 oz)     BP Readings from Last 3 Encounters:   25 (!) 126/58   25 114/68   25 114/68       Labs:  Lab Results   Component Value Date    WBC 5.40 2025    HGB 12.0 2025    HCT 36.8 (L) 2025     (H) 2025     2025           Imagin25 MRI right hip  Impression:  There are multiple scattered small pelvic and hip bone marrow lesions as detailed above, largest 2.3 cm right ischial/posterior column acetabular lesion consistent with the clinical history of multiple myeloma.  Negative for pathologic fracture. Negative for soft tissue mass.     Finalized on: 2025 4:08 PM By:  Alirio Cervantes MD  [facility]# [id number]      2025 16:11:03.354     [facility]     I spent a total of 57 minutes on the day of the visit. This  includes face to face time in discussion of goals of care, symptom assessment, coordination of care and emotional support.  This also includes non-face to face time preparing to see the patient (eg, review of tests/imaging), obtaining and/or reviewing separately obtained history, documenting clinical information in the electronic or other health record, independently interpreting results and communicating results to the patient/family/caregiver, or care coordinator.     Additional 15 min time spent on a voluntary advance care planning and /or goals of care discussion, providing emotional support, formulating and communicating prognosis and exploring burden/benefit of various approaches of treatment.       Sirena Alexander NP         [1]   Current Outpatient Medications:     acyclovir (ZOVIRAX) 400 MG tablet, Take 1 tablet (400 mg total) by mouth 2 (two) times daily., Disp: 60 tablet, Rfl: 11    albuterol (VENTOLIN HFA) 90 mcg/actuation inhaler, Inhale 2 puffs into the lungs every 4 (four) hours as needed for Wheezing. Rescue, Disp: 18 g, Rfl: 1    ALPRAZolam (XANAX) 2 MG Tab, Take 1 tablet (2 mg total) by mouth 2 (two) times daily as needed (for anxiety. Up to 2 doses/day)., Disp: 56 tablet, Rfl: 0    amlodipine-benazepril 2.5-10 mg (LOTREL) 2.5-10 mg per capsule, TAKE 1 CAPSULE BY MOUTH EVERY DAY, Disp: 90 capsule, Rfl: 3    aspirin 81 MG Chew, Take 1 tablet (81 mg total) by mouth once daily., Disp: 30 tablet, Rfl: 11    calcium-vitamin D 600 mg-10 mcg (400 unit) Tab, Take 1 tablet by mouth every 12 (twelve) hours., Disp: 60 tablet, Rfl: 2    dexAMETHasone (DECADRON) 4 MG Tab, Take 10 tablets (40 mg total) by mouth every 7 days. Take with food., Disp: 40 tablet, Rfl: 11    fluticasone propionate (FLONASE) 50 mcg/actuation nasal spray, 1 spray (50 mcg total) by Each Nostril route once daily., Disp: 16 mL, Rfl: 5    fluticasone-salmeterol diskus inhaler 250-50 mcg, Inhale 1 puff into the lungs 2 (two) times daily.,  Disp: 180 each, Rfl: 1    hydrOXYzine HCL (ATARAX) 25 MG tablet, Take 1 tablet (25 mg total) by mouth 3 (three) times daily as needed for Itching., Disp: 21 tablet, Rfl: 0    lenalidomide 10 mg Cap, TAKE 1 CAPSULE ONCE DAILY FOR 21 DAYS, AND THEN 7 DAYS OFF, Disp: 21 capsule, Rfl: 0    levothyroxine (SYNTHROID) 100 MCG tablet, TAKE 1 TABLET(100 MCG) BY MOUTH BEFORE BREAKFAST, Disp: 90 tablet, Rfl: 1    linaCLOtide (LINZESS) 72 mcg Cap capsule, Take 2 capsules (144 mcg total) by mouth before breakfast., Disp: 30 capsule, Rfl: 1    magnesium oxide (MAG-OX) 400 mg (241.3 mg magnesium) tablet, TAKE 1 TABLET(400 MG) BY MOUTH EVERY DAY, Disp: 90 tablet, Rfl: 1    metoprolol succinate (TOPROL-XL) 50 MG 24 hr tablet, Take 1 tablet (50 mg total) by mouth every evening., Disp: 90 tablet, Rfl: 3    montelukast (SINGULAIR) 10 mg tablet, Take 1 tablet (10 mg total) by mouth every evening., Disp: 90 tablet, Rfl: 3    naloxone (NARCAN) 4 mg/actuation Waller, , Disp: , Rfl:     neomycin-polymyxin-dexamethasone (DEXACINE) 3.5 mg/g-10,000 unit/g-0.1 % Oint, Place into both eyes 3 (three) times daily., Disp: 3.5 g, Rfl: 0    nicotine (NICODERM CQ) 14 mg/24 hr, Place 1 patch onto the skin once daily., Disp: 14 patch, Rfl: 0    ondansetron (ZOFRAN) 4 MG tablet, Take 1 tablet (4 mg total) by mouth every 8 (eight) hours as needed for Nausea., Disp: 30 tablet, Rfl: 0    pantoprazole (PROTONIX) 40 MG tablet, TAKE 1 TABLET(40 MG) BY MOUTH EVERY DAY, Disp: 90 tablet, Rfl: 3    potassium chloride (KLOR-CON) 10 MEQ TbSR, Take 1 tablet (10 mEq total) by mouth once daily., Disp: 30 tablet, Rfl: 0    potassium phosphate, monobasic, (K-PHOS) 500 mg TbSO, Take 1 tablet (500 mg total) by mouth once daily., Disp: 30 tablet, Rfl: 11    potassium, sodium phosphates (PHOS-NAK) 280-160-250 mg PwPk, Take 1 packet by mouth 4 (four) times daily with meals and nightly., Disp: 4 packet, Rfl: 0    rosuvastatin (CRESTOR) 10 MG tablet, Take 1 tablet (10 mg total)  by mouth every evening., Disp: 90 tablet, Rfl: 3    k phos di & mono-sod phos mono (PHOSPHOROUS) 250 mg Tab, Take 1 tablet by mouth 4 (four) times daily with meals and nightly. for 5 days, Disp: 20 each, Rfl: 0    zolpidem (AMBIEN) 5 MG Tab, Take 1 tablet (5 mg total) by mouth nightly as needed (insomnia)., Disp: 20 tablet, Rfl: 0    Current Facility-Administered Medications:     dexAMETHasone injection 40 mg, 40 mg, Intravenous, 1 time in Clinic/[facility]A, Bri Luevano MD    Facility-Administered Medications Ordered in Other Visits:     0.9% NaCl 100 mL flush bag, , Intravenous, 1 time in Clinic/KEILA, Bri Luevano MD    0.9% NaCl 100 mL flush bag, , Intravenous, 1 time in Clinic/KEILA, Bri Luevano MD    daratumumab-hyaluronidase-[facility] subcutaneous injection 1,800 mg, 1,800 mg, Subcutaneous, 1 time in [facility], Bri Luevano MD    lactated ringers infusion, , Intravenous, Continuous, Danny Matos III, MD    sodium chloride 0.9% flush 10 mL, 10 mL, Intravenous, PRN, Bri Luevano MD    zoledronic acid (ZOMETA) 3.3 mg in 0.9% NaCl 100 mL IVPB, 3.3 mg, Intravenous, 1 time in Clinic/KEILA, Bri Luevano MD

## 2025-04-29 NOTE — DISCHARGE INSTRUCTIONS
Thank you for letting me take care of YOU!!    OTIS Santana          [facility]  [id number] [facility]  [id number] [place] [address] Drive  [phone] phone     [phone] fax  Hours of Operation: Monday- Friday 8:00am- 5:00pm  After hours phone  [phone]  Hematology / Oncology Physicians on call:    Dr. Willi Zimmerman      Nurse Practitioners:    Hui Cardenas, JUANI Duncan, BELLE Constantino, BELLE Page, BELLE      Please don't hesitate to call if you have any concerns.          WAYS TO HELP PREVENT INFECTION        WASH YOUR HANDS OFTEN DURING THE DAY, ESPECIALLY BEFORE YOU EAT, AFTER USING THE BATHROOM, AND AFTER TOUCHING ANIMALS    STAY AWAY FROM PEOPLE WHO HAVE ILLNESSES YOU CAN CATCH; SUCH AS COLDS, FLU, CHICKEN POX    TRY TO AVOID CROWDS    STAY AWAY FROM CHILDREN WHO RECENTLY HAVE RECEIVED LIVE VIRUS VACCINES    MAINTAIN GOOD MOUTH CARE    DO NOT SQUEEZE OR SCRATCH PIMPLES    CLEAN CUTS & SCRAPES RIGHT AWAY AND DAILY UNTIL HEALED WITH WARM WATER, SOAP & AN ANTISEPTIC    AVOID CONTACT WITH LITTER BOXES, BIRD CAGES, & FISH TANKS    AVOID STANDING WATER, IE., BIRD BATHS, FLOWER POTS/VASES, OR HUMIDIFIERS    WEAR GLOVES WHEN GARDENING OR CLEANING UP AFTER OTHERS, ESPECIALLY BABIES & SMALL CHILDREN    DO NOT EAT RAW FISH, SEAFOOD, MEAT, OR EGGS

## 2025-04-29 NOTE — PLAN OF CARE
Patient tolerated Darzalex and Zometa well today; no adverse reaction noted.  No significant complaints voiced.  IV discontinued with catheter intact and pressure dressing applied to the site.  Has f/u appt(s) scheduled per MD request.  No questions or concerns voiced.  NAD noted upon discharge.        Darzalex given without difficulties.Bandaid applied to LLQ abd. Patient instructed to stay in the clinic for 15 minutes. Patient verbalized understanding and will notify nurse with any complaints.      Last Zometa:  4/1/2025    Labs reviewed: Corrected Ca+ 8.8 per pharmacy Bitu - ok to admin / Cr 1    Patient denied any recent or upcoming dental work that would prevent pt from receiving Zometa today.    Patient stated she takes calcium and Vit D supplements at home:   Yes      Problem: Adult Inpatient Plan of Care  Goal: Plan of Care Review  Outcome: Progressing  Flowsheets (Taken 4/29/2025 1247)  Plan of Care Reviewed With: patient  Goal: Patient-Specific Goal (Individualized)  Outcome: Progressing  Flowsheets (Taken 4/29/2025 1247)  Individualized Care Needs: Pt likes feet elevated with warm blanket, cheese its, sprite zero provided  Anxieties, Fears or Concerns: Pt denies  Patient/Family-Specific Goals (Include Timeframe): pt to tolerate infusion and injection well today with no adverse reactions.  Goal: Absence of Hospital-Acquired Illness or Injury  Outcome: Progressing  Intervention: Identify and Manage Fall Risk  Flowsheets (Taken 4/29/2025 1247)  Safety Promotion/Fall Prevention:   assistive device/personal item within reach   instructed to call staff for mobility   lighting adjusted   medications reviewed   in recliner, wheels locked   nonskid shoes/socks when out of bed  Intervention: Prevent Infection  Flowsheets (Taken 4/29/2025 1247)  Infection Prevention:   hand hygiene promoted   personal protective equipment utilized   equipment surfaces disinfected  Goal: Optimal Comfort and Wellbeing  Outcome:  Progressing  Intervention: Monitor Pain and Promote Comfort  Flowsheets (Taken 4/29/2025 1247)  Pain Management Interventions:   position adjusted   warm blanket provided   quiet environment facilitated   relaxation techniques promoted  Intervention: Provide Person-Centered Care  Flowsheets (Taken 4/29/2025 1247)  Trust Relationship/Rapport:   care explained   choices provided   emotional support provided   empathic listening provided   questions encouraged   questions answered   reassurance provided   thoughts/feelings acknowledged     Problem: Infection  Goal: Absence of Infection Signs and Symptoms  Outcome: Progressing  Intervention: Prevent or Manage Infection  Flowsheets (Taken 4/29/2025 1247)  Infection Management: aseptic technique maintained

## 2025-04-29 NOTE — PROGRESS NOTES
Patient ID: Ana Bonilla   Reason for Visit: Follow-up and Multiple Myeloma  MRN:  [id number]     Oncologic Diagnosis:  Multiple Myeloma - IgG lambda   Previous Treatment:  VRD (5/10/2023 - 6/28/2023)   Current Treatment:    D-VRD (7/6/2023 - 01/03/2024 )      Zometa (10/18/2023 - )     Subjective   Ana Bonilla is a pleasant 64 y.o. female who presents for follow up.    She reports no change in her overall symptoms.  I reviewed her labs and imaging with her which are stable.  We reviewed her clinical course and the general treatment of MM.  She requests referrals to Gyn and gastro for follow up.       Review of Systems   Constitutional:  Positive for fatigue. Negative for activity change, appetite change, chills, diaphoresis, fever and unexpected weight change.   HENT:  Negative for nosebleeds.    Respiratory:  Negative for shortness of breath.    Cardiovascular:  Negative for chest pain.   Gastrointestinal:  Negative for abdominal pain, blood in stool, constipation, diarrhea, nausea and vomiting.   Musculoskeletal:  Positive for arthralgias (right hip) and myalgias (hands). Negative for back pain.   Skin:  Negative for rash.   Neurological:  Negative for dizziness, weakness, light-headedness and headaches.   Hematological:  Does not bruise/bleed easily.   Psychiatric/Behavioral:  The patient is not nervous/anxious.      History     Oncology History   Multiple myeloma   4/20/2023 Initial Diagnosis    Multiple myeloma     5/10/2023 - 6/28/2023 Chemotherapy    Treatment Summary   Plan Name: OP VRD - WEEKLY BORTEZOMIB LENALIDOMIDE DEXAMETHASONE Q3W  Treatment Goal: Palliative  Status: Inactive  Start Date: 5/10/2023  End Date: 6/28/2023  Provider: Abram Velasquez MD  Chemotherapy: bortezomib (VELCADE) injection 2 mg, 1.3 mg/m2 = 2 mg, Subcutaneous, [facility] 1 time, 2 of 6 cycles  Administration: 2 mg (5/10/2023), 2 mg (5/17/2023), 2 mg (6/14/2023), 2 mg (5/31/2023), 2 mg (6/21/2023), 2 mg  (6/28/2023)  lenalidomide 25 mg Cap, 25 mg, Oral, Daily, 1 of 1 cycle, Start date: 5/10/2023, End date: 5/17/2023 6/28/2023 Cancer Staged    Staging form: Plasma Cell Myeloma and Plasma Cell Disorders, AJCC 8th Edition  - Clinical stage from 6/28/2023: RISS Stage II (Beta-2-microglobulin (mg/L): 1.9, Albumin (g/dL): 3, ISS: Stage II, High-risk cytogenetics: Absent, LDH: Normal)     7/6/2023 - 1/3/2024 Chemotherapy    Treatment Summary   Plan Name: OP D-VRD DARATUMUMAB + BORTEZOMIB LENALIDOMIDE DEXAMETHASONE  Treatment Goal: Palliative  Status: Inactive  Start Date: 7/6/2023  End Date: 1/3/2024  Provider: Oscar Márquez MD  Chemotherapy: bortezomib (VELCADE) injection 2 mg, 1.3 mg/m2 = 2 mg, Subcutaneous, Clinic/[facility] 1 time, 6 of 6 cycles  Administration: 2 mg (7/6/2023), 2 mg (7/12/2023), 2 mg (8/2/2023), 2 mg (8/9/2023), 2.25 mg (10/18/2023), 2 mg (7/19/2023), 2 mg (7/26/2023), 2 mg (8/16/2023), 2.25 mg (9/20/2023), 2.25 mg (9/27/2023), 2.25 mg (10/25/2023), 2.25 mg (11/1/2023), 2.25 mg (11/8/2023), 2.25 mg (12/6/2023), 2.25 mg (1/3/2024), 2.25 mg (11/15/2023), 2.25 mg (11/22/2023), 2.25 mg (11/29/2023), 2.25 mg (12/13/2023), 2.25 mg (12/20/2023), 2.25 mg (12/26/2023)  lenalidomide 10 mg Cap, 1 capsule (100 % of original dose 1 capsule), Oral, Daily, 1 of 1 cycle, Start date: 7/26/2023, End date: 8/2/2023  Dose modification: 1 capsule (original dose 1 capsule, Cycle 0)  daratumumab-hyaluronidase-fihj subcutaneous injection 1,800 mg, 1,800 mg (100 % of original dose 1,800 mg), Subcutaneous, Clinic/[facility] 1 time, 6 of 6 cycles  Dose modification: 1,800 mg (original dose 1,800 mg, Cycle 1), 1,800 mg (original dose 1,800 mg, Cycle 1)  Administration: 1,800 mg (7/6/2023), 1,800 mg (7/12/2023), 1,800 mg (7/19/2023), 1,800 mg (7/26/2023), 1,800 mg (8/2/2023), 1,800 mg (8/9/2023), 1,800 mg (8/16/2023), 1,800 mg (9/27/2023), 1,800 mg (10/18/2023), 1,800 mg (11/1/2023), 1,800 mg (11/15/2023), 1,800 mg (11/29/2023), 1,800  mg (12/13/2023), 1,800 mg (12/26/2023)     4/30/2024 -  Chemotherapy    Treatment Summary   Plan Name: OP Myeloma KIA-RD daratumumab lenalidomide dexAMETHasone Q4W  Treatment Goal: Palliative  Status: Active  Start Date: 4/30/2024  End Date: 3/31/2026 (Planned)  Provider: Bri Luevano MD  Chemotherapy: daratumumab-hyaluronidase-[facility] subcutaneous injection 1,800 mg, 1,800 mg, Subcutaneous, [facility] 1 time, 14 of 18 cycles  Administration: 1,800 mg (4/30/2024), 1,800 mg (5/7/2024), 1,800 mg (5/21/2024), 1,800 mg (5/28/2024), 1,800 mg (6/4/2024), 1,800 mg (6/25/2024), 1,800 mg (7/9/2024), 1,800 mg (10/29/2024), 1,800 mg (5/14/2024), 1,800 mg (6/11/2024), 1,800 mg (6/18/2024), 1,800 mg (7/30/2024), 1,800 mg (8/13/2024), 1,800 mg (8/27/2024), 1,800 mg (9/10/2024), 1,800 mg (9/24/2024), 1,800 mg (10/15/2024), 1,800 mg (11/26/2024), 1,800 mg (1/7/2025), 1,800 mg (2/4/2025), 1,800 mg (3/4/2025), 1,800 mg (4/1/2025), 1,800 mg (4/29/2025)           MARIBETH  Ana Bonilla  63 y.o.  female with past medical history significant for hypertension, COPD, asthma, GERD, hypothyroidism here for follow-up and management of multiple myeloma     She was referred in 2/2023 by her PCP after workup for gastritis revealed lytic lesions. CT chest showed lytic and expansile destructive lesion in the sternum and lytic lesions at L1. Biopsy of L1 lesion in 3/2023 revealed mature B cell neoplasm most consistent with plasma cell neoplasm.      Bone marrow biopsy in 4/2023 demonstrated 60% plasma cells. PET/CT showed extensive bony lesions throughout the spine, sternum, bilateral ribs, pelvis, and a mild compression deformity in L1. SPEP/MECHE showed IgG M spike 3.19 g/dL, IgG 4,521 mg/dL.      She was started on VRd and then daratumumab was added by the transplant team. She was started on ASA for thrombosis prophylaxis and acyclovir for herpes zoster prophylaxis. Start Zometa after dental evaluation.     Past Medical History:   Diagnosis Date     Allergy     Amblyopia OS    Anxiety     Asthma     COPD (chronic obstructive pulmonary disease)     NO HOME o2    GERD (gastroesophageal reflux disease)     Hyperlipidemia     Hypertension     PONV (postoperative nausea and vomiting)     Thyroid disease        Past Surgical History:   Procedure Laterality Date    APPENDECTOMY      BIOPSY N/A 2023    Procedure: BIOPSY;  Surgeon: Fausto Smith MD;  Location: [facility] OR;  Service: Neurosurgery;  Laterality: N/A;  L1    BUNIONECTOMY      COLONOSCOPY N/A 2019    Procedure: COLONOSCOPY;  Surgeon: Danny Matos III, MD;  Location: [facility] ENDO;  Service: Endoscopy;  Laterality: N/A;    COLONOSCOPY N/A 10/24/2022    Procedure: COLONOSCOPY;  Surgeon: Danny Matos III, MD;  Location: [facility] ENDO;  Service: Endoscopy;  Laterality: N/A;    ESOPHAGOGASTRODUODENOSCOPY N/A 10/24/2022    Procedure: EGD (ESOPHAGOGASTRODUODENOSCOPY);  Surgeon: Danny Matos III, MD;  Location: [facility] ENDO;  Service: Endoscopy;  Laterality: N/A;    FIXATION KYPHOPLASTY Bilateral 2023    Procedure: KYPHOPLASTY;  Surgeon: Fausto Smith MD;  Location: [facility] OR;  Service: Neurosurgery;  Laterality: Bilateral;  kyphoplasty and radiofrequency ablation - L1    HYSTERECTOMY      PARTIAL//still with ovaries    neck fusion  2017    THYROIDECTOMY      TUBAL LIGATION         Family History   Problem Relation Name Age of Onset    Hypertension Mother Vivian     Diabetes Mother Vivian     Asthma Mother Vivian             Diabetes Father      Asthma Father      Stroke Maternal Grandmother  grandmother     Prostate cancer Maternal Grandfather      Mental illness Son Lukasz Garcia     Pancreatic cancer Maternal Uncle      Mental illness Other Pat side     Pancreatic cancer Other Mat side     Ovarian cancer Maternal Cousin         Review of patient's allergies indicates:   Allergen Reactions    Hydrocodone-acetaminophen Other (See Comments)     Causes pt  to feel extremely sick        Social History     Tobacco Use    Smoking status: Every Day     Current packs/day: 0.50     Average packs/day: 0.5 packs/day for 50.0 years (25.0 ttl pk-yrs)     Types: Cigarettes     Passive exposure: Never    Smokeless tobacco: Never   Substance Use Topics    Alcohol use: Not Currently     Comment: quit    Drug use: No     Labs     ECOG SCORE    0 - Fully active-able to carry on all pre-disease performance without restriction       Vitals:    04/29/25 1054   BP: 114/68   Pulse: 65   Resp: 18   Temp: 97.3 °F (36.3 °C)     Physical Exam  Constitutional:       General: She is not in acute distress.     Appearance: Normal appearance. She is not ill-appearing, toxic-appearing or diaphoretic.   Eyes:      Extraocular Movements: Extraocular movements intact.      Conjunctiva/sclera: Conjunctivae normal.   Cardiovascular:      Rate and Rhythm: Normal rate.   Pulmonary:      Effort: Pulmonary effort is normal. No respiratory distress.   Skin:     General: Skin is warm.   Neurological:      General: No focal deficit present.      Mental Status: She is alert and oriented to person, place, and time. Mental status is at baseline.   Psychiatric:         Mood and Affect: Mood normal.         Behavior: Behavior normal.         Thought Content: Thought content normal.       Labs   Labs:  Lab Visit on 04/28/2025   Component Date Value Ref Range Status    Sodium 04/28/2025 143  136 - 145 mmol/L Final    Potassium 04/28/2025 3.6  3.5 - 5.1 mmol/L Final    Chloride 04/28/2025 112 (H)  95 - 110 mmol/L Final    CO2 04/28/2025 23  23 - 29 mmol/L Final    Glucose 04/28/2025 96  70 - 110 mg/dL Final    BUN 04/28/2025 12  8 - 23 mg/dL Final    Creatinine 04/28/2025 1.0  0.5 - 1.4 mg/dL Final    Calcium 04/28/2025 8.6 (L)  8.7 - 10.5 mg/dL Final    Protein Total 04/28/2025 6.8  6.0 - 8.4 gm/dL Final    Albumin 04/28/2025 3.7  3.5 - 5.2 g/dL Final    Bilirubin Total 04/28/2025 0.5  0.1 - 1.0 mg/dL Final    For  infants and newborns, interpretation of results should be based   on gestational age, weight and in agreement with clinical   observations.    Premature Infant recommended reference ranges:   0-24 hours:  <8.0 mg/dL   24-48 hours: <12.0 mg/dL   3-5 days:    <15.0 mg/dL   6-29 days:   <15.0 mg/dL    ALP 04/28/2025 62  40 - 150 unit/L Final    AST 04/28/2025 27  11 - 45 unit/L Final    ALT 04/28/2025 29  10 - 44 unit/L Final    Anion Gap 04/28/2025 8  8 - 16 mmol/L Final    eGFR 04/28/2025 >60  >60 mL/min/1.73/m2 Final    Estimated GFR calculated using the CKD-EPI creatinine (2021) equation.    WBC 04/28/2025 5.40  3.90 - 12.70 K/uL Final    RBC 04/28/2025 3.63 (L)  4.00 - 5.40 M/uL Final    HGB 04/28/2025 12.0  12.0 - 16.0 gm/dL Final    HCT 04/28/2025 36.8 (L)  37.0 - 48.5 % Final    MCV 04/28/2025 101 (H)  82 - 98 fL Final    MCH 04/28/2025 33.1 (H)  27.0 - 31.0 pg Final    MCHC 04/28/2025 32.6  32.0 - 36.0 g/dL Final    RDW 04/28/2025 13.2  11.5 - 14.5 % Final    Platelet Count 04/28/2025 276  150 - 450 K/uL Final    MPV 04/28/2025 9.1 (L)  9.2 - 12.9 fL Final    Neut # 04/28/2025 1.90  1.8 - 7.7 K/uL Final    Imm Grans # 04/28/2025 0.01  0.00 - 0.04 K/uL Final    Mild elevation in immature granulocytes is non specific and can be seen in a variety of conditions including stress response, acute inflammation, trauma and pregnancy. Correlation with other laboratory and clinical findings is essential.    Pathologist Interpretation MECHE 04/28/2025 Closely adjacent IgG lambda and faint IgG kappa specific monoclonal protein bands in gamma are identified.      Interpreting Pathologist: Jessica M Wells, DO       Final    Protein Total 04/28/2025 6.0  6.0 - 8.4 gm/dL Final    Serum protein electrophoresis and immunofixation results should be interpreted in clinical context in that some therapeutic agents can result in false positive results (example, daratumumab). Correlation with the patient's therapeutic regimen is required.     Alpha 1 Glob 04/28/2025 0.28  0.17 - 0.41 gm/dl Final    Alpha 2 Glob 04/28/2025 0.77  0.43 - 0.99 gm/dl Final    Beta Glob 04/28/2025 0.65  0.50 - 1.10 gm/dl Final    Gamma Globulin 04/28/2025 0.64 (L)  0.67 - 1.58 gm/dl Final    Albumin, SPE 04/28/2025 3.66  3.35 - 5.55 g/dL Final    Kappa Free Light Chain 04/28/2025 1.18  0.33 - 1.94 mg/dL Final    Kappa/Lambda FLC Ratio 04/28/2025 0.98  0.26 - 1.65 Final    Lambda Free Light Chain 04/28/2025 1.21  0.57 - 2.63 mg/dL Final    Magnesium  04/28/2025 1.7  1.6 - 2.6 mg/dL Final    Phosphorus Level 04/28/2025 2.1 (L)  2.7 - 4.5 mg/dL Final    Pathologist Interpretation SPE 04/28/2025 Borderline decreased total protein. Normal gamma globulins are decreased.  Paraprotein peak in gamma = 0.29 g/dL (unchanged from previous value).         Interpreting Pathologist: Jessica Wells DO       Final         Imaging/Pathology   - 06/06/2023 RIGHT ILIAC CREST BONE MARROW ASPIRATE, BONE MARROW CLOT, AND BONE MARROW CORE BIOPSY WITH:     CELLULARITY=40-60%, TRILINEAGE HEMATOPOIETIC ACTIVITY (M/E=2.9:1).   CONSISTENT WITH PLASMA CELL NEOPLASM(60%).  SEE COMMENT.   FOCAL GRADE 1 RETICULAR FIBROSIS.   CONGO RED NEGATIVE.   INCREASED STORAGE IRON.   ADEQUATE NUMBER OF MEGAKARYOCYTES.     Bone marrow karyotype results: 46, XX[20], female karyotype.     Myeloma fixed cell, high-risk, FISH:  Normal.  The result is within normal limits for 1q duplication, TP53 deletion and IGH rearrangement.         - 04/10/2023 PET: Numerous FDG avid predominately lytic osseous lesions as above.  Primary differential considerations would include multiple myeloma versus metastatic disease.  2. No FDG avid soft tissue masses or adenopathy demonstrated.  3. Mild pathologic compression deformity of the L1 vertebral body.                                          Assessment and Plan   IgG lambda Multiple myeloma, R-ISS stage I   Diagnosed June 2023 via bone marrow biopsy  Patient treatment had been held  multiple times with few treatments cancelled and also Revlimid 10 mg daily given as unable to tolerate higher dose   Previous oncologist discussed with the patient and the transplant team BMT.  However, patient declined transplant at this time and it was recommended that we continue with Rosa-VRD for 6 cycles and then repeat bone marrow biopsy and PET scan. If VGPR or better, then continue with Revlimid maintenance only.  Continue prophylaxis with aspirin, acyclovir 400 mg b.i.d.  Continue Zometa/calcium and vitamin-D supplements (Due for Zometa 01/13/23)  Repeat PET-CT stable  BM Biopsy 02/13/2024 decrease in plasma cell burden  Plan  Continue follow up with myeloma team  Labs reviewed and stable  Continue with daratumumab and Revlimid today      R  Hip Pain  Back Pain, Left leg pain   PET-CT showed treatment response with decreased FDG uptake in the previously identified bone lesion  Obtained LLE ultrasound - negative   She previously lost 10 lb in approximately 8 weeks; weight stable today   MRI Hip 04/17/25:  There are multiple scattered small pelvic and hip bone marrow lesions as detailed above, largest 2.3 cm right ischial/posterior column acetabular lesion consistent with the clinical history of multiple myeloma. Negative for pathologic fracture. Negative for soft tissue mass.    Continue follow up with palliative care      Hypophosphatemia  Take phosphorus supplement daily x 3 days      Chronic Hypokalemia  Magnesium Normal  Potassium low normal today.  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, Hordeolum externum of left eyelid  Per Ophthalmology, this is a chronic condition and is unrelated to and not a contraindication for treatment of MM  Has had kenalog injection  Continue follow-up with Ophthalmology  Continue antibiotic/cream and warm compresses PRN         Shortness of Breath, resolved  The patient reports decreased exercise tolerance; she can not walk a long distance without becoming short of breath in his also has some chest discomfort  Recommended CTA to assess for thromboses however patient declines and prefers to be reassessed at next visit      Cancer Screening  MMG 03/17/2023:  BI-RADS 1  PAP Smear:  Partial hysterectomy  Colonoscopy 10/24/2022:  Normal repeat in 5 years        Chronic Medical Conditions  Asthma  Anxiety   COPD   GERD  Hypertension   Hyperlipidemia   Hypothyroidism   ?IBS-C on [employer] Onc Chart Routing      Follow up with physician    Follow up with WERNER 4 weeks.   Infusion scheduling note   Zometa and Daratumumab today and in 4 weeks   Injection scheduling note    Labs CBC, CMP, phosphorus, magnesium, free light chains, SPEP, LDH and other   Scheduling:  Preferred lab:  Lab interval:  Other: immunoglobulins   Imaging    Pharmacy appointment    Other referrals           The patient was seen, interviewed and examined. Pertinent lab and radiologic studies were reviewed. Pt instructed to call should they develop concerning signs/symptoms or have further questions.        Portions of the record may have been created with voice recognition software. Occasional wrong-word or sound-a-like substitutions may have occurred due to the inherent limitations of voice recognition software. Read the chart carefully and recognize, using context, where substitutions have occurred.      Bri Michelle MD    Hematology/Oncology

## 2025-04-29 NOTE — ASSESSMENT & PLAN NOTE
Followed closely by oncology, has follow up scheduled today.   Tolerating treatment with palliative goal.

## 2025-04-29 NOTE — PATIENT INSTRUCTIONS
Consider meditation podcast at bedtime to help you sleep.     But or rent more books to read at bedtime to help you sleep and get relax your mind.

## 2025-04-29 NOTE — ASSESSMENT & PLAN NOTE
Worse due to situation: cancer,  and son are ill and she is sole caregiver.  Believes sx well-managed with alprazolam. We discussed tolerance and benefit versus burden and concerns combining with Ambien, oxycodone.   She intends to not take alprazolam when taking Ambien. Uses oxycodone sparingly.  Encouraged meditation.   Declines counseling, SSRI/SNRI

## 2025-04-30 ENCOUNTER — PATIENT MESSAGE (OUTPATIENT)
Dept: HEMATOLOGY/ONCOLOGY | Facility: CLINIC | Age: 65
End: 2025-04-30
Payer: COMMERCIAL

## 2025-05-01 ENCOUNTER — PATIENT MESSAGE (OUTPATIENT)
Dept: INTERNAL MEDICINE | Facility: CLINIC | Age: 65
End: 2025-05-01
Payer: COMMERCIAL

## 2025-05-02 DIAGNOSIS — R05.9 COUGH, UNSPECIFIED TYPE: ICD-10-CM

## 2025-05-02 DIAGNOSIS — C90.00 MULTIPLE MYELOMA, REMISSION STATUS UNSPECIFIED: Primary | ICD-10-CM

## 2025-05-02 PROBLEM — E83.39 HYPOPHOSPHATEMIA: Status: ACTIVE | Noted: 2025-05-02

## 2025-05-02 RX ORDER — PROMETHAZINE HYDROCHLORIDE AND DEXTROMETHORPHAN HYDROBROMIDE 6.25; 15 MG/5ML; MG/5ML
5 SYRUP ORAL EVERY 4 HOURS PRN
Qty: 118 ML | Refills: 0 | Status: SHIPPED | OUTPATIENT
Start: 2025-05-02 | End: 2025-05-12

## 2025-05-05 ENCOUNTER — OFFICE VISIT (OUTPATIENT)
Dept: FAMILY MEDICINE | Facility: CLINIC | Age: 65
End: 2025-05-05
Payer: COMMERCIAL

## 2025-05-05 VITALS
TEMPERATURE: 98 F | HEIGHT: 64 IN | SYSTOLIC BLOOD PRESSURE: 90 MMHG | OXYGEN SATURATION: 97 % | HEART RATE: 75 BPM | RESPIRATION RATE: 18 BRPM | DIASTOLIC BLOOD PRESSURE: 52 MMHG | WEIGHT: 122.25 LBS | BODY MASS INDEX: 20.87 KG/M2

## 2025-05-05 DIAGNOSIS — E86.0 DEHYDRATION: ICD-10-CM

## 2025-05-05 DIAGNOSIS — Z72.0 TOBACCO USE: ICD-10-CM

## 2025-05-05 DIAGNOSIS — J30.9 ALLERGIC RHINITIS, UNSPECIFIED SEASONALITY, UNSPECIFIED TRIGGER: Primary | ICD-10-CM

## 2025-05-05 DIAGNOSIS — I10 ESSENTIAL HYPERTENSION: ICD-10-CM

## 2025-05-05 DIAGNOSIS — C90.00 MULTIPLE MYELOMA, REMISSION STATUS UNSPECIFIED: Chronic | ICD-10-CM

## 2025-05-05 PROBLEM — M79.604 PAIN OF RIGHT LOWER EXTREMITY: Status: RESOLVED | Noted: 2021-09-02 | Resolved: 2025-05-05

## 2025-05-05 PROCEDURE — 99999 PR PBB SHADOW E&M-EST. PATIENT-LVL V: CPT | Mod: PBBFAC,,, | Performed by: REGISTERED NURSE

## 2025-05-05 RX ORDER — METHYLPREDNISOLONE ACETATE 40 MG/ML
40 INJECTION, SUSPENSION INTRA-ARTICULAR; INTRALESIONAL; INTRAMUSCULAR; SOFT TISSUE
Status: COMPLETED | OUTPATIENT
Start: 2025-05-05 | End: 2025-05-05

## 2025-05-05 RX ORDER — AZELASTINE 1 MG/ML
1 SPRAY, METERED NASAL 2 TIMES DAILY
Qty: 30 ML | Refills: 0 | Status: SHIPPED | OUTPATIENT
Start: 2025-05-05 | End: 2025-05-06 | Stop reason: SDUPTHER

## 2025-05-05 RX ADMIN — METHYLPREDNISOLONE ACETATE 40 MG: 40 INJECTION, SUSPENSION INTRA-ARTICULAR; INTRALESIONAL; INTRAMUSCULAR; SOFT TISSUE at 11:05

## 2025-05-05 NOTE — PROGRESS NOTES
Ana Bonilla  MRN:  [id number]  64 y.o. female      Patient Care Team:  BENJAMIN Miranda MD as PCP - General (Family Medicine)  Elle Collins LCSW as  (Hematology and Oncology)  Arabella Ferrer LMSW as  (Hematology and Oncology)  Cristina Peters PA-C as Physician Assistant (Internal Medicine)  Bri Luevano MD as Consulting Physician (Hematology and Oncology)  Patricia Martinez LMSW as  (Hematology and Oncology)      SUBJECTIVE:     CHIEF COMPLAINT:  - Sinus and allergy symptoms.    HPI:  Ms. Bonilla reports sinus congestion, which started 3-4 days ago, along with coughing. These symptoms possibly triggered by strong perfume smell at work, employed at local [employer]. Reports sneezing, clear RN, NC, PND and irritated throat. Eyes itchy and watery at times. She has been using Phenergan-DM as needed for cough, more so at night due to sedating effects. For allergy symptoms, she takes Xyzal and Singulair at night, and uses Flonase nasal spray.    Admits to poor appetite and minimal water intake daily. She does take her HTN medication w/out blood pressure monitoring. Noted to have lower blood pressure reading today than usual. She admits possibly dehydrated.    Currently treating with Oncology for multiple myeloma.        Review of Systems   Constitutional:  Positive for chills (mild), diaphoresis and malaise/fatigue. Negative for fever.   HENT:  Positive for congestion and sore throat. Negative for ear pain, sinus pain and tinnitus.         + sneezing, clear RN, PND   Eyes:  Negative for blurred vision, double vision and pain.   Respiratory:  Positive for cough and sputum production. Negative for hemoptysis, shortness of breath and wheezing.    Cardiovascular: Negative.    Neurological:  Positive for weakness and headaches. Negative for dizziness, tingling, tremors, seizures and loss of consciousness.   Endo/Heme/Allergies:  Positive for environmental  allergies.         Review of patient's allergies indicates:   Allergen Reactions    Hydrocodone-acetaminophen Other (See Comments)     Causes pt to feel extremely sick          Problem List[1]      Current Outpatient Medications   Medication Instructions    acyclovir (ZOVIRAX) 400 mg, Oral, 2 times daily    albuterol (VENTOLIN HFA) 90 mcg/actuation inhaler 2 puffs, Inhalation, Every 4 hours PRN, Rescue    ALPRAZolam (XANAX) 2 mg, Oral, 2 times daily PRN    amlodipine-benazepril 2.5-10 mg (LOTREL) 2.5-10 mg per capsule TAKE 1 CAPSULE BY MOUTH EVERY DAY    aspirin 81 mg, Oral, Daily    calcium-vitamin D 600 mg-10 mcg (400 unit) Tab 1 tablet, Oral, Every 12 hours    dexAMETHasone (DECADRON) 40 mg, Oral, Every 7 days, Take with food.    fluticasone propionate (FLONASE) 50 mcg, Each Nostril, Daily    fluticasone-salmeterol diskus inhaler 250-50 mcg 1 puff, Inhalation, 2 times daily    hydrOXYzine HCL (ATARAX) 25 mg, Oral, 3 times daily PRN    k phos di & mono-sod phos mono (PHOSPHOROUS) 250 mg Tab 1 tablet, Oral, 4 times daily with meals & nightly    lenalidomide 10 mg Cap TAKE 1 CAPSULE ONCE DAILY FOR 21 DAYS, AND THEN 7 DAYS OFF    levothyroxine (SYNTHROID) 100 mcg, Oral, Before breakfast    linaCLOtide (LINZESS) 144 mcg, Oral, Before breakfast    magnesium oxide (MAG-OX) 400 mg, Oral    metoprolol succinate (TOPROL-XL) 50 mg, Oral, Nightly    montelukast (SINGULAIR) 10 mg, Oral, Nightly    naloxone (NARCAN) 4 mg/actuation Spry     ondansetron (ZOFRAN) 4 mg, Oral, Every 8 hours PRN    pantoprazole (PROTONIX) 40 MG tablet TAKE 1 TABLET(40 MG) BY MOUTH EVERY DAY    potassium chloride (KLOR-CON) 10 MEQ TbSR 10 mEq, Oral, Daily    potassium phosphate (monobasic) (K-PHOS) 500 mg, Oral, Daily    potassium, sodium phosphates (PHOS-NAK) 280-160-250 mg PwPk 1 packet, Oral, 4 times daily with meals & nightly    promethazine-dextromethorphan (PROMETHAZINE-DM) 6.25-15 mg/5 mL Syrp 5 mLs, Oral, Every 4 hours PRN    rosuvastatin  "(CRESTOR) 10 mg, Oral, Nightly    zolpidem (AMBIEN) 5 mg, Oral, Nightly PRN         Past medical, surgical, family and social histories have been reviewed today.      OBJECTIVE:     Vitals:    05/05/25 1053   BP: (!) 90/52   Pulse: 75   Resp: 18   Temp: 98.2 °F (36.8 °C)   TempSrc: Tympanic   SpO2: 97%   Weight: 55.4 kg (122 lb 3.9 oz)   Height: 5' 4" (1.626 m)       BP Readings from Last 6 Encounters:   05/05/25 (!) 90/52   04/29/25 128/79   04/29/25 114/68   04/29/25 114/68   04/01/25 112/73   03/04/25 111/64       Physical Exam  Vitals reviewed.   HENT:      Head: Normocephalic and atraumatic.      Right Ear: Tympanic membrane normal.      Left Ear: Tympanic membrane normal.      Nose: Congestion and rhinorrhea present. Rhinorrhea is clear.      Right Turbinates: Pale.      Left Turbinates: Pale.      Right Sinus: No maxillary sinus tenderness or frontal sinus tenderness.      Left Sinus: No maxillary sinus tenderness or frontal sinus tenderness.      Mouth/Throat:      Mouth: Mucous membranes are moist.      Pharynx: Postnasal drip (w/ cobblestoning) present. No pharyngeal swelling or posterior oropharyngeal erythema.   Eyes:      General: Lids are normal. Allergic shiner present.         Right eye: No discharge.         Left eye: No discharge.      Conjunctiva/sclera: Conjunctivae normal.      Pupils: Pupils are equal, round, and reactive to light.   Cardiovascular:      Rate and Rhythm: Normal rate and regular rhythm.   Pulmonary:      Effort: Pulmonary effort is normal.      Breath sounds: Normal breath sounds.   Neurological:      Mental Status: She is alert and oriented to person, place, and time.      Motor: No weakness.      Gait: Gait normal.   Psychiatric:         Mood and Affect: Mood normal.         Behavior: Behavior normal.         Thought Content: Thought content normal.         Judgment: Judgment normal.         ASSESSMENT:     1. Allergic rhinitis, unspecified seasonality, unspecified trigger " ---- acute exacerbation of chronic intermittent issue ---- Xyzal daily in AM with Singulair nightly.  Steroid IM today.  Continue Flonase, added Astelin.  -     methylPREDNISolone acetate injection 40 mg  -     azelastine (ASTELIN) 137 mcg (0.1 %) nasal spray; 1 spray (137 mcg total) by Nasal route 2 (two) times daily.  Dispense: 30 mL; Refill: 0    2. Essential hypertension ---- chronic issue, on HTN medication as ordered but not monitoring at home.  Discussed daily water intake and need to hydrate, pt acknowledged.  Monitor.    3. Multiple myeloma, remission status unspecified ---- chronic issue, stable per Oncology.  Currently under treatment.    4. Dehydration ----- see # 2    5. Tobacco use ---- smoking cessation strongly advised and encouraged, she reports slowly weaning off      PLAN:     See PCP later this week for blood pressure recheck.  RTC as directed and/or prn.        JONA Ramesh  [facility]       40 minutes of total time spent on the encounter, which includes face to face time and non-face to face time preparing to see the patient.  This includes obtaining and/or reviewing separately obtained history, performing a medically appropriate examination and/or evaluation, and counseling and educating the patient/family/caregiver.  Includes documenting clinical information in the electronic or other health record, independently interpreting results (not separately reported) and communicating results to the patient/family/caregiver, with care coordination (not separately reported).  Medications, tests and/or procedures ordered as necessary along with referring and communicating with other health professionals (when not separately reported).             [1]   Patient Active Problem List  Diagnosis    COPD with asthma    GERD (gastroesophageal reflux disease)    Acquired hypothyroidism    Tobacco use    PVD (peripheral vascular disease)    Anxiety    Dyslipidemia     Claudication    Essential hypertension    Allergic rhinitis    Pain in both lower extremities    Anisometropic amblyopia of left eye    Nonrheumatic aortic valve insufficiency    Colon polyps    Precordial pain    Tachycardia    Difficulty walking    Low back pain, unspecified    Multiple myeloma    Compressed spine fracture    Dehydration    Immunodeficiency due to chemotherapy    Secondary malignant neoplasm of bone    Hypokalemia    Palliative care encounter    Chalazion left upper eyelid    Hypocalcemia    Sacroiliitis    Poor appetite    Cancer related pain    Needs smoking cessation education    Psychophysiological insomnia    Drug-induced insomnia    Hypophosphatemia

## 2025-05-06 ENCOUNTER — OFFICE VISIT (OUTPATIENT)
Dept: INTERNAL MEDICINE | Facility: CLINIC | Age: 65
End: 2025-05-06
Payer: COMMERCIAL

## 2025-05-06 DIAGNOSIS — Z91.09 ENVIRONMENTAL ALLERGIES: Primary | Chronic | ICD-10-CM

## 2025-05-06 PROCEDURE — 1160F RVW MEDS BY RX/DR IN RCRD: CPT | Mod: CPTII,95,, | Performed by: FAMILY MEDICINE

## 2025-05-06 PROCEDURE — 1159F MED LIST DOCD IN RCRD: CPT | Mod: CPTII,95,, | Performed by: FAMILY MEDICINE

## 2025-05-06 PROCEDURE — 98006 SYNCH AUDIO-VIDEO EST MOD 30: CPT | Mod: 95,,, | Performed by: FAMILY MEDICINE

## 2025-05-06 PROCEDURE — G2211 COMPLEX E/M VISIT ADD ON: HCPCS | Mod: 95,,, | Performed by: FAMILY MEDICINE

## 2025-05-06 PROCEDURE — 4010F ACE/ARB THERAPY RXD/TAKEN: CPT | Mod: CPTII,95,, | Performed by: FAMILY MEDICINE

## 2025-05-06 RX ORDER — AZELASTINE 1 MG/ML
1 SPRAY, METERED NASAL 2 TIMES DAILY
Qty: 30 ML | Refills: 3 | Status: SHIPPED | OUTPATIENT
Start: 2025-05-06 | End: 2026-05-06

## 2025-05-06 RX ORDER — LORATADINE 10 MG/1
10 TABLET ORAL DAILY
Qty: 30 TABLET | Refills: 11 | Status: SHIPPED | OUTPATIENT
Start: 2025-05-06

## 2025-05-06 RX ORDER — FLUTICASONE PROPIONATE 50 MCG
2 SPRAY, SUSPENSION (ML) NASAL DAILY
Qty: 16 G | Refills: 11 | Status: SHIPPED | OUTPATIENT
Start: 2025-05-06

## 2025-05-06 NOTE — PROGRESS NOTES
TELEMEDICINE VIRTUAL VIDEO VISIT  5/6/25  3:40 PM CDT    Visit Type: Audiovisual    Patient's Location: nAa represents that they are located within the state [facility].    CHIEF COMPLAINT: Allergies    Environmental allergies: She presents with chronic refractory environmental allergies, primarily triggered by occupational exposures in her work setting, particularly during special events involving strong perfumes. Symptom history includes annual episodes of sinus congestion, post nasal drip, cough, sneezing, watery eyes, and throat irritation, with the most recent episode beginning 2-3 days ago. She denies daily symptoms and reports inconsistent adherence to her allergy regimen, citing medication burden and confusion regarding Zyrtec dosing. She did not report signs or symptoms of acute sinus infection and antibiotics were not initiated. Due to immunocompromised status from ongoing chemotherapy for multiple myeloma, steroids were avoided. Recent vital signs show stable pulse and blood pressure, with no acute findings. Laboratory evaluation does not indicate acute infection or significant eosinophilia. Environmental allergies were assessed as chronic and progressing. The treatment plan includes maximum medical therapy: initiation of loratadine (Claritin) 10 mg by mouth daily, fluticasone propionate (Flonase) 50 mcg/actuation nasal spray with dose increased to 2 sprays in each nostril once daily, and ipratropium (Atrovent) 42 mcg nasal spray 2 sprays in each nostril four times daily as needed. Azelastine (Astelin) nasal spray at 137 mcg two times daily was continued. She was also referred to allergy for further management. Non-pharmacologic recommendations include environmental trigger avoidance where feasible. Singulair, Flonase, and Zyrtec were discussed and continued as part of her allergy management. Follow-up for efficacy of the new regimen was advised, and there is a scheduled referral to the  allergist.    Contact the office if the allergy medication regimen is not effective after about a week. An ambulatory referral to allergy was placed. She is scheduled to see allergy on 6/4/2025.      1. Environmental allergies  Assessment & Plan:  This is a chronic problem, with exacerbation or progression.     Orders:  -     loratadine (CLARITIN) 10 mg tablet; Take 1 tablet (10 mg total) by mouth once daily.  Dispense: 30 tablet; Refill: 11  -     azelastine (ASTELIN) 137 mcg (0.1 %) nasal spray; 1 spray (137 mcg total) by Nasal route 2 (two) times daily.  Dispense: 30 mL; Refill: 3  -     fluticasone propionate (FLONASE) 50 mcg/actuation nasal spray; 2 sprays (100 mcg total) by Each Nostril route once daily.  Dispense: 16 g; Refill: 11  -     Ambulatory referral/consult to Allergy; Future; Expected date: 05/13/2025       Unless specified otherwise, chronic conditions are represented as and appear to be compensated/controlled and stable.  Today's visit involved the intricate management of episodic problem(s) and the ongoing care for the patient's serious or complex condition(s) listed above, reflecting the inherent complexity of providing longitudinal, comprehensive evaluation and management as the central hub for the patient's primary care services.  There were no vitals filed for this visit.  PHYSICAL EXAM:  GENERAL APPEARANCE:  - Alert and grossly oriented.  - No apparent distress, breathing comfortably.     EYES:  - Sclera without icterus.     EARS, NOSE, AND THROAT:  - No visible abnormalities.     RESPIRATORY:  - No respiratory distress.  - No audible wheezing or cough.     PSYCHIATRIC:  - Mood and affect appropriate; behavior cooperative.  Review of Systems   Constitutional:  Negative for activity change and unexpected weight change.   HENT:  Positive for nasal congestion, postnasal drip and rhinorrhea. Negative for ear pain, facial swelling, hearing loss, sinus pressure/congestion and trouble swallowing.     Respiratory:  Negative for chest tightness.    Cardiovascular:  Negative for chest pain and palpitations.   Gastrointestinal:  Negative for blood in stool, diarrhea and vomiting.   Endocrine: Negative for polydipsia and polyuria.   Genitourinary:  Negative for difficulty urinating, dysuria, hematuria and menstrual problem.   Musculoskeletal:  Negative for joint swelling and neck pain.   Neurological:  Negative for weakness.   Psychiatric/Behavioral:  Negative for confusion.        I spent a total of 32 minutes today evaluating and managing this patient for this encounter.  This includes face to face time and non-face to face time preparing to see the patient (eg, review of tests), obtaining and/or reviewing separately obtained history, documenting clinical information in the electronic or other health record, independently interpreting results and communicating results to the patient/family/caregiver, or care coordinator.  This time was exclusive of any separately billable procedures for this patient and exclusive of time spent treating any other patient.    Sections of this note may have been produced using ambient-listening and speech-recognition technologies. I have reviewed the content for accuracy, though errors in syntax, spelling, or similar-sounding words may be present and should be interpreted in context. Please contact the author for any clarification.    Each patient to whom medical services are provided by telemedicine is: (1) informed of the relationship between the physician and patient and the respective role of any other health care provider with respect to management of the patient; and (2) notified that he or she may decline to receive medical services by telemedicine and may withdraw from such care at any time.    Follow up if symptoms worsen or fail to improve.

## 2025-05-07 ENCOUNTER — OFFICE VISIT (OUTPATIENT)
Dept: ALLERGY | Facility: CLINIC | Age: 65
End: 2025-05-07
Payer: COMMERCIAL

## 2025-05-07 ENCOUNTER — PATIENT MESSAGE (OUTPATIENT)
Dept: HEMATOLOGY/ONCOLOGY | Facility: CLINIC | Age: 65
End: 2025-05-07
Payer: COMMERCIAL

## 2025-05-07 DIAGNOSIS — Z91.09 ENVIRONMENTAL ALLERGIES: Chronic | ICD-10-CM

## 2025-05-07 DIAGNOSIS — J01.00 ACUTE NON-RECURRENT MAXILLARY SINUSITIS: Primary | ICD-10-CM

## 2025-05-07 PROCEDURE — 1159F MED LIST DOCD IN RCRD: CPT | Mod: CPTII,95,, | Performed by: STUDENT IN AN ORGANIZED HEALTH CARE EDUCATION/TRAINING PROGRAM

## 2025-05-07 PROCEDURE — 1160F RVW MEDS BY RX/DR IN RCRD: CPT | Mod: CPTII,95,, | Performed by: STUDENT IN AN ORGANIZED HEALTH CARE EDUCATION/TRAINING PROGRAM

## 2025-05-07 PROCEDURE — 98006 SYNCH AUDIO-VIDEO EST MOD 30: CPT | Mod: 95,,, | Performed by: STUDENT IN AN ORGANIZED HEALTH CARE EDUCATION/TRAINING PROGRAM

## 2025-05-07 PROCEDURE — 4010F ACE/ARB THERAPY RXD/TAKEN: CPT | Mod: CPTII,95,, | Performed by: STUDENT IN AN ORGANIZED HEALTH CARE EDUCATION/TRAINING PROGRAM

## 2025-05-07 RX ORDER — PREDNISONE 20 MG/1
TABLET ORAL
Qty: 12 TABLET | Refills: 0 | Status: SHIPPED | OUTPATIENT
Start: 2025-05-07 | End: 2025-05-14

## 2025-05-07 RX ORDER — IPRATROPIUM BROMIDE 42 UG/1
2 SPRAY, METERED NASAL 4 TIMES DAILY PRN
Qty: 30 ML | Refills: 11 | Status: SHIPPED | OUTPATIENT
Start: 2025-05-07 | End: 2026-05-07

## 2025-05-07 RX ORDER — AMOXICILLIN AND CLAVULANATE POTASSIUM 875; 125 MG/1; MG/1
1 TABLET, FILM COATED ORAL EVERY 12 HOURS
Qty: 14 TABLET | Refills: 0 | Status: SHIPPED | OUTPATIENT
Start: 2025-05-07 | End: 2025-05-14

## 2025-05-08 DIAGNOSIS — C90.00 MULTIPLE MYELOMA, REMISSION STATUS UNSPECIFIED: ICD-10-CM

## 2025-05-08 DIAGNOSIS — F41.9 ANXIETY: ICD-10-CM

## 2025-05-08 RX ORDER — ALPRAZOLAM 2 MG/1
2 TABLET ORAL 2 TIMES DAILY PRN
Qty: 56 TABLET | Refills: 0 | Status: SHIPPED | OUTPATIENT
Start: 2025-05-08 | End: 2025-06-05

## 2025-05-09 ENCOUNTER — PATIENT MESSAGE (OUTPATIENT)
Dept: INTERNAL MEDICINE | Facility: CLINIC | Age: 65
End: 2025-05-09
Payer: COMMERCIAL

## 2025-05-09 NOTE — PROGRESS NOTES
The patient location is: [place] - [place]  The chief complaint leading to consultation is: est care    Visit type: audiovisual    Face to Face time with patient: 10  30 minutes of total time spent on the encounter, which includes face to face time and non-face to face time preparing to see the patient (eg, review of tests), Obtaining and/or reviewing separately obtained history, Documenting clinical information in the electronic or other health record, Independently interpreting results (not separately reported) and communicating results to the patient/family/caregiver, or Care coordination (not separately reported).     Each patient to whom he or she provides medical services by telemedicine is:  (1) informed of the relationship between the physician and patient and the respective role of any other health care provider with respect to management of the patient; and (2) notified that he or she may decline to receive medical services by telemedicine and may withdraw from such care at any time    Allergy and Immunology  New Patient Clinic Note    Date: 5/9/2025  Chief Complaint   Patient presents with    Sinus Problem     Referred by: BENJAMIN Miranda MD  [id number] [place] [address]    History  Ana Bonilla is a 64 y.o. female being seen as a New Patient today.    Chronic Rhinitis/Vasomotor Rhinitis   Acute Sinusitis   - Allergy testing at in-person appointment   - Adding ipratropium PRN   - Sinus infection suspected   - Prednisone and Augmentin  - ED precautions    Allergies, PMH, PSH, Social, and Family History were reviewed.    Review of patient's allergies indicates:   Allergen Reactions    Hydrocodone-acetaminophen Other (See Comments)     Causes pt to feel extremely sick       Past Medical History:   Diagnosis Date    Allergy     Amblyopia OS    Anxiety     Asthma     COPD (chronic obstructive pulmonary disease)     NO HOME o2    GERD (gastroesophageal reflux disease)     Hyperlipidemia      Hypertension     PONV (postoperative nausea and vomiting)     Thyroid disease      Past Surgical History:   Procedure Laterality Date    APPENDECTOMY      BIOPSY N/A 06/08/2023    Procedure: BIOPSY;  Surgeon: Fausto Smith MD;  Location: [facility] OR;  Service: Neurosurgery;  Laterality: N/A;  L1    BUNIONECTOMY      COLONOSCOPY N/A 12/04/2019    Procedure: COLONOSCOPY;  Surgeon: Danny Matos III, MD;  Location: [facility] ENDO;  Service: Endoscopy;  Laterality: N/A;    COLONOSCOPY N/A 10/24/2022    Procedure: COLONOSCOPY;  Surgeon: Danny Matos III, MD;  Location: [facility] ENDO;  Service: Endoscopy;  Laterality: N/A;    ESOPHAGOGASTRODUODENOSCOPY N/A 10/24/2022    Procedure: EGD (ESOPHAGOGASTRODUODENOSCOPY);  Surgeon: Danny Matos III, MD;  Location: [facility] ENDO;  Service: Endoscopy;  Laterality: N/A;    FIXATION KYPHOPLASTY Bilateral 06/08/2023    Procedure: KYPHOPLASTY;  Surgeon: Fausto Smith MD;  Location: [facility] OR;  Service: Neurosurgery;  Laterality: Bilateral;  kyphoplasty and radiofrequency ablation - L1    HYSTERECTOMY      PARTIAL//still with ovaries    neck fusion  08/2017    THYROIDECTOMY      TUBAL LIGATION       Social History     Social History Narrative    Not on file     S/he reports that she has been smoking cigarettes. She has a 25 pack-year smoking history. She has never been exposed to tobacco smoke. She has never used smokeless tobacco. She reports that she does not currently use alcohol. She reports that she does not use drugs.    Medications Ordered Prior to Encounter[1]    Physical Examination  There were no vitals filed for this visit.  GENERAL:  female in no apparent distress and well developed and well nourished  HEAD:  Normocephalic, without obvious abnormality, atraumatic  EYES: sclera anicteric, conjunctiva normochromic  LUNGS: no tachypnea, retractions or cyanosis.  HEART: not examined.  ABDOMEN: not examined.  MUSCULOSKELETAL: no gross joint deformity or swelling.  NEURO: alert,  oriented, normal speech, no focal findings or movement disorder noted.  SKIN: normal coloration and turgor, no rashes, no suspicious skin lesions noted.   Assessment/Plan:   Problem List Items Addressed This Visit       Environmental allergies (Chronic)     Other Visit Diagnoses         Acute non-recurrent maxillary sinusitis    -  Primary    Relevant Medications    amoxicillin-clavulanate 875-125mg (AUGMENTIN) 875-125 mg per tablet    predniSONE (DELTASONE) 20 MG tablet          - Allergy testing at in-person appointment   - Adding ipratropium PRN   - Sinus infection suspected   - Prednisone and Augmentin  - ED precautions    Follow up:  No follow-ups on file.    MD [facility]  All[facility]ology        [1]   Current Outpatient Medications on File Prior to Visit   Medication Sig Dispense Refill    acyclovir (ZOVIRAX) 400 MG tablet Take 1 tablet (400 mg total) by mouth 2 (two) times daily. 60 tablet 11    albuterol (VENTOLIN HFA) 90 mcg/actuation inhaler Inhale 2 puffs into the lungs every 4 (four) hours as needed for Wheezing. Rescue 18 g 1    amlodipine-benazepril 2.5-10 mg (LOTREL) 2.5-10 mg per capsule TAKE 1 CAPSULE BY MOUTH EVERY DAY 90 capsule 3    aspirin 81 MG Chew Take 1 tablet (81 mg total) by mouth once daily. 30 tablet 11    azelastine (ASTELIN) 137 mcg (0.1 %) nasal spray 1 spray (137 mcg total) by Nasal route 2 (two) times daily. 30 mL 3    calcium-vitamin D 600 mg-10 mcg (400 unit) Tab Take 1 tablet by mouth every 12 (twelve) hours. 60 tablet 2    dexAMETHasone (DECADRON) 4 MG Tab Take 10 tablets (40 mg total) by mouth every 7 days. Take with food. 40 tablet 11    fluticasone propionate (FLONASE) 50 mcg/actuation nasal spray 2 sprays (100 mcg total) by Each Nostril route once daily. 16 g 11    fluticasone-salmeterol diskus inhaler 250-50 mcg Inhale 1 puff into the lungs 2 (two) times daily. 180 each 1    hydrOXYzine HCL (ATARAX) 25 MG tablet Take 1 tablet (25 mg  total) by mouth 3 (three) times daily as needed for Itching. 21 tablet 0    k phos di & mono-sod phos mono (PHOSPHOROUS) 250 mg Tab Take 1 tablet by mouth 4 (four) times daily with meals and nightly. for 5 days 20 each 0    lenalidomide 10 mg Cap TAKE 1 CAPSULE ONCE DAILY FOR 21 DAYS, AND THEN 7 DAYS OFF 21 capsule 0    levothyroxine (SYNTHROID) 100 MCG tablet TAKE 1 TABLET(100 MCG) BY MOUTH BEFORE BREAKFAST 90 tablet 1    linaCLOtide (LINZESS) 72 mcg Cap capsule Take 2 capsules (144 mcg total) by mouth before breakfast. 30 capsule 1    loratadine (CLARITIN) 10 mg tablet Take 1 tablet (10 mg total) by mouth once daily. 30 tablet 11    magnesium oxide (MAG-OX) 400 mg (241.3 mg magnesium) tablet TAKE 1 TABLET(400 MG) BY MOUTH EVERY DAY 90 tablet 1    metoprolol succinate (TOPROL-XL) 50 MG 24 hr tablet Take 1 tablet (50 mg total) by mouth every evening. 90 tablet 3    montelukast (SINGULAIR) 10 mg tablet Take 1 tablet (10 mg total) by mouth every evening. 90 tablet 3    naloxone (NARCAN) 4 mg/actuation Spry       ondansetron (ZOFRAN) 4 MG tablet Take 1 tablet (4 mg total) by mouth every 8 (eight) hours as needed for Nausea. 30 tablet 0    pantoprazole (PROTONIX) 40 MG tablet TAKE 1 TABLET(40 MG) BY MOUTH EVERY DAY 90 tablet 3    potassium chloride (KLOR-CON) 10 MEQ TbSR Take 1 tablet (10 mEq total) by mouth once daily. 30 tablet 0    potassium phosphate, monobasic, (K-PHOS) 500 mg TbSO Take 1 tablet (500 mg total) by mouth once daily. 30 tablet 11    potassium, sodium phosphates (PHOS-NAK) 280-160-250 mg PwPk Take 1 packet by mouth 4 (four) times daily with meals and nightly. 4 packet 0    promethazine-dextromethorphan (PROMETHAZINE-DM) 6.25-15 mg/5 mL Syrp Take 5 mLs by mouth every 4 (four) hours as needed. 118 mL 0    rosuvastatin (CRESTOR) 10 MG tablet Take 1 tablet (10 mg total) by mouth every evening. 90 tablet 3    zolpidem (AMBIEN) 5 MG Tab Take 1 tablet (5 mg total) by mouth nightly as needed (insomnia). 20  tablet 0     Current Facility-Administered Medications on File Prior to Visit   Medication Dose Route Frequency Provider Last Rate Last Admin    dexAMETHasone injection 40 mg  40 mg Intravenous 1 time in Clinic/HOD Bri Luevano MD

## 2025-05-15 DIAGNOSIS — C90.00 MULTIPLE MYELOMA, REMISSION STATUS UNSPECIFIED: ICD-10-CM

## 2025-05-15 RX ORDER — LENALIDOMIDE 10 MG/1
CAPSULE ORAL
Qty: 21 CAPSULE | Refills: 0 | Status: SHIPPED | OUTPATIENT
Start: 2025-05-15

## 2025-05-22 DIAGNOSIS — C90.00 MULTIPLE MYELOMA, REMISSION STATUS UNSPECIFIED: ICD-10-CM

## 2025-05-22 RX ORDER — NAPROXEN SODIUM 220 MG/1
81 TABLET, FILM COATED ORAL DAILY
Qty: 30 TABLET | Refills: 11 | Status: SHIPPED | OUTPATIENT
Start: 2025-05-22

## 2025-05-26 ENCOUNTER — LAB VISIT (OUTPATIENT)
Dept: LAB | Facility: HOSPITAL | Age: 65
End: 2025-05-26
Attending: INTERNAL MEDICINE
Payer: COMMERCIAL

## 2025-05-26 ENCOUNTER — RESULTS FOLLOW-UP (OUTPATIENT)
Dept: HEMATOLOGY/ONCOLOGY | Facility: CLINIC | Age: 65
End: 2025-05-26

## 2025-05-26 DIAGNOSIS — C90.00 MULTIPLE MYELOMA, REMISSION STATUS UNSPECIFIED: ICD-10-CM

## 2025-05-26 DIAGNOSIS — C79.51 SECONDARY MALIGNANT NEOPLASM OF BONE: ICD-10-CM

## 2025-05-26 LAB
ABSOLUTE EOSINOPHIL (OHS): 0.09 K/UL
ABSOLUTE MONOCYTE (OHS): 0.54 K/UL (ref 0.3–1)
ABSOLUTE NEUTROPHIL COUNT (OHS): 1.77 K/UL (ref 1.8–7.7)
ALBUMIN SERPL BCP-MCNC: 3.4 G/DL (ref 3.5–5.2)
ALP SERPL-CCNC: 58 UNIT/L (ref 40–150)
ALT SERPL W/O P-5'-P-CCNC: 76 UNIT/L (ref 10–44)
ANION GAP (OHS): 9 MMOL/L (ref 8–16)
AST SERPL-CCNC: 48 UNIT/L (ref 11–45)
BASOPHILS # BLD AUTO: 0.05 K/UL
BASOPHILS NFR BLD AUTO: 1.1 %
BILIRUB SERPL-MCNC: 1 MG/DL (ref 0.1–1)
BUN SERPL-MCNC: 13 MG/DL (ref 8–23)
CALCIUM SERPL-MCNC: 8.6 MG/DL (ref 8.7–10.5)
CHLORIDE SERPL-SCNC: 105 MMOL/L (ref 95–110)
CO2 SERPL-SCNC: 24 MMOL/L (ref 23–29)
CREAT SERPL-MCNC: 1.2 MG/DL (ref 0.5–1.4)
ERYTHROCYTE [DISTWIDTH] IN BLOOD BY AUTOMATED COUNT: 14.2 % (ref 11.5–14.5)
GFR SERPLBLD CREATININE-BSD FMLA CKD-EPI: 51 ML/MIN/1.73/M2
GLUCOSE SERPL-MCNC: 125 MG/DL (ref 70–110)
HCT VFR BLD AUTO: 36.4 % (ref 37–48.5)
HGB BLD-MCNC: 11.5 GM/DL (ref 12–16)
IGA SERPL-MCNC: 75 MG/DL (ref 40–350)
IGG SERPL-MCNC: 799 MG/DL (ref 650–1600)
IGM SERPL-MCNC: 22 MG/DL (ref 50–300)
IMM GRANULOCYTES # BLD AUTO: 0.01 K/UL (ref 0–0.04)
IMM GRANULOCYTES NFR BLD AUTO: 0.2 % (ref 0–0.5)
LDH SERPL-CCNC: 253 U/L (ref 110–260)
LYMPHOCYTES # BLD AUTO: 2.13 K/UL (ref 1–4.8)
MAGNESIUM SERPL-MCNC: 1.6 MG/DL (ref 1.6–2.6)
MCH RBC QN AUTO: 31.9 PG (ref 27–31)
MCHC RBC AUTO-ENTMCNC: 31.6 G/DL (ref 32–36)
MCV RBC AUTO: 101 FL (ref 82–98)
NUCLEATED RBC (/100WBC) (OHS): 0 /100 WBC
PHOSPHATE SERPL-MCNC: 2.4 MG/DL (ref 2.7–4.5)
PLATELET # BLD AUTO: 292 K/UL (ref 150–450)
PMV BLD AUTO: 10 FL (ref 9.2–12.9)
POTASSIUM SERPL-SCNC: 3.5 MMOL/L (ref 3.5–5.1)
PROT SERPL-MCNC: 6.5 GM/DL (ref 6–8.4)
RBC # BLD AUTO: 3.6 M/UL (ref 4–5.4)
RELATIVE EOSINOPHIL (OHS): 2 %
RELATIVE LYMPHOCYTE (OHS): 46.4 % (ref 18–48)
RELATIVE MONOCYTE (OHS): 11.8 % (ref 4–15)
RELATIVE NEUTROPHIL (OHS): 38.5 % (ref 38–73)
SODIUM SERPL-SCNC: 138 MMOL/L (ref 136–145)
WBC # BLD AUTO: 4.59 K/UL (ref 3.9–12.7)

## 2025-05-26 PROCEDURE — 86334 IMMUNOFIX E-PHORESIS SERUM: CPT

## 2025-05-26 PROCEDURE — 84165 PROTEIN E-PHORESIS SERUM: CPT

## 2025-05-26 PROCEDURE — 36415 COLL VENOUS BLD VENIPUNCTURE: CPT

## 2025-05-26 PROCEDURE — 83615 LACTATE (LD) (LDH) ENZYME: CPT

## 2025-05-26 PROCEDURE — 83521 IG LIGHT CHAINS FREE EACH: CPT

## 2025-05-26 PROCEDURE — 80053 COMPREHEN METABOLIC PANEL: CPT

## 2025-05-26 PROCEDURE — 85025 COMPLETE CBC W/AUTO DIFF WBC: CPT

## 2025-05-26 PROCEDURE — 84100 ASSAY OF PHOSPHORUS: CPT

## 2025-05-26 PROCEDURE — 82784 ASSAY IGA/IGD/IGG/IGM EACH: CPT | Mod: 59

## 2025-05-26 PROCEDURE — 83735 ASSAY OF MAGNESIUM: CPT

## 2025-05-27 ENCOUNTER — OFFICE VISIT (OUTPATIENT)
Dept: HEMATOLOGY/ONCOLOGY | Facility: CLINIC | Age: 65
End: 2025-05-27
Payer: COMMERCIAL

## 2025-05-27 ENCOUNTER — INFUSION (OUTPATIENT)
Dept: INFUSION THERAPY | Facility: HOSPITAL | Age: 65
End: 2025-05-27
Attending: INTERNAL MEDICINE
Payer: COMMERCIAL

## 2025-05-27 VITALS
HEART RATE: 62 BPM | RESPIRATION RATE: 18 BRPM | SYSTOLIC BLOOD PRESSURE: 106 MMHG | BODY MASS INDEX: 22.32 KG/M2 | TEMPERATURE: 97 F | OXYGEN SATURATION: 98 % | WEIGHT: 130.75 LBS | HEIGHT: 64 IN | DIASTOLIC BLOOD PRESSURE: 66 MMHG

## 2025-05-27 VITALS
SYSTOLIC BLOOD PRESSURE: 113 MMHG | TEMPERATURE: 98 F | HEIGHT: 64 IN | WEIGHT: 130.75 LBS | BODY MASS INDEX: 22.32 KG/M2 | DIASTOLIC BLOOD PRESSURE: 76 MMHG | HEART RATE: 68 BPM

## 2025-05-27 DIAGNOSIS — C90.00 MULTIPLE MYELOMA, REMISSION STATUS UNSPECIFIED: ICD-10-CM

## 2025-05-27 DIAGNOSIS — C90.00 MULTIPLE MYELOMA, REMISSION STATUS UNSPECIFIED: Primary | ICD-10-CM

## 2025-05-27 DIAGNOSIS — C90.00 MULTIPLE MYELOMA NOT HAVING ACHIEVED REMISSION: Primary | ICD-10-CM

## 2025-05-27 LAB
ALBUMIN, SPE (OHS): 3.77 G/DL (ref 3.35–5.55)
ALPHA 1 GLOB (OHS): 0.31 GM/DL (ref 0.17–0.41)
ALPHA 2 GLOB (OHS): 0.84 GM/DL (ref 0.43–0.99)
BETA GLOB (OHS): 0.7 GM/DL (ref 0.5–1.1)
GAMMA GLOBULIN (OHS): 0.68 GM/DL (ref 0.67–1.58)
KAPPA LC FREE SER-MCNC: 0.93 MG/L (ref 0.26–1.65)
KAPPA LC FREE/LAMBDA FREE SER: 1.26 MG/DL (ref 0.33–1.94)
LAMBDA LC FREE SERPL-MCNC: 1.35 MG/DL (ref 0.57–2.63)
PATHOLOGIST INTERPRETATION - IFE SERUM (OHS): NORMAL
PATHOLOGIST REVIEW - SPE (OHS): NORMAL
PROT SERPL-MCNC: 6.3 GM/DL (ref 6–8.4)

## 2025-05-27 PROCEDURE — 1160F RVW MEDS BY RX/DR IN RCRD: CPT | Mod: CPTII,S$GLB,,

## 2025-05-27 PROCEDURE — 3008F BODY MASS INDEX DOCD: CPT | Mod: CPTII,S$GLB,,

## 2025-05-27 PROCEDURE — 3078F DIAST BP <80 MM HG: CPT | Mod: CPTII,S$GLB,,

## 2025-05-27 PROCEDURE — 96374 THER/PROPH/DIAG INJ IV PUSH: CPT

## 2025-05-27 PROCEDURE — 99215 OFFICE O/P EST HI 40 MIN: CPT | Mod: S$GLB,,,

## 2025-05-27 PROCEDURE — 63600175 PHARM REV CODE 636 W HCPCS: Mod: JZ,TB

## 2025-05-27 PROCEDURE — 99999 PR PBB SHADOW E&M-EST. PATIENT-LVL V: CPT | Mod: PBBFAC,,,

## 2025-05-27 PROCEDURE — G2211 COMPLEX E/M VISIT ADD ON: HCPCS | Mod: S$GLB,,,

## 2025-05-27 PROCEDURE — 25000003 PHARM REV CODE 250

## 2025-05-27 PROCEDURE — 96401 CHEMO ANTI-NEOPL SQ/IM: CPT

## 2025-05-27 PROCEDURE — 4010F ACE/ARB THERAPY RXD/TAKEN: CPT | Mod: CPTII,S$GLB,,

## 2025-05-27 PROCEDURE — 3074F SYST BP LT 130 MM HG: CPT | Mod: CPTII,S$GLB,,

## 2025-05-27 PROCEDURE — 1159F MED LIST DOCD IN RCRD: CPT | Mod: CPTII,S$GLB,,

## 2025-05-27 RX ORDER — DIPHENHYDRAMINE HYDROCHLORIDE 50 MG/ML
50 INJECTION, SOLUTION INTRAMUSCULAR; INTRAVENOUS ONCE AS NEEDED
Status: CANCELLED | OUTPATIENT
Start: 2025-05-27

## 2025-05-27 RX ORDER — EPINEPHRINE 0.3 MG/.3ML
0.3 INJECTION SUBCUTANEOUS ONCE AS NEEDED
Status: DISCONTINUED | OUTPATIENT
Start: 2025-05-27 | End: 2025-05-27 | Stop reason: HOSPADM

## 2025-05-27 RX ORDER — HEPARIN 100 UNIT/ML
500 SYRINGE INTRAVENOUS
Status: CANCELLED | OUTPATIENT
Start: 2025-05-27

## 2025-05-27 RX ORDER — EPINEPHRINE 0.3 MG/.3ML
0.3 INJECTION SUBCUTANEOUS ONCE AS NEEDED
Status: CANCELLED | OUTPATIENT
Start: 2025-05-27

## 2025-05-27 RX ORDER — PROCHLORPERAZINE EDISYLATE 5 MG/ML
10 INJECTION INTRAMUSCULAR; INTRAVENOUS ONCE AS NEEDED
Status: CANCELLED | OUTPATIENT
Start: 2025-05-27

## 2025-05-27 RX ORDER — HEPARIN 100 UNIT/ML
500 SYRINGE INTRAVENOUS
Status: CANCELLED | OUTPATIENT
Start: 2025-07-08

## 2025-05-27 RX ORDER — SODIUM CHLORIDE 0.9 % (FLUSH) 0.9 %
10 SYRINGE (ML) INJECTION
Status: CANCELLED | OUTPATIENT
Start: 2025-07-08

## 2025-05-27 RX ORDER — DIPHENHYDRAMINE HYDROCHLORIDE 50 MG/ML
50 INJECTION, SOLUTION INTRAMUSCULAR; INTRAVENOUS ONCE AS NEEDED
Status: DISCONTINUED | OUTPATIENT
Start: 2025-05-27 | End: 2025-05-27 | Stop reason: HOSPADM

## 2025-05-27 RX ORDER — SODIUM CHLORIDE 0.9 % (FLUSH) 0.9 %
10 SYRINGE (ML) INJECTION
Status: CANCELLED | OUTPATIENT
Start: 2025-05-27

## 2025-05-27 RX ADMIN — DARATUMUMAB AND HYALURONIDASE-FIHJ (HUMAN RECOMBINANT) 1800 MG: 1800; 30000 INJECTION SUBCUTANEOUS at 11:05

## 2025-05-27 RX ADMIN — ZOLEDRONIC ACID 3.3 MG: 4 INJECTION, SOLUTION, CONCENTRATE INTRAVENOUS at 11:05

## 2025-05-27 NOTE — PROGRESS NOTES
Subjective:     Patient ID:?Ana Bonilla is a 64 y.o. female.?? MR#: [id number]   ?   PRIMARY ONCOLOGIST: -->  ?   CHIEF COMPLAINT: lab review/assessment for chemo/MM ?????   ?   ONCOLOGIC DIAGNOSIS: Multiple Myeloma  ?   CURRENT TREATMENT: OP Myeloma KIA-RD daratumumab lenalidomide dexAMETHasone Q4W     HPI  Ms. Bonilla is a 64-year-old  female with past medical history significant for hypertension, COPD, asthma, GERD, hypothyroidism here for follow-up and management of multiple myeloma. BMBx on 4/6/2023 showed 60 % plasma cells. PET-CT on 4/10/2023 showed extensive lytic bony lesions throughout the spine, sternum, bilateral ribs, pelvis and mild compression deformity in L1 vertebral body. SPEP/MECHE on 3/27/2023 showed IgG lambda monoclonal protein - 3.19 g/dl. Quantitative immunoglobulins on 3/27/2023 showed IgG 4,521 mg/dl. UPEP/MECHE and FLC were pending at that time. Labs on 3/27/2023 showed Beta 2 microglobulin 1.9, .  Labs on 4/19/2023 showed Hb, 11.5, WBC 6.3, platelets, BUN 11, Cr 0.9, calcium 9. Pt initiated on VRD 05/10/23. Treatment planned changed to D-VRD 07/06/23.     Interval History: Pt presents for lab review and assessment prior to Darzalex and zometa.  She states overall she is feeling well. Denies n/v/d/c, fever, chills, sob, cp, abnormal bleeding.   Oncology History   Multiple myeloma   4/20/2023 Initial Diagnosis    Multiple myeloma     5/10/2023 - 6/28/2023 Chemotherapy    Treatment Summary   Plan Name: OP VRD - WEEKLY BORTEZOMIB LENALIDOMIDE DEXAMETHASONE Q3W  Treatment Goal: Palliative  Status: Inactive  Start Date: 5/10/2023  End Date: 6/28/2023  Provider: Abram Velasquez MD  Chemotherapy: bortezomib (VELCADE) injection 2 mg, 1.3 mg/m2 = 2 mg, Subcutaneous, Clinic/[facility] 1 time, 2 of 6 cycles  Administration: 2 mg (5/10/2023), 2 mg (5/17/2023), 2 mg (6/14/2023), 2 mg (5/31/2023), 2 mg (6/21/2023), 2 mg (6/28/2023)  lenalidomide 25 mg Cap, 25 mg, Oral, Daily, 1 of 1  cycle, Start date: 5/10/2023, End date: 5/17/2023 6/28/2023 Cancer Staged    Staging form: Plasma Cell Myeloma and Plasma Cell Disorders, AJCC 8th Edition  - Clinical stage from 6/28/2023: RISS Stage II (Beta-2-microglobulin (mg/L): 1.9, Albumin (g/dL): 3, ISS: Stage II, High-risk cytogenetics: Absent, LDH: Normal)     7/6/2023 - 1/3/2024 Chemotherapy    Treatment Summary   Plan Name: OP D-VRD DARATUMUMAB + BORTEZOMIB LENALIDOMIDE DEXAMETHASONE  Treatment Goal: Palliative  Status: Inactive  Start Date: 7/6/2023  End Date: 1/3/2024  Provider: Oscar Márquez MD  Chemotherapy: bortezomib (VELCADE) injection 2 mg, 1.3 mg/m2 = 2 mg, Subcutaneous, Clinic/[facility] 1 time, 6 of 6 cycles  Administration: 2 mg (7/6/2023), 2 mg (7/12/2023), 2 mg (8/2/2023), 2 mg (8/9/2023), 2.25 mg (10/18/2023), 2 mg (7/19/2023), 2 mg (7/26/2023), 2 mg (8/16/2023), 2.25 mg (9/20/2023), 2.25 mg (9/27/2023), 2.25 mg (10/25/2023), 2.25 mg (11/1/2023), 2.25 mg (11/8/2023), 2.25 mg (12/6/2023), 2.25 mg (1/3/2024), 2.25 mg (11/15/2023), 2.25 mg (11/22/2023), 2.25 mg (11/29/2023), 2.25 mg (12/13/2023), 2.25 mg (12/20/2023), 2.25 mg (12/26/2023)  lenalidomide 10 mg Cap, 1 capsule (100 % of original dose 1 capsule), Oral, Daily, 1 of 1 cycle, Start date: 7/26/2023, End date: 8/2/2023  Dose modification: 1 capsule (original dose 1 capsule, Cycle 0)  daratumumab-hyaluronidase-fihj subcutaneous injection 1,800 mg, 1,800 mg (100 % of original dose 1,800 mg), Subcutaneous, Clinic/[facility] 1 time, 6 of 6 cycles  Dose modification: 1,800 mg (original dose 1,800 mg, Cycle 1), 1,800 mg (original dose 1,800 mg, Cycle 1)  Administration: 1,800 mg (7/6/2023), 1,800 mg (7/12/2023), 1,800 mg (7/19/2023), 1,800 mg (7/26/2023), 1,800 mg (8/2/2023), 1,800 mg (8/9/2023), 1,800 mg (8/16/2023), 1,800 mg (9/27/2023), 1,800 mg (10/18/2023), 1,800 mg (11/1/2023), 1,800 mg (11/15/2023), 1,800 mg (11/29/2023), 1,800 mg (12/13/2023), 1,800 mg (12/26/2023)     4/30/2024 -   Chemotherapy    Treatment Summary   Plan Name: OP Myeloma KIA-RD daratumumab lenalidomide dexAMETHasone Q4W  Treatment Goal: Palliative  Status: Active  Start Date: 4/30/2024  End Date: 2/3/2026 (Planned)  Provider: Bri Luevano MD  Chemotherapy: daratumumab-hyaluronidase-fij subcutaneous injection 1,800 mg, 1,800 mg, Subcutaneous, Clinic/\[facility]\"" 1 time, 15 of 18 cycles  Administration: 1,800 mg (4/30/2024), 1,800 mg (5/7/2024), 1,800 mg (5/21/2024), 1,800 mg (5/28/2024), 1,800 mg (6/4/2024), 1,800 mg (6/25/2024), 1,800 mg (7/9/2024), 1,800 mg (10/29/2024), 1,800 mg (5/14/2024), 1,800 mg (6/11/2024), 1,800 mg (6/18/2024), 1,800 mg (7/30/2024), 1,800 mg (8/13/2024), 1,800 mg (8/27/2024), 1,800 mg (9/10/2024), 1,800 mg (9/24/2024), 1,800 mg (10/15/2024), 1,800 mg (11/26/2024), 1,800 mg (1/7/2025), 1,800 mg (2/4/2025), 1,800 mg (3/4/2025), 1,800 mg (4/1/2025), 1,800 mg (4/29/2025), 1,800 mg (5/27/2025)        Social History     Socioeconomic History    Marital status:     Number of children: 2   Occupational History     Employer: [employer]   Tobacco Use    Smoking status: Every Day     Current packs/day: 0.50     Average packs/day: 0.5 packs/day for 50.0 years (25.0 ttl pk-yrs)     Types: Cigarettes     Passive exposure: Never    Smokeless tobacco: Never   Substance and Sexual Activity    Alcohol use: Not Currently     Comment: quit    Drug use: No    Sexual activity: Not Currently     Partners: Male     Social Drivers of Health     Financial Resource Strain: Patient Declined (11/21/2024)    Overall Financial Resource Strain (CARDIA)     Difficulty of Paying Living Expenses: Patient declined   Food Insecurity: Patient Declined (11/21/2024)    Hunger Vital Sign     Worried About Running Out of Food in the Last Year: Patient declined     Ran Out of Food in the Last Year: Patient declined   Transportation Needs: No Transportation Needs (11/15/2023)    PRAPARE - Transportation     Lack of Transportation (Medical): No      Lack of Transportation (Non-Medical): No   Physical Activity: Sufficiently Active (2024)    Exercise Vital Sign     Days of Exercise per Week: 3 days     Minutes of Exercise per Session: 60 min   Stress: Stress Concern Present (11/15/2023)    [employer] - Occupational Stress Questionnaire     Feeling of Stress : To some extent   Housing Stability: Low Risk  (11/15/2023)    Housing Stability Vital Sign     Unable to Pay for Housing in the Last Year: No     Number of Places Lived in the Last Year: 1     Unstable Housing in the Last Year: No      Family History   Problem Relation Name Age of Onset    Hypertension Mother Vivian     Diabetes Mother Vivian     Asthma Mother Vivian             Diabetes Father      Asthma Father      Stroke Maternal Grandmother  grandmother     Prostate cancer Maternal Grandfather      Mental illness Son Lukasz Garcia     Pancreatic cancer Maternal Uncle      Mental illness Other Pat side     Pancreatic cancer Other Mat side     Ovarian cancer Maternal Cousin        Past Surgical History:   Procedure Laterality Date    APPENDECTOMY      BIOPSY N/A 2023    Procedure: BIOPSY;  Surgeon: Fausto Smith MD;  Location: [facility] OR;  Service: Neurosurgery;  Laterality: N/A;  L1    BUNIONECTOMY      COLONOSCOPY N/A 2019    Procedure: COLONOSCOPY;  Surgeon: Danny Matos III, MD;  Location: [facility];  Service: Endoscopy;  Laterality: N/A;    COLONOSCOPY N/A 10/24/2022    Procedure: COLONOSCOPY;  Surgeon: Danny Matos III, MD;  Location: [facility];  Service: Endoscopy;  Laterality: N/A;    ESOPHAGOGASTRODUODENOSCOPY N/A 10/24/2022    Procedure: EGD (ESOPHAGOGASTRODUODENOSCOPY);  Surgeon: Danny Matos III, MD;  Location: [facility] ENDO;  Service: Endoscopy;  Laterality: N/A;    FIXATION KYPHOPLASTY Bilateral 2023    Procedure: KYPHOPLASTY;  Surgeon: Fausto Smith MD;  Location: [facility] OR;  Service:  Neurosurgery;  Laterality: Bilateral;  kyphoplasty and radiofrequency ablation - L1    HYSTERECTOMY      PARTIAL//still with ovaries    neck fusion  08/2017    THYROIDECTOMY      TUBAL LIGATION          Review of Systems   Constitutional:  Negative for activity change, appetite change, chills, fatigue, fever and unexpected weight change.   HENT:  Negative for congestion, dental problem, mouth sores, nosebleeds and rhinorrhea.    Eyes:  Negative for visual disturbance.   Respiratory:  Negative for cough, choking and chest tightness.    Cardiovascular:  Negative for chest pain, palpitations and leg swelling.   Gastrointestinal:  Negative for abdominal distention, abdominal pain, anal bleeding, blood in stool, constipation, diarrhea, nausea and vomiting.   Endocrine: Negative.    Genitourinary:  Negative for dysuria, frequency, hematuria and urgency.   Musculoskeletal:  Negative for arthralgias, back pain, gait problem, joint swelling and myalgias.   Skin:  Negative for rash and wound.   Allergic/Immunologic: Negative for immunocompromised state.   Neurological:  Negative for dizziness, light-headedness, numbness and headaches.   Hematological:  Negative for adenopathy. Does not bruise/bleed easily.   Psychiatric/Behavioral:  The patient is not nervous/anxious.        ?   A comprehensive 14-point review of systems was reviewed with patient and was negative other than as specified above.   ?     Objective:      Physical Exam  Vitals reviewed: virtual visit.   Neurological:      Mental Status: She is alert.           ?   Vitals:    05/27/25 0923   BP: 113/76   Pulse: 68   Temp: 97.9 °F (36.6 °C)        ?       ?   Laboratory:  ?   No visits with results within 1 Day(s) from this visit.   Latest known visit with results is:   Lab Visit on 05/26/2025   Component Date Value Ref Range Status    Sodium 05/26/2025 138  136 - 145 mmol/L Final    Potassium 05/26/2025 3.5  3.5 - 5.1 mmol/L Final    Chloride 05/26/2025 105  95 -  110 mmol/L Final    CO2 05/26/2025 24  23 - 29 mmol/L Final    Glucose 05/26/2025 125 (H)  70 - 110 mg/dL Final    BUN 05/26/2025 13  8 - 23 mg/dL Final    Creatinine 05/26/2025 1.2  0.5 - 1.4 mg/dL Final    Calcium 05/26/2025 8.6 (L)  8.7 - 10.5 mg/dL Final    Protein Total 05/26/2025 6.5  6.0 - 8.4 gm/dL Final    Albumin 05/26/2025 3.4 (L)  3.5 - 5.2 g/dL Final    Bilirubin Total 05/26/2025 1.0  0.1 - 1.0 mg/dL Final    ALP 05/26/2025 58  40 - 150 unit/L Final    AST 05/26/2025 48 (H)  11 - 45 unit/L Final    ALT 05/26/2025 76 (H)  10 - 44 unit/L Final    Anion Gap 05/26/2025 9  8 - 16 mmol/L Final    eGFR 05/26/2025 51 (L)  >60 mL/min/1.73/m2 Final    Protein Total 05/26/2025 6.3  6.0 - 8.4 gm/dL Final    Alpha 1 Glob 05/26/2025 0.31  0.17 - 0.41 gm/dl Final    Alpha 2 Glob 05/26/2025 0.84  0.43 - 0.99 gm/dl Final    Beta Glob 05/26/2025 0.70  0.50 - 1.10 gm/dl Final    Gamma Globulin 05/26/2025 0.68  0.67 - 1.58 gm/dl Final    Albumin, SPE 05/26/2025 3.77  3.35 - 5.55 g/dL Final    Pathologist Interpretation MECHE 05/26/2025 IgG lambda specific monoclonal band present.      Interpreting Pathologist: Monse Saldana MD       Final    Kappa Free Light Chain 05/26/2025 1.26  0.33 - 1.94 mg/dL Final    Kappa/Lambda FLC Ratio 05/26/2025 0.93  0.26 - 1.65 Final    Lambda Free Light Chain 05/26/2025 1.35  0.57 - 2.63 mg/dL Final    Lactate Dehydrogenase 05/26/2025 253  110 - 260 U/L Final    IgA Level 05/26/2025 75  40 - 350 mg/dL Final    IgG Level 05/26/2025 799  650 - 1,600 mg/dL Final    IgM Level 05/26/2025 22 (L)  50 - 300 mg/dL Final    Magnesium  05/26/2025 1.6  1.6 - 2.6 mg/dL Final    Phosphorus Level 05/26/2025 2.4 (L)  2.7 - 4.5 mg/dL Final    WBC 05/26/2025 4.59  3.90 - 12.70 K/uL Final    RBC 05/26/2025 3.60 (L)  4.00 - 5.40 M/uL Final    HGB 05/26/2025 11.5 (L)  12.0 - 16.0 gm/dL Final    HCT 05/26/2025 36.4 (L)  37.0 - 48.5 % Final    MCV 05/26/2025 101 (H)  82 - 98 fL Final    MCH 05/26/2025 31.9 (H)   27.0 - 31.0 pg Final    MCHC 05/26/2025 31.6 (L)  32.0 - 36.0 g/dL Final    RDW 05/26/2025 14.2  11.5 - 14.5 % Final    Platelet Count 05/26/2025 292  150 - 450 K/uL Final    MPV 05/26/2025 10.0  9.2 - 12.9 fL Final    Nucleated RBC 05/26/2025 0  <=0 /100 WBC Final    Neut % 05/26/2025 38.5  38 - 73 % Final    Lymph % 05/26/2025 46.4  18 - 48 % Final    Mono % 05/26/2025 11.8  4 - 15 % Final    Eos % 05/26/2025 2.0  <=8 % Final    Basophil % 05/26/2025 1.1  <=1.9 % Final    Imm Grans % 05/26/2025 0.2  0.0 - 0.5 % Final    Neut # 05/26/2025 1.77 (L)  1.8 - 7.7 K/uL Final    Lymph # 05/26/2025 2.13  1 - 4.8 K/uL Final    Mono # 05/26/2025 0.54  0.3 - 1 K/uL Final    Eos # 05/26/2025 0.09  <=0.5 K/uL Final    Baso # 05/26/2025 0.05  <=0.2 K/uL Final    Imm Grans # 05/26/2025 0.01  0.00 - 0.04 K/uL Final    Pathologist Interpretation SPE 05/26/2025 Normal total protein. Normal gamma globulins are decreased.  Paraprotein peak in gamma = 0.29 g/dL, unchanged from previous.          Interpreting Pathologist: Monse Saldana MD       Final      ?   Imaging:    No results found. However, due to the size of the patient record, not all encounters were searched. Please check Results Review for a complete set of results.       No results found. However, due to the size of the patient record, not all encounters were searched. Please check Results Review for a complete set of results.           ?   Assessment/Plan:     Problem List Items Addressed This Visit       Multiple myeloma - Primary (Chronic)    BMBx : 60 % plasma cells - 4/6/2023  - SPEP/MECHE showed IgG lambda - monoclonal protein 3.19 g/dl - (3/27/2023).  - R-ISS stage I (beta 2 microglobulin 1.9, albumin 3.5, , normal karyotype and no high-risk mutations on fish panel  - PET-CT ( 4/10/2023) - showed extensive lytic lesions in the axial skeleton and mild L1 compression deformity.  - Started with Induction chemotherapy with VRD regimen  (Velcde/Revlimid/Decadron) - 05/10/2023   - Started Aspirin daily p.o for thrombosis prophylaxis; Acyclovir 400 mg p.o BID for herpes Zoster prophylaxis .  __________________________________________________  --S/p Kyphoplasty 06/08/23    Pt seen by transplant team BMT.  However, she has declined transplant at this time   Repeat PET-CT on 1/18/24 with significantly decreased FDG uptake associated with the previously identified lesions and no new FDG avid lesions   BM Biopsy 02/13/2024 shows significant improvement, with the marrow now showing only 5% plasma cells ( was 60% at baseline)    Labs reviewed, no concerning findings   Continue revlimid, dex utd    Ok to proceed darzalex/zometa today     RTC in 4 weeks with repeat labs for Darzalex/zometa             Relevant Orders    CBC Auto Differential    Comprehensive Metabolic Panel    Magnesium    Phosphorus               Med Onc Chart Routing      Follow up with physician 4 weeks. with labs prior for darzalex/zometa   Follow up with WERNER 4 weeks. with labs prior for darzalex/zometa   Infusion scheduling note   darzalex/zometa   Injection scheduling note    Labs CBC, CMP, magnesium and phosphorus   Scheduling:  Preferred lab:  Lab interval:     Imaging    Pharmacy appointment    Other referrals                   [facility]  Hematology/Oncology

## 2025-05-27 NOTE — PLAN OF CARE
Problem: Adult Inpatient Plan of Care  Goal: Plan of Care Review  Outcome: Progressing  Flowsheets (Taken 5/27/2025 1129)  Plan of Care Reviewed With:   patient   spouse  Goal: Patient-Specific Goal (Individualized)  Outcome: Progressing  Flowsheets (Taken 5/27/2025 1129)  Individualized Care Needs: Refreshment provided.  Anxieties, Fears or Concerns: pt denies  Patient/Family-Specific Goals (Include Timeframe): pt to tolerate injection and infusion well today with no adverse reactions  Goal: Optimal Comfort and Wellbeing  Outcome: Progressing  Intervention: Provide Person-Centered Care  Flowsheets (Taken 5/27/2025 1129)  Trust Relationship/Rapport:   care explained   choices provided   emotional support provided   empathic listening provided   questions answered   questions encouraged   reassurance provided   thoughts/feelings acknowledged

## 2025-05-28 DIAGNOSIS — C90.00 MULTIPLE MYELOMA, REMISSION STATUS UNSPECIFIED: ICD-10-CM

## 2025-05-28 RX ORDER — DEXAMETHASONE 4 MG/1
40 TABLET ORAL
Qty: 40 TABLET | Refills: 11 | Status: SHIPPED | OUTPATIENT
Start: 2025-05-28

## 2025-05-28 NOTE — ASSESSMENT & PLAN NOTE
BMBx : 60 % plasma cells - 4/6/2023  - SPEP/MECHE showed IgG lambda - monoclonal protein 3.19 g/dl - (3/27/2023).  - R-ISS stage I (beta 2 microglobulin 1.9, albumin 3.5, , normal karyotype and no high-risk mutations on fish panel  - PET-CT ( 4/10/2023) - showed extensive lytic lesions in the axial skeleton and mild L1 compression deformity.  - Started with Induction chemotherapy with VRD regimen (Velcde/Revlimid/Decadron) - 05/10/2023   - Started Aspirin daily p.o for thrombosis prophylaxis; Acyclovir 400 mg p.o BID for herpes Zoster prophylaxis .  __________________________________________________  --S/p Kyphoplasty 06/08/23    Pt seen by transplant team BMT.  However, she has declined transplant at this time   Repeat PET-CT on 1/18/24 with significantly decreased FDG uptake associated with the previously identified lesions and no new FDG avid lesions   BM Biopsy 02/13/2024 shows significant improvement, with the marrow now showing only 5% plasma cells ( was 60% at baseline)    Labs reviewed, no concerning findings   Continue revlimid, dex utd    Ok to proceed darzalex/zometa today     RTC in 4 weeks with repeat labs for Darzalex/zometa

## 2025-06-04 ENCOUNTER — TELEPHONE (OUTPATIENT)
Dept: GASTROENTEROLOGY | Facility: CLINIC | Age: 65
End: 2025-06-04
Payer: COMMERCIAL

## 2025-06-04 ENCOUNTER — OFFICE VISIT (OUTPATIENT)
Dept: ALLERGY | Facility: CLINIC | Age: 65
End: 2025-06-04
Payer: COMMERCIAL

## 2025-06-04 VITALS
DIASTOLIC BLOOD PRESSURE: 66 MMHG | BODY MASS INDEX: 21.68 KG/M2 | WEIGHT: 127 LBS | OXYGEN SATURATION: 99 % | HEART RATE: 89 BPM | HEIGHT: 64 IN | SYSTOLIC BLOOD PRESSURE: 103 MMHG

## 2025-06-04 DIAGNOSIS — F17.200 TOBACCO USE DISORDER: ICD-10-CM

## 2025-06-04 DIAGNOSIS — J45.50 SEVERE PERSISTENT ASTHMA WITHOUT COMPLICATION: Primary | ICD-10-CM

## 2025-06-04 DIAGNOSIS — J44.9 CHRONIC OBSTRUCTIVE PULMONARY DISEASE, UNSPECIFIED COPD TYPE: ICD-10-CM

## 2025-06-04 LAB
FEF 25 75 LLN: 1.38
FEF 25 75 PRE REF: 19.5 %
FEF 25 75 REF: 2.78
FEV05 LLN: 0.93
FEV05 REF: 1.79
FEV1 FVC LLN: 67
FEV1 FVC PRE REF: 78.3 %
FEV1 FVC REF: 79
FEV1 LLN: 1.45
FEV1 PRE REF: 48.2 %
FEV1 REF: 2.03
FEV1FVCZSCORE: -2.23
FEV1ZSCORE: -2.91
FVC LLN: 1.87
FVC PRE REF: 61.3 %
FVC REF: 2.58
FVCZSCORE: -2.34
PEF LLN: 3.32
PEF PRE REF: 29.3 %
PEF REF: 5.32
PRE FEF 25 75: 0.54 L/S (ref 1.38–4.17)
PRE FET 100: 6.27 SEC
PRE FEV05 REF: 38.3 %
PRE FEV1 FVC: 62.02 % (ref 66.96–89.74)
PRE FEV1: 0.98 L (ref 1.45–2.59)
PRE FEV5: 0.68 L (ref 0.93–2.64)
PRE FVC: 1.58 L (ref 1.87–3.33)
PRE PEF: 1.56 L/S (ref 3.32–7.33)

## 2025-06-04 PROCEDURE — 94010 BREATHING CAPACITY TEST: CPT | Mod: S$GLB,,, | Performed by: STUDENT IN AN ORGANIZED HEALTH CARE EDUCATION/TRAINING PROGRAM

## 2025-06-04 PROCEDURE — 3074F SYST BP LT 130 MM HG: CPT | Mod: CPTII,S$GLB,, | Performed by: STUDENT IN AN ORGANIZED HEALTH CARE EDUCATION/TRAINING PROGRAM

## 2025-06-04 PROCEDURE — 1159F MED LIST DOCD IN RCRD: CPT | Mod: CPTII,S$GLB,, | Performed by: STUDENT IN AN ORGANIZED HEALTH CARE EDUCATION/TRAINING PROGRAM

## 2025-06-04 PROCEDURE — 4010F ACE/ARB THERAPY RXD/TAKEN: CPT | Mod: CPTII,S$GLB,, | Performed by: STUDENT IN AN ORGANIZED HEALTH CARE EDUCATION/TRAINING PROGRAM

## 2025-06-04 PROCEDURE — 1160F RVW MEDS BY RX/DR IN RCRD: CPT | Mod: CPTII,S$GLB,, | Performed by: STUDENT IN AN ORGANIZED HEALTH CARE EDUCATION/TRAINING PROGRAM

## 2025-06-04 PROCEDURE — 3078F DIAST BP <80 MM HG: CPT | Mod: CPTII,S$GLB,, | Performed by: STUDENT IN AN ORGANIZED HEALTH CARE EDUCATION/TRAINING PROGRAM

## 2025-06-04 PROCEDURE — 99999 PR PBB SHADOW E&M-EST. PATIENT-LVL V: CPT | Mod: PBBFAC,,, | Performed by: STUDENT IN AN ORGANIZED HEALTH CARE EDUCATION/TRAINING PROGRAM

## 2025-06-04 PROCEDURE — 99215 OFFICE O/P EST HI 40 MIN: CPT | Mod: 25,S$GLB,, | Performed by: STUDENT IN AN ORGANIZED HEALTH CARE EDUCATION/TRAINING PROGRAM

## 2025-06-04 PROCEDURE — 3008F BODY MASS INDEX DOCD: CPT | Mod: CPTII,S$GLB,, | Performed by: STUDENT IN AN ORGANIZED HEALTH CARE EDUCATION/TRAINING PROGRAM

## 2025-06-04 RX ORDER — FLUTICASONE FUROATE, UMECLIDINIUM BROMIDE AND VILANTEROL TRIFENATATE 200; 62.5; 25 UG/1; UG/1; UG/1
1 POWDER RESPIRATORY (INHALATION) DAILY
Qty: 60 EACH | Refills: 11 | Status: SHIPPED | OUTPATIENT
Start: 2025-06-04 | End: 2026-06-04

## 2025-06-04 RX ORDER — ALBUTEROL SULFATE 90 UG/1
2 INHALANT RESPIRATORY (INHALATION) EVERY 4 HOURS PRN
Qty: 18 G | Refills: 11 | Status: SHIPPED | OUTPATIENT
Start: 2025-06-04 | End: 2026-06-04

## 2025-06-04 NOTE — PROGRESS NOTES
Allergy and Immunology  Procedure Note     Spirometry  Date FEV1 (L)  (% predicted) FVC (L)  (% predicted) FEV1/ FVC LDC69-99%  (% predicted)   06/04/2025 48% 61% 62% 20%                 Interpretation: Ratio is obstructive. FEV1 is consistent with severe obstruction.  Patient is on Wixela b.i.d. at this time.     Kranthi Varma MD   [facility]  Allergy and Clinical Immunology

## 2025-06-04 NOTE — PROGRESS NOTES
Allergy and Immunology  Established Patient Clinic Note    Date: 6/4/2025  Chief Complaint   Patient presents with    Follow-up     History  Ana Bonilla is a 64 y.o. female being seen for follow-up today.    Severe persistent asthma  Asthma COPD overlap syndrome  Tobacco use disorder  Not fully controlled at this time   Increasing from Wixela 2 Trelegy at this time  Reviewed the results of the spirometry with the patient   Prior treatment for acute sinusitis with resolution at this time  Educated on tobacco sensation    Allergies, PMH, PSH, Social, and Family History were reviewed.    Medications Ordered Prior to Encounter[1]    Physical Examination  Vitals:    06/04/25 1500   BP: 103/66   Pulse: 89     GENERAL:  female in no apparent distress and well developed and well nourished  HEAD:  Normocephalic, without obvious abnormality, atraumatic  EYES: sclera anicteric, conjunctiva normochromic  EARS: normal TM's and external ear canals both ears  NOSE: swollen, clear and copious discharge, turbinates swollen    OROPHARYNX: moist mucous membranes without erythema, exudates or petechiae, clear post-nasal drainage present  LYMPH NODES: normal, supple, no lymphadenopathy  LUNGS: clear to auscultation, no wheezes, rales or rhonchi, symmetric air entry.  HEART: normal rate, regular rhythm, normal S1, S2, no murmurs, rubs, clicks or gallops.  ABDOMEN: soft, nontender, nondistended, no masses or organomegaly.  MUSCULOSKELETAL: no gross joint deformity or swelling.  NEURO: alert, oriented, normal speech, no focal findings or movement disorder noted.  SKIN: normal coloration and turgor, no rashes, no suspicious skin lesions noted.     Assessment/Plan:   Problem List Items Addressed This Visit       Severe persistent asthma without complication - Primary    Relevant Medications    fluticasone-umeclidin-vilanter (TRELEGY ELLIPTA) 200-62.5-25 mcg inhaler    albuterol (PROVENTIL HFA) 90 mcg/actuation inhaler    Other Relevant  Orders    Spirometry without Bronchodilator (Completed)    Chronic obstructive pulmonary disease    Relevant Medications    fluticasone-umeclidin-vilanter (TRELEGY ELLIPTA) 200-62.5-25 mcg inhaler    albuterol (PROVENTIL HFA) 90 mcg/actuation inhaler    Other Relevant Orders    Spirometry without Bronchodilator (Completed)     Other Visit Diagnoses         Tobacco use disorder              Severe persistent asthma  Asthma COPD overlap syndrome  Tobacco use disorder  Not fully controlled at this time   Increasing from Wixela 2 Trelegy at this time  Reviewed the results of the spirometry with the patient   Prior treatment for acute sinusitis with resolution at this time  Educated on tobacco sensation    Follow up:  Follow up in about 3 months (around 9/4/2025).    40+ minutes spent on direct and indirect health care of this patient.    Additional timing spent on the education of newly diagnosed COPD as well as discussion regarding tobacco cessation.    DISCLAIMER: This note was prepared with [employer] voice recognition transcription software. Garbled syntax, mangled pronouns, and other bizarre constructions may be attributed to that software system. While efforts were made to correct any mistakes made by this voice recognition program, some errors and/or omissions may remain in the note that were missed when the note was originally created.     Krnathi Varma MD   [facility]  Allergy and Immunology       [1]   Current Outpatient Medications on File Prior to Visit   Medication Sig Dispense Refill    acyclovir (ZOVIRAX) 400 MG tablet Take 1 tablet (400 mg total) by mouth 2 (two) times daily. 60 tablet 11    amlodipine-benazepril 2.5-10 mg (LOTREL) 2.5-10 mg per capsule TAKE 1 CAPSULE BY MOUTH EVERY DAY 90 capsule 3    aspirin 81 MG Chew Take 1 tablet (81 mg total) by mouth once daily. 30 tablet 11    azelastine (ASTELIN) 137 mcg (0.1 %) nasal spray 1 spray (137 mcg total) by Nasal route 2 (two) times daily. 30  mL 3    calcium-vitamin D 600 mg-10 mcg (400 unit) Tab Take 1 tablet by mouth every 12 (twelve) hours. 60 tablet 2    dexAMETHasone (DECADRON) 4 MG Tab Take 10 tablets (40 mg total) by mouth every 7 days. Take with food. 40 tablet 11    fluticasone propionate (FLONASE) 50 mcg/actuation nasal spray 2 sprays (100 mcg total) by Each Nostril route once daily. 16 g 11    hydrOXYzine HCL (ATARAX) 25 MG tablet Take 1 tablet (25 mg total) by mouth 3 (three) times daily as needed for Itching. 21 tablet 0    ipratropium (ATROVENT) 42 mcg (0.06 %) nasal spray 2 sprays by Each Nostril route 4 (four) times daily as needed for Rhinitis. 30 mL 11    lenalidomide 10 mg Cap TAKE 1 CAPSULE ONCE DAILY FOR 21 DAYS, AND THEN 7 DAYS OFF 21 capsule 0    levothyroxine (SYNTHROID) 100 MCG tablet TAKE 1 TABLET(100 MCG) BY MOUTH BEFORE BREAKFAST 90 tablet 1    linaCLOtide (LINZESS) 72 mcg Cap capsule Take 2 capsules (144 mcg total) by mouth before breakfast. 30 capsule 1    loratadine (CLARITIN) 10 mg tablet Take 1 tablet (10 mg total) by mouth once daily. 30 tablet 11    metoprolol succinate (TOPROL-XL) 50 MG 24 hr tablet Take 1 tablet (50 mg total) by mouth every evening. 90 tablet 3    montelukast (SINGULAIR) 10 mg tablet Take 1 tablet (10 mg total) by mouth every evening. 90 tablet 3    naloxone (NARCAN) 4 mg/actuation Spry       ondansetron (ZOFRAN) 4 MG tablet Take 1 tablet (4 mg total) by mouth every 8 (eight) hours as needed for Nausea. 30 tablet 0    potassium chloride (KLOR-CON) 10 MEQ TbSR Take 1 tablet (10 mEq total) by mouth once daily. 30 tablet 0    potassium phosphate, monobasic, (K-PHOS) 500 mg TbSO Take 1 tablet (500 mg total) by mouth once daily. 30 tablet 11    potassium, sodium phosphates (PHOS-NAK) 280-160-250 mg PwPk Take 1 packet by mouth 4 (four) times daily with meals and nightly. 4 packet 0    rosuvastatin (CRESTOR) 10 MG tablet Take 1 tablet (10 mg total) by mouth every evening. 90 tablet 3    k phos di & mono-sod  phos mono (PHOSPHOROUS) 250 mg Tab Take 1 tablet by mouth 4 (four) times daily with meals and nightly. for 5 days 20 each 0    zolpidem (AMBIEN) 5 MG Tab Take 1 tablet (5 mg total) by mouth nightly as needed (insomnia). 20 tablet 0     Current Facility-Administered Medications on File Prior to Visit   Medication Dose Route Frequency Provider Last Rate Last Admin    dexAMETHasone injection 40 mg  40 mg Intravenous 1 time in Clinic/HOD Bri Luevano MD

## 2025-06-04 NOTE — PROGRESS NOTES
Allergy and Immunology  Procedure Note     Spirometry  Date FEV1 (L)  (% predicted) FVC (L)  (% predicted) FEV1/ FVC BPM43-06%  (% predicted)                        Interpretation:     MD Keara Weaverslarisa [place]  Allergy and Clinical Immunology

## 2025-06-05 ENCOUNTER — OFFICE VISIT (OUTPATIENT)
Dept: PALLIATIVE MEDICINE | Facility: CLINIC | Age: 65
End: 2025-06-05
Payer: COMMERCIAL

## 2025-06-05 VITALS
HEIGHT: 64 IN | WEIGHT: 129.19 LBS | HEART RATE: 71 BPM | TEMPERATURE: 98 F | OXYGEN SATURATION: 99 % | BODY MASS INDEX: 22.06 KG/M2 | DIASTOLIC BLOOD PRESSURE: 71 MMHG | SYSTOLIC BLOOD PRESSURE: 105 MMHG

## 2025-06-05 DIAGNOSIS — C90.00 MULTIPLE MYELOMA, REMISSION STATUS UNSPECIFIED: ICD-10-CM

## 2025-06-05 DIAGNOSIS — G89.3 CANCER RELATED PAIN: ICD-10-CM

## 2025-06-05 DIAGNOSIS — Z51.5 PALLIATIVE CARE ENCOUNTER: ICD-10-CM

## 2025-06-05 DIAGNOSIS — E83.42 HYPOMAGNESEMIA: ICD-10-CM

## 2025-06-05 DIAGNOSIS — R25.2 HAND OR FOOT SPASMS: ICD-10-CM

## 2025-06-05 DIAGNOSIS — R25.2 SPASM: Primary | ICD-10-CM

## 2025-06-05 DIAGNOSIS — F41.9 ANXIETY: ICD-10-CM

## 2025-06-05 DIAGNOSIS — C79.51 SECONDARY MALIGNANT NEOPLASM OF BONE: ICD-10-CM

## 2025-06-05 PROCEDURE — 1160F RVW MEDS BY RX/DR IN RCRD: CPT | Mod: CPTII,S$GLB,, | Performed by: NURSE PRACTITIONER

## 2025-06-05 PROCEDURE — 99215 OFFICE O/P EST HI 40 MIN: CPT | Mod: S$GLB,,, | Performed by: NURSE PRACTITIONER

## 2025-06-05 PROCEDURE — 4010F ACE/ARB THERAPY RXD/TAKEN: CPT | Mod: CPTII,S$GLB,, | Performed by: NURSE PRACTITIONER

## 2025-06-05 PROCEDURE — 3008F BODY MASS INDEX DOCD: CPT | Mod: CPTII,S$GLB,, | Performed by: NURSE PRACTITIONER

## 2025-06-05 PROCEDURE — 99999 PR PBB SHADOW E&M-EST. PATIENT-LVL V: CPT | Mod: PBBFAC,,, | Performed by: NURSE PRACTITIONER

## 2025-06-05 PROCEDURE — 3074F SYST BP LT 130 MM HG: CPT | Mod: CPTII,S$GLB,, | Performed by: NURSE PRACTITIONER

## 2025-06-05 PROCEDURE — 3078F DIAST BP <80 MM HG: CPT | Mod: CPTII,S$GLB,, | Performed by: NURSE PRACTITIONER

## 2025-06-05 PROCEDURE — 1159F MED LIST DOCD IN RCRD: CPT | Mod: CPTII,S$GLB,, | Performed by: NURSE PRACTITIONER

## 2025-06-05 RX ORDER — ALPRAZOLAM 2 MG/1
2 TABLET ORAL 2 TIMES DAILY PRN
Qty: 60 TABLET | Refills: 2 | Status: SHIPPED | OUTPATIENT
Start: 2025-06-05 | End: 2025-09-03

## 2025-06-05 RX ORDER — OXYCODONE HYDROCHLORIDE 5 MG/1
5 TABLET ORAL EVERY 6 HOURS PRN
Qty: 60 TABLET | Refills: 0 | Status: SHIPPED | OUTPATIENT
Start: 2025-06-05 | End: 2025-07-05

## 2025-06-06 DIAGNOSIS — I10 ESSENTIAL HYPERTENSION: Primary | ICD-10-CM

## 2025-06-06 DIAGNOSIS — R00.0 TACHYCARDIA: ICD-10-CM

## 2025-06-06 PROBLEM — J44.9 CHRONIC OBSTRUCTIVE PULMONARY DISEASE: Status: ACTIVE | Noted: 2025-06-06

## 2025-06-06 PROBLEM — J45.50 SEVERE PERSISTENT ASTHMA WITHOUT COMPLICATION: Status: ACTIVE | Noted: 2025-06-06

## 2025-06-09 ENCOUNTER — OFFICE VISIT (OUTPATIENT)
Dept: CARDIOLOGY | Facility: CLINIC | Age: 65
End: 2025-06-09
Payer: COMMERCIAL

## 2025-06-09 ENCOUNTER — HOSPITAL ENCOUNTER (OUTPATIENT)
Dept: CARDIOLOGY | Facility: HOSPITAL | Age: 65
Discharge: HOME | End: 2025-06-09
Attending: INTERNAL MEDICINE
Payer: COMMERCIAL

## 2025-06-09 VITALS
OXYGEN SATURATION: 100 % | BODY MASS INDEX: 21.85 KG/M2 | HEART RATE: 74 BPM | WEIGHT: 128 LBS | SYSTOLIC BLOOD PRESSURE: 118 MMHG | DIASTOLIC BLOOD PRESSURE: 70 MMHG | HEIGHT: 64 IN

## 2025-06-09 DIAGNOSIS — I10 ESSENTIAL HYPERTENSION: ICD-10-CM

## 2025-06-09 DIAGNOSIS — I35.1 NONRHEUMATIC AORTIC VALVE INSUFFICIENCY: ICD-10-CM

## 2025-06-09 DIAGNOSIS — C90.00 MULTIPLE MYELOMA, REMISSION STATUS UNSPECIFIED: Chronic | ICD-10-CM

## 2025-06-09 DIAGNOSIS — R00.0 TACHYCARDIA: ICD-10-CM

## 2025-06-09 DIAGNOSIS — I73.9 PVD (PERIPHERAL VASCULAR DISEASE): Primary | ICD-10-CM

## 2025-06-09 DIAGNOSIS — M79.604 PAIN IN BOTH LOWER EXTREMITIES: ICD-10-CM

## 2025-06-09 DIAGNOSIS — E78.5 DYSLIPIDEMIA: ICD-10-CM

## 2025-06-09 DIAGNOSIS — C90.00 MULTIPLE MYELOMA, REMISSION STATUS UNSPECIFIED: ICD-10-CM

## 2025-06-09 DIAGNOSIS — M79.605 PAIN IN BOTH LOWER EXTREMITIES: ICD-10-CM

## 2025-06-09 DIAGNOSIS — R25.2 MUSCLE CRAMP: ICD-10-CM

## 2025-06-09 DIAGNOSIS — Z72.0 TOBACCO USE: ICD-10-CM

## 2025-06-09 LAB
OHS QRS DURATION: 80 MS
OHS QTC CALCULATION: 431 MS

## 2025-06-09 PROCEDURE — 3008F BODY MASS INDEX DOCD: CPT | Mod: CPTII,S$GLB,, | Performed by: INTERNAL MEDICINE

## 2025-06-09 PROCEDURE — 3074F SYST BP LT 130 MM HG: CPT | Mod: CPTII,S$GLB,, | Performed by: INTERNAL MEDICINE

## 2025-06-09 PROCEDURE — 99214 OFFICE O/P EST MOD 30 MIN: CPT | Mod: S$GLB,,, | Performed by: INTERNAL MEDICINE

## 2025-06-09 PROCEDURE — 1160F RVW MEDS BY RX/DR IN RCRD: CPT | Mod: CPTII,S$GLB,, | Performed by: INTERNAL MEDICINE

## 2025-06-09 PROCEDURE — 99999 PR PBB SHADOW E&M-EST. PATIENT-LVL III: CPT | Mod: PBBFAC,,, | Performed by: INTERNAL MEDICINE

## 2025-06-09 PROCEDURE — 4010F ACE/ARB THERAPY RXD/TAKEN: CPT | Mod: CPTII,S$GLB,, | Performed by: INTERNAL MEDICINE

## 2025-06-09 PROCEDURE — 3078F DIAST BP <80 MM HG: CPT | Mod: CPTII,S$GLB,, | Performed by: INTERNAL MEDICINE

## 2025-06-09 PROCEDURE — 93010 ELECTROCARDIOGRAM REPORT: CPT | Mod: ,,, | Performed by: INTERNAL MEDICINE

## 2025-06-09 PROCEDURE — 93005 ELECTROCARDIOGRAM TRACING: CPT

## 2025-06-09 PROCEDURE — 1159F MED LIST DOCD IN RCRD: CPT | Mod: CPTII,S$GLB,, | Performed by: INTERNAL MEDICINE

## 2025-06-09 RX ORDER — ACYCLOVIR 400 MG/1
400 TABLET ORAL 2 TIMES DAILY
Qty: 60 TABLET | Refills: 11 | Status: SHIPPED | OUTPATIENT
Start: 2025-06-09

## 2025-06-09 NOTE — PROGRESS NOTES
Subjective:   Patient ID:  Aan Bonilla is a 64 y.o. female who presents for follow up of No chief complaint on file.      65 yo female came in for 6 M  PMH HTN, HLD, mod AI, PAD, multiple myeloma COPD, hiatal hernia, hypothyroidism, s/p neck spinal fusion. Smokes 1 ppd for 40 yrs. Occasional drinking.    visit. Some chest hurt, on left, once a month, lasted for minutes, triggered by stress. No radiation.  Chronic BRAGG. No dizziness, palpitation and syncope.  Left calf pain at rest, worse with walking. Had leg cramp at night two weeks ago.  Father  of DM at 30'. Mother HTN. Brother  of MVA.  EKG on  NSR  ECHO in  normal EF and moderate AI. LE arterial US no lesion  Now some vaping     MPI no ischemia and EF normal   states that some stress. Some chronic BRAGG. Occasional CP triggered by stress. May relate to GERD.  Occasional calf and ankle pain, occurred at rest. EKG NSR LAE    2021 visit  C/o RLE leg. Used to have the pain starting from the lower back. Now the pain on the thigh and calf. Ocurred at rest and with exertion.   F/u with podiatric for right foot pain and will have the therapy. Orthopedic gave her Toradol.   Still smoking.     visit  H/o COVID in    Lost her mother in  and her son is in behave hospital for drug issue. A lot of stress   echo normal EF and mod AI.   LE arterial US mild disease  BRAGG chronic. Still has bad cough after COVID 19 infection  EKG NSR and nonspecific T wave     visit  Intermittent chest pain for 2 weeks, worse with cough and stretching the arms. Feels sore, and worse pain when laying down. Muscle relaxant caused the Crazy feeling and ibupofen     visit  Chronic lower sternal CP for days and weeks. Worse with coughing stretching. EGD in     09/23 visit  Dx of MM in  and on chemo Rx.  VRd (Velcade/Revlimid/dexamethasone. Had elevated Cr. And the dosage adjusted. Off 3 weeks due to eye  infection  On smoking cessation.   No leg swelling orthopnea. Question PND due to COPD.     03/24 visit  09/23 echo nml biv function and moderate AI  Had headache when BP was at 140 mmhg and improved after added amlodipine and BP at 120 mmHG  Congestion after covid infection  Calf pain. LE venous US negative in 01/24  BP LDL and A1C controlled    10.24 visit  States that chemo Rx of MM caused the low BP.  LDL 93. No chest pain dyspnea dizziness  EKG reviewed by myself today reveals NSR   Occasional chest pain     Interval history  On chemo for MM  Finger cramp. Off amlodipine and benazepril for a week and now BP at 100 to 120 mmHG,  09/23 echo EF nml. Mod AI        History of Present Illness    CHIEF COMPLAINT:  - Ana presents for follow-up of multiple myeloma treatment and to discuss recent symptoms, including muscle cramps and low BP.    HPI:  - Currently undergoing chemotherapy for multiple myeloma, expected to continue for several years  - Reports muscle cramps, particularly in hands and fingers, potentially related to chemotherapy  - Mentions calf cramping at night  - Has been having low BP, leading to near-syncope  - Reports not taking BP medication (Amlodipine and Benazepril) for 1 week  - On one occasion, BP was 138/80  - Reports headaches, especially at work  - Has decreased appetite, affecting normal eating habits  - Denies finger cramps and dizziness or near-syncope despite low BP readings    CARDIAC HISTORY:  - Echo: 2023  - Leg US: showed some non-significant stenosis  - EKG 2025-06-09: NSR with non-specific findings    MEDICATIONS:  - Metoprolol 50 mg daily for HTN  - Discontinued Amlodipine and Benazepril 1 week ago  - Discontinued Rosuvastatin 3-4 days ago  - Multiple myeloma chemotherapy, ongoing for several years    MEDICAL HISTORY:  - Multiple myeloma: Undergoing chemotherapy  - HTN: Previously treated with medication          Past Medical History:   Diagnosis Date    Allergy     Amblyopia OS     Anxiety     Asthma     COPD (chronic obstructive pulmonary disease)     NO HOME o2    GERD (gastroesophageal reflux disease)     Hyperlipidemia     Hypertension     PONV (postoperative nausea and vomiting)     Thyroid disease        Past Surgical History:   Procedure Laterality Date    APPENDECTOMY      BIOPSY N/A 2023    Procedure: BIOPSY;  Surgeon: Fausto Smith MD;  Location: [facility] OR;  Service: Neurosurgery;  Laterality: N/A;  L1    BUNIONECTOMY      COLONOSCOPY N/A 2019    Procedure: COLONOSCOPY;  Surgeon: Danny Matos III, MD;  Location: [facility] ENDO;  Service: Endoscopy;  Laterality: N/A;    COLONOSCOPY N/A 10/24/2022    Procedure: COLONOSCOPY;  Surgeon: Danny Matos III, MD;  Location: [facility] ENDO;  Service: Endoscopy;  Laterality: N/A;    ESOPHAGOGASTRODUODENOSCOPY N/A 10/24/2022    Procedure: EGD (ESOPHAGOGASTRODUODENOSCOPY);  Surgeon: Danny Matos III, MD;  Location: [facility] ENDO;  Service: Endoscopy;  Laterality: N/A;    FIXATION KYPHOPLASTY Bilateral 2023    Procedure: KYPHOPLASTY;  Surgeon: Fausto Smith MD;  Location: [facility] OR;  Service: Neurosurgery;  Laterality: Bilateral;  kyphoplasty and radiofrequency ablation - L1    HYSTERECTOMY      PARTIAL//still with ovaries    neck fusion  2017    THYROIDECTOMY      TUBAL LIGATION         Social History[1]    Family History   Problem Relation Name Age of Onset    Hypertension Mother Vivian     Diabetes Mother Vivian     Asthma Mother Vivian             Diabetes Father      Asthma Father      Stroke Maternal Grandmother  grandmother     Prostate cancer Maternal Grandfather      Mental illness Son Lukasz Garcia     Pancreatic cancer Maternal Uncle      Mental illness Other Pat side     Pancreatic cancer Other Mat side     Ovarian cancer Maternal Cousin           ROS    Objective:   Physical Exam  HENT:      Head: Normocephalic.   Eyes:      Pupils: Pupils are equal, round, and reactive to  light.   Neck:      Thyroid: No thyromegaly.      Vascular: Normal carotid pulses. No carotid bruit or JVD.   Cardiovascular:      Rate and Rhythm: Normal rate and regular rhythm. No extrasystoles are present.     Chest Wall: PMI is not displaced.      Pulses:           Dorsalis pedis pulses are 2+ on the right side and 1+ on the left side.        Posterior tibial pulses are 2+ on the right side and 1+ on the left side.      Heart sounds: Normal heart sounds. No murmur heard.     No gallop. No S3 sounds.   Pulmonary:      Effort: No respiratory distress.      Breath sounds: No stridor. Decreased breath sounds present.   Chest:      Comments: Diffuse + tenderness on both chests  Abdominal:      General: Bowel sounds are normal.      Palpations: Abdomen is soft.      Tenderness: There is no abdominal tenderness. There is no rebound.   Musculoskeletal:         General: Normal range of motion.   Skin:     Findings: No rash.   Neurological:      Mental Status: She is alert and oriented to person, place, and time.   Psychiatric:         Behavior: Behavior normal.         Lab Results   Component Value Date    CHOL 176 08/25/2022    CHOL 168 07/14/2021    CHOL 159 02/28/2020     Lab Results   Component Value Date    HDL 74 08/25/2022    HDL 74 07/14/2021    HDL 73 02/28/2020     Lab Results   Component Value Date    LDLCALC 92.6 08/25/2022    LDLCALC 80.4 07/14/2021    LDLCALC 75.6 02/28/2020     Lab Results   Component Value Date    TRIG 47 08/25/2022    TRIG 68 07/14/2021    TRIG 52 02/28/2020     Lab Results   Component Value Date    CHOLHDL 42.0 08/25/2022    CHOLHDL 44.0 07/14/2021    CHOLHDL 45.9 02/28/2020       Chemistry        Component Value Date/Time     05/26/2025 0956     03/03/2025 1114    K 3.5 05/26/2025 0956    K 3.8 03/03/2025 1114     05/26/2025 0956     03/03/2025 1114    CO2 24 05/26/2025 0956    CO2 21 (L) 03/03/2025 1114    BUN 13 05/26/2025 0956    CREATININE 1.2 05/26/2025  0956     (H) 05/26/2025 0956     (H) 03/03/2025 1114        Component Value Date/Time    CALCIUM 8.6 (L) 05/26/2025 0956    CALCIUM 8.7 03/03/2025 1114    ALKPHOS 58 05/26/2025 0956    ALKPHOS 53 03/03/2025 1114    AST 48 (H) 05/26/2025 0956    AST 26 03/03/2025 1114    ALT 76 (H) 05/26/2025 0956    ALT 30 03/03/2025 1114    BILITOT 1.0 05/26/2025 0956    BILITOT 0.6 03/03/2025 1114    ESTGFRAFRICA >60.0 07/14/2021 0927    EGFRNONAA >60.0 07/14/2021 0927          Lab Results   Component Value Date    HGBA1C 5.4 12/23/2024     Lab Results   Component Value Date    TSH 2.540 08/26/2024     Lab Results   Component Value Date    INR 0.9 02/19/2024    INR 1.0 03/10/2023    INR 1.0 11/08/2019     Lab Results   Component Value Date    WBC 4.59 05/26/2025    HGB 11.5 (L) 05/26/2025    HCT 36.4 (L) 05/26/2025     (H) 05/26/2025     05/26/2025     BMP  Sodium   Date Value Ref Range Status   05/26/2025 138 136 - 145 mmol/L Final   03/03/2025 140 136 - 145 mmol/L Final     Potassium   Date Value Ref Range Status   05/26/2025 3.5 3.5 - 5.1 mmol/L Final   03/03/2025 3.8 3.5 - 5.1 mmol/L Final     Chloride   Date Value Ref Range Status   05/26/2025 105 95 - 110 mmol/L Final   03/03/2025 109 95 - 110 mmol/L Final     CO2   Date Value Ref Range Status   05/26/2025 24 23 - 29 mmol/L Final   03/03/2025 21 (L) 23 - 29 mmol/L Final     BUN   Date Value Ref Range Status   05/26/2025 13 8 - 23 mg/dL Final     Creatinine   Date Value Ref Range Status   05/26/2025 1.2 0.5 - 1.4 mg/dL Final     Calcium   Date Value Ref Range Status   05/26/2025 8.6 (L) 8.7 - 10.5 mg/dL Final   03/03/2025 8.7 8.7 - 10.5 mg/dL Final     Anion Gap   Date Value Ref Range Status   05/26/2025 9 8 - 16 mmol/L Final     eGFR if    Date Value Ref Range Status   07/14/2021 >60.0 >60 mL/min/1.73 m^2 Final     eGFR if non    Date Value Ref Range Status   07/14/2021 >60.0 >60 mL/min/1.73 m^2 Final     Comment:      Calculation used to obtain the estimated glomerular filtration  rate (eGFR) is the CKD-EPI equation.        BNP  @LABRCNTIP(BNP,BNPTRIAGEBLO)@  @LABRCNTIP(troponini)@  CrCl cannot be calculated (Patient's most recent lab result is older than the maximum 7 days allowed.).  No results found in the last 24 hours.  No results found in the last 24 hours.  No results found in the last 24 hours.    Assessment:      1. PVD (peripheral vascular disease)    2. Essential hypertension    3. Dyslipidemia    4. Nonrheumatic aortic valve insufficiency    5. Tachycardia    6. Multiple myeloma, remission status unspecified    7. Tobacco use    8. Muscle cramp    9. Pain in both lower extremities        Plan:     Assessment & Plan    IMPRESSION:  - Blood pressure currently low, likely due to combination of medications and chemo treatment.  - Discontinued Amlodipine and Benazepril due to low blood pressure.  - Continued Metoprolol 50 mg after discussing with patient, explained it is a 24-hour medication covering blood pressure throughout the day.  - Considered scheduling repeat leg US to assess for any changes in previously noted non-significant stenosis.    HYPOTENSION:  - Ana to check blood pressure every morning.  - Recommend eating salty foods to help increase blood pressure.  - Recommend pickle juice for muscle cramps and low blood pressure.    CARDIOVASCULAR:  - Ordered echocardiogram.          Echo for h/o mod AI and h/o chemo  MIKE for leg pain  Advise pickle juice fro muscle cramp and soft BP  D/c amloidpine ACEI  Continue torpoXL and crestor 10 mg   RTC in 6m     This note was generated with the assistance of ambient listening technology. Verbal consent was obtained by the patient and accompanying visitor(s) for the recording of patient appointment to facilitate this note. I attest to having reviewed and edited the generated note for accuracy, though some syntax or spelling errors may persist. Please contact the author  of this note for any clarification.            [1]   Social History  Tobacco Use    Smoking status: Every Day     Current packs/day: 0.50     Average packs/day: 0.5 packs/day for 50.0 years (25.0 ttl pk-yrs)     Types: Cigarettes     Passive exposure: Never    Smokeless tobacco: Never   Substance Use Topics    Alcohol use: Not Currently     Comment: quit    Drug use: No

## 2025-06-12 RX ORDER — AMLODIPINE BESYLATE AND BENAZEPRIL HYDROCHLORIDE 2.5; 1 MG/1; MG/1
1 CAPSULE ORAL
Qty: 90 CAPSULE | Refills: 3 | Status: SHIPPED | OUTPATIENT
Start: 2025-06-12

## 2025-06-19 DIAGNOSIS — C90.00 MULTIPLE MYELOMA, REMISSION STATUS UNSPECIFIED: ICD-10-CM

## 2025-06-19 RX ORDER — LENALIDOMIDE 10 MG/1
CAPSULE ORAL
Qty: 21 CAPSULE | Refills: 0 | Status: SHIPPED | OUTPATIENT
Start: 2025-06-19

## 2025-06-20 ENCOUNTER — PATIENT MESSAGE (OUTPATIENT)
Dept: HEMATOLOGY/ONCOLOGY | Facility: CLINIC | Age: 65
End: 2025-06-20
Payer: COMMERCIAL

## 2025-06-23 ENCOUNTER — PATIENT MESSAGE (OUTPATIENT)
Dept: HEMATOLOGY/ONCOLOGY | Facility: CLINIC | Age: 65
End: 2025-06-23
Payer: COMMERCIAL

## 2025-06-23 ENCOUNTER — LAB VISIT (OUTPATIENT)
Dept: LAB | Facility: HOSPITAL | Age: 65
End: 2025-06-23
Attending: INTERNAL MEDICINE
Payer: COMMERCIAL

## 2025-06-23 DIAGNOSIS — C90.00 MULTIPLE MYELOMA, REMISSION STATUS UNSPECIFIED: ICD-10-CM

## 2025-06-23 LAB
ABSOLUTE EOSINOPHIL (OHS): 0.2 K/UL
ABSOLUTE MONOCYTE (OHS): 0.72 K/UL (ref 0.3–1)
ABSOLUTE NEUTROPHIL COUNT (OHS): 2.19 K/UL (ref 1.8–7.7)
ALBUMIN SERPL BCP-MCNC: 3.3 G/DL (ref 3.5–5.2)
ALP SERPL-CCNC: 64 UNIT/L (ref 40–150)
ALT SERPL W/O P-5'-P-CCNC: 70 UNIT/L (ref 10–44)
ANION GAP (OHS): 7 MMOL/L (ref 8–16)
AST SERPL-CCNC: 56 UNIT/L (ref 11–45)
BASOPHILS # BLD AUTO: 0.05 K/UL
BASOPHILS NFR BLD AUTO: 0.9 %
BILIRUB SERPL-MCNC: 0.4 MG/DL (ref 0.1–1)
BUN SERPL-MCNC: 9 MG/DL (ref 8–23)
CALCIUM SERPL-MCNC: 8.2 MG/DL (ref 8.7–10.5)
CHLORIDE SERPL-SCNC: 112 MMOL/L (ref 95–110)
CO2 SERPL-SCNC: 24 MMOL/L (ref 23–29)
CREAT SERPL-MCNC: 0.9 MG/DL (ref 0.5–1.4)
ERYTHROCYTE [DISTWIDTH] IN BLOOD BY AUTOMATED COUNT: 14.7 % (ref 11.5–14.5)
GFR SERPLBLD CREATININE-BSD FMLA CKD-EPI: >60 ML/MIN/1.73/M2
GLUCOSE SERPL-MCNC: 107 MG/DL (ref 70–110)
HCT VFR BLD AUTO: 35.5 % (ref 37–48.5)
HGB BLD-MCNC: 11.1 GM/DL (ref 12–16)
IMM GRANULOCYTES # BLD AUTO: 0.02 K/UL (ref 0–0.04)
IMM GRANULOCYTES NFR BLD AUTO: 0.4 % (ref 0–0.5)
LYMPHOCYTES # BLD AUTO: 2.23 K/UL (ref 1–4.8)
MAGNESIUM SERPL-MCNC: 1.7 MG/DL (ref 1.6–2.6)
MCH RBC QN AUTO: 32.1 PG (ref 27–31)
MCHC RBC AUTO-ENTMCNC: 31.3 G/DL (ref 32–36)
MCV RBC AUTO: 103 FL (ref 82–98)
NUCLEATED RBC (/100WBC) (OHS): 0 /100 WBC
PHOSPHATE SERPL-MCNC: 2.3 MG/DL (ref 2.7–4.5)
PLATELET # BLD AUTO: 315 K/UL (ref 150–450)
PMV BLD AUTO: 9.5 FL (ref 9.2–12.9)
POTASSIUM SERPL-SCNC: 3.6 MMOL/L (ref 3.5–5.1)
PROT SERPL-MCNC: 6.7 GM/DL (ref 6–8.4)
RBC # BLD AUTO: 3.46 M/UL (ref 4–5.4)
RELATIVE EOSINOPHIL (OHS): 3.7 %
RELATIVE LYMPHOCYTE (OHS): 41.2 % (ref 18–48)
RELATIVE MONOCYTE (OHS): 13.3 % (ref 4–15)
RELATIVE NEUTROPHIL (OHS): 40.5 % (ref 38–73)
SODIUM SERPL-SCNC: 143 MMOL/L (ref 136–145)
WBC # BLD AUTO: 5.41 K/UL (ref 3.9–12.7)

## 2025-06-23 PROCEDURE — 85025 COMPLETE CBC W/AUTO DIFF WBC: CPT

## 2025-06-23 PROCEDURE — 84100 ASSAY OF PHOSPHORUS: CPT

## 2025-06-23 PROCEDURE — 82040 ASSAY OF SERUM ALBUMIN: CPT

## 2025-06-23 PROCEDURE — 83735 ASSAY OF MAGNESIUM: CPT

## 2025-06-23 PROCEDURE — 36415 COLL VENOUS BLD VENIPUNCTURE: CPT

## 2025-06-24 ENCOUNTER — PATIENT MESSAGE (OUTPATIENT)
Dept: HEMATOLOGY/ONCOLOGY | Facility: CLINIC | Age: 65
End: 2025-06-24

## 2025-06-24 ENCOUNTER — OFFICE VISIT (OUTPATIENT)
Dept: HEMATOLOGY/ONCOLOGY | Facility: CLINIC | Age: 65
End: 2025-06-24
Payer: COMMERCIAL

## 2025-06-24 ENCOUNTER — INFUSION (OUTPATIENT)
Dept: INFUSION THERAPY | Facility: HOSPITAL | Age: 65
End: 2025-06-24
Attending: INTERNAL MEDICINE
Payer: COMMERCIAL

## 2025-06-24 ENCOUNTER — DOCUMENTATION ONLY (OUTPATIENT)
Dept: HEMATOLOGY/ONCOLOGY | Facility: CLINIC | Age: 65
End: 2025-06-24
Payer: COMMERCIAL

## 2025-06-24 VITALS
SYSTOLIC BLOOD PRESSURE: 126 MMHG | WEIGHT: 133.19 LBS | BODY MASS INDEX: 22.74 KG/M2 | RESPIRATION RATE: 18 BRPM | TEMPERATURE: 96 F | TEMPERATURE: 97 F | HEART RATE: 61 BPM | HEIGHT: 64 IN | OXYGEN SATURATION: 98 % | DIASTOLIC BLOOD PRESSURE: 71 MMHG | DIASTOLIC BLOOD PRESSURE: 65 MMHG | SYSTOLIC BLOOD PRESSURE: 113 MMHG | HEART RATE: 68 BPM

## 2025-06-24 DIAGNOSIS — E83.39 HYPOPHOSPHATEMIA: ICD-10-CM

## 2025-06-24 DIAGNOSIS — Z79.69 IMMUNODEFICIENCY DUE TO CHEMOTHERAPY: ICD-10-CM

## 2025-06-24 DIAGNOSIS — D84.821 IMMUNODEFICIENCY DUE TO CHEMOTHERAPY: ICD-10-CM

## 2025-06-24 DIAGNOSIS — C90.00 MULTIPLE MYELOMA, REMISSION STATUS UNSPECIFIED: Primary | ICD-10-CM

## 2025-06-24 DIAGNOSIS — C90.00 MULTIPLE MYELOMA, REMISSION STATUS UNSPECIFIED: Primary | Chronic | ICD-10-CM

## 2025-06-24 DIAGNOSIS — D53.9 MACROCYTIC ANEMIA: ICD-10-CM

## 2025-06-24 DIAGNOSIS — T45.1X5A IMMUNODEFICIENCY DUE TO CHEMOTHERAPY: ICD-10-CM

## 2025-06-24 DIAGNOSIS — Z72.0 TOBACCO USE: ICD-10-CM

## 2025-06-24 DIAGNOSIS — R74.01 TRANSAMINITIS: ICD-10-CM

## 2025-06-24 PROCEDURE — 1159F MED LIST DOCD IN RCRD: CPT | Mod: CPTII,S$GLB,, | Performed by: INTERNAL MEDICINE

## 2025-06-24 PROCEDURE — 3078F DIAST BP <80 MM HG: CPT | Mod: CPTII,S$GLB,, | Performed by: INTERNAL MEDICINE

## 2025-06-24 PROCEDURE — 3008F BODY MASS INDEX DOCD: CPT | Mod: CPTII,S$GLB,, | Performed by: INTERNAL MEDICINE

## 2025-06-24 PROCEDURE — 63600175 PHARM REV CODE 636 W HCPCS: Mod: JZ,TB | Performed by: INTERNAL MEDICINE

## 2025-06-24 PROCEDURE — 4010F ACE/ARB THERAPY RXD/TAKEN: CPT | Mod: CPTII,S$GLB,, | Performed by: INTERNAL MEDICINE

## 2025-06-24 PROCEDURE — 96401 CHEMO ANTI-NEOPL SQ/IM: CPT

## 2025-06-24 PROCEDURE — 99215 OFFICE O/P EST HI 40 MIN: CPT | Mod: S$GLB,,, | Performed by: INTERNAL MEDICINE

## 2025-06-24 PROCEDURE — 99999 PR PBB SHADOW E&M-EST. PATIENT-LVL III: CPT | Mod: PBBFAC,,, | Performed by: INTERNAL MEDICINE

## 2025-06-24 PROCEDURE — 3074F SYST BP LT 130 MM HG: CPT | Mod: CPTII,S$GLB,, | Performed by: INTERNAL MEDICINE

## 2025-06-24 RX ORDER — PROCHLORPERAZINE EDISYLATE 5 MG/ML
10 INJECTION INTRAMUSCULAR; INTRAVENOUS ONCE AS NEEDED
Status: DISCONTINUED | OUTPATIENT
Start: 2025-06-24 | End: 2025-06-24 | Stop reason: HOSPADM

## 2025-06-24 RX ORDER — DIPHENHYDRAMINE HYDROCHLORIDE 50 MG/ML
50 INJECTION, SOLUTION INTRAMUSCULAR; INTRAVENOUS ONCE AS NEEDED
Status: CANCELLED | OUTPATIENT
Start: 2025-06-24

## 2025-06-24 RX ORDER — DIPHENHYDRAMINE HYDROCHLORIDE 50 MG/ML
50 INJECTION, SOLUTION INTRAMUSCULAR; INTRAVENOUS ONCE AS NEEDED
Status: DISCONTINUED | OUTPATIENT
Start: 2025-06-24 | End: 2025-06-24 | Stop reason: HOSPADM

## 2025-06-24 RX ORDER — SODIUM,POTASSIUM PHOSPHATES 280-250MG
1 POWDER IN PACKET (EA) ORAL
Qty: 100 PACKET | Refills: 0 | Status: SHIPPED | OUTPATIENT
Start: 2025-06-24

## 2025-06-24 RX ORDER — HEPARIN 100 UNIT/ML
500 SYRINGE INTRAVENOUS
Status: CANCELLED | OUTPATIENT
Start: 2025-06-24

## 2025-06-24 RX ORDER — EPINEPHRINE 0.3 MG/.3ML
0.3 INJECTION SUBCUTANEOUS ONCE AS NEEDED
Status: CANCELLED | OUTPATIENT
Start: 2025-06-24

## 2025-06-24 RX ORDER — SODIUM CHLORIDE 0.9 % (FLUSH) 0.9 %
10 SYRINGE (ML) INJECTION
Status: DISCONTINUED | OUTPATIENT
Start: 2025-06-24 | End: 2025-06-24 | Stop reason: HOSPADM

## 2025-06-24 RX ORDER — EPINEPHRINE 0.3 MG/.3ML
0.3 INJECTION SUBCUTANEOUS ONCE AS NEEDED
Status: DISCONTINUED | OUTPATIENT
Start: 2025-06-24 | End: 2025-06-24 | Stop reason: HOSPADM

## 2025-06-24 RX ORDER — PROCHLORPERAZINE EDISYLATE 5 MG/ML
10 INJECTION INTRAMUSCULAR; INTRAVENOUS ONCE AS NEEDED
Status: CANCELLED | OUTPATIENT
Start: 2025-06-24

## 2025-06-24 RX ORDER — SODIUM CHLORIDE 0.9 % (FLUSH) 0.9 %
10 SYRINGE (ML) INJECTION
Status: CANCELLED | OUTPATIENT
Start: 2025-06-24

## 2025-06-24 RX ADMIN — DARATUMUMAB AND HYALURONIDASE-FIHJ (HUMAN RECOMBINANT) 1800 MG: 1800; 30000 INJECTION SUBCUTANEOUS at 08:06

## 2025-06-24 NOTE — PROGRESS NOTES
Patient requested Boost on today. Pt came to [facility] office and picked up a case of Vanilla Boost. [facility] will remain available.

## 2025-06-24 NOTE — PROGRESS NOTES
Patient ID: Ana Bonilla   Reason for Visit: Follow-up  MRN:  [id number]     Oncologic Diagnosis:  Multiple Myeloma - IgG lambda   Previous Treatment:  VRD (5/10/2023 - 6/28/2023)   Current Treatment:    D-VRD (7/6/2023 - 01/03/2024 )      Zometa (10/18/2023 - )     Subjective   Ana Bonilla is a pleasant 64 y.o. female who presents for follow up.    She reports no change in her overall symptoms.  I reviewed her labs which show a mildly worsening anemia and mild transaminitis. I reviewed my recommendations with her as outlined below.  She will restart Revlimid tomorrow.      Review of Systems   Constitutional:  Positive for fatigue. Negative for activity change, appetite change, chills, diaphoresis, fever and unexpected weight change.   HENT:  Negative for nosebleeds.    Respiratory:  Negative for shortness of breath.    Cardiovascular:  Negative for chest pain.   Gastrointestinal:  Negative for abdominal pain, blood in stool, constipation, diarrhea, nausea and vomiting.   Musculoskeletal:  Positive for arthralgias (right hip) and myalgias (hands). Negative for back pain.   Skin:  Negative for rash.   Neurological:  Negative for dizziness, weakness, light-headedness and headaches.   Hematological:  Does not bruise/bleed easily.   Psychiatric/Behavioral:  The patient is not nervous/anxious.      History     Oncology History   Multiple myeloma   4/20/2023 Initial Diagnosis    Multiple myeloma     5/10/2023 - 6/28/2023 Chemotherapy    Treatment Summary   Plan Name: OP VRD - WEEKLY BORTEZOMIB LENALIDOMIDE DEXAMETHASONE Q3W  Treatment Goal: Palliative  Status: Inactive  Start Date: 5/10/2023  End Date: 6/28/2023  Provider: Abram Velasquez MD  Chemotherapy: bortezomib (VELCADE) injection 2 mg, 1.3 mg/m2 = 2 mg, Subcutaneous, Clinic/[facility] 1 time, 2 of 6 cycles  Administration: 2 mg (5/10/2023), 2 mg (5/17/2023), 2 mg (6/14/2023), 2 mg (5/31/2023), 2 mg (6/21/2023), 2 mg (6/28/2023)  lenalidomide 25 mg Cap, 25  mg, Oral, Daily, 1 of 1 cycle, Start date: 5/10/2023, End date: 5/17/2023 6/28/2023 Cancer Staged    Staging form: Plasma Cell Myeloma and Plasma Cell Disorders, AJCC 8th Edition  - Clinical stage from 6/28/2023: RISS Stage II (Beta-2-microglobulin (mg/L): 1.9, Albumin (g/dL): 3, ISS: Stage II, High-risk cytogenetics: Absent, LDH: Normal)     7/6/2023 - 1/3/2024 Chemotherapy    Treatment Summary   Plan Name: OP D-VRD DARATUMUMAB + BORTEZOMIB LENALIDOMIDE DEXAMETHASONE  Treatment Goal: Palliative  Status: Inactive  Start Date: 7/6/2023  End Date: 1/3/2024  Provider: Oscar Márquez MD  Chemotherapy: bortezomib (VELCADE) injection 2 mg, 1.3 mg/m2 = 2 mg, Subcutaneous, Clinic/[facility] 1 time, 6 of 6 cycles  Administration: 2 mg (7/6/2023), 2 mg (7/12/2023), 2 mg (8/2/2023), 2 mg (8/9/2023), 2.25 mg (10/18/2023), 2 mg (7/19/2023), 2 mg (7/26/2023), 2 mg (8/16/2023), 2.25 mg (9/20/2023), 2.25 mg (9/27/2023), 2.25 mg (10/25/2023), 2.25 mg (11/1/2023), 2.25 mg (11/8/2023), 2.25 mg (12/6/2023), 2.25 mg (1/3/2024), 2.25 mg (11/15/2023), 2.25 mg (11/22/2023), 2.25 mg (11/29/2023), 2.25 mg (12/13/2023), 2.25 mg (12/20/2023), 2.25 mg (12/26/2023)  lenalidomide 10 mg Cap, 1 capsule (100 % of original dose 1 capsule), Oral, Daily, 1 of 1 cycle, Start date: 7/26/2023, End date: 8/2/2023  Dose modification: 1 capsule (original dose 1 capsule, Cycle 0)  daratumumab-hyaluronidase-fihj subcutaneous injection 1,800 mg, 1,800 mg (100 % of original dose 1,800 mg), Subcutaneous, Clinic/[facility] 1 time, 6 of 6 cycles  Dose modification: 1,800 mg (original dose 1,800 mg, Cycle 1), 1,800 mg (original dose 1,800 mg, Cycle 1)  Administration: 1,800 mg (7/6/2023), 1,800 mg (7/12/2023), 1,800 mg (7/19/2023), 1,800 mg (7/26/2023), 1,800 mg (8/2/2023), 1,800 mg (8/9/2023), 1,800 mg (8/16/2023), 1,800 mg (9/27/2023), 1,800 mg (10/18/2023), 1,800 mg (11/1/2023), 1,800 mg (11/15/2023), 1,800 mg (11/29/2023), 1,800 mg (12/13/2023), 1,800 mg (12/26/2023)      4/30/2024 -  Chemotherapy    Treatment Summary   Plan Name: OP Myeloma KIA-RD daratumumab lenalidomide dexAMETHasone Q4W  Treatment Goal: Palliative  Status: Active  Start Date: 4/30/2024  End Date: 12/9/2025 (Planned)  Provider: Bri Luevano MD  Chemotherapy: daratumumab-hyaluronidase-[facility] subcutaneous injection 1,800 mg, 1,800 mg, Subcutaneous, [facility] 1 time, 15 of 18 cycles  Administration: 1,800 mg (4/30/2024), 1,800 mg (5/7/2024), 1,800 mg (5/21/2024), 1,800 mg (5/28/2024), 1,800 mg (6/4/2024), 1,800 mg (6/25/2024), 1,800 mg (7/9/2024), 1,800 mg (10/29/2024), 1,800 mg (5/14/2024), 1,800 mg (6/11/2024), 1,800 mg (6/18/2024), 1,800 mg (7/30/2024), 1,800 mg (8/13/2024), 1,800 mg (8/27/2024), 1,800 mg (9/10/2024), 1,800 mg (9/24/2024), 1,800 mg (10/15/2024), 1,800 mg (11/26/2024), 1,800 mg (1/7/2025), 1,800 mg (2/4/2025), 1,800 mg (3/4/2025), 1,800 mg (4/1/2025), 1,800 mg (4/29/2025), 1,800 mg (5/27/2025)           MARIBETH  Ana GONZALEZ Bonilla  63 y.o.  female with past medical history significant for hypertension, COPD, asthma, GERD, hypothyroidism here for follow-up and management of multiple myeloma     She was referred in 2/2023 by her PCP after workup for gastritis revealed lytic lesions. CT chest showed lytic and expansile destructive lesion in the sternum and lytic lesions at L1. Biopsy of L1 lesion in 3/2023 revealed mature B cell neoplasm most consistent with plasma cell neoplasm.      Bone marrow biopsy in 4/2023 demonstrated 60% plasma cells. PET/CT showed extensive bony lesions throughout the spine, sternum, bilateral ribs, pelvis, and a mild compression deformity in L1. SPEP/MECHE showed IgG M spike 3.19 g/dL, IgG 4,521 mg/dL.      She was started on VRd and then daratumumab was added by the transplant team. She was started on ASA for thrombosis prophylaxis and acyclovir for herpes zoster prophylaxis. Start Zometa after dental evaluation.     Past Medical History:   Diagnosis Date    Allergy      Amblyopia OS    Anxiety     Asthma     COPD (chronic obstructive pulmonary disease)     NO HOME o2    GERD (gastroesophageal reflux disease)     Hyperlipidemia     Hypertension     PONV (postoperative nausea and vomiting)     Thyroid disease        Past Surgical History:   Procedure Laterality Date    APPENDECTOMY      BIOPSY N/A 2023    Procedure: BIOPSY;  Surgeon: Fausto Smith MD;  Location: [facility] OR;  Service: Neurosurgery;  Laterality: N/A;  L1    BUNIONECTOMY      COLONOSCOPY N/A 2019    Procedure: COLONOSCOPY;  Surgeon: Danny Matos III, MD;  Location: [facility] ENDO;  Service: Endoscopy;  Laterality: N/A;    COLONOSCOPY N/A 10/24/2022    Procedure: COLONOSCOPY;  Surgeon: Danny Matos III, MD;  Location: [facility] ENDO;  Service: Endoscopy;  Laterality: N/A;    ESOPHAGOGASTRODUODENOSCOPY N/A 10/24/2022    Procedure: EGD (ESOPHAGOGASTRODUODENOSCOPY);  Surgeon: Danny Matos III, MD;  Location: [facility] ENDO;  Service: Endoscopy;  Laterality: N/A;    FIXATION KYPHOPLASTY Bilateral 2023    Procedure: KYPHOPLASTY;  Surgeon: Fausto Smith MD;  Location: [facility] OR;  Service: Neurosurgery;  Laterality: Bilateral;  kyphoplasty and radiofrequency ablation - L1    HYSTERECTOMY      PARTIAL//still with ovaries    neck fusion  2017    THYROIDECTOMY      TUBAL LIGATION         Family History   Problem Relation Name Age of Onset    Hypertension Mother Vivian     Diabetes Mother Vivian     Asthma Mother Vivian             Diabetes Father      Asthma Father      Stroke Maternal Grandmother  grandmother     Prostate cancer Maternal Grandfather      Mental illness Son Lukasz Garcia     Pancreatic cancer Maternal Uncle      Mental illness Other Pat side     Pancreatic cancer Other Mat side     Ovarian cancer Maternal Cousin         Review of patient's allergies indicates:   Allergen Reactions    Hydrocodone-acetaminophen Other (See Comments)     Causes pt to feel  extremely sick        Social History     Tobacco Use    Smoking status: Every Day     Current packs/day: 0.50     Average packs/day: 0.5 packs/day for 50.0 years (25.0 ttl pk-yrs)     Types: Cigarettes     Passive exposure: Never    Smokeless tobacco: Never   Substance Use Topics    Alcohol use: Not Currently     Comment: quit    Drug use: No     Labs     ECOG SCORE    0 - Fully active-able to carry on all pre-disease performance without restriction       Vitals:    06/24/25 0703   BP: 126/71   Pulse: 68   Temp: 97.4 °F (36.3 °C)       Physical Exam  Constitutional:       General: She is not in acute distress.     Appearance: Normal appearance. She is not ill-appearing, toxic-appearing or diaphoretic.   Eyes:      Extraocular Movements: Extraocular movements intact.      Conjunctiva/sclera: Conjunctivae normal.   Cardiovascular:      Rate and Rhythm: Normal rate.   Pulmonary:      Effort: Pulmonary effort is normal. No respiratory distress.   Skin:     General: Skin is warm.   Neurological:      General: No focal deficit present.      Mental Status: She is alert and oriented to person, place, and time. Mental status is at baseline.   Psychiatric:         Mood and Affect: Mood normal.         Behavior: Behavior normal.         Thought Content: Thought content normal.         Labs   Labs:  Lab Visit on 06/23/2025   Component Date Value Ref Range Status    Sodium 06/23/2025 143  136 - 145 mmol/L Final    Potassium 06/23/2025 3.6  3.5 - 5.1 mmol/L Final    Chloride 06/23/2025 112 (H)  95 - 110 mmol/L Final    CO2 06/23/2025 24  23 - 29 mmol/L Final    Glucose 06/23/2025 107  70 - 110 mg/dL Final    BUN 06/23/2025 9  8 - 23 mg/dL Final    Creatinine 06/23/2025 0.9  0.5 - 1.4 mg/dL Final    Calcium 06/23/2025 8.2 (L)  8.7 - 10.5 mg/dL Final    Protein Total 06/23/2025 6.7  6.0 - 8.4 gm/dL Final    Albumin 06/23/2025 3.3 (L)  3.5 - 5.2 g/dL Final    Bilirubin Total 06/23/2025 0.4  0.1 - 1.0 mg/dL Final    For infants and  newborns, interpretation of results should be based   on gestational age, weight and in agreement with clinical   observations.    Premature Infant recommended reference ranges:   0-24 hours:  <8.0 mg/dL   24-48 hours: <12.0 mg/dL   3-5 days:    <15.0 mg/dL   6-29 days:   <15.0 mg/dL    ALP 06/23/2025 64  40 - 150 unit/L Final    AST 06/23/2025 56 (H)  11 - 45 unit/L Final    ALT 06/23/2025 70 (H)  10 - 44 unit/L Final    Anion Gap 06/23/2025 7 (L)  8 - 16 mmol/L Final    eGFR 06/23/2025 >60  >60 mL/min/1.73/m2 Final    Estimated GFR calculated using the CKD-EPI creatinine (2021) equation.    Magnesium  06/23/2025 1.7  1.6 - 2.6 mg/dL Final    Phosphorus Level 06/23/2025 2.3 (L)  2.7 - 4.5 mg/dL Final    WBC 06/23/2025 5.41  3.90 - 12.70 K/uL Final    RBC 06/23/2025 3.46 (L)  4.00 - 5.40 M/uL Final    HGB 06/23/2025 11.1 (L)  12.0 - 16.0 gm/dL Final    HCT 06/23/2025 35.5 (L)  37.0 - 48.5 % Final    MCV 06/23/2025 103 (H)  82 - 98 fL Final    MCH 06/23/2025 32.1 (H)  27.0 - 31.0 pg Final    MCHC 06/23/2025 31.3 (L)  32.0 - 36.0 g/dL Final    RDW 06/23/2025 14.7 (H)  11.5 - 14.5 % Final    Platelet Count 06/23/2025 315  150 - 450 K/uL Final    MPV 06/23/2025 9.5  9.2 - 12.9 fL Final    Nucleated RBC 06/23/2025 0  <=0 /100 WBC Final    Neut % 06/23/2025 40.5  38 - 73 % Final    Lymph % 06/23/2025 41.2  18 - 48 % Final    Mono % 06/23/2025 13.3  4 - 15 % Final    Eos % 06/23/2025 3.7  <=8 % Final    Basophil % 06/23/2025 0.9  <=1.9 % Final    Imm Grans % 06/23/2025 0.4  0.0 - 0.5 % Final    Neut # 06/23/2025 2.19  1.8 - 7.7 K/uL Final    Lymph # 06/23/2025 2.23  1 - 4.8 K/uL Final    Mono # 06/23/2025 0.72  0.3 - 1 K/uL Final    Eos # 06/23/2025 0.20  <=0.5 K/uL Final    Baso # 06/23/2025 0.05  <=0.2 K/uL Final    Imm Grans # 06/23/2025 0.02  0.00 - 0.04 K/uL Final    Mild elevation in immature granulocytes is non specific and can be seen in a variety of conditions including stress response, acute inflammation, trauma  and pregnancy. Correlation with other laboratory and clinical findings is essential.         Imaging/Pathology   - 06/06/2023 RIGHT ILIAC CREST BONE MARROW ASPIRATE, BONE MARROW CLOT, AND BONE MARROW CORE BIOPSY WITH:     CELLULARITY=40-60%, TRILINEAGE HEMATOPOIETIC ACTIVITY (M/E=2.9:1).   CONSISTENT WITH PLASMA CELL NEOPLASM(60%).  SEE COMMENT.   FOCAL GRADE 1 RETICULAR FIBROSIS.   CONGO RED NEGATIVE.   INCREASED STORAGE IRON.   ADEQUATE NUMBER OF MEGAKARYOCYTES.     Bone marrow karyotype results: 46, XX[20], female karyotype.     Myeloma fixed cell, high-risk, FISH:  Normal.  The result is within normal limits for 1q duplication, TP53 deletion and IGH rearrangement.         - 04/10/2023 PET: Numerous FDG avid predominately lytic osseous lesions as above.  Primary differential considerations would include multiple myeloma versus metastatic disease.  2. No FDG avid soft tissue masses or adenopathy demonstrated.  3. Mild pathologic compression deformity of the L1 vertebral body.                                          Assessment and Plan   IgG lambda Multiple myeloma, R-ISS stage I   Immunodeficiency due to Chemotherapy  Diagnosed June 2023 via bone marrow biopsy  Patient treatment had been held multiple times with few treatments cancelled and also Revlimid 10 mg daily given as unable to tolerate higher dose   Previous oncologist discussed with the patient and the transplant team BMT.  However, patient declined transplant at this time and it was recommended that we continue with Rosa-VRD for 6 cycles and then repeat bone marrow biopsy and PET scan. If VGPR or better, then continue with Revlimid maintenance only.  Continue prophylaxis with aspirin, acyclovir 400 mg b.i.d.  Continue Zometa/calcium and vitamin-D supplements (Due for Zometa 01/13/23)  Repeat PET-CT stable  BM Biopsy 02/13/2024 decrease in plasma cell burden  Plan  Continue follow up with myeloma team  Labs reviewed and protein studies stable; anemia  mildly worsening so will obtain vitamin studies  Continue with daratumumab and Revlimid today        Transaminitis  No inciting factors  Mild and stable  Recommend we obtain abd US      Hypophosphatemia  Counseled to take phosphorous supplement      Chronic Hypokalemia  Magnesium Normal  Potassium low normal today.  Advised to take low dose potassium daily and we will see how this manages her muscle cramping       R  Hip Pain  Back Pain, Left leg pain   PET-CT showed treatment response with decreased FDG uptake in the previously identified bone lesion  Obtained LLE ultrasound - negative   She previously lost 10 lb in approximately 8 weeks; weight stable today   MRI Hip 04/17/25:  There are multiple scattered small pelvic and hip bone marrow lesions as detailed above, largest 2.3 cm right ischial/posterior column acetabular lesion consistent with the clinical history of multiple myeloma. Negative for pathologic fracture. Negative for soft tissue mass.    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, Hordeolum externum of left eyelid  Per Ophthalmology, this is a chronic condition and is unrelated to and not a contraindication for treatment of MM  Has had kenalog injection  Continue follow-up with Ophthalmology  Continue antibiotic/cream and warm compresses PRN       Shortness of Breath, resolved  The patient reports decreased exercise tolerance; she can not walk a long distance without becoming short of breath in his also has some chest discomfort  Recommended CTA to assess for thromboses however patient declines and prefers to be reassessed at next visit      Cancer Screening  MMG 03/17/2023:  BI-RADS 1  PAP Smear:  Partial hysterectomy  Colonoscopy 10/24/2022:  Normal repeat in 5 years        Chronic Medical Conditions  Asthma  Anxiety   COPD   GERD  Hypertension   Hyperlipidemia   Hypothyroidism   [facility]  Routing      Follow up with physician    Follow up with W[facility]ER 4 weeks. already scheduled   Infusion scheduling note    Injection scheduling note Daratumumab today and in 4 weeks   Labs CBC, CMP, phosphorus, magnesium and other   Scheduling:  Preferred lab:  Lab interval:  all labs ordered by Dr. Michelle 06/24/2025   Imaging    Pharmacy appointment    Other referrals                   The patient was seen, interviewed and examined. Pertinent lab and radiologic studies were reviewed. Pt instructed to call should they develop concerning signs/symptoms or have further questions.        Portions of the record may have been created with voice recognition software. Occasional wrong-word or sound-a-like substitutions may have occurred due to the inherent limitations of voice recognition software. Read the chart carefully and recognize, using context, where substitutions have occurred.      Bri Michelle MD    Hematology/Oncology

## 2025-06-24 NOTE — PLAN OF CARE
Problem: Adult Inpatient Plan of Care  Goal: Plan of Care Review  Outcome: Progressing  Flowsheets (Taken 6/24/2025 0815)  Plan of Care Reviewed With: patient  Goal: Patient-Specific Goal (Individualized)  Outcome: Progressing  Flowsheets (Taken 6/24/2025 0815)  Individualized Care Needs: feet elevated  Anxieties, Fears or Concerns: denies  Goal: Optimal Comfort and Wellbeing  Outcome: Progressing  Intervention: Provide Person-Centered Care  Flowsheets (Taken 6/24/2025 0815)  Trust Relationship/Rapport:   care explained   reassurance provided   choices provided   thoughts/feelings acknowledged   emotional support provided   empathic listening provided   questions answered   questions encouraged     Problem: Infection  Goal: Absence of Infection Signs and Symptoms  Outcome: Progressing  Intervention: Prevent or Manage Infection  Flowsheets (Taken 6/24/2025 0815)  Infection Management: aseptic technique maintained

## 2025-07-07 ENCOUNTER — OFFICE VISIT (OUTPATIENT)
Dept: GASTROENTEROLOGY | Facility: CLINIC | Age: 65
End: 2025-07-07
Payer: COMMERCIAL

## 2025-07-07 VITALS
HEIGHT: 64 IN | WEIGHT: 129.19 LBS | HEART RATE: 71 BPM | BODY MASS INDEX: 22.06 KG/M2 | DIASTOLIC BLOOD PRESSURE: 80 MMHG | SYSTOLIC BLOOD PRESSURE: 121 MMHG

## 2025-07-07 DIAGNOSIS — B96.81 H PYLORI ULCER: ICD-10-CM

## 2025-07-07 DIAGNOSIS — R74.8 ELEVATED LIVER ENZYMES: ICD-10-CM

## 2025-07-07 DIAGNOSIS — R10.13 EPIGASTRIC PAIN: ICD-10-CM

## 2025-07-07 DIAGNOSIS — K27.9 H PYLORI ULCER: ICD-10-CM

## 2025-07-07 DIAGNOSIS — K59.04 CHRONIC IDIOPATHIC CONSTIPATION: Primary | ICD-10-CM

## 2025-07-07 PROCEDURE — 4010F ACE/ARB THERAPY RXD/TAKEN: CPT | Mod: CPTII,S$GLB,, | Performed by: NURSE PRACTITIONER

## 2025-07-07 PROCEDURE — 1160F RVW MEDS BY RX/DR IN RCRD: CPT | Mod: CPTII,S$GLB,, | Performed by: NURSE PRACTITIONER

## 2025-07-07 PROCEDURE — 1159F MED LIST DOCD IN RCRD: CPT | Mod: CPTII,S$GLB,, | Performed by: NURSE PRACTITIONER

## 2025-07-07 PROCEDURE — 3079F DIAST BP 80-89 MM HG: CPT | Mod: CPTII,S$GLB,, | Performed by: NURSE PRACTITIONER

## 2025-07-07 PROCEDURE — 99999 PR PBB SHADOW E&M-EST. PATIENT-LVL V: CPT | Mod: PBBFAC,,, | Performed by: NURSE PRACTITIONER

## 2025-07-07 PROCEDURE — 3008F BODY MASS INDEX DOCD: CPT | Mod: CPTII,S$GLB,, | Performed by: NURSE PRACTITIONER

## 2025-07-07 PROCEDURE — 3074F SYST BP LT 130 MM HG: CPT | Mod: CPTII,S$GLB,, | Performed by: NURSE PRACTITIONER

## 2025-07-07 PROCEDURE — 99214 OFFICE O/P EST MOD 30 MIN: CPT | Mod: S$GLB,,, | Performed by: NURSE PRACTITIONER

## 2025-07-07 RX ORDER — LUBIPROSTONE 24 UG/1
24 CAPSULE ORAL 2 TIMES DAILY WITH MEALS
Qty: 60 CAPSULE | Refills: 11 | Status: SHIPPED | OUTPATIENT
Start: 2025-07-07 | End: 2025-07-10

## 2025-07-07 NOTE — PROGRESS NOTES
Clinic Consult:  [facility] Consultation Note    Reason for Consult:  The primary encounter diagnosis was Chronic idiopathic constipation. Diagnoses of Elevated liver enzymes, Epigastric pain, and H pylori ulcer were also pertinent to this visit.    PCP: BENJAMIN Miranda   [id number] [facility] / [place] [id number]    HPI:  This is a 64 y.o. female here for evaluation of the above.     # abdominal pain  - overall abdominal bloating but is worse in the epigastric region  - has chronic constipation. Linzess 72 mcg doesn't work well enough and Linzess 145 mcg causes severe diarrhea.   - also endorses rectal pain. Does strain with defecation.   - has hx of H. Pylori ulcer in 2022 (see below)    # elevated liver enzymes  - recent onset of elevation in May 2025. Previously normal 1 month prior.   - elevation is mild.   - she is on Revlamid but has been so for the last few years   - she has not had any change in medications between April and May.     # H. Pylori   - diagnosed on EGD in 2022.   - non-bleeding gastric ulcer-- path +ve for H. Pylori  - she was treated. Never had repeat testing with stool antigen     Review of Systems   Constitutional:  Negative for fever and weight loss.   HENT:  Negative for sore throat.    Respiratory:  Negative for cough, shortness of breath and wheezing.    Cardiovascular:  Negative for chest pain and palpitations.   Gastrointestinal:  Positive for abdominal pain and constipation.   Skin:  Negative for itching and rash.       Medical History:  has a past medical history of Allergy, Amblyopia (OS), Anxiety, Asthma, COPD (chronic obstructive pulmonary disease), GERD (gastroesophageal reflux disease), Hyperlipidemia, Hypertension, PONV (postoperative nausea and vomiting), and Thyroid disease.    Surgical History:  has a past surgical history that includes Bunionectomy; Appendectomy; Thyroidectomy; Hysterectomy; neck fusion (08/2017); Colonoscopy (N/A, 12/04/2019);  "Esophagogastroduodenoscopy (N/A, 10/24/2022); Colonoscopy (N/A, 10/24/2022); Fixation Kyphoplasty (Bilateral, 06/08/2023); Biopsy (N/A, 06/08/2023); and Tubal ligation.    Family History: family history includes Asthma in her father and mother; Diabetes in her father and mother; Hypertension in her mother; Mental illness in her son and another family member; Ovarian cancer in her maternal cousin; Pancreatic cancer in her maternal uncle and another family member; Prostate cancer in her maternal grandfather; Stroke in her maternal grandmother..     Social History:  reports that she has been smoking cigarettes. She has a 25 pack-year smoking history. She has never been exposed to tobacco smoke. She has never used smokeless tobacco. She reports that she does not currently use alcohol. She reports that she does not use drugs.    Allergies: Reviewed    Home Medications:   Medications Ordered Prior to Encounter[1]    Physical Exam:  /80 (Patient Position: Sitting)   Pulse 71   Ht 5' 4" (1.626 m)   Wt 58.6 kg (129 lb 3 oz)   BMI 22.18 kg/m²   Body mass index is 22.18 kg/m².  Physical Exam  Constitutional:       General: She is not in acute distress.  HENT:      Head: Normocephalic.   Neurological:      General: No focal deficit present.      Mental Status: She is alert.   Psychiatric:         Mood and Affect: Mood normal.         Judgment: Judgment normal.         Labs: Pertinent labs reviewed.  CRC Screening: UTD    Assessment:  1. Chronic idiopathic constipation    2. Elevated liver enzymes    3. Epigastric pain    4. H pylori ulcer        Recommendations:   - EGD  - abdominal US and serologic workup     Chronic idiopathic constipation    Elevated liver enzymes  -     US Abdomen Limited; Future; Expected date: 07/07/2025  -     Hepatic Function Panel; Future; Expected date: 07/07/2025  -     Alpha-1-Antitrypsin; Future; Expected date: 07/07/2025  -     Antimitochondrial Antibody; Future; Expected date: " 07/07/2025  -     LIUDMILA Screen w/Reflex; Future; Expected date: 07/07/2025  -     Anti-Smooth Muscle Antibody; Future; Expected date: 07/07/2025  -     Ferritin; Future; Expected date: 07/07/2025  -     Iron and TIBC; Future; Expected date: 07/07/2025  -     Hepatitis B Surface Antigen; Future; Expected date: 07/07/2025  -     Hepatitis B Core Antibody, Total; Future; Expected date: 07/07/2025  -     Hepatitis B Surface Ab, Qualitative; Future; Expected date: 07/07/2025  -     Hepatitis C Antibody; Future; Expected date: 07/07/2025  -     Hepatitis A antibody, IgG; Future; Expected date: 07/07/2025  -     Ceruloplasmin; Future; Expected date: 07/07/2025  -     Immunoglobulins (IgG, IgA, IgM) Quantitative; Future; Expected date: 07/07/2025  -     Protime-INR; Future; Expected date: 07/07/2025  -     Tissue Transglutaminase, IgA; Future; Expected date: 07/07/2025    Epigastric pain  -     US Abdomen Limited; Future; Expected date: 07/07/2025  -     Ambulatory referral/consult to Endo Procedure     H pylori ulcer  -     Ambulatory referral/consult to Endo Procedure     Other orders  -     lubiprostone (AMITIZA) 24 MCG Cap; Take 1 capsule (24 mcg total) by mouth 2 (two) times daily with meals.  Dispense: 60 capsule; Refill: 11    Follow up to be determined after results/ procedure(s).    Thank you so much for allowing me to participate in the care of Ana GONZALEZ TONYA Medrano         [1]   Current Outpatient Medications on File Prior to Visit   Medication Sig Dispense Refill    acyclovir (ZOVIRAX) 400 MG tablet Take 1 tablet (400 mg total) by mouth 2 (two) times daily. 60 tablet 11    albuterol (PROVENTIL HFA) 90 mcg/actuation inhaler Inhale 2 puffs into the lungs every 4 (four) hours as needed for Wheezing or Shortness of Breath. Rescue 18 g 11    ALPRAZolam (XANAX) 2 MG Tab Take 1 tablet (2 mg total) by mouth 2 (two) times daily as needed (for anxiety. Up to 2 doses/day). 60 tablet 2     amlodipine-benazepril 2.5-10 mg (LOTREL) 2.5-10 mg per capsule TAKE 1 CAPSULE BY MOUTH EVERY DAY 90 capsule 3    aspirin 81 MG Chew Take 1 tablet (81 mg total) by mouth once daily. 30 tablet 11    azelastine (ASTELIN) 137 mcg (0.1 %) nasal spray 1 spray (137 mcg total) by Nasal route 2 (two) times daily. 30 mL 3    fluticasone propionate (FLONASE) 50 mcg/actuation nasal spray 2 sprays (100 mcg total) by Each Nostril route once daily. 16 g 11    fluticasone-umeclidin-vilanter (TRELEGY ELLIPTA) 200-62.5-25 mcg inhaler Inhale 1 puff into the lungs once daily. 60 each 11    hydrOXYzine HCL (ATARAX) 25 MG tablet Take 1 tablet (25 mg total) by mouth 3 (three) times daily as needed for Itching. 21 tablet 0    lenalidomide 10 mg Cap TAKE 1 CAPSULE ONCE DAILY FOR 21 DAYS AND THEN 7 DAYS OFF 21 capsule 0    levothyroxine (SYNTHROID) 100 MCG tablet TAKE 1 TABLET(100 MCG) BY MOUTH BEFORE BREAKFAST 90 tablet 1    loratadine (CLARITIN) 10 mg tablet Take 1 tablet (10 mg total) by mouth once daily. 30 tablet 11    magnesium glycinate 100 mg Tab Take 100 mg by mouth once daily. for 90 doses 30 tablet 2    metoprolol succinate (TOPROL-XL) 50 MG 24 hr tablet Take 1 tablet (50 mg total) by mouth every evening. 90 tablet 3    montelukast (SINGULAIR) 10 mg tablet Take 1 tablet (10 mg total) by mouth every evening. 90 tablet 3    naloxone (NARCAN) 4 mg/actuation Spry       ondansetron (ZOFRAN) 4 MG tablet Take 1 tablet (4 mg total) by mouth every 8 (eight) hours as needed for Nausea. 30 tablet 0    potassium chloride (KLOR-CON) 10 MEQ TbSR Take 1 tablet (10 mEq total) by mouth once daily. 30 tablet 0    potassium phosphate, monobasic, (K-PHOS) 500 mg TbSO Take 1 tablet (500 mg total) by mouth once daily. 30 tablet 11    potassium, sodium phosphates (PHOS-NAK) 280-160-250 mg PwPk Take 1 packet by mouth 4 (four) times daily with meals and nightly. 100 packet 0    rosuvastatin (CRESTOR) 10 MG tablet Take 1 tablet (10 mg total) by mouth every  evening. 90 tablet 3    [DISCONTINUED] linaCLOtide (LINZESS) 72 mcg Cap capsule Take 2 capsules (144 mcg total) by mouth before breakfast. 30 capsule 1    calcium-vitamin D 600 mg-10 mcg (400 unit) Tab Take 1 tablet by mouth every 12 (twelve) hours. (Patient not taking: Reported on 7/7/2025) 60 tablet 2    dexAMETHasone (DECADRON) 4 MG Tab Take 10 tablets (40 mg total) by mouth every 7 days. Take with food. (Patient not taking: Reported on 7/7/2025) 40 tablet 11    ipratropium (ATROVENT) 42 mcg (0.06 %) nasal spray 2 sprays by Each Nostril route 4 (four) times daily as needed for Rhinitis. (Patient not taking: Reported on 7/7/2025) 30 mL 11    k phos di & mono-sod phos mono (PHOSPHOROUS) 250 mg Tab Take 1 tablet by mouth 4 (four) times daily with meals and nightly. for 5 days 20 each 0    zolpidem (AMBIEN) 5 MG Tab Take 1 tablet (5 mg total) by mouth nightly as needed (insomnia). (Patient not taking: Reported on 7/7/2025) 20 tablet 0     Current Facility-Administered Medications on File Prior to Visit   Medication Dose Route Frequency Provider Last Rate Last Admin    dexAMETHasone injection 40 mg  40 mg Intravenous 1 time in Clinic/HOD Bri Luevano MD

## 2025-07-07 NOTE — PATIENT INSTRUCTIONS
Miralax Prep for Bowel Purge/ Clean Out   You will purchase a 238 gram container of Miralax at your local drug store. It is over the counter in the laxative section.   You will mix this entire 238 gram container in a pitcher with 2 liters of non-carbonated clear liquid beverage.   You will sip this over the next 24-48 hours. The pitcher will need to remain in the refrigerator.    Can start half of this prep which would be 119 grams of Miralax in 1 liter of fluid.

## 2025-07-08 ENCOUNTER — PATIENT MESSAGE (OUTPATIENT)
Dept: HEMATOLOGY/ONCOLOGY | Facility: CLINIC | Age: 65
End: 2025-07-08
Payer: COMMERCIAL

## 2025-07-08 ENCOUNTER — HOSPITAL ENCOUNTER (OUTPATIENT)
Dept: PREADMISSION TESTING | Facility: HOSPITAL | Age: 65
Discharge: HOME | End: 2025-07-08
Attending: INTERNAL MEDICINE
Payer: COMMERCIAL

## 2025-07-08 DIAGNOSIS — R10.13 EPIGASTRIC PAIN: Primary | ICD-10-CM

## 2025-07-08 DIAGNOSIS — B96.81 H PYLORI ULCER: ICD-10-CM

## 2025-07-08 DIAGNOSIS — K27.9 H PYLORI ULCER: ICD-10-CM

## 2025-07-09 ENCOUNTER — PATIENT MESSAGE (OUTPATIENT)
Dept: GASTROENTEROLOGY | Facility: CLINIC | Age: 65
End: 2025-07-09
Payer: COMMERCIAL

## 2025-07-09 ENCOUNTER — PATIENT MESSAGE (OUTPATIENT)
Dept: HEMATOLOGY/ONCOLOGY | Facility: CLINIC | Age: 65
End: 2025-07-09
Payer: COMMERCIAL

## 2025-07-09 DIAGNOSIS — K59.04 CHRONIC IDIOPATHIC CONSTIPATION: Primary | ICD-10-CM

## 2025-07-10 ENCOUNTER — PATIENT MESSAGE (OUTPATIENT)
Dept: HEMATOLOGY/ONCOLOGY | Facility: CLINIC | Age: 65
End: 2025-07-10
Payer: COMMERCIAL

## 2025-07-16 DIAGNOSIS — C90.00 MULTIPLE MYELOMA, REMISSION STATUS UNSPECIFIED: ICD-10-CM

## 2025-07-16 RX ORDER — LENALIDOMIDE 10 MG/1
CAPSULE ORAL
Qty: 21 CAPSULE | Refills: 0 | Status: SHIPPED | OUTPATIENT
Start: 2025-07-16

## 2025-07-21 ENCOUNTER — HOSPITAL ENCOUNTER (OUTPATIENT)
Dept: RADIOLOGY | Facility: HOSPITAL | Age: 65
Discharge: HOME | End: 2025-07-21
Attending: NURSE PRACTITIONER
Payer: COMMERCIAL

## 2025-07-21 DIAGNOSIS — R10.13 EPIGASTRIC PAIN: ICD-10-CM

## 2025-07-21 DIAGNOSIS — R74.8 ELEVATED LIVER ENZYMES: ICD-10-CM

## 2025-07-21 PROCEDURE — 76705 ECHO EXAM OF ABDOMEN: CPT | Mod: TC

## 2025-07-21 PROCEDURE — 76705 ECHO EXAM OF ABDOMEN: CPT | Mod: 26,,, | Performed by: STUDENT IN AN ORGANIZED HEALTH CARE EDUCATION/TRAINING PROGRAM

## 2025-07-22 ENCOUNTER — INFUSION (OUTPATIENT)
Dept: INFUSION THERAPY | Facility: HOSPITAL | Age: 65
End: 2025-07-22
Attending: INTERNAL MEDICINE
Payer: COMMERCIAL

## 2025-07-22 ENCOUNTER — OFFICE VISIT (OUTPATIENT)
Dept: HEMATOLOGY/ONCOLOGY | Facility: CLINIC | Age: 65
End: 2025-07-22
Payer: COMMERCIAL

## 2025-07-22 ENCOUNTER — TELEPHONE (OUTPATIENT)
Dept: PREADMISSION TESTING | Facility: HOSPITAL | Age: 65
End: 2025-07-22
Payer: COMMERCIAL

## 2025-07-22 ENCOUNTER — PATIENT MESSAGE (OUTPATIENT)
Dept: GASTROENTEROLOGY | Facility: CLINIC | Age: 65
End: 2025-07-22
Payer: COMMERCIAL

## 2025-07-22 ENCOUNTER — PATIENT MESSAGE (OUTPATIENT)
Dept: HEMATOLOGY/ONCOLOGY | Facility: CLINIC | Age: 65
End: 2025-07-22

## 2025-07-22 ENCOUNTER — LAB VISIT (OUTPATIENT)
Dept: LAB | Facility: HOSPITAL | Age: 65
End: 2025-07-22
Attending: INTERNAL MEDICINE
Payer: COMMERCIAL

## 2025-07-22 VITALS
BODY MASS INDEX: 22.5 KG/M2 | TEMPERATURE: 98 F | OXYGEN SATURATION: 98 % | HEIGHT: 64 IN | WEIGHT: 131.81 LBS | HEART RATE: 67 BPM | SYSTOLIC BLOOD PRESSURE: 124 MMHG | DIASTOLIC BLOOD PRESSURE: 64 MMHG | RESPIRATION RATE: 18 BRPM

## 2025-07-22 DIAGNOSIS — C90.00 MULTIPLE MYELOMA, REMISSION STATUS UNSPECIFIED: Primary | Chronic | ICD-10-CM

## 2025-07-22 DIAGNOSIS — C90.00 MULTIPLE MYELOMA, REMISSION STATUS UNSPECIFIED: Primary | ICD-10-CM

## 2025-07-22 DIAGNOSIS — D53.9 MACROCYTIC ANEMIA: ICD-10-CM

## 2025-07-22 DIAGNOSIS — C90.00 MULTIPLE MYELOMA, REMISSION STATUS UNSPECIFIED: ICD-10-CM

## 2025-07-22 LAB
ABSOLUTE EOSINOPHIL (OHS): 0.08 K/UL
ABSOLUTE MONOCYTE (OHS): 0.21 K/UL (ref 0.3–1)
ABSOLUTE NEUTROPHIL COUNT (OHS): 2.86 K/UL (ref 1.8–7.7)
ALBUMIN SERPL BCP-MCNC: 3.6 G/DL (ref 3.5–5.2)
ALP SERPL-CCNC: 58 UNIT/L (ref 40–150)
ALT SERPL W/O P-5'-P-CCNC: 25 UNIT/L (ref 10–44)
ANION GAP (OHS): 11 MMOL/L (ref 8–16)
AST SERPL-CCNC: 22 UNIT/L (ref 11–45)
BASOPHILS # BLD AUTO: 0.05 K/UL
BASOPHILS NFR BLD AUTO: 1.1 %
BILIRUB SERPL-MCNC: 0.4 MG/DL (ref 0.1–1)
BUN SERPL-MCNC: 17 MG/DL (ref 8–23)
CALCIUM SERPL-MCNC: 8.4 MG/DL (ref 8.7–10.5)
CHLORIDE SERPL-SCNC: 109 MMOL/L (ref 95–110)
CO2 SERPL-SCNC: 21 MMOL/L (ref 23–29)
CREAT SERPL-MCNC: 1 MG/DL (ref 0.5–1.4)
ERYTHROCYTE [DISTWIDTH] IN BLOOD BY AUTOMATED COUNT: 14.5 % (ref 11.5–14.5)
FERRITIN SERPL-MCNC: 33 NG/ML (ref 20–300)
FOLATE SERPL-MCNC: 10 NG/ML (ref 4–24)
GFR SERPLBLD CREATININE-BSD FMLA CKD-EPI: >60 ML/MIN/1.73/M2
GLUCOSE SERPL-MCNC: 99 MG/DL (ref 70–110)
HCT VFR BLD AUTO: 40.1 % (ref 37–48.5)
HGB BLD-MCNC: 12.7 GM/DL (ref 12–16)
IMM GRANULOCYTES # BLD AUTO: 0.01 K/UL (ref 0–0.04)
IMM GRANULOCYTES NFR BLD AUTO: 0.2 % (ref 0–0.5)
IRON SATN MFR SERPL: 13 % (ref 20–50)
IRON SERPL-MCNC: 62 UG/DL (ref 30–160)
LYMPHOCYTES # BLD AUTO: 1.24 K/UL (ref 1–4.8)
MAGNESIUM SERPL-MCNC: 1.5 MG/DL (ref 1.6–2.6)
MCH RBC QN AUTO: 32.3 PG (ref 27–31)
MCHC RBC AUTO-ENTMCNC: 31.7 G/DL (ref 32–36)
MCV RBC AUTO: 102 FL (ref 82–98)
NUCLEATED RBC (/100WBC) (OHS): 0 /100 WBC
PLATELET # BLD AUTO: 314 K/UL (ref 150–450)
PMV BLD AUTO: 9.1 FL (ref 9.2–12.9)
POTASSIUM SERPL-SCNC: 3.6 MMOL/L (ref 3.5–5.1)
PROT SERPL-MCNC: 7.1 GM/DL (ref 6–8.4)
RBC # BLD AUTO: 3.93 M/UL (ref 4–5.4)
RELATIVE EOSINOPHIL (OHS): 1.8 %
RELATIVE LYMPHOCYTE (OHS): 27.9 % (ref 18–48)
RELATIVE MONOCYTE (OHS): 4.7 % (ref 4–15)
RELATIVE NEUTROPHIL (OHS): 64.3 % (ref 38–73)
SODIUM SERPL-SCNC: 141 MMOL/L (ref 136–145)
TIBC SERPL-MCNC: 465 UG/DL (ref 250–450)
TRANSFERRIN SERPL-MCNC: 314 MG/DL (ref 200–375)
VIT B12 SERPL-MCNC: 569 PG/ML (ref 210–950)
WBC # BLD AUTO: 4.45 K/UL (ref 3.9–12.7)

## 2025-07-22 PROCEDURE — 36415 COLL VENOUS BLD VENIPUNCTURE: CPT

## 2025-07-22 PROCEDURE — 83735 ASSAY OF MAGNESIUM: CPT

## 2025-07-22 PROCEDURE — 82040 ASSAY OF SERUM ALBUMIN: CPT

## 2025-07-22 PROCEDURE — 82607 VITAMIN B-12: CPT

## 2025-07-22 PROCEDURE — 84466 ASSAY OF TRANSFERRIN: CPT

## 2025-07-22 PROCEDURE — 82728 ASSAY OF FERRITIN: CPT

## 2025-07-22 PROCEDURE — 83921 ORGANIC ACID SINGLE QUANT: CPT

## 2025-07-22 PROCEDURE — 63600175 PHARM REV CODE 636 W HCPCS: Mod: JZ,TB

## 2025-07-22 PROCEDURE — 82746 ASSAY OF FOLIC ACID SERUM: CPT

## 2025-07-22 PROCEDURE — 85025 COMPLETE CBC W/AUTO DIFF WBC: CPT

## 2025-07-22 PROCEDURE — 96401 CHEMO ANTI-NEOPL SQ/IM: CPT

## 2025-07-22 RX ORDER — PROCHLORPERAZINE EDISYLATE 5 MG/ML
10 INJECTION INTRAMUSCULAR; INTRAVENOUS ONCE AS NEEDED
Status: CANCELLED | OUTPATIENT
Start: 2025-07-22

## 2025-07-22 RX ORDER — DIPHENHYDRAMINE HYDROCHLORIDE 50 MG/ML
50 INJECTION, SOLUTION INTRAMUSCULAR; INTRAVENOUS ONCE AS NEEDED
Status: CANCELLED | OUTPATIENT
Start: 2025-07-22

## 2025-07-22 RX ORDER — EPINEPHRINE 0.3 MG/.3ML
0.3 INJECTION SUBCUTANEOUS ONCE AS NEEDED
Status: CANCELLED | OUTPATIENT
Start: 2025-07-22

## 2025-07-22 RX ORDER — SODIUM CHLORIDE 0.9 % (FLUSH) 0.9 %
10 SYRINGE (ML) INJECTION
Status: CANCELLED | OUTPATIENT
Start: 2025-07-22

## 2025-07-22 RX ORDER — HEPARIN 100 UNIT/ML
500 SYRINGE INTRAVENOUS
Status: CANCELLED | OUTPATIENT
Start: 2025-07-22

## 2025-07-22 RX ADMIN — DARATUMUMAB AND HYALURONIDASE-FIHJ (HUMAN RECOMBINANT) 1800 MG: 1800; 30000 INJECTION SUBCUTANEOUS at 11:07

## 2025-07-22 NOTE — DISCHARGE INSTRUCTIONS
Thank you for allowing me to care for you today,  MIS LeachN, RN    [facility]  [address] [address]  [phone] phone     [phone] fax  Hours of Operation: Monday- Friday 7:00am- 5:30pm  After hours phone  [phone]  Hematology / Oncology Physicians on call      JUANI Chaudhary Dr., Dr., Dr., Dr., Dr., Dr., Dr., Dr., Dr., Dr., Dr., Dr., Dr., Dr., Dr., BELLE Mckeon, BELLE Deluna, BELLE Page, NP  Please call with any concerns regarding your appointment today.   FALL PREVENTION   Falls often occur due to slipping, tripping or losing your balance. Here are ways to reduce your risk of falling again.   Was there anything that caused your fall that can be fixed, removed or replaced?   Make your home safe by keeping walkways clear of objects you may trip over.   Use non-slip pads under rugs.   Do not walk in poorly lit areas.   Do not stand on chairs or wobbly ladders.   Use caution when reaching overhead or looking upward. This position can cause a loss of balance.   Be sure your shoes fit properly, have non-slip bottoms and are in good condition.   Be cautious when going up and down stairs, curbs, and when walking on uneven sidewalks.   If your balance is poor, consider using a cane or walker.   If your fall was related to alcohol use, stop or limit alcohol intake.   If your fall was related to use of sleeping medicines, talk to your doctor about this. You may need to reduce your dosage at bedtime if you awaken during the night to go to the bathroom.   To reduce the need for nighttime bathroom trips:   Avoid drinking fluids for several hours before going to bed   Empty your bladder before going to bed   Men can keep a urinal at the bedside   © [id number] [employer], [address] [place] [address], [place] [address]. All rights reserved. This information is not intended as a  substitute for professional medical care. Always follow your healthcare professional's instructions.

## 2025-07-22 NOTE — PROGRESS NOTES
Subjective:     Patient ID:?Ana Bonilla is a 64 y.o. female.?? MR#: [id number]   ?   PRIMARY ONCOLOGIST: -->  ?   CHIEF COMPLAINT: lab review/assessment for chemo/MM ?????   ?   ONCOLOGIC DIAGNOSIS: Multiple Myeloma  ?   CURRENT TREATMENT: OP Myeloma KIA-RD daratumumab lenalidomide dexAMETHasone Q4W     HPI  Ms. Bonilla is a 64-year-old  female with past medical history significant for hypertension, COPD, asthma, GERD, hypothyroidism here for follow-up and management of multiple myeloma. BMBx on 4/6/2023 showed 60 % plasma cells. PET-CT on 4/10/2023 showed extensive lytic bony lesions throughout the spine, sternum, bilateral ribs, pelvis and mild compression deformity in L1 vertebral body. SPEP/MECHE on 3/27/2023 showed IgG lambda monoclonal protein - 3.19 g/dl. Quantitative immunoglobulins on 3/27/2023 showed IgG 4,521 mg/dl. UPEP/MECHE and FLC were pending at that time. Labs on 3/27/2023 showed Beta 2 microglobulin 1.9, .  Labs on 4/19/2023 showed Hb, 11.5, WBC 6.3, platelets, BUN 11, Cr 0.9, calcium 9. Pt initiated on VRD 05/10/23. Treatment planned changed to D-VRD 07/06/23.     Interval History: Pt presents for lab review and assessment prior to Darzalex. she states overall she is feeling well. Denies n/v/d/c, fever, chills, sob, cp, abnormal bleeding.   Oncology History   Multiple myeloma   4/20/2023 Initial Diagnosis    Multiple myeloma     5/10/2023 - 6/28/2023 Chemotherapy    Treatment Summary   Plan Name: OP VRD - WEEKLY BORTEZOMIB LENALIDOMIDE DEXAMETHASONE Q3W  Treatment Goal: Palliative  Status: Inactive  Start Date: 5/10/2023  End Date: 6/28/2023  Provider: Abram Velasquez MD  Chemotherapy: bortezomib (VELCADE) injection 2 mg, 1.3 mg/m2 = 2 mg, Subcutaneous, Clinic/[facility] 1 time, 2 of 6 cycles  Administration: 2 mg (5/10/2023), 2 mg (5/17/2023), 2 mg (6/14/2023), 2 mg (5/31/2023), 2 mg (6/21/2023), 2 mg (6/28/2023)  lenalidomide 25 mg Cap, 25 mg, Oral, Daily, 1 of 1 cycle, Start  date: 5/10/2023, End date: 5/17/2023 6/28/2023 Cancer Staged    Staging form: Plasma Cell Myeloma and Plasma Cell Disorders, AJCC 8th Edition  - Clinical stage from 6/28/2023: RISS Stage II (Beta-2-microglobulin (mg/L): 1.9, Albumin (g/dL): 3, ISS: Stage II, High-risk cytogenetics: Absent, LDH: Normal)     7/6/2023 - 1/3/2024 Chemotherapy    Treatment Summary   Plan Name: OP D-VRD DARATUMUMAB + BORTEZOMIB LENALIDOMIDE DEXAMETHASONE  Treatment Goal: Palliative  Status: Inactive  Start Date: 7/6/2023  End Date: 1/3/2024  Provider: Oscar Márquez MD  Chemotherapy: bortezomib (VELCADE) injection 2 mg, 1.3 mg/m2 = 2 mg, Subcutaneous, Clinic/[facility] 1 time, 6 of 6 cycles  Administration: 2 mg (7/6/2023), 2 mg (7/12/2023), 2 mg (8/2/2023), 2 mg (8/9/2023), 2.25 mg (10/18/2023), 2 mg (7/19/2023), 2 mg (7/26/2023), 2 mg (8/16/2023), 2.25 mg (9/20/2023), 2.25 mg (9/27/2023), 2.25 mg (10/25/2023), 2.25 mg (11/1/2023), 2.25 mg (11/8/2023), 2.25 mg (12/6/2023), 2.25 mg (1/3/2024), 2.25 mg (11/15/2023), 2.25 mg (11/22/2023), 2.25 mg (11/29/2023), 2.25 mg (12/13/2023), 2.25 mg (12/20/2023), 2.25 mg (12/26/2023)  lenalidomide 10 mg Cap, 1 capsule (100 % of original dose 1 capsule), Oral, Daily, 1 of 1 cycle, Start date: 7/26/2023, End date: 8/2/2023  Dose modification: 1 capsule (original dose 1 capsule, Cycle 0)  daratumumab-hyaluronidase-fihj subcutaneous injection 1,800 mg, 1,800 mg (100 % of original dose 1,800 mg), Subcutaneous, Clinic/[facility] 1 time, 6 of 6 cycles  Dose modification: 1,800 mg (original dose 1,800 mg, Cycle 1), 1,800 mg (original dose 1,800 mg, Cycle 1)  Administration: 1,800 mg (7/6/2023), 1,800 mg (7/12/2023), 1,800 mg (7/19/2023), 1,800 mg (7/26/2023), 1,800 mg (8/2/2023), 1,800 mg (8/9/2023), 1,800 mg (8/16/2023), 1,800 mg (9/27/2023), 1,800 mg (10/18/2023), 1,800 mg (11/1/2023), 1,800 mg (11/15/2023), 1,800 mg (11/29/2023), 1,800 mg (12/13/2023), 1,800 mg (12/26/2023)     4/30/2024 -  Chemotherapy     Treatment Summary   Plan Name: OP Myeloma KIA-RD daratumumab lenalidomide dexAMETHasone Q4W  Treatment Goal: Palliative  Status: Active  Start Date: 4/30/2024  End Date: 3/31/2026 (Planned)  Provider: Bri Luevano MD  Chemotherapy: daratumumab-hyaluronidase-fij subcutaneous injection 1,800 mg, 1,800 mg, Subcutaneous, Clinic/[facility] 1 time, 17 of 20 cycles  Administration: 1,800 mg (4/30/2024), 1,800 mg (5/7/2024), 1,800 mg (5/21/2024), 1,800 mg (5/28/2024), 1,800 mg (6/4/2024), 1,800 mg (6/25/2024), 1,800 mg (7/9/2024), 1,800 mg (10/29/2024), 1,800 mg (5/14/2024), 1,800 mg (6/11/2024), 1,800 mg (6/18/2024), 1,800 mg (7/30/2024), 1,800 mg (8/13/2024), 1,800 mg (8/27/2024), 1,800 mg (9/10/2024), 1,800 mg (9/24/2024), 1,800 mg (10/15/2024), 1,800 mg (11/26/2024), 1,800 mg (1/7/2025), 1,800 mg (2/4/2025), 1,800 mg (3/4/2025), 1,800 mg (4/1/2025), 1,800 mg (4/29/2025), 1,800 mg (5/27/2025), 1,800 mg (6/24/2025), 1,800 mg (7/22/2025)        Social History     Socioeconomic History    Marital status:     Number of children: 2   Occupational History     Employer: [employer]   Tobacco Use    Smoking status: Every Day     Current packs/day: 0.50     Average packs/day: 0.5 packs/day for 50.0 years (25.0 ttl pk-yrs)     Types: Cigarettes     Passive exposure: Never    Smokeless tobacco: Never   Substance and Sexual Activity    Alcohol use: Not Currently     Comment: quit    Drug use: No    Sexual activity: Not Currently     Partners: Male     Social Drivers of Health     Financial Resource Strain: Patient Declined (11/21/2024)    Overall Financial Resource Strain (CARDIA)     Difficulty of Paying Living Expenses: Patient declined   Food Insecurity: Patient Declined (11/21/2024)    Hunger Vital Sign     Worried About Running Out of Food in the Last Year: Patient declined     Ran Out of Food in the Last Year: Patient declined   Transportation Needs: No Transportation Needs (11/15/2023)    PRAPARE - Transportation     Lack of  Transportation (Medical): No     Lack of Transportation (Non-Medical): No   Physical Activity: Sufficiently Active (2024)    Exercise Vital Sign     Days of Exercise per Week: 3 days     Minutes of Exercise per Session: 60 min   Stress: Stress Concern Present (11/15/2023)    [employer] - Occupational Stress Questionnaire     Feeling of Stress : To some extent   Housing Stability: Low Risk  (11/15/2023)    Housing Stability Vital Sign     Unable to Pay for Housing in the Last Year: No     Number of Places Lived in the Last Year: 1     Unstable Housing in the Last Year: No      Family History   Problem Relation Name Age of Onset    Hypertension Mother Vivian     Diabetes Mother Vivian     Asthma Mother Vivian             Diabetes Father      Asthma Father      Stroke Maternal Grandmother  grandmother     Prostate cancer Maternal Grandfather      Mental illness Son Lukasz Garcia     Pancreatic cancer Maternal Uncle      Mental illness Other Pat side     Pancreatic cancer Other Mat side     Ovarian cancer Maternal Cousin        Past Surgical History:   Procedure Laterality Date    APPENDECTOMY      BIOPSY N/A 2023    Procedure: BIOPSY;  Surgeon: Fausto Smith MD;  Location: [facility] OR;  Service: Neurosurgery;  Laterality: N/A;  L1    BUNIONECTOMY      COLONOSCOPY N/A 2019    Procedure: COLONOSCOPY;  Surgeon: Danny Matos III, MD;  Location: [facility];  Service: Endoscopy;  Laterality: N/A;    COLONOSCOPY N/A 10/24/2022    Procedure: COLONOSCOPY;  Surgeon: Danny Matos III, MD;  Location: [facility];  Service: Endoscopy;  Laterality: N/A;    ESOPHAGOGASTRODUODENOSCOPY N/A 10/24/2022    Procedure: EGD (ESOPHAGOGASTRODUODENOSCOPY);  Surgeon: Danny Matos III, MD;  Location: [facility];  Service: Endoscopy;  Laterality: N/A;    FIXATION KYPHOPLASTY Bilateral 2023    Procedure: KYPHOPLASTY;  Surgeon: Fausto Smith MD;   Location: [facility] OR;  Service: Neurosurgery;  Laterality: Bilateral;  kyphoplasty and radiofrequency ablation - L1    HYSTERECTOMY      PARTIAL//still with ovaries    neck fusion  08/2017    THYROIDECTOMY      TUBAL LIGATION          Review of Systems   Constitutional:  Negative for activity change, appetite change, chills, fatigue, fever and unexpected weight change.   HENT:  Negative for congestion, dental problem, mouth sores, nosebleeds and rhinorrhea.    Eyes:  Negative for visual disturbance.   Respiratory:  Negative for cough, choking and chest tightness.    Cardiovascular:  Negative for chest pain, palpitations and leg swelling.   Gastrointestinal:  Negative for abdominal distention, abdominal pain, anal bleeding, blood in stool, constipation, diarrhea, nausea and vomiting.   Endocrine: Negative.    Genitourinary:  Negative for dysuria, frequency, hematuria and urgency.   Musculoskeletal:  Negative for arthralgias, back pain, gait problem, joint swelling and myalgias.   Skin:  Negative for rash and wound.   Allergic/Immunologic: Negative for immunocompromised state.   Neurological:  Negative for dizziness, light-headedness, numbness and headaches.   Hematological:  Negative for adenopathy. Does not bruise/bleed easily.   Psychiatric/Behavioral:  The patient is not nervous/anxious.        ?   A comprehensive 14-point review of systems was reviewed with patient and was negative other than as specified above.   ?     Objective:      Physical Exam  Vitals reviewed: virtual visit.   Neurological:      Mental Status: She is alert.           ?   There were no vitals filed for this visit.       ?       ?   Laboratory:  ?   Lab Visit on 07/22/2025   Component Date Value Ref Range Status    Magnesium  07/22/2025 1.5 (L)  1.6 - 2.6 mg/dL Final    Sodium 07/22/2025 141  136 - 145 mmol/L Final    Potassium 07/22/2025 3.6  3.5 - 5.1 mmol/L Final    Chloride 07/22/2025 109  95 - 110 mmol/L Final    CO2 07/22/2025 21 (L)  23  - 29 mmol/L Final    Glucose 07/22/2025 99  70 - 110 mg/dL Final    BUN 07/22/2025 17  8 - 23 mg/dL Final    Creatinine 07/22/2025 1.0  0.5 - 1.4 mg/dL Final    Calcium 07/22/2025 8.4 (L)  8.7 - 10.5 mg/dL Final    Protein Total 07/22/2025 7.1  6.0 - 8.4 gm/dL Final    Albumin 07/22/2025 3.6  3.5 - 5.2 g/dL Final    Bilirubin Total 07/22/2025 0.4  0.1 - 1.0 mg/dL Final    ALP 07/22/2025 58  40 - 150 unit/L Final    AST 07/22/2025 22  11 - 45 unit/L Final    ALT 07/22/2025 25  10 - 44 unit/L Final    Anion Gap 07/22/2025 11  8 - 16 mmol/L Final    eGFR 07/22/2025 >60  >60 mL/min/1.73/m2 Final    Folate Level 07/22/2025 10.0  4.0 - 24.0 ng/mL Final    Vitamin B12 07/22/2025 569  210 - 950 pg/mL Final    Iron Level 07/22/2025 62  30 - 160 ug/dL Final    Transferrin 07/22/2025 314  200 - 375 mg/dL Final    Iron Binding Capacity Total 07/22/2025 465 (H)  250 - 450 ug/dL Final    Iron Saturation 07/22/2025 13 (L)  20 - 50 % Final    Ferritin 07/22/2025 33.0  20.0 - 300.0 ng/mL Final    WBC 07/22/2025 4.45  3.90 - 12.70 K/uL Final    RBC 07/22/2025 3.93 (L)  4.00 - 5.40 M/uL Final    HGB 07/22/2025 12.7  12.0 - 16.0 gm/dL Final    HCT 07/22/2025 40.1  37.0 - 48.5 % Final    MCV 07/22/2025 102 (H)  82 - 98 fL Final    MCH 07/22/2025 32.3 (H)  27.0 - 31.0 pg Final    MCHC 07/22/2025 31.7 (L)  32.0 - 36.0 g/dL Final    RDW 07/22/2025 14.5  11.5 - 14.5 % Final    Platelet Count 07/22/2025 314  150 - 450 K/uL Final    MPV 07/22/2025 9.1 (L)  9.2 - 12.9 fL Final    Nucleated RBC 07/22/2025 0  <=0 /100 WBC Final    Neut % 07/22/2025 64.3  38 - 73 % Final    Lymph % 07/22/2025 27.9  18 - 48 % Final    Mono % 07/22/2025 4.7  4 - 15 % Final    Eos % 07/22/2025 1.8  <=8 % Final    Basophil % 07/22/2025 1.1  <=1.9 % Final    Imm Grans % 07/22/2025 0.2  0.0 - 0.5 % Final    Neut # 07/22/2025 2.86  1.8 - 7.7 K/uL Final    Lymph # 07/22/2025 1.24  1 - 4.8 K/uL Final    Mono # 07/22/2025 0.21 (L)  0.3 - 1 K/uL Final    Eos # 07/22/2025  0.08  <=0.5 K/uL Final    Baso # 07/22/2025 0.05  <=0.2 K/uL Final    Imm Grans # 07/22/2025 0.01  0.00 - 0.04 K/uL Final      ?   Imaging:    No results found. However, due to the size of the patient record, not all encounters were searched. Please check Results Review for a complete set of results.       No results found. However, due to the size of the patient record, not all encounters were searched. Please check Results Review for a complete set of results.           ?   Assessment/Plan:     Problem List Items Addressed This Visit       Multiple myeloma - Primary (Chronic)    BMBx : 60 % plasma cells - 4/6/2023  - SPEP/MECHE showed IgG lambda - monoclonal protein 3.19 g/dl - (3/27/2023).  - R-ISS stage I (beta 2 microglobulin 1.9, albumin 3.5, , normal karyotype and no high-risk mutations on fish panel  - PET-CT ( 4/10/2023) - showed extensive lytic lesions in the axial skeleton and mild L1 compression deformity.  - Started with Induction chemotherapy with VRD regimen (Velcde/Revlimid/Decadron) - 05/10/2023   - Started Aspirin daily p.o for thrombosis prophylaxis; Acyclovir 400 mg p.o BID for herpes Zoster prophylaxis .  __________________________________________________  --S/p Kyphoplasty 06/08/23    Pt seen by transplant team BMT.  However, she has declined transplant at this time   Repeat PET-CT on 1/18/24 with significantly decreased FDG uptake associated with the previously identified lesions and no new FDG avid lesions   BM Biopsy 02/13/2024 shows significant improvement, with the marrow now showing only 5% plasma cells ( was 60% at baseline)    Labs reviewed, no concerning findings   Continue revlimid, dex utd  Zometa q 3 months, last dose 05/27/25    Ok to proceed darzalex today     RTC in 4 weeks with repeat labs for Darzalex                          Med Onc Chart Routing      Follow up with physician 4 weeks. with labs prior for Darzalex   Follow up with WERNER 4 weeks. with labs prior for Darzalex    Infusion scheduling note   Darzalex   Injection scheduling note    Labs CBC, CMP, free light chains and SPEP   Scheduling:  Preferred lab:  Lab interval:     Imaging    Pharmacy appointment    Other referrals                   [facility]  Hematology/Oncology

## 2025-07-22 NOTE — NURSING
1108: Darzalex Injection given without difficulties.Bandaid applied. Patient instructed to stay in the clinic for 15 minutes. Patient verbalized understanding and will notify nurse with any complaints.

## 2025-07-23 ENCOUNTER — DOCUMENTATION ONLY (OUTPATIENT)
Dept: INTERNAL MEDICINE | Facility: CLINIC | Age: 65
End: 2025-07-23
Payer: COMMERCIAL

## 2025-07-23 ENCOUNTER — OFFICE VISIT (OUTPATIENT)
Dept: SURGERY | Facility: CLINIC | Age: 65
End: 2025-07-23
Payer: COMMERCIAL

## 2025-07-23 ENCOUNTER — PATIENT MESSAGE (OUTPATIENT)
Dept: INTERNAL MEDICINE | Facility: CLINIC | Age: 65
End: 2025-07-23
Payer: COMMERCIAL

## 2025-07-23 VITALS
HEIGHT: 64 IN | SYSTOLIC BLOOD PRESSURE: 132 MMHG | BODY MASS INDEX: 22.74 KG/M2 | HEART RATE: 69 BPM | WEIGHT: 133.19 LBS | DIASTOLIC BLOOD PRESSURE: 77 MMHG

## 2025-07-23 DIAGNOSIS — R74.8 ELEVATED ALKALINE PHOSPHATASE LEVEL: ICD-10-CM

## 2025-07-23 DIAGNOSIS — K82.8 GALLBLADDER SLUDGE: ICD-10-CM

## 2025-07-23 DIAGNOSIS — R10.13 EPIGASTRIC PAIN: ICD-10-CM

## 2025-07-23 DIAGNOSIS — C90.00 MULTIPLE MYELOMA, REMISSION STATUS UNSPECIFIED: Primary | Chronic | ICD-10-CM

## 2025-07-23 PROCEDURE — 1160F RVW MEDS BY RX/DR IN RCRD: CPT | Mod: CPTII,S$GLB,, | Performed by: SURGERY

## 2025-07-23 PROCEDURE — 3008F BODY MASS INDEX DOCD: CPT | Mod: CPTII,S$GLB,, | Performed by: SURGERY

## 2025-07-23 PROCEDURE — 99204 OFFICE O/P NEW MOD 45 MIN: CPT | Mod: S$GLB,,, | Performed by: SURGERY

## 2025-07-23 PROCEDURE — 3078F DIAST BP <80 MM HG: CPT | Mod: CPTII,S$GLB,, | Performed by: SURGERY

## 2025-07-23 PROCEDURE — 99999 PR PBB SHADOW E&M-EST. PATIENT-LVL IV: CPT | Mod: PBBFAC,,, | Performed by: SURGERY

## 2025-07-23 PROCEDURE — 4010F ACE/ARB THERAPY RXD/TAKEN: CPT | Mod: CPTII,S$GLB,, | Performed by: SURGERY

## 2025-07-23 PROCEDURE — 3075F SYST BP GE 130 - 139MM HG: CPT | Mod: CPTII,S$GLB,, | Performed by: SURGERY

## 2025-07-23 PROCEDURE — 1159F MED LIST DOCD IN RCRD: CPT | Mod: CPTII,S$GLB,, | Performed by: SURGERY

## 2025-07-23 RX ORDER — CEFAZOLIN SODIUM 2 G/50ML
2 SOLUTION INTRAVENOUS
Status: CANCELLED | OUTPATIENT
Start: 2025-07-23

## 2025-07-23 RX ORDER — SODIUM CHLORIDE 9 MG/ML
INJECTION, SOLUTION INTRAVENOUS CONTINUOUS
Status: CANCELLED | OUTPATIENT
Start: 2025-07-23

## 2025-07-23 NOTE — TELEPHONE ENCOUNTER
re op instructions reviewed with patient per phone.      To confirm, your doctor has instructed you: Surgery is scheduled for 7/31.   Pre admit office will call the afternoon prior to surgery between 1PM and 3PM with arrival time.    Surgery will be at [facility],  The address [place] [address] [address]. [place] [id number].      IMPORTANT INSTRUCTIONS!    Do not eat or drink after 12 midnight, including water. Do not smoke or use chewing tobacco after 12 midnight!  OK to brush teeth, but no gum, candy, or mints!      *Take only these medicines with a small swallow of water-morning of surgery*     Trelegy inhaler, and Synthroid.          ____ Stop taking all vitamins, herbal supplements, Aspirin, & NSAIDS (Ibuprofen, Advil, Aleve) 7 days prior to surgery, as these can thin your blood.    ____ Weight loss medication, such as Adipex and Phentermine, must be stopped 14 days prior to surgery, no exceptions!    *Diabetic Patients: If you take diabetic or weight loss medication, do NOT take morning of surgery unless instructed by Doctor. Metformin to be stopped 24 hrs prior to surgery. DO NOT take short-acting insulin the day of surgery. Only take HALF of your regular dose of long-acting insulin the night before surgery, unless instructed otherwise. Blood sugars will be checked in pre-op.   ~Ozempic/Mounjaro/Wegovy/Trulicity/Semaglutide injections must be stopped 7 days prior to surgery.     Please notify MD office if you develop an active infection, are prescribed antibiotics by someone other than the surgeon doing your surgery, or visit urgent care/ER.    Bathing Instructions:   The night before surgery and the morning prior to coming to the hospital:    - Shower & rinse your body as usual with anti-bacterial Soap (Dial or Lever 2000)   -Hibiclens (if indicated) use AFTER anti-bacterial soap; 1 packet PM/1 packet in AM on surgical site only   -Do not use hibiclens on your head, face, or genitals.    -Do not  wash with anti-bacterial soap after you use the hibiclens.    -Do not shave surgical site 5-7 days prior to surgery.    -Pubic hair 7 days prior to surgery (OBGYN/Urology only).       Additional Instructions:   __ No makeup, powder, lotions, creams, or body spray to skin   __ No deodorant if you are having: breast procedure, PORT insertion, or shoulder surgery!   __ No nail polish or artificial nails due to risk of infection.             **SURGERY MAY BE CANCELLED AT SURGEON'S DISCRETION IF ARTIFICIAL/NAIL POLISH IS PRESENT!!!**  __ Please remove all piercings and leave all jewelry at home.    **SURGERY WILL BE CANCELLED IF PIERCINGS ARE PRESENT!!!**    __ Dentures, Hearing Aids and Contact Lens need to be removed prior to the start of surgery.    __ Avoid Alcoholic beverages 3 days prior to surgery, as it can thin the blood, unless told otherwise by pre-admit department.  __ Females: may need to give a urine sample the morning of surgery;   **Please ask  for a specimen cup if you need to use the restroom prior to being called into pre-op.**  __ Males: Stop ED medications (Viagra, Cialis) 24 hrs prior to surgery.  __ You must have transportation, and they MUST stay the entire time.   __  Bring photo ID and insurance information to hospital    What to Wear:  Clean, loose-fitting clothing. Please allow for dressings/bandages/surgical equipment/drains.   ~Breast Patients: We recommend a button up shirt so you do not have to lift your arms.   Amazon Link recommended by breast surgeons if you will have drains in place: https://a.co/d/aXAJ6tX  ~Shoulder Patients: We recommend a button up shirt. If unavailable, an oversized t-shirt (2 sizes bigger than your normal size) or a stretchy dress will also work.   Amazon Link for hospital type button sleeve t-shirt: https://a.co/d/86BAbRk  ~Knee Patients: We recommend oversized pants/shorts or a dress to accommodate any knee braces in place. Braces will not be removed  to get dressed.    [facility] Visitor/Ride Policy:    Only 1 adult allowed (18 or older) to accompany you and MUST STAY through the entire admission length.      -Must have a ride home from a responsible adult that you know and trust.     -Medical Transport, Uber or Lyft can only be used if patient has a responsible adult to   accompany them during ride home.      ~Your ride MUST STAY the entire time until you are discharged~   Please notify the pre-admit department prior to surgery if you use medication transportation so we can verify your arrival/pickup time!   -The patient is responsible for setting up their own transportation!    Discharge Prescriptions:  Your discharge prescriptions will be sent next door to [facility] pharmacy, unless otherwise discussed with your surgeon. Your  will be responsible for calling the pharmacy ([phone]) to begin the prescription filling process. They will receive a text message with instructions once you are in recovery. Please make sure we have your insurance information and you bring a payment method (cash or card) if needed for prescriptions. If you have  insurance, please let the pre-op nurse know, as the pharmacy does not take this insurance.     *If you are running late or have questions the morning of surgery, please call the Pre-OP Department @ [phone].       *Please Call [facility] Pre-Admit Department for surgery instruction questions: (M-F 8AM-4:30PM)  [phone]     Financial Questions:  Jennifer: [phone]  Hours ~ 5AM-1:30PM  Monday-Friday    Billing Question Numbers:   [phone]

## 2025-07-23 NOTE — ASSESSMENT & PLAN NOTE
BMBx : 60 % plasma cells - 4/6/2023  - SPEP/MECHE showed IgG lambda - monoclonal protein 3.19 g/dl - (3/27/2023).  - R-ISS stage I (beta 2 microglobulin 1.9, albumin 3.5, , normal karyotype and no high-risk mutations on fish panel  - PET-CT ( 4/10/2023) - showed extensive lytic lesions in the axial skeleton and mild L1 compression deformity.  - Started with Induction chemotherapy with VRD regimen (Velcde/Revlimid/Decadron) - 05/10/2023   - Started Aspirin daily p.o for thrombosis prophylaxis; Acyclovir 400 mg p.o BID for herpes Zoster prophylaxis .  __________________________________________________  --S/p Kyphoplasty 06/08/23    Pt seen by transplant team BMT.  However, she has declined transplant at this time   Repeat PET-CT on 1/18/24 with significantly decreased FDG uptake associated with the previously identified lesions and no new FDG avid lesions   BM Biopsy 02/13/2024 shows significant improvement, with the marrow now showing only 5% plasma cells ( was 60% at baseline)    Labs reviewed, no concerning findings   Continue revlimid, dex utd  Zometa q 3 months, last dose 05/27/25    Ok to proceed darzalex today     RTC in 4 weeks with repeat labs for Darzalex

## 2025-07-23 NOTE — PROGRESS NOTES
History & Physical    Subjective     History of Present Illness:  Patient is a 64 y.o. female referred for gallstones.  She reports epigastric right upper quadrant abdominal pain.  It has been largely postprandial in nature.  She has associated bloating and nausea.  Recently underwent imaging showing gallstones.    No chief complaint on file.      Review of patient's allergies indicates:   Allergen Reactions    Hydrocodone-acetaminophen Other (See Comments)     Causes pt to feel extremely sick        Current Medications[1]    Past Medical History:   Diagnosis Date    Allergy     Amblyopia OS    Anxiety     Asthma     Childhood    Cancer     Multiple Myeloma    COPD (chronic obstructive pulmonary disease)     NO HOME o2    Encounter for blood transfusion     GERD (gastroesophageal reflux disease)     Hyperlipidemia     Hypertension     PONV (postoperative nausea and vomiting)     Thyroid disease      Past Surgical History:   Procedure Laterality Date    APPENDECTOMY      BIOPSY N/A 06/08/2023    Procedure: BIOPSY;  Surgeon: Fausto Smith MD;  Location: [facility];  Service: Neurosurgery;  Laterality: N/A;  L1    BUNIONECTOMY      COLONOSCOPY N/A 12/04/2019    Procedure: COLONOSCOPY;  Surgeon: Danny Matos III, MD;  Location: [facility];  Service: Endoscopy;  Laterality: N/A;    COLONOSCOPY N/A 10/24/2022    Procedure: COLONOSCOPY;  Surgeon: Danny Matos III, MD;  Location: [facility];  Service: Endoscopy;  Laterality: N/A;    ESOPHAGOGASTRODUODENOSCOPY N/A 10/24/2022    Procedure: EGD (ESOPHAGOGASTRODUODENOSCOPY);  Surgeon: Danny Matos III, MD;  Location: [facility];  Service: Endoscopy;  Laterality: N/A;    FIXATION KYPHOPLASTY Bilateral 06/08/2023    Procedure: KYPHOPLASTY;  Surgeon: Fausto Smith MD;  Location: [facility];  Service: Neurosurgery;  Laterality: Bilateral;  kyphoplasty and radiofrequency ablation - L1    HYSTERECTOMY      PARTIAL//still with ovaries    neck fusion  08/2017     "THYROIDECTOMY      TUBAL LIGATION       Family History   Problem Relation Name Age of Onset    Hypertension Mother Vivian     Diabetes Mother Vivian     Asthma Mother Vivian             Diabetes Father      Asthma Father      Stroke Maternal Grandmother  grandmother     Prostate cancer Maternal Grandfather      Mental illness Son Lukasz Garcia     Pancreatic cancer Maternal Uncle      Mental illness Other Pat side     Pancreatic cancer Other Mat side     Ovarian cancer Maternal Cousin       Social History[2]     Review of Systems:  Review of Systems   Constitutional:  Negative for activity change, chills, fatigue, fever and unexpected weight change.   HENT:  Positive for rhinorrhea. Negative for hearing loss and trouble swallowing.    Eyes:  Positive for discharge and visual disturbance.   Respiratory:  Positive for wheezing. Negative for cough, chest tightness, shortness of breath and stridor.    Cardiovascular:  Negative for chest pain, palpitations and leg swelling.   Gastrointestinal:  Positive for constipation. Negative for abdominal distention, abdominal pain, blood in stool, diarrhea, nausea and vomiting.   Endocrine: Negative for polydipsia and polyuria.   Genitourinary:  Negative for difficulty urinating, dysuria, frequency, hematuria, menstrual problem and urgency.   Musculoskeletal:  Positive for arthralgias. Negative for joint swelling and neck pain.   Skin:  Negative for color change, pallor, rash and wound.   Neurological:  Positive for headaches. Negative for weakness.   Hematological:  Does not bruise/bleed easily.   Psychiatric/Behavioral:  Negative for confusion and dysphoric mood.           Objective     Vital Signs (Most Recent)  Pulse: 69 (25 0907)  BP: 132/77 (25 0907)  5' 4" (1.626 m)  60.4 kg (133 lb 2.5 oz)     Physical Exam:  Physical Exam  Vitals reviewed.   Constitutional:       Appearance: She is well-developed.   HENT:      Head: " Normocephalic and atraumatic.   Cardiovascular:      Rate and Rhythm: Normal rate.   Pulmonary:      Effort: Pulmonary effort is normal.   Abdominal:      General: There is no distension.      Palpations: Abdomen is soft.      Tenderness: There is no abdominal tenderness.   Musculoskeletal:      Cervical back: Neck supple.   Skin:     General: Skin is warm and dry.   Neurological:      Mental Status: She is alert and oriented to person, place, and time.           Diagnostic Results:  U/S:  Impression:     Biliary sludge within the gallbladder.  No evidence for acute cholecystitis.       Assessment and Plan     64 y.o. female with symptomatic cholelithiasis/biliary colic    PLAN:    Imaging and symptoms reviewed.  We discussed potential etiologies of her discomfort.  Sludge versus enteric, constipation  Robotic Cholecystectomy   Preop: CBC, CMP, EKG  The risks of robotic/laparoscopic cholecystectomy including bleeding, infection, common bile duct injury, bile leak, injury to abdominal organs, failure to alleviate symptoms, pulmonary embolus, deep vein thrombosis, cardiac event, and possibility of conversion to an open operation were explained to the patient.   The nature of the patient's condition, probability of success, risks of refusing treatment, and alternatives and risks of the alternatives were also explained.  The patient verbalized understanding.       45 minutes of total time spent on the encounter, which includes face to face time and non-face to face time preparing to see the patient (eg, review of tests), Obtaining and/or reviewing separately obtained history, Documenting clinical information in the electronic or other health record, Independently interpreting results (not separately reported) and communicating results to the patient/family/caregiver, or Care coordination (not separately reported).                [1]   Current Outpatient Medications   Medication Sig Dispense Refill    acyclovir (ZOVIRAX)  400 MG tablet Take 1 tablet (400 mg total) by mouth 2 (two) times daily. 60 tablet 11    albuterol (PROVENTIL HFA) 90 mcg/actuation inhaler Inhale 2 puffs into the lungs every 4 (four) hours as needed for Wheezing or Shortness of Breath. Rescue 18 g 11    ALPRAZolam (XANAX) 2 MG Tab Take 1 tablet (2 mg total) by mouth 2 (two) times daily as needed (for anxiety. Up to 2 doses/day). 60 tablet 2    amlodipine-benazepril 2.5-10 mg (LOTREL) 2.5-10 mg per capsule TAKE 1 CAPSULE BY MOUTH EVERY DAY (Patient not taking: Reported on 7/23/2025) 90 capsule 3    aspirin 81 MG Chew Take 1 tablet (81 mg total) by mouth once daily. (Patient not taking: Reported on 7/23/2025) 30 tablet 11    azelastine (ASTELIN) 137 mcg (0.1 %) nasal spray 1 spray (137 mcg total) by Nasal route 2 (two) times daily. 30 mL 3    calcium-vitamin D 600 mg-10 mcg (400 unit) Tab Take 1 tablet by mouth every 12 (twelve) hours. 60 tablet 2    dexAMETHasone (DECADRON) 4 MG Tab Take 10 tablets (40 mg total) by mouth every 7 days. Take with food. 40 tablet 11    fluticasone propionate (FLONASE) 50 mcg/actuation nasal spray 2 sprays (100 mcg total) by Each Nostril route once daily. 16 g 11    fluticasone-umeclidin-vilanter (TRELEGY ELLIPTA) 200-62.5-25 mcg inhaler Inhale 1 puff into the lungs once daily. 60 each 11    hydrOXYzine HCL (ATARAX) 25 MG tablet Take 1 tablet (25 mg total) by mouth 3 (three) times daily as needed for Itching. 21 tablet 0    ipratropium (ATROVENT) 42 mcg (0.06 %) nasal spray 2 sprays by Each Nostril route 4 (four) times daily as needed for Rhinitis. 30 mL 11    lenalidomide 10 mg Cap TAKE 1 CAPSULE ONCE DAILY FOR 21 DAYS AND THEN 7 DAYS OFF 21 capsule 0    levothyroxine (SYNTHROID) 100 MCG tablet TAKE 1 TABLET(100 MCG) BY MOUTH BEFORE BREAKFAST 90 tablet 1    linaCLOtide (LINZESS) 72 mcg Cap capsule Take 1 capsule (72 mcg total) by mouth once daily. 30 capsule 11    loratadine (CLARITIN) 10 mg tablet Take 1 tablet (10 mg total) by mouth  once daily. 30 tablet 11    magnesium glycinate 100 mg Tab Take 100 mg by mouth once daily. for 90 doses 30 tablet 2    metoprolol succinate (TOPROL-XL) 50 MG 24 hr tablet Take 1 tablet (50 mg total) by mouth every evening. 90 tablet 3    montelukast (SINGULAIR) 10 mg tablet Take 1 tablet (10 mg total) by mouth every evening. 90 tablet 3    naloxone (NARCAN) 4 mg/actuation Spry       ondansetron (ZOFRAN) 4 MG tablet Take 1 tablet (4 mg total) by mouth every 8 (eight) hours as needed for Nausea. 30 tablet 0    potassium chloride (KLOR-CON) 10 MEQ TbSR Take 1 tablet (10 mEq total) by mouth once daily. 30 tablet 0    potassium phosphate, monobasic, (K-PHOS) 500 mg TbSO Take 1 tablet (500 mg total) by mouth once daily. 30 tablet 11    potassium, sodium phosphates (PHOS-NAK) 280-160-250 mg PwPk Take 1 packet by mouth 4 (four) times daily with meals and nightly. 100 packet 0    rosuvastatin (CRESTOR) 10 MG tablet Take 1 tablet (10 mg total) by mouth every evening. (Patient not taking: Reported on 7/23/2025) 90 tablet 3    k phos di & mono-sod phos mono (PHOSPHOROUS) 250 mg Tab Take 1 tablet by mouth 4 (four) times daily with meals and nightly. for 5 days 20 each 0    zolpidem (AMBIEN) 5 MG Tab Take 1 tablet (5 mg total) by mouth nightly as needed (insomnia). (Patient not taking: Reported on 7/7/2025) 20 tablet 0     Current Facility-Administered Medications   Medication Dose Route Frequency Provider Last Rate Last Admin    dexAMETHasone injection 40 mg  40 mg Intravenous 1 time in Clinic/HOD Bri Luevano MD       [2]   Social History  Tobacco Use    Smoking status: Every Day     Current packs/day: 0.50     Average packs/day: 0.5 packs/day for 50.0 years (25.0 ttl pk-yrs)     Types: Cigarettes     Passive exposure: Never    Smokeless tobacco: Never   Substance Use Topics    Alcohol use: Not Currently     Comment: quit    Drug use: No

## 2025-07-23 NOTE — H&P (VIEW-ONLY)
History & Physical    Subjective     History of Present Illness:  Patient is a 64 y.o. female referred for gallstones.  She reports epigastric right upper quadrant abdominal pain.  It has been largely postprandial in nature.  She has associated bloating and nausea.  Recently underwent imaging showing gallstones.    No chief complaint on file.      Review of patient's allergies indicates:   Allergen Reactions    Hydrocodone-acetaminophen Other (See Comments)     Causes pt to feel extremely sick        Current Medications[1]    Past Medical History:   Diagnosis Date    Allergy     Amblyopia OS    Anxiety     Asthma     Childhood    Cancer     Multiple Myeloma    COPD (chronic obstructive pulmonary disease)     NO HOME o2    Encounter for blood transfusion     GERD (gastroesophageal reflux disease)     Hyperlipidemia     Hypertension     PONV (postoperative nausea and vomiting)     Thyroid disease      Past Surgical History:   Procedure Laterality Date    APPENDECTOMY      BIOPSY N/A 06/08/2023    Procedure: BIOPSY;  Surgeon: Fausto Smith MD;  Location: [facility];  Service: Neurosurgery;  Laterality: N/A;  L1    BUNIONECTOMY      COLONOSCOPY N/A 12/04/2019    Procedure: COLONOSCOPY;  Surgeon: Danny Matos III, MD;  Location: [facility];  Service: Endoscopy;  Laterality: N/A;    COLONOSCOPY N/A 10/24/2022    Procedure: COLONOSCOPY;  Surgeon: Danyn Matos III, MD;  Location: [facility];  Service: Endoscopy;  Laterality: N/A;    ESOPHAGOGASTRODUODENOSCOPY N/A 10/24/2022    Procedure: EGD (ESOPHAGOGASTRODUODENOSCOPY);  Surgeon: Danny Matos III, MD;  Location: [facility];  Service: Endoscopy;  Laterality: N/A;    FIXATION KYPHOPLASTY Bilateral 06/08/2023    Procedure: KYPHOPLASTY;  Surgeon: Fausto Smith MD;  Location: [facility];  Service: Neurosurgery;  Laterality: Bilateral;  kyphoplasty and radiofrequency ablation - L1    HYSTERECTOMY      PARTIAL//still with ovaries    neck fusion  08/2017     "THYROIDECTOMY      TUBAL LIGATION       Family History   Problem Relation Name Age of Onset    Hypertension Mother Vivian     Diabetes Mother Vivian     Asthma Mother Vivian             Diabetes Father      Asthma Father      Stroke Maternal Grandmother  grandmother     Prostate cancer Maternal Grandfather      Mental illness Son Lukasz Garcia     Pancreatic cancer Maternal Uncle      Mental illness Other Pat side     Pancreatic cancer Other Mat side     Ovarian cancer Maternal Cousin       Social History[2]     Review of Systems:  Review of Systems   Constitutional:  Negative for activity change, chills, fatigue, fever and unexpected weight change.   HENT:  Positive for rhinorrhea. Negative for hearing loss and trouble swallowing.    Eyes:  Positive for discharge and visual disturbance.   Respiratory:  Positive for wheezing. Negative for cough, chest tightness, shortness of breath and stridor.    Cardiovascular:  Negative for chest pain, palpitations and leg swelling.   Gastrointestinal:  Positive for constipation. Negative for abdominal distention, abdominal pain, blood in stool, diarrhea, nausea and vomiting.   Endocrine: Negative for polydipsia and polyuria.   Genitourinary:  Negative for difficulty urinating, dysuria, frequency, hematuria, menstrual problem and urgency.   Musculoskeletal:  Positive for arthralgias. Negative for joint swelling and neck pain.   Skin:  Negative for color change, pallor, rash and wound.   Neurological:  Positive for headaches. Negative for weakness.   Hematological:  Does not bruise/bleed easily.   Psychiatric/Behavioral:  Negative for confusion and dysphoric mood.           Objective     Vital Signs (Most Recent)  Pulse: 69 (25 0907)  BP: 132/77 (25 0907)  5' 4" (1.626 m)  60.4 kg (133 lb 2.5 oz)     Physical Exam:  Physical Exam  Vitals reviewed.   Constitutional:       Appearance: She is well-developed.   HENT:      Head: " Normocephalic and atraumatic.   Cardiovascular:      Rate and Rhythm: Normal rate.   Pulmonary:      Effort: Pulmonary effort is normal.   Abdominal:      General: There is no distension.      Palpations: Abdomen is soft.      Tenderness: There is no abdominal tenderness.   Musculoskeletal:      Cervical back: Neck supple.   Skin:     General: Skin is warm and dry.   Neurological:      Mental Status: She is alert and oriented to person, place, and time.           Diagnostic Results:  U/S:  Impression:     Biliary sludge within the gallbladder.  No evidence for acute cholecystitis.       Assessment and Plan     64 y.o. female with symptomatic cholelithiasis/biliary colic    PLAN:    Imaging and symptoms reviewed.  We discussed potential etiologies of her discomfort.  Sludge versus enteric, constipation  Robotic Cholecystectomy   Preop: CBC, CMP, EKG  The risks of robotic/laparoscopic cholecystectomy including bleeding, infection, common bile duct injury, bile leak, injury to abdominal organs, failure to alleviate symptoms, pulmonary embolus, deep vein thrombosis, cardiac event, and possibility of conversion to an open operation were explained to the patient.   The nature of the patient's condition, probability of success, risks of refusing treatment, and alternatives and risks of the alternatives were also explained.  The patient verbalized understanding.       45 minutes of total time spent on the encounter, which includes face to face time and non-face to face time preparing to see the patient (eg, review of tests), Obtaining and/or reviewing separately obtained history, Documenting clinical information in the electronic or other health record, Independently interpreting results (not separately reported) and communicating results to the patient/family/caregiver, or Care coordination (not separately reported).                [1]   Current Outpatient Medications   Medication Sig Dispense Refill    acyclovir (ZOVIRAX)  400 MG tablet Take 1 tablet (400 mg total) by mouth 2 (two) times daily. 60 tablet 11    albuterol (PROVENTIL HFA) 90 mcg/actuation inhaler Inhale 2 puffs into the lungs every 4 (four) hours as needed for Wheezing or Shortness of Breath. Rescue 18 g 11    ALPRAZolam (XANAX) 2 MG Tab Take 1 tablet (2 mg total) by mouth 2 (two) times daily as needed (for anxiety. Up to 2 doses/day). 60 tablet 2    amlodipine-benazepril 2.5-10 mg (LOTREL) 2.5-10 mg per capsule TAKE 1 CAPSULE BY MOUTH EVERY DAY (Patient not taking: Reported on 7/23/2025) 90 capsule 3    aspirin 81 MG Chew Take 1 tablet (81 mg total) by mouth once daily. (Patient not taking: Reported on 7/23/2025) 30 tablet 11    azelastine (ASTELIN) 137 mcg (0.1 %) nasal spray 1 spray (137 mcg total) by Nasal route 2 (two) times daily. 30 mL 3    calcium-vitamin D 600 mg-10 mcg (400 unit) Tab Take 1 tablet by mouth every 12 (twelve) hours. 60 tablet 2    dexAMETHasone (DECADRON) 4 MG Tab Take 10 tablets (40 mg total) by mouth every 7 days. Take with food. 40 tablet 11    fluticasone propionate (FLONASE) 50 mcg/actuation nasal spray 2 sprays (100 mcg total) by Each Nostril route once daily. 16 g 11    fluticasone-umeclidin-vilanter (TRELEGY ELLIPTA) 200-62.5-25 mcg inhaler Inhale 1 puff into the lungs once daily. 60 each 11    hydrOXYzine HCL (ATARAX) 25 MG tablet Take 1 tablet (25 mg total) by mouth 3 (three) times daily as needed for Itching. 21 tablet 0    ipratropium (ATROVENT) 42 mcg (0.06 %) nasal spray 2 sprays by Each Nostril route 4 (four) times daily as needed for Rhinitis. 30 mL 11    lenalidomide 10 mg Cap TAKE 1 CAPSULE ONCE DAILY FOR 21 DAYS AND THEN 7 DAYS OFF 21 capsule 0    levothyroxine (SYNTHROID) 100 MCG tablet TAKE 1 TABLET(100 MCG) BY MOUTH BEFORE BREAKFAST 90 tablet 1    linaCLOtide (LINZESS) 72 mcg Cap capsule Take 1 capsule (72 mcg total) by mouth once daily. 30 capsule 11    loratadine (CLARITIN) 10 mg tablet Take 1 tablet (10 mg total) by mouth  once daily. 30 tablet 11    magnesium glycinate 100 mg Tab Take 100 mg by mouth once daily. for 90 doses 30 tablet 2    metoprolol succinate (TOPROL-XL) 50 MG 24 hr tablet Take 1 tablet (50 mg total) by mouth every evening. 90 tablet 3    montelukast (SINGULAIR) 10 mg tablet Take 1 tablet (10 mg total) by mouth every evening. 90 tablet 3    naloxone (NARCAN) 4 mg/actuation Spry       ondansetron (ZOFRAN) 4 MG tablet Take 1 tablet (4 mg total) by mouth every 8 (eight) hours as needed for Nausea. 30 tablet 0    potassium chloride (KLOR-CON) 10 MEQ TbSR Take 1 tablet (10 mEq total) by mouth once daily. 30 tablet 0    potassium phosphate, monobasic, (K-PHOS) 500 mg TbSO Take 1 tablet (500 mg total) by mouth once daily. 30 tablet 11    potassium, sodium phosphates (PHOS-NAK) 280-160-250 mg PwPk Take 1 packet by mouth 4 (four) times daily with meals and nightly. 100 packet 0    rosuvastatin (CRESTOR) 10 MG tablet Take 1 tablet (10 mg total) by mouth every evening. (Patient not taking: Reported on 7/23/2025) 90 tablet 3    k phos di & mono-sod phos mono (PHOSPHOROUS) 250 mg Tab Take 1 tablet by mouth 4 (four) times daily with meals and nightly. for 5 days 20 each 0    zolpidem (AMBIEN) 5 MG Tab Take 1 tablet (5 mg total) by mouth nightly as needed (insomnia). (Patient not taking: Reported on 7/7/2025) 20 tablet 0     Current Facility-Administered Medications   Medication Dose Route Frequency Provider Last Rate Last Admin    dexAMETHasone injection 40 mg  40 mg Intravenous 1 time in Clinic/HOD Bri Luevano MD       [2]   Social History  Tobacco Use    Smoking status: Every Day     Current packs/day: 0.50     Average packs/day: 0.5 packs/day for 50.0 years (25.0 ttl pk-yrs)     Types: Cigarettes     Passive exposure: Never    Smokeless tobacco: Never   Substance Use Topics    Alcohol use: Not Currently     Comment: quit    Drug use: No

## 2025-07-23 NOTE — PROGRESS NOTES
Pre op instructions reviewed with patient per phone.      To confirm, your doctor has instructed you: Surgery is scheduled for 7/31.   Pre admit office will call the afternoon prior to surgery between 1PM and 3PM with arrival time.    Surgery will be at [facility],  The address is [address]. [place] [id number].      IMPORTANT INSTRUCTIONS!    Do not eat or drink after 12 midnight, including water. Do not smoke or use chewing tobacco after 12 midnight!  OK to brush teeth, but no gum, candy, or mints!      *Take only these medicines with a small swallow of water-morning of surgery*     Trelegy inhaler, and Synthroid.          ____ Stop taking all vitamins, herbal supplements, Aspirin, & NSAIDS (Ibuprofen, Advil, Aleve) 7 days prior to surgery, as these can thin your blood.    ____ Weight loss medication, such as Adipex and Phentermine, must be stopped 14 days prior to surgery, no exceptions!    *Diabetic Patients: If you take diabetic or weight loss medication, do NOT take morning of surgery unless instructed by Doctor. Metformin to be stopped 24 hrs prior to surgery. DO NOT take short-acting insulin the day of surgery. Only take HALF of your regular dose of long-acting insulin the night before surgery, unless instructed otherwise. Blood sugars will be checked in pre-op.   ~Ozempic/Mounjaro/Wegovy/Trulicity/Semaglutide injections must be stopped 7 days prior to surgery.     Please notify MD office if you develop an active infection, are prescribed antibiotics by someone other than the surgeon doing your surgery, or visit urgent care/ER.    Bathing Instructions:   The night before surgery and the morning prior to coming to the hospital:    - Shower & rinse your body as usual with anti-bacterial Soap (Dial or Lever 2000)   -Hibiclens (if indicated) use AFTER anti-bacterial soap; 1 packet PM/1 packet in AM on surgical site only   -Do not use hibiclens on your head, face, or genitals.    -Do not  wash with anti-bacterial soap after you use the hibiclens.    -Do not shave surgical site 5-7 days prior to surgery.    -Pubic hair 7 days prior to surgery (OBGYN/Urology only).       Additional Instructions:   __ No makeup, powder, lotions, creams, or body spray to skin   __ No deodorant if you are having: breast procedure, PORT insertion, or shoulder surgery!   __ No nail polish or artificial nails due to risk of infection.             **SURGERY MAY BE CANCELLED AT SURGEON'S DISCRETION IF ARTIFICIAL/NAIL POLISH IS PRESENT!!!**  __ Please remove all piercings and leave all jewelry at home.    **SURGERY WILL BE CANCELLED IF PIERCINGS ARE PRESENT!!!**    __ Dentures, Hearing Aids and Contact Lens need to be removed prior to the start of surgery.    __ Avoid Alcoholic beverages 3 days prior to surgery, as it can thin the blood, unless told otherwise by pre-admit department.  __ Females: may need to give a urine sample the morning of surgery;   **Please ask  for a specimen cup if you need to use the restroom prior to being called into pre-op.**  __ Males: Stop ED medications (Viagra, Cialis) 24 hrs prior to surgery.  __ You must have transportation, and they MUST stay the entire time.   __  Bring photo ID and insurance information to hospital    What to Wear:  Clean, loose-fitting clothing. Please allow for dressings/bandages/surgical equipment/drains.   ~Breast Patients: We recommend a button up shirt so you do not have to lift your arms.   Amazon Link recommended by breast surgeons if you will have drains in place: https://a.co/d/[email or web address]  ~Shoulder Patients: We recommend a button up shirt. If unavailable, an oversized t-shirt (2 sizes bigger than your normal size) or a stretchy dress will also work.   Amazon Link for hospital type button sleeve t-shirt: https://a.co/d/86BAbRk  ~Knee Patients: We recommend oversized pants/shorts or a dress to accommodate any knee braces in place. Braces will not be removed  to get dressed.    [facility] Visitor/Ride Policy:    Only 1 adult allowed (18 or older) to accompany you and MUST STAY through the entire admission length.      -Must have a ride home from a responsible adult that you know and trust.     -Medical Transport, Uber or Lyft can only be used if patient has a responsible adult to   accompany them during ride home.      ~Your ride MUST STAY the entire time until you are discharged~   Please notify the pre-admit department prior to surgery if you use medication transportation so we can verify your arrival/pickup time!   -The patient is responsible for setting up their own transportation!    Discharge Prescriptions:  Your discharge prescriptions will be sent next door to [facility], unless otherwise discussed with your surgeon. Your  will be responsible for calling the pharmacy ([phone]) to begin the prescription filling process. They will receive a text message with instructions once you are in recovery. Please make sure we have your insurance information and you bring a payment method (cash or card) if needed for prescriptions. If you have  insurance, please let the pre-op nurse know, as the pharmacy does not take this insurance.     *If you are running late or have questions the morning of surgery, please call the Pre-OP Department @ [phone].       *Please Call [facility] Pre-Admit Department for surgery instruction questions: (M-F 8AM-4:30PM)  [phone]     Financial Questions:  Jennifer: [phone]  Hours ~ 5AM-1:30PM  Monday-Friday    Billing Question Numbers:   [phone]

## 2025-07-24 NOTE — ASSESSMENT & PLAN NOTE
Metastasis to thoracic/lumbar spine, complicated by L1 compression fracture.  Followed by Dr. Luevano, Dr. Garcia, and Neurosurgery.   On D-VRD, Lenalidomide and Zometa. S/P L1 Kyphoplasty (June 2023).   Pt remains hesitant about SCT due extended recovery time/fear of relapse post SCT  PET scan 1/18/2024: 1. Findings consistent with treatment response with significantly decreased FDG uptake associated with the previously identified lesions. No new FDG avid lesions are identified    [de-identified] : Ms. MACY SANDOVAL is a 77-year-old woman who returns for a follow up visit  She was initially seen in consultation on 2/1/2022.  She has a prior history of invasive ductal carcinoma of the right breast status post lumpectomy 6/2019 with Dr. Tashi Fountain (pathology T1N0, ER+/VT+/HER2-, negative margins). She did not undergo radiation and is under the impression that the pathology was benign.     She is s/p Bilateral breast lumpectomies after Magseed placement, four sites on the left, one on the right and left axillary SLNB on 4/18/2022.  **It is of note that I strongly encouraged the patient to undergo mastectomies, but she was adamant about breast conservation. 1) Left breast medial lumpectomy: 2.5 cm IDC and DCIS, 2 left sentinel nodes w/ metastatic disease (T2N1a) ER+/VT+/HER2- (*DCIS involves the inferior margin) 2) Left breast lateral lumpectomy: DCIS 3) Right lumpectomy: LCIS, ADH  B/L mammo/sono 7/17/23:  Irregular focal asymmetry in the lower inner left breast by mammography corresponding to the palpable area of concern and corresponding to suspicious heterogeneous masses at 9:00 and 10:00 on sonography. Ultrasound-guided core biopsy of the mass at 9:00 is recommended. Further management of the mass at 10:00 will depend on the results of this biopsy. BIRADS 5   Left breast 9:00 N4 biopsy 12/6/23: Invasive moderately differentiated ductal carcinoma, with solid papillary and mucinous features. Measures 13mm.   Her past medical history includes hypertension, hyperlipidemia (patient defers medical management), intermittent asthma (uses rescue inhaler on occasion) and constipation (on linzess).  She has a family history of breast cancer involving her maternal aunt (post-menopausal)  She is adamant that she will not have a mastectomy.  breast MRI 1/2024  - bx proven malignancy to central inner breast extending to surface measuring up to 2.6 cm, additional mass in the subareolar left breast also highly suspicious for malignancy, additional clumped nonmass enhancement in the central slightly lateral left breast, spanning 6.3 cm suspicious for additional disease  MR biopsies 3/5/2024 ([facility]) - left 3:00 N6 (cork): microinvasive carcinoma, DCIS - Left RA (stoplight): invasive mammary ductal carcinoma w/ mucinous features in this sampling, DCIS   5/21/2024: Tele health discussion included Macy and her niece. Macy is still adamant of having a lumpectomy, but her niece understands the importance of the plan for a double mastectomy.   6/4/2024: Patient presents for discussion of mastectomy with her niece.  The patient now understands the rationale for the mastectomy I explained that it could be done with immediate reconstruction so the patient was referred to a plastic surgeon to discuss reconstructive options.  Left total mastectomy and L axillary node dissection on 8/23/2024. Pathology: 0/7 LN negative, invasive ductal carcinoma, 3.4 cm, invasive lobular carcinoma, 0.7 cm. negative margins. ER/VT + HER2-   9/10/2024: Macy presents with left breast mastectomy bed seroma and discomfort  9/17/2024: Macy presents continuous left breast mastectomy bed seroma and discomfort  10/8/2024: Macy presents with discomfort. Seroma continues to improve  1/14/2025: Here for a check up, recently fell at her laundromat in Nov 2024 and fractured her left shoulder.

## 2025-07-26 LAB — W METHYLMALONIC ACID: 0.26 UMOL/L

## 2025-07-27 ENCOUNTER — TELEPHONE (OUTPATIENT)
Dept: CARDIOLOGY | Facility: CLINIC | Age: 65
End: 2025-07-27
Payer: COMMERCIAL

## 2025-07-27 NOTE — TELEPHONE ENCOUNTER
----- Message from Abel Prieto sent at 7/23/2025 10:42 AM CDT -----  Please advise  ----- Message -----  From: Lacey Lugo MA  Sent: 7/23/2025   9:48 AM CDT  To: Nas Diaz Staff; Bassem Gregory Staff    Patient is having surgery on 7/31/2025 for a cholecystectomy and needs clearance for surgery.

## 2025-07-27 NOTE — TELEPHONE ENCOUNTER
E consult for preop clearance of cholecystectomy   The chart reviewed.  PMH HTN, HLD, mod AI, PAD, multiple myeloma COPD, hiatal hernia,   06/25 EKG reviewed by myself today reveals NSR nonspecific STT change'    Plan  Elevated periop risk of CV events for non-high risk procedure.  Ok to proceed the scheduled procedure without further cardiac study.

## 2025-07-28 ENCOUNTER — PATIENT MESSAGE (OUTPATIENT)
Dept: HEMATOLOGY/ONCOLOGY | Facility: CLINIC | Age: 65
End: 2025-07-28
Payer: COMMERCIAL

## 2025-07-28 ENCOUNTER — LAB VISIT (OUTPATIENT)
Dept: LAB | Facility: HOSPITAL | Age: 65
End: 2025-07-28
Attending: NURSE PRACTITIONER
Payer: COMMERCIAL

## 2025-07-28 DIAGNOSIS — C90.00 MULTIPLE MYELOMA, REMISSION STATUS UNSPECIFIED: ICD-10-CM

## 2025-07-28 DIAGNOSIS — D53.9 MACROCYTIC ANEMIA: ICD-10-CM

## 2025-07-28 DIAGNOSIS — R10.13 EPIGASTRIC PAIN: ICD-10-CM

## 2025-07-28 DIAGNOSIS — Z13.220 ENCOUNTER FOR SCREENING FOR LIPID DISORDER: ICD-10-CM

## 2025-07-28 DIAGNOSIS — R74.8 ELEVATED LIVER ENZYMES: ICD-10-CM

## 2025-07-28 DIAGNOSIS — K82.8 GALLBLADDER SLUDGE: ICD-10-CM

## 2025-07-28 LAB
ABSOLUTE EOSINOPHIL (OHS): 0.09 K/UL
ABSOLUTE MONOCYTE (OHS): 0.45 K/UL (ref 0.3–1)
ABSOLUTE NEUTROPHIL COUNT (OHS): 2.48 K/UL (ref 1.8–7.7)
ALBUMIN SERPL BCP-MCNC: 4.1 G/DL (ref 3.5–5.2)
ALP SERPL-CCNC: 56 UNIT/L (ref 40–150)
ALT SERPL W/O P-5'-P-CCNC: 35 UNIT/L (ref 0–55)
ANION GAP (OHS): 11 MMOL/L (ref 8–16)
AST SERPL-CCNC: 32 UNIT/L (ref 0–50)
BASOPHILS # BLD AUTO: 0.07 K/UL
BASOPHILS NFR BLD AUTO: 1.5 %
BILIRUB SERPL-MCNC: 0.8 MG/DL (ref 0.1–1)
BUN SERPL-MCNC: 13 MG/DL (ref 8–23)
CALCIUM SERPL-MCNC: 9.3 MG/DL (ref 8.7–10.5)
CERULOPLASMIN SERPL-MCNC: 33 MG/DL (ref 15–45)
CHLORIDE SERPL-SCNC: 108 MMOL/L (ref 95–110)
CHOLEST SERPL-MCNC: 255 MG/DL (ref 120–199)
CHOLEST/HDLC SERPL: 2.9 {RATIO} (ref 2–5)
CO2 SERPL-SCNC: 23 MMOL/L (ref 23–29)
CREAT SERPL-MCNC: 0.9 MG/DL (ref 0.5–1.4)
ERYTHROCYTE [DISTWIDTH] IN BLOOD BY AUTOMATED COUNT: 14.2 % (ref 11.5–14.5)
GFR SERPLBLD CREATININE-BSD FMLA CKD-EPI: >60 ML/MIN/1.73/M2
GLUCOSE SERPL-MCNC: 106 MG/DL (ref 70–110)
HAV AB SER QL IA: NORMAL
HCT VFR BLD AUTO: 42 % (ref 37–48.5)
HDLC SERPL-MCNC: 89 MG/DL (ref 40–75)
HDLC SERPL: 34.9 % (ref 20–50)
HGB BLD-MCNC: 13.3 GM/DL (ref 12–16)
IGA SERPL-MCNC: 91 MG/DL (ref 40–350)
IGG SERPL-MCNC: 1011 MG/DL (ref 650–1600)
IGM SERPL-MCNC: 24 MG/DL (ref 50–300)
IMM GRANULOCYTES # BLD AUTO: 0 K/UL (ref 0–0.04)
IMM GRANULOCYTES NFR BLD AUTO: 0 % (ref 0–0.5)
INR PPP: 1 (ref 0.8–1.2)
LDH SERPL-CCNC: 220 U/L (ref 110–260)
LDLC SERPL CALC-MCNC: 150.6 MG/DL (ref 63–159)
LYMPHOCYTES # BLD AUTO: 1.6 K/UL (ref 1–4.8)
MCH RBC QN AUTO: 31.8 PG (ref 27–31)
MCHC RBC AUTO-ENTMCNC: 31.7 G/DL (ref 32–36)
MCV RBC AUTO: 101 FL (ref 82–98)
NONHDLC SERPL-MCNC: 166 MG/DL
NUCLEATED RBC (/100WBC) (OHS): 0 /100 WBC
PHOSPHATE SERPL-MCNC: 2.3 MG/DL (ref 2.7–4.5)
PLATELET # BLD AUTO: 277 K/UL (ref 150–450)
PMV BLD AUTO: 10.5 FL (ref 9.2–12.9)
POTASSIUM SERPL-SCNC: 3.2 MMOL/L (ref 3.5–5.1)
PROT SERPL-MCNC: 7.4 GM/DL (ref 6–8.4)
PROTHROMBIN TIME: 10.9 SECONDS (ref 9–12.5)
RBC # BLD AUTO: 4.18 M/UL (ref 4–5.4)
RELATIVE EOSINOPHIL (OHS): 1.9 %
RELATIVE LYMPHOCYTE (OHS): 34.1 % (ref 18–48)
RELATIVE MONOCYTE (OHS): 9.6 % (ref 4–15)
RELATIVE NEUTROPHIL (OHS): 52.9 % (ref 38–73)
SODIUM SERPL-SCNC: 142 MMOL/L (ref 136–145)
TRIGL SERPL-MCNC: 77 MG/DL (ref 30–150)
WBC # BLD AUTO: 4.69 K/UL (ref 3.9–12.7)

## 2025-07-28 PROCEDURE — 83615 LACTATE (LD) (LDH) ENZYME: CPT

## 2025-07-28 PROCEDURE — 86790 VIRUS ANTIBODY NOS: CPT

## 2025-07-28 PROCEDURE — 82525 ASSAY OF COPPER: CPT

## 2025-07-28 PROCEDURE — 80053 COMPREHEN METABOLIC PANEL: CPT

## 2025-07-28 PROCEDURE — 82390 ASSAY OF CERULOPLASMIN: CPT

## 2025-07-28 PROCEDURE — 82784 ASSAY IGA/IGD/IGG/IGM EACH: CPT

## 2025-07-28 PROCEDURE — 80061 LIPID PANEL: CPT

## 2025-07-28 PROCEDURE — 36415 COLL VENOUS BLD VENIPUNCTURE: CPT

## 2025-07-28 PROCEDURE — 86334 IMMUNOFIX E-PHORESIS SERUM: CPT

## 2025-07-28 PROCEDURE — 84100 ASSAY OF PHOSPHORUS: CPT

## 2025-07-28 PROCEDURE — 85025 COMPLETE CBC W/AUTO DIFF WBC: CPT

## 2025-07-28 PROCEDURE — 85610 PROTHROMBIN TIME: CPT

## 2025-07-29 ENCOUNTER — PATIENT MESSAGE (OUTPATIENT)
Dept: GASTROENTEROLOGY | Facility: CLINIC | Age: 65
End: 2025-07-29
Payer: COMMERCIAL

## 2025-07-29 ENCOUNTER — OFFICE VISIT (OUTPATIENT)
Dept: HEMATOLOGY/ONCOLOGY | Facility: CLINIC | Age: 65
End: 2025-07-29
Payer: COMMERCIAL

## 2025-07-29 ENCOUNTER — ANESTHESIA EVENT (OUTPATIENT)
Dept: SURGERY | Facility: HOSPITAL | Age: 65
End: 2025-07-29
Payer: COMMERCIAL

## 2025-07-29 DIAGNOSIS — C90.00 MULTIPLE MYELOMA, REMISSION STATUS UNSPECIFIED: Primary | Chronic | ICD-10-CM

## 2025-07-29 LAB — PATHOLOGIST INTERPRETATION - IFE SERUM (OHS): NORMAL

## 2025-07-29 NOTE — ANESTHESIA PREPROCEDURE EVALUATION
07/29/2025  Ana Bonilla is a 64 y.o., female.    Patient Active Problem List   Diagnosis    COPD with asthma    GERD (gastroesophageal reflux disease)    Acquired hypothyroidism    Tobacco use    PVD (peripheral vascular disease)    Anxiety    Dyslipidemia    Claudication    Essential hypertension    Allergic rhinitis    Pain in both lower extremities    Anisometropic amblyopia of left eye    Nonrheumatic aortic valve insufficiency    Colon polyps    Precordial pain    Tachycardia    Difficulty walking    Low back pain, unspecified    Multiple myeloma    Compressed spine fracture    Dehydration    Immunodeficiency due to chemotherapy    Secondary malignant neoplasm of bone    Hypokalemia    Palliative care encounter    Chalazion left upper eyelid    Hypocalcemia    Sacroiliitis    Poor appetite    Cancer related pain    Needs smoking cessation education    Psychophysiological insomnia    Drug-induced insomnia    Hypophosphatemia    Environmental allergies    Severe persistent asthma without complication    Chronic obstructive pulmonary disease    Muscle cramp    Transaminitis     Past Surgical History:   Procedure Laterality Date    APPENDECTOMY      BIOPSY N/A 06/08/2023    Procedure: BIOPSY;  Surgeon: Fausto Smith MD;  Location: [facility] OR;  Service: Neurosurgery;  Laterality: N/A;  L1    BUNIONECTOMY      COLONOSCOPY N/A 12/04/2019    Procedure: COLONOSCOPY;  Surgeon: Danny Matos III, MD;  Location: [facility];  Service: Endoscopy;  Laterality: N/A;    COLONOSCOPY N/A 10/24/2022    Procedure: COLONOSCOPY;  Surgeon: Danny Matos III, MD;  Location: [facility] ENDO;  Service: Endoscopy;  Laterality: N/A;    ESOPHAGOGASTRODUODENOSCOPY N/A 10/24/2022    Procedure: EGD (ESOPHAGOGASTRODUODENOSCOPY);  Surgeon: Danny Matos III, MD;  Location: [facility] ENDO;  Service: Endoscopy;  Laterality: N/A;     FIXATION KYPHOPLASTY Bilateral 06/08/2023    Procedure: KYPHOPLASTY;  Surgeon: Fausto Smith MD;  [facility];  Service: Neurosurgery;  Laterality: Bilateral;  kyphoplasty and radiofrequency ablation - L1    HYSTERECTOMY      PARTIAL//still with ovaries    neck fusion  08/2017    THYROIDECTOMY      TUBAL LIGATION         Pre-op Assessment    I have reviewed the Patient Summary Reports.    I have reviewed the NPO Status.   I have reviewed the Medications.     Review of Systems  Anesthesia Hx:  No problems with previous Anesthesia   History of prior surgery of interest to airway management or planning:  Previous anesthesia: General        Denies Family Hx of Anesthesia complications.   Personal Hx of Anesthesia complications, Post-Operative Nausea/Vomiting                    Social:  Smoker       Hematology/Oncology:  Hematology Normal                  Hematology Comments: Multiple myeloma                Oncology Comments: Multiple myeloma     Cardiovascular:  Exercise tolerance: poor   Hypertension Valvular problems/Murmurs, AI         PVD hyperlipidemia    NL EF, mod AI, pulm HTN 34mmHg on most recent echo.                             Pulmonary:   COPD, mild Asthma mild                   Renal/:  Renal/ Normal                 Hepatic/GI:     GERD                Musculoskeletal:     Decreased mobility.            Neurological:  Neurology Normal                                      Endocrine:   Hypothyroidism              Physical Exam  General: Somnolent    Airway:  Mallampati: II   Mouth Opening: Normal  TM Distance: Normal  Tongue: Normal  Neck ROM: Normal ROM    Dental:  Intact    Chest/Lungs:  Clear to auscultation, Normal Respiratory Rate    Heart:  Rate: Normal  Rhythm: Regular Rhythm        Anesthesia Plan  Type of Anesthesia, risks & benefits discussed:    Anesthesia Type: Gen ETT  Intra-op Monitoring Plan: Standard ASA Monitors  Post Op Pain Control Plan: multimodal analgesia and IV/PO Opioids  PRN  Induction:  IV  Informed Consent: Informed consent signed with the Patient and all parties understand the risks and agree with anesthesia plan.  All questions answered.   ASA Score: 3  Day of Surgery Review of History & Physical: H&P Update referred to the surgeon/provider.    Ready For Surgery From Anesthesia Perspective.     .      Chemistry        Component Value Date/Time     07/28/2025 1302     03/03/2025 1114    K 3.2 (L) 07/28/2025 1302    K 3.8 03/03/2025 1114     07/28/2025 1302     03/03/2025 1114    CO2 23 07/28/2025 1302    CO2 21 (L) 03/03/2025 1114    BUN 13 07/28/2025 1302    CREATININE 0.9 07/28/2025 1302     07/28/2025 1302     (H) 03/03/2025 1114        Component Value Date/Time    CALCIUM 9.3 07/28/2025 1302    CALCIUM 8.7 03/03/2025 1114    ALKPHOS 56 07/28/2025 1302    ALKPHOS 53 03/03/2025 1114    AST 32 07/28/2025 1302    AST 26 03/03/2025 1114    ALT 35 07/28/2025 1302    ALT 30 03/03/2025 1114    BILITOT 0.8 07/28/2025 1302    BILITOT 0.6 03/03/2025 1114    ESTGFRAFRICA >60.0 07/14/2021 0927    EGFRNONAA >60.0 07/14/2021 0927        Lab Results   Component Value Date    WBC 4.69 07/28/2025    HGB 13.3 07/28/2025    HCT 42.0 07/28/2025     (H) 07/28/2025     07/28/2025         Normal sinus rhythm   Normal ECG   When compared with ECG of 04-MAR-2022 10:01,   No significant change was found   Confirmed by ISELA PAREDES, RIMMA ([id number]) on 6/3/2023 8:02:45 AM    Echo 10/5/21:  The left ventricle is normal in size with normal systolic function.  The estimated ejection fraction is 60%.  Normal left ventricular diastolic function.  Normal right ventricular size with normal right ventricular systolic function.  Moderate aortic regurgitation.  Normal central venous pressure (3 mmHg).       Nuc Stress 1/14/20:    EKG Conclusions:     1. The EKG portion of this study is negative for ischemia at a peak heart rate of 121 bpm (79% of predicted).   2. Blood  pressure remained stable throughout the protocol  (Presenting BP: 105/78 Peak BP: 118/79).   3. No significant arrhythmias were present.   4. There were no symptoms of chest discomfort or significant dyspnea throughout the protocol.     Nuclear Quantitative Functional Analysis:   LVEF: >= 70 %     Impression: NORMAL MYOCARDIAL PERFUSION   1. The perfusion scan is free of evidence for myocardial ischemia or injury.   2. Resting wall motion is physiologic.   3. Resting LV function is normal.   4. The ventricular volumes are normal at rest and stress.   5. The extracardiac distribution of radioactivity is normal.

## 2025-07-30 ENCOUNTER — PATIENT MESSAGE (OUTPATIENT)
Dept: RESPIRATORY THERAPY | Facility: HOSPITAL | Age: 65
End: 2025-07-30
Payer: COMMERCIAL

## 2025-07-30 NOTE — PROGRESS NOTES
Subjective:     Patient ID:?Ana Bonilla is a 64 y.o. female.?? MR#: [id number]   ?   PRIMARY ONCOLOGIST: -->  ?   CHIEF COMPLAINT: lab review/assessment for chemo/MM ?????   ?   ONCOLOGIC DIAGNOSIS: Multiple Myeloma  ?   CURRENT TREATMENT: OP Myeloma KIA-RD daratumumab lenalidomide dexAMETHasone Q4W     The patient location is: [place]  The chief complaint leading to consultation is: follow-up    Visit type: audiovisual    Face to Face time with patient: 41 minutes  60 minutes of total time spent on the encounter, which includes face to face time and non-face to face time preparing to see the patient (eg, review of tests), Obtaining and/or reviewing separately obtained history, Documenting clinical information in the electronic or other health record, Independently interpreting results (not separately reported) and communicating results to the patient/family/caregiver, or Care coordination (not separately reported).         Each patient to whom he or she provides medical services by telemedicine is:  (1) informed of the relationship between the physician and patient and the respective role of any other health care provider with respect to management of the patient; and (2) notified that he or she may decline to receive medical services by telemedicine and may withdraw from such care at any time.    Notes:      HPI  Ms. Bonilla is a 64-year-old  female with past medical history significant for hypertension, COPD, asthma, GERD, hypothyroidism here for follow-up and management of multiple myeloma. BMBx on 4/6/2023 showed 60 % plasma cells. PET-CT on 4/10/2023 showed extensive lytic bony lesions throughout the spine, sternum, bilateral ribs, pelvis and mild compression deformity in L1 vertebral body. SPEP/MECHE on 3/27/2023 showed IgG lambda monoclonal protein - 3.19 g/dl. Quantitative immunoglobulins on 3/27/2023 showed IgG 4,521 mg/dl. UPEP/MECHE and FLC were pending at that time. Labs on 3/27/2023  showed Beta 2 microglobulin 1.9, .  Labs on 4/19/2023 showed Hb, 11.5, WBC 6.3, platelets, BUN 11, Cr 0.9, calcium 9. Pt initiated on VRD 05/10/23. Treatment planned changed to D-VRD 07/06/23.     Interval History: Pt presents for pre op clearance for planned laparoscopic cholecystectomy 07/31/25. Pt with c/o abdominal pain seen by GI abdominal US revealed gallbladder sludge, subsequent f/u with general surgery with recommendation to proceed with cholecystectomy.  Denies n/v/d/c, fever, chills, sob, cp, abnormal bleeding.   Oncology History   Multiple myeloma   4/20/2023 Initial Diagnosis    Multiple myeloma     5/10/2023 - 6/28/2023 Chemotherapy    Treatment Summary   Plan Name: OP VRD - WEEKLY BORTEZOMIB LENALIDOMIDE DEXAMETHASONE Q3W  Treatment Goal: Palliative  Status: Inactive  Start Date: 5/10/2023  End Date: 6/28/2023  Provider: Abram Velasquez MD  Chemotherapy: bortezomib (VELCADE) injection 2 mg, 1.3 mg/m2 = 2 mg, Subcutaneous, Clinic/[facility] 1 time, 2 of 6 cycles  Administration: 2 mg (5/10/2023), 2 mg (5/17/2023), 2 mg (6/14/2023), 2 mg (5/31/2023), 2 mg (6/21/2023), 2 mg (6/28/2023)  lenalidomide 25 mg Cap, 25 mg, Oral, Daily, 1 of 1 cycle, Start date: 5/10/2023, End date: 5/17/2023 6/28/2023 Cancer Staged    Staging form: Plasma Cell Myeloma and Plasma Cell Disorders, AJCC 8th Edition  - Clinical stage from 6/28/2023: RISS Stage II (Beta-2-microglobulin (mg/L): 1.9, Albumin (g/dL): 3, ISS: Stage II, High-risk cytogenetics: Absent, LDH: Normal)     7/6/2023 - 1/3/2024 Chemotherapy    Treatment Summary   Plan Name: OP D-VRD DARATUMUMAB + BORTEZOMIB LENALIDOMIDE DEXAMETHASONE  Treatment Goal: Palliative  Status: Inactive  Start Date: 7/6/2023  End Date: 1/3/2024  Provider: Oscar Márquez MD  Chemotherapy: bortezomib (VELCADE) injection 2 mg, 1.3 mg/m2 = 2 mg, Subcutaneous, Clinic/[facility] 1 time, 6 of 6 cycles  Administration: 2 mg (7/6/2023), 2 mg (7/12/2023), 2 mg (8/2/2023), 2 mg (8/9/2023), 2.25 mg  (10/18/2023), 2 mg (7/19/2023), 2 mg (7/26/2023), 2 mg (8/16/2023), 2.25 mg (9/20/2023), 2.25 mg (9/27/2023), 2.25 mg (10/25/2023), 2.25 mg (11/1/2023), 2.25 mg (11/8/2023), 2.25 mg (12/6/2023), 2.25 mg (1/3/2024), 2.25 mg (11/15/2023), 2.25 mg (11/22/2023), 2.25 mg (11/29/2023), 2.25 mg (12/13/2023), 2.25 mg (12/20/2023), 2.25 mg (12/26/2023)  lenalidomide 10 mg Cap, 1 capsule (100 % of original dose 1 capsule), Oral, Daily, 1 of 1 cycle, Start date: 7/26/2023, End date: 8/2/2023  Dose modification: 1 capsule (original dose 1 capsule, Cycle 0)  daratumumab-hyaluronidase-fihj subcutaneous injection 1,800 mg, 1,800 mg (100 % of original dose 1,800 mg), Subcutaneous, [facility] time, 6 of 6 cycles  Dose modification: 1,800 mg (original dose 1,800 mg, Cycle 1), 1,800 mg (original dose 1,800 mg, Cycle 1)  Administration: 1,800 mg (7/6/2023), 1,800 mg (7/12/2023), 1,800 mg (7/19/2023), 1,800 mg (7/26/2023), 1,800 mg (8/2/2023), 1,800 mg (8/9/2023), 1,800 mg (8/16/2023), 1,800 mg (9/27/2023), 1,800 mg (10/18/2023), 1,800 mg (11/1/2023), 1,800 mg (11/15/2023), 1,800 mg (11/29/2023), 1,800 mg (12/13/2023), 1,800 mg (12/26/2023)     4/30/2024 -  Chemotherapy    Treatment Summary   Plan Name: OP Myeloma KIA-RD daratumumab lenalidomide dexAMETHasone Q4W  Treatment Goal: Palliative  Status: Active  Start Date: 4/30/2024  End Date: 3/31/2026 (Planned)  Provider: Bri Luevano MD  Chemotherapy: daratumumab-hyaluronidase-[facility] subcutaneous injection 1,800 mg, 1,800 mg, Subcutaneous, [facility] time, 17 of 20 cycles  Administration: 1,800 mg (4/30/2024), 1,800 mg (5/7/2024), 1,800 mg (5/21/2024), 1,800 mg (5/28/2024), 1,800 mg (6/4/2024), 1,800 mg (6/25/2024), 1,800 mg (7/9/2024), 1,800 mg (10/29/2024), 1,800 mg (5/14/2024), 1,800 mg (6/11/2024), 1,800 mg (6/18/2024), 1,800 mg (7/30/2024), 1,800 mg (8/13/2024), 1,800 mg (8/27/2024), 1,800 mg (9/10/2024), 1,800 mg (9/24/2024), 1,800 mg (10/15/2024), 1,800 mg (11/26/2024),  1,800 mg (1/7/2025), 1,800 mg (2/4/2025), 1,800 mg (3/4/2025), 1,800 mg (4/1/2025), 1,800 mg (4/29/2025), 1,800 mg (5/27/2025), 1,800 mg (6/24/2025), 1,800 mg (7/22/2025)        Social History     Socioeconomic History    Marital status:     Number of children: 2   Occupational History     Employer: [employer]   Tobacco Use    Smoking status: Every Day     Current packs/day: 0.50     Average packs/day: 0.5 packs/day for 50.0 years (25.0 ttl pk-yrs)     Types: Cigarettes     Passive exposure: Never    Smokeless tobacco: Never   Substance and Sexual Activity    Alcohol use: Not Currently     Comment: quit    Drug use: No    Sexual activity: Not Currently     Partners: Male     Social Drivers of Health     Financial Resource Strain: Patient Declined (11/21/2024)    Overall Financial Resource Strain (CARDIA)     Difficulty of Paying Living Expenses: Patient declined   Food Insecurity: Patient Declined (11/21/2024)    Hunger Vital Sign     Worried About Running Out of Food in the Last Year: Patient declined     Ran Out of Food in the Last Year: Patient declined   Transportation Needs: No Transportation Needs (11/15/2023)    PRAPARE - Transportation     Lack of Transportation (Medical): No     Lack of Transportation (Non-Medical): No   Physical Activity: Sufficiently Active (11/21/2024)    Exercise Vital Sign     Days of Exercise per Week: 3 days     Minutes of Exercise per Session: 60 min   Stress: Stress Concern Present (11/15/2023)    [employer] [place] [employer] - Occupational Stress Questionnaire     Feeling of Stress : To some extent   Housing Stability: Low Risk  (11/15/2023)    Housing Stability Vital Sign     Unable to Pay for Housing in the Last Year: No     Number of Places Lived in the Last Year: 1     Unstable Housing in the Last Year: No      Family History   Problem Relation Name Age of Onset    Hypertension Mother Vivian     Diabetes Mother Vivian     Asthma Mother  Vivian             Diabetes Father      Asthma Father      Stroke Maternal Grandmother  grandmother     Prostate cancer Maternal Grandfather      Mental illness Son Lukasz Garcia     Pancreatic cancer Maternal Uncle      Mental illness Other Pat side     Pancreatic cancer Other Mat side     Ovarian cancer Maternal Cousin        Past Surgical History:   Procedure Laterality Date    APPENDECTOMY      BIOPSY N/A 2023    Procedure: BIOPSY;  Surgeon: Fausto Smith MD;  Location: [facility] OR;  Service: Neurosurgery;  Laterality: N/A;  L1    BUNIONECTOMY      COLONOSCOPY N/A 2019    Procedure: COLONOSCOPY;  Surgeon: Danny Matos III, MD;  Location: [facility] ENDO;  Service: Endoscopy;  Laterality: N/A;    COLONOSCOPY N/A 10/24/2022    Procedure: COLONOSCOPY;  Surgeon: Danny Matos III, MD;  Location: [facility] ENDO;  Service: Endoscopy;  Laterality: N/A;    ESOPHAGOGASTRODUODENOSCOPY N/A 10/24/2022    Procedure: EGD (ESOPHAGOGASTRODUODENOSCOPY);  Surgeon: Danny Matos III, MD;  Location: [facility] ENDO;  Service: Endoscopy;  Laterality: N/A;    FIXATION KYPHOPLASTY Bilateral 2023    Procedure: KYPHOPLASTY;  Surgeon: Fausto Smith MD;  Location: [facility] OR;  Service: Neurosurgery;  Laterality: Bilateral;  kyphoplasty and radiofrequency ablation - L1    HYSTERECTOMY      PARTIAL//still with ovaries    neck fusion  2017    THYROIDECTOMY      TUBAL LIGATION          Review of Systems   Constitutional:  Negative for activity change, appetite change, chills, fatigue, fever and unexpected weight change.   HENT:  Negative for congestion, dental problem, mouth sores, nosebleeds and rhinorrhea.    Eyes:  Negative for visual disturbance.   Respiratory:  Negative for cough, choking and chest tightness.    Cardiovascular:  Negative for chest pain, palpitations and leg swelling.   Gastrointestinal:  Negative for abdominal distention, abdominal pain, anal bleeding,  blood in stool, constipation, diarrhea, nausea and vomiting.   Endocrine: Negative.    Genitourinary:  Negative for dysuria, frequency, hematuria and urgency.   Musculoskeletal:  Negative for arthralgias, back pain, gait problem, joint swelling and myalgias.   Skin:  Negative for rash and wound.   Allergic/Immunologic: Negative for immunocompromised state.   Neurological:  Negative for dizziness, light-headedness, numbness and headaches.   Hematological:  Negative for adenopathy. Does not bruise/bleed easily.   Psychiatric/Behavioral:  The patient is not nervous/anxious.        ?   A comprehensive 14-point review of systems was reviewed with patient and was negative other than as specified above.   ?     Objective:      Physical Exam  Vitals reviewed: virtual visit.   Neurological:      Mental Status: She is alert.         ?   There were no vitals filed for this visit.       ?       ?   Laboratory:  ?   No visits with results within 1 Day(s) from this visit.   Latest known visit with results is:   Lab Visit on 07/28/2025   Component Date Value Ref Range Status    Cholesterol Total 07/28/2025 255 (H)  120 - 199 mg/dL Final    Triglyceride 07/28/2025 77  30 - 150 mg/dL Final    HDL Cholesterol 07/28/2025 89 (H)  40 - 75 mg/dL Final    LDL Cholesterol 07/28/2025 150.6  63.0 - 159.0 mg/dL Final    HDL/Cholesterol Ratio 07/28/2025 34.9  20.0 - 50.0 % Final    Cholesterol/HDL Ratio 07/28/2025 2.9  2.0 - 5.0 Final    Non HDL Cholesterol 07/28/2025 166  mg/dL Final    Phosphorus Level 07/28/2025 2.3 (L)  2.7 - 4.5 mg/dL Final    Lactate Dehydrogenase 07/28/2025 220  110 - 260 U/L Final    Hep A IgG Interp 07/28/2025 Non-Reactive    Final    Ceruloplasmin 07/28/2025 33.0  15.0 - 45.0 mg/dL Final    IgA Level 07/28/2025 91  40 - 350 mg/dL Final    IgG Level 07/28/2025 1,011  650 - 1,600 mg/dL Final    IgM Level 07/28/2025 24 (L)  50 - 300 mg/dL Final    PT 07/28/2025 10.9  9.0 - 12.5 seconds Final    INR  07/28/2025 1.0  0.8 - 1.2 Final    Sodium 07/28/2025 142  136 - 145 mmol/L Final    Potassium 07/28/2025 3.2 (L)  3.5 - 5.1 mmol/L Final    Chloride 07/28/2025 108  95 - 110 mmol/L Final    CO2 07/28/2025 23  23 - 29 mmol/L Final    Glucose 07/28/2025 106  70 - 110 mg/dL Final    BUN 07/28/2025 13  8 - 23 mg/dL Final    Creatinine 07/28/2025 0.9  0.5 - 1.4 mg/dL Final    Calcium 07/28/2025 9.3  8.7 - 10.5 mg/dL Final    Protein Total 07/28/2025 7.4  6.0 - 8.4 gm/dL Final    Albumin 07/28/2025 4.1  3.5 - 5.2 g/dL Final    Bilirubin Total 07/28/2025 0.8  0.1 - 1.0 mg/dL Final    ALP 07/28/2025 56  40 - 150 unit/L Final    AST 07/28/2025 32  0 - 50 unit/L Final    ALT 07/28/2025 35  0 - 55 unit/L Final    Anion Gap 07/28/2025 11  8 - 16 mmol/L Final    eGFR 07/28/2025 >60  >60 mL/min/1.73/m2 Final    WBC 07/28/2025 4.69  3.90 - 12.70 K/uL Final    RBC 07/28/2025 4.18  4.00 - 5.40 M/uL Final    HGB 07/28/2025 13.3  12.0 - 16.0 gm/dL Final    HCT 07/28/2025 42.0  37.0 - 48.5 % Final    MCV 07/28/2025 101 (H)  82 - 98 fL Final    MCH 07/28/2025 31.8 (H)  27.0 - 31.0 pg Final    MCHC 07/28/2025 31.7 (L)  32.0 - 36.0 g/dL Final    RDW 07/28/2025 14.2  11.5 - 14.5 % Final    Platelet Count 07/28/2025 277  150 - 450 K/uL Final    MPV 07/28/2025 10.5  9.2 - 12.9 fL Final    Nucleated RBC 07/28/2025 0  <=0 /100 WBC Final    Neut % 07/28/2025 52.9  38 - 73 % Final    Lymph % 07/28/2025 34.1  18 - 48 % Final    Mono % 07/28/2025 9.6  4 - 15 % Final    Eos % 07/28/2025 1.9  <=8 % Final    Basophil % 07/28/2025 1.5  <=1.9 % Final    Imm Grans % 07/28/2025 0.0  0.0 - 0.5 % Final    Neut # 07/28/2025 2.48  1.8 - 7.7 K/uL Final    Lymph # 07/28/2025 1.60  1 - 4.8 K/uL Final    Mono # 07/28/2025 0.45  0.3 - 1 K/uL Final    Eos # 07/28/2025 0.09  <=0.5 K/uL Final    Baso # 07/28/2025 0.07  <=0.2 K/uL Final    Imm Grans # 07/28/2025 0.00  0.00 - 0.04 K/uL Final    Pathologist Interpretation MECHE  07/28/2025 Adjacent IgG lambda and IgG kappa specific monoclonal bands present.      Interpreting Pathologist: Monse Saldana MD       Final      ?   Imaging:    No results found. However, due to the size of the patient record, not all encounters were searched. Please check Results Review for a complete set of results.       No results found. However, due to the size of the patient record, not all encounters were searched. Please check Results Review for a complete set of results.           ?   Assessment/Plan:     Problem List Items Addressed This Visit       Multiple myeloma - Primary (Chronic)    okay to proceed with scheduled cholecystectomy  Labs reviewed, no cytopenias noted or other concerning findings  Okay to hold lenalidomide day of procedure utd    F/u as previously scheduled                        Med Onc Chart Routing      Follow up with physician . Scheduled   Follow up with WERNER    Infusion scheduling note    Injection scheduling note    Labs    Imaging    Pharmacy appointment    Other referrals                 BRITTANI Pool-JUANITO  Hematology/Oncology

## 2025-07-30 NOTE — ASSESSMENT & PLAN NOTE
okay to proceed with scheduled cholecystectomy  Labs reviewed, no cytopenias noted or other concerning findings  Okay to hold lenalidomide day of procedure utd    F/u as previously scheduled

## 2025-07-31 ENCOUNTER — ANESTHESIA (OUTPATIENT)
Dept: SURGERY | Facility: HOSPITAL | Age: 65
End: 2025-07-31
Payer: COMMERCIAL

## 2025-07-31 ENCOUNTER — HOSPITAL ENCOUNTER (OUTPATIENT)
Facility: HOSPITAL | Age: 65
Discharge: HOME | End: 2025-07-31
Attending: SURGERY | Admitting: SURGERY
Payer: COMMERCIAL

## 2025-07-31 DIAGNOSIS — R10.13 EPIGASTRIC PAIN: ICD-10-CM

## 2025-07-31 DIAGNOSIS — K82.8 GALLBLADDER SLUDGE: ICD-10-CM

## 2025-07-31 DIAGNOSIS — C90.00 MULTIPLE MYELOMA, REMISSION STATUS UNSPECIFIED: ICD-10-CM

## 2025-07-31 PROCEDURE — 63600175 PHARM REV CODE 636 W HCPCS: Performed by: ANESTHESIOLOGY

## 2025-07-31 PROCEDURE — 63600175 PHARM REV CODE 636 W HCPCS: Performed by: SURGERY

## 2025-07-31 PROCEDURE — 88304 TISSUE EXAM BY PATHOLOGIST: CPT | Mod: TC | Performed by: SURGERY

## 2025-07-31 PROCEDURE — 71000015 HC POSTOP RECOV 1ST HR: Performed by: SURGERY

## 2025-07-31 PROCEDURE — 63600175 PHARM REV CODE 636 W HCPCS: Performed by: NURSE ANESTHETIST, CERTIFIED REGISTERED

## 2025-07-31 PROCEDURE — 36000710: Performed by: SURGERY

## 2025-07-31 PROCEDURE — 27201423 OPTIME MED/SURG SUP & DEVICES STERILE SUPPLY: Performed by: SURGERY

## 2025-07-31 PROCEDURE — 36000711: Performed by: SURGERY

## 2025-07-31 PROCEDURE — 37000009 HC ANESTHESIA EA ADD 15 MINS: Performed by: SURGERY

## 2025-07-31 PROCEDURE — 25000003 PHARM REV CODE 250: Performed by: NURSE ANESTHETIST, CERTIFIED REGISTERED

## 2025-07-31 PROCEDURE — 71000033 HC RECOVERY, INTIAL HOUR: Performed by: SURGERY

## 2025-07-31 PROCEDURE — 37000008 HC ANESTHESIA 1ST 15 MINUTES: Performed by: SURGERY

## 2025-07-31 RX ORDER — ONDANSETRON HYDROCHLORIDE 2 MG/ML
INJECTION, SOLUTION INTRAMUSCULAR; INTRAVENOUS
Status: DISCONTINUED | OUTPATIENT
Start: 2025-07-31 | End: 2025-07-31

## 2025-07-31 RX ORDER — DEXMEDETOMIDINE HYDROCHLORIDE 100 UG/ML
INJECTION, SOLUTION INTRAVENOUS
Status: DISCONTINUED | OUTPATIENT
Start: 2025-07-31 | End: 2025-07-31

## 2025-07-31 RX ORDER — ONDANSETRON HYDROCHLORIDE 2 MG/ML
4 INJECTION, SOLUTION INTRAVENOUS ONCE AS NEEDED
Status: COMPLETED | OUTPATIENT
Start: 2025-07-31 | End: 2025-07-31

## 2025-07-31 RX ORDER — DIPHENHYDRAMINE HYDROCHLORIDE 50 MG/ML
25 INJECTION, SOLUTION INTRAMUSCULAR; INTRAVENOUS EVERY 6 HOURS PRN
Status: DISCONTINUED | OUTPATIENT
Start: 2025-07-31 | End: 2025-07-31 | Stop reason: HOSPADM

## 2025-07-31 RX ORDER — SODIUM CHLORIDE 9 MG/ML
INJECTION, SOLUTION INTRAVENOUS CONTINUOUS
Status: DISCONTINUED | OUTPATIENT
Start: 2025-07-31 | End: 2025-07-31 | Stop reason: HOSPADM

## 2025-07-31 RX ORDER — BUPIVACAINE HYDROCHLORIDE 2.5 MG/ML
INJECTION, SOLUTION EPIDURAL; INFILTRATION; INTRACAUDAL; PERINEURAL
Status: DISCONTINUED
Start: 2025-07-31 | End: 2025-07-31 | Stop reason: HOSPADM

## 2025-07-31 RX ORDER — DEXAMETHASONE SODIUM PHOSPHATE 4 MG/ML
INJECTION, SOLUTION INTRA-ARTICULAR; INTRALESIONAL; INTRAMUSCULAR; INTRAVENOUS; SOFT TISSUE
Status: DISCONTINUED | OUTPATIENT
Start: 2025-07-31 | End: 2025-07-31

## 2025-07-31 RX ORDER — ALBUTEROL SULFATE 0.83 MG/ML
2.5 SOLUTION RESPIRATORY (INHALATION) EVERY 4 HOURS PRN
Status: DISCONTINUED | OUTPATIENT
Start: 2025-07-31 | End: 2025-07-31 | Stop reason: HOSPADM

## 2025-07-31 RX ORDER — LIDOCAINE HYDROCHLORIDE 10 MG/ML
INJECTION, SOLUTION EPIDURAL; INFILTRATION; INTRACAUDAL; PERINEURAL
Status: DISCONTINUED
Start: 2025-07-31 | End: 2025-07-31 | Stop reason: HOSPADM

## 2025-07-31 RX ORDER — CEFAZOLIN 2 G/1
2 INJECTION, POWDER, FOR SOLUTION INTRAMUSCULAR; INTRAVENOUS
Status: COMPLETED | OUTPATIENT
Start: 2025-07-31 | End: 2025-07-31

## 2025-07-31 RX ORDER — SODIUM CHLORIDE, SODIUM LACTATE, POTASSIUM CHLORIDE, CALCIUM CHLORIDE 600; 310; 30; 20 MG/100ML; MG/100ML; MG/100ML; MG/100ML
INJECTION, SOLUTION INTRAVENOUS CONTINUOUS
Status: DISCONTINUED | OUTPATIENT
Start: 2025-07-31 | End: 2025-07-31 | Stop reason: HOSPADM

## 2025-07-31 RX ORDER — TRAMADOL HYDROCHLORIDE 50 MG/1
50 TABLET, FILM COATED ORAL EVERY 4 HOURS PRN
Qty: 30 TABLET | Refills: 0 | Status: SHIPPED | OUTPATIENT
Start: 2025-07-31

## 2025-07-31 RX ORDER — DOCUSATE SODIUM 100 MG/1
100 CAPSULE, LIQUID FILLED ORAL 2 TIMES DAILY PRN
Qty: 30 CAPSULE | Refills: 0 | Status: SHIPPED | OUTPATIENT
Start: 2025-07-31 | End: 2025-08-15

## 2025-07-31 RX ORDER — OXYCODONE HYDROCHLORIDE 5 MG/1
5 TABLET ORAL EVERY 4 HOURS PRN
Status: DISCONTINUED | OUTPATIENT
Start: 2025-07-31 | End: 2025-07-31 | Stop reason: HOSPADM

## 2025-07-31 RX ORDER — KETOROLAC TROMETHAMINE 30 MG/ML
INJECTION, SOLUTION INTRAMUSCULAR; INTRAVENOUS
Status: DISCONTINUED | OUTPATIENT
Start: 2025-07-31 | End: 2025-07-31

## 2025-07-31 RX ORDER — FENTANYL CITRATE 50 UG/ML
INJECTION, SOLUTION INTRAMUSCULAR; INTRAVENOUS
Status: DISCONTINUED | OUTPATIENT
Start: 2025-07-31 | End: 2025-07-31

## 2025-07-31 RX ORDER — OXYCODONE HYDROCHLORIDE 5 MG/1
15 TABLET ORAL EVERY 4 HOURS PRN
Status: DISCONTINUED | OUTPATIENT
Start: 2025-07-31 | End: 2025-07-31 | Stop reason: HOSPADM

## 2025-07-31 RX ORDER — BUPIVACAINE HYDROCHLORIDE 2.5 MG/ML
INJECTION, SOLUTION EPIDURAL; INFILTRATION; INTRACAUDAL; PERINEURAL
Status: DISCONTINUED | OUTPATIENT
Start: 2025-07-31 | End: 2025-07-31 | Stop reason: HOSPADM

## 2025-07-31 RX ORDER — IBUPROFEN 800 MG/1
800 TABLET, FILM COATED ORAL 3 TIMES DAILY PRN
Qty: 30 TABLET | Refills: 0 | Status: SHIPPED | OUTPATIENT
Start: 2025-07-31

## 2025-07-31 RX ORDER — ONDANSETRON HYDROCHLORIDE 2 MG/ML
4 INJECTION, SOLUTION INTRAVENOUS EVERY 12 HOURS PRN
Status: DISCONTINUED | OUTPATIENT
Start: 2025-07-31 | End: 2025-07-31 | Stop reason: HOSPADM

## 2025-07-31 RX ORDER — FENTANYL CITRATE 50 UG/ML
25 INJECTION, SOLUTION INTRAMUSCULAR; INTRAVENOUS EVERY 5 MIN PRN
Status: DISCONTINUED | OUTPATIENT
Start: 2025-07-31 | End: 2025-07-31 | Stop reason: HOSPADM

## 2025-07-31 RX ORDER — PROPOFOL 10 MG/ML
VIAL (ML) INTRAVENOUS
Status: DISCONTINUED | OUTPATIENT
Start: 2025-07-31 | End: 2025-07-31

## 2025-07-31 RX ORDER — LIDOCAINE HYDROCHLORIDE 10 MG/ML
INJECTION, SOLUTION EPIDURAL; INFILTRATION; INTRACAUDAL; PERINEURAL
Status: DISCONTINUED | OUTPATIENT
Start: 2025-07-31 | End: 2025-07-31 | Stop reason: HOSPADM

## 2025-07-31 RX ORDER — ROCURONIUM BROMIDE 10 MG/ML
INJECTION, SOLUTION INTRAVENOUS
Status: DISCONTINUED | OUTPATIENT
Start: 2025-07-31 | End: 2025-07-31

## 2025-07-31 RX ORDER — LIDOCAINE HYDROCHLORIDE 20 MG/ML
INJECTION, SOLUTION EPIDURAL; INFILTRATION; INTRACAUDAL; PERINEURAL
Status: DISCONTINUED | OUTPATIENT
Start: 2025-07-31 | End: 2025-07-31

## 2025-07-31 RX ADMIN — CEFAZOLIN 2 G: 2 INJECTION, POWDER, FOR SOLUTION INTRAMUSCULAR; INTRAVENOUS at 07:07

## 2025-07-31 RX ADMIN — KETOROLAC TROMETHAMINE 30 MG: 30 INJECTION, SOLUTION INTRAMUSCULAR; INTRAVENOUS at 07:07

## 2025-07-31 RX ADMIN — FENTANYL CITRATE 50 MCG: 0.05 INJECTION, SOLUTION INTRAMUSCULAR; INTRAVENOUS at 07:07

## 2025-07-31 RX ADMIN — ONDANSETRON 4 MG: 2 INJECTION INTRAMUSCULAR; INTRAVENOUS at 08:07

## 2025-07-31 RX ADMIN — SODIUM CHLORIDE, POTASSIUM CHLORIDE, SODIUM LACTATE AND CALCIUM CHLORIDE: 600; 310; 30; 20 INJECTION, SOLUTION INTRAVENOUS at 07:07

## 2025-07-31 RX ADMIN — FENTANYL CITRATE 25 MCG: 50 INJECTION INTRAMUSCULAR; INTRAVENOUS at 09:07

## 2025-07-31 RX ADMIN — SUGAMMADEX 150 MG: 100 INJECTION, SOLUTION INTRAVENOUS at 08:07

## 2025-07-31 RX ADMIN — DEXAMETHASONE SODIUM PHOSPHATE 8 MG: 4 INJECTION, SOLUTION INTRA-ARTICULAR; INTRALESIONAL; INTRAMUSCULAR; INTRAVENOUS; SOFT TISSUE at 07:07

## 2025-07-31 RX ADMIN — PROPOFOL 150 MG: 10 INJECTION, EMULSION INTRAVENOUS at 07:07

## 2025-07-31 RX ADMIN — DEXMEDETOMIDINE HYDROCHLORIDE 4 MCG: 100 INJECTION, SOLUTION INTRAVENOUS at 07:07

## 2025-07-31 RX ADMIN — LIDOCAINE HYDROCHLORIDE 50 MG: 20 INJECTION, SOLUTION EPIDURAL; INFILTRATION; INTRACAUDAL; PERINEURAL at 07:07

## 2025-07-31 RX ADMIN — ONDANSETRON 4 MG: 2 INJECTION INTRAMUSCULAR; INTRAVENOUS at 07:07

## 2025-07-31 RX ADMIN — ROCURONIUM BROMIDE 30 MG: 10 SOLUTION INTRAVENOUS at 07:07

## 2025-07-31 RX ADMIN — ROCURONIUM BROMIDE 10 MG: 10 SOLUTION INTRAVENOUS at 07:07

## 2025-07-31 NOTE — ANESTHESIA POSTPROCEDURE EVALUATION
Anesthesia Post Evaluation    Patient: Ana Bonilla    Procedure(s) Performed: Procedure(s) (LRB):  XI ROBOTIC CHOLECYSTECTOMY (N/A)    Final Anesthesia Type: general      Patient location during evaluation: PACU  Patient participation: Yes- Able to Participate  Level of consciousness: awake and alert and oriented  Post-procedure vital signs: reviewed and stable  Pain management: adequate  Airway patency: patent    PONV status at discharge: No PONV  Anesthetic complications: no      Cardiovascular status: blood pressure returned to baseline, stable and hemodynamically stable  Respiratory status: unassisted  Hydration status: euvolemic  Follow-up not needed.              Vitals Value Taken Time   /66 07/31/25 09:12   Temp 36.3 °C (97.3 °F) 07/31/25 08:19   Pulse 59 07/31/25 09:17   Resp 28 07/31/25 09:16   SpO2 93 % 07/31/25 09:17   Vitals shown include unfiled device data.      No case tracking events are documented in the log.      Pain/Manuel Score: Pain Rating Prior to Med Admin: 6 (7/31/2025  9:04 AM)  Manuel Score: 5 (7/31/2025  8:49 AM)

## 2025-07-31 NOTE — DISCHARGE SUMMARY
[facility]  Discharge Note  Short Stay    Procedure(s) (LRB):  XI ROBOTIC CHOLECYSTECTOMY (N/A)      OUTCOME: Patient tolerated treatment/procedure well without complication and is now ready for discharge.    DISPOSITION: Home or Self Care    FINAL DIAGNOSIS:  Gallbladder sludge    FOLLOWUP: In clinic    DISCHARGE INSTRUCTIONS:    Discharge Procedure Orders   Diet general     Lifting restrictions   Order Comments: No heavy lifting > 10lbs or strenuous activity x 2 weeks     Call MD for:  temperature >100.4     Call MD for:  persistent nausea and vomiting     Call MD for:  severe uncontrolled pain     Call MD for:  difficulty breathing, headache or visual disturbances     Call MD for:  redness, tenderness, or signs of infection (pain, swelling, redness, odor or green/yellow discharge around incision site)     Call MD for:  hives     Call MD for:  persistent dizziness or light-headedness     Call MD for:  extreme fatigue     No dressing needed     Activity as tolerated     Shower on day dressing removed (No bath)   Order Comments: Ok to shower in 48 hours        TIME SPENT ON DISCHARGE: 30 minutes

## 2025-07-31 NOTE — TRANSFER OF CARE
"Anesthesia Transfer of Care Note    Patient: Ana Bonilla    Procedure(s) Performed: Procedure(s) (LRB):  XI ROBOTIC CHOLECYSTECTOMY (N/A)    Patient location: PACU    Anesthesia Type: general    Transport from OR: Transported from OR on room air with adequate spontaneous ventilation    Post pain: adequate analgesia    Post assessment: no apparent anesthetic complications    Post vital signs: stable    Level of consciousness: sedated    Nausea/Vomiting: no nausea/vomiting    Complications: none    Transfer of care protocol was followed      Last vitals: Visit Vitals  /65 (BP Location: Right arm, Patient Position: Sitting)   Pulse 62   Temp 36.1 °C (96.9 °F) (Temporal)   Resp 18   Ht 5' 4" (1.626 m)   Wt 61.3 kg (135 lb 2.3 oz)   SpO2 99%   Breastfeeding No   BMI 23.20 kg/m²     "

## 2025-07-31 NOTE — ANESTHESIA PROCEDURE NOTES
Intubation    Date/Time: 7/31/2025 7:17 AM    Performed by: Dary Bradford CRNA  Authorized by: Lesly Cisneros MD    Intubation:     Induction:  Intravenous    Intubated:  Postinduction    Mask Ventilation:  Easy mask    Attempts:  1    Attempted By:  CRNA    Method of Intubation:  Video laryngoscopy    Blade:  Hartley 3    Laryngeal View Grade: Grade I - full view of cords      Difficult Airway Encountered?: No      Complications:  None    Airway Device:  Oral endotracheal tube    Airway Device Size:  7.0    Style/Cuff Inflation:  Cuffed (inflated to minimal occlusive pressure)    Inflation Amount (mL):  6    Tube secured:  21    Secured at:  The lips    Placement Verified By:  Capnometry    Complicating Factors:  None    Findings Post-Intubation:  BS equal bilateral and atraumatic/condition of teeth unchanged

## 2025-07-31 NOTE — OP NOTE
[facility]  Surgery Department  Operative Note    SUMMARY     Date of Procedure: 7/31/2025     Procedure: Procedure(s) (LRB):  XI ROBOTIC CHOLECYSTECTOMY (N/A)     Surgeons and Role:     * Jacoby Coley MD - Primary    Assisting Surgeon: None    Pre-Operative Diagnosis: Gallbladder sludge [K82.8]  Epigastric pain [R10.13]    Post-Operative Diagnosis: Post-Op Diagnosis Codes:     * Gallbladder sludge [K82.8]     * Epigastric pain [R10.13]    Anesthesia: General    Operative Findings (including complications, if any):  Robotic cholecystectomy    Description of Technical Procedures:  Gallbladder with sludge    Estimated Blood Loss (EBL): 10cc           Implants: * No implants in log *    Specimens:   Specimen (24h ago, onward)       Start     Ordered    07/31/25 0758  Specimen to Pathology  RELEASE UPON ORDERING        References:    Click here for ordering Quick Tip   Question:  Release to patient  Answer:  Immediate    07/31/25 0758                   ID Type Source Tests Collected by Time Destination   1 : Gallbladder with stones Tissue Gallbladder SPECIMEN TO PATHOLOGY Jacoby Coley MD 7/31/2025 0749               Condition: Good    Disposition: PACU - hemodynamically stable.      Procedure in detail:  The patient was brought to the OR and underwent general anesthesia. The abdomen was prepped and draped in usual sterile fashion.  An incision was made just above the umbilicus.  Fascia grasped and elevated.  A Veress needle was inserted and insufflation obtained to 15 mm Hg.  An 8 mm robotic Optiview port was placed at this site. The laparoscope was inserted. There was no evidence of a vascular or enteric injury.     Additional trocars were placed as follows under visualization. An 8 mm trocar was placed in the anterior axillary line of the right mid abdomen. An 8 mm trocar was placed in the midclavicular line of the right midabdomen. An 8 mm robotic trocar was placed in the midclavicular line in  the left midabdomen      The patient was placed in reverse Trendelenburg position and rolled to the left. The patient was docked to the da Barbara robot.         The fundus of the gallbladder was retracted cephalad. The gallbladder infundibulum was retracted laterally.      Through meticulous dissection the cystic duct was dissected circumferentially. The cystic artery was dissected circumferentially and the neck of the gallbladder was then dissected off the gallbladder bed. This cleared Calots triangle of all loose areolar tissue and the critical view was obtained.     Indocyanine green with firefly technology was used to elucidate the course of the cystic duct and biliary anatomy.      The cystic artery was clipped with 2 clips proximally 1 clip distally and transected.  The cystic duct was clipped twice proximally and once distally and divided.     The gallbladder was dissected free from the gallbladder bed.    The gallbladder bed was inspected and hemostasis was ensured using hook electrocautery. The area was irrigated until clear.     The umbilical operating arm of the robot was then removed and an Endo Catch bag was inserted. The gallbladder was placed in Endo Catch bag. The patient was then undocked from the da Barbara operating robot. The gallbladder was extracted.      The trocars were removed under direct vision and no bleeding was noted. The abdomen was then desufflated. Marcaine was infiltrated. All incisions were closed with 4-0 Monocryl in a subcuticular manner. Dermaflex was applied to the incision sites

## 2025-07-31 NOTE — DISCHARGE INSTRUCTIONS
Cholecystectomy Discharge Instructions     Hygiene and incision care:  You may shower in 48 hours but do not soak such as in a bathtub or pool. Your incisions are closed with surgical glue. The glue will eventually flake off on its own. Do not scrub your incisions, just allow warm soapy water to run over them. Do not apply any creams, lotions, or oils to the incision sites.  No heavy lifting anything greater than 10 lbs for 2 weeks post op.  Post-op visit to the office to check on your progress in 2-3 weeks. Be sure to keep this appointment  Use over the counter stool softeners while on narcotic pain medication to prevent post op constipation. We recommend you drink 8 to 10 glasses of water each day    Will physical activity be limited?   You may need to rest for a while. You should not do physical activity that makes your post-op recovery worse.   If you run, work out, or play sports, you may not be able to do those things until your surgical site heals.   Avoid activities that cause you to strain, like heavy lifting for several weeks.     Pain control:  You may alternate Tylenol (650 mg every 4 hours) and ibuprofen (600 mg every 4 hours) as well as use cold packs for pain and swelling. Take ibuprofen with food as it can cause stomach upset and ulcers. Do not take ibuprofen if you have an increased risk of bleeding or have kidney issues.   If taking Norco (hydrocodone-acetaminophen) as well which is a narcotic pain medication, do not drink alcohol or drive. Each Norco tablet contains 325 mg of Tylenol; do not take more than 4000 mg of Tylenol per day. Narcotic pain medications can be constipating so be sure to drink plenty of water and take an over the counter stool softener such as colace (100 mg twice a day) or miralax (17 g once a day).    Diet:              You may resume your regular diet. Some people find it best to stick to soft, bland, and easily digestible foods for the first couple of days while the  anesthesia is leaving their system or if they're taking narcotic pain medicine to avoid nausea and vomiting. Be sure to eat good, nutritious foods such as vegetables and lean proteins to give your body the nutrients it needs to heal.    When do I need to call the doctor?   Signs of infection. These include a fever of 100.4°F (38°C) or higher, chills  Drainage, warmth, or redness at the incision site  Swelling or buildup of fluid around incision  Abdominal pain gets worse all of a sudden  Severe abdominal pain   Pain not managed with pain medications   Persistent nausea/vomiting   Inability able to pass stool  Trouble passing urine    If you have any questions or problems, please call my office or my nurse.    Dr. Jacoby Coley  [phone]

## 2025-08-01 VITALS
HEART RATE: 59 BPM | HEIGHT: 64 IN | WEIGHT: 135.13 LBS | DIASTOLIC BLOOD PRESSURE: 66 MMHG | TEMPERATURE: 97 F | BODY MASS INDEX: 23.07 KG/M2 | OXYGEN SATURATION: 93 % | RESPIRATION RATE: 18 BRPM | SYSTOLIC BLOOD PRESSURE: 121 MMHG

## 2025-08-01 LAB
ESTROGEN SERPL-MCNC: NORMAL PG/ML
INSULIN SERPL-ACNC: NORMAL U[IU]/ML
LAB AP CLINICAL INFORMATION: NORMAL
LAB AP DIAGNOSIS CATEGORY: NORMAL
LAB AP GROSS DESCRIPTION: NORMAL
LAB AP PERFORMING LOCATION(S): NORMAL
LAB AP REPORT FOOTNOTES: NORMAL
W COPPER: 1349 UG/L

## 2025-08-06 DIAGNOSIS — Z78.0 MENOPAUSE: ICD-10-CM

## 2025-08-07 ENCOUNTER — PATIENT MESSAGE (OUTPATIENT)
Dept: SURGERY | Facility: CLINIC | Age: 65
End: 2025-08-07
Payer: MEDICARE

## 2025-08-08 ENCOUNTER — TELEPHONE (OUTPATIENT)
Dept: SURGERY | Facility: CLINIC | Age: 65
End: 2025-08-08
Payer: MEDICARE

## 2025-08-08 NOTE — TELEPHONE ENCOUNTER
"Called pt back to return message asking to move appt. Gave information that there is not a sooner appt but sent message to ma. Pt should receive call back when MA returns.     Pt states she is constipated and has not had a BM for 4-5 days. Instructed pt to increase her water consumption and not to drink soda or drinks high in sugar. Instructed her to try a stool softener and MiraLax if this does not give relief. Told her to call back if she does not find relief. Pt said she will "take another Linzess". I instructed her to call the prescribing provider to ensure this is safe. Explained there may be complications if she does not take her medication as prescribed.     Pt verb'd US of all things discussed and stated she will wait for Lacey to call Monday and she will start stool softeners today. Told her to call with further questions and concerns.  "

## 2025-08-09 DIAGNOSIS — C90.00 MULTIPLE MYELOMA, REMISSION STATUS UNSPECIFIED: ICD-10-CM

## 2025-08-11 ENCOUNTER — HOSPITAL ENCOUNTER (OUTPATIENT)
Dept: CARDIOLOGY | Facility: HOSPITAL | Age: 65
Discharge: HOME | End: 2025-08-11
Attending: INTERNAL MEDICINE
Payer: MEDICARE

## 2025-08-11 VITALS
HEIGHT: 64 IN | HEIGHT: 64 IN | HEART RATE: 60 BPM | WEIGHT: 135 LBS | WEIGHT: 135 LBS | BODY MASS INDEX: 23.05 KG/M2 | SYSTOLIC BLOOD PRESSURE: 127 MMHG | BODY MASS INDEX: 23.05 KG/M2 | DIASTOLIC BLOOD PRESSURE: 76 MMHG

## 2025-08-11 DIAGNOSIS — I35.1 NONRHEUMATIC AORTIC VALVE INSUFFICIENCY: ICD-10-CM

## 2025-08-11 DIAGNOSIS — M79.604 PAIN IN BOTH LOWER EXTREMITIES: ICD-10-CM

## 2025-08-11 DIAGNOSIS — M79.605 PAIN IN BOTH LOWER EXTREMITIES: ICD-10-CM

## 2025-08-11 LAB
AORTIC ROOT ANNULUS: 2.5 CM
AORTIC SIZE INDEX (SOV): 1.4 CM/M2
AORTIC SIZE INDEX: 1.7 CM/M2
ASCENDING AORTA: 2.9 CM
AV INDEX (PROSTH): 0.77
AV MEAN GRADIENT: 6 MMHG
AV PEAK GRADIENT: 12 MMHG
AV REGURGITATION PRESSURE HALF TIME: 500 MS
AV VALVE AREA BY VELOCITY RATIO: 2.3 CM²
AV VALVE AREA: 2.2 CM²
AV VELOCITY RATIO: 0.82
BSA FOR ECHO PROCEDURE: 1.66 M2
CV ECHO LV RWT: 0.44 CM
DOP CALC AO PEAK VEL: 1.7 M/S
DOP CALC AO VTI: 37.8 CM
DOP CALC LVOT AREA: 2.8 CM2
DOP CALC LVOT DIAMETER: 1.9 CM
DOP CALC LVOT PEAK VEL: 1.4 M/S
DOP CALC RVOT PEAK VEL: 0.86 M/S
DOP CALC RVOT VTI: 24.7 CM
DOP CALCLVOT PEAK VEL VTI: 29.2 CM
E WAVE DECELERATION TIME: 155 MSEC
E/A RATIO: 0.75
E/E' RATIO: 6 M/S
ECHO LV POSTERIOR WALL: 0.9 CM (ref 0.6–1.1)
EJECTION FRACTION: 60 %
FRACTIONAL SHORTENING: 29.3 % (ref 28–44)
INTERVENTRICULAR SEPTUM: 0.9 CM (ref 0.6–1.1)
IVRT: 94 MSEC
LA MAJOR: 4 CM
LA MINOR: 3.6 CM
LA WIDTH: 3 CM
LEFT ABI: 1.22
LEFT ARM BP: 128 MMHG
LEFT ATRIUM AREA SYSTOLIC (APICAL 2 CHAMBER): 9.34 CM2
LEFT ATRIUM AREA SYSTOLIC (APICAL 4 CHAMBER): 11.96 CM2
LEFT ATRIUM SIZE: 2.8 CM
LEFT ATRIUM VOLUME INDEX MOD: 13 ML/M2
LEFT ATRIUM VOLUME INDEX: 16 ML/M2
LEFT ATRIUM VOLUME MOD: 22 ML
LEFT ATRIUM VOLUME: 27 CM3
LEFT DORSALIS PEDIS: 144 MMHG
LEFT INTERNAL DIMENSION IN SYSTOLE: 2.9 CM (ref 2.1–4)
LEFT POSTERIOR TIBIAL: 156 MMHG
LEFT TBI: 0.75
LEFT TOE PRESSURE: 96 MMHG
LEFT VENTRICLE DIASTOLIC VOLUME INDEX: 45.18 ML/M2
LEFT VENTRICLE DIASTOLIC VOLUME: 75 ML
LEFT VENTRICLE END SYSTOLIC VOLUME APICAL 2 CHAMBER: 17.08 ML
LEFT VENTRICLE END SYSTOLIC VOLUME APICAL 4 CHAMBER: 27.07 ML
LEFT VENTRICLE MASS INDEX: 68.8 G/M2
LEFT VENTRICLE SYSTOLIC VOLUME INDEX: 19.3 ML/M2
LEFT VENTRICLE SYSTOLIC VOLUME: 32 ML
LEFT VENTRICULAR INTERNAL DIMENSION IN DIASTOLE: 4.1 CM (ref 3.5–6)
LEFT VENTRICULAR MASS: 114.1 G
LV LATERAL E/E' RATIO: 5.3 M/S
LV SEPTAL E/E' RATIO: 7 M/S
LVED V (TEICH): 75.02 ML
LVES V (TEICH): 32.19 ML
LVOT MG: 3.72 MMHG
LVOT MV: 0.89 CM/S
Lab: 1.4 CM/M
Lab: 1.8 CM/M
MV PEAK A VEL: 0.84 M/S
MV PEAK E VEL: 0.63 M/S
MV STENOSIS PRESSURE HALF TIME: 44.87 MS
MV VALVE AREA P 1/2 METHOD: 4.9 CM2
OHS CV CPX PATIENT HEIGHT IN: 64
OHS CV CPX PATIENT HEIGHT IN: 64
OHS CV RV/LV RATIO: 0.61 CM
PISA AR MAX VEL: 4.27 M/S
PISA TR MAX VEL: 1.7 M/S
PULM VEIN S/D RATIO: 1.38
PV MEAN GRADIENT: 2 MMHG
PV PEAK D VEL: 0.5 M/S
PV PEAK GRADIENT: 3 MMHG
PV PEAK S VEL: 0.69 M/S
PV PEAK VELOCITY: 0.95 M/S
RA MAJOR: 3.77 CM
RA PRESSURE ESTIMATED: 3 MMHG
RA WIDTH: 3.1 CM
RIGHT ABI: 1.11
RIGHT ARM BP: 127 MMHG
RIGHT DORSALIS PEDIS: 142 MMHG
RIGHT POSTERIOR TIBIAL: 141 MMHG
RIGHT TBI: 0.75
RIGHT TOE PRESSURE: 96 MMHG
RIGHT VENTRICLE DIASTOLIC BASEL DIMENSION: 2.5 CM
RIGHT VENTRICULAR END-DIASTOLIC DIMENSION: 2.5 CM
RV TB RVSP: 5 MMHG
SINUS: 2.3 CM
STJ: 2.1 CM
TDI LATERAL: 0.12 M/S
TDI SEPTAL: 0.09 M/S
TDI: 0.11 M/S
TR MAX PG: 12 MMHG
TV REST PULMONARY ARTERY PRESSURE: 15 MMHG
Z-SCORE OF LEFT VENTRICULAR DIMENSION IN END DIASTOLE: -1.26
Z-SCORE OF LEFT VENTRICULAR DIMENSION IN END SYSTOLE: 0.05

## 2025-08-11 PROCEDURE — 93922 UPR/L XTREMITY ART 2 LEVELS: CPT

## 2025-08-11 PROCEDURE — 93306 TTE W/DOPPLER COMPLETE: CPT

## 2025-08-11 PROCEDURE — 93306 TTE W/DOPPLER COMPLETE: CPT | Mod: 26,,, | Performed by: INTERNAL MEDICINE

## 2025-08-11 PROCEDURE — 93922 UPR/L XTREMITY ART 2 LEVELS: CPT | Mod: 26,,, | Performed by: INTERNAL MEDICINE

## 2025-08-11 RX ORDER — LENALIDOMIDE 10 MG/1
CAPSULE ORAL
Qty: 21 CAPSULE | Refills: 0 | Status: SHIPPED | OUTPATIENT
Start: 2025-08-11

## 2025-08-12 ENCOUNTER — OFFICE VISIT (OUTPATIENT)
Dept: SURGERY | Facility: CLINIC | Age: 65
End: 2025-08-12
Payer: MEDICARE

## 2025-08-12 ENCOUNTER — DOCUMENTATION ONLY (OUTPATIENT)
Dept: HEMATOLOGY/ONCOLOGY | Facility: CLINIC | Age: 65
End: 2025-08-12
Payer: MEDICARE

## 2025-08-12 VITALS
HEART RATE: 65 BPM | DIASTOLIC BLOOD PRESSURE: 74 MMHG | WEIGHT: 136.88 LBS | BODY MASS INDEX: 23.5 KG/M2 | SYSTOLIC BLOOD PRESSURE: 134 MMHG

## 2025-08-12 DIAGNOSIS — Z48.89 ENCOUNTER FOR POST SURGICAL WOUND CHECK: Primary | ICD-10-CM

## 2025-08-12 DIAGNOSIS — E03.9 PRIMARY HYPOTHYROIDISM: ICD-10-CM

## 2025-08-12 DIAGNOSIS — K82.8 GALLBLADDER SLUDGE: ICD-10-CM

## 2025-08-12 DIAGNOSIS — K81.1 CHRONIC CHOLECYSTITIS: ICD-10-CM

## 2025-08-12 DIAGNOSIS — Z90.49 S/P CHOLECYSTECTOMY: ICD-10-CM

## 2025-08-12 PROCEDURE — 4010F ACE/ARB THERAPY RXD/TAKEN: CPT | Mod: CPTII,S$GLB,,

## 2025-08-12 PROCEDURE — 1157F ADVNC CARE PLAN IN RCRD: CPT | Mod: CPTII,S$GLB,,

## 2025-08-12 PROCEDURE — 3078F DIAST BP <80 MM HG: CPT | Mod: CPTII,S$GLB,,

## 2025-08-12 PROCEDURE — 1159F MED LIST DOCD IN RCRD: CPT | Mod: CPTII,S$GLB,,

## 2025-08-12 PROCEDURE — 99999 PR PBB SHADOW E&M-EST. PATIENT-LVL IV: CPT | Mod: PBBFAC,,,

## 2025-08-12 PROCEDURE — 3075F SYST BP GE 130 - 139MM HG: CPT | Mod: CPTII,S$GLB,,

## 2025-08-12 PROCEDURE — 99024 POSTOP FOLLOW-UP VISIT: CPT | Mod: S$GLB,,,

## 2025-08-12 RX ORDER — LEVOTHYROXINE SODIUM 100 UG/1
100 TABLET ORAL
Qty: 90 TABLET | Refills: 1 | Status: SHIPPED | OUTPATIENT
Start: 2025-08-12

## 2025-08-13 ENCOUNTER — RESULTS FOLLOW-UP (OUTPATIENT)
Dept: CARDIOLOGY | Facility: HOSPITAL | Age: 65
End: 2025-08-13
Payer: MEDICARE

## 2025-08-13 ENCOUNTER — DOCUMENTATION ONLY (OUTPATIENT)
Dept: HEMATOLOGY/ONCOLOGY | Facility: CLINIC | Age: 65
End: 2025-08-13
Payer: MEDICARE

## 2025-08-13 ENCOUNTER — PATIENT MESSAGE (OUTPATIENT)
Dept: CARDIOLOGY | Facility: CLINIC | Age: 65
End: 2025-08-13
Payer: MEDICARE

## 2025-08-18 ENCOUNTER — DOCUMENTATION ONLY (OUTPATIENT)
Dept: HEMATOLOGY/ONCOLOGY | Facility: CLINIC | Age: 65
End: 2025-08-18
Payer: MEDICARE

## 2025-08-18 ENCOUNTER — LAB VISIT (OUTPATIENT)
Dept: LAB | Facility: HOSPITAL | Age: 65
End: 2025-08-18
Attending: INTERNAL MEDICINE
Payer: MEDICARE

## 2025-08-18 ENCOUNTER — OFFICE VISIT (OUTPATIENT)
Dept: INTERNAL MEDICINE | Facility: CLINIC | Age: 65
End: 2025-08-18
Payer: MEDICARE

## 2025-08-18 DIAGNOSIS — F17.210 CIGARETTE NICOTINE DEPENDENCE, UNCOMPLICATED: Primary | Chronic | ICD-10-CM

## 2025-08-18 DIAGNOSIS — E03.9 ACQUIRED HYPOTHYROIDISM: ICD-10-CM

## 2025-08-18 DIAGNOSIS — Z78.0 ASYMPTOMATIC MENOPAUSAL STATE: ICD-10-CM

## 2025-08-18 DIAGNOSIS — R11.0 NAUSEA: ICD-10-CM

## 2025-08-18 DIAGNOSIS — C90.00 MULTIPLE MYELOMA, REMISSION STATUS UNSPECIFIED: ICD-10-CM

## 2025-08-18 DIAGNOSIS — Z12.31 BREAST CANCER SCREENING BY MAMMOGRAM: ICD-10-CM

## 2025-08-18 DIAGNOSIS — C90.00 MULTIPLE MYELOMA NOT HAVING ACHIEVED REMISSION: Chronic | ICD-10-CM

## 2025-08-18 DIAGNOSIS — J30.9 CHRONIC ALLERGIC RHINITIS: ICD-10-CM

## 2025-08-18 DIAGNOSIS — D53.9 MACROCYTIC ANEMIA: ICD-10-CM

## 2025-08-18 DIAGNOSIS — Z12.2 ENCOUNTER FOR SCREENING FOR LUNG CANCER: ICD-10-CM

## 2025-08-18 LAB
ABSOLUTE EOSINOPHIL (OHS): 0.25 K/UL
ABSOLUTE MONOCYTE (OHS): 0.95 K/UL (ref 0.3–1)
ABSOLUTE NEUTROPHIL COUNT (OHS): 1.51 K/UL (ref 1.8–7.7)
ALBUMIN SERPL BCP-MCNC: 3.4 G/DL (ref 3.5–5.2)
ALP SERPL-CCNC: 66 UNIT/L (ref 40–150)
ALT SERPL W/O P-5'-P-CCNC: 38 UNIT/L (ref 10–44)
ANION GAP (OHS): 6 MMOL/L (ref 8–16)
AST SERPL-CCNC: 38 UNIT/L (ref 11–45)
BASOPHILS # BLD AUTO: 0.03 K/UL
BASOPHILS NFR BLD AUTO: 0.6 %
BILIRUB SERPL-MCNC: 0.8 MG/DL (ref 0.1–1)
BUN SERPL-MCNC: 7 MG/DL (ref 8–23)
CALCIUM SERPL-MCNC: 7.9 MG/DL (ref 8.7–10.5)
CHLORIDE SERPL-SCNC: 109 MMOL/L (ref 95–110)
CO2 SERPL-SCNC: 28 MMOL/L (ref 23–29)
CREAT SERPL-MCNC: 1 MG/DL (ref 0.5–1.4)
ERYTHROCYTE [DISTWIDTH] IN BLOOD BY AUTOMATED COUNT: 14.1 % (ref 11.5–14.5)
GFR SERPLBLD CREATININE-BSD FMLA CKD-EPI: >60 ML/MIN/1.73/M2
GLUCOSE SERPL-MCNC: 96 MG/DL (ref 70–110)
HCT VFR BLD AUTO: 36.3 % (ref 37–48.5)
HGB BLD-MCNC: 11.6 GM/DL (ref 12–16)
IMM GRANULOCYTES # BLD AUTO: 0.01 K/UL (ref 0–0.04)
IMM GRANULOCYTES NFR BLD AUTO: 0.2 % (ref 0–0.5)
LYMPHOCYTES # BLD AUTO: 2.45 K/UL (ref 1–4.8)
MCH RBC QN AUTO: 32.8 PG (ref 27–31)
MCHC RBC AUTO-ENTMCNC: 32 G/DL (ref 32–36)
MCV RBC AUTO: 103 FL (ref 82–98)
NUCLEATED RBC (/100WBC) (OHS): 0 /100 WBC
PLATELET # BLD AUTO: 257 K/UL (ref 150–450)
PMV BLD AUTO: 9.7 FL (ref 9.2–12.9)
POTASSIUM SERPL-SCNC: 3.5 MMOL/L (ref 3.5–5.1)
PROT SERPL-MCNC: 6.2 GM/DL (ref 6–8.4)
RBC # BLD AUTO: 3.54 M/UL (ref 4–5.4)
RELATIVE EOSINOPHIL (OHS): 4.8 %
RELATIVE LYMPHOCYTE (OHS): 47.1 % (ref 18–48)
RELATIVE MONOCYTE (OHS): 18.3 % (ref 4–15)
RELATIVE NEUTROPHIL (OHS): 29 % (ref 38–73)
SODIUM SERPL-SCNC: 143 MMOL/L (ref 136–145)
WBC # BLD AUTO: 5.2 K/UL (ref 3.9–12.7)

## 2025-08-18 PROCEDURE — 83521 IG LIGHT CHAINS FREE EACH: CPT

## 2025-08-18 PROCEDURE — G2211 COMPLEX E/M VISIT ADD ON: HCPCS | Mod: 95,,, | Performed by: FAMILY MEDICINE

## 2025-08-18 PROCEDURE — 1157F ADVNC CARE PLAN IN RCRD: CPT | Mod: CPTII,95,, | Performed by: FAMILY MEDICINE

## 2025-08-18 PROCEDURE — 85025 COMPLETE CBC W/AUTO DIFF WBC: CPT

## 2025-08-18 PROCEDURE — 4010F ACE/ARB THERAPY RXD/TAKEN: CPT | Mod: CPTII,95,, | Performed by: FAMILY MEDICINE

## 2025-08-18 PROCEDURE — 36415 COLL VENOUS BLD VENIPUNCTURE: CPT

## 2025-08-18 PROCEDURE — 98006 SYNCH AUDIO-VIDEO EST MOD 30: CPT | Mod: 95,,, | Performed by: FAMILY MEDICINE

## 2025-08-18 PROCEDURE — 80053 COMPREHEN METABOLIC PANEL: CPT

## 2025-08-18 PROCEDURE — 84165 PROTEIN E-PHORESIS SERUM: CPT

## 2025-08-18 RX ORDER — ONDANSETRON 4 MG/1
4 TABLET, FILM COATED ORAL EVERY 8 HOURS PRN
Qty: 30 TABLET | Refills: 5 | Status: SHIPPED | OUTPATIENT
Start: 2025-08-18

## 2025-08-18 RX ORDER — LEVOTHYROXINE SODIUM 100 UG/1
100 TABLET ORAL
Qty: 90 TABLET | Refills: 1 | Status: SHIPPED | OUTPATIENT
Start: 2025-08-18

## 2025-08-18 RX ORDER — MONTELUKAST SODIUM 10 MG/1
10 TABLET ORAL NIGHTLY
Qty: 90 TABLET | Refills: 3 | Status: SHIPPED | OUTPATIENT
Start: 2025-08-18

## 2025-08-19 ENCOUNTER — INFUSION (OUTPATIENT)
Dept: INFUSION THERAPY | Facility: HOSPITAL | Age: 65
End: 2025-08-19
Attending: INTERNAL MEDICINE
Payer: MEDICARE

## 2025-08-19 ENCOUNTER — OFFICE VISIT (OUTPATIENT)
Dept: HEMATOLOGY/ONCOLOGY | Facility: CLINIC | Age: 65
End: 2025-08-19
Payer: MEDICARE

## 2025-08-19 ENCOUNTER — LAB VISIT (OUTPATIENT)
Dept: LAB | Facility: HOSPITAL | Age: 65
End: 2025-08-19
Attending: FAMILY MEDICINE
Payer: MEDICARE

## 2025-08-19 VITALS
SYSTOLIC BLOOD PRESSURE: 112 MMHG | HEART RATE: 59 BPM | BODY MASS INDEX: 23.71 KG/M2 | RESPIRATION RATE: 18 BRPM | WEIGHT: 138.88 LBS | TEMPERATURE: 98 F | OXYGEN SATURATION: 98 % | DIASTOLIC BLOOD PRESSURE: 65 MMHG | HEIGHT: 64 IN

## 2025-08-19 DIAGNOSIS — E03.9 ACQUIRED HYPOTHYROIDISM: ICD-10-CM

## 2025-08-19 DIAGNOSIS — C90.00 MULTIPLE MYELOMA, REMISSION STATUS UNSPECIFIED: Primary | ICD-10-CM

## 2025-08-19 DIAGNOSIS — C90.00 MULTIPLE MYELOMA NOT HAVING ACHIEVED REMISSION: Primary | Chronic | ICD-10-CM

## 2025-08-19 LAB
ALBUMIN, SPE (OHS): 3.36 G/DL (ref 3.35–5.55)
ALPHA 1 GLOB (OHS): 0.29 GM/DL (ref 0.17–0.41)
ALPHA 2 GLOB (OHS): 0.79 GM/DL (ref 0.43–0.99)
BETA GLOB (OHS): 0.64 GM/DL (ref 0.5–1.1)
GAMMA GLOBULIN (OHS): 0.62 GM/DL (ref 0.67–1.58)
PROT SERPL-MCNC: 5.7 GM/DL (ref 6–8.4)
TSH SERPL-ACNC: 2.16 UIU/ML (ref 0.4–4)

## 2025-08-19 PROCEDURE — 99999 PR PBB SHADOW E&M-EST. PATIENT-LVL II: CPT | Mod: PBBFAC,,, | Performed by: INTERNAL MEDICINE

## 2025-08-19 PROCEDURE — 96401 CHEMO ANTI-NEOPL SQ/IM: CPT

## 2025-08-19 PROCEDURE — 63600175 PHARM REV CODE 636 W HCPCS: Mod: JZ,TB | Performed by: INTERNAL MEDICINE

## 2025-08-19 PROCEDURE — 36415 COLL VENOUS BLD VENIPUNCTURE: CPT

## 2025-08-19 PROCEDURE — 84443 ASSAY THYROID STIM HORMONE: CPT

## 2025-08-19 RX ORDER — DIPHENHYDRAMINE HYDROCHLORIDE 50 MG/ML
50 INJECTION, SOLUTION INTRAMUSCULAR; INTRAVENOUS ONCE AS NEEDED
Status: CANCELLED | OUTPATIENT
Start: 2025-08-19 | End: 2037-01-15

## 2025-08-19 RX ORDER — PROCHLORPERAZINE EDISYLATE 5 MG/ML
5 INJECTION INTRAMUSCULAR; INTRAVENOUS ONCE AS NEEDED
Status: CANCELLED | OUTPATIENT
Start: 2025-08-19

## 2025-08-19 RX ORDER — HEPARIN 100 UNIT/ML
500 SYRINGE INTRAVENOUS
Status: CANCELLED | OUTPATIENT
Start: 2025-08-19

## 2025-08-19 RX ORDER — SODIUM CHLORIDE 0.9 % (FLUSH) 0.9 %
10 SYRINGE (ML) INJECTION
Status: CANCELLED | OUTPATIENT
Start: 2025-08-19

## 2025-08-19 RX ORDER — EPINEPHRINE 0.3 MG/.3ML
0.3 INJECTION SUBCUTANEOUS ONCE AS NEEDED
Status: CANCELLED | OUTPATIENT
Start: 2025-08-19 | End: 2037-01-15

## 2025-08-19 RX ADMIN — DARATUMUMAB AND HYALURONIDASE-FIHJ (HUMAN RECOMBINANT) 1800 MG: 1800; 30000 INJECTION SUBCUTANEOUS at 11:08

## 2025-08-20 LAB
KAPPA LC FREE SER-MCNC: 0.85 MG/L (ref 0.26–1.65)
KAPPA LC FREE/LAMBDA FREE SER: 1.13 MG/DL (ref 0.33–1.94)
LAMBDA LC FREE SERPL-MCNC: 1.33 MG/DL (ref 0.57–2.63)
PATHOLOGIST REVIEW - SPE (OHS): NORMAL

## 2025-08-26 ENCOUNTER — OFFICE VISIT (OUTPATIENT)
Dept: SURGERY | Facility: CLINIC | Age: 65
End: 2025-08-26
Payer: MEDICARE

## 2025-08-26 VITALS — HEIGHT: 64 IN | WEIGHT: 136.44 LBS | BODY MASS INDEX: 23.29 KG/M2 | RESPIRATION RATE: 14 BRPM

## 2025-08-26 DIAGNOSIS — Z90.49 S/P CHOLECYSTECTOMY: Primary | ICD-10-CM

## 2025-08-26 DIAGNOSIS — K82.8 GALLBLADDER SLUDGE: ICD-10-CM

## 2025-08-26 DIAGNOSIS — Z48.89 ENCOUNTER FOR POST SURGICAL WOUND CHECK: ICD-10-CM

## 2025-08-26 DIAGNOSIS — K81.1 CHRONIC CHOLECYSTITIS: ICD-10-CM

## 2025-08-26 PROCEDURE — 3288F FALL RISK ASSESSMENT DOCD: CPT | Mod: CPTII,S$GLB,,

## 2025-08-26 PROCEDURE — 1157F ADVNC CARE PLAN IN RCRD: CPT | Mod: CPTII,S$GLB,,

## 2025-08-26 PROCEDURE — 1101F PT FALLS ASSESS-DOCD LE1/YR: CPT | Mod: CPTII,S$GLB,,

## 2025-08-26 PROCEDURE — 99999 PR PBB SHADOW E&M-EST. PATIENT-LVL II: CPT | Mod: PBBFAC,,,

## 2025-08-26 PROCEDURE — 99024 POSTOP FOLLOW-UP VISIT: CPT | Mod: S$GLB,,,

## 2025-08-26 PROCEDURE — 4010F ACE/ARB THERAPY RXD/TAKEN: CPT | Mod: CPTII,S$GLB,,

## 2025-08-27 ENCOUNTER — TELEPHONE (OUTPATIENT)
Dept: NUTRITION | Facility: CLINIC | Age: 65
End: 2025-08-27
Payer: MEDICARE

## 2025-09-02 ENCOUNTER — TELEPHONE (OUTPATIENT)
Dept: PALLIATIVE MEDICINE | Facility: CLINIC | Age: 65
End: 2025-09-02
Payer: MEDICARE

## 2025-09-03 ENCOUNTER — OFFICE VISIT (OUTPATIENT)
Dept: PALLIATIVE MEDICINE | Facility: CLINIC | Age: 65
End: 2025-09-03
Payer: MEDICARE

## 2025-09-03 ENCOUNTER — NUTRITION (OUTPATIENT)
Dept: NUTRITION | Facility: CLINIC | Age: 65
End: 2025-09-03
Payer: MEDICARE

## 2025-09-03 VITALS
WEIGHT: 138.25 LBS | HEART RATE: 66 BPM | HEIGHT: 64 IN | DIASTOLIC BLOOD PRESSURE: 72 MMHG | BODY MASS INDEX: 23.6 KG/M2 | OXYGEN SATURATION: 99 % | TEMPERATURE: 97 F | SYSTOLIC BLOOD PRESSURE: 118 MMHG

## 2025-09-03 DIAGNOSIS — Z51.5 PALLIATIVE CARE ENCOUNTER: Primary | ICD-10-CM

## 2025-09-03 DIAGNOSIS — F41.9 ANXIETY: ICD-10-CM

## 2025-09-03 DIAGNOSIS — G89.3 CANCER RELATED PAIN: ICD-10-CM

## 2025-09-03 DIAGNOSIS — C79.51 SECONDARY MALIGNANT NEOPLASM OF BONE: ICD-10-CM

## 2025-09-03 DIAGNOSIS — C90.00 MULTIPLE MYELOMA, REMISSION STATUS UNSPECIFIED: ICD-10-CM

## 2025-09-03 DIAGNOSIS — C90.00 MULTIPLE MYELOMA NOT HAVING ACHIEVED REMISSION: Primary | Chronic | ICD-10-CM

## 2025-09-03 PROCEDURE — 99417 PROLNG OP E/M EACH 15 MIN: CPT | Mod: S$GLB,,, | Performed by: NURSE PRACTITIONER

## 2025-09-03 PROCEDURE — 3078F DIAST BP <80 MM HG: CPT | Mod: CPTII,S$GLB,, | Performed by: NURSE PRACTITIONER

## 2025-09-03 PROCEDURE — 99215 OFFICE O/P EST HI 40 MIN: CPT | Mod: S$GLB,,, | Performed by: NURSE PRACTITIONER

## 2025-09-03 PROCEDURE — 3288F FALL RISK ASSESSMENT DOCD: CPT | Mod: CPTII,S$GLB,, | Performed by: NURSE PRACTITIONER

## 2025-09-03 PROCEDURE — 3074F SYST BP LT 130 MM HG: CPT | Mod: CPTII,S$GLB,, | Performed by: NURSE PRACTITIONER

## 2025-09-03 PROCEDURE — 1101F PT FALLS ASSESS-DOCD LE1/YR: CPT | Mod: CPTII,S$GLB,, | Performed by: NURSE PRACTITIONER

## 2025-09-03 PROCEDURE — 3008F BODY MASS INDEX DOCD: CPT | Mod: CPTII,S$GLB,, | Performed by: NURSE PRACTITIONER

## 2025-09-03 PROCEDURE — 1123F ACP DISCUSS/DSCN MKR DOCD: CPT | Mod: CPTII,S$GLB,, | Performed by: NURSE PRACTITIONER

## 2025-09-03 PROCEDURE — 99999 PR PBB SHADOW E&M-EST. PATIENT-LVL IV: CPT | Mod: PBBFAC,,, | Performed by: NURSE PRACTITIONER

## 2025-09-03 PROCEDURE — 4010F ACE/ARB THERAPY RXD/TAKEN: CPT | Mod: CPTII,S$GLB,, | Performed by: NURSE PRACTITIONER

## 2025-09-03 PROCEDURE — 1159F MED LIST DOCD IN RCRD: CPT | Mod: CPTII,S$GLB,, | Performed by: NURSE PRACTITIONER

## 2025-09-03 PROCEDURE — 99999 PR PBB SHADOW E&M-EST. PATIENT-LVL II: CPT | Mod: PBBFAC,,,

## 2025-09-03 PROCEDURE — 1160F RVW MEDS BY RX/DR IN RCRD: CPT | Mod: CPTII,S$GLB,, | Performed by: NURSE PRACTITIONER

## 2025-09-03 RX ORDER — ALPRAZOLAM 2 MG/1
2 TABLET ORAL 2 TIMES DAILY PRN
Qty: 60 TABLET | Refills: 2 | Status: SHIPPED | OUTPATIENT
Start: 2025-09-03 | End: 2025-12-02

## 2025-09-03 RX ORDER — OXYCODONE AND ACETAMINOPHEN 7.5; 325 MG/1; MG/1
1 TABLET ORAL EVERY 6 HOURS PRN
Qty: 60 TABLET | Refills: 0 | Status: SHIPPED | OUTPATIENT
Start: 2025-09-03 | End: 2025-10-03

## 2025-09-03 RX ORDER — METHOCARBAMOL 500 MG/1
500 TABLET, FILM COATED ORAL 4 TIMES DAILY PRN
Qty: 40 TABLET | Refills: 0 | Status: SHIPPED | OUTPATIENT
Start: 2025-09-03 | End: 2025-09-15

## 2025-09-03 RX ORDER — LANOLIN ALCOHOL/MO/W.PET/CERES
400 CREAM (GRAM) TOPICAL DAILY
COMMUNITY
Start: 2025-07-08

## 2025-09-04 ENCOUNTER — OFFICE VISIT (OUTPATIENT)
Dept: ALLERGY | Facility: CLINIC | Age: 65
End: 2025-09-04
Payer: MEDICARE

## 2025-09-04 VITALS
DIASTOLIC BLOOD PRESSURE: 78 MMHG | SYSTOLIC BLOOD PRESSURE: 130 MMHG | OXYGEN SATURATION: 99 % | BODY MASS INDEX: 23.45 KG/M2 | WEIGHT: 137.38 LBS | HEIGHT: 64 IN | HEART RATE: 62 BPM

## 2025-09-04 DIAGNOSIS — J45.50 SEVERE PERSISTENT ASTHMA WITHOUT COMPLICATION: Primary | ICD-10-CM

## 2025-09-04 DIAGNOSIS — J44.9 CHRONIC OBSTRUCTIVE PULMONARY DISEASE, UNSPECIFIED COPD TYPE: ICD-10-CM

## 2025-09-04 DIAGNOSIS — Z91.09 ENVIRONMENTAL ALLERGIES: Chronic | ICD-10-CM

## 2025-09-04 DIAGNOSIS — J30.9 CHRONIC ALLERGIC RHINITIS: ICD-10-CM

## 2025-09-04 PROCEDURE — 3075F SYST BP GE 130 - 139MM HG: CPT | Mod: CPTII,S$GLB,, | Performed by: STUDENT IN AN ORGANIZED HEALTH CARE EDUCATION/TRAINING PROGRAM

## 2025-09-04 PROCEDURE — G2211 COMPLEX E/M VISIT ADD ON: HCPCS | Mod: S$GLB,,, | Performed by: STUDENT IN AN ORGANIZED HEALTH CARE EDUCATION/TRAINING PROGRAM

## 2025-09-04 PROCEDURE — 3008F BODY MASS INDEX DOCD: CPT | Mod: CPTII,S$GLB,, | Performed by: STUDENT IN AN ORGANIZED HEALTH CARE EDUCATION/TRAINING PROGRAM

## 2025-09-04 PROCEDURE — 4010F ACE/ARB THERAPY RXD/TAKEN: CPT | Mod: CPTII,S$GLB,, | Performed by: STUDENT IN AN ORGANIZED HEALTH CARE EDUCATION/TRAINING PROGRAM

## 2025-09-04 PROCEDURE — 1159F MED LIST DOCD IN RCRD: CPT | Mod: CPTII,S$GLB,, | Performed by: STUDENT IN AN ORGANIZED HEALTH CARE EDUCATION/TRAINING PROGRAM

## 2025-09-04 PROCEDURE — 99999 PR PBB SHADOW E&M-EST. PATIENT-LVL V: CPT | Mod: PBBFAC,,, | Performed by: STUDENT IN AN ORGANIZED HEALTH CARE EDUCATION/TRAINING PROGRAM

## 2025-09-04 PROCEDURE — 1160F RVW MEDS BY RX/DR IN RCRD: CPT | Mod: CPTII,S$GLB,, | Performed by: STUDENT IN AN ORGANIZED HEALTH CARE EDUCATION/TRAINING PROGRAM

## 2025-09-04 PROCEDURE — 1101F PT FALLS ASSESS-DOCD LE1/YR: CPT | Mod: CPTII,S$GLB,, | Performed by: STUDENT IN AN ORGANIZED HEALTH CARE EDUCATION/TRAINING PROGRAM

## 2025-09-04 PROCEDURE — 3078F DIAST BP <80 MM HG: CPT | Mod: CPTII,S$GLB,, | Performed by: STUDENT IN AN ORGANIZED HEALTH CARE EDUCATION/TRAINING PROGRAM

## 2025-09-04 PROCEDURE — 1157F ADVNC CARE PLAN IN RCRD: CPT | Mod: CPTII,S$GLB,, | Performed by: STUDENT IN AN ORGANIZED HEALTH CARE EDUCATION/TRAINING PROGRAM

## 2025-09-04 PROCEDURE — 3288F FALL RISK ASSESSMENT DOCD: CPT | Mod: CPTII,S$GLB,, | Performed by: STUDENT IN AN ORGANIZED HEALTH CARE EDUCATION/TRAINING PROGRAM

## 2025-09-04 PROCEDURE — 99215 OFFICE O/P EST HI 40 MIN: CPT | Mod: S$GLB,,, | Performed by: STUDENT IN AN ORGANIZED HEALTH CARE EDUCATION/TRAINING PROGRAM

## 2025-09-04 RX ORDER — FLUTICASONE PROPIONATE 50 MCG
1 SPRAY, SUSPENSION (ML) NASAL 2 TIMES DAILY
Qty: 16 G | Refills: 11 | Status: SHIPPED | OUTPATIENT
Start: 2025-09-04 | End: 2026-09-04

## 2025-09-04 RX ORDER — MONTELUKAST SODIUM 10 MG/1
10 TABLET ORAL NIGHTLY
Qty: 90 TABLET | Refills: 3 | Status: SHIPPED | OUTPATIENT
Start: 2025-09-04 | End: 2025-09-04

## 2025-09-04 RX ORDER — AZELASTINE 1 MG/ML
1 SPRAY, METERED NASAL 2 TIMES DAILY
Qty: 30 ML | Refills: 3 | Status: SHIPPED | OUTPATIENT
Start: 2025-09-04 | End: 2026-09-04

## 2025-09-04 RX ORDER — ALBUTEROL SULFATE 90 UG/1
2 INHALANT RESPIRATORY (INHALATION) EVERY 4 HOURS PRN
Qty: 18 G | Refills: 11 | Status: SHIPPED | OUTPATIENT
Start: 2025-09-04 | End: 2025-09-04

## 2025-09-04 RX ORDER — MONTELUKAST SODIUM 10 MG/1
10 TABLET ORAL NIGHTLY
Qty: 90 TABLET | Refills: 3 | Status: SHIPPED | OUTPATIENT
Start: 2025-09-04

## 2025-09-04 RX ORDER — FLUTICASONE PROPIONATE 50 MCG
1 SPRAY, SUSPENSION (ML) NASAL 2 TIMES DAILY
Qty: 16 G | Refills: 11 | Status: SHIPPED | OUTPATIENT
Start: 2025-09-04 | End: 2025-09-04

## 2025-09-04 RX ORDER — IPRATROPIUM BROMIDE 42 UG/1
2 SPRAY, METERED NASAL 4 TIMES DAILY PRN
Qty: 15 ML | Refills: 11 | Status: SHIPPED | OUTPATIENT
Start: 2025-09-04 | End: 2026-09-04

## 2025-09-04 RX ORDER — AZELASTINE 1 MG/ML
1 SPRAY, METERED NASAL 2 TIMES DAILY
Qty: 30 ML | Refills: 3 | Status: SHIPPED | OUTPATIENT
Start: 2025-09-04 | End: 2025-09-04

## 2025-09-04 RX ORDER — MEPOLIZUMAB 100 MG/ML
100 INJECTION, SOLUTION SUBCUTANEOUS
Qty: 1 ML | Refills: 11 | Status: SHIPPED | OUTPATIENT
Start: 2025-09-04 | End: 2025-09-04

## 2025-09-04 RX ORDER — IPRATROPIUM BROMIDE 42 UG/1
2 SPRAY, METERED NASAL 4 TIMES DAILY PRN
Qty: 30 ML | Refills: 11 | Status: SHIPPED | OUTPATIENT
Start: 2025-09-04 | End: 2025-09-04

## 2025-09-04 RX ORDER — ALBUTEROL SULFATE 90 UG/1
2 INHALANT RESPIRATORY (INHALATION) EVERY 4 HOURS PRN
Qty: 18 G | Refills: 11 | Status: SHIPPED | OUTPATIENT
Start: 2025-09-04 | End: 2026-09-04

## 2025-09-04 RX ORDER — MEPOLIZUMAB 100 MG/ML
100 INJECTION, SOLUTION SUBCUTANEOUS
Qty: 1 ML | Refills: 11 | Status: ACTIVE | OUTPATIENT
Start: 2025-09-04 | End: 2026-09-04

## (undated) DEVICE — SUT MONOCRYL 4.0 PS2 CP496G

## (undated) DEVICE — Device

## (undated) DEVICE — UNDERGLOVE BIOGEL PI SZ 6.5 LF

## (undated) DEVICE — POSITIONER HEAD DONUT 9IN FOAM

## (undated) DEVICE — GLOVE BIOGEL ECLIPSE SZ 8

## (undated) DEVICE — NDL PNEUMO INSUFFLATI 120MM

## (undated) DEVICE — GOWN POLY REINF BRTH SLV XL

## (undated) DEVICE — OBTURATOR BLADELESS 8MM XI CLR

## (undated) DEVICE — GLOVE ATTENATON RADIATION 8 1

## (undated) DEVICE — DRAPE THREE-QTR REINF 53X77IN

## (undated) DEVICE — GAUZE WOVEN STRL 12-PLY 4X4IN

## (undated) DEVICE — ALCOHOL 70% ISOP RUBBING 4OZ

## (undated) DEVICE — GLOVE SURGICAL LATEX SZ 7

## (undated) DEVICE — ADHESIVE DERMABOND ADVANCED

## (undated) DEVICE — BAG TISSUE RETRIEVAL 5MM

## (undated) DEVICE — DRAPE SURG W/TWL 17 5/8X23

## (undated) DEVICE — APPLICATOR CHLORAPREP ORN 26ML

## (undated) DEVICE — SYR 3CC LUER LOC

## (undated) DEVICE — CLIP HEMO-LOK ML

## (undated) DEVICE — COVER LIGHT HANDLE 80/CA

## (undated) DEVICE — SYR 10CC LUER LOCK

## (undated) DEVICE — MANIFOLD 4 PORT

## (undated) DEVICE — PACK BASIC SETUP SC BR

## (undated) DEVICE — NDL SPINAL SPINOCAN 22GX3.5

## (undated) DEVICE — KIT ANTIFOG

## (undated) DEVICE — ELECTRODE REM PLYHSV RETURN 9

## (undated) DEVICE — REMOVER LOTION

## (undated) DEVICE — DRAPE LAP T SHT W/ INSTR PAD

## (undated) DEVICE — DRAPE COLUMN DAVINCI XI

## (undated) DEVICE — SUPPORT ULNA NERVE PROTECTOR

## (undated) DEVICE — DRAPE INCISE IOBAN 2 23X17IN

## (undated) DEVICE — SET PNEUMOCLEAR HEAT HUM SE HF

## (undated) DEVICE — DRAPE MOBILE C-ARM

## (undated) DEVICE — UNDERGLOVES BIOGEL PI SZ 7 LF

## (undated) DEVICE — PROBE OSTEOCOOL 2 RFA 17G 10MM

## (undated) DEVICE — SEAL UNIVERSAL 5MM-8MM XI

## (undated) DEVICE — KIT TURNOVER

## (undated) DEVICE — KYPHON CEMENT DELIVERY SYSTEM

## (undated) DEVICE — CARTRIDGE KYPHON CEMENT DEL

## (undated) DEVICE — DURAPREP SURG SCRUB 26ML

## (undated) DEVICE — COVER TIP CURVED SCISSORS XI

## (undated) DEVICE — DRAPE ABDOMINAL TIBURON 14X11

## (undated) DEVICE — DRAPE ARM DAVINCI XI

## (undated) DEVICE — KIT KYPHOPAK KYPHOPLASTY FX

## (undated) DEVICE — ADHESIVE MASTISOL VIAL 48/BX

## (undated) DEVICE — UNDERGLOVES BIOGEL PI SIZE 8